# Patient Record
Sex: FEMALE | Race: WHITE | NOT HISPANIC OR LATINO | Employment: FULL TIME | URBAN - METROPOLITAN AREA
[De-identification: names, ages, dates, MRNs, and addresses within clinical notes are randomized per-mention and may not be internally consistent; named-entity substitution may affect disease eponyms.]

---

## 2017-09-27 PROCEDURE — 99284 EMERGENCY DEPT VISIT MOD MDM: CPT

## 2017-09-28 ENCOUNTER — HOSPITAL ENCOUNTER (INPATIENT)
Facility: HOSPITAL | Age: 37
LOS: 3 days | Discharge: HOME | End: 2017-10-01
Attending: EMERGENCY MEDICINE | Admitting: INTERNAL MEDICINE

## 2017-09-28 ENCOUNTER — APPOINTMENT (OUTPATIENT)
Dept: ULTRASOUND IMAGING | Facility: HOSPITAL | Age: 37
End: 2017-09-28
Attending: INTERNAL MEDICINE

## 2017-09-28 ENCOUNTER — APPOINTMENT (OUTPATIENT)
Dept: CT IMAGING | Facility: HOSPITAL | Age: 37
End: 2017-09-28

## 2017-09-28 DIAGNOSIS — K85.20 ALCOHOL-INDUCED ACUTE PANCREATITIS WITHOUT INFECTION OR NECROSIS: Primary | ICD-10-CM

## 2017-09-28 DIAGNOSIS — F10.929 ACUTE ALCOHOL INTOXICATION, WITH UNSPECIFIED COMPLICATION (HCC): ICD-10-CM

## 2017-09-28 PROBLEM — F10.10 ALCOHOL ABUSE: Status: ACTIVE | Noted: 2017-09-28

## 2017-09-28 PROBLEM — E87.6 HYPOKALEMIA: Status: ACTIVE | Noted: 2017-09-28

## 2017-09-28 PROBLEM — E86.0 DEHYDRATION: Status: ACTIVE | Noted: 2017-09-28

## 2017-09-28 PROBLEM — E87.4 MIXED ACID BASE BALANCE DISORDER: Status: ACTIVE | Noted: 2017-09-28

## 2017-09-28 PROBLEM — R79.89 ELEVATED LFTS: Status: ACTIVE | Noted: 2017-09-28

## 2017-09-28 LAB
ALBUMIN SERPL-MCNC: 4.5 G/DL (ref 3.5–5.2)
ALBUMIN/GLOB SERPL: 1.3 G/DL
ALP SERPL-CCNC: 150 U/L (ref 39–117)
ALT SERPL W P-5'-P-CCNC: 154 U/L (ref 1–33)
ANION GAP SERPL CALCULATED.3IONS-SCNC: 15.8 MMOL/L
AST SERPL-CCNC: 203 U/L (ref 1–32)
BASOPHILS # BLD AUTO: 0.04 10*3/MM3 (ref 0–0.2)
BASOPHILS NFR BLD AUTO: 0.8 % (ref 0–1.5)
BILIRUB SERPL-MCNC: 1 MG/DL (ref 0.1–1.2)
BILIRUB UR QL STRIP: NEGATIVE
BILIRUB UR QL STRIP: NEGATIVE
BUN BLD-MCNC: 3 MG/DL (ref 6–20)
BUN/CREAT SERPL: 4.9 (ref 7–25)
CALCIUM SPEC-SCNC: 9.3 MG/DL (ref 8.6–10.5)
CHLORIDE SERPL-SCNC: 99 MMOL/L (ref 98–107)
CLARITY UR: CLEAR
CLARITY UR: CLEAR
CO2 SERPL-SCNC: 30.2 MMOL/L (ref 22–29)
COLOR UR: ABNORMAL
COLOR UR: YELLOW
CREAT BLD-MCNC: 0.61 MG/DL (ref 0.57–1)
DEPRECATED RDW RBC AUTO: 64.6 FL (ref 37–54)
EOSINOPHIL # BLD AUTO: 0.04 10*3/MM3 (ref 0–0.7)
EOSINOPHIL NFR BLD AUTO: 0.8 % (ref 0.3–6.2)
ERYTHROCYTE [DISTWIDTH] IN BLOOD BY AUTOMATED COUNT: 16 % (ref 11.7–13)
ETHANOL BLD-MCNC: 381 MG/DL (ref 0–10)
ETHANOL UR QL: 0.38 %
GFR SERPL CREATININE-BSD FRML MDRD: 112 ML/MIN/1.73
GLOBULIN UR ELPH-MCNC: 3.5 GM/DL
GLUCOSE BLD-MCNC: 92 MG/DL (ref 65–99)
GLUCOSE UR STRIP-MCNC: NEGATIVE MG/DL
GLUCOSE UR STRIP-MCNC: NEGATIVE MG/DL
HCG SERPL QL: NEGATIVE
HCT VFR BLD AUTO: 44 % (ref 35.6–45.5)
HGB BLD-MCNC: 16 G/DL (ref 11.9–15.5)
HGB UR QL STRIP.AUTO: NEGATIVE
HGB UR QL STRIP.AUTO: NEGATIVE
HOLD SPECIMEN: NORMAL
HOLD SPECIMEN: NORMAL
IMM GRANULOCYTES # BLD: 0 10*3/MM3 (ref 0–0.03)
IMM GRANULOCYTES NFR BLD: 0 % (ref 0–0.5)
KETONES UR QL STRIP: NEGATIVE
KETONES UR QL STRIP: NEGATIVE
LEUKOCYTE ESTERASE UR QL STRIP.AUTO: NEGATIVE
LEUKOCYTE ESTERASE UR QL STRIP.AUTO: NEGATIVE
LIPASE SERPL-CCNC: 133 U/L (ref 13–60)
LYMPHOCYTES # BLD AUTO: 2.8 10*3/MM3 (ref 0.9–4.8)
LYMPHOCYTES NFR BLD AUTO: 57.3 % (ref 19.6–45.3)
MAGNESIUM SERPL-MCNC: 1.4 MG/DL (ref 1.6–2.6)
MCH RBC QN AUTO: 39.9 PG (ref 26.9–32)
MCHC RBC AUTO-ENTMCNC: 36.4 G/DL (ref 32.4–36.3)
MCV RBC AUTO: 109.7 FL (ref 80.5–98.2)
MONOCYTES # BLD AUTO: 0.47 10*3/MM3 (ref 0.2–1.2)
MONOCYTES NFR BLD AUTO: 9.6 % (ref 5–12)
NEUTROPHILS # BLD AUTO: 1.54 10*3/MM3 (ref 1.9–8.1)
NEUTROPHILS NFR BLD AUTO: 31.5 % (ref 42.7–76)
NITRITE UR QL STRIP: NEGATIVE
NITRITE UR QL STRIP: NEGATIVE
PH UR STRIP.AUTO: 5.5 [PH] (ref 5–8)
PH UR STRIP.AUTO: 6.5 [PH] (ref 5–8)
PLATELET # BLD AUTO: 165 10*3/MM3 (ref 140–500)
PMV BLD AUTO: 10.7 FL (ref 6–12)
POTASSIUM BLD-SCNC: 3.1 MMOL/L (ref 3.5–5.2)
PROT SERPL-MCNC: 8 G/DL (ref 6–8.5)
PROT UR QL STRIP: NEGATIVE
PROT UR QL STRIP: NEGATIVE
RBC # BLD AUTO: 4.01 10*6/MM3 (ref 3.9–5.2)
SODIUM BLD-SCNC: 145 MMOL/L (ref 136–145)
SP GR UR STRIP: 1.01 (ref 1–1.03)
SP GR UR STRIP: 1.01 (ref 1–1.03)
UROBILINOGEN UR QL STRIP: ABNORMAL
UROBILINOGEN UR QL STRIP: NORMAL
WBC NRBC COR # BLD: 4.89 10*3/MM3 (ref 4.5–10.7)
WHOLE BLOOD HOLD SPECIMEN: NORMAL
WHOLE BLOOD HOLD SPECIMEN: NORMAL

## 2017-09-28 PROCEDURE — 80307 DRUG TEST PRSMV CHEM ANLYZR: CPT | Performed by: EMERGENCY MEDICINE

## 2017-09-28 PROCEDURE — 81003 URINALYSIS AUTO W/O SCOPE: CPT | Performed by: EMERGENCY MEDICINE

## 2017-09-28 PROCEDURE — 81003 URINALYSIS AUTO W/O SCOPE: CPT | Performed by: INTERNAL MEDICINE

## 2017-09-28 PROCEDURE — 25010000002 MAGNESIUM SULFATE IN D5W 1G/100ML (PREMIX) 1-5 GM/100ML-% SOLUTION: Performed by: INTERNAL MEDICINE

## 2017-09-28 PROCEDURE — 25010000002 ONDANSETRON PER 1 MG: Performed by: INTERNAL MEDICINE

## 2017-09-28 PROCEDURE — 83735 ASSAY OF MAGNESIUM: CPT | Performed by: INTERNAL MEDICINE

## 2017-09-28 PROCEDURE — 25010000002 ONDANSETRON PER 1 MG: Performed by: EMERGENCY MEDICINE

## 2017-09-28 PROCEDURE — 25010000002 ENOXAPARIN PER 10 MG: Performed by: INTERNAL MEDICINE

## 2017-09-28 PROCEDURE — 25010000002 THIAMINE PER 100 MG: Performed by: INTERNAL MEDICINE

## 2017-09-28 PROCEDURE — 25010000002 MAGNESIUM SULFATE PER 500 MG OF MAGNESIUM: Performed by: INTERNAL MEDICINE

## 2017-09-28 PROCEDURE — 74176 CT ABD & PELVIS W/O CONTRAST: CPT

## 2017-09-28 PROCEDURE — 85025 COMPLETE CBC W/AUTO DIFF WBC: CPT | Performed by: EMERGENCY MEDICINE

## 2017-09-28 PROCEDURE — 80053 COMPREHEN METABOLIC PANEL: CPT | Performed by: EMERGENCY MEDICINE

## 2017-09-28 PROCEDURE — 25010000002 HYDROMORPHONE PER 4 MG: Performed by: EMERGENCY MEDICINE

## 2017-09-28 PROCEDURE — 25010000002 MAGNESIUM SULFATE PER 500 MG OF MAGNESIUM: Performed by: EMERGENCY MEDICINE

## 2017-09-28 PROCEDURE — 83690 ASSAY OF LIPASE: CPT | Performed by: EMERGENCY MEDICINE

## 2017-09-28 PROCEDURE — 25010000002 THIAMINE PER 100 MG: Performed by: EMERGENCY MEDICINE

## 2017-09-28 PROCEDURE — 76775 US EXAM ABDO BACK WALL LIM: CPT

## 2017-09-28 PROCEDURE — 84703 CHORIONIC GONADOTROPIN ASSAY: CPT | Performed by: EMERGENCY MEDICINE

## 2017-09-28 PROCEDURE — 80048 BASIC METABOLIC PNL TOTAL CA: CPT | Performed by: INTERNAL MEDICINE

## 2017-09-28 RX ORDER — HYDROMORPHONE HYDROCHLORIDE 1 MG/ML
0.5 INJECTION, SOLUTION INTRAMUSCULAR; INTRAVENOUS; SUBCUTANEOUS ONCE
Status: COMPLETED | OUTPATIENT
Start: 2017-09-28 | End: 2017-09-28

## 2017-09-28 RX ORDER — POTASSIUM CHLORIDE 1.5 G/1.77G
40 POWDER, FOR SOLUTION ORAL AS NEEDED
Status: DISCONTINUED | OUTPATIENT
Start: 2017-09-28 | End: 2017-10-01 | Stop reason: HOSPADM

## 2017-09-28 RX ORDER — LORAZEPAM 2 MG/ML
2 INJECTION INTRAMUSCULAR
Status: DISCONTINUED | OUTPATIENT
Start: 2017-09-28 | End: 2017-10-01 | Stop reason: HOSPADM

## 2017-09-28 RX ORDER — MAGNESIUM SULFATE 1 G/100ML
2 INJECTION INTRAVENOUS ONCE
Status: COMPLETED | OUTPATIENT
Start: 2017-09-28 | End: 2017-09-28

## 2017-09-28 RX ORDER — UBIDECARENONE 75 MG
50 CAPSULE ORAL DAILY
Status: ON HOLD | COMMUNITY
End: 2022-12-01

## 2017-09-28 RX ORDER — NITROGLYCERIN 0.4 MG/1
0.4 TABLET SUBLINGUAL
Status: DISCONTINUED | OUTPATIENT
Start: 2017-09-28 | End: 2017-10-01 | Stop reason: HOSPADM

## 2017-09-28 RX ORDER — ONDANSETRON 2 MG/ML
4 INJECTION INTRAMUSCULAR; INTRAVENOUS ONCE
Status: COMPLETED | OUTPATIENT
Start: 2017-09-28 | End: 2017-09-28

## 2017-09-28 RX ORDER — POTASSIUM CHLORIDE 7.45 MG/ML
10 INJECTION INTRAVENOUS
Status: DISCONTINUED | OUTPATIENT
Start: 2017-09-28 | End: 2017-10-01 | Stop reason: HOSPADM

## 2017-09-28 RX ORDER — OXYCODONE HYDROCHLORIDE AND ACETAMINOPHEN 5; 325 MG/1; MG/1
1 TABLET ORAL EVERY 4 HOURS PRN
Status: DISCONTINUED | OUTPATIENT
Start: 2017-09-28 | End: 2017-10-01 | Stop reason: HOSPADM

## 2017-09-28 RX ORDER — CALCIUM CARBONATE 200(500)MG
2 TABLET,CHEWABLE ORAL 2 TIMES DAILY PRN
Status: DISCONTINUED | OUTPATIENT
Start: 2017-09-28 | End: 2017-10-01 | Stop reason: HOSPADM

## 2017-09-28 RX ORDER — MAGNESIUM SULFATE 1 G/100ML
1 INJECTION INTRAVENOUS ONCE
Status: COMPLETED | OUTPATIENT
Start: 2017-09-28 | End: 2017-09-28

## 2017-09-28 RX ORDER — LORAZEPAM 2 MG/ML
1 INJECTION INTRAMUSCULAR
Status: DISCONTINUED | OUTPATIENT
Start: 2017-09-28 | End: 2017-10-01 | Stop reason: HOSPADM

## 2017-09-28 RX ORDER — SODIUM CHLORIDE 0.9 % (FLUSH) 0.9 %
1-10 SYRINGE (ML) INJECTION AS NEEDED
Status: DISCONTINUED | OUTPATIENT
Start: 2017-09-28 | End: 2017-10-01 | Stop reason: HOSPADM

## 2017-09-28 RX ORDER — SODIUM CHLORIDE 0.9 % (FLUSH) 0.9 %
10 SYRINGE (ML) INJECTION AS NEEDED
Status: DISCONTINUED | OUTPATIENT
Start: 2017-09-28 | End: 2017-10-01 | Stop reason: HOSPADM

## 2017-09-28 RX ORDER — LORAZEPAM 1 MG/1
2 TABLET ORAL
Status: DISCONTINUED | OUTPATIENT
Start: 2017-09-28 | End: 2017-10-01 | Stop reason: HOSPADM

## 2017-09-28 RX ORDER — ONDANSETRON 2 MG/ML
4 INJECTION INTRAMUSCULAR; INTRAVENOUS EVERY 6 HOURS PRN
Status: DISCONTINUED | OUTPATIENT
Start: 2017-09-28 | End: 2017-10-01 | Stop reason: HOSPADM

## 2017-09-28 RX ORDER — NALOXONE HCL 0.4 MG/ML
0.4 VIAL (ML) INJECTION
Status: DISCONTINUED | OUTPATIENT
Start: 2017-09-28 | End: 2017-10-01 | Stop reason: HOSPADM

## 2017-09-28 RX ORDER — ACETAMINOPHEN 325 MG/1
650 TABLET ORAL EVERY 4 HOURS PRN
Status: DISCONTINUED | OUTPATIENT
Start: 2017-09-28 | End: 2017-10-01 | Stop reason: HOSPADM

## 2017-09-28 RX ORDER — CALCIUM CARBONATE 200(500)MG
2 TABLET,CHEWABLE ORAL AS NEEDED
Status: ON HOLD | COMMUNITY
End: 2022-08-07

## 2017-09-28 RX ORDER — ONDANSETRON 4 MG/1
4 TABLET, FILM COATED ORAL EVERY 6 HOURS PRN
Status: DISCONTINUED | OUTPATIENT
Start: 2017-09-28 | End: 2017-10-01 | Stop reason: HOSPADM

## 2017-09-28 RX ORDER — HYDROMORPHONE HYDROCHLORIDE 1 MG/ML
0.5 INJECTION, SOLUTION INTRAMUSCULAR; INTRAVENOUS; SUBCUTANEOUS
Status: DISCONTINUED | OUTPATIENT
Start: 2017-09-28 | End: 2017-10-01 | Stop reason: HOSPADM

## 2017-09-28 RX ORDER — ONDANSETRON 4 MG/1
4 TABLET, ORALLY DISINTEGRATING ORAL EVERY 6 HOURS PRN
Status: DISCONTINUED | OUTPATIENT
Start: 2017-09-28 | End: 2017-10-01 | Stop reason: HOSPADM

## 2017-09-28 RX ORDER — LORAZEPAM 1 MG/1
1 TABLET ORAL
Status: DISCONTINUED | OUTPATIENT
Start: 2017-09-28 | End: 2017-10-01 | Stop reason: HOSPADM

## 2017-09-28 RX ORDER — POTASSIUM CHLORIDE 750 MG/1
40 CAPSULE, EXTENDED RELEASE ORAL AS NEEDED
Status: DISCONTINUED | OUTPATIENT
Start: 2017-09-28 | End: 2017-10-01 | Stop reason: HOSPADM

## 2017-09-28 RX ADMIN — ONDANSETRON 4 MG: 2 INJECTION INTRAMUSCULAR; INTRAVENOUS at 14:10

## 2017-09-28 RX ADMIN — HYDROMORPHONE HYDROCHLORIDE 0.5 MG: 1 INJECTION, SOLUTION INTRAMUSCULAR; INTRAVENOUS; SUBCUTANEOUS at 03:28

## 2017-09-28 RX ADMIN — LORAZEPAM 2 MG: 1 TABLET ORAL at 14:10

## 2017-09-28 RX ADMIN — MAGNESIUM SULFATE HEPTAHYDRATE 1 G: 1 INJECTION, SOLUTION INTRAVENOUS at 17:47

## 2017-09-28 RX ADMIN — ENOXAPARIN SODIUM 40 MG: 40 INJECTION SUBCUTANEOUS at 13:46

## 2017-09-28 RX ADMIN — LORAZEPAM 2 MG: 1 TABLET ORAL at 22:57

## 2017-09-28 RX ADMIN — ONDANSETRON 4 MG: 2 INJECTION INTRAMUSCULAR; INTRAVENOUS at 03:28

## 2017-09-28 RX ADMIN — OXYCODONE HYDROCHLORIDE AND ACETAMINOPHEN 1 TABLET: 5; 325 TABLET ORAL at 22:57

## 2017-09-28 RX ADMIN — MAGNESIUM SULFATE HEPTAHYDRATE 1 G: 1 INJECTION, SOLUTION INTRAVENOUS at 12:05

## 2017-09-28 RX ADMIN — MAGNESIUM SULFATE HEPTAHYDRATE 125 ML/HR: 500 INJECTION, SOLUTION INTRAMUSCULAR; INTRAVENOUS at 04:53

## 2017-09-28 RX ADMIN — SODIUM CHLORIDE 1000 ML: 9 INJECTION, SOLUTION INTRAVENOUS at 03:20

## 2017-09-28 RX ADMIN — FOLIC ACID 100 ML/HR: 5 INJECTION, SOLUTION INTRAMUSCULAR; INTRAVENOUS; SUBCUTANEOUS at 17:47

## 2017-09-28 RX ADMIN — OXYCODONE HYDROCHLORIDE AND ACETAMINOPHEN 1 TABLET: 5; 325 TABLET ORAL at 13:47

## 2017-09-29 LAB
ANION GAP SERPL CALCULATED.3IONS-SCNC: 22.9 MMOL/L
ANION GAP SERPL CALCULATED.3IONS-SCNC: 9.6 MMOL/L
BASOPHILS # BLD AUTO: 0.01 10*3/MM3 (ref 0–0.2)
BASOPHILS NFR BLD AUTO: 0.3 % (ref 0–1.5)
BUN BLD-MCNC: 2 MG/DL (ref 6–20)
BUN BLD-MCNC: 3 MG/DL (ref 6–20)
BUN/CREAT SERPL: 4.4 (ref 7–25)
BUN/CREAT SERPL: 4.9 (ref 7–25)
CALCIUM SPEC-SCNC: 8.6 MG/DL (ref 8.6–10.5)
CALCIUM SPEC-SCNC: 8.8 MG/DL (ref 8.6–10.5)
CHLORIDE SERPL-SCNC: 98 MMOL/L (ref 98–107)
CHLORIDE SERPL-SCNC: 98 MMOL/L (ref 98–107)
CO2 SERPL-SCNC: 24.1 MMOL/L (ref 22–29)
CO2 SERPL-SCNC: 30.4 MMOL/L (ref 22–29)
CREAT BLD-MCNC: 0.45 MG/DL (ref 0.57–1)
CREAT BLD-MCNC: 0.61 MG/DL (ref 0.57–1)
DEPRECATED RDW RBC AUTO: 62.2 FL (ref 37–54)
EOSINOPHIL # BLD AUTO: 0.05 10*3/MM3 (ref 0–0.7)
EOSINOPHIL NFR BLD AUTO: 1.6 % (ref 0.3–6.2)
ERYTHROCYTE [DISTWIDTH] IN BLOOD BY AUTOMATED COUNT: 15.3 % (ref 11.7–13)
GFR SERPL CREATININE-BSD FRML MDRD: 112 ML/MIN/1.73
GFR SERPL CREATININE-BSD FRML MDRD: >150 ML/MIN/1.73
GLUCOSE BLD-MCNC: 121 MG/DL (ref 65–99)
GLUCOSE BLD-MCNC: 96 MG/DL (ref 65–99)
HCT VFR BLD AUTO: 35.6 % (ref 35.6–45.5)
HGB BLD-MCNC: 12.3 G/DL (ref 11.9–15.5)
IMM GRANULOCYTES # BLD: 0 10*3/MM3 (ref 0–0.03)
IMM GRANULOCYTES NFR BLD: 0 % (ref 0–0.5)
LIPASE SERPL-CCNC: 86 U/L (ref 13–60)
LYMPHOCYTES # BLD AUTO: 1.34 10*3/MM3 (ref 0.9–4.8)
LYMPHOCYTES NFR BLD AUTO: 41.7 % (ref 19.6–45.3)
MAGNESIUM SERPL-MCNC: 2.2 MG/DL (ref 1.6–2.6)
MCH RBC QN AUTO: 38.3 PG (ref 26.9–32)
MCHC RBC AUTO-ENTMCNC: 34.6 G/DL (ref 32.4–36.3)
MCV RBC AUTO: 110.9 FL (ref 80.5–98.2)
MONOCYTES # BLD AUTO: 0.39 10*3/MM3 (ref 0.2–1.2)
MONOCYTES NFR BLD AUTO: 12.1 % (ref 5–12)
NEUTROPHILS # BLD AUTO: 1.42 10*3/MM3 (ref 1.9–8.1)
NEUTROPHILS NFR BLD AUTO: 44.3 % (ref 42.7–76)
PLATELET # BLD AUTO: 100 10*3/MM3 (ref 140–500)
PMV BLD AUTO: 10.3 FL (ref 6–12)
POTASSIUM BLD-SCNC: 3 MMOL/L (ref 3.5–5.2)
POTASSIUM BLD-SCNC: 3.3 MMOL/L (ref 3.5–5.2)
RBC # BLD AUTO: 3.21 10*6/MM3 (ref 3.9–5.2)
SODIUM BLD-SCNC: 138 MMOL/L (ref 136–145)
SODIUM BLD-SCNC: 145 MMOL/L (ref 136–145)
WBC NRBC COR # BLD: 3.21 10*3/MM3 (ref 4.5–10.7)

## 2017-09-29 PROCEDURE — 25010000002 THIAMINE PER 100 MG: Performed by: INTERNAL MEDICINE

## 2017-09-29 PROCEDURE — 25010000002 ENOXAPARIN PER 10 MG: Performed by: INTERNAL MEDICINE

## 2017-09-29 PROCEDURE — 25010000002 ONDANSETRON PER 1 MG: Performed by: INTERNAL MEDICINE

## 2017-09-29 PROCEDURE — 83735 ASSAY OF MAGNESIUM: CPT | Performed by: INTERNAL MEDICINE

## 2017-09-29 PROCEDURE — 25810000003 SODIUM CHLORIDE 0.9 % WITH KCL 40 MEQ/L 40-0.9 MEQ/L-% SOLUTION: Performed by: INTERNAL MEDICINE

## 2017-09-29 PROCEDURE — 25010000002 MAGNESIUM SULFATE PER 500 MG OF MAGNESIUM: Performed by: INTERNAL MEDICINE

## 2017-09-29 PROCEDURE — 90791 PSYCH DIAGNOSTIC EVALUATION: CPT

## 2017-09-29 PROCEDURE — 83690 ASSAY OF LIPASE: CPT | Performed by: INTERNAL MEDICINE

## 2017-09-29 PROCEDURE — 85025 COMPLETE CBC W/AUTO DIFF WBC: CPT | Performed by: INTERNAL MEDICINE

## 2017-09-29 PROCEDURE — 80053 COMPREHEN METABOLIC PANEL: CPT | Performed by: INTERNAL MEDICINE

## 2017-09-29 RX ORDER — SODIUM CHLORIDE AND POTASSIUM CHLORIDE 300; 900 MG/100ML; MG/100ML
100 INJECTION, SOLUTION INTRAVENOUS CONTINUOUS
Status: DISCONTINUED | OUTPATIENT
Start: 2017-09-29 | End: 2017-10-01 | Stop reason: HOSPADM

## 2017-09-29 RX ADMIN — FOLIC ACID 100 ML/HR: 5 INJECTION, SOLUTION INTRAMUSCULAR; INTRAVENOUS; SUBCUTANEOUS at 14:15

## 2017-09-29 RX ADMIN — POTASSIUM CHLORIDE AND SODIUM CHLORIDE 100 ML/HR: 900; 300 INJECTION, SOLUTION INTRAVENOUS at 20:10

## 2017-09-29 RX ADMIN — ONDANSETRON 4 MG: 2 INJECTION INTRAMUSCULAR; INTRAVENOUS at 20:10

## 2017-09-29 RX ADMIN — LORAZEPAM 1 MG: 1 TABLET ORAL at 20:13

## 2017-09-29 RX ADMIN — ENOXAPARIN SODIUM 40 MG: 40 INJECTION SUBCUTANEOUS at 09:57

## 2017-09-29 RX ADMIN — LORAZEPAM 1 MG: 1 TABLET ORAL at 09:56

## 2017-09-29 RX ADMIN — LORAZEPAM 1 MG: 1 TABLET ORAL at 15:07

## 2017-09-30 LAB
ALBUMIN SERPL-MCNC: 3.1 G/DL (ref 3.5–5.2)
ALBUMIN/GLOB SERPL: 1.2 G/DL
ALP SERPL-CCNC: 109 U/L (ref 39–117)
ALT SERPL W P-5'-P-CCNC: 81 U/L (ref 1–33)
ANION GAP SERPL CALCULATED.3IONS-SCNC: 14.1 MMOL/L
AST SERPL-CCNC: 81 U/L (ref 1–32)
BASOPHILS # BLD AUTO: 0.02 10*3/MM3 (ref 0–0.2)
BASOPHILS NFR BLD AUTO: 0.7 % (ref 0–1.5)
BILIRUB SERPL-MCNC: 1.6 MG/DL (ref 0.1–1.2)
BUN BLD-MCNC: 2 MG/DL (ref 6–20)
BUN/CREAT SERPL: 4.3 (ref 7–25)
CALCIUM SPEC-SCNC: 8.5 MG/DL (ref 8.6–10.5)
CHLORIDE SERPL-SCNC: 98 MMOL/L (ref 98–107)
CO2 SERPL-SCNC: 25.9 MMOL/L (ref 22–29)
CREAT BLD-MCNC: 0.47 MG/DL (ref 0.57–1)
DEPRECATED RDW RBC AUTO: 61.5 FL (ref 37–54)
EOSINOPHIL # BLD AUTO: 0.05 10*3/MM3 (ref 0–0.7)
EOSINOPHIL NFR BLD AUTO: 1.6 % (ref 0.3–6.2)
ERYTHROCYTE [DISTWIDTH] IN BLOOD BY AUTOMATED COUNT: 15.1 % (ref 11.7–13)
GFR SERPL CREATININE-BSD FRML MDRD: >150 ML/MIN/1.73
GLOBULIN UR ELPH-MCNC: 2.6 GM/DL
GLUCOSE BLD-MCNC: 120 MG/DL (ref 65–99)
HCT VFR BLD AUTO: 32.5 % (ref 35.6–45.5)
HGB BLD-MCNC: 11.6 G/DL (ref 11.9–15.5)
IMM GRANULOCYTES # BLD: 0 10*3/MM3 (ref 0–0.03)
IMM GRANULOCYTES NFR BLD: 0 % (ref 0–0.5)
LIPASE SERPL-CCNC: 122 U/L (ref 13–60)
LYMPHOCYTES # BLD AUTO: 1.08 10*3/MM3 (ref 0.9–4.8)
LYMPHOCYTES NFR BLD AUTO: 35.4 % (ref 19.6–45.3)
MCH RBC QN AUTO: 39.9 PG (ref 26.9–32)
MCHC RBC AUTO-ENTMCNC: 35.7 G/DL (ref 32.4–36.3)
MCV RBC AUTO: 111.7 FL (ref 80.5–98.2)
MONOCYTES # BLD AUTO: 0.51 10*3/MM3 (ref 0.2–1.2)
MONOCYTES NFR BLD AUTO: 16.7 % (ref 5–12)
NEUTROPHILS # BLD AUTO: 1.39 10*3/MM3 (ref 1.9–8.1)
NEUTROPHILS NFR BLD AUTO: 45.6 % (ref 42.7–76)
PLATELET # BLD AUTO: 83 10*3/MM3 (ref 140–500)
PMV BLD AUTO: 10.9 FL (ref 6–12)
POTASSIUM BLD-SCNC: 3.4 MMOL/L (ref 3.5–5.2)
PROT SERPL-MCNC: 5.7 G/DL (ref 6–8.5)
RBC # BLD AUTO: 2.91 10*6/MM3 (ref 3.9–5.2)
SODIUM BLD-SCNC: 138 MMOL/L (ref 136–145)
WBC NRBC COR # BLD: 3.05 10*3/MM3 (ref 4.5–10.7)

## 2017-09-30 PROCEDURE — 25810000003 SODIUM CHLORIDE 0.9 % WITH KCL 40 MEQ/L 40-0.9 MEQ/L-% SOLUTION: Performed by: INTERNAL MEDICINE

## 2017-09-30 PROCEDURE — 85025 COMPLETE CBC W/AUTO DIFF WBC: CPT | Performed by: INTERNAL MEDICINE

## 2017-09-30 PROCEDURE — 83690 ASSAY OF LIPASE: CPT | Performed by: INTERNAL MEDICINE

## 2017-09-30 RX ADMIN — POTASSIUM CHLORIDE AND SODIUM CHLORIDE 100 ML/HR: 900; 300 INJECTION, SOLUTION INTRAVENOUS at 22:00

## 2017-09-30 RX ADMIN — LORAZEPAM 1 MG: 1 TABLET ORAL at 04:43

## 2017-09-30 RX ADMIN — LORAZEPAM 1 MG: 1 TABLET ORAL at 08:16

## 2017-09-30 RX ADMIN — LORAZEPAM 1 MG: 1 TABLET ORAL at 11:52

## 2017-09-30 RX ADMIN — OXYCODONE HYDROCHLORIDE AND ACETAMINOPHEN 1 TABLET: 5; 325 TABLET ORAL at 18:43

## 2017-09-30 RX ADMIN — POTASSIUM CHLORIDE AND SODIUM CHLORIDE 100 ML/HR: 900; 300 INJECTION, SOLUTION INTRAVENOUS at 08:05

## 2017-09-30 RX ADMIN — OXYCODONE HYDROCHLORIDE AND ACETAMINOPHEN 1 TABLET: 5; 325 TABLET ORAL at 08:15

## 2017-09-30 RX ADMIN — POTASSIUM CHLORIDE AND SODIUM CHLORIDE 100 ML/HR: 900; 300 INJECTION, SOLUTION INTRAVENOUS at 22:01

## 2017-10-01 VITALS
TEMPERATURE: 98.3 F | HEART RATE: 70 BPM | OXYGEN SATURATION: 98 % | HEIGHT: 71 IN | WEIGHT: 110 LBS | BODY MASS INDEX: 15.4 KG/M2 | RESPIRATION RATE: 18 BRPM | DIASTOLIC BLOOD PRESSURE: 83 MMHG | SYSTOLIC BLOOD PRESSURE: 121 MMHG

## 2017-10-01 PROBLEM — E86.0 DEHYDRATION: Status: RESOLVED | Noted: 2017-09-28 | Resolved: 2017-10-01

## 2017-10-01 PROBLEM — E87.6 HYPOKALEMIA: Status: RESOLVED | Noted: 2017-09-28 | Resolved: 2017-10-01

## 2017-10-01 PROBLEM — E87.4 MIXED ACID BASE BALANCE DISORDER: Status: RESOLVED | Noted: 2017-09-28 | Resolved: 2017-10-01

## 2017-10-01 LAB
ALBUMIN SERPL-MCNC: 3 G/DL (ref 3.5–5.2)
ALBUMIN/GLOB SERPL: 1.2 G/DL
ALP SERPL-CCNC: 104 U/L (ref 39–117)
ALT SERPL W P-5'-P-CCNC: 61 U/L (ref 1–33)
ANION GAP SERPL CALCULATED.3IONS-SCNC: 7.5 MMOL/L
AST SERPL-CCNC: 50 U/L (ref 1–32)
BASOPHILS # BLD AUTO: 0.01 10*3/MM3 (ref 0–0.2)
BASOPHILS NFR BLD AUTO: 0.3 % (ref 0–1.5)
BILIRUB SERPL-MCNC: 0.8 MG/DL (ref 0.1–1.2)
BUN BLD-MCNC: 2 MG/DL (ref 6–20)
BUN/CREAT SERPL: 4.9 (ref 7–25)
CALCIUM SPEC-SCNC: 9 MG/DL (ref 8.6–10.5)
CHLORIDE SERPL-SCNC: 103 MMOL/L (ref 98–107)
CO2 SERPL-SCNC: 28.5 MMOL/L (ref 22–29)
CREAT BLD-MCNC: 0.41 MG/DL (ref 0.57–1)
DEPRECATED RDW RBC AUTO: 62.3 FL (ref 37–54)
EOSINOPHIL # BLD AUTO: 0.09 10*3/MM3 (ref 0–0.7)
EOSINOPHIL NFR BLD AUTO: 2.5 % (ref 0.3–6.2)
ERYTHROCYTE [DISTWIDTH] IN BLOOD BY AUTOMATED COUNT: 15.2 % (ref 11.7–13)
GFR SERPL CREATININE-BSD FRML MDRD: >150 ML/MIN/1.73
GLOBULIN UR ELPH-MCNC: 2.5 GM/DL
GLUCOSE BLD-MCNC: 91 MG/DL (ref 65–99)
HCT VFR BLD AUTO: 34.6 % (ref 35.6–45.5)
HGB BLD-MCNC: 12 G/DL (ref 11.9–15.5)
IMM GRANULOCYTES # BLD: 0 10*3/MM3 (ref 0–0.03)
IMM GRANULOCYTES NFR BLD: 0 % (ref 0–0.5)
LIPASE SERPL-CCNC: 93 U/L (ref 13–60)
LYMPHOCYTES # BLD AUTO: 1.3 10*3/MM3 (ref 0.9–4.8)
LYMPHOCYTES NFR BLD AUTO: 36 % (ref 19.6–45.3)
MCH RBC QN AUTO: 39.2 PG (ref 26.9–32)
MCHC RBC AUTO-ENTMCNC: 34.7 G/DL (ref 32.4–36.3)
MCV RBC AUTO: 113.1 FL (ref 80.5–98.2)
MONOCYTES # BLD AUTO: 0.62 10*3/MM3 (ref 0.2–1.2)
MONOCYTES NFR BLD AUTO: 17.2 % (ref 5–12)
NEUTROPHILS # BLD AUTO: 1.59 10*3/MM3 (ref 1.9–8.1)
NEUTROPHILS NFR BLD AUTO: 44 % (ref 42.7–76)
PLATELET # BLD AUTO: 87 10*3/MM3 (ref 140–500)
PMV BLD AUTO: 10.9 FL (ref 6–12)
POTASSIUM BLD-SCNC: 4.8 MMOL/L (ref 3.5–5.2)
PROT SERPL-MCNC: 5.5 G/DL (ref 6–8.5)
RBC # BLD AUTO: 3.06 10*6/MM3 (ref 3.9–5.2)
SODIUM BLD-SCNC: 139 MMOL/L (ref 136–145)
WBC NRBC COR # BLD: 3.61 10*3/MM3 (ref 4.5–10.7)

## 2017-10-01 PROCEDURE — 80053 COMPREHEN METABOLIC PANEL: CPT | Performed by: INTERNAL MEDICINE

## 2017-10-01 PROCEDURE — 83690 ASSAY OF LIPASE: CPT | Performed by: INTERNAL MEDICINE

## 2017-10-01 PROCEDURE — 85025 COMPLETE CBC W/AUTO DIFF WBC: CPT | Performed by: INTERNAL MEDICINE

## 2017-10-01 PROCEDURE — 25810000003 SODIUM CHLORIDE 0.9 % WITH KCL 40 MEQ/L 40-0.9 MEQ/L-% SOLUTION: Performed by: INTERNAL MEDICINE

## 2017-10-01 RX ORDER — TRAMADOL HYDROCHLORIDE 50 MG/1
50 TABLET ORAL EVERY 6 HOURS PRN
Qty: 10 TABLET | Refills: 0 | Status: SHIPPED | OUTPATIENT
Start: 2017-10-01 | End: 2017-10-11

## 2017-10-01 RX ORDER — TRAMADOL HYDROCHLORIDE 50 MG/1
50 TABLET ORAL EVERY 6 HOURS PRN
Status: DISCONTINUED | OUTPATIENT
Start: 2017-10-01 | End: 2017-10-01 | Stop reason: HOSPADM

## 2017-10-01 RX ADMIN — OXYCODONE HYDROCHLORIDE AND ACETAMINOPHEN 1 TABLET: 5; 325 TABLET ORAL at 01:57

## 2017-10-01 RX ADMIN — LORAZEPAM 1 MG: 1 TABLET ORAL at 01:57

## 2017-10-01 RX ADMIN — POTASSIUM CHLORIDE AND SODIUM CHLORIDE 100 ML/HR: 900; 300 INJECTION, SOLUTION INTRAVENOUS at 11:44

## 2017-10-01 RX ADMIN — TRAMADOL HYDROCHLORIDE 50 MG: 50 TABLET, FILM COATED ORAL at 19:56

## 2017-10-01 NOTE — DISCHARGE SUMMARY
NAME: Piper Cain ADMIT: 2017   : 1980  PCP: No Known Provider    MRN: [id number] LOS: 3 days   AGE/SEX: 37 y.o. female  ROOM: [facility]/1     Date of Admission:  2017  Date of Discharge:  10/2/2017    PCP: No Known Provider      CHIEF COMPLAINT  Urinary Tract Infection and Alcohol Intoxication      DISCHARGE DIAGNOSIS  Active Hospital Problems (** Indicates Principal Problem)    Diagnosis Date Noted   • Thrombocytopenia [D69.6] 10/02/2017   • Alcohol-induced acute pancreatitis without infection or necrosis [K85.20] 2017   • Alcohol abuse [F10.10] 2017   • Elevated LFTs [R79.89] 2017      Resolved Hospital Problems    Diagnosis Date Noted Date Resolved   • Hypokalemia [E87.6] 2017 10/01/2017   • Mixed acid base balance disorder [E87.4] 2017 10/01/2017   • Dehydration [E86.0] 2017 10/01/2017       SECONDARY DIAGNOSES  Past Medical History:   Diagnosis Date   • Anxiety    • E. coli bacteremia    • GERD (gastroesophageal reflux disease)    • Hiatal hernia    • Migraine    • Miscarriage 2004       CONSULTS   None  Consult Orders (all)     Start     Ordered    17 0510  Inpatient Consult to [facility]  Once     Provider:  (Not yet assigned)    17 0510    17 0354  [facility] (on-call MD unless specified)  Once     Specialty:  Internal Medicine  Provider:  (Not yet assigned)    17 0353        Treatment Team     Provider Relationship Specialty Contact    Venus Jara MD Attending --  [phone]    Rahel Angela, ZAYDA Case Manager --      Caity Aviles, RN Registered Nurse --  [phone]    yMla Segal Certified Nursing Assistant --  [phone]    Bernarda Wong, KATHRINE Respiratory Therapist Respiratory Therapy  [phone]          PROCEDURES PERFORMED  None      HOSPITAL COURSE  37-year-old female who presented complaining of epigastric pain and was found to have an elevated lipase as well as elevated AST and ALT.   She has a history of alcohol abuse and her alcohol level on admission was 381.  She was covered with a detox protocol but required very little lorazepam.  She was also followed by the access Center.  She was hydrated with IV fluids and given antiemetics and pain medication.  Her pain actually resolved very quickly although she was still taking some doses of pain medication.  Her lipase had initially gone down but went back up yesterday after her diet had been progressed.  Today it was better although it is still above the normal range.  Her abdominal exam however is completely benign so I went ahead and progressed her diet to use a low-fat diet which she tolerated fine.  She was subtotally discharged home in stable condition.  She was counseled regarding her alcohol use and the need for cessation.  Her platelet count did drop after admission but this was likely secondary to the alcohol still.  It had started to improve by today    PHYSICAL EXAM  Temp:  [97.4 °F (36.3 °C)-98.3 °F (36.8 °C)] 98.3 °F (36.8 °C)  Heart Rate:  [65-70] 70  Resp:  [16-18] 18  BP: (121)/(82-83) 121/83  Body mass index is 15.34 kg/(m^2).  Physical Exam  WDWN WF in NAD  Skin warm & dry  Lungs clear  Heart RRR  Abd soft, BS+, NT, no guarding  Ext no edema    CONDITION ON DISCHARGE  Stable.      DISCHARGE DISPOSITION   Home      DISCHARGE MEDICATIONS   Piper Cain   Home Medication Instructions DOTTIE:[id number]    Printed on:10/02/17 0037   Medication Information                      calcium carbonate (TUMS) 500 MG chewable tablet  Chew 2 tablets As Needed for Indigestion or Heartburn.             traMADol (ULTRAM) 50 MG tablet  Take 1 tablet by mouth Every 6 (Six) Hours As Needed for Moderate Pain  for up to 10 days.             vitamin B-12 (CYANOCOBALAMIN) 100 MCG tablet  Take 50 mcg by mouth Daily.                No future appointments.  Follow-up Information     Follow up with No Known Provider .    Contact information:    [facility]  [facility] [address]            TEST  RESULTS PENDING AT DISCHARGE         Venus Jara MD  [facility]  10/02/17  12:37 AM      Time: greater than 30 minutes.

## 2017-10-01 NOTE — PLAN OF CARE
Problem: Patient Care Overview (Adult)  Goal: Plan of Care Review  Outcome: Ongoing (interventions implemented as appropriate)    10/01/17 0810   Coping/Psychosocial Response Interventions   Plan Of Care Reviewed With patient   Patient Care Overview   Progress improving   Outcome Evaluation   Outcome Summary/Follow up Plan Pt with minimal complaints. CIWA improved over last night. Ativan and oxycodone given x1. will continue to monitor.         Problem: Acute Alcohol Withdrawal Syndrome, Risk For/Actual (Adult)  Goal: Signs and Symptoms of Listed Potential Problems Will be Absent or Manageable (Acute Alcohol Withdrawal Syndrome, Risk For/Actual)  Outcome: Ongoing (interventions implemented as appropriate)

## 2017-10-01 NOTE — PROGRESS NOTES
Chart was reviewed.  Met w/ patient and discussed high risk for drinking ETOH again. Also provided list of IP and outpatient [facility] providers.  Patient ambivalent about whether she wants to go to treatment. Discussed her recent medical issues that are ETOH related.  She will consider it. She states that her b/f (reason for moving from [place] to [place] in July) drinks occasionally.  She is not interested in any 12 step involvement.  CIWA scores were low and zero.  Discharge anticipated today.

## 2017-10-02 PROBLEM — D69.6 THROMBOCYTOPENIA: Status: ACTIVE | Noted: 2017-10-02

## 2018-01-24 ENCOUNTER — HOSPITAL ENCOUNTER (EMERGENCY)
Facility: HOSPITAL | Age: 38
Discharge: HOME | End: 2018-01-24
Attending: EMERGENCY MEDICINE | Admitting: EMERGENCY MEDICINE

## 2018-01-24 VITALS
DIASTOLIC BLOOD PRESSURE: 95 MMHG | TEMPERATURE: 98.1 F | SYSTOLIC BLOOD PRESSURE: 126 MMHG | BODY MASS INDEX: 18.2 KG/M2 | HEIGHT: 71 IN | OXYGEN SATURATION: 98 % | HEART RATE: 95 BPM | WEIGHT: 130 LBS | RESPIRATION RATE: 20 BRPM

## 2018-01-24 DIAGNOSIS — K85.20 ALCOHOL-INDUCED ACUTE PANCREATITIS WITHOUT INFECTION OR NECROSIS: Primary | ICD-10-CM

## 2018-01-24 DIAGNOSIS — F10.10 ALCOHOL ABUSE: ICD-10-CM

## 2018-01-24 LAB
ALBUMIN SERPL-MCNC: 4.3 G/DL (ref 3.5–5.2)
ALBUMIN/GLOB SERPL: 1.2 G/DL
ALP SERPL-CCNC: 71 U/L (ref 39–117)
ALT SERPL W P-5'-P-CCNC: 14 U/L (ref 1–33)
ANION GAP SERPL CALCULATED.3IONS-SCNC: 20.5 MMOL/L
AST SERPL-CCNC: 26 U/L (ref 1–32)
BASOPHILS # BLD AUTO: 0.02 10*3/MM3 (ref 0–0.2)
BASOPHILS NFR BLD AUTO: 0.3 % (ref 0–1.5)
BILIRUB SERPL-MCNC: 0.4 MG/DL (ref 0.1–1.2)
BUN BLD-MCNC: 7 MG/DL (ref 6–20)
BUN/CREAT SERPL: 10.9 (ref 7–25)
CALCIUM SPEC-SCNC: 8.7 MG/DL (ref 8.6–10.5)
CHLORIDE SERPL-SCNC: 97 MMOL/L (ref 98–107)
CO2 SERPL-SCNC: 28.5 MMOL/L (ref 22–29)
CREAT BLD-MCNC: 0.64 MG/DL (ref 0.57–1)
DEPRECATED RDW RBC AUTO: 56.1 FL (ref 37–54)
EOSINOPHIL # BLD AUTO: 0.01 10*3/MM3 (ref 0–0.7)
EOSINOPHIL NFR BLD AUTO: 0.2 % (ref 0.3–6.2)
ERYTHROCYTE [DISTWIDTH] IN BLOOD BY AUTOMATED COUNT: 14.8 % (ref 11.7–13)
ETHANOL BLD-MCNC: 413 MG/DL (ref 0–10)
ETHANOL UR QL: 0.41 %
GFR SERPL CREATININE-BSD FRML MDRD: 106 ML/MIN/1.73
GLOBULIN UR ELPH-MCNC: 3.5 GM/DL
GLUCOSE BLD-MCNC: 93 MG/DL (ref 65–99)
HCT VFR BLD AUTO: 48.2 % (ref 35.6–45.5)
HGB BLD-MCNC: 16.6 G/DL (ref 11.9–15.5)
IMM GRANULOCYTES # BLD: 0 10*3/MM3 (ref 0–0.03)
IMM GRANULOCYTES NFR BLD: 0 % (ref 0–0.5)
LIPASE SERPL-CCNC: 95 U/L (ref 13–60)
LYMPHOCYTES # BLD AUTO: 2.24 10*3/MM3 (ref 0.9–4.8)
LYMPHOCYTES NFR BLD AUTO: 34.9 % (ref 19.6–45.3)
MCH RBC QN AUTO: 35.9 PG (ref 26.9–32)
MCHC RBC AUTO-ENTMCNC: 34.4 G/DL (ref 32.4–36.3)
MCV RBC AUTO: 104.1 FL (ref 80.5–98.2)
MONOCYTES # BLD AUTO: 0.63 10*3/MM3 (ref 0.2–1.2)
MONOCYTES NFR BLD AUTO: 9.8 % (ref 5–12)
NEUTROPHILS # BLD AUTO: 3.52 10*3/MM3 (ref 1.9–8.1)
NEUTROPHILS NFR BLD AUTO: 54.8 % (ref 42.7–76)
PLATELET # BLD AUTO: 182 10*3/MM3 (ref 140–500)
PMV BLD AUTO: 10.4 FL (ref 6–12)
POTASSIUM BLD-SCNC: 3.5 MMOL/L (ref 3.5–5.2)
PROT SERPL-MCNC: 7.8 G/DL (ref 6–8.5)
RBC # BLD AUTO: 4.63 10*6/MM3 (ref 3.9–5.2)
SODIUM BLD-SCNC: 146 MMOL/L (ref 136–145)
WBC NRBC COR # BLD: 6.42 10*3/MM3 (ref 4.5–10.7)

## 2018-01-24 PROCEDURE — 99284 EMERGENCY DEPT VISIT MOD MDM: CPT

## 2018-01-24 PROCEDURE — 80053 COMPREHEN METABOLIC PANEL: CPT | Performed by: EMERGENCY MEDICINE

## 2018-01-24 PROCEDURE — 25010000002 ONDANSETRON PER 1 MG: Performed by: EMERGENCY MEDICINE

## 2018-01-24 PROCEDURE — 96374 THER/PROPH/DIAG INJ IV PUSH: CPT

## 2018-01-24 PROCEDURE — 80307 DRUG TEST PRSMV CHEM ANLYZR: CPT | Performed by: EMERGENCY MEDICINE

## 2018-01-24 PROCEDURE — 85025 COMPLETE CBC W/AUTO DIFF WBC: CPT | Performed by: EMERGENCY MEDICINE

## 2018-01-24 PROCEDURE — 83690 ASSAY OF LIPASE: CPT | Performed by: EMERGENCY MEDICINE

## 2018-01-24 RX ORDER — ONDANSETRON 2 MG/ML
4 INJECTION INTRAMUSCULAR; INTRAVENOUS ONCE
Status: COMPLETED | OUTPATIENT
Start: 2018-01-24 | End: 2018-01-24

## 2018-01-24 RX ORDER — SODIUM CHLORIDE 0.9 % (FLUSH) 0.9 %
10 SYRINGE (ML) INJECTION AS NEEDED
Status: DISCONTINUED | OUTPATIENT
Start: 2018-01-24 | End: 2018-01-24 | Stop reason: HOSPADM

## 2018-01-24 RX ADMIN — ONDANSETRON 4 MG: 2 INJECTION INTRAMUSCULAR; INTRAVENOUS at 05:47

## 2018-01-24 RX ADMIN — SODIUM CHLORIDE 1000 ML: 9 INJECTION, SOLUTION INTRAVENOUS at 05:47

## 2018-01-24 NOTE — ED NOTES
"Patient states she is ready to go because she is \"just laying here and no one is doing anything.\" Informed patient we were waiting for lab results and then the doctor would see her. Patient verbalizes understanding.  Patient given ice chips per her request.      Kailee Denny RN  01/24/18 0641    "

## 2018-01-24 NOTE — ED PROVIDER NOTES
" EMERGENCY DEPARTMENT ENCOUNTER    CHIEF COMPLAINT  Chief Complaint: Abdominal pain  History given by: Patient  History limited by:   Room Number: 17/17  PMD: No Known Provider      HPI:  Pt is a 37 y.o. female who presents complaining of abd pain onset 3 days ago, worse tonight. Pt reports the pain is in the epigastrium and similar to her previous episodes of abd pain. She states drinking 1x EtOH drink today at 0000. She states a fever last few weeks, CP, headache, sore throat, N/V/D, and dysuria.    Duration/Onset/Timing: 3 days  Location: epigastrium  Radiation: none  Quality: \"pain\"  Intensity/Severity: moderate  Associated Symptoms: fever, Tmax of 102, CP, headache, sore throat, N/V/D, dysuria  Aggravating or Alleviating Factors: EtOH  Previous Episodes: hx of pancreatitis. Pain is similar       PAST MEDICAL HISTORY  Active Ambulatory Problems     Diagnosis Date Noted   • Alcohol-induced acute pancreatitis without infection or necrosis 09/28/2017   • Alcohol abuse 09/28/2017   • Elevated LFTs 09/28/2017   • Thrombocytopenia 10/02/2017     Resolved Ambulatory Problems     Diagnosis Date Noted   • Hypokalemia 09/28/2017   • Mixed acid base balance disorder 09/28/2017   • Dehydration 09/28/2017     Past Medical History:   Diagnosis Date   • Anxiety    • E. coli bacteremia    • GERD (gastroesophageal reflux disease)    • Hiatal hernia    • Migraine    • Miscarriage 2004   • Pancreatitis        PAST SURGICAL HISTORY  History reviewed. No pertinent surgical history.    FAMILY HISTORY  Family History   Problem Relation Age of Onset   • Alcohol abuse Mother    • Arthritis Mother    • Asthma Mother    • Miscarriages / Stillbirths Mother    • Cancer Father    • Diabetes Father    • Drug abuse Father    • Early death Father    • Heart disease Father    • Hyperlipidemia Father    • Hypertension Father    • Alcohol abuse Paternal Aunt    • Cancer Paternal Aunt    • Diabetes Paternal Aunt    • Drug abuse Paternal Aunt    • " Early death Paternal Aunt    • Heart disease Paternal Aunt    • Hyperlipidemia Paternal Aunt    • Hypertension Paternal Aunt    • Alcohol abuse Maternal Grandmother    • Alcohol abuse Maternal Grandfather    • Alcohol abuse Paternal Grandmother    • Cancer Paternal Grandmother    • Diabetes Paternal Grandmother    • Drug abuse Paternal Grandmother    • Early death Paternal Grandmother    • Heart disease Paternal Grandmother    • Hyperlipidemia Paternal Grandmother    • Hypertension Paternal Grandmother    • Alcohol abuse Paternal Grandfather        SOCIAL HISTORY  Social History     Social History   • Marital status: Single     Spouse name: N/A   • Number of children: N/A   • Years of education: N/A     Occupational History   • Not on file.     Social History Main Topics   • Smoking status: Current Every Day Smoker     Packs/day: 0.50     Types: Cigarettes     Start date: 1/28/1996   • Smokeless tobacco: Current User   • Alcohol use Yes      Comment: 2 cocktails daily   • Drug use: No   • Sexual activity: Yes     Partners: Male     Birth control/ protection: None      Comment: IUD removed 6/29/2017     Other Topics Concern   • Not on file     Social History Narrative   • No narrative on file       ALLERGIES  Latex; Nickel; and Penicillins    REVIEW OF SYSTEMS  Review of Systems   Constitutional: Positive for fever. Negative for chills.   HENT: Negative for sore throat.    Eyes: Negative.    Respiratory: Negative.  Negative for cough.    Cardiovascular: Positive for chest pain.   Gastrointestinal: Positive for abdominal pain, diarrhea, nausea and vomiting.   Genitourinary: Positive for dysuria.   Musculoskeletal: Negative.  Negative for back pain.   Skin: Negative.  Negative for rash.   Neurological: Positive for headaches.       PHYSICAL EXAM  ED Triage Vitals   Temp Heart Rate Resp BP SpO2   01/24/18 0502 01/24/18 0456 01/24/18 0456 01/24/18 0456 --   97.3 °F (36.3 °C) 100 16 128/92       Temp src Heart Rate Source  Patient Position BP Location FiO2 (%)   01/24/18 0502 01/24/18 0456 01/24/18 0456 01/24/18 0456 --   Tympanic Monitor Lying Right arm        Physical Exam   Constitutional: No distress (occasionally laughing).   Appears intoxicated   HENT:   Head: Normocephalic and atraumatic.   Mouth/Throat: Oropharynx is clear and moist.   Eyes:   Unremarkable   Cardiovascular: Regular rhythm.  Tachycardia present.    Pulmonary/Chest: Breath sounds normal. No respiratory distress.   Abdominal: There is generalized tenderness (mild).   Musculoskeletal: She exhibits no edema or tenderness.   Neurological: She is alert.   Skin: No rash noted.   Nursing note and vitals reviewed.      LAB RESULTS  Lab Results (last 24 hours)     Procedure Component Value Units Date/Time    CBC & Differential [[id number]] Collected:  01/24/18 0545    Specimen:  Blood Updated:  01/24/18 0605    Narrative:       The following orders were created for panel order CBC & Differential.  Procedure                               Abnormality         Status                     ---------                               -----------         ------                     CBC Auto Differential[[id number]]        Abnormal            Final result                 Please view results for these tests on the individual orders.    Comprehensive Metabolic Panel [[id number]]  (Abnormal) Collected:  01/24/18 0545    Specimen:  Blood Updated:  01/24/18 0628     Glucose 93 mg/dL      BUN 7 mg/dL      Creatinine 0.64 mg/dL      Sodium 146 (H) mmol/L      Potassium 3.5 mmol/L      Chloride 97 (L) mmol/L      CO2 28.5 mmol/L      Calcium 8.7 mg/dL      Total Protein 7.8 g/dL      Albumin 4.30 g/dL      ALT (SGPT) 14 U/L      AST (SGOT) 26 U/L      Alkaline Phosphatase 71 U/L      Total Bilirubin 0.4 mg/dL      eGFR Non African Amer 106 mL/min/1.73      Globulin 3.5 gm/dL      A/G Ratio 1.2 g/dL      BUN/Creatinine Ratio 10.9     Anion Gap 20.5 mmol/L     Lipase [[id number]]  (Abnormal)  "Collected:  01/24/18 0545    Specimen:  Blood Updated:  01/24/18 0628     Lipase 95 (H) U/L     CBC Auto Differential [[id number]]  (Abnormal) Collected:  01/24/18 0545    Specimen:  Blood Updated:  01/24/18 0605     WBC 6.42 10*3/mm3      RBC 4.63 10*6/mm3      Hemoglobin 16.6 (H) g/dL      Hematocrit 48.2 (H) %      .1 (H) fL      MCH 35.9 (H) pg      MCHC 34.4 g/dL      RDW 14.8 (H) %      RDW-SD 56.1 (H) fl      MPV 10.4 fL      Platelets 182 10*3/mm3      Neutrophil % 54.8 %      Lymphocyte % 34.9 %      Monocyte % 9.8 %      Eosinophil % 0.2 (L) %      Basophil % 0.3 %      Immature Grans % 0.0 %      Neutrophils, Absolute 3.52 10*3/mm3      Lymphocytes, Absolute 2.24 10*3/mm3      Monocytes, Absolute 0.63 10*3/mm3      Eosinophils, Absolute 0.01 10*3/mm3      Basophils, Absolute 0.02 10*3/mm3      Immature Grans, Absolute 0.00 10*3/mm3     Ethanol [[id number]]  (Abnormal) Collected:  01/24/18 0545    Specimen:  Blood Updated:  01/24/18 0644     Ethanol 413 (C) mg/dL      Ethanol % 0.413 %           I ordered the above labs and reviewed the results    PROCEDURES  Procedures      PROGRESS AND CONSULTS  ED Course   5:10 AM  Blood work ordered for further evaluation.     6:50am  Pt in no distress.  No vomiting. Eating ice chips.  Discussed that the real problem here is continues alchohol use (she would only admit to \"1\" then \"2\" drinks around midnight).  I discussed need to stop drinking.  Given exam and labs, I do not feel admission or CT abdomen are indicated.  Will let pt go home if she can find a sober .  I feel her long term prognosis is poor as she seems to have no real inclination to stop drinking and is quick to blame her problems on recent \"URI\".      MEDICAL DECISION MAKING  Results were reviewed/discussed with the patient and they were also made aware of online access. Pt also made aware that some labs, such as cultures, will not be resulted during ER visit and follow up with PMD is " necessary.     MDM  Number of Diagnoses or Management Options     Amount and/or Complexity of Data Reviewed  Review and summarize past medical records: yes (Records reviewed from previous alcohol induced pancreatitis. )           DIAGNOSIS  Final diagnoses:   Alcohol-induced acute pancreatitis without infection or necrosis   Alcohol abuse       DISPOSITION  DISCHARGE    Patient discharged in stable condition.    Reviewed implications of results, diagnosis, meds, responsibility to follow up, warning signs and symptoms of possible worsening, potential complications and reasons to return to ER.    Patient/Family voiced understanding of above instructions.    Discussed plan for discharge, as there is no emergent indication for admission.  Pt/family is agreeable and understands need for follow up and repeat testing.  Pt is aware that discharge does not mean that nothing is wrong but it indicates no emergency is present that requires admission and they must continue care with follow-up as given below or physician of their choice.     FOLLOW-UP  [facility] REF[facility]  [facility] [address] [phone]    Call for Appointment         Medication List      Stop          OxyCODONE HCl 5 MG tablet                Latest Documented Vital Signs:  As of 7:07 AM  BP- 128/92 HR- 100 Temp- 97.3 °F (36.3 °C) (Tympanic) O2 sat- 98%    --  Documentation assistance provided by rowan Oleary for Dr. Navarro.  Information recorded by the scribe was done at my direction and has been verified and validated by me.     Manny Oleary  01/24/18 0640       Dany Navarro MD  01/24/18 0729

## 2018-01-24 NOTE — ED TRIAGE NOTES
"Pt called EMS with c/o heart palpitations and chest pain.  Per EMS, once patient was in ambulance, she denied having any c/o pain. Pt now describes having pain and describes pain as a sharp constant pain that is under left breast and extends down left arm.  Pt states palpitations became so strong \"that I passed out.  Pt unsure of whether she had LOC or hit head\" The patient has strong odor of alcohol but states she has only had \"one cocktail\"  "

## 2018-03-05 ENCOUNTER — HOSPITAL ENCOUNTER (EMERGENCY)
Facility: HOSPITAL | Age: 38
Discharge: HOME | End: 2018-03-05
Attending: FAMILY MEDICINE | Admitting: FAMILY MEDICINE

## 2018-03-05 VITALS
HEIGHT: 71 IN | RESPIRATION RATE: 8 BRPM | OXYGEN SATURATION: 96 % | DIASTOLIC BLOOD PRESSURE: 91 MMHG | BODY MASS INDEX: 17.92 KG/M2 | SYSTOLIC BLOOD PRESSURE: 126 MMHG | WEIGHT: 128 LBS | HEART RATE: 102 BPM | TEMPERATURE: 97.5 F

## 2018-03-05 DIAGNOSIS — N91.2 AMENORRHEA: Primary | ICD-10-CM

## 2018-03-05 LAB
ALBUMIN SERPL-MCNC: 4.3 G/DL (ref 3.5–5.2)
ALBUMIN/GLOB SERPL: 1.5 G/DL
ALP SERPL-CCNC: 67 U/L (ref 39–117)
ALT SERPL W P-5'-P-CCNC: 20 U/L (ref 1–33)
ANION GAP SERPL CALCULATED.3IONS-SCNC: 9.8 MMOL/L
AST SERPL-CCNC: 21 U/L (ref 1–32)
B-HCG UR QL: NEGATIVE
BASOPHILS # BLD AUTO: 0.02 10*3/MM3 (ref 0–0.2)
BASOPHILS NFR BLD AUTO: 0.4 % (ref 0–1.5)
BILIRUB SERPL-MCNC: 0.4 MG/DL (ref 0.1–1.2)
BUN BLD-MCNC: 5 MG/DL (ref 6–20)
BUN/CREAT SERPL: 7.8 (ref 7–25)
CALCIUM SPEC-SCNC: 8.5 MG/DL (ref 8.6–10.5)
CHLORIDE SERPL-SCNC: 101 MMOL/L (ref 98–107)
CO2 SERPL-SCNC: 32.2 MMOL/L (ref 22–29)
CREAT BLD-MCNC: 0.64 MG/DL (ref 0.57–1)
DEPRECATED RDW RBC AUTO: 48.5 FL (ref 37–54)
EOSINOPHIL # BLD AUTO: 0.08 10*3/MM3 (ref 0–0.7)
EOSINOPHIL NFR BLD AUTO: 1.7 % (ref 0.3–6.2)
ERYTHROCYTE [DISTWIDTH] IN BLOOD BY AUTOMATED COUNT: 12.9 % (ref 11.7–13)
GFR SERPL CREATININE-BSD FRML MDRD: 106 ML/MIN/1.73
GLOBULIN UR ELPH-MCNC: 2.8 GM/DL
GLUCOSE BLD-MCNC: 94 MG/DL (ref 65–99)
HCT VFR BLD AUTO: 39.3 % (ref 35.6–45.5)
HGB BLD-MCNC: 13.7 G/DL (ref 11.9–15.5)
IMM GRANULOCYTES # BLD: 0 10*3/MM3 (ref 0–0.03)
IMM GRANULOCYTES NFR BLD: 0 % (ref 0–0.5)
LYMPHOCYTES # BLD AUTO: 2.43 10*3/MM3 (ref 0.9–4.8)
LYMPHOCYTES NFR BLD AUTO: 51.1 % (ref 19.6–45.3)
MCH RBC QN AUTO: 36.1 PG (ref 26.9–32)
MCHC RBC AUTO-ENTMCNC: 34.9 G/DL (ref 32.4–36.3)
MCV RBC AUTO: 103.7 FL (ref 80.5–98.2)
MONOCYTES # BLD AUTO: 0.53 10*3/MM3 (ref 0.2–1.2)
MONOCYTES NFR BLD AUTO: 11.1 % (ref 5–12)
NEUTROPHILS # BLD AUTO: 1.7 10*3/MM3 (ref 1.9–8.1)
NEUTROPHILS NFR BLD AUTO: 35.7 % (ref 42.7–76)
NRBC BLD MANUAL-RTO: 0 /100 WBC (ref 0–0)
PLATELET # BLD AUTO: 169 10*3/MM3 (ref 140–500)
PMV BLD AUTO: 10.6 FL (ref 6–12)
POTASSIUM BLD-SCNC: 3.2 MMOL/L (ref 3.5–5.2)
PROT SERPL-MCNC: 7.1 G/DL (ref 6–8.5)
RBC # BLD AUTO: 3.79 10*6/MM3 (ref 3.9–5.2)
SODIUM BLD-SCNC: 143 MMOL/L (ref 136–145)
TSH SERPL DL<=0.05 MIU/L-ACNC: 3.23 MIU/ML (ref 0.27–4.2)
WBC NRBC COR # BLD: 4.76 10*3/MM3 (ref 4.5–10.7)

## 2018-03-05 PROCEDURE — 85025 COMPLETE CBC W/AUTO DIFF WBC: CPT | Performed by: PHYSICIAN ASSISTANT

## 2018-03-05 PROCEDURE — 80053 COMPREHEN METABOLIC PANEL: CPT | Performed by: PHYSICIAN ASSISTANT

## 2018-03-05 PROCEDURE — 84443 ASSAY THYROID STIM HORMONE: CPT | Performed by: PHYSICIAN ASSISTANT

## 2018-03-05 PROCEDURE — 99283 EMERGENCY DEPT VISIT LOW MDM: CPT

## 2018-03-05 PROCEDURE — 81025 URINE PREGNANCY TEST: CPT | Performed by: PHYSICIAN ASSISTANT

## 2018-03-05 NOTE — ED PROVIDER NOTES
HPI: Pt is a 37 y.o. female who presents to the ED c/o amenorrhea for the past two months. Pt is on B12 vitamin and potassium QD.     On exam, pt is in NAD.     I supervised care provided by the midlevel provider.    We have discussed this patient's history, physical exam, and treatment plan.   I have reviewed the note and personally saw and examined the patient and agree with the plan of care.    Documentation assistance provided by rowan Adams for Dr. Paul Anna.  Information recorded by the scribe was done at my direction and has been verified and validated by me.       Valeri Adams  03/05/18 8187       Paul Anna MD  03/05/18 8290

## 2018-03-05 NOTE — ED PROVIDER NOTES
EMERGENCY DEPARTMENT ENCOUNTER    CHIEF COMPLAINT  Chief Complaint: multiple complaints  History given by: patient  History limited by: N/A  Room Number: 42/42  PMD: No Known Provider      HPI:  Pt is a 37 y.o. female who presents with multiple complaints. She states that for the past 2 months, she has had cough, sore throat, decreased appetite, and fatigue. She reports that at baseline, she has regular menstrual cycles. However, she has missed her menstrual period for the past 2 months and her last menstrual cycle occurred on 1/4/18. She denies chest pain, trouble breathing, N/V/D, abd pain, pain and difficulty with urination, vaginal pain, fevers, and chills. Pt has concern for possible pregnancy. Pt has no other complaints at this time.     Location: N/A  Radiation: N/A  Quality: N/A  Intensity/Severity: moderate  Duration: for about 2 months  Onset quality: gradual  Timing: intermittent  Progression: unchanged  Aggravating Factors: none  Alleviating Factors: none  Previous Episodes: none mentioned  Treatment before arrival: none mentioned  Associated Symptoms: cough, sore throat, decreased appetite, fatigue, missed menstrual period for about 2 months       PAST MEDICAL HISTORY  Active Ambulatory Problems     Diagnosis Date Noted   • Alcohol-induced acute pancreatitis without infection or necrosis 09/28/2017   • Alcohol abuse 09/28/2017   • Elevated LFTs 09/28/2017   • Thrombocytopenia 10/02/2017     Resolved Ambulatory Problems     Diagnosis Date Noted   • Hypokalemia 09/28/2017   • Mixed acid base balance disorder 09/28/2017   • Dehydration 09/28/2017     Past Medical History:   Diagnosis Date   • Anxiety    • E. coli bacteremia    • ETOH abuse    • GERD (gastroesophageal reflux disease)    • Hiatal hernia    • Migraine    • Miscarriage 2004   • Pancreatitis          PAST SURGICAL HISTORY  History reviewed. No pertinent surgical history.      FAMILY HISTORY  Family History   Problem Relation Age of Onset   •  Alcohol abuse Mother    • Arthritis Mother    • Asthma Mother    • Miscarriages / Stillbirths Mother    • Cancer Father    • Diabetes Father    • Drug abuse Father    • Early death Father    • Heart disease Father    • Hyperlipidemia Father    • Hypertension Father    • Alcohol abuse Paternal Aunt    • Cancer Paternal Aunt    • Diabetes Paternal Aunt    • Drug abuse Paternal Aunt    • Early death Paternal Aunt    • Heart disease Paternal Aunt    • Hyperlipidemia Paternal Aunt    • Hypertension Paternal Aunt    • Alcohol abuse Maternal Grandmother    • Alcohol abuse Maternal Grandfather    • Alcohol abuse Paternal Grandmother    • Cancer Paternal Grandmother    • Diabetes Paternal Grandmother    • Drug abuse Paternal Grandmother    • Early death Paternal Grandmother    • Heart disease Paternal Grandmother    • Hyperlipidemia Paternal Grandmother    • Hypertension Paternal Grandmother    • Alcohol abuse Paternal Grandfather          SOCIAL HISTORY  Social History     Social History   • Marital status: Single     Spouse name: N/A   • Number of children: N/A   • Years of education: N/A     Occupational History   • Not on file.     Social History Main Topics   • Smoking status: Current Every Day Smoker     Packs/day: 0.50     Types: Cigarettes     Start date: 1/28/1996   • Smokeless tobacco: Current User   • Alcohol use Yes      Comment: 2 cocktails daily   • Drug use: No   • Sexual activity: Yes     Partners: Male     Birth control/ protection: None      Comment: IUD removed 6/29/2017     Other Topics Concern   • Not on file     Social History Narrative   • No narrative on file         ALLERGIES  Latex; Nickel; and Penicillins        REVIEW OF SYSTEMS  Review of Systems   Constitutional: Positive for appetite change (decreased appetite) and fatigue. Negative for chills and fever.   HENT: Positive for sore throat. Negative for congestion and rhinorrhea.    Eyes: Negative for pain.   Respiratory: Positive for cough.  Negative for shortness of breath.    Cardiovascular: Negative for chest pain and palpitations.   Gastrointestinal: Negative for abdominal pain, diarrhea, nausea and vomiting.   Endocrine: Negative.    Genitourinary: Positive for menstrual problem (missed menstrual period for about 2 months). Negative for difficulty urinating, dysuria and vaginal pain.   Musculoskeletal: Negative for myalgias.   Skin: Negative.    Neurological: Negative for speech difficulty, weakness, numbness and headaches.   Psychiatric/Behavioral: Negative.    All other systems reviewed and are negative.           PHYSICAL EXAM  ED Triage Vitals   Temp Heart Rate Resp BP SpO2   03/05/18 1124 03/05/18 1124 03/05/18 1135 03/05/18 1134 03/05/18 1124   97.5 °F (36.4 °C) 98 16 139/94 100 % WNL      Temp src Heart Rate Source Patient Position BP Location FiO2 (%)   -- 03/05/18 1337 03/05/18 1134 03/05/18 1134 --    Monitor Sitting Left arm        Physical Exam   Constitutional: She is oriented to person, place, and time. No distress.   HENT:   Head: Normocephalic.   Mouth/Throat: Mucous membranes are normal.   Eyes: EOM are normal. Pupils are equal, round, and reactive to light.   Neck: Normal range of motion. Neck supple.   Cardiovascular: Normal rate, regular rhythm and normal heart sounds.    Pulmonary/Chest: Effort normal and breath sounds normal. No respiratory distress. She has no decreased breath sounds. She has no wheezes. She has no rhonchi. She has no rales.   Abdominal: Soft. There is no tenderness. There is no rebound and no guarding.   Musculoskeletal: Normal range of motion.   Neurological: She is alert and oriented to person, place, and time. She has normal motor skills and normal sensation.   Skin: Skin is warm and dry.   Psychiatric: Mood and affect normal.   Nursing note and vitals reviewed.          LAB RESULTS  Recent Results (from the past 24 hour(s))   Pregnancy, Urine - Urine, Clean Catch    Collection Time: 03/05/18 12:04 PM    Result Value Ref Range    HCG, Urine QL Negative Negative   Comprehensive Metabolic Panel    Collection Time: 03/05/18  3:15 PM   Result Value Ref Range    Glucose 94 65 - 99 mg/dL    BUN 5 (L) 6 - 20 mg/dL    Creatinine 0.64 0.57 - 1.00 mg/dL    Sodium 143 136 - 145 mmol/L    Potassium 3.2 (L) 3.5 - 5.2 mmol/L    Chloride 101 98 - 107 mmol/L    CO2 32.2 (H) 22.0 - 29.0 mmol/L    Calcium 8.5 (L) 8.6 - 10.5 mg/dL    Total Protein 7.1 6.0 - 8.5 g/dL    Albumin 4.30 3.50 - 5.20 g/dL    ALT (SGPT) 20 1 - 33 U/L    AST (SGOT) 21 1 - 32 U/L    Alkaline Phosphatase 67 39 - 117 U/L    Total Bilirubin 0.4 0.1 - 1.2 mg/dL    eGFR Non African Amer 106 >60 mL/min/1.73    Globulin 2.8 gm/dL    A/G Ratio 1.5 g/dL    BUN/Creatinine Ratio 7.8 7.0 - 25.0    Anion Gap 9.8 mmol/L   TSH    Collection Time: 03/05/18  3:15 PM   Result Value Ref Range    TSH 3.230 0.270 - 4.200 mIU/mL   CBC Auto Differential    Collection Time: 03/05/18  3:15 PM   Result Value Ref Range    WBC 4.76 4.50 - 10.70 10*3/mm3    RBC 3.79 (L) 3.90 - 5.20 10*6/mm3    Hemoglobin 13.7 11.9 - 15.5 g/dL    Hematocrit 39.3 35.6 - 45.5 %    .7 (H) 80.5 - 98.2 fL    MCH 36.1 (H) 26.9 - 32.0 pg    MCHC 34.9 32.4 - 36.3 g/dL    RDW 12.9 11.7 - 13.0 %    RDW-SD 48.5 37.0 - 54.0 fl    MPV 10.6 6.0 - 12.0 fL    Platelets 169 140 - 500 10*3/mm3    Neutrophil % 35.7 (L) 42.7 - 76.0 %    Lymphocyte % 51.1 (H) 19.6 - 45.3 %    Monocyte % 11.1 5.0 - 12.0 %    Eosinophil % 1.7 0.3 - 6.2 %    Basophil % 0.4 0.0 - 1.5 %    Immature Grans % 0.0 0.0 - 0.5 %    Neutrophils, Absolute 1.70 (L) 1.90 - 8.10 10*3/mm3    Lymphocytes, Absolute 2.43 0.90 - 4.80 10*3/mm3    Monocytes, Absolute 0.53 0.20 - 1.20 10*3/mm3    Eosinophils, Absolute 0.08 0.00 - 0.70 10*3/mm3    Basophils, Absolute 0.02 0.00 - 0.20 10*3/mm3    Immature Grans, Absolute 0.00 0.00 - 0.03 10*3/mm3    nRBC 0.0 0.0 - 0.0 /100 WBC       Ordered the above labs and reviewed the  results.        PROCEDURES  Procedures        PROGRESS AND CONSULTS  ED Course     1427- Pt states that she has attempted to establish herself with a PMD without success. Consequently, CCP RN provided pt with referrals to PMDs and OBGYNs.     1434- Ordered blood work and TSH for further evaluation.    1618- Rechecked pt. She is resting comfortably and is in no acute distress. Discussed with pt about negative urine pregnancy test, normal TSH, and stable blood work. Informed pt of dx of amenorrhea and plan for discharge. Instructed to f/u with referred [facility] physician and referred OBGYN for recheck and for further management. RTER warnings given. Pt understands and agrees with plan. Addressed all questions.    1620- Discussed case with Dr Anna who agrees with the plan of care.       MEDICAL DECISION MAKING  Results were reviewed/discussed with the patient.     MDM  Number of Diagnoses or Management Options  Amenorrhea:      Amount and/or Complexity of Data Reviewed  Clinical lab tests: ordered and reviewed (Urine pregnancy test= negative, WBC= 4.76, BUN= 5, creatinine= 0.64)    Patient Progress  Patient progress: stable             DIAGNOSIS  Final diagnoses:   Amenorrhea         DISPOSITION  DISCHARGE    Patient discharged in stable condition.    Reviewed implications of results, diagnosis, responsibility to follow up, warning signs and symptoms of possible worsening, potential complications and reasons to return to ER.    Patient/Family voiced understanding of above instructions.    Discussed plan for discharge, as there is no emergent indication for admission.  Pt/family is agreeable and understands need for follow up and repeat testing.  Pt is aware that discharge does not mean that nothing is wrong but it indicates no emergency is present and they must continue care with follow-up as given below or physician of their choice.     FOLLOW-UP  [facility] PHYSICAN REFERRAL SERVICE  [facility]  [address]  [phone]  Schedule an appointment as soon as possible for a visit      Xiao Brown MD  [address] [facility] [address]  [phone]    Schedule an appointment as soon as possible for a visit        Latest Documented Vital Signs:  As of 4:19 PM  BP- 122/92 HR- 91 Temp- 97.5 °F (36.4 °C) O2 sat- 95%      --  Documentation assistance provided by rowan Hogan for CAN Huston.  Information recorded by the scribe was done at my direction and has been verified and validated by me.       Marcella Hogan  03/05/18 0642       CAN Lozano  03/05/18 6045

## 2018-03-05 NOTE — ED NOTES
"Pt reports that she has not had a period in 2 months. She has also been feeling nauseous. Pt reports a history of ETOH abuse, but states that \"we don't need to worry about that right now\". Pt also states that she does not have a PCP until August and that she just started a new joob and needs a work note.      Trixie Lerner RN  03/05/18 1140       Trixie Lerner RN  03/05/18 1142    "

## 2018-04-06 ENCOUNTER — HOSPITAL ENCOUNTER (EMERGENCY)
Facility: HOSPITAL | Age: 38
Discharge: LEFT BEFORE DISCHARGE | End: 2018-04-06

## 2018-04-06 ENCOUNTER — APPOINTMENT (OUTPATIENT)
Dept: GENERAL RADIOLOGY | Facility: HOSPITAL | Age: 38
End: 2018-04-06

## 2018-04-06 VITALS
WEIGHT: 130 LBS | RESPIRATION RATE: 22 BRPM | OXYGEN SATURATION: 99 % | TEMPERATURE: 98.7 F | BODY MASS INDEX: 18.2 KG/M2 | HEART RATE: 102 BPM | HEIGHT: 71 IN | SYSTOLIC BLOOD PRESSURE: 127 MMHG | DIASTOLIC BLOOD PRESSURE: 88 MMHG

## 2018-04-06 LAB
ALBUMIN SERPL-MCNC: 4.3 G/DL (ref 3.5–5.2)
ALBUMIN/GLOB SERPL: 1.5 G/DL
ALP SERPL-CCNC: 54 U/L (ref 39–117)
ALT SERPL W P-5'-P-CCNC: 8 U/L (ref 1–33)
ANION GAP SERPL CALCULATED.3IONS-SCNC: 14 MMOL/L
AST SERPL-CCNC: 13 U/L (ref 1–32)
BILIRUB SERPL-MCNC: 0.3 MG/DL (ref 0.1–1.2)
BUN BLD-MCNC: 8 MG/DL (ref 6–20)
BUN/CREAT SERPL: 11.6 (ref 7–25)
CALCIUM SPEC-SCNC: 8.6 MG/DL (ref 8.6–10.5)
CHLORIDE SERPL-SCNC: 99 MMOL/L (ref 98–107)
CO2 SERPL-SCNC: 31 MMOL/L (ref 22–29)
CREAT BLD-MCNC: 0.69 MG/DL (ref 0.57–1)
ETHANOL BLD-MCNC: 475 MG/DL (ref 0–10)
ETHANOL UR QL: 0.47 %
GFR SERPL CREATININE-BSD FRML MDRD: 97 ML/MIN/1.73
GLOBULIN UR ELPH-MCNC: 2.9 GM/DL
GLUCOSE BLD-MCNC: 87 MG/DL (ref 65–99)
HCG SERPL QL: NEGATIVE
POTASSIUM BLD-SCNC: 3.4 MMOL/L (ref 3.5–5.2)
PROT SERPL-MCNC: 7.2 G/DL (ref 6–8.5)
SODIUM BLD-SCNC: 144 MMOL/L (ref 136–145)
TROPONIN T SERPL-MCNC: <0.01 NG/ML (ref 0–0.03)
WHOLE BLOOD HOLD SPECIMEN: NORMAL
WHOLE BLOOD HOLD SPECIMEN: NORMAL

## 2018-04-06 PROCEDURE — 93010 ELECTROCARDIOGRAM REPORT: CPT | Performed by: INTERNAL MEDICINE

## 2018-04-06 PROCEDURE — 99211 OFF/OP EST MAY X REQ PHY/QHP: CPT

## 2018-04-06 PROCEDURE — 71046 X-RAY EXAM CHEST 2 VIEWS: CPT

## 2018-04-06 PROCEDURE — 84484 ASSAY OF TROPONIN QUANT: CPT

## 2018-04-06 PROCEDURE — 93005 ELECTROCARDIOGRAM TRACING: CPT

## 2018-04-06 PROCEDURE — 80307 DRUG TEST PRSMV CHEM ANLYZR: CPT

## 2018-04-06 PROCEDURE — 84703 CHORIONIC GONADOTROPIN ASSAY: CPT

## 2018-04-06 PROCEDURE — 80053 COMPREHEN METABOLIC PANEL: CPT

## 2018-04-06 RX ORDER — ASPIRIN 325 MG
325 TABLET ORAL ONCE
Status: DISCONTINUED | OUTPATIENT
Start: 2018-04-06 | End: 2018-04-06 | Stop reason: HOSPADM

## 2018-04-06 RX ORDER — SODIUM CHLORIDE 0.9 % (FLUSH) 0.9 %
10 SYRINGE (ML) INJECTION AS NEEDED
Status: DISCONTINUED | OUTPATIENT
Start: 2018-04-06 | End: 2018-04-06 | Stop reason: HOSPADM

## 2018-04-06 NOTE — ED NOTES
Patient can no longer be seen in the waiting room. Patient did not notify staff prior to leaving. Patient last seen alert and oriented, ambulating without difficulty. Charge nurse (Verito HORTON RN) notified.     Miley Olivares RN  04/06/18 2402

## 2018-04-06 NOTE — ED TRIAGE NOTES
"Once triage was done, patient started to cry and scream that she could not go in a wheelchair, that she was too sick and needed to go to room. Instructed patient we are busy and have no room.\" patient started crying for help.  "

## 2018-04-06 NOTE — ED TRIAGE NOTES
Patient walking around outside, patient asked to come back in but was on the telephone,-Security saw patient getting into a car with someone and leaving just now. Patient did not inform staff she was leaving.

## 2018-04-06 NOTE — ED TRIAGE NOTES
"Patient to er with ems c/o shortness of breath and chest pain.once ems got to the scene patient was in the house in no distress and smoking. Once outside ems reported patient \" faked\" passing out. Patient admitted to Ems of drinking.   "

## 2018-04-06 NOTE — ED TRIAGE NOTES
Found patient outside the er lobby, walked out with security to talk with patient, patient was smoking, instructed patient if wants to be seen she needs to come back in the er, and there is no smoking on there property.

## 2021-07-13 ENCOUNTER — INPATIENT HOSPITAL (OUTPATIENT)
Dept: URBAN - METROPOLITAN AREA HOSPITAL 76 | Facility: HOSPITAL | Age: 41
End: 2021-07-13

## 2021-07-13 DIAGNOSIS — R10.84 GENERALIZED ABDOMINAL PAIN: ICD-10-CM

## 2021-07-13 DIAGNOSIS — K85.20 ALCOHOL INDUCED ACUTE PANCREATITIS WITHOUT NECROSIS OR INFEC: ICD-10-CM

## 2021-07-13 DIAGNOSIS — D69.6 THROMBOCYTOPENIA, UNSPECIFIED: ICD-10-CM

## 2021-07-13 DIAGNOSIS — F10.239 ALCOHOL DEPENDENCE WITH WITHDRAWAL, UNSPECIFIED: ICD-10-CM

## 2021-07-13 DIAGNOSIS — E80.7 DISORDER OF BILIRUBIN METABOLISM, UNSPECIFIED: ICD-10-CM

## 2021-07-13 DIAGNOSIS — K92.0 HEMATEMESIS: ICD-10-CM

## 2021-07-13 PROCEDURE — 99253 IP/OBS CNSLTJ NEW/EST LOW 45: CPT | Performed by: NURSE PRACTITIONER

## 2021-07-14 PROCEDURE — 99232 SBSQ HOSP IP/OBS MODERATE 35: CPT | Performed by: NURSE PRACTITIONER

## 2021-07-15 ENCOUNTER — INPATIENT HOSPITAL (OUTPATIENT)
Dept: URBAN - METROPOLITAN AREA HOSPITAL 76 | Facility: HOSPITAL | Age: 41
End: 2021-07-15

## 2021-07-15 DIAGNOSIS — K92.0 HEMATEMESIS: ICD-10-CM

## 2021-07-15 DIAGNOSIS — F10.239 ALCOHOL DEPENDENCE WITH WITHDRAWAL, UNSPECIFIED: ICD-10-CM

## 2021-07-15 DIAGNOSIS — D69.6 THROMBOCYTOPENIA, UNSPECIFIED: ICD-10-CM

## 2021-07-15 DIAGNOSIS — E80.7 DISORDER OF BILIRUBIN METABOLISM, UNSPECIFIED: ICD-10-CM

## 2021-07-15 DIAGNOSIS — R10.84 GENERALIZED ABDOMINAL PAIN: ICD-10-CM

## 2021-07-15 DIAGNOSIS — K85.20 ALCOHOL INDUCED ACUTE PANCREATITIS WITHOUT NECROSIS OR INFEC: ICD-10-CM

## 2021-07-15 PROCEDURE — 99232 SBSQ HOSP IP/OBS MODERATE 35: CPT | Performed by: NURSE PRACTITIONER

## 2021-09-15 ENCOUNTER — OFFICE (OUTPATIENT)
Dept: URBAN - METROPOLITAN AREA CLINIC 64 | Facility: CLINIC | Age: 41
End: 2021-09-15
Payer: COMMERCIAL

## 2021-09-15 VITALS
HEART RATE: 77 BPM | WEIGHT: 119 LBS | DIASTOLIC BLOOD PRESSURE: 94 MMHG | HEIGHT: 71 IN | SYSTOLIC BLOOD PRESSURE: 139 MMHG

## 2021-09-15 DIAGNOSIS — K86.1 OTHER CHRONIC PANCREATITIS: ICD-10-CM

## 2021-09-15 DIAGNOSIS — Z72.0 TOBACCO USE: ICD-10-CM

## 2021-09-15 DIAGNOSIS — F10.10 ALCOHOL ABUSE, UNCOMPLICATED: ICD-10-CM

## 2021-09-15 DIAGNOSIS — K85.90 ACUTE PANCREATITIS WITHOUT NECROSIS OR INFECTION, UNSPECIFIE: ICD-10-CM

## 2021-09-15 PROCEDURE — 99214 OFFICE O/P EST MOD 30 MIN: CPT | Performed by: NURSE PRACTITIONER

## 2021-09-15 RX ORDER — PANCRELIPASE 36000; 180000; 114000 [USP'U]/1; [USP'U]/1; [USP'U]/1
216 CAPSULE, DELAYED RELEASE PELLETS ORAL
Qty: 180 | Refills: 11 | Status: COMPLETED
Start: 2021-09-15 | End: 2023-04-06

## 2021-10-08 ENCOUNTER — INPATIENT HOSPITAL (OUTPATIENT)
Dept: URBAN - METROPOLITAN AREA HOSPITAL 76 | Facility: HOSPITAL | Age: 41
End: 2021-10-08
Payer: COMMERCIAL

## 2021-10-08 DIAGNOSIS — R93.3 ABNORMAL FINDINGS ON DIAGNOSTIC IMAGING OF OTHER PARTS OF DI: ICD-10-CM

## 2021-10-08 DIAGNOSIS — K85.90 ACUTE PANCREATITIS WITHOUT NECROSIS OR INFECTION, UNSPECIFIE: ICD-10-CM

## 2021-10-08 DIAGNOSIS — K86.2 CYST OF PANCREAS: ICD-10-CM

## 2021-10-08 DIAGNOSIS — K86.1 OTHER CHRONIC PANCREATITIS: ICD-10-CM

## 2021-10-08 DIAGNOSIS — F10.11 ALCOHOL ABUSE, IN REMISSION: ICD-10-CM

## 2021-10-08 PROCEDURE — 99222 1ST HOSP IP/OBS MODERATE 55: CPT | Performed by: NURSE PRACTITIONER

## 2021-10-09 ENCOUNTER — INPATIENT HOSPITAL (OUTPATIENT)
Dept: URBAN - METROPOLITAN AREA HOSPITAL 76 | Facility: HOSPITAL | Age: 41
End: 2021-10-09
Payer: COMMERCIAL

## 2021-10-09 DIAGNOSIS — D53.9 NUTRITIONAL ANEMIA, UNSPECIFIED: ICD-10-CM

## 2021-10-09 DIAGNOSIS — K59.03 DRUG INDUCED CONSTIPATION: ICD-10-CM

## 2021-10-09 DIAGNOSIS — K86.2 CYST OF PANCREAS: ICD-10-CM

## 2021-10-09 DIAGNOSIS — M54.2 CERVICALGIA: ICD-10-CM

## 2021-10-09 DIAGNOSIS — Z72.0 TOBACCO USE: ICD-10-CM

## 2021-10-09 DIAGNOSIS — K86.1 OTHER CHRONIC PANCREATITIS: ICD-10-CM

## 2021-10-09 DIAGNOSIS — F10.11 ALCOHOL ABUSE, IN REMISSION: ICD-10-CM

## 2021-10-09 DIAGNOSIS — K85.90 ACUTE PANCREATITIS WITHOUT NECROSIS OR INFECTION, UNSPECIFIE: ICD-10-CM

## 2021-10-09 PROCEDURE — 99231 SBSQ HOSP IP/OBS SF/LOW 25: CPT | Performed by: INTERNAL MEDICINE

## 2021-10-10 PROCEDURE — 99231 SBSQ HOSP IP/OBS SF/LOW 25: CPT | Performed by: INTERNAL MEDICINE

## 2021-10-11 ENCOUNTER — INPATIENT HOSPITAL (OUTPATIENT)
Dept: URBAN - METROPOLITAN AREA HOSPITAL 76 | Facility: HOSPITAL | Age: 41
End: 2021-10-11
Payer: COMMERCIAL

## 2021-10-11 DIAGNOSIS — K86.3 PSEUDOCYST OF PANCREAS: ICD-10-CM

## 2021-10-11 DIAGNOSIS — K86.1 OTHER CHRONIC PANCREATITIS: ICD-10-CM

## 2021-10-11 DIAGNOSIS — K85.90 ACUTE PANCREATITIS WITHOUT NECROSIS OR INFECTION, UNSPECIFIE: ICD-10-CM

## 2021-10-11 DIAGNOSIS — M54.2 CERVICALGIA: ICD-10-CM

## 2021-10-11 DIAGNOSIS — F10.11 ALCOHOL ABUSE, IN REMISSION: ICD-10-CM

## 2021-10-11 DIAGNOSIS — M25.519 PAIN IN UNSPECIFIED SHOULDER: ICD-10-CM

## 2021-10-11 DIAGNOSIS — Z72.0 TOBACCO USE: ICD-10-CM

## 2021-10-11 DIAGNOSIS — D53.9 NUTRITIONAL ANEMIA, UNSPECIFIED: ICD-10-CM

## 2021-10-11 DIAGNOSIS — K59.03 DRUG INDUCED CONSTIPATION: ICD-10-CM

## 2021-10-11 PROCEDURE — 99232 SBSQ HOSP IP/OBS MODERATE 35: CPT | Performed by: INTERNAL MEDICINE

## 2021-10-12 ENCOUNTER — INPATIENT HOSPITAL (OUTPATIENT)
Dept: URBAN - METROPOLITAN AREA HOSPITAL 76 | Facility: HOSPITAL | Age: 41
End: 2021-10-12
Payer: COMMERCIAL

## 2021-10-12 DIAGNOSIS — K59.03 DRUG INDUCED CONSTIPATION: ICD-10-CM

## 2021-10-12 DIAGNOSIS — K86.3 PSEUDOCYST OF PANCREAS: ICD-10-CM

## 2021-10-12 DIAGNOSIS — K86.1 OTHER CHRONIC PANCREATITIS: ICD-10-CM

## 2021-10-12 DIAGNOSIS — Z72.0 TOBACCO USE: ICD-10-CM

## 2021-10-12 DIAGNOSIS — D53.9 NUTRITIONAL ANEMIA, UNSPECIFIED: ICD-10-CM

## 2021-10-12 DIAGNOSIS — M25.519 PAIN IN UNSPECIFIED SHOULDER: ICD-10-CM

## 2021-10-12 DIAGNOSIS — K85.90 ACUTE PANCREATITIS WITHOUT NECROSIS OR INFECTION, UNSPECIFIE: ICD-10-CM

## 2021-10-12 DIAGNOSIS — F10.11 ALCOHOL ABUSE, IN REMISSION: ICD-10-CM

## 2021-10-12 DIAGNOSIS — M54.2 CERVICALGIA: ICD-10-CM

## 2021-10-12 PROCEDURE — 99232 SBSQ HOSP IP/OBS MODERATE 35: CPT | Performed by: NURSE PRACTITIONER

## 2021-11-16 ENCOUNTER — OFFICE (OUTPATIENT)
Dept: URBAN - METROPOLITAN AREA CLINIC 64 | Facility: CLINIC | Age: 41
End: 2021-11-16
Payer: COMMERCIAL

## 2021-11-16 VITALS
DIASTOLIC BLOOD PRESSURE: 71 MMHG | HEART RATE: 104 BPM | WEIGHT: 112 LBS | HEIGHT: 71 IN | SYSTOLIC BLOOD PRESSURE: 107 MMHG

## 2021-11-16 DIAGNOSIS — K85.90 ACUTE PANCREATITIS WITHOUT NECROSIS OR INFECTION, UNSPECIFIE: ICD-10-CM

## 2021-11-16 PROCEDURE — 99214 OFFICE O/P EST MOD 30 MIN: CPT | Performed by: NURSE PRACTITIONER

## 2021-11-16 RX ORDER — ONDANSETRON HYDROCHLORIDE 4 MG/1
8 TABLET, FILM COATED ORAL
Qty: 100 | Refills: 3 | Status: COMPLETED
Start: 2021-11-16 | End: 2022-10-04

## 2022-07-24 ENCOUNTER — INPATIENT HOSPITAL (OUTPATIENT)
Dept: URBAN - METROPOLITAN AREA HOSPITAL 76 | Facility: HOSPITAL | Age: 42
End: 2022-07-24
Payer: COMMERCIAL

## 2022-07-24 DIAGNOSIS — K86.1 OTHER CHRONIC PANCREATITIS: ICD-10-CM

## 2022-07-24 DIAGNOSIS — K85.90 ACUTE PANCREATITIS WITHOUT NECROSIS OR INFECTION, UNSPECIFIE: ICD-10-CM

## 2022-07-24 PROCEDURE — 99221 1ST HOSP IP/OBS SF/LOW 40: CPT | Performed by: INTERNAL MEDICINE

## 2022-07-25 ENCOUNTER — INPATIENT HOSPITAL (OUTPATIENT)
Dept: URBAN - METROPOLITAN AREA HOSPITAL 76 | Facility: HOSPITAL | Age: 42
End: 2022-07-25
Payer: COMMERCIAL

## 2022-07-25 DIAGNOSIS — F17.200 NICOTINE DEPENDENCE, UNSPECIFIED, UNCOMPLICATED: ICD-10-CM

## 2022-07-25 DIAGNOSIS — F10.10 ALCOHOL ABUSE, UNCOMPLICATED: ICD-10-CM

## 2022-07-25 DIAGNOSIS — K85.20 ALCOHOL INDUCED ACUTE PANCREATITIS WITHOUT NECROSIS OR INFEC: ICD-10-CM

## 2022-07-25 PROCEDURE — 99232 SBSQ HOSP IP/OBS MODERATE 35: CPT

## 2022-08-06 ENCOUNTER — APPOINTMENT (OUTPATIENT)
Dept: GENERAL RADIOLOGY | Facility: HOSPITAL | Age: 42
End: 2022-08-06

## 2022-08-06 ENCOUNTER — APPOINTMENT (OUTPATIENT)
Dept: CT IMAGING | Facility: HOSPITAL | Age: 42
End: 2022-08-06

## 2022-08-06 ENCOUNTER — HOSPITAL ENCOUNTER (INPATIENT)
Facility: HOSPITAL | Age: 42
LOS: 7 days | Discharge: HOME | End: 2022-08-14
Attending: EMERGENCY MEDICINE | Admitting: INTERNAL MEDICINE

## 2022-08-06 DIAGNOSIS — N12 PYELONEPHRITIS: ICD-10-CM

## 2022-08-06 DIAGNOSIS — K85.20 ALCOHOL-INDUCED ACUTE PANCREATITIS WITHOUT INFECTION OR NECROSIS: ICD-10-CM

## 2022-08-06 DIAGNOSIS — Z09 FOLLOW-UP EXAM: ICD-10-CM

## 2022-08-06 DIAGNOSIS — D63.8 ANEMIA OF CHRONIC DISEASE: ICD-10-CM

## 2022-08-06 DIAGNOSIS — R10.13 EPIGASTRIC PAIN: Primary | ICD-10-CM

## 2022-08-06 DIAGNOSIS — Z87.19 HISTORY OF PANCREATITIS: ICD-10-CM

## 2022-08-06 DIAGNOSIS — K21.9 GERD WITHOUT ESOPHAGITIS: ICD-10-CM

## 2022-08-06 DIAGNOSIS — R19.7 DIARRHEA, UNSPECIFIED TYPE: ICD-10-CM

## 2022-08-06 DIAGNOSIS — R11.2 NAUSEA AND VOMITING, UNSPECIFIED VOMITING TYPE: ICD-10-CM

## 2022-08-06 DIAGNOSIS — I82.890 SPLENIC VEIN THROMBOSIS: ICD-10-CM

## 2022-08-06 LAB
ALBUMIN SERPL-MCNC: 4.1 G/DL (ref 3.5–5.2)
ALBUMIN/GLOB SERPL: 1.4 G/DL
ALP SERPL-CCNC: 68 U/L (ref 39–117)
ALT SERPL W P-5'-P-CCNC: 11 U/L (ref 1–33)
ANION GAP SERPL CALCULATED.3IONS-SCNC: 12.3 MMOL/L (ref 5–15)
AST SERPL-CCNC: 12 U/L (ref 1–32)
BASOPHILS # BLD AUTO: 0.05 10*3/MM3 (ref 0–0.2)
BASOPHILS NFR BLD AUTO: 0.5 % (ref 0–1.5)
BILIRUB SERPL-MCNC: 0.2 MG/DL (ref 0–1.2)
BILIRUB UR QL STRIP: NEGATIVE
BUN SERPL-MCNC: 11 MG/DL (ref 6–20)
BUN/CREAT SERPL: 15.9 (ref 7–25)
CALCIUM SPEC-SCNC: 9 MG/DL (ref 8.6–10.5)
CHLORIDE SERPL-SCNC: 104 MMOL/L (ref 98–107)
CLARITY UR: CLEAR
CO2 SERPL-SCNC: 22.7 MMOL/L (ref 22–29)
COLOR UR: YELLOW
CREAT SERPL-MCNC: 0.69 MG/DL (ref 0.57–1)
DEPRECATED RDW RBC AUTO: 55.8 FL (ref 37–54)
EGFRCR SERPLBLD CKD-EPI 2021: 113.4 ML/MIN/1.73
EOSINOPHIL # BLD AUTO: 0.24 10*3/MM3 (ref 0–0.4)
EOSINOPHIL NFR BLD AUTO: 2.3 % (ref 0.3–6.2)
ERYTHROCYTE [DISTWIDTH] IN BLOOD BY AUTOMATED COUNT: 14.9 % (ref 12.3–15.4)
GLOBULIN UR ELPH-MCNC: 3 GM/DL
GLUCOSE SERPL-MCNC: 102 MG/DL (ref 65–99)
GLUCOSE UR STRIP-MCNC: NEGATIVE MG/DL
HCG SERPL QL: NEGATIVE
HCT VFR BLD AUTO: 34.3 % (ref 34–46.6)
HGB BLD-MCNC: 11.5 G/DL (ref 12–15.9)
HGB UR QL STRIP.AUTO: NEGATIVE
IMM GRANULOCYTES # BLD AUTO: 0.03 10*3/MM3 (ref 0–0.05)
IMM GRANULOCYTES NFR BLD AUTO: 0.3 % (ref 0–0.5)
KETONES UR QL STRIP: NEGATIVE
LDH SERPL-CCNC: 238 U/L (ref 135–214)
LEUKOCYTE ESTERASE UR QL STRIP.AUTO: NEGATIVE
LIPASE SERPL-CCNC: 378 U/L (ref 13–60)
LYMPHOCYTES # BLD AUTO: 2.36 10*3/MM3 (ref 0.7–3.1)
LYMPHOCYTES NFR BLD AUTO: 22.5 % (ref 19.6–45.3)
MCH RBC QN AUTO: 35.1 PG (ref 26.6–33)
MCHC RBC AUTO-ENTMCNC: 33.5 G/DL (ref 31.5–35.7)
MCV RBC AUTO: 104.6 FL (ref 79–97)
MONOCYTES # BLD AUTO: 0.75 10*3/MM3 (ref 0.1–0.9)
MONOCYTES NFR BLD AUTO: 7.2 % (ref 5–12)
NEUTROPHILS NFR BLD AUTO: 67.2 % (ref 42.7–76)
NEUTROPHILS NFR BLD AUTO: 7.05 10*3/MM3 (ref 1.7–7)
NITRITE UR QL STRIP: NEGATIVE
NRBC BLD AUTO-RTO: 0 /100 WBC (ref 0–0.2)
PH UR STRIP.AUTO: <=5 [PH] (ref 5–8)
PLAT MORPH BLD: NORMAL
PLATELET # BLD AUTO: 323 10*3/MM3 (ref 140–450)
PMV BLD AUTO: 10.5 FL (ref 6–12)
POTASSIUM SERPL-SCNC: 4 MMOL/L (ref 3.5–5.2)
PROT SERPL-MCNC: 7.1 G/DL (ref 6–8.5)
PROT UR QL STRIP: NEGATIVE
RBC # BLD AUTO: 3.28 10*6/MM3 (ref 3.77–5.28)
RBC MORPH BLD: NORMAL
SODIUM SERPL-SCNC: 139 MMOL/L (ref 136–145)
SP GR UR STRIP: 1.01 (ref 1–1.03)
TROPONIN T SERPL-MCNC: <0.01 NG/ML (ref 0–0.03)
UROBILINOGEN UR QL STRIP: NORMAL
WBC MORPH BLD: NORMAL
WBC NRBC COR # BLD: 10.48 10*3/MM3 (ref 3.4–10.8)

## 2022-08-06 PROCEDURE — 84703 CHORIONIC GONADOTROPIN ASSAY: CPT | Performed by: EMERGENCY MEDICINE

## 2022-08-06 PROCEDURE — 99285 EMERGENCY DEPT VISIT HI MDM: CPT

## 2022-08-06 PROCEDURE — 85025 COMPLETE CBC W/AUTO DIFF WBC: CPT | Performed by: EMERGENCY MEDICINE

## 2022-08-06 PROCEDURE — 25010000002 MORPHINE PER 10 MG: Performed by: EMERGENCY MEDICINE

## 2022-08-06 PROCEDURE — 93010 ELECTROCARDIOGRAM REPORT: CPT | Performed by: INTERNAL MEDICINE

## 2022-08-06 PROCEDURE — 93005 ELECTROCARDIOGRAM TRACING: CPT | Performed by: EMERGENCY MEDICINE

## 2022-08-06 PROCEDURE — 83615 LACTATE (LD) (LDH) ENZYME: CPT | Performed by: EMERGENCY MEDICINE

## 2022-08-06 PROCEDURE — 74177 CT ABD & PELVIS W/CONTRAST: CPT

## 2022-08-06 PROCEDURE — 99284 EMERGENCY DEPT VISIT MOD MDM: CPT

## 2022-08-06 PROCEDURE — 81003 URINALYSIS AUTO W/O SCOPE: CPT | Performed by: EMERGENCY MEDICINE

## 2022-08-06 PROCEDURE — 84145 PROCALCITONIN (PCT): CPT | Performed by: PHYSICIAN ASSISTANT

## 2022-08-06 PROCEDURE — 25010000002 ONDANSETRON PER 1 MG: Performed by: EMERGENCY MEDICINE

## 2022-08-06 PROCEDURE — 25010000002 IOPAMIDOL 61 % SOLUTION: Performed by: EMERGENCY MEDICINE

## 2022-08-06 PROCEDURE — 83690 ASSAY OF LIPASE: CPT | Performed by: EMERGENCY MEDICINE

## 2022-08-06 PROCEDURE — 85007 BL SMEAR W/DIFF WBC COUNT: CPT | Performed by: EMERGENCY MEDICINE

## 2022-08-06 PROCEDURE — 80053 COMPREHEN METABOLIC PANEL: CPT | Performed by: EMERGENCY MEDICINE

## 2022-08-06 PROCEDURE — 84484 ASSAY OF TROPONIN QUANT: CPT | Performed by: EMERGENCY MEDICINE

## 2022-08-06 PROCEDURE — 25010000002 KETOROLAC TROMETHAMINE PER 15 MG: Performed by: EMERGENCY MEDICINE

## 2022-08-06 PROCEDURE — 86140 C-REACTIVE PROTEIN: CPT | Performed by: PHYSICIAN ASSISTANT

## 2022-08-06 PROCEDURE — 71045 X-RAY EXAM CHEST 1 VIEW: CPT

## 2022-08-06 RX ORDER — MORPHINE SULFATE 2 MG/ML
4 INJECTION, SOLUTION INTRAMUSCULAR; INTRAVENOUS ONCE
Status: COMPLETED | OUTPATIENT
Start: 2022-08-06 | End: 2022-08-06

## 2022-08-06 RX ORDER — KETOROLAC TROMETHAMINE 15 MG/ML
15 INJECTION, SOLUTION INTRAMUSCULAR; INTRAVENOUS ONCE
Status: COMPLETED | OUTPATIENT
Start: 2022-08-06 | End: 2022-08-06

## 2022-08-06 RX ORDER — ONDANSETRON 2 MG/ML
8 INJECTION INTRAMUSCULAR; INTRAVENOUS ONCE
Status: COMPLETED | OUTPATIENT
Start: 2022-08-06 | End: 2022-08-06

## 2022-08-06 RX ADMIN — SODIUM CHLORIDE 1000 ML: 9 INJECTION, SOLUTION INTRAVENOUS at 20:56

## 2022-08-06 RX ADMIN — MORPHINE SULFATE 4 MG: 2 INJECTION, SOLUTION INTRAMUSCULAR; INTRAVENOUS at 20:52

## 2022-08-06 RX ADMIN — IOPAMIDOL 100 ML: 612 INJECTION, SOLUTION INTRAVENOUS at 22:57

## 2022-08-06 RX ADMIN — ONDANSETRON 8 MG: 2 INJECTION INTRAMUSCULAR; INTRAVENOUS at 20:52

## 2022-08-06 RX ADMIN — KETOROLAC TROMETHAMINE 15 MG: 15 INJECTION, SOLUTION INTRAMUSCULAR; INTRAVENOUS at 22:10

## 2022-08-07 ENCOUNTER — APPOINTMENT (OUTPATIENT)
Dept: ULTRASOUND IMAGING | Facility: HOSPITAL | Age: 42
End: 2022-08-07

## 2022-08-07 ENCOUNTER — APPOINTMENT (OUTPATIENT)
Dept: CARDIOLOGY | Facility: HOSPITAL | Age: 42
End: 2022-08-07

## 2022-08-07 PROBLEM — R10.13 EPIGASTRIC PAIN: Status: ACTIVE | Noted: 2022-08-07

## 2022-08-07 PROBLEM — R93.5 ABNORMAL CT OF THE ABDOMEN: Status: ACTIVE | Noted: 2022-08-07

## 2022-08-07 LAB
ANION GAP SERPL CALCULATED.3IONS-SCNC: 11 MMOL/L (ref 5–15)
BH CV VAS HEPATIC PORTAL SPLEEN LENGTH: 12.4 CM
BH CV VAS HEPATIC PORTAL VEIN DIAMETER: 0.99 CM
BH CV VAS HEPATOPORTAL HEPATIC V LT DIRECTION: NORMAL
BH CV VAS HEPATOPORTAL HEPATIC V MID DIRECTION: NORMAL
BH CV VAS HEPATOPORTAL HEPATIC V RT DIRECTION: NORMAL
BH CV VAS HEPATOPORTAL IVC CONFLUENCE FLOW: NORMAL
BH CV VAS HEPATOPORTAL IVC CONFLUENCE SPONT: NORMAL
BH CV VAS HEPATOPORTAL IVC FLOW: NORMAL
BH CV VAS HEPATOPORTAL IVC SPONT: NORMAL
BH CV VAS HEPATOPORTAL PORTAL V EXTRAHEPATIC DIRECTION: NORMAL
BH CV VAS HEPATOPORTAL PORTAL V LT INTRA DIRECTION: NORMAL
BH CV VAS HEPATOPORTAL PORTAL V MAIN INTRA DIRECTION: NORMAL
BH CV VAS HEPATOPORTAL PORTAL V PSV: 38.5 CM/S
BH CV VAS HEPATOPORTAL PORTAL V RT INTRA DIRECTION: NORMAL
BH CV VAS HEPATOPORTAL SMV DIRECTION: NORMAL
BH CV VAS HEPATOPORTAL SPLENIC DIRECTION: NORMAL
BH CV VAS SMA HEPATIC EDV: 24.9 CM/S
BH CV VAS SMA HEPATIC PSV: 107 CM/S
BH CV VAS SMA SPLENIC EDV: 17.7 CM/S
BH CV VAS SMA SPLENIC PSV: 55.7 CM/S
BUN SERPL-MCNC: 8 MG/DL (ref 6–20)
BUN/CREAT SERPL: 12.9 (ref 7–25)
CALCIUM SPEC-SCNC: 8.3 MG/DL (ref 8.6–10.5)
CHLORIDE SERPL-SCNC: 106 MMOL/L (ref 98–107)
CO2 SERPL-SCNC: 23 MMOL/L (ref 22–29)
CREAT SERPL-MCNC: 0.62 MG/DL (ref 0.57–1)
CRP SERPL-MCNC: 4.43 MG/DL (ref 0–0.5)
CRP SERPL-MCNC: 4.74 MG/DL (ref 0–0.5)
D-LACTATE SERPL-SCNC: 0.6 MMOL/L (ref 0.5–2)
DEPRECATED RDW RBC AUTO: 55.3 FL (ref 37–54)
EGFRCR SERPLBLD CKD-EPI 2021: 116.3 ML/MIN/1.73
ERYTHROCYTE [DISTWIDTH] IN BLOOD BY AUTOMATED COUNT: 14.9 % (ref 12.3–15.4)
ERYTHROCYTE [SEDIMENTATION RATE] IN BLOOD: 67 MM/HR (ref 0–20)
FERRITIN SERPL-MCNC: 355 NG/ML (ref 13–150)
GLUCOSE SERPL-MCNC: 104 MG/DL (ref 65–99)
HCT VFR BLD AUTO: 28 % (ref 34–46.6)
HGB BLD-MCNC: 9.4 G/DL (ref 12–15.9)
HGB RETIC QN AUTO: 30.7 PG (ref 29.8–36.1)
IMM RETICS NFR: 12.2 % (ref 3–15.8)
INR PPP: 1.06 (ref 0.9–1.1)
IRON 24H UR-MRATE: 19 MCG/DL (ref 37–145)
IRON SATN MFR SERPL: 8 % (ref 20–50)
MAXIMAL PREDICTED HEART RATE: 181 BPM
MCH RBC QN AUTO: 35.1 PG (ref 26.6–33)
MCHC RBC AUTO-ENTMCNC: 33.6 G/DL (ref 31.5–35.7)
MCV RBC AUTO: 104.5 FL (ref 79–97)
PLATELET # BLD AUTO: 239 10*3/MM3 (ref 140–450)
PMV BLD AUTO: 10.2 FL (ref 6–12)
POTASSIUM SERPL-SCNC: 3.7 MMOL/L (ref 3.5–5.2)
PROCALCITONIN SERPL-MCNC: 0.06 NG/ML (ref 0–0.25)
PROTHROMBIN TIME: 13.7 SECONDS (ref 11.7–14.2)
QT INTERVAL: 360 MS
RBC # BLD AUTO: 2.68 10*6/MM3 (ref 3.77–5.28)
RETICS # AUTO: 0.03 10*6/MM3 (ref 0.02–0.13)
RETICS # AUTO: 0.03 10*6/MM3 (ref 0.02–0.13)
RETICS/RBC NFR AUTO: 0.96 % (ref 0.7–1.9)
RETICS/RBC NFR AUTO: 0.96 % (ref 0.7–1.9)
SARS-COV-2 RNA RESP QL NAA+PROBE: NOT DETECTED
SODIUM SERPL-SCNC: 140 MMOL/L (ref 136–145)
STRESS TARGET HR: 154 BPM
TIBC SERPL-MCNC: 249 MCG/DL (ref 298–536)
TRANSFERRIN SERPL-MCNC: 167 MG/DL (ref 200–360)
WBC NRBC COR # BLD: 5.75 10*3/MM3 (ref 3.4–10.8)

## 2022-08-07 PROCEDURE — 83605 ASSAY OF LACTIC ACID: CPT | Performed by: PHYSICIAN ASSISTANT

## 2022-08-07 PROCEDURE — 25010000002 HYDROMORPHONE PER 4 MG: Performed by: EMERGENCY MEDICINE

## 2022-08-07 PROCEDURE — 85610 PROTHROMBIN TIME: CPT | Performed by: NURSE PRACTITIONER

## 2022-08-07 PROCEDURE — 82728 ASSAY OF FERRITIN: CPT | Performed by: INTERNAL MEDICINE

## 2022-08-07 PROCEDURE — 84466 ASSAY OF TRANSFERRIN: CPT | Performed by: INTERNAL MEDICINE

## 2022-08-07 PROCEDURE — 99222 1ST HOSP IP/OBS MODERATE 55: CPT | Performed by: NURSE PRACTITIONER

## 2022-08-07 PROCEDURE — 25010000002 HYDROMORPHONE 1 MG/ML SOLUTION: Performed by: STUDENT IN AN ORGANIZED HEALTH CARE EDUCATION/TRAINING PROGRAM

## 2022-08-07 PROCEDURE — 85045 AUTOMATED RETICULOCYTE COUNT: CPT | Performed by: INTERNAL MEDICINE

## 2022-08-07 PROCEDURE — 36415 COLL VENOUS BLD VENIPUNCTURE: CPT

## 2022-08-07 PROCEDURE — U0003 INFECTIOUS AGENT DETECTION BY NUCLEIC ACID (DNA OR RNA); SEVERE ACUTE RESPIRATORY SYNDROME CORONAVIRUS 2 (SARS-COV-2) (CORONAVIRUS DISEASE [COVID-19]), AMPLIFIED PROBE TECHNIQUE, MAKING USE OF HIGH THROUGHPUT TECHNOLOGIES AS DESCRIBED BY CMS-2020-01-R: HCPCS | Performed by: PHYSICIAN ASSISTANT

## 2022-08-07 PROCEDURE — 25010000002 HYDROMORPHONE PER 4 MG: Performed by: NURSE PRACTITIONER

## 2022-08-07 PROCEDURE — 76705 ECHO EXAM OF ABDOMEN: CPT

## 2022-08-07 PROCEDURE — 25010000002 CEFTRIAXONE PER 250 MG: Performed by: PHYSICIAN ASSISTANT

## 2022-08-07 PROCEDURE — 85046 RETICYTE/HGB CONCENTRATE: CPT | Performed by: INTERNAL MEDICINE

## 2022-08-07 PROCEDURE — 99223 1ST HOSP IP/OBS HIGH 75: CPT | Performed by: INTERNAL MEDICINE

## 2022-08-07 PROCEDURE — 99223 1ST HOSP IP/OBS HIGH 75: CPT | Performed by: STUDENT IN AN ORGANIZED HEALTH CARE EDUCATION/TRAINING PROGRAM

## 2022-08-07 PROCEDURE — 93975 VASCULAR STUDY: CPT

## 2022-08-07 PROCEDURE — 85652 RBC SED RATE AUTOMATED: CPT | Performed by: INTERNAL MEDICINE

## 2022-08-07 PROCEDURE — 80048 BASIC METABOLIC PNL TOTAL CA: CPT | Performed by: NURSE PRACTITIONER

## 2022-08-07 PROCEDURE — 87040 BLOOD CULTURE FOR BACTERIA: CPT | Performed by: PHYSICIAN ASSISTANT

## 2022-08-07 PROCEDURE — 85027 COMPLETE CBC AUTOMATED: CPT | Performed by: NURSE PRACTITIONER

## 2022-08-07 PROCEDURE — 25010000002 ONDANSETRON PER 1 MG: Performed by: NURSE PRACTITIONER

## 2022-08-07 PROCEDURE — 86140 C-REACTIVE PROTEIN: CPT | Performed by: INTERNAL MEDICINE

## 2022-08-07 PROCEDURE — 36415 COLL VENOUS BLD VENIPUNCTURE: CPT | Performed by: NURSE PRACTITIONER

## 2022-08-07 PROCEDURE — 83540 ASSAY OF IRON: CPT | Performed by: INTERNAL MEDICINE

## 2022-08-07 RX ORDER — LIDOCAINE 50 MG/G
1 PATCH TOPICAL
Status: DISCONTINUED | OUTPATIENT
Start: 2022-08-07 | End: 2022-08-14 | Stop reason: HOSPADM

## 2022-08-07 RX ORDER — AMITRIPTYLINE HYDROCHLORIDE 10 MG/1
10 TABLET, FILM COATED ORAL NIGHTLY
Status: DISCONTINUED | OUTPATIENT
Start: 2022-08-07 | End: 2022-08-14 | Stop reason: HOSPADM

## 2022-08-07 RX ORDER — ACETAMINOPHEN 650 MG/1
650 SUPPOSITORY RECTAL EVERY 4 HOURS PRN
Status: DISCONTINUED | OUTPATIENT
Start: 2022-08-07 | End: 2022-08-14 | Stop reason: HOSPADM

## 2022-08-07 RX ORDER — FERROUS SULFATE 325(65) MG
200 TABLET ORAL DAILY
COMMUNITY

## 2022-08-07 RX ORDER — MELATONIN
5000 DAILY
Status: DISCONTINUED | OUTPATIENT
Start: 2022-08-07 | End: 2022-08-14 | Stop reason: HOSPADM

## 2022-08-07 RX ORDER — OXYCODONE AND ACETAMINOPHEN 10; 325 MG/1; MG/1
1 TABLET ORAL EVERY 6 HOURS PRN
Status: DISPENSED | OUTPATIENT
Start: 2022-08-07 | End: 2022-08-14

## 2022-08-07 RX ORDER — ACETAMINOPHEN 160 MG/5ML
650 SOLUTION ORAL EVERY 4 HOURS PRN
Status: DISCONTINUED | OUTPATIENT
Start: 2022-08-07 | End: 2022-08-14 | Stop reason: HOSPADM

## 2022-08-07 RX ORDER — FOLIC ACID 1 MG/1
1 TABLET ORAL DAILY
Status: ON HOLD | COMMUNITY
End: 2022-08-14 | Stop reason: SDUPTHER

## 2022-08-07 RX ORDER — AMOXICILLIN 250 MG
2 CAPSULE ORAL 2 TIMES DAILY
Status: DISCONTINUED | OUTPATIENT
Start: 2022-08-07 | End: 2022-08-14 | Stop reason: HOSPADM

## 2022-08-07 RX ORDER — ONDANSETRON 4 MG/1
4 TABLET, ORALLY DISINTEGRATING ORAL EVERY 6 HOURS PRN
Status: DISCONTINUED | OUTPATIENT
Start: 2022-08-07 | End: 2022-08-14 | Stop reason: HOSPADM

## 2022-08-07 RX ORDER — SODIUM CHLORIDE 0.9 % (FLUSH) 0.9 %
10 SYRINGE (ML) INJECTION EVERY 12 HOURS SCHEDULED
Status: DISCONTINUED | OUTPATIENT
Start: 2022-08-07 | End: 2022-08-14 | Stop reason: HOSPADM

## 2022-08-07 RX ORDER — SODIUM CHLORIDE 9 MG/ML
100 INJECTION, SOLUTION INTRAVENOUS CONTINUOUS
Status: DISCONTINUED | OUTPATIENT
Start: 2022-08-07 | End: 2022-08-10

## 2022-08-07 RX ORDER — FOLIC ACID 1 MG/1
1 TABLET ORAL DAILY
Status: DISCONTINUED | OUTPATIENT
Start: 2022-08-07 | End: 2022-08-14 | Stop reason: HOSPADM

## 2022-08-07 RX ORDER — HYDROMORPHONE HYDROCHLORIDE 1 MG/ML
0.5 INJECTION, SOLUTION INTRAMUSCULAR; INTRAVENOUS; SUBCUTANEOUS ONCE
Status: COMPLETED | OUTPATIENT
Start: 2022-08-07 | End: 2022-08-07

## 2022-08-07 RX ORDER — GAUZE BANDAGE 2" X 2"
100 BANDAGE TOPICAL DAILY
Status: DISCONTINUED | OUTPATIENT
Start: 2022-08-07 | End: 2022-08-14 | Stop reason: HOSPADM

## 2022-08-07 RX ORDER — ONDANSETRON 2 MG/ML
4 INJECTION INTRAMUSCULAR; INTRAVENOUS EVERY 6 HOURS PRN
Status: DISCONTINUED | OUTPATIENT
Start: 2022-08-07 | End: 2022-08-07

## 2022-08-07 RX ORDER — NALOXONE HCL 0.4 MG/ML
0.4 VIAL (ML) INJECTION
Status: DISCONTINUED | OUTPATIENT
Start: 2022-08-07 | End: 2022-08-12

## 2022-08-07 RX ORDER — NALOXONE HCL 0.4 MG/ML
0.4 VIAL (ML) INJECTION
Status: DISCONTINUED | OUTPATIENT
Start: 2022-08-07 | End: 2022-08-07

## 2022-08-07 RX ORDER — UBIDECARENONE 75 MG
50 CAPSULE ORAL DAILY
Status: DISCONTINUED | OUTPATIENT
Start: 2022-08-07 | End: 2022-08-07

## 2022-08-07 RX ORDER — ACETAMINOPHEN 325 MG/1
650 TABLET ORAL EVERY 4 HOURS PRN
Status: DISCONTINUED | OUTPATIENT
Start: 2022-08-07 | End: 2022-08-14 | Stop reason: HOSPADM

## 2022-08-07 RX ORDER — HYDROMORPHONE HYDROCHLORIDE 1 MG/ML
0.5 INJECTION, SOLUTION INTRAMUSCULAR; INTRAVENOUS; SUBCUTANEOUS
Status: DISCONTINUED | OUTPATIENT
Start: 2022-08-07 | End: 2022-08-07

## 2022-08-07 RX ORDER — ONDANSETRON 4 MG/1
4 TABLET, FILM COATED ORAL EVERY 8 HOURS PRN
COMMUNITY

## 2022-08-07 RX ORDER — PANTOPRAZOLE SODIUM 40 MG/1
40 TABLET, DELAYED RELEASE ORAL DAILY
Status: ON HOLD | COMMUNITY
End: 2022-08-14 | Stop reason: SDUPTHER

## 2022-08-07 RX ORDER — PANTOPRAZOLE SODIUM 40 MG/1
40 TABLET, DELAYED RELEASE ORAL DAILY
Status: DISCONTINUED | OUTPATIENT
Start: 2022-08-07 | End: 2022-08-14 | Stop reason: HOSPADM

## 2022-08-07 RX ORDER — AMITRIPTYLINE HYDROCHLORIDE 10 MG/1
10 TABLET, FILM COATED ORAL NIGHTLY
COMMUNITY

## 2022-08-07 RX ORDER — SODIUM CHLORIDE 0.9 % (FLUSH) 0.9 %
10 SYRINGE (ML) INJECTION AS NEEDED
Status: DISCONTINUED | OUTPATIENT
Start: 2022-08-07 | End: 2022-08-14 | Stop reason: HOSPADM

## 2022-08-07 RX ORDER — ONDANSETRON 4 MG/1
4 TABLET, ORALLY DISINTEGRATING ORAL EVERY 6 HOURS PRN
Status: CANCELLED | OUTPATIENT
Start: 2022-08-07

## 2022-08-07 RX ADMIN — OXYCODONE HYDROCHLORIDE AND ACETAMINOPHEN 1 TABLET: 10; 325 TABLET ORAL at 18:29

## 2022-08-07 RX ADMIN — PANCRELIPASE 36000 UNITS OF LIPASE: 60000; 12000; 38000 CAPSULE, DELAYED RELEASE PELLETS ORAL at 12:19

## 2022-08-07 RX ADMIN — PANCRELIPASE 72000 UNITS OF LIPASE: 60000; 12000; 38000 CAPSULE, DELAYED RELEASE PELLETS ORAL at 18:24

## 2022-08-07 RX ADMIN — HYDROMORPHONE HYDROCHLORIDE 1 MG: 1 INJECTION, SOLUTION INTRAMUSCULAR; INTRAVENOUS; SUBCUTANEOUS at 12:27

## 2022-08-07 RX ADMIN — SODIUM CHLORIDE 100 ML/HR: 9 INJECTION, SOLUTION INTRAVENOUS at 13:33

## 2022-08-07 RX ADMIN — SODIUM CHLORIDE, POTASSIUM CHLORIDE, SODIUM LACTATE AND CALCIUM CHLORIDE 1000 ML: 600; 310; 30; 20 INJECTION, SOLUTION INTRAVENOUS at 00:06

## 2022-08-07 RX ADMIN — HYDROMORPHONE HYDROCHLORIDE 0.5 MG: 1 INJECTION, SOLUTION INTRAMUSCULAR; INTRAVENOUS; SUBCUTANEOUS at 03:25

## 2022-08-07 RX ADMIN — HYDROMORPHONE HYDROCHLORIDE 0.5 MG: 1 INJECTION, SOLUTION INTRAMUSCULAR; INTRAVENOUS; SUBCUTANEOUS at 00:16

## 2022-08-07 RX ADMIN — CEFTRIAXONE SODIUM 2 G: 2 INJECTION, POWDER, FOR SOLUTION INTRAMUSCULAR; INTRAVENOUS at 00:44

## 2022-08-07 RX ADMIN — HYDROMORPHONE HYDROCHLORIDE 1 MG: 1 INJECTION, SOLUTION INTRAMUSCULAR; INTRAVENOUS; SUBCUTANEOUS at 20:25

## 2022-08-07 RX ADMIN — Medication 10 ML: at 03:22

## 2022-08-07 RX ADMIN — Medication 1 MG: at 11:49

## 2022-08-07 RX ADMIN — HYDROMORPHONE HYDROCHLORIDE 0.5 MG: 1 INJECTION, SOLUTION INTRAMUSCULAR; INTRAVENOUS; SUBCUTANEOUS at 05:56

## 2022-08-07 RX ADMIN — Medication 10 ML: at 20:25

## 2022-08-07 RX ADMIN — AMITRIPTYLINE HYDROCHLORIDE 10 MG: 10 TABLET, FILM COATED ORAL at 20:25

## 2022-08-07 RX ADMIN — LIDOCAINE 1 PATCH: 50 PATCH CUTANEOUS at 13:58

## 2022-08-07 RX ADMIN — OXYCODONE HYDROCHLORIDE AND ACETAMINOPHEN 1 TABLET: 10; 325 TABLET ORAL at 09:52

## 2022-08-07 RX ADMIN — PANTOPRAZOLE SODIUM 40 MG: 40 TABLET, DELAYED RELEASE ORAL at 09:52

## 2022-08-07 RX ADMIN — Medication 100 MG: at 11:49

## 2022-08-07 RX ADMIN — SODIUM CHLORIDE 100 ML/HR: 9 INJECTION, SOLUTION INTRAVENOUS at 02:56

## 2022-08-07 RX ADMIN — ONDANSETRON 4 MG: 2 INJECTION INTRAMUSCULAR; INTRAVENOUS at 03:23

## 2022-08-07 RX ADMIN — Medication 5000 UNITS: at 11:50

## 2022-08-07 RX ADMIN — HYDROMORPHONE HYDROCHLORIDE 1 MG: 1 INJECTION, SOLUTION INTRAMUSCULAR; INTRAVENOUS; SUBCUTANEOUS at 23:40

## 2022-08-07 RX ADMIN — ACETAMINOPHEN 650 MG: 325 TABLET, FILM COATED ORAL at 23:44

## 2022-08-07 NOTE — CONSULTS
Gastroenterology   Initial Inpatient Consult Note    Referring Provider: Evelin Weiss APRN    Reason for Consultation: Pancreatitis    Subjective     History of present illness:    41 y.o. female presented to emergency department with symptoms she thought were consistent with her history of pancreatitis.  She has chronic pancreatitis and has been having symptoms for the last 2 months.  She has had CT scan and evaluation at [facility], CT scan July 2022.    Pain described as sharp, constant, located left upper quadrant radiates to her back.  She was having nausea and vomiting that would come and go.    Patient also with diarrhea and looser stools, no melena.    Patient also reports pain that radiates up her chest and into her throat.    Patient denies history of abdominal surgery.    Patient has a history of alcoholic pancreatitis but states that she has been avoiding booze for many months.    Past Medical History:  Past Medical History:   Diagnosis Date   • Anxiety    • E. coli bacteremia    • ETOH abuse    • GERD (gastroesophageal reflux disease)    • Hiatal hernia    • Migraine    • Miscarriage 2004   • Pancreatitis      Past Surgical History:  History reviewed. No pertinent surgical history.   Social History:   Social History     Tobacco Use   • Smoking status: Current Every Day Smoker     Packs/day: 0.50     Types: Cigarettes     Start date: 1/28/1996   • Smokeless tobacco: Current User   Substance Use Topics   • Alcohol use: Yes     Comment: 2 cocktails daily      Family History:  Family History   Problem Relation Age of Onset   • Alcohol abuse Mother    • Arthritis Mother    • Asthma Mother    • Miscarriages / Stillbirths Mother    • Cancer Father    • Diabetes Father    • Drug abuse Father    • Early death Father    • Heart disease Father    • Hyperlipidemia Father    • Hypertension Father    • Alcohol abuse Paternal Aunt    • Cancer Paternal Aunt    • Diabetes Paternal Aunt    • Drug  abuse Paternal Aunt    • Early death Paternal Aunt    • Heart disease Paternal Aunt    • Hyperlipidemia Paternal Aunt    • Hypertension Paternal Aunt    • Alcohol abuse Maternal Grandmother    • Alcohol abuse Maternal Grandfather    • Alcohol abuse Paternal Grandmother    • Cancer Paternal Grandmother    • Diabetes Paternal Grandmother    • Drug abuse Paternal Grandmother    • Early death Paternal Grandmother    • Heart disease Paternal Grandmother    • Hyperlipidemia Paternal Grandmother    • Hypertension Paternal Grandmother    • Alcohol abuse Paternal Grandfather        Home Meds:  Medications Prior to Admission   Medication Sig Dispense Refill Last Dose   • amitriptyline (ELAVIL) 10 MG tablet Take 10 mg by mouth Every Night.   8/6/2022 at Unknown time   • Cholecalciferol (VITAMIN D3) 5000 units capsule capsule Take 5,000 Units by mouth Daily.   8/6/2022 at Unknown time   • Ferrous Sulfate Dried (Feosol) 200 (65 Fe) MG tablet tablet Take 200 mg by mouth Daily.   8/6/2022 at Unknown time   • folic acid (FOLVITE) 1 MG tablet Take 1 mg by mouth Daily.   8/6/2022 at Unknown time   • ondansetron (ZOFRAN) 4 MG tablet Take 4 mg by mouth Every 8 (Eight) Hours As Needed for Nausea or Vomiting.   8/6/2022 at Unknown time   • pancrelipase, Lip-Prot-Amyl, (CREON) 46660-79850 units capsule delayed-release particles capsule Take 72,000 units of lipase by mouth 3 (Three) Times a Day With Meals.   8/6/2022 at Unknown time   • pantoprazole (PROTONIX) 40 MG EC tablet Take 40 mg by mouth Daily.   Past Week at Unknown time   • thiamine (VITAMIN B-1) 100 MG tablet  tablet Take 100 mg by mouth Daily.   8/6/2022 at Unknown time   • Potassium 95 MG tablet Take  by mouth.   More than a month at Unknown time   • vitamin B-12 (CYANOCOBALAMIN) 100 MCG tablet Take 50 mcg by mouth Daily.   More than a month at Unknown time     Current Meds:   amitriptyline, 10 mg, Oral, Nightly  [START ON 8/8/2022] cefTRIAXone, 2 g, Intravenous,  Q24H  cholecalciferol, 5,000 Units, Oral, Daily  folic acid, 1 mg, Oral, Daily  lidocaine, 1 patch, Transdermal, Q24H  pancrelipase (Lip-Prot-Amyl), 72,000 units of lipase, Oral, TID With Meals  pantoprazole, 40 mg, Oral, Daily  senna-docusate sodium, 2 tablet, Oral, BID  sodium chloride, 10 mL, Intravenous, Q12H  thiamine, 100 mg, Oral, Daily      Allergies:  Allergies   Allergen Reactions   • Latex    • Nickel      Review of Systems  Pertinent items are noted in HPI, all other systems reviewed and negative    Objective     Vital Signs  Temp:  [97.2 °F (36.2 °C)-98.5 °F (36.9 °C)] 97.2 °F (36.2 °C)  Heart Rate:  [73-98] 74  Resp:  [17-20] 18  BP: (108-130)/(71-94) 120/80    Physical Exam:  CONSTITUTIONAL:  today's vital signs reviewed  EARS NOSE THROAT: trachea midline and no deformity of the nares  EYES: no scleral icterus  GASTROINTESTINAL: abdomen is soft, tender diffusely, nondistended with normal active bowel sounds  PSYCHIATRIC: appropriate mood and affect  RESPIRATORY: normal inspiratory effort with no increased work of breathing  NEUROLOGIC: patient is awake and alert  DERMATOLOGIC: skin is warm with no cyanosis  LYMPHATIC: no periumbilical lymphadenopathy     Results Review:              I reviewed the patient's new clinical results.    Results from last 7 days   Lab Units 08/07/22  0603 08/06/22  2055   WBC 10*3/mm3 5.75 10.48   HEMOGLOBIN g/dL 9.4* 11.5*   HEMATOCRIT % 28.0* 34.3   PLATELETS 10*3/mm3 239 323     Results from last 7 days   Lab Units 08/07/22  0603 08/06/22  2158   SODIUM mmol/L 140 139   POTASSIUM mmol/L 3.7 4.0   CHLORIDE mmol/L 106 104   CO2 mmol/L 23.0 22.7   BUN mg/dL 8 11   CREATININE mg/dL 0.62 0.69   CALCIUM mg/dL 8.3* 9.0   BILIRUBIN mg/dL  --  0.2   ALK PHOS U/L  --  68   ALT (SGPT) U/L  --  11   AST (SGOT) U/L  --  12   GLUCOSE mg/dL 104* 102*     Results from last 7 days   Lab Units 08/07/22  0603   INR  1.06     Lab Results   Lab Value Date/Time    LIPASE 378 (H) 08/06/2022  2158    LIPASE 95 (H) 01/24/2018 0545    LIPASE 93 (H) 10/01/2017 0356    LIPASE 122 (H) 09/30/2017 0619    LIPASE 86 (H) 09/29/2017 1731    LIPASE 133 (H) 09/28/2017 0032       Radiology:  CT Abdomen Pelvis With Contrast   Final Result       1. The patient has heterogeneous enhancement of the left kidney, as well   as extensive urothelial enhancement. The appearance is concerning for   pyelonephritis. There are associated perinephric abscesses, as described   in the body of the report. These may be in continuity with the patient's   proximal left ureter. The inflammation is also intimately associated   with the left wilmer of the diaphragm, the pancreatic tail, and the   posterior wall of the stomach. The patient does have both subcapsular   and intraparenchymal collections within the spleen. These may represent   areas of infarction or hematoma, although superimposed infection cannot   be excluded, given the degree of inflammation within the left upper   quadrant. I do think the patient has a splenic vein occlusion, as it   becomes very attenuated, and there are multiple prominent collaterals   identified within the left upper quadrant.        Radiation dose reduction techniques were utilized, including automated   exposure control and exposure modulation based on body size.       This report was finalized on 8/6/2022 11:29 PM by Dr. Camilla Whalen M.D.          XR Chest 1 View   Final Result   No acute findings.       This report was finalized on 8/6/2022 9:17 PM by Dr. Camilla Whalen M.D.          CT Guided Abscess Drain Kidney Renal    (Results Pending)   US Spleen    (Results Pending)       Assessment & Plan   Patient Active Problem List   Diagnosis   • Alcohol-induced acute pancreatitis without infection or necrosis   • Alcohol abuse   • Elevated LFTs   • Thrombocytopenia (HCC)   • Epigastric pain   • Abnormal CT of the abdomen       Assessment:  1. Chronic pancreatitis  2. Abnormal CT scan with  perinephric abscess  3. Left-sided pyelonephritis  4. Multiloculated heterogenic splenic collections    Plan:  · Continue supportive measures for chronic pancreatitis with IV fluids and pain medication  · Plans for CT guided abscess  · ID is following, managing antibiotics and recommend ultrasound for further evaluation of splenic collections and DASHA for endocarditis work-up given disseminated infection  · GI will continue to follow      I discussed the patients findings and my recommendations with patient and nursing staff.             Adrianna HORTON  [facility], [place], operated by [facility]  [address] [place] [address]

## 2022-08-07 NOTE — H&P (VIEW-ONLY)
Referring Provider: Hipolito Quintana MD  Reason for Consultation: Perinephric abscess  Chief Complaint   Patient presents with   • Pancreatitis   • Shoulder Pain         Subjective   History of present illness: Patient is a 41-year-old female with a past medical history of alcohol abuse, chronic pancreatitis and urinary tract infections who presents with worsening back, side and abdominal pain.  ID was consulted for perinephric abscess.    Patient reports she has a recent history of being admitted at [facility] in [place] for pancreatitis and has had many difficulties with admission due to this in the past.  She reports she has been told in the past she has had cysts located on her pancreas but never had any difficulties with her kidneys.  She does report she has had multiple bouts of urinary tract infections throughout the years with the last being approximately 8 months ago.  She denies any current diarrhea but does have abdominal pain.  She reports she is changed her diet very significantly to include no fats to help with her overall diarrhea.  She also reports she has had on and off fevers and chills for the last month.  Reports also significant night sweats.    On admission patient was noted to be afebrile with no leukocytosis.  CRP was elevated at 4.7 however lactate and procalcitonin were normal.  Imaging illustrated enhancement of the left kidney with extensive urothelial enhancement concerning for pyelonephritis.  There is also an associated perinephric abscess measuring 3.5 x 3.3 cm.  Also collection seen extending to the tail of the pancreas that is 1.6 cm.  Stranding around the spleen with multiloculated collections within the spleen as well.    Past Medical History:   Diagnosis Date   • Anxiety    • E. coli bacteremia    • ETOH abuse    • GERD (gastroesophageal reflux disease)    • Hiatal hernia    • Migraine    • Miscarriage 2004   • Pancreatitis        History reviewed. No pertinent surgical  history.    family history includes Alcohol abuse in her maternal grandfather, maternal grandmother, mother, paternal aunt, paternal grandfather, and paternal grandmother; Arthritis in her mother; Asthma in her mother; Cancer in her father, paternal aunt, and paternal grandmother; Diabetes in her father, paternal aunt, and paternal grandmother; Drug abuse in her father, paternal aunt, and paternal grandmother; Early death in her father, paternal aunt, and paternal grandmother; Heart disease in her father, paternal aunt, and paternal grandmother; Hyperlipidemia in her father, paternal aunt, and paternal grandmother; Hypertension in her father, paternal aunt, and paternal grandmother; Miscarriages / Stillbirths in her mother.     reports that she has been smoking cigarettes. She started smoking about 26 years ago. She has been smoking about 0.50 packs per day. She uses smokeless tobacco. She reports current alcohol use. She reports that she does not use drugs.     Allergies   Allergen Reactions   • Latex    • Nickel        Medication:  Antibiotics:  Anti-Infectives (From admission, onward)    Ordered     Dose/Rate Route Frequency Start Stop    08/07/22 1053  piperacillin-tazobactam (ZOSYN) 3.375 g in iso-osmotic dextrose 50 ml (premix)        Ordering Provider: Rafat Beltran MD    3.375 g  over 4 Hours Intravenous Every 8 Hours 08/07/22 1745 08/14/22 1744    08/07/22 1053  piperacillin-tazobactam (ZOSYN) 3.375 g in iso-osmotic dextrose 50 ml (premix)        Ordering Provider: Rafat Beltran MD    3.375 g  over 30 Minutes Intravenous Once 08/07/22 1145      08/06/22 2350  cefTRIAXone (ROCEPHIN) 2 g in sodium chloride 0.9 % 100 mL IVPB-VTB        Ordering Provider: Salvador Bach PA    2 g  200 mL/hr over 30 Minutes Intravenous Once 08/06/22 2352 08/07/22 0115          Review of Systems  Pertinent items are noted in HPI, all other systems reviewed and negative    Objective     Physical Exam:   Vital Signs   Temp:   [97.2 °F (36.2 °C)-98.5 °F (36.9 °C)] 97.2 °F (36.2 °C)  Heart Rate:  [73-98] 74  Resp:  [17-20] 18  BP: (108-130)/(71-94) 120/80    GENERAL: Awake and alert, in no acute distress.  Chronically ill-appearing.  HEENT: Oropharynx is clear. Hearing is grossly normal.   EYES: PERRL. No conjunctival injection. No lid lag.   LYMPHATICS: No lymphadenopathy of the neck or inguinal regions.   HEART: Regular rate and regular rhythm. No peripheral edema.   LUNGS: Clear to auscultation anteriorly with normal respiratory effort.   GI: Soft, tender to palpation on the left side, nondistended. No appreciable organomegaly.   SKIN: Warm and dry without cutaneous eruptions   PSYCHIATRIC: Appropriate mood, affect, insight, and judgment.     Results Review:   I reviewed the patient's new clinical results.  I reviewed the patient's new imaging results and agree with the interpretation.  I reviewed the patient's other test results and agree with the interpretation    Lab Results   Component Value Date    WBC 5.75 08/07/2022    HGB 9.4 (L) 08/07/2022    HCT 28.0 (L) 08/07/2022    .5 (H) 08/07/2022     08/07/2022       No results found for: EDUARDO LIZARRAGA VANCORANDOM    Lab Results   Component Value Date    GLUCOSE 104 (H) 08/07/2022    BUN 8 08/07/2022    CREATININE 0.62 08/07/2022    EGFRIFNONA 97 04/06/2018    BCR 12.9 08/07/2022    CO2 23.0 08/07/2022    CALCIUM 8.3 (L) 08/07/2022    ALBUMIN 4.10 08/06/2022    AST 12 08/06/2022    ALT 11 08/06/2022         Estimated Creatinine Clearance: 107.3 mL/min (by C-G formula based on SCr of 0.62 mg/dL).      Microbiology:  8/7 blood culture 1 out of 1 in process    Radiology:  8/6 CT of the abdomen and pelvis report reviewed with 3.5 x 3.3 cm perinephric abscess.  Fluid collection extending from the tail of the pancreas measuring 1.6 cm.  Stranding consistent with left-sided pyelonephritis.  Lesions within the spleen concerning for hematoma, infarction or abscess.   Largest of which is 5.1 x 4.1 cm.    Assessment   #Perinephric abscess  #Left-sided pyelonephritis  #Multiloculated heterogeneous splenic collections  #Chronic pancreatitis    Findings are concerning for disseminated infection.  Plan to obtain a set of blood cultures to correlate.  Patient is already received antibiotics and will therefore continue IV ceftriaxone 2 g daily and plan for IR drainage to further define microbiology.  Plan to use this to further guide antibiotic therapy.    Splenic collections are concerning and may need general surgery consult regarding splenectomy eval however will obtain ultrasound to further define whether these are fluid containing.  Also will get TTE for beginning endocarditis work-up given the disseminated nature.    Thank you for this consult.  We will continue to follow along and tailor antibiotics as the patient's clinical course evolves.

## 2022-08-07 NOTE — ED PROVIDER NOTES
EMERGENCY DEPARTMENT ENCOUNTER  I wore full protective equipment throughout this patient encounter including a N95 mask, eye shield, gown and gloves. Hand hygiene was performed before donning protective equipment and after removal when leaving the room.    Room Number:  P690/1  Date of encounter:  8/7/2022  PCP: System, Provider Not In    HPI:  Context: Piper Cain is a 41 y.o. female who presents to the ED c/o chief complaint of pancreatitis.  Patient reports history of chronic pancreatitis, reports that she has been having a pancreatitis attack for the last 2 months.  Patient reports pain was initially left upper quadrant, then migrated to her back, now is epigastric in nature but migrates across the upper abdomen as well as to the back.  Pain is sharp, constant, patient denies any aggravating or ameliorating factors.  Patient reports nausea vomiting but reports last emesis a week ago.  Patient has been having diarrhea, reports 3 episodes today, no blood or mucus.  Patient denies any urinary symptoms, no fever or systemic symptoms.  Patient reports pain occasionally radiates up into her chest as well as her throat, denies any shortness of breath.  Patient reports she no longer drinks alcohol.  Patient denies any prior abdominal surgeries.    MEDICAL HISTORY REVIEW  Reviewed in EPIC    PAST MEDICAL HISTORY  Active Ambulatory Problems     Diagnosis Date Noted   • Alcohol-induced acute pancreatitis without infection or necrosis 09/28/2017   • Alcohol abuse 09/28/2017   • Elevated LFTs 09/28/2017   • Thrombocytopenia (HCC) 10/02/2017     Resolved Ambulatory Problems     Diagnosis Date Noted   • Hypokalemia 09/28/2017   • Mixed acid base balance disorder 09/28/2017   • Dehydration 09/28/2017     Past Medical History:   Diagnosis Date   • Anxiety    • E. coli bacteremia    • ETOH abuse    • GERD (gastroesophageal reflux disease)    • Hiatal hernia    • Migraine    • Miscarriage 2004   • Pancreatitis        PAST  SURGICAL HISTORY  History reviewed. No pertinent surgical history.    FAMILY HISTORY  Family History   Problem Relation Age of Onset   • Alcohol abuse Mother    • Arthritis Mother    • Asthma Mother    • Miscarriages / Stillbirths Mother    • Cancer Father    • Diabetes Father    • Drug abuse Father    • Early death Father    • Heart disease Father    • Hyperlipidemia Father    • Hypertension Father    • Alcohol abuse Paternal Aunt    • Cancer Paternal Aunt    • Diabetes Paternal Aunt    • Drug abuse Paternal Aunt    • Early death Paternal Aunt    • Heart disease Paternal Aunt    • Hyperlipidemia Paternal Aunt    • Hypertension Paternal Aunt    • Alcohol abuse Maternal Grandmother    • Alcohol abuse Maternal Grandfather    • Alcohol abuse Paternal Grandmother    • Cancer Paternal Grandmother    • Diabetes Paternal Grandmother    • Drug abuse Paternal Grandmother    • Early death Paternal Grandmother    • Heart disease Paternal Grandmother    • Hyperlipidemia Paternal Grandmother    • Hypertension Paternal Grandmother    • Alcohol abuse Paternal Grandfather        SOCIAL HISTORY  Social History     Socioeconomic History   • Marital status: Single   Tobacco Use   • Smoking status: Current Every Day Smoker     Packs/day: 0.50     Types: Cigarettes     Start date: 1/28/1996   • Smokeless tobacco: Current User   Substance and Sexual Activity   • Alcohol use: Yes     Comment: 2 cocktails daily   • Drug use: No   • Sexual activity: Yes     Partners: Male     Birth control/protection: None     Comment: IUD removed 6/29/2017       ALLERGIES  Latex and Nickel    The patient's allergies have been reviewed    REVIEW OF SYSTEMS  All systems reviewed and negative except for those discussed in HPI.     PHYSICAL EXAM  I have reviewed the triage vital signs and nursing notes.  ED Triage Vitals [08/06/22 2021]   Temp Heart Rate Resp BP SpO2   98.5 °F (36.9 °C) 94 20 121/84 99 %      Temp src Heart Rate Source Patient Position BP  Location FiO2 (%)   Oral Monitor -- -- --       General: No acute distress.  HENT: NCAT, PERRL, Nares patent.  Eyes: no scleral icterus.  Neck: trachea midline, no ROM limitations.  CV: regular rhythm, regular rate.  Respiratory: normal effort, CTAB.  Abdomen: soft, nondistended, upper abdominal tenderness, greatest in the epigastrium, negative Hinton's, negative McBurney's, no rebound tenderness, no guarding or rigidity.  Musculoskeletal: no deformity.  Neuro: alert, moves all extremities, follows commands.  Skin: warm, dry.    LAB RESULTS  Recent Results (from the past 24 hour(s))   hCG, Serum, Qualitative    Collection Time: 08/06/22  8:55 PM    Specimen: Blood   Result Value Ref Range    HCG Qualitative Negative Negative   CBC Auto Differential    Collection Time: 08/06/22  8:55 PM    Specimen: Blood   Result Value Ref Range    WBC 10.48 3.40 - 10.80 10*3/mm3    RBC 3.28 (L) 3.77 - 5.28 10*6/mm3    Hemoglobin 11.5 (L) 12.0 - 15.9 g/dL    Hematocrit 34.3 34.0 - 46.6 %    .6 (H) 79.0 - 97.0 fL    MCH 35.1 (H) 26.6 - 33.0 pg    MCHC 33.5 31.5 - 35.7 g/dL    RDW 14.9 12.3 - 15.4 %    RDW-SD 55.8 (H) 37.0 - 54.0 fl    MPV 10.5 6.0 - 12.0 fL    Platelets 323 140 - 450 10*3/mm3    Neutrophil % 67.2 42.7 - 76.0 %    Lymphocyte % 22.5 19.6 - 45.3 %    Monocyte % 7.2 5.0 - 12.0 %    Eosinophil % 2.3 0.3 - 6.2 %    Basophil % 0.5 0.0 - 1.5 %    Immature Grans % 0.3 0.0 - 0.5 %    Neutrophils, Absolute 7.05 (H) 1.70 - 7.00 10*3/mm3    Lymphocytes, Absolute 2.36 0.70 - 3.10 10*3/mm3    Monocytes, Absolute 0.75 0.10 - 0.90 10*3/mm3    Eosinophils, Absolute 0.24 0.00 - 0.40 10*3/mm3    Basophils, Absolute 0.05 0.00 - 0.20 10*3/mm3    Immature Grans, Absolute 0.03 0.00 - 0.05 10*3/mm3    nRBC 0.0 0.0 - 0.2 /100 WBC   Scan Slide    Collection Time: 08/06/22  8:55 PM    Specimen: Blood   Result Value Ref Range    RBC Morphology Normal Normal    WBC Morphology Normal Normal    Platelet Morphology Normal Normal   ECG 12 Lead     Collection Time: 08/06/22  8:57 PM   Result Value Ref Range    QT Interval 360 ms   Comprehensive Metabolic Panel    Collection Time: 08/06/22  9:58 PM    Specimen: Blood   Result Value Ref Range    Glucose 102 (H) 65 - 99 mg/dL    BUN 11 6 - 20 mg/dL    Creatinine 0.69 0.57 - 1.00 mg/dL    Sodium 139 136 - 145 mmol/L    Potassium 4.0 3.5 - 5.2 mmol/L    Chloride 104 98 - 107 mmol/L    CO2 22.7 22.0 - 29.0 mmol/L    Calcium 9.0 8.6 - 10.5 mg/dL    Total Protein 7.1 6.0 - 8.5 g/dL    Albumin 4.10 3.50 - 5.20 g/dL    ALT (SGPT) 11 1 - 33 U/L    AST (SGOT) 12 1 - 32 U/L    Alkaline Phosphatase 68 39 - 117 U/L    Total Bilirubin 0.2 0.0 - 1.2 mg/dL    Globulin 3.0 gm/dL    A/G Ratio 1.4 g/dL    BUN/Creatinine Ratio 15.9 7.0 - 25.0    Anion Gap 12.3 5.0 - 15.0 mmol/L    eGFR 113.4 >60.0 mL/min/1.73   Lipase    Collection Time: 08/06/22  9:58 PM    Specimen: Blood   Result Value Ref Range    Lipase 378 (H) 13 - 60 U/L   Lactate Dehydrogenase    Collection Time: 08/06/22  9:58 PM    Specimen: Blood   Result Value Ref Range     (H) 135 - 214 U/L   Troponin    Collection Time: 08/06/22  9:58 PM    Specimen: Blood   Result Value Ref Range    Troponin T <0.010 0.000 - 0.030 ng/mL   Procalcitonin    Collection Time: 08/06/22  9:58 PM    Specimen: Blood   Result Value Ref Range    Procalcitonin 0.06 0.00 - 0.25 ng/mL   C-reactive Protein    Collection Time: 08/06/22  9:58 PM    Specimen: Blood   Result Value Ref Range    C-Reactive Protein 4.43 (H) 0.00 - 0.50 mg/dL   Urinalysis With Microscopic If Indicated (No Culture) - Urine, Clean Catch    Collection Time: 08/06/22 10:00 PM    Specimen: Urine, Clean Catch   Result Value Ref Range    Color, UA Yellow Yellow, Straw    Appearance, UA Clear Clear    pH, UA <=5.0 5.0 - 8.0    Specific Gravity, UA 1.011 1.005 - 1.030    Glucose, UA Negative Negative    Ketones, UA Negative Negative    Bilirubin, UA Negative Negative    Blood, UA Negative Negative    Protein, UA Negative  Negative    Leuk Esterase, UA Negative Negative    Nitrite, UA Negative Negative    Urobilinogen, UA 0.2 E.U./dL 0.2 - 1.0 E.U./dL   COVID-19,[facility] IN-HOUSE CEPHEID/CHARLIE NP SWAB IN TRANSPORT MEDIA 8-12 HR TAT - Swab, Nasopharynx    Collection Time: 08/07/22 12:08 AM    Specimen: Nasopharynx; Swab   Result Value Ref Range    COVID19 Not Detected Not Detected - Ref. Range   Lactic Acid, Plasma    Collection Time: 08/07/22 12:11 AM    Specimen: Blood   Result Value Ref Range    Lactate 0.6 0.5 - 2.0 mmol/L   Basic Metabolic Panel    Collection Time: 08/07/22  6:03 AM    Specimen: Blood   Result Value Ref Range    Glucose 104 (H) 65 - 99 mg/dL    BUN 8 6 - 20 mg/dL    Creatinine 0.62 0.57 - 1.00 mg/dL    Sodium 140 136 - 145 mmol/L    Potassium 3.7 3.5 - 5.2 mmol/L    Chloride 106 98 - 107 mmol/L    CO2 23.0 22.0 - 29.0 mmol/L    Calcium 8.3 (L) 8.6 - 10.5 mg/dL    BUN/Creatinine Ratio 12.9 7.0 - 25.0    Anion Gap 11.0 5.0 - 15.0 mmol/L    eGFR 116.3 >60.0 mL/min/1.73   CBC (No Diff)    Collection Time: 08/07/22  6:03 AM    Specimen: Blood   Result Value Ref Range    WBC 5.75 3.40 - 10.80 10*3/mm3    RBC 2.68 (L) 3.77 - 5.28 10*6/mm3    Hemoglobin 9.4 (L) 12.0 - 15.9 g/dL    Hematocrit 28.0 (L) 34.0 - 46.6 %    .5 (H) 79.0 - 97.0 fL    MCH 35.1 (H) 26.6 - 33.0 pg    MCHC 33.6 31.5 - 35.7 g/dL    RDW 14.9 12.3 - 15.4 %    RDW-SD 55.3 (H) 37.0 - 54.0 fl    MPV 10.2 6.0 - 12.0 fL    Platelets 239 140 - 450 10*3/mm3   Protime-INR    Collection Time: 08/07/22  6:03 AM    Specimen: Blood   Result Value Ref Range    Protime 13.7 11.7 - 14.2 Seconds    INR 1.06 0.90 - 1.10   C-reactive Protein    Collection Time: 08/07/22  6:03 AM    Specimen: Blood   Result Value Ref Range    C-Reactive Protein 4.74 (H) 0.00 - 0.50 mg/dL   Sedimentation Rate    Collection Time: 08/07/22  6:03 AM    Specimen: Blood   Result Value Ref Range    Sed Rate 67 (H) 0 - 20 mm/hr   Ferritin    Collection Time: 08/07/22  6:03 AM    Specimen:  Blood   Result Value Ref Range    Ferritin 355.00 (H) 13.00 - 150.00 ng/mL   Iron Profile    Collection Time: 08/07/22  6:03 AM    Specimen: Blood   Result Value Ref Range    Iron 19 (L) 37 - 145 mcg/dL    Iron Saturation 8 (L) 20 - 50 %    Transferrin 167 (L) 200 - 360 mg/dL    TIBC 249 (L) 298 - 536 mcg/dL   Reticulocytes    Collection Time: 08/07/22  6:03 AM    Specimen: Blood   Result Value Ref Range    Reticulocyte % 0.96 0.70 - 1.90 %    Reticulocyte Absolute 0.0259 0.0200 - 0.1300 10*6/mm3   Retic With IRF & RET-He    Collection Time: 08/07/22  6:03 AM    Specimen: Blood   Result Value Ref Range    Immature Reticulocyte Fraction 12.2 3.0 - 15.8 %    Reticulocyte % 0.96 0.70 - 1.90 %    Reticulocyte Absolute 0.0259 0.0200 - 0.1300 10*6/mm3    Reticulocyte Hgb 30.7 29.8 - 36.1 pg   Duplex Portal Hepatic Complete CAR    Collection Time: 08/07/22  4:59 PM   Result Value Ref Range    Target HR (85%) 154 bpm    Max. Pred. HR (100%) 181 bpm    Portal Vein Diameter 0.99 cm     CV VAS HEPATOPORTAL PORTAL V PSV 38.50 cm/s     CV VAS HEPATIC PORTAL SPLEEN LENGTH 12.40 cm     CV VAS HEPATOPORTAL PORTAL V MAIN INTRA DIRECTION Hepatopetal      CV VAS HEPATOPORTAL PORTAL V EXTRAHEPATIC DIRECTION Hepatopetal      CV VAS HEPATOPORTAL PORTAL V RT INTRA DIRECTION Hepatopetal      CV VAS HEPATOPORTAL PORTAL V LT INTRA DIRECTION Hepatopetal      CV VAS HEPATOPORTAL SPLENIC DIRECTION Hepatopetal      CV VAS HEPATOPORTAL SMV DIRECTION Hepatopetal      CV VAS HEPATOPORTAL HEPATIC V RT DIRECTION Hepatofugal      CV VAS HEPATOPORTAL HEPATIC V MID DIRECTION Hepatofugal      CV VAS HEPATOPORTAL HEPATIC V LT DIRECTION Hepatofugal      CV VAS HEPATOPORTAL IVC CONFLUENCE FLOW P      CV VAS HEPATOPORTAL IVC CONFLUENCE SPONT Y     BH CV VAS HEPATOPORTAL IVC FLOW P      CV VAS HEPATOPORTAL IVC SPONT Y     SMA Hepatic  cm/s    SMA Hepatic EDV 24.9 cm/s    SMA Splenic PSV 55.7 cm/s    SMA Splenic EDV 17.7 cm/s        I ordered the above labs and reviewed the results.    RADIOLOGY  CT Abdomen Pelvis With Contrast    Result Date: 8/6/2022  CT OF THE ABDOMEN AND PELVIS WITH CONTRAST  HISTORY: Upper abdominal pain  COMPARISON: 09/28/2017  TECHNIQUE: Axial CT imaging was obtained through the abdomen and pelvis. IV contrast was administered.  FINDINGS: Images through the lung bases demonstrate some minimal dependent atelectasis. No suspicious hepatic lesions are seen. The liver is enlarged, measuring up to 18.9 cm in craniocaudal dimensions. Gallbladder is normal. Right kidney enhances normally, and there is no hydronephrosis or there is heterogeneous enhancement of the left kidney, with marked urothelial enhancement. There is left periureteral soft tissue stranding. There is also left perinephric stranding. There is a multiloculated rim-enhancing collection which is intimately associated with the anterior aspect of the left kidney, concerning for perinephric abscess. It measures up to 3.5 x 3.3 cm on the axial series. It has a linear extension medially, which is intimately associated with the left wilmer of the diaphragm. This collection measures up to 8 mm in transverse dimensions, and 4.3 cm in length. Both of these collections are suspected to be continuous with the patient's left ureter. There is marked attenuation of the patient's left renal vein, although I think it appears patent. There is an additional component of the collection, which extends anteriorly, and is intimately associated with the tail of the pancreas and posterior gastric wall. This collection measures up to 1.6 cm. There is inflammatory stranding around the spleen. Multiloculated heterogeneous collections are noted within the spleen. Some of these appear to be intraparenchymal, while others are subcapsular. Etiology is uncertain. They may represent areas of splenic infarction or hematoma, although superimposed infection is not excluded. Of note, there is  marked attenuation of the patient's splenic vein, and I cannot exclude a splenic infarction. There certainly appear to be within  prominent vessels along the greater curvature of the stomach, which may represent for gastric collaterals. The single largest collection involving the spleen measures approximately 5.1 x 4.1 cm on the axial series. While the inflammation closely abuts the pancreas, it does appear to enhance normally. There is some calcification of the aorta. There is no bowel obstruction. Urinary bladder appears somewhat distended. Uterus is within normal limits. There is a small amount of free fluid within the pelvis. There is colonic diverticulosis. The appendix is normal. No acute osseous abnormalities are seen.       1. The patient has heterogeneous enhancement of the left kidney, as well as extensive urothelial enhancement. The appearance is concerning for pyelonephritis. There are associated perinephric abscesses, as described in the body of the report. These may be in continuity with the patient's proximal left ureter. The inflammation is also intimately associated with the left wilmer of the diaphragm, the pancreatic tail, and the posterior wall of the stomach. The patient does have both subcapsular and intraparenchymal collections within the spleen. These may represent areas of infarction or hematoma, although superimposed infection cannot be excluded, given the degree of inflammation within the left upper quadrant. I do think the patient has a splenic vein occlusion, as it becomes very attenuated, and there are multiple prominent collaterals identified within the left upper quadrant.  Radiation dose reduction techniques were utilized, including automated exposure control and exposure modulation based on body size.  This report was finalized on 8/6/2022 11:29 PM by Dr. Camilla Whalen M.D.       Spleen    Result Date: 8/7/2022  SONOGRAM OF THE SPLEEN  HISTORY: Splenic lesion seen on CT.   TECHNIQUE: Sonogram of the spleen was performed and is correlated with CT scan performed yesterday.  FINDINGS: Spleen measures 11.9 x 4.2 x 4.3 cm. Along the superior medial edge of the spleen there is heterogeneous area of echogenicity measuring about 3.6 cm long corresponding to the lesion seen in the same area on CT. There is also some heterogeneous echogenicity on the medial lower portion of the spleen. The findings appear similar to the CT from yesterday.      Abnormal spleen appears no different than on CT yesterday.  This report was finalized on 8/7/2022 6:48 PM by Dr. Salvador Perdomo M.D.      XR Chest 1 View    Result Date: 8/6/2022  SINGLE VIEW OF THE CHEST  HISTORY: Chronic pancreatitis  COMPARISON: 04/06/2018  FINDINGS: Heart size is within normal limits. No pneumothorax, pleural effusion, or acute infiltrate is seen.      No acute findings.  This report was finalized on 8/6/2022 9:17 PM by Dr. Camilla Whalen M.D.      Duplex Portal Hepatic Complete CAR    Result Date: 8/7/2022  · All vessels appear normal with normal flow direction and no evidence of thrombus. However, there is a nonvascular fluid collection in the and the splenic vein itself is difficult to visualize with multiple collaterals noted.        I ordered the above noted radiological studies. I reviewed the images and results. I agree with the radiologist interpretation.    PROCEDURES  Procedures    MEDICATIONS GIVEN IN ER  Medications   sodium chloride 0.9 % flush 10 mL (10 mL Intravenous Not Given 8/7/22 1410)   sodium chloride 0.9 % flush 10 mL (has no administration in time range)   sodium chloride 0.9 % infusion (100 mL/hr Intravenous New Bag 8/7/22 1333)   acetaminophen (TYLENOL) tablet 650 mg (has no administration in time range)     Or   acetaminophen (TYLENOL) 160 MG/5ML solution 650 mg (has no administration in time range)     Or   acetaminophen (TYLENOL) suppository 650 mg (has no administration in time range)   HYDROmorphone  (DILAUDID) injection 1 mg (1 mg Intravenous Given 8/7/22 1227)     And   naloxone (NARCAN) injection 0.4 mg (has no administration in time range)   oxyCODONE-acetaminophen (PERCOCET)  MG per tablet 1 tablet (1 tablet Oral Given 8/7/22 1829)   sennosides-docusate (PERICOLACE) 8.6-50 MG per tablet 2 tablet (2 tablets Oral Not Given 8/7/22 0949)   ondansetron ODT (ZOFRAN-ODT) disintegrating tablet 4 mg (has no administration in time range)   amitriptyline (ELAVIL) tablet 10 mg (has no administration in time range)   cholecalciferol (VITAMIN D3) tablet 5,000 Units (5,000 Units Oral Given 8/7/22 1150)   folic acid (FOLVITE) tablet 1 mg (1 mg Oral Given 8/7/22 1149)   pantoprazole (PROTONIX) EC tablet 40 mg (40 mg Oral Given 8/7/22 0952)   Thiamine Mononitrate tablet 100 mg (100 mg Oral Given 8/7/22 1149)   lidocaine (LIDODERM) 5 % 1 patch (1 patch Transdermal Medication Applied 8/7/22 1358)   cefTRIAXone (ROCEPHIN) 2 g in sodium chloride 0.9 % 100 mL IVPB-VTB (has no administration in time range)   pancrelipase (Lip-Prot-Amyl) (CREON) capsule 72,000 units of lipase (72,000 units of lipase Oral Given 8/7/22 1824)   morphine injection 4 mg (4 mg Intravenous Given 8/6/22 2052)   ondansetron (ZOFRAN) injection 8 mg (8 mg Intravenous Given 8/6/22 2052)   sodium chloride 0.9 % bolus 1,000 mL (0 mL Intravenous Stopped 8/6/22 2158)   ketorolac (TORADOL) injection 15 mg (15 mg Intravenous Given 8/6/22 2210)   iopamidol (ISOVUE-300) 61 % injection 100 mL (100 mL Intravenous Given 8/6/22 2257)   cefTRIAXone (ROCEPHIN) 2 g in sodium chloride 0.9 % 100 mL IVPB-VTB (0 g Intravenous Stopped 8/7/22 0115)   lactated ringers bolus 1,000 mL (1,000 mL Intravenous New Bag 8/7/22 0006)   HYDROmorphone (DILAUDID) injection 0.5 mg (0.5 mg Intravenous Given 8/7/22 0016)       PROGRESS, DATA ANALYSIS, CONSULTS, AND MEDICAL DECISION MAKING  A complete history and physical exam have been performed.  All available laboratory and imaging  results have been reviewed by myself prior to disposition.    MDM  After the initial H&P, I discussed pertinent information from history and physical exam with patient/family.  Discussed differential diagnosis.  Discussed plan for ED evaluation/workup/treatment.  All questions answered.  Patient/family is agreeable with plan.  ED Course as of 08/07/22 1915   Sat Aug 06, 2022   2101 My differential diagnosis for abdominal pain includes but is not limited to:  Gastritis, gastroenteritis, peptic ulcer disease, GERD, esophageal perforation, acute appendicitis, mesenteric adenitis, Meckel's diverticulum, epiploic appendagitis, diverticulitis, colon cancer, ulcerative colitis, Crohn's disease, intussusception, small bowel obstruction, adhesions, ischemic bowel, perforated viscus, ileus, obstipation, biliary colic, cholecystitis, cholelithiasis, Mike-Dionicio Rakan, hepatitis, pancreatitis, common bile duct obstruction, cholangitis, bile leak, splenic trauma, splenic rupture, splenic infarction, splenic abscess, abdominal abscess, ascites, spontaneous bacterial peritonitis, hernia, UTI, cystitis, prostatitis, ureterolithiasis, urinary obstruction, ovarian cyst, torsion, pregnancy, ectopic pregnancy, PID, pelvic abscess, mittelschmerz, endometriosis, AAA, myocardial infarction, pneumonia, cancer, porphyria, DKA, medications, sickle cell, viral syndrome, zoster     [JG]   2101 EKG independently viewed and contemporaneously interpreted by ED physician. Time: 2057.  Rate 92.  Interpretation: Normal sinus rhythm, normal axis, normal QRS, no acute ST changes. [JG]   2226 Patient history and ER presentation discussed with Dr. Plascencia, she is pending laboratory and imaging evaluation and final disposition. [DIMITRI]   2241 WBC: 10.48 [DIMITRI]   2242 Hemoglobin(!): 11.5 [DIMITRI]   2242 Hematocrit: 34.3 [DIMITRI]   2242 Platelets: 323 [DIMITRI]   2242 Glucose(!): 102 [DIMITRI]   2242 BUN: 11 [DIMITRI]   2242 Creatinine: 0.69 [DIMITRI]   2242 Sodium: 139 [DIMITRI]   2242  Potassium: 4.0 [DIMITRI]   2242 Chloride: 104 [DIMITRI]   2242 CO2: 22.7 [DIMITRI]   2242 Troponin T: <0.010 [DIMITRI]   2251 Lipase(!): 378 [DIMITRI]   2251 LDH(!): 238 [DIMITRI]   2251 HCG Qualitative: Negative [DIMITRI]   [place] Aug 07, 2022   0003 Patient rechecked, sitting in bed, still complaining of pain.  Discussed results including abnormal findings on her CT scan and need for admission for further evaluation and treatment.  In short patient states that she has a history of chronic pancreatitis related to alcohol abuse, however she has not had anything to drink in months.  Patient states for approximately the past 4 months she has had worsening pain with nausea.  Tonight again in the left upper quadrant became severe and radiated to her left shoulder and neck.  Patient denies any recent UTI symptoms, but has had a low-grade temperature. [DIMITRI]   0027 Patient history, ER presentation and evaluation discussed with Evelin HORTON, will admit to a [facility] bed per Dr. Quintana. [DIMITRI]      ED Course User Index  [DIMITRI] Salvador Bach PA  [JG] Clive Plascencia MD       AS OF 19:15 EDT VITALS:    BP - 113/78  HR - 79  TEMP - 97 °F (36.1 °C)  O2 SATS - 100%    DIAGNOSIS  Final diagnoses:   Epigastric pain   Nausea and vomiting, unspecified vomiting type   Diarrhea, unspecified type   History of pancreatitis   Pyelonephritis         DISPOSITION  ADMISSION    Discussed treatment plan and reason for admission with pt/family and admitting physician.  Pt/family voiced understanding of the plan for admission for further testing/treatment as needed.          Clive Plascencia MD  08/07/22 1916

## 2022-08-07 NOTE — PLAN OF CARE
Goal Outcome Evaluation:              Outcome Evaluation: Pt arrived from ED. A/O x4. up ad valeria. Pt c/o of abdominal pain, radiating to her back. dilaudid prn given with some relief. zofran given for nausea- with relief.  vss. care explained. questions answered. clear liquid diet. iv fluids infusing. significant other at bedside. will continue to monitor.

## 2022-08-07 NOTE — PROGRESS NOTES
[facility]: Piperacillin-Tazobactam Consult    Pt Name: Piper Cain   : 1980    Indication: Intra-Abdominal Infection (perinephric abscess)    Relevant clinical data and objective history reviewed:    Past Medical History:   Diagnosis Date    Anxiety     E. coli bacteremia     ETOH abuse     GERD (gastroesophageal reflux disease)     Hiatal hernia     Migraine     Miscarriage 2004    Pancreatitis      Creatinine   Date Value Ref Range Status   2022 0.62 0.57 - 1.00 mg/dL Final   2022 0.69 0.57 - 1.00 mg/dL Final   2018 0.69 0.57 - 1.00 mg/dL Final     BUN   Date Value Ref Range Status   2022 8 6 - 20 mg/dL Final     Estimated Creatinine Clearance: 107.3 mL/min (by C-G formula based on SCr of 0.62 mg/dL).    Lab Results   Component Value Date    WBC 5.75 2022     Temp Readings from Last 3 Encounters:   22 97.2 °F (36.2 °C) (Oral)   18 97.5 °F (36.4 °C)   18 98.1 °F (36.7 °C) (Oral)      Assessment/Plan  Estimated CrCl >20 mL/min at this time; BMI 17 kg/m2  Will start piperacillin-tazobactam 3.375 g IV every 8 hours     Pharmacy will continue to follow daily while on piperacillin-tazobactam and adjust as needed. Thank you for this consult.    Loni Dyer [facility]  Clinical Pharmacist

## 2022-08-07 NOTE — ED PROVIDER NOTES
EMERGENCY DEPARTMENT ENCOUNTER    Room number:  03/03  Date Seen:  8/7/2022  Time of transfer: 22:30  PCP:  System, Provider Not In    Laboratory Results:  Recent Results (from the past 24 hour(s))   hCG, Serum, Qualitative    Collection Time: 08/06/22  8:55 PM    Specimen: Blood   Result Value Ref Range    HCG Qualitative Negative Negative   CBC Auto Differential    Collection Time: 08/06/22  8:55 PM    Specimen: Blood   Result Value Ref Range    WBC 10.48 3.40 - 10.80 10*3/mm3    RBC 3.28 (L) 3.77 - 5.28 10*6/mm3    Hemoglobin 11.5 (L) 12.0 - 15.9 g/dL    Hematocrit 34.3 34.0 - 46.6 %    .6 (H) 79.0 - 97.0 fL    MCH 35.1 (H) 26.6 - 33.0 pg    MCHC 33.5 31.5 - 35.7 g/dL    RDW 14.9 12.3 - 15.4 %    RDW-SD 55.8 (H) 37.0 - 54.0 fl    MPV 10.5 6.0 - 12.0 fL    Platelets 323 140 - 450 10*3/mm3    Neutrophil % 67.2 42.7 - 76.0 %    Lymphocyte % 22.5 19.6 - 45.3 %    Monocyte % 7.2 5.0 - 12.0 %    Eosinophil % 2.3 0.3 - 6.2 %    Basophil % 0.5 0.0 - 1.5 %    Immature Grans % 0.3 0.0 - 0.5 %    Neutrophils, Absolute 7.05 (H) 1.70 - 7.00 10*3/mm3    Lymphocytes, Absolute 2.36 0.70 - 3.10 10*3/mm3    Monocytes, Absolute 0.75 0.10 - 0.90 10*3/mm3    Eosinophils, Absolute 0.24 0.00 - 0.40 10*3/mm3    Basophils, Absolute 0.05 0.00 - 0.20 10*3/mm3    Immature Grans, Absolute 0.03 0.00 - 0.05 10*3/mm3    nRBC 0.0 0.0 - 0.2 /100 WBC   Scan Slide    Collection Time: 08/06/22  8:55 PM    Specimen: Blood   Result Value Ref Range    RBC Morphology Normal Normal    WBC Morphology Normal Normal    Platelet Morphology Normal Normal   ECG 12 Lead    Collection Time: 08/06/22  8:57 PM   Result Value Ref Range    QT Interval 360 ms   Comprehensive Metabolic Panel    Collection Time: 08/06/22  9:58 PM    Specimen: Blood   Result Value Ref Range    Glucose 102 (H) 65 - 99 mg/dL    BUN 11 6 - 20 mg/dL    Creatinine 0.69 0.57 - 1.00 mg/dL    Sodium 139 136 - 145 mmol/L    Potassium 4.0 3.5 - 5.2 mmol/L    Chloride 104 98 - 107 mmol/L     CO2 22.7 22.0 - 29.0 mmol/L    Calcium 9.0 8.6 - 10.5 mg/dL    Total Protein 7.1 6.0 - 8.5 g/dL    Albumin 4.10 3.50 - 5.20 g/dL    ALT (SGPT) 11 1 - 33 U/L    AST (SGOT) 12 1 - 32 U/L    Alkaline Phosphatase 68 39 - 117 U/L    Total Bilirubin 0.2 0.0 - 1.2 mg/dL    Globulin 3.0 gm/dL    A/G Ratio 1.4 g/dL    BUN/Creatinine Ratio 15.9 7.0 - 25.0    Anion Gap 12.3 5.0 - 15.0 mmol/L    eGFR 113.4 >60.0 mL/min/1.73   Lipase    Collection Time: 08/06/22  9:58 PM    Specimen: Blood   Result Value Ref Range    Lipase 378 (H) 13 - 60 U/L   Lactate Dehydrogenase    Collection Time: 08/06/22  9:58 PM    Specimen: Blood   Result Value Ref Range     (H) 135 - 214 U/L   Troponin    Collection Time: 08/06/22  9:58 PM    Specimen: Blood   Result Value Ref Range    Troponin T <0.010 0.000 - 0.030 ng/mL   Procalcitonin    Collection Time: 08/06/22  9:58 PM    Specimen: Blood   Result Value Ref Range    Procalcitonin 0.06 0.00 - 0.25 ng/mL   C-reactive Protein    Collection Time: 08/06/22  9:58 PM    Specimen: Blood   Result Value Ref Range    C-Reactive Protein 4.43 (H) 0.00 - 0.50 mg/dL   Urinalysis With Microscopic If Indicated (No Culture) - Urine, Clean Catch    Collection Time: 08/06/22 10:00 PM    Specimen: Urine, Clean Catch   Result Value Ref Range    Color, UA Yellow Yellow, Straw    Appearance, UA Clear Clear    pH, UA <=5.0 5.0 - 8.0    Specific Gravity, UA 1.011 1.005 - 1.030    Glucose, UA Negative Negative    Ketones, UA Negative Negative    Bilirubin, UA Negative Negative    Blood, UA Negative Negative    Protein, UA Negative Negative    Leuk Esterase, UA Negative Negative    Nitrite, UA Negative Negative    Urobilinogen, UA 0.2 E.U./dL 0.2 - 1.0 E.U./dL   Lactic Acid, Plasma    Collection Time: 08/07/22 12:11 AM    Specimen: Blood   Result Value Ref Range    Lactate 0.6 0.5 - 2.0 mmol/L     I reviewed the above results.    Radiology:  CT Abdomen Pelvis With Contrast   Final Result       1. The patient has  heterogeneous enhancement of the left kidney, as well   as extensive urothelial enhancement. The appearance is concerning for   pyelonephritis. There are associated perinephric abscesses, as described   in the body of the report. These may be in continuity with the patient's   proximal left ureter. The inflammation is also intimately associated   with the left wilmer of the diaphragm, the pancreatic tail, and the   posterior wall of the stomach. The patient does have both subcapsular   and intraparenchymal collections within the spleen. These may represent   areas of infarction or hematoma, although superimposed infection cannot   be excluded, given the degree of inflammation within the left upper   quadrant. I do think the patient has a splenic vein occlusion, as it   becomes very attenuated, and there are multiple prominent collaterals   identified within the left upper quadrant.        Radiation dose reduction techniques were utilized, including automated   exposure control and exposure modulation based on body size.       This report was finalized on 8/6/2022 11:29 PM by Dr. Camilla Whalen M.D.          XR Chest 1 View   Final Result   No acute findings.       This report was finalized on 8/6/2022 9:17 PM by Dr. Camilla Whalen M.D.            I reviewed the above results    Medications ordered in ED:  Medications   cefTRIAXone (ROCEPHIN) 2 g in sodium chloride 0.9 % 100 mL IVPB-VTB (2 g Intravenous New Bag 8/7/22 0044)   morphine injection 4 mg (4 mg Intravenous Given 8/6/22 2052)   ondansetron (ZOFRAN) injection 8 mg (8 mg Intravenous Given 8/6/22 2052)   sodium chloride 0.9 % bolus 1,000 mL (0 mL Intravenous Stopped 8/6/22 2158)   ketorolac (TORADOL) injection 15 mg (15 mg Intravenous Given 8/6/22 2210)   iopamidol (ISOVUE-300) 61 % injection 100 mL (100 mL Intravenous Given 8/6/22 2257)   lactated ringers bolus 1,000 mL (1,000 mL Intravenous New Bag 8/7/22 0006)   HYDROmorphone (DILAUDID) injection  0.5 mg (0.5 mg Intravenous Given 8/7/22 0016)       ED Course as of 08/07/22 0056   Sat Aug 06, 2022   2101 My differential diagnosis for abdominal pain includes but is not limited to:  Gastritis, gastroenteritis, peptic ulcer disease, GERD, esophageal perforation, acute appendicitis, mesenteric adenitis, Meckel's diverticulum, epiploic appendagitis, diverticulitis, colon cancer, ulcerative colitis, Crohn's disease, intussusception, small bowel obstruction, adhesions, ischemic bowel, perforated viscus, ileus, obstipation, biliary colic, cholecystitis, cholelithiasis, Mike-Dionicio Rakan, hepatitis, pancreatitis, common bile duct obstruction, cholangitis, bile leak, splenic trauma, splenic rupture, splenic infarction, splenic abscess, abdominal abscess, ascites, spontaneous bacterial peritonitis, hernia, UTI, cystitis, prostatitis, ureterolithiasis, urinary obstruction, ovarian cyst, torsion, pregnancy, ectopic pregnancy, PID, pelvic abscess, mittelschmerz, endometriosis, AAA, myocardial infarction, pneumonia, cancer, porphyria, DKA, medications, sickle cell, viral syndrome, zoster     [JG]   2101 EKG independently viewed and contemporaneously interpreted by ED physician. Time: 2057.  Rate 92.  Interpretation: Normal sinus rhythm, normal axis, normal QRS, no acute ST changes. [JG]   2226 Patient history and ER presentation discussed with Dr. Plascencia, she is pending laboratory and imaging evaluation and final disposition. [DIMITRI]   2241 WBC: 10.48 [DIMITRI]   2242 Hemoglobin(!): 11.5 [DIMITRI]   2242 Hematocrit: 34.3 [DIMITRI]   2242 Platelets: 323 [DIMITRI]   2242 Glucose(!): 102 [DIMITRI]   2242 BUN: 11 [DIMITRI]   2242 Creatinine: 0.69 [DIMITRI]   2242 Sodium: 139 [DIMITRI]   2242 Potassium: 4.0 [DIMITRI]   2242 Chloride: 104 [DIMITRI]   2242 CO2: 22.7 [DIMITRI]   2242 Troponin T: <0.010 [DIMITRI]   2251 Lipase(!): 378 [DIMITRI]   2251 LDH(!): 238 [DIMITRI]   2251 HCG Qualitative: Negative [DIMITRI]   Sun Aug 07, 2022   0003 Patient rechecked, sitting in bed, still complaining of pain.   Discussed results including abnormal findings on her CT scan and need for admission for further evaluation and treatment.  In short patient states that she has a history of chronic pancreatitis related to alcohol abuse, however she has not had anything to drink in months.  Patient states for approximately the past 4 months she has had worsening pain with nausea.  Tonight again in the left upper quadrant became severe and radiated to her left shoulder and neck.  Patient denies any recent UTI symptoms, but has had a low-grade temperature. [DIMITRI]   0027 Patient history, ER presentation and evaluation discussed with Evelin HORTON, will admit to a [facility] bed per Dr. Quintana. [DIMITRI]      ED Course User Index  [DIMITRI] Salvador Bach PA  [JG] Clive Plascencia MD           Progress and Consult Notes:  22:30:  Patient care transferred from Dr. Plascecnia pending complete evaluation and disposition.    Diagnosis:  Final diagnoses:   Epigastric pain   Nausea and vomiting, unspecified vomiting type   Diarrhea, unspecified type   History of pancreatitis   Pyelonephritis       Follow Up:  No follow-up provider specified.    RX:     Medication List      No changes were made to your prescriptions during this visit.         Provider attestation:  I personally reviewed the past medical history, past surgical history, social history, family history, current medications, and allergies as they appear in the chart.    The patient was seen and examined by myself and Dr. Plascencia, who agrees with plan.          Salvador Bach PA  08/07/22 0056

## 2022-08-07 NOTE — CONSULTS
Subjective     REASON FOR CONSULTATION:  1. ANEMIA IN THE BACKGROUND OF CHRONIC PANCREATITIS .  2. MULTIPLE FLUID COLLECTIONS AROUND LEFT KIDNEY, SUGGESTING PYELONEPHRITIS AND ABCESS FORMATION.  3.QUESTION THROMBOSIS OF SPLENIC VEIN IN CT  ABDOMEN IN AN AREA OF SEVERE INFLAMMATORY RESPONSE, NO PREVIOUS HISTORY OF THROMBOPHILIA, NO FAMILY HISTORY OF THROMBOPHILIA, GRANDFATHER HAS BLOOD CLOTH AGE 82.  Provide an opinion on any further workup or treatment                             REQUESTING PHYSICIAN:   Rafat Beltran MD      Attending     Since 8/7/2022     [phone]     Chat           RECORDS OBTAINED:  Records of the patients history including those obtained from the referring provider were reviewed and summarized in detail.      History of Present Illness   On 08/07/2022 I had the opportunity to get to see this 41-year-old white female who has been admitted to the hospital with many weeks history of persistent abdominal pain in the epigastric area propagated into the left upper quadrant of the abdomen and also propagated to left shoulder. The pain intensity is 9 over 10, it comes and goes intermittently, gives her some relief, is not associated with modification in regard to bowel activity and urination and is in company of profound fatigue, anorexia, occasional nausea and no vomiting/. She also has had nocturnal sweats but no high temperature with the exception of temperature around 99 a few days ago. She carries a diagnosis of chronic pancreatitis related to alcohol abuse when she was in the 20s, she has quit drinking alcohol a long time ago. The patient also has history of anemia associated with pancreatitis and she has been taking supplementation for this including oral iron, folic acid, vitamin B12, it seems to be that this is not having an impact on her anemia.     The patient came to the emergency room after she moved from [place] to [place] and because of the illness that she has had and the  inability to take care of her pain. At the time of the arrival to the emergency room radiological assessment was done of her abdomen that documented multiple loculated areas of fluid in the perinephric area on the left, significant inflammatory response in the left kidney suggesting acute pyelonephritis and cystic collected fluid around the spleen and also engorgement of the splenic vein that could suggest any clots. The right kidney disclosed no major abnormalities. There are no tumoral lesions. The pancreas was calcified but not inflamed. Given these facts the patient was admitted especially also knowing that she has anemia.    Besides the above symptoms the patient states that actually she hs been able to gain 4 pounds of weight in the last few weeks because she was trying to eat better. Her appetite actually has been very good. Her stool is still abnormal, loose floats in the commode extremely stinky with no passage of blood.  Several stools a day. Urination is normal. She denies any cloudiness in the urine, no hematuria, frequency, urgency but she has had costovertebral angle on the left side. She denies any pelvic pain. She has not had any vaginal flow or bleeding. She has not had any swelling in her lower extremities. She has not had any cough, sputum production or shortness of breath.     She has not had any rash.     The patient states that she has had multiple urinary tract infections in the past including mostly cystitis.       Past Medical History:   Diagnosis Date   • Anxiety    • E. coli bacteremia    • ETOH abuse    • GERD (gastroesophageal reflux disease)    • Hiatal hernia    • Migraine    • Miscarriage 2004   • Pancreatitis         History reviewed. No pertinent surgical history.     No current facility-administered medications on file prior to encounter.     Current Outpatient Medications on File Prior to Encounter   Medication Sig Dispense Refill   • amitriptyline (ELAVIL) 10 MG tablet Take 10 mg  by mouth Every Night.     • Cholecalciferol (VITAMIN D3) 5000 units capsule capsule Take 5,000 Units by mouth Daily.     • Ferrous Sulfate Dried (Feosol) 200 (65 Fe) MG tablet tablet Take 200 mg by mouth Daily.     • folic acid (FOLVITE) 1 MG tablet Take 1 mg by mouth Daily.     • ondansetron (ZOFRAN) 4 MG tablet Take 4 mg by mouth Every 8 (Eight) Hours As Needed for Nausea or Vomiting.     • pancrelipase, Lip-Prot-Amyl, (CREON) 20496-56250 units capsule delayed-release particles capsule Take 36,000 units of lipase by mouth 3 (Three) Times a Day With Meals.     • pantoprazole (PROTONIX) 40 MG EC tablet Take 40 mg by mouth Daily.     • thiamine (VITAMIN B-1) 100 MG tablet  tablet Take 100 mg by mouth Daily.     • Potassium 95 MG tablet Take  by mouth.     • vitamin B-12 (CYANOCOBALAMIN) 100 MCG tablet Take 50 mcg by mouth Daily.     • [DISCONTINUED] calcium carbonate (TUMS) 500 MG chewable tablet Chew 2 tablets As Needed for Indigestion or Heartburn.     • [DISCONTINUED] OxyCODONE HCl 5 MG tablet  Take  by mouth.          ALLERGIES:    Allergies   Allergen Reactions   • Latex    • Nickel         Social History     Socioeconomic History   • Marital status: Single   Tobacco Use   • Smoking status: Current Every Day Smoker     Packs/day: 0.50     Types: Cigarettes     Start date: 1/28/1996   • Smokeless tobacco: Current User   Substance and Sexual Activity   • Alcohol use: Yes     Comment: 2 cocktails daily   • Drug use: No   • Sexual activity: Yes     Partners: Male     Birth control/protection: None     Comment: IUD removed 6/29/2017        Family History   Problem Relation Age of Onset   • Alcohol abuse Mother    • Arthritis Mother    • Asthma Mother    • Miscarriages / Stillbirths Mother    • Cancer Father    • Diabetes Father    • Drug abuse Father    • Early death Father    • Heart disease Father    • Hyperlipidemia Father    • Hypertension Father    • Alcohol abuse Paternal Aunt    • Cancer Paternal Aunt    •  Diabetes Paternal Aunt    • Drug abuse Paternal Aunt    • Early death Paternal Aunt    • Heart disease Paternal Aunt    • Hyperlipidemia Paternal Aunt    • Hypertension Paternal Aunt    • Alcohol abuse Maternal Grandmother    • Alcohol abuse Maternal Grandfather    • Alcohol abuse Paternal Grandmother    • Cancer Paternal Grandmother    • Diabetes Paternal Grandmother    • Drug abuse Paternal Grandmother    • Early death Paternal Grandmother    • Heart disease Paternal Grandmother    • Hyperlipidemia Paternal Grandmother    • Hypertension Paternal Grandmother    • Alcohol abuse Paternal Grandfather         Review of Systems   Constitutional: Positive for activity change, appetite change, chills, fatigue and fever.   HENT: Negative.    Respiratory: Negative.    Cardiovascular: Negative.    Gastrointestinal: Positive for abdominal pain, diarrhea and nausea.   Endocrine: Negative.    Genitourinary: Negative.    Musculoskeletal: Negative.    Skin: Negative.    Neurological: Negative.    Hematological: Negative.    Psychiatric/Behavioral: Negative.         Objective     Vitals:    08/07/22 0222 08/07/22 0248 08/07/22 0503 08/07/22 1000   BP:  108/73 112/71 120/80   BP Location:  Left arm Right arm Left arm   Patient Position:  Lying Lying Lying   Pulse: 76 75 73 74   Resp:  18 18 18   Temp:  97.2 °F (36.2 °C) 97.7 °F (36.5 °C) 97.2 °F (36.2 °C)   TempSrc:  Oral Oral Oral   SpO2: 100% 98% 100% 100%   Weight:       Height:         No flowsheet data found.    Physical Exam  HENT:      Head: Normocephalic.      Nose: No congestion.      Mouth/Throat:      Mouth: Mucous membranes are moist.   Eyes:      General: No scleral icterus.     Pupils: Pupils are equal, round, and reactive to light.   Cardiovascular:      Rate and Rhythm: Normal rate and regular rhythm.      Pulses: Normal pulses.      Heart sounds: Normal heart sounds. No murmur heard.    No gallop.   Pulmonary:      Effort: Pulmonary effort is normal. No respiratory  distress.      Breath sounds: Normal breath sounds. No stridor. No wheezing, rhonchi or rales.   Chest:      Chest wall: No tenderness.   Abdominal:      General: Abdomen is flat.      Palpations: There is no mass.      Tenderness: There is abdominal tenderness. There is left CVA tenderness. There is no right CVA tenderness, guarding or rebound.      Hernia: No hernia is present.   Musculoskeletal:      Right lower leg: No edema.      Left lower leg: No edema.   Lymphadenopathy:      Cervical: No cervical adenopathy.   Skin:     General: Skin is warm and dry.      Coloration: Skin is pale.   Neurological:      General: No focal deficit present.      Mental Status: She is alert.   Psychiatric:         Mood and Affect: Mood normal.         Behavior: Behavior normal.         Thought Content: Thought content normal.         Judgment: Judgment normal.           RECENT LABS:  Hematology WBC   Date Value Ref Range Status   08/07/2022 5.75 3.40 - 10.80 10*3/mm3 Final     RBC   Date Value Ref Range Status   08/07/2022 2.68 (L) 3.77 - 5.28 10*6/mm3 Final     Hemoglobin   Date Value Ref Range Status   08/07/2022 9.4 (L) 12.0 - 15.9 g/dL Final     Hematocrit   Date Value Ref Range Status   08/07/2022 28.0 (L) 34.0 - 46.6 % Final     Platelets   Date Value Ref Range Status   08/07/2022 239 140 - 450 10*3/mm3 Final        CT OF THE ABDOMEN AND PELVIS WITH CONTRAST     HISTORY: Upper abdominal pain     COMPARISON: 09/28/2017     TECHNIQUE: Axial CT imaging was obtained through the abdomen and pelvis.  IV contrast was administered.     FINDINGS:  Images through the lung bases demonstrate some minimal dependent  atelectasis. No suspicious hepatic lesions are seen. The liver is  enlarged, measuring up to 18.9 cm in craniocaudal dimensions.  Gallbladder is normal. Right kidney enhances normally, and there is no  hydronephrosis or there is heterogeneous enhancement of the left kidney,  with marked urothelial enhancement. There is left  periureteral soft  tissue stranding. There is also left perinephric stranding. There is a  multiloculated rim-enhancing collection which is intimately associated  with the anterior aspect of the left kidney, concerning for perinephric  abscess. It measures up to 3.5 x 3.3 cm on the axial series. It has a  linear extension medially, which is intimately associated with the left  wilmer of the diaphragm. This collection measures up to 8 mm in transverse  dimensions, and 4.3 cm in length. Both of these collections are  suspected to be continuous with the patient's left ureter. There is  marked attenuation of the patient's left renal vein, although I think it  appears patent. There is an additional component of the collection,  which extends anteriorly, and is intimately associated with the tail of  the pancreas and posterior gastric wall. This collection measures up to  1.6 cm. There is inflammatory stranding around the spleen.  Multiloculated heterogeneous collections are noted within the spleen.  Some of these appear to be intraparenchymal, while others are  subcapsular. Etiology is uncertain. They may represent areas of splenic  infarction or hematoma, although superimposed infection is not excluded.  Of note, there is marked attenuation of the patient's splenic vein, and  I cannot exclude a splenic infarction. There certainly appear to be  within  prominent vessels along the greater curvature of the stomach,  which may represent for gastric collaterals. The single largest  collection involving the spleen measures approximately 5.1 x 4.1 cm on  the axial series. While the inflammation closely abuts the pancreas, it  does appear to enhance normally. There is some calcification of the  aorta. There is no bowel obstruction. Urinary bladder appears somewhat  distended. Uterus is within normal limits. There is a small amount of  free fluid within the pelvis. There is colonic diverticulosis. The  appendix is normal. No acute  osseous abnormalities are seen.     IMPRESSION:     1. The patient has heterogeneous enhancement of the left kidney, as well  as extensive urothelial enhancement. The appearance is concerning for  pyelonephritis. There are associated perinephric abscesses, as described  in the body of the report. These may be in continuity with the patient's  proximal left ureter. The inflammation is also intimately associated  with the left wilmer of the diaphragm, the pancreatic tail, and the  posterior wall of the stomach. The patient does have both subcapsular  and intraparenchymal collections within the spleen. These may represent  areas of infarction or hematoma, although superimposed infection cannot  be excluded, given the degree of inflammation within the left upper  quadrant. 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      Radiation dose reduction techniques were utilized, including automated  exposure control and exposure modulation based on body size.     This report was finalized on 8/6/2022 11:29 PM by Dr. Camilla Whalen M.D.  Assessment & Plan     1. This patient has anemia. It seems to be that this issue is not new. It has been present for a long time. She has macrocytosis, she takes multivitamins, iron as well as B12, and folic acid supplementation, all of this in the background of chronic pancreatitis. She has not had the need for transfusion. Her white count, her platelet count are normal, white count differential is normal. The MCV is elevated and all of this suggests deficiencies of vitamins under the present circumstances. I would not be surprised if the patient's pancreatitis keeps her proper nutrients from absorbing including B12, folic acid, iron and so forth. Therefore I think we need to reevaluate the anemia in detail with a ferritin, iron profile, reticulated hemoglobin,  B12, folic acid level and a serum protein immunoelectrophoresis. We will obtain a C-reactive protein and also a sedimentation rate. I will quantitate immunoglobulins.     2. The patient has multiloculated lesions surrounding the left kidney. The left kidney per se is very abnormal, to my eyes it looks larger than usual with very significant soggy areas and shotty areas of inflammation at the cortex and multiple surrounding fluid collections that suggest pyelonephritis with secondary abscess formation. The spleen also has a hematoma and also probably has become infected as well.     3. The other phenomenon that this patient has is the fact that the splenic vein seems to be occluded in an area where there is so much inflammation that actually is not surprising. I do not know if the inflammation is decreasing the blood flow or if indeed there is an anatomical blockage that caused clot but I am not willing to put this patient on anticoagulant with just this piece of information. This patient will require dedicated venous Doppler of the portal circulation to see where she stands and I would not be surprised if we need to do an angiogram and venogram of this territory under the present circumstances.     Knowing that the patient likely is going to require probably a cystoscopy evaluation of why the left kidney looks like it is in absence of any symptomatology and waiting for a urinalysis to tell us what goes on there. I would not be surprised again if she needs to have a urological procedure like a cystoscopy.     The patient is going to have also a CT guided drainage of this fluid collection and microbiology will be extremely useful in regard to the characteristics of this and what needs to be done under the present circumstances about correction of the problem.     The inflammatory process that she has also will trigger anemia on the other hand but it should not effect the MCV. Typically anemia in inflammatory  conditions of acute nature are normocytic, normochromic with a normal MCV.    Therefore we will proceed with the Doppler study first and maybe we have to do a CT angiogram. I do not want to expose this patient with IV contrast again today given the fact that she already has had plenty of this with the CT scan from yesterday. We need to be proactive in regard to protection of kidney function to minimize contrast induced nephropathy.     I will discuss this patient's case with my partner from [facility] who will take over the patient's management tomorrow.    I would not be surprised if this patient needs to receive IV folic acid, intramuscular B12 and IV iron therapy.     DURING THE VISIT WITH THE PATIENT TODAY , PATIENT HAD FACE MASK, I HAD PROPER PROTECTIVE EQUIPMENT, AND I DID HAND HYGIENE WITH SOAP AND WATER BEFORE AND AFTER THE VISIT.

## 2022-08-07 NOTE — PLAN OF CARE
Goal Outcome Evaluation:              Outcome Evaluation: VSS. IV fluids infusing. Up ad valeria. Percocet and Dilaudid given for pain. Also got Lidocaine patch ordered. Home medications ordered. Plan for CT guided drain tomorrow. Seen by ID, ordered blood cultures and Rocephin.

## 2022-08-07 NOTE — ED NOTES
Pt to triage from home via [facility] [id number] with c/o left shoulder/neck pain - pt states history of pancreatitis, states pain similar to previous attacks.  Pt took hydrodocone at 1300 with little relief.  Pt wearing maskin triage.  Triage personnel wore appropriate PPE

## 2022-08-07 NOTE — H&P
"    Patient Name:  Piper Cain  YOB: 1980  MRN:  [id number]  Admit Date:  8/6/2022  Patient Care Team:  System, Provider Not In as PCP - General      Subjective   History Present Illness     Chief Complaint   Patient presents with   • Pancreatitis   • Shoulder Pain       Ms. Cain is a 41 y.o. female with a history of alcohol abuse, chronic pancreatitis, GERD that presents to [facility] complaining of epigastric pain radiating to the back and left shoulder and neck.  Patient states she is having a \"pancreatitis attack\" which has been ongoing for about 6 months, worsening over the last few days.  She states history of heavy alcohol dependence in her 20s and at that time had her first episode of acute pancreatitis.  Apparently for many years was doing fine and then began to drink heavily again in her early 30s.  She was diagnosed with chronic pancreatitis about 6 or 8 months ago and started on Creon.  Since that time she has been able to eat more and has been gaining weight.      For the past 2 months however she has been dealing with severe episodes of shooting pains, aching pains and sharp pains in the epigastric area radiating to the back, left shoulder, left neck.  The pain is moderate and constant with severe intermittent shooting episodes.  For the past 2 days she has been having nausea and vomiting.  She has had several episodes of liquid stools.  Denies any fevers as she has not checked but has been having a lot of sweating and particularly night sweats for couple months.    Timeline is a little difficult to follow.  Patient is distracted during interview, complaining of shooting pains and asking for coffee and food.  She has not eaten for a full day.    She has history of hiatal hernia, reflux, migraine headaches, anxiety and frequent UTIs.  She states she has E. coli on her skin which causes frequent urinary tract infections for her.  She has not been heavily drinking for " many years but occasionally does drink cocktails again, last drink 6 months ago.  She smokes about a pack of cigarettes once a week.  No drug use.    Review of Systems   Constitutional: Positive for diaphoresis and fever. Negative for activity change, appetite change and chills.   HENT: Negative for congestion.    Eyes: Negative for visual disturbance.   Respiratory: Negative for cough and shortness of breath.    Cardiovascular: Negative for chest pain, palpitations and leg swelling.   Gastrointestinal: Positive for abdominal pain, diarrhea and nausea. Negative for blood in stool, constipation and vomiting.   Genitourinary: Negative for difficulty urinating.   Musculoskeletal: Positive for back pain. Negative for arthralgias and myalgias.   Skin: Negative for rash and wound.   Neurological: Negative for dizziness and light-headedness.        Personal History     Past Medical History:   Diagnosis Date   • Anxiety    • E. coli bacteremia    • ETOH abuse    • GERD (gastroesophageal reflux disease)    • Hiatal hernia    • Migraine    • Miscarriage 2004   • Pancreatitis      History reviewed. No pertinent surgical history.  Family History   Problem Relation Age of Onset   • Alcohol abuse Mother    • Arthritis Mother    • Asthma Mother    • Miscarriages / Stillbirths Mother    • Cancer Father    • Diabetes Father    • Drug abuse Father    • Early death Father    • Heart disease Father    • Hyperlipidemia Father    • Hypertension Father    • Alcohol abuse Paternal Aunt    • Cancer Paternal Aunt    • Diabetes Paternal Aunt    • Drug abuse Paternal Aunt    • Early death Paternal Aunt    • Heart disease Paternal Aunt    • Hyperlipidemia Paternal Aunt    • Hypertension Paternal Aunt    • Alcohol abuse Maternal Grandmother    • Alcohol abuse Maternal Grandfather    • Alcohol abuse Paternal Grandmother    • Cancer Paternal Grandmother    • Diabetes Paternal Grandmother    • Drug abuse Paternal Grandmother    • Early death  Paternal Grandmother    • Heart disease Paternal Grandmother    • Hyperlipidemia Paternal Grandmother    • Hypertension Paternal Grandmother    • Alcohol abuse Paternal Grandfather      Social History     Tobacco Use   • Smoking status: Current Every Day Smoker     Packs/day: 0.50     Types: Cigarettes     Start date: 1/28/1996   • Smokeless tobacco: Current User   Substance Use Topics   • Alcohol use: Yes     Comment: 2 cocktails daily   • Drug use: No     No current facility-administered medications on file prior to encounter.     Current Outpatient Medications on File Prior to Encounter   Medication Sig Dispense Refill   • amitriptyline (ELAVIL) 10 MG tablet Take 10 mg by mouth Every Night.     • Cholecalciferol (VITAMIN D3) 5000 units capsule capsule Take 5,000 Units by mouth Daily.     • Ferrous Sulfate Dried (Feosol) 200 (65 Fe) MG tablet tablet Take 200 mg by mouth Daily.     • folic acid (FOLVITE) 1 MG tablet Take 1 mg by mouth Daily.     • ondansetron (ZOFRAN) 4 MG tablet Take 4 mg by mouth Every 8 (Eight) Hours As Needed for Nausea or Vomiting.     • pancrelipase, Lip-Prot-Amyl, (CREON) 32851-56867 units capsule delayed-release particles capsule Take 36,000 units of lipase by mouth 3 (Three) Times a Day With Meals.     • pantoprazole (PROTONIX) 40 MG EC tablet Take 40 mg by mouth Daily.     • thiamine (VITAMIN B-1) 100 MG tablet  tablet Take 100 mg by mouth Daily.     • Potassium 95 MG tablet Take  by mouth.     • vitamin B-12 (CYANOCOBALAMIN) 100 MCG tablet Take 50 mcg by mouth Daily.     • [DISCONTINUED] calcium carbonate (TUMS) 500 MG chewable tablet Chew 2 tablets As Needed for Indigestion or Heartburn.     • [DISCONTINUED] OxyCODONE HCl 5 MG tablet  Take  by mouth.       Allergies   Allergen Reactions   • Latex    • Nickel        Objective    Objective     Vital Signs  Temp:  [97.2 °F (36.2 °C)-98.5 °F (36.9 °C)] 97.2 °F (36.2 °C)  Heart Rate:  [73-98] 74  Resp:  [17-20] 18  BP: (108-130)/(71-94)  120/80  SpO2:  [97 %-100 %] 100 %  on   ;   Device (Oxygen Therapy): room air  Body mass index is 17.16 kg/m².    Physical Exam  Constitutional:       General: She is not in acute distress.     Appearance: She is ill-appearing. She is not toxic-appearing.   HENT:      Head: Normocephalic and atraumatic.      Mouth/Throat:      Mouth: Mucous membranes are moist.   Eyes:      Extraocular Movements: Extraocular movements intact.      Pupils: Pupils are equal, round, and reactive to light.   Cardiovascular:      Rate and Rhythm: Normal rate and regular rhythm.      Pulses: Normal pulses.      Heart sounds: Normal heart sounds.   Pulmonary:      Effort: No respiratory distress.      Breath sounds: Normal breath sounds.   Abdominal:      General: Bowel sounds are normal. There is no distension.      Tenderness: There is abdominal tenderness.   Musculoskeletal:         General: No swelling or tenderness. Normal range of motion.      Cervical back: Normal range of motion.   Neurological:      General: No focal deficit present.      Mental Status: She is alert and oriented to person, place, and time.   Psychiatric:         Mood and Affect: Mood normal.         Results Review:  I reviewed the patient's new clinical results.      Lab Results (last 24 hours)     Procedure Component Value Units Date/Time    CBC & Differential [[id number]]  (Abnormal) Collected: 08/06/22 2055    Specimen: Blood Updated: 08/06/22 2142    Narrative:      The following orders were created for panel order CBC & Differential.  Procedure                               Abnormality         Status                     ---------                               -----------         ------                     CBC Auto Differential[[id number]]        Abnormal            Final result               Scan Slide[[id number]]                   Normal              Final result                 Please view results for these tests on the individual orders.    hCG, Serum,  Qualitative [[id number]]  (Normal) Collected: 08/06/22 2055    Specimen: Blood Updated: 08/06/22 2151     HCG Qualitative Negative    CBC Auto Differential [[id number]]  (Abnormal) Collected: 08/06/22 2055    Specimen: Blood Updated: 08/06/22 2142     WBC 10.48 10*3/mm3      RBC 3.28 10*6/mm3      Hemoglobin 11.5 g/dL      Hematocrit 34.3 %      .6 fL      MCH 35.1 pg      MCHC 33.5 g/dL      RDW 14.9 %      RDW-SD 55.8 fl      MPV 10.5 fL      Platelets 323 10*3/mm3      Neutrophil % 67.2 %      Lymphocyte % 22.5 %      Monocyte % 7.2 %      Eosinophil % 2.3 %      Basophil % 0.5 %      Immature Grans % 0.3 %      Neutrophils, Absolute 7.05 10*3/mm3      Lymphocytes, Absolute 2.36 10*3/mm3      Monocytes, Absolute 0.75 10*3/mm3      Eosinophils, Absolute 0.24 10*3/mm3      Basophils, Absolute 0.05 10*3/mm3      Immature Grans, Absolute 0.03 10*3/mm3      nRBC 0.0 /100 WBC     Scan Slide [[id number]]  (Normal) Collected: 08/06/22 2055    Specimen: Blood Updated: 08/06/22 2142     RBC Morphology Normal     WBC Morphology Normal     Platelet Morphology Normal    Comprehensive Metabolic Panel [[id number]]  (Abnormal) Collected: 08/06/22 2158    Specimen: Blood Updated: 08/06/22 2239     Glucose 102 mg/dL      BUN 11 mg/dL      Creatinine 0.69 mg/dL      Sodium 139 mmol/L      Potassium 4.0 mmol/L      Comment: Slight hemolysis detected by analyzer. Results may be affected.        Chloride 104 mmol/L      CO2 22.7 mmol/L      Calcium 9.0 mg/dL      Total Protein 7.1 g/dL      Albumin 4.10 g/dL      ALT (SGPT) 11 U/L      AST (SGOT) 12 U/L      Comment: Slight hemolysis detected by analyzer. Results may be affected.        Alkaline Phosphatase 68 U/L      Total Bilirubin 0.2 mg/dL      Globulin 3.0 gm/dL      A/G Ratio 1.4 g/dL      BUN/Creatinine Ratio 15.9     Anion Gap 12.3 mmol/L      eGFR 113.4 mL/min/1.73      Comment: National Kidney Foundation and American Society of Nephrology (ASN) Task Force recommended  "calculation based on the Chronic Kidney Disease Epidemiology Collaboration (CKD-EPI) equation refit without adjustment for race.       Narrative:      GFR Normal >60  Chronic Kidney Disease <60  Kidney Failure <15      Lipase [[id number]]  (Abnormal) Collected: 08/06/22 2158    Specimen: Blood Updated: 08/06/22 2243     Lipase 378 U/L     Lactate Dehydrogenase [[id number]]  (Abnormal) Collected: 08/06/22 2158    Specimen: Blood Updated: 08/06/22 2239      U/L      Comment: Specimen hemolyzed.  Results may be affected.       Troponin [[id number]]  (Normal) Collected: 08/06/22 2158    Specimen: Blood Updated: 08/06/22 2237     Troponin T <0.010 ng/mL     Narrative:      Troponin T Reference Range:  <= 0.03 ng/mL-   Negative for AMI  >0.03 ng/mL-     Abnormal for myocardial necrosis.  Clinicians would have to utilize clinical acumen, EKG, Troponin and serial changes to determine if it is an Acute Myocardial Infarction or myocardial injury due to an underlying chronic condition.       Results may be falsely decreased if patient taking Biotin.      Procalcitonin [[id number]]  (Normal) Collected: 08/06/22 2158    Specimen: Blood Updated: 08/07/22 0017     Procalcitonin 0.06 ng/mL     Narrative:      As a Marker for Sepsis (Non-Neonates):    1. <0.5 ng/mL represents a low risk of severe sepsis and/or septic shock.  2. >2 ng/mL represents a high risk of severe sepsis and/or septic shock.    As a Marker for Lower Respiratory Tract Infections that require antibiotic therapy:    PCT on Admission    Antibiotic Therapy       6-12 Hrs later    >0.5                Strongly Recommended  >0.25 - <0.5        Recommended   0.1 - 0.25          Discouraged              Remeasure/reassess PCT  <0.1                Strongly Discouraged     Remeasure/reassess PCT    As 28 day mortality risk marker: \"Change in Procalcitonin Result\" (>80% or <=80%) if Day 0 (or Day 1) and Day 4 values are available. Refer to " http://www.[facility]-pct-calculator.com    Change in PCT <=80%  A decrease of PCT levels below or equal to 80% defines a positive change in PCT test result representing a higher risk for 28-day all-cause mortality of patients diagnosed with severe sepsis for septic shock.    Change in PCT >80%  A decrease of PCT levels of more than 80% defines a negative change in PCT result representing a lower risk for 28-day all-cause mortality of patients diagnosed with severe sepsis or septic shock.       C-reactive Protein [[id number]]  (Abnormal) Collected: 08/06/22 2158    Specimen: Blood Updated: 08/07/22 0011     C-Reactive Protein 4.43 mg/dL     Urinalysis With Microscopic If Indicated (No Culture) - Urine, Clean Catch [[id number]]  (Normal) Collected: 08/06/22 2200    Specimen: Urine, Clean Catch Updated: 08/06/22 2214     Color, UA Yellow     Appearance, UA Clear     pH, UA <=5.0     Specific Gravity, UA 1.011     Glucose, UA Negative     Ketones, UA Negative     Bilirubin, UA Negative     Blood, UA Negative     Protein, UA Negative     Leuk Esterase, UA Negative     Nitrite, UA Negative     Urobilinogen, UA 0.2 E.U./dL    Narrative:      Urine microscopic not indicated.    COVID PRE-OP / PRE-PROCEDURE SCREENING ORDER (NO ISOLATION) - Swab, Nasopharynx [[id number]]  (Normal) Collected: 08/07/22 0008    Specimen: Swab from Nasopharynx Updated: 08/07/22 0101    Narrative:      The following orders were created for panel order COVID PRE-OP / PRE-PROCEDURE SCREENING ORDER (NO ISOLATION) - Swab, Nasopharynx.  Procedure                               Abnormality         Status                     ---------                               -----------         ------                     COVID-19,[facility] IN-HOUSE...[[id number]]  Normal              Final result                 Please view results for these tests on the individual orders.    COVID-19,[facility] IN-HOUSE CEPHEID/CHARLIE NP SWAB IN TRANSPORT MEDIA 8-12 HR TAT - Swab, Nasopharynx  [[id number]]  (Normal) Collected: 08/07/22 0008    Specimen: Swab from Nasopharynx Updated: 08/07/22 0101     COVID19 Not Detected    Narrative:      Fact sheet for providers: https://www.fda.gov/media/089370/download     Fact sheet for patients: https://www.fda.gov/media/596880/download    Lactic Acid, Plasma [[id number]]  (Normal) Collected: 08/07/22 0011    Specimen: Blood Updated: 08/07/22 0036     Lactate 0.6 mmol/L     Blood Culture - Blood, Arm, Right [[id number]] Collected: 08/07/22 0043    Specimen: Blood from Arm, Right Updated: 08/07/22 0046    Basic Metabolic Panel [[id number]]  (Abnormal) Collected: 08/07/22 0603    Specimen: Blood Updated: 08/07/22 0711     Glucose 104 mg/dL      BUN 8 mg/dL      Creatinine 0.62 mg/dL      Sodium 140 mmol/L      Potassium 3.7 mmol/L      Chloride 106 mmol/L      CO2 23.0 mmol/L      Calcium 8.3 mg/dL      BUN/Creatinine Ratio 12.9     Anion Gap 11.0 mmol/L      eGFR 116.3 mL/min/1.73      Comment: National Kidney Foundation and American Society of Nephrology (ASN) Task Force recommended calculation based on the Chronic Kidney Disease Epidemiology Collaboration (CKD-EPI) equation refit without adjustment for race.       Narrative:      GFR Normal >60  Chronic Kidney Disease <60  Kidney Failure <15      CBC (No Diff) [[id number]]  (Abnormal) Collected: 08/07/22 0603    Specimen: Blood Updated: 08/07/22 0709     WBC 5.75 10*3/mm3      RBC 2.68 10*6/mm3      Hemoglobin 9.4 g/dL      Hematocrit 28.0 %      .5 fL      MCH 35.1 pg      MCHC 33.6 g/dL      RDW 14.9 %      RDW-SD 55.3 fl      MPV 10.2 fL      Platelets 239 10*3/mm3     Protime-INR [[id number]]  (Normal) Collected: 08/07/22 0603    Specimen: Blood Updated: 08/07/22 0712     Protime 13.7 Seconds      INR 1.06    C-reactive Protein [[id number]]  (Abnormal) Collected: 08/07/22 0603    Specimen: Blood Updated: 08/07/22 1159     C-Reactive Protein 4.74 mg/dL     Sedimentation Rate [[id number]]  (Abnormal) Collected:  08/07/22 0603    Specimen: Blood Updated: 08/07/22 1200     Sed Rate 67 mm/hr     Ferritin [[id number]]  (Abnormal) Collected: 08/07/22 0603    Specimen: Blood Updated: 08/07/22 1202     Ferritin 355.00 ng/mL     Narrative:      Results may be falsely decreased if patient taking Biotin.      Iron Profile [[id number]]  (Abnormal) Collected: 08/07/22 0603    Specimen: Blood Updated: 08/07/22 1159     Iron 19 mcg/dL      Iron Saturation 8 %      Transferrin 167 mg/dL      TIBC 249 mcg/dL     Reticulocytes [[id number]]  (Normal) Collected: 08/07/22 0603    Specimen: Blood Updated: 08/07/22 1150     Reticulocyte % 0.96 %      Reticulocyte Absolute 0.0259 10*6/mm3     Retic With IRF & RET-He [[id number]]  (Normal) Collected: 08/07/22 0603    Specimen: Blood Updated: 08/07/22 1150     Immature Reticulocyte Fraction 12.2 %      Reticulocyte % 0.96 %      Reticulocyte Absolute 0.0259 10*6/mm3      Reticulocyte Hgb 30.7 pg           Imaging Results (Last 24 Hours)     Procedure Component Value Units Date/Time    CT Abdomen Pelvis With Contrast [[id number]] Collected: 08/06/22 2309     Updated: 08/06/22 2332    Narrative:      CT OF THE ABDOMEN AND PELVIS WITH CONTRAST     HISTORY: Upper abdominal pain     COMPARISON: 09/28/2017     TECHNIQUE: Axial CT imaging was obtained through the abdomen and pelvis.  IV contrast was administered.     FINDINGS:  Images through the lung bases demonstrate some minimal dependent  atelectasis. No suspicious hepatic lesions are seen. The liver is  enlarged, measuring up to 18.9 cm in craniocaudal dimensions.  Gallbladder is normal. Right kidney enhances normally, and there is no  hydronephrosis or there is heterogeneous enhancement of the left kidney,  with marked urothelial enhancement. There is left periureteral soft  tissue stranding. There is also left perinephric stranding. There is a  multiloculated rim-enhancing collection which is intimately associated  with the anterior aspect of the left  kidney, concerning for perinephric  abscess. It measures up to 3.5 x 3.3 cm on the axial series. It has a  linear extension medially, which is intimately associated with the left  wilmer of the diaphragm. This collection measures up to 8 mm in transverse  dimensions, and 4.3 cm in length. Both of these collections are  suspected to be continuous with the patient's left ureter. There is  marked attenuation of the patient's left renal vein, although I think it  appears patent. There is an additional component of the collection,  which extends anteriorly, and is intimately associated with the tail of  the pancreas and posterior gastric wall. This collection measures up to  1.6 cm. There is inflammatory stranding around the spleen.  Multiloculated heterogeneous collections are noted within the spleen.  Some of these appear to be intraparenchymal, while others are  subcapsular. Etiology is uncertain. They may represent areas of splenic  infarction or hematoma, although superimposed infection is not excluded.  Of note, there is marked attenuation of the patient's splenic vein, and  I cannot exclude a splenic infarction. There certainly appear to be  within  prominent vessels along the greater curvature of the stomach,  which may represent for gastric collaterals. The single largest  collection involving the spleen measures approximately 5.1 x 4.1 cm on  the axial series. While the inflammation closely abuts the pancreas, it  does appear to enhance normally. There is some calcification of the  aorta. There is no bowel obstruction. Urinary bladder appears somewhat  distended. Uterus is within normal limits. There is a small amount of  free fluid within the pelvis. There is colonic diverticulosis. The  appendix is normal. No acute osseous abnormalities are seen.       Impression:         1. The patient has heterogeneous enhancement of the left kidney, as well  as extensive urothelial enhancement. The appearance is concerning  for  pyelonephritis. There are associated perinephric abscesses, as described  in the body of the report. These may be in continuity with the patient's  proximal left ureter. The inflammation is also intimately associated  with the left wilmer of the diaphragm, the pancreatic tail, and the  posterior wall of the stomach. The patient does have both subcapsular  and intraparenchymal collections within the spleen. These may represent  areas of infarction or hematoma, although superimposed infection cannot  be excluded, given the degree of inflammation within the left upper  quadrant. 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      Radiation dose reduction techniques were utilized, including automated  exposure control and exposure modulation based on body size.     This report was finalized on 8/6/2022 11:29 PM by Dr. Camilla Whalen M.D.       XR Chest 1 View [[id number]] Collected: 08/06/22 2116     Updated: 08/06/22 2120    Narrative:      SINGLE VIEW OF THE CHEST     HISTORY: Chronic pancreatitis     COMPARISON: 04/06/2018     FINDINGS:  Heart size is within normal limits. No pneumothorax, pleural effusion,  or acute infiltrate is seen.       Impression:      No acute findings.     This report was finalized on 8/6/2022 9:17 PM by Dr. Camilla Whalen M.D.                 ECG 12 Lead   Preliminary Result   HEART RATE= 92  bpm   RR Interval= 652  ms   KS Interval= 178  ms   P Horizontal Axis= -36  deg   P Front Axis= 72  deg   QRSD Interval= 77  ms   QT Interval= 360  ms   QRS Axis= 66  deg   T Wave Axis= 66  deg   - OTHERWISE NORMAL ECG -   Sinus rhythm   Low voltage, precordial leads   Electronically Signed By:    Date and Time of Study: 2022-08-06 20:57:03           Assessment/Plan     Active Hospital Problems    Diagnosis  POA   • **Epigastric pain [R10.13]  Yes   • Abnormal CT of  the abdomen [R93.5]  Unknown      Resolved Hospital Problems   No resolved problems to display.     BMP fairly unremarkable  Iron 19, ferritin 355, iron sat 8, trains ferritin 167, TIBC 249  CRP elevated 4.74  Lactate normal, white count normal  Hemoglobin 9.4  Lipase elevated 378  UA is negative  Blood cultures drawn and pending  Chest x-ray negative acute    CT abdomen and pelvis concerning for pyelonephritis, perinephric abscess in the left ureter, inflammation of pancreatic tail and posterior wall of stomach, possible hematoma versus infarction versus infection and spleen, possible splenic vein occlusion      Continue Rocephin.  IV fluids with bolus.  Pain and nausea meds.  Continue her home meds including stool softeners and Creon, thiamine.  Plan for CT-guided drainage of left kidney abscess  Consult CBC group to weigh in on splenic vein occlusion versus infarction versus hematoma  Consult gastroenterology, Urology, Infectious disease      · I discussed the patient's findings and my recommendations with patient.    VTE Prophylaxis - SCDs.  Code Status - Full code.       SOL Feliciano  [facility]  08/07/22  12:53 EDT

## 2022-08-07 NOTE — CONSULTS
Referring Provider: Hipolito Quintana MD  Reason for Consultation: Perinephric abscess  Chief Complaint   Patient presents with   • Pancreatitis   • Shoulder Pain         Subjective   History of present illness: Patient is a 41-year-old female with a past medical history of alcohol abuse, chronic pancreatitis and urinary tract infections who presents with worsening back, side and abdominal pain.  ID was consulted for perinephric abscess.    Patient reports she has a recent history of being admitted at [facility] in [place] for pancreatitis and has had many difficulties with admission due to this in the past.  She reports she has been told in the past she has had cysts located on her pancreas but never had any difficulties with her kidneys.  She does report she has had multiple bouts of urinary tract infections throughout the years with the last being approximately 8 months ago.  She denies any current diarrhea but does have abdominal pain.  She reports she is changed her diet very significantly to include no fats to help with her overall diarrhea.  She also reports she has had on and off fevers and chills for the last month.  Reports also significant night sweats.    On admission patient was noted to be afebrile with no leukocytosis.  CRP was elevated at 4.7 however lactate and procalcitonin were normal.  Imaging illustrated enhancement of the left kidney with extensive urothelial enhancement concerning for pyelonephritis.  There is also an associated perinephric abscess measuring 3.5 x 3.3 cm.  Also collection seen extending to the tail of the pancreas that is 1.6 cm.  Stranding around the spleen with multiloculated collections within the spleen as well.    Past Medical History:   Diagnosis Date   • Anxiety    • E. coli bacteremia    • ETOH abuse    • GERD (gastroesophageal reflux disease)    • Hiatal hernia    • Migraine    • Miscarriage 2004   • Pancreatitis        History reviewed. No pertinent surgical  history.    family history includes Alcohol abuse in her maternal grandfather, maternal grandmother, mother, paternal aunt, paternal grandfather, and paternal grandmother; Arthritis in her mother; Asthma in her mother; Cancer in her father, paternal aunt, and paternal grandmother; Diabetes in her father, paternal aunt, and paternal grandmother; Drug abuse in her father, paternal aunt, and paternal grandmother; Early death in her father, paternal aunt, and paternal grandmother; Heart disease in her father, paternal aunt, and paternal grandmother; Hyperlipidemia in her father, paternal aunt, and paternal grandmother; Hypertension in her father, paternal aunt, and paternal grandmother; Miscarriages / Stillbirths in her mother.     reports that she has been smoking cigarettes. She started smoking about 26 years ago. She has been smoking about 0.50 packs per day. She uses smokeless tobacco. She reports current alcohol use. She reports that she does not use drugs.     Allergies   Allergen Reactions   • Latex    • Nickel        Medication:  Antibiotics:  Anti-Infectives (From admission, onward)    Ordered     Dose/Rate Route Frequency Start Stop    08/07/22 1053  piperacillin-tazobactam (ZOSYN) 3.375 g in iso-osmotic dextrose 50 ml (premix)        Ordering Provider: Rafat Beltran MD    3.375 g  over 4 Hours Intravenous Every 8 Hours 08/07/22 1745 08/14/22 1744    08/07/22 1053  piperacillin-tazobactam (ZOSYN) 3.375 g in iso-osmotic dextrose 50 ml (premix)        Ordering Provider: Rafat Beltran MD    3.375 g  over 30 Minutes Intravenous Once 08/07/22 1145      08/06/22 2350  cefTRIAXone (ROCEPHIN) 2 g in sodium chloride 0.9 % 100 mL IVPB-VTB        Ordering Provider: Salvador Bach PA    2 g  200 mL/hr over 30 Minutes Intravenous Once 08/06/22 2352 08/07/22 0115          Review of Systems  Pertinent items are noted in HPI, all other systems reviewed and negative    Objective     Physical Exam:   Vital Signs   Temp:   [97.2 °F (36.2 °C)-98.5 °F (36.9 °C)] 97.2 °F (36.2 °C)  Heart Rate:  [73-98] 74  Resp:  [17-20] 18  BP: (108-130)/(71-94) 120/80    GENERAL: Awake and alert, in no acute distress.  Chronically ill-appearing.  HEENT: Oropharynx is clear. Hearing is grossly normal.   EYES: PERRL. No conjunctival injection. No lid lag.   LYMPHATICS: No lymphadenopathy of the neck or inguinal regions.   HEART: Regular rate and regular rhythm. No peripheral edema.   LUNGS: Clear to auscultation anteriorly with normal respiratory effort.   GI: Soft, tender to palpation on the left side, nondistended. No appreciable organomegaly.   SKIN: Warm and dry without cutaneous eruptions   PSYCHIATRIC: Appropriate mood, affect, insight, and judgment.     Results Review:   I reviewed the patient's new clinical results.  I reviewed the patient's new imaging results and agree with the interpretation.  I reviewed the patient's other test results and agree with the interpretation    Lab Results   Component Value Date    WBC 5.75 08/07/2022    HGB 9.4 (L) 08/07/2022    HCT 28.0 (L) 08/07/2022    .5 (H) 08/07/2022     08/07/2022       No results found for: EDUARDO LIZARRAGA VANCORANDOM    Lab Results   Component Value Date    GLUCOSE 104 (H) 08/07/2022    BUN 8 08/07/2022    CREATININE 0.62 08/07/2022    EGFRIFNONA 97 04/06/2018    BCR 12.9 08/07/2022    CO2 23.0 08/07/2022    CALCIUM 8.3 (L) 08/07/2022    ALBUMIN 4.10 08/06/2022    AST 12 08/06/2022    ALT 11 08/06/2022         Estimated Creatinine Clearance: 107.3 mL/min (by C-G formula based on SCr of 0.62 mg/dL).      Microbiology:  8/7 blood culture 1 out of 1 in process    Radiology:  8/6 CT of the abdomen and pelvis report reviewed with 3.5 x 3.3 cm perinephric abscess.  Fluid collection extending from the tail of the pancreas measuring 1.6 cm.  Stranding consistent with left-sided pyelonephritis.  Lesions within the spleen concerning for hematoma, infarction or abscess.   Largest of which is 5.1 x 4.1 cm.    Assessment   #Perinephric abscess  #Left-sided pyelonephritis  #Multiloculated heterogeneous splenic collections  #Chronic pancreatitis    Findings are concerning for disseminated infection.  Plan to obtain a set of blood cultures to correlate.  Patient is already received antibiotics and will therefore continue IV ceftriaxone 2 g daily and plan for IR drainage to further define microbiology.  Plan to use this to further guide antibiotic therapy.    Splenic collections are concerning and may need general surgery consult regarding splenectomy eval however will obtain ultrasound to further define whether these are fluid containing.  Also will get TTE for beginning endocarditis work-up given the disseminated nature.    Thank you for this consult.  We will continue to follow along and tailor antibiotics as the patient's clinical course evolves.

## 2022-08-07 NOTE — PROGRESS NOTES
Unusual presentation  It looks like a perirenal abscess    I would recommend ct guided aspiration for culture if they think it is amenable to that.    I do not see any stones.    Broad spectrum antibiotic coverage pending cultures.  Would get ID involved also

## 2022-08-07 NOTE — ED NOTES
.  Nursing report ED to floor  Piper Cain  41 y.o.  female    HPI :   Chief Complaint   Patient presents with   • Pancreatitis   • Shoulder Pain       Admitting doctor:   Hipolito Quintana MD    Admitting diagnosis:   The primary encounter diagnosis was Epigastric pain. Diagnoses of Nausea and vomiting, unspecified vomiting type, Diarrhea, unspecified type, History of pancreatitis, and Pyelonephritis were also pertinent to this visit.    Code status:   Current Code Status     Date Active Code Status Order ID Comments User Context       Prior    Advance Care Planning Activity          Allergies:   Latex, Nickel, and Penicillins    Intake and Output    Intake/Output Summary (Last 24 hours) at 8/7/2022 0049  Last data filed at 8/6/2022 2158  Gross per 24 hour   Intake 1000 ml   Output --   Net 1000 ml       Weight:       08/06/22 2021   Weight: 55.8 kg (123 lb)       Most recent vitals:   Vitals:    08/06/22 2228 08/06/22 2232 08/07/22 0000 08/07/22 0001   BP:    112/91   Pulse: 83 88 98    Resp:   17    Temp:       TempSrc:       SpO2: 99% 100% 98%    Weight:       Height:           Active LDAs/IV Access:   Lines, Drains & Airways     Active LDAs     Name Placement date Placement time Site Days    Peripheral IV 08/06/22 2210 Right Antecubital 08/06/22 2210  Antecubital  less than 1                Labs (abnormal labs have a star):   Labs Reviewed   COMPREHENSIVE METABOLIC PANEL - Abnormal; Notable for the following components:       Result Value    Glucose 102 (*)     All other components within normal limits    Narrative:     GFR Normal >60  Chronic Kidney Disease <60  Kidney Failure <15     LIPASE - Abnormal; Notable for the following components:    Lipase 378 (*)     All other components within normal limits   LACTATE DEHYDROGENASE - Abnormal; Notable for the following components:     (*)     All other components within normal limits   CBC WITH AUTO DIFFERENTIAL - Abnormal; Notable for the following  "components:    RBC 3.28 (*)     Hemoglobin 11.5 (*)     .6 (*)     MCH 35.1 (*)     RDW-SD 55.8 (*)     Neutrophils, Absolute 7.05 (*)     All other components within normal limits   C-REACTIVE PROTEIN - Abnormal; Notable for the following components:    C-Reactive Protein 4.43 (*)     All other components within normal limits   URINALYSIS W/ MICROSCOPIC IF INDICATED (NO CULTURE) - Normal    Narrative:     Urine microscopic not indicated.   HCG, SERUM, QUALITATIVE - Normal   TROPONIN (IN-HOUSE) - Normal    Narrative:     Troponin T Reference Range:  <= 0.03 ng/mL-   Negative for AMI  >0.03 ng/mL-     Abnormal for myocardial necrosis.  Clinicians would have to utilize clinical acumen, EKG, Troponin and serial changes to determine if it is an Acute Myocardial Infarction or myocardial injury due to an underlying chronic condition.       Results may be falsely decreased if patient taking Biotin.     SCAN SLIDE - Normal   LACTIC ACID, PLASMA - Normal   PROCALCITONIN - Normal    Narrative:     As a Marker for Sepsis (Non-Neonates):    1. <0.5 ng/mL represents a low risk of severe sepsis and/or septic shock.  2. >2 ng/mL represents a high risk of severe sepsis and/or septic shock.    As a Marker for Lower Respiratory Tract Infections that require antibiotic therapy:    PCT on Admission    Antibiotic Therapy       6-12 Hrs later    >0.5                Strongly Recommended  >0.25 - <0.5        Recommended   0.1 - 0.25          Discouraged              Remeasure/reassess PCT  <0.1                Strongly Discouraged     Remeasure/reassess PCT    As 28 day mortality risk marker: \"Change in Procalcitonin Result\" (>80% or <=80%) if Day 0 (or Day 1) and Day 4 values are available. Refer to http://www.[employer]-pct-calculator.com    Change in PCT <=80%  A decrease of PCT levels below or equal to 80% defines a positive change in PCT test result representing a higher risk for 28-day all-cause mortality of patients diagnosed with " severe sepsis for septic shock.    Change in PCT >80%  A decrease of PCT levels of more than 80% defines a negative change in PCT result representing a lower risk for 28-day all-cause mortality of patients diagnosed with severe sepsis or septic shock.      BLOOD CULTURE   BLOOD CULTURE   COVID PRE-OP / PRE-PROCEDURE SCREENING ORDER (NO ISOLATION)    Narrative:     The following orders were created for panel order COVID PRE-OP / PRE-PROCEDURE SCREENING ORDER (NO ISOLATION) - Swab, Nasopharynx.  Procedure                               Abnormality         Status                     ---------                               -----------         ------                     COVID-19, [facility] IN-HOUSE...[[id number]]                      In process                   Please view results for these tests on the individual orders.   COVID-19, [facility] IN-HOUSE CEPHEID/CHARLIE, NP SWAB IN TRANSPORT MEDIA 8-12 HR TAT   CBC AND DIFFERENTIAL    Narrative:     The following orders were created for panel order CBC & Differential.  Procedure                               Abnormality         Status                     ---------                               -----------         ------                     CBC Auto Differential[[id number]]        Abnormal            Final result               Scan Slide[[id number]]                   Normal              Final result                 Please view results for these tests on the individual orders.       EKG:   ECG 12 Lead   Preliminary Result   HEART RATE= 92  bpm   RR Interval= 652  ms   [place] Interval= 178  ms   P Horizontal Axis= -36  deg   P Front Axis= 72  deg   QRSD Interval= 77  ms   QT Interval= 360  ms   QRS Axis= 66  deg   T Wave Axis= 66  deg   - OTHERWISE NORMAL ECG -   Sinus rhythm   Low voltage, precordial leads   Electronically Signed By:    Date and Time of Study: 2022-08-06 20:57:03          Meds given in ED:   Medications   cefTRIAXone (ROCEPHIN) 2 g in sodium chloride 0.9 % 100 mL IVPB-VTB (2  g Intravenous New Bag 8/7/22 0044)   morphine injection 4 mg (4 mg Intravenous Given 8/6/22 2052)   ondansetron (ZOFRAN) injection 8 mg (8 mg Intravenous Given 8/6/22 2052)   sodium chloride 0.9 % bolus 1,000 mL (0 mL Intravenous Stopped 8/6/22 2158)   ketorolac (TORADOL) injection 15 mg (15 mg Intravenous Given 8/6/22 2210)   iopamidol (ISOVUE-300) 61 % injection 100 mL (100 mL Intravenous Given 8/6/22 2257)   lactated ringers bolus 1,000 mL (1,000 mL Intravenous New Bag 8/7/22 0006)   HYDROmorphone (DILAUDID) injection 0.5 mg (0.5 mg Intravenous Given 8/7/22 0016)       Imaging results:  CT Abdomen Pelvis With Contrast    Result Date: 8/6/2022   1. The patient has heterogeneous enhancement of the left kidney, as well as extensive urothelial enhancement. The appearance is concerning for pyelonephritis. There are associated perinephric abscesses, as described in the body of the report. These may be in continuity with the patient's proximal left ureter. The inflammation is also intimately associated with the left wilmer of the diaphragm, the pancreatic tail, and the posterior wall of the stomach. The patient does have both subcapsular and intraparenchymal collections within the spleen. These may represent areas of infarction or hematoma, although superimposed infection cannot be excluded, given the degree of inflammation within the left upper quadrant. I do think the patient has a splenic vein occlusion, as it becomes very attenuated, and there are multiple prominent collaterals identified within the left upper quadrant.  Radiation dose reduction techniques were utilized, including automated exposure control and exposure modulation based on body size.  This report was finalized on 8/6/2022 11:29 PM by Dr. Camilla Whalen M.D.      XR Chest 1 View    Result Date: 8/6/2022  No acute findings.  This report was finalized on 8/6/2022 9:17 PM by Dr. Camilla Whalen M.D.        Ambulatory status:   - up ad valeria    Social  issues:   Social History     Socioeconomic History   • Marital status: Single   Tobacco Use   • Smoking status: Current Every Day Smoker     Packs/day: 0.50     Types: Cigarettes     Start date: 1/28/1996   • Smokeless tobacco: Current User   Substance and Sexual Activity   • Alcohol use: Yes     Comment: 2 cocktails daily   • Drug use: No   • Sexual activity: Yes     Partners: Male     Birth control/protection: None     Comment: IUD removed 6/29/2017       NIH Stroke Scale:        Nursing report ED to floor:

## 2022-08-08 ENCOUNTER — APPOINTMENT (OUTPATIENT)
Dept: CARDIOLOGY | Facility: HOSPITAL | Age: 42
End: 2022-08-08

## 2022-08-08 ENCOUNTER — APPOINTMENT (OUTPATIENT)
Dept: CT IMAGING | Facility: HOSPITAL | Age: 42
End: 2022-08-08

## 2022-08-08 PROBLEM — D63.8 ANEMIA OF CHRONIC DISEASE: Status: ACTIVE | Noted: 2022-08-08

## 2022-08-08 PROBLEM — F10.21 ALCOHOL DEPENDENCE IN REMISSION: Status: ACTIVE | Noted: 2022-08-08

## 2022-08-08 PROBLEM — K21.9 GERD WITHOUT ESOPHAGITIS: Status: ACTIVE | Noted: 2022-08-08

## 2022-08-08 PROBLEM — N15.1 PERINEPHRIC ABSCESS: Status: ACTIVE | Noted: 2022-08-08

## 2022-08-08 PROBLEM — K86.1 CHRONIC PANCREATITIS: Status: ACTIVE | Noted: 2022-08-08

## 2022-08-08 PROBLEM — N10 ACUTE PYELONEPHRITIS: Status: ACTIVE | Noted: 2022-08-08

## 2022-08-08 PROBLEM — I82.890 SPLENIC VEIN THROMBOSIS: Status: ACTIVE | Noted: 2022-08-08

## 2022-08-08 LAB
ANION GAP SERPL CALCULATED.3IONS-SCNC: 7.8 MMOL/L (ref 5–15)
AORTIC DIMENSIONLESS INDEX: 0.7 (DI)
BH CV ECHO MEAS - AO MAX PG: 6.2 MMHG
BH CV ECHO MEAS - AO MEAN PG: 3.7 MMHG
BH CV ECHO MEAS - AO ROOT DIAM: 2.9 CM
BH CV ECHO MEAS - AO V2 MAX: 124.2 CM/SEC
BH CV ECHO MEAS - AO V2 VTI: 28.6 CM
BH CV ECHO MEAS - AVA(I,D): 2.11 CM2
BH CV ECHO MEAS - EDV(CUBED): 95.1 ML
BH CV ECHO MEAS - EDV(MOD-SP2): 84 ML
BH CV ECHO MEAS - EDV(MOD-SP4): 74 ML
BH CV ECHO MEAS - EF(MOD-BP): 65 %
BH CV ECHO MEAS - EF(MOD-SP2): 65.5 %
BH CV ECHO MEAS - EF(MOD-SP4): 66.2 %
BH CV ECHO MEAS - ESV(CUBED): 25.4 ML
BH CV ECHO MEAS - ESV(MOD-SP2): 29 ML
BH CV ECHO MEAS - ESV(MOD-SP4): 25 ML
BH CV ECHO MEAS - FS: 35.6 %
BH CV ECHO MEAS - IVS/LVPW: 0.98 CM
BH CV ECHO MEAS - IVSD: 0.82 CM
BH CV ECHO MEAS - LAT PEAK E' VEL: 14.3 CM/SEC
BH CV ECHO MEAS - LV DIASTOLIC VOL/BSA (35-75): 43.1 CM2
BH CV ECHO MEAS - LV MASS(C)D: 121 GRAMS
BH CV ECHO MEAS - LV MAX PG: 3.4 MMHG
BH CV ECHO MEAS - LV MEAN PG: 1.75 MMHG
BH CV ECHO MEAS - LV SYSTOLIC VOL/BSA (12-30): 14.6 CM2
BH CV ECHO MEAS - LV V1 MAX: 92.4 CM/SEC
BH CV ECHO MEAS - LV V1 VTI: 19.6 CM
BH CV ECHO MEAS - LVIDD: 4.6 CM
BH CV ECHO MEAS - LVIDS: 2.9 CM
BH CV ECHO MEAS - LVOT AREA: 3.1 CM2
BH CV ECHO MEAS - LVOT DIAM: 1.98 CM
BH CV ECHO MEAS - LVPWD: 0.83 CM
BH CV ECHO MEAS - MED PEAK E' VEL: 11.7 CM/SEC
BH CV ECHO MEAS - MR MAX PG: 26.9 MMHG
BH CV ECHO MEAS - MR MAX VEL: 259.1 CM/SEC
BH CV ECHO MEAS - MV A DUR: 0.09 SEC
BH CV ECHO MEAS - MV A MAX VEL: 72.2 CM/SEC
BH CV ECHO MEAS - MV DEC SLOPE: 496 CM/SEC2
BH CV ECHO MEAS - MV DEC TIME: 184 MSEC
BH CV ECHO MEAS - MV E MAX VEL: 104 CM/SEC
BH CV ECHO MEAS - MV E/A: 1.44
BH CV ECHO MEAS - MV MAX PG: 5 MMHG
BH CV ECHO MEAS - MV MEAN PG: 1.95 MMHG
BH CV ECHO MEAS - MV P1/2T: 65.4 MSEC
BH CV ECHO MEAS - MV V2 VTI: 28.4 CM
BH CV ECHO MEAS - MVA(P1/2T): 3.4 CM2
BH CV ECHO MEAS - MVA(VTI): 2.12 CM2
BH CV ECHO MEAS - PA ACC TIME: 0.16 SEC
BH CV ECHO MEAS - PA PR(ACCEL): 8.9 MMHG
BH CV ECHO MEAS - PA V2 MAX: 81.4 CM/SEC
BH CV ECHO MEAS - PULM A REVS DUR: 0.11 SEC
BH CV ECHO MEAS - PULM A REVS VEL: 44.9 CM/SEC
BH CV ECHO MEAS - PULM DIAS VEL: 72.2 CM/SEC
BH CV ECHO MEAS - PULM S/D: 0.9
BH CV ECHO MEAS - PULM SYS VEL: 65.1 CM/SEC
BH CV ECHO MEAS - QP/QS: 0.65
BH CV ECHO MEAS - RAP SYSTOLE: 8 MMHG
BH CV ECHO MEAS - RV MAX PG: 1.48 MMHG
BH CV ECHO MEAS - RV V1 MAX: 60.8 CM/SEC
BH CV ECHO MEAS - RV V1 VTI: 16.6 CM
BH CV ECHO MEAS - RVOT DIAM: 1.73 CM
BH CV ECHO MEAS - RVSP: 32 MMHG
BH CV ECHO MEAS - SI(MOD-SP2): 32.1 ML/M2
BH CV ECHO MEAS - SI(MOD-SP4): 28.6 ML/M2
BH CV ECHO MEAS - SV(LVOT): 60.2 ML
BH CV ECHO MEAS - SV(MOD-SP2): 55 ML
BH CV ECHO MEAS - SV(MOD-SP4): 49 ML
BH CV ECHO MEAS - SV(RVOT): 38.9 ML
BH CV ECHO MEAS - TAPSE (>1.6): 2.5 CM
BH CV ECHO MEAS - TR MAX PG: 23.5 MMHG
BH CV ECHO MEAS - TR MAX VEL: 242.2 CM/SEC
BH CV ECHO MEASUREMENTS AVERAGE E/E' RATIO: 8
BH CV XLRA - RV BASE: 3 CM
BH CV XLRA - RV LENGTH: 7.2 CM
BH CV XLRA - RV MID: 2.9 CM
BH CV XLRA - TDI S': 13.3 CM/SEC
BUN SERPL-MCNC: 5 MG/DL (ref 6–20)
BUN/CREAT SERPL: 8.2 (ref 7–25)
CALCIUM SPEC-SCNC: 8.4 MG/DL (ref 8.6–10.5)
CHLORIDE SERPL-SCNC: 110 MMOL/L (ref 98–107)
CO2 SERPL-SCNC: 25.2 MMOL/L (ref 22–29)
CREAT SERPL-MCNC: 0.61 MG/DL (ref 0.57–1)
DEPRECATED RDW RBC AUTO: 53.5 FL (ref 37–54)
EGFRCR SERPLBLD CKD-EPI 2021: 116.8 ML/MIN/1.73
ERYTHROCYTE [DISTWIDTH] IN BLOOD BY AUTOMATED COUNT: 14.6 % (ref 12.3–15.4)
FOLATE SERPL-MCNC: 11.4 NG/ML (ref 4.78–24.2)
GLUCOSE SERPL-MCNC: 83 MG/DL (ref 65–99)
HCT VFR BLD AUTO: 26.6 % (ref 34–46.6)
HGB BLD-MCNC: 8.9 G/DL (ref 12–15.9)
LEFT ATRIUM VOLUME INDEX: 21 ML/M2
MAXIMAL PREDICTED HEART RATE: 181 BPM
MCH RBC QN AUTO: 34.6 PG (ref 26.6–33)
MCHC RBC AUTO-ENTMCNC: 33.5 G/DL (ref 31.5–35.7)
MCV RBC AUTO: 103.5 FL (ref 79–97)
PLATELET # BLD AUTO: 229 10*3/MM3 (ref 140–450)
PMV BLD AUTO: 10.1 FL (ref 6–12)
POTASSIUM SERPL-SCNC: 4.1 MMOL/L (ref 3.5–5.2)
RBC # BLD AUTO: 2.57 10*6/MM3 (ref 3.77–5.28)
SODIUM SERPL-SCNC: 143 MMOL/L (ref 136–145)
STRESS TARGET HR: 154 BPM
VIT B12 BLD-MCNC: 415 PG/ML (ref 211–946)
WBC NRBC COR # BLD: 4.76 10*3/MM3 (ref 3.4–10.8)

## 2022-08-08 PROCEDURE — 85027 COMPLETE CBC AUTOMATED: CPT | Performed by: STUDENT IN AN ORGANIZED HEALTH CARE EDUCATION/TRAINING PROGRAM

## 2022-08-08 PROCEDURE — 99232 SBSQ HOSP IP/OBS MODERATE 35: CPT | Performed by: INTERNAL MEDICINE

## 2022-08-08 PROCEDURE — 25010000002 HYDROMORPHONE 1 MG/ML SOLUTION: Performed by: STUDENT IN AN ORGANIZED HEALTH CARE EDUCATION/TRAINING PROGRAM

## 2022-08-08 PROCEDURE — 93306 TTE W/DOPPLER COMPLETE: CPT

## 2022-08-08 PROCEDURE — 82607 VITAMIN B-12: CPT | Performed by: INTERNAL MEDICINE

## 2022-08-08 PROCEDURE — 99232 SBSQ HOSP IP/OBS MODERATE 35: CPT | Performed by: STUDENT IN AN ORGANIZED HEALTH CARE EDUCATION/TRAINING PROGRAM

## 2022-08-08 PROCEDURE — C1729 CATH, DRAINAGE: HCPCS

## 2022-08-08 PROCEDURE — 87205 SMEAR GRAM STAIN: CPT | Performed by: STUDENT IN AN ORGANIZED HEALTH CARE EDUCATION/TRAINING PROGRAM

## 2022-08-08 PROCEDURE — 25010000002 MIDAZOLAM PER 1 MG: Performed by: RADIOLOGY

## 2022-08-08 PROCEDURE — 99232 SBSQ HOSP IP/OBS MODERATE 35: CPT | Performed by: PHYSICIAN ASSISTANT

## 2022-08-08 PROCEDURE — 25010000002 FENTANYL CITRATE (PF) 50 MCG/ML SOLUTION: Performed by: RADIOLOGY

## 2022-08-08 PROCEDURE — 25010000002 CEFTRIAXONE PER 250 MG: Performed by: STUDENT IN AN ORGANIZED HEALTH CARE EDUCATION/TRAINING PROGRAM

## 2022-08-08 PROCEDURE — 87040 BLOOD CULTURE FOR BACTERIA: CPT | Performed by: STUDENT IN AN ORGANIZED HEALTH CARE EDUCATION/TRAINING PROGRAM

## 2022-08-08 PROCEDURE — 82746 ASSAY OF FOLIC ACID SERUM: CPT | Performed by: INTERNAL MEDICINE

## 2022-08-08 PROCEDURE — 63710000001 DIPHENHYDRAMINE PER 50 MG: Performed by: NURSE PRACTITIONER

## 2022-08-08 PROCEDURE — 87015 SPECIMEN INFECT AGNT CONCNTJ: CPT | Performed by: STUDENT IN AN ORGANIZED HEALTH CARE EDUCATION/TRAINING PROGRAM

## 2022-08-08 PROCEDURE — 88112 CYTOPATH CELL ENHANCE TECH: CPT | Performed by: STUDENT IN AN ORGANIZED HEALTH CARE EDUCATION/TRAINING PROGRAM

## 2022-08-08 PROCEDURE — 87070 CULTURE OTHR SPECIMN AEROBIC: CPT | Performed by: STUDENT IN AN ORGANIZED HEALTH CARE EDUCATION/TRAINING PROGRAM

## 2022-08-08 PROCEDURE — 75989 ABSCESS DRAINAGE UNDER X-RAY: CPT

## 2022-08-08 PROCEDURE — 80048 BASIC METABOLIC PNL TOTAL CA: CPT | Performed by: STUDENT IN AN ORGANIZED HEALTH CARE EDUCATION/TRAINING PROGRAM

## 2022-08-08 PROCEDURE — 87075 CULTR BACTERIA EXCEPT BLOOD: CPT | Performed by: STUDENT IN AN ORGANIZED HEALTH CARE EDUCATION/TRAINING PROGRAM

## 2022-08-08 PROCEDURE — 88305 TISSUE EXAM BY PATHOLOGIST: CPT | Performed by: STUDENT IN AN ORGANIZED HEALTH CARE EDUCATION/TRAINING PROGRAM

## 2022-08-08 PROCEDURE — 0 LIDOCAINE 1 % SOLUTION: Performed by: RADIOLOGY

## 2022-08-08 PROCEDURE — 0W9H30Z DRAINAGE OF RETROPERITONEUM WITH DRAINAGE DEVICE, PERCUTANEOUS APPROACH: ICD-10-PCS | Performed by: RADIOLOGY

## 2022-08-08 PROCEDURE — 93306 TTE W/DOPPLER COMPLETE: CPT | Performed by: INTERNAL MEDICINE

## 2022-08-08 PROCEDURE — 63710000001 ONDANSETRON ODT 4 MG TABLET DISPERSIBLE: Performed by: STUDENT IN AN ORGANIZED HEALTH CARE EDUCATION/TRAINING PROGRAM

## 2022-08-08 RX ORDER — FENTANYL CITRATE 50 UG/ML
INJECTION, SOLUTION INTRAMUSCULAR; INTRAVENOUS
Status: COMPLETED | OUTPATIENT
Start: 2022-08-08 | End: 2022-08-08

## 2022-08-08 RX ORDER — LIDOCAINE HYDROCHLORIDE 10 MG/ML
20 INJECTION, SOLUTION INFILTRATION; PERINEURAL ONCE
Status: COMPLETED | OUTPATIENT
Start: 2022-08-08 | End: 2022-08-08

## 2022-08-08 RX ORDER — MIDAZOLAM HYDROCHLORIDE 1 MG/ML
INJECTION INTRAMUSCULAR; INTRAVENOUS
Status: COMPLETED | OUTPATIENT
Start: 2022-08-08 | End: 2022-08-08

## 2022-08-08 RX ORDER — DIPHENHYDRAMINE HCL 25 MG
25 CAPSULE ORAL EVERY 6 HOURS PRN
Status: DISCONTINUED | OUTPATIENT
Start: 2022-08-08 | End: 2022-08-14 | Stop reason: HOSPADM

## 2022-08-08 RX ADMIN — CEFTRIAXONE 2 G: 2 INJECTION, POWDER, FOR SOLUTION INTRAMUSCULAR; INTRAVENOUS at 00:03

## 2022-08-08 RX ADMIN — HYDROMORPHONE HYDROCHLORIDE 1 MG: 1 INJECTION, SOLUTION INTRAMUSCULAR; INTRAVENOUS; SUBCUTANEOUS at 02:17

## 2022-08-08 RX ADMIN — FENTANYL CITRATE 25 MCG: 50 INJECTION INTRAMUSCULAR; INTRAVENOUS at 13:58

## 2022-08-08 RX ADMIN — HYDROMORPHONE HYDROCHLORIDE 1 MG: 1 INJECTION, SOLUTION INTRAMUSCULAR; INTRAVENOUS; SUBCUTANEOUS at 22:19

## 2022-08-08 RX ADMIN — LIDOCAINE HYDROCHLORIDE 20 ML: 10 INJECTION, SOLUTION INFILTRATION; PERINEURAL at 13:58

## 2022-08-08 RX ADMIN — DIPHENHYDRAMINE HYDROCHLORIDE 25 MG: 25 CAPSULE ORAL at 05:56

## 2022-08-08 RX ADMIN — MIDAZOLAM 0.5 MG: 1 INJECTION INTRAMUSCULAR; INTRAVENOUS at 13:58

## 2022-08-08 RX ADMIN — PANCRELIPASE 72000 UNITS OF LIPASE: 60000; 12000; 38000 CAPSULE, DELAYED RELEASE PELLETS ORAL at 15:27

## 2022-08-08 RX ADMIN — HYDROMORPHONE HYDROCHLORIDE 1 MG: 1 INJECTION, SOLUTION INTRAMUSCULAR; INTRAVENOUS; SUBCUTANEOUS at 07:46

## 2022-08-08 RX ADMIN — ONDANSETRON 4 MG: 4 TABLET, ORALLY DISINTEGRATING ORAL at 00:02

## 2022-08-08 RX ADMIN — OXYCODONE HYDROCHLORIDE AND ACETAMINOPHEN 1 TABLET: 10; 325 TABLET ORAL at 17:02

## 2022-08-08 RX ADMIN — HYDROMORPHONE HYDROCHLORIDE 1 MG: 1 INJECTION, SOLUTION INTRAMUSCULAR; INTRAVENOUS; SUBCUTANEOUS at 19:38

## 2022-08-08 RX ADMIN — PANCRELIPASE 72000 UNITS OF LIPASE: 60000; 12000; 38000 CAPSULE, DELAYED RELEASE PELLETS ORAL at 16:57

## 2022-08-08 RX ADMIN — HYDROMORPHONE HYDROCHLORIDE 1 MG: 1 INJECTION, SOLUTION INTRAMUSCULAR; INTRAVENOUS; SUBCUTANEOUS at 10:17

## 2022-08-08 RX ADMIN — PANTOPRAZOLE SODIUM 40 MG: 40 TABLET, DELAYED RELEASE ORAL at 10:17

## 2022-08-08 RX ADMIN — FENTANYL CITRATE 25 MCG: 50 INJECTION INTRAMUSCULAR; INTRAVENOUS at 13:51

## 2022-08-08 RX ADMIN — OXYCODONE HYDROCHLORIDE AND ACETAMINOPHEN 1 TABLET: 10; 325 TABLET ORAL at 05:33

## 2022-08-08 RX ADMIN — SODIUM CHLORIDE 100 ML/HR: 9 INJECTION, SOLUTION INTRAVENOUS at 02:34

## 2022-08-08 RX ADMIN — AMITRIPTYLINE HYDROCHLORIDE 10 MG: 10 TABLET, FILM COATED ORAL at 20:55

## 2022-08-08 RX ADMIN — FENTANYL CITRATE 25 MCG: 50 INJECTION INTRAMUSCULAR; INTRAVENOUS at 13:54

## 2022-08-08 RX ADMIN — HYDROMORPHONE HYDROCHLORIDE 1 MG: 1 INJECTION, SOLUTION INTRAMUSCULAR; INTRAVENOUS; SUBCUTANEOUS at 15:28

## 2022-08-08 RX ADMIN — CEFTRIAXONE 2 G: 2 INJECTION, POWDER, FOR SOLUTION INTRAMUSCULAR; INTRAVENOUS at 23:32

## 2022-08-08 RX ADMIN — SODIUM CHLORIDE 100 ML/HR: 9 INJECTION, SOLUTION INTRAVENOUS at 19:42

## 2022-08-08 RX ADMIN — DOCUSATE SODIUM 50MG AND SENNOSIDES 8.6MG 2 TABLET: 8.6; 5 TABLET, FILM COATED ORAL at 20:54

## 2022-08-08 RX ADMIN — MIDAZOLAM 1 MG: 1 INJECTION INTRAMUSCULAR; INTRAVENOUS at 13:51

## 2022-08-08 NOTE — PROGRESS NOTES
REASON FOR FOLLOW-UP: Anemia, possible splenic vein thrombosis    HISTORY OF PRESENT ILLNESS:   This is a 41-year-old woman with chronic pancreatitis who presented with several month history of worsening midepigastric pain radiating to the left shoulder.  On admission imaging of the abdomen showed heterogeneous enhancement of the left kidney extensive urothelial enhancement, perinephric abscesses, inflammation associated with wilmer of the diaphragm pancreatic tail and posterior wall of the stomach, fluid collections in the spleen possibly representing infarction hematoma or infection.  Probable splenic vein occlusion with multiple prominent collaterals.      Past Medical History, Past Surgical History, Social History, Family History have been reviewed and are without significant changes except as mentioned.    Review of Systems   Constitutional: Positive for activity change and fatigue.   Respiratory: Negative.    Cardiovascular: Negative.    Gastrointestinal: Positive for abdominal pain. Negative for nausea and vomiting.   Musculoskeletal: Negative.    Skin: Negative.    Neurological: Negative.    Hematological: Negative.    Psychiatric/Behavioral: Positive for dysphoric mood.          Medications:  The current medication list was reviewed in the EMR    ALLERGIES:    Allergies   Allergen Reactions   • Latex    • Nickel        Objective      Vitals:    08/08/22 1000   BP: 124/87   Pulse: 68   Resp: 18   Temp: 98 °F (36.7 °C)   SpO2: 96%          Physical Exam    CONSTITUTIONAL: Adult woman in no acute distress  HEENT: no icterus, no thrush, moist membranes  LYMPH: no cervical or supraclavicular lad  CV: RRR, S1S2, no murmur  RESP: cta bilat, no wheezing, no rales  GI: soft, mild to moderate midepigastric tenderness, no splenomegaly, +bs  MUSC: no edema, normal gait  NEURO: alert and oriented x3, normal strength  PSYCH: normal mood and affect    RECENT LABS:  Hematology Results from last 7 days   Lab Units  08/08/22  0614 08/07/22  0603 08/06/22 2055   WBC 10*3/mm3 4.76 5.75 10.48   HEMOGLOBIN g/dL 8.9* 9.4* 11.5*   HEMATOCRIT % 26.6* 28.0* 34.3   PLATELETS 10*3/mm3 229 239 323     2  Lab Results   Component Value Date    GLUCOSE 83 08/08/2022    BUN 5 (L) 08/08/2022    CREATININE 0.61 08/08/2022    EGFRIFNONA 97 04/06/2018    BCR 8.2 08/08/2022    CO2 25.2 08/08/2022    CALCIUM 8.4 (L) 08/08/2022    ALBUMIN 4.10 08/06/2022    AST 12 08/06/2022    ALT 11 08/06/2022       Lab Results   Component Value Date    IRON 19 (L) 08/07/2022    TIBC 249 (L) 08/07/2022    FERRITIN 355.00 (H) 08/07/2022       Lab Results   Component Value Date    AXEDCZUD76 415 08/08/2022       Lab Results   Component Value Date    FOLATE 11.40 08/08/2022      CT abdomen/pelvis 8/6/2022:    IMPRESSION:     1. The patient has heterogeneous enhancement of the left kidney, as well  as extensive urothelial enhancement. The appearance is concerning for  pyelonephritis. There are associated perinephric abscesses, as described  in the body of the report. These may be in continuity with the patient's  proximal left ureter. The inflammation is also intimately associated  with the left wilmer of the diaphragm, the pancreatic tail, and the  posterior wall of the stomach. The patient does have both subcapsular  and intraparenchymal collections within the spleen. These may represent  areas of infarction or hematoma, although superimposed infection cannot  be excluded, given the degree of inflammation within the left upper  quadrant. 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     Ultrasound spleen 8/7/2022    FINDINGS: Spleen measures 11.9 x 4.2 x 4.3 cm. Along the superior medial  edge of the spleen there is heterogeneous area of echogenicity measuring  about 3.6 cm long corresponding to the lesion seen in the same area on  CT. There  is also some heterogeneous echogenicity on the medial lower  portion of the spleen. The findings appear similar to the CT from  yesterday.     IMPRESSION:  Abnormal spleen appears no different than on CT yesterday.    Portal hepatic duplex 8/7/2022:  · All vessels appear normal with normal flow direction and no evidence of thrombus. However, there is a nonvascular fluid collection in the and the splenic vein itself is difficult to visualize with multiple collaterals noted.       Assessment & Plan   *Anemia:  · Hemoglobin 8.9 with .5, MCHC 33.5; white blood cell and platelet counts were normal  · B12 415, folate 11.4, ferritin 355, iron sat 8%/TIBC 249, reticulocyte 0.96%, ESR 67, CRP 4.7    *Probable splenic vein thrombosis with multiple collaterals  · The patient has history of chronic pancreatitis and given the significant collateral formation probably developed splenic vein thrombosis at some point in the past    *Probable perinephric abscesses, infection tracking along wilmer of diaphragm/pancreatic tail/posterior wall of stomach, possible spleen  · Scheduled for CT-guided drain placement with cultures etc. Today  · Currently on [facility]    Hematology plan/recommendations:  1. Anemia evaluation looks most consistent with inflammation/chronic disease probably related to infection given the CT findings  2. Iron studies difficult to interpret in the setting of such inflammation  3. Continue to monitor CBC with transfusion support if needed  4. I think the splenic vein thrombosis is likely chronic given the significant collateral formation; I doubt anticoagulation will benefit the patient unless new thrombus identified  5. Patients with chronic splenic vein thrombosis can develop varices in the esophagus or stomach- may consider scope at some point to evaluate, obviously can be done outpatient    The patient and all problems new to this examiner today.            8/8/2022      CC:

## 2022-08-08 NOTE — PLAN OF CARE
Goal Outcome Evaluation:      VSS. CT guided drainage of left kidney abcess, pigtail drain placed. Flushed with 5cc of NSS, small blood clot returned. Pain med given and ice bag placed. [place] diet without nausea.

## 2022-08-08 NOTE — PROGRESS NOTES
LOS: 1 day     Chief Complaint: Perinephric abscess    Interval History: Patient reports she is doing well this morning.  She is very hungry as she has not been able to eat.  Denies any fevers or chills.  States she continues to have significant pain in the abdomen and back.  Denies any difficulties tolerating the antibiotics.     Vital Signs  Temp:  [96.9 °F (36.1 °C)-98 °F (36.7 °C)] 98 °F (36.7 °C)  Heart Rate:  [60-79] 68  Resp:  [18] 18  BP: (102-124)/(68-87) 124/87    Physical Exam:  General: In no acute distress  HEENT: Oropharynx clear, moist mucous membranes  Cardiovascular: RRR, normal S1 and S2, no M/R/G  Respiratory: Clear to ascultation bilaterally, no wheezing  GI: Soft, nondistended, tender to palpation diffusely, + bowel sounds bilaterally, no masses  Skin: No rashes or lesions  Extremities: No edema, cyanosis  Access: Peripheral IV.    Antibiotics:  Anti-Infectives (From admission, onward)    Ordered     Dose/Rate Route Frequency Start Stop    08/07/22 1300  cefTRIAXone (ROCEPHIN) 2 g in sodium chloride 0.9 % 100 mL IVPB-VTB        Ordering Provider: Erik Hinton DO    2 g  200 mL/hr over 30 Minutes Intravenous Every 24 Hours 08/08/22 0000 08/14/22 2359    08/06/22 2350  cefTRIAXone (ROCEPHIN) 2 g in sodium chloride 0.9 % 100 mL IVPB-VTB        Ordering Provider: Salvador Bach PA    2 g  200 mL/hr over 30 Minutes Intravenous Once 08/06/22 2352 08/07/22 0115           Results Review:     I reviewed the patient's new clinical results.    Lab Results   Component Value Date    WBC 4.76 08/08/2022    HGB 8.9 (L) 08/08/2022    HCT 26.6 (L) 08/08/2022    .5 (H) 08/08/2022     08/08/2022     Lab Results   Component Value Date    GLUCOSE 83 08/08/2022    BUN 5 (L) 08/08/2022    CREATININE 0.61 08/08/2022    EGFRIFNONA 97 04/06/2018    BCR 8.2 08/08/2022    CO2 25.2 08/08/2022    CALCIUM 8.4 (L) 08/08/2022    ALBUMIN 4.10 08/06/2022    AST 12 08/06/2022    ALT 11 08/06/2022        Microbiology:  8/7 blood culture 1 out of 1 no growth to date  8/8 blood culture 1 out of 1 in process    Assessment    #Perinephric abscess  #Left-sided pyelonephritis  #Multiloculated heterogeneous splenic collections  #Chronic pancreatitis    Plan is for IR guided drainage today and discussed with Dr. Dias who will attempt to leave a drain if possible.  Plan to obtain cultures from this to further guide her therapy. Continue to cover with IV ceftriaxone 2 g daily for now.  Plan to consult general surgery for evaluation of the spleen abscesses.    ID will follow.

## 2022-08-08 NOTE — PROGRESS NOTES
Nutrition Services    Patient Name:  Piper Cain  YOB: 1980  MRN: [id number]  Admit Date:  8/6/2022      Comment: BMI-17   Pt reports good appetite and that she is hungry. She reports both times she was ready to eat, she was not able to due to being taken for tests and images. She follows a low fat, fairly healthy diet to avoid pancreatitis flares. She requested chocolate boost at every meal-- RD ordered. No recent wt loss. She is eating about 50% at meals and experiences intermittent nausea. Will continue to follow     CLINICAL NUTRITION ASSESSMENT      Reason for Assessment BMI- 17     H&P  Per infectious disease- 41-year-old female with a past medical history of alcohol abuse, chronic pancreatitis and urinary tract infections who presents with worsening back, side and abdominal pain.  ID was consulted for perinephric abscess    Past Medical History:   Diagnosis Date   • Anxiety    • E. coli bacteremia    • ETOH abuse    • GERD (gastroesophageal reflux disease)    • Hiatal hernia    • Migraine    • Miscarriage 2004   • Pancreatitis        History reviewed. No pertinent surgical history.     Current Problems   Epigastric pain, abnormal CT of the abdomen        Nutritional Risk Screening       MST SCORE 0   Risk Screening Criteria No indicators     Encounter Information        Nutrition/Diet History:    Pt reports good appetite and that she is hungry. She reports both times she was ready to eat, she was not able to due to being taken for tests and images. She follows a low fat, fairly healthy diet to avoid pancreatitis flares. She requested chocolate boost at every meal-- RD ordered. No recent wt loss. She is eating about 50% at meals and experiences intermittent nausea. Will continue to follow    Food Preferences:      Supplements:      Typical Activity Pattern: Ambulatory     Factors Affecting Intake: No factors at this time     Tests/Procedures: CT scan, Ultrasound       Anthropometrics       "  Current Height  Current Weight  BMI kg/m2 Height: 180.3 cm (71\")  Weight: 55.8 kg (123 lb) (08/08/22 0922)  Body mass index is 17.16 kg/m².       Admission Weight 123# (55.7)    Ideal Body Weight (IBW) 156# (70.8kg)    Usual Body Weight (UBW)        Trending Weight Hx     Weight Change No recent wt changes              PTA: Wt Readings from Last 30 Encounters:   08/08/22 0922 55.8 kg (123 lb)   08/06/22 2021 55.8 kg (123 lb)   03/05/18 1124 58.1 kg (128 lb)   01/24/18 0456 59 kg (130 lb)   09/27/17 2341 49.9 kg (110 lb)            Labs       Pertinent Labs reviewed   Results from last 7 days   Lab Units 08/08/22  0614 08/07/22  0603 08/06/22  2158   SODIUM mmol/L 143 140 139   POTASSIUM mmol/L 4.1 3.7 4.0   CHLORIDE mmol/L 110* 106 104   CO2 mmol/L 25.2 23.0 22.7   BUN mg/dL 5* 8 11   CREATININE mg/dL 0.61 0.62 0.69   CALCIUM mg/dL 8.4* 8.3* 9.0   BILIRUBIN mg/dL  --   --  0.2   ALK PHOS U/L  --   --  68   ALT (SGPT) U/L  --   --  11   AST (SGOT) U/L  --   --  12   GLUCOSE mg/dL 83 104* 102*     Results from last 7 days   Lab Units 08/08/22  0614   HEMOGLOBIN g/dL 8.9*   HEMATOCRIT % 26.6*   WBC 10*3/mm3 4.76     Results from last 7 days   Lab Units 08/08/22  0614 08/07/22  0603 08/06/22 2055   INR   --  1.06  --    PLATELETS 10*3/mm3 229 239 323     COVID19   Date Value Ref Range Status   08/07/2022 Not Detected Not Detected - Ref. Range Final     No results found for: HGBA1C       Medications           Scheduled Medications amitriptyline, 10 mg, Oral, Nightly  cefTRIAXone, 2 g, Intravenous, Q24H  cholecalciferol, 5,000 Units, Oral, Daily  folic acid, 1 mg, Oral, Daily  lidocaine, 1 patch, Transdermal, Q24H  pancrelipase (Lip-Prot-Amyl), 72,000 units of lipase, Oral, TID With Meals  pantoprazole, 40 mg, Oral, Daily  senna-docusate sodium, 2 tablet, Oral, BID  sodium chloride, 10 mL, Intravenous, Q12H  thiamine, 100 mg, Oral, Daily       Infusions sodium chloride, 100 mL/hr, Last Rate: 100 mL/hr (08/08/22 " 0234)       PRN Medications •  acetaminophen **OR** acetaminophen **OR** acetaminophen  •  diphenhydrAMINE  •  HYDROmorphone **AND** naloxone  •  ondansetron ODT  •  oxyCODONE-acetaminophen  •  sodium chloride     Physical Findings        Physical Appearance alert, oriented, underweight     NFPE Not applicable   --  Edema  no edema   Gastrointestinal last bowel movement:8/5   Tubes/Drains none   Oral/Mouth Cavity WNL   Skin skin intact   --  Current Nutrition Orders & Evaluation of Intake       Oral Nutrition     Food Allergies NKFA   Current PO Diet Diet Regular; GI Soft   Supplement n/a   PO Evaluation     Trending % PO Intake 50%        --  Nutrition Diagnosis        Nutrition Dx Problem 1 Problem: Underweight    Etiology: Medical Diagnosis    Signs/Symptoms: BMI       Intervention Goal        Intervention Goal(s) Nutrition support treatment, Reduce/improve symptoms, Disease management/therapy, Appropriate weight gain and PO intake goal %: 75     Nutrition Intervention        RD Action Interview for preferences, Supplement provided, Follow Tx Progress and Care plan reviewed     Nutrition Prescription        Diet Prescription GI soft    Supplement Prescription Boost Plus, TID   Prescription Ordered Yes    --  Monitor/Evaluation        Monitor Per protocol, I&O, PO intake, Supplement intake, Pertinent labs, Weight, Skin status, GI status, Symptoms, POC/GOC     RD to follow per protocol.      Electronically signed by:  TOREY CONWAY RD  08/08/22 15:21 EDT

## 2022-08-08 NOTE — PLAN OF CARE
Goal Outcome Evaluation:  Plan of Care Reviewed With: patient        Progress: no change  Outcome Evaluation: VSS, A/O, up ad valeria, c/o pain treated w/ Dilaudid and Percocet, Zofran given for nausea, IVF infusing, Rocephin given

## 2022-08-08 NOTE — POST-PROCEDURE NOTE
POST PROCEDURE NOTE    Procedure: drain placement    Pre-Procedure Diagnosis: perinephric collection    Post-procedure Diagnosis: same    Findings: successf.drain placement, collection evacuated    Complications: none    Blood loss: min    Specimen Removed: brown fluid    Disposition:   Transfer back to inpatient room

## 2022-08-08 NOTE — PROGRESS NOTES
[facility]  Inpatient Progress Note    Reason for Follow-up: Pancreatitis    Subjective     Interval History:   Patient continues to report abdominal pain which is epigastric left upper quadrant and left CVA.  She states this pain radiates to her left shoulder and neck.  States she is tolerating p.o. intake well although she has not been given food today due to procedure later.  She is receiving Creon with meals.  Some nausea but denies vomiting.  Denies melena, hematochezia.  Admits to pleuritic chest pain.  Denies SOA.    8/8 BMP shows chloride 110, calcium 8.4, normal creatinine.  Folate, B12 folate normal.  CBC with hemoglobin 8.9, mild drop from yesterday at 9.4.  .5.  Normal WBC and platelets.  Pending blood cultures-no growth at 24 hours.    Duplex portal veins: All vessels normal, no evidence of thrombus.  Presence of nonvascular fluid collection near the splenic vein which is difficult to visualize.  Significant collateral vessel formation around splenic vein.    Splenic ultrasound showed same as CT scan    DASHA showed mild to moderate size PFO, mild tricuspid valve regurgitation, EF 61% and normal diastolic function.  No evidence of pericardial effusion.    History of alcohol use but states she has been avoiding any alcohol for months now as well as maintaining low-fat diet.  Despite this she continues with a constant epigastric pain.  Has appointment with new GI with gastro so that [place] in October.      Current Facility-Administered Medications:   •  acetaminophen (TYLENOL) tablet 650 mg, 650 mg, Oral, Q4H PRN, 650 mg at 08/07/22 2344 **OR** acetaminophen (TYLENOL) 160 MG/5ML solution 650 mg, 650 mg, Oral, Q4H PRN **OR** acetaminophen (TYLENOL) suppository 650 mg, 650 mg, Rectal, Q4H PRN, Evelin Weiss, SOL  •  amitriptyline (ELAVIL) tablet 10 mg, 10 mg, Oral, Nightly, Rafat Beltran MD, 10 mg at 08/07/22 2025  •  cefTRIAXone (ROCEPHIN) 2 g in sodium chloride 0.9 %  100 mL IVPB-VTB, 2 g, Intravenous, Q24H, Erik Hinton, DO, Last Rate: 200 mL/hr at 08/08/22 0003, 2 g at 08/08/22 0003  •  cholecalciferol (VITAMIN D3) tablet 5,000 Units, 5,000 Units, Oral, Daily, Rafat Beltran MD, 5,000 Units at 08/07/22 1150  •  diphenhydrAMINE (BENADRYL) capsule 25 mg, 25 mg, Oral, Q6H PRN, Evelin Weiss, APRN, 25 mg at 08/08/22 0556  •  folic acid (FOLVITE) tablet 1 mg, 1 mg, Oral, Daily, Rafat Beltran MD, 1 mg at 08/07/22 1149  •  HYDROmorphone (DILAUDID) injection 1 mg, 1 mg, Intravenous, Q2H PRN, 1 mg at 08/08/22 1017 **AND** naloxone (NARCAN) injection 0.4 mg, 0.4 mg, Intravenous, Q5 Min PRN, Rafat Beltran MD  •  lidocaine (LIDODERM) 5 % 1 patch, 1 patch, Transdermal, Q24H, Rafat Beltran MD, 1 patch at 08/07/22 1358  •  ondansetron ODT (ZOFRAN-ODT) disintegrating tablet 4 mg, 4 mg, Oral, Q6H PRN, Rafat Beltran MD, 4 mg at 08/08/22 0002  •  oxyCODONE-acetaminophen (PERCOCET)  MG per tablet 1 tablet, 1 tablet, Oral, Q6H PRN, Rafat Beltran MD, 1 tablet at 08/08/22 0533  •  pancrelipase (Lip-Prot-Amyl) (CREON) capsule 72,000 units of lipase, 72,000 units of lipase, Oral, TID With Meals, Rafat Beltran MD, 72,000 units of lipase at 08/07/22 1824  •  pantoprazole (PROTONIX) EC tablet 40 mg, 40 mg, Oral, Daily, Rafat Beltran MD, 40 mg at 08/08/22 1017  •  sennosides-docusate (PERICOLACE) 8.6-50 MG per tablet 2 tablet, 2 tablet, Oral, BID, Rafat Beltran MD  •  sodium chloride 0.9 % flush 10 mL, 10 mL, Intravenous, Q12H, Evelin Weiss APRN, 10 mL at 08/07/22 2025  •  sodium chloride 0.9 % flush 10 mL, 10 mL, Intravenous, PRN, Evelin Weiss APRN  •  sodium chloride 0.9 % infusion, 100 mL/hr, Intravenous, Continuous, Evelin Weiss APRN, Last Rate: 100 mL/hr at 08/08/22 0234, 100 mL/hr at 08/08/22 0234  •  Thiamine Mononitrate tablet 100 mg, 100 mg, Oral, Daily, Rafat Beltran MD, 100 mg at 08/07/22 1149  Review of Systems:    The following systems were  reviewed and negative;  ENT and neurological    Objective     Vital Signs  Temp:  [96.9 °F (36.1 °C)-98 °F (36.7 °C)] 98 °F (36.7 °C)  Heart Rate:  [60-88] 71  Resp:  [9-18] 12  BP: (102-130)/(68-92) 123/84  Body mass index is 17.16 kg/m².    Intake/Output Summary (Last 24 hours) at 8/8/2022 1443  Last data filed at 8/7/2022 2018  Gross per 24 hour   Intake 240 ml   Output 650 ml   Net -410 ml     No intake/output data recorded.     Physical Exam:    General: patient awake, alert and cooperative, walking around room   Eyes: Normal lids and lashes, no scleral icterus   Skin: warm and dry, not jaundiced   Pulm: regular and unlabored   Abdomen: soft, diffusely tender but much worse in the epigastrium and left upper quadrant, nondistended; normal bowel sounds   Psychiatric: Normal mood and behavior; memory intact     Results Review:     I reviewed the patient's new clinical results.    Results from last 7 days   Lab Units 08/08/22  0614 08/07/22  0603 08/06/22  2055   WBC 10*3/mm3 4.76 5.75 10.48   HEMOGLOBIN g/dL 8.9* 9.4* 11.5*   HEMATOCRIT % 26.6* 28.0* 34.3   PLATELETS 10*3/mm3 229 239 323     Results from last 7 days   Lab Units 08/08/22 0614 08/07/22  0603 08/06/22  2158   SODIUM mmol/L 143 140 139   POTASSIUM mmol/L 4.1 3.7 4.0   CHLORIDE mmol/L 110* 106 104   CO2 mmol/L 25.2 23.0 22.7   BUN mg/dL 5* 8 11   CREATININE mg/dL 0.61 0.62 0.69   CALCIUM mg/dL 8.4* 8.3* 9.0   BILIRUBIN mg/dL  --   --  0.2   ALK PHOS U/L  --   --  68   ALT (SGPT) U/L  --   --  11   AST (SGOT) U/L  --   --  12   GLUCOSE mg/dL 83 104* 102*     Results from last 7 days   Lab Units 08/07/22  0603   INR  1.06     Lab Results   Lab Value Date/Time    LIPASE 378 (H) 08/06/2022 2158    LIPASE 95 (H) 01/24/2018 0545    LIPASE 93 (H) 10/01/2017 0356    LIPASE 122 (H) 09/30/2017 0619    LIPASE 86 (H) 09/29/2017 1731    LIPASE 133 (H) 09/28/2017 0032       Radiology:  US Spleen   Final Result   Abnormal spleen appears no different than on CT  yesterday.       This report was finalized on 8/7/2022 6:48 PM by Dr. Salvador Perdomo M.D.          CT Abdomen Pelvis With Contrast   Final Result       1. The patient has heterogeneous enhancement of the left kidney, as well   as extensive urothelial enhancement. The appearance is concerning for   pyelonephritis. There are associated perinephric abscesses, as described   in the body of the report. These may be in continuity with the patient's   proximal left ureter. The inflammation is also intimately associated   with the left wilmer of the diaphragm, the pancreatic tail, and the   posterior wall of the stomach. The patient does have both subcapsular   and intraparenchymal collections within the spleen. These may represent   areas of infarction or hematoma, although superimposed infection cannot   be excluded, given the degree of inflammation within the left upper   quadrant. I do think the patient has a splenic vein occlusion, as it   becomes very attenuated, and there are multiple prominent collaterals   identified within the left upper quadrant.        Radiation dose reduction techniques were utilized, including automated   exposure control and exposure modulation based on body size.       This report was finalized on 8/6/2022 11:29 PM by Dr. Camilla Whalen M.D.          XR Chest 1 View   Final Result   No acute findings.       This report was finalized on 8/6/2022 9:17 PM by Dr. Camilla Whalen M.D.          CT Guided Abscess Drain Kidney Renal    (Results Pending)       Assessment & Plan     Patient Active Problem List   Diagnosis   • Alcohol-induced acute pancreatitis without infection or necrosis   • Alcohol abuse   • Elevated LFTs   • Thrombocytopenia (HCC)   • Epigastric pain   • Abnormal CT of the abdomen       Assessment:  1. Chronic pancreatitis; lipase elevated to 378, CT scan with multiple findings but no evidence of acute inflammation of the pancreas.  Abscesses along the pancreatic tail as  noted in #3.  2. Probable splenic vein thrombosis with multiple collateral vessels  3. Probable perinephric abscesses, found along wilmer of diaphragm/pancreatic tail/posterior wall of stomach, and possible spleen.  4. Left-sided pyelonephritis      Plan:  · Scheduled for CT-guided drain placement with cultures today for abscesses,  · Hematology/oncology from 8/8 note reviewed- suspect chronic splenic vein thrombosis and recommended against anticoagulation unless new thrombus identified.  · They recommended considering EGD to evaluate for varices in the esophagus or stomach due to the chronic splenic vein thrombosis.  · Continue to monitor H&H and transfuse per primary hospital team  · Antibiotics per infectious disease-currently on Rocephin 2 g daily  · ID note reviewed from 8/8-recommend general surgery consult for evaluation of spleen abscesses  · Continue Creon with meals and Protonix 40 mg daily.    I discussed the patients findings and my recommendations with patient.    Dragon dictation used throughout this note.            Evelin Hinton PA-C  [facility]  [address] [place] [address]  Office: [phone]

## 2022-08-08 NOTE — PROGRESS NOTES
Name: Piper Cain ADMIT: 2022   : 1980  PCP: System, Provider Not In    MRN: [id number] LOS: 1 days   AGE/SEX: 41 y.o. female  ROOM: [facility]     Subjective   Subjective     Went for CT guided aspiration of her perinephric abscesses and a drain was left in place.        Objective   Objective   Vital Signs  Temp:  [96.9 °F (36.1 °C)-98 °F (36.7 °C)] 98 °F (36.7 °C)  Heart Rate:  [60-88] 71  Resp:  [9-18] 12  BP: (102-130)/(68-92) 123/84  SpO2:  [91 %-100 %] 96 %  on   ;   Device (Oxygen Therapy): room air  Body mass index is 17.16 kg/m².  Physical Exam  Constitutional:       General: She is not in acute distress.     Appearance: She is not toxic-appearing.   Cardiovascular:      Rate and Rhythm: Normal rate and regular rhythm.      Heart sounds: Normal heart sounds.   Pulmonary:      Effort: Pulmonary effort is normal.      Breath sounds: Normal breath sounds.   Abdominal:      General: Bowel sounds are normal. There is no distension.      Palpations: Abdomen is soft.      Tenderness: There is abdominal tenderness. There is no guarding or rebound.      Comments: Left flank drain in place   Musculoskeletal:         General: No tenderness.      Right lower leg: No edema.      Left lower leg: No edema.   Neurological:      Mental Status: She is alert.   Psychiatric:         Mood and Affect: Mood normal.         Behavior: Behavior normal.         Results Review     I reviewed the patient's new clinical results.  Results from last 7 days   Lab Units 22  0614 22  0603 22  2055   WBC 10*3/mm3 4.76 5.75 10.48   HEMOGLOBIN g/dL 8.9* 9.4* 11.5*   PLATELETS 10*3/mm3 229 239 323     Results from last 7 days   Lab Units 22  0614 22  0603 22  2158   SODIUM mmol/L 143 140 139   POTASSIUM mmol/L 4.1 3.7 4.0   CHLORIDE mmol/L 110* 106 104   CO2 mmol/L 25.2 23.0 22.7   BUN mg/dL 5* 8 11   CREATININE mg/dL 0.61 0.62 0.69   GLUCOSE mg/dL 83 104* 102*   Estimated Creatinine Clearance:  109.1 mL/min (by C-G formula based on SCr of 0.61 mg/dL).  Results from last 7 days   Lab Units 08/06/22  2158   ALBUMIN g/dL 4.10   BILIRUBIN mg/dL 0.2   ALK PHOS U/L 68   AST (SGOT) U/L 12   ALT (SGPT) U/L 11     Results from last 7 days   Lab Units 08/08/22  0614 08/07/22  0603 08/06/22  2158   CALCIUM mg/dL 8.4* 8.3* 9.0   ALBUMIN g/dL  --   --  4.10     Results from last 7 days   Lab Units 08/07/22  0011 08/06/22  2158   PROCALCITONIN ng/mL  --  0.06   LACTATE mmol/L 0.6  --      COVID19   Date Value Ref Range Status   08/07/2022 Not Detected Not Detected - Ref. Range Final     No results found for: HGBA1C, POCGLU    CT Guided Abscess Drain Kidney Renal  Narrative: CT GUIDED DRAINAGE     HISTORY:  perinephric abscesses     COMPARISON: CT 08/06/2022.     PROCEDURE DETAILS: After informed consent was obtained from the patient,  the patient was placed on the CT scanner in supine position. A nurse was  continuously present and moderate sedation was performed under physician  supervision from 1351 to 1417 hours with a total of 75 mcg fentanyl and  1.5 mg versed administered. A preliminary scan of the abdomen was  obtained and the right upper quadrant perinephric fluid collection  identified.  A route was planned for the drainage and an appropriate  skin site identified. This site was then prepped and draped in the usual  sterile manner and the skin anesthetized with lidocaine. The tract to  the collection was anesthetized with lidocaine and a needle placed  within the collection. A wire was then advanced into the collection and  the needle removed. After dilation, an 8 Vietnamese drainage catheter was  placed into the collection, coiled, locked, secured with Dermabond  reinforced suture and stay-fix dressing, and attached to a gravity  drainage bag. Approximately 15 mL brownish fluid were removed with  specimen sent to lab; post aspiration the collection appeared largely  evacuated. Initial irrigation was performed. The  patient tolerated the  procedure well and there were no immediate complications.  All elements  of maximal sterile technique were followed. Radiation dose reduction  techniques were utilized, including automated exposure control and  exposure modulation.         Impression: Successful drain placement into right upper quadrant fluid collection as  described above.     This report was finalized on 8/8/2022 3:22 PM by Dr. Joshua Dias M.D.     Adult Transthoracic Echo Complete W/ Cont if Necessary Per Protocol  · Left ventricular ejection fraction appears to be 61 - 65%. Left   ventricular systolic function is normal.  · Left ventricular diastolic function was normal.  · Normal right ventricular cavity size and systolic function noted.  · The agitated saline study is positive with Valsalva, consistent with a   small to moderate sized PFO  · Mild tricuspid valve regurgitation is present.  · Calculated right ventricular systolic pressure from tricuspid   regurgitation is 32 mmHg.  · There is no evidence of pericardial effusion       Scheduled Medications  amitriptyline, 10 mg, Oral, Nightly  cefTRIAXone, 2 g, Intravenous, Q24H  cholecalciferol, 5,000 Units, Oral, Daily  folic acid, 1 mg, Oral, Daily  lidocaine, 1 patch, Transdermal, Q24H  pancrelipase (Lip-Prot-Amyl), 72,000 units of lipase, Oral, TID With Meals  pantoprazole, 40 mg, Oral, Daily  senna-docusate sodium, 2 tablet, Oral, BID  sodium chloride, 10 mL, Intravenous, Q12H  thiamine, 100 mg, Oral, Daily    Infusions  sodium chloride, 100 mL/hr, Last Rate: 100 mL/hr (08/08/22 1541)    Diet  Diet Regular; GI Soft       Assessment/Plan     Active Hospital Problems    Diagnosis  POA   • **Perinephric abscess [N15.1]  Yes   • Chronic pancreatitis (HCC) [K86.1]  Yes   • Acute pyelonephritis [N10]  Yes   • Splenic vein thrombosis [I82.890]  Yes   • Anemia of chronic disease [D63.8]  Yes   • GERD without esophagitis [K21.9]  Yes   • Alcohol dependence in remission  (HCC) [F10.21]  Yes   • Epigastric pain [R10.13]  Yes   • Abnormal CT of the abdomen [R93.5]  Yes      Resolved Hospital Problems   No resolved problems to display.       41 y.o. female admitted with Perinephric abscess.    · Left sided pyelonephritis and perinephric abscess with multiloculated splenic collections-s/p CT guided drain placement today. Follow cultures. Continue ceftriaxone. Note that ID is requesting a surgical consult to evaluate the splenic collections  · Splenic vein occlusion-felt to most likely be chronic. No anticoagulation is recommended. EGD eventually to evaluate for gastric varices  · Chronic pancreatitis-continue creon  · Anemia of chronic disease-hgb 8.9 today  · GERD-ppi  · Alcohol dependence in remission  · SCDs for DVT prophylaxis.  · Full code.  · Discussed with patient, nursing staff and consulting provider.  · Anticipate discharge TBD timing yet to be determined.      Rafat Beltran MD  [facility]  08/08/22  15:41 EDT    I wore protective equipment throughout this patient encounter including a face mask, gloves and protective eyewear.  Hand hygiene was performed before donning protective equipment and after removal when leaving the room.

## 2022-08-09 LAB
ANION GAP SERPL CALCULATED.3IONS-SCNC: 10 MMOL/L (ref 5–15)
BASOPHILS # BLD AUTO: 0.04 10*3/MM3 (ref 0–0.2)
BASOPHILS NFR BLD AUTO: 0.8 % (ref 0–1.5)
BUN SERPL-MCNC: 7 MG/DL (ref 6–20)
BUN/CREAT SERPL: 12.3 (ref 7–25)
CALCIUM SPEC-SCNC: 9.1 MG/DL (ref 8.6–10.5)
CHLORIDE SERPL-SCNC: 104 MMOL/L (ref 98–107)
CO2 SERPL-SCNC: 27 MMOL/L (ref 22–29)
CREAT SERPL-MCNC: 0.57 MG/DL (ref 0.57–1)
DEPRECATED RDW RBC AUTO: 57.8 FL (ref 37–54)
EGFRCR SERPLBLD CKD-EPI 2021: 118.7 ML/MIN/1.73
EOSINOPHIL # BLD AUTO: 0.21 10*3/MM3 (ref 0–0.4)
EOSINOPHIL NFR BLD AUTO: 4.1 % (ref 0.3–6.2)
ERYTHROCYTE [DISTWIDTH] IN BLOOD BY AUTOMATED COUNT: 14.9 % (ref 12.3–15.4)
GLUCOSE SERPL-MCNC: 86 MG/DL (ref 65–99)
HCT VFR BLD AUTO: 27.6 % (ref 34–46.6)
HGB BLD-MCNC: 8.8 G/DL (ref 12–15.9)
LYMPHOCYTES # BLD AUTO: 1.65 10*3/MM3 (ref 0.7–3.1)
LYMPHOCYTES NFR BLD AUTO: 31.9 % (ref 19.6–45.3)
MCH RBC QN AUTO: 33.7 PG (ref 26.6–33)
MCHC RBC AUTO-ENTMCNC: 31.9 G/DL (ref 31.5–35.7)
MCV RBC AUTO: 105.7 FL (ref 79–97)
MONOCYTES # BLD AUTO: 0.54 10*3/MM3 (ref 0.1–0.9)
MONOCYTES NFR BLD AUTO: 10.4 % (ref 5–12)
NEUTROPHILS NFR BLD AUTO: 2.72 10*3/MM3 (ref 1.7–7)
NEUTROPHILS NFR BLD AUTO: 52.6 % (ref 42.7–76)
PLATELET # BLD AUTO: 222 10*3/MM3 (ref 140–450)
PMV BLD AUTO: 9.9 FL (ref 6–12)
POTASSIUM SERPL-SCNC: 4.4 MMOL/L (ref 3.5–5.2)
RBC # BLD AUTO: 2.61 10*6/MM3 (ref 3.77–5.28)
SODIUM SERPL-SCNC: 141 MMOL/L (ref 136–145)
WBC NRBC COR # BLD: 5.17 10*3/MM3 (ref 3.4–10.8)

## 2022-08-09 PROCEDURE — 80048 BASIC METABOLIC PNL TOTAL CA: CPT | Performed by: STUDENT IN AN ORGANIZED HEALTH CARE EDUCATION/TRAINING PROGRAM

## 2022-08-09 PROCEDURE — 25010000002 CEFTRIAXONE PER 250 MG: Performed by: STUDENT IN AN ORGANIZED HEALTH CARE EDUCATION/TRAINING PROGRAM

## 2022-08-09 PROCEDURE — 63710000001 DIPHENHYDRAMINE PER 50 MG: Performed by: NURSE PRACTITIONER

## 2022-08-09 PROCEDURE — 99232 SBSQ HOSP IP/OBS MODERATE 35: CPT | Performed by: PHYSICIAN ASSISTANT

## 2022-08-09 PROCEDURE — 99222 1ST HOSP IP/OBS MODERATE 55: CPT | Performed by: SURGERY

## 2022-08-09 PROCEDURE — 99232 SBSQ HOSP IP/OBS MODERATE 35: CPT | Performed by: STUDENT IN AN ORGANIZED HEALTH CARE EDUCATION/TRAINING PROGRAM

## 2022-08-09 PROCEDURE — 99232 SBSQ HOSP IP/OBS MODERATE 35: CPT | Performed by: INTERNAL MEDICINE

## 2022-08-09 PROCEDURE — 85025 COMPLETE CBC W/AUTO DIFF WBC: CPT | Performed by: INTERNAL MEDICINE

## 2022-08-09 PROCEDURE — 25010000002 HYDROMORPHONE 1 MG/ML SOLUTION: Performed by: STUDENT IN AN ORGANIZED HEALTH CARE EDUCATION/TRAINING PROGRAM

## 2022-08-09 RX ADMIN — OXYCODONE HYDROCHLORIDE AND ACETAMINOPHEN 1 TABLET: 10; 325 TABLET ORAL at 17:27

## 2022-08-09 RX ADMIN — OXYCODONE HYDROCHLORIDE AND ACETAMINOPHEN 1 TABLET: 10; 325 TABLET ORAL at 10:24

## 2022-08-09 RX ADMIN — SODIUM CHLORIDE 100 ML/HR: 9 INJECTION, SOLUTION INTRAVENOUS at 17:20

## 2022-08-09 RX ADMIN — HYDROMORPHONE HYDROCHLORIDE 1 MG: 1 INJECTION, SOLUTION INTRAMUSCULAR; INTRAVENOUS; SUBCUTANEOUS at 15:04

## 2022-08-09 RX ADMIN — Medication 1 MG: at 08:10

## 2022-08-09 RX ADMIN — PANCRELIPASE 72000 UNITS OF LIPASE: 60000; 12000; 38000 CAPSULE, DELAYED RELEASE PELLETS ORAL at 17:20

## 2022-08-09 RX ADMIN — OXYCODONE HYDROCHLORIDE AND ACETAMINOPHEN 1 TABLET: 10; 325 TABLET ORAL at 01:07

## 2022-08-09 RX ADMIN — HYDROMORPHONE HYDROCHLORIDE 1 MG: 1 INJECTION, SOLUTION INTRAMUSCULAR; INTRAVENOUS; SUBCUTANEOUS at 21:27

## 2022-08-09 RX ADMIN — DIPHENHYDRAMINE HYDROCHLORIDE 25 MG: 25 CAPSULE ORAL at 19:53

## 2022-08-09 RX ADMIN — PANTOPRAZOLE SODIUM 40 MG: 40 TABLET, DELAYED RELEASE ORAL at 08:09

## 2022-08-09 RX ADMIN — DOCUSATE SODIUM 50MG AND SENNOSIDES 8.6MG 2 TABLET: 8.6; 5 TABLET, FILM COATED ORAL at 08:09

## 2022-08-09 RX ADMIN — HYDROMORPHONE HYDROCHLORIDE 1 MG: 1 INJECTION, SOLUTION INTRAMUSCULAR; INTRAVENOUS; SUBCUTANEOUS at 12:27

## 2022-08-09 RX ADMIN — CEFTRIAXONE 2 G: 2 INJECTION, POWDER, FOR SOLUTION INTRAMUSCULAR; INTRAVENOUS at 23:33

## 2022-08-09 RX ADMIN — SODIUM CHLORIDE 100 ML/HR: 9 INJECTION, SOLUTION INTRAVENOUS at 05:56

## 2022-08-09 RX ADMIN — AMITRIPTYLINE HYDROCHLORIDE 10 MG: 10 TABLET, FILM COATED ORAL at 20:29

## 2022-08-09 RX ADMIN — HYDROMORPHONE HYDROCHLORIDE 1 MG: 1 INJECTION, SOLUTION INTRAMUSCULAR; INTRAVENOUS; SUBCUTANEOUS at 05:51

## 2022-08-09 RX ADMIN — PANCRELIPASE 72000 UNITS OF LIPASE: 60000; 12000; 38000 CAPSULE, DELAYED RELEASE PELLETS ORAL at 08:09

## 2022-08-09 RX ADMIN — HYDROMORPHONE HYDROCHLORIDE 1 MG: 1 INJECTION, SOLUTION INTRAMUSCULAR; INTRAVENOUS; SUBCUTANEOUS at 19:37

## 2022-08-09 RX ADMIN — HYDROMORPHONE HYDROCHLORIDE 1 MG: 1 INJECTION, SOLUTION INTRAMUSCULAR; INTRAVENOUS; SUBCUTANEOUS at 08:09

## 2022-08-09 RX ADMIN — HYDROMORPHONE HYDROCHLORIDE 1 MG: 1 INJECTION, SOLUTION INTRAMUSCULAR; INTRAVENOUS; SUBCUTANEOUS at 23:33

## 2022-08-09 RX ADMIN — PANCRELIPASE 72000 UNITS OF LIPASE: 60000; 12000; 38000 CAPSULE, DELAYED RELEASE PELLETS ORAL at 12:27

## 2022-08-09 RX ADMIN — Medication 100 MG: at 08:10

## 2022-08-09 RX ADMIN — Medication 5000 UNITS: at 08:09

## 2022-08-09 RX ADMIN — DOCUSATE SODIUM 50MG AND SENNOSIDES 8.6MG 2 TABLET: 8.6; 5 TABLET, FILM COATED ORAL at 20:29

## 2022-08-09 RX ADMIN — HYDROMORPHONE HYDROCHLORIDE 1 MG: 1 INJECTION, SOLUTION INTRAMUSCULAR; INTRAVENOUS; SUBCUTANEOUS at 02:45

## 2022-08-09 NOTE — H&P (VIEW-ONLY)
[facility]  Inpatient Progress Note    Reason for Follow-up: Chronic pancreatitis    Subjective     Interval History:   Still with less abdominal pain.  Drain in place and draining well.  Tolerating p.o. intake well. Denies nausea.    Underwent drainage of perinephric abscess yesterday with drain placement-abscess cultures pending.  Blood cultures x2-no growth to date.    Current Facility-Administered Medications:   •  acetaminophen (TYLENOL) tablet 650 mg, 650 mg, Oral, Q4H PRN, 650 mg at 08/07/22 2344 **OR** acetaminophen (TYLENOL) 160 MG/5ML solution 650 mg, 650 mg, Oral, Q4H PRN **OR** acetaminophen (TYLENOL) suppository 650 mg, 650 mg, Rectal, Q4H PRN, Evelin Weiss APRN  •  amitriptyline (ELAVIL) tablet 10 mg, 10 mg, Oral, Nightly, Rafat Beltran MD, 10 mg at 08/08/22 2055  •  cefTRIAXone (ROCEPHIN) 2 g in sodium chloride 0.9 % 100 mL IVPB-VTB, 2 g, Intravenous, Q24H, Erik Hinton, DO, Last Rate: 200 mL/hr at 08/08/22 2332, 2 g at 08/08/22 2332  •  cholecalciferol (VITAMIN D3) tablet 5,000 Units, 5,000 Units, Oral, Daily, Rafat Beltran MD, 5,000 Units at 08/09/22 0809  •  diphenhydrAMINE (BENADRYL) capsule 25 mg, 25 mg, Oral, Q6H PRN, Evelin Weiss APRN, 25 mg at 08/08/22 0556  •  folic acid (FOLVITE) tablet 1 mg, 1 mg, Oral, Daily, Rafat Beltran MD, 1 mg at 08/09/22 0810  •  HYDROmorphone (DILAUDID) injection 1 mg, 1 mg, Intravenous, Q2H PRN, 1 mg at 08/09/22 1227 **AND** naloxone (NARCAN) injection 0.4 mg, 0.4 mg, Intravenous, Q5 Min PRN, Rafat Beltran MD  •  lidocaine (LIDODERM) 5 % 1 patch, 1 patch, Transdermal, Q24H, Rafat Beltran MD, 1 patch at 08/07/22 1358  •  ondansetron ODT (ZOFRAN-ODT) disintegrating tablet 4 mg, 4 mg, Oral, Q6H PRN, Rafat Beltran MD, 4 mg at 08/08/22 0002  •  oxyCODONE-acetaminophen (PERCOCET)  MG per tablet 1 tablet, 1 tablet, Oral, Q6H PRN, Rafat Beltran MD, 1 tablet at 08/09/22 1024  •  pancrelipase (Lip-Prot-Amyl)  (CREON) capsule 72,000 units of lipase, 72,000 units of lipase, Oral, TID With Meals, Rafat Beltran MD, 72,000 units of lipase at 08/09/22 1227  •  pantoprazole (PROTONIX) EC tablet 40 mg, 40 mg, Oral, Daily, Rafat Beltran MD, 40 mg at 08/09/22 0809  •  sennosides-docusate (PERICOLACE) 8.6-50 MG per tablet 2 tablet, 2 tablet, Oral, BID, Rafat Beltran MD, 2 tablet at 08/09/22 0809  •  sodium chloride 0.9 % flush 10 mL, 10 mL, Intravenous, Q12H, Evelin Weiss APRN, 10 mL at 08/07/22 2025  •  sodium chloride 0.9 % flush 10 mL, 10 mL, Intravenous, PRN, Evelin Weiss APRN  •  sodium chloride 0.9 % infusion, 100 mL/hr, Intravenous, Continuous, Evelin Weiss APRN, Last Rate: 100 mL/hr at 08/09/22 0556, 100 mL/hr at 08/09/22 0556  •  Thiamine Mononitrate tablet 100 mg, 100 mg, Oral, Daily, Rafat Beltran MD, 100 mg at 08/09/22 0810  Review of Systems:    The following systems were reviewed and negative;  respiratory and cardiovascular    Objective     Vital Signs  Temp:  [97.7 °F (36.5 °C)-98.6 °F (37 °C)] 97.7 °F (36.5 °C)  Heart Rate:  [68-88] 79  Resp:  [9-17] 16  BP: (113-130)/(76-92) 116/76  Body mass index is 17.16 kg/m².    Intake/Output Summary (Last 24 hours) at 8/9/2022 1241  Last data filed at 8/9/2022 1125  Gross per 24 hour   Intake 2220 ml   Output 1125 ml   Net 1095 ml     I/O this shift:  In: -   Out: 1000 [Urine:1000]     Physical Exam:    General: patient awake, alert and cooperative   Eyes: Normal lids and lashes, no scleral icterus   Skin: warm and dry, not jaundiced   Pulm: regular and unlabored   Abdomen: soft, diffusely tender but worse in the upper abdomen and left upper quadrant, nondistended; normal bowel sounds   Psychiatric: Normal mood and behavior; memory intact     Results Review:     I reviewed the patient's new clinical results.    Results from last 7 days   Lab Units 08/09/22  0640 08/08/22  0614 08/07/22  0603   WBC 10*3/mm3 5.17 4.76 5.75   HEMOGLOBIN g/dL 8.8*  8.9* 9.4*   HEMATOCRIT % 27.6* 26.6* 28.0*   PLATELETS 10*3/mm3 222 229 239     Results from last 7 days   Lab Units 08/09/22  0640 08/08/22  0614 08/07/22  0603 08/06/22  2158   SODIUM mmol/L 141 143 140 139   POTASSIUM mmol/L 4.4 4.1 3.7 4.0   CHLORIDE mmol/L 104 110* 106 104   CO2 mmol/L 27.0 25.2 23.0 22.7   BUN mg/dL 7 5* 8 11   CREATININE mg/dL 0.57 0.61 0.62 0.69   CALCIUM mg/dL 9.1 8.4* 8.3* 9.0   BILIRUBIN mg/dL  --   --   --  0.2   ALK PHOS U/L  --   --   --  68   ALT (SGPT) U/L  --   --   --  11   AST (SGOT) U/L  --   --   --  12   GLUCOSE mg/dL 86 83 104* 102*     Results from last 7 days   Lab Units 08/07/22  0603   INR  1.06     Lab Results   Lab Value Date/Time    LIPASE 378 (H) 08/06/2022 2158    LIPASE 95 (H) 01/24/2018 0545    LIPASE 93 (H) 10/01/2017 0356    LIPASE 122 (H) 09/30/2017 0619    LIPASE 86 (H) 09/29/2017 1731    LIPASE 133 (H) 09/28/2017 0032       Radiology:  CT Guided Abscess Drain Kidney Renal   Final Result   Successful drain placement into right upper quadrant fluid collection as   described above.       This report was finalized on 8/8/2022 3:22 PM by Dr. Joshua Dias M.D.          US Spleen   Final Result   Abnormal spleen appears no different than on CT yesterday.       This report was finalized on 8/7/2022 6:48 PM by Dr. Salvador Perdomo M.D.          CT Abdomen Pelvis With Contrast   Final Result       1. The patient has heterogeneous enhancement of the left kidney, as well   as extensive urothelial enhancement. The appearance is concerning for   pyelonephritis. There are associated perinephric abscesses, as described   in the body of the report. These may be in continuity with the patient's   proximal left ureter. The inflammation is also intimately associated   with the left wilmer of the diaphragm, the pancreatic tail, and the   posterior wall of the stomach. The patient does have both subcapsular   and intraparenchymal collections within the spleen. These may represent    areas of infarction or hematoma, although superimposed infection cannot   be excluded, given the degree of inflammation within the left upper   quadrant. I do think the patient has a splenic vein occlusion, as it   becomes very attenuated, and there are multiple prominent collaterals   identified within the left upper quadrant.        Radiation dose reduction techniques were utilized, including automated   exposure control and exposure modulation based on body size.       This report was finalized on 8/6/2022 11:29 PM by Dr. Camilla Whalen M.D.          XR Chest 1 View   Final Result   No acute findings.       This report was finalized on 8/6/2022 9:17 PM by Dr. Camilla Whalen M.D.              Assessment & Plan     Patient Active Problem List   Diagnosis   • Alcohol-induced acute pancreatitis without infection or necrosis   • Alcohol abuse   • Elevated LFTs   • Thrombocytopenia (HCC)   • Epigastric pain   • Abnormal CT of the abdomen   • Chronic pancreatitis (HCC)   • Acute pyelonephritis   • Perinephric abscess   • Splenic vein thrombosis   • Anemia of chronic disease   • GERD without esophagitis   • Alcohol dependence in remission (HCC)       Assessment:  1. Chronic pancreatitis; lipase elevated to 378, CT scan with multiple findings but no evidence of acute inflammation of the pancreas.  Abscesses along the pancreatic tail as noted in #3.  2. Probable splenic vein thrombosis with multiple collateral vessels  3. Probable perinephric abscesses, found along wilmer of diaphragm/pancreatic tail/posterior wall of stomach, and possible spleen.  4. Left-sided pyelonephritis      Plan:  · Hematology/oncology from 8/8 note reviewed- suspect chronic splenic vein thrombosis and recommended against anticoagulation unless new thrombus identified.  ? They recommended considering EGD to evaluate for varices in the esophagus or stomach due to the chronic splenic vein thrombosis.  ? Discussed with patient and she agrees  to proceed.  She is not on any blood thinners. Will proceed with EGD tomorrow.   · Continue to monitor H&H and transfuse per primary hospital team  · Antibiotics per infectious disease-currently on Rocephin 2 g daily  · Continue Creon with meals and Protonix 40 mg daily.    I discussed the patients findings and my recommendations with patient.    Dragon dictation used throughout this note.            Evelin Hinton PA-C  [facility]  [address] [place] [address]  Office: [phone]

## 2022-08-09 NOTE — PROGRESS NOTES
Name: Piper Cain ADMIT: 2022   : 1980  PCP: System, Provider Not In    MRN: [id number] LOS: 2 days   AGE/SEX: 41 y.o. female  ROOM: [facility]     Subjective   Subjective     Still having some pain in her flank where her drain is, but she reports that she has been ambulatory and eating really well.       Objective   Objective   Vital Signs  Temp:  [97.7 °F (36.5 °C)-98.6 °F (37 °C)] 97.7 °F (36.5 °C)  Heart Rate:  [71-84] 79  Resp:  [16] 16  BP: (116-129)/(76-88) 116/76  SpO2:  [96 %-99 %] 96 %  on   ;   Device (Oxygen Therapy): room air  Body mass index is 17.16 kg/m².  Physical Exam  Constitutional:       General: She is not in acute distress.     Appearance: She is not toxic-appearing.   Cardiovascular:      Rate and Rhythm: Normal rate and regular rhythm.      Heart sounds: Normal heart sounds.   Pulmonary:      Effort: Pulmonary effort is normal.      Breath sounds: Normal breath sounds.   Abdominal:      General: Bowel sounds are normal. There is no distension.      Palpations: Abdomen is soft.      Tenderness: There is abdominal tenderness. There is no guarding or rebound.      Comments: Left flank drain in place   Musculoskeletal:         General: No tenderness.      Right lower leg: No edema.      Left lower leg: No edema.   Neurological:      Mental Status: She is alert.   Psychiatric:         Mood and Affect: Mood normal.         Behavior: Behavior normal.         Results Review     I reviewed the patient's new clinical results.  Results from last 7 days   Lab Units 22  0640 22  0614 22  0603 22  2055   WBC 10*3/mm3 5.17 4.76 5.75 10.48   HEMOGLOBIN g/dL 8.8* 8.9* 9.4* 11.5*   PLATELETS 10*3/mm3 222 229 239 323     Results from last 7 days   Lab Units 22  0640 22  0614 22  0603 22  2158   SODIUM mmol/L 141 143 140 139   POTASSIUM mmol/L 4.4 4.1 3.7 4.0   CHLORIDE mmol/L 104 110* 106 104   CO2 mmol/L 27.0 25.2 23.0 22.7   BUN mg/dL 7 5* 8 11    CREATININE mg/dL 0.57 0.61 0.62 0.69   GLUCOSE mg/dL 86 83 104* 102*   Estimated Creatinine Clearance: 116.7 mL/min (by C-G formula based on SCr of 0.57 mg/dL).  Results from last 7 days   Lab Units 08/06/22  2158   ALBUMIN g/dL 4.10   BILIRUBIN mg/dL 0.2   ALK PHOS U/L 68   AST (SGOT) U/L 12   ALT (SGPT) U/L 11     Results from last 7 days   Lab Units 08/09/22  0640 08/08/22  0614 08/07/22  0603 08/06/22  2158   CALCIUM mg/dL 9.1 8.4* 8.3* 9.0   ALBUMIN g/dL  --   --   --  4.10     Results from last 7 days   Lab Units 08/07/22  0011 08/06/22  2158   PROCALCITONIN ng/mL  --  0.06   LACTATE mmol/L 0.6  --      COVID19   Date Value Ref Range Status   08/07/2022 Not Detected Not Detected - Ref. Range Final     No results found for: HGBA1C, POCGLU    CT Guided Abscess Drain Kidney Renal  Narrative: CT GUIDED DRAINAGE     HISTORY:  perinephric abscesses     COMPARISON: CT 08/06/2022.     PROCEDURE DETAILS: After informed consent was obtained from the patient,  the patient was placed on the CT scanner in supine position. A nurse was  continuously present and moderate sedation was performed under physician  supervision from 1351 to 1417 hours with a total of 75 mcg fentanyl and  1.5 mg versed administered. A preliminary scan of the abdomen was  obtained and the right upper quadrant perinephric fluid collection  identified.  A route was planned for the drainage and an appropriate  skin site identified. This site was then prepped and draped in the usual  sterile manner and the skin anesthetized with lidocaine. The tract to  the collection was anesthetized with lidocaine and a needle placed  within the collection. A wire was then advanced into the collection and  the needle removed. After dilation, an 8 Welsh drainage catheter was  placed into the collection, coiled, locked, secured with Dermabond  reinforced suture and stay-fix dressing, and attached to a gravity  drainage bag. Approximately 15 mL brownish fluid were  removed with  specimen sent to lab; post aspiration the collection appeared largely  evacuated. Initial irrigation was performed. The patient tolerated the  procedure well and there were no immediate complications.  All elements  of maximal sterile technique were followed. Radiation dose reduction  techniques were utilized, including automated exposure control and  exposure modulation.         Impression: Successful drain placement into right upper quadrant fluid collection as  described above.     This report was finalized on 8/8/2022 3:22 PM by Dr. Joshua Dias M.D.     Adult Transthoracic Echo Complete W/ Cont if Necessary Per Protocol  · Left ventricular ejection fraction appears to be 61 - 65%. Left   ventricular systolic function is normal.  · Left ventricular diastolic function was normal.  · Normal right ventricular cavity size and systolic function noted.  · The agitated saline study is positive with Valsalva, consistent with a   small to moderate sized PFO  · Mild tricuspid valve regurgitation is present.  · Calculated right ventricular systolic pressure from tricuspid   regurgitation is 32 mmHg.  · There is no evidence of pericardial effusion       Scheduled Medications  amitriptyline, 10 mg, Oral, Nightly  cefTRIAXone, 2 g, Intravenous, Q24H  cholecalciferol, 5,000 Units, Oral, Daily  folic acid, 1 mg, Oral, Daily  lidocaine, 1 patch, Transdermal, Q24H  pancrelipase (Lip-Prot-Amyl), 72,000 units of lipase, Oral, TID With Meals  pantoprazole, 40 mg, Oral, Daily  senna-docusate sodium, 2 tablet, Oral, BID  sodium chloride, 10 mL, Intravenous, Q12H  thiamine, 100 mg, Oral, Daily    Infusions  sodium chloride, 100 mL/hr, Last Rate: 100 mL/hr (08/09/22 0556)    Diet  Diet Regular       Assessment/Plan     Active Hospital Problems    Diagnosis  POA   • **Perinephric abscess [N15.1]  Yes   • Chronic pancreatitis (HCC) [K86.1]  Yes   • Acute pyelonephritis [N10]  Yes   • Splenic vein thrombosis [I82.890]  Yes    • Anemia of chronic disease [D63.8]  Yes   • GERD without esophagitis [K21.9]  Yes   • Alcohol dependence in remission (HCC) [F10.21]  Yes   • Epigastric pain [R10.13]  Yes   • Abnormal CT of the abdomen [R93.5]  Yes      Resolved Hospital Problems   No resolved problems to display.       41 y.o. female admitted with Perinephric abscess.    · Left sided pyelonephritis and perinephric abscess with multiloculated splenic collections-s/p CT guided drain placement on 8/8/22. Cultures are pending, but so far no growth. Continue ceftriaxone. General surgery is consulted regarding the patients splenic abscesses.  · Splenic vein occlusion-felt to most likely be chronic. No anticoagulation is recommended. GI is planning an EGD to evaluate for gastric varices  · Chronic pancreatitis-continue creon  · Anemia of chronic disease-hgb 8.8 today  · GERD-ppi  · Alcohol dependence in remission  · SCDs for DVT prophylaxis.  · Full code.  · Discussed with patient.  · Anticipate discharge TBD timing yet to be determined.      Rafat Beltran MD  [facility]  08/09/22  14:22 EDT    I wore protective equipment throughout this patient encounter including a face mask, gloves and protective eyewear.  Hand hygiene was performed before donning protective equipment and after removal when leaving the room.

## 2022-08-09 NOTE — PLAN OF CARE
Goal Outcome Evaluation:  Plan of Care Reviewed With: patient        Progress: no change  Outcome Evaluation: Dilaudid and Percocet given for pain control, denies nausea tonight, iv abx given as ordered, ivf infusing, pigtail drain in place and flushed as ordered, ad valeria, voiding freely, flavio gi soft diet

## 2022-08-09 NOTE — CONSULTS
ASSESSMENT/PLAN:    41-year-old lady with history of pancreatitis secondary to alcohol abuse who likely has some degree of chronic pancreatitis.  Currently, however, her admission was for severe left upper quadrant abdominal pain and CT scan demonstrates what appears to be significant pyelonephritis and perinephric fluid collections that also seem to involve portions of the spleen with subcapsular and intraparenchymal collections.  At this point, I agree with treatment with intravenous antibiotics and await final culture and sensitivity from CT-guided drainage, although initial culture is negative.  Now that the most significant fluid collection has been drained with CT guidance and she is on antibiotics, probably CT scan 5 days out from the drainage would be appropriate to reassess response to antibiotics and CT drainage.  There is no indication urgently to drain any of the collections within the spleen and doing so percutaneously would be somewhat risky.    CC:     Abdominal pain and abnormal CT scan    HPI:    41-year-old lady who indicates that in her 20s she was diagnosed with acute pancreatitis secondary to alcohol abuse.  She continued drinking into her 30s and has been diagnosed at other facilities she says with chronic pancreatitis.  She has quit alcohol for about the last 6 months.  She was admitted to the hospital on 8/6/2022 secondary to severe upper abdominal pain mostly towards the left and radiating to the back and left shoulder and neck.  This is associated with nausea and vomiting.  Similar symptoms have been going on intermittently for the last few months.    RADIOLOGY:   • CT abdomen pelvis 8/6/2022: Based on the report and my review of images, there appears to be severe pyelonephritis with perinephric fluid collections, inflammation, and probable abscess.  Additionally, there is inflammation adjacent to the pancreatic tail and the spleen as well as several subcapsular and intraparenchymal  collections within the spleen.  Interpretation list possible infarction or hematoma, I suspect these are more likely fluid collections related to the severe inflammation and possibly infected.  Although not mentioned in the report, she does have some calcifications in the head of the pancreas consistent with possible chronic pancreatitis.    LABS:    • BMP today normal  • WBC 5.2  • Hemoglobin 8.8  • Platelets 222  • Culture from CT-guided drainage performed yesterday thus far no growth    SOCIAL HISTORY:   • Pack per week tobacco use  • Denies ongoing alcohol use    FAMILY HISTORY:    • Colorectal cancer: Negative  • Gallbladder disease: Negative  • Pancreatic cancer: Negative  • Pancreatitis: Negative    PREVIOUS ABDOMINAL SURGERY    • None    PAST MEDICAL HISTORY:    • Pancreatitis  • Migraines  • Gastroesophageal reflux disease  • Alcohol abuse    MEDICATIONS:   • Elavil (this was only started recently for chronic pain)  • Creon  • Protonix    ALLERGIES:   • Latex  • Neck:  • Hydrocodone: Nausea and vomiting    PHYSICAL EXAM:   • Constitutional: Well-developed well-nourished, no acute distress  • Vital signs:   o Blood pressure 140/100  o Heart rate 74  o Respiratory rate 16  o Temperature 97.9  o Weight 123 pounds  o Height 71 inches  o BMI 17.2  • Eyes: Conjunctiva normal, sclera nonicteric  • ENMT: Hearing grossly normal, oral mucosa moist  • Neck: Supple, no palpable mass, trachea midline  • Respiratory: Clear to auscultation, normal inspiratory effort  • Cardiovascular: Regular rate, no jugular venous distention  • Gastrointestinal: Soft, moderately tender left upper quadrant, minimally tender lower abdomen, mild tenderness right upper quadrant, no palpable mass, does have tenderness to CVA palpation on left  • Lymphatics (palpable nodes):  cervical-negative, inguinal-negative  • Skin:  Warm, dry, no rash on visualized skin surfaces  • Musculoskeletal: Symmetric strength, no asymmetric muscular  atrophy  • Psychiatric: Alert and oriented ×3, normal affect     ROS:    No cough, chest pain, shortness of air.  All other systems reviewed and negative other than presenting complaints.    MICHAEL DAY M.D.

## 2022-08-09 NOTE — PROGRESS NOTES
[facility]  Inpatient Progress Note    Reason for Follow-up: Chronic pancreatitis    Subjective     Interval History:   Still with less abdominal pain.  Drain in place and draining well.  Tolerating p.o. intake well. Denies nausea.    Underwent drainage of perinephric abscess yesterday with drain placement-abscess cultures pending.  Blood cultures x2-no growth to date.    Current Facility-Administered Medications:   •  acetaminophen (TYLENOL) tablet 650 mg, 650 mg, Oral, Q4H PRN, 650 mg at 08/07/22 2344 **OR** acetaminophen (TYLENOL) 160 MG/5ML solution 650 mg, 650 mg, Oral, Q4H PRN **OR** acetaminophen (TYLENOL) suppository 650 mg, 650 mg, Rectal, Q4H PRN, Evelin Weiss APRN  •  amitriptyline (ELAVIL) tablet 10 mg, 10 mg, Oral, Nightly, Rafat Beltran MD, 10 mg at 08/08/22 2055  •  cefTRIAXone (ROCEPHIN) 2 g in sodium chloride 0.9 % 100 mL IVPB-VTB, 2 g, Intravenous, Q24H, Erik Hinton, DO, Last Rate: 200 mL/hr at 08/08/22 2332, 2 g at 08/08/22 2332  •  cholecalciferol (VITAMIN D3) tablet 5,000 Units, 5,000 Units, Oral, Daily, Rafat Beltarn MD, 5,000 Units at 08/09/22 0809  •  diphenhydrAMINE (BENADRYL) capsule 25 mg, 25 mg, Oral, Q6H PRN, Evelin Weiss APRN, 25 mg at 08/08/22 0556  •  folic acid (FOLVITE) tablet 1 mg, 1 mg, Oral, Daily, Rafat Beltran MD, 1 mg at 08/09/22 0810  •  HYDROmorphone (DILAUDID) injection 1 mg, 1 mg, Intravenous, Q2H PRN, 1 mg at 08/09/22 1227 **AND** naloxone (NARCAN) injection 0.4 mg, 0.4 mg, Intravenous, Q5 Min PRN, Rafat Beltran MD  •  lidocaine (LIDODERM) 5 % 1 patch, 1 patch, Transdermal, Q24H, Rafat Beltran MD, 1 patch at 08/07/22 1358  •  ondansetron ODT (ZOFRAN-ODT) disintegrating tablet 4 mg, 4 mg, Oral, Q6H PRN, Rafat Beltran MD, 4 mg at 08/08/22 0002  •  oxyCODONE-acetaminophen (PERCOCET)  MG per tablet 1 tablet, 1 tablet, Oral, Q6H PRN, Rafat Beltran MD, 1 tablet at 08/09/22 1024  •  pancrelipase (Lip-Prot-Amyl)  (CREON) capsule 72,000 units of lipase, 72,000 units of lipase, Oral, TID With Meals, Rafat Beltran MD, 72,000 units of lipase at 08/09/22 1227  •  pantoprazole (PROTONIX) EC tablet 40 mg, 40 mg, Oral, Daily, Rafat Beltran MD, 40 mg at 08/09/22 0809  •  sennosides-docusate (PERICOLACE) 8.6-50 MG per tablet 2 tablet, 2 tablet, Oral, BID, Rafat Beltran MD, 2 tablet at 08/09/22 0809  •  sodium chloride 0.9 % flush 10 mL, 10 mL, Intravenous, Q12H, Evelin Weiss APRN, 10 mL at 08/07/22 2025  •  sodium chloride 0.9 % flush 10 mL, 10 mL, Intravenous, PRN, Evelin Weiss APRN  •  sodium chloride 0.9 % infusion, 100 mL/hr, Intravenous, Continuous, Evelin Weiss APRN, Last Rate: 100 mL/hr at 08/09/22 0556, 100 mL/hr at 08/09/22 0556  •  Thiamine Mononitrate tablet 100 mg, 100 mg, Oral, Daily, Rafat Beltran MD, 100 mg at 08/09/22 0810  Review of Systems:    The following systems were reviewed and negative;  respiratory and cardiovascular    Objective     Vital Signs  Temp:  [97.7 °F (36.5 °C)-98.6 °F (37 °C)] 97.7 °F (36.5 °C)  Heart Rate:  [68-88] 79  Resp:  [9-17] 16  BP: (113-130)/(76-92) 116/76  Body mass index is 17.16 kg/m².    Intake/Output Summary (Last 24 hours) at 8/9/2022 1241  Last data filed at 8/9/2022 1125  Gross per 24 hour   Intake 2220 ml   Output 1125 ml   Net 1095 ml     I/O this shift:  In: -   Out: 1000 [Urine:1000]     Physical Exam:    General: patient awake, alert and cooperative   Eyes: Normal lids and lashes, no scleral icterus   Skin: warm and dry, not jaundiced   Pulm: regular and unlabored   Abdomen: soft, diffusely tender but worse in the upper abdomen and left upper quadrant, nondistended; normal bowel sounds   Psychiatric: Normal mood and behavior; memory intact     Results Review:     I reviewed the patient's new clinical results.    Results from last 7 days   Lab Units 08/09/22  0640 08/08/22  0614 08/07/22  0603   WBC 10*3/mm3 5.17 4.76 5.75   HEMOGLOBIN g/dL 8.8*  8.9* 9.4*   HEMATOCRIT % 27.6* 26.6* 28.0*   PLATELETS 10*3/mm3 222 229 239     Results from last 7 days   Lab Units 08/09/22  0640 08/08/22  0614 08/07/22  0603 08/06/22  2158   SODIUM mmol/L 141 143 140 139   POTASSIUM mmol/L 4.4 4.1 3.7 4.0   CHLORIDE mmol/L 104 110* 106 104   CO2 mmol/L 27.0 25.2 23.0 22.7   BUN mg/dL 7 5* 8 11   CREATININE mg/dL 0.57 0.61 0.62 0.69   CALCIUM mg/dL 9.1 8.4* 8.3* 9.0   BILIRUBIN mg/dL  --   --   --  0.2   ALK PHOS U/L  --   --   --  68   ALT (SGPT) U/L  --   --   --  11   AST (SGOT) U/L  --   --   --  12   GLUCOSE mg/dL 86 83 104* 102*     Results from last 7 days   Lab Units 08/07/22  0603   INR  1.06     Lab Results   Lab Value Date/Time    LIPASE 378 (H) 08/06/2022 2158    LIPASE 95 (H) 01/24/2018 0545    LIPASE 93 (H) 10/01/2017 0356    LIPASE 122 (H) 09/30/2017 0619    LIPASE 86 (H) 09/29/2017 1731    LIPASE 133 (H) 09/28/2017 0032       Radiology:  CT Guided Abscess Drain Kidney Renal   Final Result   Successful drain placement into right upper quadrant fluid collection as   described above.       This report was finalized on 8/8/2022 3:22 PM by Dr. Joshua Dias M.D.          US Spleen   Final Result   Abnormal spleen appears no different than on CT yesterday.       This report was finalized on 8/7/2022 6:48 PM by Dr. Salvador Perdomo M.D.          CT Abdomen Pelvis With Contrast   Final Result       1. The patient has heterogeneous enhancement of the left kidney, as well   as extensive urothelial enhancement. The appearance is concerning for   pyelonephritis. There are associated perinephric abscesses, as described   in the body of the report. These may be in continuity with the patient's   proximal left ureter. The inflammation is also intimately associated   with the left wilmer of the diaphragm, the pancreatic tail, and the   posterior wall of the stomach. The patient does have both subcapsular   and intraparenchymal collections within the spleen. These may represent    areas of infarction or hematoma, although superimposed infection cannot   be excluded, given the degree of inflammation within the left upper   quadrant. I do think the patient has a splenic vein occlusion, as it   becomes very attenuated, and there are multiple prominent collaterals   identified within the left upper quadrant.        Radiation dose reduction techniques were utilized, including automated   exposure control and exposure modulation based on body size.       This report was finalized on 8/6/2022 11:29 PM by Dr. Camilla Whalen M.D.          XR Chest 1 View   Final Result   No acute findings.       This report was finalized on 8/6/2022 9:17 PM by Dr. Camilla Whalen M.D.              Assessment & Plan     Patient Active Problem List   Diagnosis   • Alcohol-induced acute pancreatitis without infection or necrosis   • Alcohol abuse   • Elevated LFTs   • Thrombocytopenia (HCC)   • Epigastric pain   • Abnormal CT of the abdomen   • Chronic pancreatitis (HCC)   • Acute pyelonephritis   • Perinephric abscess   • Splenic vein thrombosis   • Anemia of chronic disease   • GERD without esophagitis   • Alcohol dependence in remission (HCC)       Assessment:  1. Chronic pancreatitis; lipase elevated to 378, CT scan with multiple findings but no evidence of acute inflammation of the pancreas.  Abscesses along the pancreatic tail as noted in #3.  2. Probable splenic vein thrombosis with multiple collateral vessels  3. Probable perinephric abscesses, found along wilmer of diaphragm/pancreatic tail/posterior wall of stomach, and possible spleen.  4. Left-sided pyelonephritis      Plan:  · Hematology/oncology from 8/8 note reviewed- suspect chronic splenic vein thrombosis and recommended against anticoagulation unless new thrombus identified.  ? They recommended considering EGD to evaluate for varices in the esophagus or stomach due to the chronic splenic vein thrombosis.  ? Discussed with patient and she agrees  to proceed.  She is not on any blood thinners. Will proceed with EGD tomorrow.   · Continue to monitor H&H and transfuse per primary hospital team  · Antibiotics per infectious disease-currently on Rocephin 2 g daily  · Continue Creon with meals and Protonix 40 mg daily.    I discussed the patients findings and my recommendations with patient.    Dragon dictation used throughout this note.            Evelin Hinton PA-C  [facility]  [address] [place] [address]  Office: [phone]

## 2022-08-09 NOTE — PROGRESS NOTES
Await results of cultures  Will need reimaging in a few weeks    Not much else to offer at present  thanks

## 2022-08-09 NOTE — PROGRESS NOTES
LOS: 2 days     Chief Complaint: Perinephric abscess    Interval History: Patient up to the bathroom this morning.  Doing well after drainage.  Continues to have soreness.  No fever or leukocytosis.  Cultures pending.    Vital Signs  Temp:  [97.7 °F (36.5 °C)-98.6 °F (37 °C)] 97.7 °F (36.5 °C)  Heart Rate:  [68-88] 79  Resp:  [9-17] 16  BP: (113-130)/(76-92) 116/76    Physical Exam:  General: In no acute distress  HEENT: Oropharynx clear  Cardiovascular: RRR  Respiratory: Normal work of breathing.  No wheezing.  GI: Soft, nondistended, tender to palpation diffusely  Skin: No rashes or lesions  Extremities: No edema, cyanosis  Access: Peripheral IV.    Antibiotics:  Anti-Infectives (From admission, onward)    Ordered     Dose/Rate Route Frequency Start Stop    08/07/22 1300  cefTRIAXone (ROCEPHIN) 2 g in sodium chloride 0.9 % 100 mL IVPB-VTB        Ordering Provider: Erik Hinton DO    2 g  200 mL/hr over 30 Minutes Intravenous Every 24 Hours 08/08/22 0000 08/14/22 2359    08/06/22 2350  cefTRIAXone (ROCEPHIN) 2 g in sodium chloride 0.9 % 100 mL IVPB-VTB        Ordering Provider: Salvador Bach PA    2 g  200 mL/hr over 30 Minutes Intravenous Once 08/06/22 2352 08/07/22 0115           Results Review:     I reviewed the patient's new clinical results.    Lab Results   Component Value Date    WBC 5.17 08/09/2022    HGB 8.8 (L) 08/09/2022    HCT 27.6 (L) 08/09/2022    .7 (H) 08/09/2022     08/09/2022     Lab Results   Component Value Date    GLUCOSE 86 08/09/2022    BUN 7 08/09/2022    CREATININE 0.57 08/09/2022    EGFRIFNONA 97 04/06/2018    BCR 12.3 08/09/2022    CO2 27.0 08/09/2022    CALCIUM 9.1 08/09/2022    ALBUMIN 4.10 08/06/2022    AST 12 08/06/2022    ALT 11 08/06/2022       Microbiology:  8/7 blood culture 1 out of 1 no growth to date  8/8 blood culture 1 out of 1 no growth to date  8/8 IR perinephric abscess culture no growth    Assessment    #Perinephric abscess  #Left-sided  pyelonephritis  #Multiloculated heterogeneous splenic collections  #Chronic pancreatitis    Continue empiric coverage with IV ceftriaxone 2 g daily for now while awaiting results of cultures from IR drainage.      ID will follow.

## 2022-08-09 NOTE — PROGRESS NOTES
REASON FOR FOLLOW-UP: Anemia, possible splenic vein thrombosis    HISTORY OF PRESENT ILLNESS:   This is a 41-year-old woman with chronic pancreatitis who presented with several month history of worsening midepigastric pain radiating to the left shoulder.  On admission imaging of the abdomen showed heterogeneous enhancement of the left kidney extensive urothelial enhancement, perinephric abscesses, inflammation associated with wilmer of the diaphragm pancreatic tail and posterior wall of the stomach, fluid collections in the spleen possibly representing infarction hematoma or infection.  Probable splenic vein occlusion with multiple prominent collaterals.    The patient had a drain placed by interventional radiology 8/8/2022.  She continues to have abdominal pain.  She denies shortness of breath or lightheadedness.      Past Medical History, Past Surgical History, Social History, Family History have been reviewed and are without significant changes except as mentioned.    Review of Systems   Constitutional: Positive for fatigue. Negative for activity change.   Respiratory: Negative.    Cardiovascular: Negative.    Gastrointestinal: Positive for abdominal pain. Negative for nausea and vomiting.   Musculoskeletal: Negative.    Skin: Negative.    Neurological: Negative.    Hematological: Negative.    Psychiatric/Behavioral: Positive for dysphoric mood.          Medications:  The current medication list was reviewed in the EMR    ALLERGIES:    Allergies   Allergen Reactions   • Latex    • Nickel    • Hydrocodone-Acetaminophen Nausea And Vomiting       Objective      Vitals:    08/09/22 0903   BP: 116/76   Pulse: 79   Resp: 16   Temp: 97.7 °F (36.5 °C)   SpO2: 96%          Physical Exam    CONSTITUTIONAL: Adult woman in no acute distress  HEENT: no icterus, no thrush, moist membranes  LYMPH: no cervical or supraclavicular lad  CV: RRR, S1S2, no murmur  RESP: cta bilat, no wheezing, no rales  GI: soft, mild to moderate  midepigastric tenderness, no splenomegaly, +bs,: Drain present left abdomen/side  MUSC: no edema, normal gait  NEURO: alert and oriented x3, normal strength  PSYCH: normal mood and affect    RECENT LABS:  Hematology Results from last 7 days   Lab Units 08/09/22  0640 08/08/22  0614 08/07/22  0603   WBC 10*3/mm3 5.17 4.76 5.75   HEMOGLOBIN g/dL 8.8* 8.9* 9.4*   HEMATOCRIT % 27.6* 26.6* 28.0*   PLATELETS 10*3/mm3 222 229 239     2  Lab Results   Component Value Date    GLUCOSE 86 08/09/2022    BUN 7 08/09/2022    CREATININE 0.57 08/09/2022    EGFRIFNONA 97 04/06/2018    BCR 12.3 08/09/2022    CO2 27.0 08/09/2022    CALCIUM 9.1 08/09/2022    ALBUMIN 4.10 08/06/2022    AST 12 08/06/2022    ALT 11 08/06/2022       Lab Results   Component Value Date    IRON 19 (L) 08/07/2022    TIBC 249 (L) 08/07/2022    FERRITIN 355.00 (H) 08/07/2022       Lab Results   Component Value Date    [id number] 415 08/08/2022       Lab Results   Component Value Date    FOLATE 11.40 08/08/2022      CT abdomen/pelvis 8/6/2022:    IMPRESSION:     1. The patient has heterogeneous enhancement of the left kidney, as well  as extensive urothelial enhancement. The appearance is concerning for  pyelonephritis. There are associated perinephric abscesses, as described  in the body of the report. These may be in continuity with the patient's  proximal left ureter. The inflammation is also intimately associated  with the left wilmer of the diaphragm, the pancreatic tail, and the  posterior wall of the stomach. The patient does have both subcapsular  and intraparenchymal collections within the spleen. These may represent  areas of infarction or hematoma, although superimposed infection cannot  be excluded, given the degree of inflammation within the left upper  quadrant. 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     Ultrasound  spleen 8/7/2022    FINDINGS: Spleen measures 11.9 x 4.2 x 4.3 cm. Along the superior medial  edge of the spleen there is heterogeneous area of echogenicity measuring  about 3.6 cm long corresponding to the lesion seen in the same area on  CT. There is also some heterogeneous echogenicity on the medial lower  portion of the spleen. The findings appear similar to the CT from  yesterday.     IMPRESSION:  Abnormal spleen appears no different than on CT yesterday.    Portal hepatic duplex 8/7/2022:  · All vessels appear normal with normal flow direction and no evidence of thrombus. However, there is a nonvascular fluid collection in the and the splenic vein itself is difficult to visualize with multiple collaterals noted.       Assessment & Plan   *Anemia:  · Admission hemoglobin 8.9 with .5, MCHC 33.5; white blood cell and platelet counts were normal  · B12 415, folate 11.4, ferritin 355, iron sat 8%/TIBC 249, reticulocyte 0.96%, ESR 67, CRP 4.7  · Hemoglobin stable 8.8/.7    *Probable splenic vein thrombosis with multiple collaterals  · The patient has history of chronic pancreatitis and given the significant collateral formation probably developed splenic vein thrombosis at some point in the past    *Probable perinephric abscesses, infection tracking along wilmer of diaphragm/pancreatic tail/posterior wall of stomach, possible spleen  · Status post CT-guided drain placement 8/8/2022; cultures pending  · Currently on Rocephin    Hematology plan/recommendations:  1. Anemia evaluation looks most consistent with inflammation/chronic disease probably related to infection given the CT findings  2. Iron studies difficult to interpret in the setting of such inflammation  3. Continue to monitor CBC with transfusion support if needed  4. I think the splenic vein thrombosis is likely chronic given the significant collateral formation; I doubt anticoagulation will benefit the patient unless new thrombus  identified  5. Patients with chronic splenic vein thrombosis can develop varices in the esophagus or stomach- may consider scope at some point to evaluate, obviously can be done outpatient                8/9/2022      CC:

## 2022-08-10 ENCOUNTER — ANESTHESIA (OUTPATIENT)
Dept: GASTROENTEROLOGY | Facility: HOSPITAL | Age: 42
End: 2022-08-10

## 2022-08-10 ENCOUNTER — ANESTHESIA EVENT (OUTPATIENT)
Dept: GASTROENTEROLOGY | Facility: HOSPITAL | Age: 42
End: 2022-08-10

## 2022-08-10 LAB
ANION GAP SERPL CALCULATED.3IONS-SCNC: 7.9 MMOL/L (ref 5–15)
BUN SERPL-MCNC: 9 MG/DL (ref 6–20)
BUN/CREAT SERPL: 15.8 (ref 7–25)
CALCIUM SPEC-SCNC: 9.4 MG/DL (ref 8.6–10.5)
CHLORIDE SERPL-SCNC: 102 MMOL/L (ref 98–107)
CO2 SERPL-SCNC: 30.1 MMOL/L (ref 22–29)
CREAT SERPL-MCNC: 0.57 MG/DL (ref 0.57–1)
CYTO UR: NORMAL
DEPRECATED RDW RBC AUTO: 54.9 FL (ref 37–54)
EGFRCR SERPLBLD CKD-EPI 2021: 118.7 ML/MIN/1.73
ERYTHROCYTE [DISTWIDTH] IN BLOOD BY AUTOMATED COUNT: 14.9 % (ref 12.3–15.4)
GLUCOSE SERPL-MCNC: 89 MG/DL (ref 65–99)
HCT VFR BLD AUTO: 26.5 % (ref 34–46.6)
HGB BLD-MCNC: 8.8 G/DL (ref 12–15.9)
LAB AP CASE REPORT: NORMAL
MCH RBC QN AUTO: 33.6 PG (ref 26.6–33)
MCHC RBC AUTO-ENTMCNC: 33.2 G/DL (ref 31.5–35.7)
MCV RBC AUTO: 101.1 FL (ref 79–97)
PATH REPORT.FINAL DX SPEC: NORMAL
PATH REPORT.GROSS SPEC: NORMAL
PLATELET # BLD AUTO: 237 10*3/MM3 (ref 140–450)
PMV BLD AUTO: 9.7 FL (ref 6–12)
POTASSIUM SERPL-SCNC: 4.1 MMOL/L (ref 3.5–5.2)
RBC # BLD AUTO: 2.62 10*6/MM3 (ref 3.77–5.28)
SODIUM SERPL-SCNC: 140 MMOL/L (ref 136–145)
WBC NRBC COR # BLD: 5.9 10*3/MM3 (ref 3.4–10.8)

## 2022-08-10 PROCEDURE — 25010000002 IRON SUCROSE PER 1 MG: Performed by: INTERNAL MEDICINE

## 2022-08-10 PROCEDURE — 25010000002 HYDROMORPHONE 1 MG/ML SOLUTION: Performed by: INTERNAL MEDICINE

## 2022-08-10 PROCEDURE — 85027 COMPLETE CBC AUTOMATED: CPT | Performed by: STUDENT IN AN ORGANIZED HEALTH CARE EDUCATION/TRAINING PROGRAM

## 2022-08-10 PROCEDURE — 80048 BASIC METABOLIC PNL TOTAL CA: CPT | Performed by: STUDENT IN AN ORGANIZED HEALTH CARE EDUCATION/TRAINING PROGRAM

## 2022-08-10 PROCEDURE — 0DB38ZX EXCISION OF LOWER ESOPHAGUS, VIA NATURAL OR ARTIFICIAL OPENING ENDOSCOPIC, DIAGNOSTIC: ICD-10-PCS | Performed by: INTERNAL MEDICINE

## 2022-08-10 PROCEDURE — 99232 SBSQ HOSP IP/OBS MODERATE 35: CPT | Performed by: STUDENT IN AN ORGANIZED HEALTH CARE EDUCATION/TRAINING PROGRAM

## 2022-08-10 PROCEDURE — 88305 TISSUE EXAM BY PATHOLOGIST: CPT | Performed by: INTERNAL MEDICINE

## 2022-08-10 PROCEDURE — 0DB28ZX EXCISION OF MIDDLE ESOPHAGUS, VIA NATURAL OR ARTIFICIAL OPENING ENDOSCOPIC, DIAGNOSTIC: ICD-10-PCS | Performed by: INTERNAL MEDICINE

## 2022-08-10 PROCEDURE — 25010000002 HYDROMORPHONE 1 MG/ML SOLUTION: Performed by: STUDENT IN AN ORGANIZED HEALTH CARE EDUCATION/TRAINING PROGRAM

## 2022-08-10 PROCEDURE — 25010000002 PROPOFOL 10 MG/ML EMULSION: Performed by: NURSE ANESTHETIST, CERTIFIED REGISTERED

## 2022-08-10 PROCEDURE — 43239 EGD BIOPSY SINGLE/MULTIPLE: CPT | Performed by: INTERNAL MEDICINE

## 2022-08-10 PROCEDURE — 63710000001 DIPHENHYDRAMINE PER 50 MG: Performed by: INTERNAL MEDICINE

## 2022-08-10 RX ORDER — DIPHENHYDRAMINE HCL 50 MG
50 CAPSULE ORAL ONCE
Status: DISCONTINUED | OUTPATIENT
Start: 2022-08-10 | End: 2022-08-10

## 2022-08-10 RX ORDER — SODIUM CHLORIDE 9 MG/ML
30 INJECTION, SOLUTION INTRAVENOUS CONTINUOUS PRN
Status: DISCONTINUED | OUTPATIENT
Start: 2022-08-10 | End: 2022-08-14 | Stop reason: HOSPADM

## 2022-08-10 RX ORDER — BISACODYL 10 MG
10 SUPPOSITORY, RECTAL RECTAL DAILY
Status: DISCONTINUED | OUTPATIENT
Start: 2022-08-11 | End: 2022-08-14 | Stop reason: HOSPADM

## 2022-08-10 RX ORDER — ACETAMINOPHEN 325 MG/1
650 TABLET ORAL EVERY 24 HOURS
Status: DISCONTINUED | OUTPATIENT
Start: 2022-08-10 | End: 2022-08-12

## 2022-08-10 RX ORDER — LIDOCAINE HYDROCHLORIDE 20 MG/ML
INJECTION, SOLUTION INFILTRATION; PERINEURAL AS NEEDED
Status: DISCONTINUED | OUTPATIENT
Start: 2022-08-10 | End: 2022-08-10 | Stop reason: SURG

## 2022-08-10 RX ORDER — GLYCOPYRROLATE 0.2 MG/ML
INJECTION INTRAMUSCULAR; INTRAVENOUS AS NEEDED
Status: DISCONTINUED | OUTPATIENT
Start: 2022-08-10 | End: 2022-08-10 | Stop reason: SURG

## 2022-08-10 RX ORDER — ACETAMINOPHEN 325 MG/1
650 TABLET ORAL ONCE
Status: DISCONTINUED | OUTPATIENT
Start: 2022-08-10 | End: 2022-08-10

## 2022-08-10 RX ORDER — DIPHENHYDRAMINE HCL 50 MG
50 CAPSULE ORAL EVERY 24 HOURS
Status: DISCONTINUED | OUTPATIENT
Start: 2022-08-10 | End: 2022-08-12

## 2022-08-10 RX ORDER — SODIUM CHLORIDE 0.9 % (FLUSH) 0.9 %
10 SYRINGE (ML) INJECTION AS NEEDED
Status: DISCONTINUED | OUTPATIENT
Start: 2022-08-10 | End: 2022-08-10

## 2022-08-10 RX ORDER — PROPOFOL 10 MG/ML
VIAL (ML) INTRAVENOUS CONTINUOUS PRN
Status: DISCONTINUED | OUTPATIENT
Start: 2022-08-10 | End: 2022-08-10 | Stop reason: SURG

## 2022-08-10 RX ORDER — POLYETHYLENE GLYCOL 3350 17 G/17G
17 POWDER, FOR SOLUTION ORAL DAILY
Status: DISCONTINUED | OUTPATIENT
Start: 2022-08-11 | End: 2022-08-14 | Stop reason: HOSPADM

## 2022-08-10 RX ORDER — SODIUM CHLORIDE 0.9 % (FLUSH) 0.9 %
10 SYRINGE (ML) INJECTION EVERY 12 HOURS SCHEDULED
Status: DISCONTINUED | OUTPATIENT
Start: 2022-08-10 | End: 2022-08-10

## 2022-08-10 RX ORDER — PROPOFOL 10 MG/ML
VIAL (ML) INTRAVENOUS AS NEEDED
Status: DISCONTINUED | OUTPATIENT
Start: 2022-08-10 | End: 2022-08-10 | Stop reason: SURG

## 2022-08-10 RX ADMIN — Medication 1 MG: at 11:53

## 2022-08-10 RX ADMIN — DOCUSATE SODIUM 50MG AND SENNOSIDES 8.6MG 2 TABLET: 8.6; 5 TABLET, FILM COATED ORAL at 20:19

## 2022-08-10 RX ADMIN — HYDROMORPHONE HYDROCHLORIDE 1 MG: 1 INJECTION, SOLUTION INTRAMUSCULAR; INTRAVENOUS; SUBCUTANEOUS at 14:05

## 2022-08-10 RX ADMIN — DOCUSATE SODIUM 50MG AND SENNOSIDES 8.6MG 2 TABLET: 8.6; 5 TABLET, FILM COATED ORAL at 11:54

## 2022-08-10 RX ADMIN — HYDROMORPHONE HYDROCHLORIDE 1 MG: 1 INJECTION, SOLUTION INTRAMUSCULAR; INTRAVENOUS; SUBCUTANEOUS at 07:47

## 2022-08-10 RX ADMIN — Medication 80 MG: at 09:41

## 2022-08-10 RX ADMIN — PANCRELIPASE 72000 UNITS OF LIPASE: 60000; 12000; 38000 CAPSULE, DELAYED RELEASE PELLETS ORAL at 17:03

## 2022-08-10 RX ADMIN — IRON SUCROSE 200 MG: 20 INJECTION, SOLUTION INTRAVENOUS at 23:08

## 2022-08-10 RX ADMIN — PROPOFOL 160 MCG/KG/MIN: 10 INJECTION, EMULSION INTRAVENOUS at 09:41

## 2022-08-10 RX ADMIN — AMITRIPTYLINE HYDROCHLORIDE 10 MG: 10 TABLET, FILM COATED ORAL at 22:03

## 2022-08-10 RX ADMIN — Medication 10 ML: at 20:20

## 2022-08-10 RX ADMIN — LIDOCAINE HYDROCHLORIDE 60 MG: 20 INJECTION, SOLUTION INFILTRATION; PERINEURAL at 09:41

## 2022-08-10 RX ADMIN — Medication 10 ML: at 11:56

## 2022-08-10 RX ADMIN — HYDROMORPHONE HYDROCHLORIDE 1 MG: 1 INJECTION, SOLUTION INTRAMUSCULAR; INTRAVENOUS; SUBCUTANEOUS at 22:39

## 2022-08-10 RX ADMIN — SODIUM CHLORIDE 100 ML/HR: 9 INJECTION, SOLUTION INTRAVENOUS at 03:24

## 2022-08-10 RX ADMIN — HYDROMORPHONE HYDROCHLORIDE 1 MG: 1 INJECTION, SOLUTION INTRAMUSCULAR; INTRAVENOUS; SUBCUTANEOUS at 03:29

## 2022-08-10 RX ADMIN — OXYCODONE HYDROCHLORIDE AND ACETAMINOPHEN 1 TABLET: 10; 325 TABLET ORAL at 11:54

## 2022-08-10 RX ADMIN — PANCRELIPASE 72000 UNITS OF LIPASE: 60000; 12000; 38000 CAPSULE, DELAYED RELEASE PELLETS ORAL at 11:53

## 2022-08-10 RX ADMIN — GLYCOPYRROLATE 0.1 MG: 0.2 INJECTION INTRAMUSCULAR; INTRAVENOUS at 09:39

## 2022-08-10 RX ADMIN — DIPHENHYDRAMINE HYDROCHLORIDE 50 MG: 50 CAPSULE ORAL at 22:22

## 2022-08-10 RX ADMIN — HYDROMORPHONE HYDROCHLORIDE 1 MG: 1 INJECTION, SOLUTION INTRAMUSCULAR; INTRAVENOUS; SUBCUTANEOUS at 20:16

## 2022-08-10 RX ADMIN — PANTOPRAZOLE SODIUM 40 MG: 40 TABLET, DELAYED RELEASE ORAL at 11:54

## 2022-08-10 RX ADMIN — SODIUM CHLORIDE 30 ML/HR: 9 INJECTION, SOLUTION INTRAVENOUS at 09:15

## 2022-08-10 RX ADMIN — ACETAMINOPHEN 650 MG: 325 TABLET, FILM COATED ORAL at 22:22

## 2022-08-10 RX ADMIN — OXYCODONE HYDROCHLORIDE AND ACETAMINOPHEN 1 TABLET: 10; 325 TABLET ORAL at 18:16

## 2022-08-10 RX ADMIN — Medication 5000 UNITS: at 11:52

## 2022-08-10 NOTE — PLAN OF CARE
Goal Outcome Evaluation:  Plan of Care Reviewed With: patient        Progress: no change  Outcome Evaluation: EGD scheduled today, Dilaudid given for pain control, denies nausea, ivf infusing, pigtail drain w/ small amount of output- irrigated as ordered, iv abx given, voiding, NPO since midnight, ad valeria

## 2022-08-10 NOTE — ANESTHESIA POSTPROCEDURE EVALUATION
Patient: Piper Hodgesrp    Procedure Summary     Date: 08/10/22 Room / Location:  [facility] /  [facility]    Anesthesia Start: 0937 Anesthesia Stop: 0956    Procedure: ESOPHAGOGASTRODUODENOSCOPY with bxs (N/A Esophagus) Diagnosis:       GERD without esophagitis      Splenic vein thrombosis      Anemia of chronic disease      (GERD without esophagitis [K21.9])      (Splenic vein thrombosis [I82.890])      (Anemia of chronic disease [D63.8])    Surgeons: Salvador Price MD Provider: Khurram Doty MD    Anesthesia Type: MAC ASA Status: 3          Anesthesia Type: MAC    Vitals  Vitals Value Taken Time   /90 08/10/22 1023   Temp     Pulse 84 08/10/22 1023   Resp 16 08/10/22 1023   SpO2 97 % 08/10/22 1023           Post Anesthesia Care and Evaluation    Patient location during evaluation: bedside  Patient participation: complete - patient participated  Level of consciousness: awake  Pain management: adequate    Airway patency: patent  Anesthetic complications: No anesthetic complications  PONV Status: none  Cardiovascular status: acceptable  Respiratory status: acceptable  Hydration status: acceptable  Post Neuraxial Block status: Motor and sensory function returned to baseline

## 2022-08-10 NOTE — PLAN OF CARE
Goal Outcome Evaluation:  Plan of Care Reviewed With: patient        Progress: no change  Outcome Evaluation: VSS. Ambulating frequently in p[place]. Tolerating diet. New IV. PO and IV meds for pain.

## 2022-08-10 NOTE — ANESTHESIA PREPROCEDURE EVALUATION
Anesthesia Evaluation     Patient summary reviewed and Nursing notes reviewed   NPO Solid Status: > 8 hours  NPO Liquid Status: > 2 hours           Airway   Mallampati: I  TM distance: >3 FB  Neck ROM: full  No difficulty expected  Dental - normal exam     Pulmonary - normal exam   (+) a smoker Current Abstained day of surgery,   Cardiovascular - normal exam  Exercise tolerance: good (4-7 METS)    (+) DVT resolved,       Neuro/Psych  (+) psychiatric history,    GI/Hepatic/Renal/Endo    (+)  hiatal hernia, GERD,  renal disease,     Musculoskeletal (-) negative ROS    Abdominal  - normal exam    Bowel sounds: normal.   Substance History   (+) alcohol use,      OB/GYN negative ob/gyn ROS   (-)  Pregnant        Other        ROS/Med Hx Other: Alcohol abuse.   Admitted with pancreatitis.                Anesthesia Plan    ASA 3     MAC     intravenous induction     Anesthetic plan, risks, benefits, and alternatives have been provided, discussed and informed consent has been obtained with: patient.  Pre-procedure education provided  Plan discussed with CRNA.        CODE STATUS:    Code Status (Patient has no pulse and is not breathing): CPR (Attempt to Resuscitate)  Medical Interventions (Patient has pulse or is breathing): Full Support

## 2022-08-10 NOTE — PROGRESS NOTES
LOS: 3 days     Chief Complaint: Perinephric abscess    Interval History: Patient reports she is continuing to have some abdominal pain.  Denies any fevers or chills.  States she is tolerating antibiotics well.  Fever curve and WBC within normal limits.  Abdominal drain cultures pending.    Vital Signs  Temp:  [97.4 °F (36.3 °C)-98.1 °F (36.7 °C)] 98.1 °F (36.7 °C)  Heart Rate:  [74-76] 74  Resp:  [16] 16  BP: (131-140)/() 132/87    Physical Exam:  General: In no acute distress  HEENT: Oropharynx clear  Cardiovascular: RRR  Respiratory: Normal work of breathing.  No wheezing.  GI: Soft, nondistended, tender to palpation diffusely.  Abdominal drain site clean and intact.  Skin: No rashes or lesions  Extremities: No edema, cyanosis  Access: Peripheral IV.    Antibiotics:  Anti-Infectives (From admission, onward)    Ordered     Dose/Rate Route Frequency Start Stop    08/07/22 1300  cefTRIAXone (ROCEPHIN) 2 g in sodium chloride 0.9 % 100 mL IVPB-VTB        Ordering Provider: Erik Hinton DO    2 g  200 mL/hr over 30 Minutes Intravenous Every 24 Hours 08/08/22 0000 08/14/22 2359    08/06/22 2350  cefTRIAXone (ROCEPHIN) 2 g in sodium chloride 0.9 % 100 mL IVPB-VTB        Ordering Provider: Salvador Bach PA    2 g  200 mL/hr over 30 Minutes Intravenous Once 08/06/22 2352 08/07/22 0115           Results Review:     I reviewed the patient's new clinical results.    Lab Results   Component Value Date    WBC 5.90 08/10/2022    HGB 8.8 (L) 08/10/2022    HCT 26.5 (L) 08/10/2022    .1 (H) 08/10/2022     08/10/2022     Lab Results   Component Value Date    GLUCOSE 89 08/10/2022    BUN 9 08/10/2022    CREATININE 0.57 08/10/2022    EGFRIFNONA 97 04/06/2018    BCR 15.8 08/10/2022    CO2 30.1 (H) 08/10/2022    CALCIUM 9.4 08/10/2022    ALBUMIN 4.10 08/06/2022    AST 12 08/06/2022    ALT 11 08/06/2022       Microbiology:  8/7 blood culture 1 out of 1 no growth to date  8/8 blood culture 1 out of 1 no  growth to date  8/8 IR perinephric abscess culture no growth to date    Assessment    #Perinephric abscess  #Left-sided pyelonephritis  #Multiloculated heterogeneous splenic collections  #Chronic pancreatitis    Continue empiric IV ceftriaxone 2 g daily for now while awaiting results of cultures from IR drainage.  GPC's were seen on the stain however will await for any growth.  Blood cultures have remained negative.  Appreciate general surgery's eval and agree that repeat imaging will be very helpful in the near future.    ID will follow.

## 2022-08-11 LAB
ALBUMIN SERPL-MCNC: 3.6 G/DL (ref 3.5–5.2)
ALP SERPL-CCNC: 89 U/L (ref 39–117)
ALT SERPL W P-5'-P-CCNC: 20 U/L (ref 1–33)
ANION GAP SERPL CALCULATED.3IONS-SCNC: 12 MMOL/L (ref 5–15)
AST SERPL-CCNC: 16 U/L (ref 1–32)
BILIRUB CONJ SERPL-MCNC: <0.2 MG/DL (ref 0–0.3)
BILIRUB INDIRECT SERPL-MCNC: NORMAL MG/DL
BILIRUB SERPL-MCNC: <0.2 MG/DL (ref 0–1.2)
BUN SERPL-MCNC: 9 MG/DL (ref 6–20)
BUN/CREAT SERPL: 14.5 (ref 7–25)
CALCIUM SPEC-SCNC: 9.5 MG/DL (ref 8.6–10.5)
CHLORIDE SERPL-SCNC: 101 MMOL/L (ref 98–107)
CO2 SERPL-SCNC: 28 MMOL/L (ref 22–29)
CREAT SERPL-MCNC: 0.62 MG/DL (ref 0.57–1)
DEPRECATED RDW RBC AUTO: 56.4 FL (ref 37–54)
EGFRCR SERPLBLD CKD-EPI 2021: 116.3 ML/MIN/1.73
EOSINOPHIL # BLD MANUAL: 0.16 10*3/MM3 (ref 0–0.4)
EOSINOPHIL NFR BLD MANUAL: 3.1 % (ref 0.3–6.2)
ERYTHROCYTE [DISTWIDTH] IN BLOOD BY AUTOMATED COUNT: 15 % (ref 12.3–15.4)
GLUCOSE SERPL-MCNC: 99 MG/DL (ref 65–99)
HCT VFR BLD AUTO: 29.8 % (ref 34–46.6)
HGB BLD-MCNC: 9.8 G/DL (ref 12–15.9)
LAB AP CASE REPORT: NORMAL
LAB AP DIAGNOSIS COMMENT: NORMAL
LYMPHOCYTES # BLD MANUAL: 1.14 10*3/MM3 (ref 0.7–3.1)
LYMPHOCYTES NFR BLD MANUAL: 7.3 % (ref 5–12)
MCH RBC QN AUTO: 34 PG (ref 26.6–33)
MCHC RBC AUTO-ENTMCNC: 32.9 G/DL (ref 31.5–35.7)
MCV RBC AUTO: 103.5 FL (ref 79–97)
MONOCYTES # BLD: 0.38 10*3/MM3 (ref 0.1–0.9)
NEUTROPHILS # BLD AUTO: 3.52 10*3/MM3 (ref 1.7–7)
NEUTROPHILS NFR BLD MANUAL: 67.7 % (ref 42.7–76)
PATH REPORT.FINAL DX SPEC: NORMAL
PATH REPORT.GROSS SPEC: NORMAL
PLAT MORPH BLD: NORMAL
PLATELET # BLD AUTO: 266 10*3/MM3 (ref 140–450)
PMV BLD AUTO: 10.1 FL (ref 6–12)
POTASSIUM SERPL-SCNC: 4.5 MMOL/L (ref 3.5–5.2)
PROT SERPL-MCNC: 6.4 G/DL (ref 6–8.5)
RBC # BLD AUTO: 2.88 10*6/MM3 (ref 3.77–5.28)
RBC MORPH BLD: NORMAL
RETICS # AUTO: 0.03 10*6/MM3 (ref 0.02–0.13)
RETICS/RBC NFR AUTO: 0.92 % (ref 0.7–1.9)
SMUDGE CELLS BLD QL SMEAR: NORMAL
SODIUM SERPL-SCNC: 141 MMOL/L (ref 136–145)
VARIANT LYMPHS NFR BLD MANUAL: 21.9 % (ref 19.6–45.3)
WBC NRBC COR # BLD: 5.2 10*3/MM3 (ref 3.4–10.8)

## 2022-08-11 PROCEDURE — 85025 COMPLETE CBC W/AUTO DIFF WBC: CPT | Performed by: INTERNAL MEDICINE

## 2022-08-11 PROCEDURE — 63710000001 ONDANSETRON ODT 4 MG TABLET DISPERSIBLE: Performed by: INTERNAL MEDICINE

## 2022-08-11 PROCEDURE — 25010000002 CEFTRIAXONE PER 250 MG: Performed by: INTERNAL MEDICINE

## 2022-08-11 PROCEDURE — 25010000002 HYDROMORPHONE 1 MG/ML SOLUTION: Performed by: INTERNAL MEDICINE

## 2022-08-11 PROCEDURE — 63710000001 DIPHENHYDRAMINE PER 50 MG: Performed by: INTERNAL MEDICINE

## 2022-08-11 PROCEDURE — 85045 AUTOMATED RETICULOCYTE COUNT: CPT | Performed by: INTERNAL MEDICINE

## 2022-08-11 PROCEDURE — 80048 BASIC METABOLIC PNL TOTAL CA: CPT | Performed by: INTERNAL MEDICINE

## 2022-08-11 PROCEDURE — 80076 HEPATIC FUNCTION PANEL: CPT | Performed by: INTERNAL MEDICINE

## 2022-08-11 PROCEDURE — 25010000002 IRON SUCROSE PER 1 MG: Performed by: INTERNAL MEDICINE

## 2022-08-11 PROCEDURE — 85007 BL SMEAR W/DIFF WBC COUNT: CPT | Performed by: INTERNAL MEDICINE

## 2022-08-11 PROCEDURE — 99232 SBSQ HOSP IP/OBS MODERATE 35: CPT | Performed by: NURSE PRACTITIONER

## 2022-08-11 PROCEDURE — 99231 SBSQ HOSP IP/OBS SF/LOW 25: CPT | Performed by: INTERNAL MEDICINE

## 2022-08-11 PROCEDURE — 99232 SBSQ HOSP IP/OBS MODERATE 35: CPT | Performed by: STUDENT IN AN ORGANIZED HEALTH CARE EDUCATION/TRAINING PROGRAM

## 2022-08-11 RX ADMIN — PANCRELIPASE 72000 UNITS OF LIPASE: 60000; 12000; 38000 CAPSULE, DELAYED RELEASE PELLETS ORAL at 12:29

## 2022-08-11 RX ADMIN — DOCUSATE SODIUM 50MG AND SENNOSIDES 8.6MG 2 TABLET: 8.6; 5 TABLET, FILM COATED ORAL at 08:09

## 2022-08-11 RX ADMIN — HYDROMORPHONE HYDROCHLORIDE 1 MG: 1 INJECTION, SOLUTION INTRAMUSCULAR; INTRAVENOUS; SUBCUTANEOUS at 06:19

## 2022-08-11 RX ADMIN — POLYETHYLENE GLYCOL 3350 17 G: 17 POWDER, FOR SOLUTION ORAL at 08:09

## 2022-08-11 RX ADMIN — OXYCODONE HYDROCHLORIDE AND ACETAMINOPHEN 1 TABLET: 10; 325 TABLET ORAL at 08:09

## 2022-08-11 RX ADMIN — Medication 10 ML: at 22:06

## 2022-08-11 RX ADMIN — PANCRELIPASE 72000 UNITS OF LIPASE: 60000; 12000; 38000 CAPSULE, DELAYED RELEASE PELLETS ORAL at 08:10

## 2022-08-11 RX ADMIN — DIPHENHYDRAMINE HYDROCHLORIDE 50 MG: 50 CAPSULE ORAL at 22:05

## 2022-08-11 RX ADMIN — IRON SUCROSE 200 MG: 20 INJECTION, SOLUTION INTRAVENOUS at 22:38

## 2022-08-11 RX ADMIN — DOCUSATE SODIUM 50MG AND SENNOSIDES 8.6MG 2 TABLET: 8.6; 5 TABLET, FILM COATED ORAL at 22:05

## 2022-08-11 RX ADMIN — ACETAMINOPHEN 650 MG: 325 TABLET, FILM COATED ORAL at 22:04

## 2022-08-11 RX ADMIN — HYDROMORPHONE HYDROCHLORIDE 1 MG: 1 INJECTION, SOLUTION INTRAMUSCULAR; INTRAVENOUS; SUBCUTANEOUS at 22:05

## 2022-08-11 RX ADMIN — ONDANSETRON 4 MG: 4 TABLET, ORALLY DISINTEGRATING ORAL at 14:43

## 2022-08-11 RX ADMIN — OXYCODONE HYDROCHLORIDE AND ACETAMINOPHEN 1 TABLET: 10; 325 TABLET ORAL at 00:08

## 2022-08-11 RX ADMIN — HYDROMORPHONE HYDROCHLORIDE 1 MG: 1 INJECTION, SOLUTION INTRAMUSCULAR; INTRAVENOUS; SUBCUTANEOUS at 18:11

## 2022-08-11 RX ADMIN — BISACODYL 10 MG: 10 SUPPOSITORY RECTAL at 08:11

## 2022-08-11 RX ADMIN — HYDROMORPHONE HYDROCHLORIDE 1 MG: 1 INJECTION, SOLUTION INTRAMUSCULAR; INTRAVENOUS; SUBCUTANEOUS at 14:43

## 2022-08-11 RX ADMIN — HYDROMORPHONE HYDROCHLORIDE 1 MG: 1 INJECTION, SOLUTION INTRAMUSCULAR; INTRAVENOUS; SUBCUTANEOUS at 11:27

## 2022-08-11 RX ADMIN — Medication 1 MG: at 08:10

## 2022-08-11 RX ADMIN — Medication 5000 UNITS: at 08:09

## 2022-08-11 RX ADMIN — OXYCODONE HYDROCHLORIDE AND ACETAMINOPHEN 1 TABLET: 10; 325 TABLET ORAL at 20:22

## 2022-08-11 RX ADMIN — CEFTRIAXONE 2 G: 2 INJECTION, POWDER, FOR SOLUTION INTRAMUSCULAR; INTRAVENOUS at 00:08

## 2022-08-11 RX ADMIN — PANTOPRAZOLE SODIUM 40 MG: 40 TABLET, DELAYED RELEASE ORAL at 08:09

## 2022-08-11 RX ADMIN — PANCRELIPASE 72000 UNITS OF LIPASE: 60000; 12000; 38000 CAPSULE, DELAYED RELEASE PELLETS ORAL at 17:01

## 2022-08-11 RX ADMIN — Medication 100 MG: at 08:10

## 2022-08-11 RX ADMIN — AMITRIPTYLINE HYDROCHLORIDE 10 MG: 10 TABLET, FILM COATED ORAL at 22:08

## 2022-08-11 NOTE — PROGRESS NOTES
Name: Piper Cain ADMIT: 2022   : 1980  PCP: System, Provider Not In    MRN: [id number] LOS: 3 days   AGE/SEX: 41 y.o. female  ROOM: [facility]     Subjective   Subjective   Complaining of constipation and upper and central abdominal pain without change.  No fever or chills.  Positive for night sweats.  No nausea or vomiting.  Tolerating regular diet well.    Review of Systems  .  No dysuria or hematuria.  Cardiovascular/respiratory.  No chest pain/no shortness of breath/no cough/no palpitation.     Objective   Objective   Vital Signs  Temp:  [96.9 °F (36.1 °C)-98.1 °F (36.7 °C)] 97.4 °F (36.3 °C)  Heart Rate:  [71-84] 79  Resp:  [9-16] 16  BP: (116-148)/(75-99) 121/75  SpO2:  [95 %-100 %] 96 %  on  Flow (L/min):  [4] 4;   Device (Oxygen Therapy): room air    Intake/Output Summary (Last 24 hours) at 8/10/2022 2014  Last data filed at 8/10/2022 1726  Gross per 24 hour   Intake 1560 ml   Output 2790 ml   Net -1230 ml     Body mass index is 17.16 kg/m².      22   Weight: 55.8 kg (123 lb) 55.8 kg (123 lb)     Physical Exam  General.  Middle-aged female.  Alert and oriented x3.  In no apparent pain/distress/diaphoresis.  Normal mood and affect.  Eyes.  Pupils equal round and reactive.  Intact extraocular musculature.  No pallor or jaundice.  Normal trajectory and lids.  Oral cavity.  Moist mucous membrane.  Neck.  Supple.  No JVD.  No lymphadenopathy or thyromegaly.  Cardiovascular.  Regular rate and rhythm with no gallops or murmurs.  Chest.  Clear to auscultation bilaterally with no added sounds.  Abdomen.  Soft lax.No localized tenderness.  No guarding or rebound.  No organomegaly.  Empty drain bag from the left loin.  Extremities.  No clubbing cyanosis or edema.  CNS.  No acute focal neurological deficits.    Results Review:      Results from last 7 days   Lab Units 08/10/22  0618 22  0640 22  0614 22  0603 22  5043   SODIUM mmol/L 140 141 143 140  139   POTASSIUM mmol/L 4.1 4.4 4.1 3.7 4.0   CHLORIDE mmol/L 102 104 110* 106 104   CO2 mmol/L 30.1* 27.0 25.2 23.0 22.7   BUN mg/dL 9 7 5* 8 11   CREATININE mg/dL 0.57 0.57 0.61 0.62 0.69   GLUCOSE mg/dL 89 86 83 104* 102*   CALCIUM mg/dL 9.4 9.1 8.4* 8.3* 9.0   AST (SGOT) U/L  --   --   --   --  12   ALT (SGPT) U/L  --   --   --   --  11     Estimated Creatinine Clearance: 116.7 mL/min (by C-G formula based on SCr of 0.57 mg/dL).          Results from last 7 days   Lab Units 08/06/22  2158   TROPONIN T ng/mL <0.010                   Invalid input(s):  PHOS        Invalid input(s): LDLCALC  Results from last 7 days   Lab Units 08/10/22  0618 08/09/22  0640 08/08/22  0614 08/07/22  0603 08/06/22  2055   WBC 10*3/mm3 5.90 5.17 4.76 5.75 10.48   HEMOGLOBIN g/dL 8.8* 8.8* 8.9* 9.4* 11.5*   HEMATOCRIT % 26.5* 27.6* 26.6* 28.0* 34.3   PLATELETS 10*3/mm3 237 222 229 239 323   MCV fL 101.1* 105.7* 103.5* 104.5* 104.6*   MCH pg 33.6* 33.7* 34.6* 35.1* 35.1*   MCHC g/dL 33.2 31.9 33.5 33.6 33.5   RDW % 14.9 14.9 14.6 14.9 14.9   RDW-SD fl 54.9* 57.8* 53.5 55.3* 55.8*   MPV fL 9.7 9.9 10.1 10.2 10.5   NEUTROPHIL % %  --  52.6  --   --  67.2   LYMPHOCYTE % %  --  31.9  --   --  22.5   MONOCYTES % %  --  10.4  --   --  7.2   EOSINOPHIL % %  --  4.1  --   --  2.3   BASOPHIL % %  --  0.8  --   --  0.5   IMM GRAN % %  --   --   --   --  0.3   NEUTROS ABS 10*3/mm3  --  2.72  --   --  7.05*   LYMPHS ABS 10*3/mm3  --  1.65  --   --  2.36   MONOS ABS 10*3/mm3  --  0.54  --   --  0.75   EOS ABS 10*3/mm3  --  0.21  --   --  0.24   BASOS ABS 10*3/mm3  --  0.04  --   --  0.05   IMMATURE GRANS (ABS) 10*3/mm3  --   --   --   --  0.03   NRBC /100 WBC  --   --   --   --  0.0     Results from last 7 days   Lab Units 08/07/22  0603   INR  1.06         Results from last 7 days   Lab Units 08/07/22  0011 08/06/22 2158   PROCALCITONIN ng/mL  --  0.06   LACTATE mmol/L 0.6  --      Results from last 7 days   Lab Units 08/07/22  0603 08/06/22 2158    SED RATE mm/hr 67*  --    CRP mg/dL 4.74* 4.43*     Results from last 7 days   Lab Units 08/06/22  2158   LIPASE U/L 378*     Results from last 7 days   Lab Units 08/08/22  1410 08/08/22  0635 08/08/22  0616 08/07/22  0043   BLOODCX   --  No growth at 2 days No growth at 24 hours No growth at 3 days   BODYFLDCX  No growth at 2 days  --   --   --          Results from last 7 days   Lab Units 08/06/22  2200   NITRITE UA  Negative           Imaging:  Imaging Results (Last 24 Hours)     ** No results found for the last 24 hours. **             I reviewed the patient's new clinical results / labs / tests / procedures      Assessment/Plan     Active Hospital Problems    Diagnosis  POA   • **Perinephric abscess [N15.1]  Yes   • Chronic pancreatitis (HCC) [K86.1]  Yes   • Acute pyelonephritis [N10]  Yes   • 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           · Left pyelonephritis/perinephric abscess/splenic abscess.  S/p CT-guided drain of the left perinephric abscess on 8/8/2022.  Blood and fluid cultures are negative till now.  Currently on ceftriaxone by infectious disease.  General surgery does not recommend any splenic abscess surgical intervention and recommends repeating CAT scan in few days.  · Splenic vein occlusion (chronic)./Possible Candida esophagitis/GERD/chronic alcoholic pancreatitis..  Continue Protonix.  No anticoagulation recommended per GI.  Will await pathology results of esophageal biopsy to decide on antifungal treatment.  Continue Creon.  Benign GI examination.  Tolerating regular diet well.  We will stop IV fluid.  · Iron deficiency anemia/anemia of chronic disease.  Plan IV iron and monitor hemoglobin.  · VTE  prophylaxis.  Sequential compression devices.      Discussed my findings and plan of treatment with the patient.      Castro Hilliard MD  [facility]  08/10/22  20:14 EDT

## 2022-08-11 NOTE — NURSING NOTE
This nurse called [facility] to obtain CT abdomen results as asked by GI. Awaiting fax at this time.      Office # [phone]

## 2022-08-11 NOTE — PROGRESS NOTES
Name: Piper Cain ADMIT: 2022   : 1980  PCP: System, Provider Not In    MRN: [id number] LOS: 4 days   AGE/SEX: 41 y.o. female  ROOM: [facility]     Subjective   Subjective   Small bowel movement without fresh bright blood per rectum or melena.  Resolved central abdominal pain.  Continues with pain around the drain on the left loin.  No fever or chills.  Positive night sweats.  The drain now has a yellowish discharge.  No nausea or vomiting.  Tolerating regular diet well.    Review of Systems  .  No dysuria or hematuria.  Cardiovascular/respiratory.  No chest pain/no shortness of breath/no cough/no palpitation.     Objective   Objective   Vital Signs  Temp:  [96.7 °F (35.9 °C)-97.7 °F (36.5 °C)] 97.7 °F (36.5 °C)  Heart Rate:  [71-79] 76  Resp:  [15-16] 16  BP: (115-126)/(77-85) 120/77  SpO2:  [96 %-99 %] 96 %  on   ;   Device (Oxygen Therapy): room air    Intake/Output Summary (Last 24 hours) at 2022 1817  Last data filed at 2022 1810  Gross per 24 hour   Intake 1220 ml   Output 4900 ml   Net -3680 ml     Body mass index is 17.16 kg/m².      22  0922   Weight: 55.8 kg (123 lb) 55.8 kg (123 lb)     Physical Exam    General.  Middle-aged female.  Alert and oriented x3.  In no apparent pain/distress/diaphoresis.  Normal mood and affect.  Eyes.  Pupils equal round and reactive.  Intact extraocular musculature.  No pallor or jaundice.  Normal trajectory and lids.  Oral cavity.  Moist mucous membrane.  Neck.  Supple.  No JVD.  No lymphadenopathy or thyromegaly.  Cardiovascular.  Regular rate and rhythm with no gallops or murmurs.  Chest.  Clear to auscultation bilaterally with no added sounds.  Abdomen.  Soft lax.No localized tenderness.  No guarding or rebound.  No organomegaly.  Empty drain bag from the left loin.  Extremities.  No clubbing cyanosis or edema.  CNS.  No acute focal neurological deficits.    Results Review:      Results from last 7 days   Lab Units  08/11/22  0544 08/10/22  0618 08/09/22  0640 08/08/22  0614 08/07/22  0603 08/06/22  2158   SODIUM mmol/L 141 140 141 143 140 139   POTASSIUM mmol/L 4.5 4.1 4.4 4.1 3.7 4.0   CHLORIDE mmol/L 101 102 104 110* 106 104   CO2 mmol/L 28.0 30.1* 27.0 25.2 23.0 22.7   BUN mg/dL 9 9 7 5* 8 11   CREATININE mg/dL 0.62 0.57 0.57 0.61 0.62 0.69   GLUCOSE mg/dL 99 89 86 83 104* 102*   CALCIUM mg/dL 9.5 9.4 9.1 8.4* 8.3* 9.0   AST (SGOT) U/L 16  --   --   --   --  12   ALT (SGPT) U/L 20  --   --   --   --  11     Estimated Creatinine Clearance: 107.3 mL/min (by C-G formula based on SCr of 0.62 mg/dL).          Results from last 7 days   Lab Units 08/06/22  2158   TROPONIN T ng/mL <0.010                   Invalid input(s):  PHOS        Invalid input(s): LDLCALC  Results from last 7 days   Lab Units 08/11/22  0544 08/10/22  0618 08/09/22  0640 08/08/22  0614 08/07/22  0603 08/06/22  2055   WBC 10*3/mm3 5.20 5.90 5.17 4.76 5.75 10.48   HEMOGLOBIN g/dL 9.8* 8.8* 8.8* 8.9* 9.4* 11.5*   HEMATOCRIT % 29.8* 26.5* 27.6* 26.6* 28.0* 34.3   PLATELETS 10*3/mm3 266 237 222 229 239 323   MCV fL 103.5* 101.1* 105.7* 103.5* 104.5* 104.6*   MCH pg 34.0* 33.6* 33.7* 34.6* 35.1* 35.1*   MCHC g/dL 32.9 33.2 31.9 33.5 33.6 33.5   RDW % 15.0 14.9 14.9 14.6 14.9 14.9   RDW-SD fl 56.4* 54.9* 57.8* 53.5 55.3* 55.8*   MPV fL 10.1 9.7 9.9 10.1 10.2 10.5   NEUTROPHIL % %  --   --  52.6  --   --  67.2   LYMPHOCYTE % %  --   --  31.9  --   --  22.5   MONOCYTES % %  --   --  10.4  --   --  7.2   EOSINOPHIL % %  --   --  4.1  --   --  2.3   BASOPHIL % %  --   --  0.8  --   --  0.5   IMM GRAN % %  --   --   --   --   --  0.3   NEUTROS ABS 10*3/mm3 3.52  --  2.72  --   --  7.05*   LYMPHS ABS 10*3/mm3  --   --  1.65  --   --  2.36   MONOS ABS 10*3/mm3  --   --  0.54  --   --  0.75   EOS ABS 10*3/mm3 0.16  --  0.21  --   --  0.24   BASOS ABS 10*3/mm3  --   --  0.04  --   --  0.05   IMMATURE GRANS (ABS) 10*3/mm3  --   --   --   --   --  0.03   NRBC /100 WBC  --   --    --   --   --  0.0     Results from last 7 days   Lab Units 08/07/22  0603   INR  1.06         Results from last 7 days   Lab Units 08/07/22  0011 08/06/22  2158   PROCALCITONIN ng/mL  --  0.06   LACTATE mmol/L 0.6  --      Results from last 7 days   Lab Units 08/07/22  0603 08/06/22  2158   SED RATE mm/hr 67*  --    CRP mg/dL 4.74* 4.43*     Results from last 7 days   Lab Units 08/06/22  2158   LIPASE U/L 378*     Results from last 7 days   Lab Units 08/08/22  1410 08/08/22  0635 08/08/22  0616 08/07/22  0043   BLOODCX   --  No growth at 3 days No growth at 2 days No growth at 4 days   BODYFLDCX  No growth at 3 days  --   --   --          Results from last 7 days   Lab Units 08/06/22  2200   NITRITE UA  Negative           Imaging:  Imaging Results (Last 24 Hours)     ** No results found for the last 24 hours. **             I reviewed the patient's new clinical results / labs / tests / procedures      Assessment/Plan     Active Hospital Problems    Diagnosis  POA   • **Perinephric abscess [N15.1]  Yes   • Chronic pancreatitis (HCC) [K86.1]  Yes   • Acute pyelonephritis [N10]  Yes   • 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           · Left pyelonephritis/perinephric abscess/splenic abscess.  S/p CT-guided drain of the left perinephric abscess on 8/8/2022.  Blood and fluid cultures are negative till now.  Currently on ceftriaxone by infectious disease.  General surgery does not recommend any splenic abscess surgical intervention.  Scheduled for repeat CAT scan tomorrow.  · Splenic vein occlusion (chronic)./Possible Candida esophagitis/GERD/chronic alcoholic pancreatitis..  Continue Protonix.  No anticoagulation recommended per  GI/hematology oncology.  Awaiting pathology results of esophageal biopsy to decide on antifungal treatment.  Continue Creon.  Benign GI examination.  Tolerating regular diet well.  IV fluid DC'd.  · Iron deficiency anemia/anemia of chronic disease.  Stable hemoglobin.  On IV iron.  · VTE prophylaxis.  Sequential compression devices.      Discussed my findings and plan of treatment with the patient/nurse..      Castro Hilliard MD  [facility]  08/11/22  18:17 EDT

## 2022-08-11 NOTE — PLAN OF CARE
Goal Outcome Evaluation:  Plan of Care Reviewed With: patient        Progress: no change  Outcome Evaluation: VSS. Up ad valeria. c/o abdominal pain.  prn pain meds given multiple times.  Pigtail drain irrigated.  labs ordered this am.  voiding large amounts.

## 2022-08-11 NOTE — PROGRESS NOTES
" LOS: 4 days     Chief Complaint: Perinephric abscess    Interval History: Patient reports she is still having some pain around the drain site but overall her \"pancreatic pain\" has improved.  Denies any nausea, vomiting, diarrhea.  Denies any fevers or chills.  Fever curve within normal limits.  No leukocytosis.    Vital Signs  Temp:  [96.7 °F (35.9 °C)-97.7 °F (36.5 °C)] 97.4 °F (36.3 °C)  Heart Rate:  [71-84] 77  Resp:  [15-16] 16  BP: (115-148)/(75-99) 125/85    Physical Exam:  General: In no acute distress  HEENT: Oropharynx clear  Cardiovascular: RRR  Respiratory: Normal work of breathing.  No wheezing.  GI: Soft, nondistended, tender to palpation diffusely.  Abdominal drain site clean and intact.  Skin: No rashes or lesions  Extremities: No edema, cyanosis  Access: Peripheral IV.    Antibiotics:  Anti-Infectives (From admission, onward)    Ordered     Dose/Rate Route Frequency Start Stop    08/07/22 1300  cefTRIAXone (ROCEPHIN) 2 g in sodium chloride 0.9 % 100 mL IVPB-VTB        Ordering Provider: Salvador Price MD    2 g  200 mL/hr over 30 Minutes Intravenous Every 24 Hours 08/08/22 0000 08/14/22 2359    08/06/22 2350  cefTRIAXone (ROCEPHIN) 2 g in sodium chloride 0.9 % 100 mL IVPB-VTB        Ordering Provider: Salvador Bach PA    2 g  200 mL/hr over 30 Minutes Intravenous Once 08/06/22 2352 08/07/22 0115           Results Review:     I reviewed the patient's new clinical results.    Lab Results   Component Value Date    WBC 5.20 08/11/2022    HGB 9.8 (L) 08/11/2022    HCT 29.8 (L) 08/11/2022    .5 (H) 08/11/2022     08/11/2022     Lab Results   Component Value Date    GLUCOSE 99 08/11/2022    BUN 9 08/11/2022    CREATININE 0.62 08/11/2022    EGFRIFNONA 97 04/06/2018    BCR 14.5 08/11/2022    CO2 28.0 08/11/2022    CALCIUM 9.5 08/11/2022    ALBUMIN 3.60 08/11/2022    AST 16 08/11/2022    ALT 20 08/11/2022       Microbiology:  8/7 blood culture 1 out of 1 no growth to date  8/8 blood culture " 1 out of 1 no growth to date  8/8 IR perinephric abscess culture no growth to date    Assessment    #Perinephric abscess  #Left-sided pyelonephritis  #Multiloculated heterogeneous splenic collections  #Chronic pancreatitis    Continue empiric IV ceftriaxone 2 g daily since her cultures have remained negative.  Drain continues to have only minimal output.  Plan to order repeat CT imaging for tomorrow to further characterize any residual collections.     ID will follow.

## 2022-08-11 NOTE — PROGRESS NOTES
REASON FOR FOLLOW-UP: Anemia, possible splenic vein thrombosis    HISTORY OF PRESENT ILLNESS:   This is a 41-year-old woman with chronic pancreatitis who presented with several month history of worsening midepigastric pain radiating to the left shoulder.  On admission imaging of the abdomen showed heterogeneous enhancement of the left kidney extensive urothelial enhancement, perinephric abscesses, inflammation associated with wilmer of the diaphragm pancreatic tail and posterior wall of the stomach, fluid collections in the spleen possibly representing infarction hematoma or infection.  Probable splenic vein occlusion with multiple prominent collaterals.    The patient had a drain placed by interventional radiology 8/8/2022.      The patient reports doing about the same with some improvement in the central abdominal pain.  No fevers or chills.  Cultures from the drain remain negative.  She denies lightheadedness or dizziness.      Past Medical History, Past Surgical History, Social History, Family History have been reviewed and are without significant changes except as mentioned.    Review of Systems   Constitutional: Positive for fatigue. Negative for activity change.   Respiratory: Negative.    Cardiovascular: Negative.    Gastrointestinal: Positive for abdominal pain. Negative for nausea and vomiting.   Musculoskeletal: Negative.    Skin: Negative.    Neurological: Negative.    Hematological: Negative.    Psychiatric/Behavioral: Positive for dysphoric mood.   ROS unchanged-8/11/2022      Medications:  The current medication list was reviewed in the EMR    ALLERGIES:    Allergies   Allergen Reactions   • Latex    • Nickel    • Hydrocodone-Acetaminophen Nausea And Vomiting       Objective      Vitals:    08/11/22 0938   BP: 125/85   Pulse: 77   Resp: 16   Temp: 97.4 °F (36.3 °C)   SpO2: 97%          Physical Exam    CONSTITUTIONAL: Adult woman in no acute distress  GI: soft, mild to moderate midepigastric tenderness,  no splenomegaly, +bs,: Drain present left abdomen/side  MUSC: no edema, normal gait  NEURO: alert and oriented x3, normal strength  PSYCH: normal mood and affect    RECENT LABS:  Hematology Results from last 7 days   Lab Units 08/11/22  0544 08/10/22  0618 08/09/22  0640   WBC 10*3/mm3 5.20 5.90 5.17   HEMOGLOBIN g/dL 9.8* 8.8* 8.8*   HEMATOCRIT % 29.8* 26.5* 27.6*   PLATELETS 10*3/mm3 266 237 222     2  Lab Results   Component Value Date    GLUCOSE 99 08/11/2022    BUN 9 08/11/2022    CREATININE 0.62 08/11/2022    EGFRIFNONA 97 04/06/2018    BCR 14.5 08/11/2022    CO2 28.0 08/11/2022    CALCIUM 9.5 08/11/2022    ALBUMIN 3.60 08/11/2022    AST 16 08/11/2022    ALT 20 08/11/2022       Lab Results   Component Value Date    IRON 19 (L) 08/07/2022    TIBC 249 (L) 08/07/2022    FERRITIN 355.00 (H) 08/07/2022       Lab Results   Component Value Date    [id number] 415 08/08/2022       Lab Results   Component Value Date    FOLATE 11.40 08/08/2022      CT abdomen/pelvis 8/6/2022:    IMPRESSION:     1. The patient has heterogeneous enhancement of the left kidney, as well  as extensive urothelial enhancement. The appearance is concerning for  pyelonephritis. There are associated perinephric abscesses, as described  in the body of the report. These may be in continuity with the patient's  proximal left ureter. The inflammation is also intimately associated  with the left wilmer of the diaphragm, the pancreatic tail, and the  posterior wall of the stomach. The patient does have both subcapsular  and intraparenchymal collections within the spleen. These may represent  areas of infarction or hematoma, although superimposed infection cannot  be excluded, given the degree of inflammation within the left upper  quadrant. 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     Ultrasound spleen 8/7/2022    FINDINGS:  Spleen measures 11.9 x 4.2 x 4.3 cm. Along the superior medial  edge of the spleen there is heterogeneous area of echogenicity measuring  about 3.6 cm long corresponding to the lesion seen in the same area on  CT. There is also some heterogeneous echogenicity on the medial lower  portion of the spleen. The findings appear similar to the CT from  yesterday.     IMPRESSION:  Abnormal spleen appears no different than on CT yesterday.    Portal hepatic duplex 8/7/2022:  · All vessels appear normal with normal flow direction and no evidence of thrombus. However, there is a nonvascular fluid collection in the and the splenic vein itself is difficult to visualize with multiple collaterals noted.       Assessment & Plan   *Anemia:  · Admission hemoglobin 8.9 with .5, MCHC 33.5; white blood cell and platelet counts were normal  · B12 415, folate 11.4, ferritin 355, iron sat 8%/TIBC 249, reticulocyte 0.96%, ESR 67, CRP 4.7  · Hemoglobin improved 9.8/    *Probable splenic vein thrombosis with multiple collaterals  · The patient has history of chronic pancreatitis and given the significant collateral formation probably developed splenic vein thrombosis at some point in the past    *Probable perinephric abscesses, infection tracking along wilmer of diaphragm/pancreatic tail/posterior wall of stomach, possible spleen  · Status post CT-guided drain placement 8/8/2022; cultures no growth to date  · Currently on Rocephin    *Esophageal plaques on EGD; no varices    Hematology plan/recommendations:  1. Anemia evaluation looks most consistent with inflammation/chronic disease probably related to infection given the CT findings  2. Iron studies difficult to interpret in the setting of such inflammation  3. Continue to monitor CBC with transfusion support if needed  4. I think the splenic vein thrombosis is likely chronic given the significant collateral formation; I doubt anticoagulation will benefit the patient unless new  thrombus identified  5. Follow-up repeat abdominal imaging when available                8/11/2022      CC:

## 2022-08-11 NOTE — PROGRESS NOTES
Gastroenterology   Inpatient Progress Note    Reason for Follow Up:  Chronic pancreatitis    Subjective  Interval History:   Patient underwent EGD August 10, 2022.    She continues to have abdominal pain, it is less severe but present to left upper quadrant, right side and into her back.    She is tolerating oral intake, no nausea or vomiting.    She is currently using suppository to help with some constipation.    EGD with normal stomach, normal duodenum.  Esophageal patches present suspicious for Candida, brushings pending.    Current Facility-Administered Medications:   •  acetaminophen (TYLENOL) tablet 650 mg, 650 mg, Oral, Q4H PRN, 650 mg at 08/07/22 2344 **OR** acetaminophen (TYLENOL) 160 MG/5ML solution 650 mg, 650 mg, Oral, Q4H PRN **OR** acetaminophen (TYLENOL) suppository 650 mg, 650 mg, Rectal, Q4H PRN, Salvador Price MD  •  acetaminophen (TYLENOL) tablet 650 mg, 650 mg, Oral, Q24H, 650 mg at 08/10/22 2222 **AND** diphenhydrAMINE (BENADRYL) capsule 50 mg, 50 mg, Oral, Q24H, 50 mg at 08/10/22 2222 **AND** iron sucrose (VENOFER) 200 mg in sodium chloride 0.9 % 100 mL IVPB, 200 mg, Intravenous, Q24H, Castro Hilliard MD, 200 mg at 08/10/22 2308  •  amitriptyline (ELAVIL) tablet 10 mg, 10 mg, Oral, Nightly, Salvador Price MD, 10 mg at 08/10/22 2203  •  bisacodyl (DULCOLAX) suppository 10 mg, 10 mg, Rectal, Daily, Castro Hilliard MD, 10 mg at 08/11/22 0811  •  cefTRIAXone (ROCEPHIN) 2 g in sodium chloride 0.9 % 100 mL IVPB-VTB, 2 g, Intravenous, Q24H, Salvador Price MD, Last Rate: 200 mL/hr at 08/11/22 0008, 2 g at 08/11/22 0008  •  cholecalciferol (VITAMIN D3) tablet 5,000 Units, 5,000 Units, Oral, Daily, Salvador Price MD, 5,000 Units at 08/11/22 0809  •  diphenhydrAMINE (BENADRYL) capsule 25 mg, 25 mg, Oral, Q6H PRN, Salvador Price MD, 25 mg at 08/09/22 1953  •  folic acid (FOLVITE) tablet 1 mg, 1 mg, Oral, Daily, Salvador Price MD, 1 mg at 08/11/22 0810  •  HYDROmorphone (DILAUDID) injection 1 mg, 1 mg,  Intravenous, Q2H PRN, 1 mg at 08/11/22 1127 **AND** naloxone (NARCAN) injection 0.4 mg, 0.4 mg, Intravenous, Q5 Min PRN, Salvador Price MD  •  lidocaine (LIDODERM) 5 % 1 patch, 1 patch, Transdermal, Q24H, Salvador Price MD, 1 patch at 08/07/22 1358  •  ondansetron ODT (ZOFRAN-ODT) disintegrating tablet 4 mg, 4 mg, Oral, Q6H PRN, Salvador Price MD, 4 mg at 08/08/22 0002  •  oxyCODONE-acetaminophen (PERCOCET)  MG per tablet 1 tablet, 1 tablet, Oral, Q6H PRN, Salvador Price MD, 1 tablet at 08/11/22 0809  •  pancrelipase (Lip-Prot-Amyl) (CREON) capsule 72,000 units of lipase, 72,000 units of lipase, Oral, TID With Meals, Salvador Price MD, 72,000 units of lipase at 08/11/22 1229  •  pantoprazole (PROTONIX) EC tablet 40 mg, 40 mg, Oral, Daily, Salvador Price MD, 40 mg at 08/11/22 0809  •  polyethylene glycol (MIRALAX) packet 17 g, 17 g, Oral, Daily, Castro Hilliard MD, 17 g at 08/11/22 0809  •  sennosides-docusate (PERICOLACE) 8.6-50 MG per tablet 2 tablet, 2 tablet, Oral, BID, Salvador Price MD, 2 tablet at 08/11/22 0809  •  sodium chloride 0.9 % flush 10 mL, 10 mL, Intravenous, Q12H, Salvador Price MD, 10 mL at 08/10/22 2020  •  sodium chloride 0.9 % flush 10 mL, 10 mL, Intravenous, PRN, Salvador Price MD  •  sodium chloride 0.9 % infusion, 30 mL/hr, Intravenous, Continuous PRN, Salvador Price MD, Stopped at 08/10/22 2159  •  Thiamine Mononitrate tablet 100 mg, 100 mg, Oral, Daily, Salvador Price MD, 100 mg at 08/11/22 0810  Review of Systems:               Gastroenterology positive for constipation, abdominal pain    Objective     Vital Signs  Temp:  [96.7 °F (35.9 °C)-97.7 °F (36.5 °C)] 97.4 °F (36.3 °C)  Heart Rate:  [71-79] 77  Resp:  [15-16] 16  BP: (115-126)/(75-85) 125/85  Body mass index is 17.16 kg/m².                  Physical Exam:              General: patient awake, alert and cooperative              Eyes: no scleral icterus              Skin: warm and dry, not jaundiced              Abdomen: soft,  normal bowel sounds, diffuse abdominal tenderness with light palpation, bowel sounds present x4              Psychiatric: Appropriate affect and behavior                Results Review:                I reviewed the patient's new clinical results.    Results from last 7 days   Lab Units 08/11/22  0544 08/10/22  0618 08/09/22  0640   WBC 10*3/mm3 5.20 5.90 5.17   HEMOGLOBIN g/dL 9.8* 8.8* 8.8*   HEMATOCRIT % 29.8* 26.5* 27.6*   PLATELETS 10*3/mm3 266 237 222     Results from last 7 days   Lab Units 08/11/22  0544 08/10/22  0618 08/09/22  0640 08/07/22  0603 08/06/22  2158   SODIUM mmol/L 141 140 141   < > 139   POTASSIUM mmol/L 4.5 4.1 4.4   < > 4.0   CHLORIDE mmol/L 101 102 104   < > 104   CO2 mmol/L 28.0 30.1* 27.0   < > 22.7   BUN mg/dL 9 9 7   < > 11   CREATININE mg/dL 0.62 0.57 0.57   < > 0.69   CALCIUM mg/dL 9.5 9.4 9.1   < > 9.0   BILIRUBIN mg/dL <0.2  --   --   --  0.2   ALK PHOS U/L 89  --   --   --  68   ALT (SGPT) U/L 20  --   --   --  11   AST (SGOT) U/L 16  --   --   --  12   GLUCOSE mg/dL 99 89 86   < > 102*    < > = values in this interval not displayed.     Results from last 7 days   Lab Units 08/07/22  0603   INR  1.06     Lab Results   Lab Value Date/Time    LIPASE 378 (H) 08/06/2022 2158    LIPASE 95 (H) 01/24/2018 0545    LIPASE 93 (H) 10/01/2017 0356       Radiology:  CT Guided Abscess Drain Kidney Renal   Final Result   Successful drain placement into right upper quadrant fluid collection as   described above.       This report was finalized on 8/8/2022 3:22 PM by Dr. Joshua Dias M.D.          US Spleen   Final Result   Abnormal spleen appears no different than on CT yesterday.       This report was finalized on 8/7/2022 6:48 PM by Dr. Salvador Rothpletz,   M.D.          CT Abdomen Pelvis With Contrast   Final Result       1. The patient has heterogeneous enhancement of the left kidney, as well   as extensive urothelial enhancement. The appearance is concerning for   pyelonephritis. There are  associated perinephric abscesses, as described   in the body of the report. These may be in continuity with the patient's   proximal left ureter. The inflammation is also intimately associated   with the left wilmer of the diaphragm, the pancreatic tail, and the   posterior wall of the stomach. The patient does have both subcapsular   and intraparenchymal collections within the spleen. These may represent   areas of infarction or hematoma, although superimposed infection cannot   be excluded, given the degree of inflammation within the left upper   quadrant. I do think the patient has a splenic vein occlusion, as it   becomes very attenuated, and there are multiple prominent collaterals   identified within the left upper quadrant.        Radiation dose reduction techniques were utilized, including automated   exposure control and exposure modulation based on body size.       This report was finalized on 8/6/2022 11:29 PM by Dr. Camilla Whalen M.D.          XR Chest 1 View   Final Result   No acute findings.       This report was finalized on 8/6/2022 9:17 PM by Dr. Camilla Whalen M.D.          CT Abdomen Pelvis With Contrast    (Results Pending)       Assessment & Plan     Patient Active Problem List   Diagnosis   • Alcohol-induced acute pancreatitis without infection or necrosis   • Alcohol abuse   • Elevated LFTs   • Thrombocytopenia (HCC)   • Epigastric pain   • Abnormal CT of the abdomen   • Chronic pancreatitis (HCC)   • Acute pyelonephritis   • Perinephric abscess   • Splenic vein thrombosis   • Anemia of chronic disease   • GERD without esophagitis   • Alcohol dependence in remission (HCC)       Assessment:  1. Chronic pancreatitis, lipase elevated at 378 upon admission, CT scan with multiple findings but no evidence of acute inflammation of the pancreas.  Patient with abscesses along the pancreatic tail  2. Probable splenic vein thrombosis with multiple collateral vessels.  3. Probable perinephric  abscesses, drained in IR  4. Left-sided pyelonephritis  5. Chronic GERD        Plan:  · Status post EGD, follow-up on esophageal biopsy results, suspicion for esophageal Candida  · Continue to monitor H&H and transfuse per primary protocol  · Antibiotics per infectious disease, currently on Rocephin 2 g daily  · Continue Creon with meals  · Continue Protonix 40 mg daily  · We have also requested most recent CT scan that was performed outpatient and will update medical records accordingly    I discussed the patients findings and my recommendations with patient and nursing staff.           Adrianna HORTON  [facility]  [address] [place] [address]

## 2022-08-11 NOTE — PLAN OF CARE
Goal Outcome Evaluation:   Vitals stable. Ambulating in [place]. PRN pain medications administered. Tolerating diet. CT abdomen/pelvis tomorrow. Pigtail drain irrigated - serous fluid draining. IV KVO.

## 2022-08-12 ENCOUNTER — APPOINTMENT (OUTPATIENT)
Dept: CT IMAGING | Facility: HOSPITAL | Age: 42
End: 2022-08-12

## 2022-08-12 ENCOUNTER — APPOINTMENT (OUTPATIENT)
Dept: OTHER | Facility: HOSPITAL | Age: 42
End: 2022-08-12

## 2022-08-12 LAB
ANION GAP SERPL CALCULATED.3IONS-SCNC: 10.1 MMOL/L (ref 5–15)
BACTERIA SPEC AEROBE CULT: NORMAL
BASOPHILS # BLD AUTO: 0.05 10*3/MM3 (ref 0–0.2)
BASOPHILS NFR BLD AUTO: 0.8 % (ref 0–1.5)
BUN SERPL-MCNC: 10 MG/DL (ref 6–20)
BUN/CREAT SERPL: 14.1 (ref 7–25)
CALCIUM SPEC-SCNC: 9.7 MG/DL (ref 8.6–10.5)
CHLORIDE SERPL-SCNC: 101 MMOL/L (ref 98–107)
CO2 SERPL-SCNC: 29.9 MMOL/L (ref 22–29)
CREAT SERPL-MCNC: 0.71 MG/DL (ref 0.57–1)
DEPRECATED RDW RBC AUTO: 55.7 FL (ref 37–54)
EGFRCR SERPLBLD CKD-EPI 2021: 111.1 ML/MIN/1.73
EOSINOPHIL # BLD AUTO: 0.28 10*3/MM3 (ref 0–0.4)
EOSINOPHIL NFR BLD AUTO: 4.4 % (ref 0.3–6.2)
ERYTHROCYTE [DISTWIDTH] IN BLOOD BY AUTOMATED COUNT: 14.9 % (ref 12.3–15.4)
GLUCOSE SERPL-MCNC: 79 MG/DL (ref 65–99)
HCT VFR BLD AUTO: 30.8 % (ref 34–46.6)
HGB BLD-MCNC: 10 G/DL (ref 12–15.9)
HIV1+2 AB SER QL: NORMAL
IMM GRANULOCYTES # BLD AUTO: 0.01 10*3/MM3 (ref 0–0.05)
IMM GRANULOCYTES NFR BLD AUTO: 0.2 % (ref 0–0.5)
LYMPHOCYTES # BLD AUTO: 2.14 10*3/MM3 (ref 0.7–3.1)
LYMPHOCYTES NFR BLD AUTO: 33.4 % (ref 19.6–45.3)
MCH RBC QN AUTO: 33.7 PG (ref 26.6–33)
MCHC RBC AUTO-ENTMCNC: 32.5 G/DL (ref 31.5–35.7)
MCV RBC AUTO: 103.7 FL (ref 79–97)
MONOCYTES # BLD AUTO: 0.83 10*3/MM3 (ref 0.1–0.9)
MONOCYTES NFR BLD AUTO: 13 % (ref 5–12)
NEUTROPHILS NFR BLD AUTO: 3.09 10*3/MM3 (ref 1.7–7)
NEUTROPHILS NFR BLD AUTO: 48.2 % (ref 42.7–76)
NRBC BLD AUTO-RTO: 0 /100 WBC (ref 0–0.2)
PLATELET # BLD AUTO: 298 10*3/MM3 (ref 140–450)
PMV BLD AUTO: 10 FL (ref 6–12)
POTASSIUM SERPL-SCNC: 4.4 MMOL/L (ref 3.5–5.2)
RBC # BLD AUTO: 2.97 10*6/MM3 (ref 3.77–5.28)
SODIUM SERPL-SCNC: 141 MMOL/L (ref 136–145)
WBC NRBC COR # BLD: 6.4 10*3/MM3 (ref 3.4–10.8)

## 2022-08-12 PROCEDURE — 74177 CT ABD & PELVIS W/CONTRAST: CPT

## 2022-08-12 PROCEDURE — 99231 SBSQ HOSP IP/OBS SF/LOW 25: CPT | Performed by: INTERNAL MEDICINE

## 2022-08-12 PROCEDURE — G0432 EIA HIV-1/HIV-2 SCREEN: HCPCS | Performed by: STUDENT IN AN ORGANIZED HEALTH CARE EDUCATION/TRAINING PROGRAM

## 2022-08-12 PROCEDURE — 25010000002 CEFTRIAXONE PER 250 MG: Performed by: INTERNAL MEDICINE

## 2022-08-12 PROCEDURE — 99232 SBSQ HOSP IP/OBS MODERATE 35: CPT | Performed by: STUDENT IN AN ORGANIZED HEALTH CARE EDUCATION/TRAINING PROGRAM

## 2022-08-12 PROCEDURE — 25010000002 IOPAMIDOL 61 % SOLUTION: Performed by: INTERNAL MEDICINE

## 2022-08-12 PROCEDURE — 25010000002 HYDROMORPHONE 1 MG/ML SOLUTION: Performed by: INTERNAL MEDICINE

## 2022-08-12 PROCEDURE — 99232 SBSQ HOSP IP/OBS MODERATE 35: CPT | Performed by: NURSE PRACTITIONER

## 2022-08-12 PROCEDURE — 25010000002 HYDROMORPHONE PER 4 MG: Performed by: INTERNAL MEDICINE

## 2022-08-12 PROCEDURE — 80048 BASIC METABOLIC PNL TOTAL CA: CPT | Performed by: INTERNAL MEDICINE

## 2022-08-12 PROCEDURE — 85025 COMPLETE CBC W/AUTO DIFF WBC: CPT | Performed by: INTERNAL MEDICINE

## 2022-08-12 RX ORDER — FLUCONAZOLE 200 MG/1
200 TABLET ORAL EVERY 24 HOURS
Status: DISCONTINUED | OUTPATIENT
Start: 2022-08-13 | End: 2022-08-13 | Stop reason: SDUPTHER

## 2022-08-12 RX ORDER — HYDROMORPHONE HYDROCHLORIDE 1 MG/ML
0.5 INJECTION, SOLUTION INTRAMUSCULAR; INTRAVENOUS; SUBCUTANEOUS EVERY 4 HOURS PRN
Status: DISCONTINUED | OUTPATIENT
Start: 2022-08-12 | End: 2022-08-14 | Stop reason: HOSPADM

## 2022-08-12 RX ORDER — NALOXONE HCL 0.4 MG/ML
0.4 VIAL (ML) INJECTION
Status: DISCONTINUED | OUTPATIENT
Start: 2022-08-12 | End: 2022-08-14 | Stop reason: HOSPADM

## 2022-08-12 RX ORDER — FLUCONAZOLE 200 MG/1
200 TABLET ORAL EVERY 24 HOURS
Status: DISCONTINUED | OUTPATIENT
Start: 2022-08-13 | End: 2022-08-14 | Stop reason: HOSPADM

## 2022-08-12 RX ORDER — FLUCONAZOLE 200 MG/1
400 TABLET ORAL ONCE
Status: COMPLETED | OUTPATIENT
Start: 2022-08-12 | End: 2022-08-12

## 2022-08-12 RX ORDER — FLUCONAZOLE 200 MG/1
400 TABLET ORAL ONCE
Status: DISCONTINUED | OUTPATIENT
Start: 2022-08-12 | End: 2022-08-13 | Stop reason: SDUPTHER

## 2022-08-12 RX ADMIN — BISACODYL 10 MG: 10 SUPPOSITORY RECTAL at 11:48

## 2022-08-12 RX ADMIN — DOCUSATE SODIUM 50MG AND SENNOSIDES 8.6MG 2 TABLET: 8.6; 5 TABLET, FILM COATED ORAL at 08:10

## 2022-08-12 RX ADMIN — HYDROMORPHONE HYDROCHLORIDE 1 MG: 1 INJECTION, SOLUTION INTRAMUSCULAR; INTRAVENOUS; SUBCUTANEOUS at 08:10

## 2022-08-12 RX ADMIN — Medication 5000 UNITS: at 08:11

## 2022-08-12 RX ADMIN — DOCUSATE SODIUM 50MG AND SENNOSIDES 8.6MG 2 TABLET: 8.6; 5 TABLET, FILM COATED ORAL at 20:02

## 2022-08-12 RX ADMIN — Medication 10 ML: at 20:01

## 2022-08-12 RX ADMIN — HYDROMORPHONE HYDROCHLORIDE 1 MG: 1 INJECTION, SOLUTION INTRAMUSCULAR; INTRAVENOUS; SUBCUTANEOUS at 14:17

## 2022-08-12 RX ADMIN — HYDROMORPHONE HYDROCHLORIDE 1 MG: 1 INJECTION, SOLUTION INTRAMUSCULAR; INTRAVENOUS; SUBCUTANEOUS at 03:37

## 2022-08-12 RX ADMIN — OXYCODONE HYDROCHLORIDE AND ACETAMINOPHEN 1 TABLET: 10; 325 TABLET ORAL at 23:51

## 2022-08-12 RX ADMIN — AMITRIPTYLINE HYDROCHLORIDE 10 MG: 10 TABLET, FILM COATED ORAL at 20:02

## 2022-08-12 RX ADMIN — CEFTRIAXONE 2 G: 2 INJECTION, POWDER, FOR SOLUTION INTRAMUSCULAR; INTRAVENOUS at 00:53

## 2022-08-12 RX ADMIN — OXYCODONE HYDROCHLORIDE AND ACETAMINOPHEN 1 TABLET: 10; 325 TABLET ORAL at 11:48

## 2022-08-12 RX ADMIN — PANCRELIPASE 72000 UNITS OF LIPASE: 60000; 12000; 38000 CAPSULE, DELAYED RELEASE PELLETS ORAL at 17:45

## 2022-08-12 RX ADMIN — HYDROMORPHONE HYDROCHLORIDE 0.5 MG: 1 INJECTION, SOLUTION INTRAMUSCULAR; INTRAVENOUS; SUBCUTANEOUS at 19:57

## 2022-08-12 RX ADMIN — ACETAMINOPHEN 650 MG: 325 TABLET, FILM COATED ORAL at 22:09

## 2022-08-12 RX ADMIN — FLUCONAZOLE 400 MG: 200 TABLET ORAL at 17:46

## 2022-08-12 RX ADMIN — HYDROMORPHONE HYDROCHLORIDE 1 MG: 1 INJECTION, SOLUTION INTRAMUSCULAR; INTRAVENOUS; SUBCUTANEOUS at 01:02

## 2022-08-12 RX ADMIN — CEFTRIAXONE 2 G: 2 INJECTION, POWDER, FOR SOLUTION INTRAMUSCULAR; INTRAVENOUS at 23:51

## 2022-08-12 RX ADMIN — POLYETHYLENE GLYCOL 3350 17 G: 17 POWDER, FOR SOLUTION ORAL at 08:10

## 2022-08-12 RX ADMIN — PANTOPRAZOLE SODIUM 40 MG: 40 TABLET, DELAYED RELEASE ORAL at 08:10

## 2022-08-12 RX ADMIN — PANCRELIPASE 72000 UNITS OF LIPASE: 60000; 12000; 38000 CAPSULE, DELAYED RELEASE PELLETS ORAL at 08:11

## 2022-08-12 RX ADMIN — OXYCODONE HYDROCHLORIDE AND ACETAMINOPHEN 1 TABLET: 10; 325 TABLET ORAL at 17:45

## 2022-08-12 RX ADMIN — Medication 1 MG: at 08:12

## 2022-08-12 RX ADMIN — Medication 100 MG: at 08:12

## 2022-08-12 RX ADMIN — IOPAMIDOL 100 ML: 612 INJECTION, SOLUTION INTRAVENOUS at 08:02

## 2022-08-12 RX ADMIN — PANCRELIPASE 72000 UNITS OF LIPASE: 60000; 12000; 38000 CAPSULE, DELAYED RELEASE PELLETS ORAL at 12:45

## 2022-08-12 NOTE — PROGRESS NOTES
Gastroenterology   Inpatient Progress Note    Reason for Follow Up:  Chronic pancreatitis    Subjective  Interval History:   Biopsies from EGD revealed acid reflux and esophageal Candida.  She has used suppository, MiraLAX and stool softener with small bowel movement today.  She is tolerating oral intake, no nausea or vomiting.  She is having worsening left flank pain, imaging was obtained.      Current Facility-Administered Medications:   •  acetaminophen (TYLENOL) tablet 650 mg, 650 mg, Oral, Q4H PRN, 650 mg at 08/07/22 2344 **OR** acetaminophen (TYLENOL) 160 MG/5ML solution 650 mg, 650 mg, Oral, Q4H PRN **OR** acetaminophen (TYLENOL) suppository 650 mg, 650 mg, Rectal, Q4H PRN, Salvador Price MD  •  acetaminophen (TYLENOL) tablet 650 mg, 650 mg, Oral, Q24H, 650 mg at 08/11/22 2204 **AND** diphenhydrAMINE (BENADRYL) capsule 50 mg, 50 mg, Oral, Q24H, 50 mg at 08/11/22 2205 **AND** iron sucrose (VENOFER) 200 mg in sodium chloride 0.9 % 100 mL IVPB, 200 mg, Intravenous, Q24H, Castro Hilliard MD, Stopped at 08/11/22 2250  •  amitriptyline (ELAVIL) tablet 10 mg, 10 mg, Oral, Nightly, Salvador Price MD, 10 mg at 08/11/22 2208  •  bisacodyl (DULCOLAX) suppository 10 mg, 10 mg, Rectal, Daily, Castro Hilliard MD, 10 mg at 08/12/22 1148  •  cefTRIAXone (ROCEPHIN) 2 g in sodium chloride 0.9 % 100 mL IVPB-VTB, 2 g, Intravenous, Q24H, Salvador Price MD, Last Rate: 200 mL/hr at 08/12/22 0053, 2 g at 08/12/22 0053  •  cholecalciferol (VITAMIN D3) tablet 5,000 Units, 5,000 Units, Oral, Daily, Salvador Price MD, 5,000 Units at 08/12/22 0811  •  diphenhydrAMINE (BENADRYL) capsule 25 mg, 25 mg, Oral, Q6H PRN, Salvador Price MD, 25 mg at 08/09/22 1953  •  folic acid (FOLVITE) tablet 1 mg, 1 mg, Oral, Daily, Salvador Price MD, 1 mg at 08/12/22 0812  •  HYDROmorphone (DILAUDID) injection 1 mg, 1 mg, Intravenous, Q2H PRN, 1 mg at 08/12/22 0810 **AND** naloxone (NARCAN) injection 0.4 mg, 0.4 mg, Intravenous, Q5 Min PRN, Salvador Price  MD YOLANDE  •  lidocaine (LIDODERM) 5 % 1 patch, 1 patch, Transdermal, Q24H, Salavdor Price MD, 1 patch at 08/07/22 1358  •  ondansetron ODT (ZOFRAN-ODT) disintegrating tablet 4 mg, 4 mg, Oral, Q6H PRN, Salvador Price MD, 4 mg at 08/11/22 1443  •  oxyCODONE-acetaminophen (PERCOCET)  MG per tablet 1 tablet, 1 tablet, Oral, Q6H PRN, Salvador Price MD, 1 tablet at 08/12/22 1148  •  pancrelipase (Lip-Prot-Amyl) (CREON) capsule 72,000 units of lipase, 72,000 units of lipase, Oral, TID With Meals, Salvador Price MD, 72,000 units of lipase at 08/12/22 0811  •  pantoprazole (PROTONIX) EC tablet 40 mg, 40 mg, Oral, Daily, Salvador Price MD, 40 mg at 08/12/22 0810  •  polyethylene glycol (MIRALAX) packet 17 g, 17 g, Oral, Daily, Castro Hilliard MD, 17 g at 08/12/22 0810  •  sennosides-docusate (PERICOLACE) 8.6-50 MG per tablet 2 tablet, 2 tablet, Oral, BID, Salvador Price MD, 2 tablet at 08/12/22 0810  •  sodium chloride 0.9 % flush 10 mL, 10 mL, Intravenous, Q12H, Salvador Price MD, 10 mL at 08/11/22 2206  •  sodium chloride 0.9 % flush 10 mL, 10 mL, Intravenous, PRN, Salvador Price MD  •  sodium chloride 0.9 % infusion, 30 mL/hr, Intravenous, Continuous PRN, Salvador Price MD, Stopped at 08/10/22 2159  •  Thiamine Mononitrate tablet 100 mg, 100 mg, Oral, Daily, Salvador Price MD, 100 mg at 08/12/22 0812  Review of Systems:               Gastroenterology positive for abdominal pain and left flank pain    Objective     Vital Signs  Temp:  [97.4 °F (36.3 °C)-97.7 °F (36.5 °C)] 97.7 °F (36.5 °C)  Heart Rate:  [74-77] 74  Resp:  [15-16] 16  BP: (109-125)/(77-85) 123/81  Body mass index is 17.16 kg/m².                  Physical Exam:              General: patient awake, alert and cooperative              Eyes: no scleral icterus              Skin: warm and dry, not jaundiced              Abdomen: soft, normal bowel sounds, left upper quadrant and left flank pain as well as lower abdominal pain described as gas and pressure               Psychiatric: Appropriate affect and behavior                Results Review:                I reviewed the patient's new clinical results.    Results from last 7 days   Lab Units 08/12/22  0531 08/11/22  0544 08/10/22  0618   WBC 10*3/mm3 6.40 5.20 5.90   HEMOGLOBIN g/dL 10.0* 9.8* 8.8*   HEMATOCRIT % 30.8* 29.8* 26.5*   PLATELETS 10*3/mm3 298 266 237     Results from last 7 days   Lab Units 08/12/22  0531 08/11/22  0544 08/10/22  0618 08/07/22  0603 08/06/22  2158   SODIUM mmol/L 141 141 140   < > 139   POTASSIUM mmol/L 4.4 4.5 4.1   < > 4.0   CHLORIDE mmol/L 101 101 102   < > 104   CO2 mmol/L 29.9* 28.0 30.1*   < > 22.7   BUN mg/dL 10 9 9   < > 11   CREATININE mg/dL 0.71 0.62 0.57   < > 0.69   CALCIUM mg/dL 9.7 9.5 9.4   < > 9.0   BILIRUBIN mg/dL  --  <0.2  --   --  0.2   ALK PHOS U/L  --  89  --   --  68   ALT (SGPT) U/L  --  20  --   --  11   AST (SGOT) U/L  --  16  --   --  12   GLUCOSE mg/dL 79 99 89   < > 102*    < > = values in this interval not displayed.     Results from last 7 days   Lab Units 08/07/22  0603   INR  1.06     Lab Results   Lab Value Date/Time    LIPASE 378 (H) 08/06/2022 2158    LIPASE 95 (H) 01/24/2018 0545    LIPASE 93 (H) 10/01/2017 0356       Radiology:  CT Outside Films   Final Result      CT Abdomen Pelvis With Contrast   Preliminary Result   Interval resolution of the anterior left perinephric fluid   collection that has been appropriately drained with a percutaneous   catheter. Urothelial enhancement along the left renal pelvis and   proximal ureter remains. Diffuse phlegmon and stranding is still present   surrounding the kidney and posterior body and tail of the pancreas. A   gastrosplenic collection intimately associated with the medial aspect of   the spleen demonstrates decreased density however, mild early increase   in size. Additional perisplenic fluid collections are likely unchanged.   The splenic vein is occluded with perigastric venous collaterals.   Evidence of  chronic pancreatitis. These findings may represent sequela   from hemorrhagic and/or infected pancreatic pseudocysts.       Radiation dose reduction techniques were utilized, including automated   exposure control and exposure modulation based on body size.              CT Guided Abscess Drain Kidney Renal   Final Result   Successful drain placement into right upper quadrant fluid collection as   described above.       This report was finalized on 8/8/2022 3:22 PM by Dr. Joshua Dias M.D.           Spleen   Final Result   Abnormal spleen appears no different than on CT yesterday.       This report was finalized on 8/7/2022 6:48 PM by Dr. Salvador Perdomo M.D.          CT Abdomen Pelvis With Contrast   Final Result       1. The patient has heterogeneous enhancement of the left kidney, as well   as extensive urothelial enhancement. The appearance is concerning for   pyelonephritis. There are associated perinephric abscesses, as described   in the body of the report. These may be in continuity with the patient's   proximal left ureter. The inflammation is also intimately associated   with the left wilmer of the diaphragm, the pancreatic tail, and the   posterior wall of the stomach. The patient does have both subcapsular   and intraparenchymal collections within the spleen. These may represent   areas of infarction or hematoma, although superimposed infection cannot   be excluded, given the degree of inflammation within the left upper   quadrant. I do think the patient has a splenic vein occlusion, as it   becomes very attenuated, and there are multiple prominent collaterals   identified within the left upper quadrant.        Radiation dose reduction techniques were utilized, including automated   exposure control and exposure modulation based on body size.       This report was finalized on 8/6/2022 11:29 PM by Dr. Camilla Whalen M.D.          XR Chest 1 View   Final Result   No acute findings.       This  report was finalized on 8/6/2022 9:17 PM by Dr. Camilla Whalen M.D.              Assessment & Plan     Patient Active Problem List   Diagnosis   • Alcohol-induced acute pancreatitis without infection or necrosis   • Alcohol abuse   • Elevated LFTs   • Thrombocytopenia (HCC)   • Epigastric pain   • Abnormal CT of the abdomen   • Chronic pancreatitis (HCC)   • Acute pyelonephritis   • Perinephric abscess   • Splenic vein thrombosis   • Anemia of chronic disease   • GERD without esophagitis   • Alcohol dependence in remission (HCC)       Assessment:  1. Chronic pancreatitis, lipase elevated at 378 upon admission, CT scan with multiple findings but no evidence of acute inflammation of the pancreas.  Patient with abscesses along the pancreatic tail  2. Probable splenic vein thrombus with multiple collateral vessels thought to be chronic  3. Probable perinephric abscess, drained in IR  4. Left-sided pyelonephritis  5. Chronic GERD      Plan:  · EGD pathology reviewed, esophageal Candida and esophagitis.  We will start fluconazole 400 mg on day 1 then fluconazole 200 mg x 13 days.  · Continue Creon with meals  · Continue Protonix 40 mg daily  · Continue to monitor H&H and transfuse per primary protocol  · Patient is also being followed by infectious disease and surgery         I discussed the patients findings and my recommendations with patient and nursing staff.           Adrianna HORTON  [facility] [place]  [address] [place] [id number]

## 2022-08-12 NOTE — PROGRESS NOTES
REASON FOR FOLLOW-UP: Anemia, possible splenic vein thrombosis    HISTORY OF PRESENT ILLNESS:   This is a 41-year-old woman with chronic pancreatitis who presented with several month history of worsening midepigastric pain radiating to the left shoulder.  On admission imaging of the abdomen showed heterogeneous enhancement of the left kidney extensive urothelial enhancement, perinephric abscesses, inflammation associated with wilmer of the diaphragm pancreatic tail and posterior wall of the stomach, fluid collections in the spleen possibly representing infarction hematoma or infection.  Probable splenic vein occlusion with multiple prominent collaterals.    The patient had a drain placed by interventional radiology 8/8/2022.      The patient continues to complain of left side pain.      Past Medical History, Past Surgical History, Social History, Family History have been reviewed and are without significant changes except as mentioned.    Review of Systems   Constitutional: Positive for fatigue. Negative for activity change.   Respiratory: Negative.    Cardiovascular: Negative.    Gastrointestinal: Positive for abdominal pain. Negative for nausea and vomiting.   Musculoskeletal: Negative.    Skin: Negative.    Neurological: Negative.    Hematological: Negative.    Psychiatric/Behavioral: Positive for dysphoric mood.   ROS unchanged- 8/12/2022      Medications:  The current medication list was reviewed in the EMR    ALLERGIES:    Allergies   Allergen Reactions   • Latex    • Nickel    • Hydrocodone-Acetaminophen Nausea And Vomiting       Objective      Vitals:    08/12/22 0920   BP: 123/81   Pulse: 74   Resp: 16   Temp: 97.7 °F (36.5 °C)   SpO2: 98%          Physical Exam    CONSTITUTIONAL: Adult woman in no acute distress  GI: soft, mild to moderate midepigastric tenderness, no splenomegaly, +bs,: Drain present left abdomen/side  MUSC: no edema, normal gait  NEURO: alert and oriented x3, normal strength  PSYCH: normal  mood and affect  Exam unchanged-8/12/2022  RECENT LABS:  Hematology Results from last 7 days   Lab Units 08/12/22  0531 08/11/22  0544 08/10/22  0618   WBC 10*3/mm3 6.40 5.20 5.90   HEMOGLOBIN g/dL 10.0* 9.8* 8.8*   HEMATOCRIT % 30.8* 29.8* 26.5*   PLATELETS 10*3/mm3 298 266 237     2  Lab Results   Component Value Date    GLUCOSE 79 08/12/2022    BUN 10 08/12/2022    CREATININE 0.71 08/12/2022    EGFRIFNONA 97 04/06/2018    BCR 14.1 08/12/2022    CO2 29.9 (H) 08/12/2022    CALCIUM 9.7 08/12/2022    ALBUMIN 3.60 08/11/2022    AST 16 08/11/2022    ALT 20 08/11/2022       Lab Results   Component Value Date    IRON 19 (L) 08/07/2022    TIBC 249 (L) 08/07/2022    FERRITIN 355.00 (H) 08/07/2022       Lab Results   Component Value Date    [id number] 415 08/08/2022       Lab Results   Component Value Date    FOLATE 11.40 08/08/2022      CT abdomen/pelvis 8/12/2022:   MPRESSION:  Interval resolution of the anterior left perinephric fluid  collection that has been appropriately drained with a percutaneous  catheter. Urothelial enhancement along the left renal pelvis and  proximal ureter remains. Diffuse phlegmon and stranding is still present  surrounding the kidney and posterior body and tail of the pancreas. A  gastrosplenic collection intimately associated with the medial aspect of  the spleen demonstrates decreased density however, mild early increase  in size. Additional perisplenic fluid collections are likely unchanged.  The splenic vein is occluded with perigastric venous collaterals.  Evidence of chronic pancreatitis. These findings may represent sequela  from hemorrhagic and/or infected pancreatic pseudocysts.    Assessment & Plan   *Anemia:  · Admission hemoglobin 8.9 with .5, MCHC 33.5; white blood cell and platelet counts were normal  · B12 415, folate 11.4, ferritin 355, iron sat 8%/TIBC 249, reticulocyte 0.96%, ESR 67, CRP 4.7  · Hemoglobin improved 10.0    *Probable splenic vein thrombosis with  multiple collaterals  · The patient has history of chronic pancreatitis and given the significant collateral formation probably developed splenic vein thrombosis at some point in the past related to her chronic pancreatitis    *Probable perinephric abscesses, infection tracking along wilmer of diaphragm/pancreatic tail/posterior wall of stomach, possible spleen  · Status post CT-guided drain placement 8/8/2022       *Esophageal candida on EGD; no varices    Hematology plan/recommendations:  1. Anemia evaluation looks most consistent with inflammation/chronic disease probably related to infection given the CT findings-hemoglobin improving  2. Iron studies difficult to interpret in the setting of such inflammation  3. Continue to monitor CBC with transfusion support if needed  4. I think the splenic vein thrombosis is likely chronic given the significant collateral formation; I doubt anticoagulation will benefit the patient   5. ? Need to check HIV status    Nothing else to add at this point.  Hematology will sign off; please call if further needed during the hospital stay.                8/12/2022      CC:

## 2022-08-12 NOTE — PROGRESS NOTES
Patient is admitted and pathology to be discussed with her today by our team.  Pathology shows Candida esophagitis.

## 2022-08-12 NOTE — PLAN OF CARE
Goal Outcome Evaluation:  Plan of Care Reviewed With: patient        Progress: improving  Outcome Evaluation: VSS. Tolerating regular diet. PO and IV meds for pain. Ambulating halls continuously. Voiding freely. Large BM, but still feeling constipated.

## 2022-08-12 NOTE — PROGRESS NOTES
LOS: 5 days     Chief Complaint: Perinephric abscess    Interval History: Patient reports she is having significant pain in the left upper abdomen today.  Denies any fevers or chills.  Continues to have clear yellow output in her drain.  Denies any nausea, vomiting, diarrhea.  Does report she has somewhat constipated.  Status post repeat CT this morning.    Vital Signs  Temp:  [97.4 °F (36.3 °C)-97.7 °F (36.5 °C)] 97.7 °F (36.5 °C)  Heart Rate:  [74-77] 74  Resp:  [15-16] 16  BP: (109-125)/(77-85) 123/81    Physical Exam:  General: In no acute distress  HEENT: Oropharynx clear  Cardiovascular: RRR  Respiratory: Normal work of breathing.  No wheezing.  GI: Soft, nondistended, tender to palpation diffusely.  Abdominal drain site clean and intact.  Skin: No rashes or lesions  Extremities: No edema, cyanosis  Access: Peripheral IV.    Antibiotics:  Anti-Infectives (From admission, onward)    Ordered     Dose/Rate Route Frequency Start Stop    08/07/22 1300  cefTRIAXone (ROCEPHIN) 2 g in sodium chloride 0.9 % 100 mL IVPB-VTB        Ordering Provider: Salvador Price MD    2 g  200 mL/hr over 30 Minutes Intravenous Every 24 Hours 08/08/22 0000 08/14/22 2359    08/06/22 2350  cefTRIAXone (ROCEPHIN) 2 g in sodium chloride 0.9 % 100 mL IVPB-VTB        Ordering Provider: Salvador Bach PA    2 g  200 mL/hr over 30 Minutes Intravenous Once 08/06/22 2352 08/07/22 0115           Results Review:     I reviewed the patient's new clinical results.    Lab Results   Component Value Date    WBC 6.40 08/12/2022    HGB 10.0 (L) 08/12/2022    HCT 30.8 (L) 08/12/2022    .7 (H) 08/12/2022     08/12/2022     Lab Results   Component Value Date    GLUCOSE 79 08/12/2022    BUN 10 08/12/2022    CREATININE 0.71 08/12/2022    EGFRIFNONA 97 04/06/2018    BCR 14.1 08/12/2022    CO2 29.9 (H) 08/12/2022    CALCIUM 9.7 08/12/2022    ALBUMIN 3.60 08/11/2022    AST 16 08/11/2022    ALT 20 08/11/2022       Microbiology:  8/7 blood  culture 1 out of 1 no growth to date  8/8 blood culture 1 out of 1 no growth to date  8/8 IR perinephric abscess culture no growth to date    Assessment    #Perinephric abscess  #Left-sided pyelonephritis  #Multiloculated heterogeneous splenic collections  #Chronic pancreatitis    Continue empiric IV ceftriaxone 2 g daily in the setting of continued negative cultures from the perinephric abscess.  Repeat CT has been performed this morning and discussed taking her old imaging from outside hospital to radiology for comparison and appreciate their help.  Plan to follow-up on repeat CT to assess improvement.  Appreciate general surgery's help.    ID will follow.     Addendum:  EGD pathology with esophageal candidiasis.  Plan to start fluconazole for 14-day course with 200 mg daily.

## 2022-08-12 NOTE — NURSING NOTE
Pt c/o numbness onher LH and pain all over her LUE as soon as the  Venofer infusion was started.    Stopped infusion.  Vitals has been stable.

## 2022-08-13 LAB
ANION GAP SERPL CALCULATED.3IONS-SCNC: 12.1 MMOL/L (ref 5–15)
BACTERIA FLD CULT: NORMAL
BACTERIA SPEC AEROBE CULT: NORMAL
BACTERIA SPEC ANAEROBE CULT: NORMAL
BASOPHILS # BLD AUTO: 0.05 10*3/MM3 (ref 0–0.2)
BASOPHILS NFR BLD AUTO: 1 % (ref 0–1.5)
BUN SERPL-MCNC: 12 MG/DL (ref 6–20)
BUN/CREAT SERPL: 17.1 (ref 7–25)
CALCIUM SPEC-SCNC: 9.7 MG/DL (ref 8.6–10.5)
CHLORIDE SERPL-SCNC: 100 MMOL/L (ref 98–107)
CO2 SERPL-SCNC: 28.9 MMOL/L (ref 22–29)
CREAT SERPL-MCNC: 0.7 MG/DL (ref 0.57–1)
DEPRECATED RDW RBC AUTO: 56 FL (ref 37–54)
EGFRCR SERPLBLD CKD-EPI 2021: 113 ML/MIN/1.73
EOSINOPHIL # BLD AUTO: 0.21 10*3/MM3 (ref 0–0.4)
EOSINOPHIL NFR BLD AUTO: 4.1 % (ref 0.3–6.2)
ERYTHROCYTE [DISTWIDTH] IN BLOOD BY AUTOMATED COUNT: 15.1 % (ref 12.3–15.4)
GLUCOSE SERPL-MCNC: 103 MG/DL (ref 65–99)
GRAM STN SPEC: NORMAL
GRAM STN SPEC: NORMAL
HCT VFR BLD AUTO: 30.8 % (ref 34–46.6)
HGB BLD-MCNC: 10 G/DL (ref 12–15.9)
IMM GRANULOCYTES # BLD AUTO: 0.01 10*3/MM3 (ref 0–0.05)
IMM GRANULOCYTES NFR BLD AUTO: 0.2 % (ref 0–0.5)
LYMPHOCYTES # BLD AUTO: 1.69 10*3/MM3 (ref 0.7–3.1)
LYMPHOCYTES NFR BLD AUTO: 33.3 % (ref 19.6–45.3)
MCH RBC QN AUTO: 33.1 PG (ref 26.6–33)
MCHC RBC AUTO-ENTMCNC: 32.5 G/DL (ref 31.5–35.7)
MCV RBC AUTO: 102 FL (ref 79–97)
MONOCYTES # BLD AUTO: 0.68 10*3/MM3 (ref 0.1–0.9)
MONOCYTES NFR BLD AUTO: 13.4 % (ref 5–12)
NEUTROPHILS NFR BLD AUTO: 2.43 10*3/MM3 (ref 1.7–7)
NEUTROPHILS NFR BLD AUTO: 48 % (ref 42.7–76)
NRBC BLD AUTO-RTO: 0 /100 WBC (ref 0–0.2)
PLATELET # BLD AUTO: 267 10*3/MM3 (ref 140–450)
PMV BLD AUTO: 9.7 FL (ref 6–12)
POTASSIUM SERPL-SCNC: 4.2 MMOL/L (ref 3.5–5.2)
RBC # BLD AUTO: 3.02 10*6/MM3 (ref 3.77–5.28)
SODIUM SERPL-SCNC: 141 MMOL/L (ref 136–145)
WBC NRBC COR # BLD: 5.07 10*3/MM3 (ref 3.4–10.8)

## 2022-08-13 PROCEDURE — 80048 BASIC METABOLIC PNL TOTAL CA: CPT | Performed by: INTERNAL MEDICINE

## 2022-08-13 PROCEDURE — 99231 SBSQ HOSP IP/OBS SF/LOW 25: CPT | Performed by: SURGERY

## 2022-08-13 PROCEDURE — 99231 SBSQ HOSP IP/OBS SF/LOW 25: CPT | Performed by: INTERNAL MEDICINE

## 2022-08-13 PROCEDURE — 85025 COMPLETE CBC W/AUTO DIFF WBC: CPT | Performed by: INTERNAL MEDICINE

## 2022-08-13 PROCEDURE — 99232 SBSQ HOSP IP/OBS MODERATE 35: CPT | Performed by: PHYSICIAN ASSISTANT

## 2022-08-13 PROCEDURE — 25010000002 HYDROMORPHONE PER 4 MG: Performed by: INTERNAL MEDICINE

## 2022-08-13 RX ORDER — CEFDINIR 300 MG/1
300 CAPSULE ORAL EVERY 12 HOURS SCHEDULED
Status: DISCONTINUED | OUTPATIENT
Start: 2022-08-13 | End: 2022-08-14 | Stop reason: HOSPADM

## 2022-08-13 RX ADMIN — Medication 10 ML: at 20:05

## 2022-08-13 RX ADMIN — HYDROMORPHONE HYDROCHLORIDE 0.5 MG: 1 INJECTION, SOLUTION INTRAMUSCULAR; INTRAVENOUS; SUBCUTANEOUS at 08:19

## 2022-08-13 RX ADMIN — OXYCODONE HYDROCHLORIDE AND ACETAMINOPHEN 1 TABLET: 10; 325 TABLET ORAL at 05:54

## 2022-08-13 RX ADMIN — DOCUSATE SODIUM 50MG AND SENNOSIDES 8.6MG 2 TABLET: 8.6; 5 TABLET, FILM COATED ORAL at 20:03

## 2022-08-13 RX ADMIN — Medication 10 ML: at 08:48

## 2022-08-13 RX ADMIN — OXYCODONE HYDROCHLORIDE AND ACETAMINOPHEN 1 TABLET: 10; 325 TABLET ORAL at 20:03

## 2022-08-13 RX ADMIN — OXYCODONE HYDROCHLORIDE AND ACETAMINOPHEN 1 TABLET: 10; 325 TABLET ORAL at 12:19

## 2022-08-13 RX ADMIN — Medication 1 MG: at 08:48

## 2022-08-13 RX ADMIN — BISACODYL 10 MG: 10 SUPPOSITORY RECTAL at 20:06

## 2022-08-13 RX ADMIN — PANCRELIPASE 72000 UNITS OF LIPASE: 60000; 12000; 38000 CAPSULE, DELAYED RELEASE PELLETS ORAL at 08:16

## 2022-08-13 RX ADMIN — DOCUSATE SODIUM 50MG AND SENNOSIDES 8.6MG 2 TABLET: 8.6; 5 TABLET, FILM COATED ORAL at 08:47

## 2022-08-13 RX ADMIN — PANCRELIPASE 72000 UNITS OF LIPASE: 60000; 12000; 38000 CAPSULE, DELAYED RELEASE PELLETS ORAL at 11:35

## 2022-08-13 RX ADMIN — HYDROMORPHONE HYDROCHLORIDE 0.5 MG: 1 INJECTION, SOLUTION INTRAMUSCULAR; INTRAVENOUS; SUBCUTANEOUS at 22:13

## 2022-08-13 RX ADMIN — HYDROMORPHONE HYDROCHLORIDE 0.5 MG: 1 INJECTION, SOLUTION INTRAMUSCULAR; INTRAVENOUS; SUBCUTANEOUS at 16:02

## 2022-08-13 RX ADMIN — Medication 5000 UNITS: at 08:48

## 2022-08-13 RX ADMIN — CEFDINIR 300 MG: 300 CAPSULE ORAL at 16:00

## 2022-08-13 RX ADMIN — Medication 100 MG: at 08:48

## 2022-08-13 RX ADMIN — FLUCONAZOLE 200 MG: 200 TABLET ORAL at 08:48

## 2022-08-13 RX ADMIN — HYDROMORPHONE HYDROCHLORIDE 0.5 MG: 1 INJECTION, SOLUTION INTRAMUSCULAR; INTRAVENOUS; SUBCUTANEOUS at 01:10

## 2022-08-13 RX ADMIN — PANTOPRAZOLE SODIUM 40 MG: 40 TABLET, DELAYED RELEASE ORAL at 08:47

## 2022-08-13 RX ADMIN — PANCRELIPASE 72000 UNITS OF LIPASE: 60000; 12000; 38000 CAPSULE, DELAYED RELEASE PELLETS ORAL at 16:58

## 2022-08-13 RX ADMIN — AMITRIPTYLINE HYDROCHLORIDE 10 MG: 10 TABLET, FILM COATED ORAL at 22:13

## 2022-08-13 RX ADMIN — POLYETHYLENE GLYCOL 3350 17 G: 17 POWDER, FOR SOLUTION ORAL at 08:47

## 2022-08-13 NOTE — PROGRESS NOTES
[facility]  Inpatient Progress Note    Reason for Follow-up: Chronic pancreatitis    Subjective     Interval History:   Resting comfortably.  Complains of abdominal bloating.  No bowel movement in 2 to 3 days.  No nausea.  Complaints of chronic left upper quadrant pain.  Left lower quadrant drain removed this morning by general surgery with expected pain at the site.      Current Facility-Administered Medications:   •  acetaminophen (TYLENOL) tablet 650 mg, 650 mg, Oral, Q4H PRN, 650 mg at 08/12/22 2209 **OR** acetaminophen (TYLENOL) 160 MG/5ML solution 650 mg, 650 mg, Oral, Q4H PRN **OR** acetaminophen (TYLENOL) suppository 650 mg, 650 mg, Rectal, Q4H PRN, Salvador Price MD  •  amitriptyline (ELAVIL) tablet 10 mg, 10 mg, Oral, Nightly, Salvador Price MD, 10 mg at 08/12/22 2002  •  bisacodyl (DULCOLAX) suppository 10 mg, 10 mg, Rectal, Daily, Castro Hilliard MD, 10 mg at 08/12/22 1148  •  cefTRIAXone (ROCEPHIN) 2 g in sodium chloride 0.9 % 100 mL IVPB-VTB, 2 g, Intravenous, Q24H, Salvador Price MD, Last Rate: 200 mL/hr at 08/12/22 2351, 2 g at 08/12/22 2351  •  cholecalciferol (VITAMIN D3) tablet 5,000 Units, 5,000 Units, Oral, Daily, Salvador Price MD, 5,000 Units at 08/13/22 0848  •  diphenhydrAMINE (BENADRYL) capsule 25 mg, 25 mg, Oral, Q6H PRN, Salvador Price MD, 25 mg at 08/09/22 1953  •  fluconazole (DIFLUCAN) tablet 400 mg, 400 mg, Oral, Once **FOLLOWED BY** fluconazole (DIFLUCAN) tablet 200 mg, 200 mg, Oral, Q24H, Erik Hinton, DO  •  fluconazole (DIFLUCAN) tablet 200 mg, 200 mg, Oral, Q24H, Adrianna Walton APRN, 200 mg at 08/13/22 0848  •  folic acid (FOLVITE) tablet 1 mg, 1 mg, Oral, Daily, Salvador Price MD, 1 mg at 08/13/22 0848  •  HYDROmorphone (DILAUDID) injection 0.5 mg, 0.5 mg, Intravenous, Q4H PRN, 0.5 mg at 08/13/22 0819 **AND** naloxone (NARCAN) injection 0.4 mg, 0.4 mg, Intravenous, Q5 Min PRN, Castro Hilliard MD  •  lidocaine (LIDODERM) 5 % 1 patch, 1  patch, Transdermal, Q24H, Salvador Price MD, 1 patch at 08/07/22 1358  •  ondansetron ODT (ZOFRAN-ODT) disintegrating tablet 4 mg, 4 mg, Oral, Q6H PRN, Salvador Price MD, 4 mg at 08/11/22 1443  •  oxyCODONE-acetaminophen (PERCOCET)  MG per tablet 1 tablet, 1 tablet, Oral, Q6H PRN, Salvador Price MD, 1 tablet at 08/13/22 0554  •  pancrelipase (Lip-Prot-Amyl) (CREON) capsule 72,000 units of lipase, 72,000 units of lipase, Oral, TID With Meals, Salvador Price MD, 72,000 units of lipase at 08/13/22 0816  •  pantoprazole (PROTONIX) EC tablet 40 mg, 40 mg, Oral, Daily, Salvador Price MD, 40 mg at 08/13/22 0847  •  polyethylene glycol (MIRALAX) packet 17 g, 17 g, Oral, Daily, Castro Hilliard MD, 17 g at 08/13/22 0847  •  sennosides-docusate (PERICOLACE) 8.6-50 MG per tablet 2 tablet, 2 tablet, Oral, BID, Salvador Price MD, 2 tablet at 08/13/22 0847  •  sodium chloride 0.9 % flush 10 mL, 10 mL, Intravenous, Q12H, Salvador Price MD, 10 mL at 08/13/22 0848  •  sodium chloride 0.9 % flush 10 mL, 10 mL, Intravenous, PRN, Salvador Price MD  •  sodium chloride 0.9 % infusion, 30 mL/hr, Intravenous, Continuous PRN, Salvador Price MD, Stopped at 08/10/22 2159  •  Thiamine Mononitrate tablet 100 mg, 100 mg, Oral, Daily, Slavador Price MD, 100 mg at 08/13/22 0848  Review of Systems:    Constitutional: No fevers, chills, sweats   Respiratory: No shortness of breath, cough   Cardiovascular: No Chest pain, palpitations   Gastrointestinal: No nausea, vomiting, diarrhea   Genitourinary: No hematuria, dysuria    Objective     Vital Signs  Temp:  [97.8 °F (36.6 °C)-98 °F (36.7 °C)] 98 °F (36.7 °C)  Heart Rate:  [76-78] 76  Resp:  [16] 16  BP: (105-116)/(76-78) 105/76  Body mass index is 17.16 kg/m².    Intake/Output Summary (Last 24 hours) at 8/13/2022 0924  Last data filed at 8/13/2022 0853  Gross per 24 hour   Intake 750 ml   Output 4250 ml   Net -3500 ml     I/O this shift:  In: -   Out: 60 [Urine:60]     Physical  Exam:   General: Awake, alert and conversive. No acute distress.   Eyes: Normal lids and lashes, no scleral icterus.   Neck: Supple and symmetric. Trachea midline.    Skin: Warm and dry, not jaundiced.    Cardiovascular: Regular rate and rhythm. No murmur.   Pulm: Quiet, even, nonlabored breathing. Clear to auscultation bilaterally.    Abdomen: Soft, mildly distended, mild tenderness to palpation epigastric region. No rebound or guarding. Bowel sounds present in all 4 quadrants.   Extremities: No rashes or edema.   Psychiatric: Appropriate mood and affect. Cooperative.     Results Review:     I reviewed the patient's new clinical results.    Results from last 7 days   Lab Units 08/13/22  0500 08/12/22  0531 08/11/22  0544   WBC 10*3/mm3 5.07 6.40 5.20   HEMOGLOBIN g/dL 10.0* 10.0* 9.8*   HEMATOCRIT % 30.8* 30.8* 29.8*   PLATELETS 10*3/mm3 267 298 266     Results from last 7 days   Lab Units 08/13/22  0500 08/12/22  0531 08/11/22  0544 08/07/22  0603 08/06/22  2158   SODIUM mmol/L 141 141 141   < > 139   POTASSIUM mmol/L 4.2 4.4 4.5   < > 4.0   CHLORIDE mmol/L 100 101 101   < > 104   CO2 mmol/L 28.9 29.9* 28.0   < > 22.7   BUN mg/dL 12 10 9   < > 11   CREATININE mg/dL 0.70 0.71 0.62   < > 0.69   CALCIUM mg/dL 9.7 9.7 9.5   < > 9.0   BILIRUBIN mg/dL  --   --  <0.2  --  0.2   ALK PHOS U/L  --   --  89  --  68   ALT (SGPT) U/L  --   --  20  --  11   AST (SGOT) U/L  --   --  16  --  12   GLUCOSE mg/dL 103* 79 99   < > 102*    < > = values in this interval not displayed.     Results from last 7 days   Lab Units 08/07/22  0603   INR  1.06     Lab Results   Lab Value Date/Time    LIPASE 378 (H) 08/06/2022 2158    LIPASE 95 (H) 01/24/2018 0545    LIPASE 93 (H) 10/01/2017 0356    LIPASE 122 (H) 09/30/2017 0619    LIPASE 86 (H) 09/29/2017 1731    LIPASE 133 (H) 09/28/2017 0032       Radiology:  CT Outside Films   Final Result      CT Abdomen Pelvis With Contrast   Final Result   Interval resolution of the anterior left  perinephric fluid   collection that has been appropriately drained with a percutaneous   catheter. Urothelial enhancement along the left renal pelvis and   proximal ureter remains. Diffuse phlegmon and stranding is still present   surrounding the kidney and posterior body and tail of the pancreas. A   gastrosplenic collection intimately associated with the medial aspect of   the spleen demonstrates decreased density however, mild early increase   in size. Additional perisplenic fluid collections are likely unchanged.   The splenic vein is occluded with perigastric venous collaterals.   Evidence of chronic pancreatitis. These findings may represent sequela   from hemorrhagic and/or infected pancreatic pseudocysts.       Radiation dose reduction techniques were utilized, including automated   exposure control and exposure modulation based on body size.       This report was finalized on 8/12/2022 3:08 PM by Dr. Donna Steward M.D.          CT Guided Abscess Drain Kidney Renal   Final Result   Successful drain placement into right upper quadrant fluid collection as   described above.       This report was finalized on 8/8/2022 3:22 PM by Dr. Joshua Dias M.D.          US Spleen   Final Result   Abnormal spleen appears no different than on CT yesterday.       This report was finalized on 8/7/2022 6:48 PM by Dr. Salvador Perdomo M.D.          CT Abdomen Pelvis With Contrast   Final Result       1. The patient has heterogeneous enhancement of the left kidney, as well   as extensive urothelial enhancement. The appearance is concerning for   pyelonephritis. There are associated perinephric abscesses, as described   in the body of the report. These may be in continuity with the patient's   proximal left ureter. The inflammation is also intimately associated   with the left wilmer of the diaphragm, the pancreatic tail, and the   posterior wall of the stomach. The patient does have both subcapsular   and intraparenchymal  collections within the spleen. These may represent   areas of infarction or hematoma, although superimposed infection cannot   be excluded, given the degree of inflammation within the left upper   quadrant. I do think the patient has a splenic vein occlusion, as it   becomes very attenuated, and there are multiple prominent collaterals   identified within the left upper quadrant.        Radiation dose reduction techniques were utilized, including automated   exposure control and exposure modulation based on body size.       This report was finalized on 8/6/2022 11:29 PM by Dr. Camilla Whalen M.D.          XR Chest 1 View   Final Result   No acute findings.       This report was finalized on 8/6/2022 9:17 PM by Dr. Camilla Whalen M.D.              Assessment & Plan     Patient Active Problem List   Diagnosis   • Alcohol-induced acute pancreatitis without infection or necrosis   • Alcohol abuse   • Elevated LFTs   • Thrombocytopenia (HCC)   • Epigastric pain   • Abnormal CT of the abdomen   • Chronic pancreatitis (HCC)   • Acute pyelonephritis   • Perinephric abscess   • Splenic vein thrombosis   • Anemia of chronic disease   • GERD without esophagitis   • Alcohol dependence in remission (HCC)       Assessment:  1. Chronic pancreatitis with pseudocyst  2. 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 Probable perinephric abscess status post IR drain placement and subsequent removal by general surgery  4. Left-sided pyelonephritis  5. Chronic GERD      Plan:  · Status post EGD with Candida esophagitis, continue fluconazole 200 mg for 13 days  · Continue Creon with meals and snacks  · 40 mg pantoprazole daily.  · Antibiotic regimen per ID recommendations.  · Low-fat diet indefinitely.  · Complete abstinence from alcohol and tobacco discussed with the patient.  · Recommend follow-up with pain management.  · Also discussed the need for bowel regimen in the outpatient setting  particularly given her need for narcotics.  · Patient does not wish to return to [facility] but instead would like to establish with our practice as she recently moved to [place].  We will arrange an outpatient follow-up appointment with SOL Reyez.  · No objections to discharge from a GI perspective.  · Our service will sign off at this time.  Please contact us if we may be of further assistance.    I discussed the patient's findings and my recommendations with patient, nursing staff, consulting provider and Dr. Cameron.    Dragon dictation used throughout this note.            CAN Bateman  [facility]  [address] [place] [address]  Office: [phone]

## 2022-08-13 NOTE — PROGRESS NOTES
ID note for possible abscess   subjective: New patient to me.  She is on room air.  Not having any fevers.  Drain removed by general surgery today.  Discussed with GI  Objective: Afebrile, no acute distress, pulmonary effort normal, skin is without rash in exposed areas      8/8 IR culture negative  Assessment and plan   #Perinephric abscess  #Left-sided pyelonephritis  #Multiloculated heterogeneous splenic collections  #Chronic pancreatitis    Cultures are negative and these may be sterile fluid collections related to chronic pancreatitis as Dr. Ramos alludes to.  Discussed with the patient and she would prefer some antibiotic coverage and therefore we will place her on Omnicef 300 mg p.o. twice daily for 2 weeks in case there is secondary infection that was not captured on cultures.  Nothing more to add from our perspective and we will sign off

## 2022-08-13 NOTE — PROGRESS NOTES
Name: Piper Cain ADMIT: 2022   : 1980  PCP: System, Provider Not In    MRN: [id number] LOS: 6 days   AGE/SEX: 41 y.o. female  ROOM: [facility]     Subjective   Subjective   Continues with pain in the left upper abdomen and left lung..  Normal bowel movement today without constipation/diarrhea/bleeding per rectum/melena.  No fever or chills.  Drain has been removed by surgery.  No nausea or vomiting.  Tolerating regular diet well.    Review of Systems  .  No dysuria or hematuria.  Cardiovascular/respiratory.  No chest pain/no shortness of breath/no cough/no palpitation.     Objective   Objective   Vital Signs  Temp:  [97.9 °F (36.6 °C)-98.2 °F (36.8 °C)] 98.2 °F (36.8 °C)  Heart Rate:  [76-89] 84  Resp:  [16-18] 18  BP: (105-116)/(75-79) 112/79  SpO2:  [97 %-100 %] 97 %  on   ;   Device (Oxygen Therapy): room air    Intake/Output Summary (Last 24 hours) at 2022  Last data filed at 2022 1605  Gross per 24 hour   Intake 750 ml   Output 4050 ml   Net -3300 ml     Body mass index is 17.16 kg/m².      22   Weight: 55.8 kg (123 lb) 55.8 kg (123 lb)     Physical Exam    General.  Middle-aged female.  Alert and oriented x3.  In no apparent pain/distress/diaphoresis.  Normal mood and affect.  Eyes.  Pupils equal round and reactive.  Intact extraocular musculature.  No pallor or jaundice.  Normal trajectory and lids.  Oral cavity.  Moist mucous membrane.  Neck.  Supple.  No JVD.  No lymphadenopathy or thyromegaly.  Cardiovascular.  Regular rate and rhythm with no gallops or murmurs.  Chest.  Clear to auscultation bilaterally with no added sounds.  Abdomen.  Soft lax.No localized tenderness.  No guarding or rebound.  No organomegaly.  Drain site is dressed.  Extremities.  No clubbing cyanosis or edema.  CNS.  No acute focal neurological deficits.    Results Review:      Results from last 7 days   Lab Units 22  0500 22  0531 22  0544 08/10/22  0618  08/09/22  0640 08/08/22  0614 08/07/22  0603 08/06/22  2158   SODIUM mmol/L 141 141 141 140 141 143 140 139   POTASSIUM mmol/L 4.2 4.4 4.5 4.1 4.4 4.1 3.7 4.0   CHLORIDE mmol/L 100 101 101 102 104 110* 106 104   CO2 mmol/L 28.9 29.9* 28.0 30.1* 27.0 25.2 23.0 22.7   BUN mg/dL 12 10 9 9 7 5* 8 11   CREATININE mg/dL 0.70 0.71 0.62 0.57 0.57 0.61 0.62 0.69   GLUCOSE mg/dL 103* 79 99 89 86 83 104* 102*   CALCIUM mg/dL 9.7 9.7 9.5 9.4 9.1 8.4* 8.3* 9.0   AST (SGOT) U/L  --   --  16  --   --   --   --  12   ALT (SGPT) U/L  --   --  20  --   --   --   --  11     Estimated Creatinine Clearance: 95 mL/min (by C-G formula based on SCr of 0.7 mg/dL).          Results from last 7 days   Lab Units 08/06/22  2158   TROPONIN T ng/mL <0.010                   Invalid input(s):  PHOS        Invalid input(s): LDLCALC  Results from last 7 days   Lab Units 08/13/22  0500 08/12/22  0531 08/11/22  0544 08/10/22  0618 08/09/22  0640 08/08/22  0614 08/07/22  0603   WBC 10*3/mm3 5.07 6.40 5.20 5.90 5.17 4.76 5.75   HEMOGLOBIN g/dL 10.0* 10.0* 9.8* 8.8* 8.8* 8.9* 9.4*   HEMATOCRIT % 30.8* 30.8* 29.8* 26.5* 27.6* 26.6* 28.0*   PLATELETS 10*3/mm3 267 298 266 237 222 229 239   MCV fL 102.0* 103.7* 103.5* 101.1* 105.7* 103.5* 104.5*   MCH pg 33.1* 33.7* 34.0* 33.6* 33.7* 34.6* 35.1*   MCHC g/dL 32.5 32.5 32.9 33.2 31.9 33.5 33.6   RDW % 15.1 14.9 15.0 14.9 14.9 14.6 14.9   RDW-SD fl 56.0* 55.7* 56.4* 54.9* 57.8* 53.5 55.3*   MPV fL 9.7 10.0 10.1 9.7 9.9 10.1 10.2   NEUTROPHIL % % 48.0 48.2  --   --  52.6  --   --    LYMPHOCYTE % % 33.3 33.4  --   --  31.9  --   --    MONOCYTES % % 13.4* 13.0*  --   --  10.4  --   --    EOSINOPHIL % % 4.1 4.4  --   --  4.1  --   --    BASOPHIL % % 1.0 0.8  --   --  0.8  --   --    IMM GRAN % % 0.2 0.2  --   --   --   --   --    NEUTROS ABS 10*3/mm3 2.43 3.09 3.52  --  2.72  --   --    LYMPHS ABS 10*3/mm3 1.69 2.14  --   --  1.65  --   --    MONOS ABS 10*3/mm3 0.68 0.83  --   --  0.54  --   --    EOS ABS 10*3/mm3  0.21 0.28 0.16  --  0.21  --   --    BASOS ABS 10*3/mm3 0.05 0.05  --   --  0.04  --   --    IMMATURE GRANS (ABS) 10*3/mm3 0.01 0.01  --   --   --   --   --    NRBC /100 WBC 0.0 0.0  --   --   --   --   --      Results from last 7 days   Lab Units 08/07/22  0603   INR  1.06         Results from last 7 days   Lab Units 08/07/22  0011 08/06/22  2158   PROCALCITONIN ng/mL  --  0.06   LACTATE mmol/L 0.6  --      Results from last 7 days   Lab Units 08/07/22  0603 08/06/22  2158   SED RATE mm/hr 67*  --    CRP mg/dL 4.74* 4.43*     Results from last 7 days   Lab Units 08/06/22  2158   LIPASE U/L 378*     Results from last 7 days   Lab Units 08/08/22  1410 08/08/22  0635 08/08/22  0616 08/07/22  0043   BLOODCX   --  No growth at 5 days No growth at 4 days No growth at 5 days   BODYFLDCX  No growth at 5 days  --   --   --          Results from last 7 days   Lab Units 08/06/22  2200   NITRITE UA  Negative           Imaging:  Imaging Results (Last 24 Hours)     ** No results found for the last 24 hours. **             I reviewed the patient's new clinical results / labs / tests / procedures      Assessment/Plan     Active Hospital Problems    Diagnosis  POA   • **Perinephric abscess [N15.1]  Yes   • Chronic pancreatitis (HCC) [K86.1]  Yes   • Acute pyelonephritis [N10]  Yes   • 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           · Left pyelonephritis/perinephric abscess/splenic abscess.  S/p CT-guided drain of the left perinephric abscess on 8/8/2022.  Blood and fluid cultures are negative till now.  Switched from ceftriaxone to p.o. Omnicef.  General surgery does not believe that this is an active cyst but rather a pseudocyst of the pancreas  extending to around the kidney.  General surgery does not recommend any splenic abscess surgical intervention.  Repeat CAT scan shows resolution of the perinephric abscess but continued inflammatory changes.  DC IV Dilaudid.  Patient follows up with the pain MD for her chronic pancreatitis pain.  Discussed with the patient possible celiac block  · Splenic vein occlusion (chronic)./ Candida esophagitis/GERD/chronic alcoholic pancreatitis/ pancreatic pseudocyst...  Continue Protonix.  No anticoagulation recommended per GI/hematology oncology.  Diflucan started by infectious disease.  Continue Creon.  Benign GI examination.  Tolerating regular diet well.  IV fluid DC'd.  Patient has DC'd alcohol 4 months ago.    · Iron deficiency anemia/anemia of chronic disease.  Stable hemoglobin.  Status post IV iron.  Hematology oncology is following.  · VTE prophylaxis.  Sequential compression devices.      Discussed my findings and plan of treatment with the patient/significant other.  Disposition.  Hopefully home tomorrow if no fever and the pain is controlled on oral medications      Castro Hilliard MD  [facility]  08/13/22  19:29 EDT

## 2022-08-13 NOTE — PLAN OF CARE
Goal Outcome Evaluation:  Plan of Care Reviewed With: patient        Progress: no change  Outcome Evaluation: Patient continues to c/o severe abdominal and left shoulder pain, patient requests pain medication frequently, alternating Percocet and Dilaudid for pain relief, vss, afebrile, saline locked, patient ambulates frequently, voiding, will continue to monitor. Dressing to LUQ from old drain site is clean, dry and intact.

## 2022-08-13 NOTE — PROGRESS NOTES
ASSESSMENT/PLAN:    I have reviewed her CT scan done in July at [place] and her CT scans done here.  The fluid collection which was drained here has resolved and I remove the drain today.  She has had no fever and her white blood cell count is normal, no ongoing evidence of infection.  Cultures never grew anything.  The fluid collection adjacent to her spleen was there previously and its relatively stable.  She had a larger fluid collection adjacent to the pancreas on the previous CT in July that has resolved.  I suspect at this point that most if not all of the fluid collections represent pancreatic pseudocyst and are part of her chronic pancreatitis.  There is nothing surgical to be done here.  She is fine to go home from my standpoint.  Pain management will be the biggest long-term issue and she has pain management appointment in October.  I will be available to see again if needed.    CC:     Follow-up abdominal pain    INTERVAL HISTORY:    No real change clinically    LABS:    • WBC 5, no left shift    PHYSICAL EXAM:   • Constitutional: No acute distress  • Vital signs:   o Afebrile vital signs stable  • Abdomen: Soft, nondistended, tender in the left upper quadrant but no peritoneal signs  • Psychiatric: Alert and oriented ×3, normal affect     MICHAEL DAY M.D.

## 2022-08-13 NOTE — PLAN OF CARE
Goal Outcome Evaluation:  Plan of Care Reviewed With: patient        Progress: no change  Outcome Evaluation: VSS. Pain at abdomen, left flank and left shoulder, managed with Tylenol, Percocet  and IV Dilaudid, with minimal relief, patient stated that she has the same quality of pain when she had pancreatitis. Up ad valeria. Ambulated in the peterson. With good oral fluid intake, large urine output. Pigtail drain with light yellow output, small an amount. Slept at short intervals.

## 2022-08-14 ENCOUNTER — READMISSION MANAGEMENT (OUTPATIENT)
Dept: CALL CENTER | Facility: HOSPITAL | Age: 42
End: 2022-08-14

## 2022-08-14 VITALS
DIASTOLIC BLOOD PRESSURE: 75 MMHG | OXYGEN SATURATION: 98 % | HEIGHT: 71 IN | TEMPERATURE: 97.9 F | WEIGHT: 123 LBS | RESPIRATION RATE: 16 BRPM | BODY MASS INDEX: 17.22 KG/M2 | SYSTOLIC BLOOD PRESSURE: 100 MMHG | HEART RATE: 80 BPM

## 2022-08-14 PROBLEM — K86.3 PANCREATIC PSEUDOCYST: Status: ACTIVE | Noted: 2022-08-14

## 2022-08-14 PROBLEM — B37.81 CANDIDA ESOPHAGITIS: Status: ACTIVE | Noted: 2022-08-14

## 2022-08-14 LAB
ANION GAP SERPL CALCULATED.3IONS-SCNC: 10.2 MMOL/L (ref 5–15)
BACTERIA SPEC AEROBE CULT: NORMAL
BASOPHILS # BLD AUTO: 0.06 10*3/MM3 (ref 0–0.2)
BASOPHILS NFR BLD AUTO: 0.9 % (ref 0–1.5)
BUN SERPL-MCNC: 15 MG/DL (ref 6–20)
BUN/CREAT SERPL: 22.4 (ref 7–25)
CALCIUM SPEC-SCNC: 9.7 MG/DL (ref 8.6–10.5)
CHLORIDE SERPL-SCNC: 99 MMOL/L (ref 98–107)
CO2 SERPL-SCNC: 27.8 MMOL/L (ref 22–29)
CREAT SERPL-MCNC: 0.67 MG/DL (ref 0.57–1)
DEPRECATED RDW RBC AUTO: 56.2 FL (ref 37–54)
EGFRCR SERPLBLD CKD-EPI 2021: 114.2 ML/MIN/1.73
EOSINOPHIL # BLD AUTO: 0.23 10*3/MM3 (ref 0–0.4)
EOSINOPHIL NFR BLD AUTO: 3.3 % (ref 0.3–6.2)
ERYTHROCYTE [DISTWIDTH] IN BLOOD BY AUTOMATED COUNT: 15.3 % (ref 12.3–15.4)
GLUCOSE SERPL-MCNC: 105 MG/DL (ref 65–99)
HCT VFR BLD AUTO: 31.4 % (ref 34–46.6)
HGB BLD-MCNC: 10.1 G/DL (ref 12–15.9)
IMM GRANULOCYTES # BLD AUTO: 0.02 10*3/MM3 (ref 0–0.05)
IMM GRANULOCYTES NFR BLD AUTO: 0.3 % (ref 0–0.5)
LYMPHOCYTES # BLD AUTO: 2.18 10*3/MM3 (ref 0.7–3.1)
LYMPHOCYTES NFR BLD AUTO: 31.7 % (ref 19.6–45.3)
MCH RBC QN AUTO: 32.7 PG (ref 26.6–33)
MCHC RBC AUTO-ENTMCNC: 32.2 G/DL (ref 31.5–35.7)
MCV RBC AUTO: 101.6 FL (ref 79–97)
MONOCYTES # BLD AUTO: 0.93 10*3/MM3 (ref 0.1–0.9)
MONOCYTES NFR BLD AUTO: 13.5 % (ref 5–12)
NEUTROPHILS NFR BLD AUTO: 3.45 10*3/MM3 (ref 1.7–7)
NEUTROPHILS NFR BLD AUTO: 50.3 % (ref 42.7–76)
NRBC BLD AUTO-RTO: 0 /100 WBC (ref 0–0.2)
PLATELET # BLD AUTO: 272 10*3/MM3 (ref 140–450)
PMV BLD AUTO: 9.9 FL (ref 6–12)
POTASSIUM SERPL-SCNC: 4.4 MMOL/L (ref 3.5–5.2)
RBC # BLD AUTO: 3.09 10*6/MM3 (ref 3.77–5.28)
SODIUM SERPL-SCNC: 137 MMOL/L (ref 136–145)
WBC NRBC COR # BLD: 6.87 10*3/MM3 (ref 3.4–10.8)

## 2022-08-14 PROCEDURE — 85025 COMPLETE CBC W/AUTO DIFF WBC: CPT | Performed by: INTERNAL MEDICINE

## 2022-08-14 PROCEDURE — 25010000002 HYDROMORPHONE PER 4 MG: Performed by: INTERNAL MEDICINE

## 2022-08-14 PROCEDURE — 80048 BASIC METABOLIC PNL TOTAL CA: CPT | Performed by: INTERNAL MEDICINE

## 2022-08-14 RX ORDER — FOLIC ACID 1 MG/1
1 TABLET ORAL DAILY
Qty: 30 TABLET | Refills: 3 | Status: SHIPPED | OUTPATIENT
Start: 2022-08-14 | End: 2022-12-30 | Stop reason: SDUPTHER

## 2022-08-14 RX ORDER — OXYCODONE AND ACETAMINOPHEN 10; 325 MG/1; MG/1
1 TABLET ORAL EVERY 6 HOURS PRN
Qty: 10 TABLET | Refills: 0 | Status: ON HOLD | OUTPATIENT
Start: 2022-08-14 | End: 2023-03-25

## 2022-08-14 RX ORDER — PANTOPRAZOLE SODIUM 40 MG/1
40 TABLET, DELAYED RELEASE ORAL DAILY
Qty: 30 TABLET | Refills: 3 | Status: SHIPPED | OUTPATIENT
Start: 2022-08-14 | End: 2022-12-30 | Stop reason: SDUPTHER

## 2022-08-14 RX ORDER — CEFDINIR 300 MG/1
300 CAPSULE ORAL EVERY 12 HOURS SCHEDULED
Qty: 26 CAPSULE | Refills: 0 | Status: SHIPPED | OUTPATIENT
Start: 2022-08-14 | End: 2022-08-27

## 2022-08-14 RX ORDER — FLUCONAZOLE 200 MG/1
200 TABLET ORAL EVERY 24 HOURS
Qty: 11 TABLET | Refills: 0 | Status: SHIPPED | OUTPATIENT
Start: 2022-08-15 | End: 2022-08-26

## 2022-08-14 RX ADMIN — Medication 5000 UNITS: at 08:27

## 2022-08-14 RX ADMIN — CEFDINIR 300 MG: 300 CAPSULE ORAL at 03:13

## 2022-08-14 RX ADMIN — OXYCODONE HYDROCHLORIDE AND ACETAMINOPHEN 1 TABLET: 10; 325 TABLET ORAL at 03:13

## 2022-08-14 RX ADMIN — Medication 100 MG: at 08:27

## 2022-08-14 RX ADMIN — PANTOPRAZOLE SODIUM 40 MG: 40 TABLET, DELAYED RELEASE ORAL at 08:27

## 2022-08-14 RX ADMIN — Medication 1 MG: at 08:27

## 2022-08-14 RX ADMIN — DOCUSATE SODIUM 50MG AND SENNOSIDES 8.6MG 2 TABLET: 8.6; 5 TABLET, FILM COATED ORAL at 08:27

## 2022-08-14 RX ADMIN — HYDROMORPHONE HYDROCHLORIDE 0.5 MG: 1 INJECTION, SOLUTION INTRAMUSCULAR; INTRAVENOUS; SUBCUTANEOUS at 05:29

## 2022-08-14 RX ADMIN — FLUCONAZOLE 200 MG: 200 TABLET ORAL at 08:27

## 2022-08-14 RX ADMIN — HYDROMORPHONE HYDROCHLORIDE 0.5 MG: 1 INJECTION, SOLUTION INTRAMUSCULAR; INTRAVENOUS; SUBCUTANEOUS at 10:24

## 2022-08-14 RX ADMIN — POLYETHYLENE GLYCOL 3350 17 G: 17 POWDER, FOR SOLUTION ORAL at 08:27

## 2022-08-14 NOTE — DISCHARGE SUMMARY
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        Hospital Course     Brief admission history and physical.  Please refer to the H&P for full detail.  A pleasant 41 years old white female with a past history of alcohol abuse in remission/chronic pancreatitis/GERD/chronic abdominal pain secondary to pancreatitis who presents to the emergency with upper and left loin pain associated with fever/nausea/diarrhea/back pain..  Physical examination on admission included an afebrile patient with stable vital signs/ill-appearing female/upper abdominal and left loin tenderness.  Hospital course.  Initial ER evaluation included a CBC that was normal except a hemoglobin of 11.5 .  Serum hCG was negative.  CRP was 4.4.  CMP normal except a random blood sugar of 102.  UA was negative.  COVID was negative.  Lactic acid was normal.  Blood cultures obtained.  Reticulocyte normal.  Iron studies revealed iron deficiency.  ESR elevated at 67.  INR was normal.  Chest x-ray with no acute disease.  CT scan of the abdomen pelvis with IV contrast revealed enhancement of the left kidney concerning for pyelonephritis/perinephric abscess on the left/intraparenchymal collections of the spleen that might represent  infarction or hematoma or abscesses with evidence of splenic vein occlusion with collateral present.  Splenic ultrasound revealed echogenicities in the spleen.  Patient was admitted with left pyelonephritis associated with left perinephric abscess and splenic abscess.  GI/infectious disease/surgery were consulted.  Patient had a CT-guided drain from the left perinephric abscess on 8/8/2022.  Fluid cultures came back negative and blood cultures came back negative.  She was empirically started on Rocephin infectious disease has switched to Omnicef at discharge.  This care nature of the fluid and the fact that the patient had no fever or leukocytosis might suggest that this is not a perinephric abscess but rather pseudopancreatic cyst as the surgeon has suggested.  Surgery has not recommended any splenic abscess surgical intervention.  Repeat CAT scan showed improvement of the perinephric fluid collection.  She tolerated diet well without fever or leukocytosis during the hospital stay.  She was switched eventually to p.o. antibiotic at the time of discharge by infectious disease.  GI has performed an endoscopy and she was diagnosed with Candida esophagitis we continued her proton pump inhibitor and Diflucan was added.  As far as the splenic vein occlusion this was thought to be chronic hematology and surgery did not recommend any anticoagulation.  For her chronic alcoholic pancreatitis she has abstained from alcohol for the last 4 months and she was encouraged continue doing so.  We continued her Protonix and Creon.  Her IV fluid has been DC'd during this admission.  She was noted to be anemic anemia work-up confirmed iron deficiency and anemia of chronic disease.  She has received IV iron and her hemoglobin has stabilized.  Hematology followed up with the patient.  She will be discharged on iron.  At the time of discharge she continued with upper abdominal pain but to a lesser extent.  I believe this is secondary to  her chronic pancreatitis and a pseudocyst and she has a pain MD that she follows up with and I encouraged her to discuss with her pain MD the possibility of celiac block if the pain continues.  She was given 10 tablets of Percocet at the time of discharge.  At the time of discharge she was hemodynamically stable and tolerating p.o. and afebrile.  Surgery/hematology/GI/infectious disease okayed the discharge.    Consultants     Consult Orders (all) (From admission, onward)     Start     Ordered    08/08/22 1611  Inpatient General Surgery Consult  Once        Specialty:  General Surgery  Provider:  Katherin Jimenez MD    08/08/22 1610    08/07/22 1051  Hematology & Oncology Inpatient Consult  Once        Specialty:  Hematology and Oncology  Provider:  Marcial Olson MD    08/07/22 1051    08/07/22 0934  Inpatient Infectious Diseases Consult  Once        Specialty:  Infectious Diseases  Provider:  Bennie Mendieta MD    08/07/22 0933    08/07/22 0702  Inpatient Urology Consult  IN         Specialty:  Urology  Provider:  Clay Wilder MD    08/07/22 0145    08/07/22 0145  Inpatient Gastroenterology Consult  Once        Specialty:  Gastroenterology  Provider:  Rachell Madden MD    08/07/22 0145    08/07/22 0006  [facility] (on-call MD unless specified) Details  Once        Specialty:  Hospitalist  Provider:  (Not yet assigned)    08/07/22 0005              Procedures     Imaging Results (All)     Procedure Component Value Units Date/Time    CT Abdomen Pelvis With Contrast [[id number]] Collected: 08/12/22 0910     Updated: 08/12/22 1512    Narrative:      CT ABDOMEN AND PELVIS WITH CONTRAST     HISTORY: Follow-up drainage of perinephric abscess.     TECHNIQUE: Axial CT images of the abdomen and pelvis were obtained  following administration of intravenous contrast. The patient was not  given oral contrast Coronal and sagittal reformats were obtained.     COMPARISON: CT dated 08/06/2022.     FINDINGS:  Interim placement of a percutaneous drain within the anterior  left perinephric fluid collection that has nearly resolved in the  interim. The drain is appropriately placed. There is some diffuse  stranding and phlegmon again seen within the left perinephric space with  thickening of the Gerota's fascia and along the proximal left ureter.  Again demonstrated are heterogeneous loculated collections along the  spleen. The most superior collection within the gastrosplenic space has  slightly decreased in density from the interim however, increased in  size mildly now measuring 6.0 x 4.1 cm at a location where it previously  measured 5.1 x 4.1 cm. Along the inferior and anterior pole of the  spleen, the collection measures up to 1.9 cm in thickness and this is  largely without change. The splenic vein appears to be occluded with  numerous perisplenic venous collaterals. The renal vein and artery on  the left side are patent. There are numerous small lymph nodes that are  favored to be reactive. The urinary bladder is partially distended and  normal. The right kidney is normal. Mildly thickened right adrenal  gland. The pancreas demonstrates mild biliary prominence with numerous  calcifications in the region of the head that suggests chronic  pancreatitis.     The liver is normal. The gallbladder is decompressed and likely contains  sludge and/or stones. No evidence of bowel obstruction. Large amount of  formed stool within the colon most suggestive of constipation. Appendix  is normal.       Impression:      Interval resolution of the anterior left perinephric fluid  collection that has been appropriately drained with a percutaneous  catheter. Urothelial enhancement along the left renal pelvis and  proximal ureter remains. Diffuse phlegmon and stranding is still present  surrounding the kidney and posterior body and tail of the pancreas. A  gastrosplenic collection intimately associated with the medial aspect of  the  spleen demonstrates decreased density however, mild early increase  in size. Additional perisplenic fluid collections are likely unchanged.  The splenic vein is occluded with perigastric venous collaterals.  Evidence of chronic pancreatitis. These findings may represent sequela  from hemorrhagic and/or infected pancreatic pseudocysts.     Radiation dose reduction techniques were utilized, including automated  exposure control and exposure modulation based on body size.     This report was finalized on 8/12/2022 3:08 PM by Dr. Donna Steward M.D.       CT Outside Films [[id number]] Resulted: 08/12/22 0939     Updated: 08/12/22 0939    Narrative:      This procedure was auto-finalized with no dictation required.    CT Guided Abscess Drain Kidney Renal [[id number]] Collected: 08/08/22 1513     Updated: 08/08/22 1526    Narrative:      CT GUIDED DRAINAGE     HISTORY:  perinephric abscesses     COMPARISON: CT 08/06/2022.     PROCEDURE DETAILS: After informed consent was obtained from the patient,  the patient was placed on the CT scanner in supine position. A nurse was  continuously present and moderate sedation was performed under physician  supervision from 1351 to 1417 hours with a total of 75 mcg fentanyl and  1.5 mg versed administered. A preliminary scan of the abdomen was  obtained and the right upper quadrant perinephric fluid collection  identified.  A route was planned for the drainage and an appropriate  skin site identified. This site was then prepped and draped in the usual  sterile manner and the skin anesthetized with lidocaine. The tract to  the collection was anesthetized with lidocaine and a needle placed  within the collection. A wire was then advanced into the collection and  the needle removed. After dilation, an 8 Slovak drainage catheter was  placed into the collection, coiled, locked, secured with Dermabond  reinforced suture and stay-fix dressing, and attached to a gravity  drainage bag.  Approximately 15 mL brownish fluid were removed with  specimen sent to lab; post aspiration the collection appeared largely  evacuated. Initial irrigation was performed. The patient tolerated the  procedure well and there were no immediate complications.  All elements  of maximal sterile technique were followed. Radiation dose reduction  techniques were utilized, including automated exposure control and  exposure modulation.           Impression:      Successful drain placement into right upper quadrant fluid collection as  described above.     This report was finalized on 8/8/2022 3:22 PM by Dr. Joshua Dias M.D.        Spleen [[id number]] Collected: 08/07/22 1829     Updated: 08/07/22 1852    Narrative:      SONOGRAM OF THE SPLEEN     HISTORY: Splenic lesion seen on CT.     TECHNIQUE: Sonogram of the spleen was performed and is correlated with  CT scan performed yesterday.     FINDINGS: Spleen measures 11.9 x 4.2 x 4.3 cm. Along the superior medial  edge of the spleen there is heterogeneous area of echogenicity measuring  about 3.6 cm long corresponding to the lesion seen in the same area on  CT. There is also some heterogeneous echogenicity on the medial lower  portion of the spleen. The findings appear similar to the CT from  yesterday.       Impression:      Abnormal spleen appears no different than on CT yesterday.     This report was finalized on 8/7/2022 6:48 PM by Dr. Salvador Perdomo M.D.       CT Abdomen Pelvis With Contrast [[id number]] Collected: 08/06/22 2309     Updated: 08/06/22 2332    Narrative:      CT OF THE ABDOMEN AND PELVIS WITH CONTRAST     HISTORY: Upper abdominal pain     COMPARISON: 09/28/2017     TECHNIQUE: Axial CT imaging was obtained through the abdomen and pelvis.  IV contrast was administered.     FINDINGS:  Images through the lung bases demonstrate some minimal dependent  atelectasis. No suspicious hepatic lesions are seen. The liver is  enlarged, measuring up to 18.9 cm in  craniocaudal dimensions.  Gallbladder is normal. Right kidney enhances normally, and there is no  hydronephrosis or there is heterogeneous enhancement of the left kidney,  with marked urothelial enhancement. There is left periureteral soft  tissue stranding. There is also left perinephric stranding. There is a  multiloculated rim-enhancing collection which is intimately associated  with the anterior aspect of the left kidney, concerning for perinephric  abscess. It measures up to 3.5 x 3.3 cm on the axial series. It has a  linear extension medially, which is intimately associated with the left  wilmer of the diaphragm. This collection measures up to 8 mm in transverse  dimensions, and 4.3 cm in length. Both of these collections are  suspected to be continuous with the patient's left ureter. There is  marked attenuation of the patient's left renal vein, although I think it  appears patent. There is an additional component of the collection,  which extends anteriorly, and is intimately associated with the tail of  the pancreas and posterior gastric wall. This collection measures up to  1.6 cm. There is inflammatory stranding around the spleen.  Multiloculated heterogeneous collections are noted within the spleen.  Some of these appear to be intraparenchymal, while others are  subcapsular. Etiology is uncertain. They may represent areas of splenic  infarction or hematoma, although superimposed infection is not excluded.  Of note, there is marked attenuation of the patient's splenic vein, and  I cannot exclude a splenic infarction. There certainly appear to be  within  prominent vessels along the greater curvature of the stomach,  which may represent for gastric collaterals. The single largest  collection involving the spleen measures approximately 5.1 x 4.1 cm on  the axial series. While the inflammation closely abuts the pancreas, it  does appear to enhance normally. There is some calcification of the  aorta. There is  no bowel obstruction. Urinary bladder appears somewhat  distended. Uterus is within normal limits. There is a small amount of  free fluid within the pelvis. There is colonic diverticulosis. The  appendix is normal. No acute osseous abnormalities are seen.       Impression:         1. The patient has heterogeneous enhancement of the left kidney, as well  as extensive urothelial enhancement. The appearance is concerning for  pyelonephritis. There are associated perinephric abscesses, as described  in the body of the report. These may be in continuity with the patient's  proximal left ureter. The inflammation is also intimately associated  with the left wilmer of the diaphragm, the pancreatic tail, and the  posterior wall of the stomach. The patient does have both subcapsular  and intraparenchymal collections within the spleen. These may represent  areas of infarction or hematoma, although superimposed infection cannot  be excluded, given the degree of inflammation within the left upper  quadrant. 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      Radiation dose reduction techniques were utilized, including automated  exposure control and exposure modulation based on body size.     This report was finalized on 8/6/2022 11:29 PM by Dr. Camilla Whalen M.D.       XR Chest 1 View [[id number]] Collected: 08/06/22 2116     Updated: 08/06/22 2120    Narrative:      SINGLE VIEW OF THE CHEST     HISTORY: Chronic pancreatitis     COMPARISON: 04/06/2018     FINDINGS:  Heart size is within normal limits. No pneumothorax, pleural effusion,  or acute infiltrate is seen.       Impression:      No acute findings.     This report was finalized on 8/6/2022 9:17 PM by Dr. Camilla Whalen M.D.             Pertinent Labs     Results from last 7 days   Lab Units 08/14/22  0605 08/13/22  0500 08/12/22  0573  08/11/22  0544   WBC 10*3/mm3 6.87 5.07 6.40 5.20   HEMOGLOBIN g/dL 10.1* 10.0* 10.0* 9.8*   PLATELETS 10*3/mm3 272 267 298 266     Results from last 7 days   Lab Units 08/14/22  0605 08/13/22  0500 08/12/22  0531 08/11/22  0544   SODIUM mmol/L 137 141 141 141   POTASSIUM mmol/L 4.4 4.2 4.4 4.5   CHLORIDE mmol/L 99 100 101 101   CO2 mmol/L 27.8 28.9 29.9* 28.0   BUN mg/dL 15 12 10 9   CREATININE mg/dL 0.67 0.70 0.71 0.62   GLUCOSE mg/dL 105* 103* 79 99   Estimated Creatinine Clearance: 99.3 mL/min (by C-G formula based on SCr of 0.67 mg/dL).  Results from last 7 days   Lab Units 08/11/22  0544   ALBUMIN g/dL 3.60   BILIRUBIN mg/dL <0.2   ALK PHOS U/L 89   AST (SGOT) U/L 16   ALT (SGPT) U/L 20     Results from last 7 days   Lab Units 08/14/22  0605 08/13/22  0500 08/12/22  0531 08/11/22  0544   CALCIUM mg/dL 9.7 9.7 9.7 9.5   ALBUMIN g/dL  --   --   --  3.60               Invalid input(s): LDLCALC  Results from last 7 days   Lab Units 08/08/22  1410 08/08/22  0635 08/08/22  0616   BLOODCX   --  No growth at 5 days No growth at 4 days   BODYFLDCX  No growth at 5 days  --   --      Imaging Results (Last 24 Hours)     ** No results found for the last 24 hours. **          Test Results Pending at Discharge     Pending Labs     Order Current Status    Blood Culture - Blood, Arm, Right Preliminary result            Discharge Exam   Physical Exam  Vitals.  Temperature 97.5 a pulse of 77 respiratory rate of 18 a blood pressure of 96/69 and an O2 sats of 95% on room air.    Ggeneral.  Middle-aged female.  Alert and oriented x3.  In no apparent pain/distress/diaphoresis.  Normal mood and affect.  Eyes.  Pupils equal round and reactive.  Intact extraocular musculature.  No pallor or jaundice.  Normal trajectory and lids.  Oral cavity.  Moist mucous membrane.  Neck.  Supple.  No JVD.  No lymphadenopathy or thyromegaly.  Cardiovascular.  Regular rate and rhythm with no gallops or murmurs.  Chest.  Clear to auscultation  bilaterally with no added sounds.  Abdomen.  Soft lax.No localized tenderness.  No guarding or rebound.  No organomegaly.  Drain site is dressed.  Extremities.  No clubbing cyanosis or edema.  CNS.  No acute focal neurological deficits.      Discharge Details        Discharge Medications      New Medications      Instructions Start Date   cefdinir 300 MG capsule  Commonly known as: OMNICEF   300 mg, Oral, Every 12 Hours Scheduled      fluconazole 200 MG tablet  Commonly known as: DIFLUCAN   200 mg, Oral, Every 24 Hours   Start Date: August 15, 2022     oxyCODONE-acetaminophen  MG per tablet  Commonly known as: PERCOCET   1 tablet, Oral, Every 6 Hours PRN         Continue These Medications      Instructions Start Date   amitriptyline 10 MG tablet  Commonly known as: ELAVIL   10 mg, Oral, Nightly      Feosol 200 (65 Fe) MG tablet tablet  Generic drug: Ferrous Sulfate Dried   200 mg, Oral, Daily      folic acid 1 MG tablet  Commonly known as: FOLVITE   1 mg, Oral, Daily      ondansetron 4 MG tablet  Commonly known as: ZOFRAN   4 mg, Oral, Every 8 Hours PRN      pancrelipase (Lip-Prot-Amyl) 81691-62195 units capsule delayed-release particles capsule  Commonly known as: CREON   72,000 units of lipase, Oral, 3 Times Daily With Meals      pantoprazole 40 MG EC tablet  Commonly known as: PROTONIX   40 mg, Oral, Daily      Potassium 95 MG tablet   Oral      thiamine 100 MG tablet  tablet  Commonly known as: VITAMIN B-1   100 mg, Oral, Daily      vitamin B-12 100 MCG tablet  Commonly known as: CYANOCOBALAMIN   50 mcg, Oral, Daily      vitamin D3 125 MCG (5000 UT) capsule capsule   5,000 Units, Oral, Daily             Allergies   Allergen Reactions   • Latex    • Nickel    • Hydrocodone-Acetaminophen Nausea And Vomiting         Discharge Disposition:  Condition: Stable    Diet:   Diet Order   Procedures   • Diet Regular   Low-fat    Activity:   Activity Instructions     Activity as Tolerated            Counseling  :    CODE STATUS:    Code Status and Medical Interventions:   Ordered at: 08/07/22 0145     Code Status (Patient has no pulse and is not breathing):    CPR (Attempt to Resuscitate)     Medical Interventions (Patient has pulse or is breathing):    Full Support       Future Appointments   Date Time Provider Department Center   9/8/2022  1:30 PM Suzanne Trujillo APRN [facility]     Additional Instructions for the Follow-ups that You Need to Schedule     Call MD With Problems / Concerns   As directed      Instructions: Call MD or return to ER if increasing abdominal pain/nausea or vomiting/constipation/diarrhea/bleeding per rectum/melena/fever chills/hematuria    Order Comments: Instructions: Call MD or return to ER if increasing abdominal pain/nausea or vomiting/constipation/diarrhea/bleeding per rectum/melena/fever chills/hematuria          Discharge Follow-up with PCP   As directed       Currently Documented PCP:    System, Provider Not In    PCP Phone Number:    None     Follow Up Details: Primary MD.  1 week.  Chronic alcoholic pancreatitis/pancreatic pseudocyst/UTI/perinephric abscess/Candida esophagitis         Discharge Follow-up with Specified Provider: GI; 2 Weeks   As directed      To: GI    Follow Up: 2 Weeks    Follow Up Details: Chronic alcoholic pancreatitis/pseudocyst pancreatic cyst         Discharge Follow-up with Specified Provider: Pain clinic.  As scheduled.; 2 Weeks   As directed      To: Pain clinic.  As scheduled.    Follow Up: 2 Weeks    Follow Up Details: Chronic abdominal pain secondary to chronic pancreatitis            Follow-up Information     Suzanne Trujillo APRN Follow up on 9/8/2022.    Specialties: Gastroenterology, Nurse Practitioner  Why: at 1:30. Please arrive 15 min prior to your scheduled appointment time.  Contact information:  [address] [place] [address]  [phone]             System, Provider Not In .    Why: Primary MD.  1 week.  Chronic alcoholic  pancreatitis/pancreatic pseudocyst/UTI/perinephric abscess/Candida esophagitis  Contact information:  [facility] [address]                           Time Spent on Discharge:  Greater than 30 minutes      Castro Hilliard MD  [facility]  08/14/22  09:42 EDT

## 2022-08-14 NOTE — PAYOR COMM NOTE
"Lizzeth Zavala (41 y.o. Female)     DC SUMMARY FOR [id number]                Date of Birth   1980    Social Security Number       Address   [address]  [place] [address]    Home Phone   [phone]    MRN   [id number]       Presybeterian   Rastafarian    Marital Status   Single                            Admission Date   22    Admission Type   Emergency    Admitting Provider   Rafat Beltran MD    Attending Provider       Department, Room/Bed   [address], 90/1       Discharge Date   2022    Discharge Disposition   Home or Self Care    Discharge Destination                               Attending Provider: (none)   Allergies: Latex, Nickel, Hydrocodone-acetaminophen    Isolation: None   Infection: None   Code Status: Prior   Advance Care Planning Activity    Ht: 180.3 cm (71\")   Wt: 55.8 kg (123 lb)    Admission Cmt: None   Principal Problem: Perinephric abscess [N15.1]                 Active Insurance as of 2022     Primary Coverage     Payor Plan Insurance Group Employer/Plan Group    [id number]     Payor Plan Address Payor Plan Phone Number Payor Plan Fax Number Effective Dates    MAIL STOP:   2022 - None Entered    [address] [address]       [facility] [address]       Subscriber Name Subscriber Birth Date Member ID       LIZZETH ZAVALA 1980 [id number]                 Emergency Contacts      (Rel.) Home Phone Work Phone Mobile Phone    MALOU EVANGELISTA (Significant Other) [phone] -- --               Discharge Summary      Castro Hilliard MD at 22 0942              Patient Name: Lizzeth Zavala  : 1980  MRN: [id number]    Date of Admission: 2022  Date of Discharge:  2022  Primary Care Physician: System, Provider Not In      Discharge Diagnoses     Active Hospital Problems    Diagnosis  POA   • **Perinephric abscess [N15.1]  Yes   • Pancreatic pseudocyst " [K86.3]  Yes   • Candida esophagitis (HCC) [B37.81]  Yes   • Chronic pancreatitis (HCC) [K86.1]  Yes   • Acute pyelonephritis [N10]  Yes   • 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        Hospital Course     Brief admission history and physical.  Please refer to the H&P for full detail.  A pleasant 41 years old white female with a past history of alcohol abuse in remission/chronic pancreatitis/GERD/chronic abdominal pain secondary to pancreatitis who presents to the emergency with upper and left loin pain associated with fever/nausea/diarrhea/back pain..  Physical examination on admission included an afebrile patient with stable vital signs/ill-appearing female/upper abdominal and left loin tenderness.  Hospital course.  Initial ER evaluation included a CBC that was normal except a hemoglobin of 11.5 .  Serum hCG was negative.  CRP was 4.4.  CMP normal except a random blood sugar of 102.  UA was negative.  COVID was negative.  Lactic acid was normal.  Blood cultures obtained.  Reticulocyte normal.  Iron studies revealed iron deficiency.  ESR elevated at 67.  INR was normal.  Chest x-ray with no acute disease.  CT scan of the abdomen pelvis with IV contrast revealed enhancement of the left kidney concerning for pyelonephritis/perinephric abscess on the left/intraparenchymal collections of the spleen that might represent infarction or hematoma or abscesses with evidence of splenic vein occlusion with collateral present.  Splenic ultrasound revealed echogenicities in the spleen.  Patient was admitted with left pyelonephritis associated with left perinephric abscess and splenic abscess.  GI/infectious disease/surgery were consulted.   Patient had a CT-guided drain from the left perinephric abscess on 8/8/2022.  Fluid cultures came back negative and blood cultures came back negative.  She was empirically started on Rocephin infectious disease has switched to Omnicef at discharge.  This care nature of the fluid and the fact that the patient had no fever or leukocytosis might suggest that this is not a perinephric abscess but rather pseudopancreatic cyst as the surgeon has suggested.  Surgery has not recommended any splenic abscess surgical intervention.  Repeat CAT scan showed improvement of the perinephric fluid collection.  She tolerated diet well without fever or leukocytosis during the hospital stay.  She was switched eventually to p.o. antibiotic at the time of discharge by infectious disease.  GI has performed an endoscopy and she was diagnosed with Candida esophagitis we continued her proton pump inhibitor and Diflucan was added.  As far as the splenic vein occlusion this was thought to be chronic hematology and surgery did not recommend any anticoagulation.  For her chronic alcoholic pancreatitis she has abstained from alcohol for the last 4 months and she was encouraged continue doing so.  We continued her Protonix and Creon.  Her IV fluid has been DC'd during this admission.  She was noted to be anemic anemia work-up confirmed iron deficiency and anemia of chronic disease.  She has received IV iron and her hemoglobin has stabilized.  Hematology followed up with the patient.  She will be discharged on iron.  At the time of discharge she continued with upper abdominal pain but to a lesser extent.  I believe this is secondary to her chronic pancreatitis and a pseudocyst and she has a pain MD that she follows up with and I encouraged her to discuss with her pain MD the possibility of celiac block if the pain continues.  She was given 10 tablets of Percocet at the time of discharge.  At the time of discharge she was hemodynamically stable and  tolerating p.o. and afebrile.  Surgery/hematology/GI/infectious disease okayed the discharge.    Consultants     Consult Orders (all) (From admission, onward)     Start     Ordered    08/08/22 1611  Inpatient General Surgery Consult  Once        Specialty:  General Surgery  Provider:  Katherin Jimenez MD    08/08/22 1610    08/07/22 1051  Hematology & Oncology Inpatient Consult  Once        Specialty:  Hematology and Oncology  Provider:  Marcial Olson MD    08/07/22 1051    08/07/22 0934  Inpatient Infectious Diseases Consult  Once        Specialty:  Infectious Diseases  Provider:  Bennie Mendieta MD    08/07/22 0933    08/07/22 0702  Inpatient Urology Consult  IN AM        Specialty:  Urology  Provider:  Clay Wilder MD    08/07/22 0145    08/07/22 0145  Inpatient Gastroenterology Consult  Once        Specialty:  Gastroenterology  Provider:  Rachell Madden MD    08/07/22 0145    08/07/22 0006  [facility] (on-call MD unless specified) Details  Once        Specialty:  Hospitalist  Provider:  (Not yet assigned)    08/07/22 0005              Procedures     Imaging Results (All)     Procedure Component Value Units Date/Time    CT Abdomen Pelvis With Contrast [[id number]] Collected: 08/12/22 0910     Updated: 08/12/22 1512    Narrative:      CT ABDOMEN AND PELVIS WITH CONTRAST     HISTORY: Follow-up drainage of perinephric abscess.     TECHNIQUE: Axial CT images of the abdomen and pelvis were obtained  following administration of intravenous contrast. The patient was not  given oral contrast Coronal and sagittal reformats were obtained.     COMPARISON: CT dated 08/06/2022.     FINDINGS: Interim placement of a percutaneous drain within the anterior  left perinephric fluid collection that has nearly resolved in the  interim. The drain is appropriately placed. There is some diffuse  stranding and phlegmon again seen within the left perinephric space with  thickening of the Gerota's fascia and along the  proximal left ureter.  Again demonstrated are heterogeneous loculated collections along the  spleen. The most superior collection within the gastrosplenic space has  slightly decreased in density from the interim however, increased in  size mildly now measuring 6.0 x 4.1 cm at a location where it previously  measured 5.1 x 4.1 cm. Along the inferior and anterior pole of the  spleen, the collection measures up to 1.9 cm in thickness and this is  largely without change. The splenic vein appears to be occluded with  numerous perisplenic venous collaterals. The renal vein and artery on  the left side are patent. There are numerous small lymph nodes that are  favored to be reactive. The urinary bladder is partially distended and  normal. The right kidney is normal. Mildly thickened right adrenal  gland. The pancreas demonstrates mild biliary prominence with numerous  calcifications in the region of the head that suggests chronic  pancreatitis.     The liver is normal. The gallbladder is decompressed and likely contains  sludge and/or stones. No evidence of bowel obstruction. Large amount of  formed stool within the colon most suggestive of constipation. Appendix  is normal.       Impression:      Interval resolution of the anterior left perinephric fluid  collection that has been appropriately drained with a percutaneous  catheter. Urothelial enhancement along the left renal pelvis and  proximal ureter remains. Diffuse phlegmon and stranding is still present  surrounding the kidney and posterior body and tail of the pancreas. A  gastrosplenic collection intimately associated with the medial aspect of  the spleen demonstrates decreased density however, mild early increase  in size. Additional perisplenic fluid collections are likely unchanged.  The splenic vein is occluded with perigastric venous collaterals.  Evidence of chronic pancreatitis. These findings may represent sequela  from hemorrhagic and/or infected  pancreatic pseudocysts.     Radiation dose reduction techniques were utilized, including automated  exposure control and exposure modulation based on body size.     This report was finalized on 8/12/2022 3:08 PM by Dr. Donna Steward M.D.       CT Outside Films [[id number]] Resulted: 08/12/22 0939     Updated: 08/12/22 0939    Narrative:      This procedure was auto-finalized with no dictation required.    CT Guided Abscess Drain Kidney Renal [[id number]] Collected: 08/08/22 1513     Updated: 08/08/22 1526    Narrative:      CT GUIDED DRAINAGE     HISTORY:  perinephric abscesses     COMPARISON: CT 08/06/2022.     PROCEDURE DETAILS: After informed consent was obtained from the patient,  the patient was placed on the CT scanner in supine position. A nurse was  continuously present and moderate sedation was performed under physician  supervision from 1351 to 1417 hours with a total of 75 mcg fentanyl and  1.5 mg versed administered. A preliminary scan of the abdomen was  obtained and the right upper quadrant perinephric fluid collection  identified.  A route was planned for the drainage and an appropriate  skin site identified. This site was then prepped and draped in the usual  sterile manner and the skin anesthetized with lidocaine. The tract to  the collection was anesthetized with lidocaine and a needle placed  within the collection. A wire was then advanced into the collection and  the needle removed. After dilation, an 8 [place] drainage catheter was  placed into the collection, coiled, locked, secured with Dermabond  reinforced suture and stay-fix dressing, and attached to a gravity  drainage bag. Approximately 15 mL brownish fluid were removed with  specimen sent to lab; post aspiration the collection appeared largely  evacuated. Initial irrigation was performed. The patient tolerated the  procedure well and there were no immediate complications.  All elements  of maximal sterile technique were followed.  Radiation dose reduction  techniques were utilized, including automated exposure control and  exposure modulation.           Impression:      Successful drain placement into right upper quadrant fluid collection as  described above.     This report was finalized on 8/8/2022 3:22 PM by Dr. Joshua Dias M.D.        Spleen [[id number]] Collected: 08/07/22 1829     Updated: 08/07/22 1852    Narrative:      SONOGRAM OF THE SPLEEN     HISTORY: Splenic lesion seen on CT.     TECHNIQUE: Sonogram of the spleen was performed and is correlated with  CT scan performed yesterday.     FINDINGS: Spleen measures 11.9 x 4.2 x 4.3 cm. Along the superior medial  edge of the spleen there is heterogeneous area of echogenicity measuring  about 3.6 cm long corresponding to the lesion seen in the same area on  CT. There is also some heterogeneous echogenicity on the medial lower  portion of the spleen. The findings appear similar to the CT from  yesterday.       Impression:      Abnormal spleen appears no different than on CT yesterday.     This report was finalized on 8/7/2022 6:48 PM by Dr. Salvador Perdomo M.D.       CT Abdomen Pelvis With Contrast [[id number]] Collected: 08/06/22 2309     Updated: 08/06/22 2332    Narrative:      CT OF THE ABDOMEN AND PELVIS WITH CONTRAST     HISTORY: Upper abdominal pain     COMPARISON: 09/28/2017     TECHNIQUE: Axial CT imaging was obtained through the abdomen and pelvis.  IV contrast was administered.     FINDINGS:  Images through the lung bases demonstrate some minimal dependent  atelectasis. No suspicious hepatic lesions are seen. The liver is  enlarged, measuring up to 18.9 cm in craniocaudal dimensions.  Gallbladder is normal. Right kidney enhances normally, and there is no  hydronephrosis or there is heterogeneous enhancement of the left kidney,  with marked urothelial enhancement. There is left periureteral soft  tissue stranding. There is also left perinephric stranding. There is  a  multiloculated rim-enhancing collection which is intimately associated  with the anterior aspect of the left kidney, concerning for perinephric  abscess. It measures up to 3.5 x 3.3 cm on the axial series. It has a  linear extension medially, which is intimately associated with the left  wilmer of the diaphragm. This collection measures up to 8 mm in transverse  dimensions, and 4.3 cm in length. Both of these collections are  suspected to be continuous with the patient's left ureter. There is  marked attenuation of the patient's left renal vein, although I think it  appears patent. There is an additional component of the collection,  which extends anteriorly, and is intimately associated with the tail of  the pancreas and posterior gastric wall. This collection measures up to  1.6 cm. There is inflammatory stranding around the spleen.  Multiloculated heterogeneous collections are noted within the spleen.  Some of these appear to be intraparenchymal, while others are  subcapsular. Etiology is uncertain. They may represent areas of splenic  infarction or hematoma, although superimposed infection is not excluded.  Of note, there is marked attenuation of the patient's splenic vein, and  I cannot exclude a splenic infarction. There certainly appear to be  within  prominent vessels along the greater curvature of the stomach,  which may represent for gastric collaterals. The single largest  collection involving the spleen measures approximately 5.1 x 4.1 cm on  the axial series. While the inflammation closely abuts the pancreas, it  does appear to enhance normally. There is some calcification of the  aorta. There is no bowel obstruction. Urinary bladder appears somewhat  distended. Uterus is within normal limits. There is a small amount of  free fluid within the pelvis. There is colonic diverticulosis. The  appendix is normal. No acute osseous abnormalities are seen.       Impression:         1. The patient has  heterogeneous enhancement of the left kidney, as well  as extensive urothelial enhancement. The appearance is concerning for  pyelonephritis. There are associated perinephric abscesses, as described  in the body of the report. These may be in continuity with the patient's  proximal left ureter. The inflammation is also intimately associated  with the left wilmer of the diaphragm, the pancreatic tail, and the  posterior wall of the stomach. The patient does have both subcapsular  and intraparenchymal collections within the spleen. These may represent  areas of infarction or hematoma, although superimposed infection cannot  be excluded, given the degree of inflammation within the left upper  quadrant. 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      Radiation dose reduction techniques were utilized, including automated  exposure control and exposure modulation based on body size.     This report was finalized on 8/6/2022 11:29 PM by Dr. Camilla Whalen M.D.       XR Chest 1 View [[id number]] Collected: 08/06/22 2116     Updated: 08/06/22 2120    Narrative:      SINGLE VIEW OF THE CHEST     HISTORY: Chronic pancreatitis     COMPARISON: 04/06/2018     FINDINGS:  Heart size is within normal limits. No pneumothorax, pleural effusion,  or acute infiltrate is seen.       Impression:      No acute findings.     This report was finalized on 8/6/2022 9:17 PM by Dr. Camilla Whalen M.D.             Pertinent Labs     Results from last 7 days   Lab Units 08/14/22  0605 08/13/22  0500 08/12/22 0531 08/11/22  0544   WBC 10*3/mm3 6.87 5.07 6.40 5.20   HEMOGLOBIN g/dL 10.1* 10.0* 10.0* 9.8*   PLATELETS 10*3/mm3 272 267 298 266     Results from last 7 days   Lab Units 08/14/22  0605 08/13/22  0500 08/12/22  0531 08/11/22  0544   SODIUM mmol/L 137 141 141 141   POTASSIUM mmol/L 4.4 4.2 4.4 4.5    CHLORIDE mmol/L 99 100 101 101   CO2 mmol/L 27.8 28.9 29.9* 28.0   BUN mg/dL 15 12 10 9   CREATININE mg/dL 0.67 0.70 0.71 0.62   GLUCOSE mg/dL 105* 103* 79 99   Estimated Creatinine Clearance: 99.3 mL/min (by C-G formula based on SCr of 0.67 mg/dL).  Results from last 7 days   Lab Units 08/11/22  0544   ALBUMIN g/dL 3.60   BILIRUBIN mg/dL <0.2   ALK PHOS U/L 89   AST (SGOT) U/L 16   ALT (SGPT) U/L 20     Results from last 7 days   Lab Units 08/14/22  0605 08/13/22  0500 08/12/22  0531 08/11/22  0544   CALCIUM mg/dL 9.7 9.7 9.7 9.5   ALBUMIN g/dL  --   --   --  3.60               Invalid input(s): LDLCALC  Results from last 7 days   Lab Units 08/08/22  1410 08/08/22  0635 08/08/22  0616   BLOODCX   --  No growth at 5 days No growth at 4 days   BODYFLDCX  No growth at 5 days  --   --      Imaging Results (Last 24 Hours)     ** No results found for the last 24 hours. **          Test Results Pending at Discharge     Pending Labs     Order Current Status    Blood Culture - Blood, Arm, Right Preliminary result            Discharge Exam   Physical Exam  Vitals.  Temperature 97.5 a pulse of 77 respiratory rate of 18 a blood pressure of 96/69 and an O2 sats of 95% on room air.    Ggeneral.  Middle-aged female.  Alert and oriented x3.  In no apparent pain/distress/diaphoresis.  Normal mood and affect.  Eyes.  Pupils equal round and reactive.  Intact extraocular musculature.  No pallor or jaundice.  Normal trajectory and lids.  Oral cavity.  Moist mucous membrane.  Neck.  Supple.  No JVD.  No lymphadenopathy or thyromegaly.  Cardiovascular.  Regular rate and rhythm with no gallops or murmurs.  Chest.  Clear to auscultation bilaterally with no added sounds.  Abdomen.  Soft lax.No localized tenderness.  No guarding or rebound.  No organomegaly.  Drain site is dressed.  Extremities.  No clubbing cyanosis or edema.  CNS.  No acute focal neurological deficits.      Discharge Details        Discharge Medications      New  Medications      Instructions Start Date   cefdinir 300 MG capsule  Commonly known as: OMNICEF   300 mg, Oral, Every 12 Hours Scheduled      fluconazole 200 MG tablet  Commonly known as: DIFLUCAN   200 mg, Oral, Every 24 Hours   Start Date: August 15, 2022     oxyCODONE-acetaminophen  MG per tablet  Commonly known as: PERCOCET   1 tablet, Oral, Every 6 Hours PRN         Continue These Medications      Instructions Start Date   amitriptyline 10 MG tablet  Commonly known as: ELAVIL   10 mg, Oral, Nightly      Feosol 200 (65 Fe) MG tablet tablet  Generic drug: Ferrous Sulfate Dried   200 mg, Oral, Daily      folic acid 1 MG tablet  Commonly known as: FOLVITE   1 mg, Oral, Daily      ondansetron 4 MG tablet  Commonly known as: ZOFRAN   4 mg, Oral, Every 8 Hours PRN      pancrelipase (Lip-Prot-Amyl) 02880-65447 units capsule delayed-release particles capsule  Commonly known as: CREON   72,000 units of lipase, Oral, 3 Times Daily With Meals      pantoprazole 40 MG EC tablet  Commonly known as: PROTONIX   40 mg, Oral, Daily      Potassium 95 MG tablet   Oral      thiamine 100 MG tablet  tablet  Commonly known as: VITAMIN B-1   100 mg, Oral, Daily      vitamin B-12 100 MCG tablet  Commonly known as: CYANOCOBALAMIN   50 mcg, Oral, Daily      vitamin D3 125 MCG (5000 UT) capsule capsule   5,000 Units, Oral, Daily             Allergies   Allergen Reactions   • Latex    • Nickel    • Hydrocodone-Acetaminophen Nausea And Vomiting         Discharge Disposition:  Condition: Stable    Diet:   Diet Order   Procedures   • Diet Regular   Low-fat    Activity:   Activity Instructions     Activity as Tolerated            Counseling :    CODE STATUS:    Code Status and Medical Interventions:   Ordered at: 08/07/22 0145     Code Status (Patient has no pulse and is not breathing):    CPR (Attempt to Resuscitate)     Medical Interventions (Patient has pulse or is breathing):    Full Support       Future Appointments   Date Time  Provider Department Center   9/8/2022  1:30 PM Suzanne Trujillo, APRN [facility]     Additional Instructions for the Follow-ups that You Need to Schedule     Call MD With Problems / Concerns   As directed      Instructions: Call MD or return to ER if increasing abdominal pain/nausea or vomiting/constipation/diarrhea/bleeding per rectum/melena/fever chills/hematuria    Order Comments: Instructions: Call MD or return to ER if increasing abdominal pain/nausea or vomiting/constipation/diarrhea/bleeding per rectum/melena/fever chills/hematuria          Discharge Follow-up with PCP   As directed       Currently Documented PCP:    System, Provider Not In    PCP Phone Number:    None     Follow Up Details: Primary MD.  1 week.  Chronic alcoholic pancreatitis/pancreatic pseudocyst/UTI/perinephric abscess/Candida esophagitis         Discharge Follow-up with Specified Provider: GI; 2 Weeks   As directed      To: GI    Follow Up: 2 Weeks    Follow Up Details: Chronic alcoholic pancreatitis/pseudocyst pancreatic cyst         Discharge Follow-up with Specified Provider: Pain clinic.  As scheduled.; 2 Weeks   As directed      To: Pain clinic.  As scheduled.    Follow Up: 2 Weeks    Follow Up Details: Chronic abdominal pain secondary to chronic pancreatitis            Follow-up Information     Suzanne Trujillo, SOL Follow up on 9/8/2022.    Specialties: Gastroenterology, Nurse Practitioner  Why: at 1:30. Please arrive 15 min prior to your scheduled appointment time.  Contact information:  [address] [place] [address] [phone]             System, Provider Not In .    Why: Primary MD.  1 week.  Chronic alcoholic pancreatitis/pancreatic pseudocyst/UTI/perinephric abscess/Candida esophagitis  Contact information:  [facility] [address]                           Time Spent on Discharge:  Greater than 30 minutes      Castro Hilliard MD  [facility]  08/14/22  09:42  EDT    Electronically signed by Castro Hilliard MD at 08/14/22 0908

## 2022-08-14 NOTE — PLAN OF CARE
Goal Outcome Evaluation:  Plan of Care Reviewed With: patient        Progress: no change  Outcome Evaluation: with intermittent severe abdomnal , left flank and left shoulder pain, Percocet and IV Dilaudid given for pain. Up ad valeria. Ambulated  frequaently in the peterson. Voided freely, had large BM after Dulcolax Suppository. On Cefdinir Cap. Slept on and off.

## 2022-08-14 NOTE — DISCHARGE INSTRUCTIONS
Call MD With Problems / Concerns  As directed   Instructions: Call MD or return to ER if increasing abdominal pain/nausea or vomiting/constipation/diarrhea/bleeding per rectum/melena/fever chills/hematuria   Order Comments: Instructions: Call MD or return to ER if increasing abdominal pain/nausea or vomiting/constipation/diarrhea/bleeding per rectum/melena/fever chills/hematuria        Discharge Follow-up with PCP  As directed   Currently Documented PCP:   System, Provider Not In   PCP Phone Number:   None    Follow Up Details: Primary MD.  1 week.  Chronic alcoholic pancreatitis/pancreatic pseudocyst/UTI/perinephric abscess/Candida esophagitis        Discharge Follow-up with Specified Provider: GI; 2 Weeks  As directed   To: GI    Follow Up: 2 Weeks    Follow Up Details: Chronic alcoholic pancreatitis/pseudocyst pancreatic cyst        Discharge Follow-up with Specified Provider: Pain clinic.  As scheduled.; 2 Weeks  As directed   To: Pain clinic.  As scheduled.    Follow Up: 2 Weeks    Follow Up Details: Chronic abdominal pain secondary to chronic pancreatitis

## 2022-08-15 NOTE — PROGRESS NOTES
Case Management Discharge Note      Final Note: Discharged home. Cynthia Bonilla, ZAYDA         Transportation Services  Private: Car    Final Discharge Disposition Code: 01 - home or self-care

## 2022-08-15 NOTE — OUTREACH NOTE
Prep Survey    Flowsheet Row Responses   [facility] facility patient discharged from? [place]   Is LACE score < 7 ? No   Emergency Room discharge w/ pulse ox? No   Eligibility Readm Mgmt   Discharge diagnosis Perinephric abscess    Does the patient have one of the following disease processes/diagnoses(primary or secondary)? Other   Does the patient have Home health ordered? No   Is there a DME ordered? No   Prep survey completed? Yes          MIRIAM SAMSON - Registered Nurse

## 2022-08-16 ENCOUNTER — READMISSION MANAGEMENT (OUTPATIENT)
Dept: CALL CENTER | Facility: HOSPITAL | Age: 42
End: 2022-08-16

## 2022-08-18 ENCOUNTER — READMISSION MANAGEMENT (OUTPATIENT)
Dept: CALL CENTER | Facility: HOSPITAL | Age: 42
End: 2022-08-18

## 2022-08-18 NOTE — OUTREACH NOTE
Medical Week 1 Survey    Flowsheet Row Responses   [facility] patient discharged from? [place]   Does the patient have one of the following disease processes/diagnoses(primary or secondary)? Other   Week 1 attempt successful? No   Unsuccessful attempts Attempt 2          BOGDAN TRIMBLE - Registered Nurse

## 2022-08-22 ENCOUNTER — READMISSION MANAGEMENT (OUTPATIENT)
Dept: CALL CENTER | Facility: HOSPITAL | Age: 42
End: 2022-08-22

## 2022-08-22 NOTE — OUTREACH NOTE
Medical Week 1 Survey    Flowsheet Row Responses   [facility] patient discharged from? [place]   Does the patient have one of the following disease processes/diagnoses(primary or secondary)? Other   Week 1 attempt successful? No   Unsuccessful attempts Attempt 3          TAISHA TRIMBLE - Registered Nurse

## 2022-10-04 ENCOUNTER — OFFICE (OUTPATIENT)
Dept: URBAN - METROPOLITAN AREA CLINIC 64 | Facility: CLINIC | Age: 42
End: 2022-10-04
Payer: COMMERCIAL

## 2022-10-04 VITALS
HEIGHT: 71 IN | DIASTOLIC BLOOD PRESSURE: 71 MMHG | SYSTOLIC BLOOD PRESSURE: 107 MMHG | HEART RATE: 80 BPM | WEIGHT: 131 LBS

## 2022-10-04 DIAGNOSIS — F10.10 ALCOHOL ABUSE, UNCOMPLICATED: ICD-10-CM

## 2022-10-04 DIAGNOSIS — Z72.0 TOBACCO USE: ICD-10-CM

## 2022-10-04 DIAGNOSIS — K85.90 ACUTE PANCREATITIS WITHOUT NECROSIS OR INFECTION, UNSPECIFIE: ICD-10-CM

## 2022-10-04 DIAGNOSIS — K86.1 OTHER CHRONIC PANCREATITIS: ICD-10-CM

## 2022-10-04 PROCEDURE — 99214 OFFICE O/P EST MOD 30 MIN: CPT | Performed by: NURSE PRACTITIONER

## 2022-10-04 RX ORDER — PANCRELIPASE 36000; 180000; 114000 [USP'U]/1; [USP'U]/1; [USP'U]/1
CAPSULE, DELAYED RELEASE PELLETS ORAL
Qty: 720 | Refills: 3 | Status: COMPLETED
Start: 2022-10-04 | End: 2023-10-17

## 2022-10-20 ENCOUNTER — APPOINTMENT (OUTPATIENT)
Dept: CT IMAGING | Facility: HOSPITAL | Age: 42
End: 2022-10-20

## 2022-10-20 ENCOUNTER — HOSPITAL ENCOUNTER (INPATIENT)
Facility: HOSPITAL | Age: 42
LOS: 4 days | Discharge: HOME | End: 2022-10-24
Attending: EMERGENCY MEDICINE | Admitting: STUDENT IN AN ORGANIZED HEALTH CARE EDUCATION/TRAINING PROGRAM

## 2022-10-20 DIAGNOSIS — N23 RENAL COLIC ON LEFT SIDE: ICD-10-CM

## 2022-10-20 DIAGNOSIS — N20.1 URETEROLITHIASIS: Primary | ICD-10-CM

## 2022-10-20 DIAGNOSIS — R18.8 INTRA-ABDOMINAL FLUID COLLECTION: ICD-10-CM

## 2022-10-20 DIAGNOSIS — N13.2 HYDRONEPHROSIS WITH URINARY OBSTRUCTION DUE TO URETERAL CALCULUS: ICD-10-CM

## 2022-10-20 DIAGNOSIS — R55 VASOVAGAL SYNCOPE: ICD-10-CM

## 2022-10-20 DIAGNOSIS — F10.920 ALCOHOLIC INTOXICATION WITHOUT COMPLICATION: ICD-10-CM

## 2022-10-20 LAB
ALBUMIN SERPL-MCNC: 4.9 G/DL (ref 3.5–5.2)
ALBUMIN/GLOB SERPL: 1.7 G/DL
ALP SERPL-CCNC: 87 U/L (ref 39–117)
ALT SERPL W P-5'-P-CCNC: 23 U/L (ref 1–33)
AMPHET+METHAMPHET UR QL: NEGATIVE
ANION GAP SERPL CALCULATED.3IONS-SCNC: 13.7 MMOL/L (ref 5–15)
AST SERPL-CCNC: 21 U/L (ref 1–32)
BARBITURATES UR QL SCN: NEGATIVE
BASOPHILS # BLD AUTO: 0.05 10*3/MM3 (ref 0–0.2)
BASOPHILS NFR BLD AUTO: 0.6 % (ref 0–1.5)
BENZODIAZ UR QL SCN: NEGATIVE
BILIRUB SERPL-MCNC: 0.3 MG/DL (ref 0–1.2)
BILIRUB UR QL STRIP: NEGATIVE
BUN SERPL-MCNC: 9 MG/DL (ref 6–20)
BUN/CREAT SERPL: 10 (ref 7–25)
CALCIUM SPEC-SCNC: 9.9 MG/DL (ref 8.6–10.5)
CANNABINOIDS SERPL QL: NEGATIVE
CHLORIDE SERPL-SCNC: 100 MMOL/L (ref 98–107)
CLARITY UR: CLEAR
CO2 SERPL-SCNC: 26.3 MMOL/L (ref 22–29)
COCAINE UR QL: NEGATIVE
COLOR UR: YELLOW
CREAT SERPL-MCNC: 0.9 MG/DL (ref 0.57–1)
D-LACTATE SERPL-SCNC: 1.7 MMOL/L (ref 0.5–2)
DEPRECATED RDW RBC AUTO: 48.8 FL (ref 37–54)
EGFRCR SERPLBLD CKD-EPI 2021: 83.1 ML/MIN/1.73
EOSINOPHIL # BLD AUTO: 0.08 10*3/MM3 (ref 0–0.4)
EOSINOPHIL NFR BLD AUTO: 0.9 % (ref 0.3–6.2)
ERYTHROCYTE [DISTWIDTH] IN BLOOD BY AUTOMATED COUNT: 14.1 % (ref 12.3–15.4)
ETHANOL BLD-MCNC: 296 MG/DL (ref 0–10)
ETHANOL UR QL: 0.3 %
GLOBULIN UR ELPH-MCNC: 2.9 GM/DL
GLUCOSE SERPL-MCNC: 92 MG/DL (ref 65–99)
GLUCOSE UR STRIP-MCNC: NEGATIVE MG/DL
HCG SERPL QL: NEGATIVE
HCT VFR BLD AUTO: 47.4 % (ref 34–46.6)
HGB BLD-MCNC: 16.1 G/DL (ref 12–15.9)
HGB UR QL STRIP.AUTO: NEGATIVE
IMM GRANULOCYTES # BLD AUTO: 0.01 10*3/MM3 (ref 0–0.05)
IMM GRANULOCYTES NFR BLD AUTO: 0.1 % (ref 0–0.5)
KETONES UR QL STRIP: NEGATIVE
LEUKOCYTE ESTERASE UR QL STRIP.AUTO: NEGATIVE
LIPASE SERPL-CCNC: 335 U/L (ref 13–60)
LYMPHOCYTES # BLD AUTO: 3.34 10*3/MM3 (ref 0.7–3.1)
LYMPHOCYTES NFR BLD AUTO: 39.1 % (ref 19.6–45.3)
MCH RBC QN AUTO: 32.1 PG (ref 26.6–33)
MCHC RBC AUTO-ENTMCNC: 34 G/DL (ref 31.5–35.7)
MCV RBC AUTO: 94.6 FL (ref 79–97)
METHADONE UR QL SCN: NEGATIVE
MONOCYTES # BLD AUTO: 0.42 10*3/MM3 (ref 0.1–0.9)
MONOCYTES NFR BLD AUTO: 4.9 % (ref 5–12)
NEUTROPHILS NFR BLD AUTO: 4.64 10*3/MM3 (ref 1.7–7)
NEUTROPHILS NFR BLD AUTO: 54.4 % (ref 42.7–76)
NITRITE UR QL STRIP: NEGATIVE
NRBC BLD AUTO-RTO: 0 /100 WBC (ref 0–0.2)
OPIATES UR QL: NEGATIVE
OXYCODONE UR QL SCN: POSITIVE
PH UR STRIP.AUTO: 6 [PH] (ref 5–8)
PLATELET # BLD AUTO: 203 10*3/MM3 (ref 140–450)
PMV BLD AUTO: 9.9 FL (ref 6–12)
POTASSIUM SERPL-SCNC: 4.1 MMOL/L (ref 3.5–5.2)
PROCALCITONIN SERPL-MCNC: <0.02 NG/ML (ref 0–0.25)
PROT SERPL-MCNC: 7.8 G/DL (ref 6–8.5)
PROT UR QL STRIP: NEGATIVE
RBC # BLD AUTO: 5.01 10*6/MM3 (ref 3.77–5.28)
SODIUM SERPL-SCNC: 140 MMOL/L (ref 136–145)
SP GR UR STRIP: 1.01 (ref 1–1.03)
TROPONIN T SERPL-MCNC: <0.01 NG/ML (ref 0–0.03)
UROBILINOGEN UR QL STRIP: NORMAL
WBC NRBC COR # BLD: 8.54 10*3/MM3 (ref 3.4–10.8)

## 2022-10-20 PROCEDURE — G0378 HOSPITAL OBSERVATION PER HR: HCPCS

## 2022-10-20 PROCEDURE — 74177 CT ABD & PELVIS W/CONTRAST: CPT

## 2022-10-20 PROCEDURE — 25010000002 IOPAMIDOL 61 % SOLUTION: Performed by: EMERGENCY MEDICINE

## 2022-10-20 PROCEDURE — 80053 COMPREHEN METABOLIC PANEL: CPT | Performed by: EMERGENCY MEDICINE

## 2022-10-20 PROCEDURE — 80307 DRUG TEST PRSMV CHEM ANLYZR: CPT | Performed by: EMERGENCY MEDICINE

## 2022-10-20 PROCEDURE — 84484 ASSAY OF TROPONIN QUANT: CPT | Performed by: EMERGENCY MEDICINE

## 2022-10-20 PROCEDURE — 93010 ELECTROCARDIOGRAM REPORT: CPT | Performed by: INTERNAL MEDICINE

## 2022-10-20 PROCEDURE — 25010000002 KETOROLAC TROMETHAMINE PER 15 MG: Performed by: EMERGENCY MEDICINE

## 2022-10-20 PROCEDURE — 84145 PROCALCITONIN (PCT): CPT | Performed by: EMERGENCY MEDICINE

## 2022-10-20 PROCEDURE — 83690 ASSAY OF LIPASE: CPT | Performed by: EMERGENCY MEDICINE

## 2022-10-20 PROCEDURE — 81003 URINALYSIS AUTO W/O SCOPE: CPT | Performed by: EMERGENCY MEDICINE

## 2022-10-20 PROCEDURE — 85025 COMPLETE CBC W/AUTO DIFF WBC: CPT | Performed by: EMERGENCY MEDICINE

## 2022-10-20 PROCEDURE — 82077 ASSAY SPEC XCP UR&BREATH IA: CPT | Performed by: EMERGENCY MEDICINE

## 2022-10-20 PROCEDURE — 93005 ELECTROCARDIOGRAM TRACING: CPT | Performed by: EMERGENCY MEDICINE

## 2022-10-20 PROCEDURE — 84703 CHORIONIC GONADOTROPIN ASSAY: CPT | Performed by: EMERGENCY MEDICINE

## 2022-10-20 PROCEDURE — 99284 EMERGENCY DEPT VISIT MOD MDM: CPT

## 2022-10-20 PROCEDURE — 83605 ASSAY OF LACTIC ACID: CPT | Performed by: EMERGENCY MEDICINE

## 2022-10-20 RX ORDER — AMMONIA INHALANTS 0.04 G/.3ML
INHALANT RESPIRATORY (INHALATION)
Status: COMPLETED
Start: 2022-10-20 | End: 2022-10-20

## 2022-10-20 RX ORDER — AMMONIA INHALANTS 0.04 G/.3ML
1 INHALANT RESPIRATORY (INHALATION) ONCE
Status: COMPLETED | OUTPATIENT
Start: 2022-10-20 | End: 2022-10-20

## 2022-10-20 RX ORDER — KETOROLAC TROMETHAMINE 15 MG/ML
15 INJECTION, SOLUTION INTRAMUSCULAR; INTRAVENOUS ONCE
Status: COMPLETED | OUTPATIENT
Start: 2022-10-20 | End: 2022-10-20

## 2022-10-20 RX ORDER — SODIUM CHLORIDE 0.9 % (FLUSH) 0.9 %
10 SYRINGE (ML) INJECTION AS NEEDED
Status: DISCONTINUED | OUTPATIENT
Start: 2022-10-20 | End: 2022-10-24 | Stop reason: HOSPADM

## 2022-10-20 RX ADMIN — SODIUM CHLORIDE, POTASSIUM CHLORIDE, SODIUM LACTATE AND CALCIUM CHLORIDE 1000 ML: 600; 310; 30; 20 INJECTION, SOLUTION INTRAVENOUS at 19:41

## 2022-10-20 RX ADMIN — KETOROLAC TROMETHAMINE 15 MG: 15 INJECTION, SOLUTION INTRAMUSCULAR; INTRAVENOUS at 22:19

## 2022-10-20 RX ADMIN — AMMONIA INHALANTS 1 EACH: 0.04 INHALANT RESPIRATORY (INHALATION) at 19:33

## 2022-10-20 RX ADMIN — IOPAMIDOL 85 ML: 612 INJECTION, SOLUTION INTRAVENOUS at 20:54

## 2022-10-21 PROBLEM — R10.9 LEFT FLANK PAIN: Status: ACTIVE | Noted: 2022-10-21

## 2022-10-21 LAB
ANION GAP SERPL CALCULATED.3IONS-SCNC: 11.6 MMOL/L (ref 5–15)
BASOPHILS # BLD AUTO: 0.05 10*3/MM3 (ref 0–0.2)
BASOPHILS NFR BLD AUTO: 0.8 % (ref 0–1.5)
BUN SERPL-MCNC: 12 MG/DL (ref 6–20)
BUN/CREAT SERPL: 14.3 (ref 7–25)
CALCIUM SPEC-SCNC: 9.1 MG/DL (ref 8.6–10.5)
CHLORIDE SERPL-SCNC: 101 MMOL/L (ref 98–107)
CO2 SERPL-SCNC: 26.4 MMOL/L (ref 22–29)
CREAT SERPL-MCNC: 0.84 MG/DL (ref 0.57–1)
DEPRECATED RDW RBC AUTO: 47.8 FL (ref 37–54)
EGFRCR SERPLBLD CKD-EPI 2021: 90.2 ML/MIN/1.73
EOSINOPHIL # BLD AUTO: 0.14 10*3/MM3 (ref 0–0.4)
EOSINOPHIL NFR BLD AUTO: 2.1 % (ref 0.3–6.2)
ERYTHROCYTE [DISTWIDTH] IN BLOOD BY AUTOMATED COUNT: 14.3 % (ref 12.3–15.4)
GLUCOSE SERPL-MCNC: 73 MG/DL (ref 65–99)
HCT VFR BLD AUTO: 40.7 % (ref 34–46.6)
HGB BLD-MCNC: 13.7 G/DL (ref 12–15.9)
IMM GRANULOCYTES # BLD AUTO: 0.02 10*3/MM3 (ref 0–0.05)
IMM GRANULOCYTES NFR BLD AUTO: 0.3 % (ref 0–0.5)
LYMPHOCYTES # BLD AUTO: 2.63 10*3/MM3 (ref 0.7–3.1)
LYMPHOCYTES NFR BLD AUTO: 39.9 % (ref 19.6–45.3)
MAGNESIUM SERPL-MCNC: 1.8 MG/DL (ref 1.6–2.6)
MCH RBC QN AUTO: 31.4 PG (ref 26.6–33)
MCHC RBC AUTO-ENTMCNC: 33.7 G/DL (ref 31.5–35.7)
MCV RBC AUTO: 93.1 FL (ref 79–97)
MONOCYTES # BLD AUTO: 0.52 10*3/MM3 (ref 0.1–0.9)
MONOCYTES NFR BLD AUTO: 7.9 % (ref 5–12)
NEUTROPHILS NFR BLD AUTO: 3.23 10*3/MM3 (ref 1.7–7)
NEUTROPHILS NFR BLD AUTO: 49 % (ref 42.7–76)
NRBC BLD AUTO-RTO: 0 /100 WBC (ref 0–0.2)
PHOSPHATE SERPL-MCNC: 4.9 MG/DL (ref 2.5–4.5)
PLATELET # BLD AUTO: 167 10*3/MM3 (ref 140–450)
PMV BLD AUTO: 9.8 FL (ref 6–12)
POTASSIUM SERPL-SCNC: 3.9 MMOL/L (ref 3.5–5.2)
QT INTERVAL: 356 MS
QT INTERVAL: 420 MS
RBC # BLD AUTO: 4.37 10*6/MM3 (ref 3.77–5.28)
SODIUM SERPL-SCNC: 139 MMOL/L (ref 136–145)
WBC NRBC COR # BLD: 6.59 10*3/MM3 (ref 3.4–10.8)

## 2022-10-21 PROCEDURE — 25010000002 PIPERACILLIN SOD-TAZOBACTAM PER 1 G: Performed by: NURSE PRACTITIONER

## 2022-10-21 PROCEDURE — 99223 1ST HOSP IP/OBS HIGH 75: CPT | Performed by: INTERNAL MEDICINE

## 2022-10-21 PROCEDURE — G0378 HOSPITAL OBSERVATION PER HR: HCPCS

## 2022-10-21 PROCEDURE — 25010000002 HYDROMORPHONE PER 4 MG: Performed by: STUDENT IN AN ORGANIZED HEALTH CARE EDUCATION/TRAINING PROGRAM

## 2022-10-21 PROCEDURE — 25010000002 ONDANSETRON PER 1 MG: Performed by: NURSE PRACTITIONER

## 2022-10-21 PROCEDURE — 80048 BASIC METABOLIC PNL TOTAL CA: CPT | Performed by: NURSE PRACTITIONER

## 2022-10-21 PROCEDURE — 84100 ASSAY OF PHOSPHORUS: CPT | Performed by: STUDENT IN AN ORGANIZED HEALTH CARE EDUCATION/TRAINING PROGRAM

## 2022-10-21 PROCEDURE — 85025 COMPLETE CBC W/AUTO DIFF WBC: CPT | Performed by: NURSE PRACTITIONER

## 2022-10-21 PROCEDURE — 83735 ASSAY OF MAGNESIUM: CPT | Performed by: STUDENT IN AN ORGANIZED HEALTH CARE EDUCATION/TRAINING PROGRAM

## 2022-10-21 PROCEDURE — 90791 PSYCH DIAGNOSTIC EVALUATION: CPT

## 2022-10-21 PROCEDURE — 87040 BLOOD CULTURE FOR BACTERIA: CPT | Performed by: NURSE PRACTITIONER

## 2022-10-21 PROCEDURE — 93005 ELECTROCARDIOGRAM TRACING: CPT | Performed by: HOSPITALIST

## 2022-10-21 PROCEDURE — 25010000002 KETOROLAC TROMETHAMINE PER 15 MG: Performed by: NURSE PRACTITIONER

## 2022-10-21 PROCEDURE — 93010 ELECTROCARDIOGRAM REPORT: CPT | Performed by: INTERNAL MEDICINE

## 2022-10-21 RX ORDER — FOLIC ACID 1 MG/1
1 TABLET ORAL DAILY
Status: DISCONTINUED | OUTPATIENT
Start: 2022-10-21 | End: 2022-10-24 | Stop reason: HOSPADM

## 2022-10-21 RX ORDER — KETOROLAC TROMETHAMINE 30 MG/ML
30 INJECTION, SOLUTION INTRAMUSCULAR; INTRAVENOUS EVERY 6 HOURS PRN
Status: COMPLETED | OUTPATIENT
Start: 2022-10-21 | End: 2022-10-22

## 2022-10-21 RX ORDER — AMITRIPTYLINE HYDROCHLORIDE 10 MG/1
10 TABLET, FILM COATED ORAL NIGHTLY
Status: DISCONTINUED | OUTPATIENT
Start: 2022-10-21 | End: 2022-10-24 | Stop reason: HOSPADM

## 2022-10-21 RX ORDER — ACETAMINOPHEN 160 MG/5ML
650 SOLUTION ORAL EVERY 4 HOURS PRN
Status: DISCONTINUED | OUTPATIENT
Start: 2022-10-21 | End: 2022-10-24 | Stop reason: HOSPADM

## 2022-10-21 RX ORDER — LORAZEPAM 2 MG/ML
2 INJECTION INTRAMUSCULAR
Status: DISCONTINUED | OUTPATIENT
Start: 2022-10-21 | End: 2022-10-24 | Stop reason: HOSPADM

## 2022-10-21 RX ORDER — HYDROMORPHONE HYDROCHLORIDE 1 MG/ML
0.5 INJECTION, SOLUTION INTRAMUSCULAR; INTRAVENOUS; SUBCUTANEOUS EVERY 4 HOURS PRN
Status: DISCONTINUED | OUTPATIENT
Start: 2022-10-21 | End: 2022-10-22

## 2022-10-21 RX ORDER — BISACODYL 5 MG/1
5 TABLET, DELAYED RELEASE ORAL DAILY PRN
Status: DISCONTINUED | OUTPATIENT
Start: 2022-10-21 | End: 2022-10-24 | Stop reason: HOSPADM

## 2022-10-21 RX ORDER — BISACODYL 10 MG
10 SUPPOSITORY, RECTAL RECTAL DAILY PRN
Status: DISCONTINUED | OUTPATIENT
Start: 2022-10-21 | End: 2022-10-24 | Stop reason: HOSPADM

## 2022-10-21 RX ORDER — SODIUM CHLORIDE 0.9 % (FLUSH) 0.9 %
10 SYRINGE (ML) INJECTION AS NEEDED
Status: DISCONTINUED | OUTPATIENT
Start: 2022-10-21 | End: 2022-10-24 | Stop reason: HOSPADM

## 2022-10-21 RX ORDER — METRONIDAZOLE 500 MG/1
500 TABLET ORAL EVERY 12 HOURS SCHEDULED
Status: DISCONTINUED | OUTPATIENT
Start: 2022-10-21 | End: 2022-10-24 | Stop reason: HOSPADM

## 2022-10-21 RX ORDER — ONDANSETRON 2 MG/ML
4 INJECTION INTRAMUSCULAR; INTRAVENOUS EVERY 6 HOURS PRN
Status: DISCONTINUED | OUTPATIENT
Start: 2022-10-21 | End: 2022-10-24 | Stop reason: HOSPADM

## 2022-10-21 RX ORDER — POLYETHYLENE GLYCOL 3350 17 G/17G
17 POWDER, FOR SOLUTION ORAL DAILY PRN
Status: DISCONTINUED | OUTPATIENT
Start: 2022-10-21 | End: 2022-10-23

## 2022-10-21 RX ORDER — AMOXICILLIN 250 MG
2 CAPSULE ORAL 2 TIMES DAILY
Status: DISCONTINUED | OUTPATIENT
Start: 2022-10-21 | End: 2022-10-24 | Stop reason: HOSPADM

## 2022-10-21 RX ORDER — ALUMINA, MAGNESIA, AND SIMETHICONE 2400; 2400; 240 MG/30ML; MG/30ML; MG/30ML
15 SUSPENSION ORAL EVERY 6 HOURS PRN
Status: DISCONTINUED | OUTPATIENT
Start: 2022-10-21 | End: 2022-10-24 | Stop reason: HOSPADM

## 2022-10-21 RX ORDER — FOLIC ACID 1 MG/1
1 TABLET ORAL DAILY
Status: DISCONTINUED | OUTPATIENT
Start: 2022-10-21 | End: 2022-10-21

## 2022-10-21 RX ORDER — LORAZEPAM 1 MG/1
1 TABLET ORAL
Status: DISCONTINUED | OUTPATIENT
Start: 2022-10-21 | End: 2022-10-24 | Stop reason: HOSPADM

## 2022-10-21 RX ORDER — UBIDECARENONE 75 MG
50 CAPSULE ORAL DAILY
Status: DISCONTINUED | OUTPATIENT
Start: 2022-10-21 | End: 2022-10-24 | Stop reason: HOSPADM

## 2022-10-21 RX ORDER — HYDROCODONE BITARTRATE AND ACETAMINOPHEN 7.5; 325 MG/1; MG/1
2 TABLET ORAL EVERY 4 HOURS PRN
Status: DISCONTINUED | OUTPATIENT
Start: 2022-10-21 | End: 2022-10-23

## 2022-10-21 RX ORDER — PANTOPRAZOLE SODIUM 40 MG/1
40 TABLET, DELAYED RELEASE ORAL DAILY
Status: DISCONTINUED | OUTPATIENT
Start: 2022-10-21 | End: 2022-10-24 | Stop reason: HOSPADM

## 2022-10-21 RX ORDER — ACETAMINOPHEN 650 MG/1
650 SUPPOSITORY RECTAL EVERY 4 HOURS PRN
Status: DISCONTINUED | OUTPATIENT
Start: 2022-10-21 | End: 2022-10-24 | Stop reason: HOSPADM

## 2022-10-21 RX ORDER — DIPHENOXYLATE HYDROCHLORIDE AND ATROPINE SULFATE 2.5; .025 MG/1; MG/1
1 TABLET ORAL DAILY
Status: DISCONTINUED | OUTPATIENT
Start: 2022-10-21 | End: 2022-10-24 | Stop reason: HOSPADM

## 2022-10-21 RX ORDER — ACETAMINOPHEN 325 MG/1
650 TABLET ORAL EVERY 4 HOURS PRN
Status: DISCONTINUED | OUTPATIENT
Start: 2022-10-21 | End: 2022-10-24 | Stop reason: HOSPADM

## 2022-10-21 RX ORDER — OXYCODONE AND ACETAMINOPHEN 10; 325 MG/1; MG/1
1 TABLET ORAL EVERY 6 HOURS PRN
Status: DISCONTINUED | OUTPATIENT
Start: 2022-10-21 | End: 2022-10-23

## 2022-10-21 RX ORDER — LORAZEPAM 1 MG/1
2 TABLET ORAL
Status: DISCONTINUED | OUTPATIENT
Start: 2022-10-21 | End: 2022-10-24 | Stop reason: HOSPADM

## 2022-10-21 RX ORDER — MELATONIN
5000 DAILY
Status: DISCONTINUED | OUTPATIENT
Start: 2022-10-21 | End: 2022-10-24 | Stop reason: HOSPADM

## 2022-10-21 RX ORDER — NITROGLYCERIN 0.4 MG/1
0.4 TABLET SUBLINGUAL
Status: DISCONTINUED | OUTPATIENT
Start: 2022-10-21 | End: 2022-10-24 | Stop reason: HOSPADM

## 2022-10-21 RX ORDER — CLONIDINE HYDROCHLORIDE 0.1 MG/1
0.1 TABLET ORAL EVERY 4 HOURS PRN
Status: DISCONTINUED | OUTPATIENT
Start: 2022-10-21 | End: 2022-10-24 | Stop reason: HOSPADM

## 2022-10-21 RX ORDER — FERROUS SULFATE 325(65) MG
325 TABLET ORAL
Status: DISCONTINUED | OUTPATIENT
Start: 2022-10-21 | End: 2022-10-24 | Stop reason: HOSPADM

## 2022-10-21 RX ORDER — LORAZEPAM 2 MG/ML
1 INJECTION INTRAMUSCULAR
Status: DISCONTINUED | OUTPATIENT
Start: 2022-10-21 | End: 2022-10-24 | Stop reason: HOSPADM

## 2022-10-21 RX ORDER — SODIUM CHLORIDE 0.9 % (FLUSH) 0.9 %
10 SYRINGE (ML) INJECTION EVERY 12 HOURS SCHEDULED
Status: DISCONTINUED | OUTPATIENT
Start: 2022-10-21 | End: 2022-10-24 | Stop reason: HOSPADM

## 2022-10-21 RX ADMIN — Medication 10 ML: at 02:52

## 2022-10-21 RX ADMIN — Medication 1 TABLET: at 10:18

## 2022-10-21 RX ADMIN — HYDROMORPHONE HYDROCHLORIDE 0.5 MG: 1 INJECTION, SOLUTION INTRAMUSCULAR; INTRAVENOUS; SUBCUTANEOUS at 18:43

## 2022-10-21 RX ADMIN — PANCRELIPASE 72000 UNITS OF LIPASE: 60000; 12000; 38000 CAPSULE, DELAYED RELEASE PELLETS ORAL at 08:55

## 2022-10-21 RX ADMIN — KETOROLAC TROMETHAMINE 30 MG: 30 INJECTION, SOLUTION INTRAMUSCULAR at 11:09

## 2022-10-21 RX ADMIN — TAZOBACTAM SODIUM AND PIPERACILLIN SODIUM 3.38 G: 375; 3 INJECTION, SOLUTION INTRAVENOUS at 08:54

## 2022-10-21 RX ADMIN — Medication 1 MG: at 10:17

## 2022-10-21 RX ADMIN — FERROUS SULFATE TAB 325 MG (65 MG ELEMENTAL FE) 325 MG: 325 (65 FE) TAB at 08:56

## 2022-10-21 RX ADMIN — TAZOBACTAM SODIUM AND PIPERACILLIN SODIUM 3.38 G: 375; 3 INJECTION, SOLUTION INTRAVENOUS at 02:53

## 2022-10-21 RX ADMIN — OXYCODONE AND ACETAMINOPHEN 1 TABLET: 325; 10 TABLET ORAL at 04:40

## 2022-10-21 RX ADMIN — HYDROMORPHONE HYDROCHLORIDE 0.5 MG: 1 INJECTION, SOLUTION INTRAMUSCULAR; INTRAVENOUS; SUBCUTANEOUS at 09:02

## 2022-10-21 RX ADMIN — ONDANSETRON 4 MG: 2 INJECTION INTRAMUSCULAR; INTRAVENOUS at 14:18

## 2022-10-21 RX ADMIN — KETOROLAC TROMETHAMINE 30 MG: 30 INJECTION, SOLUTION INTRAMUSCULAR at 17:31

## 2022-10-21 RX ADMIN — PANTOPRAZOLE SODIUM 40 MG: 40 TABLET, DELAYED RELEASE ORAL at 08:56

## 2022-10-21 RX ADMIN — Medication 10 ML: at 20:24

## 2022-10-21 RX ADMIN — Medication 100 MG: at 10:17

## 2022-10-21 RX ADMIN — HYDROMORPHONE HYDROCHLORIDE 0.5 MG: 1 INJECTION, SOLUTION INTRAMUSCULAR; INTRAVENOUS; SUBCUTANEOUS at 14:18

## 2022-10-21 RX ADMIN — PANCRELIPASE 72000 UNITS OF LIPASE: 60000; 12000; 38000 CAPSULE, DELAYED RELEASE PELLETS ORAL at 13:24

## 2022-10-21 RX ADMIN — AMITRIPTYLINE HYDROCHLORIDE 10 MG: 10 TABLET, FILM COATED ORAL at 20:24

## 2022-10-21 RX ADMIN — OXYCODONE AND ACETAMINOPHEN 1 TABLET: 325; 10 TABLET ORAL at 20:27

## 2022-10-21 RX ADMIN — PANCRELIPASE 72000 UNITS OF LIPASE: 60000; 12000; 38000 CAPSULE, DELAYED RELEASE PELLETS ORAL at 17:27

## 2022-10-21 RX ADMIN — ONDANSETRON 4 MG: 2 INJECTION INTRAMUSCULAR; INTRAVENOUS at 05:20

## 2022-10-21 RX ADMIN — Medication 5000 UNITS: at 10:16

## 2022-10-22 LAB
ALBUMIN SERPL-MCNC: 3.9 G/DL (ref 3.5–5.2)
ALBUMIN/GLOB SERPL: 1.7 G/DL
ALP SERPL-CCNC: 82 U/L (ref 39–117)
ALT SERPL W P-5'-P-CCNC: 18 U/L (ref 1–33)
ANION GAP SERPL CALCULATED.3IONS-SCNC: 8.4 MMOL/L (ref 5–15)
AST SERPL-CCNC: 13 U/L (ref 1–32)
BILIRUB SERPL-MCNC: 0.4 MG/DL (ref 0–1.2)
BUN SERPL-MCNC: 17 MG/DL (ref 6–20)
BUN/CREAT SERPL: 20.5 (ref 7–25)
CALCIUM SPEC-SCNC: 9.5 MG/DL (ref 8.6–10.5)
CHLORIDE SERPL-SCNC: 99 MMOL/L (ref 98–107)
CO2 SERPL-SCNC: 29.6 MMOL/L (ref 22–29)
CREAT SERPL-MCNC: 0.83 MG/DL (ref 0.57–1)
DEPRECATED RDW RBC AUTO: 49.1 FL (ref 37–54)
EGFRCR SERPLBLD CKD-EPI 2021: 91.5 ML/MIN/1.73
ERYTHROCYTE [DISTWIDTH] IN BLOOD BY AUTOMATED COUNT: 14.2 % (ref 12.3–15.4)
GLOBULIN UR ELPH-MCNC: 2.3 GM/DL
GLUCOSE SERPL-MCNC: 88 MG/DL (ref 65–99)
HCT VFR BLD AUTO: 38.8 % (ref 34–46.6)
HGB BLD-MCNC: 12.7 G/DL (ref 12–15.9)
MCH RBC QN AUTO: 31 PG (ref 26.6–33)
MCHC RBC AUTO-ENTMCNC: 32.7 G/DL (ref 31.5–35.7)
MCV RBC AUTO: 94.6 FL (ref 79–97)
PLATELET # BLD AUTO: 143 10*3/MM3 (ref 140–450)
PMV BLD AUTO: 10 FL (ref 6–12)
POTASSIUM SERPL-SCNC: 4.4 MMOL/L (ref 3.5–5.2)
PROT SERPL-MCNC: 6.2 G/DL (ref 6–8.5)
RBC # BLD AUTO: 4.1 10*6/MM3 (ref 3.77–5.28)
SODIUM SERPL-SCNC: 137 MMOL/L (ref 136–145)
WBC NRBC COR # BLD: 6.62 10*3/MM3 (ref 3.4–10.8)

## 2022-10-22 PROCEDURE — 85027 COMPLETE CBC AUTOMATED: CPT | Performed by: INTERNAL MEDICINE

## 2022-10-22 PROCEDURE — 99232 SBSQ HOSP IP/OBS MODERATE 35: CPT | Performed by: INTERNAL MEDICINE

## 2022-10-22 PROCEDURE — G0378 HOSPITAL OBSERVATION PER HR: HCPCS

## 2022-10-22 PROCEDURE — 25010000002 HYDROMORPHONE PER 4 MG: Performed by: STUDENT IN AN ORGANIZED HEALTH CARE EDUCATION/TRAINING PROGRAM

## 2022-10-22 PROCEDURE — 25010000002 KETOROLAC TROMETHAMINE PER 15 MG: Performed by: NURSE PRACTITIONER

## 2022-10-22 PROCEDURE — 25010000002 ONDANSETRON PER 1 MG: Performed by: NURSE PRACTITIONER

## 2022-10-22 PROCEDURE — 80053 COMPREHEN METABOLIC PANEL: CPT | Performed by: INTERNAL MEDICINE

## 2022-10-22 PROCEDURE — 25010000002 HYDROMORPHONE 1 MG/ML SOLUTION: Performed by: STUDENT IN AN ORGANIZED HEALTH CARE EDUCATION/TRAINING PROGRAM

## 2022-10-22 RX ADMIN — METRONIDAZOLE 500 MG: 500 TABLET, FILM COATED ORAL at 20:55

## 2022-10-22 RX ADMIN — HYDROMORPHONE HYDROCHLORIDE 1 MG: 1 INJECTION, SOLUTION INTRAMUSCULAR; INTRAVENOUS; SUBCUTANEOUS at 14:30

## 2022-10-22 RX ADMIN — FERROUS SULFATE TAB 325 MG (65 MG ELEMENTAL FE) 325 MG: 325 (65 FE) TAB at 07:49

## 2022-10-22 RX ADMIN — KETOROLAC TROMETHAMINE 30 MG: 30 INJECTION, SOLUTION INTRAMUSCULAR at 05:32

## 2022-10-22 RX ADMIN — ONDANSETRON 4 MG: 2 INJECTION INTRAMUSCULAR; INTRAVENOUS at 07:06

## 2022-10-22 RX ADMIN — Medication 10 ML: at 20:55

## 2022-10-22 RX ADMIN — PANCRELIPASE 72000 UNITS OF LIPASE: 60000; 12000; 38000 CAPSULE, DELAYED RELEASE PELLETS ORAL at 07:49

## 2022-10-22 RX ADMIN — HYDROMORPHONE HYDROCHLORIDE 1 MG: 1 INJECTION, SOLUTION INTRAMUSCULAR; INTRAVENOUS; SUBCUTANEOUS at 22:50

## 2022-10-22 RX ADMIN — Medication 5000 UNITS: at 09:31

## 2022-10-22 RX ADMIN — ONDANSETRON 4 MG: 2 INJECTION INTRAMUSCULAR; INTRAVENOUS at 22:50

## 2022-10-22 RX ADMIN — Medication 10 ML: at 09:32

## 2022-10-22 RX ADMIN — PANCRELIPASE 72000 UNITS OF LIPASE: 60000; 12000; 38000 CAPSULE, DELAYED RELEASE PELLETS ORAL at 18:11

## 2022-10-22 RX ADMIN — Medication 100 MG: at 09:31

## 2022-10-22 RX ADMIN — HYDROMORPHONE HYDROCHLORIDE 0.5 MG: 1 INJECTION, SOLUTION INTRAMUSCULAR; INTRAVENOUS; SUBCUTANEOUS at 00:08

## 2022-10-22 RX ADMIN — Medication 1 MG: at 09:31

## 2022-10-22 RX ADMIN — PANTOPRAZOLE SODIUM 40 MG: 40 TABLET, DELAYED RELEASE ORAL at 09:31

## 2022-10-22 RX ADMIN — METRONIDAZOLE 500 MG: 500 TABLET, FILM COATED ORAL at 09:31

## 2022-10-22 RX ADMIN — HYDROMORPHONE HYDROCHLORIDE 1 MG: 1 INJECTION, SOLUTION INTRAMUSCULAR; INTRAVENOUS; SUBCUTANEOUS at 18:48

## 2022-10-22 RX ADMIN — HYDROMORPHONE HYDROCHLORIDE 1 MG: 1 INJECTION, SOLUTION INTRAMUSCULAR; INTRAVENOUS; SUBCUTANEOUS at 09:39

## 2022-10-22 RX ADMIN — PANCRELIPASE 72000 UNITS OF LIPASE: 60000; 12000; 38000 CAPSULE, DELAYED RELEASE PELLETS ORAL at 12:57

## 2022-10-22 RX ADMIN — AMITRIPTYLINE HYDROCHLORIDE 10 MG: 10 TABLET, FILM COATED ORAL at 20:55

## 2022-10-22 RX ADMIN — Medication 1 TABLET: at 09:31

## 2022-10-22 RX ADMIN — METRONIDAZOLE 500 MG: 500 TABLET, FILM COATED ORAL at 00:43

## 2022-10-22 RX ADMIN — OXYCODONE AND ACETAMINOPHEN 1 TABLET: 325; 10 TABLET ORAL at 21:01

## 2022-10-23 ENCOUNTER — APPOINTMENT (OUTPATIENT)
Dept: CT IMAGING | Facility: HOSPITAL | Age: 42
End: 2022-10-23

## 2022-10-23 PROBLEM — N76.0 BACTERIAL VAGINOSIS: Status: ACTIVE | Noted: 2022-10-23

## 2022-10-23 PROBLEM — B96.89 BACTERIAL VAGINOSIS: Status: ACTIVE | Noted: 2022-10-23

## 2022-10-23 LAB
AMYLASE FLD-CCNC: 32 U/L
ANION GAP SERPL CALCULATED.3IONS-SCNC: 10.3 MMOL/L (ref 5–15)
APPEARANCE FLD: ABNORMAL
BUN SERPL-MCNC: 18 MG/DL (ref 6–20)
BUN/CREAT SERPL: 20.5 (ref 7–25)
CALCIUM SPEC-SCNC: 9.7 MG/DL (ref 8.6–10.5)
CHLORIDE SERPL-SCNC: 103 MMOL/L (ref 98–107)
CO2 SERPL-SCNC: 27.7 MMOL/L (ref 22–29)
COLOR FLD: ABNORMAL
CREAT SERPL-MCNC: 0.88 MG/DL (ref 0.57–1)
DEPRECATED RDW RBC AUTO: 49.8 FL (ref 37–54)
EGFRCR SERPLBLD CKD-EPI 2021: 85.3 ML/MIN/1.73
ERYTHROCYTE [DISTWIDTH] IN BLOOD BY AUTOMATED COUNT: 14.3 % (ref 12.3–15.4)
GLUCOSE FLD-MCNC: 48 MG/DL
GLUCOSE SERPL-MCNC: 98 MG/DL (ref 65–99)
HCT VFR BLD AUTO: 39.9 % (ref 34–46.6)
HGB BLD-MCNC: 13.3 G/DL (ref 12–15.9)
LYMPHOCYTES NFR FLD MANUAL: 15 %
MCH RBC QN AUTO: 31.6 PG (ref 26.6–33)
MCHC RBC AUTO-ENTMCNC: 33.3 G/DL (ref 31.5–35.7)
MCV RBC AUTO: 94.8 FL (ref 79–97)
METHOD: ABNORMAL
MONOS+MACROS NFR FLD: 76 %
NEUTROPHILS NFR FLD MANUAL: 9 %
NUC CELL # FLD: 230 /MM3
PLATELET # BLD AUTO: 140 10*3/MM3 (ref 140–450)
PMV BLD AUTO: 10.4 FL (ref 6–12)
POTASSIUM SERPL-SCNC: 4.7 MMOL/L (ref 3.5–5.2)
POTASSIUM SERPL-SCNC: 5.3 MMOL/L (ref 3.5–5.2)
RBC # BLD AUTO: 4.21 10*6/MM3 (ref 3.77–5.28)
RBC # FLD AUTO: ABNORMAL /MM3
SODIUM SERPL-SCNC: 141 MMOL/L (ref 136–145)
WBC NRBC COR # BLD: 4.95 10*3/MM3 (ref 3.4–10.8)

## 2022-10-23 PROCEDURE — 82945 GLUCOSE OTHER FLUID: CPT | Performed by: STUDENT IN AN ORGANIZED HEALTH CARE EDUCATION/TRAINING PROGRAM

## 2022-10-23 PROCEDURE — 84132 ASSAY OF SERUM POTASSIUM: CPT | Performed by: STUDENT IN AN ORGANIZED HEALTH CARE EDUCATION/TRAINING PROGRAM

## 2022-10-23 PROCEDURE — 0 LIDOCAINE 1 % SOLUTION: Performed by: RADIOLOGY

## 2022-10-23 PROCEDURE — 0F9G3ZX DRAINAGE OF PANCREAS, PERCUTANEOUS APPROACH, DIAGNOSTIC: ICD-10-PCS | Performed by: RADIOLOGY

## 2022-10-23 PROCEDURE — 89051 BODY FLUID CELL COUNT: CPT | Performed by: STUDENT IN AN ORGANIZED HEALTH CARE EDUCATION/TRAINING PROGRAM

## 2022-10-23 PROCEDURE — 87070 CULTURE OTHR SPECIMN AEROBIC: CPT | Performed by: STUDENT IN AN ORGANIZED HEALTH CARE EDUCATION/TRAINING PROGRAM

## 2022-10-23 PROCEDURE — 25010000002 ONDANSETRON PER 1 MG: Performed by: NURSE PRACTITIONER

## 2022-10-23 PROCEDURE — 25010000002 HYDROMORPHONE PER 4 MG: Performed by: RADIOLOGY

## 2022-10-23 PROCEDURE — 82150 ASSAY OF AMYLASE: CPT | Performed by: SURGERY

## 2022-10-23 PROCEDURE — 87075 CULTR BACTERIA EXCEPT BLOOD: CPT | Performed by: STUDENT IN AN ORGANIZED HEALTH CARE EDUCATION/TRAINING PROGRAM

## 2022-10-23 PROCEDURE — 99222 1ST HOSP IP/OBS MODERATE 55: CPT | Performed by: SURGERY

## 2022-10-23 PROCEDURE — 80048 BASIC METABOLIC PNL TOTAL CA: CPT | Performed by: STUDENT IN AN ORGANIZED HEALTH CARE EDUCATION/TRAINING PROGRAM

## 2022-10-23 PROCEDURE — 25010000002 HYDROMORPHONE 1 MG/ML SOLUTION: Performed by: STUDENT IN AN ORGANIZED HEALTH CARE EDUCATION/TRAINING PROGRAM

## 2022-10-23 PROCEDURE — 77012 CT SCAN FOR NEEDLE BIOPSY: CPT

## 2022-10-23 PROCEDURE — 87205 SMEAR GRAM STAIN: CPT | Performed by: STUDENT IN AN ORGANIZED HEALTH CARE EDUCATION/TRAINING PROGRAM

## 2022-10-23 PROCEDURE — 85027 COMPLETE CBC AUTOMATED: CPT | Performed by: STUDENT IN AN ORGANIZED HEALTH CARE EDUCATION/TRAINING PROGRAM

## 2022-10-23 RX ORDER — HYDROMORPHONE HYDROCHLORIDE 1 MG/ML
0.5 INJECTION, SOLUTION INTRAMUSCULAR; INTRAVENOUS; SUBCUTANEOUS ONCE
Status: COMPLETED | OUTPATIENT
Start: 2022-10-23 | End: 2022-10-23

## 2022-10-23 RX ORDER — POLYETHYLENE GLYCOL 3350 17 G/17G
17 POWDER, FOR SOLUTION ORAL DAILY
Status: DISCONTINUED | OUTPATIENT
Start: 2022-10-23 | End: 2022-10-24 | Stop reason: HOSPADM

## 2022-10-23 RX ORDER — SODIUM CHLORIDE 9 MG/ML
75 INJECTION, SOLUTION INTRAVENOUS CONTINUOUS
Status: DISCONTINUED | OUTPATIENT
Start: 2022-10-23 | End: 2022-10-24 | Stop reason: HOSPADM

## 2022-10-23 RX ORDER — LIDOCAINE HYDROCHLORIDE 10 MG/ML
20 INJECTION, SOLUTION INFILTRATION; PERINEURAL ONCE
Status: COMPLETED | OUTPATIENT
Start: 2022-10-23 | End: 2022-10-23

## 2022-10-23 RX ORDER — OXYCODONE AND ACETAMINOPHEN 10; 325 MG/1; MG/1
1 TABLET ORAL EVERY 4 HOURS PRN
Status: DISCONTINUED | OUTPATIENT
Start: 2022-10-23 | End: 2022-10-24 | Stop reason: HOSPADM

## 2022-10-23 RX ADMIN — POLYETHYLENE GLYCOL 3350 17 G: 17 POWDER, FOR SOLUTION ORAL at 08:44

## 2022-10-23 RX ADMIN — Medication 10 ML: at 20:48

## 2022-10-23 RX ADMIN — METRONIDAZOLE 500 MG: 500 TABLET, FILM COATED ORAL at 08:23

## 2022-10-23 RX ADMIN — Medication 1 TABLET: at 08:22

## 2022-10-23 RX ADMIN — PANCRELIPASE 72000 UNITS OF LIPASE: 60000; 12000; 38000 CAPSULE, DELAYED RELEASE PELLETS ORAL at 08:20

## 2022-10-23 RX ADMIN — FERROUS SULFATE TAB 325 MG (65 MG ELEMENTAL FE) 325 MG: 325 (65 FE) TAB at 08:23

## 2022-10-23 RX ADMIN — PANCRELIPASE 72000 UNITS OF LIPASE: 60000; 12000; 38000 CAPSULE, DELAYED RELEASE PELLETS ORAL at 16:02

## 2022-10-23 RX ADMIN — DOCUSATE SODIUM 50MG AND SENNOSIDES 8.6MG 2 TABLET: 8.6; 5 TABLET, FILM COATED ORAL at 08:44

## 2022-10-23 RX ADMIN — HYDROMORPHONE HYDROCHLORIDE 1 MG: 1 INJECTION, SOLUTION INTRAMUSCULAR; INTRAVENOUS; SUBCUTANEOUS at 12:49

## 2022-10-23 RX ADMIN — METRONIDAZOLE 500 MG: 500 TABLET, FILM COATED ORAL at 20:48

## 2022-10-23 RX ADMIN — Medication 10 ML: at 08:24

## 2022-10-23 RX ADMIN — HYDROMORPHONE HYDROCHLORIDE 1 MG: 1 INJECTION, SOLUTION INTRAMUSCULAR; INTRAVENOUS; SUBCUTANEOUS at 19:09

## 2022-10-23 RX ADMIN — ONDANSETRON 4 MG: 2 INJECTION INTRAMUSCULAR; INTRAVENOUS at 08:19

## 2022-10-23 RX ADMIN — OXYCODONE HYDROCHLORIDE AND ACETAMINOPHEN 1 TABLET: 10; 325 TABLET ORAL at 14:30

## 2022-10-23 RX ADMIN — Medication 5000 UNITS: at 08:21

## 2022-10-23 RX ADMIN — HYDROMORPHONE HYDROCHLORIDE 1 MG: 1 INJECTION, SOLUTION INTRAMUSCULAR; INTRAVENOUS; SUBCUTANEOUS at 08:20

## 2022-10-23 RX ADMIN — ONDANSETRON 4 MG: 2 INJECTION INTRAMUSCULAR; INTRAVENOUS at 19:09

## 2022-10-23 RX ADMIN — LIDOCAINE HYDROCHLORIDE 20 ML: 10 INJECTION, SOLUTION INFILTRATION; PERINEURAL at 14:54

## 2022-10-23 RX ADMIN — SODIUM CHLORIDE 75 ML/HR: 9 INJECTION, SOLUTION INTRAVENOUS at 20:47

## 2022-10-23 RX ADMIN — Medication 1 MG: at 08:23

## 2022-10-23 RX ADMIN — SODIUM ZIRCONIUM CYCLOSILICATE 5 G: 5 POWDER, FOR SUSPENSION ORAL at 21:06

## 2022-10-23 RX ADMIN — PANTOPRAZOLE SODIUM 40 MG: 40 TABLET, DELAYED RELEASE ORAL at 08:23

## 2022-10-23 RX ADMIN — Medication 100 MG: at 08:21

## 2022-10-23 RX ADMIN — HYDROMORPHONE HYDROCHLORIDE 0.5 MG: 1 INJECTION, SOLUTION INTRAMUSCULAR; INTRAVENOUS; SUBCUTANEOUS at 14:38

## 2022-10-23 RX ADMIN — HYDROMORPHONE HYDROCHLORIDE 1 MG: 1 INJECTION, SOLUTION INTRAMUSCULAR; INTRAVENOUS; SUBCUTANEOUS at 23:14

## 2022-10-23 RX ADMIN — OXYCODONE AND ACETAMINOPHEN 1 TABLET: 325; 10 TABLET ORAL at 10:12

## 2022-10-23 RX ADMIN — DOCUSATE SODIUM 50MG AND SENNOSIDES 8.6MG 2 TABLET: 8.6; 5 TABLET, FILM COATED ORAL at 20:47

## 2022-10-23 RX ADMIN — AMITRIPTYLINE HYDROCHLORIDE 10 MG: 10 TABLET, FILM COATED ORAL at 20:48

## 2022-10-24 ENCOUNTER — READMISSION MANAGEMENT (OUTPATIENT)
Dept: CALL CENTER | Facility: HOSPITAL | Age: 42
End: 2022-10-24

## 2022-10-24 VITALS
RESPIRATION RATE: 16 BRPM | OXYGEN SATURATION: 96 % | TEMPERATURE: 97 F | HEIGHT: 70 IN | HEART RATE: 71 BPM | DIASTOLIC BLOOD PRESSURE: 76 MMHG | BODY MASS INDEX: 19.33 KG/M2 | SYSTOLIC BLOOD PRESSURE: 117 MMHG | WEIGHT: 135 LBS

## 2022-10-24 LAB
ANION GAP SERPL CALCULATED.3IONS-SCNC: 6.3 MMOL/L (ref 5–15)
BUN SERPL-MCNC: 13 MG/DL (ref 6–20)
BUN/CREAT SERPL: 17.1 (ref 7–25)
CALCIUM SPEC-SCNC: 9.2 MG/DL (ref 8.6–10.5)
CHLORIDE SERPL-SCNC: 102 MMOL/L (ref 98–107)
CO2 SERPL-SCNC: 29.7 MMOL/L (ref 22–29)
CREAT SERPL-MCNC: 0.76 MG/DL (ref 0.57–1)
DEPRECATED RDW RBC AUTO: 49.1 FL (ref 37–54)
EGFRCR SERPLBLD CKD-EPI 2021: 101.7 ML/MIN/1.73
ERYTHROCYTE [DISTWIDTH] IN BLOOD BY AUTOMATED COUNT: 13.9 % (ref 12.3–15.4)
GLUCOSE SERPL-MCNC: 88 MG/DL (ref 65–99)
HCT VFR BLD AUTO: 36.3 % (ref 34–46.6)
HGB BLD-MCNC: 11.7 G/DL (ref 12–15.9)
MCH RBC QN AUTO: 31 PG (ref 26.6–33)
MCHC RBC AUTO-ENTMCNC: 32.2 G/DL (ref 31.5–35.7)
MCV RBC AUTO: 96.3 FL (ref 79–97)
PLATELET # BLD AUTO: 144 10*3/MM3 (ref 140–450)
PMV BLD AUTO: 10.3 FL (ref 6–12)
POTASSIUM SERPL-SCNC: 4.4 MMOL/L (ref 3.5–5.2)
RBC # BLD AUTO: 3.77 10*6/MM3 (ref 3.77–5.28)
SODIUM SERPL-SCNC: 138 MMOL/L (ref 136–145)
WBC NRBC COR # BLD: 4.8 10*3/MM3 (ref 3.4–10.8)

## 2022-10-24 PROCEDURE — 25010000002 ONDANSETRON PER 1 MG: Performed by: NURSE PRACTITIONER

## 2022-10-24 PROCEDURE — 80048 BASIC METABOLIC PNL TOTAL CA: CPT | Performed by: STUDENT IN AN ORGANIZED HEALTH CARE EDUCATION/TRAINING PROGRAM

## 2022-10-24 PROCEDURE — 25010000002 HYDROMORPHONE 1 MG/ML SOLUTION: Performed by: STUDENT IN AN ORGANIZED HEALTH CARE EDUCATION/TRAINING PROGRAM

## 2022-10-24 PROCEDURE — 99222 1ST HOSP IP/OBS MODERATE 55: CPT | Performed by: PHYSICIAN ASSISTANT

## 2022-10-24 PROCEDURE — 99232 SBSQ HOSP IP/OBS MODERATE 35: CPT | Performed by: SURGERY

## 2022-10-24 PROCEDURE — 85027 COMPLETE CBC AUTOMATED: CPT | Performed by: STUDENT IN AN ORGANIZED HEALTH CARE EDUCATION/TRAINING PROGRAM

## 2022-10-24 RX ORDER — METRONIDAZOLE 500 MG/1
500 TABLET ORAL EVERY 12 HOURS SCHEDULED
Qty: 8 TABLET | Refills: 0 | Status: SHIPPED | OUTPATIENT
Start: 2022-10-24 | End: 2022-10-28

## 2022-10-24 RX ORDER — AMOXICILLIN 250 MG
2 CAPSULE ORAL DAILY
Qty: 60 TABLET | Refills: 0 | Status: SHIPPED | OUTPATIENT
Start: 2022-10-24 | End: 2022-11-23

## 2022-10-24 RX ADMIN — HYDROMORPHONE HYDROCHLORIDE 1 MG: 1 INJECTION, SOLUTION INTRAMUSCULAR; INTRAVENOUS; SUBCUTANEOUS at 13:11

## 2022-10-24 RX ADMIN — Medication 5000 UNITS: at 08:28

## 2022-10-24 RX ADMIN — OXYCODONE HYDROCHLORIDE AND ACETAMINOPHEN 1 TABLET: 10; 325 TABLET ORAL at 10:59

## 2022-10-24 RX ADMIN — Medication 1 MG: at 08:25

## 2022-10-24 RX ADMIN — PANCRELIPASE 72000 UNITS OF LIPASE: 60000; 12000; 38000 CAPSULE, DELAYED RELEASE PELLETS ORAL at 12:17

## 2022-10-24 RX ADMIN — Medication 50 MCG: at 08:26

## 2022-10-24 RX ADMIN — SODIUM CHLORIDE 75 ML/HR: 9 INJECTION, SOLUTION INTRAVENOUS at 10:36

## 2022-10-24 RX ADMIN — HYDROMORPHONE HYDROCHLORIDE 1 MG: 1 INJECTION, SOLUTION INTRAMUSCULAR; INTRAVENOUS; SUBCUTANEOUS at 03:15

## 2022-10-24 RX ADMIN — METRONIDAZOLE 500 MG: 500 TABLET, FILM COATED ORAL at 08:26

## 2022-10-24 RX ADMIN — DOCUSATE SODIUM 50MG AND SENNOSIDES 8.6MG 2 TABLET: 8.6; 5 TABLET, FILM COATED ORAL at 08:26

## 2022-10-24 RX ADMIN — FERROUS SULFATE TAB 325 MG (65 MG ELEMENTAL FE) 325 MG: 325 (65 FE) TAB at 08:25

## 2022-10-24 RX ADMIN — HYDROMORPHONE HYDROCHLORIDE 1 MG: 1 INJECTION, SOLUTION INTRAMUSCULAR; INTRAVENOUS; SUBCUTANEOUS at 08:37

## 2022-10-24 RX ADMIN — Medication 1 TABLET: at 08:26

## 2022-10-24 RX ADMIN — PANTOPRAZOLE SODIUM 40 MG: 40 TABLET, DELAYED RELEASE ORAL at 08:26

## 2022-10-24 RX ADMIN — PANCRELIPASE 72000 UNITS OF LIPASE: 60000; 12000; 38000 CAPSULE, DELAYED RELEASE PELLETS ORAL at 08:30

## 2022-10-24 RX ADMIN — Medication 100 MG: at 08:26

## 2022-10-24 RX ADMIN — POLYETHYLENE GLYCOL 3350 17 G: 17 POWDER, FOR SOLUTION ORAL at 08:26

## 2022-10-24 RX ADMIN — ONDANSETRON 4 MG: 2 INJECTION INTRAMUSCULAR; INTRAVENOUS at 13:11

## 2022-10-24 RX ADMIN — ONDANSETRON 4 MG: 2 INJECTION INTRAMUSCULAR; INTRAVENOUS at 03:15

## 2022-10-25 NOTE — OUTREACH NOTE
Prep Survey    Flowsheet Row Responses   [facility] facility patient discharged from? [place]   Is LACE score < 7 ? No   Emergency Room discharge w/ pulse ox? No   Eligibility Not Eligible   What are the reasons patient is not eligible? Other   Does the patient have one of the following disease processes/diagnoses(primary or secondary)? Other   Prep survey completed? Yes          JEFF TRIMBLE - Registered Nurse

## 2022-10-26 LAB
BACTERIA FLD CULT: NORMAL
BACTERIA SPEC AEROBE CULT: NORMAL
BACTERIA SPEC AEROBE CULT: NORMAL
GRAM STN SPEC: NORMAL

## 2022-10-28 LAB — BACTERIA SPEC ANAEROBE CULT: NORMAL

## 2022-11-28 ENCOUNTER — HOSPITAL ENCOUNTER (EMERGENCY)
Facility: HOSPITAL | Age: 42
Discharge: LEFT BEFORE DISCHARGE | End: 2022-11-29

## 2022-11-28 LAB
ALBUMIN SERPL-MCNC: 4.5 G/DL (ref 3.5–5.2)
ALBUMIN/GLOB SERPL: 1.3 G/DL
ALP SERPL-CCNC: 91 U/L (ref 39–117)
ALT SERPL W P-5'-P-CCNC: 19 U/L (ref 1–33)
ANION GAP SERPL CALCULATED.3IONS-SCNC: 12.7 MMOL/L (ref 5–15)
AST SERPL-CCNC: 18 U/L (ref 1–32)
BASOPHILS # BLD AUTO: 0.03 10*3/MM3 (ref 0–0.2)
BASOPHILS NFR BLD AUTO: 0.3 % (ref 0–1.5)
BILIRUB SERPL-MCNC: 0.3 MG/DL (ref 0–1.2)
BILIRUB UR QL STRIP: NEGATIVE
BUN SERPL-MCNC: 6 MG/DL (ref 6–20)
BUN/CREAT SERPL: 7 (ref 7–25)
CALCIUM SPEC-SCNC: 9 MG/DL (ref 8.6–10.5)
CHLORIDE SERPL-SCNC: 101 MMOL/L (ref 98–107)
CLARITY UR: CLEAR
CO2 SERPL-SCNC: 24.3 MMOL/L (ref 22–29)
COLOR UR: YELLOW
CREAT SERPL-MCNC: 0.86 MG/DL (ref 0.57–1)
DEPRECATED RDW RBC AUTO: 45.8 FL (ref 37–54)
EGFRCR SERPLBLD CKD-EPI 2021: 87.7 ML/MIN/1.73
EOSINOPHIL # BLD AUTO: 0.08 10*3/MM3 (ref 0–0.4)
EOSINOPHIL NFR BLD AUTO: 0.9 % (ref 0.3–6.2)
ERYTHROCYTE [DISTWIDTH] IN BLOOD BY AUTOMATED COUNT: 13.2 % (ref 12.3–15.4)
GLOBULIN UR ELPH-MCNC: 3.4 GM/DL
GLUCOSE SERPL-MCNC: 86 MG/DL (ref 65–99)
GLUCOSE UR STRIP-MCNC: NEGATIVE MG/DL
HCT VFR BLD AUTO: 43.7 % (ref 34–46.6)
HGB BLD-MCNC: 14.6 G/DL (ref 12–15.9)
HGB UR QL STRIP.AUTO: NEGATIVE
HOLD SPECIMEN: NORMAL
HOLD SPECIMEN: NORMAL
IMM GRANULOCYTES # BLD AUTO: 0.02 10*3/MM3 (ref 0–0.05)
IMM GRANULOCYTES NFR BLD AUTO: 0.2 % (ref 0–0.5)
KETONES UR QL STRIP: NEGATIVE
LEUKOCYTE ESTERASE UR QL STRIP.AUTO: NEGATIVE
LIPASE SERPL-CCNC: 157 U/L (ref 13–60)
LYMPHOCYTES # BLD AUTO: 1.92 10*3/MM3 (ref 0.7–3.1)
LYMPHOCYTES NFR BLD AUTO: 21.9 % (ref 19.6–45.3)
MCH RBC QN AUTO: 31.9 PG (ref 26.6–33)
MCHC RBC AUTO-ENTMCNC: 33.4 G/DL (ref 31.5–35.7)
MCV RBC AUTO: 95.4 FL (ref 79–97)
MONOCYTES # BLD AUTO: 0.76 10*3/MM3 (ref 0.1–0.9)
MONOCYTES NFR BLD AUTO: 8.7 % (ref 5–12)
NEUTROPHILS NFR BLD AUTO: 5.96 10*3/MM3 (ref 1.7–7)
NEUTROPHILS NFR BLD AUTO: 68 % (ref 42.7–76)
NITRITE UR QL STRIP: NEGATIVE
NRBC BLD AUTO-RTO: 0 /100 WBC (ref 0–0.2)
PH UR STRIP.AUTO: 6 [PH] (ref 5–8)
PLATELET # BLD AUTO: 140 10*3/MM3 (ref 140–450)
PMV BLD AUTO: 10 FL (ref 6–12)
POTASSIUM SERPL-SCNC: 4.2 MMOL/L (ref 3.5–5.2)
PROT SERPL-MCNC: 7.9 G/DL (ref 6–8.5)
PROT UR QL STRIP: NEGATIVE
RBC # BLD AUTO: 4.58 10*6/MM3 (ref 3.77–5.28)
SODIUM SERPL-SCNC: 138 MMOL/L (ref 136–145)
SP GR UR STRIP: <=1.005 (ref 1–1.03)
UROBILINOGEN UR QL STRIP: NORMAL
WBC NRBC COR # BLD: 8.77 10*3/MM3 (ref 3.4–10.8)
WHOLE BLOOD HOLD COAG: NORMAL
WHOLE BLOOD HOLD SPECIMEN: NORMAL

## 2022-11-28 PROCEDURE — 85025 COMPLETE CBC W/AUTO DIFF WBC: CPT

## 2022-11-28 PROCEDURE — 83690 ASSAY OF LIPASE: CPT

## 2022-11-28 PROCEDURE — 80053 COMPREHEN METABOLIC PANEL: CPT

## 2022-11-28 PROCEDURE — 36415 COLL VENOUS BLD VENIPUNCTURE: CPT

## 2022-11-28 PROCEDURE — 99211 OFF/OP EST MAY X REQ PHY/QHP: CPT

## 2022-11-28 PROCEDURE — 81003 URINALYSIS AUTO W/O SCOPE: CPT

## 2022-11-28 RX ORDER — SODIUM CHLORIDE 0.9 % (FLUSH) 0.9 %
10 SYRINGE (ML) INJECTION AS NEEDED
Status: DISCONTINUED | OUTPATIENT
Start: 2022-11-28 | End: 2022-11-29 | Stop reason: HOSPADM

## 2022-11-29 VITALS — RESPIRATION RATE: 16 BRPM | OXYGEN SATURATION: 97 % | HEART RATE: 128 BPM | TEMPERATURE: 98.6 F

## 2022-11-30 ENCOUNTER — HOSPITAL ENCOUNTER (INPATIENT)
Facility: HOSPITAL | Age: 42
LOS: 5 days | Discharge: HOME | End: 2022-12-05
Attending: EMERGENCY MEDICINE | Admitting: INTERNAL MEDICINE

## 2022-11-30 ENCOUNTER — APPOINTMENT (OUTPATIENT)
Dept: CT IMAGING | Facility: HOSPITAL | Age: 42
End: 2022-11-30

## 2022-11-30 DIAGNOSIS — K86.3 PANCREATIC PSEUDOCYST: ICD-10-CM

## 2022-11-30 DIAGNOSIS — R18.8 INTRAABDOMINAL FLUID COLLECTION: ICD-10-CM

## 2022-11-30 DIAGNOSIS — R10.9 LEFT SIDED ABDOMINAL PAIN: Primary | ICD-10-CM

## 2022-11-30 LAB
ALBUMIN SERPL-MCNC: 3.7 G/DL (ref 3.5–5.2)
ALBUMIN/GLOB SERPL: 1.2 G/DL
ALP SERPL-CCNC: 78 U/L (ref 39–117)
ALT SERPL W P-5'-P-CCNC: 12 U/L (ref 1–33)
ANION GAP SERPL CALCULATED.3IONS-SCNC: 11.5 MMOL/L (ref 5–15)
AST SERPL-CCNC: 12 U/L (ref 1–32)
BASOPHILS # BLD AUTO: 0.02 10*3/MM3 (ref 0–0.2)
BASOPHILS NFR BLD AUTO: 0.2 % (ref 0–1.5)
BILIRUB SERPL-MCNC: 0.4 MG/DL (ref 0–1.2)
BILIRUB UR QL STRIP: NEGATIVE
BUN SERPL-MCNC: 7 MG/DL (ref 6–20)
BUN/CREAT SERPL: 10 (ref 7–25)
CALCIUM SPEC-SCNC: 9.1 MG/DL (ref 8.6–10.5)
CHLORIDE SERPL-SCNC: 96 MMOL/L (ref 98–107)
CLARITY UR: ABNORMAL
CO2 SERPL-SCNC: 25.5 MMOL/L (ref 22–29)
COLOR UR: YELLOW
CREAT SERPL-MCNC: 0.7 MG/DL (ref 0.57–1)
D-LACTATE SERPL-SCNC: 0.6 MMOL/L (ref 0.5–2)
DEPRECATED RDW RBC AUTO: 44.8 FL (ref 37–54)
EGFRCR SERPLBLD CKD-EPI 2021: 112.3 ML/MIN/1.73
EOSINOPHIL # BLD AUTO: 0.06 10*3/MM3 (ref 0–0.4)
EOSINOPHIL NFR BLD AUTO: 0.7 % (ref 0.3–6.2)
ERYTHROCYTE [DISTWIDTH] IN BLOOD BY AUTOMATED COUNT: 12.7 % (ref 12.3–15.4)
ETHANOL BLD-MCNC: <10 MG/DL (ref 0–10)
ETHANOL UR QL: <0.01 %
GLOBULIN UR ELPH-MCNC: 3.2 GM/DL
GLUCOSE SERPL-MCNC: 131 MG/DL (ref 65–99)
GLUCOSE UR STRIP-MCNC: NEGATIVE MG/DL
HCT VFR BLD AUTO: 39.8 % (ref 34–46.6)
HGB BLD-MCNC: 13.3 G/DL (ref 12–15.9)
HGB UR QL STRIP.AUTO: NEGATIVE
HOLD SPECIMEN: NORMAL
HOLD SPECIMEN: NORMAL
KETONES UR QL STRIP: NEGATIVE
LEUKOCYTE ESTERASE UR QL STRIP.AUTO: NEGATIVE
LIPASE SERPL-CCNC: 65 U/L (ref 13–60)
LYMPHOCYTES # BLD AUTO: 1.99 10*3/MM3 (ref 0.7–3.1)
LYMPHOCYTES NFR BLD AUTO: 21.7 % (ref 19.6–45.3)
MCH RBC QN AUTO: 32.1 PG (ref 26.6–33)
MCHC RBC AUTO-ENTMCNC: 33.4 G/DL (ref 31.5–35.7)
MCV RBC AUTO: 96.1 FL (ref 79–97)
MONOCYTES # BLD AUTO: 0.92 10*3/MM3 (ref 0.1–0.9)
MONOCYTES NFR BLD AUTO: 10 % (ref 5–12)
NEUTROPHILS NFR BLD AUTO: 6.17 10*3/MM3 (ref 1.7–7)
NEUTROPHILS NFR BLD AUTO: 67.1 % (ref 42.7–76)
NITRITE UR QL STRIP: NEGATIVE
PH UR STRIP.AUTO: 6 [PH] (ref 5–8)
PLATELET # BLD AUTO: 122 10*3/MM3 (ref 140–450)
PMV BLD AUTO: 10.5 FL (ref 6–12)
POTASSIUM SERPL-SCNC: 3.5 MMOL/L (ref 3.5–5.2)
PROT SERPL-MCNC: 6.9 G/DL (ref 6–8.5)
PROT UR QL STRIP: NEGATIVE
RBC # BLD AUTO: 4.14 10*6/MM3 (ref 3.77–5.28)
SODIUM SERPL-SCNC: 133 MMOL/L (ref 136–145)
SP GR UR STRIP: <=1.005 (ref 1–1.03)
UROBILINOGEN UR QL STRIP: ABNORMAL
WBC NRBC COR # BLD: 9.19 10*3/MM3 (ref 3.4–10.8)
WHOLE BLOOD HOLD COAG: NORMAL
WHOLE BLOOD HOLD SPECIMEN: NORMAL

## 2022-11-30 PROCEDURE — 99284 EMERGENCY DEPT VISIT MOD MDM: CPT

## 2022-11-30 PROCEDURE — 25010000002 IOPAMIDOL 61 % SOLUTION: Performed by: EMERGENCY MEDICINE

## 2022-11-30 PROCEDURE — 80053 COMPREHEN METABOLIC PANEL: CPT

## 2022-11-30 PROCEDURE — G0378 HOSPITAL OBSERVATION PER HR: HCPCS

## 2022-11-30 PROCEDURE — 83690 ASSAY OF LIPASE: CPT

## 2022-11-30 PROCEDURE — 85025 COMPLETE CBC W/AUTO DIFF WBC: CPT

## 2022-11-30 PROCEDURE — 83605 ASSAY OF LACTIC ACID: CPT | Performed by: PHYSICIAN ASSISTANT

## 2022-11-30 PROCEDURE — 87040 BLOOD CULTURE FOR BACTERIA: CPT | Performed by: PHYSICIAN ASSISTANT

## 2022-11-30 PROCEDURE — 82077 ASSAY SPEC XCP UR&BREATH IA: CPT | Performed by: PHYSICIAN ASSISTANT

## 2022-11-30 PROCEDURE — 25010000002 HYDROMORPHONE PER 4 MG: Performed by: EMERGENCY MEDICINE

## 2022-11-30 PROCEDURE — 25010000002 MORPHINE PER 10 MG: Performed by: EMERGENCY MEDICINE

## 2022-11-30 PROCEDURE — 81003 URINALYSIS AUTO W/O SCOPE: CPT

## 2022-11-30 PROCEDURE — 25010000002 ONDANSETRON PER 1 MG: Performed by: PHYSICIAN ASSISTANT

## 2022-11-30 PROCEDURE — 74177 CT ABD & PELVIS W/CONTRAST: CPT

## 2022-11-30 RX ORDER — ONDANSETRON 2 MG/ML
4 INJECTION INTRAMUSCULAR; INTRAVENOUS ONCE
Status: COMPLETED | OUTPATIENT
Start: 2022-11-30 | End: 2022-11-30

## 2022-11-30 RX ORDER — SODIUM CHLORIDE 0.9 % (FLUSH) 0.9 %
10 SYRINGE (ML) INJECTION AS NEEDED
Status: DISCONTINUED | OUTPATIENT
Start: 2022-11-30 | End: 2022-12-05 | Stop reason: HOSPADM

## 2022-11-30 RX ORDER — SODIUM CHLORIDE 0.9 % (FLUSH) 0.9 %
10 SYRINGE (ML) INJECTION EVERY 12 HOURS SCHEDULED
Status: DISCONTINUED | OUTPATIENT
Start: 2022-11-30 | End: 2022-12-05 | Stop reason: HOSPADM

## 2022-11-30 RX ORDER — ACETAMINOPHEN 650 MG/1
650 SUPPOSITORY RECTAL EVERY 4 HOURS PRN
Status: DISCONTINUED | OUTPATIENT
Start: 2022-11-30 | End: 2022-12-05 | Stop reason: HOSPADM

## 2022-11-30 RX ORDER — NALOXONE HCL 0.4 MG/ML
0.4 VIAL (ML) INJECTION
Status: DISCONTINUED | OUTPATIENT
Start: 2022-11-30 | End: 2022-12-01

## 2022-11-30 RX ORDER — ACETAMINOPHEN 160 MG/5ML
650 SOLUTION ORAL EVERY 4 HOURS PRN
Status: DISCONTINUED | OUTPATIENT
Start: 2022-11-30 | End: 2022-12-05 | Stop reason: HOSPADM

## 2022-11-30 RX ORDER — SODIUM CHLORIDE 9 MG/ML
40 INJECTION, SOLUTION INTRAVENOUS AS NEEDED
Status: DISCONTINUED | OUTPATIENT
Start: 2022-11-30 | End: 2022-12-05 | Stop reason: HOSPADM

## 2022-11-30 RX ORDER — BISACODYL 10 MG
10 SUPPOSITORY, RECTAL RECTAL DAILY PRN
Status: DISCONTINUED | OUTPATIENT
Start: 2022-11-30 | End: 2022-12-05 | Stop reason: HOSPADM

## 2022-11-30 RX ORDER — AMOXICILLIN 250 MG
2 CAPSULE ORAL 2 TIMES DAILY
Status: DISCONTINUED | OUTPATIENT
Start: 2022-11-30 | End: 2022-12-05 | Stop reason: HOSPADM

## 2022-11-30 RX ORDER — ONDANSETRON 4 MG/1
4 TABLET, FILM COATED ORAL EVERY 6 HOURS PRN
Status: DISCONTINUED | OUTPATIENT
Start: 2022-11-30 | End: 2022-12-05 | Stop reason: HOSPADM

## 2022-11-30 RX ORDER — GRAPE SEED EXT/BIOFLAV,CITRUS 50MG-250MG
1 CAPSULE ORAL 2 TIMES DAILY
Status: ON HOLD | COMMUNITY
End: 2023-03-25

## 2022-11-30 RX ORDER — MORPHINE SULFATE 2 MG/ML
4 INJECTION, SOLUTION INTRAMUSCULAR; INTRAVENOUS ONCE
Status: COMPLETED | OUTPATIENT
Start: 2022-11-30 | End: 2022-11-30

## 2022-11-30 RX ORDER — POLYETHYLENE GLYCOL 3350 17 G/17G
17 POWDER, FOR SOLUTION ORAL DAILY PRN
Status: DISCONTINUED | OUTPATIENT
Start: 2022-11-30 | End: 2022-12-02

## 2022-11-30 RX ORDER — CALCIUM CARBONATE 200(500)MG
2 TABLET,CHEWABLE ORAL 3 TIMES DAILY PRN
Status: DISCONTINUED | OUTPATIENT
Start: 2022-11-30 | End: 2022-12-05 | Stop reason: HOSPADM

## 2022-11-30 RX ORDER — BISACODYL 5 MG/1
5 TABLET, DELAYED RELEASE ORAL DAILY PRN
Status: DISCONTINUED | OUTPATIENT
Start: 2022-11-30 | End: 2022-12-05 | Stop reason: HOSPADM

## 2022-11-30 RX ORDER — OXYCODONE HYDROCHLORIDE AND ACETAMINOPHEN 5; 325 MG/1; MG/1
1 TABLET ORAL EVERY 4 HOURS PRN
Status: DISCONTINUED | OUTPATIENT
Start: 2022-11-30 | End: 2022-12-04

## 2022-11-30 RX ORDER — ONDANSETRON 2 MG/ML
4 INJECTION INTRAMUSCULAR; INTRAVENOUS EVERY 6 HOURS PRN
Status: DISCONTINUED | OUTPATIENT
Start: 2022-11-30 | End: 2022-12-05 | Stop reason: HOSPADM

## 2022-11-30 RX ORDER — CHOLECALCIFEROL (VITAMIN D3) 125 MCG
5 CAPSULE ORAL NIGHTLY PRN
Status: DISCONTINUED | OUTPATIENT
Start: 2022-11-30 | End: 2022-12-05 | Stop reason: HOSPADM

## 2022-11-30 RX ORDER — SODIUM CHLORIDE 9 MG/ML
125 INJECTION, SOLUTION INTRAVENOUS CONTINUOUS
Status: DISCONTINUED | OUTPATIENT
Start: 2022-11-30 | End: 2022-12-03

## 2022-11-30 RX ORDER — ACETAMINOPHEN 325 MG/1
650 TABLET ORAL EVERY 4 HOURS PRN
Status: DISCONTINUED | OUTPATIENT
Start: 2022-11-30 | End: 2022-12-05 | Stop reason: HOSPADM

## 2022-11-30 RX ORDER — HYDROMORPHONE HYDROCHLORIDE 1 MG/ML
0.5 INJECTION, SOLUTION INTRAMUSCULAR; INTRAVENOUS; SUBCUTANEOUS ONCE
Status: COMPLETED | OUTPATIENT
Start: 2022-11-30 | End: 2022-11-30

## 2022-11-30 RX ADMIN — ONDANSETRON 4 MG: 2 INJECTION INTRAMUSCULAR; INTRAVENOUS at 19:04

## 2022-11-30 RX ADMIN — SODIUM CHLORIDE 500 ML: 9 INJECTION, SOLUTION INTRAVENOUS at 19:04

## 2022-11-30 RX ADMIN — MORPHINE SULFATE 4 MG: 2 INJECTION, SOLUTION INTRAMUSCULAR; INTRAVENOUS at 19:04

## 2022-11-30 RX ADMIN — IOPAMIDOL 100 ML: 612 INJECTION, SOLUTION INTRAVENOUS at 18:57

## 2022-11-30 RX ADMIN — HYDROMORPHONE HYDROCHLORIDE 0.5 MG: 1 INJECTION, SOLUTION INTRAMUSCULAR; INTRAVENOUS; SUBCUTANEOUS at 20:06

## 2022-12-01 LAB
ALBUMIN SERPL-MCNC: 3.4 G/DL (ref 3.5–5.2)
ALBUMIN/GLOB SERPL: 1.2 G/DL
ALP SERPL-CCNC: 63 U/L (ref 39–117)
ALT SERPL W P-5'-P-CCNC: 13 U/L (ref 1–33)
ANION GAP SERPL CALCULATED.3IONS-SCNC: 6.2 MMOL/L (ref 5–15)
AST SERPL-CCNC: 8 U/L (ref 1–32)
BASOPHILS # BLD AUTO: 0.02 10*3/MM3 (ref 0–0.2)
BASOPHILS NFR BLD AUTO: 0.3 % (ref 0–1.5)
BILIRUB SERPL-MCNC: 0.3 MG/DL (ref 0–1.2)
BUN SERPL-MCNC: 4 MG/DL (ref 6–20)
BUN/CREAT SERPL: 6.6 (ref 7–25)
CALCIUM SPEC-SCNC: 8.5 MG/DL (ref 8.6–10.5)
CHLORIDE SERPL-SCNC: 101 MMOL/L (ref 98–107)
CO2 SERPL-SCNC: 30.8 MMOL/L (ref 22–29)
CREAT SERPL-MCNC: 0.61 MG/DL (ref 0.57–1)
DEPRECATED RDW RBC AUTO: 42.6 FL (ref 37–54)
EGFRCR SERPLBLD CKD-EPI 2021: 116.1 ML/MIN/1.73
EOSINOPHIL # BLD AUTO: 0.1 10*3/MM3 (ref 0–0.4)
EOSINOPHIL NFR BLD AUTO: 1.7 % (ref 0.3–6.2)
ERYTHROCYTE [DISTWIDTH] IN BLOOD BY AUTOMATED COUNT: 12.6 % (ref 12.3–15.4)
GLOBULIN UR ELPH-MCNC: 2.8 GM/DL
GLUCOSE SERPL-MCNC: 102 MG/DL (ref 65–99)
HCT VFR BLD AUTO: 33.6 % (ref 34–46.6)
HGB BLD-MCNC: 11.3 G/DL (ref 12–15.9)
IMM GRANULOCYTES # BLD AUTO: 0.01 10*3/MM3 (ref 0–0.05)
IMM GRANULOCYTES NFR BLD AUTO: 0.2 % (ref 0–0.5)
LIPASE SERPL-CCNC: 29 U/L (ref 13–60)
LYMPHOCYTES # BLD AUTO: 1.61 10*3/MM3 (ref 0.7–3.1)
LYMPHOCYTES NFR BLD AUTO: 27.9 % (ref 19.6–45.3)
MCH RBC QN AUTO: 31.9 PG (ref 26.6–33)
MCHC RBC AUTO-ENTMCNC: 33.6 G/DL (ref 31.5–35.7)
MCV RBC AUTO: 94.9 FL (ref 79–97)
MONOCYTES # BLD AUTO: 0.68 10*3/MM3 (ref 0.1–0.9)
MONOCYTES NFR BLD AUTO: 11.8 % (ref 5–12)
NEUTROPHILS NFR BLD AUTO: 3.36 10*3/MM3 (ref 1.7–7)
NEUTROPHILS NFR BLD AUTO: 58.1 % (ref 42.7–76)
NRBC BLD AUTO-RTO: 0 /100 WBC (ref 0–0.2)
PLATELET # BLD AUTO: 124 10*3/MM3 (ref 140–450)
PMV BLD AUTO: 10.5 FL (ref 6–12)
POTASSIUM SERPL-SCNC: 3.9 MMOL/L (ref 3.5–5.2)
PROT SERPL-MCNC: 6.2 G/DL (ref 6–8.5)
RBC # BLD AUTO: 3.54 10*6/MM3 (ref 3.77–5.28)
SODIUM SERPL-SCNC: 138 MMOL/L (ref 136–145)
WBC NRBC COR # BLD: 5.78 10*3/MM3 (ref 3.4–10.8)

## 2022-12-01 PROCEDURE — 36415 COLL VENOUS BLD VENIPUNCTURE: CPT | Performed by: INTERNAL MEDICINE

## 2022-12-01 PROCEDURE — 83690 ASSAY OF LIPASE: CPT | Performed by: INTERNAL MEDICINE

## 2022-12-01 PROCEDURE — G0378 HOSPITAL OBSERVATION PER HR: HCPCS

## 2022-12-01 PROCEDURE — 80053 COMPREHEN METABOLIC PANEL: CPT | Performed by: INTERNAL MEDICINE

## 2022-12-01 PROCEDURE — 25010000002 HYDROMORPHONE PER 4 MG: Performed by: INTERNAL MEDICINE

## 2022-12-01 PROCEDURE — 99221 1ST HOSP IP/OBS SF/LOW 40: CPT | Performed by: SURGERY

## 2022-12-01 PROCEDURE — 85025 COMPLETE CBC W/AUTO DIFF WBC: CPT | Performed by: INTERNAL MEDICINE

## 2022-12-01 PROCEDURE — 25010000002 ONDANSETRON PER 1 MG: Performed by: INTERNAL MEDICINE

## 2022-12-01 RX ORDER — NICOTINE 21 MG/24HR
1 PATCH, TRANSDERMAL 24 HOURS TRANSDERMAL
Status: DISCONTINUED | OUTPATIENT
Start: 2022-12-01 | End: 2022-12-05 | Stop reason: HOSPADM

## 2022-12-01 RX ORDER — NICOTINE 21 MG/24HR
1 PATCH, TRANSDERMAL 24 HOURS TRANSDERMAL
Status: DISCONTINUED | OUTPATIENT
Start: 2022-12-02 | End: 2022-12-01

## 2022-12-01 RX ORDER — PANTOPRAZOLE SODIUM 40 MG/1
40 TABLET, DELAYED RELEASE ORAL DAILY
Status: DISCONTINUED | OUTPATIENT
Start: 2022-12-01 | End: 2022-12-05 | Stop reason: HOSPADM

## 2022-12-01 RX ORDER — HYDROMORPHONE HYDROCHLORIDE 1 MG/ML
1 INJECTION, SOLUTION INTRAMUSCULAR; INTRAVENOUS; SUBCUTANEOUS
Status: DISCONTINUED | OUTPATIENT
Start: 2022-12-01 | End: 2022-12-04

## 2022-12-01 RX ORDER — NALOXONE HCL 0.4 MG/ML
0.4 VIAL (ML) INJECTION
Status: DISCONTINUED | OUTPATIENT
Start: 2022-12-01 | End: 2022-12-04

## 2022-12-01 RX ORDER — AMITRIPTYLINE HYDROCHLORIDE 10 MG/1
10 TABLET, FILM COATED ORAL NIGHTLY
Status: DISCONTINUED | OUTPATIENT
Start: 2022-12-01 | End: 2022-12-05 | Stop reason: HOSPADM

## 2022-12-01 RX ORDER — FOLIC ACID 1 MG/1
1 TABLET ORAL DAILY
Status: DISCONTINUED | OUTPATIENT
Start: 2022-12-01 | End: 2022-12-05 | Stop reason: HOSPADM

## 2022-12-01 RX ADMIN — HYDROMORPHONE HYDROCHLORIDE 1 MG: 1 INJECTION, SOLUTION INTRAMUSCULAR; INTRAVENOUS; SUBCUTANEOUS at 12:55

## 2022-12-01 RX ADMIN — OXYCODONE AND ACETAMINOPHEN 1 TABLET: 5; 325 TABLET ORAL at 01:47

## 2022-12-01 RX ADMIN — Medication 100 MG: at 08:26

## 2022-12-01 RX ADMIN — OXYCODONE AND ACETAMINOPHEN 1 TABLET: 5; 325 TABLET ORAL at 08:26

## 2022-12-01 RX ADMIN — SODIUM CHLORIDE 125 ML/HR: 9 INJECTION, SOLUTION INTRAVENOUS at 15:23

## 2022-12-01 RX ADMIN — HYDROMORPHONE HYDROCHLORIDE 1 MG: 1 INJECTION, SOLUTION INTRAMUSCULAR; INTRAVENOUS; SUBCUTANEOUS at 22:42

## 2022-12-01 RX ADMIN — FOLIC ACID 1 MG: 1 TABLET ORAL at 08:26

## 2022-12-01 RX ADMIN — OXYCODONE AND ACETAMINOPHEN 1 TABLET: 5; 325 TABLET ORAL at 14:31

## 2022-12-01 RX ADMIN — HYDROMORPHONE HYDROCHLORIDE 1 MG: 1 INJECTION, SOLUTION INTRAMUSCULAR; INTRAVENOUS; SUBCUTANEOUS at 10:22

## 2022-12-01 RX ADMIN — Medication 1 PATCH: at 23:30

## 2022-12-01 RX ADMIN — SODIUM CHLORIDE 125 ML/HR: 9 INJECTION, SOLUTION INTRAVENOUS at 23:30

## 2022-12-01 RX ADMIN — SODIUM CHLORIDE 125 ML/HR: 9 INJECTION, SOLUTION INTRAVENOUS at 07:15

## 2022-12-01 RX ADMIN — Medication 10 ML: at 08:30

## 2022-12-01 RX ADMIN — PANTOPRAZOLE SODIUM 40 MG: 40 TABLET, DELAYED RELEASE ORAL at 08:26

## 2022-12-01 RX ADMIN — AMITRIPTYLINE HYDROCHLORIDE 10 MG: 10 TABLET, FILM COATED ORAL at 20:45

## 2022-12-01 RX ADMIN — ONDANSETRON 4 MG: 2 INJECTION INTRAMUSCULAR; INTRAVENOUS at 00:19

## 2022-12-01 RX ADMIN — HYDROMORPHONE HYDROCHLORIDE 1 MG: 1 INJECTION, SOLUTION INTRAMUSCULAR; INTRAVENOUS; SUBCUTANEOUS at 16:08

## 2022-12-01 RX ADMIN — HYDROMORPHONE HYDROCHLORIDE 1 MG: 1 INJECTION, SOLUTION INTRAMUSCULAR; INTRAVENOUS; SUBCUTANEOUS at 00:19

## 2022-12-01 RX ADMIN — SODIUM CHLORIDE 125 ML/HR: 9 INJECTION, SOLUTION INTRAVENOUS at 00:17

## 2022-12-01 RX ADMIN — DOCUSATE SODIUM 50MG AND SENNOSIDES 8.6MG 2 TABLET: 8.6; 5 TABLET, FILM COATED ORAL at 20:40

## 2022-12-01 RX ADMIN — OXYCODONE AND ACETAMINOPHEN 1 TABLET: 5; 325 TABLET ORAL at 18:33

## 2022-12-01 RX ADMIN — HYDROMORPHONE HYDROCHLORIDE 1 MG: 1 INJECTION, SOLUTION INTRAMUSCULAR; INTRAVENOUS; SUBCUTANEOUS at 07:15

## 2022-12-01 RX ADMIN — HYDROMORPHONE HYDROCHLORIDE 1 MG: 1 INJECTION, SOLUTION INTRAMUSCULAR; INTRAVENOUS; SUBCUTANEOUS at 03:37

## 2022-12-01 RX ADMIN — Medication 10 ML: at 00:19

## 2022-12-01 RX ADMIN — Medication 10 ML: at 20:40

## 2022-12-01 RX ADMIN — HYDROMORPHONE HYDROCHLORIDE 1 MG: 1 INJECTION, SOLUTION INTRAMUSCULAR; INTRAVENOUS; SUBCUTANEOUS at 20:40

## 2022-12-01 NOTE — ED PROVIDER NOTES
Pt presents to the ED c/o  abdominal pain increasing over the last week with a history of recurrent pancreatitis.  Has had multiple episodes of fluid collections requiring drainage in the past couple of months.  Reports some fevers up to 102.9 a couple of days ago.  No fevers today.  Had some nausea vomiting couple days ago but nothing since.     On exam,   General: No acute distress, nontoxic, nondiaphoretic  HEENT: Mucous membranes moist, atraumatic, EOMI  Neck: Full ROM  Pulm: Symmetric chest rise, nonlabored, lungs CTAB  Cardiovascular: Regular rate and rhythm, intact distal pulses  GI: Soft, tenderness in the mid to lateral left side of the abdomen, nondistended, no rebound, no guarding, bowel sounds present  MSK: Full ROM, no deformity  Skin: Warm, dry  Neuro: Awake, alert, oriented x 4, GCS 15, moving all extremities, no focal deficits  Psych: Calm, cooperative      N95, protective eye goggles, and gloves used during this encounter. Patient in surgical mask.      Plan:   ED Course as of 11/30/22 2359 Wed Nov 30, 2022 1828 WBC: 9.19 [KA]   1828 Hemoglobin: 13.3 [KA]   1828 Lipase(!): 65 [KA]   1828 Glucose(!): 131 [KA]   1828 Creatinine: 0.70 [KA]   1957 Medical chart reviewed.  Patient has had 2 admissions since August for abdominal pain with findings of perisplenic perinephric and peripancreatic fluid collections.  These have been aspirated by IR during both admissions.  Her second admission was in October, she was evaluated by infectious disease and it was felt that her fluid collections were sterile pseudocyst from chronic pancreatitis and her labs and condition was stable off of antibiotics with normal temperatures and white blood cell counts.  Cultures of aspirates on both occasions were negative for any growth.Patient noted to have continued alcohol abuse with 6-7 beers daily.  At her admission in October CT-guided drainage was performed secondary to ongoing severe left flank pain. [KA]   2016 No  antibiotics initiated as patient is afebrile with a normal white blood cell count and previously did not improve with antibiotics and previous cultures were negative for any bacterial growth. [KA]   2031 Discussed with CHRIS Kirk, discussed patient's clinical course and findings today, treatment modalities, and need for hospitalization. [DC]      ED Course User Index  [DC] Danish Barrera MD  [KA] Concepción Shah PA     Recurrent pancreatitis with recurrent fluid collection, at this point we will plan for rehospitalization.  All questions and concerns addressed.  Due to the sterile nature of previous aspirates, we will hold off on antibiotics at this time in light of the lack of leukocytosis or fever today.  All questions and concerns addressed with the patient.     Diagnosis Plan   1. Left sided abdominal pain        2. Pancreatic pseudocyst        3. Intraabdominal fluid collection          HOSPITALIZATION    Discussed treatment plan and reason for hospitalization with pt/family and hospitalizing physician.  Pt/family voiced understanding of the plan for hospitalization for further testing/treatment as needed.        Attestation:  The EVA and I have discussed this patient's history, physical exam, and treatment plan.  I have reviewed the documentation and personally had a face to face interaction with the patient. I affirm the documentation and agree with the treatment and plan.  The attached note describes my personal findings.          Danish Barrera MD  12/01/22 0001

## 2022-12-01 NOTE — CONSULTS
General Surgery H&P/Consultation      Impression/Plan: 42-year-old lady with acute on chronic pancreatitis with likely pseudocyst.  Agree with no current indication for antibiotics based on CT scan.  I have advised against drainage in the current setting given small size of the pseudocyst, active inflammation from recurrent pancreatitis, and risk of infection introduction with repetitive drainage.    Agree with supportive care of pancreatitis with IV fluids, clear liquid diet, pain control.  Possible diet advancement tomorrow.  Nutrition consult for counseling on low-fat diet.  I advised her that she needs to completely abstain from alcohol use.    CC: Abdominal pain    HPI:   Miss Piper Cain is a 42 y.o. female that presented to the hospital with left upper quadrant, epigastric, right upper quadrant abdominal pain.  She initially experience worsening flank pain in August and underwent drainage of a peripancreatic fluid collection.  Results of that drainage procedure showed benign fluid with no evidence of bacteria.  This was repeated on 10/23/2022 with an aspiration of the fluid collection.  No evidence of infection was noted and the fluid was benign in appearance at that time.  She has had worsening pain over the past week and also describes fevers and chills.  She presented to the emergency room.  She did not have a leukocytosis and repeat CT abdomen pelvis showed persistence of the peripancreatic fluid collection with near resolution of the perisplenic fluid collection which was previously noted.  No evidence of necrosis or infection but there was significant inflammation in the retroperitoneum likely due to recurrent acute on chronic pancreatitis.    Past Medical History:   Past Medical History:   Diagnosis Date   • Anxiety    • E. coli bacteremia    • ETOH abuse    • GERD (gastroesophageal reflux disease)    • Hiatal hernia    • Left flank pain 10/21/2022   • Migraine    • Miscarriage 2004   • Pancreatitis         Past Surgical History:   Past Surgical History:   Procedure Laterality Date   • ENDOSCOPY N/A 8/10/2022    Procedure: ESOPHAGOGASTRODUODENOSCOPY with bxs;  Surgeon: Salvador Price MD;  Location: [facility] ENDOSCOPY;  Service: Gastroenterology;  Laterality: N/A;  Pre: r/o esophageal  varacies, abd pain  Post: esophageal plaque       Medications:  Medications Prior to Admission   Medication Sig Dispense Refill Last Dose   • amitriptyline (ELAVIL) 10 MG tablet Take 10 mg by mouth Every Night.   11/29/2022   • Black Cohosh 540 MG capsule Take 1 capsule by mouth 2 (Two) Times a Day.      • Cholecalciferol (VITAMIN D3) 5000 units capsule capsule Take 5,000 Units by mouth Daily.      • Ferrous Sulfate Dried (Feosol) 200 (65 Fe) MG tablet tablet Take 200 mg by mouth Daily.      • folic acid (FOLVITE) 1 MG tablet Take 1 tablet by mouth Daily. 30 tablet 3    • ondansetron (ZOFRAN) 4 MG tablet Take 4 mg by mouth Every 8 (Eight) Hours As Needed for Nausea or Vomiting.      • oxyCODONE-acetaminophen (PERCOCET)  MG per tablet Take 1 tablet by mouth Every 6 (Six) Hours As Needed for Moderate Pain . 10 tablet 0    • pancrelipase, Lip-Prot-Amyl, (CREON) 55251-30659 units capsule delayed-release particles capsule Take 72,000 units of lipase by mouth 3 (Three) Times a Day With Meals.      • pantoprazole (PROTONIX) 40 MG EC tablet Take 1 tablet by mouth Daily. 30 tablet 3    • thiamine (VITAMIN B-1) 100 MG tablet  tablet Take 100 mg by mouth Daily.          Allergies:   Allergies   Allergen Reactions   • Nickel    • Hydrocodone-Acetaminophen Nausea And Vomiting       Social History:   Social History     Socioeconomic History   • Marital status: Single   Tobacco Use   • Smoking status: Every Day     Packs/day: 0.50     Types: Cigarettes     Start date: 1/28/1996   • Smokeless tobacco: Current   Vaping Use   • Vaping Use: Never used   Substance and Sexual Activity   • Alcohol use: Not Currently     Comment: two days ago   •  Drug use: No   • Sexual activity: Yes     Partners: Male     Birth control/protection: None     Comment: IUD removed 6/29/2017       Family History:   Family History   Problem Relation Age of Onset   • Alcohol abuse Mother    • Arthritis Mother    • Asthma Mother    • Miscarriages / Stillbirths Mother    • Cancer Father    • Diabetes Father    • Drug abuse Father    • Early death Father    • Heart disease Father    • Hyperlipidemia Father    • Hypertension Father    • Alcohol abuse Paternal Aunt    • Cancer Paternal Aunt    • Diabetes Paternal Aunt    • Drug abuse Paternal Aunt    • Early death Paternal Aunt    • Heart disease Paternal Aunt    • Hyperlipidemia Paternal Aunt    • Hypertension Paternal Aunt    • Alcohol abuse Maternal Grandmother    • Alcohol abuse Maternal Grandfather    • Alcohol abuse Paternal Grandmother    • Cancer Paternal Grandmother    • Diabetes Paternal Grandmother    • Drug abuse Paternal Grandmother    • Early death Paternal Grandmother    • Heart disease Paternal Grandmother    • Hyperlipidemia Paternal Grandmother    • Hypertension Paternal Grandmother    • Alcohol abuse Paternal Grandfather        Review of Systems:  A comprehensive review of systems was negative except for the following positives: Abdominal pain    Physical Exam:   Vitals:    12/01/22 0801   BP: 99/65   Pulse: 78   Resp: 18   Temp:    SpO2: 97%     BMI: Body mass index is 19.37 kg/m².   GENERAL: no acute distress, awake and alert  HEENT: normocephalic, atraumatic, no scleral icterus, moist mucous membranes  NECK: Supple, there is no thyromegaly or lymphadenopathy  RESPIRATORY: symmetric excursion bilaterally, normal work of breathing, no wheezes  CARDIOVASCULAR: regular rate, well perfused  GASTROINTESTINAL: Soft, tender to palpation along the left flank, no rebound or guarding of the abdomen  MUSCULOSKELETAL: no cyanosis, clubbing, or edema  NEUROLOGIC: alert and oriented, normal speech, cranial nerves 2-12 grossly  intact, no focal deficits  SKIN: Moist, warm, no rashes, no jaundice      Pertinent labs:   Results from last 7 days   Lab Units 12/01/22  0519 11/30/22  1412 11/28/22  1751   WBC 10*3/mm3 5.78 9.19 8.77   HEMOGLOBIN g/dL 11.3* 13.3 14.6   HEMATOCRIT % 33.6* 39.8 43.7   PLATELETS 10*3/mm3 124* 122* 140     Results from last 7 days   Lab Units 12/01/22  0519 11/30/22  1412 11/28/22  1751   SODIUM mmol/L 138 133* 138   POTASSIUM mmol/L 3.9 3.5 4.2   CHLORIDE mmol/L 101 96* 101   CO2 mmol/L 30.8* 25.5 24.3   BUN mg/dL 4* 7 6   CREATININE mg/dL 0.61 0.70 0.86   CALCIUM mg/dL 8.5* 9.1 9.0   BILIRUBIN mg/dL 0.3 0.4 0.3   ALK PHOS U/L 63 78 91   ALT (SGPT) U/L 13 12 19   AST (SGOT) U/L 8 12 18   GLUCOSE mg/dL 102* 131* 86       IMAGING:  CT abdomen pelvis reviewed showing persistent peripancreatic fluid collection which is most likely a pseudocyst.  There is inflammation along the tail of the pancreas into the retroperitoneum most consistent with acute pancreatitis.          Reji House MD  General and Endoscopic Surgery  [facility]    [address], [address]  [place] [address]  P: [phone]  F: [phone]

## 2022-12-01 NOTE — H&P
Patient Name:  Piper Cain  YOB: 1980  MRN:  [id number]  Admit Date:  11/30/2022  Patient Care Team:  Rachell Pozo APRN as PCP - General (Family Medicine)      Subjective   History Present Illness     Chief Complaint   Patient presents with   • Abdominal Pain       Ms. Cain is a 42 y.o. smoker with a history of GERD, anxiety, and alcohol abuse with resulting recurrent pancreatitis leading to chronic pancreatitis now complicated by pseudocysts that presents to [facility] complaining of left-sided abdominal pain for the past week or so. This is a sharp, stabbing pain which has worsened over the past 3 days. There are no particular exacerbating or alleviating factors. She is found to have another pancreatic fluid collection on CT scan. She was recently here last month for the same issue and she responded quite well to fluid drainage.    History of Present Illness  Review of Systems   Constitutional: Negative for appetite change, chills and fever.   HENT: Negative for congestion, sore throat and trouble swallowing.    Eyes: Negative for redness and visual disturbance.   Respiratory: Negative for cough and shortness of breath.    Cardiovascular: Negative for chest pain, palpitations and leg swelling.   Gastrointestinal: Positive for abdominal pain. Negative for abdominal distention, constipation, diarrhea, nausea and vomiting.   Endocrine: Negative for cold intolerance and heat intolerance.   Genitourinary: Negative for difficulty urinating, dysuria and hematuria.   Musculoskeletal: Negative for arthralgias and myalgias.   Skin: Negative for pallor and rash.   Neurological: Negative for dizziness and light-headedness.   Hematological: Negative for adenopathy. Does not bruise/bleed easily.   Psychiatric/Behavioral: Negative for confusion and decreased concentration.        Personal History     Past Medical History:   Diagnosis Date   • Anxiety    • E. coli bacteremia    •  ETOH abuse    • GERD (gastroesophageal reflux disease)    • Hiatal hernia    • Left flank pain 10/21/2022   • Migraine    • Miscarriage 2004   • Pancreatitis      Past Surgical History:   Procedure Laterality Date   • ENDOSCOPY N/A 8/10/2022    Procedure: ESOPHAGOGASTRODUODENOSCOPY with bxs;  Surgeon: Salvador Price MD;  Location: [facility] ENDOSCOPY;  Service: Gastroenterology;  Laterality: N/A;  Pre: r/o esophageal  varacies, abd pain  Post: esophageal plaque     Family History   Problem Relation Age of Onset   • Alcohol abuse Mother    • Arthritis Mother    • Asthma Mother    • Miscarriages / Stillbirths Mother    • Cancer Father    • Diabetes Father    • Drug abuse Father    • Early death Father    • Heart disease Father    • Hyperlipidemia Father    • Hypertension Father    • Alcohol abuse Paternal Aunt    • Cancer Paternal Aunt    • Diabetes Paternal Aunt    • Drug abuse Paternal Aunt    • Early death Paternal Aunt    • Heart disease Paternal Aunt    • Hyperlipidemia Paternal Aunt    • Hypertension Paternal Aunt    • Alcohol abuse Maternal Grandmother    • Alcohol abuse Maternal Grandfather    • Alcohol abuse Paternal Grandmother    • Cancer Paternal Grandmother    • Diabetes Paternal Grandmother    • Drug abuse Paternal Grandmother    • Early death Paternal Grandmother    • Heart disease Paternal Grandmother    • Hyperlipidemia Paternal Grandmother    • Hypertension Paternal Grandmother    • Alcohol abuse Paternal Grandfather      Social History     Tobacco Use   • Smoking status: Every Day     Packs/day: 0.50     Types: Cigarettes     Start date: 1/28/1996   • Smokeless tobacco: Current   Vaping Use   • Vaping Use: Never used   Substance Use Topics   • Alcohol use: Yes     Comment: 2 cocktails daily   • Drug use: No     No current facility-administered medications on file prior to encounter.     Current Outpatient Medications on File Prior to Encounter   Medication Sig Dispense Refill   • amitriptyline (ELAVIL)  10 MG tablet Take 10 mg by mouth Every Night.     • Cholecalciferol (VITAMIN D3) 5000 units capsule capsule Take 5,000 Units by mouth Daily.     • Ferrous Sulfate Dried (Feosol) 200 (65 Fe) MG tablet tablet Take 200 mg by mouth Daily.     • folic acid (FOLVITE) 1 MG tablet Take 1 tablet by mouth Daily. 30 tablet 3   • ondansetron (ZOFRAN) 4 MG tablet Take 4 mg by mouth Every 8 (Eight) Hours As Needed for Nausea or Vomiting.     • oxyCODONE-acetaminophen (PERCOCET)  MG per tablet Take 1 tablet by mouth Every 6 (Six) Hours As Needed for Moderate Pain . 10 tablet 0   • pancrelipase, Lip-Prot-Amyl, (CREON) 55949-82431 units capsule delayed-release particles capsule Take 72,000 units of lipase by mouth 3 (Three) Times a Day With Meals.     • pantoprazole (PROTONIX) 40 MG EC tablet Take 1 tablet by mouth Daily. 30 tablet 3   • thiamine (VITAMIN B-1) 100 MG tablet  tablet Take 100 mg by mouth Daily.     • vitamin B-12 (CYANOCOBALAMIN) 100 MCG tablet Take 50 mcg by mouth Daily.       Allergies   Allergen Reactions   • Nickel    • Hydrocodone-Acetaminophen Nausea And Vomiting       Objective    Objective     Vital Signs  Temp:  [98.3 °F (36.8 °C)] 98.3 °F (36.8 °C)  Heart Rate:  [] 96  Resp:  [18] 18  BP: (109-131)/(75-94) 117/76  SpO2:  [94 %-98 %] 94 %  on   ;   Device (Oxygen Therapy): room air  Body mass index is 19.37 kg/m².    Physical Exam  Vitals and nursing note reviewed.   Constitutional:       General: She is not in acute distress.     Appearance: She is not toxic-appearing or diaphoretic.   HENT:      Head: Normocephalic and atraumatic.      Nose: Nose normal.      Mouth/Throat:      Mouth: Mucous membranes are moist.      Pharynx: Oropharynx is clear.   Eyes:      Conjunctiva/sclera: Conjunctivae normal.      Pupils: Pupils are equal, round, and reactive to light.   Cardiovascular:      Rate and Rhythm: Normal rate and regular rhythm.      Pulses: Normal pulses.   Pulmonary:      Effort: Pulmonary  effort is normal.      Breath sounds: Normal breath sounds.   Abdominal:      General: Bowel sounds are normal.      Tenderness: There is abdominal tenderness. There is no guarding or rebound.   Musculoskeletal:         General: No swelling or tenderness.      Cervical back: Normal range of motion and neck supple.   Skin:     General: Skin is warm and dry.      Capillary Refill: Capillary refill takes less than 2 seconds.   Neurological:      General: No focal deficit present.      Mental Status: She is alert and oriented to person, place, and time.   Psychiatric:         Mood and Affect: Mood normal.         Behavior: Behavior normal.       Results Review:  I reviewed the patient's new clinical results.  I reviewed the patient's new imaging results and agree with the interpretation.  I reviewed the patient's other test results and agree with the interpretation  I personally viewed and interpreted the patient's EKG/Telemetry data  Discussed with ED provider.    Lab Results (last 24 hours)     Procedure Component Value Units Date/Time    Urinalysis With Microscopic If Indicated (No Culture) - Urine, Clean Catch [[id number]]  (Abnormal) Collected: 11/30/22 1410    Specimen: Urine, Clean Catch Updated: 11/30/22 1453     Color, UA Yellow     Appearance, UA Cloudy     pH, UA 6.0     Specific Gravity, UA <=1.005     Glucose, UA Negative     Ketones, UA Negative     Bilirubin, UA Negative     Blood, UA Negative     Protein, UA Negative     Leuk Esterase, UA Negative     Nitrite, UA Negative     Urobilinogen, UA 0.2 E.U./dL    Narrative:      Urine microscopic not indicated.    CBC & Differential [[id number]]  (Abnormal) Collected: 11/30/22 1412    Specimen: Blood Updated: 11/30/22 1437    Narrative:      The following orders were created for panel order CBC & Differential.  Procedure                               Abnormality         Status                     ---------                               -----------         ------                      CBC Auto Differential[[id number]]        Abnormal            Final result                 Please view results for these tests on the individual orders.    Comprehensive Metabolic Panel [[id number]]  (Abnormal) Collected: 11/30/22 1412    Specimen: Blood Updated: 11/30/22 1501     Glucose 131 mg/dL      BUN 7 mg/dL      Creatinine 0.70 mg/dL      Sodium 133 mmol/L      Potassium 3.5 mmol/L      Chloride 96 mmol/L      CO2 25.5 mmol/L      Calcium 9.1 mg/dL      Total Protein 6.9 g/dL      Albumin 3.70 g/dL      ALT (SGPT) 12 U/L      AST (SGOT) 12 U/L      Alkaline Phosphatase 78 U/L      Total Bilirubin 0.4 mg/dL      Globulin 3.2 gm/dL      A/G Ratio 1.2 g/dL      BUN/Creatinine Ratio 10.0     Anion Gap 11.5 mmol/L      eGFR 112.3 mL/min/1.73      Comment: National Kidney Foundation and American Society of Nephrology (ASN) Task Force recommended calculation based on the Chronic Kidney Disease Epidemiology Collaboration (CKD-EPI) equation refit without adjustment for race.       Narrative:      GFR Normal >60  Chronic Kidney Disease <60  Kidney Failure <15      Lipase [[id number]]  (Abnormal) Collected: 11/30/22 1412    Specimen: Blood Updated: 11/30/22 1443     Lipase 65 U/L     CBC Auto Differential [[id number]]  (Abnormal) Collected: 11/30/22 1412    Specimen: Blood Updated: 11/30/22 1437     WBC 9.19 10*3/mm3      RBC 4.14 10*6/mm3      Hemoglobin 13.3 g/dL      Hematocrit 39.8 %      MCV 96.1 fL      MCH 32.1 pg      MCHC 33.4 g/dL      RDW 12.7 %      RDW-SD 44.8 fl      MPV 10.5 fL      Platelets 122 10*3/mm3      Neutrophil % 67.1 %      Lymphocyte % 21.7 %      Monocyte % 10.0 %      Eosinophil % 0.7 %      Basophil % 0.2 %      Neutrophils, Absolute 6.17 10*3/mm3      Lymphocytes, Absolute 1.99 10*3/mm3      Monocytes, Absolute 0.92 10*3/mm3      Eosinophils, Absolute 0.06 10*3/mm3      Basophils, Absolute 0.02 10*3/mm3     Lactic Acid, Plasma [[id number]]  (Normal) Collected: 11/30/22 2014     Specimen: Blood Updated: 11/30/22 2049     Lactate 0.6 mmol/L     Blood Culture - Blood, Arm, Left [[id number]] Collected: 11/30/22 2014    Specimen: Blood from Arm, Left Updated: 11/30/22 2021    Ethanol [[id number]] Collected: 11/30/22 2014    Specimen: Blood Updated: 11/30/22 2044     Ethanol <10 mg/dL      Ethanol % <0.010 %     Blood Culture - Blood, Arm, Left [[id number]] Collected: 11/30/22 2018    Specimen: Blood from Arm, Left Updated: 11/30/22 2021          Imaging Results (Last 24 Hours)     Procedure Component Value Units Date/Time    CT Abdomen Pelvis With Contrast [[id number]] Collected: 11/30/22 1906     Updated: 11/30/22 1923    Narrative:      CT ABDOMEN AND PELVIS WITH IV CONTRAST     HISTORY: Left-sided abdominal pain, history of pancreatitis and  peripancreatic fluid collection, interval aspiration of peripancreatic  fluid collection     TECHNIQUE: Radiation dose reduction techniques were utilized, including  automated exposure control and exposure modulation based on body size.  Axial images were obtained through the abdomen and pelvis after the  administration of IV contrast. Coronal and sagittal reformatted images  obtained.     COMPARISON: 10/20/2022, 10/23/2022     FINDINGS:      ABDOMEN:  Lung bases are clear. The liver and gallbladder are unremarkable. Small  perisplenic fluid collection is slightly decreased in size measuring 2.2  x 1.1 cm, previously 2.4 x 1.1 cm. Recurrent fluid collection between  the tail of the pancreas and the left kidney now measuring 3.2 x 2.6 cm.  There is increased inflammatory stranding and ill-defined fluid seen  around the tail of the pancreas, around the left kidney and extending  inferiorly in the left retroperitoneum. The left renal parenchyma is  within normal limits. The right kidney is unremarkable. Adrenal glands  are unremarkable. Stable calcifications in the pancreatic head. Stable  mildly dilated pancreatic duct.     PELVIS:  The bladder is  unremarkable. No free fluid within the pelvis. The colon  is unremarkable. The appendix is normal. Bone windows are unremarkable.       Impression:         1. Recurrent fluid collection between the tail of the pancreas and the  left kidney now measuring about 3.2 x 2.6 cm.  2. Increased inflammatory stranding and ill-defined fluid around the  tail the pancreas, around the left kidney and inferiorly within the left  retroperitoneum. This may reflect a new episode of pancreatitis.     Radiation dose reduction techniques were utilized, including automated  exposure control and exposure modulation based on body size.     This report was finalized on 11/30/2022 7:20 PM by Dr. Manny Wilder M.D.             Results for orders placed during the hospital encounter of 08/06/22    Adult Transthoracic Echo Complete W/ Cont if Necessary Per Protocol    Interpretation Summary  · Left ventricular ejection fraction appears to be 61 - 65%. Left ventricular systolic function is normal.  · Left ventricular diastolic function was normal.  · Normal right ventricular cavity size and systolic function noted.  · The agitated saline study is positive with Valsalva, consistent with a small to moderate sized PFO  · Mild tricuspid valve regurgitation is present.  · Calculated right ventricular systolic pressure from tricuspid regurgitation is 32 mmHg.  · There is no evidence of pericardial effusion      No orders to display        Assessment/Plan     Active Hospital Problems    Diagnosis  POA   • **Left sided abdominal pain [R10.9]  Yes   • Pancreatic pseudocyst [K86.3]  Yes   • GERD without esophagitis [K21.9]  Yes   • Chronic pancreatitis (HCC) [K86.1]  Yes   • Alcohol abuse [F10.10]  Yes      Resolved Hospital Problems   No resolved problems to display.   Chronic Pancreatitis with Pancreatic Pseudocyst  - will provide pain control   - question of acute pancreatitis-will start on clear liquid diet and IVF for now  - consult general  surgery as she may require repeat drainage    EtOH Abuse  - patient last had a drink about 4 days ago  - advised complete abstinence from alcohol       I discussed the patient's findings and my recommendations with patient, spouse, nursing staff and ED provider.    VTE Prophylaxis - SCDs.  Code Status - Full code.       Jony Arora MD  [facility]  11/30/22  22:56 EST

## 2022-12-01 NOTE — PROGRESS NOTES
Name: Piper Cain ADMIT: 2022   : 1980  PCP: Rachell Pozo APRN    MRN: [id number] LOS: 0 days   AGE/SEX: 42 y.o. female  ROOM: [facility]     Subjective   Subjective   Patient seen at bedside.       Objective   Objective   Vital Signs  Temp:  [98.6 °F (37 °C)] 98.6 °F (37 °C)  Heart Rate:  [] 78  Resp:  [16-18] 18  BP: ()/(65-94) 99/65  SpO2:  [94 %-98 %] 97 %  on   ;   Device (Oxygen Therapy): room air  Body mass index is 19.37 kg/m².  Physical Exam   General Appearance:    Alert, cooperative, no distress, appears stated age.  Head:    Normocephalic, without obvious abnormality, atraumatic  Eyes:    PERRL, conjunctiva/corneas clear, EOM's intact, both eyes  Ears:    Normal external ear canals, both ears  Nose:   Nares normal, septum midline, mucosa normal, no drainage    or sinus tenderness  Throat:   Lips, tongue, gums normal; oral mucosa pink and moist  Neck:   Supple, symmetrical, trachea midline, no adenopathy;     thyroid:  no enlargement/tenderness/nodules; no carotid    bruit or JVD  Back:     Symmetric, no curvature, ROM normal, no CVA tenderness  Lungs:     Clear to auscultation bilaterally, respirations unlabored  Chest Wall:    No tenderness or deformity   Heart:    Regular rate and rhythm, S1 and S2 normal, no murmur, rub   or gallop  Abdomen:    Soft nontender no organomegaly detected  Extremities:   Extremities normal, atraumatic, no cyanosis or edema  Pulses:   Pulses palpable in all extremities; symmetric all extremities  Skin:   Skin color normal, Skin is warm and dry,  no rashes or palpable lesions  Neurologic:   CNII-XII intact, motor strength grossly intact, sensation grossly intact to light touch, no focal deficits noted         Results Review     I reviewed the patient's new clinical results.  Results from last 7 days   Lab Units 22  0519 22  1412 22  1751   WBC 10*3/mm3 5.78 9.19 8.77   HEMOGLOBIN g/dL 11.3* 13.3 14.6   PLATELETS  10*3/mm3 124* 122* 140     Results from last 7 days   Lab Units 12/01/22  0519 11/30/22  1412 11/28/22  1751   SODIUM mmol/L 138 133* 138   POTASSIUM mmol/L 3.9 3.5 4.2   CHLORIDE mmol/L 101 96* 101   CO2 mmol/L 30.8* 25.5 24.3   BUN mg/dL 4* 7 6   CREATININE mg/dL 0.61 0.70 0.86   GLUCOSE mg/dL 102* 131* 86   EGFR mL/min/1.73 116.1 112.3 87.7     Results from last 7 days   Lab Units 12/01/22  0519 11/30/22  1412 11/28/22  1751   ALBUMIN g/dL 3.40* 3.70 4.50   BILIRUBIN mg/dL 0.3 0.4 0.3   ALK PHOS U/L 63 78 91   AST (SGOT) U/L 8 12 18   ALT (SGPT) U/L 13 12 19     Results from last 7 days   Lab Units 12/01/22 0519 11/30/22  1412 11/28/22  1751   CALCIUM mg/dL 8.5* 9.1 9.0   ALBUMIN g/dL 3.40* 3.70 4.50     Results from last 7 days   Lab Units 11/30/22 2014   LACTATE mmol/L 0.6     No results found for: HGBA1C, POCGLU    CT Abdomen Pelvis With Contrast    Result Date: 11/30/2022   1. Recurrent fluid collection between the tail of the pancreas and the left kidney now measuring about 3.2 x 2.6 cm. 2. Increased inflammatory stranding and ill-defined fluid around the tail the pancreas, around the left kidney and inferiorly within the left retroperitoneum. This may reflect a new episode of pancreatitis.  Radiation dose reduction techniques were utilized, including automated exposure control and exposure modulation based on body size.  This report was finalized on 11/30/2022 7:20 PM by Dr. Manny Wilder M.D.      Scheduled Medications  amitriptyline, 10 mg, Oral, Nightly  folic acid, 1 mg, Oral, Daily  pancrelipase (Lip-Prot-Amyl), 24,000 units of lipase, Oral, TID With Meals  pantoprazole, 40 mg, Oral, Daily  senna-docusate sodium, 2 tablet, Oral, BID  sodium chloride, 10 mL, Intravenous, Q12H  thiamine, 100 mg, Oral, Daily    Infusions  sodium chloride, 125 mL/hr, Last Rate: 125 mL/hr (12/01/22 1523)    Diet  Diet: Liquid Diets; Clear Liquid; Texture: Regular Texture (IDDSI 7); Fluid Consistency: Thin (IDDSI 0)        Assessment/Plan     Active Hospital Problems    Diagnosis  POA   • **Left sided abdominal pain [R10.9]  Yes   • Pancreatic pseudocyst [K86.3]  Yes   • GERD without esophagitis [K21.9]  Yes   • Chronic pancreatitis (HCC) [K86.1]  Yes   • Alcohol abuse [F10.10]  Yes      Resolved Hospital Problems   No resolved problems to display.       42 y.o. female admitted with Left sided abdominal pain.    Assessment and plan:  1.  Chronic pancreatitis with pseudocyst, continue pain control.  Continue clear liquid diet, continue IV fluids.    2.  Alcohol abuse, complete abstinence recommended.    3.  Further plans based on hospital course.      Faisal Baird MD  [facility]  12/01/22  16:42 EST

## 2022-12-01 NOTE — PAYOR COMM NOTE
"Lizzeth Zavala (42 y.o. Female)     ATTN: INITIAL REQUEST FOR OBSERVATION AUTHORIZATION: [id number]    PLEASE REPLY TO UR DEPT: [phone]    [facility]: NPI [id number]    FAISAL BAIRD MD: NPI [id number]    DX: R10.9, K86.3, K86.1, F10.10, K21.9    [employer] WOULD NOT LET ENTER AS OBSERVATION BUT THAT IS WHAT I'M REQUESTING AT THIS TIME    BUDDY ROJAS RN/CCP- [phone]          Date of Birth   1980    Social Security Number       Address   [address] [place] [address] [address] [address]    Home Phone   [phone]    MRN   [id number]       Jewish   Orthodoxy    Marital Status   Single                            Admission Date   11/30/22  OBSERVATION      Admission Type   Emergency    Admitting Provider   Jony Arora MD    Attending Provider   Faisal Baird MD    Department, Room/Bed   [place] [address] TELE ON OSC PHASE II, 71/1       Discharge Date       Discharge Disposition       Discharge Destination                               Attending Provider: Faisal Baird MD    Allergies: Nickel, Hydrocodone-acetaminophen    Isolation: None   Infection: None   Code Status: CPR    Ht: 177.8 cm (70\")   Wt: 61.2 kg (135 lb)    Admission Cmt: None   Principal Problem: Left sided abdominal pain [R10.9]                 Active Insurance as of 11/30/2022     Primary Coverage     Payor Plan Insurance Group Employer/Plan Group    [id number]     Payor Plan Address Payor Plan Phone Number Payor Plan Fax Number Effective Dates    MAIL STOP:   7/1/2022 - None Entered    [address] [address]       [facility] [address]       Subscriber Name Subscriber Birth Date Member ID       LIZZETH ZAVALA 1980 [id number]                 Emergency Contacts      (Rel.) Home Phone Work Phone Mobile Phone    MALOU EVANGELISTA (Significant Other) [phone] -- --               History & " Physical      Jony Arora MD at 11/30/22 2256              Patient Name:  Piper Cain  YOB: 1980  MRN:  [id number]  Admit Date:  11/30/2022  Patient Care Team:  Rachell Pozo APRN as PCP - General (Family Medicine)      Subjective    History Present Illness     Chief Complaint   Patient presents with   • Abdominal Pain       Ms. Cain is a 42 y.o. smoker with a history of GERD, anxiety, and alcohol abuse with resulting recurrent pancreatitis leading to chronic pancreatitis now complicated by pseudocysts that presents to [facility] complaining of left-sided abdominal pain for the past week or so. This is a sharp, stabbing pain which has worsened over the past 3 days. There are no particular exacerbating or alleviating factors. She is found to have another pancreatic fluid collection on CT scan. She was recently here last month for the same issue and she responded quite well to fluid drainage.    History of Present Illness  Review of Systems   Constitutional: Negative for appetite change, chills and fever.   HENT: Negative for congestion, sore throat and trouble swallowing.    Eyes: Negative for redness and visual disturbance.   Respiratory: Negative for cough and shortness of breath.    Cardiovascular: Negative for chest pain, palpitations and leg swelling.   Gastrointestinal: Positive for abdominal pain. Negative for abdominal distention, constipation, diarrhea, nausea and vomiting.   Endocrine: Negative for cold intolerance and heat intolerance.   Genitourinary: Negative for difficulty urinating, dysuria and hematuria.   Musculoskeletal: Negative for arthralgias and myalgias.   Skin: Negative for pallor and rash.   Neurological: Negative for dizziness and light-headedness.   Hematological: Negative for adenopathy. Does not bruise/bleed easily.   Psychiatric/Behavioral: Negative for confusion and decreased concentration.        Personal History     Past Medical  History:   Diagnosis Date   • Anxiety    • E. coli bacteremia    • ETOH abuse    • GERD (gastroesophageal reflux disease)    • Hiatal hernia    • Left flank pain 10/21/2022   • Migraine    • Miscarriage 2004   • Pancreatitis      Past Surgical History:   Procedure Laterality Date   • ENDOSCOPY N/A 8/10/2022    Procedure: ESOPHAGOGASTRODUODENOSCOPY with bxs;  Surgeon: Salvador Price MD;  Location: [facility] ENDOSCOPY;  Service: Gastroenterology;  Laterality: N/A;  Pre: r/o esophageal  varacies, abd pain  Post: esophageal plaque     Family History   Problem Relation Age of Onset   • Alcohol abuse Mother    • Arthritis Mother    • Asthma Mother    • Miscarriages / Stillbirths Mother    • Cancer Father    • Diabetes Father    • Drug abuse Father    • Early death Father    • Heart disease Father    • Hyperlipidemia Father    • Hypertension Father    • Alcohol abuse Paternal Aunt    • Cancer Paternal Aunt    • Diabetes Paternal Aunt    • Drug abuse Paternal Aunt    • Early death Paternal Aunt    • Heart disease Paternal Aunt    • Hyperlipidemia Paternal Aunt    • Hypertension Paternal Aunt    • Alcohol abuse Maternal Grandmother    • Alcohol abuse Maternal Grandfather    • Alcohol abuse Paternal Grandmother    • Cancer Paternal Grandmother    • Diabetes Paternal Grandmother    • Drug abuse Paternal Grandmother    • Early death Paternal Grandmother    • Heart disease Paternal Grandmother    • Hyperlipidemia Paternal Grandmother    • Hypertension Paternal Grandmother    • Alcohol abuse Paternal Grandfather      Social History     Tobacco Use   • Smoking status: Every Day     Packs/day: 0.50     Types: Cigarettes     Start date: 1/28/1996   • Smokeless tobacco: Current   Vaping Use   • Vaping Use: Never used   Substance Use Topics   • Alcohol use: Yes     Comment: 2 cocktails daily   • Drug use: No     No current facility-administered medications on file prior to encounter.     Current Outpatient Medications on File Prior to  Encounter   Medication Sig Dispense Refill   • amitriptyline (ELAVIL) 10 MG tablet Take 10 mg by mouth Every Night.     • Cholecalciferol (VITAMIN D3) 5000 units capsule capsule Take 5,000 Units by mouth Daily.     • Ferrous Sulfate Dried (Feosol) 200 (65 Fe) MG tablet tablet Take 200 mg by mouth Daily.     • folic acid (FOLVITE) 1 MG tablet Take 1 tablet by mouth Daily. 30 tablet 3   • ondansetron (ZOFRAN) 4 MG tablet Take 4 mg by mouth Every 8 (Eight) Hours As Needed for Nausea or Vomiting.     • oxyCODONE-acetaminophen (PERCOCET)  MG per tablet Take 1 tablet by mouth Every 6 (Six) Hours As Needed for Moderate Pain . 10 tablet 0   • pancrelipase, Lip-Prot-Amyl, (CREON) 48960-65378 units capsule delayed-release particles capsule Take 72,000 units of lipase by mouth 3 (Three) Times a Day With Meals.     • pantoprazole (PROTONIX) 40 MG EC tablet Take 1 tablet by mouth Daily. 30 tablet 3   • thiamine (VITAMIN B-1) 100 MG tablet  tablet Take 100 mg by mouth Daily.     • vitamin B-12 (CYANOCOBALAMIN) 100 MCG tablet Take 50 mcg by mouth Daily.       Allergies   Allergen Reactions   • Nickel    • Hydrocodone-Acetaminophen Nausea And Vomiting       Objective     Objective     Vital Signs  Temp:  [98.3 °F (36.8 °C)] 98.3 °F (36.8 °C)  Heart Rate:  [] 96  Resp:  [18] 18  BP: (109-131)/(75-94) 117/76  SpO2:  [94 %-98 %] 94 %  on   ;   Device (Oxygen Therapy): room air  Body mass index is 19.37 kg/m².    Physical Exam  Vitals and nursing note reviewed.   Constitutional:       General: She is not in acute distress.     Appearance: She is not toxic-appearing or diaphoretic.   HENT:      Head: Normocephalic and atraumatic.      Nose: Nose normal.      Mouth/Throat:      Mouth: Mucous membranes are moist.      Pharynx: Oropharynx is clear.   Eyes:      Conjunctiva/sclera: Conjunctivae normal.      Pupils: Pupils are equal, round, and reactive to light.   Cardiovascular:      Rate and Rhythm: Normal rate and regular  rhythm.      Pulses: Normal pulses.   Pulmonary:      Effort: Pulmonary effort is normal.      Breath sounds: Normal breath sounds.   Abdominal:      General: Bowel sounds are normal.      Tenderness: There is abdominal tenderness. There is no guarding or rebound.   Musculoskeletal:         General: No swelling or tenderness.      Cervical back: Normal range of motion and neck supple.   Skin:     General: Skin is warm and dry.      Capillary Refill: Capillary refill takes less than 2 seconds.   Neurological:      General: No focal deficit present.      Mental Status: She is alert and oriented to person, place, and time.   Psychiatric:         Mood and Affect: Mood normal.         Behavior: Behavior normal.       Results Review:  I reviewed the patient's new clinical results.  I reviewed the patient's new imaging results and agree with the interpretation.  I reviewed the patient's other test results and agree with the interpretation  I personally viewed and interpreted the patient's EKG/Telemetry data  Discussed with ED provider.    Lab Results (last 24 hours)     Procedure Component Value Units Date/Time    Urinalysis With Microscopic If Indicated (No Culture) - Urine, Clean Catch [[id number]]  (Abnormal) Collected: 11/30/22 1410    Specimen: Urine, Clean Catch Updated: 11/30/22 1453     Color, UA Yellow     Appearance, UA Cloudy     pH, UA 6.0     Specific Gravity, UA <=1.005     Glucose, UA Negative     Ketones, UA Negative     Bilirubin, UA Negative     Blood, UA Negative     Protein, UA Negative     Leuk Esterase, UA Negative     Nitrite, UA Negative     Urobilinogen, UA 0.2 E.U./dL    Narrative:      Urine microscopic not indicated.    CBC & Differential [[id number]]  (Abnormal) Collected: 11/30/22 1412    Specimen: Blood Updated: 11/30/22 1437    Narrative:      The following orders were created for panel order CBC & Differential.  Procedure                               Abnormality         Status                      ---------                               -----------         ------                     CBC Auto Differential[[id number]]        Abnormal            Final result                 Please view results for these tests on the individual orders.    Comprehensive Metabolic Panel [[id number]]  (Abnormal) Collected: 11/30/22 1412    Specimen: Blood Updated: 11/30/22 1501     Glucose 131 mg/dL      BUN 7 mg/dL      Creatinine 0.70 mg/dL      Sodium 133 mmol/L      Potassium 3.5 mmol/L      Chloride 96 mmol/L      CO2 25.5 mmol/L      Calcium 9.1 mg/dL      Total Protein 6.9 g/dL      Albumin 3.70 g/dL      ALT (SGPT) 12 U/L      AST (SGOT) 12 U/L      Alkaline Phosphatase 78 U/L      Total Bilirubin 0.4 mg/dL      Globulin 3.2 gm/dL      A/G Ratio 1.2 g/dL      BUN/Creatinine Ratio 10.0     Anion Gap 11.5 mmol/L      eGFR 112.3 mL/min/1.73      Comment: National Kidney Foundation and American Society of Nephrology (ASN) Task Force recommended calculation based on the Chronic Kidney Disease Epidemiology Collaboration (CKD-EPI) equation refit without adjustment for race.       Narrative:      GFR Normal >60  Chronic Kidney Disease <60  Kidney Failure <15      Lipase [[id number]]  (Abnormal) Collected: 11/30/22 1412    Specimen: Blood Updated: 11/30/22 1443     Lipase 65 U/L     CBC Auto Differential [[id number]]  (Abnormal) Collected: 11/30/22 1412    Specimen: Blood Updated: 11/30/22 1437     WBC 9.19 10*3/mm3      RBC 4.14 10*6/mm3      Hemoglobin 13.3 g/dL      Hematocrit 39.8 %      MCV 96.1 fL      MCH 32.1 pg      MCHC 33.4 g/dL      RDW 12.7 %      RDW-SD 44.8 fl      MPV 10.5 fL      Platelets 122 10*3/mm3      Neutrophil % 67.1 %      Lymphocyte % 21.7 %      Monocyte % 10.0 %      Eosinophil % 0.7 %      Basophil % 0.2 %      Neutrophils, Absolute 6.17 10*3/mm3      Lymphocytes, Absolute 1.99 10*3/mm3      Monocytes, Absolute 0.92 10*3/mm3      Eosinophils, Absolute 0.06 10*3/mm3      Basophils, Absolute 0.02 10*3/mm3      Lactic Acid, Plasma [[id number]]  (Normal) Collected: 11/30/22 2014    Specimen: Blood Updated: 11/30/22 2049     Lactate 0.6 mmol/L     Blood Culture - Blood, Arm, Left [[id number]] Collected: 11/30/22 2014    Specimen: Blood from Arm, Left Updated: 11/30/22 2021    Ethanol [[id number]] Collected: 11/30/22 2014    Specimen: Blood Updated: 11/30/22 2044     Ethanol <10 mg/dL      Ethanol % <0.010 %     Blood Culture - Blood, Arm, Left [[id number]] Collected: 11/30/22 2018    Specimen: Blood from Arm, Left Updated: 11/30/22 2021          Imaging Results (Last 24 Hours)     Procedure Component Value Units Date/Time    CT Abdomen Pelvis With Contrast [[id number]] Collected: 11/30/22 1906     Updated: 11/30/22 1923    Narrative:      CT ABDOMEN AND PELVIS WITH IV CONTRAST     HISTORY: Left-sided abdominal pain, history of pancreatitis and  peripancreatic fluid collection, interval aspiration of peripancreatic  fluid collection     TECHNIQUE: Radiation dose reduction techniques were utilized, including  automated exposure control and exposure modulation based on body size.  Axial images were obtained through the abdomen and pelvis after the  administration of IV contrast. Coronal and sagittal reformatted images  obtained.     COMPARISON: 10/20/2022, 10/23/2022     FINDINGS:      ABDOMEN:  Lung bases are clear. The liver and gallbladder are unremarkable. Small  perisplenic fluid collection is slightly decreased in size measuring 2.2  x 1.1 cm, previously 2.4 x 1.1 cm. Recurrent fluid collection between  the tail of the pancreas and the left kidney now measuring 3.2 x 2.6 cm.  There is increased inflammatory stranding and ill-defined fluid seen  around the tail of the pancreas, around the left kidney and extending  inferiorly in the left retroperitoneum. The left renal parenchyma is  within normal limits. The right kidney is unremarkable. Adrenal glands  are unremarkable. Stable calcifications in the pancreatic head.  Stable  mildly dilated pancreatic duct.     PELVIS:  The bladder is unremarkable. No free fluid within the pelvis. The colon  is unremarkable. The appendix is normal. Bone windows are unremarkable.       Impression:         1. Recurrent fluid collection between the tail of the pancreas and the  left kidney now measuring about 3.2 x 2.6 cm.  2. Increased inflammatory stranding and ill-defined fluid around the  tail the pancreas, around the left kidney and inferiorly within the left  retroperitoneum. This may reflect a new episode of pancreatitis.     Radiation dose reduction techniques were utilized, including automated  exposure control and exposure modulation based on body size.     This report was finalized on 11/30/2022 7:20 PM by Dr. Manny Wilder M.D.             Results for orders placed during the hospital encounter of 08/06/22    Adult Transthoracic Echo Complete W/ Cont if Necessary Per Protocol    Interpretation Summary  · Left ventricular ejection fraction appears to be 61 - 65%. Left ventricular systolic function is normal.  · Left ventricular diastolic function was normal.  · Normal right ventricular cavity size and systolic function noted.  · The agitated saline study is positive with Valsalva, consistent with a small to moderate sized PFO  · Mild tricuspid valve regurgitation is present.  · Calculated right ventricular systolic pressure from tricuspid regurgitation is 32 mmHg.  · There is no evidence of pericardial effusion      No orders to display       Assessment/Plan     Active Hospital Problems    Diagnosis  POA   • **Left sided abdominal pain [R10.9]  Yes   • Pancreatic pseudocyst [K86.3]  Yes   • GERD without esophagitis [K21.9]  Yes   • Chronic pancreatitis (HCC) [K86.1]  Yes   • Alcohol abuse [F10.10]  Yes      Resolved Hospital Problems   No resolved problems to display.   Chronic Pancreatitis with Pancreatic Pseudocyst  - will provide pain control   - question of acute pancreatitis-will  start on clear liquid diet and IVF for now  - consult general surgery as she may require repeat drainage    EtOH Abuse  - patient last had a drink about 4 days ago  - advised complete abstinence from alcohol       I discussed the patient's findings and my recommendations with patient, spouse, nursing staff and ED provider.    VTE Prophylaxis - SCDs.  Code Status - Full code.       Jony Arora MD  [facility]  11/30/22  22:56 EST    Electronically signed by Jony Arora MD at 12/01/22 0516          Emergency Department Notes      Zulema Tran, RN at 11/30/22 1144        Patient to ER via car from home for LLQ pain x 1 week    Patient states she had her kidney drained last month    Patient wearing mask this RN in PPE    Electronically signed by Zulema Tran RN at 11/30/22 1145     Mai Jauregui, RN at 11/30/22 1739        Pt c/o luq pain that started approx 1wk ago. Pain radiates into right upper abd. Reports constipation last two days, nausea.    This RN wore mask and goggles during time of contact      Electronically signed by Mai Jauregui, RN at 11/30/22 1742     Concepción Shah PA at 11/30/22 1828     Attestation signed by Danish Barrera MD at 12/01/22 0005        SHARED APC FACE TO FACE: This visit was performed by both the physician and an APC. I personally evaluated and examined the patient. I performed the entirety of the physical exam and I documented this exam in this attestation or in accompanying documentation.    Danish Barrera MD 12/1/2022 00:05 EST                          EMERGENCY DEPARTMENT ENCOUNTER    Room Number:  A03/03  Date seen:  11/30/2022  Time seen: 18:28 EST  PCP: Rachell Pozo APRN  Historian: patient      HPI:  Chief Complaint: left abdominal pain    A complete HPI/ROS/PMH/PSH/SH/FH are unobtainable due to: none    Context: Piper Cain is a 42 y.o. female with a history of chronic pancreatitis, alcohol abuse and  pancreatic pseudocyst who presents to the ED for evaluation of severe left-sided flank pain that has been gradually increasing for the last week, significantly worse last 3 days.  It is worse positionally with palpation but also painful constantly.  She reports nausea had 1 episode of vomiting 3 days ago.  She has had intermittent fevers with a T-max of 102.9 2 days ago, afebrile today.  She denies any diarrhea hematuria dysuria or any other complaints.  She has a history of recurrent fluid collections around her pancreas and kidney that she has had to have drained twice before and states the pain is the same as that.        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   • Anemia of chronic disease 08/08/2022   • GERD without esophagitis 08/08/2022   • Alcohol dependence in remission ([facility]) 08/08/2022   • Pancreatic pseudocyst 08/14/2022   • Candida esophagitis ([facility]) 08/14/2022   • Ureterolithiasis 10/20/2022   • Left flank pain 10/21/2022   • Bacterial vaginosis 10/23/2022     Resolved Ambulatory Problems     Diagnosis Date Noted   • Hypokalemia 09/28/2017   • Mixed acid base balance disorder 09/28/2017   • Dehydration 09/28/2017     Past Medical History:   Diagnosis Date   • Anxiety    • E. coli bacteremia    • ETOH abuse    • GERD (gastroesophageal reflux disease)    • Hiatal hernia    • Migraine    • Miscarriage 2004   • Pancreatitis          PAST SURGICAL HISTORY  Past Surgical History:   Procedure Laterality Date   • ENDOSCOPY N/A 8/10/2022    Procedure: ESOPHAGOGASTRODUODENOSCOPY with  bxs;  Surgeon: Salvador Price MD;  Location: [facility] ENDOSCOPY;  Service: Gastroenterology;  Laterality: N/A;  Pre: r/o esophageal  varacies, abd pain  Post: esophageal plaque         FAMILY HISTORY  Family History   Problem Relation Age of Onset   • Alcohol abuse Mother    • Arthritis Mother    • Asthma Mother    • Miscarriages / Stillbirths Mother    • Cancer Father    • Diabetes Father    • Drug abuse Father    • Early death Father    • Heart disease Father    • Hyperlipidemia Father    • Hypertension Father    • Alcohol abuse Paternal Aunt    • Cancer Paternal Aunt    • Diabetes Paternal Aunt    • Drug abuse Paternal Aunt    • Early death Paternal Aunt    • Heart disease Paternal Aunt    • Hyperlipidemia Paternal Aunt    • Hypertension Paternal Aunt    • Alcohol abuse Maternal Grandmother    • Alcohol abuse Maternal Grandfather    • Alcohol abuse Paternal Grandmother    • Cancer Paternal Grandmother    • Diabetes Paternal Grandmother    • Drug abuse Paternal Grandmother    • Early death Paternal Grandmother    • Heart disease Paternal Grandmother    • Hyperlipidemia Paternal Grandmother    • Hypertension Paternal Grandmother    • Alcohol abuse Paternal Grandfather          SOCIAL HISTORY  Social History     Socioeconomic History   • Marital status: Single   Tobacco Use   • Smoking status: Every Day     Packs/day: 0.50     Types: Cigarettes     Start date: 1/28/1996   • Smokeless tobacco: Current   Vaping Use   • Vaping Use: Never used   Substance and Sexual Activity   • Alcohol use: Yes     Comment: 2 cocktails daily   • Drug use: No   • Sexual activity: Yes     Partners: Male     Birth control/protection: None     Comment: IUD removed 6/29/2017         ALLERGIES  Nickel and Hydrocodone-acetaminophen        REVIEW OF SYSTEMS  Review of Systems     All systems reviewed and negative except for those discussed in HPI.       PHYSICAL EXAM  ED Triage Vitals   Temp Heart Rate Resp BP SpO2   11/30/22 1145 11/30/22 1145  11/30/22 1145 11/30/22 1412 11/30/22 1145   98.3 °F (36.8 °C) 105 18 109/75 98 %      Temp src Heart Rate Source Patient Position BP Location FiO2 (%)   -- 11/30/22 1412 11/30/22 1412 11/30/22 1412 --    Monitor Sitting Left arm          GENERAL: not distressed  HENT: atraumatic  EYES: no scleral icterus  CV: regular rhythm, regular rate  RESPIRATORY: normal effort CTA B  ABDOMEN: soft, diffusely tender throughout the left abdomen, nondistended normal bowel sounds no guarding or rigidity  MUSCULOSKELETAL: no deformity  NEURO: alert, moves all extremities, follows commands  SKIN: warm, dry    Vital signs and nursing notes reviewed.          LAB RESULTS  Recent Results (from the past 24 hour(s))   Urinalysis With Microscopic If Indicated (No Culture) - Urine, Clean Catch    Collection Time: 11/30/22  2:10 PM    Specimen: Urine, Clean Catch   Result Value Ref Range    Color, UA Yellow Yellow, Straw    Appearance, UA Cloudy (A) Clear    pH, UA 6.0 5.0 - 8.0    Specific Gravity, UA <=1.005 1.005 - 1.030    Glucose, UA Negative Negative    Ketones, UA Negative Negative    Bilirubin, UA Negative Negative    Blood, UA Negative Negative    Protein, UA Negative Negative    Leuk Esterase, UA Negative Negative    Nitrite, UA Negative Negative    Urobilinogen, UA 0.2 E.U./dL 0.2 - 1.0 E.U./dL   Comprehensive Metabolic Panel    Collection Time: 11/30/22  2:12 PM    Specimen: Blood   Result Value Ref Range    Glucose 131 (H) 65 - 99 mg/dL    BUN 7 6 - 20 mg/dL    Creatinine 0.70 0.57 - 1.00 mg/dL    Sodium 133 (L) 136 - 145 mmol/L    Potassium 3.5 3.5 - 5.2 mmol/L    Chloride 96 (L) 98 - 107 mmol/L    CO2 25.5 22.0 - 29.0 mmol/L    Calcium 9.1 8.6 - 10.5 mg/dL    Total Protein 6.9 6.0 - 8.5 g/dL    Albumin 3.70 3.50 - 5.20 g/dL    ALT (SGPT) 12 1 - 33 U/L    AST (SGOT) 12 1 - 32 U/L    Alkaline Phosphatase 78 39 - 117 U/L    Total Bilirubin 0.4 0.0 - 1.2 mg/dL    Globulin 3.2 gm/dL    A/G Ratio 1.2 g/dL    BUN/Creatinine Ratio  10.0 7.0 - 25.0    Anion Gap 11.5 5.0 - 15.0 mmol/L    eGFR 112.3 >60.0 mL/min/1.73   Lipase    Collection Time: 11/30/22  2:12 PM    Specimen: Blood   Result Value Ref Range    Lipase 65 (H) 13 - 60 U/L   Green Top (Gel)    Collection Time: 11/30/22  2:12 PM   Result Value Ref Range    Extra Tube Hold for add-ons.    Lavender Top    Collection Time: 11/30/22  2:12 PM   Result Value Ref Range    Extra Tube hold for add-on    Gold Top - SST    Collection Time: 11/30/22  2:12 PM   Result Value Ref Range    Extra Tube Hold for add-ons.    Light Blue Top    Collection Time: 11/30/22  2:12 PM   Result Value Ref Range    Extra Tube Hold for add-ons.    CBC Auto Differential    Collection Time: 11/30/22  2:12 PM    Specimen: Blood   Result Value Ref Range    WBC 9.19 3.40 - 10.80 10*3/mm3    RBC 4.14 3.77 - 5.28 10*6/mm3    Hemoglobin 13.3 12.0 - 15.9 g/dL    Hematocrit 39.8 34.0 - 46.6 %    MCV 96.1 79.0 - 97.0 fL    MCH 32.1 26.6 - 33.0 pg    MCHC 33.4 31.5 - 35.7 g/dL    RDW 12.7 12.3 - 15.4 %    RDW-SD 44.8 37.0 - 54.0 fl    MPV 10.5 6.0 - 12.0 fL    Platelets 122 (L) 140 - 450 10*3/mm3    Neutrophil % 67.1 42.7 - 76.0 %    Lymphocyte % 21.7 19.6 - 45.3 %    Monocyte % 10.0 5.0 - 12.0 %    Eosinophil % 0.7 0.3 - 6.2 %    Basophil % 0.2 0.0 - 1.5 %    Neutrophils, Absolute 6.17 1.70 - 7.00 10*3/mm3    Lymphocytes, Absolute 1.99 0.70 - 3.10 10*3/mm3    Monocytes, Absolute 0.92 (H) 0.10 - 0.90 10*3/mm3    Eosinophils, Absolute 0.06 0.00 - 0.40 10*3/mm3    Basophils, Absolute 0.02 0.00 - 0.20 10*3/mm3       Ordered the above labs and independently reviewed the results.        RADIOLOGY  CT Abdomen Pelvis With Contrast   Final Result       1. Recurrent fluid collection between the tail of the pancreas and the   left kidney now measuring about 3.2 x 2.6 cm.   2. Increased inflammatory stranding and ill-defined fluid around the   tail the pancreas, around the left kidney and inferiorly within the left   retroperitoneum. This  may reflect a new episode of pancreatitis.       Radiation dose reduction techniques were utilized, including automated   exposure control and exposure modulation based on body size.       This report was finalized on 11/30/2022 7:20 PM by Dr. Manny Wilder M.D.              I ordered the above noted radiological studies. Reviewed by me and discussed with radiologist.  See dictation for official radiology interpretation.    PROCEDURES  Procedures        MEDICATIONS GIVEN IN ER  Medications   sodium chloride 0.9 % flush 10 mL (has no administration in time range)   morphine injection 4 mg (4 mg Intravenous Given 11/30/22 1904)   sodium chloride 0.9 % bolus 500 mL (0 mL Intravenous Stopped 11/30/22 1959)   ondansetron (ZOFRAN) injection 4 mg (4 mg Intravenous Given 11/30/22 1904)   iopamidol (ISOVUE-300) 61 % injection 100 mL (100 mL Intravenous Given 11/30/22 1857)   HYDROmorphone (DILAUDID) injection 0.5 mg (0.5 mg Intravenous Given 11/30/22 2006)             PROGRESS AND CONSULTS    Differential diagnosis includes but is not limited to:  - hepatobiliary pathology such as cholecystitis, cholangitis, and symptomatic cholelithiasis  - Pancreatitis  - Dyspepsia  - Small bowel obstruction  - Appendicitis  - Diverticulitis  - UTI including pyelonephritis  - Ureteral stone  - Zoster  - Colitis, including infectious and ischemic  - Atypical ACS      ED Course as of 11/30/22 2017 Wed Nov 30, 2022   1828 WBC: 9.19 [KA]   1828 Hemoglobin: 13.3 [KA]   1828 Lipase(!): 65 [KA]   1828 Glucose(!): 131 [KA]   1828 Creatinine: 0.70 [KA]   1957 Medical chart reviewed.  Patient has had 2 admissions since August for abdominal pain with findings of perisplenic perinephric and peripancreatic fluid collections.  These have been aspirated by IR during both admissions.  Her second admission was in October, she was evaluated by infectious disease and it was felt that her fluid collections were sterile pseudocyst from chronic  pancreatitis and her labs and condition was stable off of antibiotics with normal temperatures and white blood cell counts.  Cultures of aspirates on both occasions were negative for any growth.Patient noted to have continued alcohol abuse with 6-7 beers daily.  At her admission in October CT-guided drainage was performed secondary to ongoing severe left flank pain. [KA]   2016 No antibiotics initiated as patient is afebrile with a normal white blood cell count and previously did not improve with antibiotics and previous cultures were negative for any bacterial growth. [KA]      ED Course User Index  [KA] Concepción Shah PA             Patient was placed in face mask in first look. Patient was wearing facemask each time I entered the room and throughout our encounter. I wore protective equipment throughout this patient encounter including a face mask, eye shield and gloves. Hand hygiene was performed before donning protective equipment and after removal when leaving the room.        DIAGNOSIS  Final diagnoses:   Left sided abdominal pain   Pancreatic pseudocyst   Intraabdominal fluid collection           Latest Documented Vital Signs:  As of 20:17 EST  BP- 131/94 HR- 100 Temp- 98.3 °F (36.8 °C) O2 sat- 98%       Concepción Shah PA  11/30/22 2017      Electronically signed by Danish Barrera MD at 12/01/22 0005     Danish Barrera MD at 11/30/22 2000          Pt presents to the ED c/o  abdominal pain increasing over the last week with a history of recurrent pancreatitis.  Has had multiple episodes of fluid collections requiring drainage in the past couple of months.  Reports some fevers up to 102.9 a couple of days ago.  No fevers today.  Had some nausea vomiting couple days ago but nothing since.     On exam,   General: No acute distress, nontoxic, nondiaphoretic  HEENT: Mucous membranes moist, atraumatic, EOMI  Neck: Full ROM  Pulm: Symmetric chest rise, nonlabored, lungs CTAB  Cardiovascular: Regular rate and  rhythm, intact distal pulses  GI: Soft, tenderness in the mid to lateral left side of the abdomen, nondistended, no rebound, no guarding, bowel sounds present  MSK: Full ROM, no deformity  Skin: Warm, dry  Neuro: Awake, alert, oriented x 4, GCS 15, moving all extremities, no focal deficits  Psych: Calm, cooperative      N95, protective eye goggles, and gloves used during this encounter. Patient in surgical mask.      Plan:   ED Course as of 11/30/22 2359   Wed Nov 30, 2022 1828 WBC: 9.19 [KA]   1828 Hemoglobin: 13.3 [KA]   1828 Lipase(!): 65 [KA]   1828 Glucose(!): 131 [KA]   1828 Creatinine: 0.70 [KA]   1957 Medical chart reviewed.  Patient has had 2 admissions since August for abdominal pain with findings of perisplenic perinephric and peripancreatic fluid collections.  These have been aspirated by IR during both admissions.  Her second admission was in October, she was evaluated by infectious disease and it was felt that her fluid collections were sterile pseudocyst from chronic pancreatitis and her labs and condition was stable off of antibiotics with normal temperatures and white blood cell counts.  Cultures of aspirates on both occasions were negative for any growth.Patient noted to have continued alcohol abuse with 6-7 beers daily.  At her admission in October CT-guided drainage was performed secondary to ongoing severe left flank pain. [KA]   2016 No antibiotics initiated as patient is afebrile with a normal white blood cell count and previously did not improve with antibiotics and previous cultures were negative for any bacterial growth. [KA]   2031 Discussed with Dr. Arora, [facility] discussed patient's clinical course and findings today, treatment modalities, and need for hospitalization. [DC]      ED Course User Index  [DC] Danish Barrera MD  [KA] Concepción Shah PA     Recurrent pancreatitis with recurrent fluid collection, at this point we will plan for rehospitalization.  All questions and concerns  addressed.  Due to the sterile nature of previous aspirates, we will hold off on antibiotics at this time in light of the lack of leukocytosis or fever today.  All questions and concerns addressed with the patient.     Diagnosis Plan   1. Left sided abdominal pain        2. Pancreatic pseudocyst        3. Intraabdominal fluid collection          HOSPITALIZATION    Discussed treatment plan and reason for hospitalization with pt/family and hospitalizing physician.  Pt/family voiced understanding of the plan for hospitalization for further testing/treatment as needed.        Attestation:  The EVA and I have discussed this patient's history, physical exam, and treatment plan.  I have reviewed the documentation and personally had a face to face interaction with the patient. I affirm the documentation and agree with the treatment and plan.  The attached note describes my personal findings.          Danish Barrera MD  12/01/22 0001      Electronically signed by Danish Barrera MD at 12/01/22 0001       Vital Signs (last 2 days)     Date/Time Temp Temp src Pulse Resp BP Patient Position SpO2    12/01/22 0801 -- Oral 78 18 99/65 Lying 97    12/01/22 0400 -- -- 84 16 -- -- 95    11/30/22 2357 98.6 (37) Oral 93 18 114/85 Lying 97    11/30/22 21:22:46 -- -- 96 18 117/76 -- 94    11/30/22 1743 -- -- 100 18 131/94 -- 98    11/30/22 14:12:07 -- -- 89 18 109/75 Sitting 98    11/30/22 1145 98.3 (36.8) -- 105 18 -- -- 98        Oxygen Therapy (last 2 days)     Date/Time SpO2 Device (Oxygen Therapy) Flow (L/min) Oxygen Concentration (%) ETCO2 (mmHg)    12/01/22 0801 97 -- -- -- --    12/01/22 0400 95 -- -- -- --    11/30/22 2357 97 room air -- -- --    11/30/22 21:22:46 94 room air -- -- --    11/30/22 1743 98 room air -- -- --    11/30/22 14:12:07 98 room air -- -- --    11/30/22 1145 98 -- -- -- --        Intake & Output (last 2 days)       11/29 0701 11/30 0700 11/30 0701 12/01 0700 12/01 0701 12/02 0700    P.O.  240 120     I.V. (mL/kg)   820 (13.4)    IV Piggyback  500     Total Intake(mL/kg)  740 (12.1) 940 (15.4)    Net  +740 +940           Urine Unmeasured Occurrence  2 x 1 x        Lines, Drains & Airways     Active LDAs     Name Placement date Placement time Site Days    Peripheral IV 11/30/22 1758 Left;Posterior Forearm 11/30/22 1758  Forearm  less than 1                  Current Facility-Administered Medications   Medication Dose Route Frequency Provider Last Rate Last Admin   • acetaminophen (TYLENOL) tablet 650 mg  650 mg Oral Q4H PRN Jony Arora MD        Or   • acetaminophen (TYLENOL) 160 MG/5ML solution 650 mg  650 mg Oral Q4H PRN Jony Arora MD        Or   • acetaminophen (TYLENOL) suppository 650 mg  650 mg Rectal Q4H PRN Jony Arora MD       • amitriptyline (ELAVIL) tablet 10 mg  10 mg Oral Nightly Jony Arora MD       • sennosides-docusate (PERICOLACE) 8.6-50 MG per tablet 2 tablet  2 tablet Oral BID Jony Arora MD        And   • polyethylene glycol (MIRALAX) packet 17 g  17 g Oral Daily PRN Jony Arora MD        And   • bisacodyl (DULCOLAX) EC tablet 5 mg  5 mg Oral Daily PRN Jony Arora MD        And   • bisacodyl (DULCOLAX) suppository 10 mg  10 mg Rectal Daily PRN Jony Arora MD       • calcium carbonate (TUMS) chewable tablet 500 mg (200 mg elemental)  2 tablet Oral TID PRN Jony Arora MD       • folic acid (FOLVITE) tablet 1 mg  1 mg Oral Daily Jony Arora MD   1 mg at 12/01/22 0826   • HYDROmorphone (DILAUDID) injection 1 mg  1 mg Intravenous Q2H PRN Jony Arora MD   1 mg at 12/01/22 1022    And   • naloxone (NARCAN) injection 0.4 mg  0.4 mg Intravenous Q5 Min PRN Jony Arora MD       • melatonin tablet 5 mg  5 mg Oral Nightly PRN Jony Arora MD       • ondansetron (ZOFRAN) tablet 4 mg  4 mg Oral Q6H PRN Jony Arora MD        Or   • ondansetron (ZOFRAN) injection 4 mg  4 mg Intravenous Q6H PRN Jony Arora MD    4 mg at 12/01/22 0019   • oxyCODONE-acetaminophen (PERCOCET) 5-325 MG per tablet 1 tablet  1 tablet Oral Q4H PRN Jony Arora MD   1 tablet at 12/01/22 0826   • pancrelipase (Lip-Prot-Amyl) (CREON) capsule 24,000 units of lipase  24,000 units of lipase Oral TID With Meals Jony Arora MD       • pantoprazole (PROTONIX) EC tablet 40 mg  40 mg Oral Daily Jony Arora MD   40 mg at 12/01/22 0826   • sodium chloride 0.9 % flush 10 mL  10 mL Intravenous PRN Danish Barrera MD       • sodium chloride 0.9 % flush 10 mL  10 mL Intravenous Q12H Jony Arora MD   10 mL at 12/01/22 0830   • sodium chloride 0.9 % flush 10 mL  10 mL Intravenous PRN Jony Arora MD       • sodium chloride 0.9 % infusion 40 mL  40 mL Intravenous PRN Jony Arora MD       • sodium chloride 0.9 % infusion  125 mL/hr Intravenous Continuous Jony Arora  mL/hr at 12/01/22 1135 125 mL/hr at 12/01/22 1135   • thiamine (VITAMIN B-1) tablet 100 mg  100 mg Oral Daily Jony Arora MD   100 mg at 12/01/22 0826         Lab Results (last 48 hours)     Procedure Component Value Units Date/Time    Comprehensive Metabolic Panel [[id number]]  (Abnormal) Collected: 12/01/22 0519    Specimen: Blood Updated: 12/01/22 0618     Glucose 102 mg/dL      BUN 4 mg/dL      Creatinine 0.61 mg/dL      Sodium 138 mmol/L      Potassium 3.9 mmol/L      Comment: Slight hemolysis detected by analyzer. Results may be affected.        Chloride 101 mmol/L      CO2 30.8 mmol/L      Calcium 8.5 mg/dL      Total Protein 6.2 g/dL      Albumin 3.40 g/dL      ALT (SGPT) 13 U/L      AST (SGOT) 8 U/L      Alkaline Phosphatase 63 U/L      Total Bilirubin 0.3 mg/dL      Globulin 2.8 gm/dL      A/G Ratio 1.2 g/dL      BUN/Creatinine Ratio 6.6     Anion Gap 6.2 mmol/L      eGFR 116.1 mL/min/1.73      Comment: National Kidney Foundation and American Society of Nephrology (ASN) Task Force recommended calculation based on the Chronic Kidney  Disease Epidemiology Collaboration (CKD-EPI) equation refit without adjustment for race.       Narrative:      GFR Normal >60  Chronic Kidney Disease <60  Kidney Failure <15      Lipase [[id number]]  (Normal) Collected: 12/01/22 0519    Specimen: Blood Updated: 12/01/22 0613     Lipase 29 U/L     CBC & Differential [[id number]]  (Abnormal) Collected: 12/01/22 0519    Specimen: Blood Updated: 12/01/22 0558    Narrative:      The following orders were created for panel order CBC & Differential.  Procedure                               Abnormality         Status                     ---------                               -----------         ------                     CBC Auto Differential[[id number]]        Abnormal            Final result                 Please view results for these tests on the individual orders.    CBC Auto Differential [[id number]]  (Abnormal) Collected: 12/01/22 0519    Specimen: Blood Updated: 12/01/22 0558     WBC 5.78 10*3/mm3      RBC 3.54 10*6/mm3      Hemoglobin 11.3 g/dL      Hematocrit 33.6 %      MCV 94.9 fL      MCH 31.9 pg      MCHC 33.6 g/dL      RDW 12.6 %      RDW-SD 42.6 fl      MPV 10.5 fL      Platelets 124 10*3/mm3      Neutrophil % 58.1 %      Lymphocyte % 27.9 %      Monocyte % 11.8 %      Eosinophil % 1.7 %      Basophil % 0.3 %      Immature Grans % 0.2 %      Neutrophils, Absolute 3.36 10*3/mm3      Lymphocytes, Absolute 1.61 10*3/mm3      Monocytes, Absolute 0.68 10*3/mm3      Eosinophils, Absolute 0.10 10*3/mm3      Basophils, Absolute 0.02 10*3/mm3      Immature Grans, Absolute 0.01 10*3/mm3      nRBC 0.0 /100 WBC     Lactic Acid, Plasma [[id number]]  (Normal) Collected: 11/30/22 2014    Specimen: Blood Updated: 11/30/22 2049     Lactate 0.6 mmol/L     Ethanol [[id number]] Collected: 11/30/22 2014    Specimen: Blood Updated: 11/30/22 2044     Ethanol <10 mg/dL      Ethanol % <0.010 %     Blood Culture - Blood, Arm, Left [[id number]] Collected: 11/30/22 2014    Specimen: Blood  from Arm, Left Updated: 11/30/22 2021    Blood Culture - Blood, Arm, Left [[id number]] Collected: 11/30/22 2018    Specimen: Blood from Arm, Left Updated: 11/30/22 2021    [place] Draw [[id number]] Collected: 11/30/22 1412    Specimen: Blood Updated: 11/30/22 1515    Narrative:      The following orders were created for panel order [place] Draw.  Procedure                               Abnormality         Status                     ---------                               -----------         ------                     Green Top (Gel)[[id number]]                                  Final result               Lavender Top[[id number]]                                     Final result               Gold Top - SST[[id number]]                                   Final result               Light Blue Top[[id number]]                                   Final result                 Please view results for these tests on the individual orders.    Lavender Top [[id number]] Collected: 11/30/22 1412    Specimen: Blood Updated: 11/30/22 1515     Extra Tube hold for add-on     Comment: Auto resulted       Light Blue Top [[id number]] Collected: 11/30/22 1412    Specimen: Blood Updated: 11/30/22 1515     Extra Tube Hold for add-ons.     Comment: Auto resulted       Green Top (Gel) [[id number]] Collected: 11/30/22 1412    Specimen: Blood Updated: 11/30/22 1515     Extra Tube Hold for add-ons.     Comment: Auto resulted.       Gold Top - SST [[id number]] Collected: 11/30/22 1412    Specimen: Blood Updated: 11/30/22 1515     Extra Tube Hold for add-ons.     Comment: Auto resulted.       Comprehensive Metabolic Panel [[id number]]  (Abnormal) Collected: 11/30/22 1412    Specimen: Blood Updated: 11/30/22 1501     Glucose 131 mg/dL      BUN 7 mg/dL      Creatinine 0.70 mg/dL      Sodium 133 mmol/L      Potassium 3.5 mmol/L      Chloride 96 mmol/L      CO2 25.5 mmol/L      Calcium 9.1 mg/dL      Total Protein 6.9 g/dL      Albumin 3.70 g/dL      ALT (SGPT) 12  U/L      AST (SGOT) 12 U/L      Alkaline Phosphatase 78 U/L      Total Bilirubin 0.4 mg/dL      Globulin 3.2 gm/dL      A/G Ratio 1.2 g/dL      BUN/Creatinine Ratio 10.0     Anion Gap 11.5 mmol/L      eGFR 112.3 mL/min/1.73      Comment: National Kidney Foundation and American Society of Nephrology (ASN) Task Force recommended calculation based on the Chronic Kidney Disease Epidemiology Collaboration (CKD-EPI) equation refit without adjustment for race.       Narrative:      GFR Normal >60  Chronic Kidney Disease <60  Kidney Failure <15      Urinalysis With Microscopic If Indicated (No Culture) - Urine, Clean Catch [[id number]]  (Abnormal) Collected: 11/30/22 1410    Specimen: Urine, Clean Catch Updated: 11/30/22 1453     Color, UA Yellow     Appearance, UA Cloudy     pH, UA 6.0     Specific Gravity, UA <=1.005     Glucose, UA Negative     Ketones, UA Negative     Bilirubin, UA Negative     Blood, UA Negative     Protein, UA Negative     Leuk Esterase, UA Negative     Nitrite, UA Negative     Urobilinogen, UA 0.2 E.U./dL    Narrative:      Urine microscopic not indicated.    Lipase [[id number]]  (Abnormal) Collected: 11/30/22 1412    Specimen: Blood Updated: 11/30/22 1443     Lipase 65 U/L     CBC & Differential [[id number]]  (Abnormal) Collected: 11/30/22 1412    Specimen: Blood Updated: 11/30/22 1437    Narrative:      The following orders were created for panel order CBC & Differential.  Procedure                               Abnormality         Status                     ---------                               -----------         ------                     CBC Auto Differential[[id number]]        Abnormal            Final result                 Please view results for these tests on the individual orders.    CBC Auto Differential [[id number]]  (Abnormal) Collected: 11/30/22 1412    Specimen: Blood Updated: 11/30/22 1437     WBC 9.19 10*3/mm3      RBC 4.14 10*6/mm3      Hemoglobin 13.3 g/dL      Hematocrit 39.8 %       MCV 96.1 fL      MCH 32.1 pg      MCHC 33.4 g/dL      RDW 12.7 %      RDW-SD 44.8 fl      MPV 10.5 fL      Platelets 122 10*3/mm3      Neutrophil % 67.1 %      Lymphocyte % 21.7 %      Monocyte % 10.0 %      Eosinophil % 0.7 %      Basophil % 0.2 %      Neutrophils, Absolute 6.17 10*3/mm3      Lymphocytes, Absolute 1.99 10*3/mm3      Monocytes, Absolute 0.92 10*3/mm3      Eosinophils, Absolute 0.06 10*3/mm3      Basophils, Absolute 0.02 10*3/mm3           Imaging Results (Last 48 Hours)     Procedure Component Value Units Date/Time    CT Abdomen Pelvis With Contrast [[id number]] Collected: 11/30/22 1906     Updated: 11/30/22 1923    Narrative:      CT ABDOMEN AND PELVIS WITH IV CONTRAST     HISTORY: Left-sided abdominal pain, history of pancreatitis and  peripancreatic fluid collection, interval aspiration of peripancreatic  fluid collection     TECHNIQUE: Radiation dose reduction techniques were utilized, including  automated exposure control and exposure modulation based on body size.  Axial images were obtained through the abdomen and pelvis after the  administration of IV contrast. Coronal and sagittal reformatted images  obtained.     COMPARISON: 10/20/2022, 10/23/2022     FINDINGS:      ABDOMEN:  Lung bases are clear. The liver and gallbladder are unremarkable. Small  perisplenic fluid collection is slightly decreased in size measuring 2.2  x 1.1 cm, previously 2.4 x 1.1 cm. Recurrent fluid collection between  the tail of the pancreas and the left kidney now measuring 3.2 x 2.6 cm.  There is increased inflammatory stranding and ill-defined fluid seen  around the tail of the pancreas, around the left kidney and extending  inferiorly in the left retroperitoneum. The left renal parenchyma is  within normal limits. The right kidney is unremarkable. Adrenal glands  are unremarkable. Stable calcifications in the pancreatic head. Stable  mildly dilated pancreatic duct.     PELVIS:  The bladder is unremarkable. No  free fluid within the pelvis. The colon  is unremarkable. The appendix is normal. Bone windows are unremarkable.       Impression:         1. Recurrent fluid collection between the tail of the pancreas and the  left kidney now measuring about 3.2 x 2.6 cm.  2. Increased inflammatory stranding and ill-defined fluid around the  tail the pancreas, around the left kidney and inferiorly within the left  retroperitoneum. This may reflect a new episode of pancreatitis.     Radiation dose reduction techniques were utilized, including automated  exposure control and exposure modulation based on body size.     This report was finalized on 11/30/2022 7:20 PM by Dr. Manny Wilder M.D.           ECG/EMG Results (last 48 hours)     ** No results found for the last 48 hours. **          Orders (last 48 hrs)      Start     Ordered    12/01/22 2100  amitriptyline (ELAVIL) tablet 10 mg  Nightly         12/01/22 0510    12/01/22 0900  folic acid (FOLVITE) tablet 1 mg  Daily         12/01/22 0510    12/01/22 0900  pantoprazole (PROTONIX) EC tablet 40 mg  Daily         12/01/22 0510    12/01/22 0900  thiamine (VITAMIN B-1) tablet 100 mg  Daily         12/01/22 0510    12/01/22 0800  Oral Care  2 Times Daily       11/30/22 2255 12/01/22 0800  pancrelipase (Lip-Prot-Amyl) (CREON) capsule 24,000 units of lipase  3 Times Daily With Meals         12/01/22 0510    12/01/22 0702  Inpatient General Surgery Consult  IN AM        Specialty:  General Surgery  Provider:  Reji House MD    11/30/22 2255 12/01/22 0600  Comprehensive Metabolic Panel  Daily       11/30/22 2255 12/01/22 0600  Lipase  Daily       11/30/22 2255 12/01/22 0600  CBC & Differential  Daily       11/30/22 2255 12/01/22 0600  CBC Auto Differential  PROCEDURE ONCE         11/30/22 2255 12/01/22 0557  Manual Differential  Once,   Status:  Canceled         12/01/22 0556    12/01/22 0005  HYDROmorphone (DILAUDID) injection 1 mg  Every 2 Hours PRN         See Hyperspace for full Linked Orders Report.    12/01/22 0005    12/01/22 0005  naloxone (NARCAN) injection 0.4 mg  Every 5 Minutes PRN        See Hyperspace for full Linked Orders Report.    12/01/22 0005    12/01/22 0000  Vital Signs  Every 4 Hours       11/30/22 2255 11/30/22 2257  sodium chloride 0.9 % flush 10 mL  Every 12 Hours Scheduled         11/30/22 2255 11/30/22 2257  sodium chloride 0.9 % infusion  Continuous         11/30/22 2255 11/30/22 2257  sennosides-docusate (PERICOLACE) 8.6-50 MG per tablet 2 tablet  2 Times Daily        See Hyperspace for full Linked Orders Report.    11/30/22 2255 11/30/22 2256  Diet: Liquid Diets; Clear Liquid; Texture: Regular Texture (IDDSI 7); Fluid Consistency: Thin (IDDSI 0)  Diet Effective Now         11/30/22 2255 11/30/22 2254  oxyCODONE-acetaminophen (PERCOCET) 5-325 MG per tablet 1 tablet  Every 4 Hours PRN         11/30/22 2255 11/30/22 2254  calcium carbonate (TUMS) chewable tablet 500 mg (200 mg elemental)  3 Times Daily PRN         11/30/22 2255 11/30/22 2254  ondansetron (ZOFRAN) tablet 4 mg  Every 6 Hours PRN        See Hyperspace for full Linked Orders Report.    11/30/22 2255 11/30/22 2254  ondansetron (ZOFRAN) injection 4 mg  Every 6 Hours PRN        See Hyperspace for full Linked Orders Report.    11/30/22 2255 11/30/22 2254  polyethylene glycol (MIRALAX) packet 17 g  Daily PRN        See Hyperspace for full Linked Orders Report.    11/30/22 2255 11/30/22 2254  bisacodyl (DULCOLAX) EC tablet 5 mg  Daily PRN        See Hyperspace for full Linked Orders Report.    11/30/22 2255 11/30/22 2254  bisacodyl (DULCOLAX) suppository 10 mg  Daily PRN        See Hyperspace for full Linked Orders Report.    11/30/22 2255 11/30/22 2254  melatonin tablet 5 mg  Nightly PRN         11/30/22 2255 11/30/22 2254  HYDROmorphone (DILAUDID) injection 1 mg  Every 2 Hours PRN,   Status:  Discontinued        See Hyperspace for full Linked  Orders Report.    11/30/22 2255 11/30/22 2254  naloxone (NARCAN) injection 0.4 mg  Every 5 Minutes PRN,   Status:  Discontinued        See Hyperspace for full Linked Orders Report.    11/30/22 2255 11/30/22 2253  acetaminophen (TYLENOL) tablet 650 mg  Every 4 Hours PRN        See Hyperspace for full Linked Orders Report.    11/30/22 2255 11/30/22 2253  acetaminophen (TYLENOL) 160 MG/5ML solution 650 mg  Every 4 Hours PRN        See Hyperspace for full Linked Orders Report.    11/30/22 2255 11/30/22 2253  acetaminophen (TYLENOL) suppository 650 mg  Every 4 Hours PRN        See Hyperspace for full Linked Orders Report.    11/30/22 2255 11/30/22 2253  Intake & Output  Every Shift       11/30/22 2255 11/30/22 2253  Weigh Patient  Once         11/30/22 2255 11/30/22 2253  Oxygen Therapy- Nasal Cannula; Titrate for SPO2: 90% - 95%  Continuous         11/30/22 2255 11/30/22 2253  Insert Peripheral IV  Once         11/30/22 2255 11/30/22 2253  Saline Lock & Maintain IV Access  Continuous         11/30/22 2255 11/30/22 2253  Code Status and Medical Interventions:  Continuous         11/30/22 2255    11/30/22 2253  Place Sequential Compression Device  Once         11/30/22 2255 11/30/22 2253  Maintain Sequential Compression Device  Continuous         11/30/22 2255 11/30/22 2253  Activity - Ad Sylwia  Until Discontinued         11/30/22 2255 11/30/22 2253  Notify Provider (With Default Parameters)  Until Discontinued         11/30/22 2255 11/30/22 2252  sodium chloride 0.9 % flush 10 mL  As Needed         11/30/22 2255 11/30/22 2252  sodium chloride 0.9 % infusion 40 mL  As Needed         11/30/22 2255 11/30/22 2033  Initiate Observation Status  Once         11/30/22 2032 11/30/22 2001  [facility] (on-call MD unless specified) Details  Once        Specialty:  Hospitalist  Provider:  Jony Arora MD    11/30/22 2000 11/30/22 2000  Ethanol  STAT         11/30/22 1959 11/30/22  1956  HYDROmorphone (DILAUDID) injection 0.5 mg  Once         11/30/22 1954 11/30/22 1953  Lactic Acid, Plasma  Once         11/30/22 1953 11/30/22 1953  Blood Culture - Blood, Arm, Left  Once         11/30/22 1953 11/30/22 1953  Blood Culture - Blood, Arm, Left  Once         11/30/22 1953 11/30/22 1858  iopamidol (ISOVUE-300) 61 % injection 100 mL  Once in Imaging         11/30/22 1857 11/30/22 1838  sodium chloride 0.9 % bolus 500 mL  Once         11/30/22 1836    11/30/22 1838  ondansetron (ZOFRAN) injection 4 mg  Once         11/30/22 1836    11/30/22 1837  morphine injection 4 mg  Once         11/30/22 1835    11/30/22 1836  CT Abdomen Pelvis With Contrast  1 Time Imaging         11/30/22 1836    11/30/22 1427  Manual Differential  Once,   Status:  Canceled         11/30/22 1426    11/30/22 1147  NPO Diet NPO Type: Strict NPO  Diet Effective Now,   Status:  Canceled         11/30/22 1146    11/30/22 1147  Undress & Gown  Once         11/30/22 1146    11/30/22 1147  Insert Peripheral IV  Once         11/30/22 1146    11/30/22 1147  [facility] Draw  Once         11/30/22 1146    11/30/22 1147  CBC & Differential  Once         11/30/22 1146    11/30/22 1147  Comprehensive Metabolic Panel  Once         11/30/22 1146    11/30/22 1147  Lipase  Once         11/30/22 1146    11/30/22 1147  Urinalysis With Microscopic If Indicated (No Culture) - Urine, Clean Catch  Once         11/30/22 1146    11/30/22 1147  Green Top (Gel)  PROCEDURE ONCE         11/30/22 1147    11/30/22 1147  Lavender Top  PROCEDURE ONCE         11/30/22 1147    11/30/22 1147  Gold Top - SST  PROCEDURE ONCE         11/30/22 1147    11/30/22 1147  Light Blue Top  PROCEDURE ONCE         11/30/22 1147    11/30/22 1147  CBC Auto Differential  PROCEDURE ONCE         11/30/22 1147    11/30/22 1146  sodium chloride 0.9 % flush 10 mL  As Needed         11/30/22 1146    --  Black Cohosh 540 MG capsule  2 Times Daily         11/30/22 0576                      Consult Notes (last 48 hours)      Reji House MD at 12/01/22 0944      Consult Orders    1. Inpatient General Surgery Consult [[id number]] ordered by Jony Arora MD at 11/30/22 5015               General Surgery H&P/Consultation      Impression/Plan: 42-year-old lady with acute on chronic pancreatitis with likely pseudocyst.  Agree with no current indication for antibiotics based on CT scan.  I have advised against drainage in the current setting given small size of the pseudocyst, active inflammation from recurrent pancreatitis, and risk of infection introduction with repetitive drainage.    Agree with supportive care of pancreatitis with IV fluids, clear liquid diet, pain control.  Possible diet advancement tomorrow.  Nutrition consult for counseling on low-fat diet.  I advised her that she needs to completely abstain from alcohol use.    CC: Abdominal pain    HPI:   Miss Piper Cain is a 42 y.o. female that presented to the hospital with left upper quadrant, epigastric, right upper quadrant abdominal pain.  She initially experience worsening flank pain in August and underwent drainage of a peripancreatic fluid collection.  Results of that drainage procedure showed benign fluid with no evidence of bacteria.  This was repeated on 10/23/2022 with an aspiration of the fluid collection.  No evidence of infection was noted and the fluid was benign in appearance at that time.  She has had worsening pain over the past week and also describes fevers and chills.  She presented to the emergency room.  She did not have a leukocytosis and repeat CT abdomen pelvis showed persistence of the peripancreatic fluid collection with near resolution of the perisplenic fluid collection which was previously noted.  No evidence of necrosis or infection but there was significant inflammation in the retroperitoneum likely due to recurrent acute on chronic pancreatitis.    Past Medical History:   Past Medical  History:   Diagnosis Date   • Anxiety    • E. coli bacteremia    • ETOH abuse    • GERD (gastroesophageal reflux disease)    • Hiatal hernia    • Left flank pain 10/21/2022   • Migraine    • Miscarriage 2004   • Pancreatitis        Past Surgical History:   Past Surgical History:   Procedure Laterality Date   • ENDOSCOPY N/A 8/10/2022    Procedure: ESOPHAGOGASTRODUODENOSCOPY with bxs;  Surgeon: Salvador Price MD;  Location: [facility] ENDOSCOPY;  Service: Gastroenterology;  Laterality: N/A;  Pre: r/o esophageal  varacies, abd pain  Post: esophageal plaque       Medications:  Medications Prior to Admission   Medication Sig Dispense Refill Last Dose   • amitriptyline (ELAVIL) 10 MG tablet Take 10 mg by mouth Every Night.   11/29/2022   • Black Cohosh 540 MG capsule Take 1 capsule by mouth 2 (Two) Times a Day.      • Cholecalciferol (VITAMIN D3) 5000 units capsule capsule Take 5,000 Units by mouth Daily.      • Ferrous Sulfate Dried (Feosol) 200 (65 Fe) MG tablet tablet Take 200 mg by mouth Daily.      • folic acid (FOLVITE) 1 MG tablet Take 1 tablet by mouth Daily. 30 tablet 3    • ondansetron (ZOFRAN) 4 MG tablet Take 4 mg by mouth Every 8 (Eight) Hours As Needed for Nausea or Vomiting.      • oxyCODONE-acetaminophen (PERCOCET)  MG per tablet Take 1 tablet by mouth Every 6 (Six) Hours As Needed for Moderate Pain . 10 tablet 0    • pancrelipase, Lip-Prot-Amyl, (CREON) 43051-93225 units capsule delayed-release particles capsule Take 72,000 units of lipase by mouth 3 (Three) Times a Day With Meals.      • pantoprazole (PROTONIX) 40 MG EC tablet Take 1 tablet by mouth Daily. 30 tablet 3    • thiamine (VITAMIN B-1) 100 MG tablet  tablet Take 100 mg by mouth Daily.          Allergies:   Allergies   Allergen Reactions   • Nickel    • Hydrocodone-Acetaminophen Nausea And Vomiting       Social History:   Social History     Socioeconomic History   • Marital status: Single   Tobacco Use   • Smoking status: Every Day      Packs/day: 0.50     Types: Cigarettes     Start date: 1/28/1996   • Smokeless tobacco: Current   Vaping Use   • Vaping Use: Never used   Substance and Sexual Activity   • Alcohol use: Not Currently     Comment: two days ago   • Drug use: No   • Sexual activity: Yes     Partners: Male     Birth control/protection: None     Comment: IUD removed 6/29/2017       Family History:   Family History   Problem Relation Age of Onset   • Alcohol abuse Mother    • Arthritis Mother    • Asthma Mother    • Miscarriages / Stillbirths Mother    • Cancer Father    • Diabetes Father    • Drug abuse Father    • Early death Father    • Heart disease Father    • Hyperlipidemia Father    • Hypertension Father    • Alcohol abuse Paternal Aunt    • Cancer Paternal Aunt    • Diabetes Paternal Aunt    • Drug abuse Paternal Aunt    • Early death Paternal Aunt    • Heart disease Paternal Aunt    • Hyperlipidemia Paternal Aunt    • Hypertension Paternal Aunt    • Alcohol abuse Maternal Grandmother    • Alcohol abuse Maternal Grandfather    • Alcohol abuse Paternal Grandmother    • Cancer Paternal Grandmother    • Diabetes Paternal Grandmother    • Drug abuse Paternal Grandmother    • Early death Paternal Grandmother    • Heart disease Paternal Grandmother    • Hyperlipidemia Paternal Grandmother    • Hypertension Paternal Grandmother    • Alcohol abuse Paternal Grandfather        Review of Systems:  A comprehensive review of systems was negative except for the following positives: Abdominal pain    Physical Exam:   Vitals:    12/01/22 0801   BP: 99/65   Pulse: 78   Resp: 18   Temp:    SpO2: 97%     BMI: Body mass index is 19.37 kg/m².   GENERAL: no acute distress, awake and alert  HEENT: normocephalic, atraumatic, no scleral icterus, moist mucous membranes  NECK: Supple, there is no thyromegaly or lymphadenopathy  RESPIRATORY: symmetric excursion bilaterally, normal work of breathing, no wheezes  CARDIOVASCULAR: regular rate, well  perfused  GASTROINTESTINAL: Soft, tender to palpation along the left flank, no rebound or guarding of the abdomen  MUSCULOSKELETAL: no cyanosis, clubbing, or edema  NEUROLOGIC: alert and oriented, normal speech, cranial nerves 2-12 grossly intact, no focal deficits  SKIN: Moist, warm, no rashes, no jaundice      Pertinent labs:   Results from last 7 days   Lab Units 12/01/22  0519 11/30/22  1412 11/28/22  1751   WBC 10*3/mm3 5.78 9.19 8.77   HEMOGLOBIN g/dL 11.3* 13.3 14.6   HEMATOCRIT % 33.6* 39.8 43.7   PLATELETS 10*3/mm3 124* 122* 140     Results from last 7 days   Lab Units 12/01/22  0519 11/30/22  1412 11/28/22  1751   SODIUM mmol/L 138 133* 138   POTASSIUM mmol/L 3.9 3.5 4.2   CHLORIDE mmol/L 101 96* 101   CO2 mmol/L 30.8* 25.5 24.3   BUN mg/dL 4* 7 6   CREATININE mg/dL 0.61 0.70 0.86   CALCIUM mg/dL 8.5* 9.1 9.0   BILIRUBIN mg/dL 0.3 0.4 0.3   ALK PHOS U/L 63 78 91   ALT (SGPT) U/L 13 12 19   AST (SGOT) U/L 8 12 18   GLUCOSE mg/dL 102* 131* 86       IMAGING:  CT abdomen pelvis reviewed showing persistent peripancreatic fluid collection which is most likely a pseudocyst.  There is inflammation along the tail of the pancreas into the retroperitoneum most consistent with acute pancreatitis.          Reji House MD  General and Endoscopic Surgery  [facility]    [address] [place] [address], [address]  [place] [address]  P: [phone]  F: [phone]       Electronically signed by Reji House MD at 12/01/22 6835

## 2022-12-01 NOTE — CONSULTS
"Nutrition Services    Patient Name:  Piper Cain  YOB: 1980  MRN: [id number]  Admit Date:  11/30/2022      CLINICAL NUTRITION ASSESSMENT     Comments:     42yoF with chronic pancreatitis and pseudocyst presents to ED with L-sided abdominal pain. CT abdomen/pelvis (11/30) with persistent peripancreatic fluid collection, most likely pseudocyst per notes.     Received consult for nutrition education for low-fat diet and diet for pancreatitis. RD spoke with pt at bedside who reported taking 2 capsules (36,000 mL) pancrelipase in the middle of each meal at home and decreasing fat, ETOH and sugar intake PTA. RD provided pt with nutrition handout from [facility] for pancreatitis nutrition therapy and encouraged pt to reach out with any questions. RD will add Boost Breeze TID with meals for additional nutrition support. Last documented BM on 11/29 per I/Os. RD will continue to follow course.     Encounter Information         Reason for Encounter Diet education for pancreatitis    Admitting Diagnosis L-sided abdominal pain    Pertinent Medical History L flank pain (10/2022), pancreatitis, ETOH abuse    Current Issues Recurrent acute on chronic pancreatitis  Pseudocyst  L-sided abdominal pain     Current Nutrition Orders & Evaluation of Intake       Oral Nutrition     Current PO Diet Diet: Liquid Diets; Clear Liquid; Texture: Regular Texture (IDDSI 7); Fluid Consistency: Thin (IDDSI 0)   Supplement Add Boost Breeze TID with meals   PO Evaluation     Trending % PO Intake    --  Anthropometrics          Height    Weight Height: 177.8 cm (70\")  Weight: 61.2 kg (135 lb) (11/30/22 1743)    BMI kg/m2 Body mass index is 19.37 kg/m².    Weight Trend/Change Stable    Weight History  Wt Readings from Last 15 Encounters:   11/30/22 1743 61.2 kg (135 lb)   10/21/22 0009 61.2 kg (135 lb)   08/08/22 0922 55.8 kg (123 lb)   08/06/22 2021 55.8 kg (123 lb)   03/05/18 1124 58.1 kg (128 lb)   01/24/18 0456 59 kg (130 lb) "   09/27/17 2341 49.9 kg (110 lb)      --  Labs        Pertinent Labs Reviewed, listed below     Results from last 7 days   Lab Units 12/01/22  0519 11/30/22  1412 11/28/22  1751   SODIUM mmol/L 138 133* 138   POTASSIUM mmol/L 3.9 3.5 4.2   CHLORIDE mmol/L 101 96* 101   CO2 mmol/L 30.8* 25.5 24.3   BUN mg/dL 4* 7 6   CREATININE mg/dL 0.61 0.70 0.86   CALCIUM mg/dL 8.5* 9.1 9.0   BILIRUBIN mg/dL 0.3 0.4 0.3   ALK PHOS U/L 63 78 91   ALT (SGPT) U/L 13 12 19   AST (SGOT) U/L 8 12 18   GLUCOSE mg/dL 102* 131* 86     Results from last 7 days   Lab Units 12/01/22  0519   HEMOGLOBIN g/dL 11.3*   HEMATOCRIT % 33.6*   WBC 10*3/mm3 5.78   ALBUMIN g/dL 3.40*     Results from last 7 days   Lab Units 12/01/22 0519 11/30/22  1412 11/28/22  1751   PLATELETS 10*3/mm3 124* 122* 140     COVID19   Date Value Ref Range Status   08/07/2022 Not Detected Not Detected - Ref. Range Final     No results found for: HGBA1C       Medications            Scheduled Medications amitriptyline, 10 mg, Oral, Nightly  folic acid, 1 mg, Oral, Daily  pancrelipase (Lip-Prot-Amyl), 24,000 units of lipase, Oral, TID With Meals  pantoprazole, 40 mg, Oral, Daily  senna-docusate sodium, 2 tablet, Oral, BID  sodium chloride, 10 mL, Intravenous, Q12H  thiamine, 100 mg, Oral, Daily        Infusions sodium chloride, 125 mL/hr, Last Rate: 125 mL/hr (12/01/22 1523)        PRN Medications •  acetaminophen **OR** acetaminophen **OR** acetaminophen  •  senna-docusate sodium **AND** polyethylene glycol **AND** bisacodyl **AND** bisacodyl  •  calcium carbonate  •  HYDROmorphone **AND** naloxone  •  melatonin  •  ondansetron **OR** ondansetron  •  oxyCODONE-acetaminophen  •  sodium chloride  •  sodium chloride  •  sodium chloride     Physical Findings         Skin, GI, Oral, LDA Abdominal pain, on room air    NFPE Not applicable at this time   --  PES STATEMENT / NUTRITION DIAGNOSIS      Nutrition Dx Problem  Problem: Altered GI Function  Etiology: Medical Diagnosis -  acute on chronic pancreatitis  Signs/Symptoms: Clear Liquid Diet and Report/Observation    Comment:      NUTRITION INTERVENTION / PLAN OF CARE  Intervention Goal        Intervention Goal(s) Provide information, Meet estimated needs, Disease management/therapy, Establish PO intake, Tolerate PO  and Maintain weight     Nutrition Intervention        RD Action Supplement provided, Encourage intake, Follow Tx Progress and Care plan reviewed     Nutrition Prescription          Diet      Supplement/Snack Boost Breeze TID with meals   EN/PN      Prescription Ordered Yes     Monitor/Evaluation        Monitor Per protocol   Discharge Needs Pending clinical course   Education Other:Pancreatitis nutrition therapy           Education topic: Fat, GI disease, Low fat     Resources given:  Printed materials, Sample menus     Advised regarding: Beverage choices, Eating pattern, Food choices, Food preparation, Food shopping, Label reading, Snacks, Supplement use     Learner:  Patient     Readiness to learn: Accepting     RD contact info provided: Yes     Outpatient referral: None at this time.      RD to follow up per protocol.    Electronically signed by:  Zari Romano RD  12/01/22 15:29 EST

## 2022-12-01 NOTE — CASE MANAGEMENT/SOCIAL WORK
Discharge Planning Assessment  [facility]     Patient Name: Piper Cain  MRN: [id number]  Today's Date: 12/1/2022    Admit Date: 11/30/2022    Plan: Home, family to transport   Discharge Needs Assessment     Row Name 12/01/22 1340       Living Environment    People in Home significant other    Current Living Arrangements home    Primary Care Provided by self    Provides Primary Care For no one    Family Caregiver if Needed significant other    Able to Return to Prior Arrangements yes       Resource/Environmental Concerns    Resource/Environmental Concerns none       Transition Planning    Patient/Family Anticipates Transition to home with family    Patient/Family Anticipated Services at Transition none    Transportation Anticipated family or friend will provide       Discharge Needs Assessment    Equipment Currently Used at Home none    Concerns to be Addressed denies needs/concerns at this time;no discharge needs identified    Equipment Needed After Discharge none               Discharge Plan     Row Name 12/01/22 6185       Plan    Plan Home, family to transport    Patient/Family in Agreement with Plan yes    Plan Comments CCP spoke with patient at bedside; explained role, verified facesheet, and discussed dc plan. Patient uses no DME and is independent for mobility. She reports no trouble with ambulation. She denies any need for DME or HH at this time. Family can transport. At this time, plan is home. SHIREEN, ONELW              Continued Care and Services - Admitted Since 11/30/2022    Coordination has not been started for this encounter.          Demographic Summary     Row Name 12/01/22 1332       General Information    Admission Type observation    Arrived From home    Referral Source admission list    Reason for Consult discharge planning    Preferred Language English               Functional Status     Row Name 12/01/22 1340       Functional Status    Usual Activity Tolerance excellent    Current Activity  Tolerance excellent       Functional Status, IADL    Medications independent    Meal Preparation independent    Housekeeping independent    Laundry independent    Shopping independent       Mental Status    General Appearance WDL WDL               Psychosocial    No documentation.                Abuse/Neglect    No documentation.                Legal    No documentation.                Substance Abuse    No documentation.                Patient Forms    No documentation.                   ESTIVEN Fagan

## 2022-12-02 LAB
ALBUMIN SERPL-MCNC: 3 G/DL (ref 3.5–5.2)
ALBUMIN/GLOB SERPL: 1.1 G/DL
ALP SERPL-CCNC: 54 U/L (ref 39–117)
ALT SERPL W P-5'-P-CCNC: 9 U/L (ref 1–33)
ANION GAP SERPL CALCULATED.3IONS-SCNC: 8.4 MMOL/L (ref 5–15)
AST SERPL-CCNC: 7 U/L (ref 1–32)
BASOPHILS # BLD AUTO: 0.02 10*3/MM3 (ref 0–0.2)
BASOPHILS NFR BLD AUTO: 0.4 % (ref 0–1.5)
BILIRUB SERPL-MCNC: 0.3 MG/DL (ref 0–1.2)
BUN SERPL-MCNC: 2 MG/DL (ref 6–20)
BUN/CREAT SERPL: 4.8 (ref 7–25)
CALCIUM SPEC-SCNC: 8.7 MG/DL (ref 8.6–10.5)
CHLORIDE SERPL-SCNC: 106 MMOL/L (ref 98–107)
CO2 SERPL-SCNC: 26.6 MMOL/L (ref 22–29)
CREAT SERPL-MCNC: 0.42 MG/DL (ref 0.57–1)
DEPRECATED RDW RBC AUTO: 44.7 FL (ref 37–54)
EGFRCR SERPLBLD CKD-EPI 2021: 127 ML/MIN/1.73
EOSINOPHIL # BLD AUTO: 0.1 10*3/MM3 (ref 0–0.4)
EOSINOPHIL NFR BLD AUTO: 2.2 % (ref 0.3–6.2)
ERYTHROCYTE [DISTWIDTH] IN BLOOD BY AUTOMATED COUNT: 12.5 % (ref 12.3–15.4)
GLOBULIN UR ELPH-MCNC: 2.7 GM/DL
GLUCOSE SERPL-MCNC: 88 MG/DL (ref 65–99)
HCT VFR BLD AUTO: 32.1 % (ref 34–46.6)
HGB BLD-MCNC: 10.8 G/DL (ref 12–15.9)
IMM GRANULOCYTES # BLD AUTO: 0.01 10*3/MM3 (ref 0–0.05)
IMM GRANULOCYTES NFR BLD AUTO: 0.2 % (ref 0–0.5)
LIPASE SERPL-CCNC: 50 U/L (ref 13–60)
LYMPHOCYTES # BLD AUTO: 1.68 10*3/MM3 (ref 0.7–3.1)
LYMPHOCYTES NFR BLD AUTO: 36.1 % (ref 19.6–45.3)
MCH RBC QN AUTO: 32.8 PG (ref 26.6–33)
MCHC RBC AUTO-ENTMCNC: 33.6 G/DL (ref 31.5–35.7)
MCV RBC AUTO: 97.6 FL (ref 79–97)
MONOCYTES # BLD AUTO: 0.55 10*3/MM3 (ref 0.1–0.9)
MONOCYTES NFR BLD AUTO: 11.8 % (ref 5–12)
NEUTROPHILS NFR BLD AUTO: 2.29 10*3/MM3 (ref 1.7–7)
NEUTROPHILS NFR BLD AUTO: 49.3 % (ref 42.7–76)
NRBC BLD AUTO-RTO: 0 /100 WBC (ref 0–0.2)
PLATELET # BLD AUTO: 127 10*3/MM3 (ref 140–450)
PMV BLD AUTO: 9.8 FL (ref 6–12)
POTASSIUM SERPL-SCNC: 4 MMOL/L (ref 3.5–5.2)
PROT SERPL-MCNC: 5.7 G/DL (ref 6–8.5)
RBC # BLD AUTO: 3.29 10*6/MM3 (ref 3.77–5.28)
SODIUM SERPL-SCNC: 141 MMOL/L (ref 136–145)
WBC NRBC COR # BLD: 4.65 10*3/MM3 (ref 3.4–10.8)

## 2022-12-02 PROCEDURE — 85025 COMPLETE CBC W/AUTO DIFF WBC: CPT | Performed by: INTERNAL MEDICINE

## 2022-12-02 PROCEDURE — 99214 OFFICE O/P EST MOD 30 MIN: CPT | Performed by: INTERNAL MEDICINE

## 2022-12-02 PROCEDURE — 99232 SBSQ HOSP IP/OBS MODERATE 35: CPT | Performed by: SURGERY

## 2022-12-02 PROCEDURE — G0378 HOSPITAL OBSERVATION PER HR: HCPCS

## 2022-12-02 PROCEDURE — 36415 COLL VENOUS BLD VENIPUNCTURE: CPT | Performed by: INTERNAL MEDICINE

## 2022-12-02 PROCEDURE — 83690 ASSAY OF LIPASE: CPT | Performed by: INTERNAL MEDICINE

## 2022-12-02 PROCEDURE — 25010000002 HYDROMORPHONE PER 4 MG: Performed by: INTERNAL MEDICINE

## 2022-12-02 PROCEDURE — 80053 COMPREHEN METABOLIC PANEL: CPT | Performed by: INTERNAL MEDICINE

## 2022-12-02 PROCEDURE — 25010000002 ONDANSETRON PER 1 MG: Performed by: INTERNAL MEDICINE

## 2022-12-02 RX ORDER — POLYETHYLENE GLYCOL 3350 17 G/17G
17 POWDER, FOR SOLUTION ORAL DAILY
Status: DISCONTINUED | OUTPATIENT
Start: 2022-12-03 | End: 2022-12-05 | Stop reason: HOSPADM

## 2022-12-02 RX ADMIN — HYDROMORPHONE HYDROCHLORIDE 1 MG: 1 INJECTION, SOLUTION INTRAMUSCULAR; INTRAVENOUS; SUBCUTANEOUS at 19:01

## 2022-12-02 RX ADMIN — FOLIC ACID 1 MG: 1 TABLET ORAL at 09:46

## 2022-12-02 RX ADMIN — HYDROMORPHONE HYDROCHLORIDE 1 MG: 1 INJECTION, SOLUTION INTRAMUSCULAR; INTRAVENOUS; SUBCUTANEOUS at 21:47

## 2022-12-02 RX ADMIN — HYDROMORPHONE HYDROCHLORIDE 1 MG: 1 INJECTION, SOLUTION INTRAMUSCULAR; INTRAVENOUS; SUBCUTANEOUS at 04:55

## 2022-12-02 RX ADMIN — HYDROMORPHONE HYDROCHLORIDE 1 MG: 1 INJECTION, SOLUTION INTRAMUSCULAR; INTRAVENOUS; SUBCUTANEOUS at 11:37

## 2022-12-02 RX ADMIN — ONDANSETRON 4 MG: 2 INJECTION INTRAMUSCULAR; INTRAVENOUS at 11:37

## 2022-12-02 RX ADMIN — OXYCODONE AND ACETAMINOPHEN 1 TABLET: 5; 325 TABLET ORAL at 09:46

## 2022-12-02 RX ADMIN — DOCUSATE SODIUM 50MG AND SENNOSIDES 8.6MG 2 TABLET: 8.6; 5 TABLET, FILM COATED ORAL at 20:30

## 2022-12-02 RX ADMIN — HYDROMORPHONE HYDROCHLORIDE 1 MG: 1 INJECTION, SOLUTION INTRAMUSCULAR; INTRAVENOUS; SUBCUTANEOUS at 01:49

## 2022-12-02 RX ADMIN — HYDROMORPHONE HYDROCHLORIDE 1 MG: 1 INJECTION, SOLUTION INTRAMUSCULAR; INTRAVENOUS; SUBCUTANEOUS at 07:48

## 2022-12-02 RX ADMIN — PANTOPRAZOLE SODIUM 40 MG: 40 TABLET, DELAYED RELEASE ORAL at 09:46

## 2022-12-02 RX ADMIN — Medication 100 MG: at 09:46

## 2022-12-02 RX ADMIN — PANCRELIPASE 72000 UNITS OF LIPASE: 36000; 180000; 114000 CAPSULE, DELAYED RELEASE PELLETS ORAL at 11:37

## 2022-12-02 RX ADMIN — SODIUM CHLORIDE 125 ML/HR: 9 INJECTION, SOLUTION INTRAVENOUS at 07:28

## 2022-12-02 RX ADMIN — SODIUM CHLORIDE 125 ML/HR: 9 INJECTION, SOLUTION INTRAVENOUS at 15:27

## 2022-12-02 RX ADMIN — Medication 10 ML: at 09:46

## 2022-12-02 RX ADMIN — POLYETHYLENE GLYCOL 3350 17 G: 17 POWDER, FOR SOLUTION ORAL at 11:37

## 2022-12-02 RX ADMIN — OXYCODONE AND ACETAMINOPHEN 1 TABLET: 5; 325 TABLET ORAL at 20:35

## 2022-12-02 RX ADMIN — AMITRIPTYLINE HYDROCHLORIDE 10 MG: 10 TABLET, FILM COATED ORAL at 20:29

## 2022-12-02 RX ADMIN — SODIUM CHLORIDE 125 ML/HR: 9 INJECTION, SOLUTION INTRAVENOUS at 23:43

## 2022-12-02 RX ADMIN — DOCUSATE SODIUM 50MG AND SENNOSIDES 8.6MG 2 TABLET: 8.6; 5 TABLET, FILM COATED ORAL at 09:45

## 2022-12-02 RX ADMIN — PANCRELIPASE 72000 UNITS OF LIPASE: 36000; 180000; 114000 CAPSULE, DELAYED RELEASE PELLETS ORAL at 17:03

## 2022-12-02 RX ADMIN — HYDROMORPHONE HYDROCHLORIDE 1 MG: 1 INJECTION, SOLUTION INTRAMUSCULAR; INTRAVENOUS; SUBCUTANEOUS at 15:27

## 2022-12-02 RX ADMIN — ONDANSETRON 4 MG: 2 INJECTION INTRAMUSCULAR; INTRAVENOUS at 19:01

## 2022-12-02 NOTE — CONSULTS
Chief Complaint   Patient presents with   • Abdominal Pain       Piper Cain is a 42 y.o. female who presents with abdominal pain    HPI  Mrs. Cain is a 42 yoF w h/o chronic pancreatitis, EtOH abuse, smoker who presents with upper abdominal pain.  She has /o of drainage of peripancreatic fluid collection and this was repeated 10/23 with aspiration of fluid collection with no evidence of infection.  She has had worsening pain over the past week with chills.  She denies drinking EtOH before reporting she had a beer a week ago.  EtOH negative on admission.  She continues to smoke.  She reports eating some solid food that did not worsen her pain.  She reports pain in her left side of the abdomen.  Some nausea with no emesis.  She reports she has not had a BM in about 4 days.   She had CT abd/pelvis w contrast that showed unremarkable GB and liver, small perisplenic fluid measuring 2.2 x 1.1 cm and recurrent fluid collection between tail of pancreas and spleen measuring 3.2 x 2.6 cm with increased stranding around tail of pancreas extending into left retroperitoneum, stable calcifications of HOP and mildly dilated PD.   WBC normal with Hb 11 / Plt low in 120s.   LFTs and tbili normal.  Lipase also normal.   Past Medical History:   Diagnosis Date   • Anxiety    • E. coli bacteremia    • ETOH abuse    • GERD (gastroesophageal reflux disease)    • Hiatal hernia    • Left flank pain 10/21/2022   • Migraine    • Miscarriage 2004   • Pancreatitis        Past Surgical History:   Procedure Laterality Date   • ENDOSCOPY N/A 8/10/2022    Procedure: ESOPHAGOGASTRODUODENOSCOPY with bxs;  Surgeon: Salvador Price MD;  Location: [facility] ENDOSCOPY;  Service: Gastroenterology;  Laterality: N/A;  Pre: r/o esophageal  varacies, abd pain  Post: esophageal plaque         Current Facility-Administered Medications:   •  acetaminophen (TYLENOL) tablet 650 mg, 650 mg, Oral, Q4H PRN **OR** acetaminophen (TYLENOL) 160 MG/5ML solution 650 mg,  650 mg, Oral, Q4H PRN **OR** acetaminophen (TYLENOL) suppository 650 mg, 650 mg, Rectal, Q4H PRN, Jony Arora MD  •  amitriptyline (ELAVIL) tablet 10 mg, 10 mg, Oral, Nightly, Jony Arora MD, 10 mg at 12/01/22 2045  •  sennosides-docusate (PERICOLACE) 8.6-50 MG per tablet 2 tablet, 2 tablet, Oral, BID, 2 tablet at 12/01/22 2040 **AND** polyethylene glycol (MIRALAX) packet 17 g, 17 g, Oral, Daily PRN **AND** bisacodyl (DULCOLAX) EC tablet 5 mg, 5 mg, Oral, Daily PRN **AND** bisacodyl (DULCOLAX) suppository 10 mg, 10 mg, Rectal, Daily PRN, Jony Arora MD  •  calcium carbonate (TUMS) chewable tablet 500 mg (200 mg elemental), 2 tablet, Oral, TID PRN, Jony Arora MD  •  folic acid (FOLVITE) tablet 1 mg, 1 mg, Oral, Daily, Jony Arora MD, 1 mg at 12/01/22 0826  •  HYDROmorphone (DILAUDID) injection 1 mg, 1 mg, Intravenous, Q2H PRN, 1 mg at 12/02/22 0748 **AND** naloxone (NARCAN) injection 0.4 mg, 0.4 mg, Intravenous, Q5 Min PRN, Jony Arora MD  •  melatonin tablet 5 mg, 5 mg, Oral, Nightly PRN, Jony Arora MD  •  nicotine (NICODERM CQ) 14 MG/24HR patch 1 patch, 1 patch, Transdermal, Q24H, Evelin Weiss APRN, 1 patch at 12/01/22 2330  •  ondansetron (ZOFRAN) tablet 4 mg, 4 mg, Oral, Q6H PRN **OR** ondansetron (ZOFRAN) injection 4 mg, 4 mg, Intravenous, Q6H PRN, Jony Arora MD, 4 mg at 12/01/22 0019  •  oxyCODONE-acetaminophen (PERCOCET) 5-325 MG per tablet 1 tablet, 1 tablet, Oral, Q4H PRN, Jony Arora MD, 1 tablet at 12/01/22 1833  •  Pancrelipase (Lip-Prot-Amyl) (CREON) capsule 72,000 units of lipase, 72,000 units of lipase, Oral, TID With Meals, Faisal Baird MD  •  pantoprazole (PROTONIX) EC tablet 40 mg, 40 mg, Oral, Daily, Jony Arora MD, 40 mg at 12/01/22 0837  •  sodium chloride 0.9 % flush 10 mL, 10 mL, Intravenous, PRN, Danish Barrera MD  •  sodium chloride 0.9 % flush 10 mL, 10 mL, Intravenous, Q12H, Jony Arora MD, 10 mL  at 12/01/22 2040  •  sodium chloride 0.9 % flush 10 mL, 10 mL, Intravenous, PRN, Jony Arora MD  •  sodium chloride 0.9 % infusion 40 mL, 40 mL, Intravenous, PRN, Jony Arora MD  •  sodium chloride 0.9 % infusion, 125 mL/hr, Intravenous, Continuous, Jony Arora MD, Last Rate: 125 mL/hr at 12/02/22 0728, 125 mL/hr at 12/02/22 0728  •  thiamine (VITAMIN B-1) tablet 100 mg, 100 mg, Oral, Daily, Jony Arora MD, 100 mg at 12/01/22 0826    Allergies   Allergen Reactions   • Nickel    • Hydrocodone-Acetaminophen Nausea And Vomiting       Social History     Socioeconomic History   • Marital status: Single   Tobacco Use   • Smoking status: Every Day     Packs/day: 0.50     Types: Cigarettes     Start date: 1/28/1996   • Smokeless tobacco: Current   Vaping Use   • Vaping Use: Never used   Substance and Sexual Activity   • Alcohol use: Not Currently     Comment: two days ago   • Drug use: No   • Sexual activity: Yes     Partners: Male     Birth control/protection: None     Comment: IUD removed 6/29/2017       Family History   Problem Relation Age of Onset   • Alcohol abuse Mother    • Arthritis Mother    • Asthma Mother    • Miscarriages / Stillbirths Mother    • Cancer Father    • Diabetes Father    • Drug abuse Father    • Early death Father    • Heart disease Father    • Hyperlipidemia Father    • Hypertension Father    • Alcohol abuse Paternal Aunt    • Cancer Paternal Aunt    • Diabetes Paternal Aunt    • Drug abuse Paternal Aunt    • Early death Paternal Aunt    • Heart disease Paternal Aunt    • Hyperlipidemia Paternal Aunt    • Hypertension Paternal Aunt    • Alcohol abuse Maternal Grandmother    • Alcohol abuse Maternal Grandfather    • Alcohol abuse Paternal Grandmother    • Cancer Paternal Grandmother    • Diabetes Paternal Grandmother    • Drug abuse Paternal Grandmother    • Early death Paternal Grandmother    • Heart disease Paternal Grandmother    • Hyperlipidemia Paternal Grandmother     • Hypertension Paternal Grandmother    • Alcohol abuse Paternal Grandfather        Review of Systems   Constitutional: Negative for chills and fever.   Respiratory: Negative for cough and shortness of breath.    Gastrointestinal: Positive for abdominal pain and nausea. Negative for abdominal distention and vomiting.   Skin: Negative for color change and rash.   All other systems reviewed and are negative.      Vitals:    12/02/22 0554   BP: 101/67   Pulse: 86   Resp: 16   Temp: 98.2 °F (36.8 °C)   SpO2: 91%       Physical Exam   General: patient awake, alert and cooperative   Eyes: Normal lids and lashes, no scleral icterus   Neck: supple, normal ROM   Skin: warm and dry, not jaundiced   Cardiovascular: regular rate,  well perfused extremities   Pulm: Equal expansion bilaterally, no increased WOB   Abdomen: soft, ttp left quadrant, nondistended;    Extremities: no rash or edema              Neuro: A&O, no obvious sign of focal deficit   Psychiatric: Normal mood and behavior; memory intact    CT Abdomen Pelvis With Contrast    Result Date: 11/30/2022   1. Recurrent fluid collection between the tail of the pancreas and the left kidney now measuring about 3.2 x 2.6 cm. 2. Increased inflammatory stranding and ill-defined fluid around the tail the pancreas, around the left kidney and inferiorly within the left retroperitoneum. This may reflect a new episode of pancreatitis.  Radiation dose reduction techniques were utilized, including automated exposure control and exposure modulation based on body size.  This report was finalized on 11/30/2022 7:20 PM by Dr. Manny Wilder M.D.        Impression:  1. Abdominal Pain  2. Acute on Chronic Pancreatitis with Perpancreatic Fluid Collections  3. EtOH Abuse  4. Tobacco Abuse  5. Anemia of Chronic Disease  6. GERD    Plan:  - No current indication for repeat aspiration of peripancreatic fluid collection at this time  - Continue supportive care with pain control and  anti-emetics  - Continue Creon with meals  - Continue Elavil nightly, have room to titrate if needed   - Continue PPI  - Advance diet as tolerated   - Bowel regimen with daily miralax, doc-senna   - Strongly recommend EtOH and smoking cessation  - Follow up with her primary gastroenterologist after discharge ([facility])    Salvador Price MD

## 2022-12-02 NOTE — PLAN OF CARE
Goal Outcome Evaluation:  Plan of Care Reviewed With: patient        Progress: no change  Outcome Evaluation: c/o Left sided abdominal pain.  IV dilaudid given several times.  up ad valeria.  tolerated clear liquid.  Gastroenterologist to see her today.  labs ordered

## 2022-12-02 NOTE — PROGRESS NOTES
Name: Piper Cain ADMIT: 2022   : 1980  PCP: Rachell Pozo APRN    MRN: [id number] LOS: 0 days   AGE/SEX: 42 y.o. female  ROOM: [facility]     Subjective   Subjective   Patient seen at bedside.       Objective   Objective   Vital Signs  Temp:  [97.2 °F (36.2 °C)-98.7 °F (37.1 °C)] 98.7 °F (37.1 °C)  Heart Rate:  [71-86] 82  Resp:  [16] 16  BP: (101-118)/(67-79) 118/79  SpO2:  [91 %-100 %] 97 %  on   ;   Device (Oxygen Therapy): room air  Body mass index is 19.37 kg/m².  Physical Exam   Physical Exam   General Appearance:    Alert, cooperative, no distress, appears stated age.  Head:     Normocephalic, without obvious abnormality, atraumatic  Eyes:     PERRL, conjunctiva/corneas clear, EOM's intact, both eyes  Ears:      Normal external ear canals, both ears  Nose:     Nares normal, septum midline, mucosa normal, no drainage    or sinus tenderness  Throat:   Lips, tongue, gums normal; oral mucosa pink and moist  Neck:     Supple, symmetrical, trachea midline, no adenopathy;     thyroid:  no enlargement/tenderness/nodules; no carotid    bruit or JVD  Back:     Symmetric, no curvature, ROM normal, no CVA tenderness  Lungs:               Clear to auscultation bilaterally, respirations unlabored  Chest Wall:        No tenderness or deformity   Heart:               Regular rate and rhythm, S1 and S2 normal, no murmur, rub   or gallop  Abdomen:         Soft nontender no organomegaly detected  Extremities:       Extremities normal, atraumatic, no cyanosis or edema  Pulses:              Pulses palpable in all extremities; symmetric all extremities  Skin:      Skin color normal, Skin is warm and dry,  no rashes or palpable lesions  Neurologic:        CNII-XII intact, motor strength grossly intact, sensation grossly intact to light touch, no focal deficits noted    Results Review     I reviewed the patient's new clinical results.  Results from last 7 days   Lab Units 22  0719 22  0519  11/30/22  1412 11/28/22  1751   WBC 10*3/mm3 4.65 5.78 9.19 8.77   HEMOGLOBIN g/dL 10.8* 11.3* 13.3 14.6   PLATELETS 10*3/mm3 127* 124* 122* 140     Results from last 7 days   Lab Units 12/02/22  0719 12/01/22  0519 11/30/22  1412 11/28/22  1751   SODIUM mmol/L 141 138 133* 138   POTASSIUM mmol/L 4.0 3.9 3.5 4.2   CHLORIDE mmol/L 106 101 96* 101   CO2 mmol/L 26.6 30.8* 25.5 24.3   BUN mg/dL 2* 4* 7 6   CREATININE mg/dL 0.42* 0.61 0.70 0.86   GLUCOSE mg/dL 88 102* 131* 86   EGFR mL/min/1.73 127.0 116.1 112.3 87.7     Results from last 7 days   Lab Units 12/02/22  0719 12/01/22  0519 11/30/22  1412 11/28/22  1751   ALBUMIN g/dL 3.00* 3.40* 3.70 4.50   BILIRUBIN mg/dL 0.3 0.3 0.4 0.3   ALK PHOS U/L 54 63 78 91   AST (SGOT) U/L 7 8 12 18   ALT (SGPT) U/L 9 13 12 19     Results from last 7 days   Lab Units 12/02/22  0719 12/01/22  0519 11/30/22 1412 11/28/22  1751   CALCIUM mg/dL 8.7 8.5* 9.1 9.0   ALBUMIN g/dL 3.00* 3.40* 3.70 4.50     Results from last 7 days   Lab Units 11/30/22 2014   LACTATE mmol/L 0.6     No results found for: HGBA1C, POCGLU    CT Abdomen Pelvis With Contrast    Result Date: 11/30/2022   1. Recurrent fluid collection between the tail of the pancreas and the left kidney now measuring about 3.2 x 2.6 cm. 2. Increased inflammatory stranding and ill-defined fluid around the tail the pancreas, around the left kidney and inferiorly within the left retroperitoneum. This may reflect a new episode of pancreatitis.  Radiation dose reduction techniques were utilized, including automated exposure control and exposure modulation based on body size.  This report was finalized on 11/30/2022 7:20 PM by Dr. Manny Wilder M.D.      Scheduled Medications  amitriptyline, 10 mg, Oral, Nightly  folic acid, 1 mg, Oral, Daily  nicotine, 1 patch, Transdermal, Q24H  Pancrelipase (Lip-Prot-Amyl), 72,000 units of lipase, Oral, TID With Meals  pantoprazole, 40 mg, Oral, Daily  [START ON 12/3/2022] polyethylene glycol, 17  g, Oral, Daily  senna-docusate sodium, 2 tablet, Oral, BID  sodium chloride, 10 mL, Intravenous, Q12H  thiamine, 100 mg, Oral, Daily    Infusions  sodium chloride, 125 mL/hr, Last Rate: 125 mL/hr (12/02/22 1527)    Diet  Diet: Gastrointestinal Diets, Regular/House Diet; Fat-Restricted; Texture: Regular Texture (IDDSI 7); Fluid Consistency: Thin (IDDSI 0)       Assessment/Plan     Active Hospital Problems    Diagnosis  POA   • **Left sided abdominal pain [R10.9]  Yes   • Pancreatic pseudocyst [K86.3]  Yes   • GERD without esophagitis [K21.9]  Yes   • Chronic pancreatitis (HCC) [K86.1]  Yes   • Alcohol abuse [F10.10]  Yes      Resolved Hospital Problems   No resolved problems to display.       42 y.o. female admitted with Left sided abdominal pain.       Assessment and plan:  1.  Chronic pancreatitis with pseudocyst, continue pain control.  Continue clear liquid diet, continue IV fluids.     2.  Alcohol abuse, complete abstinence recommended.     3.  Further plans based on hospital course.      Faisal Baird MD  [facility]  12/02/22  17:05 EST

## 2022-12-02 NOTE — PROGRESS NOTES
IMPRESSION & PLAN:  42-year-old lady with acute on chronic pancreatitis with likely pseudocyst.  Continue supportive care.  She is tolerating a low-fat diet today.  Plan to repeat CT scan as an outpatient in 4 to 6 weeks to assess for resolution of current inflammatory changes and persistence of pseudocyst.    CC:    Chief Complaint   Patient presents with   • Abdominal Pain         HPI: Still with left upper quadrant pain.  Denies any increase in pain with the low-fat diet today.  Has tolerated without nausea or emesis.    ROS:   Constitutional: No fever, no chills  CV:  No chest pain. No palpitations.   Lungs:  No shortness of breath or cough.   GI:   No nausea, no vomiting, no diarrhea,  no anorexia.       PE:    VS:   Vitals:    12/02/22 1700   BP: 116/70   Pulse: 91   Resp: 18   Temp: 98.6 °F (37 °C)   SpO2: 99%      CONST: Awake, alert  LUNGS: symmetric excursion, normal inspiratory effort  CV: regular rate, well perfused  Abdomen: Soft, tenderness to palpation along the left flank and left upper quadrant    LABS:  Lipase normal, WBC normal, LFTs normal    RADIOLOGY:  CT abdomen pelvis reviewed showing persistent peripancreatic fluid collection which is most likely a pseudocyst.  There is inflammation along the tail of the pancreas into the retroperitoneum most consistent with acute pancreatitis.

## 2022-12-02 NOTE — PLAN OF CARE
Goal Outcome Evaluation:              Outcome Evaluation: asking for dilauded for pain. Ambulating frequently in peterson. Pt had a BM today. Voiding freely.

## 2022-12-03 LAB
ALBUMIN SERPL-MCNC: 3.2 G/DL (ref 3.5–5.2)
ALBUMIN/GLOB SERPL: 1.1 G/DL
ALP SERPL-CCNC: 66 U/L (ref 39–117)
ALT SERPL W P-5'-P-CCNC: 11 U/L (ref 1–33)
ANION GAP SERPL CALCULATED.3IONS-SCNC: 10.1 MMOL/L (ref 5–15)
AST SERPL-CCNC: 9 U/L (ref 1–32)
BASOPHILS # BLD AUTO: 0.02 10*3/MM3 (ref 0–0.2)
BASOPHILS NFR BLD AUTO: 0.4 % (ref 0–1.5)
BILIRUB SERPL-MCNC: <0.2 MG/DL (ref 0–1.2)
BUN SERPL-MCNC: 4 MG/DL (ref 6–20)
BUN/CREAT SERPL: 7.4 (ref 7–25)
CALCIUM SPEC-SCNC: 8.6 MG/DL (ref 8.6–10.5)
CHLORIDE SERPL-SCNC: 104 MMOL/L (ref 98–107)
CO2 SERPL-SCNC: 27.9 MMOL/L (ref 22–29)
CREAT SERPL-MCNC: 0.54 MG/DL (ref 0.57–1)
DEPRECATED RDW RBC AUTO: 45.7 FL (ref 37–54)
EGFRCR SERPLBLD CKD-EPI 2021: 119.5 ML/MIN/1.73
EOSINOPHIL # BLD AUTO: 0.11 10*3/MM3 (ref 0–0.4)
EOSINOPHIL NFR BLD AUTO: 2.4 % (ref 0.3–6.2)
ERYTHROCYTE [DISTWIDTH] IN BLOOD BY AUTOMATED COUNT: 12.8 % (ref 12.3–15.4)
GLOBULIN UR ELPH-MCNC: 2.8 GM/DL
GLUCOSE SERPL-MCNC: 98 MG/DL (ref 65–99)
HCT VFR BLD AUTO: 35.8 % (ref 34–46.6)
HGB BLD-MCNC: 11.8 G/DL (ref 12–15.9)
IMM GRANULOCYTES # BLD AUTO: 0.01 10*3/MM3 (ref 0–0.05)
IMM GRANULOCYTES NFR BLD AUTO: 0.2 % (ref 0–0.5)
LIPASE SERPL-CCNC: 29 U/L (ref 13–60)
LYMPHOCYTES # BLD AUTO: 1.94 10*3/MM3 (ref 0.7–3.1)
LYMPHOCYTES NFR BLD AUTO: 42.3 % (ref 19.6–45.3)
MCH RBC QN AUTO: 32.3 PG (ref 26.6–33)
MCHC RBC AUTO-ENTMCNC: 33 G/DL (ref 31.5–35.7)
MCV RBC AUTO: 98.1 FL (ref 79–97)
MONOCYTES # BLD AUTO: 0.52 10*3/MM3 (ref 0.1–0.9)
MONOCYTES NFR BLD AUTO: 11.3 % (ref 5–12)
NEUTROPHILS NFR BLD AUTO: 1.99 10*3/MM3 (ref 1.7–7)
NEUTROPHILS NFR BLD AUTO: 43.4 % (ref 42.7–76)
NRBC BLD AUTO-RTO: 0 /100 WBC (ref 0–0.2)
PLATELET # BLD AUTO: 165 10*3/MM3 (ref 140–450)
PMV BLD AUTO: 9.8 FL (ref 6–12)
POTASSIUM SERPL-SCNC: 3.8 MMOL/L (ref 3.5–5.2)
PROT SERPL-MCNC: 6 G/DL (ref 6–8.5)
RBC # BLD AUTO: 3.65 10*6/MM3 (ref 3.77–5.28)
SODIUM SERPL-SCNC: 142 MMOL/L (ref 136–145)
WBC NRBC COR # BLD: 4.59 10*3/MM3 (ref 3.4–10.8)

## 2022-12-03 PROCEDURE — 85025 COMPLETE CBC W/AUTO DIFF WBC: CPT | Performed by: INTERNAL MEDICINE

## 2022-12-03 PROCEDURE — 99231 SBSQ HOSP IP/OBS SF/LOW 25: CPT | Performed by: SURGERY

## 2022-12-03 PROCEDURE — 25010000002 HYDROMORPHONE PER 4 MG: Performed by: INTERNAL MEDICINE

## 2022-12-03 PROCEDURE — 80053 COMPREHEN METABOLIC PANEL: CPT | Performed by: INTERNAL MEDICINE

## 2022-12-03 PROCEDURE — 83690 ASSAY OF LIPASE: CPT | Performed by: INTERNAL MEDICINE

## 2022-12-03 PROCEDURE — 99232 SBSQ HOSP IP/OBS MODERATE 35: CPT | Performed by: INTERNAL MEDICINE

## 2022-12-03 PROCEDURE — 63710000001 ONDANSETRON PER 8 MG: Performed by: INTERNAL MEDICINE

## 2022-12-03 RX ADMIN — HYDROMORPHONE HYDROCHLORIDE 1 MG: 1 INJECTION, SOLUTION INTRAMUSCULAR; INTRAVENOUS; SUBCUTANEOUS at 20:16

## 2022-12-03 RX ADMIN — Medication 1 PATCH: at 21:32

## 2022-12-03 RX ADMIN — OXYCODONE AND ACETAMINOPHEN 1 TABLET: 5; 325 TABLET ORAL at 16:48

## 2022-12-03 RX ADMIN — PANCRELIPASE 72000 UNITS OF LIPASE: 36000; 180000; 114000 CAPSULE, DELAYED RELEASE PELLETS ORAL at 17:59

## 2022-12-03 RX ADMIN — PANCRELIPASE 72000 UNITS OF LIPASE: 36000; 180000; 114000 CAPSULE, DELAYED RELEASE PELLETS ORAL at 08:14

## 2022-12-03 RX ADMIN — OXYCODONE AND ACETAMINOPHEN 1 TABLET: 5; 325 TABLET ORAL at 10:27

## 2022-12-03 RX ADMIN — FOLIC ACID 1 MG: 1 TABLET ORAL at 08:13

## 2022-12-03 RX ADMIN — HYDROMORPHONE HYDROCHLORIDE 1 MG: 1 INJECTION, SOLUTION INTRAMUSCULAR; INTRAVENOUS; SUBCUTANEOUS at 23:13

## 2022-12-03 RX ADMIN — DOCUSATE SODIUM 50MG AND SENNOSIDES 8.6MG 2 TABLET: 8.6; 5 TABLET, FILM COATED ORAL at 08:13

## 2022-12-03 RX ADMIN — PANTOPRAZOLE SODIUM 40 MG: 40 TABLET, DELAYED RELEASE ORAL at 08:13

## 2022-12-03 RX ADMIN — HYDROMORPHONE HYDROCHLORIDE 1 MG: 1 INJECTION, SOLUTION INTRAMUSCULAR; INTRAVENOUS; SUBCUTANEOUS at 08:14

## 2022-12-03 RX ADMIN — HYDROMORPHONE HYDROCHLORIDE 1 MG: 1 INJECTION, SOLUTION INTRAMUSCULAR; INTRAVENOUS; SUBCUTANEOUS at 14:11

## 2022-12-03 RX ADMIN — HYDROMORPHONE HYDROCHLORIDE 1 MG: 1 INJECTION, SOLUTION INTRAMUSCULAR; INTRAVENOUS; SUBCUTANEOUS at 05:26

## 2022-12-03 RX ADMIN — Medication 10 ML: at 17:29

## 2022-12-03 RX ADMIN — HYDROMORPHONE HYDROCHLORIDE 1 MG: 1 INJECTION, SOLUTION INTRAMUSCULAR; INTRAVENOUS; SUBCUTANEOUS at 11:36

## 2022-12-03 RX ADMIN — SODIUM CHLORIDE 125 ML/HR: 9 INJECTION, SOLUTION INTRAVENOUS at 08:01

## 2022-12-03 RX ADMIN — AMITRIPTYLINE HYDROCHLORIDE 10 MG: 10 TABLET, FILM COATED ORAL at 20:17

## 2022-12-03 RX ADMIN — Medication 100 MG: at 08:13

## 2022-12-03 RX ADMIN — HYDROMORPHONE HYDROCHLORIDE 1 MG: 1 INJECTION, SOLUTION INTRAMUSCULAR; INTRAVENOUS; SUBCUTANEOUS at 00:38

## 2022-12-03 RX ADMIN — POLYETHYLENE GLYCOL 3350 17 G: 17 POWDER, FOR SOLUTION ORAL at 08:13

## 2022-12-03 RX ADMIN — PANCRELIPASE 72000 UNITS OF LIPASE: 36000; 180000; 114000 CAPSULE, DELAYED RELEASE PELLETS ORAL at 11:59

## 2022-12-03 RX ADMIN — OXYCODONE AND ACETAMINOPHEN 1 TABLET: 5; 325 TABLET ORAL at 21:37

## 2022-12-03 RX ADMIN — Medication 10 ML: at 20:18

## 2022-12-03 RX ADMIN — DOCUSATE SODIUM 50MG AND SENNOSIDES 8.6MG 2 TABLET: 8.6; 5 TABLET, FILM COATED ORAL at 20:16

## 2022-12-03 RX ADMIN — ONDANSETRON HYDROCHLORIDE 4 MG: 4 TABLET, FILM COATED ORAL at 17:29

## 2022-12-03 RX ADMIN — HYDROMORPHONE HYDROCHLORIDE 1 MG: 1 INJECTION, SOLUTION INTRAMUSCULAR; INTRAVENOUS; SUBCUTANEOUS at 17:29

## 2022-12-03 NOTE — PROGRESS NOTES
Follow-up chronic pancreatitis, fluid collection and developing pseudocyst    Subjective:  Not much change, tolerating some diet, still requiring fairly high amounts of narcotics.    Objective:  Afebrile, vital stable  General: Awake and alert without distress  Abdomen: Soft, tenderness in left upper quadrant without peritonitis    Labs reviewed, unremarkable    Assessment plan:  -Pain from chronic pancreatitis and possible developing pseudocyst  -Alcohol abstinence  -Low-fat diet as tolerated  -Will need repeat outpatient CT in the future    John Hoang MD  General and Endoscopic Surgery  [facility]    [address] [place] [address], [address]  [place], [place], [address]  P: [phone]  F: [phone]

## 2022-12-03 NOTE — PLAN OF CARE
Goal Outcome Evaluation:  Plan of Care Reviewed With: patient        Progress: improving  Outcome Evaluation: pt c/o RLQ/back pain, PO and IV pain medication given as ordered, no n/v, eating/drinking well, iv saline locked, pt voiding large amts and had a BM today, ambulated in peterson independently without difficulty

## 2022-12-03 NOTE — PLAN OF CARE
Goal Outcome Evaluation:  Plan of Care Reviewed With: patient        Progress: no change  Outcome Evaluation: pt resting on and off tonight, frequently asks for dilaudid for pain, PO pain medication given x1 with no relief per pt, ambulating frequently in the peterson, voiding without difficulty, up ad valeria, ivf infusing, VSS

## 2022-12-03 NOTE — PROGRESS NOTES
Name: Piper Cain ADMIT: 2022   : 1980  PCP: Rachell Pozo APRN    MRN: [id number] LOS: 0 days   AGE/SEX: 42 y.o. female  ROOM: [facility]     Subjective   Subjective   Patient seen at bedside.       Objective   Objective   Vital Signs  Temp:  [96.8 °F (36 °C)-97.6 °F (36.4 °C)] 96.8 °F (36 °C)  Heart Rate:  [73] 73  Resp:  [18] 18  BP: (113-125)/(71-88) 125/88  SpO2:  [98 %-100 %] 100 %  on   ;   Device (Oxygen Therapy): room air  Body mass index is 19.37 kg/m².  Physical Exam   General Appearance:    Alert, cooperative, no distress, appears stated age.  Head:     Normocephalic, without obvious abnormality, atraumatic  Eyes:     PERRL, conjunctiva/corneas clear, EOM's intact, both eyes  Ears:      Normal external ear canals, both ears  Nose:     Nares normal, septum midline, mucosa normal, no drainage    or sinus tenderness  Throat:   Lips, tongue, gums normal; oral mucosa pink and moist  Neck:     Supple, symmetrical, trachea midline, no adenopathy;     thyroid:  no enlargement/tenderness/nodules; no carotid    bruit or JVD  Back:     Symmetric, no curvature, ROM normal, no CVA tenderness  Lungs:               Clear to auscultation bilaterally, respirations unlabored  Chest Wall:        No tenderness or deformity   Heart:               Regular rate and rhythm, S1 and S2 normal, no murmur, rub   or gallop  Abdomen:      Generalized tenderness  Extremities:       Extremities normal, atraumatic, no cyanosis or edema  Pulses:              Pulses palpable in all extremities; symmetric all extremities  Skin:      Skin color normal, Skin is warm and dry,  no rashes or palpable lesions  Neurologic:        CNII-XII intact, motor strength grossly intact, sensation grossly intact to light touch, no focal deficits noted    Results Review     I reviewed the patient's new clinical results.  Results from last 7 days   Lab Units 22  0515 22  0719 22  0519 22  1412   WBC 10*3/mm3  4.59 4.65 5.78 9.19   HEMOGLOBIN g/dL 11.8* 10.8* 11.3* 13.3   PLATELETS 10*3/mm3 165 127* 124* 122*     Results from last 7 days   Lab Units 12/03/22  0515 12/02/22  0719 12/01/22 0519 11/30/22  1412   SODIUM mmol/L 142 141 138 133*   POTASSIUM mmol/L 3.8 4.0 3.9 3.5   CHLORIDE mmol/L 104 106 101 96*   CO2 mmol/L 27.9 26.6 30.8* 25.5   BUN mg/dL 4* 2* 4* 7   CREATININE mg/dL 0.54* 0.42* 0.61 0.70   GLUCOSE mg/dL 98 88 102* 131*   EGFR mL/min/1.73 119.5 127.0 116.1 112.3     Results from last 7 days   Lab Units 12/03/22 0515 12/02/22 0719 12/01/22 0519 11/30/22  1412   ALBUMIN g/dL 3.20* 3.00* 3.40* 3.70   BILIRUBIN mg/dL <0.2 0.3 0.3 0.4   ALK PHOS U/L 66 54 63 78   AST (SGOT) U/L 9 7 8 12   ALT (SGPT) U/L 11 9 13 12     Results from last 7 days   Lab Units 12/03/22 0515 12/02/22 0719 12/01/22 0519 11/30/22  1412   CALCIUM mg/dL 8.6 8.7 8.5* 9.1   ALBUMIN g/dL 3.20* 3.00* 3.40* 3.70     Results from last 7 days   Lab Units 11/30/22 2014   LACTATE mmol/L 0.6     No results found for: HGBA1C, POCGLU    No radiology results for the last day  Scheduled Medications  amitriptyline, 10 mg, Oral, Nightly  folic acid, 1 mg, Oral, Daily  nicotine, 1 patch, Transdermal, Q24H  Pancrelipase (Lip-Prot-Amyl), 72,000 units of lipase, Oral, TID With Meals  pantoprazole, 40 mg, Oral, Daily  polyethylene glycol, 17 g, Oral, Daily  senna-docusate sodium, 2 tablet, Oral, BID  sodium chloride, 10 mL, Intravenous, Q12H  thiamine, 100 mg, Oral, Daily    Infusions   Diet  Diet: Gastrointestinal Diets, Regular/House Diet; Fat-Restricted; Texture: Regular Texture (IDDSI 7); Fluid Consistency: Thin (IDDSI 0)       Assessment/Plan     Active Hospital Problems    Diagnosis  POA   • **Left sided abdominal pain [R10.9]  Yes   • Pancreatic pseudocyst [K86.3]  Yes   • GERD without esophagitis [K21.9]  Yes   • Chronic pancreatitis (HCC) [K86.1]  Yes   • Alcohol abuse [F10.10]  Yes      Resolved Hospital Problems   No resolved problems to  display.       42 y.o. female admitted with Left sided abdominal pain.    Assessment and plan:  1.  Chronic pancreatitis with pseudocyst, continue pain control.  Follow management recommendations by GI and surgery.     2.  Alcohol abuse, complete abstinence recommended.     3.  Further plans based on hospital course.      Faisal Baird MD  [facility]  12/03/22  18:07 EST

## 2022-12-03 NOTE — PROGRESS NOTES
[facility]  Inpatient Progress Note    Reason for Follow Up: Abdominal pain    Subjective     Interval History: Patient continues to complain of pain, long discussion regarding recurrent fluid collection and future options.  Advised she may wish to discuss with her gastroenterologist of record in the outpatient setting and even consider referral to a tertiary care center if needed.  Would continue to treat symptomatically.      Current Facility-Administered Medications:   •  acetaminophen (TYLENOL) tablet 650 mg, 650 mg, Oral, Q4H PRN **OR** acetaminophen (TYLENOL) 160 MG/5ML solution 650 mg, 650 mg, Oral, Q4H PRN **OR** acetaminophen (TYLENOL) suppository 650 mg, 650 mg, Rectal, Q4H PRN, Jony Arora MD  •  amitriptyline (ELAVIL) tablet 10 mg, 10 mg, Oral, Nightly, Jony Arora MD, 10 mg at 12/02/22 2029  •  sennosides-docusate (PERICOLACE) 8.6-50 MG per tablet 2 tablet, 2 tablet, Oral, BID, 2 tablet at 12/03/22 0813 **AND** [DISCONTINUED] polyethylene glycol (MIRALAX) packet 17 g, 17 g, Oral, Daily PRN, 17 g at 12/02/22 1137 **AND** bisacodyl (DULCOLAX) EC tablet 5 mg, 5 mg, Oral, Daily PRN **AND** bisacodyl (DULCOLAX) suppository 10 mg, 10 mg, Rectal, Daily PRN, Jony Arora MD  •  calcium carbonate (TUMS) chewable tablet 500 mg (200 mg elemental), 2 tablet, Oral, TID PRN, Jony Arora MD  •  folic acid (FOLVITE) tablet 1 mg, 1 mg, Oral, Daily, Jony Arora MD, 1 mg at 12/03/22 0813  •  HYDROmorphone (DILAUDID) injection 1 mg, 1 mg, Intravenous, Q2H PRN, 1 mg at 12/03/22 1136 **AND** naloxone (NARCAN) injection 0.4 mg, 0.4 mg, Intravenous, Q5 Min PRN, Jony Arora MD  •  melatonin tablet 5 mg, 5 mg, Oral, Nightly PRN, Jony Arora MD  •  nicotine (NICODERM CQ) 14 MG/24HR patch 1 patch, 1 patch, Transdermal, Q24H, Evelin Weiss APRN, 1 patch at 12/01/22 2330  •  ondansetron (ZOFRAN) tablet 4 mg, 4 mg, Oral, Q6H PRN **OR** ondansetron  (ZOFRAN) injection 4 mg, 4 mg, Intravenous, Q6H PRN, Jony Arora MD, 4 mg at 12/02/22 1901  •  oxyCODONE-acetaminophen (PERCOCET) 5-325 MG per tablet 1 tablet, 1 tablet, Oral, Q4H PRN, Jony Arora MD, 1 tablet at 12/03/22 1027  •  Pancrelipase (Lip-Prot-Amyl) (CREON) capsule 72,000 units of lipase, 72,000 units of lipase, Oral, TID With Meals, Faisal Baird MD, 72,000 units of lipase at 12/03/22 1159  •  pantoprazole (PROTONIX) EC tablet 40 mg, 40 mg, Oral, Daily, Jony Arora MD, 40 mg at 12/03/22 0813  •  polyethylene glycol (MIRALAX) packet 17 g, 17 g, Oral, Daily, Salvador Price MD, 17 g at 12/03/22 0813  •  sodium chloride 0.9 % flush 10 mL, 10 mL, Intravenous, PRN, Danish Barrera MD  •  sodium chloride 0.9 % flush 10 mL, 10 mL, Intravenous, Q12H, Jony Arora MD, 10 mL at 12/02/22 0946  •  sodium chloride 0.9 % flush 10 mL, 10 mL, Intravenous, PRN, Jony Arora MD  •  sodium chloride 0.9 % infusion 40 mL, 40 mL, Intravenous, PRN, Jony Arora MD  •  sodium chloride 0.9 % infusion, 125 mL/hr, Intravenous, Continuous, Jony Arora MD, Last Rate: 125 mL/hr at 12/03/22 1136, 125 mL/hr at 12/03/22 1136  •  thiamine (VITAMIN B-1) tablet 100 mg, 100 mg, Oral, Daily, Jony Arora MD, 100 mg at 12/03/22 0813  Review of Systems:    All systems were reviewed and negative except for:  Gastrointestinal: positive for  See HPI    Objective     Vital Signs  Temp:  [96.8 °F (36 °C)-98.6 °F (37 °C)] 96.8 °F (36 °C)  Heart Rate:  [73-91] 73  Resp:  [18] 18  BP: (113-125)/(70-88) 125/88  Body mass index is 19.37 kg/m².    Intake/Output Summary (Last 24 hours) at 12/3/2022 1319  Last data filed at 12/3/2022 1139  Gross per 24 hour   Intake 3600 ml   Output 4940 ml   Net -1340 ml     I/O this shift:  In: 1360 [P.O.:360; I.V.:1000]  Out: 1100 [Urine:1100]     Physical Exam:   General: patient awake, alert and cooperative   Eyes: Normal lids and lashes, no scleral icterus   Neck:  supple, normal ROM   Skin: warm and dry, not jaundiced   Cardiovascular: regular rhythm and rate, no murmurs auscultated   Pulm: clear to auscultation bilaterally, regular and unlabored   Abdomen: soft, nontender, nondistended; normal bowel sounds   Extremities: no rash or edema   Psychiatric: Normal mood and behavior; memory intact     Results Review:     I reviewed the patient's new clinical results.    Results from last 7 days   Lab Units 12/03/22  0515 12/02/22 0719 12/01/22 0519   WBC 10*3/mm3 4.59 4.65 5.78   HEMOGLOBIN g/dL 11.8* 10.8* 11.3*   HEMATOCRIT % 35.8 32.1* 33.6*   PLATELETS 10*3/mm3 165 127* 124*     Results from last 7 days   Lab Units 12/03/22 0515 12/02/22 0719 12/01/22  0519   SODIUM mmol/L 142 141 138   POTASSIUM mmol/L 3.8 4.0 3.9   CHLORIDE mmol/L 104 106 101   CO2 mmol/L 27.9 26.6 30.8*   BUN mg/dL 4* 2* 4*   CREATININE mg/dL 0.54* 0.42* 0.61   CALCIUM mg/dL 8.6 8.7 8.5*   BILIRUBIN mg/dL <0.2 0.3 0.3   ALK PHOS U/L 66 54 63   ALT (SGPT) U/L 11 9 13   AST (SGOT) U/L 9 7 8   GLUCOSE mg/dL 98 88 102*         Lab Results   Lab Value Date/Time    LIPASE 29 12/03/2022 0515    LIPASE 50 12/02/2022 0719    LIPASE 29 12/01/2022 0519    LIPASE 65 (H) 11/30/2022 1412    LIPASE 157 (H) 11/28/2022 1751    LIPASE 335 (H) 10/20/2022 1939    LIPASE 378 (H) 08/06/2022 2158    LIPASE 95 (H) 01/24/2018 0545    LIPASE 93 (H) 10/01/2017 0356    LIPASE 122 (H) 09/30/2017 0619    LIPASE 86 (H) 09/29/2017 1731    LIPASE 133 (H) 09/28/2017 0032       Radiology:  CT Abdomen Pelvis With Contrast   Final Result       1. Recurrent fluid collection between the tail of the pancreas and the   left kidney now measuring about 3.2 x 2.6 cm.   2. Increased inflammatory stranding and ill-defined fluid around the   tail the pancreas, around the left kidney and inferiorly within the left   retroperitoneum. This may reflect a new episode of pancreatitis.       Radiation dose reduction techniques were utilized, including  automated   exposure control and exposure modulation based on body size.       This report was finalized on 11/30/2022 7:20 PM by Dr. Manny Wilder M.D.              Assessment & Plan     Patient Active Problem List   Diagnosis   • Alcohol-induced acute pancreatitis without infection or necrosis   • Alcohol abuse   • Elevated LFTs   • Thrombocytopenia (HCC)   • Epigastric pain   • Abnormal CT of the abdomen   • Chronic pancreatitis (HCC)   • Acute pyelonephritis   • Perinephric abscess   • Splenic vein thrombosis   • Anemia of chronic disease   • GERD without esophagitis   • Alcohol dependence in remission (HCC)   • Pancreatic pseudocyst   • Candida esophagitis (HCC)   • Ureterolithiasis   • Left flank pain   • Bacterial vaginosis   • Left sided abdominal pain       Assessment:  1. Acute on chronic pancreatitis with peripancreatic fluid collections  2. Abdominal pain  3. Alcohol abuse  4. Tobacco abuse  5. GERD  6. Anemia      Plan:  · Continue symptomatic treatment  · Alcohol and tobacco cessation  · Follow-up with primary gastroenterologist and consider referral to tertiary care setting as needed.  I discussed the patients findings and my recommendations with patient.    Petar Starr MD

## 2022-12-04 LAB
ALBUMIN SERPL-MCNC: 3.5 G/DL (ref 3.5–5.2)
ALBUMIN/GLOB SERPL: 1.2 G/DL
ALP SERPL-CCNC: 71 U/L (ref 39–117)
ALT SERPL W P-5'-P-CCNC: 12 U/L (ref 1–33)
ANION GAP SERPL CALCULATED.3IONS-SCNC: 10 MMOL/L (ref 5–15)
AST SERPL-CCNC: 10 U/L (ref 1–32)
BASOPHILS # BLD AUTO: 0.02 10*3/MM3 (ref 0–0.2)
BASOPHILS NFR BLD AUTO: 0.5 % (ref 0–1.5)
BILIRUB SERPL-MCNC: <0.2 MG/DL (ref 0–1.2)
BUN SERPL-MCNC: 5 MG/DL (ref 6–20)
BUN/CREAT SERPL: 8.8 (ref 7–25)
CALCIUM SPEC-SCNC: 9.6 MG/DL (ref 8.6–10.5)
CHLORIDE SERPL-SCNC: 102 MMOL/L (ref 98–107)
CO2 SERPL-SCNC: 31 MMOL/L (ref 22–29)
CREAT SERPL-MCNC: 0.57 MG/DL (ref 0.57–1)
DEPRECATED RDW RBC AUTO: 43.4 FL (ref 37–54)
EGFRCR SERPLBLD CKD-EPI 2021: 118 ML/MIN/1.73
EOSINOPHIL # BLD AUTO: 0.11 10*3/MM3 (ref 0–0.4)
EOSINOPHIL NFR BLD AUTO: 2.8 % (ref 0.3–6.2)
ERYTHROCYTE [DISTWIDTH] IN BLOOD BY AUTOMATED COUNT: 12.5 % (ref 12.3–15.4)
GLOBULIN UR ELPH-MCNC: 2.9 GM/DL
GLUCOSE SERPL-MCNC: 85 MG/DL (ref 65–99)
HCT VFR BLD AUTO: 36.4 % (ref 34–46.6)
HGB BLD-MCNC: 12.1 G/DL (ref 12–15.9)
IMM GRANULOCYTES # BLD AUTO: 0.01 10*3/MM3 (ref 0–0.05)
IMM GRANULOCYTES NFR BLD AUTO: 0.3 % (ref 0–0.5)
LIPASE SERPL-CCNC: 34 U/L (ref 13–60)
LYMPHOCYTES # BLD AUTO: 1.73 10*3/MM3 (ref 0.7–3.1)
LYMPHOCYTES NFR BLD AUTO: 44.2 % (ref 19.6–45.3)
MCH RBC QN AUTO: 31.8 PG (ref 26.6–33)
MCHC RBC AUTO-ENTMCNC: 33.2 G/DL (ref 31.5–35.7)
MCV RBC AUTO: 95.8 FL (ref 79–97)
MONOCYTES # BLD AUTO: 0.47 10*3/MM3 (ref 0.1–0.9)
MONOCYTES NFR BLD AUTO: 12 % (ref 5–12)
NEUTROPHILS NFR BLD AUTO: 1.57 10*3/MM3 (ref 1.7–7)
NEUTROPHILS NFR BLD AUTO: 40.2 % (ref 42.7–76)
NRBC BLD AUTO-RTO: 0 /100 WBC (ref 0–0.2)
PLATELET # BLD AUTO: 193 10*3/MM3 (ref 140–450)
PMV BLD AUTO: 9.8 FL (ref 6–12)
POTASSIUM SERPL-SCNC: 3.8 MMOL/L (ref 3.5–5.2)
PROT SERPL-MCNC: 6.4 G/DL (ref 6–8.5)
RBC # BLD AUTO: 3.8 10*6/MM3 (ref 3.77–5.28)
SODIUM SERPL-SCNC: 143 MMOL/L (ref 136–145)
WBC NRBC COR # BLD: 3.91 10*3/MM3 (ref 3.4–10.8)

## 2022-12-04 PROCEDURE — 85025 COMPLETE CBC W/AUTO DIFF WBC: CPT | Performed by: INTERNAL MEDICINE

## 2022-12-04 PROCEDURE — 36415 COLL VENOUS BLD VENIPUNCTURE: CPT | Performed by: INTERNAL MEDICINE

## 2022-12-04 PROCEDURE — 25010000002 HYDROMORPHONE PER 4 MG: Performed by: INTERNAL MEDICINE

## 2022-12-04 PROCEDURE — 99232 SBSQ HOSP IP/OBS MODERATE 35: CPT | Performed by: INTERNAL MEDICINE

## 2022-12-04 PROCEDURE — 63710000001 ONDANSETRON PER 8 MG: Performed by: INTERNAL MEDICINE

## 2022-12-04 PROCEDURE — 83690 ASSAY OF LIPASE: CPT | Performed by: INTERNAL MEDICINE

## 2022-12-04 PROCEDURE — 80053 COMPREHEN METABOLIC PANEL: CPT | Performed by: INTERNAL MEDICINE

## 2022-12-04 RX ORDER — OXYCODONE AND ACETAMINOPHEN 10; 325 MG/1; MG/1
1 TABLET ORAL EVERY 4 HOURS PRN
Status: DISCONTINUED | OUTPATIENT
Start: 2022-12-04 | End: 2022-12-05 | Stop reason: HOSPADM

## 2022-12-04 RX ADMIN — Medication 10 ML: at 08:44

## 2022-12-04 RX ADMIN — HYDROMORPHONE HYDROCHLORIDE 1 MG: 1 INJECTION, SOLUTION INTRAMUSCULAR; INTRAVENOUS; SUBCUTANEOUS at 16:33

## 2022-12-04 RX ADMIN — PANCRELIPASE 72000 UNITS OF LIPASE: 36000; 180000; 114000 CAPSULE, DELAYED RELEASE PELLETS ORAL at 07:45

## 2022-12-04 RX ADMIN — AMITRIPTYLINE HYDROCHLORIDE 10 MG: 10 TABLET, FILM COATED ORAL at 21:40

## 2022-12-04 RX ADMIN — HYDROMORPHONE HYDROCHLORIDE 1 MG: 1 INJECTION, SOLUTION INTRAMUSCULAR; INTRAVENOUS; SUBCUTANEOUS at 05:13

## 2022-12-04 RX ADMIN — HYDROMORPHONE HYDROCHLORIDE 1 MG: 1 INJECTION, SOLUTION INTRAMUSCULAR; INTRAVENOUS; SUBCUTANEOUS at 11:14

## 2022-12-04 RX ADMIN — PANTOPRAZOLE SODIUM 40 MG: 40 TABLET, DELAYED RELEASE ORAL at 08:43

## 2022-12-04 RX ADMIN — OXYCODONE AND ACETAMINOPHEN 1 TABLET: 5; 325 TABLET ORAL at 05:50

## 2022-12-04 RX ADMIN — DOCUSATE SODIUM 50MG AND SENNOSIDES 8.6MG 2 TABLET: 8.6; 5 TABLET, FILM COATED ORAL at 21:42

## 2022-12-04 RX ADMIN — PANCRELIPASE 72000 UNITS OF LIPASE: 36000; 180000; 114000 CAPSULE, DELAYED RELEASE PELLETS ORAL at 16:37

## 2022-12-04 RX ADMIN — PANCRELIPASE 72000 UNITS OF LIPASE: 36000; 180000; 114000 CAPSULE, DELAYED RELEASE PELLETS ORAL at 11:14

## 2022-12-04 RX ADMIN — DOCUSATE SODIUM 50MG AND SENNOSIDES 8.6MG 2 TABLET: 8.6; 5 TABLET, FILM COATED ORAL at 08:43

## 2022-12-04 RX ADMIN — HYDROMORPHONE HYDROCHLORIDE 1 MG: 1 INJECTION, SOLUTION INTRAMUSCULAR; INTRAVENOUS; SUBCUTANEOUS at 02:29

## 2022-12-04 RX ADMIN — FOLIC ACID 1 MG: 1 TABLET ORAL at 08:43

## 2022-12-04 RX ADMIN — BISACODYL 10 MG: 10 SUPPOSITORY RECTAL at 08:59

## 2022-12-04 RX ADMIN — OXYCODONE HYDROCHLORIDE AND ACETAMINOPHEN 1 TABLET: 10; 325 TABLET ORAL at 21:41

## 2022-12-04 RX ADMIN — HYDROMORPHONE HYDROCHLORIDE 1 MG: 1 INJECTION, SOLUTION INTRAMUSCULAR; INTRAVENOUS; SUBCUTANEOUS at 08:44

## 2022-12-04 RX ADMIN — Medication 10 ML: at 21:43

## 2022-12-04 RX ADMIN — Medication 100 MG: at 08:43

## 2022-12-04 RX ADMIN — HYDROMORPHONE HYDROCHLORIDE 1 MG: 1 INJECTION, SOLUTION INTRAMUSCULAR; INTRAVENOUS; SUBCUTANEOUS at 13:53

## 2022-12-04 RX ADMIN — ONDANSETRON HYDROCHLORIDE 4 MG: 4 TABLET, FILM COATED ORAL at 13:53

## 2022-12-04 RX ADMIN — POLYETHYLENE GLYCOL 3350 17 G: 17 POWDER, FOR SOLUTION ORAL at 08:44

## 2022-12-04 RX ADMIN — OXYCODONE AND ACETAMINOPHEN 1 TABLET: 5; 325 TABLET ORAL at 17:14

## 2022-12-04 RX ADMIN — ONDANSETRON HYDROCHLORIDE 4 MG: 4 TABLET, FILM COATED ORAL at 05:14

## 2022-12-04 NOTE — PROGRESS NOTES
Name: Piper Cain ADMIT: 2022   : 1980  PCP: Rachell Pozo APRN    MRN: [id number] LOS: 1 days   AGE/SEX: 42 y.o. female  ROOM: Count includes the [facility]     Subjective   Subjective    Patient seen at bedside.       Objective   Objective   Vital Signs  Temp:  [96.8 °F (36 °C)-98.5 °F (36.9 °C)] 97.4 °F (36.3 °C)  Heart Rate:  [68-75] 73  Resp:  [16-18] 18  BP: (112-127)/(79-89) 112/81  SpO2:  [97 %-100 %] 97 %  on   ;   Device (Oxygen Therapy): room air  Body mass index is 19.37 kg/m².  Physical Exam    General Appearance:    Alert, cooperative, no distress, appears stated age.  Head:     Normocephalic, without obvious abnormality, atraumatic  Eyes:     PERRL, conjunctiva/corneas clear, EOM's intact, both eyes  Ears:      Normal external ear canals, both ears  Nose:     Nares normal, septum midline, mucosa normal, no drainage    or sinus tenderness  Throat:   Lips, tongue, gums normal; oral mucosa pink and moist  Neck:     Supple, symmetrical, trachea midline, no adenopathy;     thyroid:  no enlargement/tenderness/nodules; no carotid    bruit or JVD  Back:     Symmetric, no curvature, ROM normal, no CVA tenderness  Lungs:               Clear to auscultation bilaterally, respirations unlabored  Chest Wall:        No tenderness or deformity   Heart:               Regular rate and rhythm, S1 and S2 normal, no murmur, rub   or gallop  Abdomen:      Generalized tenderness  Extremities:       Extremities normal, atraumatic, no cyanosis or edema  Pulses:              Pulses palpable in all extremities; symmetric all extremities  Skin:      Skin color normal, Skin is warm and dry,  no rashes or palpable lesions  Neurologic:        CNII-XII intact, motor strength grossly intact, sensation grossly intact to light touch, no focal deficits noted    Results Review     I reviewed the patient's new clinical results.  Results from last 7 days   Lab Units 22  0542 22  0515 22  0719 22  0519   WBC  10*3/mm3 3.91 4.59 4.65 5.78   HEMOGLOBIN g/dL 12.1 11.8* 10.8* 11.3*   PLATELETS 10*3/mm3 193 165 127* 124*     Results from last 7 days   Lab Units 12/04/22  0542 12/03/22  0515 12/02/22  0719 12/01/22  0519   SODIUM mmol/L 143 142 141 138   POTASSIUM mmol/L 3.8 3.8 4.0 3.9   CHLORIDE mmol/L 102 104 106 101   CO2 mmol/L 31.0* 27.9 26.6 30.8*   BUN mg/dL 5* 4* 2* 4*   CREATININE mg/dL 0.57 0.54* 0.42* 0.61   GLUCOSE mg/dL 85 98 88 102*   EGFR mL/min/1.73 118.0 119.5 127.0 116.1     Results from last 7 days   Lab Units 12/04/22  0542 12/03/22  0515 12/02/22  0719 12/01/22  0519   ALBUMIN g/dL 3.50 3.20* 3.00* 3.40*   BILIRUBIN mg/dL <0.2 <0.2 0.3 0.3   ALK PHOS U/L 71 66 54 63   AST (SGOT) U/L 10 9 7 8   ALT (SGPT) U/L 12 11 9 13     Results from last 7 days   Lab Units 12/04/22  0542 12/03/22  0515 12/02/22  0719 12/01/22  0519   CALCIUM mg/dL 9.6 8.6 8.7 8.5*   ALBUMIN g/dL 3.50 3.20* 3.00* 3.40*     Results from last 7 days   Lab Units 11/30/22 2014   LACTATE mmol/L 0.6     No results found for: HGBA1C, POCGLU    No radiology results for the last day  Scheduled Medications  amitriptyline, 10 mg, Oral, Nightly  folic acid, 1 mg, Oral, Daily  nicotine, 1 patch, Transdermal, Q24H  Pancrelipase (Lip-Prot-Amyl), 72,000 units of lipase, Oral, TID With Meals  pantoprazole, 40 mg, Oral, Daily  polyethylene glycol, 17 g, Oral, Daily  senna-docusate sodium, 2 tablet, Oral, BID  sodium chloride, 10 mL, Intravenous, Q12H  thiamine, 100 mg, Oral, Daily    Infusions   Diet  Diet: Gastrointestinal Diets, Regular/House Diet; Fat-Restricted; Texture: Regular Texture (IDDSI 7); Fluid Consistency: Thin (IDDSI 0)       Assessment/Plan     Active Hospital Problems    Diagnosis  POA   • **Left sided abdominal pain [R10.9]  Yes   • Pancreatic pseudocyst [K86.3]  Yes   • GERD without esophagitis [K21.9]  Yes   • Chronic pancreatitis (HCC) [K86.1]  Yes   • Alcohol abuse [F10.10]  Yes      Resolved Hospital Problems   No resolved  problems to display.       42 y.o. female admitted with Left sided abdominal pain.    Assessment and plan:  1.  Chronic pancreatitis with pseudocyst, continue pain control.  Follow management recommendations by GI and surgery.  Plan to discontinue IV Dilaudid, use Percocet as needed, with eventual plans to discharge her home tomorrow.     2.  Alcohol abuse, complete abstinence recommended.     3.  Further plans based on hospital course.      Faisal Baird MD  [facility]  12/04/22  18:56 EST

## 2022-12-04 NOTE — PLAN OF CARE
Goal Outcome Evaluation:  Plan of Care Reviewed With: patient        Progress: no change  Outcome Evaluation: pt c/o left abdominal pain, PO and IV pain medication given per pt request often throughout shift, had a long discussion with patient regarding pain medication/diagnosis, no nausea, tolerating diet well, voiding well, c/o constipation - Miralax and Dulcolax supp given with results, up ad valeria, relaxed and calm throughout this shift

## 2022-12-04 NOTE — PROGRESS NOTES
[facility], [place], operated by [facility]  Inpatient Progress Note    Reason for Follow Up: Abdominal pain    Subjective     Interval History: Discussed with patient and family at bedside.  Left-sided abdominal pain persists.  Multiple questions asked regarding the status of the CT scan in comparison with previous studies.  Advised the fluid collection between the tail of the pancreas and the left kidney measured at 3.3 x 2.6 cm and there is increased inflammatory stranding around the tail of the pancreas that may reflect a new episode of pancreatitis.      Current Facility-Administered Medications:   •  acetaminophen (TYLENOL) tablet 650 mg, 650 mg, Oral, Q4H PRN **OR** acetaminophen (TYLENOL) 160 MG/5ML solution 650 mg, 650 mg, Oral, Q4H PRN **OR** acetaminophen (TYLENOL) suppository 650 mg, 650 mg, Rectal, Q4H PRN, Jony Arora MD  •  amitriptyline (ELAVIL) tablet 10 mg, 10 mg, Oral, Nightly, Jony Arora MD, 10 mg at 12/03/22 2017  •  sennosides-docusate (PERICOLACE) 8.6-50 MG per tablet 2 tablet, 2 tablet, Oral, BID, 2 tablet at 12/04/22 0843 **AND** [DISCONTINUED] polyethylene glycol (MIRALAX) packet 17 g, 17 g, Oral, Daily PRN, 17 g at 12/02/22 1137 **AND** bisacodyl (DULCOLAX) EC tablet 5 mg, 5 mg, Oral, Daily PRN **AND** bisacodyl (DULCOLAX) suppository 10 mg, 10 mg, Rectal, Daily PRN, Jony Arora MD, 10 mg at 12/04/22 0859  •  calcium carbonate (TUMS) chewable tablet 500 mg (200 mg elemental), 2 tablet, Oral, TID PRN, Jony Arora MD  •  folic acid (FOLVITE) tablet 1 mg, 1 mg, Oral, Daily, Jony Arora MD, 1 mg at 12/04/22 0843  •  HYDROmorphone (DILAUDID) injection 1 mg, 1 mg, Intravenous, Q2H PRN, 1 mg at 12/04/22 1353 **AND** naloxone (NARCAN) injection 0.4 mg, 0.4 mg, Intravenous, Q5 Min PRN, Jony Arora MD  •  melatonin tablet 5 mg, 5 mg, Oral, Nightly PRN, Jony Arora MD  •  nicotine (NICODERM CQ) 14 MG/24HR patch 1 patch, 1 patch, Transdermal, Q24H, Abhishek  SOL Lazo, 1 patch at 12/03/22 2132  •  ondansetron (ZOFRAN) tablet 4 mg, 4 mg, Oral, Q6H PRN, 4 mg at 12/04/22 1353 **OR** ondansetron (ZOFRAN) injection 4 mg, 4 mg, Intravenous, Q6H PRN, Jony Arora MD, 4 mg at 12/02/22 1901  •  oxyCODONE-acetaminophen (PERCOCET) 5-325 MG per tablet 1 tablet, 1 tablet, Oral, Q4H PRN, Jony Arora MD, 1 tablet at 12/04/22 0550  •  Pancrelipase (Lip-Prot-Amyl) (CREON) capsule 72,000 units of lipase, 72,000 units of lipase, Oral, TID With Meals, Faisal Baird MD, 72,000 units of lipase at 12/04/22 1114  •  pantoprazole (PROTONIX) EC tablet 40 mg, 40 mg, Oral, Daily, Jony Arora MD, 40 mg at 12/04/22 0843  •  polyethylene glycol (MIRALAX) packet 17 g, 17 g, Oral, Daily, Salvador Price MD, 17 g at 12/04/22 0844  •  sodium chloride 0.9 % flush 10 mL, 10 mL, Intravenous, PRN, Danish Barrera MD  •  sodium chloride 0.9 % flush 10 mL, 10 mL, Intravenous, Q12H, Jony Arora MD, 10 mL at 12/04/22 0844  •  sodium chloride 0.9 % flush 10 mL, 10 mL, Intravenous, PRN, Jony Arora MD, 10 mL at 12/03/22 1729  •  sodium chloride 0.9 % infusion 40 mL, 40 mL, Intravenous, PRN, Jony Arora MD  •  thiamine (VITAMIN B-1) tablet 100 mg, 100 mg, Oral, Daily, Jony Aroar MD, 100 mg at 12/04/22 0843  Review of Systems:    All systems were reviewed and negative except for:  Gastrointestinal: positive for  See HPI    Objective     Vital Signs  Temp:  [96.8 °F (36 °C)-98.5 °F (36.9 °C)] 97.5 °F (36.4 °C)  Heart Rate:  [67-75] 75  Resp:  [16-18] 18  BP: (121-127)/(79-89) 121/79  Body mass index is 19.37 kg/m².    Intake/Output Summary (Last 24 hours) at 12/4/2022 1438  Last data filed at 12/4/2022 0937  Gross per 24 hour   Intake 1506 ml   Output 725 ml   Net 781 ml     I/O this shift:  In: 240 [P.O.:240]  Out: -      Physical Exam:   General: patient awake, alert and cooperative   Eyes: Normal lids and lashes, no scleral icterus   Neck: supple, normal  ROM   Skin: warm and dry, not jaundiced   Cardiovascular: regular rhythm and rate, no murmurs auscultated   Pulm: clear to auscultation bilaterally, regular and unlabored   Abdomen: soft, nontender, nondistended; normal bowel sounds   Extremities: no rash or edema   Psychiatric: Normal mood and behavior; memory intact     Results Review:     I reviewed the patient's new clinical results.    Results from last 7 days   Lab Units 12/04/22  0542 12/03/22  0515 12/02/22  0719   WBC 10*3/mm3 3.91 4.59 4.65   HEMOGLOBIN g/dL 12.1 11.8* 10.8*   HEMATOCRIT % 36.4 35.8 32.1*   PLATELETS 10*3/mm3 193 165 127*     Results from last 7 days   Lab Units 12/04/22  0542 12/03/22  0515 12/02/22  0719   SODIUM mmol/L 143 142 141   POTASSIUM mmol/L 3.8 3.8 4.0   CHLORIDE mmol/L 102 104 106   CO2 mmol/L 31.0* 27.9 26.6   BUN mg/dL 5* 4* 2*   CREATININE mg/dL 0.57 0.54* 0.42*   CALCIUM mg/dL 9.6 8.6 8.7   BILIRUBIN mg/dL <0.2 <0.2 0.3   ALK PHOS U/L 71 66 54   ALT (SGPT) U/L 12 11 9   AST (SGOT) U/L 10 9 7   GLUCOSE mg/dL 85 98 88         Lab Results   Lab Value Date/Time    LIPASE 34 12/04/2022 0542    LIPASE 29 12/03/2022 0515    LIPASE 50 12/02/2022 0719    LIPASE 29 12/01/2022 0519    LIPASE 65 (H) 11/30/2022 1412    LIPASE 157 (H) 11/28/2022 1751    LIPASE 335 (H) 10/20/2022 1939    LIPASE 378 (H) 08/06/2022 2158    LIPASE 95 (H) 01/24/2018 0545    LIPASE 93 (H) 10/01/2017 0356    LIPASE 122 (H) 09/30/2017 0619    LIPASE 86 (H) 09/29/2017 1731    LIPASE 133 (H) 09/28/2017 0032       Radiology:  CT Abdomen Pelvis With Contrast   Final Result       1. Recurrent fluid collection between the tail of the pancreas and the   left kidney now measuring about 3.2 x 2.6 cm.   2. Increased inflammatory stranding and ill-defined fluid around the   tail the pancreas, around the left kidney and inferiorly within the left   retroperitoneum. This may reflect a new episode of pancreatitis.       Radiation dose reduction techniques were utilized,  including automated   exposure control and exposure modulation based on body size.       This report was finalized on 11/30/2022 7:20 PM by Dr. Manny Wilder M.D.              Assessment & Plan     Patient Active Problem List   Diagnosis   • Alcohol-induced acute pancreatitis without infection or necrosis   • Alcohol abuse   • Elevated LFTs   • Thrombocytopenia (HCC)   • Epigastric pain   • Abnormal CT of the abdomen   • Chronic pancreatitis (HCC)   • Acute pyelonephritis   • Perinephric abscess   • Splenic vein thrombosis   • Anemia of chronic disease   • GERD without esophagitis   • Alcohol dependence in remission (HCC)   • Pancreatic pseudocyst   • Candida esophagitis (HCC)   • Ureterolithiasis   • Left flank pain   • Bacterial vaginosis   • Left sided abdominal pain       Assessment:  1. Acute on chronic pancreatitis with peripancreatic fluid collection  2. Abdominal pain  3. Alcohol abuse  4. Tobacco abuse  5. GERD      Plan:  · Continue symptomatic treatment  · Anticipate follow-up with her primary gastroenterologist upon discharge  I discussed the patients findings and my recommendations with patient and family.    Petar Starr MD

## 2022-12-04 NOTE — PLAN OF CARE
Goal Outcome Evaluation:  Plan of Care Reviewed With: patient        Progress: no change  Outcome Evaluation: Pt resting on and off tonight, up ad valeria, PO and IV pain medication given for LLQ pain, no c/o n/v, saline locked, frequent ambulation in peterson, voiding without difficulty, VSS

## 2022-12-05 ENCOUNTER — READMISSION MANAGEMENT (OUTPATIENT)
Dept: CALL CENTER | Facility: HOSPITAL | Age: 42
End: 2022-12-05

## 2022-12-05 VITALS
SYSTOLIC BLOOD PRESSURE: 116 MMHG | BODY MASS INDEX: 19.33 KG/M2 | HEIGHT: 70 IN | HEART RATE: 70 BPM | RESPIRATION RATE: 16 BRPM | OXYGEN SATURATION: 98 % | WEIGHT: 135 LBS | TEMPERATURE: 96.7 F | DIASTOLIC BLOOD PRESSURE: 79 MMHG

## 2022-12-05 LAB
ALBUMIN SERPL-MCNC: 3.4 G/DL (ref 3.5–5.2)
ALBUMIN/GLOB SERPL: 1.2 G/DL
ALP SERPL-CCNC: 65 U/L (ref 39–117)
ALT SERPL W P-5'-P-CCNC: 10 U/L (ref 1–33)
ANION GAP SERPL CALCULATED.3IONS-SCNC: 7.4 MMOL/L (ref 5–15)
AST SERPL-CCNC: 11 U/L (ref 1–32)
BACTERIA SPEC AEROBE CULT: NORMAL
BACTERIA SPEC AEROBE CULT: NORMAL
BASOPHILS # BLD AUTO: 0.03 10*3/MM3 (ref 0–0.2)
BASOPHILS NFR BLD AUTO: 0.8 % (ref 0–1.5)
BILIRUB SERPL-MCNC: <0.2 MG/DL (ref 0–1.2)
BUN SERPL-MCNC: 7 MG/DL (ref 6–20)
BUN/CREAT SERPL: 10.3 (ref 7–25)
CALCIUM SPEC-SCNC: 8.9 MG/DL (ref 8.6–10.5)
CHLORIDE SERPL-SCNC: 102 MMOL/L (ref 98–107)
CO2 SERPL-SCNC: 30.6 MMOL/L (ref 22–29)
CREAT SERPL-MCNC: 0.68 MG/DL (ref 0.57–1)
DEPRECATED RDW RBC AUTO: 45 FL (ref 37–54)
EGFRCR SERPLBLD CKD-EPI 2021: 113.1 ML/MIN/1.73
EOSINOPHIL # BLD AUTO: 0.11 10*3/MM3 (ref 0–0.4)
EOSINOPHIL NFR BLD AUTO: 3 % (ref 0.3–6.2)
ERYTHROCYTE [DISTWIDTH] IN BLOOD BY AUTOMATED COUNT: 12.6 % (ref 12.3–15.4)
GLOBULIN UR ELPH-MCNC: 2.8 GM/DL
GLUCOSE SERPL-MCNC: 97 MG/DL (ref 65–99)
HCT VFR BLD AUTO: 36.5 % (ref 34–46.6)
HGB BLD-MCNC: 12.1 G/DL (ref 12–15.9)
IMM GRANULOCYTES # BLD AUTO: 0.01 10*3/MM3 (ref 0–0.05)
IMM GRANULOCYTES NFR BLD AUTO: 0.3 % (ref 0–0.5)
LIPASE SERPL-CCNC: 38 U/L (ref 13–60)
LYMPHOCYTES # BLD AUTO: 1.56 10*3/MM3 (ref 0.7–3.1)
LYMPHOCYTES NFR BLD AUTO: 41.9 % (ref 19.6–45.3)
MCH RBC QN AUTO: 32.4 PG (ref 26.6–33)
MCHC RBC AUTO-ENTMCNC: 33.2 G/DL (ref 31.5–35.7)
MCV RBC AUTO: 97.6 FL (ref 79–97)
MONOCYTES # BLD AUTO: 0.49 10*3/MM3 (ref 0.1–0.9)
MONOCYTES NFR BLD AUTO: 13.2 % (ref 5–12)
NEUTROPHILS NFR BLD AUTO: 1.52 10*3/MM3 (ref 1.7–7)
NEUTROPHILS NFR BLD AUTO: 40.8 % (ref 42.7–76)
NRBC BLD AUTO-RTO: 0 /100 WBC (ref 0–0.2)
PLATELET # BLD AUTO: 197 10*3/MM3 (ref 140–450)
PMV BLD AUTO: 9.5 FL (ref 6–12)
POTASSIUM SERPL-SCNC: 4.3 MMOL/L (ref 3.5–5.2)
PROT SERPL-MCNC: 6.2 G/DL (ref 6–8.5)
RBC # BLD AUTO: 3.74 10*6/MM3 (ref 3.77–5.28)
SODIUM SERPL-SCNC: 140 MMOL/L (ref 136–145)
WBC NRBC COR # BLD: 3.72 10*3/MM3 (ref 3.4–10.8)

## 2022-12-05 PROCEDURE — 99232 SBSQ HOSP IP/OBS MODERATE 35: CPT | Performed by: SURGERY

## 2022-12-05 PROCEDURE — 80053 COMPREHEN METABOLIC PANEL: CPT | Performed by: INTERNAL MEDICINE

## 2022-12-05 PROCEDURE — 99231 SBSQ HOSP IP/OBS SF/LOW 25: CPT | Performed by: PHYSICIAN ASSISTANT

## 2022-12-05 PROCEDURE — 83690 ASSAY OF LIPASE: CPT | Performed by: INTERNAL MEDICINE

## 2022-12-05 PROCEDURE — 85025 COMPLETE CBC W/AUTO DIFF WBC: CPT | Performed by: INTERNAL MEDICINE

## 2022-12-05 RX ORDER — POLYETHYLENE GLYCOL 3350 17 G/17G
17 POWDER, FOR SOLUTION ORAL DAILY
Status: ON HOLD
Start: 2022-12-06 | End: 2023-03-25

## 2022-12-05 RX ORDER — AMOXICILLIN 250 MG
2 CAPSULE ORAL 2 TIMES DAILY PRN
Start: 2022-12-05

## 2022-12-05 RX ADMIN — POLYETHYLENE GLYCOL 3350 17 G: 17 POWDER, FOR SOLUTION ORAL at 08:22

## 2022-12-05 RX ADMIN — Medication 10 ML: at 08:22

## 2022-12-05 RX ADMIN — PANCRELIPASE 72000 UNITS OF LIPASE: 36000; 180000; 114000 CAPSULE, DELAYED RELEASE PELLETS ORAL at 08:22

## 2022-12-05 RX ADMIN — PANTOPRAZOLE SODIUM 40 MG: 40 TABLET, DELAYED RELEASE ORAL at 08:22

## 2022-12-05 RX ADMIN — OXYCODONE HYDROCHLORIDE AND ACETAMINOPHEN 1 TABLET: 10; 325 TABLET ORAL at 02:35

## 2022-12-05 RX ADMIN — FOLIC ACID 1 MG: 1 TABLET ORAL at 08:22

## 2022-12-05 RX ADMIN — DOCUSATE SODIUM 50MG AND SENNOSIDES 8.6MG 2 TABLET: 8.6; 5 TABLET, FILM COATED ORAL at 08:22

## 2022-12-05 RX ADMIN — OXYCODONE HYDROCHLORIDE AND ACETAMINOPHEN 1 TABLET: 10; 325 TABLET ORAL at 08:22

## 2022-12-05 RX ADMIN — Medication 100 MG: at 08:22

## 2022-12-05 RX ADMIN — PANCRELIPASE 72000 UNITS OF LIPASE: 36000; 180000; 114000 CAPSULE, DELAYED RELEASE PELLETS ORAL at 11:41

## 2022-12-05 RX ADMIN — OXYCODONE HYDROCHLORIDE AND ACETAMINOPHEN 1 TABLET: 10; 325 TABLET ORAL at 12:17

## 2022-12-05 NOTE — PROGRESS NOTES
[facility]  Inpatient Progress Note    Reason for Follow-up: Abdominal pain    Subjective     Interval History:   Still with some left upper quadrant pain but overall improved.  Tolerating regular diet well.  Labs are stable.  Would like to go home.    12/5/2 CMP with albumin 3.4 otherwise unremarkable, lipase 38, CBC with hemoglobin 12.1    Current Facility-Administered Medications:   •  acetaminophen (TYLENOL) tablet 650 mg, 650 mg, Oral, Q4H PRN **OR** acetaminophen (TYLENOL) 160 MG/5ML solution 650 mg, 650 mg, Oral, Q4H PRN **OR** acetaminophen (TYLENOL) suppository 650 mg, 650 mg, Rectal, Q4H PRN, Jony Arora MD  •  amitriptyline (ELAVIL) tablet 10 mg, 10 mg, Oral, Nightly, Jony Arora MD, 10 mg at 12/04/22 2140  •  sennosides-docusate (PERICOLACE) 8.6-50 MG per tablet 2 tablet, 2 tablet, Oral, BID, 2 tablet at 12/05/22 0822 **AND** [DISCONTINUED] polyethylene glycol (MIRALAX) packet 17 g, 17 g, Oral, Daily PRN, 17 g at 12/02/22 1137 **AND** bisacodyl (DULCOLAX) EC tablet 5 mg, 5 mg, Oral, Daily PRN **AND** bisacodyl (DULCOLAX) suppository 10 mg, 10 mg, Rectal, Daily PRN, Jony Arora MD, 10 mg at 12/04/22 0859  •  calcium carbonate (TUMS) chewable tablet 500 mg (200 mg elemental), 2 tablet, Oral, TID PRN, Jony Arora MD  •  folic acid (FOLVITE) tablet 1 mg, 1 mg, Oral, Daily, Jony Arora MD, 1 mg at 12/05/22 0822  •  melatonin tablet 5 mg, 5 mg, Oral, Nightly PRN, Jony Arora MD  •  nicotine (NICODERM CQ) 14 MG/24HR patch 1 patch, 1 patch, Transdermal, Q24H, Evelin Weiss APRN, 1 patch at 12/03/22 2132  •  ondansetron (ZOFRAN) tablet 4 mg, 4 mg, Oral, Q6H PRN, 4 mg at 12/04/22 1353 **OR** ondansetron (ZOFRAN) injection 4 mg, 4 mg, Intravenous, Q6H PRN, Jony Arora MD, 4 mg at 12/02/22 1901  •  oxyCODONE-acetaminophen (PERCOCET)  MG per tablet 1 tablet, 1 tablet, Oral, Q4H PRN, Faisal Baird MD, 1 tablet at 12/05/22  0822  •  Pancrelipase (Lip-Prot-Amyl) (CREON) capsule 72,000 units of lipase, 72,000 units of lipase, Oral, TID With Meals, Faisal Baird MD, 72,000 units of lipase at 12/05/22 0822  •  pantoprazole (PROTONIX) EC tablet 40 mg, 40 mg, Oral, Daily, Jony Arora MD, 40 mg at 12/05/22 0822  •  polyethylene glycol (MIRALAX) packet 17 g, 17 g, Oral, Daily, Salvador Price MD, 17 g at 12/05/22 0822  •  sodium chloride 0.9 % flush 10 mL, 10 mL, Intravenous, PRN, Danish Barrera MD  •  sodium chloride 0.9 % flush 10 mL, 10 mL, Intravenous, Q12H, Jony Arora MD, 10 mL at 12/05/22 0822  •  sodium chloride 0.9 % flush 10 mL, 10 mL, Intravenous, PRN, oJny Arora MD, 10 mL at 12/03/22 1729  •  sodium chloride 0.9 % infusion 40 mL, 40 mL, Intravenous, PRN, Jony Arora MD  •  thiamine (VITAMIN B-1) tablet 100 mg, 100 mg, Oral, Daily, Jony Arora MD, 100 mg at 12/05/22 0822  Review of Systems:    The following systems were reviewed and negative;  respiratory and cardiovascular    Objective     Vital Signs  Temp:  [96.5 °F (35.8 °C)-97.4 °F (36.3 °C)] 96.7 °F (35.9 °C)  Heart Rate:  [59-73] 70  Resp:  [16-18] 16  BP: ()/(65-88) 116/79  Body mass index is 19.37 kg/m².    Intake/Output Summary (Last 24 hours) at 12/5/2022 1009  Last data filed at 12/5/2022 0900  Gross per 24 hour   Intake 1040 ml   Output --   Net 1040 ml     I/O this shift:  In: 240 [P.O.:240]  Out: -      Physical Exam:    General: patient awake, alert and cooperative   Eyes: Normal lids and lashes, no scleral icterus   Skin: warm and dry, not jaundiced   Pulm: regular and unlabored   Abdomen: soft, LUQ TTP without guarding, nondistended; normal bowel sounds   Psychiatric: Normal mood and behavior; memory intact     Results Review:     I reviewed the patient's new clinical results.    Results from last 7 days   Lab Units 12/05/22  0526 12/04/22  0542 12/03/22  0515   WBC 10*3/mm3 3.72 3.91 4.59   HEMOGLOBIN g/dL 12.1 12.1  11.8*   HEMATOCRIT % 36.5 36.4 35.8   PLATELETS 10*3/mm3 197 193 165     Results from last 7 days   Lab Units 12/05/22  0526 12/04/22  0542 12/03/22  0515   SODIUM mmol/L 140 143 142   POTASSIUM mmol/L 4.3 3.8 3.8   CHLORIDE mmol/L 102 102 104   CO2 mmol/L 30.6* 31.0* 27.9   BUN mg/dL 7 5* 4*   CREATININE mg/dL 0.68 0.57 0.54*   CALCIUM mg/dL 8.9 9.6 8.6   BILIRUBIN mg/dL <0.2 <0.2 <0.2   ALK PHOS U/L 65 71 66   ALT (SGPT) U/L 10 12 11   AST (SGOT) U/L 11 10 9   GLUCOSE mg/dL 97 85 98         Lab Results   Lab Value Date/Time    LIPASE 38 12/05/2022 0526    LIPASE 34 12/04/2022 0542    LIPASE 29 12/03/2022 0515    LIPASE 50 12/02/2022 0719    LIPASE 29 12/01/2022 0519    LIPASE 65 (H) 11/30/2022 1412    LIPASE 157 (H) 11/28/2022 1751    LIPASE 335 (H) 10/20/2022 1939    LIPASE 378 (H) 08/06/2022 2158    LIPASE 95 (H) 01/24/2018 0545    LIPASE 93 (H) 10/01/2017 0356    LIPASE 122 (H) 09/30/2017 0619    LIPASE 86 (H) 09/29/2017 1731    LIPASE 133 (H) 09/28/2017 0032       Radiology:  CT Abdomen Pelvis With Contrast   Final Result       1. Recurrent fluid collection between the tail of the pancreas and the   left kidney now measuring about 3.2 x 2.6 cm.   2. Increased inflammatory stranding and ill-defined fluid around the   tail the pancreas, around the left kidney and inferiorly within the left   retroperitoneum. This may reflect a new episode of pancreatitis.       Radiation dose reduction techniques were utilized, including automated   exposure control and exposure modulation based on body size.       This report was finalized on 11/30/2022 7:20 PM by Dr. Manny Wilder M.D.              Assessment & Plan     Patient Active Problem List   Diagnosis   • Alcohol-induced acute pancreatitis without infection or necrosis   • Alcohol abuse   • Elevated LFTs   • Thrombocytopenia (HCC)   • Epigastric pain   • Abnormal CT of the abdomen   • Chronic pancreatitis (HCC)   • Acute pyelonephritis   • Perinephric abscess   •  Splenic vein thrombosis   • Anemia of chronic disease   • GERD without esophagitis   • Alcohol dependence in remission (HCC)   • Pancreatic pseudocyst   • Candida esophagitis (HCC)   • Ureterolithiasis   • Left flank pain   • Bacterial vaginosis   • Left sided abdominal pain       Assessment:  1. Acute on chronic pancreatitis with peripancreatic fluid collection  2. Abdominal pain, LUQ, improved  3. Alcohol abuse, active prior to arrival  4. Tobacco abuse  5. GERD      Plan:  · Patient tolerating low-fat diet well and agrees to maintain this at home.  · Avoid alcohol and tobacco  · Follow-up with primary gastroenterologist, Piper HORTON with [facility], in next 2 weeks  · Okay for discharge from GI standpoint.    I discussed the patients findings and my recommendations with patient.    Dragon dictation used throughout this note.            Evelin Hinton PA-C  [facility]  [address] [place] [address]  Office: [phone]

## 2022-12-05 NOTE — PROGRESS NOTES
IMPRESSION & PLAN:  42-year-old lady with acute on chronic pancreatitis with likely pseudocyst.  Okay for discharge from my standpoint.  Recommend continued low-fat diet and alcohol abstinence.  Repeat CT abdomen pelvis in 4 to 6 weeks as outpatient to evaluate for resolution of changes of pancreatitis.  If pseudocyst evident and symptoms resolve then can likely attribute recurrent symptoms to pancreatitis as opposed to the pseudocyst.    CC:    Chief Complaint   Patient presents with   • Abdominal Pain         HPI: Her symptoms have improved.  She is tolerating a diet.  She is having bowel function and her lower abdominal pain has improved    ROS:   Constitutional: No fever, no chills  CV:  No chest pain. No palpitations.   Lungs:  No shortness of breath or cough.   GI:   No nausea, no vomiting, no diarrhea,  no anorexia.       PE:    VS:   Vitals:    12/05/22 0900   BP: 116/79   Pulse: 70   Resp: 16   Temp: 96.7 °F (35.9 °C)   SpO2: 98%      CONST: Awake, alert  LUNGS: symmetric excursion, normal inspiratory effort  CV: regular rate, well perfused  Abdomen: Soft, improved tenderness to palpation along the left flank and left upper quadrant  LABS:  Lipase normal, WBC normal, LFTs normal    RADIOLOGY:  CT abdomen pelvis reviewed showing persistent peripancreatic fluid collection which is most likely a pseudocyst.  There is inflammation along the tail of the pancreas into the retroperitoneum most consistent with acute pancreatitis.

## 2022-12-05 NOTE — PLAN OF CARE
Problem: Adult Inpatient Plan of Care  Goal: Plan of Care Review  Outcome: Ongoing, Progressing  Flowsheets (Taken 12/5/2022 0426)  Progress: improving  Plan of Care Reviewed With: patient  Outcome Evaluation: c/o constant sharp pain Lt upper quadrant, under Lt ribs, Pt looks comfortable for the most part, medicated with Percocet 10 mg with adequate relief, no nausea, up ad valeria, voiding, no BM, VSS, possible home today   Goal Outcome Evaluation:  Plan of Care Reviewed With: patient        Progress: improving  Outcome Evaluation: c/o constant sharp pain Lt upper quadrant, under Lt ribs, Pt looks comfortable for the most part, medicated with Percocet 10 mg with adequate relief, no nausea, up ad valeria, voiding, no BM, VSS, possible home today

## 2022-12-05 NOTE — DISCHARGE SUMMARY
Patient Name: Piper Cain  : 1980  MRN: [id number]    Date of Admission: 2022  Date of Discharge:  2022  Primary Care Physician: Rachell Pozo APRN      Chief Complaint:   Abdominal Pain      Discharge Diagnoses     Active Hospital Problems    Diagnosis  POA   • **Left sided abdominal pain [R10.9]  Yes   • Pancreatic pseudocyst [K86.3]  Yes   • GERD without esophagitis [K21.9]  Yes   • Chronic pancreatitis (HCC) [K86.1]  Yes   • Alcohol abuse [F10.10]  Yes      Resolved Hospital Problems   No resolved problems to display.        Hospital Course     Ms. Cain is a 42 y.o. female smoker with a history of GERD, anxiety, alcohol abuse with resulting recurrent pancreatitis leading to chronic pancreatitis now complicated by pseudocyts who presented to [facility] initially complaining of lt sided abdominal pain.  Please see the admitting history and physical for further details.  She was found to have pancreatic fluid collection on CT and was admitted to the hospital for further evaluation and treatment. GI and Surgery have followed. Drainage was not recommended due to small size of the pseudocyst, active inflammation of the pancreas. Supportive care was recommended with IVF's, clear liquids, and pain control.  Pain is currently controlled with home dose percocet. She is tolerating some po intake. Alcohol abstinence is crucial as well as low fat diet. She will need to continue bowel regimen with Miralax and Senna.  She will need repeat CT A/P in 4-6 weeks to evaluate for resolution of changes of pancreatitis per Surgery. She will also follow up with her primary GI APRN. She has been cleared for discharge by GI and Surgery.  Day of Discharge     Subjective:  Still w/lt sided abdominal pain but improved. Tolerating some po. Afebrile. +BM yesterday. Agrees with discharge.    Physical Exam:  Temp:  [96.5 °F (35.8 °C)-97.4 °F (36.3 °C)] 96.7 °F (35.9 °C)  Heart Rate:  [59-73]  70  Resp:  [16-18] 16  BP: ()/(65-88) 116/79  Body mass index is 19.37 kg/m².  Physical Exam  Vitals and nursing note reviewed.   Constitutional:       General: She is not in acute distress.  HENT:      Head: Normocephalic.      Mouth/Throat:      Mouth: Mucous membranes are moist.   Eyes:      Conjunctiva/sclera: Conjunctivae normal.   Cardiovascular:      Rate and Rhythm: Normal rate and regular rhythm.   Pulmonary:      Effort: Pulmonary effort is normal. No respiratory distress.      Breath sounds: Normal breath sounds.   Abdominal:      General: Bowel sounds are normal.      Palpations: Abdomen is soft.   Musculoskeletal:      Cervical back: Neck supple.      Right lower leg: No edema.      Left lower leg: No edema.   Skin:     General: Skin is warm and dry.   Neurological:      Mental Status: She is alert and oriented to person, place, and time.   Psychiatric:         Mood and Affect: Mood normal.         Behavior: Behavior normal.         Consultants     Consult Orders (all) (From admission, onward)     Start     Ordered    12/01/22 1805  Inpatient Gastroenterology Consult  Once        Specialty:  Gastroenterology  Provider:  Valdez Coello MD    12/01/22 1806    12/01/22 1404  Inpatient Nutrition Consult  Once        Comments: Low fat diet for pancreatitis   Provider:  (Not yet assigned)    12/01/22 1404    12/01/22 0702  Inpatient General Surgery Consult  IN AM        Specialty:  General Surgery  Provider:  Reji House MD    11/30/22 5869    11/30/22 2001  [facility] (on-call MD unless specified) Details  Once        Specialty:  Hospitalist  Provider:  Jony Arora MD    11/30/22 2000              Procedures     * Surgery not found *      Imaging Results (All)     Procedure Component Value Units Date/Time    CT Abdomen Pelvis With Contrast [[id number]] Collected: 11/30/22 1906     Updated: 11/30/22 1923    Narrative:      CT ABDOMEN AND PELVIS WITH IV CONTRAST     HISTORY: Left-sided abdominal  pain, history of pancreatitis and  peripancreatic fluid collection, interval aspiration of peripancreatic  fluid collection     TECHNIQUE: Radiation dose reduction techniques were utilized, including  automated exposure control and exposure modulation based on body size.  Axial images were obtained through the abdomen and pelvis after the  administration of IV contrast. Coronal and sagittal reformatted images  obtained.     COMPARISON: 10/20/2022, 10/23/2022     FINDINGS:      ABDOMEN:  Lung bases are clear. The liver and gallbladder are unremarkable. Small  perisplenic fluid collection is slightly decreased in size measuring 2.2  x 1.1 cm, previously 2.4 x 1.1 cm. Recurrent fluid collection between  the tail of the pancreas and the left kidney now measuring 3.2 x 2.6 cm.  There is increased inflammatory stranding and ill-defined fluid seen  around the tail of the pancreas, around the left kidney and extending  inferiorly in the left retroperitoneum. The left renal parenchyma is  within normal limits. The right kidney is unremarkable. Adrenal glands  are unremarkable. Stable calcifications in the pancreatic head. Stable  mildly dilated pancreatic duct.     PELVIS:  The bladder is unremarkable. No free fluid within the pelvis. The colon  is unremarkable. The appendix is normal. Bone windows are unremarkable.       Impression:         1. Recurrent fluid collection between the tail of the pancreas and the  left kidney now measuring about 3.2 x 2.6 cm.  2. Increased inflammatory stranding and ill-defined fluid around the  tail the pancreas, around the left kidney and inferiorly within the left  retroperitoneum. This may reflect a new episode of pancreatitis.     Radiation dose reduction techniques were utilized, including automated  exposure control and exposure modulation based on body size.     This report was finalized on 11/30/2022 7:20 PM by Dr. Manny Wilder M.D.           Results for orders placed during the  hospital encounter of 08/06/22    Duplex Portal Hepatic Complete CAR    Interpretation Summary  · All vessels appear normal with normal flow direction and no evidence of thrombus. However, there is a nonvascular fluid collection in the and the splenic vein itself is difficult to visualize with multiple collaterals noted.    Results for orders placed during the hospital encounter of 08/06/22    Adult Transthoracic Echo Complete W/ Cont if Necessary Per Protocol    Interpretation Summary  · Left ventricular ejection fraction appears to be 61 - 65%. Left ventricular systolic function is normal.  · Left ventricular diastolic function was normal.  · Normal right ventricular cavity size and systolic function noted.  · The agitated saline study is positive with Valsalva, consistent with a small to moderate sized PFO  · Mild tricuspid valve regurgitation is present.  · Calculated right ventricular systolic pressure from tricuspid regurgitation is 32 mmHg.  · There is no evidence of pericardial effusion    Pertinent Labs     Results from last 7 days   Lab Units 12/05/22 0526 12/04/22 0542 12/03/22 0515 12/02/22  0719   WBC 10*3/mm3 3.72 3.91 4.59 4.65   HEMOGLOBIN g/dL 12.1 12.1 11.8* 10.8*   PLATELETS 10*3/mm3 197 193 165 127*     Results from last 7 days   Lab Units 12/05/22 0526 12/04/22  0542 12/03/22  0515 12/02/22  0719   SODIUM mmol/L 140 143 142 141   POTASSIUM mmol/L 4.3 3.8 3.8 4.0   CHLORIDE mmol/L 102 102 104 106   CO2 mmol/L 30.6* 31.0* 27.9 26.6   BUN mg/dL 7 5* 4* 2*   CREATININE mg/dL 0.68 0.57 0.54* 0.42*   GLUCOSE mg/dL 97 85 98 88   EGFR mL/min/1.73 113.1 118.0 119.5 127.0     Results from last 7 days   Lab Units 12/05/22 0526 12/04/22 0542 12/03/22  0515 12/02/22  0719   ALBUMIN g/dL 3.40* 3.50 3.20* 3.00*   BILIRUBIN mg/dL <0.2 <0.2 <0.2 0.3   ALK PHOS U/L 65 71 66 54   AST (SGOT) U/L 11 10 9 7   ALT (SGPT) U/L 10 12 11 9     Results from last 7 days   Lab Units 12/05/22  0526 12/04/22  0542  12/03/22  0515 12/02/22  0719   CALCIUM mg/dL 8.9 9.6 8.6 8.7   ALBUMIN g/dL 3.40* 3.50 3.20* 3.00*     Results from last 7 days   Lab Units 12/05/22  0526 12/04/22  0542 12/03/22  0515 12/02/22  0719   LIPASE U/L 38 34 29 50             Invalid input(s): LDLCALC  Results from last 7 days   Lab Units 11/30/22 2018 11/30/22 2014   BLOODCX  No growth at 4 days No growth at 4 days         Test Results Pending at Discharge     Pending Labs     Order Current Status    Blood Culture - Blood, Arm, Left Preliminary result    Blood Culture - Blood, Arm, Left Preliminary result          Discharge Details        Discharge Medications      New Medications      Instructions Start Date   polyethylene glycol 17 g packet  Commonly known as: MIRALAX   17 g, Oral, Daily   Start Date: December 6, 2022     sennosides-docusate 8.6-50 MG per tablet  Commonly known as: PERICOLACE   2 tablets, Oral, 2 Times Daily PRN         Continue These Medications      Instructions Start Date   amitriptyline 10 MG tablet  Commonly known as: ELAVIL   10 mg, Oral, Nightly      Black Cohosh 540 MG capsule   1 capsule, Oral, 2 Times Daily      Feosol 200 (65 Fe) MG tablet tablet  Generic drug: Ferrous Sulfate Dried   200 mg, Oral, Daily      folic acid 1 MG tablet  Commonly known as: FOLVITE   1 mg, Oral, Daily      ondansetron 4 MG tablet  Commonly known as: ZOFRAN   4 mg, Oral, Every 8 Hours PRN      oxyCODONE-acetaminophen  MG per tablet  Commonly known as: PERCOCET   1 tablet, Oral, Every 6 Hours PRN      pancrelipase (Lip-Prot-Amyl) 71895-12036 units capsule delayed-release particles capsule  Commonly known as: CREON   72,000 units of lipase, Oral, 3 Times Daily With Meals      pantoprazole 40 MG EC tablet  Commonly known as: PROTONIX   40 mg, Oral, Daily      thiamine 100 MG tablet  tablet  Commonly known as: VITAMIN B-1   100 mg, Oral, Daily      vitamin D3 125 MCG (5000 UT) capsule capsule   5,000 Units, Oral, Daily             Allergies    Allergen Reactions   • Nickel    • Hydrocodone-Acetaminophen Nausea And Vomiting       Discharge Disposition:  Home or Self Care      Discharge Diet:  Diet Order   Procedures   • Diet: Gastrointestinal Diets, Regular/House Diet; Fat-Restricted; Texture: Regular Texture (IDDSI 7); Fluid Consistency: Thin (IDDSI 0)       Discharge Activity:   Activity Instructions     Activity as Tolerated            CODE STATUS:    Code Status and Medical Interventions:   Ordered at: 11/30/22 4170     Level Of Support Discussed With:    Patient     Code Status (Patient has no pulse and is not breathing):    CPR (Attempt to Resuscitate)     Medical Interventions (Patient has pulse or is breathing):    Full Support       No future appointments.  Additional Instructions for the Follow-ups that You Need to Schedule     CT Abdomen Pelvis With Contrast   Dec 10, 2022      Schedule in 4-6 weeks    Will Oral Contrast be needed for this procedure?: No    Pregnant?: No    Will Oral Contrast be needed for this procedure?: No            Follow-up Information     Rachell Pozo APRN. Schedule an appointment as soon as possible for a visit in 1 week(s).    Specialty: Family Medicine  Contact information:  [address] [facility] [address]  [facility] [address]  [phone]             Reji House MD Follow up.    Specialty: General Surgery  Why: As recommended  Contact information:  [address] [facility] [address]  [facility] [id number] [phone]             Piper Denny APRN Follow up in 2 week(s).    Specialty: Nurse Practitioner  Contact information:  [address] [facility] [place] [address] [phone]                         Additional Instructions for the Follow-ups that You Need to Schedule     CT Abdomen Pelvis With Contrast   Dec 10, 2022      Schedule in 4-6 weeks    Will Oral Contrast be needed for this procedure?: No    Pregnant?: No    Will Oral Contrast be needed for this procedure?: No           Time Spent on Discharge:   Greater than 30 minutes      SOL Man  [facility]  12/05/22  15:45 EST

## 2022-12-06 NOTE — OUTREACH NOTE
Prep Survey    Flowsheet Row Responses   [facility] facility patient discharged from? [facility]   Is LACE score < 7 ? No   Emergency Room discharge w/ pulse ox? No   Eligibility Readm Mgmt   Discharge diagnosis abdominal pain, pnacreatic pseudocyst, chronic pancratitis, ETOH abuse   Does the patient have one of the following disease processes/diagnoses(primary or secondary)? Other   Does the patient have Home health ordered? No   Is there a DME ordered? No   Prep survey completed? Yes          KAREN TRIMBLE - Registered Nurse

## 2022-12-06 NOTE — PAYOR COMM NOTE
"Piper Zavala (42 y.o. Female)     DC SUMMARY FOR [id number]    CONTACT NORRIS MORGAN  P# [phone]  F# [phone]      Date of Birth   1980    Social Security Number       Address   [address] [facility] [address] [address]    Home Phone   [phone]    MRN   [id number]       Voodoo   Mu-ism    Marital Status   Single                            Admission Date   22    Admission Type   Emergency    Admitting Provider   Jony Arora MD    Attending Provider       Department, Room/Bed   [address], 93/1       Discharge Date   2022    Discharge Disposition   Home or Self Care    Discharge Destination                               Attending Provider: (none)   Allergies: Nickel, Hydrocodone-acetaminophen    Isolation: None   Infection: None   Code Status: Prior    Ht: 177.8 cm (70\")   Wt: 61.2 kg (135 lb)    Admission Cmt: None   Principal Problem: Left sided abdominal pain [R10.9]                 Active Insurance as of 2022     Primary Coverage     Payor Plan Insurance Group Employer/Plan Group    [id number]     Payor Plan Address Payor Plan Phone Number Payor Plan Fax Number Effective Dates    MAIL STOP:   2022 - None Entered    [address] [address]       [facility] [address]       Subscriber Name Subscriber Birth Date Member ID       PIPER ZAVALA 1980 [id number]                 Emergency Contacts      (Rel.) Home Phone Work Phone Mobile Phone    MALOU EVANGELISTA (Significant Other) [phone] -- --            [place]: [facility] [id number]  Tax ID [id number]     Discharge Summary      Nancy Glass APRN at 22 1545              Patient Name: Piper Zavala  : 1980  MRN: [id number]    Date of Admission: 2022  Date of Discharge:  2022  Primary Care Physician: Rachell Pozo APRN      Chief Complaint:   Abdominal Pain      Discharge " Diagnoses     Active Hospital Problems    Diagnosis  POA   • **Left sided abdominal pain [R10.9]  Yes   • Pancreatic pseudocyst [K86.3]  Yes   • GERD without esophagitis [K21.9]  Yes   • Chronic pancreatitis (HCC) [K86.1]  Yes   • Alcohol abuse [F10.10]  Yes      Resolved Hospital Problems   No resolved problems to display.        Hospital Course     Ms. Cain is a 42 y.o. female smoker with a history of GERD, anxiety, alcohol abuse with resulting recurrent pancreatitis leading to chronic pancreatitis now complicated by pseudocyts who presented to [facility] initially complaining of lt sided abdominal pain.  Please see the admitting history and physical for further details.  She was found to have pancreatic fluid collection on CT and was admitted to the hospital for further evaluation and treatment. GI and Surgery have followed. Drainage was not recommended due to small size of the pseudocyst, active inflammation of the pancreas. Supportive care was recommended with IVF's, clear liquids, and pain control.  Pain is currently controlled with home dose percocet. She is tolerating some po intake. Alcohol abstinence is crucial as well as low fat diet. She will need to continue bowel regimen with Miralax and Senna.  She will need repeat CT A/P in 4-6 weeks to evaluate for resolution of changes of pancreatitis per Surgery. She will also follow up with her primary GI APRN. She has been cleared for discharge by GI and Surgery.  Day of Discharge     Subjective:  Still w/lt sided abdominal pain but improved. Tolerating some po. Afebrile. +BM yesterday. Agrees with discharge.    Physical Exam:  Temp:  [96.5 °F (35.8 °C)-97.4 °F (36.3 °C)] 96.7 °F (35.9 °C)  Heart Rate:  [59-73] 70  Resp:  [16-18] 16  BP: ()/(65-88) 116/79  Body mass index is 19.37 kg/m².  Physical Exam  Vitals and nursing note reviewed.   Constitutional:       General: She is not in acute distress.  HENT:      Head: Normocephalic.       Mouth/Throat:      Mouth: Mucous membranes are moist.   Eyes:      Conjunctiva/sclera: Conjunctivae normal.   Cardiovascular:      Rate and Rhythm: Normal rate and regular rhythm.   Pulmonary:      Effort: Pulmonary effort is normal. No respiratory distress.      Breath sounds: Normal breath sounds.   Abdominal:      General: Bowel sounds are normal.      Palpations: Abdomen is soft.   Musculoskeletal:      Cervical back: Neck supple.      Right lower leg: No edema.      Left lower leg: No edema.   Skin:     General: Skin is warm and dry.   Neurological:      Mental Status: She is alert and oriented to person, place, and time.   Psychiatric:         Mood and Affect: Mood normal.         Behavior: Behavior normal.         Consultants     Consult Orders (all) (From admission, onward)     Start     Ordered    12/01/22 1805  Inpatient Gastroenterology Consult  Once        Specialty:  Gastroenterology  Provider:  Valdez Coello MD    12/01/22 1806    12/01/22 1404  Inpatient Nutrition Consult  Once        Comments: Low fat diet for pancreatitis   Provider:  (Not yet assigned)    12/01/22 1404    12/01/22 0702  Inpatient General Surgery Consult  IN AM        Specialty:  General Surgery  Provider:  Reji House MD    11/30/22 2255    11/30/22 2001  [facility] (on-call MD unless specified) Details  Once        Specialty:  Hospitalist  Provider:  Jony Arora MD    11/30/22 2000              Procedures     * Surgery not found *      Imaging Results (All)     Procedure Component Value Units Date/Time    CT Abdomen Pelvis With Contrast [[id number]] Collected: 11/30/22 1906     Updated: 11/30/22 1923    Narrative:      CT ABDOMEN AND PELVIS WITH IV CONTRAST     HISTORY: Left-sided abdominal pain, history of pancreatitis and  peripancreatic fluid collection, interval aspiration of peripancreatic  fluid collection     TECHNIQUE: Radiation dose reduction techniques were utilized, including  automated exposure control and  exposure modulation based on body size.  Axial images were obtained through the abdomen and pelvis after the  administration of IV contrast. Coronal and sagittal reformatted images  obtained.     COMPARISON: 10/20/2022, 10/23/2022     FINDINGS:      ABDOMEN:  Lung bases are clear. The liver and gallbladder are unremarkable. Small  perisplenic fluid collection is slightly decreased in size measuring 2.2  x 1.1 cm, previously 2.4 x 1.1 cm. Recurrent fluid collection between  the tail of the pancreas and the left kidney now measuring 3.2 x 2.6 cm.  There is increased inflammatory stranding and ill-defined fluid seen  around the tail of the pancreas, around the left kidney and extending  inferiorly in the left retroperitoneum. The left renal parenchyma is  within normal limits. The right kidney is unremarkable. Adrenal glands  are unremarkable. Stable calcifications in the pancreatic head. Stable  mildly dilated pancreatic duct.     PELVIS:  The bladder is unremarkable. No free fluid within the pelvis. The colon  is unremarkable. The appendix is normal. Bone windows are unremarkable.       Impression:         1. Recurrent fluid collection between the tail of the pancreas and the  left kidney now measuring about 3.2 x 2.6 cm.  2. Increased inflammatory stranding and ill-defined fluid around the  tail the pancreas, around the left kidney and inferiorly within the left  retroperitoneum. This may reflect a new episode of pancreatitis.     Radiation dose reduction techniques were utilized, including automated  exposure control and exposure modulation based on body size.     This report was finalized on 11/30/2022 7:20 PM by Dr. Manny Wilder M.D.           Results for orders placed during the hospital encounter of 08/06/22    Duplex Portal Hepatic Complete CAR    Interpretation Summary  · All vessels appear normal with normal flow direction and no evidence of thrombus. However, there is a nonvascular fluid collection in  the and the splenic vein itself is difficult to visualize with multiple collaterals noted.    Results for orders placed during the hospital encounter of 08/06/22    Adult Transthoracic Echo Complete W/ Cont if Necessary Per Protocol    Interpretation Summary  · Left ventricular ejection fraction appears to be 61 - 65%. Left ventricular systolic function is normal.  · Left ventricular diastolic function was normal.  · Normal right ventricular cavity size and systolic function noted.  · The agitated saline study is positive with Valsalva, consistent with a small to moderate sized PFO  · Mild tricuspid valve regurgitation is present.  · Calculated right ventricular systolic pressure from tricuspid regurgitation is 32 mmHg.  · There is no evidence of pericardial effusion    Pertinent Labs     Results from last 7 days   Lab Units 12/05/22 0526 12/04/22 0542 12/03/22 0515 12/02/22  0719   WBC 10*3/mm3 3.72 3.91 4.59 4.65   HEMOGLOBIN g/dL 12.1 12.1 11.8* 10.8*   PLATELETS 10*3/mm3 197 193 165 127*     Results from last 7 days   Lab Units 12/05/22 0526 12/04/22 0542 12/03/22 0515 12/02/22  0719   SODIUM mmol/L 140 143 142 141   POTASSIUM mmol/L 4.3 3.8 3.8 4.0   CHLORIDE mmol/L 102 102 104 106   CO2 mmol/L 30.6* 31.0* 27.9 26.6   BUN mg/dL 7 5* 4* 2*   CREATININE mg/dL 0.68 0.57 0.54* 0.42*   GLUCOSE mg/dL 97 85 98 88   EGFR mL/min/1.73 113.1 118.0 119.5 127.0     Results from last 7 days   Lab Units 12/05/22 0526 12/04/22 0542 12/03/22 0515 12/02/22  0719   ALBUMIN g/dL 3.40* 3.50 3.20* 3.00*   BILIRUBIN mg/dL <0.2 <0.2 <0.2 0.3   ALK PHOS U/L 65 71 66 54   AST (SGOT) U/L 11 10 9 7   ALT (SGPT) U/L 10 12 11 9     Results from last 7 days   Lab Units 12/05/22 0526 12/04/22 0542 12/03/22 0515 12/02/22  0719   CALCIUM mg/dL 8.9 9.6 8.6 8.7   ALBUMIN g/dL 3.40* 3.50 3.20* 3.00*     Results from last 7 days   Lab Units 12/05/22  0526 12/04/22  0542 12/03/22  0515 12/02/22  0719   LIPASE U/L 38 34 29 50              Invalid input(s): LDLCALC  Results from last 7 days   Lab Units 11/30/22 2018 11/30/22 2014   BLOODCX  No growth at 4 days No growth at 4 days         Test Results Pending at Discharge     Pending Labs     Order Current Status    Blood Culture - Blood, Arm, Left Preliminary result    Blood Culture - Blood, Arm, Left Preliminary result          Discharge Details        Discharge Medications      New Medications      Instructions Start Date   polyethylene glycol 17 g packet  Commonly known as: MIRALAX   17 g, Oral, Daily   Start Date: December 6, 2022     sennosides-docusate 8.6-50 MG per tablet  Commonly known as: PERICOLACE   2 tablets, Oral, 2 Times Daily PRN         Continue These Medications      Instructions Start Date   amitriptyline 10 MG tablet  Commonly known as: ELAVIL   10 mg, Oral, Nightly      Black Cohosh 540 MG capsule   1 capsule, Oral, 2 Times Daily      Feosol 200 (65 Fe) MG tablet tablet  Generic drug: Ferrous Sulfate Dried   200 mg, Oral, Daily      folic acid 1 MG tablet  Commonly known as: FOLVITE   1 mg, Oral, Daily      ondansetron 4 MG tablet  Commonly known as: ZOFRAN   4 mg, Oral, Every 8 Hours PRN      oxyCODONE-acetaminophen  MG per tablet  Commonly known as: PERCOCET   1 tablet, Oral, Every 6 Hours PRN      pancrelipase (Lip-Prot-Amyl) 10715-48597 units capsule delayed-release particles capsule  Commonly known as: CREON   72,000 units of lipase, Oral, 3 Times Daily With Meals      pantoprazole 40 MG EC tablet  Commonly known as: PROTONIX   40 mg, Oral, Daily      thiamine 100 MG tablet  tablet  Commonly known as: VITAMIN B-1   100 mg, Oral, Daily      vitamin D3 125 MCG (5000 UT) capsule capsule   5,000 Units, Oral, Daily             Allergies   Allergen Reactions   • Nickel    • Hydrocodone-Acetaminophen Nausea And Vomiting       Discharge Disposition:  Home or Self Care      Discharge Diet:  Diet Order   Procedures   • Diet: Gastrointestinal Diets, Regular/House Diet;  Fat-Restricted; Texture: Regular Texture (IDDSI 7); Fluid Consistency: Thin (IDDSI 0)       Discharge Activity:   Activity Instructions     Activity as Tolerated            CODE STATUS:    Code Status and Medical Interventions:   Ordered at: 11/30/22 0728     Level Of Support Discussed With:    Patient     Code Status (Patient has no pulse and is not breathing):    CPR (Attempt to Resuscitate)     Medical Interventions (Patient has pulse or is breathing):    Full Support       No future appointments.  Additional Instructions for the Follow-ups that You Need to Schedule     CT Abdomen Pelvis With Contrast   Dec 10, 2022      Schedule in 4-6 weeks    Will Oral Contrast be needed for this procedure?: No    Pregnant?: No    Will Oral Contrast be needed for this procedure?: No            Follow-up Information     Rachell Pozo APRN. Schedule an appointment as soon as possible for a visit in 1 week(s).    Specialty: Family Medicine  Contact information:  [address] [facility] [address]  [facility] [address]  [phone]             Reji House MD Follow up.    Specialty: General Surgery  Why: As recommended  Contact information:  [address] [facility] [address]  [facility] [address]  [phone]             Piper Denny APRN Follow up in 2 week(s).    Specialty: Nurse Practitioner  Contact information:  [address] [facility] [place] [address] [phone]                         Additional Instructions for the Follow-ups that You Need to Schedule     CT Abdomen Pelvis With Contrast   Dec 10, 2022      Schedule in 4-6 weeks    Will Oral Contrast be needed for this procedure?: No    Pregnant?: No    Will Oral Contrast be needed for this procedure?: No           Time Spent on Discharge:  Greater than 30 minutes      SOL Man  [facility]  12/05/22  15:45 EST                Electronically signed by Nancy Glass APRN at 12/05/22 9284

## 2022-12-08 ENCOUNTER — READMISSION MANAGEMENT (OUTPATIENT)
Dept: CALL CENTER | Facility: HOSPITAL | Age: 42
End: 2022-12-08

## 2022-12-08 NOTE — OUTREACH NOTE
Medical Week 1 Survey    Flowsheet Row Responses   [facility] patient discharged from? [place]   Does the patient have one of the following disease processes/diagnoses(primary or secondary)? Other   Week 1 attempt successful? Yes   Call start time 1624   Call end time 1629   Discharge diagnosis abdominal pain, pnacreatic pseudocyst, chronic pancratitis, ETOH abuse   Meds reviewed with patient/caregiver? Yes   Is the patient having any side effects they believe may be caused by any medication additions or changes? No   Does the patient have all medications ordered at discharge? N/A   Is the patient taking all medications as directed (includes completed medication regime)? Yes   Does the patient have a primary care provider?  Yes   Does the patient have an appointment with their PCP within 7 days of discharge? Greater than 7 days   Comments regarding PCP 12/29/22   Nursing Interventions Verified appointment date/time/provider   Has the patient kept scheduled appointments due by today? N/A   Has home health visited the patient within 72 hours of discharge? N/A   Psychosocial issues? No   Did the patient receive a copy of their discharge instructions? Yes   Nursing interventions Reviewed instructions with patient   What is the patient's perception of their health status since discharge? Improving   Is the patient/caregiver able to teach back the hierarchy of who to call/visit for symptoms/problems? PCP, Specialist, Home health nurse, Urgent Care, ED, 911 Yes   Week 1 call completed? Yes          BOGDAN TRIMBLE - Registered Nurse

## 2022-12-27 RX ORDER — PANTOPRAZOLE SODIUM 40 MG/1
40 TABLET, DELAYED RELEASE ORAL DAILY
Qty: 30 TABLET | Refills: 3 | OUTPATIENT
Start: 2022-12-27

## 2022-12-27 RX ORDER — FOLIC ACID 1 MG/1
1 TABLET ORAL DAILY
Qty: 30 TABLET | Refills: 3 | OUTPATIENT
Start: 2022-12-27

## 2023-01-18 ENCOUNTER — HOSPITAL ENCOUNTER (OUTPATIENT)
Dept: CT IMAGING | Facility: HOSPITAL | Age: 43
Discharge: HOME | End: 2023-01-18
Admitting: SURGERY
Payer: MEDICAID

## 2023-01-18 DIAGNOSIS — K86.3 PANCREATIC PSEUDOCYST: ICD-10-CM

## 2023-01-18 PROCEDURE — 0 IOPAMIDOL PER 1 ML: Performed by: SURGERY

## 2023-01-18 PROCEDURE — 74177 CT ABD & PELVIS W/CONTRAST: CPT

## 2023-01-18 RX ADMIN — IOPAMIDOL 100 ML: 755 INJECTION, SOLUTION INTRAVENOUS at 12:03

## 2023-01-23 NOTE — PROGRESS NOTES
I called and reviewed the results of her CT abdomen and pelvis from 1/18/2023.  In comparison to the one completed on 11/30/2022 there is improvement in the cystic lesion located at the tail of the pancreas which is likely a pseudocyst.  Previously this measured 3.2 x 2.6 cm and on scan last week was 1.6 x 2.2 cm.  Inflammatory changes surrounding this are improved as well.  I discussed with her continued supportive care and I recommended against drainage or further intervention at this time to avoid complications such as infection, hemorrhage, damage to surrounding structures.  She is following up with her established gastroenterologist in March.  I have not recommended further imaging at this time as she has had numerous CT scans in the last year.  Given the improvement seen I think following this clinically is probably her best bet currently.  Should she develop fevers, significant worsening pain then repeat imaging could be pursued at that time.  I gave her our office number should new concerns arise.

## 2023-03-25 ENCOUNTER — HOSPITAL ENCOUNTER (INPATIENT)
Facility: HOSPITAL | Age: 43
LOS: 2 days | Discharge: HOME | End: 2023-03-27
Attending: EMERGENCY MEDICINE | Admitting: INTERNAL MEDICINE
Payer: MEDICAID

## 2023-03-25 ENCOUNTER — APPOINTMENT (OUTPATIENT)
Dept: CT IMAGING | Facility: HOSPITAL | Age: 43
End: 2023-03-25
Payer: MEDICAID

## 2023-03-25 DIAGNOSIS — K86.1 ACUTE ON CHRONIC PANCREATITIS: ICD-10-CM

## 2023-03-25 DIAGNOSIS — E27.49 ADRENAL HEMORRHAGE: Primary | ICD-10-CM

## 2023-03-25 DIAGNOSIS — R82.71 BACTERIA IN URINE: ICD-10-CM

## 2023-03-25 DIAGNOSIS — K85.90 ACUTE ON CHRONIC PANCREATITIS: ICD-10-CM

## 2023-03-25 LAB
ALBUMIN SERPL-MCNC: 4.1 G/DL (ref 3.5–5.2)
ALBUMIN/GLOB SERPL: 1.1 G/DL
ALP SERPL-CCNC: 77 U/L (ref 39–117)
ALT SERPL W P-5'-P-CCNC: 15 U/L (ref 1–33)
AMPHET+METHAMPHET UR QL: NEGATIVE
ANION GAP SERPL CALCULATED.3IONS-SCNC: 13.5 MMOL/L (ref 5–15)
AST SERPL-CCNC: 6 U/L (ref 1–32)
BACTERIA UR QL AUTO: ABNORMAL /HPF
BARBITURATES UR QL SCN: NEGATIVE
BASOPHILS # BLD AUTO: 0.02 10*3/MM3 (ref 0–0.2)
BASOPHILS NFR BLD AUTO: 0.2 % (ref 0–1.5)
BENZODIAZ UR QL SCN: NEGATIVE
BILIRUB SERPL-MCNC: 0.3 MG/DL (ref 0–1.2)
BILIRUB UR QL STRIP: NEGATIVE
BUN SERPL-MCNC: 6 MG/DL (ref 6–20)
BUN/CREAT SERPL: 9.8 (ref 7–25)
CALCIUM SPEC-SCNC: 10.4 MG/DL (ref 8.6–10.5)
CANNABINOIDS SERPL QL: NEGATIVE
CHLORIDE SERPL-SCNC: 98 MMOL/L (ref 98–107)
CLARITY UR: CLEAR
CO2 SERPL-SCNC: 25.5 MMOL/L (ref 22–29)
COCAINE UR QL: NEGATIVE
COLOR UR: YELLOW
CREAT SERPL-MCNC: 0.61 MG/DL (ref 0.57–1)
DEPRECATED RDW RBC AUTO: 51.2 FL (ref 37–54)
EGFRCR SERPLBLD CKD-EPI 2021: 116.1 ML/MIN/1.73
EOSINOPHIL # BLD AUTO: 0.13 10*3/MM3 (ref 0–0.4)
EOSINOPHIL NFR BLD AUTO: 1.2 % (ref 0.3–6.2)
ERYTHROCYTE [DISTWIDTH] IN BLOOD BY AUTOMATED COUNT: 13.3 % (ref 12.3–15.4)
ETHANOL BLD-MCNC: <10 MG/DL (ref 0–10)
ETHANOL UR QL: <0.01 %
GLOBULIN UR ELPH-MCNC: 3.8 GM/DL
GLUCOSE SERPL-MCNC: 119 MG/DL (ref 65–99)
GLUCOSE UR STRIP-MCNC: NEGATIVE MG/DL
HCT VFR BLD AUTO: 40.7 % (ref 34–46.6)
HGB BLD-MCNC: 13.9 G/DL (ref 12–15.9)
HGB UR QL STRIP.AUTO: NEGATIVE
HYALINE CASTS UR QL AUTO: ABNORMAL /LPF
IMM GRANULOCYTES # BLD AUTO: 0.03 10*3/MM3 (ref 0–0.05)
IMM GRANULOCYTES NFR BLD AUTO: 0.3 % (ref 0–0.5)
KETONES UR QL STRIP: NEGATIVE
LEUKOCYTE ESTERASE UR QL STRIP.AUTO: ABNORMAL
LIPASE SERPL-CCNC: 192 U/L (ref 13–60)
LYMPHOCYTES # BLD AUTO: 1.46 10*3/MM3 (ref 0.7–3.1)
LYMPHOCYTES NFR BLD AUTO: 13 % (ref 19.6–45.3)
MCH RBC QN AUTO: 34.8 PG (ref 26.6–33)
MCHC RBC AUTO-ENTMCNC: 34.2 G/DL (ref 31.5–35.7)
MCV RBC AUTO: 102 FL (ref 79–97)
METHADONE UR QL SCN: NEGATIVE
MONOCYTES # BLD AUTO: 1.1 10*3/MM3 (ref 0.1–0.9)
MONOCYTES NFR BLD AUTO: 9.8 % (ref 5–12)
NEUTROPHILS NFR BLD AUTO: 75.5 % (ref 42.7–76)
NEUTROPHILS NFR BLD AUTO: 8.48 10*3/MM3 (ref 1.7–7)
NITRITE UR QL STRIP: NEGATIVE
NRBC BLD AUTO-RTO: 0 /100 WBC (ref 0–0.2)
OPIATES UR QL: NEGATIVE
OXYCODONE UR QL SCN: POSITIVE
PH UR STRIP.AUTO: 7.5 [PH] (ref 5–8)
PLATELET # BLD AUTO: 249 10*3/MM3 (ref 140–450)
PMV BLD AUTO: 9.6 FL (ref 6–12)
POTASSIUM SERPL-SCNC: 3.8 MMOL/L (ref 3.5–5.2)
PROT SERPL-MCNC: 7.9 G/DL (ref 6–8.5)
PROT UR QL STRIP: NEGATIVE
RBC # BLD AUTO: 3.99 10*6/MM3 (ref 3.77–5.28)
RBC # UR STRIP: ABNORMAL /HPF
REF LAB TEST METHOD: ABNORMAL
SODIUM SERPL-SCNC: 137 MMOL/L (ref 136–145)
SP GR UR STRIP: 1.01 (ref 1–1.03)
SQUAMOUS #/AREA URNS HPF: ABNORMAL /HPF
UROBILINOGEN UR QL STRIP: ABNORMAL
WBC # UR STRIP: ABNORMAL /HPF
WBC NRBC COR # BLD: 11.22 10*3/MM3 (ref 3.4–10.8)

## 2023-03-25 PROCEDURE — 80307 DRUG TEST PRSMV CHEM ANLYZR: CPT | Performed by: EMERGENCY MEDICINE

## 2023-03-25 PROCEDURE — 99285 EMERGENCY DEPT VISIT HI MDM: CPT

## 2023-03-25 PROCEDURE — 80053 COMPREHEN METABOLIC PANEL: CPT | Performed by: EMERGENCY MEDICINE

## 2023-03-25 PROCEDURE — 25010000002 HYDROMORPHONE 1 MG/ML SOLUTION: Performed by: INTERNAL MEDICINE

## 2023-03-25 PROCEDURE — 25010000002 HYDROMORPHONE 1 MG/ML SOLUTION: Performed by: EMERGENCY MEDICINE

## 2023-03-25 PROCEDURE — 25010000002 ONDANSETRON PER 1 MG: Performed by: EMERGENCY MEDICINE

## 2023-03-25 PROCEDURE — 82077 ASSAY SPEC XCP UR&BREATH IA: CPT | Performed by: EMERGENCY MEDICINE

## 2023-03-25 PROCEDURE — 25510000001 IOPAMIDOL 61 % SOLUTION: Performed by: EMERGENCY MEDICINE

## 2023-03-25 PROCEDURE — 74177 CT ABD & PELVIS W/CONTRAST: CPT

## 2023-03-25 PROCEDURE — 25010000002 KETOROLAC TROMETHAMINE PER 15 MG: Performed by: EMERGENCY MEDICINE

## 2023-03-25 PROCEDURE — 81001 URINALYSIS AUTO W/SCOPE: CPT | Performed by: EMERGENCY MEDICINE

## 2023-03-25 PROCEDURE — 83690 ASSAY OF LIPASE: CPT | Performed by: EMERGENCY MEDICINE

## 2023-03-25 PROCEDURE — 25010000002 HYDROMORPHONE PER 4 MG: Performed by: EMERGENCY MEDICINE

## 2023-03-25 PROCEDURE — 85025 COMPLETE CBC W/AUTO DIFF WBC: CPT | Performed by: EMERGENCY MEDICINE

## 2023-03-25 RX ORDER — ACETAMINOPHEN 325 MG/1
650 TABLET ORAL EVERY 4 HOURS PRN
Status: DISCONTINUED | OUTPATIENT
Start: 2023-03-25 | End: 2023-03-27 | Stop reason: HOSPADM

## 2023-03-25 RX ORDER — NALOXONE HCL 0.4 MG/ML
0.4 VIAL (ML) INJECTION
Status: DISCONTINUED | OUTPATIENT
Start: 2023-03-25 | End: 2023-03-27 | Stop reason: HOSPADM

## 2023-03-25 RX ORDER — AMITRIPTYLINE HYDROCHLORIDE 10 MG/1
10 TABLET, FILM COATED ORAL NIGHTLY
Status: DISCONTINUED | OUTPATIENT
Start: 2023-03-25 | End: 2023-03-27 | Stop reason: HOSPADM

## 2023-03-25 RX ORDER — KETOROLAC TROMETHAMINE 15 MG/ML
15 INJECTION, SOLUTION INTRAMUSCULAR; INTRAVENOUS ONCE
Status: COMPLETED | OUTPATIENT
Start: 2023-03-25 | End: 2023-03-25

## 2023-03-25 RX ORDER — MELATONIN
5000 DAILY
Status: DISCONTINUED | OUTPATIENT
Start: 2023-03-26 | End: 2023-03-27 | Stop reason: HOSPADM

## 2023-03-25 RX ORDER — ACETAMINOPHEN 160 MG/5ML
650 SOLUTION ORAL EVERY 4 HOURS PRN
Status: DISCONTINUED | OUTPATIENT
Start: 2023-03-25 | End: 2023-03-27 | Stop reason: HOSPADM

## 2023-03-25 RX ORDER — DICYCLOMINE HYDROCHLORIDE 10 MG/1
20 CAPSULE ORAL ONCE
Status: COMPLETED | OUTPATIENT
Start: 2023-03-25 | End: 2023-03-25

## 2023-03-25 RX ORDER — COSYNTROPIN 0.25 MG/ML
0.25 INJECTION, POWDER, FOR SOLUTION INTRAMUSCULAR; INTRAVENOUS ONCE
Status: COMPLETED | OUTPATIENT
Start: 2023-03-26 | End: 2023-03-26

## 2023-03-25 RX ORDER — PROCHLORPERAZINE EDISYLATE 5 MG/ML
10 INJECTION INTRAMUSCULAR; INTRAVENOUS EVERY 6 HOURS PRN
Status: DISCONTINUED | OUTPATIENT
Start: 2023-03-25 | End: 2023-03-27 | Stop reason: HOSPADM

## 2023-03-25 RX ORDER — FOLIC ACID 1 MG/1
1000 TABLET ORAL DAILY
Status: DISCONTINUED | OUTPATIENT
Start: 2023-03-26 | End: 2023-03-27 | Stop reason: HOSPADM

## 2023-03-25 RX ORDER — ACETAMINOPHEN 650 MG/1
650 SUPPOSITORY RECTAL EVERY 4 HOURS PRN
Status: DISCONTINUED | OUTPATIENT
Start: 2023-03-25 | End: 2023-03-27 | Stop reason: HOSPADM

## 2023-03-25 RX ORDER — SODIUM CHLORIDE 0.9 % (FLUSH) 0.9 %
10 SYRINGE (ML) INJECTION AS NEEDED
Status: DISCONTINUED | OUTPATIENT
Start: 2023-03-25 | End: 2023-03-27 | Stop reason: HOSPADM

## 2023-03-25 RX ORDER — PANTOPRAZOLE SODIUM 40 MG/1
40 TABLET, DELAYED RELEASE ORAL DAILY
Status: DISCONTINUED | OUTPATIENT
Start: 2023-03-26 | End: 2023-03-27 | Stop reason: HOSPADM

## 2023-03-25 RX ORDER — SODIUM CHLORIDE 9 MG/ML
150 INJECTION, SOLUTION INTRAVENOUS CONTINUOUS
Status: DISCONTINUED | OUTPATIENT
Start: 2023-03-25 | End: 2023-03-27 | Stop reason: HOSPADM

## 2023-03-25 RX ORDER — NITROGLYCERIN 0.4 MG/1
0.4 TABLET SUBLINGUAL
Status: DISCONTINUED | OUTPATIENT
Start: 2023-03-25 | End: 2023-03-27 | Stop reason: HOSPADM

## 2023-03-25 RX ORDER — ONDANSETRON 4 MG/1
4 TABLET, FILM COATED ORAL EVERY 6 HOURS PRN
Status: DISCONTINUED | OUTPATIENT
Start: 2023-03-25 | End: 2023-03-27 | Stop reason: HOSPADM

## 2023-03-25 RX ORDER — CHOLECALCIFEROL (VITAMIN D3) 125 MCG
1000 CAPSULE ORAL DAILY
Status: DISCONTINUED | OUTPATIENT
Start: 2023-03-26 | End: 2023-03-27 | Stop reason: HOSPADM

## 2023-03-25 RX ORDER — AMOXICILLIN 250 MG
2 CAPSULE ORAL 2 TIMES DAILY
Status: DISCONTINUED | OUTPATIENT
Start: 2023-03-25 | End: 2023-03-27 | Stop reason: HOSPADM

## 2023-03-25 RX ORDER — ONDANSETRON 2 MG/ML
4 INJECTION INTRAMUSCULAR; INTRAVENOUS ONCE
Status: COMPLETED | OUTPATIENT
Start: 2023-03-25 | End: 2023-03-25

## 2023-03-25 RX ORDER — ONDANSETRON 2 MG/ML
4 INJECTION INTRAMUSCULAR; INTRAVENOUS EVERY 6 HOURS PRN
Status: DISCONTINUED | OUTPATIENT
Start: 2023-03-25 | End: 2023-03-27 | Stop reason: HOSPADM

## 2023-03-25 RX ORDER — SODIUM CHLORIDE 9 MG/ML
40 INJECTION, SOLUTION INTRAVENOUS AS NEEDED
Status: DISCONTINUED | OUTPATIENT
Start: 2023-03-25 | End: 2023-03-27 | Stop reason: HOSPADM

## 2023-03-25 RX ORDER — OXYCODONE AND ACETAMINOPHEN 10; 325 MG/1; MG/1
1 TABLET ORAL ONCE
Status: COMPLETED | OUTPATIENT
Start: 2023-03-25 | End: 2023-03-25

## 2023-03-25 RX ORDER — SODIUM CHLORIDE 0.9 % (FLUSH) 0.9 %
10 SYRINGE (ML) INJECTION EVERY 12 HOURS SCHEDULED
Status: DISCONTINUED | OUTPATIENT
Start: 2023-03-25 | End: 2023-03-27 | Stop reason: HOSPADM

## 2023-03-25 RX ORDER — HYDROMORPHONE HYDROCHLORIDE 1 MG/ML
0.5 INJECTION, SOLUTION INTRAMUSCULAR; INTRAVENOUS; SUBCUTANEOUS ONCE
Status: COMPLETED | OUTPATIENT
Start: 2023-03-25 | End: 2023-03-25

## 2023-03-25 RX ORDER — OXYCODONE AND ACETAMINOPHEN 10; 325 MG/1; MG/1
1 TABLET ORAL EVERY 4 HOURS PRN
Status: DISCONTINUED | OUTPATIENT
Start: 2023-03-25 | End: 2023-03-27 | Stop reason: HOSPADM

## 2023-03-25 RX ADMIN — ONDANSETRON 4 MG: 2 INJECTION INTRAMUSCULAR; INTRAVENOUS at 17:09

## 2023-03-25 RX ADMIN — SODIUM CHLORIDE, POTASSIUM CHLORIDE, SODIUM LACTATE AND CALCIUM CHLORIDE 1000 ML: 600; 310; 30; 20 INJECTION, SOLUTION INTRAVENOUS at 17:01

## 2023-03-25 RX ADMIN — Medication 10 ML: at 21:40

## 2023-03-25 RX ADMIN — DICYCLOMINE HYDROCHLORIDE 20 MG: 10 CAPSULE ORAL at 20:47

## 2023-03-25 RX ADMIN — HYDROMORPHONE HYDROCHLORIDE 0.5 MG: 1 INJECTION, SOLUTION INTRAMUSCULAR; INTRAVENOUS; SUBCUTANEOUS at 20:47

## 2023-03-25 RX ADMIN — HYDROMORPHONE HYDROCHLORIDE 1 MG: 1 INJECTION, SOLUTION INTRAMUSCULAR; INTRAVENOUS; SUBCUTANEOUS at 17:10

## 2023-03-25 RX ADMIN — DOCUSATE SODIUM 50MG AND SENNOSIDES 8.6MG 2 TABLET: 8.6; 5 TABLET, FILM COATED ORAL at 22:59

## 2023-03-25 RX ADMIN — IOPAMIDOL 85 ML: 612 INJECTION, SOLUTION INTRAVENOUS at 20:02

## 2023-03-25 RX ADMIN — KETOROLAC TROMETHAMINE 15 MG: 15 INJECTION, SOLUTION INTRAMUSCULAR; INTRAVENOUS at 17:54

## 2023-03-25 RX ADMIN — SODIUM CHLORIDE 150 ML/HR: 9 INJECTION, SOLUTION INTRAVENOUS at 23:01

## 2023-03-25 RX ADMIN — HYDROMORPHONE HYDROCHLORIDE 1 MG: 1 INJECTION, SOLUTION INTRAMUSCULAR; INTRAVENOUS; SUBCUTANEOUS at 22:59

## 2023-03-25 RX ADMIN — AMITRIPTYLINE HYDROCHLORIDE 10 MG: 10 TABLET, FILM COATED ORAL at 23:21

## 2023-03-25 RX ADMIN — HYDROMORPHONE HYDROCHLORIDE 1 MG: 1 INJECTION, SOLUTION INTRAMUSCULAR; INTRAVENOUS; SUBCUTANEOUS at 21:39

## 2023-03-25 RX ADMIN — OXYCODONE HYDROCHLORIDE AND ACETAMINOPHEN 1 TABLET: 10; 325 TABLET ORAL at 21:39

## 2023-03-25 NOTE — ED PROVIDER NOTES
EMERGENCY DEPARTMENT ENCOUNTER    Room Number:  N638/1  Date seen:  3/25/2023  PCP: Rachell Pozo APRN  Historian: Patient      HPI:  Chief Complaint: Abdominal pain, nausea  A complete HPI/ROS/PMH/PSH/SH/FH are unobtainable due to: N/A  Context: Piper Cain is a 42 y.o. female who presents to the ED c/o persistent abdominal pain with nausea and poor appetite for the past 2 weeks.  Patient has a history of chronic pancreatitis.  She has never had any abdominal surgeries in the past, however.  Last hospitalization was in January for similar problem.  However she says that this particular flareup of pain is different from her previous pancreatitis pain symptoms.  She says the pain seems to radiate laterally towards the back but also towards the upward left-sided chest to some degree.  She has had some nausea but no blood in the vomit.  She has not had any blood in her stools.  In fact she has been very constipated recently.  She said she ran out of her usual stool softeners but has been taking some generic softeners instead.  She denies any fevers.        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   • Anemia of chronic disease 08/08/2022   • GERD without esophagitis 08/08/2022   • Alcohol dependence in remission ([facility]) 08/08/2022   • Pancreatic pseudocyst 08/14/2022   • Candida esophagitis ([facility]) 08/14/2022   • Ureterolithiasis 10/20/2022   • Left flank pain 10/21/2022   • Bacterial vaginosis 10/23/2022   • Left sided abdominal pain 11/30/2022     Resolved Ambulatory Problems      Diagnosis Date Noted   • Hypokalemia 09/28/2017   • Mixed acid base balance disorder 09/28/2017   • Dehydration 09/28/2017     Past Medical History:   Diagnosis Date   • Anxiety    • E. coli bacteremia    • ETOH abuse    • GERD (gastroesophageal reflux disease)    • Hiatal hernia    • Migraine    • Miscarriage 2004   • Pancreatitis          PAST SURGICAL HISTORY  Past Surgical History:   Procedure Laterality Date   • ENDOSCOPY N/A 8/10/2022    Procedure: ESOPHAGOGASTRODUODENOSCOPY with bxs;  Surgeon: Salvador Price MD;  Location: [facility] ENDOSCOPY;  Service: Gastroenterology;  Laterality: N/A;  Pre: r/o esophageal  varacies, abd pain  Post: esophageal plaque         FAMILY HISTORY  Family History   Problem Relation Age of Onset   • Alcohol abuse Mother    • Arthritis Mother    • Asthma Mother    • Miscarriages / Stillbirths Mother    • Cancer Father    • Diabetes Father    • Drug abuse Father    • Early death Father    • Heart disease Father    • Hyperlipidemia Father    • Hypertension Father    • Alcohol abuse Paternal Aunt    • Cancer Paternal Aunt    • Diabetes Paternal Aunt    • Drug abuse Paternal Aunt    • Early death Paternal Aunt    • Heart disease Paternal Aunt    • Hyperlipidemia Paternal Aunt    • Hypertension Paternal Aunt    • Alcohol abuse Maternal Grandmother    • Alcohol abuse Maternal Grandfather    • Alcohol abuse Paternal Grandmother    • Cancer Paternal Grandmother    • Diabetes Paternal Grandmother    • Drug abuse Paternal Grandmother    • Early death Paternal Grandmother    • Heart disease Paternal Grandmother    • Hyperlipidemia Paternal Grandmother    • Hypertension Paternal Grandmother    • Alcohol abuse Paternal Grandfather          SOCIAL HISTORY  Social History     Socioeconomic History   • Marital status: Single   Tobacco Use   • Smoking status: Every Day     Packs/day: 0.50     Types: Cigarettes     Start date: 1/28/1996   • Smokeless tobacco: Current   Vaping Use   • Vaping Use: Never  used   Substance and Sexual Activity   • Alcohol use: Never   • Drug use: No   • Sexual activity: Yes     Partners: Male     Birth control/protection: None     Comment: IUD removed 6/29/2017         ALLERGIES  Nickel and Hydrocodone-acetaminophen        REVIEW OF SYSTEMS  Review of Systems   Constitutional: Positive for activity change and appetite change. Negative for fever.   HENT: Negative.    Eyes: Negative for pain and visual disturbance.   Respiratory: Negative for cough and shortness of breath.    Cardiovascular: Negative for chest pain.   Gastrointestinal: Positive for abdominal pain, constipation and nausea. Negative for vomiting.   Genitourinary: Positive for decreased urine volume. Negative for dysuria.   Musculoskeletal: Positive for myalgias.   Skin: Negative for color change.   Neurological: Negative for syncope and headaches.   All other systems reviewed and are negative.           PHYSICAL EXAM  ED Triage Vitals   Temp Heart Rate Resp BP SpO2   03/25/23 1629 03/25/23 1629 03/25/23 1629 03/25/23 1632 03/25/23 1629   98 °F (36.7 °C) (!) 140 18 138/92 98 %      Temp src Heart Rate Source Patient Position BP Location FiO2 (%)   -- -- -- -- --              Physical Exam      GENERAL: Pleasant lady, appears uncomfortable but no acute distress  HENT: nares patent, normocephalic and atraumatic  EYES: no scleral icterus EOMI, normal conjunctiva  CV: regular rhythm, heart rate is a little bit elevated around 120 bpm during my evaluation.  Normal pulses, no murmurs  RESPIRATORY: normal effort, lungs are clear bilaterally, no stridor  ABDOMEN: soft, mild to moderate tenderness in the entire upper half of the abdomen but no peritoneal features elicited.  Bowel sounds hypoactive throughout.  MUSCULOSKELETAL: no deformity, no edema or asymmetry   NEURO: alert, moves all extremities, follows commands  PSYCH:  calm, cooperative  SKIN: warm, dry    Vital signs and nursing notes reviewed.          LAB RESULTS  Recent  Results (from the past 24 hour(s))   Comprehensive Metabolic Panel    Collection Time: 03/25/23  5:00 PM    Specimen: Arm, Right; Blood   Result Value Ref Range    Glucose 119 (H) 65 - 99 mg/dL    BUN 6 6 - 20 mg/dL    Creatinine 0.61 0.57 - 1.00 mg/dL    Sodium 137 136 - 145 mmol/L    Potassium 3.8 3.5 - 5.2 mmol/L    Chloride 98 98 - 107 mmol/L    CO2 25.5 22.0 - 29.0 mmol/L    Calcium 10.4 8.6 - 10.5 mg/dL    Total Protein 7.9 6.0 - 8.5 g/dL    Albumin 4.1 3.5 - 5.2 g/dL    ALT (SGPT) 15 1 - 33 U/L    AST (SGOT) 6 1 - 32 U/L    Alkaline Phosphatase 77 39 - 117 U/L    Total Bilirubin 0.3 0.0 - 1.2 mg/dL    Globulin 3.8 gm/dL    A/G Ratio 1.1 g/dL    BUN/Creatinine Ratio 9.8 7.0 - 25.0    Anion Gap 13.5 5.0 - 15.0 mmol/L    eGFR 116.1 >60.0 mL/min/1.73   Lipase    Collection Time: 03/25/23  5:00 PM    Specimen: Arm, Right; Blood   Result Value Ref Range    Lipase 192 (H) 13 - 60 U/L   CBC Auto Differential    Collection Time: 03/25/23  5:00 PM    Specimen: Arm, Right; Blood   Result Value Ref Range    WBC 11.22 (H) 3.40 - 10.80 10*3/mm3    RBC 3.99 3.77 - 5.28 10*6/mm3    Hemoglobin 13.9 12.0 - 15.9 g/dL    Hematocrit 40.7 34.0 - 46.6 %    .0 (H) 79.0 - 97.0 fL    MCH 34.8 (H) 26.6 - 33.0 pg    MCHC 34.2 31.5 - 35.7 g/dL    RDW 13.3 12.3 - 15.4 %    RDW-SD 51.2 37.0 - 54.0 fl    MPV 9.6 6.0 - 12.0 fL    Platelets 249 140 - 450 10*3/mm3    Neutrophil % 75.5 42.7 - 76.0 %    Lymphocyte % 13.0 (L) 19.6 - 45.3 %    Monocyte % 9.8 5.0 - 12.0 %    Eosinophil % 1.2 0.3 - 6.2 %    Basophil % 0.2 0.0 - 1.5 %    Immature Grans % 0.3 0.0 - 0.5 %    Neutrophils, Absolute 8.48 (H) 1.70 - 7.00 10*3/mm3    Lymphocytes, Absolute 1.46 0.70 - 3.10 10*3/mm3    Monocytes, Absolute 1.10 (H) 0.10 - 0.90 10*3/mm3    Eosinophils, Absolute 0.13 0.00 - 0.40 10*3/mm3    Basophils, Absolute 0.02 0.00 - 0.20 10*3/mm3    Immature Grans, Absolute 0.03 0.00 - 0.05 10*3/mm3    nRBC 0.0 0.0 - 0.2 /100 WBC   Ethanol    Collection Time:  03/25/23  5:00 PM    Specimen: Arm, Right; Blood   Result Value Ref Range    Ethanol <10 0 - 10 mg/dL    Ethanol % <0.010 %   Urinalysis With Microscopic If Indicated (No Culture) - Urine, Clean Catch    Collection Time: 03/25/23  5:22 PM    Specimen: Urine, Clean Catch   Result Value Ref Range    Color, UA Yellow Yellow, Straw    Appearance, UA Clear Clear    pH, UA 7.5 5.0 - 8.0    Specific Gravity, UA 1.006 1.005 - 1.030    Glucose, UA Negative Negative    Ketones, UA Negative Negative    Bilirubin, UA Negative Negative    Blood, UA Negative Negative    Protein, UA Negative Negative    Leuk Esterase, UA Moderate (2+) (A) Negative    Nitrite, UA Negative Negative    Urobilinogen, UA 0.2 E.U./dL 0.2 - 1.0 E.U./dL   Urine Drug Screen - Urine, Clean Catch    Collection Time: 03/25/23  5:22 PM    Specimen: Urine, Clean Catch   Result Value Ref Range    Amphet/Methamphet, Screen Negative Negative    Barbiturates Screen, Urine Negative Negative    Benzodiazepine Screen, Urine Negative Negative    Cocaine Screen, Urine Negative Negative    Opiate Screen Negative Negative    THC, Screen, Urine Negative Negative    Methadone Screen, Urine Negative Negative    Oxycodone Screen, Urine Positive (A) Negative   Urinalysis, Microscopic Only - Urine, Clean Catch    Collection Time: 03/25/23  5:22 PM    Specimen: Urine, Clean Catch   Result Value Ref Range    RBC, UA 0-2 None Seen, 0-2 /HPF    WBC, UA 21-30 (A) None Seen, 0-2 /HPF    Bacteria, UA 4+ (A) None Seen /HPF    Squamous Epithelial Cells, UA 3-6 (A) None Seen, 0-2 /HPF    Hyaline Casts, UA None Seen None Seen /LPF    Methodology Automated Microscopy        Ordered the above labs and reviewed the results.        RADIOLOGY  CT Abdomen Pelvis With Contrast    Result Date: 3/25/2023  CT ABDOMEN AND PELVIS WITH IV CONTRAST  HISTORY: Abdominal pain with nausea and vomiting.  TECHNIQUE:  CT includes axial imaging from the lung bases to the trochanters with intravenous contrast  and with use of oral contrast. Data reconstructed in coronal and sagittal planes. Radiation dose reduction techniques were utilized, including automated exposure control and exposure modulation based on body size.  COMPARISON: CT abdomen and pelvis with IV contrast 11/30/2022.  FINDINGS: There is a peripherally enhancing fluid collection centered between the tail of the pancreatic tail and the anterior left kidney measuring 4.6 x 3.8 cm and this is increased in size from 3.5 x 2.9 cm on the prior CT 11/30/2022. Above this collection there is developed 2 new peripheral enhancing collections that are along the posterior margin of the upper gastric body and gastric fundus and these collections measure 1.7 and 2.1 cm. There is indentation along the posterior gastric wall with adjacent gastric wall thickening.  There is developed new hyperdensity involving the left adrenal gland which is abnormally enlarged and exhibits heterogenous appearance measuring 4 x 2.6 cm. Left adrenal gland was normal on the prior exam and this new hyperdensity and heterogenous appearance was large and suspected to be due to an acute left adrenal hemorrhage. There is surrounding stranding within the periadrenal fat and within the fat extending above the upper pole of the left kidney. There is also mild fluid density extending adjacent to the spleen and surrounding Gerota's fascia.  The left renal vein appears narrowed. There is cortical thinning along the anterior margin of the left kidney. Right kidney appears within normal limits. Right adrenal gland is normal. The liver, gallbladder appear within normal limits. There are multiple pancreatic calcifications associated with uncinate process of the pancreatic head and there is dilatation of the pancreatic duct that appears similar to the previous exam.  There is no bowel dilatation or evidence for bowel obstruction. Atherosclerotic calcifications are present involving the abdominal aorta and  iliac vasculature.      1. Since the prior CT approximately 4 months ago there has developed abnormal enlargement with heterogenous appearance of the left adrenal gland measuring 4 x 2.6 cm and this is suspected to represent acute left adrenal hemorrhage. There is adjacent stranding within the fat and fluid partially surrounds Gerota's fascia. Recommend follow-up CT evaluation. 2. Collection between the pancreatic tail and left kidney measures increased in size compared to the prior exam now measuring 4.6 cm. There is developed 2 new collections above this along the posterior margin of the stomach associated with gastric wall. There is associated gastric wall thickening/secondary gastritis. 3. Chronic pancreatic calcifications and pancreatic ductal enlargement without change. 4. Narrowing of the left renal vein where there is surrounding edema and stranding within the fat. Left renal vein collaterals are present similar to the previous exam.  Discussed with Dr. Edwards in the emergency department on 03/25/2023 8:30 PM.  Radiation dose reduction techniques were utilized, including automated exposure control and exposure modulation based on body size.         Ordered the above noted radiological studies. Reviewed by me in PACS.            PROCEDURES  Procedures          MEDICATIONS GIVEN IN ER  Medications   sodium chloride 0.9 % flush 10 mL (10 mL Intravenous Given 3/25/23 2140)   sodium chloride 0.9 % flush 10 mL (has no administration in time range)   sodium chloride 0.9 % flush 10 mL (has no administration in time range)   sodium chloride 0.9 % infusion 40 mL (has no administration in time range)   ondansetron (ZOFRAN) tablet 4 mg (has no administration in time range)     Or   ondansetron (ZOFRAN) injection 4 mg (has no administration in time range)   nitroglycerin (NITROSTAT) SL tablet 0.4 mg (has no administration in time range)   sodium chloride 0.9 % infusion (has no administration in time range)    acetaminophen (TYLENOL) tablet 650 mg (has no administration in time range)     Or   acetaminophen (TYLENOL) 160 MG/5ML solution 650 mg (has no administration in time range)     Or   acetaminophen (TYLENOL) suppository 650 mg (has no administration in time range)   HYDROmorphone (DILAUDID) injection 1 mg (has no administration in time range)     And   naloxone (NARCAN) injection 0.4 mg (has no administration in time range)   prochlorperazine (COMPAZINE) injection 10 mg (has no administration in time range)   sennosides-docusate (PERICOLACE) 8.6-50 MG per tablet 2 tablet (has no administration in time range)   oxyCODONE-acetaminophen (PERCOCET)  MG per tablet 1 tablet (has no administration in time range)   cosyntropin (CORTROSYN) injection 0.25 mg (has no administration in time range)   lactated ringers bolus 1,000 mL (0 mL Intravenous Stopped 3/25/23 2111)   HYDROmorphone (DILAUDID) injection 1 mg (1 mg Intravenous Given 3/25/23 1710)   ondansetron (ZOFRAN) injection 4 mg (4 mg Intravenous Given 3/25/23 1709)   ketorolac (TORADOL) injection 15 mg (15 mg Intravenous Given 3/25/23 1754)   HYDROmorphone (DILAUDID) injection 0.5 mg (0.5 mg Intravenous Given 3/25/23 2047)   dicyclomine (BENTYL) capsule 20 mg (20 mg Oral Given 3/25/23 2047)   iopamidol (ISOVUE-300) 61 % injection 100 mL (85 mL Intravenous Given 3/25/23 2002)   HYDROmorphone (DILAUDID) injection 1 mg (1 mg Intravenous Given 3/25/23 2139)   oxyCODONE-acetaminophen (PERCOCET)  MG per tablet 1 tablet (1 tablet Oral Given 3/25/23 2139)                   MEDICAL DECISION MAKING, PROGRESS, and CONSULTS    All labs have been independently reviewed by me.  All radiology studies have been reviewed by me and I have also reviewed the radiology report.   EKG's independently viewed and interpreted by me.  Discussion below represents my analysis of pertinent findings related to patient's condition, differential diagnosis, treatment plan and final  disposition.      Additional sources:  - Discussed/ obtained information from independent historians: None    - External (non-ED) record review: I reviewed the most recent hospital discharge summary which was from December 5, 2022 when patient was admitted here for similar presentation with left-sided abdominal pain, chronic pancreatitis and pancreatic pseudocyst.    - Chronic or social conditions impacting care: Chronic pancreatitis.  Has a chronic pain management provider.      Orders placed during this visit:  Orders Placed This Encounter   Procedures   • CT Abdomen Pelvis With Contrast   • Comprehensive Metabolic Panel   • Lipase   • Urinalysis With Microscopic If Indicated (No Culture) - Urine, Clean Catch   • CBC Auto Differential   • Ethanol   • Urine Drug Screen - Urine, Clean Catch   • Urinalysis, Microscopic Only - Urine, Clean Catch   • CBC Auto Differential   • Potassium   • Magnesium   • High Sensitivity Troponin T   • Blood Gas, Arterial -   • Cortisol   • ACTH   • Vitamin B12   • Comprehensive Metabolic Panel   • Diet: Liquid Diets; Clear Liquid; Texture: Regular Texture (IDDSI 7); Fluid Consistency: Thin (IDDSI 0)   • NPO Diet NPO Type: Strict NPO   • Vital Signs   • Intake & Output   • Weigh patient   • Oral Care   • Place Sequential Compression Device   • Maintain Sequential Compression Device   • Telemetry - Maintain IV Access   • May Be Off Telemetry for Tests   • Pulse Oximetry, Continuous   • Cardiac Monitoring   • Code Status and Medical Interventions:   • Surgery (on-call MD unless specified)   • [facility] (on-call MD unless specified) Details   • Urology (on-call MD unless specified)   • Oxygen Therapy- Nasal Cannula; Titrate for SPO2: 90% - 95%   • Oxygen Therapy- Nasal Cannula; Titrate for SPO2: 90% - 95%   • Incentive Spirometry   • ECG 12 Lead Chest Pain   • Insert Peripheral IV   • Insert Peripheral IV   • Inpatient Admission   • ED Bed Request   • CBC & Differential         Differential  diagnosis includes but is not limited to:    Acute on chronic pancreatitis, biliary colic, bowel obstruction, viral enteritis, peptic ulcer disease      Independent interpretation of labs, radiology studies, and discussions with consultants:  ED Course as of 03/25/23 2210   Sat Mar 25, 2023   1651 Patient has some upper abdominal tenderness and tachycardia here.  However does not appear overtly toxic.  I do suspect she is probably having a flareup of her pancreatitis symptoms.  She has had multiple images in the past.  I will start with a simple work-up including CBC, CMP and lipase values as well as urinalysis testing.  We will give her some IV fluids and pain and nausea medications.  Anticipate she may necessitate observation in the hospital for symptom management and consultation with her previously established specialists. [CHARLES]   1751 Lipase(!): 192 [CHARLES]   1751 WBC(!): 11.22 [CHARLES]   1858 Bacteria, UA(!): 4+ [CHARLES]   1858 WBC, UA(!): 21-30 [CHARLES]   2042 I discussed with the radiologist about the patient's CT scan.  He identifies a new finding of left adrenal gland hemorrhage as well as chronic inflammatory changes around the pancreas.  I am paging general surgery now to discuss these findings together [CHARLES]   2059 I just discussed with Dr. Pena from general surgery about this patient.  I shared the test results with him including CT report.  He says he will be glad to consult from a surgical standpoint.  He also request that we notify urology regarding the abnormalities with regard to the adrenal gland. [CHARLES]   2059 I just discussed with Dr. Hampton from [facility].  He agrees to accept the patient on his service for continued management today. [CHARLES]   2208 I just discussed with Dr. Martinez from [facility] urolog about this patient and he agrees to consult on her as well. [CHARLES]   2208 I do note the patient has 4+ bacteria in the urine but she does not really describe any dysuria symptoms or change with urinary habits.  Therefore I  will hold off on any antibiotics at this time and defer to the admitting team. [CHARLES]      ED Course User Index  [CHARLES] Dawood Edwards MD           Patient was placed in face mask during triage.  Patient was wearing face mask throughout encounter.  I wore personal protective equipment throughout the encounter.  Hand hygiene was performed before and after patient encounter.     DIAGNOSIS  Final diagnoses:   Adrenal hemorrhage (HCC)   Acute on chronic pancreatitis (HCC)   Bacteria in urine         DISPOSITION  Discharge home            Latest Documented Vital Signs:  As of 22:10 EDT  BP- 117/81 HR- 105 Temp- 98 °F (36.7 °C) O2 sat- 96%              --    Please note that portions of this were completed with a voice recognition program.       Note Disclaimer: At [facility], we believe that sharing information builds trust and better relationships. You are receiving this note because you are receiving care at [facility] or recently visited. It is possible you will see health information before a provider has talked with you about it. This kind of information can be easy to misunderstand. To help you fully understand what it means for your health, we urge you to discuss this note with your provider.           Dawood Edwards MD  03/25/23 [id number]

## 2023-03-25 NOTE — CASE MANAGEMENT/SOCIAL WORK
"Discharge Planning Assessment  [facility]     Patient Name: Piper Cain  MRN: [id number]  Today's Date: 3/25/2023    Admit Date: 3/25/2023        Discharge Needs Assessment    No documentation.                Discharge Plan     Row Name 03/25/23 1458       Plan    Plan Comments Call placed to ER provider to advise of non par insurance for pt- was advised she will be admitted.- Entered room, introduced self and explained role w/PPE In place on self, patient and significant other at bedside; discussed probability of admission to hospital and non par insurance and guidelines related to coverage- patient advises she has been admitted here before with same insurance- she planned on switching to \"[place] Medicaid\"- Patient also noted that her insurance \"allows her to stay here a certain \"amount of days each year\" therefore she has decided to stay at [facility]. Advised I am unaware of her individual policy- discussed notice of noncoverage- patient signed notice- provided patient with copies of form.Updated ER provider              Continued Care and Services - Admitted Since 3/25/2023    Coordination has not been started for this encounter.          Demographic Summary    No documentation.                Functional Status    No documentation.                Psychosocial    No documentation.                Abuse/Neglect    No documentation.                Legal    No documentation.                Substance Abuse    No documentation.                Patient Forms    No documentation.                   Kavitha Alegria RN    "

## 2023-03-25 NOTE — Clinical Note
Level of Care: Telemetry [5]   Diagnosis: Adrenal hemorrhage (HCC) [674504]   Admitting Physician: ALLISON CONWAY [[id number]]   Attending Physician: ALLISON CONWAY [[id number]]

## 2023-03-25 NOTE — ED NOTES
Patient to ER via car for abd pain that goes all over her body x a few weeks    Patient states she has pancreatitis    Patient wearing mask this RN in PPE

## 2023-03-26 LAB
ALBUMIN SERPL-MCNC: 3.6 G/DL (ref 3.5–5.2)
ALBUMIN/GLOB SERPL: 1.2 G/DL
ALP SERPL-CCNC: 64 U/L (ref 39–117)
ALT SERPL W P-5'-P-CCNC: <5 U/L (ref 1–33)
ANION GAP SERPL CALCULATED.3IONS-SCNC: 9 MMOL/L (ref 5–15)
AST SERPL-CCNC: 5 U/L (ref 1–32)
BILIRUB SERPL-MCNC: 0.2 MG/DL (ref 0–1.2)
BUN SERPL-MCNC: 6 MG/DL (ref 6–20)
BUN/CREAT SERPL: 10.7 (ref 7–25)
CALCIUM SPEC-SCNC: 8.8 MG/DL (ref 8.6–10.5)
CHLORIDE SERPL-SCNC: 101 MMOL/L (ref 98–107)
CO2 SERPL-SCNC: 28 MMOL/L (ref 22–29)
CORTIS SERPL-MCNC: 16.5 MCG/DL
CORTIS SERPL-MCNC: 23.7 MCG/DL
CORTIS SERPL-MCNC: 3.96 MCG/DL
CREAT SERPL-MCNC: 0.56 MG/DL (ref 0.57–1)
EGFRCR SERPLBLD CKD-EPI 2021: 118.5 ML/MIN/1.73
GLOBULIN UR ELPH-MCNC: 3.1 GM/DL
GLUCOSE SERPL-MCNC: 98 MG/DL (ref 65–99)
POTASSIUM SERPL-SCNC: 4.2 MMOL/L (ref 3.5–5.2)
PROT SERPL-MCNC: 6.7 G/DL (ref 6–8.5)
SODIUM SERPL-SCNC: 138 MMOL/L (ref 136–145)
VIT B12 BLD-MCNC: 670 PG/ML (ref 211–946)

## 2023-03-26 PROCEDURE — 25010000002 KETOROLAC TROMETHAMINE PER 15 MG: Performed by: NURSE PRACTITIONER

## 2023-03-26 PROCEDURE — 80053 COMPREHEN METABOLIC PANEL: CPT | Performed by: INTERNAL MEDICINE

## 2023-03-26 PROCEDURE — 82607 VITAMIN B-12: CPT | Performed by: INTERNAL MEDICINE

## 2023-03-26 PROCEDURE — 25010000002 HYDROMORPHONE 1 MG/ML SOLUTION: Performed by: INTERNAL MEDICINE

## 2023-03-26 PROCEDURE — 82533 TOTAL CORTISOL: CPT | Performed by: INTERNAL MEDICINE

## 2023-03-26 PROCEDURE — 63710000001 ONDANSETRON PER 8 MG: Performed by: INTERNAL MEDICINE

## 2023-03-26 PROCEDURE — 82024 ASSAY OF ACTH: CPT | Performed by: INTERNAL MEDICINE

## 2023-03-26 PROCEDURE — 25010000002 ONDANSETRON PER 1 MG: Performed by: INTERNAL MEDICINE

## 2023-03-26 PROCEDURE — 25010000002 COSYNTROPIN PER 0.25 MG: Performed by: INTERNAL MEDICINE

## 2023-03-26 RX ORDER — KETOROLAC TROMETHAMINE 15 MG/ML
15 INJECTION, SOLUTION INTRAMUSCULAR; INTRAVENOUS ONCE
Status: COMPLETED | OUTPATIENT
Start: 2023-03-26 | End: 2023-03-26

## 2023-03-26 RX ADMIN — HYDROMORPHONE HYDROCHLORIDE 1 MG: 1 INJECTION, SOLUTION INTRAMUSCULAR; INTRAVENOUS; SUBCUTANEOUS at 23:41

## 2023-03-26 RX ADMIN — HYDROMORPHONE HYDROCHLORIDE 1 MG: 1 INJECTION, SOLUTION INTRAMUSCULAR; INTRAVENOUS; SUBCUTANEOUS at 01:13

## 2023-03-26 RX ADMIN — DOCUSATE SODIUM 50MG AND SENNOSIDES 8.6MG 2 TABLET: 8.6; 5 TABLET, FILM COATED ORAL at 20:39

## 2023-03-26 RX ADMIN — OXYCODONE HYDROCHLORIDE AND ACETAMINOPHEN 1 TABLET: 10; 325 TABLET ORAL at 16:14

## 2023-03-26 RX ADMIN — HYDROMORPHONE HYDROCHLORIDE 1 MG: 1 INJECTION, SOLUTION INTRAMUSCULAR; INTRAVENOUS; SUBCUTANEOUS at 11:21

## 2023-03-26 RX ADMIN — KETOROLAC TROMETHAMINE 15 MG: 15 INJECTION, SOLUTION INTRAMUSCULAR; INTRAVENOUS at 06:21

## 2023-03-26 RX ADMIN — HYDROMORPHONE HYDROCHLORIDE 1 MG: 1 INJECTION, SOLUTION INTRAMUSCULAR; INTRAVENOUS; SUBCUTANEOUS at 20:39

## 2023-03-26 RX ADMIN — OXYCODONE HYDROCHLORIDE AND ACETAMINOPHEN 1 TABLET: 10; 325 TABLET ORAL at 19:58

## 2023-03-26 RX ADMIN — ONDANSETRON HYDROCHLORIDE 4 MG: 4 TABLET, FILM COATED ORAL at 16:14

## 2023-03-26 RX ADMIN — HYDROMORPHONE HYDROCHLORIDE 1 MG: 1 INJECTION, SOLUTION INTRAMUSCULAR; INTRAVENOUS; SUBCUTANEOUS at 14:11

## 2023-03-26 RX ADMIN — ONDANSETRON 4 MG: 2 INJECTION INTRAMUSCULAR; INTRAVENOUS at 01:13

## 2023-03-26 RX ADMIN — Medication 10 ML: at 09:00

## 2023-03-26 RX ADMIN — SODIUM CHLORIDE 150 ML/HR: 9 INJECTION, SOLUTION INTRAVENOUS at 20:39

## 2023-03-26 RX ADMIN — AMITRIPTYLINE HYDROCHLORIDE 10 MG: 10 TABLET, FILM COATED ORAL at 20:39

## 2023-03-26 RX ADMIN — Medication 100 MG: at 16:14

## 2023-03-26 RX ADMIN — SODIUM CHLORIDE 150 ML/HR: 9 INJECTION, SOLUTION INTRAVENOUS at 12:41

## 2023-03-26 RX ADMIN — HYDROMORPHONE HYDROCHLORIDE 1 MG: 1 INJECTION, SOLUTION INTRAMUSCULAR; INTRAVENOUS; SUBCUTANEOUS at 09:13

## 2023-03-26 RX ADMIN — Medication 5000 UNITS: at 16:14

## 2023-03-26 RX ADMIN — HYDROMORPHONE HYDROCHLORIDE 1 MG: 1 INJECTION, SOLUTION INTRAMUSCULAR; INTRAVENOUS; SUBCUTANEOUS at 06:27

## 2023-03-26 RX ADMIN — COSYNTROPIN 0.25 MG: 0.25 INJECTION, POWDER, LYOPHILIZED, FOR SOLUTION INTRAMUSCULAR; INTRAVENOUS at 06:00

## 2023-03-26 RX ADMIN — HYDROMORPHONE HYDROCHLORIDE 1 MG: 1 INJECTION, SOLUTION INTRAMUSCULAR; INTRAVENOUS; SUBCUTANEOUS at 03:33

## 2023-03-26 RX ADMIN — Medication 1000 MCG: at 16:14

## 2023-03-26 NOTE — PROGRESS NOTES
Name: Piper Cain ADMIT: 3/25/2023   : 1980  PCP: Rachell Pozo APRN    MRN: [id number] LOS: 1 days   AGE/SEX: 42 y.o. female  ROOM: [facility]     Subjective   Subjective   No acute events overnight. No surgical intervention required per urology,     Review of Systems   Respiratory: Negative for chest tightness and shortness of breath.    Cardiovascular: Negative for chest pain and palpitations.   Gastrointestinal: Positive for abdominal pain and constipation.        Objective   Objective   Vital Signs  Temp:  [98 °F (36.7 °C)-98.5 °F (36.9 °C)] 98.4 °F (36.9 °C)  Heart Rate:  [] 101  Resp:  [18-22] 22  BP: (103-138)/(71-92) 103/71  SpO2:  [96 %-100 %] 100 %  on   ;   Device (Oxygen Therapy): room air  Body mass index is 19.2 kg/m².  Physical Exam    General: Alert and oriented x3, no acute distress  HEENT: Normocephalic, atraumatic  CV: Regular rate and rhythm, no murmurs rubs or gallops  Lungs: Clear to auscultation bilaterally, no crackles or wheezes  Abdomen: Soft, nontender, nondistended  Neuro: CN II-XII intact, no FND appreciated     Results Review     I reviewed the patient's new clinical results.  Results from last 7 days   Lab Units 23  1700   WBC 10*3/mm3 11.22*   HEMOGLOBIN g/dL 13.9   PLATELETS 10*3/mm3 249     Results from last 7 days   Lab Units 23  0559 23  1700   SODIUM mmol/L 138 137   POTASSIUM mmol/L 4.2 3.8   CHLORIDE mmol/L 101 98   CO2 mmol/L 28.0 25.5   BUN mg/dL 6 6   CREATININE mg/dL 0.56* 0.61   GLUCOSE mg/dL 98 119*   Estimated Creatinine Clearance: 124.4 mL/min (A) (by C-G formula based on SCr of 0.56 mg/dL (L)).  Results from last 7 days   Lab Units 23  0559 23  1700   ALBUMIN g/dL 3.6 4.1   BILIRUBIN mg/dL 0.2 0.3   ALK PHOS U/L 64 77   AST (SGOT) U/L 5 6   ALT (SGPT) U/L <5 15     Results from last 7 days   Lab Units 23  0559 23  1700   CALCIUM mg/dL 8.8 10.4   ALBUMIN g/dL 3.6 4.1       COVID19   Date Value Ref  Range Status   08/07/2022 Not Detected Not Detected - Ref. Range Final     No results found for: HGBA1C, POCGLU    CT Abdomen Pelvis With Contrast  Narrative: CT ABDOMEN AND PELVIS WITH IV CONTRAST     HISTORY: Abdominal pain with nausea and vomiting.     TECHNIQUE:  CT includes axial imaging from the lung bases to the  trochanters with intravenous contrast and with use of oral contrast.  Data reconstructed in coronal and sagittal planes. Radiation dose  reduction techniques were utilized, including automated exposure control  and exposure modulation based on body size.     COMPARISON: CT abdomen and pelvis with IV contrast 11/30/2022.     FINDINGS: There is a peripherally enhancing fluid collection centered  between the tail of the pancreatic tail and the anterior left kidney  measuring 4.6 x 3.8 cm and this is increased in size from 3.5 x 2.9 cm  on the prior CT 11/30/2022. Above this collection there is developed 2  new peripheral enhancing collections that are along the posterior margin  of the upper gastric body and gastric fundus and these collections  measure 1.7 and 2.1 cm. There is indentation along the posterior gastric  wall with adjacent gastric wall thickening.     There is developed new hyperdensity involving the left adrenal gland  which is abnormally enlarged and exhibits heterogenous appearance  measuring 4 x 2.6 cm. Left adrenal gland was normal on the prior exam  and this new hyperdensity and heterogenous appearance was large and  suspected to be due to an acute left adrenal hemorrhage. There is  surrounding stranding within the periadrenal fat and within the fat  extending above the upper pole of the left kidney. There is also mild  fluid density extending adjacent to the spleen and surrounding Gerota's  fascia.     The left renal vein appears narrowed. There is cortical thinning along  the anterior margin of the left kidney. Right kidney appears within  normal limits. Right adrenal gland is  normal. The liver, gallbladder  appear within normal limits. There are multiple pancreatic  calcifications associated with uncinate process of the pancreatic head  and there is dilatation of the pancreatic duct that appears similar to  the previous exam.     There is no bowel dilatation or evidence for bowel obstruction.  Atherosclerotic calcifications are present involving the abdominal aorta  and iliac vasculature.     Impression: 1. Since the prior CT approximately 4 months ago there has developed  abnormal enlargement with heterogenous appearance of the left adrenal  gland measuring 4 x 2.6 cm and this is suspected to represent acute left  adrenal hemorrhage. There is adjacent stranding within the fat and fluid  partially surrounds Gerota's fascia. Recommend follow-up CT evaluation.  2. Collection between the pancreatic tail and left kidney measures  increased in size compared to the prior exam now measuring 4.6 cm. There  is developed 2 new collections above this along the posterior margin of  the stomach associated with gastric wall. There is associated gastric  wall thickening/secondary gastritis.  3. Chronic pancreatic calcifications and pancreatic ductal enlargement  without change.  4. Narrowing of the left renal vein where there is surrounding edema and  stranding within the fat. Left renal vein collaterals are present  similar to the previous exam.     Discussed with Dr. Edwards in the emergency department on 03/25/2023 8:30  PM.     Radiation dose reduction techniques were utilized, including automated  exposure control and exposure modulation based on body size.     This report was finalized on 3/25/2023 10:30 PM by Dr. Angel Mcleod M.D.       Scheduled Medications  amitriptyline, 10 mg, Oral, Nightly  cholecalciferol, 5,000 Units, Oral, Daily  folic acid, 1,000 mcg, Oral, Daily  pancrelipase (Lip-Prot-Amyl), 12,000 units of lipase, Oral, TID With Meals  pantoprazole, 40 mg, Oral,  Daily  senna-docusate sodium, 2 tablet, Oral, BID  sodium chloride, 10 mL, Intravenous, Q12H  thiamine, 100 mg, Oral, Daily  vitamin B-12, 1,000 mcg, Oral, Daily    Infusions  sodium chloride, 150 mL/hr, Last Rate: 150 mL/hr (03/26/23 1241)    Diet  NPO Diet NPO Type: Strict NPO       Assessment/Plan     Active Hospital Problems    Diagnosis  POA   • **Adrenal hemorrhage (HCC) [E27.49]  Yes   • Left flank pain [R10.9]  Yes   • Pancreatic pseudocyst [K86.3]  Yes   • GERD without esophagitis [K21.9]  Yes   • Chronic pancreatitis (HCC) [K86.1]  Yes   • Anemia of chronic disease [D63.8]  Yes   • Epigastric pain [R10.13]  Yes      Resolved Hospital Problems   No resolved problems to display.       42 y.o. female admitted with Adrenal hemorrhage (HCC).    Adrenal hemorrhage  Evaluated by urology.  No need for any surgical intervention.  Will need repeat CT scan in the near future.  Cosyntropin stimulation test ordered to rule out adrenal insufficiency.  Cortisol level was 24, 1 hour after administration of cosyntropin, findings revealed that she is not adrenally insufficient.  However, if she becomes hemodynamically unstable or hypotensive she will need IV Solu-Cortef.  There are no signs of adrenal insufficiency.    Chronic pancreatitis  Pancreatic pseudocyst  IV fluids and IV medications for pain control  General Surgery consulted  Continue pancrelipase    SCDs for DVT prophylaxis  Full code  Discussed with patient and nursing staff  Dispo: Home, timing to be determined        Hipolito Quintana MD  [facility]  03/26/23  12:53 EDT    I wore protective equipment throughout this patient encounter including a face mask, gloves and protective eyewear.  Hand hygiene was performed before donning protective equipment and after removal when leaving the room.

## 2023-03-26 NOTE — PLAN OF CARE
Problem: Adult Inpatient Plan of Care  Goal: Plan of Care Review  Outcome: Ongoing, Progressing  Goal: Patient-Specific Goal (Individualized)  Outcome: Ongoing, Progressing  Goal: Absence of Hospital-Acquired Illness or Injury  Outcome: Ongoing, Progressing  Goal: Optimal Comfort and Wellbeing  Outcome: Ongoing, Progressing  Goal: Readiness for Transition of Care  Outcome: Ongoing, Progressing  Intervention: Mutually Develop Transition Plan  Recent Flowsheet Documentation  Taken 3/25/2023 2313 by Miley Middleton, RN  Transportation Anticipated: family or friend will provide  Patient/Family Anticipated Services at Transition: none  Patient/Family Anticipates Transition to: home with family  Taken 3/25/2023 2311 by Miley Middleton, RN  Equipment Currently Used at Home: none     Problem: Pain Acute  Goal: Acceptable Pain Control and Functional Ability  Outcome: Ongoing, Progressing     Problem: Fluid Imbalance (Pancreatitis)  Goal: Fluid Balance  Outcome: Ongoing, Progressing     Problem: Infection (Pancreatitis)  Goal: Infection Symptom Resolution  Outcome: Ongoing, Progressing     Problem: Nutrition Impaired (Pancreatitis)  Goal: Optimal Nutrition Intake  Outcome: Ongoing, Progressing     Problem: Pain (Pancreatitis)  Goal: Acceptable Pain Control  Outcome: Ongoing, Progressing     Problem: Respiratory Compromise (Pancreatitis)  Goal: Effective Oxygenation and Ventilation  Outcome: Ongoing, Progressing   Goal Outcome Evaluation:

## 2023-03-26 NOTE — CONSULTS
Urology Consult Note    Patient:Piper Cain :1980  Room:[facility]  Admit Date3/  Age:42 y.o.     SEX:female     DOS:3/26/2023     MR:[id number]     Visit:[id number]       Attending: Hipolito Quintana MD  Referring Provider: SOL Pickard  Reason for Consultation: Adrenal Hemorrhage    Patient Care Team:  Rachell Pozo APRN as PCP - General (Family Medicine)    Chief complaint Left flank pain    Subjective .     History of present illness:    Patient reports having worsening left sided pain for the last 2 weeks. Radiates from her side to her mid abdomen and to her left flank. Has history of chronic pancreatitis. Reports alcohol cessation approximately 3 months ago.  Denies any gross hematuria or dysuria, occasional malodorous urine.     Review of Systems  Constitutional:  Flank pain  Opthalmalogic: No change in vision or blurred vision  Otolaryngeal:  No epistaxis  Cardiovascular: No recent chest pain  Pulmonary:  No cough, sputum production, hemoptysis  Gastrointestinal: No melena, hematochezia, BRBPR, hematemesis  Genitourinary:  See HPI  Hematologic:  No tendency for easy bruising  Musculoskeletal: Left sided pain  Neurologic:  No Seizures, new focal deficits      History  Past Medical History:   Diagnosis Date   • Anxiety    • E. coli bacteremia    • ETOH abuse    • GERD (gastroesophageal reflux disease)    • Hiatal hernia    • Left flank pain 10/21/2022   • Migraine    • Miscarriage    • Pancreatitis      Past Surgical History:   Procedure Laterality Date   • ENDOSCOPY N/A 8/10/2022    Procedure: ESOPHAGOGASTRODUODENOSCOPY with bxs;  Surgeon: Salvador Price MD;  Location: [facility] ENDOSCOPY;  Service: Gastroenterology;  Laterality: N/A;  Pre: r/o esophageal  varacies, abd pain  Post: esophageal plaque     Social History     Socioeconomic History   • Marital status: Single   Tobacco Use   • Smoking status: Every Day     Packs/day: 0.50     Types: Cigarettes     Start date:  1/28/1996   • Smokeless tobacco: Current   Vaping Use   • Vaping Use: Never used   Substance and Sexual Activity   • Alcohol use: Never   • Drug use: No   • Sexual activity: Yes     Partners: Male     Birth control/protection: None     Comment: IUD removed 6/29/2017     Family History   Problem Relation Age of Onset   • Alcohol abuse Mother    • Arthritis Mother    • Asthma Mother    • Miscarriages / Stillbirths Mother    • Cancer Father    • Diabetes Father    • Drug abuse Father    • Early death Father    • Heart disease Father    • Hyperlipidemia Father    • Hypertension Father    • Alcohol abuse Paternal Aunt    • Cancer Paternal Aunt    • Diabetes Paternal Aunt    • Drug abuse Paternal Aunt    • Early death Paternal Aunt    • Heart disease Paternal Aunt    • Hyperlipidemia Paternal Aunt    • Hypertension Paternal Aunt    • Alcohol abuse Maternal Grandmother    • Alcohol abuse Maternal Grandfather    • Alcohol abuse Paternal Grandmother    • Cancer Paternal Grandmother    • Diabetes Paternal Grandmother    • Drug abuse Paternal Grandmother    • Early death Paternal Grandmother    • Heart disease Paternal Grandmother    • Hyperlipidemia Paternal Grandmother    • Hypertension Paternal Grandmother    • Alcohol abuse Paternal Grandfather      Allergies   Allergen Reactions   • Nickel    • Hydrocodone-Acetaminophen Nausea And Vomiting     Prior to Admission medications    Medication Sig Start Date End Date Taking? Authorizing Provider   amitriptyline (ELAVIL) 10 MG tablet Take 1 tablet by mouth Every Night.   Yes ProviderChanelle MD   Cyanocobalamin (VITAMIN B 12 PO) Take  by mouth.   Yes ProviderChanelle MD   Ferrous Sulfate Dried (Feosol) 200 (65 Fe) MG tablet tablet Take 1 tablet by mouth Daily.   Yes ProviderChanelle MD   folic acid (FOLVITE) 1 MG tablet Take 1 tablet by mouth Daily. 12/30/22  Yes    ondansetron (ZOFRAN) 4 MG tablet Take 1 tablet by mouth Every 8 (Eight) Hours As Needed for Nausea  "or Vomiting.   Yes Chanelle Osorio MD   pancrelipase, Lip-Prot-Amyl, (CREON) 62965-96615 units capsule delayed-release particles capsule Take 3 capsules by mouth 3 (Three) Times a Day With Meals.   Yes Chanelle Osorio MD   pantoprazole (PROTONIX) 40 MG EC tablet Take 1 tablet by mouth Daily. 22  Yes    Cholecalciferol (VITAMIN D3) 5000 units capsule capsule Take 5,000 Units by mouth Daily.    Chanelle Osorio MD   sennosides-docusate (PERICOLACE) 8.6-50 MG per tablet Take 2 tablets by mouth 2 (Two) Times a Day As Needed for Constipation. 22   Nancy Glass APRN   thiamine (VITAMIN B-1) 100 MG tablet  tablet Take 100 mg by mouth Daily.    Chanelle Osorio MD         Objective     tMax 24 hours:  Temp (24hrs), Av.3 °F (36.8 °C), Min:98 °F (36.7 °C), Max:98.5 °F (36.9 °C)    Vital Sign Ranges:  Temp:  [98 °F (36.7 °C)-98.5 °F (36.9 °C)] 98.4 °F (36.9 °C)  Heart Rate:  [] 101  Resp:  [18-22] 22  BP: (103-138)/(71-92) 103/71  Intake and Output Last 3 Shifts:  I/O last 3 completed shifts:  In: 1480 [P.O.:480; IV Piggyback:1000]  Out: 625 [Urine:625]      Physical Exam:     Vital signs: /71 (BP Location: Right arm, Patient Position: Lying)   Pulse 101   Temp 98.4 °F (36.9 °C) (Oral)   Resp 22   Ht 175.3 cm (69\")   Wt 59 kg (130 lb)   LMP 2018 (Exact Date)   SpO2 100%   BMI 19.20 kg/m²   100%     General: Well developed, well nourished, alert and oriented x 3    Appears stated age. No apparent distress.    HEENT: Moist mucous membranes of the oral mucosa & nasal mucosa.    Lips are not cyanotic.    Extraocular movements intact    Neck:  Trachea position is midline.     Respiratory: Respiratory rhythm & depth: Normal.    Respiratory effort:  Normal.      GI:  Nondistended. Nontender.    Skin:  Inspection: No rash.    Palpation:  Warm, dry. No induration.     Extremities: No clubbing & no cyanosis.    No edema    :  Exam deferred           Results " Review:     Lab Results (last 24 hours)     Procedure Component Value Units Date/Time    Cortisol [[id number]] Collected: 03/26/23 0659    Specimen: Blood Updated: 03/26/23 0752     Cortisol 23.70 mcg/dL     Narrative:      Cortisol Reference Ranges:    Cortisol 6AM - 10AM Range: 6.02-18.40 mcg/dl  Cortisol 4PM - 8PM Range: 2.68-10.50 mcg/dl      Results may be falsely increased if patient taking Biotin.      Cortisol [[id number]] Collected: 03/26/23 0629    Specimen: Blood Updated: 03/26/23 0730     Cortisol 16.50 mcg/dL     Narrative:      Cortisol Reference Ranges:    Cortisol 6AM - 10AM Range: 6.02-18.40 mcg/dl  Cortisol 4PM - 8PM Range: 2.68-10.50 mcg/dl      Results may be falsely increased if patient taking Biotin.      Vitamin B12 [[id number]]  (Normal) Collected: 03/26/23 0559    Specimen: Blood Updated: 03/26/23 0655     Vitamin B-12 670 pg/mL     Narrative:      Results may be falsely increased if patient taking Biotin.      Cortisol [[id number]] Collected: 03/26/23 0559    Specimen: Blood Updated: 03/26/23 0641     Cortisol 3.96 mcg/dL     Narrative:      Cortisol Reference Ranges:    Cortisol 6AM - 10AM Range: 6.02-18.40 mcg/dl  Cortisol 4PM - 8PM Range: 2.68-10.50 mcg/dl      Results may be falsely increased if patient taking Biotin.      Comprehensive Metabolic Panel [[id number]]  (Abnormal) Collected: 03/26/23 0559    Specimen: Blood Updated: 03/26/23 0635     Glucose 98 mg/dL      BUN 6 mg/dL      Creatinine 0.56 mg/dL      Sodium 138 mmol/L      Potassium 4.2 mmol/L      Chloride 101 mmol/L      CO2 28.0 mmol/L      Calcium 8.8 mg/dL      Total Protein 6.7 g/dL      Albumin 3.6 g/dL      ALT (SGPT) <5 U/L      AST (SGOT) 5 U/L      Alkaline Phosphatase 64 U/L      Total Bilirubin 0.2 mg/dL      Globulin 3.1 gm/dL      A/G Ratio 1.2 g/dL      BUN/Creatinine Ratio 10.7     Anion Gap 9.0 mmol/L      eGFR 118.5 mL/min/1.73     Narrative:      GFR Normal >60  Chronic Kidney Disease <60  Kidney Failure <15       ACTH [[id number]] Collected: 03/26/23 0559    Specimen: Blood Updated: 03/26/23 0610    Urine Drug Screen - Urine, Clean Catch [[id number]]  (Abnormal) Collected: 03/25/23 1722    Specimen: Urine, Clean Catch Updated: 03/25/23 1813     Amphet/Methamphet, Screen Negative     Barbiturates Screen, Urine Negative     Benzodiazepine Screen, Urine Negative     Cocaine Screen, Urine Negative     Opiate Screen Negative     THC, Screen, Urine Negative     Methadone Screen, Urine Negative     Oxycodone Screen, Urine Positive    Narrative:      Negative Thresholds Per Drugs Screened:    Amphetamines                 500 ng/ml  Barbiturates                 200 ng/ml  Benzodiazepines              100 ng/ml  Cocaine                      300 ng/ml  Methadone                    300 ng/ml  Opiates                      300 ng/ml  Oxycodone                    100 ng/ml  THC                           50 ng/ml    The Normal Value for all drugs tested is negative. This report includes final unconfirmed screening results to be used for medical treatment purposes only. Unconfirmed results must not be used for non-medical purposes such as employment or legal testing. Clinical consideration should be applied to any drug of abuse test, particularly when unconfirmed results are used.            Urinalysis With Microscopic If Indicated (No Culture) - Urine, Clean Catch [[id number]]  (Abnormal) Collected: 03/25/23 1722    Specimen: Urine, Clean Catch Updated: 03/25/23 1806     Color, UA Yellow     Appearance, UA Clear     pH, UA 7.5     Specific Gravity, UA 1.006     Glucose, UA Negative     Ketones, UA Negative     Bilirubin, UA Negative     Blood, UA Negative     Protein, UA Negative     Leuk Esterase, UA Moderate (2+)     Nitrite, UA Negative     Urobilinogen, UA 0.2 E.U./dL    Urinalysis, Microscopic Only - Urine, Clean Catch [[id number]]  (Abnormal) Collected: 03/25/23 1722    Specimen: Urine, Clean Catch Updated: 03/25/23 1806     RBC, UA  0-2 /HPF      WBC, UA 21-30 /HPF      Bacteria, UA 4+ /HPF      Squamous Epithelial Cells, UA 3-6 /HPF      Hyaline Casts, UA None Seen /LPF      Methodology Automated Microscopy    Lipase [[id number]]  (Abnormal) Collected: 03/25/23 1700    Specimen: Blood from Arm, Right Updated: 03/25/23 1731     Lipase 192 U/L     Comprehensive Metabolic Panel [[id number]]  (Abnormal) Collected: 03/25/23 1700    Specimen: Blood from Arm, Right Updated: 03/25/23 1731     Glucose 119 mg/dL      BUN 6 mg/dL      Creatinine 0.61 mg/dL      Sodium 137 mmol/L      Potassium 3.8 mmol/L      Comment: Slight hemolysis detected by analyzer. Results may be affected.        Chloride 98 mmol/L      CO2 25.5 mmol/L      Calcium 10.4 mg/dL      Total Protein 7.9 g/dL      Albumin 4.1 g/dL      ALT (SGPT) 15 U/L      AST (SGOT) 6 U/L      Alkaline Phosphatase 77 U/L      Total Bilirubin 0.3 mg/dL      Globulin 3.8 gm/dL      A/G Ratio 1.1 g/dL      BUN/Creatinine Ratio 9.8     Anion Gap 13.5 mmol/L      eGFR 116.1 mL/min/1.73     Narrative:      GFR Normal >60  Chronic Kidney Disease <60  Kidney Failure <15      Ethanol [[id number]] Collected: 03/25/23 1700    Specimen: Blood from Arm, Right Updated: 03/25/23 1731     Ethanol <10 mg/dL      Ethanol % <0.010 %     CBC & Differential [[id number]]  (Abnormal) Collected: 03/25/23 1700    Specimen: Blood from Arm, Right Updated: 03/25/23 1712    Narrative:      The following orders were created for panel order CBC & Differential.  Procedure                               Abnormality         Status                     ---------                               -----------         ------                     CBC Auto Differential[[id number]]        Abnormal            Final result                 Please view results for these tests on the individual orders.    CBC Auto Differential [[id number]]  (Abnormal) Collected: 03/25/23 1700    Specimen: Blood from Arm, Right Updated: 03/25/23 1712     WBC 11.22 10*3/mm3       RBC 3.99 10*6/mm3      Hemoglobin 13.9 g/dL      Hematocrit 40.7 %      .0 fL      MCH 34.8 pg      MCHC 34.2 g/dL      RDW 13.3 %      RDW-SD 51.2 fl      MPV 9.6 fL      Platelets 249 10*3/mm3      Neutrophil % 75.5 %      Lymphocyte % 13.0 %      Monocyte % 9.8 %      Eosinophil % 1.2 %      Basophil % 0.2 %      Immature Grans % 0.3 %      Neutrophils, Absolute 8.48 10*3/mm3      Lymphocytes, Absolute 1.46 10*3/mm3      Monocytes, Absolute 1.10 10*3/mm3      Eosinophils, Absolute 0.13 10*3/mm3      Basophils, Absolute 0.02 10*3/mm3      Immature Grans, Absolute 0.03 10*3/mm3      nRBC 0.0 /100 WBC          No results found for: URINECX     Imaging Results (Last 7 Days)     Procedure Component Value Units Date/Time    CT Abdomen Pelvis With Contrast [[id number]] Collected: 03/25/23 2045     Updated: 03/25/23 2233    Narrative:      CT ABDOMEN AND PELVIS WITH IV CONTRAST     HISTORY: Abdominal pain with nausea and vomiting.     TECHNIQUE:  CT includes axial imaging from the lung bases to the  trochanters with intravenous contrast and with use of oral contrast.  Data reconstructed in coronal and sagittal planes. Radiation dose  reduction techniques were utilized, including automated exposure control  and exposure modulation based on body size.     COMPARISON: CT abdomen and pelvis with IV contrast 11/30/2022.     FINDINGS: There is a peripherally enhancing fluid collection centered  between the tail of the pancreatic tail and the anterior left kidney  measuring 4.6 x 3.8 cm and this is increased in size from 3.5 x 2.9 cm  on the prior CT 11/30/2022. Above this collection there is developed 2  new peripheral enhancing collections that are along the posterior margin  of the upper gastric body and gastric fundus and these collections  measure 1.7 and 2.1 cm. There is indentation along the posterior gastric  wall with adjacent gastric wall thickening.     There is developed new hyperdensity involving the left  adrenal gland  which is abnormally enlarged and exhibits heterogenous appearance  measuring 4 x 2.6 cm. Left adrenal gland was normal on the prior exam  and this new hyperdensity and heterogenous appearance was large and  suspected to be due to an acute left adrenal hemorrhage. There is  surrounding stranding within the periadrenal fat and within the fat  extending above the upper pole of the left kidney. There is also mild  fluid density extending adjacent to the spleen and surrounding Gerota's  fascia.     The left renal vein appears narrowed. There is cortical thinning along  the anterior margin of the left kidney. Right kidney appears within  normal limits. Right adrenal gland is normal. The liver, gallbladder  appear within normal limits. There are multiple pancreatic  calcifications associated with uncinate process of the pancreatic head  and there is dilatation of the pancreatic duct that appears similar to  the previous exam.     There is no bowel dilatation or evidence for bowel obstruction.  Atherosclerotic calcifications are present involving the abdominal aorta  and iliac vasculature.       Impression:      1. Since the prior CT approximately 4 months ago there has developed  abnormal enlargement with heterogenous appearance of the left adrenal  gland measuring 4 x 2.6 cm and this is suspected to represent acute left  adrenal hemorrhage. There is adjacent stranding within the fat and fluid  partially surrounds Gerota's fascia. Recommend follow-up CT evaluation.  2. Collection between the pancreatic tail and left kidney measures  increased in size compared to the prior exam now measuring 4.6 cm. There  is developed 2 new collections above this along the posterior margin of  the stomach associated with gastric wall. There is associated gastric  wall thickening/secondary gastritis.  3. Chronic pancreatic calcifications and pancreatic ductal enlargement  without change.  4. Narrowing of the left renal vein  where there is surrounding edema and  stranding within the fat. Left renal vein collaterals are present  similar to the previous exam.     Discussed with Dr. Edwards in the emergency department on 03/25/2023 8:30  PM.     Radiation dose reduction techniques were utilized, including automated  exposure control and exposure modulation based on body size.     This report was finalized on 3/25/2023 10:30 PM by Dr. Angel Mcleod M.D.                Inpatient Meds:   Current Meds:       Scheduled Meds:amitriptyline, 10 mg, Oral, Nightly  cholecalciferol, 5,000 Units, Oral, Daily  folic acid, 1,000 mcg, Oral, Daily  pancrelipase (Lip-Prot-Amyl), 12,000 units of lipase, Oral, TID With Meals  pantoprazole, 40 mg, Oral, Daily  senna-docusate sodium, 2 tablet, Oral, BID  sodium chloride, 10 mL, Intravenous, Q12H  thiamine, 100 mg, Oral, Daily  vitamin B-12, 1,000 mcg, Oral, Daily       Continuous Infusions:sodium chloride, 150 mL/hr, Last Rate: 150 mL/hr (03/25/23 2301)       PRN Meds:.•  acetaminophen **OR** acetaminophen **OR** acetaminophen  •  HYDROmorphone **AND** naloxone  •  nitroglycerin  •  ondansetron **OR** ondansetron  •  oxyCODONE-acetaminophen  •  prochlorperazine  •  [COMPLETED] Insert Peripheral IV **AND** sodium chloride  •  sodium chloride  •  sodium chloride    Impression      Adrenal hemorrhage (HCC)    Epigastric pain    Chronic pancreatitis (HCC)    Anemia of chronic disease    GERD without esophagitis    Pancreatic pseudocyst    Left flank pain    Plan  Discussed repeating imaging in near future  Will follow  CT reviewed, AVSS  No acute surgical intervention at this time    I discussed the patients findings and my recommendations with patient.    Evelin Martínez, APRN  03/26/23  09:25 EDT

## 2023-03-26 NOTE — H&P
Patient Name:  Piper Cain  YOB: 1980  MRN:  [id number]  Admit Date:  3/25/2023  Patient Care Team:  Rachell Pozo APRN as PCP - General (Family Medicine)      Subjective   History Present Illness     Chief Complaint   Patient presents with   • Abdominal Pain     Ms. Cain is a 42 y.o. female smoker with a history of GERD, anxiety, alcohol abuse with resulting recurrent pancreatitis leading to chronic pancreatitis now complicated by pseudocyts.     She presents with abdominal pain- left flank and epigastric for a week or two. Different than her usual pancreatitis pain. Associated with N/V. Not improved with her home pain medications. Came to [facility] ER and found to have abnormal adrenal gland hemorrhage- likely related to her recurrent pancreatitis. Given multiple agents for pain IV and po with just partial improvement.     History of Present Illness  Review of Systems   Constitutional: Positive for appetite change. Negative for fever.   HENT: Negative.    Eyes: Negative.    Respiratory: Negative.    Cardiovascular: Negative for chest pain, palpitations and leg swelling.   Gastrointestinal: Positive for abdominal pain, nausea and vomiting.   Endocrine: Negative.    Genitourinary: Positive for flank pain. Negative for dysuria and frequency.   Musculoskeletal: Positive for back pain.   Skin: Negative.    Allergic/Immunologic: Negative.    Neurological: Negative.    Hematological: Negative.    Psychiatric/Behavioral: Negative.         Personal History     Past Medical History:   Diagnosis Date   • Anxiety    • E. coli bacteremia    • ETOH abuse    • GERD (gastroesophageal reflux disease)    • Hiatal hernia    • Left flank pain 10/21/2022   • Migraine    • Miscarriage 2004   • Pancreatitis      Past Surgical History:   Procedure Laterality Date   • ENDOSCOPY N/A 8/10/2022    Procedure: ESOPHAGOGASTRODUODENOSCOPY with bxs;  Surgeon: Salvador Price MD;  Location: [facility] ENDOSCOPY;  Service:  Gastroenterology;  Laterality: N/A;  Pre: r/o esophageal  varacies, abd pain  Post: esophageal plaque     Family History   Problem Relation Age of Onset   • Alcohol abuse Mother    • Arthritis Mother    • Asthma Mother    • Miscarriages / Stillbirths Mother    • Cancer Father    • Diabetes Father    • Drug abuse Father    • Early death Father    • Heart disease Father    • Hyperlipidemia Father    • Hypertension Father    • Alcohol abuse Paternal Aunt    • Cancer Paternal Aunt    • Diabetes Paternal Aunt    • Drug abuse Paternal Aunt    • Early death Paternal Aunt    • Heart disease Paternal Aunt    • Hyperlipidemia Paternal Aunt    • Hypertension Paternal Aunt    • Alcohol abuse Maternal Grandmother    • Alcohol abuse Maternal Grandfather    • Alcohol abuse Paternal Grandmother    • Cancer Paternal Grandmother    • Diabetes Paternal Grandmother    • Drug abuse Paternal Grandmother    • Early death Paternal Grandmother    • Heart disease Paternal Grandmother    • Hyperlipidemia Paternal Grandmother    • Hypertension Paternal Grandmother    • Alcohol abuse Paternal Grandfather      Social History     Tobacco Use   • Smoking status: Every Day     Packs/day: 0.50     Types: Cigarettes     Start date: 1/28/1996   • Smokeless tobacco: Current   Vaping Use   • Vaping Use: Never used   Substance Use Topics   • Alcohol use: Never   • Drug use: No     Medications Prior to Admission   Medication Sig Dispense Refill Last Dose   • amitriptyline (ELAVIL) 10 MG tablet Take 1 tablet by mouth Every Night.      • Cyanocobalamin (VITAMIN B 12 PO) Take  by mouth.      • Ferrous Sulfate Dried (Feosol) 200 (65 Fe) MG tablet tablet Take 1 tablet by mouth Daily.      • folic acid (FOLVITE) 1 MG tablet Take 1 tablet by mouth Daily. 30 tablet 3    • ondansetron (ZOFRAN) 4 MG tablet Take 1 tablet by mouth Every 8 (Eight) Hours As Needed for Nausea or Vomiting.      • oxyCODONE-acetaminophen (PERCOCET)  MG per tablet Take 1 tablet by  mouth Every 6 (Six) Hours As Needed for Moderate Pain . 10 tablet 0    • pancrelipase, Lip-Prot-Amyl, (CREON) 38570-14001 units capsule delayed-release particles capsule Take 3 capsules by mouth 3 (Three) Times a Day With Meals.      • pantoprazole (PROTONIX) 40 MG EC tablet Take 1 tablet by mouth Daily. 30 tablet 3    • Black Cohosh 540 MG capsule Take 1 capsule by mouth 2 (Two) Times a Day.      • Cholecalciferol (VITAMIN D3) 5000 units capsule capsule Take 5,000 Units by mouth Daily.      • polyethylene glycol 17 g packet Take 17 g by mouth Daily.      • sennosides-docusate (PERICOLACE) 8.6-50 MG per tablet Take 2 tablets by mouth 2 (Two) Times a Day As Needed for Constipation.      • thiamine (VITAMIN B-1) 100 MG tablet  tablet Take 100 mg by mouth Daily.        Allergies:    Allergies   Allergen Reactions   • Nickel    • Hydrocodone-Acetaminophen Nausea And Vomiting       Objective    Objective     Vital Signs  Temp:  [98 °F (36.7 °C)] 98 °F (36.7 °C)  Heart Rate:  [] 105  Resp:  [18] 18  BP: (117-138)/(76-92) 117/81  SpO2:  [96 %-98 %] 96 %  on   ;      Body mass index is 19.2 kg/m².    Physical Exam  Vitals and nursing note reviewed.   Constitutional:       General: She is in acute distress (in pain).      Appearance: She is well-developed.   HENT:      Head: Normocephalic and atraumatic.   Eyes:      General: No scleral icterus.     Pupils: Pupils are equal, round, and reactive to light.   Cardiovascular:      Rate and Rhythm: Normal rate and regular rhythm.      Heart sounds: Normal heart sounds. No murmur heard.  Pulmonary:      Effort: Pulmonary effort is normal.      Breath sounds: Normal breath sounds.   Abdominal:      General: Bowel sounds are normal. There is no distension.      Palpations: Abdomen is soft.      Tenderness: There is abdominal tenderness (epigastric and left flank). There is no guarding.   Musculoskeletal:      Cervical back: Normal range of motion and neck supple.   Skin:      General: Skin is warm and dry.      Findings: No rash.   Neurological:      Mental Status: She is alert and oriented to person, place, and time.      Cranial Nerves: No cranial nerve deficit.   Psychiatric:         Behavior: Behavior normal.         Thought Content: Thought content normal.         Results Review:  I reviewed the patient's new clinical results.  Discussed with ED provider.    Lab Results (last 24 hours)     Procedure Component Value Units Date/Time    CBC & Differential [[id number]]  (Abnormal) Collected: 03/25/23 1700    Specimen: Blood from Arm, Right Updated: 03/25/23 1712    Narrative:      The following orders were created for panel order CBC & Differential.  Procedure                               Abnormality         Status                     ---------                               -----------         ------                     CBC Auto Differential[[id number]]        Abnormal            Final result                 Please view results for these tests on the individual orders.    Comprehensive Metabolic Panel [[id number]]  (Abnormal) Collected: 03/25/23 1700    Specimen: Blood from Arm, Right Updated: 03/25/23 1731     Glucose 119 mg/dL      BUN 6 mg/dL      Creatinine 0.61 mg/dL      Sodium 137 mmol/L      Potassium 3.8 mmol/L      Comment: Slight hemolysis detected by analyzer. Results may be affected.        Chloride 98 mmol/L      CO2 25.5 mmol/L      Calcium 10.4 mg/dL      Total Protein 7.9 g/dL      Albumin 4.1 g/dL      ALT (SGPT) 15 U/L      AST (SGOT) 6 U/L      Alkaline Phosphatase 77 U/L      Total Bilirubin 0.3 mg/dL      Globulin 3.8 gm/dL      A/G Ratio 1.1 g/dL      BUN/Creatinine Ratio 9.8     Anion Gap 13.5 mmol/L      eGFR 116.1 mL/min/1.73     Narrative:      GFR Normal >60  Chronic Kidney Disease <60  Kidney Failure <15      Lipase [[id number]]  (Abnormal) Collected: 03/25/23 1700    Specimen: Blood from Arm, Right Updated: 03/25/23 1731     Lipase 192 U/L     CBC Auto  Differential [[id number]]  (Abnormal) Collected: 03/25/23 1700    Specimen: Blood from Arm, Right Updated: 03/25/23 1712     WBC 11.22 10*3/mm3      RBC 3.99 10*6/mm3      Hemoglobin 13.9 g/dL      Hematocrit 40.7 %      .0 fL      MCH 34.8 pg      MCHC 34.2 g/dL      RDW 13.3 %      RDW-SD 51.2 fl      MPV 9.6 fL      Platelets 249 10*3/mm3      Neutrophil % 75.5 %      Lymphocyte % 13.0 %      Monocyte % 9.8 %      Eosinophil % 1.2 %      Basophil % 0.2 %      Immature Grans % 0.3 %      Neutrophils, Absolute 8.48 10*3/mm3      Lymphocytes, Absolute 1.46 10*3/mm3      Monocytes, Absolute 1.10 10*3/mm3      Eosinophils, Absolute 0.13 10*3/mm3      Basophils, Absolute 0.02 10*3/mm3      Immature Grans, Absolute 0.03 10*3/mm3      nRBC 0.0 /100 WBC     Ethanol [[id number]] Collected: 03/25/23 1700    Specimen: Blood from Arm, Right Updated: 03/25/23 1731     Ethanol <10 mg/dL      Ethanol % <0.010 %     Urinalysis With Microscopic If Indicated (No Culture) - Urine, Clean Catch [[id number]]  (Abnormal) Collected: 03/25/23 1722    Specimen: Urine, Clean Catch Updated: 03/25/23 1806     Color, UA Yellow     Appearance, UA Clear     pH, UA 7.5     Specific Gravity, UA 1.006     Glucose, UA Negative     Ketones, UA Negative     Bilirubin, UA Negative     Blood, UA Negative     Protein, UA Negative     Leuk Esterase, UA Moderate (2+)     Nitrite, UA Negative     Urobilinogen, UA 0.2 E.U./dL    Urine Drug Screen - Urine, Clean Catch [[id number]]  (Abnormal) Collected: 03/25/23 1722    Specimen: Urine, Clean Catch Updated: 03/25/23 1813     Amphet/Methamphet, Screen Negative     Barbiturates Screen, Urine Negative     Benzodiazepine Screen, Urine Negative     Cocaine Screen, Urine Negative     Opiate Screen Negative     THC, Screen, Urine Negative     Methadone Screen, Urine Negative     Oxycodone Screen, Urine Positive    Narrative:      Negative Thresholds Per Drugs Screened:    Amphetamines                 500  ng/ml  Barbiturates                 200 ng/ml  Benzodiazepines              100 ng/ml  Cocaine                      300 ng/ml  Methadone                    300 ng/ml  Opiates                      300 ng/ml  Oxycodone                    100 ng/ml  THC                           50 ng/ml    The Normal Value for all drugs tested is negative. This report includes final unconfirmed screening results to be used for medical treatment purposes only. Unconfirmed results must not be used for non-medical purposes such as employment or legal testing. Clinical consideration should be applied to any drug of abuse test, particularly when unconfirmed results are used.            Urinalysis, Microscopic Only - Urine, Clean Catch [[id number]]  (Abnormal) Collected: 03/25/23 1722    Specimen: Urine, Clean Catch Updated: 03/25/23 1806     RBC, UA 0-2 /HPF      WBC, UA 21-30 /HPF      Bacteria, UA 4+ /HPF      Squamous Epithelial Cells, UA 3-6 /HPF      Hyaline Casts, UA None Seen /LPF      Methodology Automated Microscopy          Imaging Results (Last 24 Hours)     Procedure Component Value Units Date/Time    CT Abdomen Pelvis With Contrast [[id number]] Collected: 03/25/23 2045     Updated: 03/25/23 2045    Narrative:      CT ABDOMEN AND PELVIS WITH IV CONTRAST     HISTORY: Abdominal pain with nausea and vomiting.     TECHNIQUE:  CT includes axial imaging from the lung bases to the  trochanters with intravenous contrast and with use of oral contrast.  Data reconstructed in coronal and sagittal planes. Radiation dose  reduction techniques were utilized, including automated exposure control  and exposure modulation based on body size.     COMPARISON: CT abdomen and pelvis with IV contrast 11/30/2022.     FINDINGS: There is a peripherally enhancing fluid collection centered  between the tail of the pancreatic tail and the anterior left kidney  measuring 4.6 x 3.8 cm and this is increased in size from 3.5 x 2.9 cm  on the prior CT  11/30/2022. Above this collection there is developed 2  new peripheral enhancing collections that are along the posterior margin  of the upper gastric body and gastric fundus and these collections  measure 1.7 and 2.1 cm. There is indentation along the posterior gastric  wall with adjacent gastric wall thickening.     There is developed new hyperdensity involving the left adrenal gland  which is abnormally enlarged and exhibits heterogenous appearance  measuring 4 x 2.6 cm. Left adrenal gland was normal on the prior exam  and this new hyperdensity and heterogenous appearance was large and  suspected to be due to an acute left adrenal hemorrhage. There is  surrounding stranding within the periadrenal fat and within the fat  extending above the upper pole of the left kidney. There is also mild  fluid density extending adjacent to the spleen and surrounding Gerota's  fascia.     The left renal vein appears narrowed. There is cortical thinning along  the anterior margin of the left kidney. Right kidney appears within  normal limits. Right adrenal gland is normal. The liver, gallbladder  appear within normal limits. There are multiple pancreatic  calcifications associated with uncinate process of the pancreatic head  and there is dilatation of the pancreatic duct that appears similar to  the previous exam.     There is no bowel dilatation or evidence for bowel obstruction.  Atherosclerotic calcifications are present involving the abdominal aorta  and iliac vasculature.       Impression:      1. Since the prior CT approximately 4 months ago there has developed  abnormal enlargement with heterogenous appearance of the left adrenal  gland measuring 4 x 2.6 cm and this is suspected to represent acute left  adrenal hemorrhage. There is adjacent stranding within the fat and fluid  partially surrounds Gerota's fascia. Recommend follow-up CT evaluation.  2. Collection between the pancreatic tail and left kidney  measures  increased in size compared to the prior exam now measuring 4.6 cm. There  is developed 2 new collections above this along the posterior margin of  the stomach associated with gastric wall. There is associated gastric  wall thickening/secondary gastritis.  3. Chronic pancreatic calcifications and pancreatic ductal enlargement  without change.  4. Narrowing of the left renal vein where there is surrounding edema and  stranding within the fat. Left renal vein collaterals are present  similar to the previous exam.     Discussed with Dr. Edwards in the emergency department on 03/25/2023 8:30  PM.     Radiation dose reduction techniques were utilized, including automated  exposure control and exposure modulation based on body size.                Results for orders placed during the hospital encounter of 08/06/22    Adult Transthoracic Echo Complete W/ Cont if Necessary Per Protocol    Interpretation Summary  · Left ventricular ejection fraction appears to be 61 - 65%. Left ventricular systolic function is normal.  · Left ventricular diastolic function was normal.  · Normal right ventricular cavity size and systolic function noted.  · The agitated saline study is positive with Valsalva, consistent with a small to moderate sized PFO  · Mild tricuspid valve regurgitation is present.  · Calculated right ventricular systolic pressure from tricuspid regurgitation is 32 mmHg.  · There is no evidence of pericardial effusion      No orders to display        Assessment/Plan     Active Hospital Problems    Diagnosis  POA   • **Adrenal hemorrhage (HCC) [E27.49]  Yes   • Left flank pain [R10.9]  Yes   • Pancreatic pseudocyst [K86.3]  Yes   • GERD without esophagitis [K21.9]  Yes   • Chronic pancreatitis (HCC) [K86.1]  Yes   • Anemia of chronic disease [D63.8]  Yes   • Epigastric pain [R10.13]  Yes     Ms. Cain is a 42 y.o. female smoker with a history of GERD, anxiety, alcohol abuse with resulting recurrent pancreatitis  leading to chronic pancreatitis now complicated by pseudocyts. She presents now with unilateral adrenal gland hemorrhage- likely related to her recurrent pancreatitis.     · IVFs, IV agents for pain and nausea control  · Surgery and Urology consulted by ER physician. Will see their recommendations- will have her NPO after midnight incase surgery is needed though I wonder if will be more conservative management  · I discussed with her she will likely need to follow up with Endocrinology as an outpatient. No evidence currently for adrenal insufficiency-and less likely to have it with unilateral adrenal hemorrhage. Will check AM cosyntropin stim test. If hypotension or unstable would need IV steroids but she is stable and no signs of adrenal insuffiency at this time.   · Denies any dysuria or symptoms of UTI. She states she has a history of urinary colonization. Will hold off on abx for the time being unless develops symptoms of UTI.   · Monitor labs daily  · I discussed the patients findings and my recommendations with patient, family and consulting provider.    VTE Prophylaxis - SCDs.         Marcel Hampton MD  [facility]  03/25/23  22:09 EDT

## 2023-03-26 NOTE — ED NOTES
Nursing report ED to floor  Piper Cain  42 y.o.  female    HPI :   Chief Complaint   Patient presents with    Abdominal Pain       Admitting doctor:   Marcel Hampton MD    Admitting diagnosis:   There were no encounter diagnoses.    Code status:   Current Code Status       Date Active Code Status Order ID Comments User Context       Prior            Allergies:   Nickel and Hydrocodone-acetaminophen    Isolation:   No active isolations    Intake and Output  No intake or output data in the 24 hours ending 03/25/23 2105    Weight:       03/25/23  1631   Weight: 59 kg (130 lb)       Most recent vitals:   Vitals:    03/25/23 1746 03/25/23 1901 03/25/23 1931 03/25/23 2027   BP: 119/88 117/76 117/85    Pulse:  96  93   Resp:       Temp:       SpO2:  97%  97%   Weight:       Height:           Active LDAs/IV Access:   Lines, Drains & Airways       Active LDAs       Name Placement date Placement time Site Days    Peripheral IV 03/25/23 1659 Anterior;Right Wrist 03/25/23  1659  Wrist  less than 1                    Labs (abnormal labs have a star):   Labs Reviewed   COMPREHENSIVE METABOLIC PANEL - Abnormal; Notable for the following components:       Result Value    Glucose 119 (*)     All other components within normal limits    Narrative:     GFR Normal >60  Chronic Kidney Disease <60  Kidney Failure <15     LIPASE - Abnormal; Notable for the following components:    Lipase 192 (*)     All other components within normal limits   URINALYSIS W/ MICROSCOPIC IF INDICATED (NO CULTURE) - Abnormal; Notable for the following components:    Leuk Esterase, UA Moderate (2+) (*)     All other components within normal limits   CBC WITH AUTO DIFFERENTIAL - Abnormal; Notable for the following components:    WBC 11.22 (*)     .0 (*)     MCH 34.8 (*)     Lymphocyte % 13.0 (*)     Neutrophils, Absolute 8.48 (*)     Monocytes, Absolute 1.10 (*)     All other components within normal limits   URINE DRUG SCREEN - Abnormal;  Notable for the following components:    Oxycodone Screen, Urine Positive (*)     All other components within normal limits    Narrative:     Negative Thresholds Per Drugs Screened:    Amphetamines                 500 ng/ml  Barbiturates                 200 ng/ml  Benzodiazepines              100 ng/ml  Cocaine                      300 ng/ml  Methadone                    300 ng/ml  Opiates                      300 ng/ml  Oxycodone                    100 ng/ml  THC                           50 ng/ml    The Normal Value for all drugs tested is negative. This report includes final unconfirmed screening results to be used for medical treatment purposes only. Unconfirmed results must not be used for non-medical purposes such as employment or legal testing. Clinical consideration should be applied to any drug of abuse test, particularly when unconfirmed results are used.           URINALYSIS, MICROSCOPIC ONLY - Abnormal; Notable for the following components:    WBC, UA 21-30 (*)     Bacteria, UA 4+ (*)     Squamous Epithelial Cells, UA 3-6 (*)     All other components within normal limits   ETHANOL   CBC AND DIFFERENTIAL    Narrative:     The following orders were created for panel order CBC & Differential.  Procedure                               Abnormality         Status                     ---------                               -----------         ------                     CBC Auto Differential[[id number]]        Abnormal            Final result                 Please view results for these tests on the individual orders.       EKG:   No orders to display       Meds given in ED:   Medications   sodium chloride 0.9 % flush 10 mL (has no administration in time range)   HYDROmorphone (DILAUDID) injection 1 mg (has no administration in time range)   oxyCODONE-acetaminophen (PERCOCET)  MG per tablet 1 tablet (has no administration in time range)   lactated ringers bolus 1,000 mL (1,000 mL Intravenous New Bag 3/25/23  1701)   HYDROmorphone (DILAUDID) injection 1 mg (1 mg Intravenous Given 3/25/23 1710)   ondansetron (ZOFRAN) injection 4 mg (4 mg Intravenous Given 3/25/23 1709)   ketorolac (TORADOL) injection 15 mg (15 mg Intravenous Given 3/25/23 1754)   HYDROmorphone (DILAUDID) injection 0.5 mg (0.5 mg Intravenous Given 3/25/23 2047)   dicyclomine (BENTYL) capsule 20 mg (20 mg Oral Given 3/25/23 2047)   iopamidol (ISOVUE-300) 61 % injection 100 mL (85 mL Intravenous Given 3/25/23 2002)       Imaging results:  CT Abdomen Pelvis With Contrast    Result Date: 3/25/2023  1. Since the prior CT approximately 4 months ago there has developed abnormal enlargement with heterogenous appearance of the left adrenal gland measuring 4 x 2.6 cm and this is suspected to represent acute left adrenal hemorrhage. There is adjacent stranding within the fat and fluid partially surrounds Gerota's fascia. Recommend follow-up CT evaluation. 2. Collection between the pancreatic tail and left kidney measures increased in size compared to the prior exam now measuring 4.6 cm. There is developed 2 new collections above this along the posterior margin of the stomach associated with gastric wall. There is associated gastric wall thickening/secondary gastritis. 3. Chronic pancreatic calcifications and pancreatic ductal enlargement without change. 4. Narrowing of the left renal vein where there is surrounding edema and stranding within the fat. Left renal vein collaterals are present similar to the previous exam.  Discussed with Dr. Edwards in the emergency department on 03/25/2023 8:30 PM.  Radiation dose reduction techniques were utilized, including automated exposure control and exposure modulation based on body size.        Ambulatory status:   - up ad valeria, AOx4.     Social issues:   Social History     Socioeconomic History    Marital status: Single   Tobacco Use    Smoking status: Every Day     Packs/day: 0.50     Types: Cigarettes     Start date: 1/28/1996     Smokeless tobacco: Current   Vaping Use    Vaping Use: Never used   Substance and Sexual Activity    Alcohol use: Never    Drug use: No    Sexual activity: Yes     Partners: Male     Birth control/protection: None     Comment: IUD removed 6/29/2017       NIH Stroke Scale:         Prosper Zuñiga RN  03/25/23 21:05 EDT

## 2023-03-26 NOTE — PLAN OF CARE
Goal Outcome Evaluation:  Plan of Care Reviewed With: patient        Progress: no change  Outcome Evaluation: VSS. No signs of distress. Remains RA. C/o pain (see MAR). A&Ox4. Up ad valeria, ambulated around nurses station. Continues on IVF. Advance diet as tolerated. SR on the monitor. IS initial instruction provided.

## 2023-03-27 VITALS
SYSTOLIC BLOOD PRESSURE: 108 MMHG | BODY MASS INDEX: 19.26 KG/M2 | WEIGHT: 130 LBS | HEART RATE: 92 BPM | HEIGHT: 69 IN | TEMPERATURE: 98.9 F | RESPIRATION RATE: 18 BRPM | DIASTOLIC BLOOD PRESSURE: 72 MMHG | OXYGEN SATURATION: 98 %

## 2023-03-27 LAB
ANION GAP SERPL CALCULATED.3IONS-SCNC: 9.9 MMOL/L (ref 5–15)
BUN SERPL-MCNC: 3 MG/DL (ref 6–20)
BUN/CREAT SERPL: 6.1 (ref 7–25)
CALCIUM SPEC-SCNC: 8.8 MG/DL (ref 8.6–10.5)
CHLORIDE SERPL-SCNC: 102 MMOL/L (ref 98–107)
CO2 SERPL-SCNC: 25.1 MMOL/L (ref 22–29)
CREAT SERPL-MCNC: 0.49 MG/DL (ref 0.57–1)
DEPRECATED RDW RBC AUTO: 48.5 FL (ref 37–54)
EGFRCR SERPLBLD CKD-EPI 2021: 122.4 ML/MIN/1.73
ERYTHROCYTE [DISTWIDTH] IN BLOOD BY AUTOMATED COUNT: 13.1 % (ref 12.3–15.4)
GLUCOSE SERPL-MCNC: 81 MG/DL (ref 65–99)
HCT VFR BLD AUTO: 30.9 % (ref 34–46.6)
HGB BLD-MCNC: 10.5 G/DL (ref 12–15.9)
MCH RBC QN AUTO: 34.5 PG (ref 26.6–33)
MCHC RBC AUTO-ENTMCNC: 34 G/DL (ref 31.5–35.7)
MCV RBC AUTO: 101.6 FL (ref 79–97)
PLATELET # BLD AUTO: 167 10*3/MM3 (ref 140–450)
PMV BLD AUTO: 10.2 FL (ref 6–12)
POTASSIUM SERPL-SCNC: 3.4 MMOL/L (ref 3.5–5.2)
RBC # BLD AUTO: 3.04 10*6/MM3 (ref 3.77–5.28)
SODIUM SERPL-SCNC: 137 MMOL/L (ref 136–145)
WBC NRBC COR # BLD: 6.46 10*3/MM3 (ref 3.4–10.8)

## 2023-03-27 PROCEDURE — 25010000002 ONDANSETRON PER 1 MG: Performed by: INTERNAL MEDICINE

## 2023-03-27 PROCEDURE — 99222 1ST HOSP IP/OBS MODERATE 55: CPT | Performed by: SURGERY

## 2023-03-27 PROCEDURE — 25010000002 HYDROMORPHONE 1 MG/ML SOLUTION: Performed by: INTERNAL MEDICINE

## 2023-03-27 PROCEDURE — 80048 BASIC METABOLIC PNL TOTAL CA: CPT | Performed by: STUDENT IN AN ORGANIZED HEALTH CARE EDUCATION/TRAINING PROGRAM

## 2023-03-27 PROCEDURE — 85027 COMPLETE CBC AUTOMATED: CPT | Performed by: STUDENT IN AN ORGANIZED HEALTH CARE EDUCATION/TRAINING PROGRAM

## 2023-03-27 RX ORDER — BISACODYL 10 MG
10 SUPPOSITORY, RECTAL RECTAL DAILY
Status: DISCONTINUED | OUTPATIENT
Start: 2023-03-27 | End: 2023-03-27 | Stop reason: HOSPADM

## 2023-03-27 RX ORDER — BISACODYL 10 MG
10 SUPPOSITORY, RECTAL RECTAL DAILY
Qty: 30 EACH | Refills: 0 | Status: SHIPPED | OUTPATIENT
Start: 2023-03-27

## 2023-03-27 RX ADMIN — Medication 1000 MCG: at 09:29

## 2023-03-27 RX ADMIN — DOCUSATE SODIUM 50MG AND SENNOSIDES 8.6MG 2 TABLET: 8.6; 5 TABLET, FILM COATED ORAL at 09:30

## 2023-03-27 RX ADMIN — PANTOPRAZOLE SODIUM 40 MG: 40 TABLET, DELAYED RELEASE ORAL at 05:50

## 2023-03-27 RX ADMIN — OXYCODONE HYDROCHLORIDE AND ACETAMINOPHEN 1 TABLET: 10; 325 TABLET ORAL at 11:26

## 2023-03-27 RX ADMIN — ONDANSETRON 4 MG: 2 INJECTION INTRAMUSCULAR; INTRAVENOUS at 09:28

## 2023-03-27 RX ADMIN — SODIUM CHLORIDE 150 ML/HR: 9 INJECTION, SOLUTION INTRAVENOUS at 04:31

## 2023-03-27 RX ADMIN — HYDROMORPHONE HYDROCHLORIDE 1 MG: 1 INJECTION, SOLUTION INTRAMUSCULAR; INTRAVENOUS; SUBCUTANEOUS at 14:52

## 2023-03-27 RX ADMIN — OXYCODONE HYDROCHLORIDE AND ACETAMINOPHEN 1 TABLET: 10; 325 TABLET ORAL at 01:24

## 2023-03-27 RX ADMIN — BISACODYL 10 MG: 10 SUPPOSITORY RECTAL at 11:26

## 2023-03-27 RX ADMIN — HYDROMORPHONE HYDROCHLORIDE 1 MG: 1 INJECTION, SOLUTION INTRAMUSCULAR; INTRAVENOUS; SUBCUTANEOUS at 12:36

## 2023-03-27 RX ADMIN — HYDROMORPHONE HYDROCHLORIDE 1 MG: 1 INJECTION, SOLUTION INTRAMUSCULAR; INTRAVENOUS; SUBCUTANEOUS at 02:22

## 2023-03-27 RX ADMIN — SODIUM CHLORIDE 150 ML/HR: 9 INJECTION, SOLUTION INTRAVENOUS at 11:19

## 2023-03-27 RX ADMIN — ONDANSETRON 4 MG: 2 INJECTION INTRAMUSCULAR; INTRAVENOUS at 03:38

## 2023-03-27 RX ADMIN — Medication 5000 UNITS: at 09:29

## 2023-03-27 RX ADMIN — OXYCODONE HYDROCHLORIDE AND ACETAMINOPHEN 1 TABLET: 10; 325 TABLET ORAL at 16:07

## 2023-03-27 RX ADMIN — OXYCODONE HYDROCHLORIDE AND ACETAMINOPHEN 1 TABLET: 10; 325 TABLET ORAL at 05:50

## 2023-03-27 RX ADMIN — ONDANSETRON 4 MG: 2 INJECTION INTRAMUSCULAR; INTRAVENOUS at 14:52

## 2023-03-27 RX ADMIN — HYDROMORPHONE HYDROCHLORIDE 1 MG: 1 INJECTION, SOLUTION INTRAMUSCULAR; INTRAVENOUS; SUBCUTANEOUS at 04:31

## 2023-03-27 RX ADMIN — Medication 10 ML: at 09:30

## 2023-03-27 RX ADMIN — HYDROMORPHONE HYDROCHLORIDE 1 MG: 1 INJECTION, SOLUTION INTRAMUSCULAR; INTRAVENOUS; SUBCUTANEOUS at 09:29

## 2023-03-27 RX ADMIN — Medication 100 MG: at 09:29

## 2023-03-27 NOTE — CONSULTS
"Nutrition Services    Patient Name:  Piper Cain  YOB: 1980  MRN: [id number]  Admit Date:  3/25/2023    Assessment Date:  03/27/23    Comment: Nutrition consult for chronic poor intake    43 y/o female admitted d/t adrenal hemorrhage. PMHx significant for chronic pancreatitis with pseudocysts.     Pt reports decreased PO intake d/t N/V x 5 days leading up until hospital admission. Pt reports normally she has been eating very well, no issues with appetite. Reports she follows a low fat diet at home d/t chronic pancreatitis. Takes Creon with meals. Has abstained from alcohol for 3 months. Reports supplementing her intakes with Premier protein shakes. She has been maintaining her weight around 128-132#. She agreed to addition of Boost Plus BID to promote adequate intakes. Pt had no additional questions about her diet or other nutrition related needs. RD will follow.     Recommendations  1. Low Fat diet  2. Boost Plus BID      CLINICAL NUTRITION ASSESSMENT      Reason for Assessment Chronic Poor Intake      Diagnosis/Problem   Adrenal hemorrhage   Medical/Surgical History Past Medical History:   Diagnosis Date   • Anxiety    • E. coli bacteremia    • ETOH abuse    • GERD (gastroesophageal reflux disease)    • Hiatal hernia    • Left flank pain 10/21/2022   • Migraine    • Miscarriage 2004   • Pancreatitis        Past Surgical History:   Procedure Laterality Date   • ENDOSCOPY N/A 8/10/2022    Procedure: ESOPHAGOGASTRODUODENOSCOPY with bxs;  Surgeon: Salvador Price MD;  Location: [facility] ENDOSCOPY;  Service: Gastroenterology;  Laterality: N/A;  Pre: r/o esophageal  varacies, abd pain  Post: esophageal plaque        Encounter Information        Nutrition History:  Follows low fat diet   Food Preferences:    Supplements: Premier protein shakes   Factors Affecting Intake: No factors at this time     Anthropometrics        Current Height  Current Weight  BMI kg/m2 Height: 175.3 cm (69\")  Weight: 59 kg (130 " lb) (03/25/23 2214)  Body mass index is 19.2 kg/m².   Adjusted BMI (if applicable)        Admission Weight 130#       Ideal Body Weight (IBW) 145#   Adjusted IBW (if applicable)        Usual Body Weight (UBW) 128-132#   Weight Change/Trend Stable       Weight History Wt Readings from Last 30 Encounters:   03/25/23 2214 59 kg (130 lb)   03/25/23 1631 59 kg (130 lb)   11/30/22 1743 61.2 kg (135 lb)   10/21/22 0009 61.2 kg (135 lb)   08/08/22 0922 55.8 kg (123 lb)   08/06/22 2021 55.8 kg (123 lb)   03/05/18 1124 58.1 kg (128 lb)   01/24/18 0456 59 kg (130 lb)   09/27/17 2341 49.9 kg (110 lb)           --  Tests/Procedures        Tests/Procedures No new tests/procedures     Labs       Pertinent Labs    Results from last 7 days   Lab Units 03/27/23  0705 03/26/23  0559 03/25/23  1700   SODIUM mmol/L 137 138 137   POTASSIUM mmol/L 3.4* 4.2 3.8   CHLORIDE mmol/L 102 101 98   CO2 mmol/L 25.1 28.0 25.5   BUN mg/dL 3* 6 6   CREATININE mg/dL 0.49* 0.56* 0.61   CALCIUM mg/dL 8.8 8.8 10.4   BILIRUBIN mg/dL  --  0.2 0.3   ALK PHOS U/L  --  64 77   ALT (SGPT) U/L  --  <5 15   AST (SGOT) U/L  --  5 6   GLUCOSE mg/dL 81 98 119*     Results from last 7 days   Lab Units 03/27/23  0705 03/26/23  0559   HEMOGLOBIN g/dL 10.5*  --    HEMATOCRIT % 30.9*  --    WBC 10*3/mm3 6.46  --    ALBUMIN g/dL  --  3.6     Results from last 7 days   Lab Units 03/27/23  0705 03/25/23  1700   PLATELETS 10*3/mm3 167 249     COVID19   Date Value Ref Range Status   08/07/2022 Not Detected Not Detected - Ref. Range Final     No results found for: HGBA1C       Medications           Scheduled Medications amitriptyline, 10 mg, Oral, Nightly  bisacodyl, 10 mg, Rectal, Daily  cholecalciferol, 5,000 Units, Oral, Daily  folic acid, 1,000 mcg, Oral, Daily  Pancrelipase (Lip-Prot-Amyl), 72,000 units of lipase, Oral, TID  pantoprazole, 40 mg, Oral, Daily  senna-docusate sodium, 2 tablet, Oral, BID  sodium chloride, 10 mL, Intravenous, Q12H  thiamine, 100 mg, Oral,  Daily  vitamin B-12, 1,000 mcg, Oral, Daily       Infusions sodium chloride, 150 mL/hr, Last Rate: 150 mL/hr (03/27/23 1119)       PRN Medications •  acetaminophen **OR** acetaminophen **OR** acetaminophen  •  HYDROmorphone **AND** naloxone  •  nitroglycerin  •  ondansetron **OR** ondansetron  •  oxyCODONE-acetaminophen  •  prochlorperazine  •  [COMPLETED] Insert Peripheral IV **AND** sodium chloride  •  sodium chloride  •  sodium chloride     Physical Findings          Physical Appearance alert, oriented   Oral/Mouth Cavity WNL   Edema  no edema   Gastrointestinal hyperactive bowel sounds, last bowel movement:3/20   Skin  skin intact   Tubes/Drains none   NFPE Not applicable at this time   --  Current Nutrition Orders & Evaluation of Intake       Oral Nutrition     Food Allergies NKFA   Current PO Diet Diet: Gastrointestinal Diets; Low Irritant; Texture: Regular Texture (IDDSI 7); Fluid Consistency: Thin (IDDSI 0)   Supplement n/a   PO Evaluation     % PO Intake     # of Days Evaluated    --  PES STATEMENT / NUTRITION DIAGNOSIS      Nutrition Dx Problem  Problem: Altered GI Function  Etiology: Medical Diagnosis  Signs/Symptoms: Other (comment) chronic pancreatitis    Comment:    --  NUTRITION INTERVENTION / PLAN OF CARE      Intervention Goal(s) Maintain nutrition status, Maintain intake and Maintain weight         RD Intervention/Action Supplement provided, Encourage intake and Follow Tx Progress         Prescription/Orders:       PO Diet       Supplements Boost Plus BID      Snacks       Enteral Nutrition       Parenteral Nutrition    New Prescription Ordered? Yes   --      Monitor/Evaluation Per protocol, PO intake, Supplement intake, GI status   Discharge Plan/Needs Pending clinical course   Education Will instruct as appropriate   --    RD to follow per protocol.      Electronically signed by:  Mary Núñez RD  03/27/23 15:52 EDT

## 2023-03-27 NOTE — PROGRESS NOTES
Name: Piper Cain ADMIT: 3/25/2023   : 1980  PCP: Rachell Pozo APRN    MRN: [id number] LOS: 2 days   AGE/SEX: 42 y.o. female  ROOM: [facility]     Subjective   Subjective   She is complaining of significant amount of pain.  Reports that this is more like her chronic pancreatitis type of pain.  Continues to endorse constipation.    Review of Systems   Respiratory: Negative for chest tightness and shortness of breath.    Cardiovascular: Negative for chest pain and palpitations.   Gastrointestinal: Positive for abdominal pain and constipation.        Objective   Objective   Vital Signs  Temp:  [98.3 °F (36.8 °C)-99 °F (37.2 °C)] 99 °F (37.2 °C)  Heart Rate:  [86-93] 92  Resp:  [18] 18  BP: (109-126)/(83-91) 118/84  SpO2:  [91 %-98 %] 93 %  on   ;   Device (Oxygen Therapy): room air  Body mass index is 19.2 kg/m².  Physical Exam    General: Alert and oriented x3, no acute distress  HEENT: Normocephalic, atraumatic  CV: Regular rate and rhythm, no murmurs rubs or gallops  Lungs: Clear to auscultation bilaterally, no crackles or wheezes  Abdomen: Tender to palpation at the epigastrium  Neuro: CN II-XII intact, no FND appreciated     Results Review     I reviewed the patient's new clinical results.  Results from last 7 days   Lab Units 23  0705 23  1700   WBC 10*3/mm3 6.46 11.22*   HEMOGLOBIN g/dL 10.5* 13.9   PLATELETS 10*3/mm3 167 249     Results from last 7 days   Lab Units 23  0705 23  0559 23  1700   SODIUM mmol/L 137 138 137   POTASSIUM mmol/L 3.4* 4.2 3.8   CHLORIDE mmol/L 102 101 98   CO2 mmol/L 25.1 28.0 25.5   BUN mg/dL 3* 6 6   CREATININE mg/dL 0.49* 0.56* 0.61   GLUCOSE mg/dL 81 98 119*   Estimated Creatinine Clearance: 142.1 mL/min (A) (by C-G formula based on SCr of 0.49 mg/dL (L)).  Results from last 7 days   Lab Units 23  0559 23  1700   ALBUMIN g/dL 3.6 4.1   BILIRUBIN mg/dL 0.2 0.3   ALK PHOS U/L 64 77   AST (SGOT) U/L 5 6   ALT (SGPT) U/L  <5 15     Results from last 7 days   Lab Units 03/27/23  0705 03/26/23  0559 03/25/23  1700   CALCIUM mg/dL 8.8 8.8 10.4   ALBUMIN g/dL  --  3.6 4.1       COVID19   Date Value Ref Range Status   08/07/2022 Not Detected Not Detected - Ref. Range Final     No results found for: HGBA1C, POCGLU    CT Abdomen Pelvis With Contrast  Narrative: CT ABDOMEN AND PELVIS WITH IV CONTRAST     HISTORY: Abdominal pain with nausea and vomiting.     TECHNIQUE:  CT includes axial imaging from the lung bases to the  trochanters with intravenous contrast and with use of oral contrast.  Data reconstructed in coronal and sagittal planes. Radiation dose  reduction techniques were utilized, including automated exposure control  and exposure modulation based on body size.     COMPARISON: CT abdomen and pelvis with IV contrast 11/30/2022.     FINDINGS: There is a peripherally enhancing fluid collection centered  between the tail of the pancreatic tail and the anterior left kidney  measuring 4.6 x 3.8 cm and this is increased in size from 3.5 x 2.9 cm  on the prior CT 11/30/2022. Above this collection there is developed 2  new peripheral enhancing collections that are along the posterior margin  of the upper gastric body and gastric fundus and these collections  measure 1.7 and 2.1 cm. There is indentation along the posterior gastric  wall with adjacent gastric wall thickening.     There is developed new hyperdensity involving the left adrenal gland  which is abnormally enlarged and exhibits heterogenous appearance  measuring 4 x 2.6 cm. Left adrenal gland was normal on the prior exam  and this new hyperdensity and heterogenous appearance was large and  suspected to be due to an acute left adrenal hemorrhage. There is  surrounding stranding within the periadrenal fat and within the fat  extending above the upper pole of the left kidney. There is also mild  fluid density extending adjacent to the spleen and surrounding Gerota's  fascia.     The  left renal vein appears narrowed. There is cortical thinning along  the anterior margin of the left kidney. Right kidney appears within  normal limits. Right adrenal gland is normal. The liver, gallbladder  appear within normal limits. There are multiple pancreatic  calcifications associated with uncinate process of the pancreatic head  and there is dilatation of the pancreatic duct that appears similar to  the previous exam.     There is no bowel dilatation or evidence for bowel obstruction.  Atherosclerotic calcifications are present involving the abdominal aorta  and iliac vasculature.     Impression: 1. Since the prior CT approximately 4 months ago there has developed  abnormal enlargement with heterogenous appearance of the left adrenal  gland measuring 4 x 2.6 cm and this is suspected to represent acute left  adrenal hemorrhage. There is adjacent stranding within the fat and fluid  partially surrounds Gerota's fascia. Recommend follow-up CT evaluation.  2. Collection between the pancreatic tail and left kidney measures  increased in size compared to the prior exam now measuring 4.6 cm. There  is developed 2 new collections above this along the posterior margin of  the stomach associated with gastric wall. There is associated gastric  wall thickening/secondary gastritis.  3. Chronic pancreatic calcifications and pancreatic ductal enlargement  without change.  4. Narrowing of the left renal vein where there is surrounding edema and  stranding within the fat. Left renal vein collaterals are present  similar to the previous exam.     Discussed with Dr. Edwards in the emergency department on 03/25/2023 8:30  PM.     Radiation dose reduction techniques were utilized, including automated  exposure control and exposure modulation based on body size.     This report was finalized on 3/25/2023 10:30 PM by Dr. Angel Mcleod M.D.       Scheduled Medications  amitriptyline, 10 mg, Oral, Nightly  cholecalciferol, 5,000  Units, Oral, Daily  folic acid, 1,000 mcg, Oral, Daily  Pancrelipase (Lip-Prot-Amyl), 72,000 units of lipase, Oral, TID  pantoprazole, 40 mg, Oral, Daily  senna-docusate sodium, 2 tablet, Oral, BID  sodium chloride, 10 mL, Intravenous, Q12H  thiamine, 100 mg, Oral, Daily  vitamin B-12, 1,000 mcg, Oral, Daily    Infusions  sodium chloride, 150 mL/hr, Last Rate: 150 mL/hr (03/27/23 0431)    Diet  Diet: Gastrointestinal Diets; Low Irritant; Texture: Regular Texture (IDDSI 7); Fluid Consistency: Thin (IDDSI 0)       Assessment/Plan     Active Hospital Problems    Diagnosis  POA   • **Adrenal hemorrhage (HCC) [E27.49]  Yes   • Left flank pain [R10.9]  Yes   • Pancreatic pseudocyst [K86.3]  Yes   • GERD without esophagitis [K21.9]  Yes   • Chronic pancreatitis (HCC) [K86.1]  Yes   • Anemia of chronic disease [D63.8]  Yes   • Epigastric pain [R10.13]  Yes      Resolved Hospital Problems   No resolved problems to display.       42 y.o. female admitted with Adrenal hemorrhage (HCC).    Left adrenal gland hemorrhage  Evaluated by urology.  No need for any surgical intervention.  Will need repeat CT scan in the near future.  Cosyntropin stimulation test ordered to rule out adrenal insufficiency.  Cortisol level was 24, 1 hour after administration of cosyntropin, findings revealed that she is not adrenally insufficient.  However, if she becomes hemodynamically unstable or hypotensive she will need IV Solu-Cortef.  There are no signs of adrenal insufficiency.     Chronic pancreatitis  Pancreatic pseudocyst  IV fluids and IV medications for pain control. she currently receiving Percocet 10 mg every 4 hours as needed as well as IV Dilaudid 1 mg every 2 hours as needed.  Reports the pain is not currently adequately controlled.  General Surgery consulted.  No indications for any intervention at this time  Continue Creon      SCDs for DVT prophylaxis  Full code  Discussed with patient and nursing staff  Dispo: Home, tomorrow          Hipolito Quintana MD  [facility]  03/27/23  10:25 EDT    I wore protective equipment throughout this patient encounter including a face mask, gloves and protective eyewear.  Hand hygiene was performed before donning protective equipment and after removal when leaving the room.

## 2023-03-27 NOTE — PLAN OF CARE
Problem: Adult Inpatient Plan of Care  Goal: Plan of Care Review  Outcome: Ongoing, Progressing  Goal: Patient-Specific Goal (Individualized)  Outcome: Ongoing, Progressing  Goal: Absence of Hospital-Acquired Illness or Injury  Outcome: Ongoing, Progressing  Intervention: Identify and Manage Fall Risk  Recent Flowsheet Documentation  Taken 3/27/2023 0000 by Miley Middleton RN  Safety Promotion/Fall Prevention:   activity supervised   assistive device/personal items within reach   clutter free environment maintained   fall prevention program maintained   lighting adjusted   nonskid shoes/slippers when out of bed   room organization consistent   safety round/check completed   toileting scheduled  Taken 3/26/2023 2200 by Miley Middleton RN  Safety Promotion/Fall Prevention:   activity supervised   assistive device/personal items within reach   clutter free environment maintained   fall prevention program maintained   lighting adjusted   nonskid shoes/slippers when out of bed   room organization consistent   safety round/check completed   toileting scheduled  Taken 3/26/2023 2000 by Miley Middleton RN  Safety Promotion/Fall Prevention:   activity supervised   assistive device/personal items within reach   clutter free environment maintained   fall prevention program maintained   lighting adjusted   nonskid shoes/slippers when out of bed   room organization consistent   safety round/check completed   toileting scheduled  Intervention: Prevent Skin Injury  Recent Flowsheet Documentation  Taken 3/27/2023 0000 by Miley Middleton RN  Body Position: position changed independently  Taken 3/26/2023 2200 by Miley Middleton RN  Body Position:   position changed independently   sitting up in bed  Taken 3/26/2023 2000 by Miley Middleton RN  Body Position: position changed independently  Skin Protection:   adhesive use limited   tubing/devices free from skin contact   transparent dressing maintained  Intervention:  Prevent and Manage VTE (Venous Thromboembolism) Risk  Recent Flowsheet Documentation  Taken 3/27/2023 0000 by Miley Middleton, RN  Activity Management: up ad valeria  Taken 3/26/2023 2200 by Miley Middleton RN  Activity Management: up ad valeria  Taken 3/26/2023 2000 by Miley Middleton RN  Activity Management: up ad valeria  Range of Motion: active ROM (range of motion) encouraged  Intervention: Prevent Infection  Recent Flowsheet Documentation  Taken 3/27/2023 0000 by Miley Middleton RN  Infection Prevention:   visitors restricted/screened   single patient room provided   rest/sleep promoted   personal protective equipment utilized   hand hygiene promoted  Taken 3/26/2023 2200 by Miley Middleton RN  Infection Prevention:   visitors restricted/screened   single patient room provided   rest/sleep promoted   personal protective equipment utilized   hand hygiene promoted  Taken 3/26/2023 2000 by Miley Middleton RN  Infection Prevention:   visitors restricted/screened   single patient room provided   rest/sleep promoted   personal protective equipment utilized   hand hygiene promoted  Goal: Optimal Comfort and Wellbeing  Outcome: Ongoing, Progressing  Intervention: Monitor Pain and Promote Comfort  Recent Flowsheet Documentation  Taken 3/26/2023 2000 by Miley Middleton RN  Pain Management Interventions:   see MAR   quiet environment facilitated   position adjusted   pillow support provided   diversional activity provided   care clustered  Intervention: Provide Person-Centered Care  Recent Flowsheet Documentation  Taken 3/26/2023 2000 by Miley Middleton RN  Trust Relationship/Rapport:   care explained   choices provided   emotional support provided   empathic listening provided   questions answered  Goal: Readiness for Transition of Care  Outcome: Ongoing, Progressing     Problem: Pain Acute  Goal: Acceptable Pain Control and Functional Ability  Outcome: Ongoing, Progressing  Intervention: Prevent or Manage  Pain  Recent Flowsheet Documentation  Taken 3/26/2023 2000 by Miley Middleton RN  Bowel Elimination Promotion:   ambulation promoted   adequate fluid intake promoted  Sleep/Rest Enhancement:   awakenings minimized   room darkened   regular sleep/rest pattern promoted  Medication Review/Management: medications reviewed  Intervention: Develop Pain Management Plan  Recent Flowsheet Documentation  Taken 3/26/2023 2000 by Miley Middleton RN  Pain Management Interventions:   see MAR   quiet environment facilitated   position adjusted   pillow support provided   diversional activity provided   care clustered  Intervention: Optimize Psychosocial Wellbeing  Recent Flowsheet Documentation  Taken 3/26/2023 2000 by Miley Middleton RN  Supportive Measures: active listening utilized  Diversional Activities:   television   smartphone     Problem: Fluid Imbalance (Pancreatitis)  Goal: Fluid Balance  Outcome: Ongoing, Progressing     Problem: Infection (Pancreatitis)  Goal: Infection Symptom Resolution  Outcome: Ongoing, Progressing  Intervention: Prevent or Manage Infection  Recent Flowsheet Documentation  Taken 3/26/2023 2200 by Miley Middleton RN  Isolation Precautions: precautions maintained  Taken 3/26/2023 2000 by Miley Middleton RN  Isolation Precautions: precautions maintained     Problem: Nutrition Impaired (Pancreatitis)  Goal: Optimal Nutrition Intake  Outcome: Ongoing, Progressing     Problem: Pain (Pancreatitis)  Goal: Acceptable Pain Control  Outcome: Ongoing, Progressing  Intervention: Monitor and Manage Pain  Recent Flowsheet Documentation  Taken 3/26/2023 2000 by Miley Middleton RN  Pain Management Interventions:   see MAR   quiet environment facilitated   position adjusted   pillow support provided   diversional activity provided   care clustered  Diversional Activities:   television   smartphone  Sleep/Rest Enhancement:   awakenings minimized   room darkened   regular sleep/rest pattern promoted      Problem: Respiratory Compromise (Pancreatitis)  Goal: Effective Oxygenation and Ventilation  Outcome: Ongoing, Progressing  Intervention: Optimize Oxygenation and Ventilation  Recent Flowsheet Documentation  Taken 3/27/2023 0000 by Miley Middleton, RN  Activity Management: up ad valeria  Head of Bed (HOB) Positioning: HOB elevated  Taken 3/26/2023 2200 by Miley Middleton, RN  Activity Management: up ad valeria  Head of Bed (HOB) Positioning: HOB elevated  Taken 3/26/2023 2000 by Miley Middleton, RN  Activity Management: up ad valeria  Head of Bed (HOB) Positioning: HOB elevated   Goal Outcome Evaluation:

## 2023-03-27 NOTE — PLAN OF CARE
Goal Outcome Evaluation:  Plan of Care Reviewed With: patient        Progress: no change  Outcome Evaluation: VSS. Telemetry monitor, sr/st. alert and oriented x4, room air. up ad valeria. to be discharged, awaiting transportation.

## 2023-03-27 NOTE — PROGRESS NOTES
Follow up for left adrenal hemorrhage    Patient reports ongoing pain in the abdomen, flank, and back with radiation to the left lower quadrant and right abdomen.    Afebrile  Patient appears uncomfortable during encounter.  A&Ox3    Cr 0.56  No recent Hb    CT reviewed: enlargement of the left adrenal gland or uncertain significance, possible hemorrhage. Fluid collection within the tail of the pancreas causing mass effect/inflammatory response on the gastric wall and left renal vein.    Plan:  - no urologic intervention necessary at this time  - check Hb. As long as this is stable, will sign off  - will follow remotely

## 2023-03-27 NOTE — CONSULTS
General Surgery H&P/Consultation      Impression/Plan: 42-year-old lady with history of chronic pancreatitis and peripancreatic fluid collections.  Readmitted for what appears to be left adrenal gland hemorrhage.  She also has enlarging and new peripancreatic fluid collections.  Unclear entirely if all of these are related to the adrenal hemorrhage versus 2 separate processes going on.  Her last drainage procedure had a low amylase for a pancreatic pseudocyst.  Either way, nothing that appears to require acute intervention.  I will refer her for an outpatient EUS with Dr. Denson for further characterization of the peripancreatic fluid collections.  Left adrenal gland hemorrhage management per urology.    Okay for discharge from my standpoint once pain controlled and tolerating regular diet.    CC: Abdominal pain, back pain    HPI:   Miss Piper Cain is a 42 y.o. female that presented to the hospital with worsening abdominal pain, left flank pain, back pain over the past 2 months.  She has previously been admitted and undergone drainage procedures with interventional radiology for perinephric and peripancreatic fluid collections.  Most recent CT scan showed improvement in the peripancreatic/perinephric fluid collection and I recommended observation at that time.  Previous drainage procedures did not clearly delineate a source.  In October 2022 aspiration of the fluid collection showed a large number of red blood cells and a low fluid amylase (32).  In August, drainage of the fluid showed rare gram-positive cocci on the Gram stain but no growth.  She reports that she is following a low-fat diet and has abstained from alcohol for the last 3 months.    Past Medical History:   Past Medical History:   Diagnosis Date   • Anxiety    • E. coli bacteremia    • ETOH abuse    • GERD (gastroesophageal reflux disease)    • Hiatal hernia    • Left flank pain 10/21/2022   • Migraine    • Miscarriage 2004   • Pancreatitis         Past Surgical History:   Past Surgical History:   Procedure Laterality Date   • ENDOSCOPY N/A 8/10/2022    Procedure: ESOPHAGOGASTRODUODENOSCOPY with bxs;  Surgeon: Salvador Price MD;  Location: [facility] ENDOSCOPY;  Service: Gastroenterology;  Laterality: N/A;  Pre: r/o esophageal  varacies, abd pain  Post: esophageal plaque       Medications:  Medications Prior to Admission   Medication Sig Dispense Refill Last Dose   • amitriptyline (ELAVIL) 10 MG tablet Take 1 tablet by mouth Every Night.      • Cyanocobalamin (VITAMIN B 12 PO) Take  by mouth.      • Ferrous Sulfate Dried (Feosol) 200 (65 Fe) MG tablet tablet Take 1 tablet by mouth Daily.      • folic acid (FOLVITE) 1 MG tablet Take 1 tablet by mouth Daily. 30 tablet 3    • ondansetron (ZOFRAN) 4 MG tablet Take 1 tablet by mouth Every 8 (Eight) Hours As Needed for Nausea or Vomiting.      • pancrelipase, Lip-Prot-Amyl, (CREON) 06451-24308 units capsule delayed-release particles capsule Take 3 capsules by mouth 3 (Three) Times a Day With Meals.      • pantoprazole (PROTONIX) 40 MG EC tablet Take 1 tablet by mouth Daily. 30 tablet 3    • Cholecalciferol (VITAMIN D3) 5000 units capsule capsule Take 5,000 Units by mouth Daily.      • sennosides-docusate (PERICOLACE) 8.6-50 MG per tablet Take 2 tablets by mouth 2 (Two) Times a Day As Needed for Constipation.      • thiamine (VITAMIN B-1) 100 MG tablet  tablet Take 100 mg by mouth Daily.          Allergies:   Allergies   Allergen Reactions   • Nickel    • Hydrocodone-Acetaminophen Nausea And Vomiting       Social History:   Social History     Socioeconomic History   • Marital status: Single   Tobacco Use   • Smoking status: Every Day     Packs/day: 0.50     Types: Cigarettes     Start date: 1/28/1996   • Smokeless tobacco: Current   Vaping Use   • Vaping Use: Never used   Substance and Sexual Activity   • Alcohol use: Never   • Drug use: No   • Sexual activity: Yes     Partners: Male     Birth control/protection:  None     Comment: IUD removed 6/29/2017       Family History:   Family History   Problem Relation Age of Onset   • Alcohol abuse Mother    • Arthritis Mother    • Asthma Mother    • Miscarriages / Stillbirths Mother    • Cancer Father    • Diabetes Father    • Drug abuse Father    • Early death Father    • Heart disease Father    • Hyperlipidemia Father    • Hypertension Father    • Alcohol abuse Paternal Aunt    • Cancer Paternal Aunt    • Diabetes Paternal Aunt    • Drug abuse Paternal Aunt    • Early death Paternal Aunt    • Heart disease Paternal Aunt    • Hyperlipidemia Paternal Aunt    • Hypertension Paternal Aunt    • Alcohol abuse Maternal Grandmother    • Alcohol abuse Maternal Grandfather    • Alcohol abuse Paternal Grandmother    • Cancer Paternal Grandmother    • Diabetes Paternal Grandmother    • Drug abuse Paternal Grandmother    • Early death Paternal Grandmother    • Heart disease Paternal Grandmother    • Hyperlipidemia Paternal Grandmother    • Hypertension Paternal Grandmother    • Alcohol abuse Paternal Grandfather        Review of Systems:  A comprehensive review of systems was negative except for the following positives: Abdominal pain, flank pain, back pain    Physical Exam:   Vitals:    03/27/23 1337   BP: 108/72   Pulse:    Resp: 18   Temp: 98.9 °F (37.2 °C)   SpO2: 98%     BMI: Body mass index is 19.2 kg/m².   GENERAL: no acute distress, awake and alert  HEENT: normocephalic, atraumatic, no scleral icterus, moist mucous membranes  NECK: Supple, there is no thyromegaly or lymphadenopathy  RESPIRATORY: symmetric excursion bilaterally, normal work of breathing, no wheezes  CARDIOVASCULAR: regular rate, well perfused  GASTROINTESTINAL: Soft, nondistended  MUSCULOSKELETAL: no cyanosis, clubbing, or edema  NEUROLOGIC: alert and oriented, normal speech, cranial nerves 2-12 grossly intact, no focal deficits  SKIN: Moist, warm, no rashes, no jaundice      Pertinent labs:   Results from last 7 days    Lab Units 23  0705 23  1700   WBC 10*3/mm3 6.46 11.22*   HEMOGLOBIN g/dL 10.5* 13.9   HEMATOCRIT % 30.9* 40.7   PLATELETS 10*3/mm3 167 249     Results from last 7 days   Lab Units 23  0705 23  0559 23  1700   SODIUM mmol/L 137 138 137   POTASSIUM mmol/L 3.4* 4.2 3.8   CHLORIDE mmol/L 102 101 98   CO2 mmol/L 25.1 28.0 25.5   BUN mg/dL 3* 6 6   CREATININE mg/dL 0.49* 0.56* 0.61   CALCIUM mg/dL 8.8 8.8 10.4   BILIRUBIN mg/dL  --  0.2 0.3   ALK PHOS U/L  --  64 77   ALT (SGPT) U/L  --  <5 15   AST (SGOT) U/L  --  5 6   GLUCOSE mg/dL 81 98 119*       IMAGIN. Since the prior CT approximately 4 months ago there has developed  abnormal enlargement with heterogenous appearance of the left adrenal  gland measuring 4 x 2.6 cm and this is suspected to represent acute left  adrenal hemorrhage. There is adjacent stranding within the fat and fluid  partially surrounds Gerota's fascia. Recommend follow-up CT evaluation.  2. Collection between the pancreatic tail and left kidney measures  increased in size compared to the prior exam now measuring 4.6 cm. There  is developed 2 new collections above this along the posterior margin of  the stomach associated with gastric wall. There is associated gastric  wall thickening/secondary gastritis.  3. Chronic pancreatic calcifications and pancreatic ductal enlargement  without change.  4. Narrowing of the left renal vein where there is surrounding edema and  stranding within the fat. Left renal vein collaterals are present  similar to the previous exam.          Reji House MD  General and Endoscopic Surgery  [facility]    [address], [address]  [place] [address]  P: [phone]  F: [phone]

## 2023-03-27 NOTE — PAYOR COMM NOTE
"Lizzeth Zavala (42 y.o. Female)     PLEASE CALL  OR  [phone] WITH INPT AUTH AND DAYS.     REF#[id number]    THANK YOU    THELMA BARNEY LPN CCP    Date of Birth   1980    Social Security Number       Address   [address]  [place] [address]    Home Phone   [phone]    MRN   [id number]       Religious   Anglican    Marital Status   Single                            Admission Date   3/25/23    Admission Type   Emergency    Admitting Provider   Marcel Hampton MD    Attending Provider   Hipolito Quintana MD    Department, Room/Bed   [address], N638/1       Discharge Date       Discharge Disposition       Discharge Destination                               Attending Provider: Hipolito Quintana MD    Allergies: Nickel, Hydrocodone-acetaminophen    Isolation: None   Infection: None   Code Status: CPR    Ht: 175.3 cm (69\")   Wt: 59 kg (130 lb)    Admission Cmt: None   Principal Problem: Adrenal hemorrhage (HCC) [E27.49]                 Active Insurance as of 3/25/2023     Primary Coverage     Payor Plan Insurance Group Employer/Plan Group    [id number]     Payor Plan Address Payor Plan Phone Number Payor Plan Fax Number Effective Dates    MAIL STOP:   7/1/2022 - None Entered    [address] [address]       [facility] [id number]       Subscriber Name Subscriber Birth Date Member ID       LIZZETH ZAVALA 1980 [id number]                 Emergency Contacts      (Rel.) Home Phone Work Phone Mobile Phone    JEAN CARLOSMALOU CLARKE (Significant Other) [phone] -- --            [place]: [facility] [id number]  Tax ID [id number]     History & Physical      Marcel Hampton MD at 03/25/23 2151              Patient Name:  Lizzeth Zavala  YOB: 1980  MRN:  [id number]  Admit Date:  3/25/2023  Patient Care Team:  Rachell Pozo APRN as PCP - General (Family Medicine)      Subjective "    History Present Illness     Chief Complaint   Patient presents with   • Abdominal Pain     Ms. Cain is a 42 y.o. female smoker with a history of GERD, anxiety, alcohol abuse with resulting recurrent pancreatitis leading to chronic pancreatitis now complicated by pseudocyts.     She presents with abdominal pain- left flank and epigastric for a week or two. Different than her usual pancreatitis pain. Associated with N/V. Not improved with her home pain medications. Came to [facility] ER and found to have abnormal adrenal gland hemorrhage- likely related to her recurrent pancreatitis. Given multiple agents for pain IV and po with just partial improvement.     History of Present Illness  Review of Systems   Constitutional: Positive for appetite change. Negative for fever.   HENT: Negative.    Eyes: Negative.    Respiratory: Negative.    Cardiovascular: Negative for chest pain, palpitations and leg swelling.   Gastrointestinal: Positive for abdominal pain, nausea and vomiting.   Endocrine: Negative.    Genitourinary: Positive for flank pain. Negative for dysuria and frequency.   Musculoskeletal: Positive for back pain.   Skin: Negative.    Allergic/Immunologic: Negative.    Neurological: Negative.    Hematological: Negative.    Psychiatric/Behavioral: Negative.         Personal History     Past Medical History:   Diagnosis Date   • Anxiety    • E. coli bacteremia    • ETOH abuse    • GERD (gastroesophageal reflux disease)    • Hiatal hernia    • Left flank pain 10/21/2022   • Migraine    • Miscarriage 2004   • Pancreatitis      Past Surgical History:   Procedure Laterality Date   • ENDOSCOPY N/A 8/10/2022    Procedure: ESOPHAGOGASTRODUODENOSCOPY with bxs;  Surgeon: Salvador Price MD;  Location: [facility] ENDOSCOPY;  Service: Gastroenterology;  Laterality: N/A;  Pre: r/o esophageal  varacies, abd pain  Post: esophageal plaque     Family History   Problem Relation Age of Onset   • Alcohol abuse Mother    • Arthritis Mother    •  Asthma Mother    • Miscarriages / Stillbirths Mother    • Cancer Father    • Diabetes Father    • Drug abuse Father    • Early death Father    • Heart disease Father    • Hyperlipidemia Father    • Hypertension Father    • Alcohol abuse Paternal Aunt    • Cancer Paternal Aunt    • Diabetes Paternal Aunt    • Drug abuse Paternal Aunt    • Early death Paternal Aunt    • Heart disease Paternal Aunt    • Hyperlipidemia Paternal Aunt    • Hypertension Paternal Aunt    • Alcohol abuse Maternal Grandmother    • Alcohol abuse Maternal Grandfather    • Alcohol abuse Paternal Grandmother    • Cancer Paternal Grandmother    • Diabetes Paternal Grandmother    • Drug abuse Paternal Grandmother    • Early death Paternal Grandmother    • Heart disease Paternal Grandmother    • Hyperlipidemia Paternal Grandmother    • Hypertension Paternal Grandmother    • Alcohol abuse Paternal Grandfather      Social History     Tobacco Use   • Smoking status: Every Day     Packs/day: 0.50     Types: Cigarettes     Start date: 1/28/1996   • Smokeless tobacco: Current   Vaping Use   • Vaping Use: Never used   Substance Use Topics   • Alcohol use: Never   • Drug use: No     Medications Prior to Admission   Medication Sig Dispense Refill Last Dose   • amitriptyline (ELAVIL) 10 MG tablet Take 1 tablet by mouth Every Night.      • Cyanocobalamin (VITAMIN B 12 PO) Take  by mouth.      • Ferrous Sulfate Dried (Feosol) 200 (65 Fe) MG tablet tablet Take 1 tablet by mouth Daily.      • folic acid (FOLVITE) 1 MG tablet Take 1 tablet by mouth Daily. 30 tablet 3    • ondansetron (ZOFRAN) 4 MG tablet Take 1 tablet by mouth Every 8 (Eight) Hours As Needed for Nausea or Vomiting.      • oxyCODONE-acetaminophen (PERCOCET)  MG per tablet Take 1 tablet by mouth Every 6 (Six) Hours As Needed for Moderate Pain . 10 tablet 0    • pancrelipase, Lip-Prot-Amyl, (CREON) 50183-34740 units capsule delayed-release particles capsule Take 3 capsules by mouth 3 (Three)  Times a Day With Meals.      • pantoprazole (PROTONIX) 40 MG EC tablet Take 1 tablet by mouth Daily. 30 tablet 3    • Black Cohosh 540 MG capsule Take 1 capsule by mouth 2 (Two) Times a Day.      • Cholecalciferol (VITAMIN D3) 5000 units capsule capsule Take 5,000 Units by mouth Daily.      • polyethylene glycol 17 g packet Take 17 g by mouth Daily.      • sennosides-docusate (PERICOLACE) 8.6-50 MG per tablet Take 2 tablets by mouth 2 (Two) Times a Day As Needed for Constipation.      • thiamine (VITAMIN B-1) 100 MG tablet  tablet Take 100 mg by mouth Daily.        Allergies:    Allergies   Allergen Reactions   • Nickel    • Hydrocodone-Acetaminophen Nausea And Vomiting       Objective     Objective     Vital Signs  Temp:  [98 °F (36.7 °C)] 98 °F (36.7 °C)  Heart Rate:  [] 105  Resp:  [18] 18  BP: (117-138)/(76-92) 117/81  SpO2:  [96 %-98 %] 96 %  on   ;      Body mass index is 19.2 kg/m².    Physical Exam  Vitals and nursing note reviewed.   Constitutional:       General: She is in acute distress (in pain).      Appearance: She is well-developed.   HENT:      Head: Normocephalic and atraumatic.   Eyes:      General: No scleral icterus.     Pupils: Pupils are equal, round, and reactive to light.   Cardiovascular:      Rate and Rhythm: Normal rate and regular rhythm.      Heart sounds: Normal heart sounds. No murmur heard.  Pulmonary:      Effort: Pulmonary effort is normal.      Breath sounds: Normal breath sounds.   Abdominal:      General: Bowel sounds are normal. There is no distension.      Palpations: Abdomen is soft.      Tenderness: There is abdominal tenderness (epigastric and left flank). There is no guarding.   Musculoskeletal:      Cervical back: Normal range of motion and neck supple.   Skin:     General: Skin is warm and dry.      Findings: No rash.   Neurological:      Mental Status: She is alert and oriented to person, place, and time.      Cranial Nerves: No cranial nerve deficit.    Psychiatric:         Behavior: Behavior normal.         Thought Content: Thought content normal.         Results Review:  I reviewed the patient's new clinical results.  Discussed with ED provider.    Lab Results (last 24 hours)     Procedure Component Value Units Date/Time    CBC & Differential [[id number]]  (Abnormal) Collected: 03/25/23 1700    Specimen: Blood from Arm, Right Updated: 03/25/23 1712    Narrative:      The following orders were created for panel order CBC & Differential.  Procedure                               Abnormality         Status                     ---------                               -----------         ------                     CBC Auto Differential[[id number]]        Abnormal            Final result                 Please view results for these tests on the individual orders.    Comprehensive Metabolic Panel [[id number]]  (Abnormal) Collected: 03/25/23 1700    Specimen: Blood from Arm, Right Updated: 03/25/23 1731     Glucose 119 mg/dL      BUN 6 mg/dL      Creatinine 0.61 mg/dL      Sodium 137 mmol/L      Potassium 3.8 mmol/L      Comment: Slight hemolysis detected by analyzer. Results may be affected.        Chloride 98 mmol/L      CO2 25.5 mmol/L      Calcium 10.4 mg/dL      Total Protein 7.9 g/dL      Albumin 4.1 g/dL      ALT (SGPT) 15 U/L      AST (SGOT) 6 U/L      Alkaline Phosphatase 77 U/L      Total Bilirubin 0.3 mg/dL      Globulin 3.8 gm/dL      A/G Ratio 1.1 g/dL      BUN/Creatinine Ratio 9.8     Anion Gap 13.5 mmol/L      eGFR 116.1 mL/min/1.73     Narrative:      GFR Normal >60  Chronic Kidney Disease <60  Kidney Failure <15      Lipase [[id number]]  (Abnormal) Collected: 03/25/23 1700    Specimen: Blood from Arm, Right Updated: 03/25/23 1731     Lipase 192 U/L     CBC Auto Differential [[id number]]  (Abnormal) Collected: 03/25/23 1700    Specimen: Blood from Arm, Right Updated: 03/25/23 1712     WBC 11.22 10*3/mm3      RBC 3.99 10*6/mm3      Hemoglobin 13.9 g/dL       Hematocrit 40.7 %      .0 fL      MCH 34.8 pg      MCHC 34.2 g/dL      RDW 13.3 %      RDW-SD 51.2 fl      MPV 9.6 fL      Platelets 249 10*3/mm3      Neutrophil % 75.5 %      Lymphocyte % 13.0 %      Monocyte % 9.8 %      Eosinophil % 1.2 %      Basophil % 0.2 %      Immature Grans % 0.3 %      Neutrophils, Absolute 8.48 10*3/mm3      Lymphocytes, Absolute 1.46 10*3/mm3      Monocytes, Absolute 1.10 10*3/mm3      Eosinophils, Absolute 0.13 10*3/mm3      Basophils, Absolute 0.02 10*3/mm3      Immature Grans, Absolute 0.03 10*3/mm3      nRBC 0.0 /100 WBC     Ethanol [[id number]] Collected: 03/25/23 1700    Specimen: Blood from Arm, Right Updated: 03/25/23 1731     Ethanol <10 mg/dL      Ethanol % <0.010 %     Urinalysis With Microscopic If Indicated (No Culture) - Urine, Clean Catch [[id number]]  (Abnormal) Collected: 03/25/23 1722    Specimen: Urine, Clean Catch Updated: 03/25/23 1806     Color, UA Yellow     Appearance, UA Clear     pH, UA 7.5     Specific Gravity, UA 1.006     Glucose, UA Negative     Ketones, UA Negative     Bilirubin, UA Negative     Blood, UA Negative     Protein, UA Negative     Leuk Esterase, UA Moderate (2+)     Nitrite, UA Negative     Urobilinogen, UA 0.2 E.U./dL    Urine Drug Screen - Urine, Clean Catch [[id number]]  (Abnormal) Collected: 03/25/23 1722    Specimen: Urine, Clean Catch Updated: 03/25/23 1813     Amphet/Methamphet, Screen Negative     Barbiturates Screen, Urine Negative     Benzodiazepine Screen, Urine Negative     Cocaine Screen, Urine Negative     Opiate Screen Negative     THC, Screen, Urine Negative     Methadone Screen, Urine Negative     Oxycodone Screen, Urine Positive    Narrative:      Negative Thresholds Per Drugs Screened:    Amphetamines                 500 ng/ml  Barbiturates                 200 ng/ml  Benzodiazepines              100 ng/ml  Cocaine                      300 ng/ml  Methadone                    300 ng/ml  Opiates                      300  ng/ml  Oxycodone                    100 ng/ml  THC                           50 ng/ml    The Normal Value for all drugs tested is negative. This report includes final unconfirmed screening results to be used for medical treatment purposes only. Unconfirmed results must not be used for non-medical purposes such as employment or legal testing. Clinical consideration should be applied to any drug of abuse test, particularly when unconfirmed results are used.            Urinalysis, Microscopic Only - Urine, Clean Catch [[id number]]  (Abnormal) Collected: 03/25/23 1722    Specimen: Urine, Clean Catch Updated: 03/25/23 1806     RBC, UA 0-2 /HPF      WBC, UA 21-30 /HPF      Bacteria, UA 4+ /HPF      Squamous Epithelial Cells, UA 3-6 /HPF      Hyaline Casts, UA None Seen /LPF      Methodology Automated Microscopy          Imaging Results (Last 24 Hours)     Procedure Component Value Units Date/Time    CT Abdomen Pelvis With Contrast [[id number]] Collected: 03/25/23 2045     Updated: 03/25/23 2045    Narrative:      CT ABDOMEN AND PELVIS WITH IV CONTRAST     HISTORY: Abdominal pain with nausea and vomiting.     TECHNIQUE:  CT includes axial imaging from the lung bases to the  trochanters with intravenous contrast and with use of oral contrast.  Data reconstructed in coronal and sagittal planes. Radiation dose  reduction techniques were utilized, including automated exposure control  and exposure modulation based on body size.     COMPARISON: CT abdomen and pelvis with IV contrast 11/30/2022.     FINDINGS: There is a peripherally enhancing fluid collection centered  between the tail of the pancreatic tail and the anterior left kidney  measuring 4.6 x 3.8 cm and this is increased in size from 3.5 x 2.9 cm  on the prior CT 11/30/2022. Above this collection there is developed 2  new peripheral enhancing collections that are along the posterior margin  of the upper gastric body and gastric fundus and these collections  measure 1.7  and 2.1 cm. There is indentation along the posterior gastric  wall with adjacent gastric wall thickening.     There is developed new hyperdensity involving the left adrenal gland  which is abnormally enlarged and exhibits heterogenous appearance  measuring 4 x 2.6 cm. Left adrenal gland was normal on the prior exam  and this new hyperdensity and heterogenous appearance was large and  suspected to be due to an acute left adrenal hemorrhage. There is  surrounding stranding within the periadrenal fat and within the fat  extending above the upper pole of the left kidney. There is also mild  fluid density extending adjacent to the spleen and surrounding Gerota's  fascia.     The left renal vein appears narrowed. There is cortical thinning along  the anterior margin of the left kidney. Right kidney appears within  normal limits. Right adrenal gland is normal. The liver, gallbladder  appear within normal limits. There are multiple pancreatic  calcifications associated with uncinate process of the pancreatic head  and there is dilatation of the pancreatic duct that appears similar to  the previous exam.     There is no bowel dilatation or evidence for bowel obstruction.  Atherosclerotic calcifications are present involving the abdominal aorta  and iliac vasculature.       Impression:      1. Since the prior CT approximately 4 months ago there has developed  abnormal enlargement with heterogenous appearance of the left adrenal  gland measuring 4 x 2.6 cm and this is suspected to represent acute left  adrenal hemorrhage. There is adjacent stranding within the fat and fluid  partially surrounds Gerota's fascia. Recommend follow-up CT evaluation.  2. Collection between the pancreatic tail and left kidney measures  increased in size compared to the prior exam now measuring 4.6 cm. There  is developed 2 new collections above this along the posterior margin of  the stomach associated with gastric wall. There is associated  gastric  wall thickening/secondary gastritis.  3. Chronic pancreatic calcifications and pancreatic ductal enlargement  without change.  4. Narrowing of the left renal vein where there is surrounding edema and  stranding within the fat. Left renal vein collaterals are present  similar to the previous exam.     Discussed with Dr. Edwards in the emergency department on 03/25/2023 8:30  PM.     Radiation dose reduction techniques were utilized, including automated  exposure control and exposure modulation based on body size.                Results for orders placed during the hospital encounter of 08/06/22    Adult Transthoracic Echo Complete W/ Cont if Necessary Per Protocol    Interpretation Summary  · Left ventricular ejection fraction appears to be 61 - 65%. Left ventricular systolic function is normal.  · Left ventricular diastolic function was normal.  · Normal right ventricular cavity size and systolic function noted.  · The agitated saline study is positive with Valsalva, consistent with a small to moderate sized PFO  · Mild tricuspid valve regurgitation is present.  · Calculated right ventricular systolic pressure from tricuspid regurgitation is 32 mmHg.  · There is no evidence of pericardial effusion      No orders to display       Assessment/Plan     Active Hospital Problems    Diagnosis  POA   • **Adrenal hemorrhage (HCC) [E27.49]  Yes   • Left flank pain [R10.9]  Yes   • Pancreatic pseudocyst [K86.3]  Yes   • GERD without esophagitis [K21.9]  Yes   • Chronic pancreatitis (HCC) [K86.1]  Yes   • Anemia of chronic disease [D63.8]  Yes   • Epigastric pain [R10.13]  Yes     Ms. Cain is a 42 y.o. female smoker with a history of GERD, anxiety, alcohol abuse with resulting recurrent pancreatitis leading to chronic pancreatitis now complicated by pseudocyts. She presents now with unilateral adrenal gland hemorrhage- likely related to her recurrent pancreatitis.     · IVFs, IV agents for pain and nausea  control  · Surgery and Urology consulted by ER physician. Will see their recommendations- will have her NPO after midnight incase surgery is needed though I wonder if will be more conservative management  · I discussed with her she will likely need to follow up with Endocrinology as an outpatient. No evidence currently for adrenal insufficiency-and less likely to have it with unilateral adrenal hemorrhage. Will check AM cosyntropin stim test. If hypotension or unstable would need IV steroids but she is stable and no signs of adrenal insuffiency at this time.   · Denies any dysuria or symptoms of UTI. She states she has a history of urinary colonization. Will hold off on abx for the time being unless develops symptoms of UTI.   · Monitor labs daily  · I discussed the patients findings and my recommendations with patient, family and consulting provider.    VTE Prophylaxis - SCDs.         Marcel Hampton MD  [facility]  03/25/23  22:09 EDT      Electronically signed by Marcel Hampton MD at 03/25/23 2209          Emergency Department Notes      Zulema Tran RN at 03/25/23 1627        Patient to ER via car for abd pain that goes all over her body x a few weeks    Patient states she has pancreatitis    Patient wearing mask this RN in PPE    Electronically signed by Zulema Tran RN at 03/25/23 1629     Dawood Edwards MD at 03/25/23 1648           EMERGENCY DEPARTMENT ENCOUNTER    Room Number:  N638/1  Date seen:  3/25/2023  PCP: Rachell Pozo APRN  Historian: Patient      HPI:  Chief Complaint: Abdominal pain, nausea  A complete HPI/ROS/PMH/PSH/SH/FH are unobtainable due to: N/A  Context: Piper Cain is a 42 y.o. female who presents to the ED c/o persistent abdominal pain with nausea and poor appetite for the past 2 weeks.  Patient has a history of chronic pancreatitis.  She has never had any abdominal surgeries in the past, however.  Last hospitalization was in  January for similar problem.  However she says that this particular flareup of pain is different from her previous pancreatitis pain symptoms.  She says the pain seems to radiate laterally towards the back but also towards the upward left-sided chest to some degree.  She has had some nausea but no blood in the vomit.  She has not had any blood in her stools.  In fact she has been very constipated recently.  She said she ran out of her usual stool softeners but has been taking some generic softeners instead.  She denies any fevers.        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   • Anemia of chronic disease 08/08/2022   • GERD without esophagitis 08/08/2022   • Alcohol dependence in remission ([facility]) 08/08/2022   • Pancreatic pseudocyst 08/14/2022   • Candida esophagitis ([facility]) 08/14/2022   • Ureterolithiasis 10/20/2022   • Left flank pain 10/21/2022   • Bacterial vaginosis 10/23/2022   • Left sided abdominal pain 11/30/2022     Resolved Ambulatory Problems     Diagnosis Date Noted   • Hypokalemia 09/28/2017   • Mixed acid base balance disorder 09/28/2017   • Dehydration 09/28/2017     Past Medical History:   Diagnosis Date   • Anxiety    • E. coli bacteremia    • ETOH abuse    • GERD (gastroesophageal reflux disease)    • Hiatal hernia    • Migraine    • Miscarriage 2004   • Pancreatitis          PAST SURGICAL HISTORY  Past Surgical History:   Procedure Laterality Date   • ENDOSCOPY N/A 8/10/2022    Procedure: ESOPHAGOGASTRODUODENOSCOPY with bxs;  Surgeon: Salvador Price MD;  Location: [facility]  ENDOSCOPY;  Service: Gastroenterology;  Laterality: N/A;  Pre: r/o esophageal  varacies, abd pain  Post: esophageal plaque         FAMILY HISTORY  Family History   Problem Relation Age of Onset   • Alcohol abuse Mother    • Arthritis Mother    • Asthma Mother    • Miscarriages / Stillbirths Mother    • Cancer Father    • Diabetes Father    • Drug abuse Father    • Early death Father    • Heart disease Father    • Hyperlipidemia Father    • Hypertension Father    • Alcohol abuse Paternal Aunt    • Cancer Paternal Aunt    • Diabetes Paternal Aunt    • Drug abuse Paternal Aunt    • Early death Paternal Aunt    • Heart disease Paternal Aunt    • Hyperlipidemia Paternal Aunt    • Hypertension Paternal Aunt    • Alcohol abuse Maternal Grandmother    • Alcohol abuse Maternal Grandfather    • Alcohol abuse Paternal Grandmother    • Cancer Paternal Grandmother    • Diabetes Paternal Grandmother    • Drug abuse Paternal Grandmother    • Early death Paternal Grandmother    • Heart disease Paternal Grandmother    • Hyperlipidemia Paternal Grandmother    • Hypertension Paternal Grandmother    • Alcohol abuse Paternal Grandfather          SOCIAL HISTORY  Social History     Socioeconomic History   • Marital status: Single   Tobacco Use   • Smoking status: Every Day     Packs/day: 0.50     Types: Cigarettes     Start date: 1/28/1996   • Smokeless tobacco: Current   Vaping Use   • Vaping Use: Never used   Substance and Sexual Activity   • Alcohol use: Never   • Drug use: No   • Sexual activity: Yes     Partners: Male     Birth control/protection: None     Comment: IUD removed 6/29/2017         ALLERGIES  Nickel and Hydrocodone-acetaminophen        REVIEW OF SYSTEMS  Review of Systems   Constitutional: Positive for activity change and appetite change. Negative for fever.   HENT: Negative.    Eyes: Negative for pain and visual disturbance.   Respiratory: Negative for cough and shortness of breath.    Cardiovascular: Negative for chest  pain.   Gastrointestinal: Positive for abdominal pain, constipation and nausea. Negative for vomiting.   Genitourinary: Positive for decreased urine volume. Negative for dysuria.   Musculoskeletal: Positive for myalgias.   Skin: Negative for color change.   Neurological: Negative for syncope and headaches.   All other systems reviewed and are negative.           PHYSICAL EXAM  ED Triage Vitals   Temp Heart Rate Resp BP SpO2   03/25/23 1629 03/25/23 1629 03/25/23 1629 03/25/23 1632 03/25/23 1629   98 °F (36.7 °C) (!) 140 18 138/92 98 %      Temp src Heart Rate Source Patient Position BP Location FiO2 (%)   -- -- -- -- --              Physical Exam      GENERAL: Pleasant lady, appears uncomfortable but no acute distress  HENT: nares patent, normocephalic and atraumatic  EYES: no scleral icterus EOMI, normal conjunctiva  CV: regular rhythm, heart rate is a little bit elevated around 120 bpm during my evaluation.  Normal pulses, no murmurs  RESPIRATORY: normal effort, lungs are clear bilaterally, no stridor  ABDOMEN: soft, mild to moderate tenderness in the entire upper half of the abdomen but no peritoneal features elicited.  Bowel sounds hypoactive throughout.  MUSCULOSKELETAL: no deformity, no edema or asymmetry   NEURO: alert, moves all extremities, follows commands  PSYCH:  calm, cooperative  SKIN: warm, dry    Vital signs and       RADIOLOGY  CT Abdomen Pelvis With Contrast    Result Date: 3/25/2023  CT ABDOMEN AND PELVIS WITH IV CONTRAST  HISTORY: Abdominal pain with nausea and vomiting.  TECHNIQUE:  CT includes axial imaging from the lung bases to the trochanters with intravenous contrast and with use of oral contrast. Data reconstructed in coronal and sagittal planes. Radiation dose reduction techniques were utilized, including automated exposure control and exposure modulation based on body size.  COMPARISON: CT abdomen and pelvis with IV contrast 11/30/2022.  FINDINGS: There is a peripherally enhancing fluid  collection centered between the tail of the pancreatic tail and the anterior left kidney measuring 4.6 x 3.8 cm and this is increased in size from 3.5 x 2.9 cm on the prior CT 11/30/2022. Above this collection there is developed 2 new peripheral enhancing collections that are along the posterior margin of the upper gastric body and gastric fundus and these collections measure 1.7 and 2.1 cm. There is indentation along the posterior gastric wall with adjacent gastric wall thickening.  There is developed new hyperdensity involving the left adrenal gland which is abnormally enlarged and exhibits heterogenous appearance measuring 4 x 2.6 cm. Left adrenal gland was normal on the prior exam and this new hyperdensity and heterogenous appearance was large and suspected to be due to an acute left adrenal hemorrhage. There is surrounding stranding within the periadrenal fat and within the fat extending above the upper pole of the left kidney. There is also mild fluid density extending adjacent to the spleen and surrounding Gerota's fascia.  The left renal vein appears narrowed. There is cortical thinning along the anterior margin of the left kidney. Right kidney appears within normal limits. Right adrenal gland is normal. The liver, gallbladder appear within normal limits. There are multiple pancreatic calcifications associated with uncinate process of the pancreatic head and there is dilatation of the pancreatic duct that appears similar to the previous exam.  There is no bowel dilatation or evidence for bowel obstruction. Atherosclerotic calcifications are present involving the abdominal aorta and iliac vasculature.      1. Since the prior CT approximately 4 months ago there has developed abnormal enlargement with heterogenous appearance of the left adrenal gland measuring 4 x 2.6 cm and this is suspected to represent acute left adrenal hemorrhage. There is adjacent stranding within the fat and fluid partially surrounds  Gerota's fascia. Recommend follow-up CT evaluation. 2. Collection between the pancreatic tail and left kidney measures increased in size compared to the prior exam now measuring 4.6 cm. There is developed 2 new collections above this along the posterior margin of the stomach associated with gastric wall. There is associated gastric wall thickening/secondary gastritis. 3. Chronic pancreatic calcifications and pancreatic ductal enlargement without change. 4. Narrowing of the left renal vein where there is surrounding edema and stranding within the fat. Left renal vein collaterals are present similar to the previous exam.  Discussed with Dr. Edwards in the emergency department on 03/25/2023 8:30 PM.  Radiation dose reduction techniques were utilized, including automated exposure control and exposure modulation based on body size.         Ordered the above noted radiological studies. Reviewed by me in PACS.            PROCEDURES  Procedures          MEDICATIONS GIVEN IN ER  Medications   sodium chloride 0.9 % flush 10 mL (10 mL Intravenous Given 3/25/23 2140)   sodium chloride 0.9 % flush 10 mL (has no administration in time range)   sodium chloride 0.9 % flush 10 mL (has no administration in time range)   sodium chloride 0.9 % infusion 40 mL (has no administration in time range)   ondansetron (ZOFRAN) tablet 4 mg (has no administration in time range)     Or   ondansetron (ZOFRAN) injection 4 mg (has no administration in time range)   nitroglycerin (NITROSTAT) SL tablet 0.4 mg (has no administration in time range)   sodium chloride 0.9 % infusion (has no administration in time range)   acetaminophen (TYLENOL) tablet 650 mg (has no administration in time range)     Or   acetaminophen (TYLENOL) 160 MG/5ML solution 650 mg (has no administration in time range)     Or   acetaminophen (TYLENOL) suppository 650 mg (has no administration in time range)   HYDROmorphone (DILAUDID) injection 1 mg (has no administration in time  range)     And   naloxone (NARCAN) injection 0.4 mg (has no administration in time range)   prochlorperazine (COMPAZINE) injection 10 mg (has no administration in time range)   sennosides-docusate (PERICOLACE) 8.6-50 MG per tablet 2 tablet (has no administration in time range)   oxyCODONE-acetaminophen (PERCOCET)  MG per tablet 1 tablet (has no administration in time range)   cosyntropin (CORTROSYN) injection 0.25 mg (has no administration in time range)   lactated ringers bolus 1,000 mL (0 mL Intravenous Stopped 3/25/23 2111)   HYDROmorphone (DILAUDID) injection 1 mg (1 mg Intravenous Given 3/25/23 1710)   ondansetron (ZOFRAN) injection 4 mg (4 mg Intravenous Given 3/25/23 1709)   ketorolac (TORADOL) injection 15 mg (15 mg Intravenous Given 3/25/23 1754)   HYDROmorphone (DILAUDID) injection 0.5 mg (0.5 mg Intravenous Given 3/25/23 2047)   dicyclomine (BENTYL) capsule 20 mg (20 mg Oral Given 3/25/23 2047)   iopamidol (ISOVUE-300) 61 % injection 100 mL (85 mL Intravenous Given 3/25/23 2002)   HYDROmorphone (DILAUDID) injection 1 mg (1 mg Intravenous Given 3/25/23 2139)   oxyCODONE-acetaminophen (PERCOCET)  MG per tablet 1 tablet (1 tablet Oral Given 3/25/23 2139)                   MEDICAL DECISION MAKING, PROGRESS, and CONSULTS    All labs have been independently reviewed by me.  All radiology studies have been reviewed by me and I have also reviewed the radiology report.   EKG's independently viewed and interpreted by me.  Discussion below represents my analysis of pertinent findings related to patient's condition, differential diagnosis, treatment plan and final disposition.      Additional sources:  - Discussed/ obtained information from independent historians: None    - External (non-ED) record review: I reviewed the most recent hospital discharge summary which was from December 5, 2022 when patient was admitted here for similar presentation with left-sided abdominal pain, chronic pancreatitis and  pancreatic pseudocyst.    - Chronic or social conditions impacting care: Chronic pancreatitis.  Has a chronic pain management provider.      Orders placed during this visit:  Orders Placed This Encounter   Procedures   • CT Abdomen Pelvis With Contrast   • Comprehensive Metabolic Panel   • Lipase   • Urinalysis With Microscopic If Indicated (No Culture) - Urine, Clean Catch   • CBC Auto Differential   • Ethanol   • Urine Drug Screen - Urine, Clean Catch   • Urinalysis, Microscopic Only - Urine, Clean Catch   • CBC Auto Differential   • Potassium   • Magnesium   • High Sensitivity Troponin T   • Blood Gas, Arterial -   • Cortisol   • ACTH   • Vitamin B12   • Comprehensive Metabolic Panel   • Diet: Liquid Diets; Clear Liquid; Texture: Regular Texture (IDDSI 7); Fluid Consistency: Thin (IDDSI 0)   • NPO Diet NPO Type: Strict NPO   • Vital Signs   • Intake & Output   • Weigh patient   • Oral Care   • Place Sequential Compression Device   • Maintain Sequential Compression Device   • Telemetry - Maintain IV Access   • May Be Off Telemetry for Tests   • Pulse Oximetry, Continuous   • Cardiac Monitoring   • Code Status and Medical Interventions:   • Surgery (on-call MD unless specified)   • [facility] (on-call MD unless specified) Details   • Urology (on-call MD unless specified)   • Oxygen Therapy- Nasal Cannula; Titrate for SPO2: 90% - 95%   • Oxygen Therapy- Nasal Cannula; Titrate for SPO2: 90% - 95%   • Incentive Spirometry   • ECG 12 Lead Chest Pain   • Insert Peripheral IV   • Insert Peripheral IV   • Inpatient Admission   • ED Bed Request   • CBC & Differential         Differential diagnosis includes but is not limited to:    Acute on chronic pancreatitis, biliary colic, bowel obstruction, viral enteritis, peptic ulcer disease      Independent interpretation of labs, radiology studies, and discussions with consultants:  ED Course as of 03/25/23 2210   Sat Mar 25, 2023   1651 Patient has some upper abdominal tenderness and  tachycardia here.  However does not appear overtly toxic.  I do suspect she is probably having a flareup of her pancreatitis symptoms.  She has had multiple images in the past.  I will start with a simple work-up including CBC, CMP and lipase values as well as urinalysis testing.  We will give her some IV fluids and pain and nausea medications.  Anticipate she may necessitate observation in the hospital for symptom management and consultation with her previously established specialists. [CHARLES]   1751 Lipase(!): 192 [CHARLES]   1751 WBC(!): 11.22 [CHARLES]   1858 Bacteria, UA(!): 4+ [CHARLES]   1858 WBC, UA(!): 21-30 [CHARLES]   2042 I discussed with the radiologist about the patient's CT scan.  He identifies a new finding of left adrenal gland hemorrhage as well as chronic inflammatory changes around the pancreas.  I am paging general surgery now to discuss these findings together [CHARLES]   2059 I just discussed with Dr. Pena from general surgery about this patient.  I shared the test results with him including CT report.  He says he will be glad to consult from a surgical standpoint.  He also request that we notify urology regarding the abnormalities with regard to the adrenal gland. [CHARLES]   2059 I just discussed with Dr. Hampton from [facility].  He agrees to accept the patient on his service for continued management today. [CHARLES]   2208 I just discussed with Dr. Martinez from [facility] urology about this patient and he agrees to consult on her as well. [CHARLES]   2208 I do note the patient has 4+ bacteria in the urine but she does not really describe any dysuria symptoms or change with urinary habits.  Therefore I will hold off on any antibiotics at this time and defer to the admitting team. [CHARLES]      ED Course User Index  [CHARLES] Dawood Edwards MD           Patient was placed in face mask during triage.  Patient was wearing face mask throughout encounter.  I wore personal protective equipment throughout the encounter.  Hand hygiene was performed before and  after patient encounter.     DIAGNOSIS  Final diagnoses:   Adrenal hemorrhage (HCC)   Acute on chronic pancreatitis (HCC)   Bacteria in urine         DISPOSITION  Discharge home            Latest Documented Vital Signs:  As of 22:10 EDT  BP- 117/81 HR- 105 Temp- 98 °F (36.7 °C) O2 sat- 96%              --    Please note that portions of this were completed with a voice recognition program.       Note Disclaimer: At [facility], we believe that sharing information builds trust and better relationships. You are receiving this note because you are receiving care at [facility] or recently visited. It is possible you will see health information before a provider has talked with you about it. This kind of information can be easy to misunderstand. To help you fully understand what it means for your health, we urge you to discuss this note with your provider.           Dawood Edwards MD  03/25/23 2210      Electronically signed by Dawood Edwards MD at 03/25/23 2210     Malaika Church, RN at 03/25/23 1705        IV attempt x2 unsuccessful.    Electronically signed by Malaika Church, RN at 03/25/23 1705     Prosper Moreno, RN at 03/25/23 2105               Oxygen Therapy (since admission)     Date/Time SpO2 Device (Oxygen Therapy) Flow (L/min) Oxygen Concentration (%) ETCO2 (mmHg)    03/27/23 1337 98 room air -- -- --    03/27/23 0736 -- room air -- -- --    03/26/23 2331 93 room air -- -- --    03/26/23 2000 -- room air -- -- --    03/26/23 1907 91 room air -- -- --    03/26/23 1411 -- room air -- -- --    03/26/23 1350 98 room air -- -- --    03/26/23 0913 -- room air -- -- --    03/26/23 0721 100 room air -- -- --    03/26/23 0358 97 room air -- -- --    03/25/23 2230 -- room air -- -- --    03/25/23 2214 96 room air -- -- --    03/25/23 2128 96 -- -- -- --    03/25/23 2111 97 -- -- -- --    03/25/23 2027 97 -- -- -- --    03/25/23 1901 97 -- -- -- --    03/25/23 1629 98 -- -- -- --        Intake &  Output (last 3 days)        0701   0700  0701   0700  0701   07 0701   0700    P.O.  480 1800 600    IV Piggyback  1000      Total Intake(mL/kg)  1480 (25.1) 1800 (30.5) 600 (10.2)    Urine (mL/kg/hr)  625 1750 (1.2) 600 (1.5)    Total Output  625 1750 600    Net  +855 +50 0            Urine Unmeasured Occurrence  1 x           Lines, Drains & Airways     Active LDAs     Name Placement date Placement time Site Days    Peripheral IV 23 Anterior;Left Wrist 23  Wrist  less than 1          Inactive LDAs     Name Placement date Placement time Removal date Removal time Site Days    [REMOVED] Peripheral IV 23 Anterior;Right Wrist 23  Wrist  1                       Physician Progress Notes (all)      Hipolito Quintana MD at 23 1024              Name: Piper Cain ADMIT: 3/25/2023   : 1980  PCP: Rachell Pozo APRN    MRN: [id number] LOS: 2 days   AGE/SEX: 42 y.o. female  ROOM: [facility]     Subjective   Subjective   She is complaining of significant amount of pain.  Reports that this is more like her chronic pancreatitis type of pain.  Continues to endorse constipation.    Review of Systems   Respiratory: Negative for chest tightness and shortness of breath.    Cardiovascular: Negative for chest pain and palpitations.   Gastrointestinal: Positive for abdominal pain and constipation.       Objective   Objective   Vital Signs  Temp:  [98.3 °F (36.8 °C)-99 °F (37.2 °C)] 99 °F (37.2 °C)  Heart Rate:  [86-93] 92  Resp:  [18] 18  BP: (109-126)/(83-91) 118/84  SpO2:  [91 %-98 %] 93 %  on   ;   Device (Oxygen Therapy): room air  Body mass index is 19.2 kg/m².  Physical Exam    General: Alert and oriented x3, no acute distress  HEENT: Normocephalic, atraumatic  CV: Regular rate and rhythm, no murmurs rubs or gallops  Lungs: Clear to auscultation bilaterally, no crackles or wheezes  Abdomen: Tender to palpation  at the epigastrium  Neuro: CN II-XII intact, no FND appreciated     Results Review     I reviewed the patient's new clinical results.  Results from last 7 days   Lab Units 03/27/23  0705 03/25/23  1700   WBC 10*3/mm3 6.46 11.22*   HEMOGLOBIN g/dL 10.5* 13.9   PLATELETS 10*3/mm3 167 249     Results from last 7 days   Lab Units 03/27/23  0705 03/26/23  0559 03/25/23  1700   SODIUM mmol/L 137 138 137   POTASSIUM mmol/L 3.4* 4.2 3.8   CHLORIDE mmol/L 102 101 98   CO2 mmol/L 25.1 28.0 25.5   BUN mg/dL 3* 6 6   CREATININE mg/dL 0.49* 0.56* 0.61   GLUCOSE mg/dL 81 98 119*   Estimated Creatinine Clearance: 142.1 mL/min (A) (by C-G formula based on SCr of 0.49 mg/dL (L)).  Results from last 7 days   Lab Units 03/26/23  0559 03/25/23  1700   ALBUMIN g/dL 3.6 4.1   BILIRUBIN mg/dL 0.2 0.3   ALK PHOS U/L 64 77   AST (SGOT) U/L 5 6   ALT (SGPT) U/L <5 15     Results from last 7 days   Lab Units 03/27/23  0705 03/26/23  0559 03/25/23  1700   CALCIUM mg/dL 8.8 8.8 10.4   ALBUMIN g/dL  --  3.6 4.1       COVID19   Date Value Ref Range Status   08/07/2022 Not Detected Not Detected - Ref. Range Final     No results found for: HGBA1C, POCGLU    CT Abdomen Pelvis With Contrast  Narrative: CT ABDOMEN AND PELVIS WITH IV CONTRAST     HISTORY: Abdominal pain with nausea and vomiting.     TECHNIQUE:  CT includes axial imaging from the lung bases to the  trochanters with intravenous contrast and with use of oral contrast.  Data reconstructed in coronal and sagittal planes. Radiation dose  reduction techniques were utilized, including automated exposure control  and exposure modulation based on body size.     COMPARISON: CT abdomen and pelvis with IV contrast 11/30/2022.     FINDINGS: There is a peripherally enhancing fluid collection centered  between the tail of the pancreatic tail and the anterior left kidney  measuring 4.6 x 3.8 cm and this is increased in size from 3.5 x 2.9 cm  on the prior CT 11/30/2022. Above this collection there is  developed 2  new peripheral enhancing collections that are along the posterior margin  of the upper gastric body and gastric fundus and these collections  measure 1.7 and 2.1 cm. There is indentation along the posterior gastric  wall with adjacent gastric wall thickening.     There is developed new hyperdensity involving the left adrenal gland  which is abnormally enlarged and exhibits heterogenous appearance  measuring 4 x 2.6 cm. Left adrenal gland was normal on the prior exam  and this new hyperdensity and heterogenous appearance was large and  suspected to be due to an acute left adrenal hemorrhage. There is  surrounding stranding within the periadrenal fat and within the fat  extending above the upper pole of the left kidney. There is also mild  fluid density extending adjacent to the spleen and surrounding Gerota's  fascia.     The left renal vein appears narrowed. There is cortical thinning along  the anterior margin of the left kidney. Right kidney appears within  normal limits. Right adrenal gland is normal. The liver, gallbladder  appear within normal limits. There are multiple pancreatic  calcifications associated with uncinate process of the pancreatic head  and there is dilatation of the pancreatic duct that appears similar to  the previous exam.     There is no bowel dilatation or evidence for bowel obstruction.  Atherosclerotic calcifications are present involving the abdominal aorta  and iliac vasculature.     Impression: 1. Since the prior CT approximately 4 months ago there has developed  abnormal enlargement with heterogenous appearance of the left adrenal  gland measuring 4 x 2.6 cm and this is suspected to represent acute left  adrenal hemorrhage. There is adjacent stranding within the fat and fluid  partially surrounds Gerota's fascia. Recommend follow-up CT evaluation.  2. Collection between the pancreatic tail and left kidney measures  increased in size compared to the prior exam now  measuring 4.6 cm. There  is developed 2 new collections above this along the posterior margin of  the stomach associated with gastric wall. There is associated gastric  wall thickening/secondary gastritis.  3. Chronic pancreatic calcifications and pancreatic ductal enlargement  without change.  4. Narrowing of the left renal vein where there is surrounding edema and  stranding within the fat. Left renal vein collaterals are present  similar to the previous exam.     Discussed with Dr. Edwards in the emergency department on 03/25/2023 8:30  PM.     Radiation dose reduction techniques were utilized, including automated  exposure control and exposure modulation based on body size.     This report was finalized on 3/25/2023 10:30 PM by Dr. Angel Mcleod M.D.       Scheduled Medications  amitriptyline, 10 mg, Oral, Nightly  cholecalciferol, 5,000 Units, Oral, Daily  folic acid, 1,000 mcg, Oral, Daily  Pancrelipase (Lip-Prot-Amyl), 72,000 units of lipase, Oral, TID  pantoprazole, 40 mg, Oral, Daily  senna-docusate sodium, 2 tablet, Oral, BID  sodium chloride, 10 mL, Intravenous, Q12H  thiamine, 100 mg, Oral, Daily  vitamin B-12, 1,000 mcg, Oral, Daily    Infusions  sodium chloride, 150 mL/hr, Last Rate: 150 mL/hr (03/27/23 0431)    Diet  Diet: Gastrointestinal Diets; Low Irritant; Texture: Regular Texture (IDDSI 7); Fluid Consistency: Thin (IDDSI 0)      Assessment/Plan     Active Hospital Problems    Diagnosis  POA   • **Adrenal hemorrhage (HCC) [E27.49]  Yes   • Left flank pain [R10.9]  Yes   • Pancreatic pseudocyst [K86.3]  Yes   • GERD without esophagitis [K21.9]  Yes   • Chronic pancreatitis (HCC) [K86.1]  Yes   • Anemia of chronic disease [D63.8]  Yes   • Epigastric pain [R10.13]  Yes      Resolved Hospital Problems   No resolved problems to display.       42 y.o. female admitted with Adrenal hemorrhage (HCC).    Left adrenal gland hemorrhage  Evaluated by urology.  No need for any surgical intervention.  Will  need repeat CT scan in the near future.  Cosyntropin stimulation test ordered to rule out adrenal insufficiency.  Cortisol level was 24, 1 hour after administration of cosyntropin, findings revealed that she is not adrenally insufficient.  However, if she becomes hemodynamically unstable or hypotensive she will need IV Solu-Cortef.  There are no signs of adrenal insufficiency.     Chronic pancreatitis  Pancreatic pseudocyst  IV fluids and IV medications for pain control. she currently receiving Percocet 10 mg every 4 hours as needed as well as IV Dilaudid 1 mg every 2 hours as needed.  Reports the pain is not currently adequately controlled.  General Surgery consulted.  No indications for any intervention at this time  Continue Creon      SCDs for DVT prophylaxis  Full code  Discussed with patient and nursing staff  Dispo: Home, tomorrow         Hipolito Quintana MD  [facility]  03/27/23  10:25 EDT    I wore protective equipment throughout this patient encounter including a face mask, gloves and protective eyewear.  Hand hygiene was performed before donning protective equipment and after removal when leaving the room.          Electronically signed by Hipolito Quintana MD at 03/27/23 1029     Salvador Boles Jr., MD at 03/27/23 0807        Follow up for left adrenal hemorrhage    Patient reports ongoing pain in the abdomen, flank, and back with radiation to the left lower quadrant and right abdomen.    Afebrile  Patient appears uncomfortable during encounter.  A&Ox3    Cr 0.56  No recent Hb    CT reviewed: enlargement of the left adrenal gland or uncertain significance, possible hemorrhage. Fluid collection within the tail of the pancreas causing mass effect/inflammatory response on the gastric wall and left renal vein.    Plan:  - no urologic intervention necessary at this time  - check Hb. As long as this is stable, will sign off  - will follow remotely    Electronically signed by Salvador Boles  Jose Sy MD at 23 0813     Hipolito Quintana MD at 23 1252              Name: Piper Cain ADMIT: 3/25/2023   : 1980  PCP: Rachell Pozo APRN    MRN: [id number] LOS: 1 days   AGE/SEX: 42 y.o. female  ROOM: [facility]     Subjective   Subjective   No acute events overnight. No surgical intervention required per urology,     Review of Systems   Respiratory: Negative for chest tightness and shortness of breath.    Cardiovascular: Negative for chest pain and palpitations.   Gastrointestinal: Positive for abdominal pain and constipation.       Objective   Objective   Vital Signs  Temp:  [98 °F (36.7 °C)-98.5 °F (36.9 °C)] 98.4 °F (36.9 °C)  Heart Rate:  [] 101  Resp:  [18-22] 22  BP: (103-138)/(71-92) 103/71  SpO2:  [96 %-100 %] 100 %  on   ;   Device (Oxygen Therapy): room air  Body mass index is 19.2 kg/m².  Physical Exam    General: Alert and oriented x3, no acute distress  HEENT: Normocephalic, atraumatic  CV: Regular rate and rhythm, no murmurs rubs or gallops  Lungs: Clear to auscultation bilaterally, no crackles or wheezes  Abdomen: Soft, nontender, nondistended  Neuro: CN II-XII intact, no FND appreciated     Results Review     I reviewed the patient's new clinical results.  Results from last 7 days   Lab Units 23  1700   WBC 10*3/mm3 11.22*   HEMOGLOBIN g/dL 13.9   PLATELETS 10*3/mm3 249     Results from last 7 days   Lab Units 23  0559 23  1700   SODIUM mmol/L 138 137   POTASSIUM mmol/L 4.2 3.8   CHLORIDE mmol/L 101 98   CO2 mmol/L 28.0 25.5   BUN mg/dL 6 6   CREATININE mg/dL 0.56* 0.61   GLUCOSE mg/dL 98 119*   Estimated Creatinine Clearance: 124.4 mL/min (A) (by C-G formula based on SCr of 0.56 mg/dL (L)).  Results from last 7 days   Lab Units 23  0559 23  1700   ALBUMIN g/dL 3.6 4.1   BILIRUBIN mg/dL 0.2 0.3   ALK PHOS U/L 64 77   AST (SGOT) U/L 5 6   ALT (SGPT) U/L <5 15     Results from last 7 days   Lab Units 23  0559 23  1700    CALCIUM mg/dL 8.8 10.4   ALBUMIN g/dL 3.6 4.1       COVID19   Date Value Ref Range Status   08/07/2022 Not Detected Not Detected - Ref. Range Final     No results found for: HGBA1C, POCGLU    CT Abdomen Pelvis With Contrast  Narrative: CT ABDOMEN AND PELVIS WITH IV CONTRAST     HISTORY: Abdominal pain with nausea and vomiting.     TECHNIQUE:  CT includes axial imaging from the lung bases to the  trochanters with intravenous contrast and with use of oral contrast.  Data reconstructed in coronal and sagittal planes. Radiation dose  reduction techniques were utilized, including automated exposure control  and exposure modulation based on body size.     COMPARISON: CT abdomen and pelvis with IV contrast 11/30/2022.     FINDINGS: There is a peripherally enhancing fluid collection centered  between the tail of the pancreatic tail and the anterior left kidney  measuring 4.6 x 3.8 cm and this is increased in size from 3.5 x 2.9 cm  on the prior CT 11/30/2022. Above this collection there is developed 2  new peripheral enhancing collections that are along the posterior margin  of the upper gastric body and gastric fundus and these collections  measure 1.7 and 2.1 cm. There is indentation along the posterior gastric  wall with adjacent gastric wall thickening.     There is developed new hyperdensity involving the left adrenal gland  which is abnormally enlarged and exhibits heterogenous appearance  measuring 4 x 2.6 cm. Left adrenal gland was normal on the prior exam  and this new hyperdensity and heterogenous appearance was large and  suspected to be due to an acute left adrenal hemorrhage. There is  surrounding stranding within the periadrenal fat and within the fat  extending above the upper pole of the left kidney. There is also mild  fluid density extending adjacent to the spleen and surrounding Gerota's  fascia.     The left renal vein appears narrowed. There is cortical thinning along  the anterior margin of the left  kidney. Right kidney appears within  normal limits. Right adrenal gland is normal. The liver, gallbladder  appear within normal limits. There are multiple pancreatic  calcifications associated with uncinate process of the pancreatic head  and there is dilatation of the pancreatic duct that appears similar to  the previous exam.     There is no bowel dilatation or evidence for bowel obstruction.  Atherosclerotic calcifications are present involving the abdominal aorta  and iliac vasculature.     Impression: 1. Since the prior CT approximately 4 months ago there has developed  abnormal enlargement with heterogenous appearance of the left adrenal  gland measuring 4 x 2.6 cm and this is suspected to represent acute left  adrenal hemorrhage. There is adjacent stranding within the fat and fluid  partially surrounds Gerota's fascia. Recommend follow-up CT evaluation.  2. Collection between the pancreatic tail and left kidney measures  increased in size compared to the prior exam now measuring 4.6 cm. There  is developed 2 new collections above this along the posterior margin of  the stomach associated with gastric wall. There is associated gastric  wall thickening/secondary gastritis.  3. Chronic pancreatic calcifications and pancreatic ductal enlargement  without change.  4. Narrowing of the left renal vein where there is surrounding edema and  stranding within the fat. Left renal vein collaterals are present  similar to the previous exam.     Discussed with Dr. Edwards in the emergency department on 03/25/2023 8:30  PM.     Radiation dose reduction techniques were utilized, including automated  exposure control and exposure modulation based on body size.     This report was finalized on 3/25/2023 10:30 PM by Dr. Angel Mcleod M.D.       Scheduled Medications  amitriptyline, 10 mg, Oral, Nightly  cholecalciferol, 5,000 Units, Oral, Daily  folic acid, 1,000 mcg, Oral, Daily  pancrelipase (Lip-Prot-Amyl), 12,000 units of  lipase, Oral, TID With Meals  pantoprazole, 40 mg, Oral, Daily  senna-docusate sodium, 2 tablet, Oral, BID  sodium chloride, 10 mL, Intravenous, Q12H  thiamine, 100 mg, Oral, Daily  vitamin B-12, 1,000 mcg, Oral, Daily    Infusions  sodium chloride, 150 mL/hr, Last Rate: 150 mL/hr (03/26/23 1241)    Diet  NPO Diet NPO Type: Strict NPO      Assessment/Plan     Active Hospital Problems    Diagnosis  POA   • **Adrenal hemorrhage (HCC) [E27.49]  Yes   • Left flank pain [R10.9]  Yes   • Pancreatic pseudocyst [K86.3]  Yes   • GERD without esophagitis [K21.9]  Yes   • Chronic pancreatitis (HCC) [K86.1]  Yes   • Anemia of chronic disease [D63.8]  Yes   • Epigastric pain [R10.13]  Yes      Resolved Hospital Problems   No resolved problems to display.       42 y.o. female admitted with Adrenal hemorrhage (HCC).    Adrenal hemorrhage  Evaluated by urology.  No need for any surgical intervention.  Will need repeat CT scan in the near future.  Cosyntropin stimulation test ordered to rule out adrenal insufficiency.  Cortisol level was 24, 1 hour after administration of cosyntropin, findings revealed that she is not adrenally insufficient.  However, if she becomes hemodynamically unstable or hypotensive she will need IV Solu-Cortef.  There are no signs of adrenal insufficiency.    Chronic pancreatitis  Pancreatic pseudocyst  IV fluids and IV medications for pain control  General Surgery consulted  Continue pancrelipase    SCDs for DVT prophylaxis  Full code  Discussed with patient and nursing staff  Dispo: Home, timing to be determined        Hipolito Quintana MD  [facility]  03/26/23  12:53 EDT    I wore protective equipment throughout this patient encounter including a face mask, gloves and protective eyewear.  Hand hygiene was performed before donning protective equipment and after removal when leaving the room.          Electronically signed by Hipolito Quintana MD at 03/26/23 1305          Consult Notes (all)       Evelin Martínez APRN at 23 [id number]      Consult Orders    1. Urology (on-call MD unless specified) [[id number]] ordered by Dawood Edwards MD at 23          Attestation signed by Oscar Murray MD at 23 171    I have reviewed this documentation and agree.    CT reviewed 2.5x3.8 cm left adrenal hemorrhage  Pt AF and blood pressure normal  No adrenal insufficiency noted  Does have left upper quadrant pain worse with inspiration    Plan:  No acute surgical intervention  Monitor vital signs, if any signs of decomposition repeat imaging and start corticosteroids  Urology to follow                  Urology Consult Note    Patient:Piper Cain :1980  Room:[facility]  Admit Date3/  Age:42 y.o.     SEX:female     DOS:3/26/2023     MR:[id number]     Visit:[id number]       Attending: Hipolito Quintana MD  Referring Provider: SOL Pickard  Reason for Consultation: Adrenal Hemorrhage    Patient Care Team:  Rachell Pozo APRN as PCP - General (Family Medicine)    Chief complaint Left flank pain    Subjective .     History of present illness:    Patient reports having worsening left sided pain for the last 2 weeks. Radiates from her side to her mid abdomen and to her left flank. Has history of chronic pancreatitis. Reports alcohol cessation approximately 3 months ago.  Denies any gross hematuria or dysuria, occasional malodorous urine.     Review of Systems  Constitutional:  Flank pain  Opthalmalogic: No change in vision or blurred vision  Otolaryngeal:  No epistaxis  Cardiovascular: No recent chest pain  Pulmonary:  No cough, sputum production, hemoptysis  Gastrointestinal: No melena, hematochezia, BRBPR, hematemesis  Genitourinary:  See HPI  Hematologic:  No tendency for easy bruising  Musculoskeletal: Left sided pain  Neurologic:  No Seizures, new focal deficits      History  Past Medical History:   Diagnosis Date   • Anxiety    • E. coli bacteremia    • ETOH abuse     • GERD (gastroesophageal reflux disease)    • Hiatal hernia    • Left flank pain 10/21/2022   • Migraine    • Miscarriage 2004   • Pancreatitis      Past Surgical History:   Procedure Laterality Date   • ENDOSCOPY N/A 8/10/2022    Procedure: ESOPHAGOGASTRODUODENOSCOPY with bxs;  Surgeon: Salvador Price MD;  Location: [facility] ENDOSCOPY;  Service: Gastroenterology;  Laterality: N/A;  Pre: r/o esophageal  varacies, abd pain  Post: esophageal plaque     Social History     Socioeconomic History   • Marital status: Single   Tobacco Use   • Smoking status: Every Day     Packs/day: 0.50     Types: Cigarettes     Start date: 1/28/1996   • Smokeless tobacco: Current   Vaping Use   • Vaping Use: Never used   Substance and Sexual Activity   • Alcohol use: Never   • Drug use: No   • Sexual activity: Yes     Partners: Male     Birth control/protection: None     Comment: IUD removed 6/29/2017     Family History   Problem Relation Age of Onset   • Alcohol abuse Mother    • Arthritis Mother    • Asthma Mother    • Miscarriages / Stillbirths Mother    • Cancer Father    • Diabetes Father    • Drug abuse Father    • Early death Father    • Heart disease Father    • Hyperlipidemia Father    • Hypertension Father    • Alcohol abuse Paternal Aunt    • Cancer Paternal Aunt    • Diabetes Paternal Aunt    • Drug abuse Paternal Aunt    • Early death Paternal Aunt    • Heart disease Paternal Aunt    • Hyperlipidemia Paternal Aunt    • Hypertension Paternal Aunt    • Alcohol abuse Maternal Grandmother    • Alcohol abuse Maternal Grandfather    • Alcohol abuse Paternal Grandmother    • Cancer Paternal Grandmother    • Diabetes Paternal Grandmother    • Drug abuse Paternal Grandmother    • Early death Paternal Grandmother    • Heart disease Paternal Grandmother    • Hyperlipidemia Paternal Grandmother    • Hypertension Paternal Grandmother    • Alcohol abuse Paternal Grandfather      Allergies   Allergen Reactions   • Nickel    •  "Hydrocodone-Acetaminophen Nausea And Vomiting     Prior to Admission medications    Medication Sig Start Date End Date Taking? Authorizing Provider   amitriptyline (ELAVIL) 10 MG tablet Take 1 tablet by mouth Every Night.   Yes Chanelle Osorio MD   Cyanocobalamin (VITAMIN B 12 PO) Take  by mouth.   Yes Chanelle Osorio MD   Ferrous Sulfate Dried (Feosol) 200 (65 Fe) MG tablet tablet Take 1 tablet by mouth Daily.   Yes Chanelle Osorio MD   folic acid (FOLVITE) 1 MG tablet Take 1 tablet by mouth Daily. 22  Yes    ondansetron (ZOFRAN) 4 MG tablet Take 1 tablet by mouth Every 8 (Eight) Hours As Needed for Nausea or Vomiting.   Yes Chanelle Osorio MD   pancrelipase, Lip-Prot-Amyl, (CREON) 97880-05008 units capsule delayed-release particles capsule Take 3 capsules by mouth 3 (Three) Times a Day With Meals.   Yes Chanelle Osorio MD   pantoprazole (PROTONIX) 40 MG EC tablet Take 1 tablet by mouth Daily. 22  Yes    Cholecalciferol (VITAMIN D3) 5000 units capsule capsule Take 5,000 Units by mouth Daily.    Chanelle Osorio MD   sennosides-docusate (PERICOLACE) 8.6-50 MG per tablet Take 2 tablets by mouth 2 (Two) Times a Day As Needed for Constipation. 22   Nancy Glass APRN   thiamine (VITAMIN B-1) 100 MG tablet  tablet Take 100 mg by mouth Daily.    Chanelle Osorio MD         Objective     tMax 24 hours:  Temp (24hrs), Av.3 °F (36.8 °C), Min:98 °F (36.7 °C), Max:98.5 °F (36.9 °C)    Vital Sign Ranges:  Temp:  [98 °F (36.7 °C)-98.5 °F (36.9 °C)] 98.4 °F (36.9 °C)  Heart Rate:  [] 101  Resp:  [18-22] 22  BP: (103-138)/(71-92) 103/71  Intake and Output Last 3 Shifts:  I/O last 3 completed shifts:  In: 1480 [P.O.:480; IV Piggyback:1000]  Out: 625 [Urine:625]      Physical Exam:     Vital signs: /71 (BP Location: Right arm, Patient Position: Lying)   Pulse 101   Temp 98.4 °F (36.9 °C) (Oral)   Resp 22   Ht 175.3 cm (69\")   Wt 59 kg (130 lb)   " LMP 01/17/2018 (Exact Date)   SpO2 100%   BMI 19.20 kg/m²   100%     General: Well developed, well nourished, alert and oriented x 3    Appears stated age. No apparent distress.    HEENT: Moist mucous membranes of the oral mucosa & nasal mucosa.    Lips are not cyanotic.    Extraocular movements intact    Neck:  Trachea position is midline.     Respiratory: Respiratory rhythm & depth: Normal.    Respiratory effort:  Normal.      GI:  Nondistended. Nontender.    Skin:  Inspection: No rash.    Palpation:  Warm, dry. No induration.     Extremities: No clubbing & no cyanosis.    No edema    :  Exam deferred           Results Review:     Lab Results (last 24 hours)     Procedure Component Value Units Date/Time    Cortisol [[id number]] Collected: 03/26/23 0659    Specimen: Blood Updated: 03/26/23 0752     Cortisol 23.70 mcg/dL     Narrative:      Cortisol Reference Ranges:    Cortisol 6AM - 10AM Range: 6.02-18.40 mcg/dl  Cortisol 4PM - 8PM Range: 2.68-10.50 mcg/dl      Results may be falsely increased if patient taking Biotin.      Cortisol [[id number]] Collected: 03/26/23 0629    Specimen: Blood Updated: 03/26/23 0730     Cortisol 16.50 mcg/dL     Narrative:      Cortisol Reference Ranges:    Cortisol 6AM - 10AM Range: 6.02-18.40 mcg/dl  Cortisol 4PM - 8PM Range: 2.68-10.50 mcg/dl      Results may be falsely increased if patient taking Biotin.      Vitamin B12 [[id number]]  (Normal) Collected: 03/26/23 0559    Specimen: Blood Updated: 03/26/23 0655     Vitamin B-12 670 pg/mL     Narrative:      Results may be falsely increased if patient taking Biotin.      Cortisol [[id number]] Collected: 03/26/23 0559    Specimen: Blood Updated: 03/26/23 0641     Cortisol 3.96 mcg/dL     Narrative:      Cortisol Reference Ranges:    Cortisol 6AM - 10AM Range: 6.02-18.40 mcg/dl  Cortisol 4PM - 8PM Range: 2.68-10.50 mcg/dl      Results may be falsely increased if patient taking Biotin.      Comprehensive Metabolic Panel [[id number]]   (Abnormal) Collected: 03/26/23 0559    Specimen: Blood Updated: 03/26/23 0635     Glucose 98 mg/dL      BUN 6 mg/dL      Creatinine 0.56 mg/dL      Sodium 138 mmol/L      Potassium 4.2 mmol/L      Chloride 101 mmol/L      CO2 28.0 mmol/L      Calcium 8.8 mg/dL      Total Protein 6.7 g/dL      Albumin 3.6 g/dL      ALT (SGPT) <5 U/L      AST (SGOT) 5 U/L      Alkaline Phosphatase 64 U/L      Total Bilirubin 0.2 mg/dL      Globulin 3.1 gm/dL      A/G Ratio 1.2 g/dL      BUN/Creatinine Ratio 10.7     Anion Gap 9.0 mmol/L      eGFR 118.5 mL/min/1.73     Narrative:      GFR Normal >60  Chronic Kidney Disease <60  Kidney Failure <15      ACTH [[id number]] Collected: 03/26/23 0559    Specimen: Blood Updated: 03/26/23 0610    Urine Drug Screen - Urine, Clean Catch [[id number]]  (Abnormal) Collected: 03/25/23 1722    Specimen: Urine, Clean Catch Updated: 03/25/23 1813     Amphet/Methamphet, Screen Negative     Barbiturates Screen, Urine Negative     Benzodiazepine Screen, Urine Negative     Cocaine Screen, Urine Negative     Opiate Screen Negative     THC, Screen, Urine Negative     Methadone Screen, Urine Negative     Oxycodone Screen, Urine Positive    Narrative:      Negative Thresholds Per Drugs Screened:    Amphetamines                 500 ng/ml  Barbiturates                 200 ng/ml  Benzodiazepines              100 ng/ml  Cocaine                      300 ng/ml  Methadone                    300 ng/ml  Opiates                      300 ng/ml  Oxycodone                    100 ng/ml  THC                           50 ng/ml    The Normal Value for all drugs tested is negative. This report includes final unconfirmed screening results to be used for medical treatment purposes only. Unconfirmed results must not be used for non-medical purposes such as employment or legal testing. Clinical consideration should be applied to any drug of abuse test, particularly when unconfirmed results are used.            Urinalysis With  Microscopic If Indicated (No Culture) - Urine, Clean Catch [[id number]]  (Abnormal) Collected: 03/25/23 1722    Specimen: Urine, Clean Catch Updated: 03/25/23 1806     Color, UA Yellow     Appearance, UA Clear     pH, UA 7.5     Specific Gravity, UA 1.006     Glucose, UA Negative     Ketones, UA Negative     Bilirubin, UA Negative     Blood, UA Negative     Protein, UA Negative     Leuk Esterase, UA Moderate (2+)     Nitrite, UA Negative     Urobilinogen, UA 0.2 E.U./dL    Urinalysis, Microscopic Only - Urine, Clean Catch [[id number]]  (Abnormal) Collected: 03/25/23 1722    Specimen: Urine, Clean Catch Updated: 03/25/23 1806     RBC, UA 0-2 /HPF      WBC, UA 21-30 /HPF      Bacteria, UA 4+ /HPF      Squamous Epithelial Cells, UA 3-6 /HPF      Hyaline Casts, UA None Seen /LPF      Methodology Automated Microscopy    Lipase [[id number]]  (Abnormal) Collected: 03/25/23 1700    Specimen: Blood from Arm, Right Updated: 03/25/23 1731     Lipase 192 U/L     Comprehensive Metabolic Panel [[id number]]  (Abnormal) Collected: 03/25/23 1700    Specimen: Blood from Arm, Right Updated: 03/25/23 1731     Glucose 119 mg/dL      BUN 6 mg/dL      Creatinine 0.61 mg/dL      Sodium 137 mmol/L      Potassium 3.8 mmol/L      Comment: Slight hemolysis detected by analyzer. Results may be affected.        Chloride 98 mmol/L      CO2 25.5 mmol/L      Calcium 10.4 mg/dL      Total Protein 7.9 g/dL      Albumin 4.1 g/dL      ALT (SGPT) 15 U/L      AST (SGOT) 6 U/L      Alkaline Phosphatase 77 U/L      Total Bilirubin 0.3 mg/dL      Globulin 3.8 gm/dL      A/G Ratio 1.1 g/dL      BUN/Creatinine Ratio 9.8     Anion Gap 13.5 mmol/L      eGFR 116.1 mL/min/1.73     Narrative:      GFR Normal >60  Chronic Kidney Disease <60  Kidney Failure <15      Ethanol [[id number]] Collected: 03/25/23 1700    Specimen: Blood from Arm, Right Updated: 03/25/23 1731     Ethanol <10 mg/dL      Ethanol % <0.010 %     CBC & Differential [[id number]]  (Abnormal) Collected:  03/25/23 1700    Specimen: Blood from Arm, Right Updated: 03/25/23 1712    Narrative:      The following orders were created for panel order CBC & Differential.  Procedure                               Abnormality         Status                     ---------                               -----------         ------                     CBC Auto Differential[[id number]]        Abnormal            Final result                 Please view results for these tests on the individual orders.    CBC Auto Differential [[id number]]  (Abnormal) Collected: 03/25/23 1700    Specimen: Blood from Arm, Right Updated: 03/25/23 1712     WBC 11.22 10*3/mm3      RBC 3.99 10*6/mm3      Hemoglobin 13.9 g/dL      Hematocrit 40.7 %      .0 fL      MCH 34.8 pg      MCHC 34.2 g/dL      RDW 13.3 %      RDW-SD 51.2 fl      MPV 9.6 fL      Platelets 249 10*3/mm3      Neutrophil % 75.5 %      Lymphocyte % 13.0 %      Monocyte % 9.8 %      Eosinophil % 1.2 %      Basophil % 0.2 %      Immature Grans % 0.3 %      Neutrophils, Absolute 8.48 10*3/mm3      Lymphocytes, Absolute 1.46 10*3/mm3      Monocytes, Absolute 1.10 10*3/mm3      Eosinophils, Absolute 0.13 10*3/mm3      Basophils, Absolute 0.02 10*3/mm3      Immature Grans, Absolute 0.03 10*3/mm3      nRBC 0.0 /100 WBC          No results found for: URINECX     Imaging Results (Last 7 Days)     Procedure Component Value Units Date/Time    CT Abdomen Pelvis With Contrast [[id number]] Collected: 03/25/23 2045     Updated: 03/25/23 2233    Narrative:      CT ABDOMEN AND PELVIS WITH IV CONTRAST     HISTORY: Abdominal pain with nausea and vomiting.     TECHNIQUE:  CT includes axial imaging from the lung bases to the  trochanters with intravenous contrast and with use of oral contrast.  Data reconstructed in coronal and sagittal planes. Radiation dose  reduction techniques were utilized, including automated exposure control  and exposure modulation based on body size.     COMPARISON: CT abdomen and  pelvis with IV contrast 11/30/2022.     FINDINGS: There is a peripherally enhancing fluid collection centered  between the tail of the pancreatic tail and the anterior left kidney  measuring 4.6 x 3.8 cm and this is increased in size from 3.5 x 2.9 cm  on the prior CT 11/30/2022. Above this collection there is developed 2  new peripheral enhancing collections that are along the posterior margin  of the upper gastric body and gastric fundus and these collections  measure 1.7 and 2.1 cm. There is indentation along the posterior gastric  wall with adjacent gastric wall thickening.     There is developed new hyperdensity involving the left adrenal gland  which is abnormally enlarged and exhibits heterogenous appearance  measuring 4 x 2.6 cm. Left adrenal gland was normal on the prior exam  and this new hyperdensity and heterogenous appearance was large and  suspected to be due to an acute left adrenal hemorrhage. There is  surrounding stranding within the periadrenal fat and within the fat  extending above the upper pole of the left kidney. There is also mild  fluid density extending adjacent to the spleen and surrounding Gerota's  fascia.     The left renal vein appears narrowed. There is cortical thinning along  the anterior margin of the left kidney. Right kidney appears within  normal limits. Right adrenal gland is normal. The liver, gallbladder  appear within normal limits. There are multiple pancreatic  calcifications associated with uncinate process of the pancreatic head  and there is dilatation of the pancreatic duct that appears similar to  the previous exam.     There is no bowel dilatation or evidence for bowel obstruction.  Atherosclerotic calcifications are present involving the abdominal aorta  and iliac vasculature.       Impression:      1. Since the prior CT approximately 4 months ago there has developed  abnormal enlargement with heterogenous appearance of the left adrenal  gland measuring 4 x 2.6 cm  and this is suspected to represent acute left  adrenal hemorrhage. There is adjacent stranding within the fat and fluid  partially surrounds Gerota's fascia. Recommend follow-up CT evaluation.  2. Collection between the pancreatic tail and left kidney measures  increased in size compared to the prior exam now measuring 4.6 cm. There  is developed 2 new collections above this along the posterior margin of  the stomach associated with gastric wall. There is associated gastric  wall thickening/secondary gastritis.  3. Chronic pancreatic calcifications and pancreatic ductal enlargement  without change.  4. Narrowing of the left renal vein where there is surrounding edema and  stranding within the fat. Left renal vein collaterals are present  similar to the previous exam.     Discussed with Dr. Edwards in the emergency department on 03/25/2023 8:30  PM.     Radiation dose reduction techniques were utilized, including automated  exposure control and exposure modulation based on body size.     This report was finalized on 3/25/2023 10:30 PM by Dr. Angel Mcleod M.D.                Inpatient Meds:   Current Meds:       Scheduled Meds:amitriptyline, 10 mg, Oral, Nightly  cholecalciferol, 5,000 Units, Oral, Daily  folic acid, 1,000 mcg, Oral, Daily  pancrelipase (Lip-Prot-Amyl), 12,000 units of lipase, Oral, TID With Meals  pantoprazole, 40 mg, Oral, Daily  senna-docusate sodium, 2 tablet, Oral, BID  sodium chloride, 10 mL, Intravenous, Q12H  thiamine, 100 mg, Oral, Daily  vitamin B-12, 1,000 mcg, Oral, Daily       Continuous Infusions:sodium chloride, 150 mL/hr, Last Rate: 150 mL/hr (03/25/23 6275)       PRN Meds:.•  acetaminophen **OR** acetaminophen **OR** acetaminophen  •  HYDROmorphone **AND** naloxone  •  nitroglycerin  •  ondansetron **OR** ondansetron  •  oxyCODONE-acetaminophen  •  prochlorperazine  •  [COMPLETED] Insert Peripheral IV **AND** sodium chloride  •  sodium chloride  •  sodium  chloride    Impression      Adrenal hemorrhage (HCC)    Epigastric pain    Chronic pancreatitis (HCC)    Anemia of chronic disease    GERD without esophagitis    Pancreatic pseudocyst    Left flank pain    Plan  Discussed repeating imaging in near future  Will follow  CT reviewed, AVSS  No acute surgical intervention at this time    I discussed the patients findings and my recommendations with patient.    Evelin Martínez, APRN  03/26/23  09:25 EDT      Electronically signed by Oscar Murray MD at 03/26/23 0228

## 2023-03-27 NOTE — DISCHARGE SUMMARY
Patient Name: Piper Cain  : 1980  MRN: [id number]    Date of Admission: 3/25/2023  Date of Discharge:  3/27/2023  Primary Care Physician: Rachell Pozo APRN      Chief Complaint:   Abdominal Pain      Discharge Diagnoses     Active Hospital Problems    Diagnosis  POA   • **Adrenal hemorrhage (HCC) [E27.49]  Yes   • Left flank pain [R10.9]  Yes   • Pancreatic pseudocyst [K86.3]  Yes   • GERD without esophagitis [K21.9]  Yes   • Chronic pancreatitis (HCC) [K86.1]  Yes   • Anemia of chronic disease [D63.8]  Yes   • Epigastric pain [R10.13]  Yes      Resolved Hospital Problems   No resolved problems to display.        Hospital Course       Ms. Cain is a 42 y.o. female smoker with a history of GERD, anxiety, alcohol abuse with resulting recurrent pancreatitis leading to chronic pancreatitis now complicated by pseudocyts.  She presented to the hospital with abdominal pain and left flank pain.  Reportedly this was from her usual pancreatitis pain.  She was found to have an abnormal adrenal gland under imaging, concerning for adrenal hemorrhage. Urology was consulted and no surgical intervention was indicated. General surgery also saw the patient in consultation for the pancreatic pseudocyst.  No surgical intervention was indicated from their side as well.    Cosyntropin test was administered and results showed that she was not adrenally insufficient. His blood pressure was maintained without steroids.  Patient reported that she was in pain and that she was barely able to walk but per nursing report, she was seen walking around the hospital floor without any issues.  She was also seen tolerating a regular diet.  She is stable for discharge home with outpatient follow-up.    Her hemoglobin did drop from 13.9-10.5 however she had been on IV fluids for the course of this admission.     Of note, she did have a urinalysis that was concerning for urinary tract infection.  However she was not  complaining of any symptoms and has a history of urinary colonization and so antibiotics were held.    She could follow-up with her pain management physician for further titration of her pain medication regimen.    She should also follow-up with her primary care provider for repeat hemoglobin check.    Day of Discharge       Physical Exam:  Temp:  [98.3 °F (36.8 °C)-99 °F (37.2 °C)] 99 °F (37.2 °C)  Heart Rate:  [86-93] 92  Resp:  [18] 18  BP: (109-126)/(83-91) 118/84  Body mass index is 19.2 kg/m².  Physical Exam  Constitutional:       Appearance: Normal appearance.   HENT:      Head: Normocephalic and atraumatic.   Eyes:      Extraocular Movements: Extraocular movements intact.      Pupils: Pupils are equal, round, and reactive to light.   Cardiovascular:      Rate and Rhythm: Normal rate and regular rhythm.   Pulmonary:      Breath sounds: Normal breath sounds.   Abdominal:      General: There is no distension.      Palpations: Abdomen is soft.      Tenderness: There is no abdominal tenderness.   Neurological:      General: No focal deficit present.      Mental Status: She is alert and oriented to person, place, and time.         Consultants     Consult Orders (all) (From admission, onward)     Start     Ordered    03/26/23 1014  Inpatient Nutrition Consult  Once        Provider:  (Not yet assigned)    03/26/23 1013    03/25/23 2052  [facility] (on-call MD unless specified) Details  Once        Specialty:  Hospitalist  Provider:  Marcel Hampton MD    03/25/23 2051 03/25/23 2052  Urology (on-call MD unless specified)  Once        Specialty:  Urology  Provider:  (Not yet assigned)    03/25/23 2051 03/25/23 2043  Surgery (on-call MD unless specified)  Once        Specialty:  General Surgery  Provider:  Jacob Pena Jr., MD    03/25/23 2042              Procedures     Imaging Results (All)     Procedure Component Value Units Date/Time    CT Abdomen Pelvis With Contrast [[id number]] Collected: 03/25/23 2045      Updated: 03/25/23 2233    Narrative:      CT ABDOMEN AND PELVIS WITH IV CONTRAST     HISTORY: Abdominal pain with nausea and vomiting.     TECHNIQUE:  CT includes axial imaging from the lung bases to the  trochanters with intravenous contrast and with use of oral contrast.  Data reconstructed in coronal and sagittal planes. Radiation dose  reduction techniques were utilized, including automated exposure control  and exposure modulation based on body size.     COMPARISON: CT abdomen and pelvis with IV contrast 11/30/2022.     FINDINGS: There is a peripherally enhancing fluid collection centered  between the tail of the pancreatic tail and the anterior left kidney  measuring 4.6 x 3.8 cm and this is increased in size from 3.5 x 2.9 cm  on the prior CT 11/30/2022. Above this collection there is developed 2  new peripheral enhancing collections that are along the posterior margin  of the upper gastric body and gastric fundus and these collections  measure 1.7 and 2.1 cm. There is indentation along the posterior gastric  wall with adjacent gastric wall thickening.     There is developed new hyperdensity involving the left adrenal gland  which is abnormally enlarged and exhibits heterogenous appearance  measuring 4 x 2.6 cm. Left adrenal gland was normal on the prior exam  and this new hyperdensity and heterogenous appearance was large and  suspected to be due to an acute left adrenal hemorrhage. There is  surrounding stranding within the periadrenal fat and within the fat  extending above the upper pole of the left kidney. There is also mild  fluid density extending adjacent to the spleen and surrounding Gerota's  fascia.     The left renal vein appears narrowed. There is cortical thinning along  the anterior margin of the left kidney. Right kidney appears within  normal limits. Right adrenal gland is normal. The liver, gallbladder  appear within normal limits. There are multiple pancreatic  calcifications associated  with uncinate process of the pancreatic head  and there is dilatation of the pancreatic duct that appears similar to  the previous exam.     There is no bowel dilatation or evidence for bowel obstruction.  Atherosclerotic calcifications are present involving the abdominal aorta  and iliac vasculature.       Impression:      1. Since the prior CT approximately 4 months ago there has developed  abnormal enlargement with heterogenous appearance of the left adrenal  gland measuring 4 x 2.6 cm and this is suspected to represent acute left  adrenal hemorrhage. There is adjacent stranding within the fat and fluid  partially surrounds Gerota's fascia. Recommend follow-up CT evaluation.  2. Collection between the pancreatic tail and left kidney measures  increased in size compared to the prior exam now measuring 4.6 cm. There  is developed 2 new collections above this along the posterior margin of  the stomach associated with gastric wall. There is associated gastric  wall thickening/secondary gastritis.  3. Chronic pancreatic calcifications and pancreatic ductal enlargement  without change.  4. Narrowing of the left renal vein where there is surrounding edema and  stranding within the fat. Left renal vein collaterals are present  similar to the previous exam.     Discussed with Dr. Edwards in the emergency department on 03/25/2023 8:30  PM.     Radiation dose reduction techniques were utilized, including automated  exposure control and exposure modulation based on body size.     This report was finalized on 3/25/2023 10:30 PM by Dr. Angel Mcleod M.D.             Pertinent Labs     Results from last 7 days   Lab Units 03/27/23  0705 03/25/23  1700   WBC 10*3/mm3 6.46 11.22*   HEMOGLOBIN g/dL 10.5* 13.9   PLATELETS 10*3/mm3 167 249     Results from last 7 days   Lab Units 03/27/23  0705 03/26/23  0559 03/25/23  1700   SODIUM mmol/L 137 138 137   POTASSIUM mmol/L 3.4* 4.2 3.8   CHLORIDE mmol/L 102 101 98   CO2 mmol/L 25.1  28.0 25.5   BUN mg/dL 3* 6 6   CREATININE mg/dL 0.49* 0.56* 0.61   GLUCOSE mg/dL 81 98 119*   Estimated Creatinine Clearance: 142.1 mL/min (A) (by C-G formula based on SCr of 0.49 mg/dL (L)).  Results from last 7 days   Lab Units 03/26/23  0559 03/25/23  1700   ALBUMIN g/dL 3.6 4.1   BILIRUBIN mg/dL 0.2 0.3   ALK PHOS U/L 64 77   AST (SGOT) U/L 5 6   ALT (SGPT) U/L <5 15     Results from last 7 days   Lab Units 03/27/23  0705 03/26/23  0559 03/25/23  1700   CALCIUM mg/dL 8.8 8.8 10.4   ALBUMIN g/dL  --  3.6 4.1     Results from last 7 days   Lab Units 03/25/23  1700   LIPASE U/L 192*             Invalid input(s): LDLCALC        Test Results Pending at Discharge     Pending Labs     Order Current Status    ACTH In process          Discharge Details        Discharge Medications      New Medications      Instructions Start Date   bisacodyl 10 MG suppository  Commonly known as: DULCOLAX   10 mg, Rectal, Daily         Continue These Medications      Instructions Start Date   amitriptyline 10 MG tablet  Commonly known as: ELAVIL   10 mg, Oral, Nightly      Feosol 200 (65 Fe) MG tablet tablet  Generic drug: Ferrous Sulfate Dried   200 mg, Oral, Daily      folic acid 1 MG tablet  Commonly known as: FOLVITE   1,000 mcg, Oral, Daily      ondansetron 4 MG tablet  Commonly known as: ZOFRAN   4 mg, Oral, Every 8 Hours PRN      pancrelipase (Lip-Prot-Amyl) 95013-79447 units capsule delayed-release particles capsule  Commonly known as: CREON   72,000 units of lipase, Oral, 3 Times Daily With Meals      pantoprazole 40 MG EC tablet  Commonly known as: PROTONIX   40 mg, Oral, Daily      sennosides-docusate 8.6-50 MG per tablet  Commonly known as: PERICOLACE   2 tablets, Oral, 2 Times Daily PRN      thiamine 100 MG tablet  tablet  Commonly known as: VITAMIN B-1   100 mg, Oral, Daily      VITAMIN B 12 PO   Oral      vitamin D3 125 MCG (5000 UT) capsule capsule   5,000 Units, Oral, Daily             Allergies   Allergen Reactions   •  Nickel    • Hydrocodone-Acetaminophen Nausea And Vomiting         Discharge Disposition:  Home or Self Care    Discharge Diet:  Diet Order   Procedures   • Diet: Gastrointestinal Diets; Low Irritant; Texture: Regular Texture (IDDSI 7); Fluid Consistency: Thin (IDDSI 0)       Discharge Activity:  As tolerated      CODE STATUS:    Code Status and Medical Interventions:   Ordered at: 03/25/23 2142     Code Status (Patient has no pulse and is not breathing):    CPR (Attempt to Resuscitate)     Medical Interventions (Patient has pulse or is breathing):    Full Support       No future appointments.   Follow-up Information     Rachell Pozo, SOL .    Specialty: Family Medicine  Contact information:  [id number] [facility] [id number] [phone]                         Time Spent on Discharge:  Greater than 30 minutes      Hipolito Quintana MD  [facility]  03/27/23  10:59 EDT

## 2023-03-28 ENCOUNTER — READMISSION MANAGEMENT (OUTPATIENT)
Dept: CALL CENTER | Facility: HOSPITAL | Age: 43
End: 2023-03-28
Payer: MEDICAID

## 2023-03-28 LAB — ACTH PLAS-MCNC: <1.5 PG/ML (ref 7.2–63.3)

## 2023-03-28 NOTE — OUTREACH NOTE
Prep Survey    Flowsheet Row Responses   [facility] facility patient discharged from? [facility]   Is LACE score < 7 ? No   Eligibility Readm Mgmt   Discharge diagnosis adrenal hemorrhage   Does the patient have one of the following disease processes/diagnoses(primary or secondary)? Other   Does the patient have Home health ordered? No   Is there a DME ordered? No   Prep survey completed? Yes          Selam Chacon Registered Nurse

## 2023-03-31 NOTE — PAYOR COMM NOTE
"Lizzeth Zavala (42 y.o. Female)     PLEASE SEE ATTACHED DC SUMMARY    REF#[id number]    THANK YOU    THELMA BARNEY LPN CCP    Date of Birth   1980    Social Security Number       Address   [address]   [place] [address]    Home Phone   [phone]    MRN   [id number]       Church   [employer]    Marital Status   Single                            Admission Date   3/25/23    Admission Type   Emergency    Admitting Provider   Marcel Hampton MD    Attending Provider   Rafat Beltran MD    Department, Room/Bed   [address], [address]       Discharge Date   3/27/2023    Discharge Disposition   Home or Self Care    Discharge Destination                               Attending Provider: Rafat Beltran MD    Allergies: Nickel, Hydrocodone-acetaminophen    Isolation: None   Infection: None   Code Status: Prior    Ht: 175.3 cm (69\")   Wt: 59 kg (130 lb)    Admission Cmt: None   Principal Problem: Adrenal hemorrhage (HCC) [E27.49]                 Active Insurance as of 3/25/2023     Primary Coverage     Payor Plan Insurance Group Employer/Plan Group    [id number]     Payor Plan Address Payor Plan Phone Number Payor Plan Fax Number Effective Dates    MAIL STOP:   2022 - None Entered    [address] [address]       [facility] [address]       Subscriber Name Subscriber Birth Date Member ID       LIZZETH ZAVALA 1980 [id number]                 Emergency Contacts      (Rel.) Home Phone Work Phone Mobile Phone    MALOU EVANGELISTA (Significant Other) [phone] -- --               Discharge Summary      Hipolito Quintana MD at 23 1059              Patient Name: Lizzeth Zavala  : 1980  MRN: [id number]    Date of Admission: 3/25/2023  Date of Discharge:  3/27/2023  Primary Care Physician: Rachell Pozo APRN      Chief Complaint:   Abdominal Pain      Discharge Diagnoses     Active Hospital " Problems    Diagnosis  POA   • **Adrenal hemorrhage (HCC) [E27.49]  Yes   • Left flank pain [R10.9]  Yes   • Pancreatic pseudocyst [K86.3]  Yes   • GERD without esophagitis [K21.9]  Yes   • Chronic pancreatitis (HCC) [K86.1]  Yes   • Anemia of chronic disease [D63.8]  Yes   • Epigastric pain [R10.13]  Yes      Resolved Hospital Problems   No resolved problems to display.        Hospital Course       Ms. Cain is a 42 y.o. female smoker with a history of GERD, anxiety, alcohol abuse with resulting recurrent pancreatitis leading to chronic pancreatitis now complicated by pseudocyts.  She presented to the hospital with abdominal pain and left flank pain.  Reportedly this was from her usual pancreatitis pain.  She was found to have an abnormal adrenal gland under imaging, concerning for adrenal hemorrhage. Urology was consulted and no surgical intervention was indicated. General surgery also saw the patient in consultation for the pancreatic pseudocyst.  No surgical intervention was indicated from their side as well.    Cosyntropin test was administered and results showed that she was not adrenally insufficient. His blood pressure was maintained without steroids.  Patient reported that she was in pain and that she was barely able to walk but per nursing report, she was seen walking around the hospital floor without any issues.  She was also seen tolerating a regular diet.  She is stable for discharge home with outpatient follow-up.    Her hemoglobin did drop from 13.9-10.5 however she had been on IV fluids for the course of this admission.     Of note, she did have a urinalysis that was concerning for urinary tract infection.  However she was not complaining of any symptoms and has a history of urinary colonization and so antibiotics were held.    She could follow-up with her pain management physician for further titration of her pain medication regimen.    She should also follow-up with her primary care provider for  repeat hemoglobin check.    Day of Discharge       Physical Exam:  Temp:  [98.3 °F (36.8 °C)-99 °F (37.2 °C)] 99 °F (37.2 °C)  Heart Rate:  [86-93] 92  Resp:  [18] 18  BP: (109-126)/(83-91) 118/84  Body mass index is 19.2 kg/m².  Physical Exam  Constitutional:       Appearance: Normal appearance.   HENT:      Head: Normocephalic and atraumatic.   Eyes:      Extraocular Movements: Extraocular movements intact.      Pupils: Pupils are equal, round, and reactive to light.   Cardiovascular:      Rate and Rhythm: Normal rate and regular rhythm.   Pulmonary:      Breath sounds: Normal breath sounds.   Abdominal:      General: There is no distension.      Palpations: Abdomen is soft.      Tenderness: There is no abdominal tenderness.   Neurological:      General: No focal deficit present.      Mental Status: She is alert and oriented to person, place, and time.         Consultants     Consult Orders (all) (From admission, onward)     Start     Ordered    03/26/23 1014  Inpatient Nutrition Consult  Once        Provider:  (Not yet assigned)    03/26/23 1013    03/25/23 2052  [facility] (on-call MD unless specified) Details  Once        Specialty:  Hospitalist  Provider:  Marcel Hampton MD    03/25/23 2051 03/25/23 2052  Urology (on-call MD unless specified)  Once        Specialty:  Urology  Provider:  (Not yet assigned)    03/25/23 2051 03/25/23 2043  Surgery (on-call MD unless specified)  Once        Specialty:  General Surgery  Provider:  Jacob Pena Jr., MD    03/25/23 2042              Procedures     Imaging Results (All)     Procedure Component Value Units Date/Time    CT Abdomen Pelvis With Contrast [[id number]] Collected: 03/25/23 2045     Updated: 03/25/23 2233    Narrative:      CT ABDOMEN AND PELVIS WITH IV CONTRAST     HISTORY: Abdominal pain with nausea and vomiting.     TECHNIQUE:  CT includes axial imaging from the lung bases to the  trochanters with intravenous contrast and with use of oral  contrast.  Data reconstructed in coronal and sagittal planes. Radiation dose  reduction techniques were utilized, including automated exposure control  and exposure modulation based on body size.     COMPARISON: CT abdomen and pelvis with IV contrast 11/30/2022.     FINDINGS: There is a peripherally enhancing fluid collection centered  between the tail of the pancreatic tail and the anterior left kidney  measuring 4.6 x 3.8 cm and this is increased in size from 3.5 x 2.9 cm  on the prior CT 11/30/2022. Above this collection there is developed 2  new peripheral enhancing collections that are along the posterior margin  of the upper gastric body and gastric fundus and these collections  measure 1.7 and 2.1 cm. There is indentation along the posterior gastric  wall with adjacent gastric wall thickening.     There is developed new hyperdensity involving the left adrenal gland  which is abnormally enlarged and exhibits heterogenous appearance  measuring 4 x 2.6 cm. Left adrenal gland was normal on the prior exam  and this new hyperdensity and heterogenous appearance was large and  suspected to be due to an acute left adrenal hemorrhage. There is  surrounding stranding within the periadrenal fat and within the fat  extending above the upper pole of the left kidney. There is also mild  fluid density extending adjacent to the spleen and surrounding Gerota's  fascia.     The left renal vein appears narrowed. There is cortical thinning along  the anterior margin of the left kidney. Right kidney appears within  normal limits. Right adrenal gland is normal. The liver, gallbladder  appear within normal limits. There are multiple pancreatic  calcifications associated with uncinate process of the pancreatic head  and there is dilatation of the pancreatic duct that appears similar to  the previous exam.     There is no bowel dilatation or evidence for bowel obstruction.  Atherosclerotic calcifications are present involving the  abdominal aorta  and iliac vasculature.       Impression:      1. Since the prior CT approximately 4 months ago there has developed  abnormal enlargement with heterogenous appearance of the left adrenal  gland measuring 4 x 2.6 cm and this is suspected to represent acute left  adrenal hemorrhage. There is adjacent stranding within the fat and fluid  partially surrounds Gerota's fascia. Recommend follow-up CT evaluation.  2. Collection between the pancreatic tail and left kidney measures  increased in size compared to the prior exam now measuring 4.6 cm. There  is developed 2 new collections above this along the posterior margin of  the stomach associated with gastric wall. There is associated gastric  wall thickening/secondary gastritis.  3. Chronic pancreatic calcifications and pancreatic ductal enlargement  without change.  4. Narrowing of the left renal vein where there is surrounding edema and  stranding within the fat. Left renal vein collaterals are present  similar to the previous exam.     Discussed with Dr. Edwards in the emergency department on 03/25/2023 8:30  PM.     Radiation dose reduction techniques were utilized, including automated  exposure control and exposure modulation based on body size.     This report was finalized on 3/25/2023 10:30 PM by Dr. Angel Mcleod M.D.             Pertinent Labs     Results from last 7 days   Lab Units 03/27/23  0705 03/25/23  1700   WBC 10*3/mm3 6.46 11.22*   HEMOGLOBIN g/dL 10.5* 13.9   PLATELETS 10*3/mm3 167 249     Results from last 7 days   Lab Units 03/27/23  0705 03/26/23  0559 03/25/23  1700   SODIUM mmol/L 137 138 137   POTASSIUM mmol/L 3.4* 4.2 3.8   CHLORIDE mmol/L 102 101 98   CO2 mmol/L 25.1 28.0 25.5   BUN mg/dL 3* 6 6   CREATININE mg/dL 0.49* 0.56* 0.61   GLUCOSE mg/dL 81 98 119*   Estimated Creatinine Clearance: 142.1 mL/min (A) (by C-G formula based on SCr of 0.49 mg/dL (L)).  Results from last 7 days   Lab Units 03/26/23  0559 03/25/23  1700    ALBUMIN g/dL 3.6 4.1   BILIRUBIN mg/dL 0.2 0.3   ALK PHOS U/L 64 77   AST (SGOT) U/L 5 6   ALT (SGPT) U/L <5 15     Results from last 7 days   Lab Units 03/27/23  0705 03/26/23  0559 03/25/23  1700   CALCIUM mg/dL 8.8 8.8 10.4   ALBUMIN g/dL  --  3.6 4.1     Results from last 7 days   Lab Units 03/25/23  1700   LIPASE U/L 192*             Invalid input(s): LDLCALC        Test Results Pending at Discharge     Pending Labs     Order Current Status    ACTH In process          Discharge Details        Discharge Medications      New Medications      Instructions Start Date   bisacodyl 10 MG suppository  Commonly known as: DULCOLAX   10 mg, Rectal, Daily         Continue These Medications      Instructions Start Date   amitriptyline 10 MG tablet  Commonly known as: ELAVIL   10 mg, Oral, Nightly      Feosol 200 (65 Fe) MG tablet tablet  Generic drug: Ferrous Sulfate Dried   200 mg, Oral, Daily      folic acid 1 MG tablet  Commonly known as: FOLVITE   1,000 mcg, Oral, Daily      ondansetron 4 MG tablet  Commonly known as: ZOFRAN   4 mg, Oral, Every 8 Hours PRN      pancrelipase (Lip-Prot-Amyl) 18662-92272 units capsule delayed-release particles capsule  Commonly known as: CREON   72,000 units of lipase, Oral, 3 Times Daily With Meals      pantoprazole 40 MG EC tablet  Commonly known as: PROTONIX   40 mg, Oral, Daily      sennosides-docusate 8.6-50 MG per tablet  Commonly known as: PERICOLACE   2 tablets, Oral, 2 Times Daily PRN      thiamine 100 MG tablet  tablet  Commonly known as: VITAMIN B-1   100 mg, Oral, Daily      VITAMIN B 12 PO   Oral      vitamin D3 125 MCG (5000 UT) capsule capsule   5,000 Units, Oral, Daily             Allergies   Allergen Reactions   • Nickel    • Hydrocodone-Acetaminophen Nausea And Vomiting         Discharge Disposition:  Home or Self Care    Discharge Diet:  Diet Order   Procedures   • Diet: Gastrointestinal Diets; Low Irritant; Texture: Regular Texture (IDDSI 7); Fluid Consistency: Thin  (IDDSI 0)       Discharge Activity:  As tolerated      CODE STATUS:    Code Status and Medical Interventions:   Ordered at: 03/25/23 2148     Code Status (Patient has no pulse and is not breathing):    CPR (Attempt to Resuscitate)     Medical Interventions (Patient has pulse or is breathing):    Full Support       No future appointments.   Follow-up Information     Rachell Pozo, SOL .    Specialty: Family Medicine  Contact information:  [address] [facility] [address]  [phone]                         Time Spent on Discharge:  Greater than 30 minutes      Hipolito Quintana MD  [facility]  03/27/23  10:59 EDT                Electronically signed by Hipolito Quintana MD at 03/27/23 1740       Discharge Order (From admission, onward)     Start     Ordered    03/27/23 1551  Discharge patient  Once        Expected Discharge Date: 03/27/23    Discharge Disposition: Home or Self Care    Physician of Record for Attribution - Please select from Treatment Team: HIPOLITO QUINTANA [[id number]]    Review needed by CMO to determine Physician of Record: No       Question Answer Comment   Physician of Record for Attribution - Please select from Treatment Team HIPOLITO QUINTANA    Review needed by CMO to determine Physician of Record No        03/27/23 1550    03/27/23 1059  Discharge patient  Once,   Status:  Canceled        Expected Discharge Date: 03/27/23    Discharge Disposition: Home or Self Care    Physician of Record for Attribution - Please select from Treatment Team: HIPOLITO QUINTANA [[id number]]    Review needed by CMO to determine Physician of Record: No       Question Answer Comment   Physician of Record for Attribution - Please select from Treatment Team HIPOLITO QUINTANA    Review needed by CMO to determine Physician of Record No        03/27/23 1059

## 2023-04-04 ENCOUNTER — READMISSION MANAGEMENT (OUTPATIENT)
Dept: CALL CENTER | Facility: HOSPITAL | Age: 43
End: 2023-04-04
Payer: MEDICAID

## 2023-04-04 NOTE — OUTREACH NOTE
Medical Week 1 Survey    Flowsheet Row Responses   [facility] patient discharged from? [place]   Does the patient have one of the following disease processes/diagnoses(primary or secondary)? Other   Week 1 attempt successful? No   Unsuccessful attempts Attempt 1          Lupe ARTHUR - Licensed Nurse

## 2023-04-06 ENCOUNTER — OFFICE (OUTPATIENT)
Dept: URBAN - METROPOLITAN AREA CLINIC 64 | Facility: CLINIC | Age: 43
End: 2023-04-06
Payer: COMMERCIAL

## 2023-04-06 ENCOUNTER — READMISSION MANAGEMENT (OUTPATIENT)
Dept: CALL CENTER | Facility: HOSPITAL | Age: 43
End: 2023-04-06
Payer: MEDICAID

## 2023-04-06 VITALS
DIASTOLIC BLOOD PRESSURE: 71 MMHG | WEIGHT: 125 LBS | SYSTOLIC BLOOD PRESSURE: 113 MMHG | HEART RATE: 90 BPM | HEIGHT: 71 IN

## 2023-04-06 DIAGNOSIS — R93.3 ABNORMAL FINDINGS ON DIAGNOSTIC IMAGING OF OTHER PARTS OF DI: ICD-10-CM

## 2023-04-06 DIAGNOSIS — R10.12 LEFT UPPER QUADRANT PAIN: ICD-10-CM

## 2023-04-06 DIAGNOSIS — K86.3 PSEUDOCYST OF PANCREAS: ICD-10-CM

## 2023-04-06 DIAGNOSIS — F11.90 OPIOID USE, UNSPECIFIED, UNCOMPLICATED: ICD-10-CM

## 2023-04-06 DIAGNOSIS — F17.200 NICOTINE DEPENDENCE, UNSPECIFIED, UNCOMPLICATED: ICD-10-CM

## 2023-04-06 DIAGNOSIS — F10.10 ALCOHOL ABUSE, UNCOMPLICATED: ICD-10-CM

## 2023-04-06 PROCEDURE — 99213 OFFICE O/P EST LOW 20 MIN: CPT | Performed by: NURSE PRACTITIONER

## 2023-04-06 NOTE — OUTREACH NOTE
Medical Week 1 Survey    Flowsheet Row Responses   [facility] patient discharged from? [place]   Does the patient have one of the following disease processes/diagnoses(primary or secondary)? Other   Week 1 attempt successful? No   Unsuccessful attempts Attempt 2          Dioni SAMSON - Registered Nurse

## 2023-04-11 ENCOUNTER — READMISSION MANAGEMENT (OUTPATIENT)
Dept: CALL CENTER | Facility: HOSPITAL | Age: 43
End: 2023-04-11
Payer: MEDICAID

## 2023-04-11 NOTE — OUTREACH NOTE
Medical Week 1 Survey    Flowsheet Row Responses   [facility] patient discharged from? [place]   Does the patient have one of the following disease processes/diagnoses(primary or secondary)? Other   Week 1 attempt successful? No   Unsuccessful attempts Attempt 3   Revoke Decline to participate          Lupe ARTHUR - Licensed Nurse

## 2023-06-07 LAB
ALBUMIN SERPL-MCNC: 4.1 G/DL (ref 3.5–5.2)
ALBUMIN/GLOB SERPL: 1.3 G/DL
ALP SERPL-CCNC: 113 U/L (ref 39–117)
ALT SERPL W P-5'-P-CCNC: 14 U/L (ref 1–33)
ANION GAP SERPL CALCULATED.3IONS-SCNC: 8.8 MMOL/L (ref 5–15)
AST SERPL-CCNC: 8 U/L (ref 1–32)
BASOPHILS # BLD AUTO: 0.04 10*3/MM3 (ref 0–0.2)
BASOPHILS NFR BLD AUTO: 0.9 % (ref 0–1.5)
BILIRUB SERPL-MCNC: <0.2 MG/DL (ref 0–1.2)
BILIRUB UR QL STRIP: NEGATIVE
BUN SERPL-MCNC: 6 MG/DL (ref 6–20)
BUN/CREAT SERPL: 8.1 (ref 7–25)
CALCIUM SPEC-SCNC: 9.5 MG/DL (ref 8.6–10.5)
CHLORIDE SERPL-SCNC: 103 MMOL/L (ref 98–107)
CLARITY UR: CLEAR
CO2 SERPL-SCNC: 29.2 MMOL/L (ref 22–29)
COLOR UR: YELLOW
CREAT SERPL-MCNC: 0.74 MG/DL (ref 0.57–1)
DEPRECATED RDW RBC AUTO: 48 FL (ref 37–54)
EGFRCR SERPLBLD CKD-EPI 2021: 105 ML/MIN/1.73
EOSINOPHIL # BLD AUTO: 0.08 10*3/MM3 (ref 0–0.4)
EOSINOPHIL NFR BLD AUTO: 1.7 % (ref 0.3–6.2)
ERYTHROCYTE [DISTWIDTH] IN BLOOD BY AUTOMATED COUNT: 13.4 % (ref 12.3–15.4)
GLOBULIN UR ELPH-MCNC: 3.2 GM/DL
GLUCOSE SERPL-MCNC: 104 MG/DL (ref 65–99)
GLUCOSE UR STRIP-MCNC: NEGATIVE MG/DL
HCT VFR BLD AUTO: 40.5 % (ref 34–46.6)
HGB BLD-MCNC: 13.9 G/DL (ref 12–15.9)
HGB UR QL STRIP.AUTO: NEGATIVE
IMM GRANULOCYTES # BLD AUTO: 0.01 10*3/MM3 (ref 0–0.05)
IMM GRANULOCYTES NFR BLD AUTO: 0.2 % (ref 0–0.5)
KETONES UR QL STRIP: NEGATIVE
LEUKOCYTE ESTERASE UR QL STRIP.AUTO: NEGATIVE
LIPASE SERPL-CCNC: 151 U/L (ref 13–60)
LYMPHOCYTES # BLD AUTO: 2.33 10*3/MM3 (ref 0.7–3.1)
LYMPHOCYTES NFR BLD AUTO: 49.9 % (ref 19.6–45.3)
MCH RBC QN AUTO: 33.7 PG (ref 26.6–33)
MCHC RBC AUTO-ENTMCNC: 34.3 G/DL (ref 31.5–35.7)
MCV RBC AUTO: 98.3 FL (ref 79–97)
MONOCYTES # BLD AUTO: 0.36 10*3/MM3 (ref 0.1–0.9)
MONOCYTES NFR BLD AUTO: 7.7 % (ref 5–12)
NEUTROPHILS NFR BLD AUTO: 1.85 10*3/MM3 (ref 1.7–7)
NEUTROPHILS NFR BLD AUTO: 39.6 % (ref 42.7–76)
NITRITE UR QL STRIP: NEGATIVE
NRBC BLD AUTO-RTO: 0 /100 WBC (ref 0–0.2)
PH UR STRIP.AUTO: 6.5 [PH] (ref 5–8)
PLATELET # BLD AUTO: 204 10*3/MM3 (ref 140–450)
PMV BLD AUTO: 9.6 FL (ref 6–12)
POTASSIUM SERPL-SCNC: 5 MMOL/L (ref 3.5–5.2)
PROT SERPL-MCNC: 7.3 G/DL (ref 6–8.5)
PROT UR QL STRIP: NEGATIVE
RBC # BLD AUTO: 4.12 10*6/MM3 (ref 3.77–5.28)
SODIUM SERPL-SCNC: 141 MMOL/L (ref 136–145)
SP GR UR STRIP: <1.005 (ref 1–1.03)
UROBILINOGEN UR QL STRIP: ABNORMAL
WBC NRBC COR # BLD: 4.67 10*3/MM3 (ref 3.4–10.8)

## 2023-06-07 PROCEDURE — 93010 ELECTROCARDIOGRAM REPORT: CPT | Performed by: STUDENT IN AN ORGANIZED HEALTH CARE EDUCATION/TRAINING PROGRAM

## 2023-06-07 PROCEDURE — 36415 COLL VENOUS BLD VENIPUNCTURE: CPT

## 2023-06-07 PROCEDURE — 93005 ELECTROCARDIOGRAM TRACING: CPT

## 2023-06-07 PROCEDURE — 85025 COMPLETE CBC W/AUTO DIFF WBC: CPT

## 2023-06-07 PROCEDURE — 80053 COMPREHEN METABOLIC PANEL: CPT

## 2023-06-07 PROCEDURE — 99285 EMERGENCY DEPT VISIT HI MDM: CPT

## 2023-06-07 PROCEDURE — 81003 URINALYSIS AUTO W/O SCOPE: CPT

## 2023-06-07 PROCEDURE — 83690 ASSAY OF LIPASE: CPT

## 2023-06-07 RX ORDER — SODIUM CHLORIDE 0.9 % (FLUSH) 0.9 %
10 SYRINGE (ML) INJECTION AS NEEDED
Status: DISCONTINUED | OUTPATIENT
Start: 2023-06-07 | End: 2023-06-09 | Stop reason: HOSPADM

## 2023-06-08 ENCOUNTER — HOSPITAL ENCOUNTER (OUTPATIENT)
Facility: HOSPITAL | Age: 43
Setting detail: OBSERVATION
Discharge: HOME | End: 2023-06-09
Attending: EMERGENCY MEDICINE | Admitting: INTERNAL MEDICINE
Payer: MEDICAID

## 2023-06-08 ENCOUNTER — TELEPHONE (OUTPATIENT)
Dept: GASTROENTEROLOGY | Facility: CLINIC | Age: 43
End: 2023-06-08
Payer: MEDICAID

## 2023-06-08 ENCOUNTER — APPOINTMENT (OUTPATIENT)
Dept: CT IMAGING | Facility: HOSPITAL | Age: 43
End: 2023-06-08
Payer: MEDICAID

## 2023-06-08 DIAGNOSIS — R11.2 NAUSEA AND VOMITING, UNSPECIFIED VOMITING TYPE: ICD-10-CM

## 2023-06-08 DIAGNOSIS — R10.30 INTRACTABLE LOWER ABDOMINAL PAIN: ICD-10-CM

## 2023-06-08 DIAGNOSIS — K86.1 CHRONIC PANCREATITIS, UNSPECIFIED PANCREATITIS TYPE: Primary | ICD-10-CM

## 2023-06-08 PROBLEM — D75.89 MACROCYTOSIS: Status: ACTIVE | Noted: 2023-06-08

## 2023-06-08 PROBLEM — R07.9 LEFT-SIDED CHEST PAIN: Status: ACTIVE | Noted: 2023-06-08

## 2023-06-08 PROBLEM — Z72.0 TOBACCO ABUSE: Status: ACTIVE | Noted: 2023-06-08

## 2023-06-08 LAB
ALBUMIN SERPL-MCNC: 3.7 G/DL (ref 3.5–5.2)
ALP SERPL-CCNC: 96 U/L (ref 39–117)
ALT SERPL W P-5'-P-CCNC: 11 U/L (ref 1–33)
ANION GAP SERPL CALCULATED.3IONS-SCNC: 7 MMOL/L (ref 5–15)
AST SERPL-CCNC: 10 U/L (ref 1–32)
BILIRUB CONJ SERPL-MCNC: <0.2 MG/DL (ref 0–0.3)
BILIRUB INDIRECT SERPL-MCNC: NORMAL MG/DL
BILIRUB SERPL-MCNC: <0.2 MG/DL (ref 0–1.2)
BUN SERPL-MCNC: 5 MG/DL (ref 6–20)
BUN/CREAT SERPL: 8.5 (ref 7–25)
CALCIUM SPEC-SCNC: 7.9 MG/DL (ref 8.6–10.5)
CHLORIDE SERPL-SCNC: 104 MMOL/L (ref 98–107)
CO2 SERPL-SCNC: 27 MMOL/L (ref 22–29)
CREAT SERPL-MCNC: 0.59 MG/DL (ref 0.57–1)
DEPRECATED RDW RBC AUTO: 47.6 FL (ref 37–54)
EGFRCR SERPLBLD CKD-EPI 2021: 117 ML/MIN/1.73
ERYTHROCYTE [DISTWIDTH] IN BLOOD BY AUTOMATED COUNT: 13.4 % (ref 12.3–15.4)
ETHANOL BLD-MCNC: <10 MG/DL (ref 0–10)
ETHANOL UR QL: <0.01 %
GEN 5 2HR TROPONIN T REFLEX: <6 NG/L
GLUCOSE SERPL-MCNC: 76 MG/DL (ref 65–99)
HCT VFR BLD AUTO: 36.9 % (ref 34–46.6)
HGB BLD-MCNC: 12.4 G/DL (ref 12–15.9)
HOLD SPECIMEN: NORMAL
HOLD SPECIMEN: NORMAL
LIPASE SERPL-CCNC: 73 U/L (ref 13–60)
MAGNESIUM SERPL-MCNC: 1.6 MG/DL (ref 1.6–2.6)
MCH RBC QN AUTO: 32.9 PG (ref 26.6–33)
MCHC RBC AUTO-ENTMCNC: 33.6 G/DL (ref 31.5–35.7)
MCV RBC AUTO: 97.9 FL (ref 79–97)
PLATELET # BLD AUTO: 160 10*3/MM3 (ref 140–450)
PMV BLD AUTO: 9.6 FL (ref 6–12)
POTASSIUM SERPL-SCNC: 3.7 MMOL/L (ref 3.5–5.2)
PROT SERPL-MCNC: 6.2 G/DL (ref 6–8.5)
QT INTERVAL: 376 MS
RBC # BLD AUTO: 3.77 10*6/MM3 (ref 3.77–5.28)
SODIUM SERPL-SCNC: 138 MMOL/L (ref 136–145)
TROPONIN T DELTA: NORMAL
TROPONIN T SERPL HS-MCNC: <6 NG/L
WBC NRBC COR # BLD: 4.46 10*3/MM3 (ref 3.4–10.8)
WHOLE BLOOD HOLD COAG: NORMAL
WHOLE BLOOD HOLD SPECIMEN: NORMAL

## 2023-06-08 PROCEDURE — 96375 TX/PRO/DX INJ NEW DRUG ADDON: CPT

## 2023-06-08 PROCEDURE — 25010000002 HYDROMORPHONE 1 MG/ML SOLUTION: Performed by: INTERNAL MEDICINE

## 2023-06-08 PROCEDURE — 85027 COMPLETE CBC AUTOMATED: CPT | Performed by: NURSE PRACTITIONER

## 2023-06-08 PROCEDURE — 82077 ASSAY SPEC XCP UR&BREATH IA: CPT | Performed by: NURSE PRACTITIONER

## 2023-06-08 PROCEDURE — 25010000002 HYDROMORPHONE PER 4 MG: Performed by: NURSE PRACTITIONER

## 2023-06-08 PROCEDURE — 84484 ASSAY OF TROPONIN QUANT: CPT | Performed by: NURSE PRACTITIONER

## 2023-06-08 PROCEDURE — 96361 HYDRATE IV INFUSION ADD-ON: CPT

## 2023-06-08 PROCEDURE — 83690 ASSAY OF LIPASE: CPT | Performed by: INTERNAL MEDICINE

## 2023-06-08 PROCEDURE — G0378 HOSPITAL OBSERVATION PER HR: HCPCS

## 2023-06-08 PROCEDURE — 96376 TX/PRO/DX INJ SAME DRUG ADON: CPT

## 2023-06-08 PROCEDURE — 25010000002 HYDROMORPHONE PER 4 MG: Performed by: EMERGENCY MEDICINE

## 2023-06-08 PROCEDURE — 25510000001 IOPAMIDOL 61 % SOLUTION: Performed by: EMERGENCY MEDICINE

## 2023-06-08 PROCEDURE — 80076 HEPATIC FUNCTION PANEL: CPT | Performed by: INTERNAL MEDICINE

## 2023-06-08 PROCEDURE — 83735 ASSAY OF MAGNESIUM: CPT | Performed by: INTERNAL MEDICINE

## 2023-06-08 PROCEDURE — 80048 BASIC METABOLIC PNL TOTAL CA: CPT | Performed by: NURSE PRACTITIONER

## 2023-06-08 PROCEDURE — 96374 THER/PROPH/DIAG INJ IV PUSH: CPT

## 2023-06-08 PROCEDURE — 74177 CT ABD & PELVIS W/CONTRAST: CPT

## 2023-06-08 PROCEDURE — 25010000002 ONDANSETRON PER 1 MG: Performed by: NURSE PRACTITIONER

## 2023-06-08 PROCEDURE — 25010000002 ONDANSETRON PER 1 MG: Performed by: EMERGENCY MEDICINE

## 2023-06-08 PROCEDURE — 99213 OFFICE O/P EST LOW 20 MIN: CPT | Performed by: INTERNAL MEDICINE

## 2023-06-08 RX ORDER — NICOTINE 21 MG/24HR
1 PATCH, TRANSDERMAL 24 HOURS TRANSDERMAL
Status: DISCONTINUED | OUTPATIENT
Start: 2023-06-08 | End: 2023-06-09 | Stop reason: HOSPADM

## 2023-06-08 RX ORDER — OXYCODONE AND ACETAMINOPHEN 10; 325 MG/1; MG/1
1 TABLET ORAL EVERY 4 HOURS PRN
Status: DISCONTINUED | OUTPATIENT
Start: 2023-06-08 | End: 2023-06-08

## 2023-06-08 RX ORDER — FOLIC ACID 1 MG/1
1000 TABLET ORAL DAILY
Status: DISCONTINUED | OUTPATIENT
Start: 2023-06-08 | End: 2023-06-09 | Stop reason: HOSPADM

## 2023-06-08 RX ORDER — PANTOPRAZOLE SODIUM 40 MG/1
40 TABLET, DELAYED RELEASE ORAL DAILY
Status: DISCONTINUED | OUTPATIENT
Start: 2023-06-08 | End: 2023-06-09 | Stop reason: HOSPADM

## 2023-06-08 RX ORDER — ACETAMINOPHEN 325 MG/1
650 TABLET ORAL EVERY 4 HOURS PRN
Status: DISCONTINUED | OUTPATIENT
Start: 2023-06-08 | End: 2023-06-09 | Stop reason: HOSPADM

## 2023-06-08 RX ORDER — HYDROMORPHONE HYDROCHLORIDE 1 MG/ML
0.5 INJECTION, SOLUTION INTRAMUSCULAR; INTRAVENOUS; SUBCUTANEOUS
Status: DISCONTINUED | OUTPATIENT
Start: 2023-06-08 | End: 2023-06-08

## 2023-06-08 RX ORDER — SODIUM CHLORIDE 0.9 % (FLUSH) 0.9 %
10 SYRINGE (ML) INJECTION EVERY 12 HOURS SCHEDULED
Status: DISCONTINUED | OUTPATIENT
Start: 2023-06-08 | End: 2023-06-09 | Stop reason: HOSPADM

## 2023-06-08 RX ORDER — SODIUM CHLORIDE 9 MG/ML
40 INJECTION, SOLUTION INTRAVENOUS AS NEEDED
Status: DISCONTINUED | OUTPATIENT
Start: 2023-06-08 | End: 2023-06-09 | Stop reason: HOSPADM

## 2023-06-08 RX ORDER — HYDROMORPHONE HYDROCHLORIDE 1 MG/ML
0.5 INJECTION, SOLUTION INTRAMUSCULAR; INTRAVENOUS; SUBCUTANEOUS ONCE
Status: COMPLETED | OUTPATIENT
Start: 2023-06-08 | End: 2023-06-08

## 2023-06-08 RX ORDER — ONDANSETRON 2 MG/ML
4 INJECTION INTRAMUSCULAR; INTRAVENOUS EVERY 6 HOURS PRN
Status: DISCONTINUED | OUTPATIENT
Start: 2023-06-08 | End: 2023-06-09 | Stop reason: HOSPADM

## 2023-06-08 RX ORDER — ONDANSETRON 2 MG/ML
4 INJECTION INTRAMUSCULAR; INTRAVENOUS ONCE
Status: COMPLETED | OUTPATIENT
Start: 2023-06-08 | End: 2023-06-08

## 2023-06-08 RX ORDER — HYDROMORPHONE HYDROCHLORIDE 1 MG/ML
0.5 INJECTION, SOLUTION INTRAMUSCULAR; INTRAVENOUS; SUBCUTANEOUS
Status: DISCONTINUED | OUTPATIENT
Start: 2023-06-08 | End: 2023-06-09

## 2023-06-08 RX ORDER — ONDANSETRON 4 MG/1
4 TABLET, FILM COATED ORAL EVERY 6 HOURS PRN
Status: DISCONTINUED | OUTPATIENT
Start: 2023-06-08 | End: 2023-06-09 | Stop reason: HOSPADM

## 2023-06-08 RX ORDER — CALCIUM CARBONATE 500 MG/1
2 TABLET, CHEWABLE ORAL 2 TIMES DAILY PRN
Status: DISCONTINUED | OUTPATIENT
Start: 2023-06-08 | End: 2023-06-09 | Stop reason: HOSPADM

## 2023-06-08 RX ORDER — SODIUM CHLORIDE 9 MG/ML
125 INJECTION, SOLUTION INTRAVENOUS CONTINUOUS
Status: DISCONTINUED | OUTPATIENT
Start: 2023-06-08 | End: 2023-06-09 | Stop reason: HOSPADM

## 2023-06-08 RX ORDER — SODIUM CHLORIDE 0.9 % (FLUSH) 0.9 %
10 SYRINGE (ML) INJECTION AS NEEDED
Status: DISCONTINUED | OUTPATIENT
Start: 2023-06-08 | End: 2023-06-09 | Stop reason: HOSPADM

## 2023-06-08 RX ORDER — AMITRIPTYLINE HYDROCHLORIDE 10 MG/1
10 TABLET, FILM COATED ORAL NIGHTLY
Status: DISCONTINUED | OUTPATIENT
Start: 2023-06-08 | End: 2023-06-09 | Stop reason: HOSPADM

## 2023-06-08 RX ORDER — ACETAMINOPHEN 650 MG/1
650 SUPPOSITORY RECTAL EVERY 4 HOURS PRN
Status: DISCONTINUED | OUTPATIENT
Start: 2023-06-08 | End: 2023-06-09 | Stop reason: HOSPADM

## 2023-06-08 RX ORDER — OXYCODONE HYDROCHLORIDE AND ACETAMINOPHEN 5; 325 MG/1; MG/1
1 TABLET ORAL EVERY 8 HOURS PRN
Status: DISCONTINUED | OUTPATIENT
Start: 2023-06-08 | End: 2023-06-09 | Stop reason: HOSPADM

## 2023-06-08 RX ORDER — ACETAMINOPHEN 160 MG/5ML
650 SOLUTION ORAL EVERY 4 HOURS PRN
Status: DISCONTINUED | OUTPATIENT
Start: 2023-06-08 | End: 2023-06-09 | Stop reason: HOSPADM

## 2023-06-08 RX ORDER — OXYCODONE AND ACETAMINOPHEN 10; 325 MG/1; MG/1
1 TABLET ORAL EVERY 4 HOURS PRN
COMMUNITY

## 2023-06-08 RX ADMIN — Medication 100 MG: at 09:54

## 2023-06-08 RX ADMIN — HYDROMORPHONE HYDROCHLORIDE 0.5 MG: 1 INJECTION, SOLUTION INTRAMUSCULAR; INTRAVENOUS; SUBCUTANEOUS at 08:09

## 2023-06-08 RX ADMIN — HYDROMORPHONE HYDROCHLORIDE 0.5 MG: 1 INJECTION, SOLUTION INTRAMUSCULAR; INTRAVENOUS; SUBCUTANEOUS at 04:41

## 2023-06-08 RX ADMIN — NICOTINE 1 PATCH: 21 PATCH, EXTENDED RELEASE TRANSDERMAL at 11:28

## 2023-06-08 RX ADMIN — SODIUM CHLORIDE 125 ML/HR: 9 INJECTION, SOLUTION INTRAVENOUS at 08:09

## 2023-06-08 RX ADMIN — SODIUM CHLORIDE 125 ML/HR: 9 INJECTION, SOLUTION INTRAVENOUS at 05:50

## 2023-06-08 RX ADMIN — AMITRIPTYLINE HYDROCHLORIDE 10 MG: 10 TABLET, FILM COATED ORAL at 22:27

## 2023-06-08 RX ADMIN — HYDROMORPHONE HYDROCHLORIDE 0.5 MG: 1 INJECTION, SOLUTION INTRAMUSCULAR; INTRAVENOUS; SUBCUTANEOUS at 22:02

## 2023-06-08 RX ADMIN — OXYCODONE HYDROCHLORIDE AND ACETAMINOPHEN 1 TABLET: 10; 325 TABLET ORAL at 09:55

## 2023-06-08 RX ADMIN — Medication 1000 MCG: at 09:54

## 2023-06-08 RX ADMIN — HYDROMORPHONE HYDROCHLORIDE 1 MG: 1 INJECTION, SOLUTION INTRAMUSCULAR; INTRAVENOUS; SUBCUTANEOUS at 11:28

## 2023-06-08 RX ADMIN — ONDANSETRON 4 MG: 2 INJECTION INTRAMUSCULAR; INTRAVENOUS at 09:55

## 2023-06-08 RX ADMIN — IOPAMIDOL 85 ML: 612 INJECTION, SOLUTION INTRAVENOUS at 04:07

## 2023-06-08 RX ADMIN — ONDANSETRON 4 MG: 2 INJECTION INTRAMUSCULAR; INTRAVENOUS at 03:36

## 2023-06-08 RX ADMIN — SODIUM CHLORIDE 125 ML/HR: 9 INJECTION, SOLUTION INTRAVENOUS at 15:34

## 2023-06-08 RX ADMIN — SODIUM CHLORIDE 125 ML/HR: 9 INJECTION, SOLUTION INTRAVENOUS at 23:01

## 2023-06-08 RX ADMIN — OXYCODONE HYDROCHLORIDE AND ACETAMINOPHEN 1 TABLET: 5; 325 TABLET ORAL at 17:26

## 2023-06-08 RX ADMIN — Medication 10 ML: at 09:55

## 2023-06-08 RX ADMIN — HYDROMORPHONE HYDROCHLORIDE 0.5 MG: 1 INJECTION, SOLUTION INTRAMUSCULAR; INTRAVENOUS; SUBCUTANEOUS at 15:02

## 2023-06-08 RX ADMIN — HYDROMORPHONE HYDROCHLORIDE 0.5 MG: 1 INJECTION, SOLUTION INTRAMUSCULAR; INTRAVENOUS; SUBCUTANEOUS at 18:48

## 2023-06-08 RX ADMIN — ACETAMINOPHEN 650 MG: 325 TABLET ORAL at 06:18

## 2023-06-08 RX ADMIN — SODIUM CHLORIDE 1000 ML: 9 INJECTION, SOLUTION INTRAVENOUS at 03:33

## 2023-06-08 RX ADMIN — Medication 10 ML: at 20:16

## 2023-06-08 RX ADMIN — PANTOPRAZOLE SODIUM 40 MG: 40 TABLET, DELAYED RELEASE ORAL at 09:54

## 2023-06-08 RX ADMIN — HYDROMORPHONE HYDROCHLORIDE 0.5 MG: 1 INJECTION, SOLUTION INTRAMUSCULAR; INTRAVENOUS; SUBCUTANEOUS at 03:36

## 2023-06-08 NOTE — ED NOTES
Nursing report ED to floor  Piper Cain  42 y.o.  female    HPI :   Chief Complaint   Patient presents with    Abdominal Pain    Palpitations       Admitting doctor:   Bienvenido Barney MD    Admitting diagnosis:   The primary encounter diagnosis was Chronic pancreatitis, unspecified pancreatitis type. Diagnoses of Intractable lower abdominal pain and Nausea and vomiting, unspecified vomiting type were also pertinent to this visit.    Code status:   Current Code Status       Date Active Code Status Order ID Comments User Context       6/8/2023 0514 CPR (Attempt to Resuscitate) [id number]  Xiao Conroy APRN ED        Question Answer    Code Status (Patient has no pulse and is not breathing) CPR (Attempt to Resuscitate)    Medical Interventions (Patient has pulse or is breathing) Full Support                    Allergies:   Nickel and Hydrocodone-acetaminophen    Isolation:   No active isolations    Intake and Output  No intake or output data in the 24 hours ending 06/08/23 0651    Weight:       06/07/23  2210   Weight: 54.4 kg (120 lb)       Most recent vitals:   Vitals:    06/08/23 0417 06/08/23 0436 06/08/23 0506 06/08/23 0536   BP:  116/86 133/82 133/95   Pulse: 77 74 74 71   Resp:       Temp:       TempSrc:       SpO2: 100% 99% 97% 99%   Weight:       Height:           Active LDAs/IV Access:   Lines, Drains & Airways       Active LDAs       Name Placement date Placement time Site Days    Peripheral IV 06/08/23 0324 Left;Distal Antecubital 06/08/23  0324  Antecubital  less than 1                    Labs (abnormal labs have a star):   Labs Reviewed   COMPREHENSIVE METABOLIC PANEL - Abnormal; Notable for the following components:       Result Value    Glucose 104 (*)     CO2 29.2 (*)     All other components within normal limits    Narrative:     GFR Normal >60  Chronic Kidney Disease <60  Kidney Failure <15     LIPASE - Abnormal; Notable for the following components:    Lipase 151 (*)     All other  components within normal limits   URINALYSIS W/ MICROSCOPIC IF INDICATED (NO CULTURE) - Abnormal; Notable for the following components:    Specific Gravity, UA <1.005 (*)     All other components within normal limits    Narrative:     Urine microscopic not indicated.   CBC WITH AUTO DIFFERENTIAL - Abnormal; Notable for the following components:    MCV 98.3 (*)     MCH 33.7 (*)     Neutrophil % 39.6 (*)     Lymphocyte % 49.9 (*)     All other components within normal limits   RAINBOW DRAW    Narrative:     The following orders were created for panel order [place] Draw.  Procedure                               Abnormality         Status                     ---------                               -----------         ------                     Green Top (Gel)[[id number]]                                  Final result               Lavender Top[[id number]]                                     Final result               Gold Top - SST[[id number]]                                   Final result               Light Blue Top[[id number]]                                   Final result                 Please view results for these tests on the individual orders.   BASIC METABOLIC PANEL   CBC (NO DIFF)   CBC AND DIFFERENTIAL    Narrative:     The following orders were created for panel order CBC & Differential.  Procedure                               Abnormality         Status                     ---------                               -----------         ------                     CBC Auto Differential[[id number]]        Abnormal            Final result                 Please view results for these tests on the individual orders.   GREEN TOP   LAVENDER TOP   GOLD TOP - SST   LIGHT BLUE TOP       EKG:   ECG 12 Lead Other; Palpitations   Preliminary Result   HEART RATE= 83  bpm   RR Interval= 723  ms   MS Interval= 158  ms   P Horizontal Axis= 10  deg   P Front Axis= 44  deg   QRSD Interval= 83  ms   QT Interval= 376  ms   QRS Axis= 67   deg   T Wave Axis= 61  deg   - OTHERWISE NORMAL ECG -   Sinus rhythm   Low voltage, precordial leads   Electronically Signed By:    Date and Time of Study: 2023-06-07 22:34:56          Meds given in ED:   Medications   sodium chloride 0.9 % flush 10 mL (has no administration in time range)   sodium chloride 0.9 % flush 10 mL (has no administration in time range)   sodium chloride 0.9 % flush 10 mL (has no administration in time range)   sodium chloride 0.9 % infusion 40 mL (has no administration in time range)   sodium chloride 0.9 % infusion (125 mL/hr Intravenous New Bag 6/8/23 0550)   acetaminophen (TYLENOL) tablet 650 mg (650 mg Oral Given 6/8/23 0618)     Or   acetaminophen (TYLENOL) 160 MG/5ML solution 650 mg ( Oral Not Given:  See Alt 6/8/23 0618)     Or   acetaminophen (TYLENOL) suppository 650 mg ( Rectal Not Given:  See Alt 6/8/23 0618)   ondansetron (ZOFRAN) tablet 4 mg (has no administration in time range)     Or   ondansetron (ZOFRAN) injection 4 mg (has no administration in time range)   calcium carbonate (TUMS) chewable tablet 500 mg (200 mg elemental) (has no administration in time range)   HYDROmorphone (DILAUDID) injection 0.5 mg (has no administration in time range)   nicotine (NICODERM CQ) 21 MG/24HR patch 1 patch (has no administration in time range)   sodium chloride 0.9 % bolus 1,000 mL (1,000 mL Intravenous New Bag 6/8/23 0333)   HYDROmorphone (DILAUDID) injection 0.5 mg (0.5 mg Intravenous Given 6/8/23 0336)   ondansetron (ZOFRAN) injection 4 mg (4 mg Intravenous Given 6/8/23 0336)   iopamidol (ISOVUE-300) 61 % injection 100 mL (85 mL Intravenous Given 6/8/23 0407)   HYDROmorphone (DILAUDID) injection 0.5 mg (0.5 mg Intravenous Given 6/8/23 3101)       Imaging results:  CT Abdomen Pelvis With Contrast    Result Date: 6/8/2023  Electronically signed by Jony Enamorado MD on 06-08-23 at 0505     Ambulatory status:   - ad valeria    Social issues:   Social History     Socioeconomic History     Marital status: Single   Tobacco Use    Smoking status: Every Day     Packs/day: 0.50     Types: Cigarettes     Start date: 1/28/1996    Smokeless tobacco: Current   Vaping Use    Vaping Use: Never used   Substance and Sexual Activity    Alcohol use: Never    Drug use: No    Sexual activity: Yes     Partners: Male     Birth control/protection: None     Comment: IUD removed 6/29/2017       NIH Stroke Scale:         Hayley Mata RN  06/08/23 06:51 EDT

## 2023-06-08 NOTE — CONSULTS
[facility]  Initial Inpatient Consult Note    Referring Provider: Bienvenido Kern    Reason for Consultation: Chronic pancreatitis    Subjective     History of present illness:    42 y.o. female chronic pancreatitis, alcohol abuse as recently as 5 days ago, tobacco abuse, admitted again with her upper abdominal pain.  She has a history of peripancreatic fluid collection, she is following with gastroenterology of [facility] and there is a plan to place a stent according to the patient to try to reduce volume of fluid in the peripancreatic area.  She says that she believes she is going to have a ERCP and EUS in [facility] but it has not been scheduled.  She is due to see their nurse practitioner in 3 weeks.  She is describing worsening upper abdominal pain worse with eating better while fasting.  No relation to movement.  Her gallbladder is intact.  She tells me she is never had a HIDA scan.    Past Medical History:  Past Medical History:   Diagnosis Date    Anxiety     E. coli bacteremia     ETOH abuse     GERD (gastroesophageal reflux disease)     Hiatal hernia     Left flank pain 10/21/2022    Migraine     Miscarriage 2004    Pancreatitis      Past Surgical History:  Past Surgical History:   Procedure Laterality Date    ENDOSCOPY N/A 8/10/2022    Procedure: ESOPHAGOGASTRODUODENOSCOPY with bxs;  Surgeon: Salvador Price MD;  Location: [facility] ENDOSCOPY;  Service: Gastroenterology;  Laterality: N/A;  Pre: r/o esophageal  varacies, abd pain  Post: esophageal plaque      Social History:   Social History     Tobacco Use    Smoking status: Every Day     Packs/day: 0.50     Types: Cigarettes     Start date: 1/28/1996    Smokeless tobacco: Current   Substance Use Topics    Alcohol use: Never      Family History:  Family History   Problem Relation Age of Onset    Alcohol abuse Mother     Arthritis Mother     Asthma Mother     Miscarriages / Stillbirths Mother     Cancer Father     Diabetes  Father     Drug abuse Father     Early death Father     Heart disease Father     Hyperlipidemia Father     Hypertension Father     Alcohol abuse Paternal Aunt     Cancer Paternal Aunt     Diabetes Paternal Aunt     Drug abuse Paternal Aunt     Early death Paternal Aunt     Heart disease Paternal Aunt     Hyperlipidemia Paternal Aunt     Hypertension Paternal Aunt     Alcohol abuse Maternal Grandmother     Alcohol abuse Maternal Grandfather     Alcohol abuse Paternal Grandmother     Cancer Paternal Grandmother     Diabetes Paternal Grandmother     Drug abuse Paternal Grandmother     Early death Paternal Grandmother     Heart disease Paternal Grandmother     Hyperlipidemia Paternal Grandmother     Hypertension Paternal Grandmother     Alcohol abuse Paternal Grandfather        Home Meds:  Medications Prior to Admission   Medication Sig Dispense Refill Last Dose    amitriptyline (ELAVIL) 10 MG tablet Take 1 tablet by mouth Every Night.   6/7/2023    bisacodyl (DULCOLAX) 10 MG suppository Insert 1 suppository into the rectum Daily. 30 each 0     Cyanocobalamin (VITAMIN B 12 PO) Take  by mouth.   6/7/2023    Ferrous Sulfate Dried (Feosol) 200 (65 Fe) MG tablet tablet Take 1 tablet by mouth Daily.   6/7/2023    folic acid (FOLVITE) 1 MG tablet Take 1 tablet by mouth Daily. 30 tablet 3 6/7/2023    ondansetron (ZOFRAN) 4 MG tablet Take 1 tablet by mouth Every 8 (Eight) Hours As Needed for Nausea or Vomiting.   Past Week    oxyCODONE-acetaminophen (PERCOCET)  MG per tablet Take 1 tablet by mouth Every 4 (Four) Hours As Needed for Moderate Pain.       pancrelipase, Lip-Prot-Amyl, (CREON) 10024-54745 units capsule delayed-release particles capsule Take 3 capsules by mouth 3 (Three) Times a Day With Meals.   6/7/2023    pantoprazole (PROTONIX) 40 MG EC tablet Take 1 tablet by mouth Daily. 30 tablet 3 6/7/2023    Cholecalciferol (VITAMIN D3) 5000 units capsule capsule Take 5,000 Units by mouth Daily. (Patient not taking:  Reported on 6/8/2023)   Not Taking    sennosides-docusate (PERICOLACE) 8.6-50 MG per tablet Take 2 tablets by mouth 2 (Two) Times a Day As Needed for Constipation. (Patient not taking: Reported on 6/8/2023)   Not Taking    thiamine (VITAMIN B-1) 100 MG tablet  tablet Take 100 mg by mouth Daily. (Patient not taking: Reported on 6/8/2023)   Not Taking     Current Meds:   amitriptyline, 10 mg, Oral, Nightly  folic acid, 1,000 mcg, Oral, Daily  nicotine, 1 patch, Transdermal, Q24H  pancrelipase (Lip-Prot-Amyl), 12,000 units of lipase, Oral, TID With Meals  pantoprazole, 40 mg, Oral, Daily  sodium chloride, 10 mL, Intravenous, Q12H  thiamine, 100 mg, Oral, Daily      Allergies:  Allergies   Allergen Reactions    Nickel     Hydrocodone-Acetaminophen Nausea And Vomiting     Review of systems: There is weakness fatigue all other systems reviewed and negative    Objective     Vital Signs  Temp:  [97.8 °F (36.6 °C)-97.9 °F (36.6 °C)] 97.9 °F (36.6 °C)  Heart Rate:  [] 70  Resp:  [16] 16  BP: (116-143)/(80-99) 132/87  Physical Exam:  General Appearance:    Alert, cooperative, in no acute distress   Head:    Normocephalic, without obvious abnormality, atraumatic   Eyes:          conjunctivae and sclerae normal, no   icterus   Throat:   no thrush, oral mucosa moist   Neck:   Supple, no adenopathy   Lungs:     Clear to auscultation bilaterally    Heart:    Regular rhythm and normal rate    Chest Wall:    No abnormalities observed   Abdomen:     Soft, nondistended, nontender; normal bowel sounds   Extremities:   no edema, no redness   Skin:   No bruising or rash   Psychiatric:  normal mood and insight     Results Review:   I reviewed the patient's new clinical results.    Results from last 7 days   Lab Units 06/08/23 0652 06/07/23  2246   WBC 10*3/mm3 4.46 4.67   HEMOGLOBIN g/dL 12.4 13.9   HEMATOCRIT % 36.9 40.5   PLATELETS 10*3/mm3 160 204     Results from last 7 days   Lab Units 06/08/23 0652 06/07/23  2246   SODIUM  mmol/L 138 141   POTASSIUM mmol/L 3.7 5.0   CHLORIDE mmol/L 104 103   CO2 mmol/L 27.0 29.2*   BUN mg/dL 5* 6   CREATININE mg/dL 0.59 0.74   CALCIUM mg/dL 7.9* 9.5   BILIRUBIN mg/dL <0.2 <0.2   ALK PHOS U/L 96 113   ALT (SGPT) U/L 11 14   AST (SGOT) U/L 10 8   GLUCOSE mg/dL 76 104*         Lab Results   Lab Value Date/Time    LIPASE 73 (H) 06/08/2023 0652    LIPASE 151 (H) 06/07/2023 2246    LIPASE 192 (H) 03/25/2023 1700    LIPASE 38 12/05/2022 0526    LIPASE 34 12/04/2022 0542    LIPASE 29 12/03/2022 0515    LIPASE 50 12/02/2022 0719    LIPASE 29 12/01/2022 0519    LIPASE 65 (H) 11/30/2022 1412    LIPASE 157 (H) 11/28/2022 1751    LIPASE 335 (H) 10/20/2022 1939    LIPASE 378 (H) 08/06/2022 2158    LIPASE 95 (H) 01/24/2018 0545    LIPASE 93 (H) 10/01/2017 0356    LIPASE 122 (H) 09/30/2017 0619    LIPASE 86 (H) 09/29/2017 1731    LIPASE 133 (H) 09/28/2017 0032       Radiology:  CT Abdomen Pelvis With Contrast   Final Result         Electronically signed by Jony Enamorado MD on 06-08-23 at 0505        MPRESSION:       1.  The previously noted loculated fluid collection between the anterior   aspect of the left kidney and the posterior margin of the distal tail the   pancreas now measures 3 x 5.8 cm from 3.4 x 4.7 cm previously.  There is   questionable subtle communication with the pancreatic duct at the tail   the pancreas (series 2; image 28). Subtle fat stranding noted along the   medial aspect of the collection.  2.  The previously noted smaller collections noted along the greater   curvature the proximal stomach have resolved.  3.  Similar calcifications in the neck and head of the pancreas with   pancreatic ductal ectasia.  The visualized pancreas is otherwise stable   in appearance and demonstrates normal enhancement.  Findings are   consistent with chronic pancreatitis.  4.  No bowel obstruction.  No asymmetric bowel mucosal abnormality   identified.  No free intraperitoneal fluid or pneumoperitoneum.      IMPRESSION:           Assessment & Plan   Active Hospital Problems    Diagnosis     **Nausea & vomiting     Chronic pancreatitis, unspecified pancreatitis type     Macrocytosis     Left-sided chest pain     Tobacco abuse     Abdominal pain        Assessment:  Abdominal pain  Chronic pancreatitis  Nausea and vomiting  Fluid collection on CT see above  Tobacco abuse  Alcohol abuse    Plan:  No current indication for repeat aspiration of peripancreatic fluid collection  I agree with ERCP and pancreas stent placement in [facility] with Dr. Duval  If EUS is to be performed at that time, Dr. Duval can make that decision  We will check a HIDA scan as she is never had her gallbladder evaluated with this test  Clear liquid diet  Strongly recommend alcohol and smoking cessation  Further recommendations after recheck HIDA scan      I discussed the patients findings and my recommendations with patient and nursing staff.    Jose Robertson MD

## 2023-06-08 NOTE — PAYOR COMM NOTE
"Lizzeth Zavala (42 y.o. Female)        PLEASE SEE ATTACHED FOR INPT AUTH.     REF#[id number]    PLEASE CALL  OR  [phone] WITH INPT AUTH.    THANK YOU    THELMA BARNEY LPN CCP   Date of Birth   1980    Social Security Number       Address   [address]  [place] [address]    Home Phone   [phone]    MRN   [id number]       [employer]   [employer]    Marital Status   Single                            Admission Date   6/8/23    Admission Type   Emergency    Admitting Provider   Bienvenido Barney MD    Attending Provider   Bienvenido Barney MD    Department, Room/Bed   [facility] OBSERVATION, 127/1       Discharge Date       Discharge Disposition       Discharge Destination                                 Attending Provider: Bienvenido Barney MD    Allergies: Nickel, Hydrocodone-acetaminophen    Isolation: None   Infection: None   Code Status: CPR    Ht: 175.3 cm (69\")   Wt: 54.4 kg (120 lb)    Admission Cmt: None   Principal Problem: Nausea & vomiting [R11.2]                   Active Insurance as of 6/8/2023       Primary Coverage       Payor Plan Insurance Group Employer/Plan Group    [id number]       Payor Plan Address Payor Plan Phone Number Payor Plan Fax Number Effective Dates    MAIL STOP:   7/1/2022 - None Entered    [address] [address]       [facility] [address]         Subscriber Name Subscriber Birth Date Member ID       LIZZETH ZAVALA 1980 [id number]                     Emergency Contacts        (Rel.) Home Phone Work Phone Mobile Phone    MALOU EVANGELISTA (Significant Other) [phone] -- --              [place]: [facility] [id number]  Tax ID [id number]     History & Physical        Manan Garcia APRN at 06/08/23 0840              Patient Name:  Lizzeth Zavala  YOB: 1980  MRN:  [id number]  Admit Date:  6/8/2023  Patient Care Team:  Rachell Pozo, " "APRN as PCP - General (Family Medicine)      Subjective  History Present Illness     Chief Complaint   Patient presents with    Abdominal Pain    Palpitations       Ms. Cain is a 42 y.o. smoker with a history of alcohol abuse, GERD, migraine, pancreatitis who presents to [facility] ER chief complaint of abdominal pain, palpitations admitted for chronic pancreatitis.    Reports acute on chronic LUQ pain for about one month & went to [facility] in [place], [place] ([facility]) on 5/3/2023 for \"scan\" for possible stent placement for pseudocyst; however, nothing scheduled with subsequent frequent \"attacks\" of abdominal pain, nausea, & vomiting for the past 3 weeks & worse on 5/18/2023 & heart \"palpitations\" within the last & left shoulder pain; therefore, went to [facility] ER for further evaluation.    Denies history of CAD.    Reports compliance with diet & avoiding EtOH; however, states recently drinking one alcohol beverage at a work party on 6/6/2023. Admits to smoking tobacco.    Recommendation pending hospital course.  Details below.    History of Present Illness  Review of Systems   Constitutional:  Negative for chills and fever.   HENT:  Negative for congestion and rhinorrhea.    Respiratory:  Negative for cough and shortness of breath.    Cardiovascular:  Positive for chest pain and palpitations. Negative for leg swelling.   Gastrointestinal:  Positive for abdominal pain, nausea and vomiting. Negative for constipation, diarrhea and rectal pain.   Endocrine: Negative for polydipsia, polyphagia and polyuria.   Genitourinary:  Negative for difficulty urinating and dysuria.   Musculoskeletal:  Negative for gait problem and myalgias.   Skin:  Negative for rash and wound.   Neurological:  Negative for syncope and light-headedness.   Psychiatric/Behavioral:  Negative for confusion and hallucinations.       Personal History     Past Medical History:   Diagnosis Date    " Anxiety     E. coli bacteremia     ETOH abuse     GERD (gastroesophageal reflux disease)     Hiatal hernia     Left flank pain 10/21/2022    Migraine     Miscarriage 2004    Pancreatitis      Past Surgical History:   Procedure Laterality Date    ENDOSCOPY N/A 8/10/2022    Procedure: ESOPHAGOGASTRODUODENOSCOPY with bxs;  Surgeon: Salvador Price MD;  Location: [facility] ENDOSCOPY;  Service: Gastroenterology;  Laterality: N/A;  Pre: r/o esophageal  varacies, abd pain  Post: esophageal plaque     Family History   Problem Relation Age of Onset    Alcohol abuse Mother     Arthritis Mother     Asthma Mother     Miscarriages / Stillbirths Mother     Cancer Father     Diabetes Father     Drug abuse Father     Early death Father     Heart disease Father     Hyperlipidemia Father     Hypertension Father     Alcohol abuse Paternal Aunt     Cancer Paternal Aunt     Diabetes Paternal Aunt     Drug abuse Paternal Aunt     Early death Paternal Aunt     Heart disease Paternal Aunt     Hyperlipidemia Paternal Aunt     Hypertension Paternal Aunt     Alcohol abuse Maternal Grandmother     Alcohol abuse Maternal Grandfather     Alcohol abuse Paternal Grandmother     Cancer Paternal Grandmother     Diabetes Paternal Grandmother     Drug abuse Paternal Grandmother     Early death Paternal Grandmother     Heart disease Paternal Grandmother     Hyperlipidemia Paternal Grandmother     Hypertension Paternal Grandmother     Alcohol abuse Paternal Grandfather      Social History     Tobacco Use    Smoking status: Every Day     Packs/day: 0.50     Types: Cigarettes     Start date: 1/28/1996    Smokeless tobacco: Current   Vaping Use    Vaping Use: Never used   Substance Use Topics    Alcohol use: Never    Drug use: No     No current facility-administered medications on file prior to encounter.     Current Outpatient Medications on File Prior to Encounter   Medication Sig Dispense Refill    amitriptyline (ELAVIL) 10 MG tablet Take 1 tablet by mouth  Every Night.      bisacodyl (DULCOLAX) 10 MG suppository Insert 1 suppository into the rectum Daily. 30 each 0    Cyanocobalamin (VITAMIN B 12 PO) Take  by mouth.      Ferrous Sulfate Dried (Feosol) 200 (65 Fe) MG tablet tablet Take 1 tablet by mouth Daily.      folic acid (FOLVITE) 1 MG tablet Take 1 tablet by mouth Daily. 30 tablet 3    ondansetron (ZOFRAN) 4 MG tablet Take 1 tablet by mouth Every 8 (Eight) Hours As Needed for Nausea or Vomiting.      oxyCODONE-acetaminophen (PERCOCET)  MG per tablet Take 1 tablet by mouth Every 4 (Four) Hours As Needed for Moderate Pain.      pancrelipase, Lip-Prot-Amyl, (CREON) 08759-19080 units capsule delayed-release particles capsule Take 3 capsules by mouth 3 (Three) Times a Day With Meals.      pantoprazole (PROTONIX) 40 MG EC tablet Take 1 tablet by mouth Daily. 30 tablet 3    Cholecalciferol (VITAMIN D3) 5000 units capsule capsule Take 5,000 Units by mouth Daily. (Patient not taking: Reported on 6/8/2023)      sennosides-docusate (PERICOLACE) 8.6-50 MG per tablet Take 2 tablets by mouth 2 (Two) Times a Day As Needed for Constipation. (Patient not taking: Reported on 6/8/2023)      thiamine (VITAMIN B-1) 100 MG tablet  tablet Take 100 mg by mouth Daily. (Patient not taking: Reported on 6/8/2023)       Allergies   Allergen Reactions    Nickel     Hydrocodone-Acetaminophen Nausea And Vomiting       Objective   Objective     Vital Signs  Temp:  [97.8 °F (36.6 °C)-97.9 °F (36.6 °C)] 97.9 °F (36.6 °C)  Heart Rate:  [] 74  Resp:  [16] 16  BP: (116-143)/(80-99) 120/86  SpO2:  [97 %-100 %] 100 %  on   ;   Device (Oxygen Therapy): room air  Body mass index is 17.72 kg/m².    Physical Exam  Constitutional:       General: She is not in acute distress.     Appearance: She is not toxic-appearing.   HENT:      Head: Normocephalic and atraumatic.   Eyes:      Extraocular Movements: Extraocular movements intact.      Conjunctiva/sclera: Conjunctivae normal.   Cardiovascular:       Rate and Rhythm: Normal rate.      Heart sounds: Normal heart sounds.   Pulmonary:      Effort: Pulmonary effort is normal.      Breath sounds: Normal breath sounds.   Abdominal:      General: There is no distension.      Palpations: Abdomen is soft.      Tenderness: There is abdominal tenderness (LUQ). There is no guarding.   Musculoskeletal:      Cervical back: Normal range of motion and neck supple.      Right lower leg: No edema.      Left lower leg: No edema.   Skin:     General: Skin is warm and dry.   Neurological:      Mental Status: She is alert and oriented to person, place, and time.      Cranial Nerves: No cranial nerve deficit.   Psychiatric:         Behavior: Behavior normal.         Thought Content: Thought content normal.       Results Review:  I reviewed the patient's new clinical results.  I reviewed the patient's new imaging results and agree with the interpretation.  I reviewed the patient's other test results and agree with the interpretation  I personally viewed and interpreted the patient's EKG/Telemetry data  Discussed with ED provider.    Lab Results (last 24 hours)       Procedure Component Value Units Date/Time    CBC & Differential [[id number]]  (Abnormal) Collected: 06/07/23 2246    Specimen: Blood Updated: 06/07/23 5043    Narrative:      The following orders were created for panel order CBC & Differential.  Procedure                               Abnormality         Status                     ---------                               -----------         ------                     CBC Auto Differential[[id number]]        Abnormal            Final result                 Please view results for these tests on the individual orders.    Comprehensive Metabolic Panel [[id number]]  (Abnormal) Collected: 06/07/23 2246    Specimen: Blood Updated: 06/07/23 2349     Glucose 104 mg/dL      BUN 6 mg/dL      Creatinine 0.74 mg/dL      Sodium 141 mmol/L      Potassium 5.0 mmol/L      Chloride 103  mmol/L      CO2 29.2 mmol/L      Calcium 9.5 mg/dL      Total Protein 7.3 g/dL      Albumin 4.1 g/dL      ALT (SGPT) 14 U/L      AST (SGOT) 8 U/L      Alkaline Phosphatase 113 U/L      Total Bilirubin <0.2 mg/dL      Globulin 3.2 gm/dL      A/G Ratio 1.3 g/dL      BUN/Creatinine Ratio 8.1     Anion Gap 8.8 mmol/L      eGFR 105.0 mL/min/1.73     Narrative:      GFR Normal >60  Chronic Kidney Disease <60  Kidney Failure <15      Lipase [[id number]]  (Abnormal) Collected: 06/07/23 2246    Specimen: Blood Updated: 06/07/23 2349     Lipase 151 U/L     CBC Auto Differential [[id number]]  (Abnormal) Collected: 06/07/23 2246    Specimen: Blood Updated: 06/07/23 2323     WBC 4.67 10*3/mm3      RBC 4.12 10*6/mm3      Hemoglobin 13.9 g/dL      Hematocrit 40.5 %      MCV 98.3 fL      MCH 33.7 pg      MCHC 34.3 g/dL      RDW 13.4 %      RDW-SD 48.0 fl      MPV 9.6 fL      Platelets 204 10*3/mm3      Neutrophil % 39.6 %      Lymphocyte % 49.9 %      Monocyte % 7.7 %      Eosinophil % 1.7 %      Basophil % 0.9 %      Immature Grans % 0.2 %      Neutrophils, Absolute 1.85 10*3/mm3      Lymphocytes, Absolute 2.33 10*3/mm3      Monocytes, Absolute 0.36 10*3/mm3      Eosinophils, Absolute 0.08 10*3/mm3      Basophils, Absolute 0.04 10*3/mm3      Immature Grans, Absolute 0.01 10*3/mm3      nRBC 0.0 /100 WBC     Urinalysis With Microscopic If Indicated (No Culture) - Urine, Clean Catch [[id number]]  (Abnormal) Collected: 06/07/23 2247    Specimen: Urine, Clean Catch Updated: 06/07/23 2303     Color, UA Yellow     Appearance, UA Clear     pH, UA 6.5     Specific Gravity, UA <1.005     Glucose, UA Negative     Ketones, UA Negative     Bilirubin, UA Negative     Blood, UA Negative     Protein, UA Negative     Leuk Esterase, UA Negative     Nitrite, UA Negative     Urobilinogen, UA 0.2 E.U./dL    Narrative:      Urine microscopic not indicated.    Basic Metabolic Panel [[id number]]  (Abnormal) Collected: 06/08/23 0652    Specimen: Blood from  Arm, Left Updated: 06/08/23 0739     Glucose 76 mg/dL      BUN 5 mg/dL      Creatinine 0.59 mg/dL      Sodium 138 mmol/L      Potassium 3.7 mmol/L      Comment: Slight hemolysis detected by analyzer. Results may be affected.        Chloride 104 mmol/L      CO2 27.0 mmol/L      Calcium 7.9 mg/dL      BUN/Creatinine Ratio 8.5     Anion Gap 7.0 mmol/L      eGFR 117.0 mL/min/1.73     Narrative:      GFR Normal >60  Chronic Kidney Disease <60  Kidney Failure <15      CBC (No Diff) [[id number]]  (Abnormal) Collected: 06/08/23 0652    Specimen: Blood from Arm, Left Updated: 06/08/23 0714     WBC 4.46 10*3/mm3      RBC 3.77 10*6/mm3      Hemoglobin 12.4 g/dL      Hematocrit 36.9 %      MCV 97.9 fL      MCH 32.9 pg      MCHC 33.6 g/dL      RDW 13.4 %      RDW-SD 47.6 fl      MPV 9.6 fL      Platelets 160 10*3/mm3     Hepatic Function Panel [[id number]] Collected: 06/08/23 0652    Specimen: Blood from Arm, Left Updated: 06/08/23 1125     Total Protein 6.2 g/dL      Albumin 3.7 g/dL      ALT (SGPT) 11 U/L      AST (SGOT) 10 U/L      Comment: Specimen hemolyzed.  Results may be affected.        Alkaline Phosphatase 96 U/L      Total Bilirubin <0.2 mg/dL      Bilirubin, Direct <0.2 mg/dL      Comment: Specimen hemolyzed. Results may be affected.        Bilirubin, Indirect --     Comment: Unable to calculate       Lipase [[id number]]  (Abnormal) Collected: 06/08/23 0652    Specimen: Blood from Arm, Left Updated: 06/08/23 1123     Lipase 73 U/L     Magnesium [[id number]]  (Normal) Collected: 06/08/23 0652    Specimen: Blood from Arm, Left Updated: 06/08/23 1123     Magnesium 1.6 mg/dL     Ethanol [[id number]] Collected: 06/08/23 0906    Specimen: Blood Updated: 06/08/23 0944     Ethanol <10 mg/dL      Ethanol % <0.010 %     High Sensitivity Troponin T [[id number]]  (Normal) Collected: 06/08/23 0906    Specimen: Blood Updated: 06/08/23 1036     HS Troponin T <6 ng/L     Narrative:      High Sensitive Troponin T Reference Range:  <10.0  ng/L- Negative Female for AMI  <15.0 ng/L- Negative Male for AMI  >=10 - Abnormal Female indicating possible myocardial injury.  >=15 - Abnormal Male indicating possible myocardial injury.   Clinicians would have to utilize clinical acumen, EKG, Troponin, and serial changes to determine if it is an Acute Myocardial Infarction or myocardial injury due to an underlying chronic condition.         High Sensitivity Troponin T 2Hr [[id number]] Collected: 06/08/23 1109    Specimen: Blood Updated: 06/08/23 1136     HS Troponin T <6 ng/L      Troponin T Delta --     Comment: Unable to calculate.       Narrative:                  Imaging Results (Last 24 Hours)       Procedure Component Value Units Date/Time    CT Abdomen Pelvis With Contrast [[id number]] Collected: 06/08/23 0506     Updated: 06/08/23 0506    Narrative:        Patient: LIZZETH ZAVALA  Time Out: 05:05  Exam(s): CT ABDOMEN + PELVIS With Contrast     EXAM:    CT Abdomen and Pelvis With Intravenous Contrast    CLINICAL HISTORY:     Abdominal pain, acute, nonlocalized.    TECHNIQUE:    Axial computed tomography images of the abdomen and pelvis with   intravenous contrast.  CTDI is 6.07 mGy and DLP is 279.9 mGy-cm.  This CT   exam was performed according to the principle of ALARA (As Low As   Reasonably Achievable) by using one or more of the following dose   reduction techniques: automated exposure control, adjustment of the mA   and or kV according to patient size, and or use of iterative   reconstruction technique.    COMPARISON:    CT abdomen and pelvis with contrast dated 3 25 2023    FINDINGS:    Lung bases:  Unremarkable.  No mass.  No consolidation.     ABDOMEN:    Liver:  Unremarkable.  No mass.    Gallbladder and bile ducts:  Unremarkable.  No calcified stones.  No   ductal dilation.    Pancreas:  The previously noted loculated fluid collection between the   anterior aspect of the left kidney and the posterior margin of the distal   tail the pancreas now  measures 3 x 5.8 cm from 3.4 x 4.7 cm previously.    There is questionable subtle communication with the pancreatic duct at   the tail the pancreas (series 2; image 28). Subtle fat stranding noted   along the medial aspect of the collection.  Similar calcifications in the   neck and head of the pancreas with pancreatic ductal ectasia.  The   visualized pancreas is otherwise stable in appearance and demonstrates   normal enhancement.    Spleen:  Unremarkable.  No splenomegaly.    Adrenals:  The previously noted abnormal hyperattenuation and fullness   of the left adrenal gland is improved with more normal appearance of the   left adrenal gland.  The right adrenal gland is unremarkable.    Kidneys and ureters: The kidneys are stable in appearance and   demonstrate normal enhancement.  No nephrolithiasis or hydronephrosis.    Stomach and bowel:  The previously noted smaller collections noted   along the greater curvature the proximal stomach have resolved.  No bowel   obstruction.  No asymmetric bowel mucosal abnormality identified.     PELVIS:    Appendix:  No findings to suggest acute appendicitis.    Bladder:  Unremarkable.  No mass.    Reproductive:  Unremarkable as visualized.     ABDOMEN and PELVIS:    Intraperitoneal space:  Unremarkable.  No free air.  No significant   fluid collection.    Bones joints:  No acute fracture.  No dislocation.    Soft tissues:  Unremarkable.    Vasculature:  Unremarkable.  No abdominal aortic aneurysm.    Lymph nodes:  Unremarkable.  No enlarged lymph nodes.    IMPRESSION:       1.  The previously noted loculated fluid collection between the anterior   aspect of the left kidney and the posterior margin of the distal tail the   pancreas now measures 3 x 5.8 cm from 3.4 x 4.7 cm previously.  There is   questionable subtle communication with the pancreatic duct at the tail   the pancreas (series 2; image 28). Subtle fat stranding noted along the   medial aspect of the collection.  2.   The previously noted smaller collections noted along the greater   curvature the proximal stomach have resolved.  3.  Similar calcifications in the neck and head of the pancreas with   pancreatic ductal ectasia.  The visualized pancreas is otherwise stable   in appearance and demonstrates normal enhancement.  Findings are   consistent with chronic pancreatitis.  4.  No bowel obstruction.  No asymmetric bowel mucosal abnormality   identified.  No free intraperitoneal fluid or pneumoperitoneum.      Impression:          Electronically signed by Jony Enamoardo MD on 06-08-23 at 0505            Results for orders placed during the hospital encounter of 08/06/22    Adult Transthoracic Echo Complete W/ Cont if Necessary Per Protocol    Interpretation Summary  · Left ventricular ejection fraction appears to be 61 - 65%. Left ventricular systolic function is normal.  · Left ventricular diastolic function was normal.  · Normal right ventricular cavity size and systolic function noted.  · The agitated saline study is positive with Valsalva, consistent with a small to moderate sized PFO  · Mild tricuspid valve regurgitation is present.  · Calculated right ventricular systolic pressure from tricuspid regurgitation is 32 mmHg.  · There is no evidence of pericardial effusion      ECG 12 Lead Other; Palpitations   Preliminary Result   HEART RATE= 83  bpm   RR Interval= 723  ms   AL Interval= 158  ms   P Horizontal Axis= 10  deg   P Front Axis= 44  deg   QRSD Interval= 83  ms   QT Interval= 376  ms   QRS Axis= 67  deg   T Wave Axis= 61  deg   - OTHERWISE NORMAL ECG -   Sinus rhythm   Low voltage, precordial leads   Electronically Signed By:    Date and Time of Study: 2023-06-07 22:34:56           Assessment/Plan     Active Hospital Problems    Diagnosis  POA    **Nausea & vomiting [R11.2]  Yes    Chronic pancreatitis, unspecified pancreatitis type [K86.1]  Yes    Macrocytosis [D75.89]  Yes    Left-sided chest pain [R07.9]  Yes     Tobacco abuse [Z72.0]  Yes    Abdominal pain [R10.9]  Yes      Resolved Hospital Problems   No resolved problems to display.       Ms. Cain is a 42 y.o. smoker with a history of alcohol abuse, GERD, migraine, pancreatitis who presents to [facility] ER chief complaint of abdominal pain, palpitations admitted for chronic pancreatitis.      Nausea & vomiting: Most likely due to chronic pancreatitis and abdominal pain.  See plan below.      Left-sided chest pain: Unremarkable at HS troponin <6.  No current indication for cardiology consultation.  Normal EKG.      Abdominal pain: CT findings loculated fluid collection between left kidney and posterior margin of distal pancreatic tail with subcu fat stranding.  Diet clears for now.  IV fluids.  Antiemetics as needed.  Consult gastroenterology for possible pseudocyst management.      Chronic pancreatitis, unspecified pancreatitis type: Lipase 115.  See plan above.      Macrocytosis: Most likely due to chronic tobacco use.  Ordering serum vitamin B12.      Tobacco abuse: Complicating all problems.  Smoking cessation per hospital protocol.      History of alcohol abuse: Unremarkable serum ethanol.  Reports last alcoholic beverage on 6/6/2023.  Monitor for signs of alcohol withdrawal and indication for CIWA protocol initiation.    I discussed the patient's findings and my recommendations with patient, RN, & Dr. Barney.    VTE Prophylaxis - SCDs.  Code Status - Full code.       [facility]  [facility]  06/08/23  13:05 EDT      Electronically signed by Manan Garcia APRN at 06/08/23 1307          Emergency Department Notes        Krista Rivera RN at 06/07/23 2207          Pt to triage  Pt reports she has chronic/acute pancreatitis. Pt reports increased episode this past week on her left side that has no radiated to her right side. Pt reports n/v/d. Pt reports palpitations that started last week.     Electronically signed by Krista Rivera  RN at 06/07/23 2209       Isacc Alfred MD at 06/08/23 0240           EMERGENCY DEPARTMENT ENCOUNTER    Room Number:  10/10  Date seen:  6/8/2023  PCP: Rachell Pozo APRN  Historian: Patient      HPI:  Chief Complaint: Abdominal pain  A complete HPI/ROS/PMH/PSH/SH/FH are unobtainable due to: None  Context: Piper Cain is a 42 y.o. female who presents to the ED c/o upper abdominal discomfort that is somewhat chronic in nature but has significantly worsened over the last 2 to 3 days.  The patient reports a long history of acute on chronic pancreatitis.  She reports associated nausea and vomiting but denies diarrhea/constipation, fever/chills, chest pain, or shortness of breath.  She describes the abdominal pain as a dull ache with radiation into her scapula.  She states that the pain is fairly consistent with her acute on chronic pancreatitis.            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 coli bacteremia     ETOH  abuse     GERD (gastroesophageal reflux disease)     Hiatal hernia     Migraine     Miscarriage 2004    Pancreatitis          PAST SURGICAL HISTORY  Past Surgical History:   Procedure Laterality Date    ENDOSCOPY N/A 8/10/2022    Procedure: ESOPHAGOGASTRODUODENOSCOPY with bxs;  Surgeon: Salvador Price MD;  Location: [facility] ENDOSCOPY;  Service: Gastroenterology;  Laterality: N/A;  Pre: r/o esophageal  varacies, abd pain  Post: esophageal plaque         FAMILY HISTORY  Family History   Problem Relation Age of Onset    Alcohol abuse Mother     Arthritis Mother     Asthma Mother     Miscarriages / Stillbirths Mother     Cancer Father     Diabetes Father     Drug abuse Father     Early death Father     Heart disease Father     Hyperlipidemia Father     Hypertension Father     Alcohol abuse Paternal Aunt     Cancer Paternal Aunt     Diabetes Paternal Aunt     Drug abuse Paternal Aunt     Early death Paternal Aunt     Heart disease Paternal Aunt     Hyperlipidemia Paternal Aunt     Hypertension Paternal Aunt     Alcohol abuse Maternal Grandmother     Alcohol abuse Maternal Grandfather     Alcohol abuse Paternal Grandmother     Cancer Paternal Grandmother     Diabetes Paternal Grandmother     Drug abuse Paternal Grandmother     Early death Paternal Grandmother     Heart disease Paternal Grandmother     Hyperlipidemia Paternal Grandmother     Hypertension Paternal Grandmother     Alcohol abuse Paternal Grandfather          SOCIAL HISTORY  Social History     Socioeconomic History    Marital status: Single   Tobacco Use    Smoking status: Every Day     Packs/day: 0.50     Types: Cigarettes     Start date: 1/28/1996    Smokeless tobacco: Current   Vaping Use    Vaping Use: Never used   Substance and Sexual Activity    Alcohol use: Never    Drug use: No    Sexual activity: Yes     Partners: Male     Birth control/protection: None     Comment: IUD removed 6/29/2017         ALLERGIES  Nickel and  Hydrocodone-acetaminophen        REVIEW OF SYSTEMS  Review of Systems   Constitutional:  Negative for fever.   HENT:  Negative for sore throat.    Eyes: Negative.    Respiratory:  Negative for cough and shortness of breath.    Cardiovascular:  Negative for chest pain.   Gastrointestinal:  Positive for abdominal pain, nausea and vomiting. Negative for diarrhea.   Genitourinary:  Negative for dysuria.   Musculoskeletal:  Negative for neck pain.   Skin:  Negative for rash.   Allergic/Immunologic: Negative.    Neurological:  Negative for weakness, numbness and headaches.   Hematological: Negative.    Psychiatric/Behavioral: Negative.     All other systems reviewed and are negative.         PHYSICAL EXAM  ED Triage Vitals [06/07/23 2210]   Temp Heart Rate Resp BP SpO2   97.8 °F (36.6 °C) 110 16 121/91 98 %      Temp src Heart Rate Source Patient Position BP Location FiO2 (%)   Tympanic -- -- -- --       Physical Exam  Constitutional:       General: She is not in acute distress.     Appearance: Normal appearance. She is not ill-appearing or toxic-appearing.   HENT:      Head: Normocephalic and atraumatic.   Eyes:      Extraocular Movements: Extraocular movements intact.      Pupils: Pupils are equal, round, and reactive to light.   Cardiovascular:      Rate and Rhythm: Normal rate and regular rhythm.      Heart sounds: No murmur heard.    No friction rub. No gallop.   Pulmonary:      Effort: Pulmonary effort is normal.      Breath sounds: Normal breath sounds.   Abdominal:      General: Abdomen is flat. There is no distension.      Palpations: Abdomen is soft.      Tenderness: There is generalized no abdominal tenderness.   Musculoskeletal:         General: No swelling or tenderness. Normal range of motion.      Cervical back: Normal range of motion and neck supple.   Skin:     General: Skin is warm and dry.   Neurological:      General: No focal deficit present.      Mental Status: She is alert and oriented to person,  place, and time.      Sensory: No sensory deficit.      Motor: No weakness.   Psychiatric:         Mood and Affect: Mood normal.         Behavior: Behavior normal.         Vital signs and nursing notes reviewed.          LAB RESULTS  Recent Results (from the past 24 hour(s))   ECG 12 Lead Other; Palpitations    Collection Time: 06/07/23 10:34 PM   Result Value Ref Range    QT Interval 376 ms   Comprehensive Metabolic Panel    Collection Time: 06/07/23 10:46 PM    Specimen: Blood   Result Value Ref Range    Glucose 104 (H) 65 - 99 mg/dL    BUN 6 6 - 20 mg/dL    Creatinine 0.74 0.57 - 1.00 mg/dL    Sodium 141 136 - 145 mmol/L    Potassium 5.0 3.5 - 5.2 mmol/L    Chloride 103 98 - 107 mmol/L    CO2 29.2 (H) 22.0 - 29.0 mmol/L    Calcium 9.5 8.6 - 10.5 mg/dL    Total Protein 7.3 6.0 - 8.5 g/dL    Albumin 4.1 3.5 - 5.2 g/dL    ALT (SGPT) 14 1 - 33 U/L    AST (SGOT) 8 1 - 32 U/L    Alkaline Phosphatase 113 39 - 117 U/L    Total Bilirubin <0.2 0.0 - 1.2 mg/dL    Globulin 3.2 gm/dL    A/G Ratio 1.3 g/dL    BUN/Creatinine Ratio 8.1 7.0 - 25.0    Anion Gap 8.8 5.0 - 15.0 mmol/L    eGFR 105.0 >60.0 mL/min/1.73   Lipase    Collection Time: 06/07/23 10:46 PM    Specimen: Blood   Result Value Ref Range    Lipase 151 (H) 13 - 60 U/L   Green Top (Gel)    Collection Time: 06/07/23 10:46 PM   Result Value Ref Range    Extra Tube Hold for add-ons.    Lavender Top    Collection Time: 06/07/23 10:46 PM   Result Value Ref Range    Extra Tube hold for add-on    Gold Top - SST    Collection Time: 06/07/23 10:46 PM   Result Value Ref Range    Extra Tube Hold for add-ons.    Light Blue Top    Collection Time: 06/07/23 10:46 PM   Result Value Ref Range    Extra Tube Hold for add-ons.    CBC Auto Differential    Collection Time: 06/07/23 10:46 PM    Specimen: Blood   Result Value Ref Range    WBC 4.67 3.40 - 10.80 10*3/mm3    RBC 4.12 3.77 - 5.28 10*6/mm3    Hemoglobin 13.9 12.0 - 15.9 g/dL    Hematocrit 40.5 34.0 - 46.6 %    MCV 98.3 (H) 79.0  - 97.0 fL    MCH 33.7 (H) 26.6 - 33.0 pg    MCHC 34.3 31.5 - 35.7 g/dL    RDW 13.4 12.3 - 15.4 %    RDW-SD 48.0 37.0 - 54.0 fl    MPV 9.6 6.0 - 12.0 fL    Platelets 204 140 - 450 10*3/mm3    Neutrophil % 39.6 (L) 42.7 - 76.0 %    Lymphocyte % 49.9 (H) 19.6 - 45.3 %    Monocyte % 7.7 5.0 - 12.0 %    Eosinophil % 1.7 0.3 - 6.2 %    Basophil % 0.9 0.0 - 1.5 %    Immature Grans % 0.2 0.0 - 0.5 %    Neutrophils, Absolute 1.85 1.70 - 7.00 10*3/mm3    Lymphocytes, Absolute 2.33 0.70 - 3.10 10*3/mm3    Monocytes, Absolute 0.36 0.10 - 0.90 10*3/mm3    Eosinophils, Absolute 0.08 0.00 - 0.40 10*3/mm3    Basophils, Absolute 0.04 0.00 - 0.20 10*3/mm3    Immature Grans, Absolute 0.01 0.00 - 0.05 10*3/mm3    nRBC 0.0 0.0 - 0.2 /100 WBC   Urinalysis With Microscopic If Indicated (No Culture) - Urine, Clean Catch    Collection Time: 06/07/23 10:47 PM    Specimen: Urine, Clean Catch   Result Value Ref Range    Color, UA Yellow Yellow, Straw    Appearance, UA Clear Clear    pH, UA 6.5 5.0 - 8.0    Specific Gravity, UA <1.005 (L) 1.005 - 1.030    Glucose, UA Negative Negative    Ketones, UA Negative Negative    Bilirubin, UA Negative Negative    Blood, UA Negative Negative    Protein, UA Negative Negative    Leuk Esterase, UA Negative Negative    Nitrite, UA Negative Negative    Urobilinogen, UA 0.2 E.U./dL 0.2 - 1.0 E.U./dL       Ordered the above labs and reviewed the results.        RADIOLOGY  CT Abdomen Pelvis With Contrast    Result Date: 6/8/2023  Patient: LIZZETH ZAVALA  Time Out: 05:05 Exam(s): CT ABDOMEN + PELVIS With Contrast EXAM:   CT Abdomen and Pelvis With Intravenous Contrast CLINICAL HISTORY:    Abdominal pain, acute, nonlocalized. TECHNIQUE:   Axial computed tomography images of the abdomen and pelvis with intravenous contrast.  CTDI is 6.07 mGy and DLP is 279.9 mGy-cm.  This CT exam was performed according to the principle of ALARA (As Low As Reasonably Achievable) by using one or more of the following dose reduction  techniques: automated exposure control, adjustment of the mA and or kV according to patient size, and or use of iterative reconstruction technique. COMPARISON:   CT abdomen and pelvis with contrast dated 3 25 2023 FINDINGS:   Lung bases:  Unremarkable.  No mass.  No consolidation.  ABDOMEN:   Liver:  Unremarkable.  No mass.   Gallbladder and bile ducts:  Unremarkable.  No calcified stones.  No ductal dilation.   Pancreas:  The previously noted loculated fluid collection between the anterior aspect of the left kidney and the posterior margin of the distal tail the pancreas now measures 3 x 5.8 cm from 3.4 x 4.7 cm previously.  There is questionable subtle communication with the pancreatic duct at the tail the pancreas (series 2; image 28). Subtle fat stranding noted along the medial aspect of the collection.  Similar calcifications in the neck and head of the pancreas with pancreatic ductal ectasia.  The visualized pancreas is otherwise stable in appearance and demonstrates normal enhancement.   Spleen:  Unremarkable.  No splenomegaly.   Adrenals:  The previously noted abnormal hyperattenuation and fullness of the left adrenal gland is improved with more normal appearance of the left adrenal gland.  The right adrenal gland is unremarkable.   Kidneys and ureters: The kidneys are stable in appearance and demonstrate normal enhancement.  No nephrolithiasis or hydronephrosis.   Stomach and bowel:  The previously noted smaller collections noted along the greater curvature the proximal stomach have resolved.  No bowel obstruction.  No asymmetric bowel mucosal abnormality identified.  PELVIS:   Appendix:  No findings to suggest acute appendicitis.   Bladder:  Unremarkable.  No mass.   Reproductive:  Unremarkable as visualized.  ABDOMEN and PELVIS:   Intraperitoneal space:  Unremarkable.  No free air.  No significant fluid collection.   Bones joints:  No acute fracture.  No dislocation.   Soft tissues:  Unremarkable.    Vasculature:  Unremarkable.  No abdominal aortic aneurysm.   Lymph nodes:  Unremarkable.  No enlarged lymph nodes. IMPRESSION:     1.  The previously noted loculated fluid collection between the anterior aspect of the left kidney and the posterior margin of the distal tail the pancreas now measures 3 x 5.8 cm from 3.4 x 4.7 cm previously.  There is questionable subtle communication with the pancreatic duct at the tail the pancreas (series 2; image 28). Subtle fat stranding noted along the medial aspect of the collection. 2.  The previously noted smaller collections noted along the greater curvature the proximal stomach have resolved. 3.  Similar calcifications in the neck and head of the pancreas with pancreatic ductal ectasia.  The visualized pancreas is otherwise stable in appearance and demonstrates normal enhancement.  Findings are consistent with chronic pancreatitis. 4.  No bowel obstruction.  No asymmetric bowel mucosal abnormality identified.  No free intraperitoneal fluid or pneumoperitoneum.     Electronically signed by Jony Enamorado MD on 06-08-23 at 0505     Ordered the above noted radiological studies. Reviewed by me in PACS.            PROCEDURES  Procedures            MEDICATIONS GIVEN IN ER  Medications   sodium chloride 0.9 % flush 10 mL (has no administration in time range)   sodium chloride 0.9 % flush 10 mL (has no administration in time range)   sodium chloride 0.9 % flush 10 mL (has no administration in time range)   sodium chloride 0.9 % infusion 40 mL (has no administration in time range)   sodium chloride 0.9 % infusion (125 mL/hr Intravenous New Bag 6/8/23 0550)   acetaminophen (TYLENOL) tablet 650 mg (650 mg Oral Given 6/8/23 0618)     Or   acetaminophen (TYLENOL) 160 MG/5ML solution 650 mg ( Oral Not Given:  See Alt 6/8/23 0618)     Or   acetaminophen (TYLENOL) suppository 650 mg ( Rectal Not Given:  See Alt 6/8/23 0618)   ondansetron (ZOFRAN) tablet 4 mg (has no administration in time  range)     Or   ondansetron (ZOFRAN) injection 4 mg (has no administration in time range)   calcium carbonate (TUMS) chewable tablet 500 mg (200 mg elemental) (has no administration in time range)   HYDROmorphone (DILAUDID) injection 0.5 mg (has no administration in time range)   nicotine (NICODERM CQ) 21 MG/24HR patch 1 patch (has no administration in time range)   sodium chloride 0.9 % bolus 1,000 mL (1,000 mL Intravenous New Bag 6/8/23 0333)   HYDROmorphone (DILAUDID) injection 0.5 mg (0.5 mg Intravenous Given 6/8/23 0336)   ondansetron (ZOFRAN) injection 4 mg (4 mg Intravenous Given 6/8/23 0336)   iopamidol (ISOVUE-300) 61 % injection 100 mL (85 mL Intravenous Given 6/8/23 0407)   HYDROmorphone (DILAUDID) injection 0.5 mg (0.5 mg Intravenous Given 6/8/23 0441)                   MEDICAL DECISION MAKING, PROGRESS, and CONSULTS    All labs have been independently reviewed by me.  All radiology studies have been reviewed by me and I have also reviewed the radiology report.   EKG's independently viewed and interpreted by me.  Discussion below represents my analysis of pertinent findings related to patient's condition, differential diagnosis, treatment plan and final disposition.      Additional sources:  - Discussed/ obtained information from independent historians: History obtained from the patient herself at bedside    - External (non-ED) record review: Upon medical records review, the patient was admitted to the hospital from 3/25/2023 through 3/27/2023 where she was treated for acute on chronic pancreatitis as well as an adrenal hemorrhage.    - Chronic or social conditions impacting care: History of pancreatitis    - Shared decision making: Admission decision based on shared conversations have between myself as well as the patient at bedside.    Case discussed with SOL Barreto for [facility], who will admit the patient to the hospital today for Dr. Uribe.        Orders placed during this visit:  Orders  Placed This Encounter   Procedures    CT Abdomen Pelvis With Contrast    [facility] Draw    Comprehensive Metabolic Panel    Lipase    Urinalysis With Microscopic If Indicated (No Culture) - Urine, Clean Catch    CBC Auto Differential    Basic Metabolic Panel    CBC (No Diff)    NPO Diet NPO Type: Strict NPO    Undress & Gown    Vital Signs    Intake & Output    Weigh Patient    Oral Care    Saline Lock & Maintain IV Access    Place Sequential Compression Device    Maintain Sequential Compression Device    Code Status and Medical Interventions:    [facility] (on-call MD unless specified) Details    ECG 12 Lead Other; Palpitations    Insert Peripheral IV    Insert Peripheral IV    Inpatient Admission    CBC & Differential    Green Top (Gel)    Lavender Top    Gold Top - SST    Light Blue Top             Differential diagnosis includes but is not limited to:    Differential diagnosis includes but is not limited to acute on chronic pancreatitis, GERD/gastritis, small bowel obstruction, acute colitis, or diverticulitis.      Independent interpretation of labs, radiology studies, and discussions with consultants:    CT scan of the abdomen and pelvis was independently interpreted by myself showing no obvious areas of acute pancreatitis or pseudocyst formation.  No sign of free intraperitoneal air or bowel obstruction as well.        ED Course as of 06/08/23 0627   Thu Jun 08, 2023   8450 The patient's presentation, work-up, as well as diagnosis and treatment plan were discussed at length with SOL Barreto for A.  She agrees to admit the patient to the hospital today for Dr. Uribe. [BM]   1231 On reevaluation, the patient does report some improvement in symptoms following IV pain medication administration.  I informed her that she does have areas of chronic pancreatitis as well as the loculated fluid formation that she was aware of on CT scan.  We will admit her to the hospital for treatment of her discomfort as well as  nausea and vomiting.  All questions have been answered. [BM]      ED Course User Index  [BM] Isacc Alfred MD             DIAGNOSIS  Final diagnoses:   Chronic pancreatitis, unspecified pancreatitis type   Intractable lower abdominal pain   Nausea and vomiting, unspecified vomiting type         DISPOSITION  ADMISSION    Discussed treatment plan and reason for admission with pt/family and admitting physician.  Pt/family voiced understanding of the plan for admission for further testing/treatment as needed.               Latest Documented Vital Signs:  As of 06:27 EDT  BP- 133/95 HR- 71 Temp- 97.8 °F (36.6 °C) (Tympanic) O2 sat- 99%              --    Please note that portions of this were completed with a voice recognition program.       Note Disclaimer: At [facility], we believe that sharing information builds trust and better relationships. You are receiving this note because you are receiving care at [facility] or recently visited. It is possible you will see health information before a provider has talked with you about it. This kind of information can be easy to misunderstand. To help you fully understand what it means for your health, we urge you to discuss this note with your provider.             Isacc Alfred MD  06/08/23 0628      Electronically signed by Isacc Alfred MD at 06/08/23 0628       Hayley Mata RN at 06/08/23 0651          N  Oxygen Therapy (since admission)       Date/Time SpO2 Device (Oxygen Therapy) Flow (L/min) Oxygen Concentration (%) ETCO2 (mmHg)    06/08/23 0801 100 room air -- -- --    06/08/23 0706 99 -- -- -- --    06/08/23 0536 99 -- -- -- --    06/08/23 0506 97 -- -- -- --    06/08/23 0436 99 -- -- -- --    06/08/23 0417 100 -- -- -- --    06/08/23 0336 99 -- -- -- --    06/08/23 0307 99 -- -- -- --    06/07/23 2210 98 -- -- -- --          Intake & Output (last 2 days)         06/06 0701 06/07 0700 06/07 0701 06/08 0700 06/08 0701 06/09 0700    IV  Piggyback   1000    Total Intake(mL/kg)   1000 (18.4)    Net   +1000                 Lines, Drains & Airways       Active LDAs       Name Placement date Placement time Site Days    Peripheral IV 06/08/23 0324 Left;Distal Antecubital 06/08/23  0324  Antecubital  less than 1                  Medication Administration Report for Piper Cain as of 06/08/23 1323     Legend:    Given Hold Not Given Due Canceled Entry Other Actions    Time Time (Time) Time Time-Action         Discontinued     Completed     Future     MAR Hold     Linked             Medications 06/06/23 06/07/23 06/08/23      acetaminophen (TYLENOL) tablet 650 mg  Dose: 650 mg  Freq: Every 4 Hours PRN Route: PO  PRN Reason: Mild Pain  Start: 06/08/23 0514   Admin Instructions:   Based on patient request - if ordered for moderate or severe pain, provider allows for administration of a medication prescribed for a lower pain scale.  Do not exceed 4 grams of acetaminophen in a 24 hr period. Max dose of 2gm for AST/ALT greater than 120 units/L    If given for fever, use fever parameter: fever greater than 100.4 °F.    If given for pain, use the following pain scale:   Mild Pain = Pain Score of 1-3, CPOT 1-2  Moderate Pain = Pain Score of 4-6, CPOT 3-4  Severe Pain = Pain Score of 7-10, CPOT 5-8      0618-Given            Or  acetaminophen (TYLENOL) 160 MG/5ML solution 650 mg  Dose: 650 mg  Freq: Every 4 Hours PRN Route: PO  PRN Reason: Mild Pain  Start: 06/08/23 0514   Admin Instructions:   Based on patient request - if ordered for moderate or severe pain, provider allows for administration of a medication prescribed for a lower pain scale.  Do not exceed 4 grams of acetaminophen in a 24 hr period. Max dose of 2gm for AST/ALT greater than 120 units/L    If given for fever, use fever parameter: fever greater than 100.4 °F.    If given for pain, use the following pain scale:   Mild Pain = Pain Score of 1-3, CPOT 1-2  Moderate Pain = Pain Score of 4-6, CPOT  3-4  Severe Pain = Pain Score of 7-10, CPOT 5-8      0618-Not Given:  See Alt            Or  acetaminophen (TYLENOL) suppository 650 mg  Dose: 650 mg  Freq: Every 4 Hours PRN Route: RE  PRN Reason: Mild Pain  Start: 06/08/23 0514   Admin Instructions:   Based on patient request - if ordered for moderate or severe pain, provider allows for administration of a medication prescribed for a lower pain scale.  Do not exceed 4 grams of acetaminophen in a 24 hr period. Max dose of 2gm for AST/ALT greater than 120 units/L    If given for fever, use fever parameter: fever greater than 100.4 °F.    If given for pain, use the following pain scale:   Mild Pain = Pain Score of 1-3, CPOT 1-2  Moderate Pain = Pain Score of 4-6, CPOT 3-4  Severe Pain = Pain Score of 7-10, CPOT 5-8      0618-Not Given:  See Alt             calcium carbonate (TUMS) chewable tablet 500 mg (200 mg elemental)  Dose: 2 tablet  Freq: 2 Times Daily PRN Route: PO  PRN Reason: Heartburn  Start: 06/08/23 0514   Admin Instructions:   One tablet contains 200 mg elemental calcium.  Take with food.          folic acid (FOLVITE) tablet 1,000 mcg  Dose: 1,000 mcg  Freq: Daily Route: PO  Start: 06/08/23 0945      7017-Given             HYDROmorphone (DILAUDID) injection 0.5 mg  Dose: 0.5 mg  Freq: Every 3 Hours PRN Route: IV  PRN Reason: Severe Pain  Start: 06/08/23 1245   Admin Instructions:   Based on patient request - if ordered for moderate or severe pain, provider allows for administration of a medication prescribed for a lower pain scale.  If given for pain, use the following pain scale:  Mild Pain = Pain Score of 1-3, CPOT 1-2  Moderate Pain = Pain Score of 4-6, CPOT 3-4  Severe Pain = Pain Score of 7-10, CPOT 5-8          HYDROmorphone (DILAUDID) injection 1 mg  Dose: 1 mg  Freq: Every 2 Hours PRN Route: IV  PRN Reason: Severe Pain  Start: 06/08/23 1114   End: 06/08/23 1239   Admin Instructions:   Based on patient request - if ordered for moderate or severe  pain, provider allows for administration of a medication prescribed for a lower pain scale.      Caution: Look alike/sound alike drug alert    If given for pain, use the following pain scale:  Mild Pain = Pain Score of 1-3, CPOT 1-2  Moderate Pain = Pain Score of 4-6, CPOT 3-4  Severe Pain = Pain Score of 7-10, CPOT 5-8      1128-Given             nicotine (NICODERM CQ) 21 MG/24HR patch 1 patch  Dose: 1 patch  Freq: Every 24 Hours Scheduled Route: TD  Start: 06/08/23 0900   Admin Instructions:   Apply to clean, dry, nonhairy area of skin (typically upper arm or shoulder)   Acutely Hazardous. Waste BOTH Residual Medication and/or Empty Package.      1128-Medication Applied             ondansetron (ZOFRAN) tablet 4 mg  Dose: 4 mg  Freq: Every 6 Hours PRN Route: PO  PRN Reasons: Nausea,Vomiting  Start: 06/08/23 0514   Admin Instructions:   If BOTH ondansetron (ZOFRAN) and promethazine (PHENERGAN) are ordered use ondansetron first and THEN promethazine IF ondansetron is ineffective.      0955-Not Given:  See Alt            Or  ondansetron (ZOFRAN) injection 4 mg  Dose: 4 mg  Freq: Every 6 Hours PRN Route: IV  PRN Reasons: Nausea,Vomiting  Start: 06/08/23 0514   Admin Instructions:   If BOTH ondansetron (ZOFRAN) and promethazine (PHENERGAN) are ordered use ondansetron first and THEN promethazine IF ondansetron is ineffective.      0955-Given             oxyCODONE-acetaminophen (PERCOCET)  MG per tablet 1 tablet  Dose: 1 tablet  Freq: Every 4 Hours PRN Route: PO  PRN Reason: Moderate Pain  Start: 06/08/23 0844   End: 06/08/23 1240   Admin Instructions:   Based on patient request - if ordered for moderate or severe pain, provider allows for administration of a medication prescribed for a lower pain scale.  [JULIA]    Do not exceed 4 grams of acetaminophen in a 24 hr period. Max dose of 2gm for AST/ALT greater than 120 units/L        If given for pain, use the following pain scale:   Mild Pain = Pain Score of 1-3, CPOT  1-2  Moderate Pain = Pain Score of 4-6, CPOT 3-4  Severe Pain = Pain Score of 7-10, CPOT 5-8      0955-Given             oxyCODONE-acetaminophen (PERCOCET) 5-325 MG per tablet 1 tablet  Dose: 1 tablet  Freq: Every 8 Hours PRN Route: PO  PRN Reason: Moderate Pain  Start: 06/08/23 1240   End: 06/15/23 1239   Admin Instructions:   Based on patient request - if ordered for moderate or severe pain, provider allows for administration of a medication prescribed for a lower pain scale.  [JULIA]    Do not exceed 4 grams of acetaminophen in a 24 hr period. Max dose of 2gm for AST/ALT greater than 120 units/L        If given for pain, use the following pain scale:   Mild Pain = Pain Score of 1-3, CPOT 1-2  Moderate Pain = Pain Score of 4-6, CPOT 3-4  Severe Pain = Pain Score of 7-10, CPOT 5-8          pantoprazole (PROTONIX) EC tablet 40 mg  Dose: 40 mg  Freq: Daily Route: PO  Start: 06/08/23 0945   Admin Instructions:   Swallow whole; do not crush, split, or chew.      0954-Given             sodium chloride 0.9 % flush 10 mL  Dose: 10 mL  Freq: As Needed Route: IV  PRN Reason: Line Care  Start: 06/08/23 0513          sodium chloride 0.9 % flush 10 mL  Dose: 10 mL  Freq: Every 12 Hours Scheduled Route: IV  Start: 06/08/23 0900      0955-Given     2100            sodium chloride 0.9 % flush 10 mL  Dose: 10 mL  Freq: As Needed Route: IV  PRN Reason: Line Care  Start: 06/07/23 2215          sodium chloride 0.9 % infusion  Rate: 125 mL/hr Dose: 125 mL/hr  Freq: Continuous Route: IV  Start: 06/08/23 0529      0550-New Bag     0748-Stopped     0809-New Bag           sodium chloride 0.9 % infusion 40 mL  Dose: 40 mL  Freq: As Needed Route: IV  PRN Reason: Line Care  Start: 06/08/23 0513   Admin Instructions:   Following administration of an IV intermittent medication, flush line with 40mL NS at 100mL/hr.          thiamine (VITAMIN B-1) tablet 100 mg  Dose: 100 mg  Freq: Daily Route: PO  Start: 06/08/23 0945      0954-Given           "  Future Medications  Medications 06/06/23 06/07/23 06/08/23       amitriptyline (ELAVIL) tablet 10 mg  Dose: 10 mg  Freq: Nightly Route: PO  Start: 06/08/23 2100 2100            Completed Medications  Medications 06/06/23 06/07/23 06/08/23       HYDROmorphone (DILAUDID) injection 0.5 mg  Dose: 0.5 mg  Freq: Once Route: IV  Start: 06/08/23 0441   End: 06/08/23 0441   Admin Instructions:   Based on patient request - if ordered for moderate or severe pain, provider allows for administration of a medication prescribed for a lower pain scale.  If given for pain, use the following pain scale:  Mild Pain = Pain Score of 1-3, CPOT 1-2  Moderate Pain = Pain Score of 4-6, CPOT 3-4  Severe Pain = Pain Score of 7-10, CPOT 5-8      0441-Given             HYDROmorphone (DILAUDID) injection 0.5 mg  Dose: 0.5 mg  Freq: Once Route: IV  Start: 06/08/23 0317   End: 06/08/23 0336   Admin Instructions:   Based on patient request - if ordered for moderate or severe pain, provider allows for administration of a medication prescribed for a lower pain scale.  If given for pain, use the following pain scale:  Mild Pain = Pain Score of 1-3, CPOT 1-2  Moderate Pain = Pain Score of 4-6, CPOT 3-4  Severe Pain = Pain Score of 7-10, CPOT 5-8      0336-Given             iopamidol (ISOVUE-300) 61 % injection 100 mL  Dose: 100 mL  Freq: Once in Imaging Route: IV  Start: 06/08/23 0421   End: 06/08/23 0407      0407-Given             ondansetron (ZOFRAN) injection 4 mg  Dose: 4 mg  Freq: Once Route: IV  Start: 06/08/23 0317   End: 06/08/23 0336   Admin Instructions:   \"If multiple N/V medications ordered, use in the following order: Ondansetron, Prochlorperazine, Promethazine. Use PO unless patient refuses or patient unable to swallow.\"        0336-Given             sodium chloride 0.9 % bolus 1,000 mL  Dose: 1,000 mL  Freq: Once Route: IV  Start: 06/08/23 0317   End: 06/08/23 0748      0333-New Bag     0748-Stopped           Discontinued " Medications  Medications 06/06/23 06/07/23 06/08/23       HYDROmorphone (DILAUDID) injection 0.5 mg  Dose: 0.5 mg  Freq: Every 2 Hours PRN Route: IV  PRN Reason: Severe Pain  Start: 06/08/23 0514   End: 06/08/23 1016   Admin Instructions:   Based on patient request - if ordered for moderate or severe pain, provider allows for administration of a medication prescribed for a lower pain scale.  If given for pain, use the following pain scale:  Mild Pain = Pain Score of 1-3, CPOT 1-2  Moderate Pain = Pain Score of 4-6, CPOT 3-4  Severe Pain = Pain Score of 7-10, CPOT 5-8      0809-Given             HYDROmorphone (DILAUDID) injection 1 mg  Dose: 1 mg  Freq: Every 3 Hours PRN Route: IV  PRN Reason: Severe Pain  Start: 06/08/23 1030   End: 06/08/23 1115   Admin Instructions:   Based on patient request - if ordered for moderate or severe pain, provider allows for administration of a medication prescribed for a lower pain scale.  Caution: Look alike/sound alike drug alert    If given for pain, use the following pain scale:  Mild Pain = Pain Score of 1-3, CPOT 1-2  Moderate Pain = Pain Score of 4-6, CPOT 3-4  Severe Pain = Pain Score of 7-10, CPOT 5-8

## 2023-06-08 NOTE — TELEPHONE ENCOUNTER
Caller: FRANTZ    Relationship: FROM [facility]       Best call back number: [phone]    What orders are you requesting (i.e. lab or imaging):PATIENT WAS SEEN  IN THE ER AND PER DR. ADHIKARI, HE WAS SUPPOSED TO PUT IN AN ORDER FOR THIS PATIENT AND IT'S STILL NOT THERE.     In what timeframe would the patient need to come in: ASAP

## 2023-06-08 NOTE — CASE MANAGEMENT/SOCIAL WORK
Discharge Planning Assessment  [facility]     Patient Name: Piper Cain  MRN: [id number]  Today's Date: 6/8/2023    Admit Date: 6/8/2023    Plan: home no needs   Discharge Needs Assessment       Row Name 06/08/23 1234       Living Environment    People in Home significant other    Current Living Arrangements apartment    Potentially Unsafe Housing Conditions none    Primary Care Provided by self    Provides Primary Care For no one    Family Caregiver if Needed significant other    Quality of Family Relationships helpful;involved    Able to Return to Prior Arrangements yes       Resource/Environmental Concerns    Resource/Environmental Concerns none       Transition Planning    Patient/Family Anticipates Transition to home with family    Patient/Family Anticipated Services at Transition none    Transportation Anticipated family or friend will provide       Discharge Needs Assessment    Readmission Within the Last 30 Days no previous admission in last 30 days    Equipment Currently Used at Home none    Concerns to be Addressed denies needs/concerns at this time;no discharge needs identified    Anticipated Changes Related to Illness none    Equipment Needed After Discharge none    Provided Post Acute Provider List? N/A                   Discharge Plan       Row Name 06/08/23 1237       Plan    Plan home no needs    Patient/Family in Agreement with Plan yes    Plan Comments Spoke with pt bedside. Confirmed facesheet correct. Explained role of CCP. Pt reports she lives with her significant other in a apartment. She is IADLs no DME used. Pt works full time as a  at [employer]. Pt has no HH or SNF history. Verified PCP and pharamcy. pt reports she is worried she is going to lose her job and wants information on how to apply for SSDI. Provided her with that information. She plans home at d/c. CCP to follow.                  Continued Care and Services - Admitted Since 6/8/2023    Coordination has not  been started for this encounter.          Demographic Summary    No documentation.                  Functional Status    No documentation.                  Psychosocial    No documentation.                  Abuse/Neglect    No documentation.                  Legal    No documentation.                  Substance Abuse    No documentation.                  Patient Forms    No documentation.                     TIARA Su

## 2023-06-08 NOTE — H&P
"    Patient Name:  Piper Cain  YOB: 1980  MRN:  [id number]  Admit Date:  6/8/2023  Patient Care Team:  Rachell Pozo APRN as PCP - General (Family Medicine)      Subjective   History Present Illness     Chief Complaint   Patient presents with    Abdominal Pain    Palpitations       Ms. Cain is a 42 y.o. smoker with a history of alcohol abuse, GERD, migraine, pancreatitis who presents to [facility] ER chief complaint of abdominal pain, palpitations admitted for chronic pancreatitis.    Reports acute on chronic LUQ pain for about one month & went to [facility] in [place], [place] ([facility]) on 5/3/2023 for \"scan\" for possible stent placement for pseudocyst; however, nothing scheduled with subsequent frequent \"attacks\" of abdominal pain, nausea, & vomiting for the past 3 weeks & worse on 5/18/2023 & heart \"palpitations\" within the last & left shoulder pain; therefore, went to [facility] ER for further evaluation.    Denies history of CAD.    Reports compliance with diet & avoiding EtOH; however, states recently drinking one alcohol beverage at a work party on 6/6/2023. Admits to smoking tobacco.    Recommendation pending hospital course.  Details below.    History of Present Illness  Review of Systems   Constitutional:  Negative for chills and fever.   HENT:  Negative for congestion and rhinorrhea.    Respiratory:  Negative for cough and shortness of breath.    Cardiovascular:  Positive for chest pain and palpitations. Negative for leg swelling.   Gastrointestinal:  Positive for abdominal pain, nausea and vomiting. Negative for constipation, diarrhea and rectal pain.   Endocrine: Negative for polydipsia, polyphagia and polyuria.   Genitourinary:  Negative for difficulty urinating and dysuria.   Musculoskeletal:  Negative for gait problem and myalgias.   Skin:  Negative for rash and wound.   Neurological:  Negative for syncope and " light-headedness.   Psychiatric/Behavioral:  Negative for confusion and hallucinations.       Personal History     Past Medical History:   Diagnosis Date    Anxiety     E. coli bacteremia     ETOH abuse     GERD (gastroesophageal reflux disease)     Hiatal hernia     Left flank pain 10/21/2022    Migraine     Miscarriage 2004    Pancreatitis      Past Surgical History:   Procedure Laterality Date    ENDOSCOPY N/A 8/10/2022    Procedure: ESOPHAGOGASTRODUODENOSCOPY with bxs;  Surgeon: Salvador Price MD;  Location: [facility] ENDOSCOPY;  Service: Gastroenterology;  Laterality: N/A;  Pre: r/o esophageal  varacies, abd pain  Post: esophageal plaque     Family History   Problem Relation Age of Onset    Alcohol abuse Mother     Arthritis Mother     Asthma Mother     Miscarriages / Stillbirths Mother     Cancer Father     Diabetes Father     Drug abuse Father     Early death Father     Heart disease Father     Hyperlipidemia Father     Hypertension Father     Alcohol abuse Paternal Aunt     Cancer Paternal Aunt     Diabetes Paternal Aunt     Drug abuse Paternal Aunt     Early death Paternal Aunt     Heart disease Paternal Aunt     Hyperlipidemia Paternal Aunt     Hypertension Paternal Aunt     Alcohol abuse Maternal Grandmother     Alcohol abuse Maternal Grandfather     Alcohol abuse Paternal Grandmother     Cancer Paternal Grandmother     Diabetes Paternal Grandmother     Drug abuse Paternal Grandmother     Early death Paternal Grandmother     Heart disease Paternal Grandmother     Hyperlipidemia Paternal Grandmother     Hypertension Paternal Grandmother     Alcohol abuse Paternal Grandfather      Social History     Tobacco Use    Smoking status: Every Day     Packs/day: 0.50     Types: Cigarettes     Start date: 1/28/1996    Smokeless tobacco: Current   Vaping Use    Vaping Use: Never used   Substance Use Topics    Alcohol use: Never    Drug use: No     No current facility-administered medications on file prior to encounter.      Current Outpatient Medications on File Prior to Encounter   Medication Sig Dispense Refill    amitriptyline (ELAVIL) 10 MG tablet Take 1 tablet by mouth Every Night.      bisacodyl (DULCOLAX) 10 MG suppository Insert 1 suppository into the rectum Daily. 30 each 0    Cyanocobalamin (VITAMIN B 12 PO) Take  by mouth.      Ferrous Sulfate Dried (Feosol) 200 (65 Fe) MG tablet tablet Take 1 tablet by mouth Daily.      folic acid (FOLVITE) 1 MG tablet Take 1 tablet by mouth Daily. 30 tablet 3    ondansetron (ZOFRAN) 4 MG tablet Take 1 tablet by mouth Every 8 (Eight) Hours As Needed for Nausea or Vomiting.      oxyCODONE-acetaminophen (PERCOCET)  MG per tablet Take 1 tablet by mouth Every 4 (Four) Hours As Needed for Moderate Pain.      pancrelipase, Lip-Prot-Amyl, (CREON) 63200-83792 units capsule delayed-release particles capsule Take 3 capsules by mouth 3 (Three) Times a Day With Meals.      pantoprazole (PROTONIX) 40 MG EC tablet Take 1 tablet by mouth Daily. 30 tablet 3    Cholecalciferol (VITAMIN D3) 5000 units capsule capsule Take 5,000 Units by mouth Daily. (Patient not taking: Reported on 6/8/2023)      sennosides-docusate (PERICOLACE) 8.6-50 MG per tablet Take 2 tablets by mouth 2 (Two) Times a Day As Needed for Constipation. (Patient not taking: Reported on 6/8/2023)      thiamine (VITAMIN B-1) 100 MG tablet  tablet Take 100 mg by mouth Daily. (Patient not taking: Reported on 6/8/2023)       Allergies   Allergen Reactions    Nickel     Hydrocodone-Acetaminophen Nausea And Vomiting       Objective    Objective     Vital Signs  Temp:  [97.8 °F (36.6 °C)-97.9 °F (36.6 °C)] 97.9 °F (36.6 °C)  Heart Rate:  [] 74  Resp:  [16] 16  BP: (116-143)/(80-99) 120/86  SpO2:  [97 %-100 %] 100 %  on   ;   Device (Oxygen Therapy): room air  Body mass index is 17.72 kg/m².    Physical Exam  Constitutional:       General: She is not in acute distress.     Appearance: She is not toxic-appearing.   HENT:      Head:  Normocephalic and atraumatic.   Eyes:      Extraocular Movements: Extraocular movements intact.      Conjunctiva/sclera: Conjunctivae normal.   Cardiovascular:      Rate and Rhythm: Normal rate.      Heart sounds: Normal heart sounds.   Pulmonary:      Effort: Pulmonary effort is normal.      Breath sounds: Normal breath sounds.   Abdominal:      General: There is no distension.      Palpations: Abdomen is soft.      Tenderness: There is abdominal tenderness (LUQ). There is no guarding.   Musculoskeletal:      Cervical back: Normal range of motion and neck supple.      Right lower leg: No edema.      Left lower leg: No edema.   Skin:     General: Skin is warm and dry.   Neurological:      Mental Status: She is alert and oriented to person, place, and time.      Cranial Nerves: No cranial nerve deficit.   Psychiatric:         Behavior: Behavior normal.         Thought Content: Thought content normal.       Results Review:  I reviewed the patient's new clinical results.  I reviewed the patient's new imaging results and agree with the interpretation.  I reviewed the patient's other test results and agree with the interpretation  I personally viewed and interpreted the patient's EKG/Telemetry data  Discussed with ED provider.    Lab Results (last 24 hours)       Procedure Component Value Units Date/Time    CBC & Differential [[id number]]  (Abnormal) Collected: 06/07/23 2246    Specimen: Blood Updated: 06/07/23 2323    Narrative:      The following orders were created for panel order CBC & Differential.  Procedure                               Abnormality         Status                     ---------                               -----------         ------                     CBC Auto Differential[[id number]]        Abnormal            Final result                 Please view results for these tests on the individual orders.    Comprehensive Metabolic Panel [[id number]]  (Abnormal) Collected: 06/07/23 2246    Specimen: Blood  Updated: 06/07/23 2349     Glucose 104 mg/dL      BUN 6 mg/dL      Creatinine 0.74 mg/dL      Sodium 141 mmol/L      Potassium 5.0 mmol/L      Chloride 103 mmol/L      CO2 29.2 mmol/L      Calcium 9.5 mg/dL      Total Protein 7.3 g/dL      Albumin 4.1 g/dL      ALT (SGPT) 14 U/L      AST (SGOT) 8 U/L      Alkaline Phosphatase 113 U/L      Total Bilirubin <0.2 mg/dL      Globulin 3.2 gm/dL      A/G Ratio 1.3 g/dL      BUN/Creatinine Ratio 8.1     Anion Gap 8.8 mmol/L      eGFR 105.0 mL/min/1.73     Narrative:      GFR Normal >60  Chronic Kidney Disease <60  Kidney Failure <15      Lipase [[id number]]  (Abnormal) Collected: 06/07/23 2246    Specimen: Blood Updated: 06/07/23 2349     Lipase 151 U/L     CBC Auto Differential [[id number]]  (Abnormal) Collected: 06/07/23 2246    Specimen: Blood Updated: 06/07/23 2323     WBC 4.67 10*3/mm3      RBC 4.12 10*6/mm3      Hemoglobin 13.9 g/dL      Hematocrit 40.5 %      MCV 98.3 fL      MCH 33.7 pg      MCHC 34.3 g/dL      RDW 13.4 %      RDW-SD 48.0 fl      MPV 9.6 fL      Platelets 204 10*3/mm3      Neutrophil % 39.6 %      Lymphocyte % 49.9 %      Monocyte % 7.7 %      Eosinophil % 1.7 %      Basophil % 0.9 %      Immature Grans % 0.2 %      Neutrophils, Absolute 1.85 10*3/mm3      Lymphocytes, Absolute 2.33 10*3/mm3      Monocytes, Absolute 0.36 10*3/mm3      Eosinophils, Absolute 0.08 10*3/mm3      Basophils, Absolute 0.04 10*3/mm3      Immature Grans, Absolute 0.01 10*3/mm3      nRBC 0.0 /100 WBC     Urinalysis With Microscopic If Indicated (No Culture) - Urine, Clean Catch [[id number]]  (Abnormal) Collected: 06/07/23 2247    Specimen: Urine, Clean Catch Updated: 06/07/23 2303     Color, UA Yellow     Appearance, UA Clear     pH, UA 6.5     Specific Gravity, UA <1.005     Glucose, UA Negative     Ketones, UA Negative     Bilirubin, UA Negative     Blood, UA Negative     Protein, UA Negative     Leuk Esterase, UA Negative     Nitrite, UA Negative     Urobilinogen, UA 0.2  E.U./dL    Narrative:      Urine microscopic not indicated.    Basic Metabolic Panel [[id number]]  (Abnormal) Collected: 06/08/23 0652    Specimen: Blood from Arm, Left Updated: 06/08/23 0739     Glucose 76 mg/dL      BUN 5 mg/dL      Creatinine 0.59 mg/dL      Sodium 138 mmol/L      Potassium 3.7 mmol/L      Comment: Slight hemolysis detected by analyzer. Results may be affected.        Chloride 104 mmol/L      CO2 27.0 mmol/L      Calcium 7.9 mg/dL      BUN/Creatinine Ratio 8.5     Anion Gap 7.0 mmol/L      eGFR 117.0 mL/min/1.73     Narrative:      GFR Normal >60  Chronic Kidney Disease <60  Kidney Failure <15      CBC (No Diff) [[id number]]  (Abnormal) Collected: 06/08/23 0652    Specimen: Blood from Arm, Left Updated: 06/08/23 0714     WBC 4.46 10*3/mm3      RBC 3.77 10*6/mm3      Hemoglobin 12.4 g/dL      Hematocrit 36.9 %      MCV 97.9 fL      MCH 32.9 pg      MCHC 33.6 g/dL      RDW 13.4 %      RDW-SD 47.6 fl      MPV 9.6 fL      Platelets 160 10*3/mm3     Hepatic Function Panel [[id number]] Collected: 06/08/23 0652    Specimen: Blood from Arm, Left Updated: 06/08/23 1125     Total Protein 6.2 g/dL      Albumin 3.7 g/dL      ALT (SGPT) 11 U/L      AST (SGOT) 10 U/L      Comment: Specimen hemolyzed.  Results may be affected.        Alkaline Phosphatase 96 U/L      Total Bilirubin <0.2 mg/dL      Bilirubin, Direct <0.2 mg/dL      Comment: Specimen hemolyzed. Results may be affected.        Bilirubin, Indirect --     Comment: Unable to calculate       Lipase [[id number]]  (Abnormal) Collected: 06/08/23 0652    Specimen: Blood from Arm, Left Updated: 06/08/23 1123     Lipase 73 U/L     Magnesium [[id number]]  (Normal) Collected: 06/08/23 0652    Specimen: Blood from Arm, Left Updated: 06/08/23 1123     Magnesium 1.6 mg/dL     Ethanol [[id number]] Collected: 06/08/23 0906    Specimen: Blood Updated: 06/08/23 0944     Ethanol <10 mg/dL      Ethanol % <0.010 %     High Sensitivity Troponin T [[id number]]  (Normal)  Collected: 06/08/23 0906    Specimen: Blood Updated: 06/08/23 1036     HS Troponin T <6 ng/L     Narrative:      High Sensitive Troponin T Reference Range:  <10.0 ng/L- Negative Female for AMI  <15.0 ng/L- Negative Male for AMI  >=10 - Abnormal Female indicating possible myocardial injury.  >=15 - Abnormal Male indicating possible myocardial injury.   Clinicians would have to utilize clinical acumen, EKG, Troponin, and serial changes to determine if it is an Acute Myocardial Infarction or myocardial injury due to an underlying chronic condition.         High Sensitivity Troponin T 2Hr [[id number]] Collected: 06/08/23 1109    Specimen: Blood Updated: 06/08/23 1136     HS Troponin T <6 ng/L      Troponin T Delta --     Comment: Unable to calculate.       Narrative:      High Sensitive Troponin T Reference Range:  <10.0 ng/L- Negative Female for AMI  <15.0 ng/L- Negative Male for AMI  >=10 - Abnormal Female indicating possible myocardial injury.  >=15 - Abnormal Male indicating possible myocardial injury.   Clinicians would have to utilize clinical acumen, EKG, Troponin, and serial changes to determine if it is an Acute Myocardial Infarction or myocardial injury due to an underlying chronic condition.                 Imaging Results (Last 24 Hours)       Procedure Component Value Units Date/Time    CT Abdomen Pelvis With Contrast [[id number]] Collected: 06/08/23 0506     Updated: 06/08/23 0506    Narrative:        Patient: LIZZETH ZAVAAL  Time Out: 05:05  Exam(s): CT ABDOMEN + PELVIS With Contrast     EXAM:    CT Abdomen and Pelvis With Intravenous Contrast    CLINICAL HISTORY:     Abdominal pain, acute, nonlocalized.    TECHNIQUE:    Axial computed tomography images of the abdomen and pelvis with   intravenous contrast.  CTDI is 6.07 mGy and DLP is 279.9 mGy-cm.  This CT   exam was performed according to the principle of ALARA (As Low As   Reasonably Achievable) by using one or more of the following dose   reduction  techniques: automated exposure control, adjustment of the mA   and or kV according to patient size, and or use of iterative   reconstruction technique.    COMPARISON:    CT abdomen and pelvis with contrast dated 3 25 2023    FINDINGS:    Lung bases:  Unremarkable.  No mass.  No consolidation.     ABDOMEN:    Liver:  Unremarkable.  No mass.    Gallbladder and bile ducts:  Unremarkable.  No calcified stones.  No   ductal dilation.    Pancreas:  The previously noted loculated fluid collection between the   anterior aspect of the left kidney and the posterior margin of the distal   tail the pancreas now measures 3 x 5.8 cm from 3.4 x 4.7 cm previously.    There is questionable subtle communication with the pancreatic duct at   the tail the pancreas (series 2; image 28). Subtle fat stranding noted   along the medial aspect of the collection.  Similar calcifications in the   neck and head of the pancreas with pancreatic ductal ectasia.  The   visualized pancreas is otherwise stable in appearance and demonstrates   normal enhancement.    Spleen:  Unremarkable.  No splenomegaly.    Adrenals:  The previously noted abnormal hyperattenuation and fullness   of the left adrenal gland is improved with more normal appearance of the   left adrenal gland.  The right adrenal gland is unremarkable.    Kidneys and ureters: The kidneys are stable in appearance and   demonstrate normal enhancement.  No nephrolithiasis or hydronephrosis.    Stomach and bowel:  The previously noted smaller collections noted   along the greater curvature the proximal stomach have resolved.  No bowel   obstruction.  No asymmetric bowel mucosal abnormality identified.     PELVIS:    Appendix:  No findings to suggest acute appendicitis.    Bladder:  Unremarkable.  No mass.    Reproductive:  Unremarkable as visualized.     ABDOMEN and PELVIS:    Intraperitoneal space:  Unremarkable.  No free air.  No significant   fluid collection.    Bones joints:  No acute  fracture.  No dislocation.    Soft tissues:  Unremarkable.    Vasculature:  Unremarkable.  No abdominal aortic aneurysm.    Lymph nodes:  Unremarkable.  No enlarged lymph nodes.    IMPRESSION:       1.  The previously noted loculated fluid collection between the anterior   aspect of the left kidney and the posterior margin of the distal tail the   pancreas now measures 3 x 5.8 cm from 3.4 x 4.7 cm previously.  There is   questionable subtle communication with the pancreatic duct at the tail   the pancreas (series 2; image 28). Subtle fat stranding noted along the   medial aspect of the collection.  2.  The previously noted smaller collections noted along the greater   curvature the proximal stomach have resolved.  3.  Similar calcifications in the neck and head of the pancreas with   pancreatic ductal ectasia.  The visualized pancreas is otherwise stable   in appearance and demonstrates normal enhancement.  Findings are   consistent with chronic pancreatitis.  4.  No bowel obstruction.  No asymmetric bowel mucosal abnormality   identified.  No free intraperitoneal fluid or pneumoperitoneum.      Impression:          Electronically signed by Jony Enamorado MD on 06-08-23 at 0505            Results for orders placed during the hospital encounter of 08/06/22    Adult Transthoracic Echo Complete W/ Cont if Necessary Per Protocol    Interpretation Summary  · Left ventricular ejection fraction appears to be 61 - 65%. Left ventricular systolic function is normal.  · Left ventricular diastolic function was normal.  · Normal right ventricular cavity size and systolic function noted.  · The agitated saline study is positive with Valsalva, consistent with a small to moderate sized PFO  · Mild tricuspid valve regurgitation is present.  · Calculated right ventricular systolic pressure from tricuspid regurgitation is 32 mmHg.  · There is no evidence of pericardial effusion      ECG 12 Lead Other; Palpitations   Preliminary  Result   HEART RATE= 83  bpm   RR Interval= 723  ms   SD Interval= 158  ms   P Horizontal Axis= 10  deg   P Front Axis= 44  deg   QRSD Interval= 83  ms   QT Interval= 376  ms   QRS Axis= 67  deg   T Wave Axis= 61  deg   - OTHERWISE NORMAL ECG -   Sinus rhythm   Low voltage, precordial leads   Electronically Signed By:    Date and Time of Study: 2023-06-07 22:34:56           Assessment/Plan     Active Hospital Problems    Diagnosis  POA    **Nausea & vomiting [R11.2]  Yes    Chronic pancreatitis, unspecified pancreatitis type [K86.1]  Yes    Macrocytosis [D75.89]  Yes    Left-sided chest pain [R07.9]  Yes    Tobacco abuse [Z72.0]  Yes    Abdominal pain [R10.9]  Yes      Resolved Hospital Problems   No resolved problems to display.       Ms. Cain is a 42 y.o. smoker with a history of alcohol abuse, GERD, migraine, pancreatitis who presents to [facility] ER chief complaint of abdominal pain, palpitations admitted for chronic pancreatitis.      Nausea & vomiting: Most likely due to chronic pancreatitis and abdominal pain.  See plan below.      Left-sided chest pain: Unremarkable at HS troponin <6.  No current indication for cardiology consultation.  Normal EKG.      Abdominal pain: CT findings loculated fluid collection between left kidney and posterior margin of distal pancreatic tail with subcu fat stranding.  Diet clears for now.  IV fluids.  Antiemetics as needed.  Consult gastroenterology for possible pseudocyst management.      Chronic pancreatitis, unspecified pancreatitis type: Lipase 115.  See plan above.      Macrocytosis: Most likely due to chronic tobacco use.  Ordering serum vitamin B12.      Tobacco abuse: Complicating all problems.  Smoking cessation per hospital protocol.      History of alcohol abuse: Unremarkable serum ethanol.  Reports last alcoholic beverage on 6/6/2023.  Monitor for signs of alcohol withdrawal and indication for CIWA protocol initiation.    I discussed the patient's findings and my  recommendations with patient, RN, & Dr. Barney.    VTE Prophylaxis - SCDs.  Code Status - Full code.       [facility]  [facility]  06/08/23  13:05 EDT

## 2023-06-08 NOTE — PLAN OF CARE
Problem: Fluid Imbalance (Pancreatitis)  Goal: Fluid Balance  Outcome: Ongoing, Progressing     Problem: Fluid Imbalance (Pancreatitis)  Goal: Fluid Balance  Outcome: Ongoing, Progressing     Problem: Infection (Pancreatitis)  Goal: Infection Symptom Resolution  Outcome: Ongoing, Progressing     Problem: Nutrition Impaired (Pancreatitis)  Goal: Optimal Nutrition Intake  Outcome: Ongoing, Progressing     Problem: Pain (Pancreatitis)  Goal: Acceptable Pain Control  Outcome: Ongoing, Not Progressing     Problem: Respiratory Compromise (Pancreatitis)  Goal: Effective Oxygenation and Ventilation  Outcome: Ongoing, Not Progressing  Intervention: Optimize Oxygenation and Ventilation  Recent Flowsheet Documentation  Taken 6/8/2023 1800 by Ebonie Tariq RN  Activity Management: activity encouraged  Taken 6/8/2023 1600 by Ebonie Tariq RN  Activity Management: activity encouraged  Taken 6/8/2023 1400 by Ebonie Tariq RN  Activity Management: activity encouraged  Taken 6/8/2023 1200 by Ebonie Tariq, RN  Activity Management: activity encouraged  Taken 6/8/2023 1000 by Ebonie Tariq, RN  Activity Management: activity encouraged  Taken 6/8/2023 0800 by Ebonie Tariq RN  Activity Management: activity encouraged   Goal Outcome Evaluation:

## 2023-06-08 NOTE — ED TRIAGE NOTES
Pt to triage  Pt reports she has chronic/acute pancreatitis. Pt reports increased episode this past week on her left side that has no radiated to her right side. Pt reports n/v/d. Pt reports palpitations that started last week.

## 2023-06-08 NOTE — ED PROVIDER NOTES
EMERGENCY DEPARTMENT ENCOUNTER    Room Number:  10/10  Date seen:  6/8/2023  PCP: Rachell Pozo APRN  Historian: Patient      HPI:  Chief Complaint: Abdominal pain  A complete HPI/ROS/PMH/PSH/SH/FH are unobtainable due to: None  Context: Piper Cain is a 42 y.o. female who presents to the ED c/o upper abdominal discomfort that is somewhat chronic in nature but has significantly worsened over the last 2 to 3 days.  The patient reports a long history of acute on chronic pancreatitis.  She reports associated nausea and vomiting but denies diarrhea/constipation, fever/chills, chest pain, or shortness of breath.  She describes the abdominal pain as a dull ache with radiation into her scapula.  She states that the pain is fairly consistent with her acute on chronic pancreatitis.            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 coli bacteremia    • ETOH abuse    • GERD (gastroesophageal reflux disease)    •  Hiatal hernia    • Migraine    • Miscarriage 2004   • Pancreatitis          PAST SURGICAL HISTORY  Past Surgical History:   Procedure Laterality Date   • ENDOSCOPY N/A 8/10/2022    Procedure: ESOPHAGOGASTRODUODENOSCOPY with bxs;  Surgeon: Salvador Price MD;  Location: [facility] ENDOSCOPY;  Service: Gastroenterology;  Laterality: N/A;  Pre: r/o esophageal  varacies, abd pain  Post: esophageal plaque         FAMILY HISTORY  Family History   Problem Relation Age of Onset   • Alcohol abuse Mother    • Arthritis Mother    • Asthma Mother    • Miscarriages / Stillbirths Mother    • Cancer Father    • Diabetes Father    • Drug abuse Father    • Early death Father    • Heart disease Father    • Hyperlipidemia Father    • Hypertension Father    • Alcohol abuse Paternal Aunt    • Cancer Paternal Aunt    • Diabetes Paternal Aunt    • Drug abuse Paternal Aunt    • Early death Paternal Aunt    • Heart disease Paternal Aunt    • Hyperlipidemia Paternal Aunt    • Hypertension Paternal Aunt    • Alcohol abuse Maternal Grandmother    • Alcohol abuse Maternal Grandfather    • Alcohol abuse Paternal Grandmother    • Cancer Paternal Grandmother    • Diabetes Paternal Grandmother    • Drug abuse Paternal Grandmother    • Early death Paternal Grandmother    • Heart disease Paternal Grandmother    • Hyperlipidemia Paternal Grandmother    • Hypertension Paternal Grandmother    • Alcohol abuse Paternal Grandfather          SOCIAL HISTORY  Social History     Socioeconomic History   • Marital status: Single   Tobacco Use   • Smoking status: Every Day     Packs/day: 0.50     Types: Cigarettes     Start date: 1/28/1996   • Smokeless tobacco: Current   Vaping Use   • Vaping Use: Never used   Substance and Sexual Activity   • Alcohol use: Never   • Drug use: No   • Sexual activity: Yes     Partners: Male     Birth control/protection: None     Comment: IUD removed 6/29/2017         ALLERGIES  Nickel and Hydrocodone-acetaminophen        REVIEW OF  SYSTEMS  Review of Systems   Constitutional:  Negative for fever.   HENT:  Negative for sore throat.    Eyes: Negative.    Respiratory:  Negative for cough and shortness of breath.    Cardiovascular:  Negative for chest pain.   Gastrointestinal:  Positive for abdominal pain, nausea and vomiting. Negative for diarrhea.   Genitourinary:  Negative for dysuria.   Musculoskeletal:  Negative for neck pain.   Skin:  Negative for rash.   Allergic/Immunologic: Negative.    Neurological:  Negative for weakness, numbness and headaches.   Hematological: Negative.    Psychiatric/Behavioral: Negative.     All other systems reviewed and are negative.         PHYSICAL EXAM  ED Triage Vitals [06/07/23 2210]   Temp Heart Rate Resp BP SpO2   97.8 °F (36.6 °C) 110 16 121/91 98 %      Temp src Heart Rate Source Patient Position BP Location FiO2 (%)   Tympanic -- -- -- --       Physical Exam  Constitutional:       General: She is not in acute distress.     Appearance: Normal appearance. She is not ill-appearing or toxic-appearing.   HENT:      Head: Normocephalic and atraumatic.   Eyes:      Extraocular Movements: Extraocular movements intact.      Pupils: Pupils are equal, round, and reactive to light.   Cardiovascular:      Rate and Rhythm: Normal rate and regular rhythm.      Heart sounds: No murmur heard.    No friction rub. No gallop.   Pulmonary:      Effort: Pulmonary effort is normal.      Breath sounds: Normal breath sounds.   Abdominal:      General: Abdomen is flat. There is no distension.      Palpations: Abdomen is soft.      Tenderness: There is generalized no abdominal tenderness.   Musculoskeletal:         General: No swelling or tenderness. Normal range of motion.      Cervical back: Normal range of motion and neck supple.   Skin:     General: Skin is warm and dry.   Neurological:      General: No focal deficit present.      Mental Status: She is alert and oriented to person, place, and time.      Sensory: No sensory  deficit.      Motor: No weakness.   Psychiatric:         Mood and Affect: Mood normal.         Behavior: Behavior normal.         Vital signs and nursing notes reviewed.          LAB RESULTS  Recent Results (from the past 24 hour(s))   ECG 12 Lead Other; Palpitations    Collection Time: 06/07/23 10:34 PM   Result Value Ref Range    QT Interval 376 ms   Comprehensive Metabolic Panel    Collection Time: 06/07/23 10:46 PM    Specimen: Blood   Result Value Ref Range    Glucose 104 (H) 65 - 99 mg/dL    BUN 6 6 - 20 mg/dL    Creatinine 0.74 0.57 - 1.00 mg/dL    Sodium 141 136 - 145 mmol/L    Potassium 5.0 3.5 - 5.2 mmol/L    Chloride 103 98 - 107 mmol/L    CO2 29.2 (H) 22.0 - 29.0 mmol/L    Calcium 9.5 8.6 - 10.5 mg/dL    Total Protein 7.3 6.0 - 8.5 g/dL    Albumin 4.1 3.5 - 5.2 g/dL    ALT (SGPT) 14 1 - 33 U/L    AST (SGOT) 8 1 - 32 U/L    Alkaline Phosphatase 113 39 - 117 U/L    Total Bilirubin <0.2 0.0 - 1.2 mg/dL    Globulin 3.2 gm/dL    A/G Ratio 1.3 g/dL    BUN/Creatinine Ratio 8.1 7.0 - 25.0    Anion Gap 8.8 5.0 - 15.0 mmol/L    eGFR 105.0 >60.0 mL/min/1.73   Lipase    Collection Time: 06/07/23 10:46 PM    Specimen: Blood   Result Value Ref Range    Lipase 151 (H) 13 - 60 U/L   Green Top (Gel)    Collection Time: 06/07/23 10:46 PM   Result Value Ref Range    Extra Tube Hold for add-ons.    Lavender Top    Collection Time: 06/07/23 10:46 PM   Result Value Ref Range    Extra Tube hold for add-on    Gold Top - SST    Collection Time: 06/07/23 10:46 PM   Result Value Ref Range    Extra Tube Hold for add-ons.    Light Blue Top    Collection Time: 06/07/23 10:46 PM   Result Value Ref Range    Extra Tube Hold for add-ons.    CBC Auto Differential    Collection Time: 06/07/23 10:46 PM    Specimen: Blood   Result Value Ref Range    WBC 4.67 3.40 - 10.80 10*3/mm3    RBC 4.12 3.77 - 5.28 10*6/mm3    Hemoglobin 13.9 12.0 - 15.9 g/dL    Hematocrit 40.5 34.0 - 46.6 %    MCV 98.3 (H) 79.0 - 97.0 fL    MCH 33.7 (H) 26.6 - 33.0 pg     MCHC 34.3 31.5 - 35.7 g/dL    RDW 13.4 12.3 - 15.4 %    RDW-SD 48.0 37.0 - 54.0 fl    MPV 9.6 6.0 - 12.0 fL    Platelets 204 140 - 450 10*3/mm3    Neutrophil % 39.6 (L) 42.7 - 76.0 %    Lymphocyte % 49.9 (H) 19.6 - 45.3 %    Monocyte % 7.7 5.0 - 12.0 %    Eosinophil % 1.7 0.3 - 6.2 %    Basophil % 0.9 0.0 - 1.5 %    Immature Grans % 0.2 0.0 - 0.5 %    Neutrophils, Absolute 1.85 1.70 - 7.00 10*3/mm3    Lymphocytes, Absolute 2.33 0.70 - 3.10 10*3/mm3    Monocytes, Absolute 0.36 0.10 - 0.90 10*3/mm3    Eosinophils, Absolute 0.08 0.00 - 0.40 10*3/mm3    Basophils, Absolute 0.04 0.00 - 0.20 10*3/mm3    Immature Grans, Absolute 0.01 0.00 - 0.05 10*3/mm3    nRBC 0.0 0.0 - 0.2 /100 WBC   Urinalysis With Microscopic If Indicated (No Culture) - Urine, Clean Catch    Collection Time: 06/07/23 10:47 PM    Specimen: Urine, Clean Catch   Result Value Ref Range    Color, UA Yellow Yellow, Straw    Appearance, UA Clear Clear    pH, UA 6.5 5.0 - 8.0    Specific Gravity, UA <1.005 (L) 1.005 - 1.030    Glucose, UA Negative Negative    Ketones, UA Negative Negative    Bilirubin, UA Negative Negative    Blood, UA Negative Negative    Protein, UA Negative Negative    Leuk Esterase, UA Negative Negative    Nitrite, UA Negative Negative    Urobilinogen, UA 0.2 E.U./dL 0.2 - 1.0 E.U./dL       Ordered the above labs and reviewed the results.        RADIOLOGY  CT Abdomen Pelvis With Contrast    Result Date: 6/8/2023  Patient: LIZZETH ZAVALA  Time Out: 05:05 Exam(s): CT ABDOMEN + PELVIS With Contrast EXAM:   CT Abdomen and Pelvis With Intravenous Contrast CLINICAL HISTORY:    Abdominal pain, acute, nonlocalized. TECHNIQUE:   Axial computed tomography images of the abdomen and pelvis with intravenous contrast.  CTDI is 6.07 mGy and DLP is 279.9 mGy-cm.  This CT exam was performed according to the principle of ALARA (As Low As Reasonably Achievable) by using one or more of the following dose reduction techniques: automated exposure control,  adjustment of the mA and or kV according to patient size, and or use of iterative reconstruction technique. COMPARISON:   CT abdomen and pelvis with contrast dated 3 25 2023 FINDINGS:   Lung bases:  Unremarkable.  No mass.  No consolidation.  ABDOMEN:   Liver:  Unremarkable.  No mass.   Gallbladder and bile ducts:  Unremarkable.  No calcified stones.  No ductal dilation.   Pancreas:  The previously noted loculated fluid collection between the anterior aspect of the left kidney and the posterior margin of the distal tail the pancreas now measures 3 x 5.8 cm from 3.4 x 4.7 cm previously.  There is questionable subtle communication with the pancreatic duct at the tail the pancreas (series 2; image 28). Subtle fat stranding noted along the medial aspect of the collection.  Similar calcifications in the neck and head of the pancreas with pancreatic ductal ectasia.  The visualized pancreas is otherwise stable in appearance and demonstrates normal enhancement.   Spleen:  Unremarkable.  No splenomegaly.   Adrenals:  The previously noted abnormal hyperattenuation and fullness of the left adrenal gland is improved with more normal appearance of the left adrenal gland.  The right adrenal gland is unremarkable.   Kidneys and ureters: The kidneys are stable in appearance and demonstrate normal enhancement.  No nephrolithiasis or hydronephrosis.   Stomach and bowel:  The previously noted smaller collections noted along the greater curvature the proximal stomach have resolved.  No bowel obstruction.  No asymmetric bowel mucosal abnormality identified.  PELVIS:   Appendix:  No findings to suggest acute appendicitis.   Bladder:  Unremarkable.  No mass.   Reproductive:  Unremarkable as visualized.  ABDOMEN and PELVIS:   Intraperitoneal space:  Unremarkable.  No free air.  No significant fluid collection.   Bones joints:  No acute fracture.  No dislocation.   Soft tissues:  Unremarkable.   Vasculature:  Unremarkable.  No abdominal  aortic aneurysm.   Lymph nodes:  Unremarkable.  No enlarged lymph nodes. IMPRESSION:     1.  The previously noted loculated fluid collection between the anterior aspect of the left kidney and the posterior margin of the distal tail the pancreas now measures 3 x 5.8 cm from 3.4 x 4.7 cm previously.  There is questionable subtle communication with the pancreatic duct at the tail the pancreas (series 2; image 28). Subtle fat stranding noted along the medial aspect of the collection. 2.  The previously noted smaller collections noted along the greater curvature the proximal stomach have resolved. 3.  Similar calcifications in the neck and head of the pancreas with pancreatic ductal ectasia.  The visualized pancreas is otherwise stable in appearance and demonstrates normal enhancement.  Findings are consistent with chronic pancreatitis. 4.  No bowel obstruction.  No asymmetric bowel mucosal abnormality identified.  No free intraperitoneal fluid or pneumoperitoneum.     Electronically signed by Jony Enamorado MD on 06-08-23 at 0505     Ordered the above noted radiological studies. Reviewed by me in PACS.            PROCEDURES  Procedures            MEDICATIONS GIVEN IN ER  Medications   sodium chloride 0.9 % flush 10 mL (has no administration in time range)   sodium chloride 0.9 % flush 10 mL (has no administration in time range)   sodium chloride 0.9 % flush 10 mL (has no administration in time range)   sodium chloride 0.9 % infusion 40 mL (has no administration in time range)   sodium chloride 0.9 % infusion (125 mL/hr Intravenous New Bag 6/8/23 0550)   acetaminophen (TYLENOL) tablet 650 mg (650 mg Oral Given 6/8/23 0618)     Or   acetaminophen (TYLENOL) 160 MG/5ML solution 650 mg ( Oral Not Given:  See Alt 6/8/23 0618)     Or   acetaminophen (TYLENOL) suppository 650 mg ( Rectal Not Given:  See Alt 6/8/23 0618)   ondansetron (ZOFRAN) tablet 4 mg (has no administration in time range)     Or   ondansetron (ZOFRAN)  injection 4 mg (has no administration in time range)   calcium carbonate (TUMS) chewable tablet 500 mg (200 mg elemental) (has no administration in time range)   HYDROmorphone (DILAUDID) injection 0.5 mg (has no administration in time range)   nicotine (NICODERM CQ) 21 MG/24HR patch 1 patch (has no administration in time range)   sodium chloride 0.9 % bolus 1,000 mL (1,000 mL Intravenous New Bag 6/8/23 0333)   HYDROmorphone (DILAUDID) injection 0.5 mg (0.5 mg Intravenous Given 6/8/23 0336)   ondansetron (ZOFRAN) injection 4 mg (4 mg Intravenous Given 6/8/23 0336)   iopamidol (ISOVUE-300) 61 % injection 100 mL (85 mL Intravenous Given 6/8/23 0407)   HYDROmorphone (DILAUDID) injection 0.5 mg (0.5 mg Intravenous Given 6/8/23 0441)                   MEDICAL DECISION MAKING, PROGRESS, and CONSULTS    All labs have been independently reviewed by me.  All radiology studies have been reviewed by me and I have also reviewed the radiology report.   EKG's independently viewed and interpreted by me.  Discussion below represents my analysis of pertinent findings related to patient's condition, differential diagnosis, treatment plan and final disposition.      Additional sources:  - Discussed/ obtained information from independent historians: History obtained from the patient herself at bedside    - External (non-ED) record review: Upon medical records review, the patient was admitted to the hospital from 3/25/2023 through 3/27/2023 where she was treated for acute on chronic pancreatitis as well as an adrenal hemorrhage.    - Chronic or social conditions impacting care: History of pancreatitis    - Shared decision making: Admission decision based on shared conversations have between myself as well as the patient at bedside.    Case discussed with SOL Barreto for [facility], who will admit the patient to the hospital today for Dr. Uribe.        Orders placed during this visit:  Orders Placed This Encounter   Procedures   • CT  Abdomen Pelvis With Contrast   • [place] Draw   • Comprehensive Metabolic Panel   • Lipase   • Urinalysis With Microscopic If Indicated (No Culture) - Urine, Clean Catch   • CBC Auto Differential   • Basic Metabolic Panel   • CBC (No Diff)   • NPO Diet NPO Type: Strict NPO   • Undress & Gown   • Vital Signs   • Intake & Output   • Weigh Patient   • Oral Care   • Saline Lock & Maintain IV Access   • Place Sequential Compression Device   • Maintain Sequential Compression Device   • Code Status and Medical Interventions:   • LHA (on-call MD unless specified) Details   • ECG 12 Lead Other; Palpitations   • Insert Peripheral IV   • Insert Peripheral IV   • Inpatient Admission   • CBC & Differential   • Green Top (Gel)   • Lavender Top   • Gold Top - SST   • Light Blue Top             Differential diagnosis includes but is not limited to:    Differential diagnosis includes but is not limited to acute on chronic pancreatitis, GERD/gastritis, small bowel obstruction, acute colitis, or diverticulitis.      Independent interpretation of labs, radiology studies, and discussions with consultants:    CT scan of the abdomen and pelvis was independently interpreted by myself showing no obvious areas of acute pancreatitis or pseudocyst formation.  No sign of free intraperitoneal air or bowel obstruction as well.        ED Course as of 06/08/23 0627   Thu Jun 08, 2023   0789 The patient's presentation, work-up, as well as diagnosis and treatment plan were discussed at length with SOL Barreto for A.  She agrees to admit the patient to the hospital today for Dr. Uribe. [BM]   4170 On reevaluation, the patient does report some improvement in symptoms following IV pain medication administration.  I informed her that she does have areas of chronic pancreatitis as well as the loculated fluid formation that she was aware of on CT scan.  We will admit her to the hospital for treatment of her discomfort as well as nausea and  vomiting.  All questions have been answered. [BM]      ED Course User Index  [BM] Isacc Alfred MD             DIAGNOSIS  Final diagnoses:   Chronic pancreatitis, unspecified pancreatitis type   Intractable lower abdominal pain   Nausea and vomiting, unspecified vomiting type         DISPOSITION  ADMISSION    Discussed treatment plan and reason for admission with pt/family and admitting physician.  Pt/family voiced understanding of the plan for admission for further testing/treatment as needed.               Latest Documented Vital Signs:  As of 06:27 EDT  BP- 133/95 HR- 71 Temp- 97.8 °F (36.6 °C) (Tympanic) O2 sat- 99%              --    Please note that portions of this were completed with a voice recognition program.       Note Disclaimer: At [facility], we believe that sharing information builds trust and better relationships. You are receiving this note because you are receiving care at [facility] or recently visited. It is possible you will see health information before a provider has talked with you about it. This kind of information can be easy to misunderstand. To help you fully understand what it means for your health, we urge you to discuss this note with your provider.             Isacc Alfred MD  06/08/23 0652

## 2023-06-09 ENCOUNTER — APPOINTMENT (OUTPATIENT)
Dept: NUCLEAR MEDICINE | Facility: HOSPITAL | Age: 43
End: 2023-06-09
Payer: MEDICAID

## 2023-06-09 VITALS
TEMPERATURE: 97.9 F | RESPIRATION RATE: 16 BRPM | HEART RATE: 89 BPM | DIASTOLIC BLOOD PRESSURE: 83 MMHG | HEIGHT: 69 IN | BODY MASS INDEX: 17.77 KG/M2 | WEIGHT: 120 LBS | OXYGEN SATURATION: 98 % | SYSTOLIC BLOOD PRESSURE: 117 MMHG

## 2023-06-09 LAB
ALBUMIN SERPL-MCNC: 3.2 G/DL (ref 3.5–5.2)
ALBUMIN/GLOB SERPL: 1.3 G/DL
ALP SERPL-CCNC: 93 U/L (ref 39–117)
ALT SERPL W P-5'-P-CCNC: 9 U/L (ref 1–33)
ANION GAP SERPL CALCULATED.3IONS-SCNC: 6 MMOL/L (ref 5–15)
AST SERPL-CCNC: 7 U/L (ref 1–32)
BILIRUB SERPL-MCNC: 0.3 MG/DL (ref 0–1.2)
BUN SERPL-MCNC: 6 MG/DL (ref 6–20)
BUN/CREAT SERPL: 10.2 (ref 7–25)
CALCIUM SPEC-SCNC: 9.3 MG/DL (ref 8.6–10.5)
CHLORIDE SERPL-SCNC: 105 MMOL/L (ref 98–107)
CO2 SERPL-SCNC: 28 MMOL/L (ref 22–29)
CREAT SERPL-MCNC: 0.59 MG/DL (ref 0.57–1)
DEPRECATED RDW RBC AUTO: 48 FL (ref 37–54)
EGFRCR SERPLBLD CKD-EPI 2021: 117 ML/MIN/1.73
ERYTHROCYTE [DISTWIDTH] IN BLOOD BY AUTOMATED COUNT: 13.3 % (ref 12.3–15.4)
FOLATE SERPL-MCNC: 19 NG/ML (ref 4.78–24.2)
GLOBULIN UR ELPH-MCNC: 2.4 GM/DL
GLUCOSE SERPL-MCNC: 94 MG/DL (ref 65–99)
HCT VFR BLD AUTO: 37.6 % (ref 34–46.6)
HGB BLD-MCNC: 12.6 G/DL (ref 12–15.9)
MCH RBC QN AUTO: 32.9 PG (ref 26.6–33)
MCHC RBC AUTO-ENTMCNC: 33.5 G/DL (ref 31.5–35.7)
MCV RBC AUTO: 98.2 FL (ref 79–97)
PLATELET # BLD AUTO: 175 10*3/MM3 (ref 140–450)
PMV BLD AUTO: 9.8 FL (ref 6–12)
POTASSIUM SERPL-SCNC: 5.1 MMOL/L (ref 3.5–5.2)
PROT SERPL-MCNC: 5.6 G/DL (ref 6–8.5)
RBC # BLD AUTO: 3.83 10*6/MM3 (ref 3.77–5.28)
SODIUM SERPL-SCNC: 139 MMOL/L (ref 136–145)
TSH SERPL DL<=0.05 MIU/L-ACNC: 2.58 UIU/ML (ref 0.27–4.2)
VIT B12 BLD-MCNC: 358 PG/ML (ref 211–946)
WBC NRBC COR # BLD: 4.54 10*3/MM3 (ref 3.4–10.8)

## 2023-06-09 PROCEDURE — 82746 ASSAY OF FOLIC ACID SERUM: CPT | Performed by: INTERNAL MEDICINE

## 2023-06-09 PROCEDURE — 80050 GENERAL HEALTH PANEL: CPT | Performed by: NURSE PRACTITIONER

## 2023-06-09 PROCEDURE — 25010000002 HYDROMORPHONE PER 4 MG: Performed by: NURSE PRACTITIONER

## 2023-06-09 PROCEDURE — 25010000002 SINCALIDE PER 5 MCG: Performed by: INTERNAL MEDICINE

## 2023-06-09 PROCEDURE — 63710000001 ONDANSETRON PER 8 MG: Performed by: NURSE PRACTITIONER

## 2023-06-09 PROCEDURE — G0378 HOSPITAL OBSERVATION PER HR: HCPCS

## 2023-06-09 PROCEDURE — 78227 HEPATOBIL SYST IMAGE W/DRUG: CPT

## 2023-06-09 PROCEDURE — A9537 TC99M MEBROFENIN: HCPCS | Performed by: INTERNAL MEDICINE

## 2023-06-09 PROCEDURE — 82607 VITAMIN B-12: CPT | Performed by: NURSE PRACTITIONER

## 2023-06-09 PROCEDURE — 0 TECHNETIUM TC 99M MEBROFENIN KIT: Performed by: INTERNAL MEDICINE

## 2023-06-09 RX ORDER — KIT FOR THE PREPARATION OF TECHNETIUM TC 99M MEBROFENIN 45 MG/10ML
1 INJECTION, POWDER, LYOPHILIZED, FOR SOLUTION INTRAVENOUS
Status: COMPLETED | OUTPATIENT
Start: 2023-06-09 | End: 2023-06-09

## 2023-06-09 RX ORDER — HYDROMORPHONE HYDROCHLORIDE 1 MG/ML
0.5 INJECTION, SOLUTION INTRAMUSCULAR; INTRAVENOUS; SUBCUTANEOUS
Status: DISCONTINUED | OUTPATIENT
Start: 2023-06-09 | End: 2023-06-09 | Stop reason: HOSPADM

## 2023-06-09 RX ADMIN — HYDROMORPHONE HYDROCHLORIDE 0.5 MG: 1 INJECTION, SOLUTION INTRAMUSCULAR; INTRAVENOUS; SUBCUTANEOUS at 15:06

## 2023-06-09 RX ADMIN — SODIUM CHLORIDE 1.1 MCG: 9 INJECTION, SOLUTION INTRAVENOUS at 13:14

## 2023-06-09 RX ADMIN — MEBROFENIN 1 DOSE: 45 INJECTION, POWDER, LYOPHILIZED, FOR SOLUTION INTRAVENOUS at 11:41

## 2023-06-09 RX ADMIN — ONDANSETRON HYDROCHLORIDE 4 MG: 4 TABLET, FILM COATED ORAL at 15:06

## 2023-06-09 RX ADMIN — OXYCODONE HYDROCHLORIDE AND ACETAMINOPHEN 1 TABLET: 5; 325 TABLET ORAL at 17:25

## 2023-06-09 RX ADMIN — HYDROMORPHONE HYDROCHLORIDE 0.5 MG: 1 INJECTION, SOLUTION INTRAMUSCULAR; INTRAVENOUS; SUBCUTANEOUS at 01:08

## 2023-06-09 RX ADMIN — NICOTINE 1 PATCH: 21 PATCH, EXTENDED RELEASE TRANSDERMAL at 06:57

## 2023-06-09 NOTE — NURSING NOTE
Pt given discharge instructions both verbal and written. Educated on need for follow up appt with GI in [place]. Waiting for transport.

## 2023-06-09 NOTE — PROGRESS NOTES
Gastroenterology quick note:    Patient not seen by her service today as she was off the floor all morning.  '  Reviewed HIDA scan results which were unremarkable with ejection fraction 92%.     6/9 labs showed CMP with normal LFTs kidney functions, BUN 3.2, TSH, B12, and folate all normal, CBC unremarkable with WBC 4.54    Okay to discharge from GI standpoint with follow-up with Gastroenterology at [facility].    Evelin Hinton PA-C

## 2023-06-09 NOTE — DISCHARGE SUMMARY
"    Patient Name: Piper Cain  : 1980  MRN: [id number]    Date of Admission: 2023  Date of Discharge:  2023  Primary Care Physician: Rachell Pozo APRN      Chief Complaint:   Abdominal Pain and Palpitations      Discharge Diagnoses     Active Hospital Problems    Diagnosis  POA    **Nausea & vomiting [R11.2]  Yes    Chronic pancreatitis, unspecified pancreatitis type [K86.1]  Yes    Macrocytosis [D75.89]  Yes    Left-sided chest pain [R07.9]  Yes    Tobacco abuse [Z72.0]  Yes    Abdominal pain [R10.9]  Yes      Resolved Hospital Problems   No resolved problems to display.        Hospital Course     Ms. Cain is a 42 y.o. smoker with a history of alcohol abuse, GERD, migraine, pancreatitis who presents to [facility] ER chief complaint of nausea, vomiting, abdominal pain, palpitations admitted for chronic pancreatitis.    Describes chronic \"pancreatic attacks\" since 2023 with most recent occurring this week following alcohol intake at a work party.    CT findings revealed loculated fluid collection between left kidney and posterior margin of distal pancreatic tail with subcu fat stranding.  IV fluids provided symptom management with pain medication available as needed.      Gastroenterology evaluated and states no current indication for repeat aspiration of peripancreatic fluid collection and okay to discharge following HIDA scan (normal findings) with recommendation for follow-up Dr. Duval for possible EUS versus ERCP versus both versus other.    Strongly recommend alcohol and smoking cessation moving forward.    TSH, folate, vitamin B12 unremarkable.      Patient appears medically stable for discharge, 2023 and agrees with plan for follow-up as previously discussed.    No plans to provide additional narcotics at discharge given 14-day supply of oxycodone dispensed on 2023.    Day of Discharge       Physical Exam:  Temp:  [96.6 °F (35.9 °C)-98.1 °F (36.7 °C)] 97.8 °F (36.6 " °C)  Heart Rate:  [59-76] 67  Resp:  [16] 16  BP: (122-139)/(81-89) 139/89  Body mass index is 17.72 kg/m².    Physical Exam  Constitutional:       General: She is not in acute distress.     Appearance: She is not toxic-appearing.   HENT:      Head: Normocephalic and atraumatic.   Eyes:      Extraocular Movements: Extraocular movements intact.      Conjunctiva/sclera: Conjunctivae normal.   Cardiovascular:      Rate and Rhythm: Normal rate.      Heart sounds: Normal heart sounds.   Pulmonary:      Effort: Pulmonary effort is normal.      Breath sounds: Normal breath sounds.   Abdominal:      General: Bowel sounds are normal.      Palpations: Abdomen is soft.   Musculoskeletal:      Cervical back: Normal range of motion and neck supple.      Right lower leg: No edema.      Left lower leg: No edema.   Skin:     General: Skin is warm and dry.   Neurological:      Mental Status: She is alert and oriented to person, place, and time.      Cranial Nerves: No cranial nerve deficit.   Psychiatric:         Behavior: Behavior normal.         Thought Content: Thought content normal.       Consultants     Consult Orders (all) (From admission, onward)       Start     Ordered    06/08/23 1016  Inpatient Gastroenterology Consult  Once        Specialty:  Gastroenterology  Provider:  Petar Starr MD    06/08/23 1015    06/08/23 0458  [facility] (on-call MD unless specified) Details  Once        Specialty:  Hospitalist  Provider:  (Not yet assigned)    06/08/23 0457                  Procedures     * Surgery not found *      Imaging Results (All)       Procedure Component Value Units Date/Time    NM HIDA SCAN WITH PHARMACOLOGICAL INTERVENTION [[id number]] Collected: 06/09/23 1511     Updated: 06/09/23 1511    Narrative:      NUCLEAR MEDICINE HIDA SCAN WITH PHARMACOLOGIC INTERVENTION     HISTORY: Right upper quadrant pain.     TECHNIQUE: 45 minute anterior dynamic imaging over the abdomen was  performed after injection of 4.8 mCi  Tc-choletec IV.  1.1 mcg Kinevac  was administered IV to determine gallbladder ejection fraction.     FINDINGS: There is normal hepatic uptake and excretion with appropriate  clearance of background blood pool activity. There is normal  visualization of biliary tract, gallbladder, small bowel. After CCK  infusion, gallbladder ejection fraction is calculated to be  92%   (normal range is considered 35% or greater)       Impression:      1. No common duct or cystic duct obstruction.  2. Gallbladder ejection fraction measures 92 % which is normal.       CT Abdomen Pelvis With Contrast [[id number]] Collected: 06/08/23 0506     Updated: 06/08/23 0506    Narrative:        Patient: LIZZETH ZAVALA  Time Out: 05:05  Exam(s): CT ABDOMEN + PELVIS With Contrast     EXAM:    CT Abdomen and Pelvis With Intravenous Contrast    CLINICAL HISTORY:     Abdominal pain, acute, nonlocalized.    TECHNIQUE:    Axial computed tomography images of the abdomen and pelvis with   intravenous contrast.  CTDI is 6.07 mGy and DLP is 279.9 mGy-cm.  This CT   exam was performed according to the principle of ALARA (As Low As   Reasonably Achievable) by using one or more of the following dose   reduction techniques: automated exposure control, adjustment of the mA   and or kV according to patient size, and or use of iterative   reconstruction technique.    COMPARISON:    CT abdomen and pelvis with contrast dated 3 25 2023    FINDINGS:    Lung bases:  Unremarkable.  No mass.  No consolidation.     ABDOMEN:    Liver:  Unremarkable.  No mass.    Gallbladder and bile ducts:  Unremarkable.  No calcified stones.  No   ductal dilation.    Pancreas:  The previously noted loculated fluid collection between the   anterior aspect of the left kidney and the posterior margin of the distal   tail the pancreas now measures 3 x 5.8 cm from 3.4 x 4.7 cm previously.    There is questionable subtle communication with the pancreatic duct at   the tail the pancreas (series  2; image 28). Subtle fat stranding noted   along the medial aspect of the collection.  Similar calcifications in the   neck and head of the pancreas with pancreatic ductal ectasia.  The   visualized pancreas is otherwise stable in appearance and demonstrates   normal enhancement.    Spleen:  Unremarkable.  No splenomegaly.    Adrenals:  The previously noted abnormal hyperattenuation and fullness   of the left adrenal gland is improved with more normal appearance of the   left adrenal gland.  The right adrenal gland is unremarkable.    Kidneys and ureters: The kidneys are stable in appearance and   demonstrate normal enhancement.  No nephrolithiasis or hydronephrosis.    Stomach and bowel:  The previously noted smaller collections noted   along the greater curvature the proximal stomach have resolved.  No bowel   obstruction.  No asymmetric bowel mucosal abnormality identified.     PELVIS:    Appendix:  No findings to suggest acute appendicitis.    Bladder:  Unremarkable.  No mass.    Reproductive:  Unremarkable as visualized.     ABDOMEN and PELVIS:    Intraperitoneal space:  Unremarkable.  No free air.  No significant   fluid collection.    Bones joints:  No acute fracture.  No dislocation.    Soft tissues:  Unremarkable.    Vasculature:  Unremarkable.  No abdominal aortic aneurysm.    Lymph nodes:  Unremarkable.  No enlarged lymph nodes.    IMPRESSION:       1.  The previously noted loculated fluid collection between the anterior   aspect of the left kidney and the posterior margin of the distal tail the   pancreas now measures 3 x 5.8 cm from 3.4 x 4.7 cm previously.  There is   questionable subtle communication with the pancreatic duct at the tail   the pancreas (series 2; image 28). Subtle fat stranding noted along the   medial aspect of the collection.  2.  The previously noted smaller collections noted along the greater   curvature the proximal stomach have resolved.  3.  Similar calcifications in the neck  and head of the pancreas with   pancreatic ductal ectasia.  The visualized pancreas is otherwise stable   in appearance and demonstrates normal enhancement.  Findings are   consistent with chronic pancreatitis.  4.  No bowel obstruction.  No asymmetric bowel mucosal abnormality   identified.  No free intraperitoneal fluid or pneumoperitoneum.      Impression:          Electronically signed by Jony Enamorado MD on 06-08-23 at 0505          Results for orders placed during the hospital encounter of 08/06/22    Duplex Portal Hepatic Complete CAR    Interpretation Summary  · All vessels appear normal with normal flow direction and no evidence of thrombus. However, there is a nonvascular fluid collection in the and the splenic vein itself is difficult to visualize with multiple collaterals noted.    Results for orders placed during the hospital encounter of 08/06/22    Adult Transthoracic Echo Complete W/ Cont if Necessary Per Protocol    Interpretation Summary  · Left ventricular ejection fraction appears to be 61 - 65%. Left ventricular systolic function is normal.  · Left ventricular diastolic function was normal.  · Normal right ventricular cavity size and systolic function noted.  · The agitated saline study is positive with Valsalva, consistent with a small to moderate sized PFO  · Mild tricuspid valve regurgitation is present.  · Calculated right ventricular systolic pressure from tricuspid regurgitation is 32 mmHg.  · There is no evidence of pericardial effusion    Pertinent Labs     Results from last 7 days   Lab Units 06/09/23  0700 06/08/23  0652 06/07/23  2246   WBC 10*3/mm3 4.54 4.46 4.67   HEMOGLOBIN g/dL 12.6 12.4 13.9   PLATELETS 10*3/mm3 175 160 204     Results from last 7 days   Lab Units 06/09/23  0700 06/08/23  0652 06/07/23  2246   SODIUM mmol/L 139 138 141   POTASSIUM mmol/L 5.1 3.7 5.0   CHLORIDE mmol/L 105 104 103   CO2 mmol/L 28.0 27.0 29.2*   BUN mg/dL 6 5* 6   CREATININE mg/dL 0.59 0.59 0.74    GLUCOSE mg/dL 94 76 104*   EGFR mL/min/1.73 117.0 117.0 105.0     Results from last 7 days   Lab Units 06/09/23  0700 06/08/23  0652 06/07/23  2246   ALBUMIN g/dL 3.2* 3.7 4.1   BILIRUBIN mg/dL 0.3 <0.2 <0.2   ALK PHOS U/L 93 96 113   AST (SGOT) U/L 7 10 8   ALT (SGPT) U/L 9 11 14     Results from last 7 days   Lab Units 06/09/23  0700 06/08/23  0652 06/07/23  2246   CALCIUM mg/dL 9.3 7.9* 9.5   ALBUMIN g/dL 3.2* 3.7 4.1   MAGNESIUM mg/dL  --  1.6  --      Results from last 7 days   Lab Units 06/08/23  0652 06/07/23  2246   LIPASE U/L 73* 151*     Results from last 7 days   Lab Units 06/08/23  1109 06/08/23  0906   HSTROP T ng/L <6 <6           Invalid input(s): LDLCALC          Test Results Pending at Discharge       Discharge Details        Discharge Medications        Continue These Medications        Instructions Start Date   amitriptyline 10 MG tablet  Commonly known as: ELAVIL   10 mg, Oral, Nightly      bisacodyl 10 MG suppository  Commonly known as: DULCOLAX   10 mg, Rectal, Daily      Feosol 200 (65 Fe) MG tablet tablet  Generic drug: Ferrous Sulfate Dried   200 mg, Oral, Daily      folic acid 1 MG tablet  Commonly known as: FOLVITE   1,000 mcg, Oral, Daily      ondansetron 4 MG tablet  Commonly known as: ZOFRAN   4 mg, Oral, Every 8 Hours PRN      oxyCODONE-acetaminophen  MG per tablet  Commonly known as: PERCOCET   1 tablet, Oral, Every 4 Hours PRN      pancrelipase (Lip-Prot-Amyl) 00888-67102 units capsule delayed-release particles capsule  Commonly known as: CREON   72,000 units of lipase, Oral, 3 Times Daily With Meals      pantoprazole 40 MG EC tablet  Commonly known as: PROTONIX   40 mg, Oral, Daily      VITAMIN B 12 PO   Oral             ASK your doctor about these medications        Instructions Start Date   sennosides-docusate 8.6-50 MG per tablet  Commonly known as: PERICOLACE   2 tablets, Oral, 2 Times Daily PRN      thiamine 100 MG tablet  tablet  Commonly known as: VITAMIN B-1   100  mg, Daily      vitamin D3 125 MCG (5000 UT) capsule capsule   5,000 Units, Daily               Allergies   Allergen Reactions    Nickel     Hydrocodone-Acetaminophen Nausea And Vomiting       Discharge Disposition:  Home or Self Care      Discharge Diet:  Diet Order   Procedures    Diet: Regular/House Diet; Texture: Regular Texture (IDDSI 7); Fluid Consistency: Thin (IDDSI 0)       Discharge Activity:   Activity Instructions       Activity as Tolerated              CODE STATUS:    Code Status and Medical Interventions:   Ordered at: 06/08/23 0514     Code Status (Patient has no pulse and is not breathing):    CPR (Attempt to Resuscitate)     Medical Interventions (Patient has pulse or is breathing):    Full Support       No future appointments.  Additional Instructions for the Follow-ups that You Need to Schedule       Discharge Follow-up with PCP   As directed       Currently Documented PCP:    Rachell Pozo APRN    PCP Phone Number:    [phone]     Follow Up Details: Please call to schedule a 1 week or earliest available follow-up appointment PCP                Follow-up Information       Rachell Pozo APRN .    Specialty: Family Medicine  Why: Please call to schedule a 1 week or earliest available follow-up appointment PCP  Contact information:  [id number] [facility] [address] [phone]               Jakob Duval MD. Schedule an appointment as soon as possible for a visit.    Specialty: Gastroenterology  Why: As needed  Contact information:  [address]  [facility] [address] [phone]                             Additional Instructions for the Follow-ups that You Need to Schedule       Discharge Follow-up with PCP   As directed       Currently Documented PCP:    Rachell Pozo APRN    PCP Phone Number:    [phone]     Follow Up Details: Please call to schedule a 1 week or earliest available follow-up appointment PCP             Time  Spent on Discharge:  Greater than 30 minutes      SOL Richomnd  [facility]  06/09/23  15:16 EDT

## 2023-06-09 NOTE — PLAN OF CARE
Problem: Adult Inpatient Plan of Care  Goal: Plan of Care Review  Outcome: Ongoing, Progressing  Flowsheets (Taken 6/9/2023 0343)  Plan of Care Reviewed With: patient  Outcome Evaluation: received from Observation unit alert and oriemted, up ad valeria, c/o LUQ and Lt upper back pain 7/10, medicated with IV Dilaudid once with adequate relief, was on Full liquid diet and is now NPO since MN for a HIDA scan today, voiding, VSS   Goal Outcome Evaluation:  Plan of Care Reviewed With: patient           Outcome Evaluation: received from Observation unit alert and oriemted, up ad valeria, c/o LUQ and Lt upper back pain 7/10, medicated with IV Dilaudid once with adequate relief, was on Full liquid diet and is now NPO since MN for a HIDA scan today, voiding, VSS

## 2023-06-09 NOTE — PAYOR COMM NOTE
"Piper Zavala (42 y.o. Female)          DC NOTE FOR [id number]    CONTACT NORRIS MORGAN  P# [phone]  F# [phone]       Date of Birth   1980    Social Security Number       Address   [address] [place] [address] [address] [address]    Home Phone   [phone]    MRN   [id number]       Mandaeism   Synagogue    Marital Status   Single                            Admission Date   6/8/23    Admission Type   Emergency    Admitting Provider   Bienvenido Barney MD    Attending Provider   Bienvenido Barney MD    Department, Room/Bed   [address], 91/1       Discharge Date       Discharge Disposition   Home or Self Care    Discharge Destination                                 Attending Provider: Bienvenido Barney MD    Allergies: Nickel, Hydrocodone-acetaminophen    Isolation: None   Infection: None   Code Status: CPR    Ht: 175.3 cm (69\")   Wt: 54.4 kg (120 lb)    Admission Cmt: None   Principal Problem: Nausea & vomiting [R11.2]                   Active Insurance as of 6/8/2023       Primary Coverage       Payor Plan Insurance Group Employer/Plan Group    [id number]       Payor Plan Address Payor Plan Phone Number Payor Plan Fax Number Effective Dates    MAIL STOP:   7/1/2022 - None Entered    [address] [address]       [facility] [address]         Subscriber Name Subscriber Birth Date Member ID       PIPER ZAVALA 1980 [id number]                     Emergency Contacts        (Rel.) Home Phone Work Phone Mobile Phone    MALOU EVANGELISTA (Significant Other) [phone] -- --              Physician Progress Notes (last 48 hours)  Notes from 06/07/23 1327 through 06/09/23 1327   No notes of this type exist for this encounter.          Consult Notes (last 48 hours)        Jose Robertson MD at 06/08/23 1614          [facility]  Initial Inpatient Consult Note    Referring Provider: Bienvenido" Concha    Reason for Consultation: Chronic pancreatitis    Subjective     History of present illness:    42 y.o. female chronic pancreatitis, alcohol abuse as recently as 5 days ago, tobacco abuse, admitted again with her upper abdominal pain.  She has a history of peripancreatic fluid collection, she is following with gastroenterology of [facility] and there is a plan to place a stent according to the patient to try to reduce volume of fluid in the peripancreatic area.  She says that she believes she is going to have a ERCP and EUS in [facility] but it has not been scheduled.  She is due to see their nurse practitioner in 3 weeks.  She is describing worsening upper abdominal pain worse with eating better while fasting.  No relation to movement.  Her gallbladder is intact.  She tells me she is never had a HIDA scan.    Past Medical History:  Past Medical History:   Diagnosis Date    Anxiety     E. coli bacteremia     ETOH abuse     GERD (gastroesophageal reflux disease)     Hiatal hernia     Left flank pain 10/21/2022    Migraine     Miscarriage 2004    Pancreatitis      Past Surgical History:  Past Surgical History:   Procedure Laterality Date    ENDOSCOPY N/A 8/10/2022    Procedure: ESOPHAGOGASTRODUODENOSCOPY with bxs;  Surgeon: Salvador Price MD;  Location: [facility] ENDOSCOPY;  Service: Gastroenterology;  Laterality: N/A;  Pre: r/o esophageal  varacies, abd pain  Post: esophageal plaque      Social History:   Social History     Tobacco Use    Smoking status: Every Day     Packs/day: 0.50     Types: Cigarettes     Start date: 1/28/1996    Smokeless tobacco: Current   Substance Use Topics    Alcohol use: Never      Family History:  Family History   Problem Relation Age of Onset    Alcohol abuse Mother     Arthritis Mother     Asthma Mother     Miscarriages / Stillbirths Mother     Cancer Father     Diabetes Father     Drug abuse Father     Early death Father     Heart disease Father     Hyperlipidemia  Father     Hypertension Father     Alcohol abuse Paternal Aunt     Cancer Paternal Aunt     Diabetes Paternal Aunt     Drug abuse Paternal Aunt     Early death Paternal Aunt     Heart disease Paternal Aunt     Hyperlipidemia Paternal Aunt     Hypertension Paternal Aunt     Alcohol abuse Maternal Grandmother     Alcohol abuse Maternal Grandfather     Alcohol abuse Paternal Grandmother     Cancer Paternal Grandmother     Diabetes Paternal Grandmother     Drug abuse Paternal Grandmother     Early death Paternal Grandmother     Heart disease Paternal Grandmother     Hyperlipidemia Paternal Grandmother     Hypertension Paternal Grandmother     Alcohol abuse Paternal Grandfather        Home Meds:  Medications Prior to Admission   Medication Sig Dispense Refill Last Dose    amitriptyline (ELAVIL) 10 MG tablet Take 1 tablet by mouth Every Night.   6/7/2023    bisacodyl (DULCOLAX) 10 MG suppository Insert 1 suppository into the rectum Daily. 30 each 0     Cyanocobalamin (VITAMIN B 12 PO) Take  by mouth.   6/7/2023    Ferrous Sulfate Dried (Feosol) 200 (65 Fe) MG tablet tablet Take 1 tablet by mouth Daily.   6/7/2023    folic acid (FOLVITE) 1 MG tablet Take 1 tablet by mouth Daily. 30 tablet 3 6/7/2023    ondansetron (ZOFRAN) 4 MG tablet Take 1 tablet by mouth Every 8 (Eight) Hours As Needed for Nausea or Vomiting.   Past Week    oxyCODONE-acetaminophen (PERCOCET)  MG per tablet Take 1 tablet by mouth Every 4 (Four) Hours As Needed for Moderate Pain.       pancrelipase, Lip-Prot-Amyl, (CREON) 99395-57734 units capsule delayed-release particles capsule Take 3 capsules by mouth 3 (Three) Times a Day With Meals.   6/7/2023    pantoprazole (PROTONIX) 40 MG EC tablet Take 1 tablet by mouth Daily. 30 tablet 3 6/7/2023    Cholecalciferol (VITAMIN D3) 5000 units capsule capsule Take 5,000 Units by mouth Daily. (Patient not taking: Reported on 6/8/2023)   Not Taking    sennosides-docusate (PERICOLACE) 8.6-50 MG per tablet Take 2  tablets by mouth 2 (Two) Times a Day As Needed for Constipation. (Patient not taking: Reported on 6/8/2023)   Not Taking    thiamine (VITAMIN B-1) 100 MG tablet  tablet Take 100 mg by mouth Daily. (Patient not taking: Reported on 6/8/2023)   Not Taking     Current Meds:   amitriptyline, 10 mg, Oral, Nightly  folic acid, 1,000 mcg, Oral, Daily  nicotine, 1 patch, Transdermal, Q24H  pancrelipase (Lip-Prot-Amyl), 12,000 units of lipase, Oral, TID With Meals  pantoprazole, 40 mg, Oral, Daily  sodium chloride, 10 mL, Intravenous, Q12H  thiamine, 100 mg, Oral, Daily      Allergies:  Allergies   Allergen Reactions    Nickel     Hydrocodone-Acetaminophen Nausea And Vomiting     Review of systems: There is weakness fatigue all other systems reviewed and negative    Objective     Vital Signs  Temp:  [97.8 °F (36.6 °C)-97.9 °F (36.6 °C)] 97.9 °F (36.6 °C)  Heart Rate:  [] 70  Resp:  [16] 16  BP: (116-143)/(80-99) 132/87  Physical Exam:  General Appearance:    Alert, cooperative, in no acute distress   Head:    Normocephalic, without obvious abnormality, atraumatic   Eyes:          conjunctivae and sclerae normal, no   icterus   Throat:   no thrush, oral mucosa moist   Neck:   Supple, no adenopathy   Lungs:     Clear to auscultation bilaterally    Heart:    Regular rhythm and normal rate    Chest Wall:    No abnormalities observed   Abdomen:     Soft, nondistended, nontender; normal bowel sounds   Extremities:   no edema, no redness   Skin:   No bruising or rash   Psychiatric:  normal mood and insight     Results Review:   I reviewed the patient's new clinical results.    Results from last 7 days   Lab Units 06/08/23  0652 06/07/23  2246   WBC 10*3/mm3 4.46 4.67   HEMOGLOBIN g/dL 12.4 13.9   HEMATOCRIT % 36.9 40.5   PLATELETS 10*3/mm3 160 204     Results from last 7 days   Lab Units 06/08/23  0652 06/07/23  2246   SODIUM mmol/L 138 141   POTASSIUM mmol/L 3.7 5.0   CHLORIDE mmol/L 104 103   CO2 mmol/L 27.0 29.2*   BUN  mg/dL 5* 6   CREATININE mg/dL 0.59 0.74   CALCIUM mg/dL 7.9* 9.5   BILIRUBIN mg/dL <0.2 <0.2   ALK PHOS U/L 96 113   ALT (SGPT) U/L 11 14   AST (SGOT) U/L 10 8   GLUCOSE mg/dL 76 104*         Lab Results   Lab Value Date/Time    LIPASE 73 (H) 06/08/2023 0652    LIPASE 151 (H) 06/07/2023 2246    LIPASE 192 (H) 03/25/2023 1700    LIPASE 38 12/05/2022 0526    LIPASE 34 12/04/2022 0542    LIPASE 29 12/03/2022 0515    LIPASE 50 12/02/2022 0719    LIPASE 29 12/01/2022 0519    LIPASE 65 (H) 11/30/2022 1412    LIPASE 157 (H) 11/28/2022 1751    LIPASE 335 (H) 10/20/2022 1939    LIPASE 378 (H) 08/06/2022 2158    LIPASE 95 (H) 01/24/2018 0545    LIPASE 93 (H) 10/01/2017 0356    LIPASE 122 (H) 09/30/2017 0619    LIPASE 86 (H) 09/29/2017 1731    LIPASE 133 (H) 09/28/2017 0032       Radiology:  CT Abdomen Pelvis With Contrast   Final Result         Electronically signed by Jony Enamorado MD on 06-08-23 at 0505        MPRESSION:       1.  The previously noted loculated fluid collection between the anterior   aspect of the left kidney and the posterior margin of the distal tail the   pancreas now measures 3 x 5.8 cm from 3.4 x 4.7 cm previously.  There is   questionable subtle communication with the pancreatic duct at the tail   the pancreas (series 2; image 28). Subtle fat stranding noted along the   medial aspect of the collection.  2.  The previously noted smaller collections noted along the greater   curvature the proximal stomach have resolved.  3.  Similar calcifications in the neck and head of the pancreas with   pancreatic ductal ectasia.  The visualized pancreas is otherwise stable   in appearance and demonstrates normal enhancement.  Findings are   consistent with chronic pancreatitis.  4.  No bowel obstruction.  No asymmetric bowel mucosal abnormality   identified.  No free intraperitoneal fluid or pneumoperitoneum.     IMPRESSION:           Assessment & Plan   Active Hospital Problems    Diagnosis     **Nausea &  vomiting     Chronic pancreatitis, unspecified pancreatitis type     Macrocytosis     Left-sided chest pain     Tobacco abuse     Abdominal pain        Assessment:  Abdominal pain  Chronic pancreatitis  Nausea and vomiting  Fluid collection on CT see above  Tobacco abuse  Alcohol abuse    Plan:  No current indication for repeat aspiration of peripancreatic fluid collection  I agree with ERCP and pancreas stent placement in [facility] with Dr. Duval  If EUS is to be performed at that time, Dr. Duval can make that decision  We will check a HIDA scan as she is never had her gallbladder evaluated with this test  Clear liquid diet  Strongly recommend alcohol and smoking cessation  Further recommendations after recheck HIDA scan      I discussed the patients findings and my recommendations with patient and nursing staff.    Jose Robertson MD             Electronically signed by Jose Robertson MD at 23 1619       Discharge Summary    No notes of this type exist for this encounter.     Manan Garcia APRN   Nurse Practitioner  Hospitalist     Discharge Summary      Cosign Needed     Date of Service: 23  Creation Time: 23     Cosign Needed              Patient Name: Piper Cain  : 1980  MRN: [id number]     Date of Admission: 2023  Date of Discharge:  2023  Primary Care Physician: Rachell Pozo APRN        Chief Complaint:   Abdominal Pain and Palpitations        Discharge Diagnoses            Active Hospital Problems     Diagnosis   POA    **Nausea & vomiting [R11.2]   Yes    Chronic pancreatitis, unspecified pancreatitis type [K86.1]   Yes    Macrocytosis [D75.89]   Yes    Left-sided chest pain [R07.9]   Yes    Tobacco abuse [Z72.0]   Yes    Abdominal pain [R10.9]   Yes       Resolved Hospital Problems   No resolved problems to display.         Hospital Course      Ms. Cain is a 42 y.o. smoker with a history of alcohol abuse, GERD, migraine,  "pancreatitis who presents to [facility] ER chief complaint of nausea, vomiting, abdominal pain, palpitations admitted for chronic pancreatitis.     Describes chronic \"pancreatic attacks\" since March 2023 with most recent occurring this week following alcohol intake at a work party.     CT findings revealed loculated fluid collection between left kidney and posterior margin of distal pancreatic tail with subcu fat stranding.  IV fluids provided symptom management with pain medication available as needed.       Gastroenterology evaluated and states no current indication for repeat aspiration of peripancreatic fluid collection and okay to discharge following HIDA scan (normal findings) with recommendation for follow-up Dr. Duval for possible EUS versus ERCP versus both versus other.     Strongly recommend alcohol and smoking cessation moving forward.     TSH, folate, vitamin B12 unremarkable.       Patient appears medically stable for discharge, 6/9/2023 and agrees with plan for follow-up as previously discussed.     No plans to provide additional narcotics at discharge given 14-day supply of oxycodone dispensed on 5/30/2023.     Day of Discharge         Physical Exam:  Temp:  [96.6 °F (35.9 °C)-98.1 °F (36.7 °C)] 97.8 °F (36.6 °C)  Heart Rate:  [59-76] 67  Resp:  [16] 16  BP: (122-139)/(81-89) 139/89  Body mass index is 17.72 kg/m².     Physical Exam  Constitutional:       General: She is not in acute distress.     Appearance: She is not toxic-appearing.   HENT:      Head: Normocephalic and atraumatic.   Eyes:      Extraocular Movements: Extraocular movements intact.      Conjunctiva/sclera: Conjunctivae normal.   Cardiovascular:      Rate and Rhythm: Normal rate.      Heart sounds: Normal heart sounds.   Pulmonary:      Effort: Pulmonary effort is normal.      Breath sounds: Normal breath sounds.   Abdominal:      General: Bowel sounds are normal.      Palpations: Abdomen is soft.   Musculoskeletal:      Cervical " back: Normal range of motion and neck supple.      Right lower leg: No edema.      Left lower leg: No edema.   Skin:     General: Skin is warm and dry.   Neurological:      Mental Status: She is alert and oriented to person, place, and time.      Cranial Nerves: No cranial nerve deficit.   Psychiatric:         Behavior: Behavior normal.         Thought Content: Thought content normal.         Consultants      Consult Orders (all) (From admission, onward)          Start     Ordered     06/08/23 1016   Inpatient Gastroenterology Consult  Once        Specialty:  Gastroenterology  Provider:  Petar Starr MD    06/08/23 1015     06/08/23 0458   [facility] (on-call MD unless specified) Details  Once        Specialty:  Hospitalist  Provider:  (Not yet assigned)    06/08/23 0457                       Procedures      * Surgery not found *        Imaging Results (All)         Procedure Component Value Units Date/Time     NM HIDA SCAN WITH PHARMACOLOGICAL INTERVENTION [[id number]] Collected: 06/09/23 1511       Updated: 06/09/23 1511     Narrative:       NUCLEAR MEDICINE HIDA SCAN WITH PHARMACOLOGIC INTERVENTION     HISTORY: Right upper quadrant pain.     TECHNIQUE: 45 minute anterior dynamic imaging over the abdomen was  performed after injection of 4.8 mCi Tc-choletec IV.  1.1 mcg Kinevac  was administered IV to determine gallbladder ejection fraction.     FINDINGS: There is normal hepatic uptake and excretion with appropriate  clearance of background blood pool activity. There is normal  visualization of biliary tract, gallbladder, small bowel. After CCK  infusion, gallbladder ejection fraction is calculated to be  92%   (normal range is considered 35% or greater)        Impression:       1. No common duct or cystic duct obstruction.  2. Gallbladder ejection fraction measures 92 % which is normal.        CT Abdomen Pelvis With Contrast [[id number]] Collected: 06/08/23 0506       Updated: 06/08/23 0506     Narrative:           Patient: LIZZETH ZAVALA  Time Out: 05:05  Exam(s): CT ABDOMEN + PELVIS With Contrast      EXAM:    CT Abdomen and Pelvis With Intravenous Contrast     CLINICAL HISTORY:     Abdominal pain, acute, nonlocalized.     TECHNIQUE:    Axial computed tomography images of the abdomen and pelvis with   intravenous contrast.  CTDI is 6.07 mGy and DLP is 279.9 mGy-cm.  This CT   exam was performed according to the principle of ALARA (As Low As   Reasonably Achievable) by using one or more of the following dose   reduction techniques: automated exposure control, adjustment of the mA   and or kV according to patient size, and or use of iterative   reconstruction technique.     COMPARISON:    CT abdomen and pelvis with contrast dated 3 25 2023     FINDINGS:    Lung bases:  Unremarkable.  No mass.  No consolidation.      ABDOMEN:    Liver:  Unremarkable.  No mass.    Gallbladder and bile ducts:  Unremarkable.  No calcified stones.  No   ductal dilation.    Pancreas:  The previously noted loculated fluid collection between the   anterior aspect of the left kidney and the posterior margin of the distal   tail the pancreas now measures 3 x 5.8 cm from 3.4 x 4.7 cm previously.    There is questionable subtle communication with the pancreatic duct at   the tail the pancreas (series 2; image 28). Subtle fat stranding noted   along the medial aspect of the collection.  Similar calcifications in the   neck and head of the pancreas with pancreatic ductal ectasia.  The   visualized pancreas is otherwise stable in appearance and demonstrates   normal enhancement.    Spleen:  Unremarkable.  No splenomegaly.    Adrenals:  The previously noted abnormal hyperattenuation and fullness   of the left adrenal gland is improved with more normal appearance of the   left adrenal gland.  The right adrenal gland is unremarkable.    Kidneys and ureters: The kidneys are stable in appearance and   demonstrate normal enhancement.  No nephrolithiasis or  hydronephrosis.    Stomach and bowel:  The previously noted smaller collections noted   along the greater curvature the proximal stomach have resolved.  No bowel   obstruction.  No asymmetric bowel mucosal abnormality identified.      PELVIS:    Appendix:  No findings to suggest acute appendicitis.    Bladder:  Unremarkable.  No mass.    Reproductive:  Unremarkable as visualized.      ABDOMEN and PELVIS:    Intraperitoneal space:  Unremarkable.  No free air.  No significant   fluid collection.    Bones joints:  No acute fracture.  No dislocation.    Soft tissues:  Unremarkable.    Vasculature:  Unremarkable.  No abdominal aortic aneurysm.    Lymph nodes:  Unremarkable.  No enlarged lymph nodes.     IMPRESSION:       1.  The previously noted loculated fluid collection between the anterior   aspect of the left kidney and the posterior margin of the distal tail the   pancreas now measures 3 x 5.8 cm from 3.4 x 4.7 cm previously.  There is   questionable subtle communication with the pancreatic duct at the tail   the pancreas (series 2; image 28). Subtle fat stranding noted along the   medial aspect of the collection.  2.  The previously noted smaller collections noted along the greater   curvature the proximal stomach have resolved.  3.  Similar calcifications in the neck and head of the pancreas with   pancreatic ductal ectasia.  The visualized pancreas is otherwise stable   in appearance and demonstrates normal enhancement.  Findings are   consistent with chronic pancreatitis.  4.  No bowel obstruction.  No asymmetric bowel mucosal abnormality   identified.  No free intraperitoneal fluid or pneumoperitoneum.        Impression:             Electronically signed by Jony Enamorado MD on 06-08-23 at 0505             Results for orders placed during the hospital encounter of 08/06/22     Duplex Portal Hepatic Complete CAR     Interpretation Summary  · All vessels appear normal with normal flow direction and no evidence  of thrombus. However, there is a nonvascular fluid collection in the and the splenic vein itself is difficult to visualize with multiple collaterals noted.     Results for orders placed during the hospital encounter of 08/06/22     Adult Transthoracic Echo Complete W/ Cont if Necessary Per Protocol     Interpretation Summary  · Left ventricular ejection fraction appears to be 61 - 65%. Left ventricular systolic function is normal.  · Left ventricular diastolic function was normal.  · Normal right ventricular cavity size and systolic function noted.  · The agitated saline study is positive with Valsalva, consistent with a small to moderate sized PFO  · Mild tricuspid valve regurgitation is present.  · Calculated right ventricular systolic pressure from tricuspid regurgitation is 32 mmHg.  · There is no evidence of pericardial effusion     Pertinent Labs             Results from last 7 days   Lab Units 06/09/23 0700 06/08/23 0652 06/07/23  2246   WBC 10*3/mm3 4.54 4.46 4.67   HEMOGLOBIN g/dL 12.6 12.4 13.9   PLATELETS 10*3/mm3 175 160 204             Results from last 7 days   Lab Units 06/09/23 0700 06/08/23 0652 06/07/23  2246   SODIUM mmol/L 139 138 141   POTASSIUM mmol/L 5.1 3.7 5.0   CHLORIDE mmol/L 105 104 103   CO2 mmol/L 28.0 27.0 29.2*   BUN mg/dL 6 5* 6   CREATININE mg/dL 0.59 0.59 0.74   GLUCOSE mg/dL 94 76 104*   EGFR mL/min/1.73 117.0 117.0 105.0             Results from last 7 days   Lab Units 06/09/23 0700 06/08/23 0652 06/07/23  2246   ALBUMIN g/dL 3.2* 3.7 4.1   BILIRUBIN mg/dL 0.3 <0.2 <0.2   ALK PHOS U/L 93 96 113   AST (SGOT) U/L 7 10 8   ALT (SGPT) U/L 9 11 14             Results from last 7 days   Lab Units 06/09/23 0700 06/08/23 0652 06/07/23  2246   CALCIUM mg/dL 9.3 7.9* 9.5   ALBUMIN g/dL 3.2* 3.7 4.1   MAGNESIUM mg/dL  --  1.6  --             Results from last 7 days   Lab Units 06/08/23 0652 06/07/23  2246   LIPASE U/L 73* 151*            Results from last 7 days   Lab Units  06/08/23  1109 06/08/23  0906   HSTROP T ng/L <6 <6            Invalid input(s): LDLCALC           Test Results Pending at Discharge         Discharge Details          Discharge Medications          Continue These Medications         Instructions Start Date   amitriptyline 10 MG tablet  Commonly known as: ELAVIL    10 mg, Oral, Nightly        bisacodyl 10 MG suppository  Commonly known as: DULCOLAX    10 mg, Rectal, Daily        Feosol 200 (65 Fe) MG tablet tablet  Generic drug: Ferrous Sulfate Dried    200 mg, Oral, Daily        folic acid 1 MG tablet  Commonly known as: FOLVITE    1,000 mcg, Oral, Daily        ondansetron 4 MG tablet  Commonly known as: ZOFRAN    4 mg, Oral, Every 8 Hours PRN        oxyCODONE-acetaminophen  MG per tablet  Commonly known as: PERCOCET    1 tablet, Oral, Every 4 Hours PRN        pancrelipase (Lip-Prot-Amyl) 63203-45445 units capsule delayed-release particles capsule  Commonly known as: CREON    72,000 units of lipase, Oral, 3 Times Daily With Meals        pantoprazole 40 MG EC tablet  Commonly known as: PROTONIX    40 mg, Oral, Daily        VITAMIN B 12 PO    Oral                  ASK your doctor about these medications         Instructions Start Date   sennosides-docusate 8.6-50 MG per tablet  Commonly known as: PERICOLACE    2 tablets, Oral, 2 Times Daily PRN        thiamine 100 MG tablet  tablet  Commonly known as: VITAMIN B-1    100 mg, Daily        vitamin D3 125 MCG (5000 UT) capsule capsule    5,000 Units, Daily                          Allergies   Allergen Reactions    Nickel      Hydrocodone-Acetaminophen Nausea And Vomiting         Discharge Disposition:  Home or Self Care        Discharge Diet:      Diet Order   Procedures    Diet: Regular/House Diet; Texture: Regular Texture (IDDSI 7); Fluid Consistency: Thin (IDDSI 0)         Discharge Activity:   Activity Instructions         Activity as Tolerated                  CODE STATUS:        Code Status and Medical  Interventions:   Ordered at: 06/08/23 0514     Code Status (Patient has no pulse and is not breathing):     CPR (Attempt to Resuscitate)     Medical Interventions (Patient has pulse or is breathing):     Full Support         No future appointments.  Additional Instructions for the Follow-ups that You Need to Schedule         Discharge Follow-up with PCP   As directed         Currently Documented PCP:    Rachell Pozo APRN    PCP Phone Number:    [phone]      Follow Up Details: Please call to schedule a 1 week or earliest available follow-up appointment PCP                     Follow-up Information         Rachell Pozo APRN .    Specialty: Family Medicine  Why: Please call to schedule a 1 week or earliest available follow-up appointment PCP  Contact information:  [id number]  [facility] [id number] [phone]

## 2023-06-12 NOTE — PROGRESS NOTES
Case Management Discharge Note      Final Note: Discharged home. Cynthia Bonilla RN    Provided Post Acute Provider List?: N/A            Transportation Services  Private: Car    Final Discharge Disposition Code: 01 - home or self-care

## 2023-06-22 ENCOUNTER — OFFICE (OUTPATIENT)
Dept: URBAN - METROPOLITAN AREA CLINIC 64 | Facility: CLINIC | Age: 43
End: 2023-06-22
Payer: COMMERCIAL

## 2023-06-22 VITALS
WEIGHT: 127 LBS | SYSTOLIC BLOOD PRESSURE: 114 MMHG | HEIGHT: 71 IN | HEART RATE: 104 BPM | DIASTOLIC BLOOD PRESSURE: 74 MMHG

## 2023-06-22 DIAGNOSIS — F10.10 ALCOHOL ABUSE, UNCOMPLICATED: ICD-10-CM

## 2023-06-22 DIAGNOSIS — R10.12 LEFT UPPER QUADRANT PAIN: ICD-10-CM

## 2023-06-22 DIAGNOSIS — R93.3 ABNORMAL FINDINGS ON DIAGNOSTIC IMAGING OF OTHER PARTS OF DI: ICD-10-CM

## 2023-06-22 DIAGNOSIS — K86.3 PSEUDOCYST OF PANCREAS: ICD-10-CM

## 2023-06-22 DIAGNOSIS — K86.1 OTHER CHRONIC PANCREATITIS: ICD-10-CM

## 2023-06-22 DIAGNOSIS — F11.90 OPIOID USE, UNSPECIFIED, UNCOMPLICATED: ICD-10-CM

## 2023-06-22 DIAGNOSIS — F17.200 NICOTINE DEPENDENCE, UNSPECIFIED, UNCOMPLICATED: ICD-10-CM

## 2023-06-22 PROCEDURE — 99214 OFFICE O/P EST MOD 30 MIN: CPT | Performed by: NURSE PRACTITIONER

## 2023-06-22 RX ORDER — AMITRIPTYLINE HYDROCHLORIDE 25 MG/1
TABLET, FILM COATED ORAL
Qty: 30 | Refills: 6 | Status: COMPLETED
End: 2024-01-10

## 2023-06-22 RX ORDER — FERROUS SULFATE TAB EC 325 MG (65 MG FE EQUIVALENT) 325 (65 FE) MG
650 TABLET DELAYED RESPONSE ORAL
Qty: 180 | Refills: 2 | Status: ACTIVE
Start: 2023-06-22

## 2023-08-22 ENCOUNTER — HOSPITAL ENCOUNTER (INPATIENT)
Facility: HOSPITAL | Age: 43
LOS: 3 days | Discharge: HOME | End: 2023-08-25
Attending: EMERGENCY MEDICINE | Admitting: INTERNAL MEDICINE
Payer: MEDICAID

## 2023-08-22 ENCOUNTER — APPOINTMENT (OUTPATIENT)
Dept: CT IMAGING | Facility: HOSPITAL | Age: 43
End: 2023-08-22
Payer: MEDICAID

## 2023-08-22 DIAGNOSIS — K85.90 PHLEGMON OF PANCREAS: ICD-10-CM

## 2023-08-22 DIAGNOSIS — R10.13 EPIGASTRIC PAIN: Primary | ICD-10-CM

## 2023-08-22 DIAGNOSIS — R11.2 NAUSEA AND VOMITING, UNSPECIFIED VOMITING TYPE: ICD-10-CM

## 2023-08-22 DIAGNOSIS — K86.0 ALCOHOL-INDUCED CHRONIC PANCREATITIS: ICD-10-CM

## 2023-08-22 LAB
ALBUMIN SERPL-MCNC: 4.3 G/DL (ref 3.5–5.2)
ALBUMIN/GLOB SERPL: 1.4 G/DL
ALP SERPL-CCNC: 140 U/L (ref 39–117)
ALT SERPL W P-5'-P-CCNC: 26 U/L (ref 1–33)
AMPHET+METHAMPHET UR QL: NEGATIVE
ANION GAP SERPL CALCULATED.3IONS-SCNC: 14 MMOL/L (ref 5–15)
AST SERPL-CCNC: 17 U/L (ref 1–32)
BACTERIA UR QL AUTO: ABNORMAL /HPF
BARBITURATES UR QL SCN: NEGATIVE
BASOPHILS # BLD AUTO: 0.03 10*3/MM3 (ref 0–0.2)
BASOPHILS NFR BLD AUTO: 0.3 % (ref 0–1.5)
BENZODIAZ UR QL SCN: NEGATIVE
BILIRUB SERPL-MCNC: 0.5 MG/DL (ref 0–1.2)
BILIRUB UR QL STRIP: NEGATIVE
BUN SERPL-MCNC: 6 MG/DL (ref 6–20)
BUN/CREAT SERPL: 9.4 (ref 7–25)
CALCIUM SPEC-SCNC: 9.7 MG/DL (ref 8.6–10.5)
CANNABINOIDS SERPL QL: NEGATIVE
CHLORIDE SERPL-SCNC: 99 MMOL/L (ref 98–107)
CLARITY UR: ABNORMAL
CO2 SERPL-SCNC: 25 MMOL/L (ref 22–29)
COCAINE UR QL: NEGATIVE
COLOR UR: YELLOW
CREAT SERPL-MCNC: 0.64 MG/DL (ref 0.57–1)
D-LACTATE SERPL-SCNC: 0.8 MMOL/L (ref 0.5–2)
DEPRECATED RDW RBC AUTO: 53.3 FL (ref 37–54)
EGFRCR SERPLBLD CKD-EPI 2021: 114.7 ML/MIN/1.73
EOSINOPHIL # BLD AUTO: 0.05 10*3/MM3 (ref 0–0.4)
EOSINOPHIL NFR BLD AUTO: 0.4 % (ref 0.3–6.2)
ERYTHROCYTE [DISTWIDTH] IN BLOOD BY AUTOMATED COUNT: 14.3 % (ref 12.3–15.4)
ETHANOL BLD-MCNC: <10 MG/DL (ref 0–10)
ETHANOL UR QL: <0.01 %
FENTANYL UR-MCNC: NEGATIVE NG/ML
GEN 5 2HR TROPONIN T REFLEX: <6 NG/L
GLOBULIN UR ELPH-MCNC: 3.1 GM/DL
GLUCOSE SERPL-MCNC: 122 MG/DL (ref 65–99)
GLUCOSE UR STRIP-MCNC: NEGATIVE MG/DL
HCT VFR BLD AUTO: 42.9 % (ref 34–46.6)
HGB BLD-MCNC: 14.6 G/DL (ref 12–15.9)
HGB UR QL STRIP.AUTO: NEGATIVE
HOLD SPECIMEN: NORMAL
HOLD SPECIMEN: NORMAL
HYALINE CASTS UR QL AUTO: ABNORMAL /LPF
IMM GRANULOCYTES # BLD AUTO: 0.05 10*3/MM3 (ref 0–0.05)
IMM GRANULOCYTES NFR BLD AUTO: 0.4 % (ref 0–0.5)
INR PPP: 0.94 (ref 0.9–1.1)
KETONES UR QL STRIP: ABNORMAL
LEUKOCYTE ESTERASE UR QL STRIP.AUTO: NEGATIVE
LIPASE SERPL-CCNC: 115 U/L (ref 13–60)
LYMPHOCYTES # BLD AUTO: 1.24 10*3/MM3 (ref 0.7–3.1)
LYMPHOCYTES NFR BLD AUTO: 10.7 % (ref 19.6–45.3)
MAGNESIUM SERPL-MCNC: 2.1 MG/DL (ref 1.6–2.6)
MCH RBC QN AUTO: 34.6 PG (ref 26.6–33)
MCHC RBC AUTO-ENTMCNC: 34 G/DL (ref 31.5–35.7)
MCV RBC AUTO: 101.7 FL (ref 79–97)
METHADONE UR QL SCN: NEGATIVE
MONOCYTES # BLD AUTO: 0.72 10*3/MM3 (ref 0.1–0.9)
MONOCYTES NFR BLD AUTO: 6.2 % (ref 5–12)
NEUTROPHILS NFR BLD AUTO: 82 % (ref 42.7–76)
NEUTROPHILS NFR BLD AUTO: 9.46 10*3/MM3 (ref 1.7–7)
NITRITE UR QL STRIP: POSITIVE
NRBC BLD AUTO-RTO: 0 /100 WBC (ref 0–0.2)
OPIATES UR QL: NEGATIVE
OXYCODONE UR QL SCN: POSITIVE
PH UR STRIP.AUTO: 7 [PH] (ref 5–8)
PLATELET # BLD AUTO: 194 10*3/MM3 (ref 140–450)
PMV BLD AUTO: 9.7 FL (ref 6–12)
POTASSIUM SERPL-SCNC: 3.6 MMOL/L (ref 3.5–5.2)
PROCALCITONIN SERPL-MCNC: <0.02 NG/ML (ref 0–0.25)
PROT SERPL-MCNC: 7.4 G/DL (ref 6–8.5)
PROT UR QL STRIP: ABNORMAL
PROTHROMBIN TIME: 12.6 SECONDS (ref 11.7–14.2)
QT INTERVAL: 384 MS
RBC # BLD AUTO: 4.22 10*6/MM3 (ref 3.77–5.28)
RBC # UR STRIP: ABNORMAL /HPF
REF LAB TEST METHOD: ABNORMAL
SODIUM SERPL-SCNC: 138 MMOL/L (ref 136–145)
SP GR UR STRIP: 1.02 (ref 1–1.03)
SQUAMOUS #/AREA URNS HPF: ABNORMAL /HPF
TROPONIN T DELTA: NORMAL
TROPONIN T SERPL HS-MCNC: <6 NG/L
UROBILINOGEN UR QL STRIP: ABNORMAL
WBC # UR STRIP: ABNORMAL /HPF
WBC NRBC COR # BLD: 11.55 10*3/MM3 (ref 3.4–10.8)
WHOLE BLOOD HOLD COAG: NORMAL
WHOLE BLOOD HOLD SPECIMEN: NORMAL

## 2023-08-22 PROCEDURE — 80307 DRUG TEST PRSMV CHEM ANLYZR: CPT | Performed by: EMERGENCY MEDICINE

## 2023-08-22 PROCEDURE — 74177 CT ABD & PELVIS W/CONTRAST: CPT

## 2023-08-22 PROCEDURE — 25010000002 THIAMINE HCL 200 MG/2ML SOLUTION: Performed by: INTERNAL MEDICINE

## 2023-08-22 PROCEDURE — 25010000002 SODIUM CHLORIDE 0.9 % WITH KCL 20 MEQ 20-0.9 MEQ/L-% SOLUTION: Performed by: INTERNAL MEDICINE

## 2023-08-22 PROCEDURE — 85610 PROTHROMBIN TIME: CPT | Performed by: EMERGENCY MEDICINE

## 2023-08-22 PROCEDURE — 84484 ASSAY OF TROPONIN QUANT: CPT | Performed by: EMERGENCY MEDICINE

## 2023-08-22 PROCEDURE — 87040 BLOOD CULTURE FOR BACTERIA: CPT | Performed by: EMERGENCY MEDICINE

## 2023-08-22 PROCEDURE — 83690 ASSAY OF LIPASE: CPT | Performed by: EMERGENCY MEDICINE

## 2023-08-22 PROCEDURE — 81001 URINALYSIS AUTO W/SCOPE: CPT | Performed by: EMERGENCY MEDICINE

## 2023-08-22 PROCEDURE — 25510000001 IOPAMIDOL 61 % SOLUTION: Performed by: EMERGENCY MEDICINE

## 2023-08-22 PROCEDURE — 87077 CULTURE AEROBIC IDENTIFY: CPT | Performed by: EMERGENCY MEDICINE

## 2023-08-22 PROCEDURE — 25010000002 LORAZEPAM PER 2 MG: Performed by: INTERNAL MEDICINE

## 2023-08-22 PROCEDURE — 93010 ELECTROCARDIOGRAM REPORT: CPT | Performed by: INTERNAL MEDICINE

## 2023-08-22 PROCEDURE — 87086 URINE CULTURE/COLONY COUNT: CPT | Performed by: EMERGENCY MEDICINE

## 2023-08-22 PROCEDURE — 83735 ASSAY OF MAGNESIUM: CPT | Performed by: EMERGENCY MEDICINE

## 2023-08-22 PROCEDURE — 84145 PROCALCITONIN (PCT): CPT | Performed by: EMERGENCY MEDICINE

## 2023-08-22 PROCEDURE — 36415 COLL VENOUS BLD VENIPUNCTURE: CPT

## 2023-08-22 PROCEDURE — 25010000002 MORPHINE PER 10 MG: Performed by: NURSE PRACTITIONER

## 2023-08-22 PROCEDURE — 99285 EMERGENCY DEPT VISIT HI MDM: CPT

## 2023-08-22 PROCEDURE — 25010000002 HYDROMORPHONE 1 MG/ML SOLUTION: Performed by: EMERGENCY MEDICINE

## 2023-08-22 PROCEDURE — 80053 COMPREHEN METABOLIC PANEL: CPT | Performed by: EMERGENCY MEDICINE

## 2023-08-22 PROCEDURE — 87186 SC STD MICRODIL/AGAR DIL: CPT | Performed by: EMERGENCY MEDICINE

## 2023-08-22 PROCEDURE — 25010000002 ONDANSETRON PER 1 MG: Performed by: EMERGENCY MEDICINE

## 2023-08-22 PROCEDURE — 25010000002 DROPERIDOL PER 5 MG: Performed by: EMERGENCY MEDICINE

## 2023-08-22 PROCEDURE — 83605 ASSAY OF LACTIC ACID: CPT | Performed by: EMERGENCY MEDICINE

## 2023-08-22 PROCEDURE — 85025 COMPLETE CBC W/AUTO DIFF WBC: CPT | Performed by: EMERGENCY MEDICINE

## 2023-08-22 PROCEDURE — 82077 ASSAY SPEC XCP UR&BREATH IA: CPT | Performed by: EMERGENCY MEDICINE

## 2023-08-22 PROCEDURE — 25010000002 LORAZEPAM PER 2 MG: Performed by: NURSE PRACTITIONER

## 2023-08-22 PROCEDURE — 25010000002 PIPERACILLIN SOD-TAZOBACTAM PER 1 G: Performed by: INTERNAL MEDICINE

## 2023-08-22 PROCEDURE — 93005 ELECTROCARDIOGRAM TRACING: CPT | Performed by: EMERGENCY MEDICINE

## 2023-08-22 PROCEDURE — 25010000002 ONDANSETRON PER 1 MG: Performed by: INTERNAL MEDICINE

## 2023-08-22 PROCEDURE — 25010000002 DIPHENHYDRAMINE PER 50 MG: Performed by: EMERGENCY MEDICINE

## 2023-08-22 RX ORDER — SODIUM CHLORIDE 0.9 % (FLUSH) 0.9 %
10 SYRINGE (ML) INJECTION AS NEEDED
Status: DISCONTINUED | OUTPATIENT
Start: 2023-08-22 | End: 2023-08-25 | Stop reason: HOSPADM

## 2023-08-22 RX ORDER — LORAZEPAM 2 MG/ML
2 INJECTION INTRAMUSCULAR
Status: DISCONTINUED | OUTPATIENT
Start: 2023-08-22 | End: 2023-08-25 | Stop reason: HOSPADM

## 2023-08-22 RX ORDER — DIPHENHYDRAMINE HYDROCHLORIDE 50 MG/ML
25 INJECTION INTRAMUSCULAR; INTRAVENOUS ONCE
Status: COMPLETED | OUTPATIENT
Start: 2023-08-22 | End: 2023-08-22

## 2023-08-22 RX ORDER — CLONIDINE HYDROCHLORIDE 0.1 MG/1
0.1 TABLET ORAL EVERY 4 HOURS PRN
Status: DISCONTINUED | OUTPATIENT
Start: 2023-08-22 | End: 2023-08-25 | Stop reason: HOSPADM

## 2023-08-22 RX ORDER — THIAMINE HYDROCHLORIDE 100 MG/ML
200 INJECTION, SOLUTION INTRAMUSCULAR; INTRAVENOUS EVERY 8 HOURS SCHEDULED
Status: DISCONTINUED | OUTPATIENT
Start: 2023-08-22 | End: 2023-08-25 | Stop reason: HOSPADM

## 2023-08-22 RX ORDER — ONDANSETRON 4 MG/1
4 TABLET, FILM COATED ORAL EVERY 6 HOURS PRN
Status: DISCONTINUED | OUTPATIENT
Start: 2023-08-22 | End: 2023-08-25 | Stop reason: HOSPADM

## 2023-08-22 RX ORDER — BISACODYL 5 MG/1
5 TABLET, DELAYED RELEASE ORAL DAILY PRN
Status: DISCONTINUED | OUTPATIENT
Start: 2023-08-22 | End: 2023-08-25 | Stop reason: HOSPADM

## 2023-08-22 RX ORDER — LORAZEPAM 2 MG/ML
1 INJECTION INTRAMUSCULAR
Status: DISCONTINUED | OUTPATIENT
Start: 2023-08-22 | End: 2023-08-25 | Stop reason: HOSPADM

## 2023-08-22 RX ORDER — SODIUM CHLORIDE AND POTASSIUM CHLORIDE 150; 900 MG/100ML; MG/100ML
125 INJECTION, SOLUTION INTRAVENOUS CONTINUOUS
Status: DISCONTINUED | OUTPATIENT
Start: 2023-08-22 | End: 2023-08-25 | Stop reason: HOSPADM

## 2023-08-22 RX ORDER — ONDANSETRON 2 MG/ML
4 INJECTION INTRAMUSCULAR; INTRAVENOUS EVERY 6 HOURS PRN
Status: DISCONTINUED | OUTPATIENT
Start: 2023-08-22 | End: 2023-08-25 | Stop reason: HOSPADM

## 2023-08-22 RX ORDER — LORAZEPAM 1 MG/1
2 TABLET ORAL
Status: DISCONTINUED | OUTPATIENT
Start: 2023-08-22 | End: 2023-08-25 | Stop reason: HOSPADM

## 2023-08-22 RX ORDER — UREA 10 %
3 LOTION (ML) TOPICAL NIGHTLY PRN
Status: DISCONTINUED | OUTPATIENT
Start: 2023-08-22 | End: 2023-08-25 | Stop reason: HOSPADM

## 2023-08-22 RX ORDER — POLYETHYLENE GLYCOL 3350 17 G/17G
17 POWDER, FOR SOLUTION ORAL DAILY PRN
Status: DISCONTINUED | OUTPATIENT
Start: 2023-08-22 | End: 2023-08-25 | Stop reason: HOSPADM

## 2023-08-22 RX ORDER — LORAZEPAM 2 MG/ML
1 INJECTION INTRAMUSCULAR ONCE
Status: COMPLETED | OUTPATIENT
Start: 2023-08-22 | End: 2023-08-22

## 2023-08-22 RX ORDER — AMITRIPTYLINE HYDROCHLORIDE 25 MG/1
25 TABLET, FILM COATED ORAL NIGHTLY
Status: DISCONTINUED | OUTPATIENT
Start: 2023-08-22 | End: 2023-08-25 | Stop reason: HOSPADM

## 2023-08-22 RX ORDER — DROPERIDOL 2.5 MG/ML
1.25 INJECTION, SOLUTION INTRAMUSCULAR; INTRAVENOUS ONCE
Status: COMPLETED | OUTPATIENT
Start: 2023-08-22 | End: 2023-08-22

## 2023-08-22 RX ORDER — SODIUM CHLORIDE 9 MG/ML
125 INJECTION, SOLUTION INTRAVENOUS CONTINUOUS
Status: DISCONTINUED | OUTPATIENT
Start: 2023-08-22 | End: 2023-08-22

## 2023-08-22 RX ORDER — LORAZEPAM 1 MG/1
1 TABLET ORAL EVERY 6 HOURS
Status: COMPLETED | OUTPATIENT
Start: 2023-08-23 | End: 2023-08-24

## 2023-08-22 RX ORDER — BISACODYL 10 MG
10 SUPPOSITORY, RECTAL RECTAL DAILY PRN
Status: DISCONTINUED | OUTPATIENT
Start: 2023-08-22 | End: 2023-08-25 | Stop reason: HOSPADM

## 2023-08-22 RX ORDER — FOLIC ACID 1 MG/1
1 TABLET ORAL DAILY
Status: DISCONTINUED | OUTPATIENT
Start: 2023-08-23 | End: 2023-08-25 | Stop reason: HOSPADM

## 2023-08-22 RX ORDER — ONDANSETRON 2 MG/ML
4 INJECTION INTRAMUSCULAR; INTRAVENOUS ONCE
Status: COMPLETED | OUTPATIENT
Start: 2023-08-22 | End: 2023-08-22

## 2023-08-22 RX ORDER — ACETAMINOPHEN 325 MG/1
650 TABLET ORAL EVERY 4 HOURS PRN
Status: DISCONTINUED | OUTPATIENT
Start: 2023-08-22 | End: 2023-08-25 | Stop reason: HOSPADM

## 2023-08-22 RX ORDER — MORPHINE SULFATE 2 MG/ML
2 INJECTION, SOLUTION INTRAMUSCULAR; INTRAVENOUS
Status: DISCONTINUED | OUTPATIENT
Start: 2023-08-22 | End: 2023-08-25 | Stop reason: HOSPADM

## 2023-08-22 RX ORDER — LORAZEPAM 1 MG/1
1 TABLET ORAL
Status: DISCONTINUED | OUTPATIENT
Start: 2023-08-22 | End: 2023-08-25 | Stop reason: HOSPADM

## 2023-08-22 RX ORDER — LORAZEPAM 1 MG/1
2 TABLET ORAL EVERY 6 HOURS
Status: DISPENSED | OUTPATIENT
Start: 2023-08-22 | End: 2023-08-23

## 2023-08-22 RX ORDER — AMOXICILLIN 250 MG
2 CAPSULE ORAL 2 TIMES DAILY
Status: DISCONTINUED | OUTPATIENT
Start: 2023-08-22 | End: 2023-08-25 | Stop reason: HOSPADM

## 2023-08-22 RX ADMIN — SODIUM CHLORIDE 1000 ML: 9 INJECTION, SOLUTION INTRAVENOUS at 13:37

## 2023-08-22 RX ADMIN — ONDANSETRON 4 MG: 2 INJECTION INTRAMUSCULAR; INTRAVENOUS at 13:40

## 2023-08-22 RX ADMIN — POTASSIUM CHLORIDE AND SODIUM CHLORIDE 125 ML/HR: 900; 150 INJECTION, SOLUTION INTRAVENOUS at 23:03

## 2023-08-22 RX ADMIN — MORPHINE SULFATE 2 MG: 2 INJECTION, SOLUTION INTRAMUSCULAR; INTRAVENOUS at 20:40

## 2023-08-22 RX ADMIN — MORPHINE SULFATE 2 MG: 2 INJECTION, SOLUTION INTRAMUSCULAR; INTRAVENOUS at 22:59

## 2023-08-22 RX ADMIN — HYDROMORPHONE HYDROCHLORIDE 1 MG: 1 INJECTION, SOLUTION INTRAMUSCULAR; INTRAVENOUS; SUBCUTANEOUS at 17:31

## 2023-08-22 RX ADMIN — DIPHENHYDRAMINE HYDROCHLORIDE 25 MG: 50 INJECTION, SOLUTION INTRAMUSCULAR; INTRAVENOUS at 14:32

## 2023-08-22 RX ADMIN — DROPERIDOL 1.25 MG: 2.5 INJECTION, SOLUTION INTRAMUSCULAR; INTRAVENOUS at 14:32

## 2023-08-22 RX ADMIN — LORAZEPAM 1 MG: 2 INJECTION INTRAMUSCULAR; INTRAVENOUS at 22:15

## 2023-08-22 RX ADMIN — SODIUM CHLORIDE 125 ML/HR: 9 INJECTION, SOLUTION INTRAVENOUS at 13:45

## 2023-08-22 RX ADMIN — PIPERACILLIN SODIUM AND TAZOBACTAM SODIUM 3.38 G: 3; .375 INJECTION, SOLUTION INTRAVENOUS at 22:05

## 2023-08-22 RX ADMIN — HYDROMORPHONE HYDROCHLORIDE 1 MG: 1 INJECTION, SOLUTION INTRAMUSCULAR; INTRAVENOUS; SUBCUTANEOUS at 14:50

## 2023-08-22 RX ADMIN — HYDROMORPHONE HYDROCHLORIDE 1 MG: 1 INJECTION, SOLUTION INTRAMUSCULAR; INTRAVENOUS; SUBCUTANEOUS at 13:40

## 2023-08-22 RX ADMIN — ONDANSETRON 4 MG: 2 INJECTION INTRAMUSCULAR; INTRAVENOUS at 20:40

## 2023-08-22 RX ADMIN — IOPAMIDOL 100 ML: 612 INJECTION, SOLUTION INTRAVENOUS at 15:23

## 2023-08-22 RX ADMIN — THIAMINE HYDROCHLORIDE 200 MG: 100 INJECTION, SOLUTION INTRAMUSCULAR; INTRAVENOUS at 22:58

## 2023-08-22 RX ADMIN — LORAZEPAM 1 MG: 2 INJECTION INTRAMUSCULAR; INTRAVENOUS at 22:59

## 2023-08-22 NOTE — ED PROVIDER NOTES
" EMERGENCY DEPARTMENT ENCOUNTER    Room Number:  S410/1  Date of encounter:  8/22/2023  PCP: Rachell Pozo APRN  Historian: Patient and boyfriend  Relevant information and history provided by sources other than the patient will be included below and in the ED Course.  Review of pertinent past medical records may also be included in record below and ED Course.    HPI:  Chief Complaint: \"I am having pancreatitis again\" abdominal pain with nausea and vomiting  A complete HPI/ROS/PMH/PSH/SH/FH are unobtainable due to: Not applicable  Context: Piper Cain is a 42 y.o. female who presents to the ED c/o patient started having abdominal pain again a few days ago and then really became worse yesterday evening and afternoon.  She has had some episodic vomiting over the past few days and has become persistent since last night.  Whenever she tries to drink or eat the pain is worse and she will vomit it up.  She is not able tolerate any solids.  Pain is in the mid epigastric area goes to the left upper quadrant into her midline back and a little to the left side.  Pain is worse with eating and drinking as well as with movement.  She has not vomited up any blood.  She reports no diarrhea or black stool.  She reports her last alcohol was a couple weeks ago.  She did take an oxycodone as well as a Creon today.  She has never had surgery on her abdomen and pelvis.  She has not had a menstrual period since she was 30 due to premature menopause.  She denies the possibility of pregnancy.  Denies any urinary symptoms.  Denies any illegal drug use.  Denies any chest pain although the midepigastric pain radiates into the lower aspect of her chest on the left and posteriorly.  Denies shortness of breath.      Previous Episodes: Yes multiple times in the past  Current Symptoms: See above    MEDICAL HISTORY REVIEWED  In reviewing old records I see this patient was discharged from [facility] on 6/9/2023 with a " diagnosis of nausea and vomiting and chronic pancreatitis.  She does have a history of alcohol induced pancreatitis, tobacco abuse and chronic abdominal pain.    I can see this patient was also admitted 3/27/2023 with left flank pain did have a pancreatic pseudocyst and did have adrenal hemorrhage.    I saw that the patient was again admitted in December 2022 to [facility] had left-sided abdominal pain with pancreatic pseudocyst and chronic pancreatitis.  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    Anemia of chronic disease 08/08/2022    GERD without esophagitis 08/08/2022    Alcohol dependence in remission 08/08/2022    Pancreatic pseudocyst 08/14/2022    Candida esophagitis 08/14/2022    Ureterolithiasis 10/20/2022    Left flank pain 10/21/2022    Bacterial vaginosis 10/23/2022    Abdominal pain 11/30/2022    Adrenal hemorrhage 03/25/2023    Chronic pancreatitis, unspecified pancreatitis type 06/08/2023    Macrocytosis 06/08/2023    Nausea & vomiting 06/08/2023    Left-sided chest pain 06/08/2023    Tobacco abuse 06/08/2023     Resolved Ambulatory Problems     Diagnosis Date Noted    Hypokalemia 09/28/2017    Mixed acid base balance disorder 09/28/2017    Dehydration 09/28/2017     Past Medical History:   Diagnosis Date    Anxiety     E. coli bacteremia     ETOH abuse     GERD (gastroesophageal reflux disease)     Hiatal hernia     Migraine     Miscarriage 2004    Pancreatitis          PAST SURGICAL HISTORY  Past Surgical History:   Procedure Laterality Date    ENDOSCOPY N/A 8/10/2022     Procedure: ESOPHAGOGASTRODUODENOSCOPY with bxs;  Surgeon: Salvador Price MD;  Location: [facility] ENDOSCOPY;  Service: Gastroenterology;  Laterality: N/A;  Pre: r/o esophageal  varacies, abd pain  Post: esophageal plaque         FAMILY HISTORY  Family History   Problem Relation Age of Onset    Alcohol abuse Mother     Arthritis Mother     Asthma Mother     Miscarriages / Stillbirths Mother     Cancer Father     Diabetes Father     Drug abuse Father     Early death Father     Heart disease Father     Hyperlipidemia Father     Hypertension Father     Alcohol abuse Paternal Aunt     Cancer Paternal Aunt     Diabetes Paternal Aunt     Drug abuse Paternal Aunt     Early death Paternal Aunt     Heart disease Paternal Aunt     Hyperlipidemia Paternal Aunt     Hypertension Paternal Aunt     Alcohol abuse Maternal Grandmother     Alcohol abuse Maternal Grandfather     Alcohol abuse Paternal Grandmother     Cancer Paternal Grandmother     Diabetes Paternal Grandmother     Drug abuse Paternal Grandmother     Early death Paternal Grandmother     Heart disease Paternal Grandmother     Hyperlipidemia Paternal Grandmother     Hypertension Paternal Grandmother     Alcohol abuse Paternal Grandfather          SOCIAL HISTORY  Social History     Socioeconomic History    Marital status: Single   Tobacco Use    Smoking status: Every Day     Packs/day: 0.50     Types: Cigarettes     Start date: 1/28/1996    Smokeless tobacco: Current   Vaping Use    Vaping Use: Never used   Substance and Sexual Activity    Alcohol use: Never    Drug use: No    Sexual activity: Yes     Partners: Male     Birth control/protection: None     Comment: IUD removed 6/29/2017         ALLERGIES  Nickel and Hydrocodone-acetaminophen        REVIEW OF SYSTEMS  Review of Systems     All systems reviewed and negative except for those discussed in HPI.       PHYSICAL EXAM    I have reviewed the triage vital signs and nursing notes.    ED Triage Vitals   Temp Heart Rate  Resp BP SpO2   08/22/23 1247 08/22/23 1247 08/22/23 1254 08/22/23 1254 08/22/23 1247   98.2 øF (36.8 øC) (!) 136 20 (!) 152/116 93 %      Temp src Heart Rate Source Patient Position BP Location FiO2 (%)   08/22/23 1247 08/22/23 1247 08/22/23 1254 08/22/23 1254 --   Tympanic Monitor Lying Right arm        GENERAL: Patient looks uncomfortable and looks like she is in pain.  She is tachycardic with a heart rate in the low 100s and a sinus tachycardia.  Her heart rate is about 1 10-1 20 on the monitor.  Blood pressure is normal she is afebrile oxygen saturation is normal.  .Vital signs on my initial evaluation have been reviewed  HENT: nares patent  Head/neck/ face are symmetric without gross deformity, signs of trauma, or swelling  EYES: no scleral icterus, no conjunctival pallor.  NECK: Supple, no meningismus  CV: regular rhythm, tachycardia with intact distal pulses.  No murmur or rub  RESPIRATORY: normal effort and no respiratory distress.  Clear to auscultation bilaterally  ABDOMEN: This is midepigastric region left upper quadrant with radiation to her back in the left lower aspect of her chest anterior and posteriorly.  There is some voluntary guarding.  Positive bowel sounds.  She does have some dry heaves after my abdominal exam.  MUSCULOSKELETAL: no deformity.  No edema.  Intact distal pulses that are equal strong and symmetric.  Patient appears chronically malnourished.  She has no cyanosis or pallor.  NEURO: alert and appropriate, moves all extremities, follows commands.  No focal motor or sensory changes  SKIN: warm, dry    Vital signs and nursing notes reviewed.        LAB RESULTS  Recent Results (from the past 24 hour(s))   Comprehensive Metabolic Panel    Collection Time: 08/22/23  1:01 PM    Specimen: Blood   Result Value Ref Range    Glucose 122 (H) 65 - 99 mg/dL    BUN 6 6 - 20 mg/dL    Creatinine 0.64 0.57 - 1.00 mg/dL    Sodium 138 136 - 145 mmol/L    Potassium 3.6 3.5 - 5.2 mmol/L    Chloride 99 98  - 107 mmol/L    CO2 25.0 22.0 - 29.0 mmol/L    Calcium 9.7 8.6 - 10.5 mg/dL    Total Protein 7.4 6.0 - 8.5 g/dL    Albumin 4.3 3.5 - 5.2 g/dL    ALT (SGPT) 26 1 - 33 U/L    AST (SGOT) 17 1 - 32 U/L    Alkaline Phosphatase 140 (H) 39 - 117 U/L    Total Bilirubin 0.5 0.0 - 1.2 mg/dL    Globulin 3.1 gm/dL    A/G Ratio 1.4 g/dL    BUN/Creatinine Ratio 9.4 7.0 - 25.0    Anion Gap 14.0 5.0 - 15.0 mmol/L    eGFR 114.7 >60.0 mL/min/1.73   Lipase    Collection Time: 08/22/23  1:01 PM    Specimen: Blood   Result Value Ref Range    Lipase 115 (H) 13 - 60 U/L   Green Top (Gel)    Collection Time: 08/22/23  1:01 PM   Result Value Ref Range    Extra Tube Hold for add-ons.    Lavender Top    Collection Time: 08/22/23  1:01 PM   Result Value Ref Range    Extra Tube hold for add-on    Gold Top - SST    Collection Time: 08/22/23  1:01 PM   Result Value Ref Range    Extra Tube Hold for add-ons.    Light Blue Top    Collection Time: 08/22/23  1:01 PM   Result Value Ref Range    Extra Tube Hold for add-ons.    CBC Auto Differential    Collection Time: 08/22/23  1:01 PM    Specimen: Blood   Result Value Ref Range    WBC 11.55 (H) 3.40 - 10.80 10*3/mm3    RBC 4.22 3.77 - 5.28 10*6/mm3    Hemoglobin 14.6 12.0 - 15.9 g/dL    Hematocrit 42.9 34.0 - 46.6 %    .7 (H) 79.0 - 97.0 fL    MCH 34.6 (H) 26.6 - 33.0 pg    MCHC 34.0 31.5 - 35.7 g/dL    RDW 14.3 12.3 - 15.4 %    RDW-SD 53.3 37.0 - 54.0 fl    MPV 9.7 6.0 - 12.0 fL    Platelets 194 140 - 450 10*3/mm3    Neutrophil % 82.0 (H) 42.7 - 76.0 %    Lymphocyte % 10.7 (L) 19.6 - 45.3 %    Monocyte % 6.2 5.0 - 12.0 %    Eosinophil % 0.4 0.3 - 6.2 %    Basophil % 0.3 0.0 - 1.5 %    Immature Grans % 0.4 0.0 - 0.5 %    Neutrophils, Absolute 9.46 (H) 1.70 - 7.00 10*3/mm3    Lymphocytes, Absolute 1.24 0.70 - 3.10 10*3/mm3    Monocytes, Absolute 0.72 0.10 - 0.90 10*3/mm3    Eosinophils, Absolute 0.05 0.00 - 0.40 10*3/mm3    Basophils, Absolute 0.03 0.00 - 0.20 10*3/mm3    Immature Grans, Absolute  0.05 0.00 - 0.05 10*3/mm3    nRBC 0.0 0.0 - 0.2 /100 WBC   Protime-INR    Collection Time: 08/22/23  1:01 PM    Specimen: Blood   Result Value Ref Range    Protime 12.6 11.7 - 14.2 Seconds    INR 0.94 0.90 - 1.10   Magnesium    Collection Time: 08/22/23  1:01 PM    Specimen: Blood   Result Value Ref Range    Magnesium 2.1 1.6 - 2.6 mg/dL   High Sensitivity Troponin T    Collection Time: 08/22/23  1:01 PM    Specimen: Blood   Result Value Ref Range    HS Troponin T <6 <10 ng/L   Ethanol    Collection Time: 08/22/23  1:01 PM    Specimen: Blood   Result Value Ref Range    Ethanol <10 0 - 10 mg/dL    Ethanol % <0.010 %   Procalcitonin    Collection Time: 08/22/23  1:01 PM    Specimen: Blood   Result Value Ref Range    Procalcitonin <0.02 0.00 - 0.25 ng/mL   ECG 12 Lead Other; Midepigastric abdominal pain    Collection Time: 08/22/23  1:46 PM   Result Value Ref Range    QT Interval 384 ms   Urinalysis With Microscopic If Indicated (No Culture) - Urine, Clean Catch    Collection Time: 08/22/23  3:16 PM    Specimen: Urine, Clean Catch   Result Value Ref Range    Color, UA Yellow Yellow, Straw    Appearance, UA Cloudy (A) Clear    pH, UA 7.0 5.0 - 8.0    Specific Gravity, UA 1.020 1.005 - 1.030    Glucose, UA Negative Negative    Ketones, UA Trace (A) Negative    Bilirubin, UA Negative Negative    Blood, UA Negative Negative    Protein, UA Trace (A) Negative    Leuk Esterase, UA Negative Negative    Nitrite, UA Positive (A) Negative    Urobilinogen, UA 0.2 E.U./dL 0.2 - 1.0 E.U./dL   Urine Drug Screen - Urine, Clean Catch    Collection Time: 08/22/23  3:16 PM    Specimen: Urine, Clean Catch   Result Value Ref Range    Amphet/Methamphet, Screen Negative Negative    Barbiturates Screen, Urine Negative Negative    Benzodiazepine Screen, Urine Negative Negative    Cocaine Screen, Urine Negative Negative    Opiate Screen Negative Negative    THC, Screen, Urine Negative Negative    Methadone Screen, Urine Negative Negative     Oxycodone Screen, Urine Positive (A) Negative    Fentanyl, Urine Negative Negative   Urinalysis, Microscopic Only - Urine, Clean Catch    Collection Time: 08/22/23  3:16 PM    Specimen: Urine, Clean Catch   Result Value Ref Range    RBC, UA 0-2 None Seen, 0-2 /HPF    WBC, UA 13-20 (A) None Seen, 0-2 /HPF    Bacteria, UA 4+ (A) None Seen /HPF    Squamous Epithelial Cells, UA 7-12 (A) None Seen, 0-2 /HPF    Hyaline Casts, UA 3-6 None Seen /LPF    Methodology Automated Microscopy    High Sensitivity Troponin T 2Hr    Collection Time: 08/22/23  4:19 PM    Specimen: Blood   Result Value Ref Range    HS Troponin T <6 <10 ng/L    Troponin T Delta     Lactic Acid, Plasma    Collection Time: 08/22/23  4:19 PM    Specimen: Blood   Result Value Ref Range    Lactate 0.8 0.5 - 2.0 mmol/L       Ordered the above labs and independently reviewed the results.        RADIOLOGY  CT Abdomen Pelvis With Contrast    Result Date: 8/22/2023  CT ABDOMEN AND PELVIS WITH IV CONTRAST  HISTORY: 42-year-old female with epigastric pain. Pancreatitis history.  TECHNIQUE: Radiation dose reduction techniques were utilized, including automated exposure control and exposure modulation based on body size. 3 mm images were obtained through the abdomen and pelvis after the administration of IV contrast. Compared with previous CT 06/08/2023.  FINDINGS: There has been significant interval increase in the peripancreatic fluid collection posterior to the pancreatic tail measuring approximately 8.6 x 6.2 cm, previously 4.5 x 3.3 cm. The much smaller fluid collections within the left adrenal gland are larger as well. There is also slight increase in the rind of complex fluid along the left paracolic gutter posterior to the spleen and lateral to the dominant peripancreatic collection. There is also significantly more prominent thickening of the posteromedial left diaphragmatic wilmer with the thickening extending anteriorly to the midline, and there are now tiny  fluid collections within the left diaphragmatic phlegmon measuring 1.6 cm transversely. There is no change in the appearance of the pancreas which has changes of chronic pancreatitis predominantly at the pancreatic head and neck and the degree of pancreatic ductal dilatation proximal to the stones is unchanged, 5 mm. There is no biliary dilatation and the gallbladder appears unremarkable. The splenic vein is diminutive. The portal vein and SMV are patent. The phlegmon along the left posterior medial diaphragmatic wilmer abuts and thickens the posterior wall of the GE junction and gastric fundus. There is no acute bowel abnormality. Uterus and adnexa appear unremarkable. There are no pleural effusions or pneumonia at the visualized lower lobes.      1. Marked increase in size of the 8.6 x 6.2 cm peripancreatic fluid collection, development of phlegmon along the left posterior medial diaphragmatic wilmer with phlegmon extending to the posterior wall of the GE junction and gastric fundus. Much smaller fluid collections within the left adrenal gland are larger and there is thicker phlegmon along the left paracolic gutter posterior to the spleen. 2. There is no biliary dilatation and the gallbladder appears unremarkable. There are no new vascular complications of pancreatitis.       I ordered the above noted radiological studies. Reviewed by me and discussed with radiologist.  See dictation for official radiology interpretation.      PROCEDURES    Procedures      MEDICATIONS GIVEN IN ER    Medications   sodium chloride 0.9 % flush 10 mL (has no administration in time range)   sodium chloride 0.9 % flush 10 mL (has no administration in time range)   sodium chloride 0.9 % infusion (125 mL/hr Intravenous New Bag 8/22/23 1345)   iopamidol (ISOVUE-300) 61 % injection 100 mL (has no administration in time range)   acetaminophen (TYLENOL) tablet 650 mg (has no administration in time range)   sennosides-docusate (PERICOLACE) 8.6-50  MG per tablet 2 tablet (has no administration in time range)     And   polyethylene glycol (MIRALAX) packet 17 g (has no administration in time range)     And   bisacodyl (DULCOLAX) EC tablet 5 mg (has no administration in time range)     And   bisacodyl (DULCOLAX) suppository 10 mg (has no administration in time range)   ondansetron (ZOFRAN) tablet 4 mg (has no administration in time range)     Or   ondansetron (ZOFRAN) injection 4 mg (has no administration in time range)   melatonin tablet 3 mg (has no administration in time range)   sodium chloride 0.9 % with KCl 20 mEq/L infusion (has no administration in time range)   morphine injection 2 mg (has no administration in time range)   sodium chloride 0.9 % bolus 1,000 mL (0 mL Intravenous Stopped 8/22/23 1902)   HYDROmorphone (DILAUDID) injection 1 mg (1 mg Intravenous Given 8/22/23 1340)   ondansetron (ZOFRAN) injection 4 mg (4 mg Intravenous Given 8/22/23 1340)   droperidol (INAPSINE) injection 1.25 mg (1.25 mg Intravenous Given 8/22/23 1432)   diphenhydrAMINE (BENADRYL) injection 25 mg (25 mg Intravenous Given 8/22/23 1432)   HYDROmorphone (DILAUDID) injection 1 mg (1 mg Intravenous Given 8/22/23 1450)   iopamidol (ISOVUE-300) 61 % injection 100 mL (100 mL Intravenous Given by Other 8/22/23 1523)   HYDROmorphone (DILAUDID) injection 1 mg (1 mg Intravenous Given 8/22/23 1731)         All labs have been independently reviewed by me.  All radiology studies have been reviewed by me and I discussed with radiologist dictating the report when indicated below.  All EKG's independently viewed and interpreted by me.  Discussion below represents my analysis of pertinent findings related to patient's condition, differential diagnosis, treatment plan and final disposition.        PROGRESS, DATA ANALYSIS, CONSULTS, AND MEDICAL DECISION MAKING    Differential diagnosis includes   - hepatobiliary pathology such as cholecystitis, cholangitis, and symptomatic  cholelithiasis  -PUD  -Mesenteric ischemia  - Pancreatitis  - Dyspepsia  - Small bowel or large bowel obstruction  - Appendicitis  - Diverticulitis  - UTI including pyelonephritis  - Ureteral stone  - Zoster  - Colitis, including infectious and ischemic  - Atypical ACS  This patient looks uncomfortable with a history of pancreatitis in the past.  I suspect that is the same etiology of this episode.  She is also been admitted with GERD and abdominal pain and vomiting without pancreatitis as well and looking at old records.  We will give her some pain medicine, IV fluids and check a CT scan of her abdomen pelvis.  She will very likely need to get admitted to the hospital.  Discussed the plan with the patient and significant other at bedside.  All questions answered    ED Course as of 08/22/23 2039 Tue Aug 22, 2023   1442 Lipase(!): 115 [MM]   1442 Ethanol %: <0.010 [MM]   1442 WBC(!): 11.55 [MM]   1442 HS Troponin T: <6 [MM]   1506 Lipase(!): 115  Pancreatitis.  Mildly elevated from last lipase in the 70s. [MM]   1506 Patient has had persistent dry heaves and pain.  I have given her Dilaudid 1 mg and then Inapsine 1.25 mg and then is just given her another dose of Dilaudid 1 mg.  She was unable to get a CT scan after the initial 1 mg of Dilaudid and Inapsine.  Give her another dose of Dilaudid to see if that will help her sit still so we can get a repeat CT scan. [MM]   1507 My own independent TURP rotation of the EKG that was done at 1:46 PM reveals a rate of 86 it is sinus rhythm it is narrow complex normal axis I do not appreciate any acute injury pattern there are some nonspecific T wave changes  This EKG looks pretty darn similar to an EKG that was done on 6/7/2023. [MM]   1543 Bacteria, UA(!): 4+ [MM]   1543 WBC, UA(!): 13-20 [MM]   1543 Ketones, UA(!): Trace [MM]   1543 Nitrite, UA(!): Positive [MM]   1551 Oxycodone Screen, Urine(!): Positive [MM]   1709 I reviewed the CT scan report from the radiologist.   There is marked increased size of the 8.6 x 6.2 cm peripancreatic fluid collection this development of likely a phlegmon along the posterior medial diaphragmatic wilmer with phlegmon extending to the posterior wall of the GE junction and gastric fundus.  There is much smaller fluid collection within the left adrenal gland which appears larger and thicker phlegmon along the left paracolic gutter posterior to the spleen.  No biliary dilatation in the bladder gallbladder appears unremarkable.  No new vascular abnormality appreciated. [MM]   1716 I can see the CT scan that was done here on 6/8/2023.  The CT scan done today was compared to that CT scan.  I did review the report of that CT scan as well. [MM]   1716 I have reevaluated the patient.  She is hypertensive.  Her heart rate is now 70s to 80s.  She still complains of bad pain but she looks more comfortable than my initial exam.  I informed her the results of the CT scan and lab work.  She does inform me that she sees a GI in [facility] and there was talk about placing a drain.  Supposed to be a drain from her stomach to drain a fluid collection.  I Agustina consult general surgery initially to see if this is something that they potentially take care of when she gets in or whether this will be a gastroenterology issue. [MM]   1721 I discussed the case with the general surgeon Dr. Marte.  She reviewed the CT scan report as she currently was not in front of an image computer so she can see the images.  She will do that when that becomes available to her.  I informed her of the CT scan report and the patient's clinical condition.  I do not think this patient has an infectious etiology.  Patient's procalcitonin is normal lactic acid is normal and she is afebrile here and prior to arrival here.  This is likely a fluid collection that is nonacutely infectious.  She does not recommend placing the patient on antibiotics at this time.  She will see and evaluate the  patient.  Likely these fluids will need to either be drained by IR or potential GI.  All questions answered at this time [MM]      ED Course User Index  [MM] Tomasz Viera MD       AS OF 20:39 EDT VITALS:    BP - 167/99  HR - 76  TEMP - 98.2 øF (36.8 øC) (Tympanic)  02 SATS - 97%    SOCIAL DETERMINANTS OF HEALTH THAT IMPACT OR LIMIT CARE (For example..Homelessness,safe discharge, inability to obtain care, follow up, or prescriptions):      DIAGNOSIS  Final diagnoses:   Epigastric pain   Phlegmon of pancreas   Alcohol-induced chronic pancreatitis   Nausea and vomiting, unspecified vomiting type         DISPOSITION  I have reviewed the test results with my patient and explained the current treatment plan.  I answered all of the patient's questions.  The patient will be admitted to monitor bed at this time.  The patient is not hypotensive and is tolerating their current disease condition well enough for a monitored bed at this time.  The patient's current condition does not require intensive care treatment at this time.            DICTATED UTILIZING DRAGON DICTATION    Note Disclaimer: At [facility], we believe that sharing information builds trust and better relationships. You are receiving this note because you recently visited [facility]. It is possible you will see health information before a provider has talked with you about it. This kind of information can be easy to misunderstand. To help you fully understand what it means for your health, we urge you to discuss this note with your provider.       Tomasz Viera MD  08/22/23 2039

## 2023-08-22 NOTE — ED TRIAGE NOTES
"Patient presents to ED with c/o \"pancreatitis flare up\". Patient reports L sided pain and vomiting x 4 days.   " BMP in system

## 2023-08-22 NOTE — ED NOTES
Nursing report ED to floor  Piper Hodgesrp  42 y.o.  female    HPI :   Chief Complaint   Patient presents with    Abdominal Pain       Admitting doctor:   Kalpana Beasley MD    Admitting diagnosis:   The primary encounter diagnosis was Epigastric pain. Diagnoses of Phlegmon of pancreas, Alcohol-induced chronic pancreatitis, and Nausea and vomiting, unspecified vomiting type were also pertinent to this visit.    Code status:   Current Code Status       Date Active Code Status Order ID Comments User Context       8/22/2023 1727 CPR (Attempt to Resuscitate) [id number]  Kalpana Beasley MD ED        Question Answer    Code Status (Patient has no pulse and is not breathing) CPR (Attempt to Resuscitate)    Medical Interventions (Patient has pulse or is breathing) Full                    Allergies:   Nickel and Hydrocodone-acetaminophen    Isolation:   No active isolations    Intake and Output  No intake or output data in the 24 hours ending 08/22/23 1741    Weight:       08/22/23  1254   Weight: 58.1 kg (128 lb)       Most recent vitals:   Vitals:    08/22/23 1256 08/22/23 1326 08/22/23 1456 08/22/23 1656   BP: (!) 158/120 131/100 (!) 152/104 (!) 172/105   BP Location:       Patient Position:       Pulse: 91 117 75 73   Resp:       Temp:       TempSrc:       SpO2: 100% 99% 97% 99%   Weight:       Height:           Active LDAs/IV Access:   Lines, Drains & Airways       Active LDAs       Name Placement date Placement time Site Days    Peripheral IV 08/22/23 1300 Right Antecubital 08/22/23  1300  Antecubital  less than 1                    Labs (abnormal labs have a star):   Labs Reviewed   COMPREHENSIVE METABOLIC PANEL - Abnormal; Notable for the following components:       Result Value    Glucose 122 (*)     Alkaline Phosphatase 140 (*)     All other components within normal limits    Narrative:     GFR Normal >60  Chronic Kidney Disease <60  Kidney Failure <15     LIPASE - Abnormal; Notable for the following  components:    Lipase 115 (*)     All other components within normal limits   URINALYSIS W/ MICROSCOPIC IF INDICATED (NO CULTURE) - Abnormal; Notable for the following components:    Appearance, UA Cloudy (*)     Ketones, UA Trace (*)     Protein, UA Trace (*)     Nitrite, UA Positive (*)     All other components within normal limits   CBC WITH AUTO DIFFERENTIAL - Abnormal; Notable for the following components:    WBC 11.55 (*)     .7 (*)     MCH 34.6 (*)     Neutrophil % 82.0 (*)     Lymphocyte % 10.7 (*)     Neutrophils, Absolute 9.46 (*)     All other components within normal limits   URINE DRUG SCREEN - Abnormal; Notable for the following components:    Oxycodone Screen, Urine Positive (*)     All other components within normal limits    Narrative:     Negative Thresholds Per Drugs Screened:    Amphetamines                 500 ng/ml  Barbiturates                 200 ng/ml  Benzodiazepines              100 ng/ml  Cocaine                      300 ng/ml  Methadone                    300 ng/ml  Opiates                      300 ng/ml  Oxycodone                    100 ng/ml  THC                           50 ng/ml  Fentanyl                       5 ng/ml      The Normal Value for all drugs tested is negative. This report includes final unconfirmed screening results to be used for medical treatment purposes only. Unconfirmed results must not be used for non-medical purposes such as employment or legal testing. Clinical consideration should be applied to any drug of abuse test, particularly when unconfirmed results are used.           URINALYSIS, MICROSCOPIC ONLY - Abnormal; Notable for the following components:    WBC, UA 13-20 (*)     Bacteria, UA 4+ (*)     Squamous Epithelial Cells, UA 7-12 (*)     All other components within normal limits   PROTIME-INR - Normal   MAGNESIUM - Normal   TROPONIN - Normal    Narrative:     High Sensitive Troponin T Reference Range:  <10.0 ng/L- Negative Female for AMI  <15.0 ng/L-  "Negative Male for AMI  >=10 - Abnormal Female indicating possible myocardial injury.  >=15 - Abnormal Male indicating possible myocardial injury.   Clinicians would have to utilize clinical acumen, EKG, Troponin, and serial changes to determine if it is an Acute Myocardial Infarction or myocardial injury due to an underlying chronic condition.        PROCALCITONIN - Normal    Narrative:     As a Marker for Sepsis (Non-Neonates):    1. <0.5 ng/mL represents a low risk of severe sepsis and/or septic shock.  2. >2 ng/mL represents a high risk of severe sepsis and/or septic shock.    As a Marker for Lower Respiratory Tract Infections that require antibiotic therapy:    PCT on Admission    Antibiotic Therapy       6-12 Hrs later    >0.5                Strongly Recommended  >0.25 - <0.5        Recommended   0.1 - 0.25          Discouraged              Remeasure/reassess PCT  <0.1                Strongly Discouraged     Remeasure/reassess PCT    As 28 day mortality risk marker: \"Change in Procalcitonin Result\" (>80% or <=80%) if Day 0 (or Day 1) and Day 4 values are available. Refer to [email or web address]    Change in PCT <=80%  A decrease of PCT levels below or equal to 80% defines a positive change in PCT test result representing a higher risk for 28-day all-cause mortality of patients diagnosed with severe sepsis for septic shock.    Change in PCT >80%  A decrease of PCT levels of more than 80% defines a negative change in PCT result representing a lower risk for 28-day all-cause mortality of patients diagnosed with severe sepsis or septic shock.      LACTIC ACID, PLASMA - Normal   BLOOD CULTURE   BLOOD CULTURE   RAINBOW DRAW    Narrative:     The following orders were created for panel order [place] Draw.  Procedure                               Abnormality         Status                     ---------                               -----------         ------                     Green Top " (Gel)[[id number]]                                  Final result               Lavender Top[[id number]]                                     Final result               Gold Top - SST[[id number]]                                   Final result               Light Blue Top[[id number]]                                   Final result                 Please view results for these tests on the individual orders.   ETHANOL   HIGH SENSITIVITIY TROPONIN T 2HR    Narrative:     High Sensitive Troponin T Reference Range:  <10.0 ng/L- Negative Female for AMI  <15.0 ng/L- Negative Male for AMI  >=10 - Abnormal Female indicating possible myocardial injury.  >=15 - Abnormal Male indicating possible myocardial injury.   Clinicians would have to utilize clinical acumen, EKG, Troponin, and serial changes to determine if it is an Acute Myocardial Infarction or myocardial injury due to an underlying chronic condition.        URINALYSIS W/ CULTURE IF INDICATED   CBC AND DIFFERENTIAL    Narrative:     The following orders were created for panel order CBC & Differential.  Procedure                               Abnormality         Status                     ---------                               -----------         ------                     CBC Auto Differential[[id number]]        Abnormal            Final result                 Please view results for these tests on the individual orders.   GREEN TOP   LAVENDER TOP   GOLD TOP - SST   LIGHT BLUE TOP       EKG:   ECG 12 Lead Other; Midepigastric abdominal pain   Final Result   HEART RATE= 86  bpm   RR Interval= 698  ms   IL Interval= 167  ms   P Horizontal Axis= -26  deg   P Front Axis= 56  deg   QRSD Interval= 80  ms   QT Interval= 384  ms   QRS Axis= 71  deg   T Wave Axis= 68  deg   - ABNORMAL ECG -   Sinus rhythm   Probable left atrial enlargement   Low voltage, precordial leads   Nonspecific T abnrm, anterolateral leads   c/w prior ecg, anterior t wave inversion now seen   Electronically  Signed By: Evelyn Rinaldi ([facility]) 22-Aug-2023 16:19:04   Date and Time of Study: 2023-08-22 13:46:30          Meds given in ED:   Medications   sodium chloride 0.9 % flush 10 mL (has no administration in time range)   sodium chloride 0.9 % flush 10 mL (has no administration in time range)   sodium chloride 0.9 % infusion (125 mL/hr Intravenous New Bag 8/22/23 1345)   iopamidol (ISOVUE-300) 61 % injection 100 mL (has no administration in time range)   acetaminophen (TYLENOL) tablet 650 mg (has no administration in time range)   sennosides-docusate (PERICOLACE) 8.6-50 MG per tablet 2 tablet (has no administration in time range)     And   polyethylene glycol (MIRALAX) packet 17 g (has no administration in time range)     And   bisacodyl (DULCOLAX) EC tablet 5 mg (has no administration in time range)     And   bisacodyl (DULCOLAX) suppository 10 mg (has no administration in time range)   ondansetron (ZOFRAN) tablet 4 mg (has no administration in time range)     Or   ondansetron (ZOFRAN) injection 4 mg (has no administration in time range)   melatonin tablet 3 mg (has no administration in time range)   sodium chloride 0.9 % with KCl 20 mEq/L infusion (has no administration in time range)   sodium chloride 0.9 % bolus 1,000 mL (1,000 mL Intravenous New Bag 8/22/23 1337)   HYDROmorphone (DILAUDID) injection 1 mg (1 mg Intravenous Given 8/22/23 1340)   ondansetron (ZOFRAN) injection 4 mg (4 mg Intravenous Given 8/22/23 1340)   droperidol (INAPSINE) injection 1.25 mg (1.25 mg Intravenous Given 8/22/23 1432)   diphenhydrAMINE (BENADRYL) injection 25 mg (25 mg Intravenous Given 8/22/23 1432)   HYDROmorphone (DILAUDID) injection 1 mg (1 mg Intravenous Given 8/22/23 1450)   iopamidol (ISOVUE-300) 61 % injection 100 mL (100 mL Intravenous Given by Other 8/22/23 1523)   HYDROmorphone (DILAUDID) injection 1 mg (1 mg Intravenous Given 8/22/23 1731)       Imaging results:  CT Abdomen Pelvis With Contrast    Result Date:  8/22/2023  1. Marked increase in size of the 8.6 x 6.2 cm peripancreatic fluid collection, development of phlegmon along the left posterior medial diaphragmatic wilmer with phlegmon extending to the posterior wall of the GE junction and gastric fundus. Much smaller fluid collections within the left adrenal gland are larger and there is thicker phlegmon along the left paracolic gutter posterior to the spleen. 2. There is no biliary dilatation and the gallbladder appears unremarkable. There are no new vascular complications of pancreatitis.       Ambulatory status:   - Up x's 1 assist    Social issues:   Social History     Socioeconomic History    Marital status: Single   Tobacco Use    Smoking status: Every Day     Packs/day: 0.50     Types: Cigarettes     Start date: 1/28/1996    Smokeless tobacco: Current   Vaping Use    Vaping Use: Never used   Substance and Sexual Activity    Alcohol use: Never    Drug use: No    Sexual activity: Yes     Partners: Male     Birth control/protection: None     Comment: IUD removed 6/29/2017       NIH Stroke Scale:       Nunu Estrada RN  08/22/23 17:41 EDT

## 2023-08-23 PROBLEM — K86.1 ACUTE ON CHRONIC PANCREATITIS: Status: ACTIVE | Noted: 2023-08-23

## 2023-08-23 PROBLEM — F10.10 ALCOHOL ABUSE: Status: ACTIVE | Noted: 2023-08-23

## 2023-08-23 PROBLEM — F41.1 GENERALIZED ANXIETY DISORDER: Status: ACTIVE | Noted: 2023-08-23

## 2023-08-23 PROBLEM — I10 ESSENTIAL HYPERTENSION: Status: ACTIVE | Noted: 2023-08-23

## 2023-08-23 PROBLEM — K86.3 PANCREATIC PSEUDOCYST: Status: ACTIVE | Noted: 2023-08-23

## 2023-08-23 PROBLEM — G43.909 MIGRAINE: Status: ACTIVE | Noted: 2023-08-23

## 2023-08-23 PROBLEM — N39.0 ACUTE UTI (URINARY TRACT INFECTION): Status: ACTIVE | Noted: 2023-08-23

## 2023-08-23 PROBLEM — K85.90 ACUTE ON CHRONIC PANCREATITIS: Status: ACTIVE | Noted: 2023-08-23

## 2023-08-23 PROBLEM — R73.9 HYPERGLYCEMIA: Status: ACTIVE | Noted: 2023-08-23

## 2023-08-23 LAB
ANION GAP SERPL CALCULATED.3IONS-SCNC: 11 MMOL/L (ref 5–15)
BUN SERPL-MCNC: 4 MG/DL (ref 6–20)
BUN/CREAT SERPL: 7.8 (ref 7–25)
CALCIUM SPEC-SCNC: 9 MG/DL (ref 8.6–10.5)
CHLORIDE SERPL-SCNC: 101 MMOL/L (ref 98–107)
CO2 SERPL-SCNC: 24 MMOL/L (ref 22–29)
CREAT SERPL-MCNC: 0.51 MG/DL (ref 0.57–1)
DEPRECATED RDW RBC AUTO: 51.6 FL (ref 37–54)
EGFRCR SERPLBLD CKD-EPI 2021: 121.2 ML/MIN/1.73
ERYTHROCYTE [DISTWIDTH] IN BLOOD BY AUTOMATED COUNT: 14 % (ref 12.3–15.4)
FOLATE SERPL-MCNC: >20 NG/ML (ref 4.78–24.2)
GLUCOSE SERPL-MCNC: 103 MG/DL (ref 65–99)
HBA1C MFR BLD: 5.1 % (ref 4.8–5.6)
HCT VFR BLD AUTO: 39.6 % (ref 34–46.6)
HGB BLD-MCNC: 14 G/DL (ref 12–15.9)
LIPASE SERPL-CCNC: 87 U/L (ref 13–60)
MCH RBC QN AUTO: 35.4 PG (ref 26.6–33)
MCHC RBC AUTO-ENTMCNC: 35.4 G/DL (ref 31.5–35.7)
MCV RBC AUTO: 100 FL (ref 79–97)
PLATELET # BLD AUTO: 147 10*3/MM3 (ref 140–450)
PMV BLD AUTO: 10.3 FL (ref 6–12)
POTASSIUM SERPL-SCNC: 3.5 MMOL/L (ref 3.5–5.2)
RBC # BLD AUTO: 3.96 10*6/MM3 (ref 3.77–5.28)
SODIUM SERPL-SCNC: 136 MMOL/L (ref 136–145)
TSH SERPL DL<=0.05 MIU/L-ACNC: 0.84 UIU/ML (ref 0.27–4.2)
VIT B12 BLD-MCNC: 810 PG/ML (ref 211–946)
WBC NRBC COR # BLD: 8.14 10*3/MM3 (ref 3.4–10.8)

## 2023-08-23 PROCEDURE — 83036 HEMOGLOBIN GLYCOSYLATED A1C: CPT | Performed by: INTERNAL MEDICINE

## 2023-08-23 PROCEDURE — 84443 ASSAY THYROID STIM HORMONE: CPT | Performed by: INTERNAL MEDICINE

## 2023-08-23 PROCEDURE — 25010000002 SODIUM CHLORIDE 0.9 % WITH KCL 20 MEQ 20-0.9 MEQ/L-% SOLUTION: Performed by: INTERNAL MEDICINE

## 2023-08-23 PROCEDURE — 25010000002 THIAMINE HCL 200 MG/2ML SOLUTION: Performed by: INTERNAL MEDICINE

## 2023-08-23 PROCEDURE — 82607 VITAMIN B-12: CPT | Performed by: INTERNAL MEDICINE

## 2023-08-23 PROCEDURE — 36415 COLL VENOUS BLD VENIPUNCTURE: CPT | Performed by: INTERNAL MEDICINE

## 2023-08-23 PROCEDURE — 85027 COMPLETE CBC AUTOMATED: CPT | Performed by: INTERNAL MEDICINE

## 2023-08-23 PROCEDURE — 25010000002 ONDANSETRON PER 1 MG: Performed by: INTERNAL MEDICINE

## 2023-08-23 PROCEDURE — 25010000002 THIAMINE PER 100 MG: Performed by: INTERNAL MEDICINE

## 2023-08-23 PROCEDURE — 25010000002 MORPHINE PER 10 MG: Performed by: NURSE PRACTITIONER

## 2023-08-23 PROCEDURE — 83690 ASSAY OF LIPASE: CPT | Performed by: INTERNAL MEDICINE

## 2023-08-23 PROCEDURE — 25010000002 PIPERACILLIN SOD-TAZOBACTAM PER 1 G: Performed by: INTERNAL MEDICINE

## 2023-08-23 PROCEDURE — 99222 1ST HOSP IP/OBS MODERATE 55: CPT | Performed by: INTERNAL MEDICINE

## 2023-08-23 PROCEDURE — 80048 BASIC METABOLIC PNL TOTAL CA: CPT | Performed by: INTERNAL MEDICINE

## 2023-08-23 PROCEDURE — 82746 ASSAY OF FOLIC ACID SERUM: CPT | Performed by: INTERNAL MEDICINE

## 2023-08-23 PROCEDURE — 99221 1ST HOSP IP/OBS SF/LOW 40: CPT | Performed by: SURGERY

## 2023-08-23 PROCEDURE — 25010000002 CEFTRIAXONE PER 250 MG: Performed by: INTERNAL MEDICINE

## 2023-08-23 RX ORDER — PANTOPRAZOLE SODIUM 40 MG/10ML
40 INJECTION, POWDER, LYOPHILIZED, FOR SOLUTION INTRAVENOUS
Status: DISCONTINUED | OUTPATIENT
Start: 2023-08-23 | End: 2023-08-25 | Stop reason: HOSPADM

## 2023-08-23 RX ADMIN — MORPHINE SULFATE 2 MG: 2 INJECTION, SOLUTION INTRAMUSCULAR; INTRAVENOUS at 08:56

## 2023-08-23 RX ADMIN — MORPHINE SULFATE 2 MG: 2 INJECTION, SOLUTION INTRAMUSCULAR; INTRAVENOUS at 01:32

## 2023-08-23 RX ADMIN — POTASSIUM CHLORIDE AND SODIUM CHLORIDE 125 ML/HR: 900; 150 INJECTION, SOLUTION INTRAVENOUS at 16:52

## 2023-08-23 RX ADMIN — THIAMINE HYDROCHLORIDE 200 MG: 100 INJECTION, SOLUTION INTRAMUSCULAR; INTRAVENOUS at 06:52

## 2023-08-23 RX ADMIN — FOLIC ACID 1 MG: 1 TABLET ORAL at 08:56

## 2023-08-23 RX ADMIN — MORPHINE SULFATE 2 MG: 2 INJECTION, SOLUTION INTRAMUSCULAR; INTRAVENOUS at 21:17

## 2023-08-23 RX ADMIN — MORPHINE SULFATE 2 MG: 2 INJECTION, SOLUTION INTRAMUSCULAR; INTRAVENOUS at 12:48

## 2023-08-23 RX ADMIN — ONDANSETRON 4 MG: 2 INJECTION INTRAMUSCULAR; INTRAVENOUS at 06:57

## 2023-08-23 RX ADMIN — PIPERACILLIN SODIUM AND TAZOBACTAM SODIUM 3.38 G: 3; .375 INJECTION, SOLUTION INTRAVENOUS at 03:59

## 2023-08-23 RX ADMIN — MORPHINE SULFATE 2 MG: 2 INJECTION, SOLUTION INTRAMUSCULAR; INTRAVENOUS at 15:02

## 2023-08-23 RX ADMIN — MORPHINE SULFATE 2 MG: 2 INJECTION, SOLUTION INTRAMUSCULAR; INTRAVENOUS at 06:57

## 2023-08-23 RX ADMIN — THIAMINE HYDROCHLORIDE 200 MG: 100 INJECTION, SOLUTION INTRAMUSCULAR; INTRAVENOUS at 21:17

## 2023-08-23 RX ADMIN — MORPHINE SULFATE 2 MG: 2 INJECTION, SOLUTION INTRAMUSCULAR; INTRAVENOUS at 03:59

## 2023-08-23 RX ADMIN — THIAMINE HYDROCHLORIDE 200 MG: 100 INJECTION, SOLUTION INTRAMUSCULAR; INTRAVENOUS at 15:02

## 2023-08-23 RX ADMIN — PANTOPRAZOLE SODIUM 40 MG: 40 INJECTION, POWDER, FOR SOLUTION INTRAVENOUS at 12:48

## 2023-08-23 RX ADMIN — MORPHINE SULFATE 2 MG: 2 INJECTION, SOLUTION INTRAMUSCULAR; INTRAVENOUS at 18:50

## 2023-08-23 RX ADMIN — CEFTRIAXONE SODIUM 1000 MG: 1 INJECTION, POWDER, FOR SOLUTION INTRAMUSCULAR; INTRAVENOUS at 13:04

## 2023-08-23 RX ADMIN — LORAZEPAM 2 MG: 1 TABLET ORAL at 15:02

## 2023-08-23 RX ADMIN — ONDANSETRON 4 MG: 2 INJECTION INTRAMUSCULAR; INTRAVENOUS at 12:48

## 2023-08-23 RX ADMIN — LORAZEPAM 1 MG: 1 TABLET ORAL at 21:18

## 2023-08-23 RX ADMIN — AMITRIPTYLINE HYDROCHLORIDE 25 MG: 25 TABLET, FILM COATED ORAL at 21:16

## 2023-08-23 RX ADMIN — POTASSIUM CHLORIDE AND SODIUM CHLORIDE 125 ML/HR: 900; 150 INJECTION, SOLUTION INTRAVENOUS at 08:56

## 2023-08-23 RX ADMIN — LORAZEPAM 2 MG: 1 TABLET ORAL at 08:56

## 2023-08-23 RX ADMIN — LORAZEPAM 2 MG: 1 TABLET ORAL at 02:52

## 2023-08-23 RX ADMIN — ONDANSETRON 4 MG: 2 INJECTION INTRAMUSCULAR; INTRAVENOUS at 18:50

## 2023-08-23 NOTE — CONSULTS
"[facility] attempted consult, spoke w/ RN and reviewed chart. Pt PERCY 7 @ 4411. RN reports pt is pleasant. Pt A&Ox4. Pt reports has seen [facility] multiple times in the past and has \"all the resources I could possible get at this point, it's up to me from now on\". Pt reports not wanting to complete another consult and does not want to be followed by [facility] at this time. Pt aware of how to request [facility] to return if needed. Gave report to RN.   [facility] will sign off at this time, please re-consult if needed.     "

## 2023-08-23 NOTE — H&P
HISTORY AND PHYSICAL   [facility]        Date of Admission: 2023  Patient Identification:  Name: Piper Cain  Age: 42 y.o.  Sex: female  :  1980  MRN: [id number]                     Primary Care Physician: Rachell Pozo APRN    Chief Complaint:  42 year old female who presented to the emergency room with abdominal pain which started four days ago; she has also had nausea and vomiting; she has a history of alcohol dependence and pancreatitis; she became much worse last night; she has continued to consume alcohol but is trying to wean herself off; she has a history of alcohol withdrawal;     History of Present Illness:   As above    Past Medical History:  Past Medical History:   Diagnosis Date    Anxiety     E. coli bacteremia     ETOH abuse     GERD (gastroesophageal reflux disease)     Hiatal hernia     Left flank pain 10/21/2022    Migraine     Miscarriage 2004    Pancreatitis      Past Surgical History:  Past Surgical History:   Procedure Laterality Date    ENDOSCOPY N/A 8/10/2022    Procedure: ESOPHAGOGASTRODUODENOSCOPY with bxs;  Surgeon: Salvador Price MD;  Location: [facility] ENDOSCOPY;  Service: Gastroenterology;  Laterality: N/A;  Pre: r/o esophageal  varacies, abd pain  Post: esophageal plaque      Home Meds:  Medications Prior to Admission   Medication Sig Dispense Refill Last Dose    amitriptyline (ELAVIL) 10 MG tablet Take 2.5 tablets by mouth Every Night.   2023 at 2300    bisacodyl (DULCOLAX) 10 MG suppository Insert 1 suppository into the rectum Daily. 30 each 0 Past Week    Cyanocobalamin (VITAMIN B 12 PO) Take  by mouth.   Past Week    Ferrous Sulfate Dried (Feosol) 200 (65 Fe) MG tablet tablet Take 1 tablet by mouth Daily.   2023 at 0900    folic acid (FOLVITE) 1 MG tablet Take 1 tablet by mouth Daily. 30 tablet 3 2023 at 0900    Magnesium 400 MG capsule Take 400 mg by mouth.   2023 at 0900    ondansetron (ZOFRAN) 4 MG tablet Take 1 tablet  by mouth Every 8 (Eight) Hours As Needed for Nausea or Vomiting.   8/22/2023 at 0900    oxyCODONE-acetaminophen (PERCOCET)  MG per tablet Take 1 tablet by mouth Every 4 (Four) Hours As Needed for Moderate Pain.   8/22/2023 at 0900    pancrelipase, Lip-Prot-Amyl, (CREON) 50335-06887 units capsule delayed-release particles capsule Take 3 capsules by mouth 3 (Three) Times a Day With Meals.   8/21/2023 at 2300    pantoprazole (PROTONIX) 40 MG EC tablet Take 1 tablet by mouth Daily. 30 tablet 3 8/21/2023 at 1800    Cholecalciferol (VITAMIN D3) 5000 units capsule capsule Take 1 capsule by mouth Daily.       sennosides-docusate (PERICOLACE) 8.6-50 MG per tablet Take 2 tablets by mouth 2 (Two) Times a Day As Needed for Constipation. (Patient not taking: Reported on 6/8/2023)       thiamine (VITAMIN B-1) 100 MG tablet  tablet Take 100 mg by mouth Daily. (Patient not taking: Reported on 6/8/2023)          Allergies:  Allergies   Allergen Reactions    Nickel     Hydrocodone-Acetaminophen Nausea And Vomiting     Immunizations:    There is no immunization history on file for this patient.  Social History:   Social History     Social History Narrative    Not on file     Social History     Socioeconomic History    Marital status: Single   Tobacco Use    Smoking status: Every Day     Packs/day: 0.50     Types: Cigarettes     Start date: 1/28/1996    Smokeless tobacco: Current   Vaping Use    Vaping Use: Never used   Substance and Sexual Activity    Alcohol use: Never    Drug use: No    Sexual activity: Yes     Partners: Male     Birth control/protection: None     Comment: IUD removed 6/29/2017       Family History:  Family History   Problem Relation Age of Onset    Alcohol abuse Mother     Arthritis Mother     Asthma Mother     Miscarriages / Stillbirths Mother     Cancer Father     Diabetes Father     Drug abuse Father     Early death Father     Heart disease Father     Hyperlipidemia Father     Hypertension Father      "Alcohol abuse Paternal Aunt     Cancer Paternal Aunt     Diabetes Paternal Aunt     Drug abuse Paternal Aunt     Early death Paternal Aunt     Heart disease Paternal Aunt     Hyperlipidemia Paternal Aunt     Hypertension Paternal Aunt     Alcohol abuse Maternal Grandmother     Alcohol abuse Maternal Grandfather     Alcohol abuse Paternal Grandmother     Cancer Paternal Grandmother     Diabetes Paternal Grandmother     Drug abuse Paternal Grandmother     Early death Paternal Grandmother     Heart disease Paternal Grandmother     Hyperlipidemia Paternal Grandmother     Hypertension Paternal Grandmother     Alcohol abuse Paternal Grandfather         Review of Systems  See history of present illness and past medical history.  Patient denies headache, dizziness, syncope, falls, trauma, change in vision, change in hearing, change in taste, changes in weight, changes in appetite, focal weakness, numbness, or paresthesia.  Patient denies chest pain, palpitations, dyspnea, orthopnea, PND, cough, sinus pressure, rhinorrhea, epistaxis, hemoptysis, nausea, vomiting,hematemesis, diarrhea, constipation or hematochezia.  Denies cold or heat intolerance, polydipsia, polyuria, polyphagia. Denies hematuria, pyuria, dysuria, hesitancy, frequency or urgency. Denies consumption of raw and under cooked meats foods or change in water source.  Denies fever, chills, sweats, night sweats.  Denies missing any routine medications. Remainder of ROS is negative.    Objective:  T Max 24 hrs: Temp (24hrs), Av.8 øF (37.1 øC), Min:98.2 øF (36.8 øC), Max:99.3 øF (37.4 øC)    Vitals Ranges:   Temp:  [98.2 øF (36.8 øC)-99.3 øF (37.4 øC)] 99.3 øF (37.4 øC)  Heart Rate:  [] 74  Resp:  [20] 20  BP: (131-172)/() 155/90      Exam:  /90 (BP Location: Left arm, Patient Position: Lying)   Pulse 74   Temp 99.3 øF (37.4 øC) (Oral)   Resp 20   Ht 175.3 cm (69\")   Wt 61.4 kg (135 lb 4.8 oz)   LMP 2018 (Exact Date)   SpO2 99%   " BMI 19.98 kg/mý     General Appearance:    Alert, cooperative, no distress, appears stated age   Head:    Normocephalic, without obvious abnormality, atraumatic   Eyes:    PERRL, conjunctivae/corneas clear, EOM's intact, both eyes   Ears:    Normal external ear canals, both ears   Nose:   Nares normal, septum midline, mucosa normal, no drainage    or sinus tenderness   Throat:   Lips, mucosa, and tongue normal   Neck:   Supple, symmetrical, trachea midline, no adenopathy;     thyroid:  no enlargement/tenderness/nodules; no carotid    bruit or JVD   Back:     Symmetric, no curvature, ROM normal, no CVA tenderness   Lungs:     Clear to auscultation bilaterally, respirations unlabored   Chest Wall:    No tenderness or deformity    Heart:    Regular rate and rhythm, S1 and S2 normal, no murmur, rub   or gallop   Abdomen:     Soft, nontender, bowel sounds active all four quadrants,     no masses, no hepatomegaly, no splenomegaly   Extremities:   Extremities normal, atraumatic, no cyanosis or edema   Pulses:   2+ and symmetric all extremities   Skin:   Skin color, texture, turgor normal, no rashes or lesions   Lymph nodes:   Cervical, supraclavicular, and axillary nodes normal   Neurologic:   CNII-XII intact, normal strength, sensation intact throughout      .    Data Review:  Labs in chart were reviewed.  WBC   Date Value Ref Range Status   08/22/2023 11.55 (H) 3.40 - 10.80 10*3/mm3 Final     Hemoglobin   Date Value Ref Range Status   08/22/2023 14.6 12.0 - 15.9 g/dL Final     Hematocrit   Date Value Ref Range Status   08/22/2023 42.9 34.0 - 46.6 % Final     Platelets   Date Value Ref Range Status   08/22/2023 194 140 - 450 10*3/mm3 Final     Sodium   Date Value Ref Range Status   08/22/2023 138 136 - 145 mmol/L Final     Potassium   Date Value Ref Range Status   08/22/2023 3.6 3.5 - 5.2 mmol/L Final     Chloride   Date Value Ref Range Status   08/22/2023 99 98 - 107 mmol/L Final     CO2   Date Value Ref Range Status    08/22/2023 25.0 22.0 - 29.0 mmol/L Final     BUN   Date Value Ref Range Status   08/22/2023 6 6 - 20 mg/dL Final     Creatinine   Date Value Ref Range Status   08/22/2023 0.64 0.57 - 1.00 mg/dL Final     Glucose   Date Value Ref Range Status   08/22/2023 122 (H) 65 - 99 mg/dL Final     Calcium   Date Value Ref Range Status   08/22/2023 9.7 8.6 - 10.5 mg/dL Final     Magnesium   Date Value Ref Range Status   08/22/2023 2.1 1.6 - 2.6 mg/dL Final                Imaging Results (All)       Procedure Component Value Units Date/Time    CT Abdomen Pelvis With Contrast [[id number]] Collected: 08/22/23 1629     Updated: 08/22/23 1629    Narrative:      CT ABDOMEN AND PELVIS WITH IV CONTRAST     HISTORY: 42-year-old female with epigastric pain. Pancreatitis history.     TECHNIQUE: Radiation dose reduction techniques were utilized, including  automated exposure control and exposure modulation based on body size.   3 mm images were obtained through the abdomen and pelvis after the  administration of IV contrast. Compared with previous CT 06/08/2023.     FINDINGS: There has been significant interval increase in the  peripancreatic fluid collection posterior to the pancreatic tail  measuring approximately 8.6 x 6.2 cm, previously 4.5 x 3.3 cm. The much  smaller fluid collections within the left adrenal gland are larger as  well. There is also slight increase in the rind of complex fluid along  the left paracolic gutter posterior to the spleen and lateral to the  dominant peripancreatic collection. There is also significantly more  prominent thickening of the posteromedial left diaphragmatic wilmer with  the thickening extending anteriorly to the midline, and there are now  tiny fluid collections within the left diaphragmatic phlegmon measuring  1.6 cm transversely. There is no change in the appearance of the  pancreas which has changes of chronic pancreatitis predominantly at the  pancreatic head and neck and the degree of  pancreatic ductal dilatation  proximal to the stones is unchanged, 5 mm. There is no biliary  dilatation and the gallbladder appears unremarkable. The splenic vein is  diminutive. The portal vein and SMV are patent. The phlegmon along the  left posterior medial diaphragmatic wilmer abuts and thickens the  posterior wall of the GE junction and gastric fundus. There is no acute  bowel abnormality. Uterus and adnexa appear unremarkable. There are no  pleural effusions or pneumonia at the visualized lower lobes.       Impression:      1. Marked increase in size of the 8.6 x 6.2 cm peripancreatic fluid  collection, development of phlegmon along the left posterior medial  diaphragmatic wilmer with phlegmon extending to the posterior wall of the  GE junction and gastric fundus. Much smaller fluid collections within  the left adrenal gland are larger and there is thicker phlegmon along  the left paracolic gutter posterior to the spleen.  2. There is no biliary dilatation and the gallbladder appears  unremarkable. There are no new vascular complications of pancreatitis.                 Assessment:  Active Hospital Problems    Diagnosis  POA    **Abdominal pain [R10.9]  Yes      Resolved Hospital Problems   No resolved problems to display.   Pancreatitis  Alcohol dependence  Alcohol withdrawal  Uti  Hypertension  Hyperglycemia      Plan:  Ask gi to see her  Antibiotics  [facility] protocol  [facility] consult  Monitor on telemetry  Trend labs  Dw patient and ed provider    Kalpana Beasley MD  8/22/2023  22:46 EDT

## 2023-08-23 NOTE — PAYOR COMM NOTE
"Lizzeth Zavala (42 y.o. Female)      REQUESTING INPATIENT AUTHORIZATION:  ID#  [id number]    REPLY TO UR DEPT: [phone],  [phone]    [facility]    ROBB ALFARO MD: HEIKE [id number]    DX: R10.9, K85.90, K86.1, K86.3, N39.0             Date of Birth   1980    Social Security Number         Address   [address] [place] [address]  [place] [address]    Home Phone   [phone]    MRN   [id number]       Gnosticist   Nondenominational    Marital Status   Single                            Admission Date   8/22/23    Admission Type   Emergency    Admitting Provider   Kalpana Beasley MD    Attending Provider   Robb Alfaro MD    Department, Room/Bed   [address], S410/1       Discharge Date       Discharge Disposition       Discharge Destination                                 Attending Provider: Robb Alfaro MD    Allergies: Nickel    Isolation: None   Infection: None   Code Status: CPR    Ht: 175.3 cm (69\")   Wt: 59 kg (130 lb)    Admission Cmt: None   Principal Problem: Acute on chronic pancreatitis [K85.90,K86.1]                   Active Insurance as of 8/22/2023       Primary Coverage       Payor Plan Insurance Group Employer/Plan Group    [id number]       Payor Plan Address Payor Plan Phone Number Payor Plan Fax Number Effective Dates    MAIL STOP:   7/1/2022 - None Entered    [address] [address]       [facility] [address]         Subscriber Name Subscriber Birth Date Member ID       LIZZETH ZAVALA 1980 [id number]                     Emergency Contacts        (Rel.) Home Phone Work Phone Mobile Phone    JEAN CARLOSMALOU (Significant Other) [phone] -- --                 History & Physical        Kalpana Beasley MD at 08/22/23 2246          HISTORY AND PHYSICAL   [facility]        Date of Admission: 8/22/2023  Patient Identification:  Name: Lizzeth ROLLE " Payton  Age: 42 y.o.  Sex: female  :  1980  MRN: [id number]                     Primary Care Physician: Rachell Pozo APRN    Chief Complaint:  42 year old female who presented to the emergency room with abdominal pain which started four days ago; she has also had nausea and vomiting; she has a history of alcohol dependence and pancreatitis; she became much worse last night; she has continued to consume alcohol but is trying to wean herself off; she has a history of alcohol withdrawal;     History of Present Illness:   As above    Past Medical History:  Past Medical History:   Diagnosis Date    Anxiety     E. coli bacteremia     ETOH abuse     GERD (gastroesophageal reflux disease)     Hiatal hernia     Left flank pain 10/21/2022    Migraine     Miscarriage 2004    Pancreatitis      Past Surgical History:  Past Surgical History:   Procedure Laterality Date    ENDOSCOPY N/A 8/10/2022    Procedure: ESOPHAGOGASTRODUODENOSCOPY with bxs;  Surgeon: Salvador Price MD;  Location: [facility] ENDOSCOPY;  Service: Gastroenterology;  Laterality: N/A;  Pre: r/o esophageal  varacies, abd pain  Post: esophageal plaque      Home Meds:  Medications Prior to Admission   Medication Sig Dispense Refill Last Dose    amitriptyline (ELAVIL) 10 MG tablet Take 2.5 tablets by mouth Every Night.   2023 at 2300    bisacodyl (DULCOLAX) 10 MG suppository Insert 1 suppository into the rectum Daily. 30 each 0 Past Week    Cyanocobalamin (VITAMIN B 12 PO) Take  by mouth.   Past Week    Ferrous Sulfate Dried (Feosol) 200 (65 Fe) MG tablet tablet Take 1 tablet by mouth Daily.   2023 at 0900    folic acid (FOLVITE) 1 MG tablet Take 1 tablet by mouth Daily. 30 tablet 3 2023 at 0900    Magnesium 400 MG capsule Take 400 mg by mouth.   2023 at 0900    ondansetron (ZOFRAN) 4 MG tablet Take 1 tablet by mouth Every 8 (Eight) Hours As Needed for Nausea or Vomiting.   2023 at 0900    oxyCODONE-acetaminophen (PERCOCET)   MG per tablet Take 1 tablet by mouth Every 4 (Four) Hours As Needed for Moderate Pain.   8/22/2023 at 0900    pancrelipase, Lip-Prot-Amyl, (CREON) 04014-40530 units capsule delayed-release particles capsule Take 3 capsules by mouth 3 (Three) Times a Day With Meals.   8/21/2023 at 2300    pantoprazole (PROTONIX) 40 MG EC tablet Take 1 tablet by mouth Daily. 30 tablet 3 8/21/2023 at 1800    Cholecalciferol (VITAMIN D3) 5000 units capsule capsule Take 1 capsule by mouth Daily.       sennosides-docusate (PERICOLACE) 8.6-50 MG per tablet Take 2 tablets by mouth 2 (Two) Times a Day As Needed for Constipation. (Patient not taking: Reported on 6/8/2023)       thiamine (VITAMIN B-1) 100 MG tablet  tablet Take 100 mg by mouth Daily. (Patient not taking: Reported on 6/8/2023)          Allergies:  Allergies   Allergen Reactions    Nickel     Hydrocodone-Acetaminophen Nausea And Vomiting     Immunizations:    There is no immunization history on file for this patient.  Social History:   Social History     Social History Narrative    Not on file     Social History     Socioeconomic History    Marital status: Single   Tobacco Use    Smoking status: Every Day     Packs/day: 0.50     Types: Cigarettes     Start date: 1/28/1996    Smokeless tobacco: Current   Vaping Use    Vaping Use: Never used   Substance and Sexual Activity    Alcohol use: Never    Drug use: No    Sexual activity: Yes     Partners: Male     Birth control/protection: None     Comment: IUD removed 6/29/2017       Family History:  Family History   Problem Relation Age of Onset    Alcohol abuse Mother     Arthritis Mother     Asthma Mother     Miscarriages / Stillbirths Mother     Cancer Father     Diabetes Father     Drug abuse Father     Early death Father     Heart disease Father     Hyperlipidemia Father     Hypertension Father     Alcohol abuse Paternal Aunt     Cancer Paternal Aunt     Diabetes Paternal Aunt     Drug abuse Paternal Aunt     Early death  "Paternal Aunt     Heart disease Paternal Aunt     Hyperlipidemia Paternal Aunt     Hypertension Paternal Aunt     Alcohol abuse Maternal Grandmother     Alcohol abuse Maternal Grandfather     Alcohol abuse Paternal Grandmother     Cancer Paternal Grandmother     Diabetes Paternal Grandmother     Drug abuse Paternal Grandmother     Early death Paternal Grandmother     Heart disease Paternal Grandmother     Hyperlipidemia Paternal Grandmother     Hypertension Paternal Grandmother     Alcohol abuse Paternal Grandfather         Review of Systems  See history of present illness and past medical history.  Patient denies headache, dizziness, syncope, falls, trauma, change in vision, change in hearing, change in taste, changes in weight, changes in appetite, focal weakness, numbness, or paresthesia.  Patient denies chest pain, palpitations, dyspnea, orthopnea, PND, cough, sinus pressure, rhinorrhea, epistaxis, hemoptysis, nausea, vomiting,hematemesis, diarrhea, constipation or hematochezia.  Denies cold or heat intolerance, polydipsia, polyuria, polyphagia. Denies hematuria, pyuria, dysuria, hesitancy, frequency or urgency. Denies consumption of raw and under cooked meats foods or change in water source.  Denies fever, chills, sweats, night sweats.  Denies missing any routine medications. Remainder of ROS is negative.    Objective:  T Max 24 hrs: Temp (24hrs), Av.8 øF (37.1 øC), Min:98.2 øF (36.8 øC), Max:99.3 øF (37.4 øC)    Vitals Ranges:   Temp:  [98.2 øF (36.8 øC)-99.3 øF (37.4 øC)] 99.3 øF (37.4 øC)  Heart Rate:  [] 74  Resp:  [20] 20  BP: (131-172)/() 155/90      Exam:  /90 (BP Location: Left arm, Patient Position: Lying)   Pulse 74   Temp 99.3 øF (37.4 øC) (Oral)   Resp 20   Ht 175.3 cm (69\")   Wt 61.4 kg (135 lb 4.8 oz)   LMP 2018 (Exact Date)   SpO2 99%   BMI 19.98 kg/mý     General Appearance:    Alert, cooperative, no distress, appears stated age   Head:    Normocephalic, " without obvious abnormality, atraumatic   Eyes:    PERRL, conjunctivae/corneas clear, EOM's intact, both eyes   Ears:    Normal external ear canals, both ears   Nose:   Nares normal, septum midline, mucosa normal, no drainage    or sinus tenderness   Throat:   Lips, mucosa, and tongue normal   Neck:   Supple, symmetrical, trachea midline, no adenopathy;     thyroid:  no enlargement/tenderness/nodules; no carotid    bruit or JVD   Back:     Symmetric, no curvature, ROM normal, no CVA tenderness   Lungs:     Clear to auscultation bilaterally, respirations unlabored   Chest Wall:    No tenderness or deformity    Heart:    Regular rate and rhythm, S1 and S2 normal, no murmur, rub   or gallop   Abdomen:     Soft, nontender, bowel sounds active all four quadrants,     no masses, no hepatomegaly, no splenomegaly   Extremities:   Extremities normal, atraumatic, no cyanosis or edema   Pulses:   2+ and symmetric all extremities   Skin:   Skin color, texture, turgor normal, no rashes or lesions   Lymph nodes:   Cervical, supraclavicular, and axillary nodes normal   Neurologic:   CNII-XII intact, normal strength, sensation intact throughout      .    Data Review:  Labs in chart were reviewed.  WBC   Date Value Ref Range Status   08/22/2023 11.55 (H) 3.40 - 10.80 10*3/mm3 Final     Hemoglobin   Date Value Ref Range Status   08/22/2023 14.6 12.0 - 15.9 g/dL Final     Hematocrit   Date Value Ref Range Status   08/22/2023 42.9 34.0 - 46.6 % Final     Platelets   Date Value Ref Range Status   08/22/2023 194 140 - 450 10*3/mm3 Final     Sodium   Date Value Ref Range Status   08/22/2023 138 136 - 145 mmol/L Final     Potassium   Date Value Ref Range Status   08/22/2023 3.6 3.5 - 5.2 mmol/L Final     Chloride   Date Value Ref Range Status   08/22/2023 99 98 - 107 mmol/L Final     CO2   Date Value Ref Range Status   08/22/2023 25.0 22.0 - 29.0 mmol/L Final     BUN   Date Value Ref Range Status   08/22/2023 6 6 - 20 mg/dL Final      Creatinine   Date Value Ref Range Status   08/22/2023 0.64 0.57 - 1.00 mg/dL Final     Glucose   Date Value Ref Range Status   08/22/2023 122 (H) 65 - 99 mg/dL Final     Calcium   Date Value Ref Range Status   08/22/2023 9.7 8.6 - 10.5 mg/dL Final     Magnesium   Date Value Ref Range Status   08/22/2023 2.1 1.6 - 2.6 mg/dL Final                Imaging Results (All)       Procedure Component Value Units Date/Time    CT Abdomen Pelvis With Contrast [[id number]] Collected: 08/22/23 1629     Updated: 08/22/23 1629    Narrative:      CT ABDOMEN AND PELVIS WITH IV CONTRAST     HISTORY: 42-year-old female with epigastric pain. Pancreatitis history.     TECHNIQUE: Radiation dose reduction techniques were utilized, including  automated exposure control and exposure modulation based on body size.   3 mm images were obtained through the abdomen and pelvis after the  administration of IV contrast. Compared with previous CT 06/08/2023.     FINDINGS: There has been significant interval increase in the  peripancreatic fluid collection posterior to the pancreatic tail  measuring approximately 8.6 x 6.2 cm, previously 4.5 x 3.3 cm. The much  smaller fluid collections within the left adrenal gland are larger as  well. There is also slight increase in the rind of complex fluid along  the left paracolic gutter posterior to the spleen and lateral to the  dominant peripancreatic collection. There is also significantly more  prominent thickening of the posteromedial left diaphragmatic wilmer with  the thickening extending anteriorly to the midline, and there are now  tiny fluid collections within the left diaphragmatic phlegmon measuring  1.6 cm transversely. There is no change in the appearance of the  pancreas which has changes of chronic pancreatitis predominantly at the  pancreatic head and neck and the degree of pancreatic ductal dilatation  proximal to the stones is unchanged, 5 mm. There is no biliary  dilatation and the  gallbladder appears unremarkable. The splenic vein is  diminutive. The portal vein and SMV are patent. The phlegmon along the  left posterior medial diaphragmatic wilmer abuts and thickens the  posterior wall of the GE junction and gastric fundus. There is no acute  bowel abnormality. Uterus and adnexa appear unremarkable. There are no  pleural effusions or pneumonia at the visualized lower lobes.       Impression:      1. Marked increase in size of the 8.6 x 6.2 cm peripancreatic fluid  collection, development of phlegmon along the left posterior medial  diaphragmatic wilmer with phlegmon extending to the posterior wall of the  GE junction and gastric fundus. Much smaller fluid collections within  the left adrenal gland are larger and there is thicker phlegmon along  the left paracolic gutter posterior to the spleen.  2. There is no biliary dilatation and the gallbladder appears  unremarkable. There are no new vascular complications of pancreatitis.                 Assessment:  Active Hospital Problems    Diagnosis  POA    **Abdominal pain [R10.9]  Yes      Resolved Hospital Problems   No resolved problems to display.   Pancreatitis  Alcohol dependence  Alcohol withdrawal  Uti  Hypertension  Hyperglycemia      Plan:  Ask gi to see her  Antibiotics  [facility]  Access center consult  Monitor on telemetry  Trend labs  Dw patient and ed provider    Kalpana Beasley MD  8/22/2023  22:46 EDT      Electronically signed by Kalpana Beasley MD at 08/22/23 2251          Emergency Department Notes            Nunu Estrada RN at 08/22/23 1733          This RN wearing all appropriate PPE during patient encounter. Hand hygiene performed before and during entering room.      Electronically signed by Nunu Estrada RN at 08/22/23 1733       Nunu Estrada RN at 08/22/23 1622          This RN wearing all appropriate PPE during patient encounter. Hand hygiene performed before and during  "entering room.      Electronically signed by Nunu Estrada RN at 08/22/23 1622       Nunu Estrada, RN at 08/22/23 1441          This RN wearing all appropriate PPE during patient encounter. Hand hygiene performed before and during entering room.      Electronically signed by Nunu Estrada RN at 08/22/23 1441       Nunu Estrada RN at 08/22/23 1356          This RN wearing all appropriate PPE during patient encounter. Hand hygiene performed before and during entering room.      Electronically signed by Nunu Estrada RN at 08/22/23 1356       Tomasz Virea MD at 08/22/23 1334           EMERGENCY DEPARTMENT ENCOUNTER    Room Number:  S410/1  Date of encounter:  8/22/2023  PCP: Rachell Pozo APRN  Historian: Patient and boyfriend  Relevant information and history provided by sources other than the patient will be included below and in the ED Course.  Review of pertinent past medical records may also be included in record below and ED Course.    HPI:  Chief Complaint: \"I am having pancreatitis again\" abdominal pain with nausea and vomiting  A complete HPI/ROS/PMH/PSH/SH/FH are unobtainable due to: Not applicable  Context: Piper Cain is a 42 y.o. female who presents to the ED c/o patient started having abdominal pain again a few days ago and then really became worse yesterday evening and afternoon.  She has had some episodic vomiting over the past few days and has become persistent since last night.  Whenever she tries to drink or eat the pain is worse and she will vomit it up.  She is not able tolerate any solids.  Pain is in the mid epigastric area goes to the left upper quadrant into her midline back and a little to the left side.  Pain is worse with eating and drinking as well as with movement.  She has not vomited up any blood.  She reports no diarrhea or black stool.  She reports her last alcohol was a couple weeks ago.  She did take an oxycodone as " well as a Creon today.  She has never had surgery on her abdomen and pelvis.  She has not had a menstrual period since she was 30 due to premature menopause.  She denies the possibility of pregnancy.  Denies any urinary symptoms.  Denies any illegal drug use.  Denies any chest pain although the midepigastric pain radiates into the lower aspect of her chest on the left and posteriorly.  Denies shortness of breath.      Previous Episodes: Yes multiple times in the past  Current Symptoms: See above    MEDICAL HISTORY REVIEWED  In reviewing old records I see this patient was discharged from [facility] on 6/9/2023 with a diagnosis of nausea and vomiting and chronic pancreatitis.  She does have a history of alcohol induced pancreatitis, tobacco abuse and chronic abdominal pain.    I can see this patient was also admitted 3/27/2023 with left flank pain did have a pancreatic pseudocyst and did have adrenal hemorrhage.    I saw that the patient was again admitted in December 2022 to [facility] had left-sided abdominal pain with pancreatic pseudocyst and chronic pancreatitis.  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    Anemia of chronic disease 08/08/2022    GERD without esophagitis 08/08/2022    Alcohol dependence in remission 08/08/2022    Pancreatic pseudocyst 08/14/2022    Candida esophagitis 08/14/2022    Ureterolithiasis 10/20/2022    Left flank pain 10/21/2022    Bacterial vaginosis 10/23/2022    Abdominal pain 11/30/2022    Adrenal hemorrhage 03/25/2023    Chronic pancreatitis,  unspecified pancreatitis type 06/08/2023    Macrocytosis 06/08/2023    Nausea & vomiting 06/08/2023    Left-sided chest pain 06/08/2023    Tobacco abuse 06/08/2023     Resolved Ambulatory Problems     Diagnosis Date Noted    Hypokalemia 09/28/2017    Mixed acid base balance disorder 09/28/2017    Dehydration 09/28/2017     Past Medical History:   Diagnosis Date    Anxiety     E. coli bacteremia     ETOH abuse     GERD (gastroesophageal reflux disease)     Hiatal hernia     Migraine     Miscarriage 2004    Pancreatitis          PAST SURGICAL HISTORY  Past Surgical History:   Procedure Laterality Date    ENDOSCOPY N/A 8/10/2022    Procedure: ESOPHAGOGASTRODUODENOSCOPY with bxs;  Surgeon: Salvador Price MD;  Location: [facility] ENDOSCOPY;  Service: Gastroenterology;  Laterality: N/A;  Pre: r/o esophageal  varacies, abd pain  Post: esophageal plaque         FAMILY HISTORY  Family History   Problem Relation Age of Onset    Alcohol abuse Mother     Arthritis Mother     Asthma Mother     Miscarriages / Stillbirths Mother     Cancer Father     Diabetes Father     Drug abuse Father     Early death Father     Heart disease Father     Hyperlipidemia Father     Hypertension Father     Alcohol abuse Paternal Aunt     Cancer Paternal Aunt     Diabetes Paternal Aunt     Drug abuse Paternal Aunt     Early death Paternal Aunt     Heart disease Paternal Aunt     Hyperlipidemia Paternal Aunt     Hypertension Paternal Aunt     Alcohol abuse Maternal Grandmother     Alcohol abuse Maternal Grandfather     Alcohol abuse Paternal Grandmother     Cancer Paternal Grandmother     Diabetes Paternal Grandmother     Drug abuse Paternal Grandmother     Early death Paternal Grandmother     Heart disease Paternal Grandmother     Hyperlipidemia Paternal Grandmother     Hypertension Paternal Grandmother     Alcohol abuse Paternal Grandfather          SOCIAL HISTORY  Social History     Socioeconomic History    Marital status: Single   Tobacco Use     Smoking status: Every Day     Packs/day: 0.50     Types: Cigarettes     Start date: 1/28/1996    Smokeless tobacco: Current   Vaping Use    Vaping Use: Never used   Substance and Sexual Activity    Alcohol use: Never    Drug use: No    Sexual activity: Yes     Partners: Male     Birth control/protection: None     Comment: IUD removed 6/29/2017         ALLERGIES  Nickel and Hydrocodone-acetaminophen        REVIEW OF SYSTEMS  Review of Systems     All systems reviewed and negative except for those discussed in HPI.       PHYSICAL EXAM    I have reviewed the triage vital signs and nursing notes.    ED Triage Vitals   Temp Heart Rate Resp BP SpO2   08/22/23 1247 08/22/23 1247 08/22/23 1254 08/22/23 1254 08/22/23 1247   98.2 øF (36.8 øC) (!) 136 20 (!) 152/116 93 %      Temp src Heart Rate Source Patient Position BP Location FiO2 (%)   08/22/23 1247 08/22/23 1247 08/22/23 1254 08/22/23 1254 --   Tympanic Monitor Lying Right arm        GENERAL: Patient looks uncomfortable and looks like she is in pain.  She is tachycardic with a heart rate in the low 100s and a sinus tachycardia.  Her heart rate is about 1 10-1 20 on the monitor.  Blood pressure is normal she is afebrile oxygen saturation is normal.  .Vital signs on my initial evaluation have been reviewed  HENT: nares patent  Head/neck/ face are symmetric without gross deformity, signs of trauma, or swelling  EYES: no scleral icterus, no conjunctival pallor.  NECK: Supple, no meningismus  CV: regular rhythm, tachycardia with intact distal pulses.  No murmur or rub  RESPIRATORY: normal effort and no respiratory distress.  Clear to auscultation bilaterally  ABDOMEN: This is midepigastric region left upper quadrant with radiation to her back in the left lower aspect of her chest anterior and posteriorly.  There is some voluntary guarding.  Positive bowel sounds.  She does have some dry heaves after my abdominal exam.  MUSCULOSKELETAL: no deformity.  No edema.  Intact distal  pulses that are equal strong and symmetric.  Patient appears chronically malnourished.  She has no cyanosis or pallor.  NEURO: alert and appropriate, moves all extremities, follows commands.  No focal motor or sensory changes  SKIN: warm, dry    Vital signs and nursing notes reviewed.        LAB RESULTS  Recent Results (from the past 24 hour(s))   Comprehensive Metabolic Panel    Collection Time: 08/22/23  1:01 PM    Specimen: Blood   Result Value Ref Range    Glucose 122 (H) 65 - 99 mg/dL    BUN 6 6 - 20 mg/dL    Creatinine 0.64 0.57 - 1.00 mg/dL    Sodium 138 136 - 145 mmol/L    Potassium 3.6 3.5 - 5.2 mmol/L    Chloride 99 98 - 107 mmol/L    CO2 25.0 22.0 - 29.0 mmol/L    Calcium 9.7 8.6 - 10.5 mg/dL    Total Protein 7.4 6.0 - 8.5 g/dL    Albumin 4.3 3.5 - 5.2 g/dL    ALT (SGPT) 26 1 - 33 U/L    AST (SGOT) 17 1 - 32 U/L    Alkaline Phosphatase 140 (H) 39 - 117 U/L    Total Bilirubin 0.5 0.0 - 1.2 mg/dL    Globulin 3.1 gm/dL    A/G Ratio 1.4 g/dL    BUN/Creatinine Ratio 9.4 7.0 - 25.0    Anion Gap 14.0 5.0 - 15.0 mmol/L    eGFR 114.7 >60.0 mL/min/1.73   Lipase    Collection Time: 08/22/23  1:01 PM    Specimen: Blood   Result Value Ref Range    Lipase 115 (H) 13 - 60 U/L   Green Top (Gel)    Collection Time: 08/22/23  1:01 PM   Result Value Ref Range    Extra Tube Hold for add-ons.    Lavender Top    Collection Time: 08/22/23  1:01 PM   Result Value Ref Range    Extra Tube hold for add-on    Gold Top - SST    Collection Time: 08/22/23  1:01 PM   Result Value Ref Range    Extra Tube Hold for add-ons.    Light Blue Top    Collection Time: 08/22/23  1:01 PM   Result Value Ref Range    Extra Tube Hold for add-ons.    CBC Auto Differential    Collection Time: 08/22/23  1:01 PM    Specimen: Blood   Result Value Ref Range    WBC 11.55 (H) 3.40 - 10.80 10*3/mm3    RBC 4.22 3.77 - 5.28 10*6/mm3    Hemoglobin 14.6 12.0 - 15.9 g/dL    Hematocrit 42.9 34.0 - 46.6 %    .7 (H) 79.0 - 97.0 fL    MCH 34.6 (H) 26.6 - 33.0 pg     MCHC 34.0 31.5 - 35.7 g/dL    RDW 14.3 12.3 - 15.4 %    RDW-SD 53.3 37.0 - 54.0 fl    MPV 9.7 6.0 - 12.0 fL    Platelets 194 140 - 450 10*3/mm3    Neutrophil % 82.0 (H) 42.7 - 76.0 %    Lymphocyte % 10.7 (L) 19.6 - 45.3 %    Monocyte % 6.2 5.0 - 12.0 %    Eosinophil % 0.4 0.3 - 6.2 %    Basophil % 0.3 0.0 - 1.5 %    Immature Grans % 0.4 0.0 - 0.5 %    Neutrophils, Absolute 9.46 (H) 1.70 - 7.00 10*3/mm3    Lymphocytes, Absolute 1.24 0.70 - 3.10 10*3/mm3    Monocytes, Absolute 0.72 0.10 - 0.90 10*3/mm3    Eosinophils, Absolute 0.05 0.00 - 0.40 10*3/mm3    Basophils, Absolute 0.03 0.00 - 0.20 10*3/mm3    Immature Grans, Absolute 0.05 0.00 - 0.05 10*3/mm3    nRBC 0.0 0.0 - 0.2 /100 WBC   Protime-INR    Collection Time: 08/22/23  1:01 PM    Specimen: Blood   Result Value Ref Range    Protime 12.6 11.7 - 14.2 Seconds    INR 0.94 0.90 - 1.10   Magnesium    Collection Time: 08/22/23  1:01 PM    Specimen: Blood   Result Value Ref Range    Magnesium 2.1 1.6 - 2.6 mg/dL   High Sensitivity Troponin T    Collection Time: 08/22/23  1:01 PM    Specimen: Blood   Result Value Ref Range    HS Troponin T <6 <10 ng/L   Ethanol    Collection Time: 08/22/23  1:01 PM    Specimen: Blood   Result Value Ref Range    Ethanol <10 0 - 10 mg/dL    Ethanol % <0.010 %   Procalcitonin    Collection Time: 08/22/23  1:01 PM    Specimen: Blood   Result Value Ref Range    Procalcitonin <0.02 0.00 - 0.25 ng/mL   ECG 12 Lead Other; Midepigastric abdominal pain    Collection Time: 08/22/23  1:46 PM   Result Value Ref Range    QT Interval 384 ms   Urinalysis With Microscopic If Indicated (No Culture) - Urine, Clean Catch    Collection Time: 08/22/23  3:16 PM    Specimen: Urine, Clean Catch   Result Value Ref Range    Color, UA Yellow Yellow, Straw    Appearance, UA Cloudy (A) Clear    pH, UA 7.0 5.0 - 8.0    Specific Gravity, UA 1.020 1.005 - 1.030    Glucose, UA Negative Negative    Ketones, UA Trace (A) Negative    Bilirubin, UA Negative Negative     Blood, UA Negative Negative    Protein, UA Trace (A) Negative    Leuk Esterase, UA Negative Negative    Nitrite, UA Positive (A) Negative    Urobilinogen, UA 0.2 E.U./dL 0.2 - 1.0 E.U./dL   Urine Drug Screen - Urine, Clean Catch    Collection Time: 08/22/23  3:16 PM    Specimen: Urine, Clean Catch   Result Value Ref Range    Amphet/Methamphet, Screen Negative Negative    Barbiturates Screen, Urine Negative Negative    Benzodiazepine Screen, Urine Negative Negative    Cocaine Screen, Urine Negative Negative    Opiate Screen Negative Negative    THC, Screen, Urine Negative Negative    Methadone Screen, Urine Negative Negative    Oxycodone Screen, Urine Positive (A) Negative    Fentanyl, Urine Negative Negative   Urinalysis, Microscopic Only - Urine, Clean Catch    Collection Time: 08/22/23  3:16 PM    Specimen: Urine, Clean Catch   Result Value Ref Range    RBC, UA 0-2 None Seen, 0-2 /HPF    WBC, UA 13-20 (A) None Seen, 0-2 /HPF    Bacteria, UA 4+ (A) None Seen /HPF    Squamous Epithelial Cells, UA 7-12 (A) None Seen, 0-2 /HPF    Hyaline Casts, UA 3-6 None Seen /LPF    Methodology Automated Microscopy    High Sensitivity Troponin T 2Hr    Collection Time: 08/22/23  4:19 PM    Specimen: Blood   Result Value Ref Range    HS Troponin T <6 <10 ng/L    Troponin T Delta     Lactic Acid, Plasma    Collection Time: 08/22/23  4:19 PM    Specimen: Blood   Result Value Ref Range    Lactate 0.8 0.5 - 2.0 mmol/L       Ordered the above labs and independently reviewed the results.        RADIOLOGY  CT Abdomen Pelvis With Contrast    Result Date: 8/22/2023  CT ABDOMEN AND PELVIS WITH IV CONTRAST  HISTORY: 42-year-old female with epigastric pain. Pancreatitis history.  TECHNIQUE: Radiation dose reduction techniques were utilized, including automated exposure control and exposure modulation based on body size. 3 mm images were obtained through the abdomen and pelvis after the administration of IV contrast. Compared with previous CT  06/08/2023.  FINDINGS: There has been significant interval increase in the peripancreatic fluid collection posterior to the pancreatic tail measuring approximately 8.6 x 6.2 cm, previously 4.5 x 3.3 cm. The much smaller fluid collections within the left adrenal gland are larger as well. There is also slight increase in the rind of complex fluid along the left paracolic gutter posterior to the spleen and lateral to the dominant peripancreatic collection. There is also significantly more prominent thickening of the posteromedial left diaphragmatic wilmer with the thickening extending anteriorly to the midline, and there are now tiny fluid collections within the left diaphragmatic phlegmon measuring 1.6 cm transversely. There is no change in the appearance of the pancreas which has changes of chronic pancreatitis predominantly at the pancreatic head and neck and the degree of pancreatic ductal dilatation proximal to the stones is unchanged, 5 mm. There is no biliary dilatation and the gallbladder appears unremarkable. The splenic vein is diminutive. The portal vein and SMV are patent. The phlegmon along the left posterior medial diaphragmatic wilmer abuts and thickens the posterior wall of the GE junction and gastric fundus. There is no acute bowel abnormality. Uterus and adnexa appear unremarkable. There are no pleural effusions or pneumonia at the visualized lower lobes.      1. Marked increase in size of the 8.6 x 6.2 cm peripancreatic fluid collection, development of phlegmon along the left posterior medial diaphragmatic wilmer with phlegmon extending to the posterior wall of the GE junction and gastric fundus. Much smaller fluid collections within the left adrenal gland are larger and there is thicker phlegmon along the left paracolic gutter posterior to the spleen. 2. There is no biliary dilatation and the gallbladder appears unremarkable. There are no new vascular complications of pancreatitis.       I ordered the  above noted radiological studies. Reviewed by me and discussed with radiologist.  See dictation for official radiology interpretation.      PROCEDURES    Procedures      MEDICATIONS GIVEN IN ER    Medications   sodium chloride 0.9 % flush 10 mL (has no administration in time range)   sodium chloride 0.9 % flush 10 mL (has no administration in time range)   sodium chloride 0.9 % infusion (125 mL/hr Intravenous New Bag 8/22/23 1345)   iopamidol (ISOVUE-300) 61 % injection 100 mL (has no administration in time range)   acetaminophen (TYLENOL) tablet 650 mg (has no administration in time range)   sennosides-docusate (PERICOLACE) 8.6-50 MG per tablet 2 tablet (has no administration in time range)     And   polyethylene glycol (MIRALAX) packet 17 g (has no administration in time range)     And   bisacodyl (DULCOLAX) EC tablet 5 mg (has no administration in time range)     And   bisacodyl (DULCOLAX) suppository 10 mg (has no administration in time range)   ondansetron (ZOFRAN) tablet 4 mg (has no administration in time range)     Or   ondansetron (ZOFRAN) injection 4 mg (has no administration in time range)   melatonin tablet 3 mg (has no administration in time range)   sodium chloride 0.9 % with KCl 20 mEq/L infusion (has no administration in time range)   morphine injection 2 mg (has no administration in time range)   sodium chloride 0.9 % bolus 1,000 mL (0 mL Intravenous Stopped 8/22/23 1902)   HYDROmorphone (DILAUDID) injection 1 mg (1 mg Intravenous Given 8/22/23 1340)   ondansetron (ZOFRAN) injection 4 mg (4 mg Intravenous Given 8/22/23 1340)   droperidol (INAPSINE) injection 1.25 mg (1.25 mg Intravenous Given 8/22/23 1432)   diphenhydrAMINE (BENADRYL) injection 25 mg (25 mg Intravenous Given 8/22/23 1432)   HYDROmorphone (DILAUDID) injection 1 mg (1 mg Intravenous Given 8/22/23 1450)   iopamidol (ISOVUE-300) 61 % injection 100 mL (100 mL Intravenous Given by Other 8/22/23 1523)   HYDROmorphone (DILAUDID) injection 1  mg (1 mg Intravenous Given 8/22/23 8861)         All labs have been independently reviewed by me.  All radiology studies have been reviewed by me and I discussed with radiologist dictating the report when indicated below.  All EKG's independently viewed and interpreted by me.  Discussion below represents my analysis of pertinent findings related to patient's condition, differential diagnosis, treatment plan and final disposition.        PROGRESS, DATA ANALYSIS, CONSULTS, AND MEDICAL DECISION MAKING    Differential diagnosis includes   - hepatobiliary pathology such as cholecystitis, cholangitis, and symptomatic cholelithiasis  -PUD  -Mesenteric ischemia  - Pancreatitis  - Dyspepsia  - Small bowel or large bowel obstruction  - Appendicitis  - Diverticulitis  - UTI including pyelonephritis  - Ureteral stone  - Zoster  - Colitis, including infectious and ischemic  - Atypical ACS  This patient looks uncomfortable with a history of pancreatitis in the past.  I suspect that is the same etiology of this episode.  She is also been admitted with GERD and abdominal pain and vomiting without pancreatitis as well and looking at old records.  We will give her some pain medicine, IV fluids and check a CT scan of her abdomen pelvis.  She will very likely need to get admitted to the hospital.  Discussed the plan with the patient and significant other at bedside.  All questions answered    ED Course as of 08/22/23 2039 Tue Aug 22, 2023   1442 Lipase(!): 115 [MM]   1442 Ethanol %: <0.010 [MM]   1442 WBC(!): 11.55 [MM]   1442 HS Troponin T: <6 [MM]   1506 Lipase(!): 115  Pancreatitis.  Mildly elevated from last lipase in the 70s. [MM]   1506 Patient has had persistent dry heaves and pain.  I have given her Dilaudid 1 mg and then Inapsine 1.25 mg and then is just given her another dose of Dilaudid 1 mg.  She was unable to get a CT scan after the initial 1 mg of Dilaudid and Inapsine.  Give her another dose of Dilaudid to see if that  will help her sit still so we can get a repeat CT scan. [MM]   1507 My own independent TURP rotation of the EKG that was done at 1:46 PM reveals a rate of 86 it is sinus rhythm it is narrow complex normal axis I do not appreciate any acute injury pattern there are some nonspecific T wave changes  This EKG looks pretty darn similar to an EKG that was done on 6/7/2023. [MM]   1543 Bacteria, UA(!): 4+ [MM]   1543 WBC, UA(!): 13-20 [MM]   1543 Ketones, UA(!): Trace [MM]   1543 Nitrite, UA(!): Positive [MM]   1551 Oxycodone Screen, Urine(!): Positive [MM]   1709 I reviewed the CT scan report from the radiologist.  There is marked increased size of the 8.6 x 6.2 cm peripancreatic fluid collection this development of likely a phlegmon along the posterior medial diaphragmatic wilmer with phlegmon extending to the posterior wall of the GE junction and gastric fundus.  There is much smaller fluid collection within the left adrenal gland which appears larger and thicker phlegmon along the left paracolic gutter posterior to the spleen.  No biliary dilatation in the bladder gallbladder appears unremarkable.  No new vascular abnormality appreciated. [MM]   1716 I can see the CT scan that was done here on 6/8/2023.  The CT scan done today was compared to that CT scan.  I did review the report of that CT scan as well. [MM]   1716 I have reevaluated the patient.  She is hypertensive.  Her heart rate is now 70s to 80s.  She still complains of bad pain but she looks more comfortable than my initial exam.  I informed her the results of the CT scan and lab work.  She does inform me that she sees a GI in [facility] and there was talk about placing a drain.  Supposed to be a drain from her stomach to drain a fluid collection.  I Agustina consult general surgery initially to see if this is something that they potentially take care of when she gets in or whether this will be a gastroenterology issue. [MM]   1721 I discussed the case with  the general surgeon Dr. Marte.  She reviewed the CT scan report as she currently was not in front of an image computer so she can see the images.  She will do that when that becomes available to her.  I informed her of the CT scan report and the patient's clinical condition.  I do not think this patient has an infectious etiology.  Patient's procalcitonin is normal lactic acid is normal and she is afebrile here and prior to arrival here.  This is likely a fluid collection that is nonacutely infectious.  She does not recommend placing the patient on antibiotics at this time.  She will see and evaluate the patient.  Likely these fluids will need to either be drained by IR or potential GI.  All questions answered at this time [MM]      ED Course User Index  [MM] Tomasz Viera MD       AS OF 20:39 EDT VITALS:    BP - 167/99  HR - 76  TEMP - 98.2 øF (36.8 øC) (Tympanic)  02 SATS - 97%    SOCIAL DETERMINANTS OF HEALTH THAT IMPACT OR LIMIT CARE (For example..Homelessness,safe discharge, inability to obtain care, follow up, or prescriptions):      DIAGNOSIS  Final diagnoses:   Epigastric pain   Phlegmon of pancreas   Alcohol-induced chronic pancreatitis   Nausea and vomiting, unspecified vomiting type         DISPOSITION  I have reviewed the test results with my patient and explained the current treatment plan.  I answered all of the patient's questions.  The patient will be admitted to monitor bed at this time.  The patient is not hypotensive and is tolerating their current disease condition well enough for a monitored bed at this time.  The patient's current condition does not require intensive care treatment at this time.            DICTATED UTILIZING DRAGON DICTATION    Note Disclaimer: At [facility], we believe that sharing information builds trust and better relationships. You are receiving this note because you recently visited [facility]. It is possible you will see health information before a provider  "has talked with you about it. This kind of information can be easy to misunderstand. To help you fully understand what it means for your health, we urge you to discuss this note with your provider.       Tomasz Viera MD  08/22/23 2039      Electronically signed by Tomasz Viera MD at 08/22/23 2039       Cassidy Nunez, RN at 08/22/23 1302          Patient presents to ED with c/o \"pancreatitis flare up\". Patient reports L sided pain and vomiting x 4 days.     Electronically signed by Cassidy Nunez, RN at 08/22/23 1304       Vital Signs (last 2 days)       Date/Time Temp Temp src Pulse Resp BP Patient Position SpO2    08/23/23 0815 97.7 (36.5) Oral 98 18 136/95 Lying 97    08/22/23 2355 99.7 (37.6) Oral 96 20 144/96 Lying 96    08/22/23 1954 99.3 (37.4) Oral 74 20 155/90 Lying 99    08/22/23 1856 -- -- 76 -- 167/99 -- 97    08/22/23 1756 -- -- 74 -- 154/103 -- 96    08/22/23 1656 -- -- 73 -- 172/105 -- 99    08/22/23 1456 -- -- 75 -- 152/104 -- 97    08/22/23 1326 -- -- 117 -- 131/100 -- 99    08/22/23 1256 -- -- 91 -- 158/120 -- 100    08/22/23 1254 -- -- 101 20 152/116 Lying --    08/22/23 1247 98.2 (36.8) Tympanic 136 -- -- -- 93          Oxygen Therapy (last 2 days)       Date/Time SpO2 Device (Oxygen Therapy) Flow (L/min) Oxygen Concentration (%) ETCO2 (mmHg)    08/23/23 0815 97 room air -- -- --    08/23/23 0004 -- room air -- -- --    08/22/23 2355 96 room air -- -- --    08/22/23 2247 -- room air -- -- --    08/22/23 2031 -- room air -- -- --    08/22/23 1954 99 room air -- -- --    08/22/23 1856 97 -- -- -- --    08/22/23 1756 96 -- -- -- --    08/22/23 1656 99 -- -- -- --    08/22/23 1456 97 -- -- -- --    08/22/23 1326 99 -- -- -- --    08/22/23 1256 100 -- -- -- --    08/22/23 1247 93 room air -- -- --          Intake & Output (last 2 days)         08/21 0701 08/22 0700 08/22 0701 08/23 0700 08/23 0701 08/24 0700    P.O.  240 0    IV Piggyback  1000     Total Intake(mL/kg)  1240 (21) 0 (0)    " Net  +1240 0           Urine Unmeasured Occurrence  3 x           Lines, Drains & Airways       Active LDAs       Name Placement date Placement time Site Days    Peripheral IV 08/22/23 1300 Right Antecubital 08/22/23  1300  Antecubital  1    Peripheral IV 08/22/23 2215 Posterior;Right Hand 08/22/23  2215  Hand  less than 1                  CIWA (last 2 days)       Date/Time CIWA-Ar Score    08/23/23 1248 5    08/23/23 1000 2    08/23/23 0847 7    08/23/23 0649 6    08/23/23 0253 5    08/23/23 0130 2    08/23/23 0004 3    08/22/23 2247 10    08/22/23 2136 21    08/22/23 2031 22          Medication Administration Report for Piper Cain as of 08/23/23 1305     Legend:    Given Hold Not Given Due Canceled Entry Other Actions    Time Time (Time) Time Time-Action         Discontinued     Completed     Future     MAR Hold     Linked             Medications 08/22/23 08/23/23      acetaminophen (TYLENOL) tablet 650 mg  Dose: 650 mg  Freq: Every 4 Hours PRN Route: PO  PRN Reason: Mild Pain  Start: 08/22/23 1726   Admin Instructions:   Do not exceed 4 grams of acetaminophen in a 24 hr period.    If given for pain, use the following pain scale:   Mild Pain = Pain Score of 1-3, CPOT 1-2  Moderate Pain = Pain Score of 4-6, CPOT 3-4  Severe Pain = Pain Score of 7-10, CPOT 5-8  Based on patient request - if ordered for moderate or severe pain, provider allows for administration of a medication prescribed for a lower pain scale.    Do not exceed 4 grams of acetaminophen in a 24 hr period. Max dose of 2gm for AST/ALT greater than 120 units/L.    If given for pain, use the following pain scale:   Mild Pain = Pain Score of 1-3, CPOT 1-2  Moderate Pain = Pain Score of 4-6, CPOT 3-4  Severe Pain = Pain Score of 7-10, CPOT 5-8         amitriptyline (ELAVIL) tablet 25 mg  Dose: 25 mg  Freq: Nightly Route: PO  Start: 08/22/23 2676     (0024)-Not Given     2100              sennosides-docusate (PERICOLACE) 8.6-50 MG per tablet 2  tablet  Dose: 2 tablet  Freq: 2 Times Daily Route: PO  Start: 08/22/23 2100   Admin Instructions:   HOLD MEDICATION IF PATIENT HAS HAD BOWEL MOVEMENT. Start bowel management regimen if patient has not had a bowel movement after 12 hours.    (2136)-Not Given            (0850)-Not Given     2100             And  polyethylene glycol (MIRALAX) packet 17 g  Dose: 17 g  Freq: Daily PRN Route: PO  PRN Reason: Constipation  PRN Comment: Use if senna-docusate is ineffective  Start: 08/22/23 1726   Admin Instructions:   Use if no bowel movement after 12 hours. Mix in 6-8 ounces of water.  Use 4-8 ounces of water, tea, or juice for each 17 gram dose.        And  bisacodyl (DULCOLAX) EC tablet 5 mg  Dose: 5 mg  Freq: Daily PRN Route: PO  PRN Reason: Constipation  PRN Comment: Use if polyethylene glycol is ineffective  Start: 08/22/23 1726   Admin Instructions:   Use if no bowel movement after 12 hours.  Swallow whole. Do not crush, split, or chew tablet.        And  bisacodyl (DULCOLAX) suppository 10 mg  Dose: 10 mg  Freq: Daily PRN Route: RE  PRN Reason: Constipation  PRN Comment: Use if bisacodyl oral is ineffective  Start: 08/22/23 1726   Admin Instructions:   Use if no bowel movement after 12 hours.  Hold for diarrhea         cefTRIAXone (ROCEPHIN) 1,000 mg in sodium chloride 0.9 % 100 mL IVPB-VTB  Dose: 1,000 mg  Freq: Every 24 Hours Route: IV  Indications of Use: URINARY TRACT INFECTION  Start: 08/23/23 1200   End: 08/28/23 1159   Admin Instructions:   LR should be paused and flushing of the line with NS is recommended prior to and after completion of ceftriaxone infusion due to incompatibility. Do not co-adminster with calcium-containing solutions.  Caution: Look alike/sound alike drug alert     1200               cloNIDine (CATAPRES) tablet 0.1 mg  Dose: 0.1 mg  Freq: Every 4 Hours PRN Route: PO  PRN Reason: High Blood Pressure  PRN Comment: sbp greater than 160  Start: 08/22/23 2053   Admin Instructions:   For  systolic blood pressure greater than 160 mmHg  Caution: Look alike/sound alike drug alert.         folic acid (FOLVITE) tablet 1 mg  Dose: 1 mg  Freq: Daily Route: PO  Start: 08/23/23 0900     0856-Given               iopamidol (ISOVUE-300) 61 % injection 100 mL  Dose: 100 mL  Freq: Once in Imaging Route: IV  Start: 08/22/23 1520    1520                LORazepam (ATIVAN) tablet 1 mg  Dose: 1 mg  Freq: Every 1 Hour PRN Route: PO  PRN Reason: Withdrawal  PRN Comment: For CIWA-Ar 8-10  Start: 08/22/23 2053   End: 08/29/23 2052   Admin Instructions:   Reassess 1 Hour After Administration     Caution: Look alike/sound alike drug alert    2259-Not Given:  See Alt               Or  LORazepam (ATIVAN) injection 1 mg  Dose: 1 mg  Freq: Every 1 Hour PRN Route: IV  PRN Reason: Withdrawal  PRN Comment: For CIWA-Ar 8-10  Start: 08/22/23 2053   End: 08/29/23 2052   Admin Instructions:   Reassess 1 Hour After Administration     Caution: Look alike/sound alike drug alert. Dilute 1:1 with normal saline.    2259-Given               Or  LORazepam (ATIVAN) tablet 2 mg  Dose: 2 mg  Freq: Every 1 Hour PRN Route: PO  PRN Reason: Withdrawal  PRN Comment: For CIWA-Ar 11-15 or -160, DBP , -125  Start: 08/22/23 2053   End: 08/29/23 2052   Admin Instructions:   Reassess 1 Hour After Administration.     Caution: Look alike/sound alike drug alert    2259-Not Given:  See Alt               Or  LORazepam (ATIVAN) injection 2 mg  Dose: 2 mg  Freq: Every 1 Hour PRN Route: IV  PRN Reason: Withdrawal  PRN Comment: For CIWA-Ar 11-15 or -160, DBP , -125  Start: 08/22/23 2053   End: 08/29/23 2052   Admin Instructions:   Reassess 1 Hour After Administration     Caution: Look alike/sound alike drug alert. Dilute 1:1 with normal saline.    2259-Not Given:  See Alt               Or  LORazepam (ATIVAN) injection 2 mg  Dose: 2 mg  Freq: Every 15 Minutes PRN Route: IV  PRN Reason: Withdrawal  PRN Comment: For PERCY-Ar Greater  "Than 15 or SBP greater than 160, DBP greater than 110, or HR greater than 125.  Start: 08/22/23 2053   End: 08/29/23 2052   Admin Instructions:   Reassess 15 Minutes After Each Administration.  If CIWA-Ar Does Not Decrease Contact Provider To Discuss Transfer to Higher Level of Care.     Caution: Look alike/sound alike drug alert. Dilute 1:1 with normal saline.    2259-Not Given:  See Alt               Or  LORazepam (ATIVAN) injection 2 mg  Dose: 2 mg  Freq: Every 15 Minutes PRN Route: IM  PRN Reason: Withdrawal  PRN Comment: For CIWA-Ar Greater Than 15 or SBP greater than 160, DBP greater than 110, or HR greater than 125.  Start: 08/22/23 2053   End: 08/29/23 2052   Admin Instructions:   Reassess 15 Minutes After Each Administration.  If CIWA-Ar Does Not Decrease Contact Provider To Discuss Transfer to Higher Level of Care.     Caution: Look alike/sound alike drug alert. Dilute 1:1 with normal saline.    2259-Not Given:  See Alt                LORazepam (ATIVAN) tablet 2 mg  Dose: 2 mg  Freq: Every 6 Hours Route: PO  Start: 08/22/23 2145   End: 08/23/23 2144   Admin Instructions:      Caution: Look alike/sound alike drug alert    (2142)-Not Given [C]            0252-Given     0856-Given     1545            Followed by  LORazepam (ATIVAN) tablet 1 mg  Dose: 1 mg  Freq: Every 6 Hours Route: PO  Start: 08/23/23 2145   End: 08/24/23 2144   Admin Instructions:      Caution: Look alike/sound alike drug alert     2145               Magnesium Standard Dose Replacement - Follow Nurse / BPA Driven Protocol  Freq: As Needed Route: XX  PRN Reason: Other  Start: 08/22/23 2053   Admin Instructions:   Open Order & Select \"[facility] Electrolyte Replacement Protocol Algorithm\" to View Details         melatonin tablet 3 mg  Dose: 3 mg  Freq: Nightly PRN Route: PO  PRN Reason: Sleep  Start: 08/22/23 1726         morphine injection 2 mg  Dose: 2 mg  Freq: Every 2 Hours PRN Route: IV  PRN Reason: Severe Pain  Start: 08/22/23 1945   End: " 09/01/23 1944   Admin Instructions:   If given for pain, use the following pain scale:  Mild Pain = Pain Score of 1-3, CPOT 1-2  Moderate Pain = Pain Score of 4-6, CPOT 3-4  Severe Pain = Pain Score of 7-10, CPOT 5-8    2040-Given     2259-Given           0132-Given     0359-Given     0657-Given     0856-Given     1248-Given           ondansetron (ZOFRAN) tablet 4 mg  Dose: 4 mg  Freq: Every 6 Hours PRN Route: PO  PRN Reasons: Nausea,Vomiting  Start: 08/22/23 1726 2040-Not Given:  See Alt            0657-Not Given:  See Alt     1248-Not Given:  See Alt             Or  ondansetron (ZOFRAN) injection 4 mg  Dose: 4 mg  Freq: Every 6 Hours PRN Route: IV  PRN Reasons: Nausea,Vomiting  Start: 08/22/23 1726 2040-Given            0657-Given     1248-Given              pantoprazole (PROTONIX) injection 40 mg  Dose: 40 mg  Freq: Every Early Morning Route: IV  Indications of Use: STRESS ULCER PROPHYLAXIS  Start: 08/23/23 1145   Admin Instructions:   Dilute with 10 mL of 0.9% NaCl and give IV push over 2 minutes.     1248-Given               sodium chloride 0.9 % flush 10 mL  Dose: 10 mL  Freq: As Needed Route: IV  PRN Reason: Line Care  Start: 08/22/23 1305         sodium chloride 0.9 % flush 10 mL  Dose: 10 mL  Freq: As Needed Route: IV  PRN Reason: Line Care  Start: 08/22/23 1300         sodium chloride 0.9 % with KCl 20 mEq/L infusion  Rate: 125 mL/hr Dose: 125 mL/hr  Freq: Continuous Route: IV  Start: 08/22/23 1743    2303-New Bag            0856-New Bag               thiamine (B-1) injection 200 mg  Dose: 200 mg  Freq: Every 8 Hours Scheduled Route: IV  Start: 08/22/23 2300   End: 08/27/23 2159   Admin Instructions:   Doses of up to 250mg, give over 1-2 minutes (IV push). Doses 250mg and above, give over 30 minutes.    2258-Given            0652-Given     1400     2200            Followed by  thiamine (VITAMIN B-1) tablet 100 mg  Dose: 100 mg  Freq: Daily Route: PO  Start: 08/28/23 0900        Completed  Medications  Medications 08/22/23 08/23/23       diphenhydrAMINE (BENADRYL) injection 25 mg  Dose: 25 mg  Freq: Once Route: IV  Start: 08/22/23 1439   End: 08/22/23 1432   Admin Instructions:   25 mg may be given IV push over less than 1 minute.  Caution: Look alike/sound alike drug alert. This med may be ordered in other forms and routes. Before giving verify the last time the drug was given by any route/form.      1432-Given                droperidol (INAPSINE) injection 1.25 mg  Dose: 1.25 mg  Freq: Once Route: IV  Start: 08/22/23 1439   End: 08/22/23 1432    1432-Given                HYDROmorphone (DILAUDID) injection 1 mg  Dose: 1 mg  Freq: Once Route: IV  Start: 08/22/23 1730   End: 08/22/23 1731   Admin Instructions:   Based on patient request - if ordered for moderate or severe pain, provider allows for administration of a medication prescribed for a lower pain scale.        Caution: Look alike/sound alike drug alert    If given for pain, use the following pain scale:  Mild Pain = Pain Score of 1-3, CPOT 1-2  Moderate Pain = Pain Score of 4-6, CPOT 3-4  Severe Pain = Pain Score of 7-10, CPOT 5-8    1731-Given                HYDROmorphone (DILAUDID) injection 1 mg  Dose: 1 mg  Freq: Once Route: IV  Start: 08/22/23 1458   End: 08/22/23 1450   Admin Instructions:   Based on patient request - if ordered for moderate or severe pain, provider allows for administration of a medication prescribed for a lower pain scale.        Caution: Look alike/sound alike drug alert    If given for pain, use the following pain scale:  Mild Pain = Pain Score of 1-3, CPOT 1-2  Moderate Pain = Pain Score of 4-6, CPOT 3-4  Severe Pain = Pain Score of 7-10, CPOT 5-8    1450-Given                HYDROmorphone (DILAUDID) injection 1 mg  Dose: 1 mg  Freq: Once Route: IV  Start: 08/22/23 1346   End: 08/22/23 1340   Admin Instructions:   Based on patient request - if ordered for moderate or severe pain, provider allows for administration  "of a medication prescribed for a lower pain scale.        Caution: Look alike/sound alike drug alert    If given for pain, use the following pain scale:  Mild Pain = Pain Score of 1-3, CPOT 1-2  Moderate Pain = Pain Score of 4-6, CPOT 3-4  Severe Pain = Pain Score of 7-10, CPOT 5-8    1340-Given                iopamidol (ISOVUE-300) 61 % injection 100 mL  Dose: 100 mL  Freq: Once in Imaging Route: IV  Start: 08/22/23 1535   End: 08/22/23 1523    1523-Given by Other                LORazepam (ATIVAN) injection 1 mg  Dose: 1 mg  Freq: Once Route: IV  Start: 08/22/23 2245   End: 08/22/23 2215   Admin Instructions:      Caution: Look alike/sound alike drug alert. Dilute 1:1 with normal saline.    2215-Given                ondansetron (ZOFRAN) injection 4 mg  Dose: 4 mg  Freq: Once Route: IV  Start: 08/22/23 1346   End: 08/22/23 1340   Admin Instructions:   \"If multiple N/V medications ordered, use in the following order: Ondansetron, Prochlorperazine, Promethazine. Use PO unless patient refuses or patient unable to swallow.\"      1340-Given                piperacillin-tazobactam (ZOSYN) 3.375 g in iso-osmotic dextrose 50 ml (premix)  Dose: 3.375 g  Freq: Once Route: IV  Start: 08/22/23 2145   End: 08/22/23 2235   Admin Instructions:   Refrigerate    2205-New Bag                sodium chloride 0.9 % bolus 1,000 mL  Dose: 1,000 mL  Freq: Once Route: IV  Start: 08/22/23 1346   End: 08/22/23 1902    1337-New Bag     1902-Stopped              Discontinued Medications  Medications 08/22/23 08/23/23       HYDROmorphone (DILAUDID) injection 1 mg  Dose: 1 mg  Freq: Once Route: IV  Start: 08/22/23 1743   End: 08/22/23 1740   Admin Instructions:   Based on patient request - if ordered for moderate or severe pain, provider allows for administration of a medication prescribed for a lower pain scale.        Caution: Look alike/sound alike drug alert    If given for pain, use the following pain scale:  Mild Pain = Pain Score of 1-3, " CPOT 1-2  Moderate Pain = Pain Score of 4-6, CPOT 3-4  Severe Pain = Pain Score of 7-10, CPOT 5-8    (1730)-Not Given     (1731)-Not Given               HYDROmorphone (DILAUDID) injection 1 mg  Dose: 1 mg  Freq: Once Route: IV  Start: 08/22/23 1742   End: 08/22/23 1730   Admin Instructions:   Based on patient request - if ordered for moderate or severe pain, provider allows for administration of a medication prescribed for a lower pain scale.        Caution: Look alike/sound alike drug alert    If given for pain, use the following pain scale:  Mild Pain = Pain Score of 1-3, CPOT 1-2  Moderate Pain = Pain Score of 4-6, CPOT 3-4  Severe Pain = Pain Score of 7-10, CPOT 5-8         piperacillin-tazobactam (ZOSYN) 3.375 g in iso-osmotic dextrose 50 ml (premix)  Dose: 3.375 g  Freq: Every 8 Hours Route: IV  Indications of Use: INTRA-ABDOMINAL INFECTION  Start: 08/23/23 0345   End: 08/23/23 1048   Admin Instructions:   Refrigerate     0359-New Bag               sodium chloride 0.9 % infusion  Rate: 125 mL/hr Dose: 125 mL/hr  Freq: Continuous Route: IV  Start: 08/22/23 1346   End: 08/22/23 2253    1345-New Bag                 Lab Results (all)       Procedure Component Value Units Date/Time    Urine Culture - Urine, Urine, Clean Catch [[id number]]  (Abnormal) Collected: 08/22/23 1516    Specimen: Urine, Clean Catch Updated: 08/23/23 1303     Urine Culture >100,000 CFU/mL Gram Negative Bacilli    Narrative:      Colonization of the urinary tract without infection is common. Treatment is discouraged unless the patient is symptomatic, pregnant, or undergoing an invasive urologic procedure.    Folate [[id number]]  (Normal) Collected: 08/23/23 1105    Specimen: Blood Updated: 08/23/23 1241     Folate >20.00 ng/mL     Narrative:      Results may be falsely increased if patient taking Biotin.      Vitamin B12 [[id number]]  (Normal) Collected: 08/23/23 1105    Specimen: Blood Updated: 08/23/23 1237     Vitamin B-12 810 pg/mL      Narrative:      Results may be falsely increased if patient taking Biotin.      TSH [[id number]]  (Normal) Collected: 08/23/23 1105    Specimen: Blood Updated: 08/23/23 1237     TSH 0.841 uIU/mL     Hemoglobin A1c [[id number]]  (Normal) Collected: 08/23/23 1105    Specimen: Blood Updated: 08/23/23 1215     Hemoglobin A1C 5.10 %     Narrative:      Hemoglobin A1C Ranges:    Increased Risk for Diabetes  5.7% to 6.4%  Diabetes                     >= 6.5%  Diabetic Goal                < 7.0%    Lipase [[id number]]  (Abnormal) Collected: 08/23/23 0252    Specimen: Blood Updated: 08/23/23 0411     Lipase 87 U/L     Basic Metabolic Panel [[id number]]  (Abnormal) Collected: 08/23/23 0252    Specimen: Blood Updated: 08/23/23 0411     Glucose 103 mg/dL      BUN 4 mg/dL      Creatinine 0.51 mg/dL      Sodium 136 mmol/L      Potassium 3.5 mmol/L      Chloride 101 mmol/L      CO2 24.0 mmol/L      Calcium 9.0 mg/dL      BUN/Creatinine Ratio 7.8     Anion Gap 11.0 mmol/L      eGFR 121.2 mL/min/1.73     Narrative:      GFR Normal >60  Chronic Kidney Disease <60  Kidney Failure <15      CBC (No Diff) [[id number]]  (Abnormal) Collected: 08/23/23 0252    Specimen: Blood Updated: 08/23/23 0351     WBC 8.14 10*3/mm3      RBC 3.96 10*6/mm3      Hemoglobin 14.0 g/dL      Hematocrit 39.6 %      .0 fL      MCH 35.4 pg      MCHC 35.4 g/dL      RDW 14.0 %      RDW-SD 51.6 fl      MPV 10.3 fL      Platelets 147 10*3/mm3     Blood Culture - Blood, Arm, Left [[id number]] Collected: 08/22/23 1650    Specimen: Blood from Arm, Left Updated: 08/22/23 1652    Lactic Acid, Plasma [[id number]]  (Normal) Collected: 08/22/23 1619    Specimen: Blood Updated: 08/22/23 1649     Lactate 0.8 mmol/L     High Sensitivity Troponin T 2Hr [[id number]] Collected: 08/22/23 1619    Specimen: Blood Updated: 08/22/23 1647     HS Troponin T <6 ng/L      Troponin T Delta --     Comment: Unable to calculate.       Narrative:      High Sensitive Troponin T Reference  "Range:  <10.0 ng/L- Negative Female for AMI  <15.0 ng/L- Negative Male for AMI  >=10 - Abnormal Female indicating possible myocardial injury.  >=15 - Abnormal Male indicating possible myocardial injury.   Clinicians would have to utilize clinical acumen, EKG, Troponin, and serial changes to determine if it is an Acute Myocardial Infarction or myocardial injury due to an underlying chronic condition.         Blood Culture - Blood, Arm, Right [[id number]] Collected: 08/22/23 1619    Specimen: Blood from Arm, Right Updated: 08/22/23 1625    Procalcitonin [[id number]]  (Normal) Collected: 08/22/23 1301    Specimen: Blood Updated: 08/22/23 1608     Procalcitonin <0.02 ng/mL     Narrative:      As a Marker for Sepsis (Non-Neonates):    1. <0.5 ng/mL represents a low risk of severe sepsis and/or septic shock.  2. >2 ng/mL represents a high risk of severe sepsis and/or septic shock.    As a Marker for Lower Respiratory Tract Infections that require antibiotic therapy:    PCT on Admission    Antibiotic Therapy       6-12 Hrs later    >0.5                Strongly Recommended  >0.25 - <0.5        Recommended   0.1 - 0.25          Discouraged              Remeasure/reassess PCT  <0.1                Strongly Discouraged     Remeasure/reassess PCT    As 28 day mortality risk marker: \"Change in Procalcitonin Result\" (>80% or <=80%) if Day 0 (or Day 1) and Day 4 values are available. Refer to http://www.[email or web address]-pct-calculator.com    Change in PCT <=80%  A decrease of PCT levels below or equal to 80% defines a positive change in PCT test result representing a higher risk for 28-day all-cause mortality of patients diagnosed with severe sepsis for septic shock.    Change in PCT >80%  A decrease of PCT levels of more than 80% defines a negative change in PCT result representing a lower risk for 28-day all-cause mortality of patients diagnosed with severe sepsis or septic shock.       Urine Drug Screen - Urine, Clean Catch [[id number]]  " (Abnormal) Collected: 08/22/23 1516    Specimen: Urine, Clean Catch Updated: 08/22/23 1548     Amphet/Methamphet, Screen Negative     Barbiturates Screen, Urine Negative     Benzodiazepine Screen, Urine Negative     Cocaine Screen, Urine Negative     Opiate Screen Negative     THC, Screen, Urine Negative     Methadone Screen, Urine Negative     Oxycodone Screen, Urine Positive     Fentanyl, Urine Negative    Narrative:      Negative Thresholds Per Drugs Screened:    Amphetamines                 500 ng/ml  Barbiturates                 200 ng/ml  Benzodiazepines              100 ng/ml  Cocaine                      300 ng/ml  Methadone                    300 ng/ml  Opiates                      300 ng/ml  Oxycodone                    100 ng/ml  THC                           50 ng/ml  Fentanyl                       5 ng/ml      The Normal Value for all drugs tested is negative. This report includes final unconfirmed screening results to be used for medical treatment purposes only. Unconfirmed results must not be used for non-medical purposes such as employment or legal testing. Clinical consideration should be applied to any drug of abuse test, particularly when unconfirmed results are used.            Urinalysis, Microscopic Only - Urine, Clean Catch [[id number]]  (Abnormal) Collected: 08/22/23 1516    Specimen: Urine, Clean Catch Updated: 08/22/23 1540     RBC, UA 0-2 /HPF      WBC, UA 13-20 /HPF      Bacteria, UA 4+ /HPF      Squamous Epithelial Cells, UA 7-12 /HPF      Hyaline Casts, UA 3-6 /LPF      Methodology Automated Microscopy    Urinalysis With Microscopic If Indicated (No Culture) - Urine, Clean Catch [[id number]]  (Abnormal) Collected: 08/22/23 1516    Specimen: Urine, Clean Catch Updated: 08/22/23 1540     Color, UA Yellow     Appearance, UA Cloudy     pH, UA 7.0     Specific Gravity, UA 1.020     Glucose, UA Negative     Ketones, UA Trace     Bilirubin, UA Negative     Blood, UA Negative     Protein, UA  Trace     Leuk Esterase, UA Negative     Nitrite, UA Positive     Urobilinogen, UA 0.2 E.U./dL    High Sensitivity Troponin T [[id number]]  (Normal) Collected: 08/22/23 1301    Specimen: Blood Updated: 08/22/23 1432     HS Troponin T <6 ng/L     Narrative:      High Sensitive Troponin T Reference Range:  <10.0 ng/L- Negative Female for AMI  <15.0 ng/L- Negative Male for AMI  >=10 - Abnormal Female indicating possible myocardial injury.  >=15 - Abnormal Male indicating possible myocardial injury.   Clinicians would have to utilize clinical acumen, EKG, Troponin, and serial changes to determine if it is an Acute Myocardial Infarction or myocardial injury due to an underlying chronic condition.         Magnesium [[id number]]  (Normal) Collected: 08/22/23 1301    Specimen: Blood Updated: 08/22/23 1429     Magnesium 2.1 mg/dL     Ethanol [[id number]] Collected: 08/22/23 1301    Specimen: Blood Updated: 08/22/23 1429     Ethanol <10 mg/dL      Ethanol % <0.010 %     [place] Draw [[id number]] Collected: 08/22/23 1301    Specimen: Blood Updated: 08/22/23 1415    Narrative:      The following orders were created for panel order [place] Draw.  Procedure                               Abnormality         Status                     ---------                               -----------         ------                     Green Top (Gel)[[id number]]                                  Final result               Lavender Top[[id number]]                                     Final result               Gold Top - SST[[id number]]                                   Final result               Light Blue Top[[id number]]                                   Final result                 Please view results for these tests on the individual orders.    Green Top (Gel) [[id number]] Collected: 08/22/23 1301    Specimen: Blood Updated: 08/22/23 1415     Extra Tube Hold for add-ons.     Comment: Auto resulted.       Lavender Top [[id number]] Collected: 08/22/23 1301     Specimen: Blood Updated: 08/22/23 1415     Extra Tube hold for add-on     Comment: Auto resulted       Gold Top - SST [[id number]] Collected: 08/22/23 1301    Specimen: Blood Updated: 08/22/23 1415     Extra Tube Hold for add-ons.     Comment: Auto resulted.       Light Blue Top [[id number]] Collected: 08/22/23 1301    Specimen: Blood Updated: 08/22/23 1415     Extra Tube Hold for add-ons.     Comment: Auto resulted       Comprehensive Metabolic Panel [[id number]]  (Abnormal) Collected: 08/22/23 1301    Specimen: Blood Updated: 08/22/23 1333     Glucose 122 mg/dL      BUN 6 mg/dL      Creatinine 0.64 mg/dL      Sodium 138 mmol/L      Potassium 3.6 mmol/L      Chloride 99 mmol/L      CO2 25.0 mmol/L      Calcium 9.7 mg/dL      Total Protein 7.4 g/dL      Albumin 4.3 g/dL      ALT (SGPT) 26 U/L      AST (SGOT) 17 U/L      Alkaline Phosphatase 140 U/L      Total Bilirubin 0.5 mg/dL      Globulin 3.1 gm/dL      A/G Ratio 1.4 g/dL      BUN/Creatinine Ratio 9.4     Anion Gap 14.0 mmol/L      eGFR 114.7 mL/min/1.73     Narrative:      GFR Normal >60  Chronic Kidney Disease <60  Kidney Failure <15      Lipase [[id number]]  (Abnormal) Collected: 08/22/23 1301    Specimen: Blood Updated: 08/22/23 1333     Lipase 115 U/L     Protime-INR [[id number]]  (Normal) Collected: 08/22/23 1301    Specimen: Blood Updated: 08/22/23 1333     Protime 12.6 Seconds      INR 0.94    CBC & Differential [[id number]]  (Abnormal) Collected: 08/22/23 1301    Specimen: Blood Updated: 08/22/23 1316    Narrative:      The following orders were created for panel order CBC & Differential.  Procedure                               Abnormality         Status                     ---------                               -----------         ------                     CBC Auto Differential[[id number]]        Abnormal            Final result                 Please view results for these tests on the individual orders.    CBC Auto Differential [[id number]]  (Abnormal)  Collected: 08/22/23 1301    Specimen: Blood Updated: 08/22/23 1316     WBC 11.55 10*3/mm3      RBC 4.22 10*6/mm3      Hemoglobin 14.6 g/dL      Hematocrit 42.9 %      .7 fL      MCH 34.6 pg      MCHC 34.0 g/dL      RDW 14.3 %      RDW-SD 53.3 fl      MPV 9.7 fL      Platelets 194 10*3/mm3      Neutrophil % 82.0 %      Lymphocyte % 10.7 %      Monocyte % 6.2 %      Eosinophil % 0.4 %      Basophil % 0.3 %      Immature Grans % 0.4 %      Neutrophils, Absolute 9.46 10*3/mm3      Lymphocytes, Absolute 1.24 10*3/mm3      Monocytes, Absolute 0.72 10*3/mm3      Eosinophils, Absolute 0.05 10*3/mm3      Basophils, Absolute 0.03 10*3/mm3      Immature Grans, Absolute 0.05 10*3/mm3      nRBC 0.0 /100 WBC           Imaging Results (All)       Procedure Component Value Units Date/Time    CT Abdomen Pelvis With Contrast [[id number]] Collected: 08/22/23 1629     Updated: 08/22/23 1629    Narrative:      CT ABDOMEN AND PELVIS WITH IV CONTRAST     HISTORY: 42-year-old female with epigastric pain. Pancreatitis history.     TECHNIQUE: Radiation dose reduction techniques were utilized, including  automated exposure control and exposure modulation based on body size.   3 mm images were obtained through the abdomen and pelvis after the  administration of IV contrast. Compared with previous CT 06/08/2023.     FINDINGS: There has been significant interval increase in the  peripancreatic fluid collection posterior to the pancreatic tail  measuring approximately 8.6 x 6.2 cm, previously 4.5 x 3.3 cm. The much  smaller fluid collections within the left adrenal gland are larger as  well. There is also slight increase in the rind of complex fluid along  the left paracolic gutter posterior to the spleen and lateral to the  dominant peripancreatic collection. There is also significantly more  prominent thickening of the posteromedial left diaphragmatic wilmer with  the thickening extending anteriorly to the midline, and there are now  tiny  fluid collections within the left diaphragmatic phlegmon measuring  1.6 cm transversely. There is no change in the appearance of the  pancreas which has changes of chronic pancreatitis predominantly at the  pancreatic head and neck and the degree of pancreatic ductal dilatation  proximal to the stones is unchanged, 5 mm. There is no biliary  dilatation and the gallbladder appears unremarkable. The splenic vein is  diminutive. The portal vein and SMV are patent. The phlegmon along the  left posterior medial diaphragmatic wilmer abuts and thickens the  posterior wall of the GE junction and gastric fundus. There is no acute  bowel abnormality. Uterus and adnexa appear unremarkable. There are no  pleural effusions or pneumonia at the visualized lower lobes.       Impression:      1. Marked increase in size of the 8.6 x 6.2 cm peripancreatic fluid  collection, development of phlegmon along the left posterior medial  diaphragmatic wilmer with phlegmon extending to the posterior wall of the  GE junction and gastric fundus. Much smaller fluid collections within  the left adrenal gland are larger and there is thicker phlegmon along  the left paracolic gutter posterior to the spleen.  2. There is no biliary dilatation and the gallbladder appears  unremarkable. There are no new vascular complications of pancreatitis.             ECG/EMG Results (all)       Procedure Component Value Units Date/Time    ECG 12 Lead Other; Midepigastric abdominal pain [[id number]] Collected: 08/22/23 1346     Updated: 08/22/23 1619     QT Interval 384 ms     Narrative:      HEART RATE= 86  bpm  RR Interval= 698  ms  NH Interval= 167  ms  P Horizontal Axis= -26  deg  P Front Axis= 56  deg  QRSD Interval= 80  ms  QT Interval= 384  ms  QRS Axis= 71  deg  T Wave Axis= 68  deg  - ABNORMAL ECG -  Sinus rhythm  Probable left atrial enlargement  Low voltage, precordial leads  Nonspecific T abnrm, anterolateral leads  c/w prior ecg, anterior t wave inversion  now seen  Electronically Signed By: Evelyn Rinaldi ([facility]) 22-Aug-2023 16:19:04  Date and Time of Study: 2023-08-22 13:46:30    SCANNED - TELEMETRY   [[id number]] Resulted: 08/22/23     Updated: 08/22/23 2050    SCANNED - TELEMETRY   [[id number]] Resulted: 08/22/23     Updated: 08/23/23 0435    SCANNED - TELEMETRY   [[id number]] Resulted: 08/22/23     Updated: 08/23/23 0818          Orders (last 48 hrs)        Start     Ordered    08/28/23 0900  thiamine (VITAMIN B-1) tablet 100 mg  Daily        See Hyperspace for full Linked Orders Report.    08/22/23 2053 08/24/23 0600  CBC & Differential  Daily       08/23/23 1048    08/24/23 0600  Comprehensive Metabolic Panel  Daily       08/23/23 1048    08/24/23 0600  Lipase  Daily       08/23/23 1048    08/24/23 0600  Cortisol  Morning Draw         08/23/23 1048    08/23/23 2145  LORazepam (ATIVAN) tablet 1 mg  Every 6 Hours        See Hyperspace for full Linked Orders Report.    08/22/23 2053 08/23/23 1200  cefTRIAXone (ROCEPHIN) 1,000 mg in sodium chloride 0.9 % 100 mL IVPB-VTB  Every 24 Hours         08/23/23 1048    08/23/23 1145  pantoprazole (PROTONIX) injection 40 mg  Every Early Morning         08/23/23 1048    08/23/23 1049  Hemoglobin A1c  Once         08/23/23 1048    08/23/23 1048  Inpatient [facility] Consult  Once        Provider:  (Not yet assigned)    08/23/23 1048    08/23/23 1047  TSH  Once         08/23/23 1048    08/23/23 1047  Vitamin B12  Once         08/23/23 1048    08/23/23 1047  Folate  Once         08/23/23 1048    08/23/23 0944  Can advance diet to LF diet as tolerated  Nursing Communication  Once        Comments: Can advance diet to LF diet as tolerated    08/23/23 0952    08/23/23 0943  MRI Abdomen With & Without Contrast  1 Time Imaging         08/23/23 0942    08/23/23 0943  Diet: Liquid Diets; Clear Liquid; Texture: Regular Texture (IDDSI 7); Fluid Consistency: Thin (IDDSI 0)  Diet Effective Now         08/23/23 0942    08/23/23 0900   folic acid (FOLVITE) tablet 1 mg  Daily         08/22/23 2053 08/23/23 0600  Basic Metabolic Panel  Morning Draw         08/22/23 1727 08/23/23 0600  CBC (No Diff)  Morning Draw         08/22/23 1727 08/23/23 0600  Lipase  Morning Draw         08/22/23 2251 08/23/23 0345  piperacillin-tazobactam (ZOSYN) 3.375 g in iso-osmotic dextrose 50 ml (premix)  Every 8 Hours,   Status:  Discontinued         08/22/23 2053 08/22/23 2345  amitriptyline (ELAVIL) tablet 25 mg  Nightly         08/22/23 2253 08/22/23 2300  thiamine (B-1) injection 200 mg  Every 8 Hours Scheduled        See Hyperspace for full Linked Orders Report.    08/22/23 2053 08/22/23 2253  NPO Diet NPO Type: Ice Chips  Diet Effective Now,   Status:  Canceled         08/22/23 2253 08/22/23 2245  LORazepam (ATIVAN) injection 1 mg  Once         08/22/23 2151 08/22/23 2145  piperacillin-tazobactam (ZOSYN) 3.375 g in iso-osmotic dextrose 50 ml (premix)  Once         08/22/23 2053 08/22/23 2145  LORazepam (ATIVAN) tablet 2 mg  Every 6 Hours        See Hyperspace for full Linked Orders Report.    08/22/23 2053 08/22/23 2100  sennosides-docusate (PERICOLACE) 8.6-50 MG per tablet 2 tablet  2 Times Daily        See Hyperspace for full Linked Orders Report.    08/22/23 1727 08/22/23 2054  Initiate Alcohol Withdrawal Protocol  Once         08/22/23 2053 08/22/23 2054  Vital Signs  Per Hospital Policy         08/22/23 2053 08/22/23 2054  Continuous Pulse Oximetry  Continuous         08/22/23 2053 08/22/23 2054  Obtain baseline Clinical [place] Withdrawal Assessment - Ar, sedation scale, and vital signs  Until Discontinued        Comments: See hyperlink for CIWA scoring reference guide.    08/22/23 2053 08/22/23 2054  Clinical [place] Withdrawal Assessment (CIWA-Ar)  Per Hospital Policy         08/22/23 2053 08/22/23 2054  If CIWA-Ar Score Less Than 8 For 3 Consecutive Assessments, Monitor Every 4 Hours & Discontinue  Assessment When CIWA-Ar Less Than 8 for 24 Hours  Per Hospital Policy        Comments: Contact Provider to Restart Protocol if Any Symptoms Reappear    08/22/23 2053 08/22/23 2054  Obtain pre and post sedation scores with every Lorazepam dose  Once        Comments: Hold Lorazepam for POSS greater than 2 or RASS of-3, -4, -5.     08/22/23 2053 08/22/23 2054  Seizure Precautions  Continuous         08/22/23 2053 08/22/23 2054  Notify physician for breakthrough symptoms of withdrawal and to restart protocol  Until Discontinued        Comments: Including: anxiety, agitation, severe vomiting, seizure activity.    08/22/23 2053 08/22/23 2054  Notify physician of abnormal lab results  Until Discontinued         08/22/23 2053 08/22/23 2054  Notify physician  Until Discontinued         08/22/23 2053 08/22/23 2054  Fall Precautions  Continuous         08/22/23 2053 08/22/23 2054  Safety Precautions  Continuous         08/22/23 2053 08/22/23 2054  Inpatient consult to [facility]  Once        Provider:  (Not yet assigned)    08/22/23 2053 08/22/23 2053  LORazepam (ATIVAN) tablet 1 mg  Every 1 Hour PRN        See Hyperspace for full Linked Orders Report.    08/22/23 2053 08/22/23 2053  LORazepam (ATIVAN) injection 1 mg  Every 1 Hour PRN        See Hyperspace for full Linked Orders Report.    08/22/23 2053 08/22/23 2053  LORazepam (ATIVAN) tablet 2 mg  Every 1 Hour PRN        See Hyperspace for full Linked Orders Report.    08/22/23 2053 08/22/23 2053  LORazepam (ATIVAN) injection 2 mg  Every 1 Hour PRN        See Hyperspace for full Linked Orders Report.    08/22/23 2053 08/22/23 2053  LORazepam (ATIVAN) injection 2 mg  Every 15 Minutes PRN        See Hyperspace for full Linked Orders Report.    08/22/23 2053 08/22/23 2053  LORazepam (ATIVAN) injection 2 mg  Every 15 Minutes PRN        See Hyperspace for full Linked Orders Report.    08/22/23 2053 08/22/23 2053  cloNIDine  (CATAPRES) tablet 0.1 mg  Every 4 Hours PRN         08/22/23 2053 08/22/23 2053  Magnesium Standard Dose Replacement - Follow Nurse / BPA Driven Protocol  As Needed         08/22/23 2053 08/22/23 2053  Inpatient Gastroenterology Consult  Once        Specialty:  Gastroenterology  Provider:  Rachell Madden MD    08/22/23 2053 08/22/23 2001  Urine Culture - Urine, Urine, Clean Catch  Once         08/22/23 2000 08/22/23 2000  Vital Signs  Every 4 Hours      Comments: Per per hospital policy    08/22/23 1727 08/22/23 1945  morphine injection 2 mg  Every 2 Hours PRN         08/22/23 1945 08/22/23 1800  Oral Care  2 Times Daily       08/22/23 1727 08/22/23 1743  HYDROmorphone (DILAUDID) injection 1 mg  Once,   Status:  Discontinued         08/22/23 1727    08/22/23 1743  sodium chloride 0.9 % with KCl 20 mEq/L infusion  Continuous         08/22/23 1727    08/22/23 1742  HYDROmorphone (DILAUDID) injection 1 mg  Once,   Status:  Discontinued         08/22/23 1726    08/22/23 1730  HYDROmorphone (DILAUDID) injection 1 mg  Once         08/22/23 1730    08/22/23 1728  Daily Weights  Daily       08/22/23 1727    08/22/23 1728  Place Sequential Compression Device  Once         08/22/23 1727    08/22/23 1728  Maintain Sequential Compression Device  Continuous         08/22/23 1727    08/22/23 1727  Code Status and Medical Interventions:  Continuous         08/22/23 1727    08/22/23 1727  Diet: Cardiac Diets; Healthy Heart (2-3 Na+); Texture: Regular Texture (IDDSI 7); Fluid Consistency: Thin (IDDSI 0)  Diet Effective Now,   Status:  Canceled         08/22/23 1727    08/22/23 1727  Strict Intake & Output  Every Shift       08/22/23 1727    08/22/23 1726  acetaminophen (TYLENOL) tablet 650 mg  Every 4 Hours PRN         08/22/23 1727    08/22/23 1726  polyethylene glycol (MIRALAX) packet 17 g  Daily PRN        See Hyperspace for full Linked Orders Report.    08/22/23 1727    08/22/23 1726  bisacodyl  (DULCOLAX) EC tablet 5 mg  Daily PRN        See Hyperspace for full Linked Orders Report.    08/22/23 1727    08/22/23 1726  bisacodyl (DULCOLAX) suppository 10 mg  Daily PRN        See Hyperspace for full Linked Orders Report.    08/22/23 1727    08/22/23 1726  ondansetron (ZOFRAN) tablet 4 mg  Every 6 Hours PRN        See Hyperspace for full Linked Orders Report.    08/22/23 1727    08/22/23 1726  ondansetron (ZOFRAN) injection 4 mg  Every 6 Hours PRN        See Hyperspace for full Linked Orders Report.    08/22/23 1727    08/22/23 1726  melatonin tablet 3 mg  Nightly PRN         08/22/23 1727    08/22/23 1726  Inpatient Admission  Once         08/22/23 1725    08/22/23 1711  Surgery (on-call MD unless specified)  Once        Specialty:  General Surgery  Provider:  Alessandra Marte MD    08/22/23 1710    08/22/23 1555  [facility] (on-call MD unless specified) Details  Once        Specialty:  Hospitalist  Provider:  Kalpana Beasley MD    08/22/23 1554    08/22/23 1544  Procalcitonin  Once         08/22/23 1543    08/22/23 1544  Lactic Acid, Plasma  Once         08/22/23 1543    08/22/23 1544  Blood Culture - Blood, Arm, Right  Once         08/22/23 1543    08/22/23 1544  Blood Culture - Blood, Arm, Left  Once         08/22/23 1543    08/22/23 1544  Urinalysis With Culture If Indicated - Urine, Clean Catch  Once,   Status:  Canceled         08/22/23 1543    08/22/23 1535  iopamidol (ISOVUE-300) 61 % injection 100 mL  Once in Imaging         08/22/23 1519    08/22/23 1532  Urinalysis, Microscopic Only - Urine, Clean Catch  Once         08/22/23 1531    08/22/23 1520  iopamidol (ISOVUE-300) 61 % injection 100 mL  Once in Imaging         08/22/23 1504    08/22/23 1501  High Sensitivity Troponin T 2Hr  PROCEDURE ONCE         08/22/23 1432    08/22/23 1458  HYDROmorphone (DILAUDID) injection 1 mg  Once         08/22/23 1442    08/22/23 1439  droperidol (INAPSINE) injection 1.25 mg  Once         08/22/23 1423    08/22/23  1439  diphenhydrAMINE (BENADRYL) injection 25 mg  Once         08/22/23 1423    08/22/23 1346  sodium chloride 0.9 % bolus 1,000 mL  Once         08/22/23 1330    08/22/23 1346  sodium chloride 0.9 % infusion  Continuous,   Status:  Discontinued         08/22/23 1330    08/22/23 1346  HYDROmorphone (DILAUDID) injection 1 mg  Once         08/22/23 1330    08/22/23 1346  ondansetron (ZOFRAN) injection 4 mg  Once         08/22/23 1330    08/22/23 1338  Ethanol  Once         08/22/23 1337    08/22/23 1338  Urine Drug Screen - Urine, Clean Catch  Once         08/22/23 1337    08/22/23 1331  Monitor Blood Pressure  Per Hospital Policy         08/22/23 1330    08/22/23 1331  Cardiac Monitoring  Continuous        Comments: Follow Standing Orders As Outlined in Process Instructions (Open Order Report to View Full Instructions)    08/22/23 1330    08/22/23 1331  Pulse Oximetry, Continuous  Continuous,   Status:  Canceled         08/22/23 1330    08/22/23 1330  CT Abdomen Pelvis With Contrast  1 Time Imaging        Comments: IV CONTRAST ONLY      08/22/23 1330    08/22/23 1330  High Sensitivity Troponin T  Once         08/22/23 1330    08/22/23 1330  ECG 12 Lead Other; Midepigastric abdominal pain  Once         08/22/23 1330    08/22/23 1306  Protime-INR  Once         08/22/23 1305    08/22/23 1306  Magnesium  Once         08/22/23 1305    08/22/23 1306  Insert Peripheral IV  Once        See Hyperspace for full Linked Orders Report.    08/22/23 1305    08/22/23 1306  Monitor Blood Pressure  Per Hospital Policy         08/22/23 1305    08/22/23 1306  Cardiac Monitoring  Continuous,   Status:  Canceled        Comments: Follow Standing Orders As Outlined in Process Instructions (Open Order Report to View Full Instructions)    08/22/23 1305    08/22/23 1306  Pulse Oximetry, Continuous  Continuous,   Status:  Canceled         08/22/23 1305    08/22/23 1305  sodium chloride 0.9 % flush 10 mL  As Needed        See Hyperspace for full  Linked Orders Report.    23 1305    23 1301  CBC & Differential  Once         23 1300    23 1301  Comprehensive Metabolic Panel  Once         23 1300    23 1301  Lipase  Once         23 1300    23 1301  Urinalysis With Microscopic If Indicated (No Culture) - Urine, Clean Catch  Once         23 1300    23 1301  CBC Auto Differential  PROCEDURE ONCE         23 1300    23 1300  NPO Diet NPO Type: Strict NPO  Diet Effective Now,   Status:  Canceled         23 1300    23 1300  Undress & Gown  Once         23 1300    23 1300  Insert Peripheral IV  Once         23 1300    23 1300  sodium chloride 0.9 % flush 10 mL  As Needed         23 1300    23 1300  [place] Draw  Once         23 1300    23 1300  Green Top (Gel)  PROCEDURE ONCE         23 1300    23 1300  Lavender Top  PROCEDURE ONCE         23 1300    23 1300  Gold Top - SST  PROCEDURE ONCE         23 1300    23 1300  Light Blue Top  PROCEDURE ONCE         23 1300    Unscheduled  Up with assistance  As Needed       23 1727    --  Magnesium 400 MG capsule  Status:  Discontinued         23 1355    --  SCANNED - TELEMETRY           23 0000    --  SCANNED - TELEMETRY           23 0000    --  SCANNED - TELEMETRY           23 0000                       Physician Progress Notes (all)        Castro Hilliard MD at 23 1048              Name: Piper Cain ADMIT: 2023   : 1980  PCP: Rachell Pozo APRN    MRN: [id number] LOS: 1 days   AGE/SEX: 42 y.o. female  ROOM: [facility]/     Subjective   Subjective   Patient continues with middle and upper abdominal pain.  No nausea or vomiting today.  Tolerating clear liquids well.  There are no bowel movement.  No fever or chills.    Review of Systems  .  Positive frequency and urgency but no dysuria or  hematuria.  Cardiovascular/respiratory.  No chest pain/no palpitations/no shortness of breath/no cough  CNS/psychiatry.  Minimal anxiety.  No tremor.  No headache.  No seizures.  No focal neurological symptoms.    Objective   Objective   Vital Signs  Temp:  [97.7 øF (36.5 øC)-99.7 øF (37.6 øC)] 97.7 øF (36.5 øC)  Heart Rate:  [] 98  Resp:  [18-20] 18  BP: (131-172)/() 136/95  SpO2:  [93 %-100 %] 97 %  on   ;   Device (Oxygen Therapy): room air    Intake/Output Summary (Last 24 hours) at 8/23/2023 1051  Last data filed at 8/23/2023 0815  Gross per 24 hour   Intake 1240 ml   Output --   Net 1240 ml     Body mass index is 19.2 kg/mý.      08/22/23  1254 08/22/23  1954 08/23/23  0533   Weight: 58.1 kg (128 lb) 61.4 kg (135 lb 4.8 oz) 59 kg (130 lb)     Physical Exam  General.  Alert and oriented x3.  In no apparent pain/distress/diaphoresis.  Normal mood and affect.  Eyes.  Pupils equal round and reactive.  Intact extraocular musculature.  No pallor or jaundice.  Oral cavity.  Moist mucous membrane.  Neck.  Supple. No lymphadenopathy or thyromegaly.  Cardiovascular.  Regular rate and rhythm with no gallops or murmurs.  Chest.  Care to auscultation bilaterally with no added sounds.  Abdomen.  Upper and middle abdominal mild tenderness.  No guarding.  No distention.  Normal bowel sounds.  No organomegaly.  Extremities.  No clubbing/cyanosis/edema.    Results Review:      Results from last 7 days   Lab Units 08/23/23  0252 08/22/23  1301   SODIUM mmol/L 136 138   POTASSIUM mmol/L 3.5 3.6   CHLORIDE mmol/L 101 99   CO2 mmol/L 24.0 25.0   BUN mg/dL 4* 6   CREATININE mg/dL 0.51* 0.64   GLUCOSE mg/dL 103* 122*   CALCIUM mg/dL 9.0 9.7   AST (SGOT) U/L  --  17   ALT (SGPT) U/L  --  26     Estimated Creatinine Clearance: 136.6 mL/min (A) (by C-G formula based on SCr of 0.51 mg/dL (L)).          Results from last 7 days   Lab Units 08/22/23  1619 08/22/23  1301   HSTROP T ng/L <6 <6             Results from last 7 days    Lab Units 08/22/23  1301   MAGNESIUM mg/dL 2.1           Invalid input(s): LDLCALC  Results from last 7 days   Lab Units 08/23/23  0252 08/22/23  1301   WBC 10*3/mm3 8.14 11.55*   HEMOGLOBIN g/dL 14.0 14.6   HEMATOCRIT % 39.6 42.9   PLATELETS 10*3/mm3 147 194   MCV fL 100.0* 101.7*   MCH pg 35.4* 34.6*   MCHC g/dL 35.4 34.0   RDW % 14.0 14.3   RDW-SD fl 51.6 53.3   MPV fL 10.3 9.7   NEUTROPHIL % %  --  82.0*   LYMPHOCYTE % %  --  10.7*   MONOCYTES % %  --  6.2   EOSINOPHIL % %  --  0.4   BASOPHIL % %  --  0.3   IMM GRAN % %  --  0.4   NEUTROS ABS 10*3/mm3  --  9.46*   LYMPHS ABS 10*3/mm3  --  1.24   MONOS ABS 10*3/mm3  --  0.72   EOS ABS 10*3/mm3  --  0.05   BASOS ABS 10*3/mm3  --  0.03   IMMATURE GRANS (ABS) 10*3/mm3  --  0.05   NRBC /100 WBC  --  0.0     Results from last 7 days   Lab Units 08/22/23  1301   INR  0.94         Results from last 7 days   Lab Units 08/22/23  1619 08/22/23  1301   PROCALCITONIN ng/mL  --  <0.02   LACTATE mmol/L 0.8  --          Results from last 7 days   Lab Units 08/23/23  0252 08/22/23  1301   LIPASE U/L 87* 115*             Results from last 7 days   Lab Units 08/22/23  1516   NITRITE UA  Positive*   WBC UA /HPF 13-20*   BACTERIA UA /HPF 4+*   SQUAM EPITHEL UA /HPF 7-12*           Imaging:  Imaging Results (Last 24 Hours)       Procedure Component Value Units Date/Time    CT Abdomen Pelvis With Contrast [[id number]] Collected: 08/22/23 1629     Updated: 08/22/23 1629    Narrative:      CT ABDOMEN AND PELVIS WITH IV CONTRAST     HISTORY: 42-year-old female with epigastric pain. Pancreatitis history.     TECHNIQUE: Radiation dose reduction techniques were utilized, including  automated exposure control and exposure modulation based on body size.   3 mm images were obtained through the abdomen and pelvis after the  administration of IV contrast. Compared with previous CT 06/08/2023.     FINDINGS: There has been significant interval increase in the  peripancreatic fluid collection  posterior to the pancreatic tail  measuring approximately 8.6 x 6.2 cm, previously 4.5 x 3.3 cm. The much  smaller fluid collections within the left adrenal gland are larger as  well. There is also slight increase in the rind of complex fluid along  the left paracolic gutter posterior to the spleen and lateral to the  dominant peripancreatic collection. There is also significantly more  prominent thickening of the posteromedial left diaphragmatic wilmer with  the thickening extending anteriorly to the midline, and there are now  tiny fluid collections within the left diaphragmatic phlegmon measuring  1.6 cm transversely. There is no change in the appearance of the  pancreas which has changes of chronic pancreatitis predominantly at the  pancreatic head and neck and the degree of pancreatic ductal dilatation  proximal to the stones is unchanged, 5 mm. There is no biliary  dilatation and the gallbladder appears unremarkable. The splenic vein is  diminutive. The portal vein and SMV are patent. The phlegmon along the  left posterior medial diaphragmatic wilmer abuts and thickens the  posterior wall of the GE junction and gastric fundus. There is no acute  bowel abnormality. Uterus and adnexa appear unremarkable. There are no  pleural effusions or pneumonia at the visualized lower lobes.       Impression:      1. Marked increase in size of the 8.6 x 6.2 cm peripancreatic fluid  collection, development of phlegmon along the left posterior medial  diaphragmatic wilmer with phlegmon extending to the posterior wall of the  GE junction and gastric fundus. Much smaller fluid collections within  the left adrenal gland are larger and there is thicker phlegmon along  the left paracolic gutter posterior to the spleen.  2. There is no biliary dilatation and the gallbladder appears  unremarkable. There are no new vascular complications of pancreatitis.                  I reviewed the patient's new clinical results / labs / tests /  procedures      Assessment/Plan     Active Hospital Problems    Diagnosis  POA    **Acute on chronic pancreatitis [K85.90, K86.1]  Yes    Alcohol abuse [F10.10]  Yes    Acute UTI (urinary tract infection) [N39.0]  Yes    Hyperglycemia [R73.9]  Yes    Essential hypertension [I10]  Yes    Migraine [G43.909]  Yes    Generalized anxiety disorder [F41.1]  Yes    Pancreatic pseudocyst [K86.3]  Yes    Nausea and vomiting [R11.2]  Yes    Abdominal pain [R10.9]  Yes      Resolved Hospital Problems   No resolved problems to display.           Acute on chronic alcoholic pancreatitis complicated by pancreatic pseudocyst in a patient with a history of chronic alcoholic pancreatitis/GERD.  Improving.  Nausea and vomiting.  Currently tolerating clear liquids and GI advancing the diet and ordering MRI of the abdomen to better evaluate intra-abdominal pancreatic fluid.  CT scan of the abdomen and pelvis revealed changes of chronic pancreatitis and increased peripancreatic fluid with phlegmon formation.  I doubt that there is an infection in the pancreatic cyst (no fever and normal leukocytes)..  Awaiting surgical consultation for possible drainage.  We will continue IV fluid/initiate IV Protonix.  Alcohol abuse/withdrawal.  Improved withdrawal symptoms on detoxification and CIWA protocol.  We will ask access center to evaluate.  Migraine/anxiety.  Stable.  Negative CNS examination.  Continue Elavil.  UTI in a patient with a history of nephrolithiasis.  CT scan of the abdomen pelvis reveals no obstruction.  Change from Zosyn to Rocephin.  Hypertension.  Initially elevated.  Not on any home medication.  Now blood pressure under good control.  No evidence of angina or congestive heart failure.  Will monitor.  Hyperglycemia.  Check A1c.  History of anemia of chronic disease and thrombocytopenia/macrocytosis.  Normal hemoglobin and platelets.  Will check B12/folate/TSH/cortisol.  VTE prophylaxis.  Sequential compression  "devices.    Discussed my findings and plan of treatment with the patient.  Disposition.  To be determined based on clinical course.        Castro Hilliard MD  [facility]  08/23/23  10:51 EDT           Electronically signed by Castro Hilliard MD at 08/23/23 1100          Consult Notes (all)        Evelin Ohara LPCC at 08/23/23 0931        Consult Orders    1. Inpatient consult to [facility] [[id number]] ordered by Kalpana Beasley MD at 08/22/23 2053                 [facility] attempted consult, spoke w/ RN and reviewed chart. Pt SHARMILAWA 7 @ 5100. RN reports pt is pleasant. Pt A&Ox4. Pt reports has seen [facility] multiple times in the past and has \"all the resources I could possible get at this point, it's up to me from now on\". Pt reports not wanting to complete another consult and does not want to be followed by [facility] at this time. Pt aware of how to request [facility] to return if needed. Gave report to RN.   [facility] will sign off at this time, please re-consult if needed.       Electronically signed by Evelin Ohara LPCC at 08/23/23 0933       Rachell Madden MD at 08/23/23 0908        Consult Orders    1. Inpatient Gastroenterology Consult [[id number]] ordered by Kalpana Beasley MD at 08/22/23 2053                 [facility]  Initial Inpatient Consult Note    Referring Provider: Dr. Beasley    Reason for Consultation: Pancreatitis    Subjective     History of present illness:    42 y.o. female, known to our service from previous admissions, that we are asked to see for pancreatitis.    Patient reports increasing bouts of nausea with vomiting.  This often happens at night but sometimes happens in the morning.  She reports worsening heartburn despite taking daily medication.  No weight loss.  No change in her bowel movements or diarrhea.  Abdominal pain is stable.  She did have some left shoulder pain which is often been an early sign of a " pancreatitis flare in the past but she does not feel the typical symptoms of a flare.  She is scheduled for outpatient ERCP with stent placement as well as EUS at [place] on 9/6.    Patient has a history of chronic pancreatitis, alcohol abuse, as well as tobacco abuse.  She is followed by gastroenterology of [facility].  Patient reports her last drink was 3 weeks ago when she continues to smoke although this makes her nauseated and has induced vomiting.    CT of the abdomen and pelvis with IV contrast, reviewed by me shows significant increase in the peripancreatic fluid collection posterior to the pancreatic tail.  Chronic pancreatitis noted involving the pancreatic head and neck predominantly.  Normal gallbladder.  Lipase this admission is normal.  Normal white count.  Normal BUN and creatinine.    She had a previous normal HIDA scan in June 2023.    Past Medical History:  Past Medical History:   Diagnosis Date    Anxiety     E. coli bacteremia     ETOH abuse     GERD (gastroesophageal reflux disease)     Hiatal hernia     Left flank pain 10/21/2022    Migraine     Miscarriage 2004    Pancreatitis      Past Surgical History:  Past Surgical History:   Procedure Laterality Date    ENDOSCOPY N/A 8/10/2022    Procedure: ESOPHAGOGASTRODUODENOSCOPY with bxs;  Surgeon: Salvador Price MD;  Location: [facility] ENDOSCOPY;  Service: Gastroenterology;  Laterality: N/A;  Pre: r/o esophageal  varacies, abd pain  Post: esophageal plaque      Social History:   Social History     Tobacco Use    Smoking status: Every Day     Packs/day: 0.50     Types: Cigarettes     Start date: 1/28/1996    Smokeless tobacco: Current   Substance Use Topics    Alcohol use: Never      Family History:  Family History   Problem Relation Age of Onset    Alcohol abuse Mother     Arthritis Mother     Asthma Mother     Miscarriages / Stillbirths Mother     Cancer Father     Diabetes Father     Drug abuse Father     Early death Father     Heart disease  Father     Hyperlipidemia Father     Hypertension Father     Alcohol abuse Paternal Aunt     Cancer Paternal Aunt     Diabetes Paternal Aunt     Drug abuse Paternal Aunt     Early death Paternal Aunt     Heart disease Paternal Aunt     Hyperlipidemia Paternal Aunt     Hypertension Paternal Aunt     Alcohol abuse Maternal Grandmother     Alcohol abuse Maternal Grandfather     Alcohol abuse Paternal Grandmother     Cancer Paternal Grandmother     Diabetes Paternal Grandmother     Drug abuse Paternal Grandmother     Early death Paternal Grandmother     Heart disease Paternal Grandmother     Hyperlipidemia Paternal Grandmother     Hypertension Paternal Grandmother     Alcohol abuse Paternal Grandfather        Home Meds:  Medications Prior to Admission   Medication Sig Dispense Refill Last Dose    amitriptyline (ELAVIL) 10 MG tablet Take 2.5 tablets by mouth Every Night.   8/21/2023 at 2300    Cyanocobalamin (VITAMIN B 12 PO) Take  by mouth.   Past Week    Ferrous Sulfate Dried (Feosol) 200 (65 Fe) MG tablet tablet Take 1 tablet by mouth Daily.   8/21/2023 at 0900    folic acid (FOLVITE) 1 MG tablet Take 1 tablet by mouth Daily. 30 tablet 3 8/21/2023 at 0900    ondansetron (ZOFRAN) 4 MG tablet Take 1 tablet by mouth Every 8 (Eight) Hours As Needed for Nausea or Vomiting.   8/22/2023 at 0900    oxyCODONE-acetaminophen (PERCOCET)  MG per tablet Take 1 tablet by mouth Every 4 (Four) Hours As Needed for Moderate Pain.   8/22/2023 at 0900    pancrelipase, Lip-Prot-Amyl, (CREON) 49324-25194 units capsule delayed-release particles capsule Take 3 capsules by mouth 3 (Three) Times a Day With Meals.   8/21/2023 at 2300    pantoprazole (PROTONIX) 40 MG EC tablet Take 1 tablet by mouth Daily. 30 tablet 3 8/21/2023 at 1800     Current Meds:   amitriptyline, 25 mg, Oral, Nightly  folic acid, 1 mg, Oral, Daily  iopamidol, 100 mL, Intravenous, Once in imaging  LORazepam, 2 mg, Oral, Q6H   Followed by  LORazepam, 1 mg, Oral,  Q6H  piperacillin-tazobactam, 3.375 g, Intravenous, Q8H  senna-docusate sodium, 2 tablet, Oral, BID  thiamine (B-1) IV, 200 mg, Intravenous, Q8H   Followed by  [START ON 8/28/2023] thiamine, 100 mg, Oral, Daily      Allergies:  Allergies   Allergen Reactions    Nickel      Review of Systems  Pertinent items are noted in HPI, all other systems reviewed and negative     Objective     Vital Signs  Temp:  [97.7 øF (36.5 øC)-99.7 øF (37.6 øC)] 97.7 øF (36.5 øC)  Heart Rate:  [] 98  Resp:  [18-20] 18  BP: (131-172)/() 136/95  Physical Exam:  General Appearance:    Alert, cooperative, in no acute distress   Head:    Normocephalic, without obvious abnormality, atraumatic   Eyes:          conjunctivae and sclerae normal, no   icterus   Throat:   no thrush, oral mucosa moist   Neck:   Supple, no adenopathy   Lungs:     Clear to auscultation bilaterally    Heart:    Regular rhythm and normal rate    Chest Wall:    No abnormalities observed   Abdomen:     Soft, nondistended, mildly tender in the midepigastrium without rebound or guarding   Extremities:   no edema, no redness   Skin:   No bruising or rash   Psychiatric:  normal mood and insight     Results Review:   I reviewed the patient's new clinical results.    Results from last 7 days   Lab Units 08/23/23  0252 08/22/23  1301   WBC 10*3/mm3 8.14 11.55*   HEMOGLOBIN g/dL 14.0 14.6   HEMATOCRIT % 39.6 42.9   PLATELETS 10*3/mm3 147 194     Results from last 7 days   Lab Units 08/23/23  0252 08/22/23  1301   SODIUM mmol/L 136 138   POTASSIUM mmol/L 3.5 3.6   CHLORIDE mmol/L 101 99   CO2 mmol/L 24.0 25.0   BUN mg/dL 4* 6   CREATININE mg/dL 0.51* 0.64   CALCIUM mg/dL 9.0 9.7   BILIRUBIN mg/dL  --  0.5   ALK PHOS U/L  --  140*   ALT (SGPT) U/L  --  26   AST (SGOT) U/L  --  17   GLUCOSE mg/dL 103* 122*     Results from last 7 days   Lab Units 08/22/23  1301   INR  0.94     Lab Results   Lab Value Date/Time    LIPASE 87 (H) 08/23/2023 0252    LIPASE 115 (H) 08/22/2023  1301    LIPASE 73 (H) 06/08/2023 0652    LIPASE 151 (H) 06/07/2023 2246    LIPASE 192 (H) 03/25/2023 1700    LIPASE 38 12/05/2022 0526    LIPASE 34 12/04/2022 0542    LIPASE 29 12/03/2022 0515    LIPASE 50 12/02/2022 0719    LIPASE 29 12/01/2022 0519    LIPASE 65 (H) 11/30/2022 1412    LIPASE 157 (H) 11/28/2022 1751    LIPASE 335 (H) 10/20/2022 1939    LIPASE 378 (H) 08/06/2022 2158    LIPASE 95 (H) 01/24/2018 0545    LIPASE 93 (H) 10/01/2017 0356    LIPASE 122 (H) 09/30/2017 0619    LIPASE 86 (H) 09/29/2017 1731    LIPASE 133 (H) 09/28/2017 0032       Radiology:  CT Abdomen Pelvis With Contrast   Preliminary Result   1. Marked increase in size of the 8.6 x 6.2 cm peripancreatic fluid   collection, development of phlegmon along the left posterior medial   diaphragmatic wilmer with phlegmon extending to the posterior wall of the   GE junction and gastric fundus. Much smaller fluid collections within   the left adrenal gland are larger and there is thicker phlegmon along   the left paracolic gutter posterior to the spleen.   2. There is no biliary dilatation and the gallbladder appears   unremarkable. There are no new vascular complications of pancreatitis.              Assessment & Plan   Active Hospital Problems    Diagnosis     **Abdominal pain        Assessment:  Peripancreatic fluid collection  Chronic pancreatitis  H/o alcohol abuse  History of tobacco abuse    Plan:  Recommend MRI of the pancreas for further evaluation of the pancreatic fluid collection.  Clear liquids-she is not having a lot of abdominal pain.  Can advance to low-fat diet as tolerated.  Recommend that she eat small portions given some compression of the stomach caused by the pancreatic fluid collection which is likely contributing to symptoms.  Resume pancreatic enzymes with initiation of diet  Counseled patient regarding alcohol and tobacco cessation  Ultimately needs to f/u for planned OP ERCP with [place] [facility]        I discussed the patients findings and my recommendations with patient and family.    Rachell Madden MD              Electronically signed by Rachell Madden MD at 08/23/23 0977

## 2023-08-23 NOTE — CONSULTS
[facility]  Initial Inpatient Consult Note    Referring Provider: Dr. Beasley    Reason for Consultation: Pancreatitis    Subjective     History of present illness:    42 y.o. female, known to our service from previous admissions, that we are asked to see for pancreatitis.    Patient reports increasing bouts of nausea with vomiting.  This often happens at night but sometimes happens in the morning.  She reports worsening heartburn despite taking daily medication.  No weight loss.  No change in her bowel movements or diarrhea.  Abdominal pain is stable.  She did have some left shoulder pain which is often been an early sign of a pancreatitis flare in the past but she does not feel the typical symptoms of a flare.  She is scheduled for outpatient ERCP with stent placement as well as EUS at [place] on 9/6.    Patient has a history of chronic pancreatitis, alcohol abuse, as well as tobacco abuse.  She is followed by gastroenterology of [facility].  Patient reports her last drink was 3 weeks ago when she continues to smoke although this makes her nauseated and has induced vomiting.    CT of the abdomen and pelvis with IV contrast, reviewed by me shows significant increase in the peripancreatic fluid collection posterior to the pancreatic tail.  Chronic pancreatitis noted involving the pancreatic head and neck predominantly.  Normal gallbladder.  Lipase this admission is normal.  Normal white count.  Normal BUN and creatinine.    She had a previous normal HIDA scan in June 2023.    Past Medical History:  Past Medical History:   Diagnosis Date    Anxiety     E. coli bacteremia     ETOH abuse     GERD (gastroesophageal reflux disease)     Hiatal hernia     Left flank pain 10/21/2022    Migraine     Miscarriage 2004    Pancreatitis      Past Surgical History:  Past Surgical History:   Procedure Laterality Date    ENDOSCOPY N/A 8/10/2022    Procedure: ESOPHAGOGASTRODUODENOSCOPY with bxs;  Surgeon:  Salvador Price MD;  Location: [facility] ENDOSCOPY;  Service: Gastroenterology;  Laterality: N/A;  Pre: r/o esophageal  varacies, abd pain  Post: esophageal plaque      Social History:   Social History     Tobacco Use    Smoking status: Every Day     Packs/day: 0.50     Types: Cigarettes     Start date: 1/28/1996    Smokeless tobacco: Current   Substance Use Topics    Alcohol use: Never      Family History:  Family History   Problem Relation Age of Onset    Alcohol abuse Mother     Arthritis Mother     Asthma Mother     Miscarriages / Stillbirths Mother     Cancer Father     Diabetes Father     Drug abuse Father     Early death Father     Heart disease Father     Hyperlipidemia Father     Hypertension Father     Alcohol abuse Paternal Aunt     Cancer Paternal Aunt     Diabetes Paternal Aunt     Drug abuse Paternal Aunt     Early death Paternal Aunt     Heart disease Paternal Aunt     Hyperlipidemia Paternal Aunt     Hypertension Paternal Aunt     Alcohol abuse Maternal Grandmother     Alcohol abuse Maternal Grandfather     Alcohol abuse Paternal Grandmother     Cancer Paternal Grandmother     Diabetes Paternal Grandmother     Drug abuse Paternal Grandmother     Early death Paternal Grandmother     Heart disease Paternal Grandmother     Hyperlipidemia Paternal Grandmother     Hypertension Paternal Grandmother     Alcohol abuse Paternal Grandfather        Home Meds:  Medications Prior to Admission   Medication Sig Dispense Refill Last Dose    amitriptyline (ELAVIL) 10 MG tablet Take 2.5 tablets by mouth Every Night.   8/21/2023 at 2300    Cyanocobalamin (VITAMIN B 12 PO) Take  by mouth.   Past Week    Ferrous Sulfate Dried (Feosol) 200 (65 Fe) MG tablet tablet Take 1 tablet by mouth Daily.   8/21/2023 at 0900    folic acid (FOLVITE) 1 MG tablet Take 1 tablet by mouth Daily. 30 tablet 3 8/21/2023 at 0900    ondansetron (ZOFRAN) 4 MG tablet Take 1 tablet by mouth Every 8 (Eight) Hours As Needed for Nausea or Vomiting.    8/22/2023 at 0900    oxyCODONE-acetaminophen (PERCOCET)  MG per tablet Take 1 tablet by mouth Every 4 (Four) Hours As Needed for Moderate Pain.   8/22/2023 at 0900    pancrelipase, Lip-Prot-Amyl, (CREON) 51008-54174 units capsule delayed-release particles capsule Take 3 capsules by mouth 3 (Three) Times a Day With Meals.   8/21/2023 at 2300    pantoprazole (PROTONIX) 40 MG EC tablet Take 1 tablet by mouth Daily. 30 tablet 3 8/21/2023 at 1800     Current Meds:   amitriptyline, 25 mg, Oral, Nightly  folic acid, 1 mg, Oral, Daily  iopamidol, 100 mL, Intravenous, Once in imaging  LORazepam, 2 mg, Oral, Q6H   Followed by  LORazepam, 1 mg, Oral, Q6H  piperacillin-tazobactam, 3.375 g, Intravenous, Q8H  senna-docusate sodium, 2 tablet, Oral, BID  thiamine (B-1) IV, 200 mg, Intravenous, Q8H   Followed by  [START ON 8/28/2023] thiamine, 100 mg, Oral, Daily      Allergies:  Allergies   Allergen Reactions    Nickel      Review of Systems  Pertinent items are noted in HPI, all other systems reviewed and negative     Objective     Vital Signs  Temp:  [97.7 øF (36.5 øC)-99.7 øF (37.6 øC)] 97.7 øF (36.5 øC)  Heart Rate:  [] 98  Resp:  [18-20] 18  BP: (131-172)/() 136/95  Physical Exam:  General Appearance:    Alert, cooperative, in no acute distress   Head:    Normocephalic, without obvious abnormality, atraumatic   Eyes:          conjunctivae and sclerae normal, no   icterus   Throat:   no thrush, oral mucosa moist   Neck:   Supple, no adenopathy   Lungs:     Clear to auscultation bilaterally    Heart:    Regular rhythm and normal rate    Chest Wall:    No abnormalities observed   Abdomen:     Soft, nondistended, mildly tender in the midepigastrium without rebound or guarding   Extremities:   no edema, no redness   Skin:   No bruising or rash   Psychiatric:  normal mood and insight     Results Review:   I reviewed the patient's new clinical results.    Results from last 7 days   Lab Units 08/23/23  0252  08/22/23  1301   WBC 10*3/mm3 8.14 11.55*   HEMOGLOBIN g/dL 14.0 14.6   HEMATOCRIT % 39.6 42.9   PLATELETS 10*3/mm3 147 194     Results from last 7 days   Lab Units 08/23/23  0252 08/22/23  1301   SODIUM mmol/L 136 138   POTASSIUM mmol/L 3.5 3.6   CHLORIDE mmol/L 101 99   CO2 mmol/L 24.0 25.0   BUN mg/dL 4* 6   CREATININE mg/dL 0.51* 0.64   CALCIUM mg/dL 9.0 9.7   BILIRUBIN mg/dL  --  0.5   ALK PHOS U/L  --  140*   ALT (SGPT) U/L  --  26   AST (SGOT) U/L  --  17   GLUCOSE mg/dL 103* 122*     Results from last 7 days   Lab Units 08/22/23  1301   INR  0.94     Lab Results   Lab Value Date/Time    LIPASE 87 (H) 08/23/2023 0252    LIPASE 115 (H) 08/22/2023 1301    LIPASE 73 (H) 06/08/2023 0652    LIPASE 151 (H) 06/07/2023 2246    LIPASE 192 (H) 03/25/2023 1700    LIPASE 38 12/05/2022 0526    LIPASE 34 12/04/2022 0542    LIPASE 29 12/03/2022 0515    LIPASE 50 12/02/2022 0719    LIPASE 29 12/01/2022 0519    LIPASE 65 (H) 11/30/2022 1412    LIPASE 157 (H) 11/28/2022 1751    LIPASE 335 (H) 10/20/2022 1939    LIPASE 378 (H) 08/06/2022 2158    LIPASE 95 (H) 01/24/2018 0545    LIPASE 93 (H) 10/01/2017 0356    LIPASE 122 (H) 09/30/2017 0619    LIPASE 86 (H) 09/29/2017 1731    LIPASE 133 (H) 09/28/2017 0032       Radiology:  CT Abdomen Pelvis With Contrast   Preliminary Result   1. Marked increase in size of the 8.6 x 6.2 cm peripancreatic fluid   collection, development of phlegmon along the left posterior medial   diaphragmatic wilmer with phlegmon extending to the posterior wall of the   GE junction and gastric fundus. Much smaller fluid collections within   the left adrenal gland are larger and there is thicker phlegmon along   the left paracolic gutter posterior to the spleen.   2. There is no biliary dilatation and the gallbladder appears   unremarkable. There are no new vascular complications of pancreatitis.              Assessment & Plan   Active Hospital Problems    Diagnosis     **Abdominal pain         Assessment:  Peripancreatic fluid collection  Chronic pancreatitis  H/o alcohol abuse  History of tobacco abuse    Plan:  Recommend MRI of the pancreas for further evaluation of the pancreatic fluid collection.  Clear liquids-she is not having a lot of abdominal pain.  Can advance to low-fat diet as tolerated.  Recommend that she eat small portions given some compression of the stomach caused by the pancreatic fluid collection which is likely contributing to symptoms.  Resume pancreatic enzymes with initiation of diet  Counseled patient regarding alcohol and tobacco cessation  Ultimately needs to f/u for planned OP ERCP with stent placement and EUS at [facility] on 9/6        I discussed the patients findings and my recommendations with patient and family.    Rachell Madden MD

## 2023-08-23 NOTE — PLAN OF CARE
Problem: Adult Inpatient Plan of Care  Goal: Plan of Care Review  8/23/2023 0521 by Floridalma Jacobo RN  Outcome: Ongoing, Progressing     Goal Outcome Evaluation:  Plan of Care Reviewed With: patient           Outcome Evaluation: pt is here with pancreatitis. A/OX4. VSS. NSR. on RA. on CIWA Protocol, Ativan given per CIWA protocol. last CIWA was 5. C/O of abd pain and Nausea, managing with Morphine and zofran PRN. IVF and IV ABX in place. NPO. GI consulted. Carefully updating plan of care.

## 2023-08-23 NOTE — CONSULTS
[facility] attempted to see patient this morning-she declined; see note per Evelin for further details.

## 2023-08-23 NOTE — CONSULTS
General Surgery Consultation    Consulting Physician: Alessandra Marte MD    Reason for consultation: Pancreatitis, pancreatic pseudocyst    CC: abdominal pain, nocturnal vomitin    HPI:   The patient is a very pleasant 42 y.o. female who presented to the hospital with ongoing abdominal pain and vomiting. She reports the pain is in her epigastrium and left upper abdomen. It feels sharp and comes and goes. She also reports feeling nauseated and vomiting at night. Nausea was improved in the mornings. No fevers or chills.     She reports seeing Piper Denny NP, with [facility].  She also reports having appointment for an endoscopic ultrasound and cyst gastrostomy with Dr. Denson on September 6th.     Past Medical History:  Anxiety  E. coli bacteremia  Alcohol abuse  GERD  Hiatal hernia  Left flank pain  Chronic pancreatitis with pseudocysts    Past Surgical History:  Multiple endoscopy procedures    Medications:  Medications Prior to Admission   Medication Sig Dispense Refill Last Dose    amitriptyline (ELAVIL) 10 MG tablet Take 2.5 tablets by mouth Every Night.   8/21/2023 at 2300    Cyanocobalamin (VITAMIN B 12 PO) Take  by mouth.   Past Week    Ferrous Sulfate Dried (Feosol) 200 (65 Fe) MG tablet tablet Take 1 tablet by mouth Daily.   8/21/2023 at 0900    folic acid (FOLVITE) 1 MG tablet Take 1 tablet by mouth Daily. 30 tablet 3 8/21/2023 at 0900    ondansetron (ZOFRAN) 4 MG tablet Take 1 tablet by mouth Every 8 (Eight) Hours As Needed for Nausea or Vomiting.   8/22/2023 at 0900    oxyCODONE-acetaminophen (PERCOCET)  MG per tablet Take 1 tablet by mouth Every 4 (Four) Hours As Needed for Moderate Pain.   8/22/2023 at 0900    pancrelipase, Lip-Prot-Amyl, (CREON) 54070-43976 units capsule delayed-release particles capsule Take 3 capsules by mouth 3 (Three) Times a Day With Meals.   8/21/2023 at 2300    pantoprazole (PROTONIX) 40 MG EC tablet Take 1 tablet by mouth Daily.  30 tablet 3 8/21/2023 at 1800       Allergies:   Allergies   Allergen Reactions    Nickel        Social History:   Single  Current everyday smoker  Prior alcohol abuse, denies current use      Family History:   Family History   Problem Relation Age of Onset    Alcohol abuse Mother     Arthritis Mother     Asthma Mother     Miscarriages / Stillbirths Mother     Cancer Father     Diabetes Father     Drug abuse Father     Early death Father     Heart disease Father     Hyperlipidemia Father     Hypertension Father     Alcohol abuse Paternal Aunt     Cancer Paternal Aunt     Diabetes Paternal Aunt     Drug abuse Paternal Aunt     Early death Paternal Aunt     Heart disease Paternal Aunt     Hyperlipidemia Paternal Aunt     Hypertension Paternal Aunt     Alcohol abuse Maternal Grandmother     Alcohol abuse Maternal Grandfather     Alcohol abuse Paternal Grandmother     Cancer Paternal Grandmother     Diabetes Paternal Grandmother     Drug abuse Paternal Grandmother     Early death Paternal Grandmother     Heart disease Paternal Grandmother     Hyperlipidemia Paternal Grandmother     Hypertension Paternal Grandmother     Alcohol abuse Paternal Grandfather        Review of Systems:  Constitutional: denies any weight changes, fatigue, or weakness  Eyes: denies blurred/double vision or scleral icterus  Cardiovascular: denies chest pain, palpitations, or edemas  Respiratory: denies cough, sputum, or dyspnea  Gastrointestinal: Reports abdominal pain, nausea, vomiting  Genitourinary: denies dysuria or hematuria  Endocrine: denies cold intolerance, lethargy, or flushing  Hematologic: denies excessive bruising or bleeding  Musculoskeletal: denies weakness, joint swelling, joint pain, or stiffness  Neurologic: denies seizures, CVA, paresthesia, or peripheral neuropathy  Skin: denies change in nevi, rashes, masses, or jaundice     All other systems reviewed and were negative.    Physical Exam:   Vitals:    08/22/23 2355   BP:  144/96   Pulse: 96   Resp: 20   Temp: 99.7 øF (37.6 øC)   SpO2: 96%     Height: 69 inches  Weight: 59 kg  BMI: 19  GENERAL: awake and alert, no acute distress, oriented to person, place, and time  HEENT: normocephalic, atraumatic, no scleral icterus, moist mucous membranes  NECK: Supple, there is no thyromegaly or lymphadenopathy  RESPIRATORY: clear to auscultation, no wheezes, rales or rhonchi, symmetric air entry  CARDIOVASCULAR: regular rate and rhythm    GASTROINTESTINAL: Soft, nondistended, tender to palpation in the left upper quadrant without rebound or guarding  MUSCULOSKELETAL: no cyanosis, clubbing, or edema   NEUROLOGIC: alert and oriented, normal speech, cranial nerves 2-12 grossly intact, no focal deficits   SKIN: Moist, warm, no rashes, no jaundice      Diagnostic work-up:     Pertinent labs:   Results from last 7 days   Lab Units 08/23/23  0252 08/22/23  1301   WBC 10*3/mm3 8.14 11.55*   HEMOGLOBIN g/dL 14.0 14.6   HEMATOCRIT % 39.6 42.9   PLATELETS 10*3/mm3 147 194     Results from last 7 days   Lab Units 08/23/23  0252 08/22/23  1301   SODIUM mmol/L 136 138   POTASSIUM mmol/L 3.5 3.6   CHLORIDE mmol/L 101 99   CO2 mmol/L 24.0 25.0   BUN mg/dL 4* 6   CREATININE mg/dL 0.51* 0.64   CALCIUM mg/dL 9.0 9.7   BILIRUBIN mg/dL  --  0.5   ALK PHOS U/L  --  140*   ALT (SGPT) U/L  --  26   AST (SGOT) U/L  --  17   GLUCOSE mg/dL 103* 122*       Imaging:  I personally reviewed the CT abdomen and pelvis images.  Again demonstrated is the pseudocyst extending from the pancreatic tail.  It has increased in size from the last images done in June, but it still does not have any signs of infection or necrosis.    Assessment and plan:   The patient is a 42 y.o. female with chronic pancreatitis and large pancreatic pseudocyst.  Currently, she does not have any signs of infection of the pseudocyst or pancreatic necrosis.  No antibiotics warranted.  Due to the size of the pseudocyst and her symptoms, she needs a drainage  procedure, which she already has scheduled with Dr. Denson in 2 weeks. She is okay for discharge from general surgery standpoint.        Alessandra Marte MD  General, Robotic, and Endoscopic Surgery  [facility]     [address], [address]  [place], [place], [address]  P: [phone]  F: [phone]

## 2023-08-23 NOTE — PROGRESS NOTES
Name: Piper Cain ADMIT: 2023   : 1980  PCP: Rachell Pozo APRN    MRN: [id number] LOS: 1 days   AGE/SEX: 42 y.o. female  ROOM: [facility]     Subjective   Subjective   Patient continues with middle and upper abdominal pain.  No nausea or vomiting today.  Tolerating clear liquids well.  There are no bowel movement.  No fever or chills.    Review of Systems  .  Positive frequency and urgency but no dysuria or hematuria.  Cardiovascular/respiratory.  No chest pain/no palpitations/no shortness of breath/no cough  CNS/psychiatry.  Minimal anxiety.  No tremor.  No headache.  No seizures.  No focal neurological symptoms.     Objective   Objective   Vital Signs  Temp:  [97.7 øF (36.5 øC)-99.7 øF (37.6 øC)] 97.7 øF (36.5 øC)  Heart Rate:  [] 98  Resp:  [18-20] 18  BP: (131-172)/() 136/95  SpO2:  [93 %-100 %] 97 %  on   ;   Device (Oxygen Therapy): room air    Intake/Output Summary (Last 24 hours) at 2023 1051  Last data filed at 2023 0815  Gross per 24 hour   Intake 1240 ml   Output --   Net 1240 ml     Body mass index is 19.2 kg/mý.      23  1254 23  1954 23  0533   Weight: 58.1 kg (128 lb) 61.4 kg (135 lb 4.8 oz) 59 kg (130 lb)     Physical Exam  General.  Alert and oriented x3.  In no apparent pain/distress/diaphoresis.  Normal mood and affect.  Eyes.  Pupils equal round and reactive.  Intact extraocular musculature.  No pallor or jaundice.  Oral cavity.  Moist mucous membrane.  Neck.  Supple. No lymphadenopathy or thyromegaly.  Cardiovascular.  Regular rate and rhythm with no gallops or murmurs.  Chest.  Care to auscultation bilaterally with no added sounds.  Abdomen.  Upper and middle abdominal mild tenderness.  No guarding.  No distention.  Normal bowel sounds.  No organomegaly.  Extremities.  No clubbing/cyanosis/edema.    Results Review:      Results from last 7 days   Lab Units 23  0252 23  1301   SODIUM mmol/L 136 138   POTASSIUM  Assessment & Plan     Herpes zoster without complication  Patient with history of zoster, clinical presentation today consistent with nondisseminated nonsystemic zoster.  Will treat with valacyclovir as symptom onset is within 72 hours and patient is at high risk due to age.  Given level of discomfort at night, will also provide a short course of as needed Vicodin for pain management.  Advised not to use alongside her prescription benzodiazepine.  Agreed with the disposition and plan.  - valACYclovir (VALTREX) 1000 mg tablet  Dispense: 21 tablet; Refill: 0  - HYDROcodone-acetaminophen (NORCO) 5-325 MG tablet  Dispense: 10 tablet; Refill: 0     I spent a total of 20 minutes on the day of the visit.   Time spent doing chart review, history and exam, documentation and further activities per the note    Return in about 1 week (around 1/26/2023) for reevaluation with PCP if symptoms not improving, return earlier if symptoms are worsening.    Tre Mayfield PA-C  [facility] GELY Palmer is a 59 year old female who presents to clinic today for the following health issues:  Chief Complaint   Patient presents with     Urgent Care     Derm Problem     Concerns of shingles around right breast area and upper back      HPI  Rash  Onset of rash was 1 day(s) ago.   Course of illness is worsening.  Severity moderate  Current and Associated symptoms: burning and painful   Location of the rash: Right posterior chest wall and right breast.  Previous history of a similar rash? Yes, patient had shingles in her teen years as well as in her 30s.  Did not have shingles vaccine this year.  Recent exposure history: none known  Denies exposure to: none known  Associated symptoms include: nothing.  Treatment measures tried include: Lidocaine gel  Patient with history    Review of Systems  Focused ROS obtained, pertinent positives/negatives reviewed in the HPI.       Objective    /83   Pulse 76    Temp 96.9  F (36.1  C) (Tympanic)   Resp 16   SpO2 96%   Physical Exam   GENERAL: healthy, alert and no distress  SKIN: Maculopapular erythema on right mid posterior thoracic wall, also on the lateral right breast in a dermatomal distribution.  No current open ulcerations.  Minimally tender to palpation.  No weeping.       mmol/L 3.5 3.6   CHLORIDE mmol/L 101 99   CO2 mmol/L 24.0 25.0   BUN mg/dL 4* 6   CREATININE mg/dL 0.51* 0.64   GLUCOSE mg/dL 103* 122*   CALCIUM mg/dL 9.0 9.7   AST (SGOT) U/L  --  17   ALT (SGPT) U/L  --  26     Estimated Creatinine Clearance: 136.6 mL/min (A) (by C-G formula based on SCr of 0.51 mg/dL (L)).          Results from last 7 days   Lab Units 08/22/23  1619 08/22/23  1301   HSTROP T ng/L <6 <6             Results from last 7 days   Lab Units 08/22/23  1301   MAGNESIUM mg/dL 2.1           Invalid input(s): LDLCALC  Results from last 7 days   Lab Units 08/23/23  0252 08/22/23  1301   WBC 10*3/mm3 8.14 11.55*   HEMOGLOBIN g/dL 14.0 14.6   HEMATOCRIT % 39.6 42.9   PLATELETS 10*3/mm3 147 194   MCV fL 100.0* 101.7*   MCH pg 35.4* 34.6*   MCHC g/dL 35.4 34.0   RDW % 14.0 14.3   RDW-SD fl 51.6 53.3   MPV fL 10.3 9.7   NEUTROPHIL % %  --  82.0*   LYMPHOCYTE % %  --  10.7*   MONOCYTES % %  --  6.2   EOSINOPHIL % %  --  0.4   BASOPHIL % %  --  0.3   IMM GRAN % %  --  0.4   NEUTROS ABS 10*3/mm3  --  9.46*   LYMPHS ABS 10*3/mm3  --  1.24   MONOS ABS 10*3/mm3  --  0.72   EOS ABS 10*3/mm3  --  0.05   BASOS ABS 10*3/mm3  --  0.03   IMMATURE GRANS (ABS) 10*3/mm3  --  0.05   NRBC /100 WBC  --  0.0     Results from last 7 days   Lab Units 08/22/23  1301   INR  0.94         Results from last 7 days   Lab Units 08/22/23  1619 08/22/23  1301   PROCALCITONIN ng/mL  --  <0.02   LACTATE mmol/L 0.8  --          Results from last 7 days   Lab Units 08/23/23  0252 08/22/23  1301   LIPASE U/L 87* 115*             Results from last 7 days   Lab Units 08/22/23  1516   NITRITE UA  Positive*   WBC UA /HPF 13-20*   BACTERIA UA /HPF 4+*   SQUAM EPITHEL UA /HPF 7-12*           Imaging:  Imaging Results (Last 24 Hours)       Procedure Component Value Units Date/Time    CT Abdomen Pelvis With Contrast [[id number]] Collected: 08/22/23 1629     Updated: 08/22/23 1629    Narrative:      CT ABDOMEN AND PELVIS WITH IV CONTRAST     HISTORY:  42-year-old female with epigastric pain. Pancreatitis history.     TECHNIQUE: Radiation dose reduction techniques were utilized, including  automated exposure control and exposure modulation based on body size.   3 mm images were obtained through the abdomen and pelvis after the  administration of IV contrast. Compared with previous CT 06/08/2023.     FINDINGS: There has been significant interval increase in the  peripancreatic fluid collection posterior to the pancreatic tail  measuring approximately 8.6 x 6.2 cm, previously 4.5 x 3.3 cm. The much  smaller fluid collections within the left adrenal gland are larger as  well. There is also slight increase in the rind of complex fluid along  the left paracolic gutter posterior to the spleen and lateral to the  dominant peripancreatic collection. There is also significantly more  prominent thickening of the posteromedial left diaphragmatic wilmer with  the thickening extending anteriorly to the midline, and there are now  tiny fluid collections within the left diaphragmatic phlegmon measuring  1.6 cm transversely. There is no change in the appearance of the  pancreas which has changes of chronic pancreatitis predominantly at the  pancreatic head and neck and the degree of pancreatic ductal dilatation  proximal to the stones is unchanged, 5 mm. There is no biliary  dilatation and the gallbladder appears unremarkable. The splenic vein is  diminutive. The portal vein and SMV are patent. The phlegmon along the  left posterior medial diaphragmatic wilmer abuts and thickens the  posterior wall of the GE junction and gastric fundus. There is no acute  bowel abnormality. Uterus and adnexa appear unremarkable. There are no  pleural effusions or pneumonia at the visualized lower lobes.       Impression:      1. Marked increase in size of the 8.6 x 6.2 cm peripancreatic fluid  collection, development of phlegmon along the left posterior medial  diaphragmatic wilmer with phlegmon  extending to the posterior wall of the  GE junction and gastric fundus. Much smaller fluid collections within  the left adrenal gland are larger and there is thicker phlegmon along  the left paracolic gutter posterior to the spleen.  2. There is no biliary dilatation and the gallbladder appears  unremarkable. There are no new vascular complications of pancreatitis.                  I reviewed the patient's new clinical results / labs / tests / procedures      Assessment/Plan     Active Hospital Problems    Diagnosis  POA    **Acute on chronic pancreatitis [K85.90, K86.1]  Yes    Alcohol abuse [F10.10]  Yes    Acute UTI (urinary tract infection) [N39.0]  Yes    Hyperglycemia [R73.9]  Yes    Essential hypertension [I10]  Yes    Migraine [G43.909]  Yes    Generalized anxiety disorder [F41.1]  Yes    Pancreatic pseudocyst [K86.3]  Yes    Nausea and vomiting [R11.2]  Yes    Abdominal pain [R10.9]  Yes      Resolved Hospital Problems   No resolved problems to display.           Acute on chronic alcoholic pancreatitis complicated by pancreatic pseudocyst in a patient with a history of chronic alcoholic pancreatitis/GERD.  Improving.  Nausea and vomiting.  Currently tolerating clear liquids and GI advancing the diet and ordering MRI of the abdomen to better evaluate intra-abdominal pancreatic fluid.  CT scan of the abdomen and pelvis revealed changes of chronic pancreatitis and increased peripancreatic fluid with phlegmon formation.  I doubt that there is an infection in the pancreatic cyst (no fever and normal leukocytes)..  Awaiting surgical consultation for possible drainage.  We will continue IV fluid/initiate IV Protonix.  Alcohol abuse/withdrawal.  Improved withdrawal symptoms on detoxification and [facility] protocol.  We will ask access center to evaluate.  Migraine/anxiety.  Stable.  Negative CNS examination.  Continue Elavil.  UTI in a patient with a history of nephrolithiasis.  CT scan of the abdomen pelvis reveals no  obstruction.  Change from Zosyn to Rocephin.  Hypertension.  Initially elevated.  Not on any home medication.  Now blood pressure under good control.  No evidence of angina or congestive heart failure.  Will monitor.  Hyperglycemia.  Check A1c.  History of anemia of chronic disease and thrombocytopenia/macrocytosis.  Normal hemoglobin and platelets.  Will check B12/folate/TSH/cortisol.  VTE prophylaxis.  Sequential compression devices.    Discussed my findings and plan of treatment with the patient.  Disposition.  To be determined based on clinical course.        Castro Hilliard MD  [facility]  08/23/23  10:51 EDT

## 2023-08-24 ENCOUNTER — APPOINTMENT (OUTPATIENT)
Dept: MRI IMAGING | Facility: HOSPITAL | Age: 43
End: 2023-08-24
Payer: MEDICAID

## 2023-08-24 LAB
ALBUMIN SERPL-MCNC: 3.6 G/DL (ref 3.5–5.2)
ALBUMIN/GLOB SERPL: 1.1 G/DL
ALP SERPL-CCNC: 95 U/L (ref 39–117)
ALT SERPL W P-5'-P-CCNC: 15 U/L (ref 1–33)
ANION GAP SERPL CALCULATED.3IONS-SCNC: 11.1 MMOL/L (ref 5–15)
AST SERPL-CCNC: 8 U/L (ref 1–32)
BACTERIA SPEC AEROBE CULT: ABNORMAL
BASOPHILS # BLD AUTO: 0.02 10*3/MM3 (ref 0–0.2)
BASOPHILS NFR BLD AUTO: 0.4 % (ref 0–1.5)
BILIRUB SERPL-MCNC: 0.5 MG/DL (ref 0–1.2)
BUN SERPL-MCNC: 4 MG/DL (ref 6–20)
BUN/CREAT SERPL: 7.3 (ref 7–25)
CALCIUM SPEC-SCNC: 9.2 MG/DL (ref 8.6–10.5)
CHLORIDE SERPL-SCNC: 104 MMOL/L (ref 98–107)
CO2 SERPL-SCNC: 22.9 MMOL/L (ref 22–29)
CORTIS SERPL-MCNC: 5.03 MCG/DL
CREAT SERPL-MCNC: 0.55 MG/DL (ref 0.57–1)
DEPRECATED RDW RBC AUTO: 50.5 FL (ref 37–54)
EGFRCR SERPLBLD CKD-EPI 2021: 119 ML/MIN/1.73
EOSINOPHIL # BLD AUTO: 0.18 10*3/MM3 (ref 0–0.4)
EOSINOPHIL NFR BLD AUTO: 3.4 % (ref 0.3–6.2)
ERYTHROCYTE [DISTWIDTH] IN BLOOD BY AUTOMATED COUNT: 13.8 % (ref 12.3–15.4)
GLOBULIN UR ELPH-MCNC: 3.2 GM/DL
GLUCOSE SERPL-MCNC: 96 MG/DL (ref 65–99)
HCT VFR BLD AUTO: 37.6 % (ref 34–46.6)
HGB BLD-MCNC: 12.9 G/DL (ref 12–15.9)
LIPASE SERPL-CCNC: 75 U/L (ref 13–60)
LYMPHOCYTES # BLD AUTO: 2.34 10*3/MM3 (ref 0.7–3.1)
LYMPHOCYTES NFR BLD AUTO: 43.8 % (ref 19.6–45.3)
MCH RBC QN AUTO: 34.6 PG (ref 26.6–33)
MCHC RBC AUTO-ENTMCNC: 34.3 G/DL (ref 31.5–35.7)
MCV RBC AUTO: 100.8 FL (ref 79–97)
MONOCYTES # BLD AUTO: 0.51 10*3/MM3 (ref 0.1–0.9)
MONOCYTES NFR BLD AUTO: 9.6 % (ref 5–12)
NEUTROPHILS NFR BLD AUTO: 2.28 10*3/MM3 (ref 1.7–7)
NEUTROPHILS NFR BLD AUTO: 42.6 % (ref 42.7–76)
PLATELET # BLD AUTO: 143 10*3/MM3 (ref 140–450)
PMV BLD AUTO: 10.1 FL (ref 6–12)
POTASSIUM SERPL-SCNC: 3.7 MMOL/L (ref 3.5–5.2)
PROT SERPL-MCNC: 6.8 G/DL (ref 6–8.5)
RBC # BLD AUTO: 3.73 10*6/MM3 (ref 3.77–5.28)
SODIUM SERPL-SCNC: 138 MMOL/L (ref 136–145)
WBC NRBC COR # BLD: 5.34 10*3/MM3 (ref 3.4–10.8)

## 2023-08-24 PROCEDURE — 83690 ASSAY OF LIPASE: CPT | Performed by: INTERNAL MEDICINE

## 2023-08-24 PROCEDURE — 82533 TOTAL CORTISOL: CPT | Performed by: INTERNAL MEDICINE

## 2023-08-24 PROCEDURE — 74183 MRI ABD W/O CNTR FLWD CNTR: CPT

## 2023-08-24 PROCEDURE — 25010000002 THIAMINE HCL 200 MG/2ML SOLUTION: Performed by: INTERNAL MEDICINE

## 2023-08-24 PROCEDURE — 25010000002 CEFTRIAXONE PER 250 MG: Performed by: INTERNAL MEDICINE

## 2023-08-24 PROCEDURE — 25010000002 SODIUM CHLORIDE 0.9 % WITH KCL 20 MEQ 20-0.9 MEQ/L-% SOLUTION: Performed by: INTERNAL MEDICINE

## 2023-08-24 PROCEDURE — 99232 SBSQ HOSP IP/OBS MODERATE 35: CPT | Performed by: PHYSICIAN ASSISTANT

## 2023-08-24 PROCEDURE — A9577 INJ MULTIHANCE: HCPCS | Performed by: INTERNAL MEDICINE

## 2023-08-24 PROCEDURE — 80053 COMPREHEN METABOLIC PANEL: CPT | Performed by: INTERNAL MEDICINE

## 2023-08-24 PROCEDURE — 25010000002 MORPHINE PER 10 MG: Performed by: NURSE PRACTITIONER

## 2023-08-24 PROCEDURE — 85025 COMPLETE CBC W/AUTO DIFF WBC: CPT | Performed by: INTERNAL MEDICINE

## 2023-08-24 PROCEDURE — 0 GADOBENATE DIMEGLUMINE 529 MG/ML SOLUTION: Performed by: INTERNAL MEDICINE

## 2023-08-24 RX ADMIN — POTASSIUM CHLORIDE AND SODIUM CHLORIDE 125 ML/HR: 900; 150 INJECTION, SOLUTION INTRAVENOUS at 04:08

## 2023-08-24 RX ADMIN — LORAZEPAM 1 MG: 1 TABLET ORAL at 17:23

## 2023-08-24 RX ADMIN — FOLIC ACID 1 MG: 1 TABLET ORAL at 08:07

## 2023-08-24 RX ADMIN — THIAMINE HYDROCHLORIDE 200 MG: 100 INJECTION, SOLUTION INTRAMUSCULAR; INTRAVENOUS at 07:44

## 2023-08-24 RX ADMIN — SENNOSIDES AND DOCUSATE SODIUM 2 TABLET: 50; 8.6 TABLET ORAL at 08:07

## 2023-08-24 RX ADMIN — MORPHINE SULFATE 2 MG: 2 INJECTION, SOLUTION INTRAMUSCULAR; INTRAVENOUS at 01:00

## 2023-08-24 RX ADMIN — LORAZEPAM 1 MG: 1 TABLET ORAL at 10:16

## 2023-08-24 RX ADMIN — GADOBENATE DIMEGLUMINE 12 ML: 529 INJECTION, SOLUTION INTRAVENOUS at 06:35

## 2023-08-24 RX ADMIN — LORAZEPAM 1 MG: 1 TABLET ORAL at 14:14

## 2023-08-24 RX ADMIN — PANTOPRAZOLE SODIUM 40 MG: 40 INJECTION, POWDER, FOR SOLUTION INTRAVENOUS at 07:44

## 2023-08-24 RX ADMIN — Medication 3 MG: at 20:09

## 2023-08-24 RX ADMIN — MORPHINE SULFATE 2 MG: 2 INJECTION, SOLUTION INTRAMUSCULAR; INTRAVENOUS at 20:09

## 2023-08-24 RX ADMIN — AMITRIPTYLINE HYDROCHLORIDE 25 MG: 25 TABLET, FILM COATED ORAL at 20:09

## 2023-08-24 RX ADMIN — THIAMINE HYDROCHLORIDE 200 MG: 100 INJECTION, SOLUTION INTRAMUSCULAR; INTRAVENOUS at 21:55

## 2023-08-24 RX ADMIN — SENNOSIDES AND DOCUSATE SODIUM 2 TABLET: 50; 8.6 TABLET ORAL at 20:09

## 2023-08-24 RX ADMIN — POTASSIUM CHLORIDE AND SODIUM CHLORIDE 125 ML/HR: 900; 150 INJECTION, SOLUTION INTRAVENOUS at 14:16

## 2023-08-24 RX ADMIN — MORPHINE SULFATE 2 MG: 2 INJECTION, SOLUTION INTRAMUSCULAR; INTRAVENOUS at 07:44

## 2023-08-24 RX ADMIN — MORPHINE SULFATE 2 MG: 2 INJECTION, SOLUTION INTRAMUSCULAR; INTRAVENOUS at 14:16

## 2023-08-24 RX ADMIN — POTASSIUM CHLORIDE AND SODIUM CHLORIDE 125 ML/HR: 900; 150 INJECTION, SOLUTION INTRAVENOUS at 22:59

## 2023-08-24 RX ADMIN — THIAMINE HYDROCHLORIDE 200 MG: 100 INJECTION, SOLUTION INTRAMUSCULAR; INTRAVENOUS at 16:55

## 2023-08-24 RX ADMIN — MORPHINE SULFATE 2 MG: 2 INJECTION, SOLUTION INTRAMUSCULAR; INTRAVENOUS at 23:11

## 2023-08-24 RX ADMIN — MORPHINE SULFATE 2 MG: 2 INJECTION, SOLUTION INTRAMUSCULAR; INTRAVENOUS at 04:14

## 2023-08-24 RX ADMIN — CEFTRIAXONE SODIUM 1000 MG: 1 INJECTION, POWDER, FOR SOLUTION INTRAMUSCULAR; INTRAVENOUS at 14:15

## 2023-08-24 RX ADMIN — LORAZEPAM 1 MG: 1 TABLET ORAL at 04:08

## 2023-08-24 RX ADMIN — MORPHINE SULFATE 2 MG: 2 INJECTION, SOLUTION INTRAMUSCULAR; INTRAVENOUS at 10:16
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
## 2023-08-24 NOTE — PROGRESS NOTES
[facility], [facility]  Inpatient Progress Note    Reason for Follow-up: Pancreatitis    Subjective     Interval History:   Patient reports she is doing fairly well today, tolerating p.o. intake well.  Still with left lower quadrant pain rating to the flank and left mid back.    8/24 labs show lipase improved to 75, CBC with normal hemoglobin at 12.9, .8, MCH 34.6, CMP unremarkable    8/24 MRI report shows   1. The approximately 9 x 6 cm pseudocyst posterior to the pancreatic tail and body communicates with the multiple smaller collections within the left adrenal gland. The pancreatic duct which drapes the anterior margin of the pseudocyst is irregular in contour and likely has a tiny side branch connection to the dominant pseudocyst.  2. The pancreatic duct is very irregular at the pancreatic body and measures 5 mm at the pancreatic neck and is truncated at the pancreatic head, likely secondary to the multiple stones seen on the previous CT abdomen.   3.  Unremarkable gallbladder and CBD.  Patent portal vein and SMV.    Current Facility-Administered Medications:     acetaminophen (TYLENOL) tablet 650 mg, 650 mg, Oral, Q4H PRN, Kalpana Beasley MD    amitriptyline (ELAVIL) tablet 25 mg, 25 mg, Oral, Nightly, Kalpana Beasley MD, 25 mg at 08/23/23 2116    sennosides-docusate (PERICOLACE) 8.6-50 MG per tablet 2 tablet, 2 tablet, Oral, BID, 2 tablet at 08/24/23 0807 **AND** polyethylene glycol (MIRALAX) packet 17 g, 17 g, Oral, Daily PRN **AND** bisacodyl (DULCOLAX) EC tablet 5 mg, 5 mg, Oral, Daily PRN **AND** bisacodyl (DULCOLAX) suppository 10 mg, 10 mg, Rectal, Daily PRN, Kalpana Beasley MD    cefTRIAXone (ROCEPHIN) 1,000 mg in sodium chloride 0.9 % 100 mL IVPB-VTB, 1,000 mg, Intravenous, Q24H, Castro Hilliard MD, Last Rate: 200 mL/hr at 08/24/23 1415, 1,000 mg at 08/24/23 1415    cloNIDine (CATAPRES) tablet 0.1 mg, 0.1 mg, Oral, Q4H PRN, Karyl, Kalpana Fishman MD    folic  acid (FOLVITE) tablet 1 mg, 1 mg, Oral, Daily, Kalpana Beasley MD, 1 mg at 23 0807    iopamidol (ISOVUE-300) 61 % injection 100 mL, 100 mL, Intravenous, Once in imaging, Tomasz Viera MD    LORazepam (ATIVAN) tablet 1 mg, 1 mg, Oral, Q1H PRN, 1 mg at 23 1414 **OR** LORazepam (ATIVAN) injection 1 mg, 1 mg, Intravenous, Q1H PRN, 1 mg at 23 2259 **OR** LORazepam (ATIVAN) tablet 2 mg, 2 mg, Oral, Q1H PRN **OR** LORazepam (ATIVAN) injection 2 mg, 2 mg, Intravenous, Q1H PRN **OR** LORazepam (ATIVAN) injection 2 mg, 2 mg, Intravenous, Q15 Min PRN **OR** LORazepam (ATIVAN) injection 2 mg, 2 mg, Intramuscular, Q15 Min PRN, Kalpana Beasley MD    [] LORazepam (ATIVAN) tablet 2 mg, 2 mg, Oral, Q6H, 2 mg at 23 1502 **FOLLOWED BY** LORazepam (ATIVAN) tablet 1 mg, 1 mg, Oral, Q6H, Kalpana Beasley MD, 1 mg at 23 1016    Magnesium Standard Dose Replacement - Follow Nurse / BPA Driven Protocol, , Does not apply, PRN, Kalpana Beasley MD    melatonin tablet 3 mg, 3 mg, Oral, Nightly PRN, Kalpana Beasley MD    morphine injection 2 mg, 2 mg, Intravenous, Q2H PRN, Kimberly Armstrong, APRN, 2 mg at 23 1416    ondansetron (ZOFRAN) tablet 4 mg, 4 mg, Oral, Q6H PRN **OR** ondansetron (ZOFRAN) injection 4 mg, 4 mg, Intravenous, Q6H PRN, Kalpana Beasley MD, 4 mg at 23 1850    pantoprazole (PROTONIX) injection 40 mg, 40 mg, Intravenous, Q AM, Castro Hilliard MD, 40 mg at 23 0744    sodium chloride 0.9 % flush 10 mL, 10 mL, Intravenous, PRN, Tomasz Viera MD    [COMPLETED] Insert Peripheral IV, , , Once **AND** sodium chloride 0.9 % flush 10 mL, 10 mL, Intravenous, PRN, Tomasz Viera MD    sodium chloride 0.9 % with KCl 20 mEq/L infusion, 125 mL/hr, Intravenous, Continuous, Stingl, Kalpana Fishman MD, Last Rate: 125 mL/hr at 23 1416, 125 mL/hr at 23 1416    thiamine (B-1) injection 200 mg, 200 mg, Intravenous, Q8H, 200 mg at  08/24/23 0744 **FOLLOWED BY** [START ON 8/28/2023] thiamine (VITAMIN B-1) tablet 100 mg, 100 mg, Oral, Daily, Kalpana Beasley MD  Review of Systems:    The following systems were reviewed and negative;  respiratory and cardiovascular    Objective     Vital Signs  Temp:  [98.1 øF (36.7 øC)-98.8 øF (37.1 øC)] 98.6 øF (37 øC)  Heart Rate:  [] 86  Resp:  [18] 18  BP: (114-126)/(84-98) 118/93  Body mass index is 19.47 kg/mý.    Intake/Output Summary (Last 24 hours) at 8/24/2023 1438  Last data filed at 8/24/2023 0810  Gross per 24 hour   Intake 490 ml   Output --   Net 490 ml     I/O this shift:  In: 250 [P.O.:250]  Out: -      Physical Exam:    General: patient awake, alert and cooperative   Eyes: Normal lids and lashes, no scleral icterus   Skin: warm and dry, not jaundiced   Pulm: regular and unlabored   Abdomen: soft, left flank TTP with guarding, nondistended; normal bowel sounds   Psychiatric: Normal mood and behavior; memory intact     Results Review:     I reviewed the patient's new clinical results.    Results from last 7 days   Lab Units 08/24/23  0324 08/23/23  0252 08/22/23  1301   WBC 10*3/mm3 5.34 8.14 11.55*   HEMOGLOBIN g/dL 12.9 14.0 14.6   HEMATOCRIT % 37.6 39.6 42.9   PLATELETS 10*3/mm3 143 147 194     Results from last 7 days   Lab Units 08/24/23  0324 08/23/23  0252 08/22/23  1301   SODIUM mmol/L 138 136 138   POTASSIUM mmol/L 3.7 3.5 3.6   CHLORIDE mmol/L 104 101 99   CO2 mmol/L 22.9 24.0 25.0   BUN mg/dL 4* 4* 6   CREATININE mg/dL 0.55* 0.51* 0.64   CALCIUM mg/dL 9.2 9.0 9.7   BILIRUBIN mg/dL 0.5  --  0.5   ALK PHOS U/L 95  --  140*   ALT (SGPT) U/L 15  --  26   AST (SGOT) U/L 8  --  17   GLUCOSE mg/dL 96 103* 122*     Results from last 7 days   Lab Units 08/22/23  1301   INR  0.94     Lab Results   Lab Value Date/Time    LIPASE 75 (H) 08/24/2023 0324    LIPASE 87 (H) 08/23/2023 0252    LIPASE 115 (H) 08/22/2023 1301    LIPASE 73 (H) 06/08/2023 0652    LIPASE 151 (H) 06/07/2023 6229     LIPASE 192 (H) 03/25/2023 1700    LIPASE 38 12/05/2022 0526    LIPASE 34 12/04/2022 0542    LIPASE 29 12/03/2022 0515    LIPASE 50 12/02/2022 0719    LIPASE 29 12/01/2022 0519    LIPASE 65 (H) 11/30/2022 1412    LIPASE 157 (H) 11/28/2022 1751    LIPASE 335 (H) 10/20/2022 1939    LIPASE 378 (H) 08/06/2022 2158    LIPASE 95 (H) 01/24/2018 0545    LIPASE 93 (H) 10/01/2017 0356    LIPASE 122 (H) 09/30/2017 0619    LIPASE 86 (H) 09/29/2017 1731    LIPASE 133 (H) 09/28/2017 0032       Radiology:  MRI Abdomen With & Without Contrast         CT Abdomen Pelvis With Contrast   Final Result   1. Marked increase in size of the 8.6 x 6.2 cm peripancreatic fluid   collection, development of phlegmon along the left posterior medial   diaphragmatic wilmer with phlegmon extending to the posterior wall of the   GE junction and gastric fundus. Much smaller fluid collections within   the left adrenal gland are larger and there is thicker phlegmon along   the left paracolic gutter posterior to the spleen.   2. There is no biliary dilatation and the gallbladder appears   unremarkable. There are no new vascular complications of pancreatitis.       This report was finalized on 8/23/2023 1:42 PM by Dr. Eusebia Barnes M.D.              Assessment & Plan     Active Hospital Problems    Diagnosis     **Acute on chronic pancreatitis     Alcohol abuse     Acute UTI (urinary tract infection)     Hyperglycemia     Essential hypertension     Migraine     Generalized anxiety disorder     Pancreatic pseudocyst     Nausea and vomiting     Abdominal pain        Assessment:  Extensive peripancreatic fluid collection, enlarging  Chronic pancreatitis  Left flank pain due to #1  H/o alcohol abuse  History of tobacco abuse      Plan:  Tolerating low-fat diet well  Continue to eat small portions given compression of stomach caused by the peripancreatic fluid collections  Continue pancreatic enzymes with meals  Recommend complete cessation of alcohol and  tobacco  She is tolerating p.o. intake but does states she is using more of her pain meds at home to combat this increased left flank pain likely caused by extensive and worsening peripancreatic fluid collections.  She needs to follow-up as scheduled for planned outpatient ERCP with stent placement and EUS at [facility] on 9/6 with Dr. Denson.  Reviewed with Dr. Barton for discharge from GI standpoint with follow-up care with Dr. Ellis as planned.    I discussed the patients findings and my recommendations with patient.    Dragon dictation used throughout this note.            Evelin Hinton PA-C  [facility], [facility]  [address] [place] [address]  Office: [phone]

## 2023-08-24 NOTE — PAYOR COMM NOTE
"Lizzeth Zavala (42 y.o. Female)      PER REQUEST: CONTINUED STAY CLINICALS- ID#  [id number]     REPLY TO [facility] DEPT: [phone]     [facility]- NPI [id number]  [facility]# [id number]       Date of Birth   1980    Social Security Number       Address   [address]  [place] [address]    Home Phone   [phone]    MRN   [id number]       Tenriism   Quaker    Marital Status   Single                            Admission Date   8/22/23    Admission Type   Emergency    Admitting Provider   Kalpana Beasley MD    Attending Provider   Castro Hilliard MD    Department, Room/Bed   [address], S410/1       Discharge Date       Discharge Disposition       Discharge Destination                                 Attending Provider: Castro Hilliard MD    Allergies: Nickel    Isolation: None   Infection: None   Code Status: CPR    Ht: 175.3 cm (69\")   Wt: 59.8 kg (131 lb 14.4 oz)    Admission Cmt: None   Principal Problem: Acute on chronic pancreatitis [K85.90,K86.1]                   Active Insurance as of 8/22/2023       Primary Coverage       Payor Plan Insurance Group Employer/Plan Group    [id number]       Payor Plan Address Payor Plan Phone Number Payor Plan Fax Number Effective Dates    MAIL STOP:   7/1/2022 - None Entered    [address] [address]       [facility] [id number]         Subscriber Name Subscriber Birth Date Member ID       LIZZETH ZAVALA 1980 [id number]                     Emergency Contacts        (Rel.) Home Phone Work Phone Mobile Phone    MALOU EVANGELISTA (Significant Other) [phone] -- --              Vital Signs (last 2 days)       Date/Time Temp Temp src Pulse Resp BP Patient Position SpO2    08/24/23 1310 98.6 (37) Oral 86 18 118/93 Lying 99    08/24/23 0724 98.4 (36.9) Oral 70 18 126/90 Lying 100    08/23/23 2344 98.1 (36.7) Oral 86 18 114/84 " Lying 100    08/23/23 1938 98.8 (37.1) Oral 106 18 124/98 Lying 100    08/23/23 1310 99 (37.2) Oral 101 16 132/99 Lying 98    08/23/23 0815 97.7 (36.5) Oral 98 18 136/95 Lying 97    08/22/23 2355 99.7 (37.6) Oral 96 20 144/96 Lying 96    08/22/23 1954 99.3 (37.4) Oral 74 20 155/90 Lying 99    08/22/23 1856 -- -- 76 -- 167/99 -- 97    08/22/23 1756 -- -- 74 -- 154/103 -- 96    08/22/23 1656 -- -- 73 -- 172/105 -- 99    08/22/23 1456 -- -- 75 -- 152/104 -- 97    08/22/23 1326 -- -- 117 -- 131/100 -- 99    08/22/23 1256 -- -- 91 -- 158/120 -- 100    08/22/23 1254 -- -- 101 20 152/116 Lying --    08/22/23 1247 98.2 (36.8) Tympanic 136 -- -- -- 93          Oxygen Therapy (last 2 days)       Date/Time SpO2 Device (Oxygen Therapy) Flow (L/min) Oxygen Concentration (%) ETCO2 (mmHg)    08/24/23 1310 99 room air -- -- --    08/24/23 0810 -- room air -- -- --    08/24/23 0724 100 room air -- -- --    08/24/23 0054 -- room air -- -- --    08/23/23 2344 100 room air -- -- --    08/23/23 2115 -- room air -- -- --    08/23/23 1938 100 room air -- -- --    08/23/23 1310 98 room air -- -- --    08/23/23 0847 -- room air -- -- --    08/23/23 0815 97 room air -- -- --    08/23/23 0004 -- room air -- -- --    08/22/23 2355 96 room air -- -- --    08/22/23 2247 -- room air -- -- --    08/22/23 2031 -- room air -- -- --    08/22/23 1954 99 room air -- -- --    08/22/23 1856 97 -- -- -- --    08/22/23 1756 96 -- -- -- --    08/22/23 1656 99 -- -- -- --    08/22/23 1456 97 -- -- -- --    08/22/23 1326 99 -- -- -- --    08/22/23 1256 100 -- -- -- --    08/22/23 1247 93 room air -- -- --          Intake & Output (last 2 days)         08/22 0701 08/23 0700 08/23 0701 08/24 0700 08/24 0701 08/25 0700    P.O. 240 240 250    IV Piggyback 1000      Total Intake(mL/kg) 1240 (21) 240 (4) 250 (4.2)    Net +1240 +240 +250           Urine Unmeasured Occurrence 3 x            Lines, Drains & Airways       Active LDAs       Name Placement date  Placement time Site Days    Peripheral IV 08/24/23 0934 Posterior;Right Hand 08/24/23  0934  Hand  less than 1    Peripheral IV 08/24/23 1236 Right Forearm 08/24/23  1236  Forearm  less than 1                  CIWA (last 2 days)       Date/Time CIWA-Ar Score    08/24/23 1408 9    08/24/23 0810 1    08/24/23 0409 1    08/24/23 0054 1    08/23/23 2115 2    08/23/23 1600 2    08/23/23 1445 5    08/23/23 1248 5    08/23/23 1000 2    08/23/23 0847 7    08/23/23 0649 6    08/23/23 0253 5    08/23/23 0130 2    08/23/23 0004 3    08/22/23 2247 10    08/22/23 2136 21    08/22/23 2031 22          Medication Administration Report for Piper Cain as of 08/24/23 1529     Legend:    Given Hold Not Given Due Canceled Entry Other Actions    Time Time (Time) Time Time-Action         Discontinued     Completed     Future     MAR Hold     Linked             Medications 08/23/23 08/24/23      acetaminophen (TYLENOL) tablet 650 mg  Dose: 650 mg  Freq: Every 4 Hours PRN Route: PO  PRN Reason: Mild Pain  Start: 08/22/23 1726   Admin Instructions:   Do not exceed 4 grams of acetaminophen in a 24 hr period.    If given for pain, use the following pain scale:   Mild Pain = Pain Score of 1-3, CPOT 1-2  Moderate Pain = Pain Score of 4-6, CPOT 3-4  Severe Pain = Pain Score of 7-10, CPOT 5-8  Based on patient request - if ordered for moderate or severe pain, provider allows for administration of a medication prescribed for a lower pain scale.    Do not exceed 4 grams of acetaminophen in a 24 hr period. Max dose of 2gm for AST/ALT greater than 120 units/L.    If given for pain, use the following pain scale:   Mild Pain = Pain Score of 1-3, CPOT 1-2  Moderate Pain = Pain Score of 4-6, CPOT 3-4  Severe Pain = Pain Score of 7-10, CPOT 5-8         amitriptyline (ELAVIL) tablet 25 mg  Dose: 25 mg  Freq: Nightly Route: PO  Start: 08/22/23 2345    (0024)-Not Given     2116-Given           2100               sennosides-docusate (PERICOLACE) 8.6-50 MG  per tablet 2 tablet  Dose: 2 tablet  Freq: 2 Times Daily Route: PO  Start: 08/22/23 2100   Admin Instructions:   HOLD MEDICATION IF PATIENT HAS HAD BOWEL MOVEMENT. Start bowel management regimen if patient has not had a bowel movement after 12 hours.    (0850)-Not Given     (2115)-Not Given           0807-Given     2100             And  polyethylene glycol (MIRALAX) packet 17 g  Dose: 17 g  Freq: Daily PRN Route: PO  PRN Reason: Constipation  PRN Comment: Use if senna-docusate is ineffective  Start: 08/22/23 1726   Admin Instructions:   Use if no bowel movement after 12 hours. Mix in 6-8 ounces of water.  Use 4-8 ounces of water, tea, or juice for each 17 gram dose.        And  bisacodyl (DULCOLAX) EC tablet 5 mg  Dose: 5 mg  Freq: Daily PRN Route: PO  PRN Reason: Constipation  PRN Comment: Use if polyethylene glycol is ineffective  Start: 08/22/23 1726   Admin Instructions:   Use if no bowel movement after 12 hours.  Swallow whole. Do not crush, split, or chew tablet.        And  bisacodyl (DULCOLAX) suppository 10 mg  Dose: 10 mg  Freq: Daily PRN Route: RE  PRN Reason: Constipation  PRN Comment: Use if bisacodyl oral is ineffective  Start: 08/22/23 1726   Admin Instructions:   Use if no bowel movement after 12 hours.  Hold for diarrhea         cefTRIAXone (ROCEPHIN) 1,000 mg in sodium chloride 0.9 % 100 mL IVPB-VTB  Dose: 1,000 mg  Freq: Every 24 Hours Route: IV  Indications of Use: URINARY TRACT INFECTION  Start: 08/23/23 1200   End: 08/28/23 1159   Admin Instructions:   LR should be paused and flushing of the line with NS is recommended prior to and after completion of ceftriaxone infusion due to incompatibility. Do not co-adminster with calcium-containing solutions.  Caution: Look alike/sound alike drug alert    1304-New Bag     1652-Stopped           1415-New Bag [C]               cloNIDine (CATAPRES) tablet 0.1 mg  Dose: 0.1 mg  Freq: Every 4 Hours PRN Route: PO  PRN Reason: High Blood Pressure  PRN Comment:  sbp greater than 160  Start: 08/22/23 2053   Admin Instructions:   For systolic blood pressure greater than 160 mmHg  Caution: Look alike/sound alike drug alert.         folic acid (FOLVITE) tablet 1 mg  Dose: 1 mg  Freq: Daily Route: PO  Start: 08/23/23 0900    0856-Given            0807-Given               iopamidol (ISOVUE-300) 61 % injection 100 mL  Dose: 100 mL  Freq: Once in Imaging Route: IV  Start: 08/22/23 1520         LORazepam (ATIVAN) tablet 1 mg  Dose: 1 mg  Freq: Every 1 Hour PRN Route: PO  PRN Reason: Withdrawal  PRN Comment: For CIWA-Ar 8-10  Start: 08/22/23 2053   End: 08/29/23 2052   Admin Instructions:   Reassess 1 Hour After Administration     Caution: Look alike/sound alike drug alert     1414-Given [C]              Or  LORazepam (ATIVAN) injection 1 mg  Dose: 1 mg  Freq: Every 1 Hour PRN Route: IV  PRN Reason: Withdrawal  PRN Comment: For CIWA-Ar 8-10  Start: 08/22/23 2053   End: 08/29/23 2052   Admin Instructions:   Reassess 1 Hour After Administration     Caution: Look alike/sound alike drug alert. Dilute 1:1 with normal saline.     1414-Not Given:  See Alt              Or  LORazepam (ATIVAN) tablet 2 mg  Dose: 2 mg  Freq: Every 1 Hour PRN Route: PO  PRN Reason: Withdrawal  PRN Comment: For CIWA-Ar 11-15 or -160, DBP , -125  Start: 08/22/23 2053   End: 08/29/23 2052   Admin Instructions:   Reassess 1 Hour After Administration.     Caution: Look alike/sound alike drug alert     1414-Not Given:  See Alt              Or  LORazepam (ATIVAN) injection 2 mg  Dose: 2 mg  Freq: Every 1 Hour PRN Route: IV  PRN Reason: Withdrawal  PRN Comment: For CIWA-Ar 11-15 or -160, DBP , -125  Start: 08/22/23 2053   End: 08/29/23 2052   Admin Instructions:   Reassess 1 Hour After Administration     Caution: Look alike/sound alike drug alert. Dilute 1:1 with normal saline.     1414-Not Given:  See Alt              Or  LORazepam (ATIVAN) injection 2 mg  Dose: 2 mg  Freq: Every  "15 Minutes PRN Route: IV  PRN Reason: Withdrawal  PRN Comment: For CIWA-Ar Greater Than 15 or SBP greater than 160, DBP greater than 110, or HR greater than 125.  Start: 08/22/23 2053   End: 08/29/23 2052   Admin Instructions:   Reassess 15 Minutes After Each Administration.  If CIWA-Ar Does Not Decrease Contact Provider To Discuss Transfer to Higher Level of Care.     Caution: Look alike/sound alike drug alert. Dilute 1:1 with normal saline.     1414-Not Given:  See Alt              Or  LORazepam (ATIVAN) injection 2 mg  Dose: 2 mg  Freq: Every 15 Minutes PRN Route: IM  PRN Reason: Withdrawal  PRN Comment: For CIWA-Ar Greater Than 15 or SBP greater than 160, DBP greater than 110, or HR greater than 125.  Start: 08/22/23 2053   End: 08/29/23 2052   Admin Instructions:   Reassess 15 Minutes After Each Administration.  If CIWA-Ar Does Not Decrease Contact Provider To Discuss Transfer to Higher Level of Care.     Caution: Look alike/sound alike drug alert. Dilute 1:1 with normal saline.     1414-Not Given:  See Alt               LORazepam (ATIVAN) tablet 2 mg  Dose: 2 mg  Freq: Every 6 Hours Route: PO  Start: 08/22/23 2145   End: 08/23/23 2144   Admin Instructions:      Caution: Look alike/sound alike drug alert    0252-Given     0856-Given     1502-Given             Followed by  LORazepam (ATIVAN) tablet 1 mg  Dose: 1 mg  Freq: Every 6 Hours Route: PO  Start: 08/23/23 2145   End: 08/24/23 2144   Admin Instructions:      Caution: Look alike/sound alike drug alert    2118-Given            0408-Given     1016-Given     1545             Magnesium Standard Dose Replacement - Follow Nurse / BPA Driven Protocol  Freq: As Needed Route: XX  PRN Reason: Other  Start: 08/22/23 2053   Admin Instructions:   Open Order & Select \"[facility] Electrolyte Replacement Protocol Algorithm\" to View Details         melatonin tablet 3 mg  Dose: 3 mg  Freq: Nightly PRN Route: PO  PRN Reason: Sleep  Start: 08/22/23 1726         morphine injection 2 " mg  Dose: 2 mg  Freq: Every 2 Hours PRN Route: IV  PRN Reason: Severe Pain  Start: 08/22/23 1945   End: 09/01/23 1944   Admin Instructions:   If given for pain, use the following pain scale:  Mild Pain = Pain Score of 1-3, CPOT 1-2  Moderate Pain = Pain Score of 4-6, CPOT 3-4  Severe Pain = Pain Score of 7-10, CPOT 5-8    0132-Given     0359-Given     0657-Given     0856-Given     1248-Given       1502-Given     1850-Given     2117-Given          0100-Given     0414-Given     0744-Given     1016-Given     1416-Given           ondansetron (ZOFRAN) tablet 4 mg  Dose: 4 mg  Freq: Every 6 Hours PRN Route: PO  PRN Reasons: Nausea,Vomiting  Start: 08/22/23 1726    0657-Not Given:  See Alt     1248-Not Given:  See Alt     1850-Not Given:  See Alt             Or  ondansetron (ZOFRAN) injection 4 mg  Dose: 4 mg  Freq: Every 6 Hours PRN Route: IV  PRN Reasons: Nausea,Vomiting  Start: 08/22/23 1726    0657-Given     1248-Given     1850-Given              pantoprazole (PROTONIX) injection 40 mg  Dose: 40 mg  Freq: Every Early Morning Route: IV  Indications of Use: STRESS ULCER PROPHYLAXIS  Start: 08/23/23 1145   Admin Instructions:   Dilute with 10 mL of 0.9% NaCl and give IV push over 2 minutes.    1248-Given            0744-Given               sodium chloride 0.9 % flush 10 mL  Dose: 10 mL  Freq: As Needed Route: IV  PRN Reason: Line Care  Start: 08/22/23 1305         sodium chloride 0.9 % flush 10 mL  Dose: 10 mL  Freq: As Needed Route: IV  PRN Reason: Line Care  Start: 08/22/23 1300         sodium chloride 0.9 % with KCl 20 mEq/L infusion  Rate: 125 mL/hr Dose: 125 mL/hr  Freq: Continuous Route: IV  Start: 08/22/23 1743    0856-New Bag     1652-New Bag           0408-New Bag     1416-New Bag              thiamine (B-1) injection 200 mg  Dose: 200 mg  Freq: Every 8 Hours Scheduled Route: IV  Start: 08/22/23 2300   End: 08/27/23 5388   Admin Instructions:   Doses of up to 250mg, give over 1-2 minutes (IV push). Doses 250mg  and above, give over 30 minutes.    0652-Given     1502-Given     2117-Given          0744-Given     1400     2200            Followed by  thiamine (VITAMIN B-1) tablet 100 mg  Dose: 100 mg  Freq: Daily Route: PO  Start: 08/28/23 0900        Completed Medications  Medications 08/23/23 08/24/23       diphenhydrAMINE (BENADRYL) injection 25 mg  Dose: 25 mg  Freq: Once Route: IV  Start: 08/22/23 1439   End: 08/22/23 1432   Admin Instructions:   25 mg may be given IV push over less than 1 minute.  Caution: Look alike/sound alike drug alert. This med may be ordered in other forms and routes. Before giving verify the last time the drug was given by any route/form.           droperidol (INAPSINE) injection 1.25 mg  Dose: 1.25 mg  Freq: Once Route: IV  Start: 08/22/23 1439   End: 08/22/23 1432         gadobenate dimeglumine (MULTIHANCE) injection 12 mL  Dose: 12 mL  Freq: Once in Imaging Route: IV  Start: 08/24/23 0630   End: 08/24/23 0635   Admin Instructions:   Vesicant; admin as rapid bolus; flush with 5 mL NS after admin or 20 mL for renal or aortoiliofemoral vasculature     0635-Given [C]               HYDROmorphone (DILAUDID) injection 1 mg  Dose: 1 mg  Freq: Once Route: IV  Start: 08/22/23 1730   End: 08/22/23 1731   Admin Instructions:   Based on patient request - if ordered for moderate or severe pain, provider allows for administration of a medication prescribed for a lower pain scale.        Caution: Look alike/sound alike drug alert    If given for pain, use the following pain scale:  Mild Pain = Pain Score of 1-3, CPOT 1-2  Moderate Pain = Pain Score of 4-6, CPOT 3-4  Severe Pain = Pain Score of 7-10, CPOT 5-8         HYDROmorphone (DILAUDID) injection 1 mg  Dose: 1 mg  Freq: Once Route: IV  Start: 08/22/23 1458   End: 08/22/23 1450   Admin Instructions:   Based on patient request - if ordered for moderate or severe pain, provider allows for administration of a medication prescribed for a lower pain  "scale.        Caution: Look alike/sound alike drug alert    If given for pain, use the following pain scale:  Mild Pain = Pain Score of 1-3, CPOT 1-2  Moderate Pain = Pain Score of 4-6, CPOT 3-4  Severe Pain = Pain Score of 7-10, CPOT 5-8         HYDROmorphone (DILAUDID) injection 1 mg  Dose: 1 mg  Freq: Once Route: IV  Start: 08/22/23 1346   End: 08/22/23 1340   Admin Instructions:   Based on patient request - if ordered for moderate or severe pain, provider allows for administration of a medication prescribed for a lower pain scale.        Caution: Look alike/sound alike drug alert    If given for pain, use the following pain scale:  Mild Pain = Pain Score of 1-3, CPOT 1-2  Moderate Pain = Pain Score of 4-6, CPOT 3-4  Severe Pain = Pain Score of 7-10, CPOT 5-8         iopamidol (ISOVUE-300) 61 % injection 100 mL  Dose: 100 mL  Freq: Once in Imaging Route: IV  Start: 08/22/23 1535   End: 08/22/23 1523         LORazepam (ATIVAN) injection 1 mg  Dose: 1 mg  Freq: Once Route: IV  Start: 08/22/23 2245   End: 08/22/23 2215   Admin Instructions:      Caution: Look alike/sound alike drug alert. Dilute 1:1 with normal saline.         ondansetron (ZOFRAN) injection 4 mg  Dose: 4 mg  Freq: Once Route: IV  Start: 08/22/23 1346   End: 08/22/23 1340   Admin Instructions:   \"If multiple N/V medications ordered, use in the following order: Ondansetron, Prochlorperazine, Promethazine. Use PO unless patient refuses or patient unable to swallow.\"           piperacillin-tazobactam (ZOSYN) 3.375 g in iso-osmotic dextrose 50 ml (premix)  Dose: 3.375 g  Freq: Once Route: IV  Start: 08/22/23 2145   End: 08/22/23 2235   Admin Instructions:   Refrigerate         sodium chloride 0.9 % bolus 1,000 mL  Dose: 1,000 mL  Freq: Once Route: IV  Start: 08/22/23 1346   End: 08/22/23 1902        Discontinued Medications  Medications 08/23/23 08/24/23       HYDROmorphone (DILAUDID) injection 1 mg  Dose: 1 mg  Freq: Once Route: IV  Start: 08/22/23 1743 "   End: 08/22/23 1740   Admin Instructions:   Based on patient request - if ordered for moderate or severe pain, provider allows for administration of a medication prescribed for a lower pain scale.        Caution: Look alike/sound alike drug alert    If given for pain, use the following pain scale:  Mild Pain = Pain Score of 1-3, CPOT 1-2  Moderate Pain = Pain Score of 4-6, CPOT 3-4  Severe Pain = Pain Score of 7-10, CPOT 5-8         HYDROmorphone (DILAUDID) injection 1 mg  Dose: 1 mg  Freq: Once Route: IV  Start: 08/22/23 1742   End: 08/22/23 1730   Admin Instructions:   Based on patient request - if ordered for moderate or severe pain, provider allows for administration of a medication prescribed for a lower pain scale.        Caution: Look alike/sound alike drug alert    If given for pain, use the following pain scale:  Mild Pain = Pain Score of 1-3, CPOT 1-2  Moderate Pain = Pain Score of 4-6, CPOT 3-4  Severe Pain = Pain Score of 7-10, CPOT 5-8         piperacillin-tazobactam (ZOSYN) 3.375 g in iso-osmotic dextrose 50 ml (premix)  Dose: 3.375 g  Freq: Every 8 Hours Route: IV  Indications of Use: INTRA-ABDOMINAL INFECTION  Start: 08/23/23 0345   End: 08/23/23 1048   Admin Instructions:   Refrigerate    0359-New Bag                sodium chloride 0.9 % infusion  Rate: 125 mL/hr Dose: 125 mL/hr  Freq: Continuous Route: IV  Start: 08/22/23 1346   End: 08/22/23 2253          Lab Results (last 48 hours)       Procedure Component Value Units Date/Time    Urine Culture - Urine, Urine, Clean Catch [[id number]]  (Abnormal)  (Susceptibility) Collected: 08/22/23 1516    Specimen: Urine, Clean Catch Updated: 08/24/23 0734     Urine Culture >100,000 CFU/mL Escherichia coli    Narrative:      Colonization of the urinary tract without infection is common. Treatment is discouraged unless the patient is symptomatic, pregnant, or undergoing an invasive urologic procedure.    Susceptibility        Escherichia coli      OLI       Ampicillin Susceptible      Ampicillin + Sulbactam Susceptible      Cefazolin Susceptible      Cefepime Susceptible      Ceftazidime Susceptible      Ceftriaxone Susceptible      Gentamicin Susceptible      Levofloxacin Resistant      Nitrofurantoin Susceptible      Piperacillin + Tazobactam Susceptible      Trimethoprim + Sulfamethoxazole Susceptible                           Cortisol [[id number]] Collected: 08/24/23 0324    Specimen: Blood Updated: 08/24/23 0454     Cortisol 5.03 mcg/dL     Narrative:      Cortisol Reference Ranges:    Cortisol 6AM - 10AM Range: 6.02-18.40 mcg/dl  Cortisol 4PM - 8PM Range: 2.68-10.50 mcg/dl      Results may be falsely increased if patient taking Biotin.      Lipase [[id number]]  (Abnormal) Collected: 08/24/23 0324    Specimen: Blood Updated: 08/24/23 0415     Lipase 75 U/L     Comprehensive Metabolic Panel [[id number]]  (Abnormal) Collected: 08/24/23 0324    Specimen: Blood Updated: 08/24/23 0415     Glucose 96 mg/dL      BUN 4 mg/dL      Creatinine 0.55 mg/dL      Sodium 138 mmol/L      Potassium 3.7 mmol/L      Chloride 104 mmol/L      CO2 22.9 mmol/L      Calcium 9.2 mg/dL      Total Protein 6.8 g/dL      Albumin 3.6 g/dL      ALT (SGPT) 15 U/L      AST (SGOT) 8 U/L      Alkaline Phosphatase 95 U/L      Total Bilirubin 0.5 mg/dL      Globulin 3.2 gm/dL      A/G Ratio 1.1 g/dL      BUN/Creatinine Ratio 7.3     Anion Gap 11.1 mmol/L      eGFR 119.0 mL/min/1.73     Narrative:      GFR Normal >60  Chronic Kidney Disease <60  Kidney Failure <15      CBC & Differential [[id number]]  (Abnormal) Collected: 08/24/23 0324    Specimen: Blood Updated: 08/24/23 0407    Narrative:      The following orders were created for panel order CBC & Differential.  Procedure                               Abnormality         Status                     ---------                               -----------         ------                     CBC Auto Differential[[id number]]        Abnormal            Final  result                 Please view results for these tests on the individual orders.    CBC Auto Differential [[id number]]  (Abnormal) Collected: 08/24/23 0324    Specimen: Blood Updated: 08/24/23 0407     WBC 5.34 10*3/mm3      RBC 3.73 10*6/mm3      Hemoglobin 12.9 g/dL      Hematocrit 37.6 %      .8 fL      MCH 34.6 pg      MCHC 34.3 g/dL      RDW 13.8 %      RDW-SD 50.5 fl      MPV 10.1 fL      Platelets 143 10*3/mm3      Neutrophil % 42.6 %      Lymphocyte % 43.8 %      Monocyte % 9.6 %      Eosinophil % 3.4 %      Basophil % 0.4 %      Neutrophils, Absolute 2.28 10*3/mm3      Lymphocytes, Absolute 2.34 10*3/mm3      Monocytes, Absolute 0.51 10*3/mm3      Eosinophils, Absolute 0.18 10*3/mm3      Basophils, Absolute 0.02 10*3/mm3     Blood Culture - Blood, Arm, Left [[id number]]  (Normal) Collected: 08/22/23 1650    Specimen: Blood from Arm, Left Updated: 08/23/23 1700     Blood Culture No growth at 24 hours    Blood Culture - Blood, Arm, Right [[id number]]  (Normal) Collected: 08/22/23 1619    Specimen: Blood from Arm, Right Updated: 08/23/23 1631     Blood Culture No growth at 24 hours    Narrative:      Less than seven (7) mL's of blood was collected.  Insufficient quantity may yield false negative results.    Folate [[id number]]  (Normal) Collected: 08/23/23 1105    Specimen: Blood Updated: 08/23/23 1241     Folate >20.00 ng/mL     Narrative:      Results may be falsely increased if patient taking Biotin.      Vitamin B12 [[id number]]  (Normal) Collected: 08/23/23 1105    Specimen: Blood Updated: 08/23/23 1237     Vitamin B-12 810 pg/mL     Narrative:      Results may be falsely increased if patient taking Biotin.      TSH [[id number]]  (Normal) Collected: 08/23/23 1105    Specimen: Blood Updated: 08/23/23 1237     TSH 0.841 uIU/mL     Hemoglobin A1c [[id number]]  (Normal) Collected: 08/23/23 1105    Specimen: Blood Updated: 08/23/23 1215     Hemoglobin A1C 5.10 %     Narrative:      Hemoglobin A1C  Ranges:    Increased Risk for Diabetes  5.7% to 6.4%  Diabetes                     >= 6.5%  Diabetic Goal                < 7.0%    Lipase [[id number]]  (Abnormal) Collected: 08/23/23 0252    Specimen: Blood Updated: 08/23/23 0411     Lipase 87 U/L     Basic Metabolic Panel [[id number]]  (Abnormal) Collected: 08/23/23 0252    Specimen: Blood Updated: 08/23/23 0411     Glucose 103 mg/dL      BUN 4 mg/dL      Creatinine 0.51 mg/dL      Sodium 136 mmol/L      Potassium 3.5 mmol/L      Chloride 101 mmol/L      CO2 24.0 mmol/L      Calcium 9.0 mg/dL      BUN/Creatinine Ratio 7.8     Anion Gap 11.0 mmol/L      eGFR 121.2 mL/min/1.73     Narrative:      GFR Normal >60  Chronic Kidney Disease <60  Kidney Failure <15      CBC (No Diff) [[id number]]  (Abnormal) Collected: 08/23/23 0252    Specimen: Blood Updated: 08/23/23 0351     WBC 8.14 10*3/mm3      RBC 3.96 10*6/mm3      Hemoglobin 14.0 g/dL      Hematocrit 39.6 %      .0 fL      MCH 35.4 pg      MCHC 35.4 g/dL      RDW 14.0 %      RDW-SD 51.6 fl      MPV 10.3 fL      Platelets 147 10*3/mm3     Lactic Acid, Plasma [[id number]]  (Normal) Collected: 08/22/23 1619    Specimen: Blood Updated: 08/22/23 1649     Lactate 0.8 mmol/L     High Sensitivity Troponin T 2Hr [[id number]] Collected: 08/22/23 1619    Specimen: Blood Updated: 08/22/23 1647     HS Troponin T <6 ng/L      Troponin T Delta --     Comment: Unable to calculate.       Narrative:      High Sensitive Troponin T Reference Range:  <10.0 ng/L- Negative Female for AMI  <15.0 ng/L- Negative Male for AMI  >=10 - Abnormal Female indicating possible myocardial injury.  >=15 - Abnormal Male indicating possible myocardial injury.   Clinicians would have to utilize clinical acumen, EKG, Troponin, and serial changes to determine if it is an Acute Myocardial Infarction or myocardial injury due to an underlying chronic condition.         Procalcitonin [[id number]]  (Normal) Collected: 08/22/23 1301    Specimen: Blood Updated:  "08/22/23 1608     Procalcitonin <0.02 ng/mL     Narrative:      As a Marker for Sepsis (Non-Neonates):    1. <0.5 ng/mL represents a low risk of severe sepsis and/or septic shock.  2. >2 ng/mL represents a high risk of severe sepsis and/or septic shock.    As a Marker for Lower Respiratory Tract Infections that require antibiotic therapy:    PCT on Admission    Antibiotic Therapy       6-12 Hrs later    >0.5                Strongly Recommended  >0.25 - <0.5        Recommended   0.1 - 0.25          Discouraged              Remeasure/reassess PCT  <0.1                Strongly Discouraged     Remeasure/reassess PCT    As 28 day mortality risk marker: \"Change in Procalcitonin Result\" (>80% or <=80%) if Day 0 (or Day 1) and Day 4 values are available. Refer to [email or web address]    Change in PCT <=80%  A decrease of PCT levels below or equal to 80% defines a positive change in PCT test result representing a higher risk for 28-day all-cause mortality of patients diagnosed with severe sepsis for septic shock.    Change in PCT >80%  A decrease of PCT levels of more than 80% defines a negative change in PCT result representing a lower risk for 28-day all-cause mortality of patients diagnosed with severe sepsis or septic shock.       Urine Drug Screen - Urine, Clean Catch [[id number]]  (Abnormal) Collected: 08/22/23 1516    Specimen: Urine, Clean Catch Updated: 08/22/23 1548     Amphet/Methamphet, Screen Negative     Barbiturates Screen, Urine Negative     Benzodiazepine Screen, Urine Negative     Cocaine Screen, Urine Negative     Opiate Screen Negative     THC, Screen, Urine Negative     Methadone Screen, Urine Negative     Oxycodone Screen, Urine Positive     Fentanyl, Urine Negative    Narrative:      Negative Thresholds Per Drugs Screened:    Amphetamines                 500 ng/ml  Barbiturates                 200 ng/ml  Benzodiazepines              100 ng/ml  Cocaine                      300 " ng/ml  Methadone                    300 ng/ml  Opiates                      300 ng/ml  Oxycodone                    100 ng/ml  THC                           50 ng/ml  Fentanyl                       5 ng/ml      The Normal Value for all drugs tested is negative. This report includes final unconfirmed screening results to be used for medical treatment purposes only. Unconfirmed results must not be used for non-medical purposes such as employment or legal testing. Clinical consideration should be applied to any drug of abuse test, particularly when unconfirmed results are used.            Urinalysis, Microscopic Only - Urine, Clean Catch [[id number]]  (Abnormal) Collected: 08/22/23 1516    Specimen: Urine, Clean Catch Updated: 08/22/23 1540     RBC, UA 0-2 /HPF      WBC, UA 13-20 /HPF      Bacteria, UA 4+ /HPF      Squamous Epithelial Cells, UA 7-12 /HPF      Hyaline Casts, UA 3-6 /LPF      Methodology Automated Microscopy    Urinalysis With Microscopic If Indicated (No Culture) - Urine, Clean Catch [[id number]]  (Abnormal) Collected: 08/22/23 1516    Specimen: Urine, Clean Catch Updated: 08/22/23 1540     Color, UA Yellow     Appearance, UA Cloudy     pH, UA 7.0     Specific Gravity, UA 1.020     Glucose, UA Negative     Ketones, UA Trace     Bilirubin, UA Negative     Blood, UA Negative     Protein, UA Trace     Leuk Esterase, UA Negative     Nitrite, UA Positive     Urobilinogen, UA 0.2 E.U./dL          Imaging Results (Last 48 Hours)       Procedure Component Value Units Date/Time    MRI Abdomen With & Without Contrast [[id number]] Collected: 08/24/23 1008     Updated: 08/24/23 1008    Narrative:      MRI ABDOMEN W WO CONTRAST-MRCP     HISTORY: 42-year-old female with history of chronic pancreatitis.  Enlarging pseudocysts.     TECHNIQUE: Routine MRCP was performed without and with IV contrast.  Compared with CT abdomen 08/22/2023.     FINDINGS:  1. The approximately 9 x 6 cm pseudocyst posterior to the  pancreatic  tail and body communicates with the multiple smaller collections within  the left adrenal gland. The pancreatic duct which drapes the anterior  margin of the pseudocyst is irregular in contour and likely has a tiny  side branch connection to the dominant pseudocyst.  2. The pancreatic duct is very irregular at the pancreatic body and  measures 5 mm at the pancreatic neck and is truncated at the pancreatic  head, likely secondary to the multiple stones seen on the previous CT  abdomen.  3. The gallbladder appears unremarkable and there is no biliary  dilatation. The common bile duct measures up to 5 mm and there is no  abnormal signal intensity within the biliary tree. No evidence for  choledocholithiasis.  4. The portal vein and SMV are patent. The splenic vein is diminutive  but is patent and there are also collateral veins at the splenic hilum.  5. There is no significant fatty infiltration of the liver.       CT Abdomen Pelvis With Contrast [[id number]] Collected: 08/22/23 1629     Updated: 08/23/23 1345    Narrative:      CT ABDOMEN AND PELVIS WITH IV CONTRAST     HISTORY: 42-year-old female with epigastric pain. Pancreatitis history.     TECHNIQUE: Radiation dose reduction techniques were utilized, including  automated exposure control and exposure modulation based on body size.   3 mm images were obtained through the abdomen and pelvis after the  administration of IV contrast. Compared with previous CT 06/08/2023.     FINDINGS: There has been significant interval increase in the  peripancreatic fluid collection posterior to the pancreatic tail  measuring approximately 8.6 x 6.2 cm, previously 4.5 x 3.3 cm. The much  smaller fluid collections within the left adrenal gland are larger as  well. There is also slight increase in the rind of complex fluid along  the left paracolic gutter posterior to the spleen and lateral to the  dominant peripancreatic collection. There is also significantly  more  prominent thickening of the posteromedial left diaphragmatic wilmer with  the thickening extending anteriorly to the midline, and there are now  tiny fluid collections within the left diaphragmatic phlegmon measuring  1.6 cm transversely. There is no change in the appearance of the  pancreas which has changes of chronic pancreatitis predominantly at the  pancreatic head and neck and the degree of pancreatic ductal dilatation  proximal to the stones is unchanged, 5 mm. There is no biliary  dilatation and the gallbladder appears unremarkable. The splenic vein is  diminutive. The portal vein and SMV are patent. The phlegmon along the  left posterior medial diaphragmatic wilmer abuts and thickens the  posterior wall of the GE junction and gastric fundus. There is no acute  bowel abnormality. Uterus and adnexa appear unremarkable. There are no  pleural effusions or pneumonia at the visualized lower lobes.       Impression:      1. Marked increase in size of the 8.6 x 6.2 cm peripancreatic fluid  collection, development of phlegmon along the left posterior medial  diaphragmatic wilmer with phlegmon extending to the posterior wall of the  GE junction and gastric fundus. Much smaller fluid collections within  the left adrenal gland are larger and there is thicker phlegmon along  the left paracolic gutter posterior to the spleen.  2. There is no biliary dilatation and the gallbladder appears  unremarkable. There are no new vascular complications of pancreatitis.     This report was finalized on 8/23/2023 1:42 PM by Dr. Eusebia Barnes M.D.             ECG/EMG Results (last 48 hours)       Procedure Component Value Units Date/Time    ECG 12 Lead Other; Midepigastric abdominal pain [[id number]] Collected: 08/22/23 1346     Updated: 08/22/23 1619     QT Interval 384 ms     Narrative:      HEART RATE= 86  bpm  RR Interval= 698  ms  NV Interval= 167  ms  P Horizontal Axis= -26  deg  P Front Axis= 56  deg  QRSD Interval= 80  ms  QT  Interval= 384  ms  QRS Axis= 71  deg  T Wave Axis= 68  deg  - ABNORMAL ECG -  Sinus rhythm  Probable left atrial enlargement  Low voltage, precordial leads  Nonspecific T abnrm, anterolateral leads  c/w prior ecg, anterior t wave inversion now seen  Electronically Signed By: Evelyn Rinaldi ([facility]) 22-Aug-2023 16:19:04  Date and Time of Study: 2023-08-22 13:46:30    SCANNED - TELEMETRY   [[id number]] Resulted: 08/22/23     Updated: 08/22/23 2050    SCANNED - TELEMETRY   [[id number]] Resulted: 08/22/23     Updated: 08/23/23 0435    SCANNED - TELEMETRY   [[id number]] Resulted: 08/22/23     Updated: 08/23/23 0818    SCANNED - TELEMETRY   [[id number]] Resulted: 08/22/23     Updated: 08/23/23 1348    SCANNED - TELEMETRY   [[id number]] Resulted: 08/22/23     Updated: 08/24/23 0450    SCANNED - TELEMETRY   [[id number]] Resulted: 08/22/23     Updated: 08/24/23 0558    SCANNED - TELEMETRY   [[id number]] Resulted: 08/22/23     Updated: 08/24/23 0810    SCANNED - TELEMETRY   [[id number]] Resulted: 08/22/23     Updated: 08/24/23 1442          Orders (last 24 hrs)        Start     Ordered    08/28/23 0900  thiamine (VITAMIN B-1) tablet 100 mg  Daily        See Hyperspace for full Linked Orders Report.    08/22/23 2053 08/24/23 0630  gadobenate dimeglumine (MULTIHANCE) injection 12 mL  Once in Imaging         08/24/23 0539    08/24/23 0600  CBC & Differential  Daily       08/23/23 1048    08/24/23 0600  Comprehensive Metabolic Panel  Daily       08/23/23 1048    08/24/23 0600  Lipase  Daily       08/23/23 1048    08/24/23 0600  Cortisol  Morning Draw         08/23/23 1048    08/24/23 0600  CBC Auto Differential  PROCEDURE ONCE         08/23/23 2204    08/24/23 0358  Manual Differential  Once,   Status:  Canceled         08/24/23 0357    08/23/23 2145  LORazepam (ATIVAN) tablet 1 mg  Every 6 Hours        See Hyperspace for full Linked Orders Report.    08/22/23 2053 08/23/23 2035  Diet: Gastrointestinal Diets; Fat-Restricted;  Texture: Regular Texture (IDDSI 7); Fluid Consistency: Thin (IDDSI 0)  Diet Effective Now         08/23/23 2036 08/23/23 1654  Diet: Liquid Diets; Full Liquid; Texture: Regular Texture (IDDSI 7); Fluid Consistency: Thin (IDDSI 0)  Diet Effective Now,   Status:  Canceled         08/23/23 1653 08/23/23 1200  cefTRIAXone (ROCEPHIN) 1,000 mg in sodium chloride 0.9 % 100 mL IVPB-VTB  Every 24 Hours         08/23/23 1048 08/23/23 1145  pantoprazole (PROTONIX) injection 40 mg  Every Early Morning         08/23/23 1048 08/23/23 0900  folic acid (FOLVITE) tablet 1 mg  Daily         08/22/23 2053 08/22/23 2345  amitriptyline (ELAVIL) tablet 25 mg  Nightly         08/22/23 2253 08/22/23 2300  thiamine (B-1) injection 200 mg  Every 8 Hours Scheduled        See Hyperspace for full Linked Orders Report.    08/22/23 2053 08/22/23 2145  LORazepam (ATIVAN) tablet 2 mg  Every 6 Hours        See Hyperspace for full Linked Orders Report.    08/22/23 2053 08/22/23 2100  sennosides-docusate (PERICOLACE) 8.6-50 MG per tablet 2 tablet  2 Times Daily        See Hyperspace for full Linked Orders Report.    08/22/23 1727 08/22/23 2053  LORazepam (ATIVAN) tablet 1 mg  Every 1 Hour PRN        See Hyperspace for full Linked Orders Report.    08/22/23 2053 08/22/23 2053  LORazepam (ATIVAN) injection 1 mg  Every 1 Hour PRN        See Hyperspace for full Linked Orders Report.    08/22/23 2053 08/22/23 2053  LORazepam (ATIVAN) tablet 2 mg  Every 1 Hour PRN        See Hyperspace for full Linked Orders Report.    08/22/23 2053 08/22/23 2053  LORazepam (ATIVAN) injection 2 mg  Every 1 Hour PRN        See Hyperspace for full Linked Orders Report.    08/22/23 2053 08/22/23 2053  LORazepam (ATIVAN) injection 2 mg  Every 15 Minutes PRN        See Hyperspace for full Linked Orders Report.    08/22/23 2053 08/22/23 2053  LORazepam (ATIVAN) injection 2 mg  Every 15 Minutes PRN        See Hyperspace for full Linked  Orders Report.    08/22/23 2053 08/22/23 2053  cloNIDine (CATAPRES) tablet 0.1 mg  Every 4 Hours PRN         08/22/23 2053 08/22/23 2053  Magnesium Standard Dose Replacement - Follow Nurse / BPA Driven Protocol  As Needed         08/22/23 2053 08/22/23 2000  Vital Signs  Every 4 Hours      Comments: Per per hospital policy    08/22/23 1727    08/22/23 1945  morphine injection 2 mg  Every 2 Hours PRN         08/22/23 1945 08/22/23 1800  Oral Care  2 Times Daily       08/22/23 1727 08/22/23 1743  sodium chloride 0.9 % with KCl 20 mEq/L infusion  Continuous         08/22/23 1727    08/22/23 1728  Daily Weights  Daily       08/22/23 1727    08/22/23 1727  Strict Intake & Output  Every Shift       08/22/23 1727    08/22/23 1726  acetaminophen (TYLENOL) tablet 650 mg  Every 4 Hours PRN         08/22/23 1727    08/22/23 1726  polyethylene glycol (MIRALAX) packet 17 g  Daily PRN        See Hyperspace for full Linked Orders Report.    08/22/23 1727    08/22/23 1726  bisacodyl (DULCOLAX) EC tablet 5 mg  Daily PRN        See Hyperspace for full Linked Orders Report.    08/22/23 1727    08/22/23 1726  bisacodyl (DULCOLAX) suppository 10 mg  Daily PRN        See Hyperspace for full Linked Orders Report.    08/22/23 1727    08/22/23 1726  ondansetron (ZOFRAN) tablet 4 mg  Every 6 Hours PRN        See Hyperspace for full Linked Orders Report.    08/22/23 1727    08/22/23 1726  ondansetron (ZOFRAN) injection 4 mg  Every 6 Hours PRN        See Hyperspace for full Linked Orders Report.    08/22/23 1727    08/22/23 1726  melatonin tablet 3 mg  Nightly PRN         08/22/23 1727    08/22/23 1520  iopamidol (ISOVUE-300) 61 % injection 100 mL  Once in Imaging         08/22/23 1504    08/22/23 1305  sodium chloride 0.9 % flush 10 mL  As Needed        See Hyperspace for full Linked Orders Report.    08/22/23 1305    08/22/23 1300  sodium chloride 0.9 % flush 10 mL  As Needed         08/22/23 1300    Unscheduled  Up with  assistance  As Needed       23 1727    --  SCANNED - TELEMETRY           23 0000    --  SCANNED - TELEMETRY           23 0000    --  SCANNED - TELEMETRY           23 0000    --  SCANNED - TELEMETRY           23 0000    --  SCANNED - TELEMETRY           23 0000    --  SCANNED - TELEMETRY           23 0000    --  SCANNED - TELEMETRY           23 0000    --  SCANNED - TELEMETRY           23 0000                     Physician Progress Notes (last 24 hours)        Castro Hilliard MD at 23 1031              Name: Piper Cain ADMIT: 2023   : 1980  PCP: Rachell Pozo APRN    MRN: [id number] LOS: 2 days   AGE/SEX: 42 y.o. female  ROOM: [facility]     Subjective   Subjective   Patient continues with middle and upper abdominal pain radiating to the left and back (improved since admission)..  No nausea or vomiting no bowel movement since admission..  No fever or chills.  Positive mild anxiety and tremor.    Review of Systems  .  Improved frequency and urgency but no dysuria or hematuria.  Cardiovascular/respiratory.  No chest pain/no palpitations/no shortness of breath/no cough  CNS/psychiatry.  Minimal anxiety.  No tremor.  No headache.  No seizures.  No focal neurological symptoms.    Objective   Objective   Vital Signs  Temp:  [98.1 øF (36.7 øC)-99 øF (37.2 øC)] 98.4 øF (36.9 øC)  Heart Rate:  [] 70  Resp:  [16-18] 18  BP: (114-132)/(84-99) 126/90  SpO2:  [98 %-100 %] 100 %  on   ;   Device (Oxygen Therapy): room air    Intake/Output Summary (Last 24 hours) at 2023 1032  Last data filed at 2023 0810  Gross per 24 hour   Intake 490 ml   Output --   Net 490 ml       Body mass index is 19.47 kg/mý.      23  0533 23  0655   Weight: 61.4 kg (135 lb 4.8 oz) 59 kg (130 lb) 59.8 kg (131 lb 14.4 oz)     Physical Exam  General.  Alert and oriented x3.  In no apparent pain/distress/diaphoresis.  Normal mood  and affect.  Eyes.  Pupils equal round and reactive.  Intact extraocular musculature.  No pallor or jaundice.  Oral cavity.  Moist mucous membrane.  Neck.  Supple. No lymphadenopathy or thyromegaly.  Cardiovascular.  Regular rate and rhythm with no gallops or murmurs.  Chest.  Care to auscultation bilaterally with no added sounds.  Abdomen.  No localized tenderness.  No guarding.  No distention.  Normal bowel sounds.  No organomegaly.  Extremities.  No clubbing/cyanosis/edema.    Results Review:      Results from last 7 days   Lab Units 08/24/23  0324 08/23/23  0252 08/22/23  1301   SODIUM mmol/L 138 136 138   POTASSIUM mmol/L 3.7 3.5 3.6   CHLORIDE mmol/L 104 101 99   CO2 mmol/L 22.9 24.0 25.0   BUN mg/dL 4* 4* 6   CREATININE mg/dL 0.55* 0.51* 0.64   GLUCOSE mg/dL 96 103* 122*   CALCIUM mg/dL 9.2 9.0 9.7   AST (SGOT) U/L 8  --  17   ALT (SGPT) U/L 15  --  26       Estimated Creatinine Clearance: 128.4 mL/min (A) (by C-G formula based on SCr of 0.55 mg/dL (L)).  Results from last 7 days   Lab Units 08/23/23  1105   HEMOGLOBIN A1C % 5.10         Results from last 7 days   Lab Units 08/22/23  1619 08/22/23  1301   HSTROP T ng/L <6 <6           Results from last 7 days   Lab Units 08/23/23  1105   TSH uIU/mL 0.841     Results from last 7 days   Lab Units 08/22/23  1301   MAGNESIUM mg/dL 2.1             Invalid input(s): LDLCALC  Results from last 7 days   Lab Units 08/24/23  0324 08/23/23  0252 08/22/23  1301   WBC 10*3/mm3 5.34 8.14 11.55*   HEMOGLOBIN g/dL 12.9 14.0 14.6   HEMATOCRIT % 37.6 39.6 42.9   PLATELETS 10*3/mm3 143 147 194   MCV fL 100.8* 100.0* 101.7*   MCH pg 34.6* 35.4* 34.6*   MCHC g/dL 34.3 35.4 34.0   RDW % 13.8 14.0 14.3   RDW-SD fl 50.5 51.6 53.3   MPV fL 10.1 10.3 9.7   NEUTROPHIL % % 42.6*  --  82.0*   LYMPHOCYTE % % 43.8  --  10.7*   MONOCYTES % % 9.6  --  6.2   EOSINOPHIL % % 3.4  --  0.4   BASOPHIL % % 0.4  --  0.3   IMM GRAN % %  --   --  0.4   NEUTROS ABS 10*3/mm3 2.28  --  9.46*   LYMPHS ABS  10*3/mm3 2.34  --  1.24   MONOS ABS 10*3/mm3 0.51  --  0.72   EOS ABS 10*3/mm3 0.18  --  0.05   BASOS ABS 10*3/mm3 0.02  --  0.03   IMMATURE GRANS (ABS) 10*3/mm3  --   --  0.05   NRBC /100 WBC  --   --  0.0       Results from last 7 days   Lab Units 08/22/23  1301   INR  0.94           Results from last 7 days   Lab Units 08/22/23  1619 08/22/23  1301   PROCALCITONIN ng/mL  --  <0.02   LACTATE mmol/L 0.8  --            Results from last 7 days   Lab Units 08/24/23  0324 08/23/23  0252 08/22/23  1301   LIPASE U/L 75* 87* 115*       Results from last 7 days   Lab Units 08/22/23  1650 08/22/23  1619 08/22/23  1516   BLOODCX  No growth at 24 hours No growth at 24 hours  --    URINECX   --   --  >100,000 CFU/mL Escherichia coli*         Results from last 7 days   Lab Units 08/22/23  1516   NITRITE UA  Positive*   WBC UA /HPF 13-20*   BACTERIA UA /HPF 4+*   SQUAM EPITHEL UA /HPF 7-12*   URINECX  >100,000 CFU/mL Escherichia coli*             Imaging:  Imaging Results (Last 24 Hours)       Procedure Component Value Units Date/Time    MRI Abdomen With & Without Contrast [[id number]] Collected: 08/24/23 1008     Updated: 08/24/23 1008    Narrative:      MRI ABDOMEN W WO CONTRAST-MRCP     HISTORY: 42-year-old female with history of chronic pancreatitis.  Enlarging pseudocysts.     TECHNIQUE: Routine MRCP was performed without and with IV contrast.  Compared with CT abdomen 08/22/2023.     FINDINGS:  1. The approximately 9 x 6 cm pseudocyst posterior to the pancreatic  tail and body communicates with the multiple smaller collections within  the left adrenal gland. The pancreatic duct which drapes the anterior  margin of the pseudocyst is irregular in contour and likely has a tiny  side branch connection to the dominant pseudocyst.  2. The pancreatic duct is very irregular at the pancreatic body and  measures 5 mm at the pancreatic neck and is truncated at the pancreatic  head, likely secondary to the multiple stones seen on the  previous CT  abdomen.  3. The gallbladder appears unremarkable and there is no biliary  dilatation. The common bile duct measures up to 5 mm and there is no  abnormal signal intensity within the biliary tree. No evidence for  choledocholithiasis.  4. The portal vein and SMV are patent. The splenic vein is diminutive  but is patent and there are also collateral veins at the splenic hilum.  5. There is no significant fatty infiltration of the liver.       CT Abdomen Pelvis With Contrast [[id number]] Collected: 08/22/23 1629     Updated: 08/23/23 1345    Narrative:      CT ABDOMEN AND PELVIS WITH IV CONTRAST     HISTORY: 42-year-old female with epigastric pain. Pancreatitis history.     TECHNIQUE: Radiation dose reduction techniques were utilized, including  automated exposure control and exposure modulation based on body size.   3 mm images were obtained through the abdomen and pelvis after the  administration of IV contrast. Compared with previous CT 06/08/2023.     FINDINGS: There has been significant interval increase in the  peripancreatic fluid collection posterior to the pancreatic tail  measuring approximately 8.6 x 6.2 cm, previously 4.5 x 3.3 cm. The much  smaller fluid collections within the left adrenal gland are larger as  well. There is also slight increase in the rind of complex fluid along  the left paracolic gutter posterior to the spleen and lateral to the  dominant peripancreatic collection. There is also significantly more  prominent thickening of the posteromedial left diaphragmatic wilmer with  the thickening extending anteriorly to the midline, and there are now  tiny fluid collections within the left diaphragmatic phlegmon measuring  1.6 cm transversely. There is no change in the appearance of the  pancreas which has changes of chronic pancreatitis predominantly at the  pancreatic head and neck and the degree of pancreatic ductal dilatation  proximal to the stones is unchanged, 5 mm. There is no  biliary  dilatation and the gallbladder appears unremarkable. The splenic vein is  diminutive. The portal vein and SMV are patent. The phlegmon along the  left posterior medial diaphragmatic wilmer abuts and thickens the  posterior wall of the GE junction and gastric fundus. There is no acute  bowel abnormality. Uterus and adnexa appear unremarkable. There are no  pleural effusions or pneumonia at the visualized lower lobes.       Impression:      1. Marked increase in size of the 8.6 x 6.2 cm peripancreatic fluid  collection, development of phlegmon along the left posterior medial  diaphragmatic wilmer with phlegmon extending to the posterior wall of the  GE junction and gastric fundus. Much smaller fluid collections within  the left adrenal gland are larger and there is thicker phlegmon along  the left paracolic gutter posterior to the spleen.  2. There is no biliary dilatation and the gallbladder appears  unremarkable. There are no new vascular complications of pancreatitis.     This report was finalized on 8/23/2023 1:42 PM by Dr. Eusebia Barnes M.D.                  I reviewed the patient's new clinical results / labs / tests / procedures      Assessment/Plan     Active Hospital Problems    Diagnosis  POA    **Acute on chronic pancreatitis [K85.90, K86.1]  Yes    Alcohol abuse [F10.10]  Yes    Acute UTI (urinary tract infection) [N39.0]  Yes    Hyperglycemia [R73.9]  Yes    Essential hypertension [I10]  Yes    Migraine [G43.909]  Yes    Generalized anxiety disorder [F41.1]  Yes    Pancreatic pseudocyst [K86.3]  Yes    Nausea and vomiting [R11.2]  Yes    Abdominal pain [R10.9]  Yes      Resolved Hospital Problems   No resolved problems to display.           Acute on chronic alcoholic pancreatitis complicated by pancreatic pseudocyst in a patient with a history of chronic alcoholic pancreatitis/GERD.  Improving clinically.  Resolved nausea and vomiting.  Will advance diet.  MRI of the abdomen noted with chronic  "pancreatitis and pancreatic stones and a large pseudocyst.  CT scan of the abdomen and pelvis revealed changes of chronic pancreatitis and increased peripancreatic fluid with phlegmon formation.  I doubt that there is an infection in the pancreatic cyst (no fever and normal leukocytes)..  Surgery saw the patient and recommended to proceed with outpatient drainage as scheduled.  Continue IV fluid and IV Protonix.  Lipase improved.  Alcohol abuse/withdrawal.  Improved withdrawal symptoms on detoxification and [facility] protocol.  [facility] and [facility].  Migraine/anxiety.  Stable.  Negative CNS examination.  Continue Elavil.  E. coli UTI in a patient with a history of nephrolithiasis.  CT scan of the abdomen pelvis reveals no obstruction.  Currently on Rocephin.  Improving symptomatology.    Hypertension.  Initially elevated.  Not on any home medication.  Now blood pressure under good control.  No evidence of angina or congestive heart failure.  Will monitor.  Hyperglycemia.  A1c is normal.  History of anemia of chronic disease and thrombocytopenia/macrocytosis.  Normal hemoglobin and platelets.  Normal B12/folate/TSH/cortisol.  VTE prophylaxis.  Sequential compression devices.    Discussed my findings and plan of treatment with the patient.  Disposition.  Anticipate discharge home tomorrow if okay with GI.        Castro Hilliard MD  [facility]  08/24/23  10:32 EDT           Electronically signed by Castro Hilliard MD at 08/24/23 1038          Consult Notes (last 48 hours)        [facility] at 08/23/23 0931        Consult Orders    1. Inpatient consult to [facility] [[id number]] ordered by Kalpana Beasley MD at 08/22/23 2053                 [facility] attempted consult, spoke w/ RN and reviewed chart. Pt PERCY 7 @ 6369. RN reports pt is pleasant. Pt A&Ox4. Pt reports has seen [facility]ESS multiple times in the past and has \"all the resources I could " "possible get at this point, it's up to me from now on\". Pt reports not wanting to complete another consult and does not want to be followed by [facility] at this time. Pt aware of how to request [facility] to return if needed. Gave report to RN.   [facility] will sign off at this time, please re-consult if needed.       Electronically signed by Evelin Ohara [facility] at 08/23/23 0933       Rachell Madden MD at 08/23/23 0908        Consult Orders    1. Inpatient Gastroenterology Consult [[id number]] ordered by Kalpana Beasley MD at 08/22/23 2053                 [facility]  Initial Inpatient Consult Note    Referring Provider: Dr. Beasley    Reason for Consultation: Pancreatitis    Subjective     History of present illness:    42 y.o. female, known to our service from previous admissions, that we are asked to see for pancreatitis.    Patient reports increasing bouts of nausea with vomiting.  This often happens at night but sometimes happens in the morning.  She reports worsening heartburn despite taking daily medication.  No weight loss.  No change in her bowel movements or diarrhea.  Abdominal pain is stable.  She did have some left shoulder pain which is often been an early sign of a pancreatitis flare in the past but she does not feel the typical symptoms of a flare.  She is scheduled for outpatient ERCP with stent placement as well as EUS at [place] on 9/6.    Patient has a history of chronic pancreatitis, alcohol abuse, as well as tobacco abuse.  She is followed by gastroenterology of [facility].  Patient reports her last drink was 3 weeks ago when she continues to smoke although this makes her nauseated and has induced vomiting.    CT of the abdomen and pelvis with IV contrast, reviewed by me shows significant increase in the peripancreatic fluid collection posterior to the pancreatic tail.  Chronic pancreatitis noted involving the pancreatic head and neck predominantly.  Normal " gallbladder.  Lipase this admission is normal.  Normal white count.  Normal BUN and creatinine.    She had a previous normal HIDA scan in June 2023.    Past Medical History:  Past Medical History:   Diagnosis Date    Anxiety     E. coli bacteremia     ETOH abuse     GERD (gastroesophageal reflux disease)     Hiatal hernia     Left flank pain 10/21/2022    Migraine     Miscarriage 2004    Pancreatitis      Past Surgical History:  Past Surgical History:   Procedure Laterality Date    ENDOSCOPY N/A 8/10/2022    Procedure: ESOPHAGOGASTRODUODENOSCOPY with bxs;  Surgeon: Salvador Price MD;  Location: [facility] ENDOSCOPY;  Service: Gastroenterology;  Laterality: N/A;  Pre: r/o esophageal  varacies, abd pain  Post: esophageal plaque      Social History:   Social History     Tobacco Use    Smoking status: Every Day     Packs/day: 0.50     Types: Cigarettes     Start date: 1/28/1996    Smokeless tobacco: Current   Substance Use Topics    Alcohol use: Never      Family History:  Family History   Problem Relation Age of Onset    Alcohol abuse Mother     Arthritis Mother     Asthma Mother     Miscarriages / Stillbirths Mother     Cancer Father     Diabetes Father     Drug abuse Father     Early death Father     Heart disease Father     Hyperlipidemia Father     Hypertension Father     Alcohol abuse Paternal Aunt     Cancer Paternal Aunt     Diabetes Paternal Aunt     Drug abuse Paternal Aunt     Early death Paternal Aunt     Heart disease Paternal Aunt     Hyperlipidemia Paternal Aunt     Hypertension Paternal Aunt     Alcohol abuse Maternal Grandmother     Alcohol abuse Maternal Grandfather     Alcohol abuse Paternal Grandmother     Cancer Paternal Grandmother     Diabetes Paternal Grandmother     Drug abuse Paternal Grandmother     Early death Paternal Grandmother     Heart disease Paternal Grandmother     Hyperlipidemia Paternal Grandmother     Hypertension Paternal Grandmother     Alcohol abuse Paternal Grandfather        Home  Meds:  Medications Prior to Admission   Medication Sig Dispense Refill Last Dose    amitriptyline (ELAVIL) 10 MG tablet Take 2.5 tablets by mouth Every Night.   8/21/2023 at 2300    Cyanocobalamin (VITAMIN B 12 PO) Take  by mouth.   Past Week    Ferrous Sulfate Dried (Feosol) 200 (65 Fe) MG tablet tablet Take 1 tablet by mouth Daily.   8/21/2023 at 0900    folic acid (FOLVITE) 1 MG tablet Take 1 tablet by mouth Daily. 30 tablet 3 8/21/2023 at 0900    ondansetron (ZOFRAN) 4 MG tablet Take 1 tablet by mouth Every 8 (Eight) Hours As Needed for Nausea or Vomiting.   8/22/2023 at 0900    oxyCODONE-acetaminophen (PERCOCET)  MG per tablet Take 1 tablet by mouth Every 4 (Four) Hours As Needed for Moderate Pain.   8/22/2023 at 0900    pancrelipase, Lip-Prot-Amyl, (CREON) 53628-69154 units capsule delayed-release particles capsule Take 3 capsules by mouth 3 (Three) Times a Day With Meals.   8/21/2023 at 2300    pantoprazole (PROTONIX) 40 MG EC tablet Take 1 tablet by mouth Daily. 30 tablet 3 8/21/2023 at 1800     Current Meds:   amitriptyline, 25 mg, Oral, Nightly  folic acid, 1 mg, Oral, Daily  iopamidol, 100 mL, Intravenous, Once in imaging  LORazepam, 2 mg, Oral, Q6H   Followed by  LORazepam, 1 mg, Oral, Q6H  piperacillin-tazobactam, 3.375 g, Intravenous, Q8H  senna-docusate sodium, 2 tablet, Oral, BID  thiamine (B-1) IV, 200 mg, Intravenous, Q8H   Followed by  [START ON 8/28/2023] thiamine, 100 mg, Oral, Daily      Allergies:  Allergies   Allergen Reactions    Nickel      Review of Systems  Pertinent items are noted in HPI, all other systems reviewed and negative     Objective     Vital Signs  Temp:  [97.7 øF (36.5 øC)-99.7 øF (37.6 øC)] 97.7 øF (36.5 øC)  Heart Rate:  [] 98  Resp:  [18-20] 18  BP: (131-172)/() 136/95  Physical Exam:  General Appearance:    Alert, cooperative, in no acute distress   Head:    Normocephalic, without obvious abnormality, atraumatic   Eyes:          conjunctivae and sclerae  normal, no   icterus   Throat:   no thrush, oral mucosa moist   Neck:   Supple, no adenopathy   Lungs:     Clear to auscultation bilaterally    Heart:    Regular rhythm and normal rate    Chest Wall:    No abnormalities observed   Abdomen:     Soft, nondistended, mildly tender in the midepigastrium without rebound or guarding   Extremities:   no edema, no redness   Skin:   No bruising or rash   Psychiatric:  normal mood and insight     Results Review:   I reviewed the patient's new clinical results.    Results from last 7 days   Lab Units 08/23/23  0252 08/22/23  1301   WBC 10*3/mm3 8.14 11.55*   HEMOGLOBIN g/dL 14.0 14.6   HEMATOCRIT % 39.6 42.9   PLATELETS 10*3/mm3 147 194     Results from last 7 days   Lab Units 08/23/23  0252 08/22/23  1301   SODIUM mmol/L 136 138   POTASSIUM mmol/L 3.5 3.6   CHLORIDE mmol/L 101 99   CO2 mmol/L 24.0 25.0   BUN mg/dL 4* 6   CREATININE mg/dL 0.51* 0.64   CALCIUM mg/dL 9.0 9.7   BILIRUBIN mg/dL  --  0.5   ALK PHOS U/L  --  140*   ALT (SGPT) U/L  --  26   AST (SGOT) U/L  --  17   GLUCOSE mg/dL 103* 122*     Results from last 7 days   Lab Units 08/22/23  1301   INR  0.94     Lab Results   Lab Value Date/Time    LIPASE 87 (H) 08/23/2023 0252    LIPASE 115 (H) 08/22/2023 1301    LIPASE 73 (H) 06/08/2023 0652    LIPASE 151 (H) 06/07/2023 2246    LIPASE 192 (H) 03/25/2023 1700    LIPASE 38 12/05/2022 0526    LIPASE 34 12/04/2022 0542    LIPASE 29 12/03/2022 0515    LIPASE 50 12/02/2022 0719    LIPASE 29 12/01/2022 0519    LIPASE 65 (H) 11/30/2022 1412    LIPASE 157 (H) 11/28/2022 1751    LIPASE 335 (H) 10/20/2022 1939    LIPASE 378 (H) 08/06/2022 2158    LIPASE 95 (H) 01/24/2018 0545    LIPASE 93 (H) 10/01/2017 0356    LIPASE 122 (H) 09/30/2017 0619    LIPASE 86 (H) 09/29/2017 1731    LIPASE 133 (H) 09/28/2017 0032       Radiology:  CT Abdomen Pelvis With Contrast   Preliminary Result   1. Marked increase in size of the 8.6 x 6.2 cm peripancreatic fluid   collection, development of  phlegmon along the left posterior medial   diaphragmatic wilmer with phlegmon extending to the posterior wall of the   GE junction and gastric fundus. Much smaller fluid collections within   the left adrenal gland are larger and there is thicker phlegmon along   the left paracolic gutter posterior to the spleen.   2. There is no biliary dilatation and the gallbladder appears   unremarkable. There are no new vascular complications of pancreatitis.              Assessment & Plan   Active Hospital Problems    Diagnosis     **Abdominal pain        Assessment:  Peripancreatic fluid collection  Chronic pancreatitis  H/o alcohol abuse  History of tobacco abuse    Plan:  Recommend MRI of the pancreas for further evaluation of the pancreatic fluid collection.  Clear liquids-she is not having a lot of abdominal pain.  Can advance to low-fat diet as tolerated.  Recommend that she eat small portions given some compression of the stomach caused by the pancreatic fluid collection which is likely contributing to symptoms.  Resume pancreatic enzymes with initiation of diet  Counseled patient regarding alcohol and tobacco cessation  Ultimately needs to f/u for planned OP ERCP with stent placement and EUS at [facility] on 9/6        I discussed the patients findings and my recommendations with patient and family.    Rachell Madden MD              Electronically signed by Rachell Madden MD at 08/23/23 0967       Alessandra Marte MD at 08/23/23 0811        Consult Orders    1. Surgery (on-call MD unless specified) [[id number]] ordered by Tomasz Viera MD at 08/22/23 1717                 General Surgery Consultation    Consulting Physician: Alessandra Marte MD    Reason for consultation: Pancreatitis, pancreatic pseudocyst    CC: abdominal pain, nocturnal vomitin    HPI:   The patient is a very pleasant 42 y.o. female who presented to the hospital with ongoing abdominal pain and vomiting. She reports the pain is in her  epigastrium and left upper abdomen. It feels sharp and comes and goes. She also reports feeling nauseated and vomiting at night. Nausea was improved in the mornings. No fevers or chills.     She reports seeing Piper Denny NP, with gastroenterology [facility] in [facility].  She also reports having appointment for an endoscopic ultrasound and cyst gastrostomy with Dr. Denson on September 6th.     Past Medical History:  Anxiety  E. coli bacteremia  Alcohol abuse  GERD  Hiatal hernia  Left flank pain  Chronic pancreatitis with pseudocysts    Past Surgical History:  Multiple endoscopy procedures    Medications:  Medications Prior to Admission   Medication Sig Dispense Refill Last Dose    amitriptyline (ELAVIL) 10 MG tablet Take 2.5 tablets by mouth Every Night.   8/21/2023 at 2300    Cyanocobalamin (VITAMIN B 12 PO) Take  by mouth.   Past Week    Ferrous Sulfate Dried (Feosol) 200 (65 Fe) MG tablet tablet Take 1 tablet by mouth Daily.   8/21/2023 at 0900    folic acid (FOLVITE) 1 MG tablet Take 1 tablet by mouth Daily. 30 tablet 3 8/21/2023 at 0900    ondansetron (ZOFRAN) 4 MG tablet Take 1 tablet by mouth Every 8 (Eight) Hours As Needed for Nausea or Vomiting.   8/22/2023 at 0900    oxyCODONE-acetaminophen (PERCOCET)  MG per tablet Take 1 tablet by mouth Every 4 (Four) Hours As Needed for Moderate Pain.   8/22/2023 at 0900    pancrelipase, Lip-Prot-Amyl, (CREON) 35370-28573 units capsule delayed-release particles capsule Take 3 capsules by mouth 3 (Three) Times a Day With Meals.   8/21/2023 at 2300    pantoprazole (PROTONIX) 40 MG EC tablet Take 1 tablet by mouth Daily. 30 tablet 3 8/21/2023 at 1800       Allergies:   Allergies   Allergen Reactions    Nickel        Social History:   Single  Current everyday smoker  Prior alcohol abuse, denies current use      Family History:   Family History   Problem Relation Age of Onset    Alcohol abuse Mother     Arthritis Mother     Asthma Mother      Miscarriages / Stillbirths Mother     Cancer Father     Diabetes Father     Drug abuse Father     Early death Father     Heart disease Father     Hyperlipidemia Father     Hypertension Father     Alcohol abuse Paternal Aunt     Cancer Paternal Aunt     Diabetes Paternal Aunt     Drug abuse Paternal Aunt     Early death Paternal Aunt     Heart disease Paternal Aunt     Hyperlipidemia Paternal Aunt     Hypertension Paternal Aunt     Alcohol abuse Maternal Grandmother     Alcohol abuse Maternal Grandfather     Alcohol abuse Paternal Grandmother     Cancer Paternal Grandmother     Diabetes Paternal Grandmother     Drug abuse Paternal Grandmother     Early death Paternal Grandmother     Heart disease Paternal Grandmother     Hyperlipidemia Paternal Grandmother     Hypertension Paternal Grandmother     Alcohol abuse Paternal Grandfather        Review of Systems:  Constitutional: denies any weight changes, fatigue, or weakness  Eyes: denies blurred/double vision or scleral icterus  Cardiovascular: denies chest pain, palpitations, or edemas  Respiratory: denies cough, sputum, or dyspnea  Gastrointestinal: Reports abdominal pain, nausea, vomiting  Genitourinary: denies dysuria or hematuria  Endocrine: denies cold intolerance, lethargy, or flushing  Hematologic: denies excessive bruising or bleeding  Musculoskeletal: denies weakness, joint swelling, joint pain, or stiffness  Neurologic: denies seizures, CVA, paresthesia, or peripheral neuropathy  Skin: denies change in nevi, rashes, masses, or jaundice     All other systems reviewed and were negative.    Physical Exam:   Vitals:    08/22/23 2355   BP: 144/96   Pulse: 96   Resp: 20   Temp: 99.7 øF (37.6 øC)   SpO2: 96%     Height: 69 inches  Weight: 59 kg  BMI: 19  GENERAL: awake and alert, no acute distress, oriented to person, place, and time  HEENT: normocephalic, atraumatic, no scleral icterus, moist mucous membranes  NECK: Supple, there is no thyromegaly or  lymphadenopathy  RESPIRATORY: clear to auscultation, no wheezes, rales or rhonchi, symmetric air entry  CARDIOVASCULAR: regular rate and rhythm    GASTROINTESTINAL: Soft, nondistended, tender to palpation in the left upper quadrant without rebound or guarding  MUSCULOSKELETAL: no cyanosis, clubbing, or edema   NEUROLOGIC: alert and oriented, normal speech, cranial nerves 2-12 grossly intact, no focal deficits   SKIN: Moist, warm, no rashes, no jaundice      Diagnostic work-up:     Pertinent labs:   Results from last 7 days   Lab Units 08/23/23  0252 08/22/23  1301   WBC 10*3/mm3 8.14 11.55*   HEMOGLOBIN g/dL 14.0 14.6   HEMATOCRIT % 39.6 42.9   PLATELETS 10*3/mm3 147 194     Results from last 7 days   Lab Units 08/23/23  0252 08/22/23  1301   SODIUM mmol/L 136 138   POTASSIUM mmol/L 3.5 3.6   CHLORIDE mmol/L 101 99   CO2 mmol/L 24.0 25.0   BUN mg/dL 4* 6   CREATININE mg/dL 0.51* 0.64   CALCIUM mg/dL 9.0 9.7   BILIRUBIN mg/dL  --  0.5   ALK PHOS U/L  --  140*   ALT (SGPT) U/L  --  26   AST (SGOT) U/L  --  17   GLUCOSE mg/dL 103* 122*       Imaging:  I personally reviewed the CT abdomen and pelvis images.  Again demonstrated is the pseudocyst extending from the pancreatic tail.  It has increased in size from the last images done in June, but it still does not have any signs of infection or necrosis.    Assessment and plan:   The patient is a 42 y.o. female with chronic pancreatitis and large pancreatic pseudocyst.  Currently, she does not have any signs of infection of the pseudocyst or pancreatic necrosis.  No antibiotics warranted.  Due to the size of the pseudocyst and her symptoms, she needs a drainage procedure, which she already has scheduled with Dr. Denson in 2 weeks. She is okay for discharge from general surgery standpoint.        Alessandra Marte MD  General, Robotic, and Endoscopic Surgery  [facility]     [address], [address]  [place], [place], [address]  P: [phone]  F:  [phone]      Electronically signed by Alessandra Marte MD at 08/23/23 0062

## 2023-08-25 ENCOUNTER — READMISSION MANAGEMENT (OUTPATIENT)
Dept: CALL CENTER | Facility: HOSPITAL | Age: 43
End: 2023-08-25
Payer: MEDICAID

## 2023-08-25 VITALS
TEMPERATURE: 97.7 F | SYSTOLIC BLOOD PRESSURE: 124 MMHG | OXYGEN SATURATION: 100 % | HEIGHT: 69 IN | RESPIRATION RATE: 16 BRPM | BODY MASS INDEX: 19.6 KG/M2 | DIASTOLIC BLOOD PRESSURE: 97 MMHG | HEART RATE: 81 BPM | WEIGHT: 132.3 LBS

## 2023-08-25 PROBLEM — R73.9 HYPERGLYCEMIA: Status: RESOLVED | Noted: 2023-08-23 | Resolved: 2023-08-25

## 2023-08-25 PROBLEM — R11.2 NAUSEA AND VOMITING: Status: RESOLVED | Noted: 2023-06-08 | Resolved: 2023-08-25

## 2023-08-25 LAB
ALBUMIN SERPL-MCNC: 3.1 G/DL (ref 3.5–5.2)
ALBUMIN/GLOB SERPL: 1.2 G/DL
ALP SERPL-CCNC: 71 U/L (ref 39–117)
ALT SERPL W P-5'-P-CCNC: 10 U/L (ref 1–33)
ANION GAP SERPL CALCULATED.3IONS-SCNC: 10.7 MMOL/L (ref 5–15)
AST SERPL-CCNC: 6 U/L (ref 1–32)
BASOPHILS # BLD AUTO: 0.02 10*3/MM3 (ref 0–0.2)
BASOPHILS NFR BLD AUTO: 0.5 % (ref 0–1.5)
BILIRUB SERPL-MCNC: 0.2 MG/DL (ref 0–1.2)
BUN SERPL-MCNC: 5 MG/DL (ref 6–20)
BUN/CREAT SERPL: 10.4 (ref 7–25)
CALCIUM SPEC-SCNC: 8.9 MG/DL (ref 8.6–10.5)
CHLORIDE SERPL-SCNC: 107 MMOL/L (ref 98–107)
CO2 SERPL-SCNC: 23.3 MMOL/L (ref 22–29)
CREAT SERPL-MCNC: 0.48 MG/DL (ref 0.57–1)
DEPRECATED RDW RBC AUTO: 51.9 FL (ref 37–54)
EGFRCR SERPLBLD CKD-EPI 2021: 123 ML/MIN/1.73
EOSINOPHIL # BLD AUTO: 0.23 10*3/MM3 (ref 0–0.4)
EOSINOPHIL NFR BLD AUTO: 5.2 % (ref 0.3–6.2)
ERYTHROCYTE [DISTWIDTH] IN BLOOD BY AUTOMATED COUNT: 13.7 % (ref 12.3–15.4)
GLOBULIN UR ELPH-MCNC: 2.6 GM/DL
GLUCOSE SERPL-MCNC: 97 MG/DL (ref 65–99)
HCT VFR BLD AUTO: 33.8 % (ref 34–46.6)
HGB BLD-MCNC: 11.3 G/DL (ref 12–15.9)
IMM GRANULOCYTES # BLD AUTO: 0.01 10*3/MM3 (ref 0–0.05)
IMM GRANULOCYTES NFR BLD AUTO: 0.2 % (ref 0–0.5)
LIPASE SERPL-CCNC: 64 U/L (ref 13–60)
LYMPHOCYTES # BLD AUTO: 1.83 10*3/MM3 (ref 0.7–3.1)
LYMPHOCYTES NFR BLD AUTO: 41.4 % (ref 19.6–45.3)
MCH RBC QN AUTO: 34.2 PG (ref 26.6–33)
MCHC RBC AUTO-ENTMCNC: 33.4 G/DL (ref 31.5–35.7)
MCV RBC AUTO: 102.4 FL (ref 79–97)
MONOCYTES # BLD AUTO: 0.51 10*3/MM3 (ref 0.1–0.9)
MONOCYTES NFR BLD AUTO: 11.5 % (ref 5–12)
NEUTROPHILS NFR BLD AUTO: 1.82 10*3/MM3 (ref 1.7–7)
NEUTROPHILS NFR BLD AUTO: 41.2 % (ref 42.7–76)
NRBC BLD AUTO-RTO: 0 /100 WBC (ref 0–0.2)
PLATELET # BLD AUTO: 127 10*3/MM3 (ref 140–450)
PMV BLD AUTO: 10.3 FL (ref 6–12)
POTASSIUM SERPL-SCNC: 3.9 MMOL/L (ref 3.5–5.2)
PROT SERPL-MCNC: 5.7 G/DL (ref 6–8.5)
RBC # BLD AUTO: 3.3 10*6/MM3 (ref 3.77–5.28)
SODIUM SERPL-SCNC: 141 MMOL/L (ref 136–145)
WBC NRBC COR # BLD: 4.42 10*3/MM3 (ref 3.4–10.8)

## 2023-08-25 PROCEDURE — 25010000002 THIAMINE HCL 200 MG/2ML SOLUTION: Performed by: INTERNAL MEDICINE

## 2023-08-25 PROCEDURE — 25010000002 MORPHINE PER 10 MG: Performed by: NURSE PRACTITIONER

## 2023-08-25 PROCEDURE — 85025 COMPLETE CBC W/AUTO DIFF WBC: CPT | Performed by: INTERNAL MEDICINE

## 2023-08-25 PROCEDURE — 80053 COMPREHEN METABOLIC PANEL: CPT | Performed by: INTERNAL MEDICINE

## 2023-08-25 PROCEDURE — 83690 ASSAY OF LIPASE: CPT | Performed by: INTERNAL MEDICINE

## 2023-08-25 RX ORDER — AMOXICILLIN 500 MG/1
1000 CAPSULE ORAL 2 TIMES DAILY
Qty: 8 CAPSULE | Refills: 0 | Status: SHIPPED | OUTPATIENT
Start: 2023-08-25 | End: 2023-08-27

## 2023-08-25 RX ORDER — LANOLIN ALCOHOL/MO/W.PET/CERES
100 CREAM (GRAM) TOPICAL DAILY
Qty: 30 TABLET | Refills: 3 | Status: SHIPPED | OUTPATIENT
Start: 2023-08-28

## 2023-08-25 RX ADMIN — MORPHINE SULFATE 2 MG: 2 INJECTION, SOLUTION INTRAMUSCULAR; INTRAVENOUS at 03:01

## 2023-08-25 RX ADMIN — THIAMINE HYDROCHLORIDE 200 MG: 100 INJECTION, SOLUTION INTRAMUSCULAR; INTRAVENOUS at 06:14

## 2023-08-25 RX ADMIN — PANTOPRAZOLE SODIUM 40 MG: 40 INJECTION, POWDER, FOR SOLUTION INTRAVENOUS at 06:14

## 2023-08-25 RX ADMIN — FOLIC ACID 1 MG: 1 TABLET ORAL at 08:37

## 2023-08-25 RX ADMIN — SENNOSIDES AND DOCUSATE SODIUM 2 TABLET: 50; 8.6 TABLET ORAL at 08:36

## 2023-08-25 RX ADMIN — MORPHINE SULFATE 2 MG: 2 INJECTION, SOLUTION INTRAMUSCULAR; INTRAVENOUS at 09:11

## 2023-08-25 NOTE — OUTREACH NOTE
Prep Survey      Flowsheet Row Responses   [facility] facility patient discharged from? [facility]   Is LACE score < 7 ? No   Eligibility Readm Mgmt   Discharge diagnosis Acute on chronic pancreatitis   Does the patient have one of the following disease processes/diagnoses(primary or secondary)? Other   Does the patient have Home health ordered? No   Is there a DME ordered? No   Prep survey completed? Yes            JAZMIN SAMSON - Registered Nurse

## 2023-08-25 NOTE — DISCHARGE SUMMARY
Patient Name: Piper Cain  : 1980  MRN: [id number]    Date of Admission: 2023  Date of Discharge:  2023  Primary Care Physician: Rachell Pozo APRN      Discharge Diagnoses     Active Hospital Problems    Diagnosis  POA    **Acute on chronic pancreatitis [K85.90, K86.1]  Yes    Alcohol abuse [F10.10]  Yes    Acute UTI (urinary tract infection) [N39.0]  Yes    Essential hypertension [I10]  Yes    Migraine [G43.909]  Yes    Generalized anxiety disorder [F41.1]  Yes    Pancreatic pseudocyst [K86.3]  Yes    Abdominal pain [R10.9]  Yes      Resolved Hospital Problems    Diagnosis Date Resolved POA    Hyperglycemia [R73.9] 2023 Yes    Nausea and vomiting [R11.2] 2023 Yes        Hospital Course     Brief admission history and physical.  Please refer to the H&P for full details.  A pleasant 42 years old white female with a past history of alcohol abuse/GERD/hiatal hernia/chronic pancreatitis/pancreatic pseudocyst/migraine/anxiety who presented to the emergency department with central upper abdominal pain for the last 4 days associated with nausea and vomiting.  Her physical examination remarkable for a temperature of 99.3 a pulse of 74 respirate rate of 20 and blood pressure 150 Over 90.  Rest of the examination is remarkable for upper abdominal tenderness  Hospital course.  Initial ER evaluation included ethanol level that was negative.  Procalcitonin was negative.  High-sensitivity troponin was negative.  Magnesium was normal.  INR was normal.  CBC was normal except white count of 11.5 and .7.  Lipase elevated at 115.  CMP normal except Ehlinger blood sugar of 122 and alkaline phosphatase of 140.  UA suggestive of UTI.  Urine tox screen was positive for oxycodone.  Lactate was negative.  Repeat troponin was negative.  CT abdomen and pelvis with IV contrast revealed marked increase of the peripancreatic fluid collection and development of phlegmon of the pancreatic  cyst.  Patient was admitted as an acute on chronic alcoholic pancreatitis complicated by pancreatic pseudocyst in a patient with a history of chronic alcoholic pancreatitis and GERD.  She was placed n.p.o.  Antiemetic and IV Protonix were administered.  MRI of the abdomen and pelvis was done and revealed chronic pancreatitis/pancreatic stones and a large pseudocyst.  There was no obvious complication of the pseudocyst with infection.  Surgical and GI consult was obtained.  Surgery recommended continuation of the follow-up with the patient's surgeon to drain the pancreatic cyst as scheduled.  Patient pain control was achieved with with IV narcotics.  Her diet was slowly advanced and tolerated the diet advancement well.Upper abdominal pain has improved but not completely resolved.  There was no nausea or vomiting at the time of discharge.  Her GI examination remained benign.  She did not have much of an alcohol withdrawal during this admission and she was placed on [place] protocol and detoxification procedure.  Access has seen the patient and she has already had counseling and she has resources for help as an outpatient.  For her migraine and anxiety this remained stable on [place] with negative CNS examination.  She did have a urgency of urination and UA was suggestive of UTI urine culture revealed E. coli she was initially placed on IV Rocephin and at discharge she was placed on p.o. amoxicillin to complete a total of 5 days of antibiotic.  She is to be maintained on her Creon and proton pump inhibitors.  He has a history of hypertension.  Blood pressure was initially elevated but has normalized spontaneously   There was no evidence of angina or congestive heart failure.  No medications were instituted.  She was noted to have hyperglycemia that has normalized with a normal A1c.  She has a history of anemia of chronic disease and thrombocytopenia and macrocytosis.  Hemoglobin and platelets remained stable with no  bleeding diathesis.  B12/folate/TSH/cortisol were normal.  This is mostly alcohol induced.  At the time of discharge patient was hemodynamically stable.  Both surgery and GI released the patient to discharge.    Consultants     Consult Orders (all) (From admission, onward)       Start     Ordered    08/23/23 1048  [facility]patient [facility] Consult  Once        Provider:  (Not yet assigned)    08/23/23 1048    08/22/23 2054  Inpatient consult to [facility]  Once        Provider:  (Not yet assigned)    08/22/23 2053 08/22/23 2053  Inpatient Gastroenterology Consult  Once        Specialty:  Gastroenterology  Provider:  Rachell Madden MD    08/22/23 2053 08/22/23 1711  Surgery (on-call MD unless specified)  Once        Specialty:  General Surgery  Provider:  Alessandra Marte MD    08/22/23 1710 08/22/23 1555  [facility] (on-call MD unless specified) Details  Once        Specialty:  Hospitalist  Provider:  Kalpana Beasley MD    08/22/23 1554                  Procedures     Imaging Results (All)       Procedure Component Value Units Date/Time    MRI Abdomen With & Without Contrast [[id number]] Collected: 08/24/23 1008     Updated: 08/24/23 1008    Narrative:      MRI ABDOMEN W WO CONTRAST-MRCP     HISTORY: 42-year-old female with history of chronic pancreatitis.  Enlarging pseudocysts.     TECHNIQUE: Routine MRCP was performed without and with IV contrast.  Compared with CT abdomen 08/22/2023.     FINDINGS:  1. The approximately 9 x 6 cm pseudocyst posterior to the pancreatic  tail and body communicates with the multiple smaller collections within  the left adrenal gland. The pancreatic duct which drapes the anterior  margin of the pseudocyst is irregular in contour and likely has a tiny  side branch connection to the dominant pseudocyst.  2. The pancreatic duct is very irregular at the pancreatic body and  measures 5 mm at the pancreatic neck and is truncated at the pancreatic  head, likely secondary to the  multiple stones seen on the previous CT  abdomen.  3. The gallbladder appears unremarkable and there is no biliary  dilatation. The common bile duct measures up to 5 mm and there is no  abnormal signal intensity within the biliary tree. No evidence for  choledocholithiasis.  4. The portal vein and SMV are patent. The splenic vein is diminutive  but is patent and there are also collateral veins at the splenic hilum.  5. There is no significant fatty infiltration of the liver.       CT Abdomen Pelvis With Contrast [[id number]] Collected: 08/22/23 1629     Updated: 08/23/23 1345    Narrative:      CT ABDOMEN AND PELVIS WITH IV CONTRAST     HISTORY: 42-year-old female with epigastric pain. Pancreatitis history.     TECHNIQUE: Radiation dose reduction techniques were utilized, including  automated exposure control and exposure modulation based on body size.   3 mm images were obtained through the abdomen and pelvis after the  administration of IV contrast. Compared with previous CT 06/08/2023.     FINDINGS: There has been significant interval increase in the  peripancreatic fluid collection posterior to the pancreatic tail  measuring approximately 8.6 x 6.2 cm, previously 4.5 x 3.3 cm. The much  smaller fluid collections within the left adrenal gland are larger as  well. There is also slight increase in the rind of complex fluid along  the left paracolic gutter posterior to the spleen and lateral to the  dominant peripancreatic collection. There is also significantly more  prominent thickening of the posteromedial left diaphragmatic wilmer with  the thickening extending anteriorly to the midline, and there are now  tiny fluid collections within the left diaphragmatic phlegmon measuring  1.6 cm transversely. There is no change in the appearance of the  pancreas which has changes of chronic pancreatitis predominantly at the  pancreatic head and neck and the degree of pancreatic ductal dilatation  proximal to the stones is  unchanged, 5 mm. There is no biliary  dilatation and the gallbladder appears unremarkable. The splenic vein is  diminutive. The portal vein and SMV are patent. The phlegmon along the  left posterior medial diaphragmatic wilmer abuts and thickens the  posterior wall of the GE junction and gastric fundus. There is no acute  bowel abnormality. Uterus and adnexa appear unremarkable. There are no  pleural effusions or pneumonia at the visualized lower lobes.       Impression:      1. Marked increase in size of the 8.6 x 6.2 cm peripancreatic fluid  collection, development of phlegmon along the left posterior medial  diaphragmatic wilmer with phlegmon extending to the posterior wall of the  GE junction and gastric fundus. Much smaller fluid collections within  the left adrenal gland are larger and there is thicker phlegmon along  the left paracolic gutter posterior to the spleen.  2. There is no biliary dilatation and the gallbladder appears  unremarkable. There are no new vascular complications of pancreatitis.     This report was finalized on 8/23/2023 1:42 PM by Dr. Eusebia Barnes M.D.               Pertinent Labs     Results from last 7 days   Lab Units 08/25/23 0425 08/24/23 0324 08/23/23  0252 08/22/23  1301   WBC 10*3/mm3 4.42 5.34 8.14 11.55*   HEMOGLOBIN g/dL 11.3* 12.9 14.0 14.6   PLATELETS 10*3/mm3 127* 143 147 194     Results from last 7 days   Lab Units 08/25/23 0425 08/24/23 0324 08/23/23  0252 08/22/23  1301   SODIUM mmol/L 141 138 136 138   POTASSIUM mmol/L 3.9 3.7 3.5 3.6   CHLORIDE mmol/L 107 104 101 99   CO2 mmol/L 23.3 22.9 24.0 25.0   BUN mg/dL 5* 4* 4* 6   CREATININE mg/dL 0.48* 0.55* 0.51* 0.64   GLUCOSE mg/dL 97 96 103* 122*   Estimated Creatinine Clearance: 147.6 mL/min (A) (by C-G formula based on SCr of 0.48 mg/dL (L)).  Results from last 7 days   Lab Units 08/25/23 0425 08/24/23  0324 08/22/23  1301   ALBUMIN g/dL 3.1* 3.6 4.3   BILIRUBIN mg/dL 0.2 0.5 0.5   ALK PHOS U/L 71 95 140*   AST (SGOT)  U/L 6 8 17   ALT (SGPT) U/L 10 15 26     Results from last 7 days   Lab Units 08/25/23  0425 08/24/23  0324 08/23/23  0252 08/22/23  1301   CALCIUM mg/dL 8.9 9.2 9.0 9.7   ALBUMIN g/dL 3.1* 3.6  --  4.3   MAGNESIUM mg/dL  --   --   --  2.1     Results from last 7 days   Lab Units 08/25/23  0425 08/24/23  0324 08/23/23  0252 08/22/23  1301   LIPASE U/L 64* 75* 87* 115*     Results from last 7 days   Lab Units 08/22/23  1619 08/22/23  1301   HSTROP T ng/L <6 <6           Invalid input(s): LDLCALC  Results from last 7 days   Lab Units 08/22/23  1650 08/22/23  1619 08/22/23  1516   BLOODCX  No growth at 2 days No growth at 2 days  --    URINECX   --   --  >100,000 CFU/mL Escherichia coli*     Imaging Results (Last 24 Hours)       Procedure Component Value Units Date/Time    MRI Abdomen With & Without Contrast [[id number]] Collected: 08/24/23 1008     Updated: 08/24/23 1008    Narrative:      MRI ABDOMEN W WO CONTRAST-MRCP     HISTORY: 42-year-old female with history of chronic pancreatitis.  Enlarging pseudocysts.     TECHNIQUE: Routine MRCP was performed without and with IV contrast.  Compared with CT abdomen 08/22/2023.     FINDINGS:  1. The approximately 9 x 6 cm pseudocyst posterior to the pancreatic  tail and body communicates with the multiple smaller collections within  the left adrenal gland. The pancreatic duct which drapes the anterior  margin of the pseudocyst is irregular in contour and likely has a tiny  side branch connection to the dominant pseudocyst.  2. The pancreatic duct is very irregular at the pancreatic body and  measures 5 mm at the pancreatic neck and is truncated at the pancreatic  head, likely secondary to the multiple stones seen on the previous CT  abdomen.  3. The gallbladder appears unremarkable and there is no biliary  dilatation. The common bile duct measures up to 5 mm and there is no  abnormal signal intensity within the biliary tree. No evidence for  choledocholithiasis.  4. The portal  vein and SMV are patent. The splenic vein is diminutive  but is patent and there are also collateral veins at the splenic hilum.  5. There is no significant fatty infiltration of the liver.               Test Results Pending at Discharge     Pending Labs       Order Current Status    Blood Culture - Blood, Arm, Left Preliminary result    Blood Culture - Blood, Arm, Right Preliminary result              Discharge Exam   Physical Exam  Vitals.  Temperature 97.7 a pulse of 81 respiratory rate of 16 blood pressure 124/97 and an O2 sats of 100% room air.  General.  Alert and oriented x3.  In no apparent pain/distress/diaphoresis.  Normal mood and affect.  Eyes.  Pupils equal round and reactive.  Intact extraocular musculature.  No pallor or jaundice.  Oral cavity.  Moist mucous membrane.  Neck.  Supple. No lymphadenopathy or thyromegaly.  Cardiovascular.  Regular rate and rhythm with no gallops or murmurs.  Chest.  Care to auscultation bilaterally with no added sounds.  Abdomen.  No lenderness.  No guarding.  No distention.  Normal bowel sounds.  No organomegaly.  Extremities.  No clubbing/cyanosis/edema.  Discharge Details        Discharge Medications        New Medications        Instructions Start Date   amoxicillin 500 MG capsule  Commonly known as: AMOXIL   1,000 mg, Oral, 2 Times Daily      thiamine 100 MG tablet  Commonly known as: VITAMIN B1   100 mg, Oral, Daily   Start Date: August 28, 2023            Continue These Medications        Instructions Start Date   amitriptyline 10 MG tablet  Commonly known as: ELAVIL   25 mg, Oral, Nightly      Feosol 200 (65 Fe) MG tablet tablet  Generic drug: Ferrous Sulfate Dried   200 mg, Oral, Daily      folic acid 1 MG tablet  Commonly known as: FOLVITE   1,000 mcg, Oral, Daily      ondansetron 4 MG tablet  Commonly known as: ZOFRAN   4 mg, Oral, Every 8 Hours PRN      oxyCODONE-acetaminophen  MG per tablet  Commonly known as: PERCOCET   1 tablet, Oral, Every 4 Hours  PRN      pancrelipase (Lip-Prot-Amyl) 36521-52592 units capsule delayed-release particles capsule  Commonly known as: CREON   72,000 units of lipase, Oral, 3 Times Daily With Meals      pantoprazole 40 MG EC tablet  Commonly known as: PROTONIX   40 mg, Oral, Daily      VITAMIN B 12 PO   Oral               Allergies   Allergen Reactions    Nickel          Discharge Disposition:  Condition: Stable    Diet:   Diet Order   Procedures    Diet: Gastrointestinal Diets; Fat-Restricted; Texture: Regular Texture (IDDSI 7); Fluid Consistency: Thin (IDDSI 0)       Activity:   Activity Instructions       Activity as Tolerated              Counseling : Abstain from alcohol    CODE STATUS:    Code Status and Medical Interventions:   Ordered at: 08/22/23 4843     Code Status (Patient has no pulse and is not breathing):    CPR (Attempt to Resuscitate)     Medical Interventions (Patient has pulse or is breathing):    Full       No future appointments.  Additional Instructions for the Follow-ups that You Need to Schedule       Call MD With Problems / Concerns   As directed      Instructions: Call MD or return to ER if increasing abdominal pain/nausea or vomiting/bowel habit changes/chest pain/shortness of breath    Order Comments: Instructions: Call MD or return to ER if increasing abdominal pain/nausea or vomiting/bowel habit changes/chest pain/shortness of breath         Discharge Follow-up with PCP   As directed       Currently Documented PCP:    Rachell Pozo APRN    PCP Phone Number:    [phone]     Follow Up Details: Primary MD.  1 week.  Acute on chronic pancreatitis/pancreatic Pseudocyst/alcoholism/migraine/anxiety/UTI/hypertension/chronic disease anemia/thrombocytopenia        Discharge Follow-up with Specified Provider: GI.  As scheduled.   As directed      To: GI.  As scheduled.   Follow Up Details: Acute on chronic pancreatitis/pancreatic pseudocyst        Discharge Follow-up with Specified Provider:  Surgery.  As scheduled.   As directed      To: Surgery.  As scheduled.   Follow Up Details: Pancreatic pseudocyst drainage               Follow-up Information       Rachell Pozo, APRN .    Specialty: Family Medicine  Why: Primary MD.  1 week.  Acute on chronic pancreatitis/pancreatic Pseudocyst/alcoholism/migraine/anxiety/UTI/hypertension/chronic disease anemia/thrombocytopenia  Contact information:  [address] [facility]  [address]  [facility] [address]  [phone]                               Time Spent on Discharge:  Greater than 30 minutes      Castro Hilliard MD  [facility]  08/25/23  08:09 EDT

## 2023-08-25 NOTE — PLAN OF CARE
Problem: Adult Inpatient Plan of Care  Goal: Absence of Hospital-Acquired Illness or Injury  Intervention: Identify and Manage Fall Risk  Recent Flowsheet Documentation  Taken 8/25/2023 0843 by Zuly Rosales RN  Safety Promotion/Fall Prevention:   assistive device/personal items within reach   clutter free environment maintained   room organization consistent   safety round/check completed     Problem: Adult Inpatient Plan of Care  Goal: Absence of Hospital-Acquired Illness or Injury  Intervention: Prevent Skin Injury  Recent Flowsheet Documentation  Taken 8/25/2023 0843 by Zuly Rosales RN  Body Position:   position changed independently   supine   Goal Outcome Evaluation:           Progress: no change  Outcome Evaluation: Patient AO x 4 VSS, still with abdominal pain management will have ERCP as outpatient ,    Will be discharge today waiting for partner significant other.

## 2023-08-25 NOTE — CASE MANAGEMENT/SOCIAL WORK
Case Management Discharge Note      Final Note: Home, no additional CCP needs.         Selected Continued Care - Admitted Since 8/22/2023       Destination    No services have been selected for the patient.                Durable Medical Equipment    No services have been selected for the patient.                Dialysis/Infusion    No services have been selected for the patient.                Home Medical Care    No services have been selected for the patient.                Therapy    No services have been selected for the patient.                Community Resources    No services have been selected for the patient.                Community & DME    No services have been selected for the patient.                         Final Discharge Disposition Code: 01 - home or self-care

## 2023-08-27 LAB
BACTERIA SPEC AEROBE CULT: NORMAL
BACTERIA SPEC AEROBE CULT: NORMAL

## 2023-08-28 ENCOUNTER — READMISSION MANAGEMENT (OUTPATIENT)
Dept: CALL CENTER | Facility: HOSPITAL | Age: 43
End: 2023-08-28
Payer: MEDICAID

## 2023-08-28 NOTE — OUTREACH NOTE
Medical Week 1 Survey      Flowsheet Row Responses   [facility] patient discharged from? [place]   Does the patient have one of the following disease processes/diagnoses(primary or secondary)? Other   Week 1 attempt successful? No   Unsuccessful attempts Attempt 1            Connie YBARRA - Registered Nurse

## 2023-08-28 NOTE — PAYOR COMM NOTE
"Lizzeth Zavala (42 y.o. Female)      REQUESTING RECONSIDERATION FOR ID#  [id number]    PLEASE REPLY TO UR DEPT: [phone],  [phone]    [facility]: NPI [id number]  [facility]# [id number]         Date of Birth   1980    Social Security Number       Address   [address]  [place] [address]    Home Phone   [phone]    MRN   [id number]       Oriental orthodox   Jewish    Marital Status   Single                            Admission Date   23    Admission Type   Emergency    Admitting Provider   Kalpana Beasley MD    Attending Provider       Department, Room/Bed   [address], S410/1       Discharge Date   2023    Discharge Disposition   Home or Self Care    Discharge Destination                                 Attending Provider: (none)   Allergies: Nickel    Isolation: None   Infection: None   Code Status: Prior    Ht: 175.3 cm (69\")   Wt: 60 kg (132 lb 4.8 oz)    Admission Cmt: None   Principal Problem: Acute on chronic pancreatitis [K85.90,K86.1]                   Active Insurance as of 2023       Primary Coverage       Payor Plan Insurance Group Employer/Plan Group    [id number]       Payor Plan Address Payor Plan Phone Number Payor Plan Fax Number Effective Dates    MAIL STOP:   2022 - None Entered    [address] [address]       [facility] [address]         Subscriber Name Subscriber Birth Date Member ID       LIZZETH ZAVALA 1980 [id number]                   Emergency Contacts        (Rel.) Home Phone Work Phone Mobile Phone    MALOU EVANGELISTA (Significant Other) [phone] -- --             History & Physical        Kalpana Beasley MD at 23 1607          HISTORY AND PHYSICAL   [facility]        Date of Admission: 2023  Patient Identification:  Name: Lizzeth Zavala  Age: 42 y.o.  Sex: female  :  1980  MRN: " [id number]                     Primary Care Physician: Rachell Pozo APRN    Chief Complaint:  42 year old female who presented to the emergency room with abdominal pain which started four days ago; she has also had nausea and vomiting; she has a history of alcohol dependence and pancreatitis; she became much worse last night; she has continued to consume alcohol but is trying to wean herself off; she has a history of alcohol withdrawal;     History of Present Illness:   As above    Past Medical History:  Past Medical History:   Diagnosis Date    Anxiety     E. coli bacteremia     ETOH abuse     GERD (gastroesophageal reflux disease)     Hiatal hernia     Left flank pain 10/21/2022    Migraine     Miscarriage 2004    Pancreatitis      Past Surgical History:  Past Surgical History:   Procedure Laterality Date    ENDOSCOPY N/A 8/10/2022    Procedure: ESOPHAGOGASTRODUODENOSCOPY with bxs;  Surgeon: Salvador Price MD;  [facility] ENDOSCOPY;  Service: Gastroenterology;  Laterality: N/A;  Pre: r/o esophageal  varacies, abd pain  Post: esophageal plaque      Home Meds:  Medications Prior to Admission   Medication Sig Dispense Refill Last Dose    amitriptyline (ELAVIL) 10 MG tablet Take 2.5 tablets by mouth Every Night.   8/21/2023 at 2300    bisacodyl (DULCOLAX) 10 MG suppository Insert 1 suppository into the rectum Daily. 30 each 0 Past Week    Cyanocobalamin (VITAMIN B 12 PO) Take  by mouth.   Past Week    Ferrous Sulfate Dried (Feosol) 200 (65 Fe) MG tablet tablet Take 1 tablet by mouth Daily.   8/21/2023 at 0900    folic acid (FOLVITE) 1 MG tablet Take 1 tablet by mouth Daily. 30 tablet 3 8/21/2023 at 0900    Magnesium 400 MG capsule Take 400 mg by mouth.   8/21/2023 at 0900    ondansetron (ZOFRAN) 4 MG tablet Take 1 tablet by mouth Every 8 (Eight) Hours As Needed for Nausea or Vomiting.   8/22/2023 at 0900    oxyCODONE-acetaminophen (PERCOCET)  MG per tablet Take 1 tablet by mouth Every 4 (Four)  Hours As Needed for Moderate Pain.   8/22/2023 at 0900    pancrelipase, Lip-Prot-Amyl, (CREON) 20854-27564 units capsule delayed-release particles capsule Take 3 capsules by mouth 3 (Three) Times a Day With Meals.   8/21/2023 at 2300    pantoprazole (PROTONIX) 40 MG EC tablet Take 1 tablet by mouth Daily. 30 tablet 3 8/21/2023 at 1800    Cholecalciferol (VITAMIN D3) 5000 units capsule capsule Take 1 capsule by mouth Daily.       sennosides-docusate (PERICOLACE) 8.6-50 MG per tablet Take 2 tablets by mouth 2 (Two) Times a Day As Needed for Constipation. (Patient not taking: Reported on 6/8/2023)       thiamine (VITAMIN B-1) 100 MG tablet  tablet Take 100 mg by mouth Daily. (Patient not taking: Reported on 6/8/2023)          Allergies:  Allergies   Allergen Reactions    Nickel     Hydrocodone-Acetaminophen Nausea And Vomiting     Immunizations:    There is no immunization history on file for this patient.  Social History:   Social History     Social History Narrative    Not on file     Social History     Socioeconomic History    Marital status: Single   Tobacco Use    Smoking status: Every Day     Packs/day: 0.50     Types: Cigarettes     Start date: 1/28/1996    Smokeless tobacco: Current   Vaping Use    Vaping Use: Never used   Substance and Sexual Activity    Alcohol use: Never    Drug use: No    Sexual activity: Yes     Partners: Male     Birth control/protection: None     Comment: IUD removed 6/29/2017       Family History:  Family History   Problem Relation Age of Onset    Alcohol abuse Mother     Arthritis Mother     Asthma Mother     Miscarriages / Stillbirths Mother     Cancer Father     Diabetes Father     Drug abuse Father     Early death Father     Heart disease Father     Hyperlipidemia Father     Hypertension Father     Alcohol abuse Paternal Aunt     Cancer Paternal Aunt     Diabetes Paternal Aunt     Drug abuse Paternal Aunt     Early death Paternal Aunt     Heart disease Paternal Aunt      "Hyperlipidemia Paternal Aunt     Hypertension Paternal Aunt     Alcohol abuse Maternal Grandmother     Alcohol abuse Maternal Grandfather     Alcohol abuse Paternal Grandmother     Cancer Paternal Grandmother     Diabetes Paternal Grandmother     Drug abuse Paternal Grandmother     Early death Paternal Grandmother     Heart disease Paternal Grandmother     Hyperlipidemia Paternal Grandmother     Hypertension Paternal Grandmother     Alcohol abuse Paternal Grandfather         Review of Systems  See history of present illness and past medical history.  Patient denies headache, dizziness, syncope, falls, trauma, change in vision, change in hearing, change in taste, changes in weight, changes in appetite, focal weakness, numbness, or paresthesia.  Patient denies chest pain, palpitations, dyspnea, orthopnea, PND, cough, sinus pressure, rhinorrhea, epistaxis, hemoptysis, nausea, vomiting,hematemesis, diarrhea, constipation or hematochezia.  Denies cold or heat intolerance, polydipsia, polyuria, polyphagia. Denies hematuria, pyuria, dysuria, hesitancy, frequency or urgency. Denies consumption of raw and under cooked meats foods or change in water source.  Denies fever, chills, sweats, night sweats.  Denies missing any routine medications. Remainder of ROS is negative.    Objective:  T Max 24 hrs: Temp (24hrs), Av.8 øF (37.1 øC), Min:98.2 øF (36.8 øC), Max:99.3 øF (37.4 øC)    Vitals Ranges:   Temp:  [98.2 øF (36.8 øC)-99.3 øF (37.4 øC)] 99.3 øF (37.4 øC)  Heart Rate:  [] 74  Resp:  [20] 20  BP: (131-172)/() 155/90      Exam:  /90 (BP Location: Left arm, Patient Position: Lying)   Pulse 74   Temp 99.3 øF (37.4 øC) (Oral)   Resp 20   Ht 175.3 cm (69\")   Wt 61.4 kg (135 lb 4.8 oz)   LMP 2018 (Exact Date)   SpO2 99%   BMI 19.98 kg/mý     General Appearance:    Alert, cooperative, no distress, appears stated age   Head:    Normocephalic, without obvious abnormality, atraumatic   Eyes:    " PERRL, conjunctivae/corneas clear, EOM's intact, both eyes   Ears:    Normal external ear canals, both ears   Nose:   Nares normal, septum midline, mucosa normal, no drainage    or sinus tenderness   Throat:   Lips, mucosa, and tongue normal   Neck:   Supple, symmetrical, trachea midline, no adenopathy;     thyroid:  no enlargement/tenderness/nodules; no carotid    bruit or JVD   Back:     Symmetric, no curvature, ROM normal, no CVA tenderness   Lungs:     Clear to auscultation bilaterally, respirations unlabored   Chest Wall:    No tenderness or deformity    Heart:    Regular rate and rhythm, S1 and S2 normal, no murmur, rub   or gallop   Abdomen:     Soft, nontender, bowel sounds active all four quadrants,     no masses, no hepatomegaly, no splenomegaly   Extremities:   Extremities normal, atraumatic, no cyanosis or edema   Pulses:   2+ and symmetric all extremities   Skin:   Skin color, texture, turgor normal, no rashes or lesions   Lymph nodes:   Cervical, supraclavicular, and axillary nodes normal   Neurologic:   CNII-XII intact, normal strength, sensation intact throughout      .    Data Review:  Labs in chart were reviewed.  WBC   Date Value Ref Range Status   08/22/2023 11.55 (H) 3.40 - 10.80 10*3/mm3 Final     Hemoglobin   Date Value Ref Range Status   08/22/2023 14.6 12.0 - 15.9 g/dL Final     Hematocrit   Date Value Ref Range Status   08/22/2023 42.9 34.0 - 46.6 % Final     Platelets   Date Value Ref Range Status   08/22/2023 194 140 - 450 10*3/mm3 Final     Sodium   Date Value Ref Range Status   08/22/2023 138 136 - 145 mmol/L Final     Potassium   Date Value Ref Range Status   08/22/2023 3.6 3.5 - 5.2 mmol/L Final     Chloride   Date Value Ref Range Status   08/22/2023 99 98 - 107 mmol/L Final     CO2   Date Value Ref Range Status   08/22/2023 25.0 22.0 - 29.0 mmol/L Final     BUN   Date Value Ref Range Status   08/22/2023 6 6 - 20 mg/dL Final     Creatinine   Date Value Ref Range Status   08/22/2023  0.64 0.57 - 1.00 mg/dL Final     Glucose   Date Value Ref Range Status   08/22/2023 122 (H) 65 - 99 mg/dL Final     Calcium   Date Value Ref Range Status   08/22/2023 9.7 8.6 - 10.5 mg/dL Final     Magnesium   Date Value Ref Range Status   08/22/2023 2.1 1.6 - 2.6 mg/dL Final         Imaging Results (All)       Procedure Component Value Units Date/Time    CT Abdomen Pelvis With Contrast [[id number]] Collected: 08/22/23 1629     Updated: 08/22/23 1629    Narrative:      CT ABDOMEN AND PELVIS WITH IV CONTRAST     HISTORY: 42-year-old female with epigastric pain. Pancreatitis history.     TECHNIQUE: Radiation dose reduction techniques were utilized, including  automated exposure control and exposure modulation based on body size.   3 mm images were obtained through the abdomen and pelvis after the  administration of IV contrast. Compared with previous CT 06/08/2023.     FINDINGS: There has been significant interval increase in the  peripancreatic fluid collection posterior to the pancreatic tail  measuring approximately 8.6 x 6.2 cm, previously 4.5 x 3.3 cm. The much  smaller fluid collections within the left adrenal gland are larger as  well. There is also slight increase in the rind of complex fluid along  the left paracolic gutter posterior to the spleen and lateral to the  dominant peripancreatic collection. There is also significantly more  prominent thickening of the posteromedial left diaphragmatic wilmer with  the thickening extending anteriorly to the midline, and there are now  tiny fluid collections within the left diaphragmatic phlegmon measuring  1.6 cm transversely. There is no change in the appearance of the  pancreas which has changes of chronic pancreatitis predominantly at the  pancreatic head and neck and the degree of pancreatic ductal dilatation  proximal to the stones is unchanged, 5 mm. There is no biliary  dilatation and the gallbladder appears unremarkable. The splenic vein is  diminutive. The  "portal vein and SMV are patent. The phlegmon along the  left posterior medial diaphragmatic wilmer abuts and thickens the  posterior wall of the GE junction and gastric fundus. There is no acute  bowel abnormality. Uterus and adnexa appear unremarkable. There are no  pleural effusions or pneumonia at the visualized lower lobes.       Impression:      1. Marked increase in size of the 8.6 x 6.2 cm peripancreatic fluid  collection, development of phlegmon along the left posterior medial  diaphragmatic wilmer with phlegmon extending to the posterior wall of the  GE junction and gastric fundus. Much smaller fluid collections within  the left adrenal gland are larger and there is thicker phlegmon along  the left paracolic gutter posterior to the spleen.  2. There is no biliary dilatation and the gallbladder appears  unremarkable. There are no new vascular complications of pancreatitis.                 Assessment:  Active Hospital Problems    Diagnosis  POA    **Abdominal pain [R10.9]  Yes      Resolved Hospital Problems   No resolved problems to display.   Pancreatitis  Alcohol dependence  Alcohol withdrawal  Uti  Hypertension  Hyperglycemia      Plan:  Ask gi to see her  Antibiotics  [facility]  Access center consult  Monitor on telemetry  Trend labs  Dw patient and ed provider    Kalpana Beasley MD  8/22/2023  22:46 EDT      Electronically signed by Kalpana Beasley MD at 08/22/23 2251          Emergency Department Notes          Tomasz Viera MD at 08/22/23 1334           EMERGENCY DEPARTMENT ENCOUNTER    Room Number:  S410/1  Date of encounter:  8/22/2023  PCP: Rachell Pozo APRN  Historian: Patient and boyfriend  Relevant information and history provided by sources other than the patient will be included below and in the ED Course.  Review of pertinent past medical records may also be included in record below and ED Course.    HPI:  Chief Complaint: \"I am having pancreatitis again\" abdominal " pain with nausea and vomiting  A complete HPI/ROS/PMH/PSH/SH/FH are unobtainable due to: Not applicable  Context: Piper Cain is a 42 y.o. female who presents to the ED c/o patient started having abdominal pain again a few days ago and then really became worse yesterday evening and afternoon.  She has had some episodic vomiting over the past few days and has become persistent since last night.  Whenever she tries to drink or eat the pain is worse and she will vomit it up.  She is not able tolerate any solids.  Pain is in the mid epigastric area goes to the left upper quadrant into her midline back and a little to the left side.  Pain is worse with eating and drinking as well as with movement.  She has not vomited up any blood.  She reports no diarrhea or black stool.  She reports her last alcohol was a couple weeks ago.  She did take an oxycodone as well as a Creon today.  She has never had surgery on her abdomen and pelvis.  She has not had a menstrual period since she was 30 due to premature menopause.  She denies the possibility of pregnancy.  Denies any urinary symptoms.  Denies any illegal drug use.  Denies any chest pain although the midepigastric pain radiates into the lower aspect of her chest on the left and posteriorly.  Denies shortness of breath.      Previous Episodes: Yes multiple times in the past  Current Symptoms: See above    MEDICAL HISTORY REVIEWED  In reviewing old records I see this patient was discharged from [facility] on 6/9/2023 with a diagnosis of nausea and vomiting and chronic pancreatitis.  She does have a history of alcohol induced pancreatitis, tobacco abuse and chronic abdominal pain.    I can see this patient was also admitted 3/27/2023 with left flank pain did have a pancreatic pseudocyst and did have adrenal hemorrhage.    I saw that the patient was again admitted in December 2022 to [facility] had left-sided abdominal pain with pancreatic  pseudocyst and chronic pancreatitis.  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    Anemia of chronic disease 08/08/2022    GERD without esophagitis 08/08/2022    Alcohol dependence in remission 08/08/2022    Pancreatic pseudocyst 08/14/2022    Candida esophagitis 08/14/2022    Ureterolithiasis 10/20/2022    Left flank pain 10/21/2022    Bacterial vaginosis 10/23/2022    Abdominal pain 11/30/2022    Adrenal hemorrhage 03/25/2023    Chronic pancreatitis, unspecified pancreatitis type 06/08/2023    Macrocytosis 06/08/2023    Nausea & vomiting 06/08/2023    Left-sided chest pain 06/08/2023    Tobacco abuse 06/08/2023     Resolved Ambulatory Problems     Diagnosis Date Noted    Hypokalemia 09/28/2017    Mixed acid base balance disorder 09/28/2017    Dehydration 09/28/2017       ALLERGIES  Nickel and Hydrocodone-acetaminophen      REVIEW OF SYSTEMS  Review of Systems     All systems reviewed and negative except for those discussed in HPI.     PHYSICAL EXAM    I have reviewed the triage vital signs and nursing notes.    ED Triage Vitals   Temp Heart Rate Resp BP SpO2   08/22/23 1247 08/22/23 1247 08/22/23 1254 08/22/23 1254 08/22/23 1247   98.2 øF (36.8 øC) (!) 136 20 (!) 152/116 93 %      Temp src Heart Rate Source Patient Position BP Location FiO2 (%)   08/22/23 1247 08/22/23 1247 08/22/23 1254 08/22/23 1254 --   Tympanic Monitor Lying Right arm        GENERAL: Patient looks uncomfortable and looks like she is in pain.  She is tachycardic with a heart rate in the low 100s and a sinus tachycardia.  Her heart rate is  about 1 10-1 20 on the monitor.  Blood pressure is normal she is afebrile oxygen saturation is normal.  .Vital signs on my initial evaluation have been reviewed  HENT: nares patent  Head/neck/ face are symmetric without gross deformity, signs of trauma, or swelling  EYES: no scleral icterus, no conjunctival pallor.  NECK: Supple, no meningismus  CV: regular rhythm, tachycardia with intact distal pulses.  No murmur or rub  RESPIRATORY: normal effort and no respiratory distress.  Clear to auscultation bilaterally  ABDOMEN: This is midepigastric region left upper quadrant with radiation to her back in the left lower aspect of her chest anterior and posteriorly.  There is some voluntary guarding.  Positive bowel sounds.  She does have some dry heaves after my abdominal exam.  MUSCULOSKELETAL: no deformity.  No edema.  Intact distal pulses that are equal strong and symmetric.  Patient appears chronically malnourished.  She has no cyanosis or pallor.  NEURO: alert and appropriate, moves all extremities, follows commands.  No focal motor or sensory changes  SKIN: warm, dry    Vital signs and nursing notes reviewed.    LAB RESULTS    Ordered the above labs and independently reviewed the results.    RADIOLOGY  CT Abdomen Pelvis With Contrast    Result Date: 8/22/2023  CT ABDOMEN AND PELVIS WITH IV CONTRAST  HISTORY: 42-year-old female with epigastric pain. Pancreatitis history.  TECHNIQUE: Radiation dose reduction techniques were utilized, including automated exposure control and exposure modulation based on body size. 3 mm images were obtained through the abdomen and pelvis after the administration of IV contrast. Compared with previous CT 06/08/2023.  FINDINGS: There has been significant interval increase in the peripancreatic fluid collection posterior to the pancreatic tail measuring approximately 8.6 x 6.2 cm, previously 4.5 x 3.3 cm. The much smaller fluid collections within the left adrenal gland are larger as well.  There is also slight increase in the rind of complex fluid along the left paracolic gutter posterior to the spleen and lateral to the dominant peripancreatic collection. There is also significantly more prominent thickening of the posteromedial left diaphragmatic wilmer with the thickening extending anteriorly to the midline, and there are now tiny fluid collections within the left diaphragmatic phlegmon measuring 1.6 cm transversely. There is no change in the appearance of the pancreas which has changes of chronic pancreatitis predominantly at the pancreatic head and neck and the degree of pancreatic ductal dilatation proximal to the stones is unchanged, 5 mm. There is no biliary dilatation and the gallbladder appears unremarkable. The splenic vein is diminutive. The portal vein and SMV are patent. The phlegmon along the left posterior medial diaphragmatic wilmer abuts and thickens the posterior wall of the GE junction and gastric fundus. There is no acute bowel abnormality. Uterus and adnexa appear unremarkable. There are no pleural effusions or pneumonia at the visualized lower lobes.      1. Marked increase in size of the 8.6 x 6.2 cm peripancreatic fluid collection, development of phlegmon along the left posterior medial diaphragmatic wilmer with phlegmon extending to the posterior wall of the GE junction and gastric fundus. Much smaller fluid collections within the left adrenal gland are larger and there is thicker phlegmon along the left paracolic gutter posterior to the spleen. 2. There is no biliary dilatation and the gallbladder appears unremarkable. There are no new vascular complications of pancreatitis.       I ordered the above noted radiological studies. Reviewed by me and discussed with radiologist.  See dictation for official radiology interpretation.      PROCEDURES    Procedures      MEDICATIONS GIVEN IN ER    Medications   sodium chloride 0.9 % flush 10 mL (has no administration in time range)   sodium  chloride 0.9 % flush 10 mL (has no administration in time range)   sodium chloride 0.9 % infusion (125 mL/hr Intravenous New Bag 8/22/23 1345)   iopamidol (ISOVUE-300) 61 % injection 100 mL (has no administration in time range)   acetaminophen (TYLENOL) tablet 650 mg (has no administration in time range)   sennosides-docusate (PERICOLACE) 8.6-50 MG per tablet 2 tablet (has no administration in time range)     And   polyethylene glycol (MIRALAX) packet 17 g (has no administration in time range)     And   bisacodyl (DULCOLAX) EC tablet 5 mg (has no administration in time range)     And   bisacodyl (DULCOLAX) suppository 10 mg (has no administration in time range)   ondansetron (ZOFRAN) tablet 4 mg (has no administration in time range)     Or   ondansetron (ZOFRAN) injection 4 mg (has no administration in time range)   melatonin tablet 3 mg (has no administration in time range)   sodium chloride 0.9 % with KCl 20 mEq/L infusion (has no administration in time range)   morphine injection 2 mg (has no administration in time range)   sodium chloride 0.9 % bolus 1,000 mL (0 mL Intravenous Stopped 8/22/23 1902)   HYDROmorphone (DILAUDID) injection 1 mg (1 mg Intravenous Given 8/22/23 1340)   ondansetron (ZOFRAN) injection 4 mg (4 mg Intravenous Given 8/22/23 1340)   droperidol (INAPSINE) injection 1.25 mg (1.25 mg Intravenous Given 8/22/23 1432)   diphenhydrAMINE (BENADRYL) injection 25 mg (25 mg Intravenous Given 8/22/23 1432)   HYDROmorphone (DILAUDID) injection 1 mg (1 mg Intravenous Given 8/22/23 1450)   iopamidol (ISOVUE-300) 61 % injection 100 mL (100 mL Intravenous Given by Other 8/22/23 1523)   HYDROmorphone (DILAUDID) injection 1 mg (1 mg Intravenous Given 8/22/23 1731)     All labs have been independently reviewed by me.  All radiology studies have been reviewed by me and I discussed with radiologist dictating the report when indicated below.  All EKG's independently viewed and interpreted by me.  Discussion below  represents my analysis of pertinent findings related to patient's condition, differential diagnosis, treatment plan and final disposition.        PROGRESS, DATA ANALYSIS, CONSULTS, AND MEDICAL DECISION MAKING    Differential diagnosis includes   - hepatobiliary pathology such as cholecystitis, cholangitis, and symptomatic cholelithiasis  -PUD  -Mesenteric ischemia  - Pancreatitis  - Dyspepsia  - Small bowel or large bowel obstruction  - Appendicitis  - Diverticulitis  - UTI including pyelonephritis  - Ureteral stone  - Zoster  - Colitis, including infectious and ischemic  - Atypical ACS  This patient looks uncomfortable with a history of pancreatitis in the past.  I suspect that is the same etiology of this episode.  She is also been admitted with GERD and abdominal pain and vomiting without pancreatitis as well and looking at old records.  We will give her some pain medicine, IV fluids and check a CT scan of her abdomen pelvis.  She will very likely need to get admitted to the hospital.  Discussed the plan with the patient and significant other at bedside.  All questions answered    ED Course as of 08/22/23 2039 Tue Aug 22, 2023   1442 Lipase(!): 115 [MM]   1442 Ethanol %: <0.010 [MM]   1442 WBC(!): 11.55 [MM]   1442 HS Troponin T: <6 [MM]   1506 Lipase(!): 115  Pancreatitis.  Mildly elevated from last lipase in the 70s. [MM]   1506 Patient has had persistent dry heaves and pain.  I have given her Dilaudid 1 mg and then Inapsine 1.25 mg and then is just given her another dose of Dilaudid 1 mg.  She was unable to get a CT scan after the initial 1 mg of Dilaudid and Inapsine.  Give her another dose of Dilaudid to see if that will help her sit still so we can get a repeat CT scan. [MM]   1507 My own independent TURP rotation of the EKG that was done at 1:46 PM reveals a rate of 86 it is sinus rhythm it is narrow complex normal axis I do not appreciate any acute injury pattern there are some nonspecific T wave  changes  This EKG looks pretty darn similar to an EKG that was done on 6/7/2023. [MM]   1543 Bacteria, UA(!): 4+ [MM]   1543 WBC, UA(!): 13-20 [MM]   1543 Ketones, UA(!): Trace [MM]   1543 Nitrite, UA(!): Positive [MM]   1551 Oxycodone Screen, Urine(!): Positive [MM]   1709 I reviewed the CT scan report from the radiologist.  There is marked increased size of the 8.6 x 6.2 cm peripancreatic fluid collection this development of likely a phlegmon along the posterior medial diaphragmatic wilmer with phlegmon extending to the posterior wall of the GE junction and gastric fundus.  There is much smaller fluid collection within the left adrenal gland which appears larger and thicker phlegmon along the left paracolic gutter posterior to the spleen.  No biliary dilatation in the bladder gallbladder appears unremarkable.  No new vascular abnormality appreciated. [MM]   1716 I can see the CT scan that was done here on 6/8/2023.  The CT scan done today was compared to that CT scan.  I did review the report of that CT scan as well. [MM]   1716 I have reevaluated the patient.  She is hypertensive.  Her heart rate is now 70s to 80s.  She still complains of bad pain but she looks more comfortable than my initial exam.  I informed her the results of the CT scan and lab work.  She does inform me that she sees a GI in [facility] and there was talk about placing a drain.  Supposed to be a drain from her stomach to drain a fluid collection.  I Agustina consult general surgery initially to see if this is something that they potentially take care of when she gets in or whether this will be a gastroenterology issue. [MM]   1721 I discussed the case with the general surgeon Dr. Marte.  She reviewed the CT scan report as she currently was not in front of an image computer so she can see the images.  She will do that when that becomes available to her.  I informed her of the CT scan report and the patient's clinical condition.  I do not  think this patient has an infectious etiology.  Patient's procalcitonin is normal lactic acid is normal and she is afebrile here and prior to arrival here.  This is likely a fluid collection that is nonacutely infectious.  She does not recommend placing the patient on antibiotics at this time.  She will see and evaluate the patient.  Likely these fluids will need to either be drained by IR or potential GI.  All questions answered at this time [MM]      ED Course User Index  [MM] Tomasz Viera MD       AS OF 20:39 EDT VITALS:    BP - 167/99  HR - 76  TEMP - 98.2 øF (36.8 øC) (Tympanic)  02 SATS - 97%    SOCIAL DETERMINANTS OF HEALTH THAT IMPACT OR LIMIT CARE (For example..Homelessness,safe discharge, inability to obtain care, follow up, or prescriptions):      DIAGNOSIS  Final diagnoses:   Epigastric pain   Phlegmon of pancreas   Alcohol-induced chronic pancreatitis   Nausea and vomiting, unspecified vomiting type         DISPOSITION  I have reviewed the test results with my patient and explained the current treatment plan.  I answered all of the patient's questions.  The patient will be admitted to monitor bed at this time.  The patient is not hypotensive and is tolerating their current disease condition well enough for a monitored bed at this time.  The patient's current condition does not require intensive care treatment at this time.    DICTATED UTILIZING DRAGON DICTATION    Note Disclaimer: At [facility], we believe that sharing information builds trust and better relationships. You are receiving this note because you recently visited [facility]. It is possible you will see health information before a provider has talked with you about it. This kind of information can be easy to misunderstand. To help you fully understand what it means for your health, we urge you to discuss this note with your provider.       Tomasz Viera MD  08/22/23 2039      Electronically signed by Tomasz Viera MD at  "08/22/23 2039       Cassidy Nunez, RN at 08/22/23 1302          Patient presents to ED with c/o \"pancreatitis flare up\". Patient reports L sided pain and vomiting x 4 days.     Electronically signed by Cassidy Nunez RN at 08/22/23 1304       Vital Signs (last 7 days) before discharge       Date/Time Temp Temp src Pulse Resp BP Patient Position SpO2    08/25/23 0720 97.7 (36.5) Oral 81 16 124/97 Lying 100    08/25/23 0023 98.8 (37.1) Oral -- -- -- -- --    08/24/23 2320 -- -- 68 20 131/93 Lying 100    08/24/23 1950 98.2 (36.8) Oral 79 18 122/96 Lying 100    08/24/23 1310 98.6 (37) Oral 86 18 118/93 Lying 99    08/24/23 0724 98.4 (36.9) Oral 70 18 126/90 Lying 100    08/23/23 2344 98.1 (36.7) Oral 86 18 114/84 Lying 100    08/23/23 1938 98.8 (37.1) Oral 106 18 124/98 Lying 100    08/23/23 1310 99 (37.2) Oral 101 16 132/99 Lying 98    08/23/23 0815 97.7 (36.5) Oral 98 18 136/95 Lying 97    08/22/23 2355 99.7 (37.6) Oral 96 20 144/96 Lying 96    08/22/23 1954 99.3 (37.4) Oral 74 20 155/90 Lying 99    08/22/23 1856 -- -- 76 -- 167/99 -- 97    08/22/23 1756 -- -- 74 -- 154/103 -- 96    08/22/23 1656 -- -- 73 -- 172/105 -- 99    08/22/23 1456 -- -- 75 -- 152/104 -- 97    08/22/23 1326 -- -- 117 -- 131/100 -- 99    08/22/23 1256 -- -- 91 -- 158/120 -- 100    08/22/23 1254 -- -- 101 20 152/116 Lying --    08/22/23 1247 98.2 (36.8) Tympanic 136 -- -- -- 93          Intake & Output (last 7 days)         08/21 0701 08/22 0700 08/22 0701 08/23 0700 08/23 0701 08/24 0700 08/24 0701 08/25 0700 08/25 0701 08/26 0700 08/26 0701 08/27 0700 08/27 0701 08/28 0700 08/28 0701 08/29 0700    P.O.  240 240 250 240       IV Piggyback  1000          Total Intake(mL/kg)  1240 (21) 240 (4) 250 (4.2) 240 (4)       Net  +1240 +240 +250 +240                   Urine Unmeasured Occurrence  3 x            Inactive LDAs       Name Placement date Placement time Removal date Removal time Site Days    [REMOVED] Peripheral IV 08/22/23 1300 " Right Antecubital 08/22/23  1300  08/24/23  0931  Antecubital  1    [REMOVED] Peripheral IV 08/22/23 2215 Posterior;Right Hand 08/22/23  2215  08/23/23  1537  Hand  less than 1    [REMOVED] Peripheral IV 08/24/23 0932 Posterior;Right Hand 08/24/23  0932  08/24/23  0933  Hand  less than 1    [REMOVED] Peripheral IV 08/24/23 0934 Posterior;Right Hand 08/24/23  0934  08/24/23  1932  Hand  less than 1    [REMOVED] Peripheral IV 08/24/23 1236 Right Forearm 08/24/23  1236  08/25/23  0920  Forearm  less than 1               CIWA (since admission)       Date/Time CIWA-Ar Score    08/25/23 0843 4    08/25/23 0026 4    08/24/23 2009 4    08/24/23 1408 9    08/24/23 0810 1    08/24/23 0409 1    08/24/23 0054 1    08/23/23 2115 2    08/23/23 1600 2    08/23/23 1445 5    08/23/23 1248 5    08/23/23 1000 2    08/23/23 0847 7    08/23/23 0649 6    08/23/23 0253 5    08/23/23 0130 2    08/23/23 0004 3    08/22/23 2247 10    08/22/23 2136 21    08/22/23 2031 22     Medication Administration Report for Piper Cain as of 08/28/23 1239     Legend:    Given Hold Not Given Due Canceled Entry Other Actions    Time Time (Time) Time Time-Action         Discontinued     Completed     Future     MAR Hold     Linked             Medications 08/19/23 08/20/23 08/21/23 08/22/23 08/23/23 08/24/23 08/25/23     Completed Medications    diphenhydrAMINE (BENADRYL) injection 25 mg  Dose: 25 mg  Freq: Once Route: IV  Start: 08/22/23 1439   End: 08/22/23 1432   Admin Instructions:   25 mg may be given IV push over less than 1 minute.  Caution: Look alike/sound alike drug alert. This med may be ordered in other forms and routes. Before giving verify the last time the drug was given by any route/form.         1432-Given              droperidol (INAPSINE) injection 1.25 mg  Dose: 1.25 mg  Freq: Once Route: IV  Start: 08/22/23 1439   End: 08/22/23 1432 1432-Given              gadobenate dimeglumine (MULTIHANCE) injection 12 mL  Dose: 12 mL  Freq:  Once in Imaging Route: IV  Start: 08/24/23 0630   End: 08/24/23 0635   Admin Instructions:   Vesicant; admin as rapid bolus; flush with 5 mL NS after admin or 20 mL for renal or aortoiliofemoral vasculature         0635-Given [C]            HYDROmorphone (DILAUDID) injection 1 mg  Dose: 1 mg  Freq: Once Route: IV  Start: 08/22/23 1730   End: 08/22/23 1731   Admin Instructions:   Based on patient request - if ordered for moderate or severe pain, provider allows for administration of a medication prescribed for a lower pain scale.        Caution: Look alike/sound alike drug alert    If given for pain, use the following pain scale:  Mild Pain = Pain Score of 1-3, CPOT 1-2  Moderate Pain = Pain Score of 4-6, CPOT 3-4  Severe Pain = Pain Score of 7-10, CPOT 5-8       1731-Given              HYDROmorphone (DILAUDID) injection 1 mg  Dose: 1 mg  Freq: Once Route: IV  Start: 08/22/23 1458   End: 08/22/23 1450   Admin Instructions:   Based on patient request - if ordered for moderate or severe pain, provider allows for administration of a medication prescribed for a lower pain scale.        Caution: Look alike/sound alike drug alert    If given for pain, use the following pain scale:  Mild Pain = Pain Score of 1-3, CPOT 1-2  Moderate Pain = Pain Score of 4-6, CPOT 3-4  Severe Pain = Pain Score of 7-10, CPOT 5-8       1450-Given              HYDROmorphone (DILAUDID) injection 1 mg  Dose: 1 mg  Freq: Once Route: IV  Start: 08/22/23 1346   End: 08/22/23 1340   Admin Instructions:   Based on patient request - if ordered for moderate or severe pain, provider allows for administration of a medication prescribed for a lower pain scale.        Caution: Look alike/sound alike drug alert    If given for pain, use the following pain scale:  Mild Pain = Pain Score of 1-3, CPOT 1-2  Moderate Pain = Pain Score of 4-6, CPOT 3-4  Severe Pain = Pain Score of 7-10, CPOT 5-8       1340-Given              iopamidol (ISOVUE-300) 61 % injection  "100 mL  Dose: 100 mL  Freq: Once in Imaging Route: IV  Start: 08/22/23 1535   End: 08/22/23 1523       1523-Given by Other              LORazepam (ATIVAN) injection 1 mg  Dose: 1 mg  Freq: Once Route: IV  Start: 08/22/23 2245   End: 08/22/23 2215   Admin Instructions:      Caution: Look alike/sound alike drug alert. Dilute 1:1 with normal saline.       2215-Given              LORazepam (ATIVAN) tablet 2 mg  Dose: 2 mg  Freq: Every 6 Hours Route: PO  Start: 08/22/23 2145   End: 08/23/23 2144   Admin Instructions:      Caution: Look alike/sound alike drug alert       (2142)-Not Given [C]        0252-Given       0856-Given       1502-Given            Followed by  LORazepam (ATIVAN) tablet 1 mg  Dose: 1 mg  Freq: Every 6 Hours Route: PO  Start: 08/23/23 2145   End: 08/24/23 1723   Admin Instructions:      Caution: Look alike/sound alike drug alert        2118-Given        0408-Given       1016-Given       1723-Given            ondansetron (ZOFRAN) injection 4 mg  Dose: 4 mg  Freq: Once Route: IV  Start: 08/22/23 1346   End: 08/22/23 1340   Admin Instructions:   \"If multiple N/V medications ordered, use in the following order: Ondansetron, Prochlorperazine, Promethazine. Use PO unless patient refuses or patient unable to swallow.\"         1340-Given              piperacillin-tazobactam (ZOSYN) 3.375 g in iso-osmotic dextrose 50 ml (premix)  Dose: 3.375 g  Freq: Once Route: IV  Start: 08/22/23 2145   End: 08/22/23 2235   Admin Instructions:   Refrigerate       2205-New Bag              sodium chloride 0.9 % bolus 1,000 mL  Dose: 1,000 mL  Freq: Once Route: IV  Start: 08/22/23 1346   End: 08/22/23 1902       1337-New Bag       1902-Stopped             Discontinued Medications  Medications 08/19/23 08/20/23 08/21/23 08/22/23 08/23/23 08/24/23 08/25/23       acetaminophen (TYLENOL) tablet 650 mg  Dose: 650 mg  Freq: Every 4 Hours PRN Route: PO  PRN Reason: Mild Pain  Start: 08/22/23 1726   End: 08/25/23 1221   Admin " Instructions:   Do not exceed 4 grams of acetaminophen in a 24 hr period.    If given for pain, use the following pain scale:   Mild Pain = Pain Score of 1-3, CPOT 1-2  Moderate Pain = Pain Score of 4-6, CPOT 3-4  Severe Pain = Pain Score of 7-10, CPOT 5-8  Based on patient request - if ordered for moderate or severe pain, provider allows for administration of a medication prescribed for a lower pain scale.    Do not exceed 4 grams of acetaminophen in a 24 hr period. Max dose of 2gm for AST/ALT greater than 120 units/L.    If given for pain, use the following pain scale:   Mild Pain = Pain Score of 1-3, CPOT 1-2  Moderate Pain = Pain Score of 4-6, CPOT 3-4  Severe Pain = Pain Score of 7-10, CPOT 5-8              amitriptyline (ELAVIL) tablet 25 mg  Dose: 25 mg  Freq: Nightly Route: PO  Start: 08/22/23 2345   End: 08/25/23 1221        (0024)-Not Given       2116-Given        2009-Given            sennosides-docusate (PERICOLACE) 8.6-50 MG per tablet 2 tablet  Dose: 2 tablet  Freq: 2 Times Daily Route: PO  Start: 08/22/23 2100   End: 08/25/23 1221   Admin Instructions:   HOLD MEDICATION IF PATIENT HAS HAD BOWEL MOVEMENT. Start bowel management regimen if patient has not had a bowel movement after 12 hours.       (2136)-Not Given        (0850)-Not Given       (2115)-Not Given        0807-Given       2009-Given        0836-Given          And  bisacodyl (DULCOLAX) suppository 10 mg  Dose: 10 mg  Freq: Daily PRN Route: RE  PRN Reason: Constipation  PRN Comment: Use if bisacodyl oral is ineffective  Start: 08/22/23 1726   End: 08/25/23 1221   Admin Instructions:   Use if no bowel movement after 12 hours.  Hold for diarrhea              cefTRIAXone (ROCEPHIN) 1,000 mg in sodium chloride 0.9 % 100 mL IVPB-VTB  Dose: 1,000 mg  Freq: Every 24 Hours Route: IV  Indications of Use: URINARY TRACT INFECTION  Start: 08/23/23 1200   End: 08/25/23 1221   Admin Instructions:   LR should be paused and flushing of the line with NS is  recommended prior to and after completion of ceftriaxone infusion due to incompatibility. Do not co-adminster with calcium-containing solutions.  Caution: Look alike/sound alike drug alert        1304-New Bag       1652-Stopped        1415-New Bag [C]            folic acid (FOLVITE) tablet 1 mg  Dose: 1 mg  Freq: Daily Route: PO  Start: 08/23/23 0900   End: 08/25/23 1221        0856-Given        0807-Given        0837-Given           HYDROmorphone (DILAUDID) injection 1 mg  Dose: 1 mg  Freq: Once Route: IV  Start: 08/22/23 1743   End: 08/22/23 1740   Admin Instructions:   Based on patient request - if ordered for moderate or severe pain, provider allows for administration of a medication prescribed for a lower pain scale.        Caution: Look alike/sound alike drug alert    If given for pain, use the following pain scale:  Mild Pain = Pain Score of 1-3, CPOT 1-2  Moderate Pain = Pain Score of 4-6, CPOT 3-4  Severe Pain = Pain Score of 7-10, CPOT 5-8       (1730)-Not Given       (1731)-Not Given              HYDROmorphone (DILAUDID) injection 1 mg  Dose: 1 mg  Freq: Once Route: IV  Start: 08/22/23 1742   End: 08/22/23 1730   Admin Instructions:   Based on patient request - if ordered for moderate or severe pain, provider allows for administration of a medication prescribed for a lower pain scale.        Caution: Look alike/sound alike drug alert    If given for pain, use the following pain scale:  Mild Pain = Pain Score of 1-3, CPOT 1-2  Moderate Pain = Pain Score of 4-6, CPOT 3-4  Severe Pain = Pain Score of 7-10, CPOT 5-8              iopamidol (ISOVUE-300) 61 % injection 100 mL  Dose: 100 mL  Freq: Once in Imaging Route: IV  Start: 08/22/23 1520   End: 08/25/23 1221 1520              LORazepam (ATIVAN) tablet 1 mg  Dose: 1 mg  Freq: Every 1 Hour PRN Route: PO  PRN Reason: Withdrawal  PRN Comment: For CIWA-Ar 8-10  Start: 08/22/23 2053   End: 08/25/23 1221   Admin Instructions:   Reassess 1 Hour After  Administration     Caution: Look alike/sound alike drug alert       2259-Not Given:  See Alt         1414-Given [C]       (1712)-Not Given           Or  LORazepam (ATIVAN) injection 1 mg  Dose: 1 mg  Freq: Every 1 Hour PRN Route: IV  PRN Reason: Withdrawal  PRN Comment: For CIWA-Ar 8-10  Start: 08/22/23 2053   End: 08/25/23 1221   Admin Instructions:   Reassess 1 Hour After Administration     Caution: Look alike/sound alike drug alert. Dilute 1:1 with normal saline.       2259-Given         1414-Not Given:  See Alt       1712-Not Given:  See Alt           Or  LORazepam (ATIVAN) tablet 2 mg  Dose: 2 mg  Freq: Every 1 Hour PRN Route: PO  PRN Reason: Withdrawal  PRN Comment: For CIWA-Ar 11-15 or -160, DBP , -125  Start: 08/22/23 2053   End: 08/25/23 1221   Admin Instructions:   Reassess 1 Hour After Administration.     Caution: Look alike/sound alike drug alert       2259-Not Given:  See Alt         1414-Not Given:  See Alt       1712-Not Given:  See Alt           Or  LORazepam (ATIVAN) injection 2 mg  Dose: 2 mg  Freq: Every 1 Hour PRN Route: IV  PRN Reason: Withdrawal  PRN Comment: For CIWA-Ar 11-15 or -160, DBP , -125  Start: 08/22/23 2053   End: 08/25/23 1221   Admin Instructions:   Reassess 1 Hour After Administration     Caution: Look alike/sound alike drug alert. Dilute 1:1 with normal saline.       2259-Not Given:  See Alt         1414-Not Given:  See Alt       1712-Not Given:  See Alt           Or  LORazepam (ATIVAN) injection 2 mg  Dose: 2 mg  Freq: Every 15 Minutes PRN Route: IV  PRN Reason: Withdrawal  PRN Comment: For CIWA-Ar Greater Than 15 or SBP greater than 160, DBP greater than 110, or HR greater than 125.  Start: 08/22/23 2053   End: 08/25/23 1221   Admin Instructions:   Reassess 15 Minutes After Each Administration.  If CIWA-Ar Does Not Decrease Contact Provider To Discuss Transfer to Higher Level of Care.     Caution: Look alike/sound alike drug alert. Dilute  "1:1 with normal saline.       2259-Not Given:  See Alt         1414-Not Given:  See Alt       1712-Not Given:  See Alt           Or  LORazepam (ATIVAN) injection 2 mg  Dose: 2 mg  Freq: Every 15 Minutes PRN Route: IM  PRN Reason: Withdrawal  PRN Comment: For CIWA-Ar Greater Than 15 or SBP greater than 160, DBP greater than 110, or HR greater than 125.  Start: 08/22/23 2053   End: 08/25/23 1221   Admin Instructions:   Reassess 15 Minutes After Each Administration.  If CIWA-Ar Does Not Decrease Contact Provider To Discuss Transfer to Higher Level of Care.     Caution: Look alike/sound alike drug alert. Dilute 1:1 with normal saline.       2259-Not Given:  See Alt         1414-Not Given:  See Alt       1712-Not Given:  See Alt            Magnesium Standard Dose Replacement - Follow Nurse / BPA Driven Protocol  Freq: As Needed Route: XX  PRN Reason: Other  Start: 08/22/23 2053   End: 08/25/23 1221   Admin Instructions:   Open Order & Select \"[facility] Electrolyte Replacement Protocol Algorithm\" to View Details              melatonin tablet 3 mg  Dose: 3 mg  Freq: Nightly PRN Route: PO  PRN Reason: Sleep  Start: 08/22/23 1726   End: 08/25/23 1221 2009-Given            morphine injection 2 mg  Dose: 2 mg  Freq: Every 2 Hours PRN Route: IV  PRN Reason: Severe Pain  Start: 08/22/23 1945   End: 08/25/23 1221   Admin Instructions:   If given for pain, use the following pain scale:  Mild Pain = Pain Score of 1-3, CPOT 1-2  Moderate Pain = Pain Score of 4-6, CPOT 3-4  Severe Pain = Pain Score of 7-10, CPOT 5-8       2040-Given       2259-Given        0132-Given       0359-Given       0657-Given       0856-Given       1248-Given       1502-Given       1850-Given       2117-Given        0100-Given       0414-Given       0744-Given       1016-Given       1416-Given       2009-Given       2311-Given        0301-Given       0911-Given           ondansetron (ZOFRAN) tablet 4 mg  Dose: 4 mg  Freq: Every 6 Hours PRN Route: PO  PRN " Reasons: Nausea,Vomiting  Start: 08/22/23 1726   End: 08/25/23 1221       2040-Not Given:  See Alt        0657-Not Given:  See Alt       1248-Not Given:  See Alt       1850-Not Given:  See Alt            Or  ondansetron (ZOFRAN) injection 4 mg  Dose: 4 mg  Freq: Every 6 Hours PRN Route: IV  PRN Reasons: Nausea,Vomiting  Start: 08/22/23 1726   End: 08/25/23 1221       2040-Given        0657-Given       1248-Given       1850-Given             pantoprazole (PROTONIX) injection 40 mg  Dose: 40 mg  Freq: Every Early Morning Route: IV  Indications of Use: STRESS ULCER PROPHYLAXIS  Start: 08/23/23 1145   End: 08/25/23 1221   Admin Instructions:   Dilute with 10 mL of 0.9% NaCl and give IV push over 2 minutes.        1248-Given        0744-Given        0614-Given           piperacillin-tazobactam (ZOSYN) 3.375 g in iso-osmotic dextrose 50 ml (premix)  Dose: 3.375 g  Freq: Every 8 Hours Route: IV  Indications of Use: INTRA-ABDOMINAL INFECTION  Start: 08/23/23 0345   End: 08/23/23 1048   Admin Instructions:   Refrigerate        0359-New Bag             sodium chloride 0.9 % flush 10 mL  Dose: 10 mL  Freq: As Needed Route: IV  PRN Reason: Line Care  Start: 08/22/23 1305   End: 08/25/23 1221              sodium chloride 0.9 % flush 10 mL  Dose: 10 mL  Freq: As Needed Route: IV  PRN Reason: Line Care  Start: 08/22/23 1300   End: 08/25/23 1221              sodium chloride 0.9 % infusion  Rate: 125 mL/hr Dose: 125 mL/hr  Freq: Continuous Route: IV  Start: 08/22/23 1346   End: 08/22/23 2253       1345-New Bag              sodium chloride 0.9 % with KCl 20 mEq/L infusion  Rate: 125 mL/hr Dose: 125 mL/hr  Freq: Continuous Route: IV  Start: 08/22/23 1743   End: 08/25/23 1221       2303-New Bag        0856-New Bag       1652-New Bag        0408-New Bag       1416-New Bag       2259-New Bag            thiamine (B-1) injection 200 mg  Dose: 200 mg  Freq: Every 8 Hours Scheduled Route: IV  Start: 08/22/23 2300   End: 08/25/23 1221   Admin  Instructions:   Doses of up to 250mg, give over 1-2 minutes (IV push). Doses 250mg and above, give over 30 minutes.       2258-Given        0652-Given       1502-Given       2117-Given        0744-Given       1655-Given       2155-Given        0614-Given          Followed by  thiamine (VITAMIN B-1) tablet 100 mg  Dose: 100 mg  Freq: Daily Route: PO  Start: 08/28/23 0900   End: 08/25/23 1221               Lab Results (all)       Procedure Component Value Units Date/Time    Blood Culture - Blood, Arm, Left [[id number]]  (Normal) Collected: 08/22/23 1650    Specimen: Blood from Arm, Left Updated: 08/27/23 1700     Blood Culture No growth at 5 days    Blood Culture - Blood, Arm, Right [[id number]]  (Normal) Collected: 08/22/23 1619    Specimen: Blood from Arm, Right Updated: 08/27/23 1631     Blood Culture No growth at 5 days    Narrative:      Less than seven (7) mL's of blood was collected.  Insufficient quantity may yield false negative results.    Lipase [[id number]]  (Abnormal) Collected: 08/25/23 0425    Specimen: Blood Updated: 08/25/23 0530     Lipase 64 U/L     Comprehensive Metabolic Panel [[id number]]  (Abnormal) Collected: 08/25/23 0425    Specimen: Blood Updated: 08/25/23 0530     Glucose 97 mg/dL      BUN 5 mg/dL      Creatinine 0.48 mg/dL      Sodium 141 mmol/L      Potassium 3.9 mmol/L      Chloride 107 mmol/L      CO2 23.3 mmol/L      Calcium 8.9 mg/dL      Total Protein 5.7 g/dL      Albumin 3.1 g/dL      ALT (SGPT) 10 U/L      AST (SGOT) 6 U/L      Alkaline Phosphatase 71 U/L      Total Bilirubin 0.2 mg/dL      Globulin 2.6 gm/dL      A/G Ratio 1.2 g/dL      BUN/Creatinine Ratio 10.4     Anion Gap 10.7 mmol/L      eGFR 123.0 mL/min/1.73     Narrative:      GFR Normal >60  Chronic Kidney Disease <60  Kidney Failure <15      CBC & Differential [[id number]]  (Abnormal) Collected: 08/25/23 0425    Specimen: Blood Updated: 08/25/23 0511    Narrative:      The following orders were created for panel order CBC &  Differential.  Procedure                               Abnormality         Status                     ---------                               -----------         ------                     CBC Auto Differential[[id number]]        Abnormal            Final result                 Please view results for these tests on the individual orders.    CBC Auto Differential [[id number]]  (Abnormal) Collected: 08/25/23 0425    Specimen: Blood Updated: 08/25/23 0511     WBC 4.42 10*3/mm3      RBC 3.30 10*6/mm3      Hemoglobin 11.3 g/dL      Hematocrit 33.8 %      .4 fL      MCH 34.2 pg      MCHC 33.4 g/dL      RDW 13.7 %      RDW-SD 51.9 fl      MPV 10.3 fL      Platelets 127 10*3/mm3      Neutrophil % 41.2 %      Lymphocyte % 41.4 %      Monocyte % 11.5 %      Eosinophil % 5.2 %      Basophil % 0.5 %      Immature Grans % 0.2 %      Neutrophils, Absolute 1.82 10*3/mm3      Lymphocytes, Absolute 1.83 10*3/mm3      Monocytes, Absolute 0.51 10*3/mm3      Eosinophils, Absolute 0.23 10*3/mm3      Basophils, Absolute 0.02 10*3/mm3      Immature Grans, Absolute 0.01 10*3/mm3      nRBC 0.0 /100 WBC     Urine Culture - Urine, Urine, Clean Catch [[id number]]  (Abnormal)  (Susceptibility) Collected: 08/22/23 1516    Specimen: Urine, Clean Catch Updated: 08/24/23 0734     Urine Culture >100,000 CFU/mL Escherichia coli    Narrative:      Colonization of the urinary tract without infection is common. Treatment is discouraged unless the patient is symptomatic, pregnant, or undergoing an invasive urologic procedure.    Susceptibility        Escherichia coli      OLI      Ampicillin Susceptible      Ampicillin + Sulbactam Susceptible      Cefazolin Susceptible      Cefepime Susceptible      Ceftazidime Susceptible      Ceftriaxone Susceptible      Gentamicin Susceptible      Levofloxacin Resistant      Nitrofurantoin Susceptible      Piperacillin + Tazobactam Susceptible      Trimethoprim + Sulfamethoxazole Susceptible                            Cortisol [[id number]] Collected: 08/24/23 0324    Specimen: Blood Updated: 08/24/23 0454     Cortisol 5.03 mcg/dL     Narrative:      Cortisol Reference Ranges:    Cortisol 6AM - 10AM Range: 6.02-18.40 mcg/dl  Cortisol 4PM - 8PM Range: 2.68-10.50 mcg/dl      Results may be falsely increased if patient taking Biotin.      Lipase [[id number]]  (Abnormal) Collected: 08/24/23 0324    Specimen: Blood Updated: 08/24/23 0415     Lipase 75 U/L     Comprehensive Metabolic Panel [[id number]]  (Abnormal) Collected: 08/24/23 0324    Specimen: Blood Updated: 08/24/23 0415     Glucose 96 mg/dL      BUN 4 mg/dL      Creatinine 0.55 mg/dL      Sodium 138 mmol/L      Potassium 3.7 mmol/L      Chloride 104 mmol/L      CO2 22.9 mmol/L      Calcium 9.2 mg/dL      Total Protein 6.8 g/dL      Albumin 3.6 g/dL      ALT (SGPT) 15 U/L      AST (SGOT) 8 U/L      Alkaline Phosphatase 95 U/L      Total Bilirubin 0.5 mg/dL      Globulin 3.2 gm/dL      A/G Ratio 1.1 g/dL      BUN/Creatinine Ratio 7.3     Anion Gap 11.1 mmol/L      eGFR 119.0 mL/min/1.73     Narrative:      GFR Normal >60  Chronic Kidney Disease <60  Kidney Failure <15      CBC & Differential [[id number]]  (Abnormal) Collected: 08/24/23 0324    Specimen: Blood Updated: 08/24/23 0407    Narrative:      The following orders were created for panel order CBC & Differential.  Procedure                               Abnormality         Status                     ---------                               -----------         ------                     CBC Auto Differential[[id number]]        Abnormal            Final result                 Please view results for these tests on the individual orders.    CBC Auto Differential [[id number]]  (Abnormal) Collected: 08/24/23 0324    Specimen: Blood Updated: 08/24/23 0407     WBC 5.34 10*3/mm3      RBC 3.73 10*6/mm3      Hemoglobin 12.9 g/dL      Hematocrit 37.6 %      .8 fL      MCH 34.6 pg      MCHC 34.3 g/dL      RDW 13.8 %      RDW-SD  50.5 fl      MPV 10.1 fL      Platelets 143 10*3/mm3      Neutrophil % 42.6 %      Lymphocyte % 43.8 %      Monocyte % 9.6 %      Eosinophil % 3.4 %      Basophil % 0.4 %      Neutrophils, Absolute 2.28 10*3/mm3      Lymphocytes, Absolute 2.34 10*3/mm3      Monocytes, Absolute 0.51 10*3/mm3      Eosinophils, Absolute 0.18 10*3/mm3      Basophils, Absolute 0.02 10*3/mm3     Folate [[id number]]  (Normal) Collected: 08/23/23 1105    Specimen: Blood Updated: 08/23/23 1241     Folate >20.00 ng/mL     Narrative:      Results may be falsely increased if patient taking Biotin.      Vitamin B12 [[id number]]  (Normal) Collected: 08/23/23 1105    Specimen: Blood Updated: 08/23/23 1237     Vitamin B-12 810 pg/mL     Narrative:      Results may be falsely increased if patient taking Biotin.      TSH [[id number]]  (Normal) Collected: 08/23/23 1105    Specimen: Blood Updated: 08/23/23 1237     TSH 0.841 uIU/mL     Hemoglobin A1c [[id number]]  (Normal) Collected: 08/23/23 1105    Specimen: Blood Updated: 08/23/23 1215     Hemoglobin A1C 5.10 %     Narrative:      Hemoglobin A1C Ranges:    Increased Risk for Diabetes  5.7% to 6.4%  Diabetes                     >= 6.5%  Diabetic Goal                < 7.0%    Lipase [[id number]]  (Abnormal) Collected: 08/23/23 0252    Specimen: Blood Updated: 08/23/23 0411     Lipase 87 U/L     Basic Metabolic Panel [[id number]]  (Abnormal) Collected: 08/23/23 0252    Specimen: Blood Updated: 08/23/23 0411     Glucose 103 mg/dL      BUN 4 mg/dL      Creatinine 0.51 mg/dL      Sodium 136 mmol/L      Potassium 3.5 mmol/L      Chloride 101 mmol/L      CO2 24.0 mmol/L      Calcium 9.0 mg/dL      BUN/Creatinine Ratio 7.8     Anion Gap 11.0 mmol/L      eGFR 121.2 mL/min/1.73     Narrative:      GFR Normal >60  Chronic Kidney Disease <60  Kidney Failure <15      CBC (No Diff) [[id number]]  (Abnormal) Collected: 08/23/23 0252    Specimen: Blood Updated: 08/23/23 0351     WBC 8.14 10*3/mm3      RBC 3.96 10*6/mm3       "Hemoglobin 14.0 g/dL      Hematocrit 39.6 %      .0 fL      MCH 35.4 pg      MCHC 35.4 g/dL      RDW 14.0 %      RDW-SD 51.6 fl      MPV 10.3 fL      Platelets 147 10*3/mm3     Lactic Acid, Plasma [[id number]]  (Normal) Collected: 08/22/23 1619    Specimen: Blood Updated: 08/22/23 1649     Lactate 0.8 mmol/L     High Sensitivity Troponin T 2Hr [[id number]] Collected: 08/22/23 1619    Specimen: Blood Updated: 08/22/23 1647     HS Troponin T <6 ng/L      Troponin T Delta --     Comment: Unable to calculate.       Narrative:      High Sensitive Troponin T Reference Range:  <10.0 ng/L- Negative Female for AMI  <15.0 ng/L- Negative Male for AMI  >=10 - Abnormal Female indicating possible myocardial injury.  >=15 - Abnormal Male indicating possible myocardial injury.   Clinicians would have to utilize clinical acumen, EKG, Troponin, and serial changes to determine if it is an Acute Myocardial Infarction or myocardial injury due to an underlying chronic condition.         Procalcitonin [[id number]]  (Normal) Collected: 08/22/23 1301    Specimen: Blood Updated: 08/22/23 1608     Procalcitonin <0.02 ng/mL     Narrative:      As a Marker for Sepsis (Non-Neonates):    1. <0.5 ng/mL represents a low risk of severe sepsis and/or septic shock.  2. >2 ng/mL represents a high risk of severe sepsis and/or septic shock.    As a Marker for Lower Respiratory Tract Infections that require antibiotic therapy:    PCT on Admission    Antibiotic Therapy       6-12 Hrs later    >0.5                Strongly Recommended  >0.25 - <0.5        Recommended   0.1 - 0.25          Discouraged              Remeasure/reassess PCT  <0.1                Strongly Discouraged     Remeasure/reassess PCT    As 28 day mortality risk marker: \"Change in Procalcitonin Result\" (>80% or <=80%) if Day 0 (or Day 1) and Day 4 values are available. Refer to http://www.[employer]-pct-calculator.com    Change in PCT <=80%  A decrease of PCT levels below or equal to 80% " defines a positive change in PCT test result representing a higher risk for 28-day all-cause mortality of patients diagnosed with severe sepsis for septic shock.    Change in PCT >80%  A decrease of PCT levels of more than 80% defines a negative change in PCT result representing a lower risk for 28-day all-cause mortality of patients diagnosed with severe sepsis or septic shock.       Urine Drug Screen - Urine, Clean Catch [[id number]]  (Abnormal) Collected: 08/22/23 1516    Specimen: Urine, Clean Catch Updated: 08/22/23 1548     Amphet/Methamphet, Screen Negative     Barbiturates Screen, Urine Negative     Benzodiazepine Screen, Urine Negative     Cocaine Screen, Urine Negative     Opiate Screen Negative     THC, Screen, Urine Negative     Methadone Screen, Urine Negative     Oxycodone Screen, Urine Positive     Fentanyl, Urine Negative    Narrative:      Negative Thresholds Per Drugs Screened:    Amphetamines                 500 ng/ml  Barbiturates                 200 ng/ml  Benzodiazepines              100 ng/ml  Cocaine                      300 ng/ml  Methadone                    300 ng/ml  Opiates                      300 ng/ml  Oxycodone                    100 ng/ml  THC                           50 ng/ml  Fentanyl                       5 ng/ml      The Normal Value for all drugs tested is negative. This report includes final unconfirmed screening results to be used for medical treatment purposes only. Unconfirmed results must not be used for non-medical purposes such as employment or legal testing. Clinical consideration should be applied to any drug of abuse test, particularly when unconfirmed results are used.            Urinalysis, Microscopic Only - Urine, Clean Catch [[id number]]  (Abnormal) Collected: 08/22/23 1516    Specimen: Urine, Clean Catch Updated: 08/22/23 1540     RBC, UA 0-2 /HPF      WBC, UA 13-20 /HPF      Bacteria, UA 4+ /HPF      Squamous Epithelial Cells, UA 7-12 /HPF      Hyaline Casts, UA  3-6 /LPF      Methodology Automated Microscopy    Urinalysis With Microscopic If Indicated (No Culture) - Urine, Clean Catch [[id number]]  (Abnormal) Collected: 08/22/23 1516    Specimen: Urine, Clean Catch Updated: 08/22/23 1540     Color, UA Yellow     Appearance, UA Cloudy     pH, UA 7.0     Specific Gravity, UA 1.020     Glucose, UA Negative     Ketones, UA Trace     Bilirubin, UA Negative     Blood, UA Negative     Protein, UA Trace     Leuk Esterase, UA Negative     Nitrite, UA Positive     Urobilinogen, UA 0.2 E.U./dL    High Sensitivity Troponin T [[id number]]  (Normal) Collected: 08/22/23 1301    Specimen: Blood Updated: 08/22/23 1432     HS Troponin T <6 ng/L     Narrative:      High Sensitive Troponin T Reference Range:  <10.0 ng/L- Negative Female for AMI  <15.0 ng/L- Negative Male for AMI  >=10 - Abnormal Female indicating possible myocardial injury.  >=15 - Abnormal Male indicating possible myocardial injury.   Clinicians would have to utilize clinical acumen, EKG, Troponin, and serial changes to determine if it is an Acute Myocardial Infarction or myocardial injury due to an underlying chronic condition.         Magnesium [[id number]]  (Normal) Collected: 08/22/23 1301    Specimen: Blood Updated: 08/22/23 1429     Magnesium 2.1 mg/dL     Ethanol [[id number]] Collected: 08/22/23 1301    Specimen: Blood Updated: 08/22/23 1429     Ethanol <10 mg/dL      Ethanol % <0.010 %     [place] Draw [[id number]] Collected: 08/22/23 1301    Specimen: Blood Updated: 08/22/23 1415    Narrative:      The following orders were created for panel order [place] Draw.  Procedure                               Abnormality         Status                     ---------                               -----------         ------                     Green Top (Gel)[[id number]]                                  Final result               Lavender Top[[id number]]                                     Final result               Gold Top -  SST[[id number]]                                   Final result               Light Blue Top[[id number]]                                   Final result                 Please view results for these tests on the individual orders.    Comprehensive Metabolic Panel [[id number]]  (Abnormal) Collected: 08/22/23 1301    Specimen: Blood Updated: 08/22/23 1333     Glucose 122 mg/dL      BUN 6 mg/dL      Creatinine 0.64 mg/dL      Sodium 138 mmol/L      Potassium 3.6 mmol/L      Chloride 99 mmol/L      CO2 25.0 mmol/L      Calcium 9.7 mg/dL      Total Protein 7.4 g/dL      Albumin 4.3 g/dL      ALT (SGPT) 26 U/L      AST (SGOT) 17 U/L      Alkaline Phosphatase 140 U/L      Total Bilirubin 0.5 mg/dL      Globulin 3.1 gm/dL      A/G Ratio 1.4 g/dL      BUN/Creatinine Ratio 9.4     Anion Gap 14.0 mmol/L      eGFR 114.7 mL/min/1.73     Narrative:      GFR Normal >60  Chronic Kidney Disease <60  Kidney Failure <15      Lipase [[id number]]  (Abnormal) Collected: 08/22/23 1301    Specimen: Blood Updated: 08/22/23 1333     Lipase 115 U/L     Protime-INR [[id number]]  (Normal) Collected: 08/22/23 1301    Specimen: Blood Updated: 08/22/23 1333     Protime 12.6 Seconds      INR 0.94    CBC & Differential [[id number]]  (Abnormal) Collected: 08/22/23 1301    Specimen: Blood Updated: 08/22/23 1316    Narrative:      The following orders were created for panel order CBC & Differential.  Procedure                               Abnormality         Status                     ---------                               -----------         ------                     CBC Auto Differential[[id number]]        Abnormal            Final result                 Please view results for these tests on the individual orders.    CBC Auto Differential [[id number]]  (Abnormal) Collected: 08/22/23 1301    Specimen: Blood Updated: 08/22/23 1316     WBC 11.55 10*3/mm3      RBC 4.22 10*6/mm3      Hemoglobin 14.6 g/dL      Hematocrit 42.9 %      .7 fL      MCH 34.6 pg       MCHC 34.0 g/dL      RDW 14.3 %      RDW-SD 53.3 fl      MPV 9.7 fL      Platelets 194 10*3/mm3      Neutrophil % 82.0 %      Lymphocyte % 10.7 %      Monocyte % 6.2 %      Eosinophil % 0.4 %      Basophil % 0.3 %      Immature Grans % 0.4 %      Neutrophils, Absolute 9.46 10*3/mm3      Lymphocytes, Absolute 1.24 10*3/mm3      Monocytes, Absolute 0.72 10*3/mm3      Eosinophils, Absolute 0.05 10*3/mm3      Basophils, Absolute 0.03 10*3/mm3      Immature Grans, Absolute 0.05 10*3/mm3      nRBC 0.0 /100 WBC        Imaging Results (All)       Procedure Component Value Units Date/Time    MRI Abdomen With & Without Contrast [[id number]] Collected: 08/24/23 1008     Updated: 08/25/23 0854    Narrative:      MRI ABDOMEN W WO CONTRAST-MRCP     HISTORY: 42-year-old female with history of chronic pancreatitis.  Enlarging pseudocysts.     TECHNIQUE: Routine MRCP was performed without and with IV contrast.  Compared with CT abdomen 08/22/2023.     FINDINGS:  1. The approximately 9 x 6 cm pseudocyst posterior to the pancreatic  tail and body communicates with the multiple smaller collections within  the left adrenal gland. The pancreatic duct which drapes the anterior  margin of the pseudocyst is irregular in contour and likely has a tiny  side branch connection to the dominant pseudocyst.  2. The pancreatic duct is very irregular at the pancreatic body,  measures 5 mm at the pancreatic neck and is truncated at the pancreatic  head, likely secondary to the multiple stones seen on the previous CT  abdomen.  3. The gallbladder appears unremarkable and there is no biliary  dilatation. The common bile duct measures up to 5 mm and there is no  abnormal signal intensity within the biliary tree. No evidence for  choledocholithiasis.  4. The portal vein and SMV are patent. The splenic vein is diminutive  but is patent and there are also collateral veins at the splenic hilum.  5. There is no significant fatty infiltration of the  liver.     This report was finalized on 8/25/2023 8:51 AM by Dr. Eusebia Barnes M.D.       CT Abdomen Pelvis With Contrast [[id number]] Collected: 08/22/23 1629     Updated: 08/23/23 1345    Narrative:      CT ABDOMEN AND PELVIS WITH IV CONTRAST     HISTORY: 42-year-old female with epigastric pain. Pancreatitis history.     TECHNIQUE: Radiation dose reduction techniques were utilized, including  automated exposure control and exposure modulation based on body size.   3 mm images were obtained through the abdomen and pelvis after the  administration of IV contrast. Compared with previous CT 06/08/2023.     FINDINGS: There has been significant interval increase in the  peripancreatic fluid collection posterior to the pancreatic tail  measuring approximately 8.6 x 6.2 cm, previously 4.5 x 3.3 cm. The much  smaller fluid collections within the left adrenal gland are larger as  well. There is also slight increase in the rind of complex fluid along  the left paracolic gutter posterior to the spleen and lateral to the  dominant peripancreatic collection. There is also significantly more  prominent thickening of the posteromedial left diaphragmatic wilmer with  the thickening extending anteriorly to the midline, and there are now  tiny fluid collections within the left diaphragmatic phlegmon measuring  1.6 cm transversely. There is no change in the appearance of the  pancreas which has changes of chronic pancreatitis predominantly at the  pancreatic head and neck and the degree of pancreatic ductal dilatation  proximal to the stones is unchanged, 5 mm. There is no biliary  dilatation and the gallbladder appears unremarkable. The splenic vein is  diminutive. The portal vein and SMV are patent. The phlegmon along the  left posterior medial diaphragmatic wilmer abuts and thickens the  posterior wall of the GE junction and gastric fundus. There is no acute  bowel abnormality. Uterus and adnexa appear unremarkable. There are no  pleural  effusions or pneumonia at the visualized lower lobes.       Impression:      1. Marked increase in size of the 8.6 x 6.2 cm peripancreatic fluid  collection, development of phlegmon along the left posterior medial  diaphragmatic wilmer with phlegmon extending to the posterior wall of the  GE junction and gastric fundus. Much smaller fluid collections within  the left adrenal gland are larger and there is thicker phlegmon along  the left paracolic gutter posterior to the spleen.  2. There is no biliary dilatation and the gallbladder appears  unremarkable. There are no new vascular complications of pancreatitis.     This report was finalized on 8/23/2023 1:42 PM by Dr. Eusebia Barnes M.D.          ECG/EMG Results (all)       Procedure Component Value Units Date/Time    ECG 12 Lead Other; Midepigastric abdominal pain [[id number]] Collected: 08/22/23 1346     Updated: 08/22/23 1619     QT Interval 384 ms     Narrative:      HEART RATE= 86  bpm  RR Interval= 698  ms  [place] Interval= 167  ms  P Horizontal Axis= -26  deg  P Front Axis= 56  deg  QRSD Interval= 80  ms  QT Interval= 384  ms  QRS Axis= 71  deg  T Wave Axis= 68  deg  - ABNORMAL ECG -  Sinus rhythm  Probable left atrial enlargement  Low voltage, precordial leads  Nonspecific T abnrm, anterolateral leads  c/w prior ecg, anterior t wave inversion now seen  Electronically Signed By: Evelyn Rinaldi ([facility]) 22-Aug-2023 16:19:04  Date and Time of Study: 2023-08-22 13:46:30       Orders (last 7 days)        Start     Ordered    08/28/23 0900  thiamine (VITAMIN B-1) tablet 100 mg  Daily,   Status:  Discontinued        See [facility] for full Linked Orders Report.    08/22/23 2053 08/28/23 0000  thiamine (VITAMIN B1) 100 MG tablet  Daily         08/25/23 0809    08/25/23 0808  Discontinue IV  Once,   Status:  Canceled         08/25/23 0809    08/25/23 0805  Discharge patient  Once         08/25/23 0809    08/25/23 0600  CBC Auto Differential  PROCEDURE ONCE         08/24/23  2203    08/25/23 0000  amoxicillin (AMOXIL) 500 MG capsule  2 Times Daily         08/25/23 0809    08/25/23 0000  Discharge Follow-up with PCP         08/25/23 0809    08/25/23 0000  Discharge Follow-up with Specified Provider: Surgery.  As scheduled.         08/25/23 0809    08/25/23 0000  Discharge Follow-up with Specified Provider: GI.  As scheduled.         08/25/23 0809    08/25/23 0000  Discharge Instructions        Comments: 1.  Abstain from alcohol.  Use resources given to follow-up for alcohol detoxification    08/25/23 0809    08/25/23 0000  Call MD With Problems / Concerns        Comments: Instructions: Call MD or return to ER if increasing abdominal pain/nausea or vomiting/bowel habit changes/chest pain/shortness of breath    08/25/23 0809    08/25/23 0000  Activity as Tolerated         08/25/23 0809    08/25/23 0000  Diet: Regular/House Diet; Regular Texture (IDDSI 7); Thin (IDDSI 0)         08/25/23 0809    08/24/23 0630  gadobenate dimeglumine (MULTIHANCE) injection 12 mL  Once in Imaging         08/24/23 0539    08/24/23 0600  CBC & Differential  Daily,   Status:  Canceled       08/23/23 1048    08/24/23 0600  Comprehensive Metabolic Panel  Daily,   Status:  Canceled       08/23/23 1048    08/24/23 0600  Lipase  Daily,   Status:  Canceled       08/23/23 1048    08/24/23 0600  Cortisol  Morning Draw         08/23/23 1048    08/24/23 0600  CBC Auto Differential  PROCEDURE ONCE         08/23/23 2204 08/24/23 0358  Manual Differential  Once,   Status:  Canceled         08/24/23 0357    08/23/23 2145  LORazepam (ATIVAN) tablet 1 mg  Every 6 Hours        See [facility]ers[facility] for full Linked Orders Report.    08/22/23 2053 08/23/23 2035  Diet: Gastrointestinal Diets; Fat-Restricted; Texture: Regular Texture (IDDSI 7); Fluid Consistency: Thin (IDDSI 0)  Diet Effective Now,   Status:  Canceled         08/23/23 2036 08/23/23 1654  Diet: Liquid Diets; Full Liquid; Texture: Regular Texture (IDDSI 7); Fluid  Consistency: Thin (IDDSI 0)  Diet Effective Now,   Status:  Canceled         08/23/23 1653    08/23/23 1200  cefTRIAXone (ROCEPHIN) 1,000 mg in sodium chloride 0.9 % 100 mL IVPB-VTB  Every 24 Hours,   Status:  Discontinued         08/23/23 1048    08/23/23 1145  pantoprazole (PROTONIX) injection 40 mg  Every Early Morning,   Status:  Discontinued         08/23/23 1048    08/23/23 1049  Hemoglobin A1c  Once         08/23/23 1048    08/23/23 1048  [facility]patient [facility] Consult  Once        Provider:  (Not yet assigned)    08/23/23 1048    08/23/23 1047  TSH  Once         08/23/23 1048    08/23/23 1047  Vitamin B12  Once         08/23/23 1048    08/23/23 1047  Folate  Once         08/23/23 1048    08/23/23 0944  Can advance diet to LF diet as tolerated  Nursing Communication  Once,   Status:  Canceled        Comments: Can advance diet to LF diet as tolerated    08/23/23 0952    08/23/23 0943  MRI Abdomen With & Without Contrast  1 Time Imaging         08/23/23 0942    08/23/23 0943  Diet: Liquid Diets; Clear Liquid; Texture: Regular Texture (IDDSI 7); Fluid Consistency: Thin (IDDSI 0)  Diet Effective Now,   Status:  Canceled         08/23/23 0942    08/23/23 0900  folic acid (FOLVITE) tablet 1 mg  Daily,   Status:  Discontinued         08/22/23 2053 08/23/23 0600  Basic Metabolic Panel  Morning Draw         08/22/23 1727 08/23/23 0600  CBC (No Diff)  Morning Draw         08/22/23 1727 08/23/23 0600  Lipase  Morning Draw         08/22/23 2251    08/23/23 0345  piperacillin-tazobactam (ZOSYN) 3.375 g in iso-osmotic dextrose 50 ml (premix)  Every 8 Hours,   Status:  Discontinued         08/22/23 2053 08/22/23 2345  amitriptyline (ELAVIL) tablet 25 mg  Nightly,   Status:  Discontinued         08/22/23 2253 08/22/23 2300  thiamine (B-1) injection 200 mg  Every 8 Hours Scheduled,   Status:  Discontinued        See Hyperspace for full Linked Orders Report.    08/22/23 2053 08/22/23 3  NPO Diet NPO Type:  Ice Chips  Diet Effective Now,   Status:  Canceled         08/22/23 2253 08/22/23 2245  LORazepam (ATIVAN) injection 1 mg  Once         08/22/23 2151 08/22/23 2145  piperacillin-tazobactam (ZOSYN) 3.375 g in iso-osmotic dextrose 50 ml (premix)  Once         08/22/23 2053 08/22/23 2145  LORazepam (ATIVAN) tablet 2 mg  Every 6 Hours        See Hyperspace for full Linked Orders Report.    08/22/23 2053 08/22/23 2100  sennosides-docusate (PERICOLACE) 8.6-50 MG per tablet 2 tablet  2 Times Daily,   Status:  Discontinued        See Hyperspace for full Linked Orders Report.    08/22/23 1727 08/22/23 2054  Initiate Alcohol Withdrawal Protocol  Once,   Status:  Canceled         08/22/23 2053 08/22/23 2054  Vital Signs  Per Hospital Policy,   Status:  Canceled         08/22/23 2053 08/22/23 2054  Continuous Pulse Oximetry  Continuous,   Status:  Canceled         08/22/23 2053 08/22/23 2054  Obtain baseline Clinical [place] Withdrawal Assessment - Ar, sedation scale, and vital signs  Until Discontinued,   Status:  Canceled        Comments: See hyperlink for CIWA scoring reference guide.    08/22/23 2053 08/22/23 2054  Clinical [place] Withdrawal Assessment (CIWA-Ar)  Per Hospital Policy,   Status:  Canceled         08/22/23 2053 08/22/23 2054  If CIWA-Ar Score Less Than 8 For 3 Consecutive Assessments, Monitor Every 4 Hours & Discontinue Assessment When CIWA-Ar Less Than 8 for 24 Hours  Per Hospital Policy,   Status:  Canceled        Comments: Contact Provider to Restart Protocol if Any Symptoms Reappear    08/22/23 2053 08/22/23 2054  Obtain pre and post sedation scores with every Lorazepam dose  Once,   Status:  Canceled        Comments: Hold Lorazepam for POSS greater than 2 or RASS of-3, -4, -5.     08/22/23 2053 08/22/23 2054  Seizure Precautions  Continuous,   Status:  Canceled         08/22/23 2053 08/22/23 2054  Notify physician for breakthrough symptoms of withdrawal and to  restart protocol  Until Discontinued,   Status:  Canceled        Comments: Including: anxiety, agitation, severe vomiting, seizure activity.    08/22/23 2053 08/22/23 2054  Notify physician of abnormal lab results  Until Discontinued,   Status:  Canceled         08/22/23 2053 08/22/23 2054  Notify physician  Until Discontinued,   Status:  Canceled         08/22/23 2053 08/22/23 2054  Fall Precautions  Continuous,   Status:  Canceled         08/22/23 2053 08/22/23 2054  Safety Precautions  Continuous,   Status:  Canceled         08/22/23 2053 08/22/23 2054  Inpatient consult to Access Center  Once        Provider:  (Not yet assigned)    08/22/23 2053 08/22/23 2053  LORazepam (ATIVAN) tablet 1 mg  Every 1 Hour PRN,   Status:  Discontinued        See Hyperspace for full Linked Orders Report.    08/22/23 2053 08/22/23 2053  LORazepam (ATIVAN) injection 1 mg  Every 1 Hour PRN,   Status:  Discontinued        See Hyperspace for full Linked Orders Report.    08/22/23 2053 08/22/23 2053  LORazepam (ATIVAN) tablet 2 mg  Every 1 Hour PRN,   Status:  Discontinued        See Hyperspace for full Linked Orders Report.    08/22/23 2053 08/22/23 2053  LORazepam (ATIVAN) injection 2 mg  Every 1 Hour PRN,   Status:  Discontinued        See Hyperspace for full Linked Orders Report.    08/22/23 2053 08/22/23 2053  LORazepam (ATIVAN) injection 2 mg  Every 15 Minutes PRN,   Status:  Discontinued        See Hyperspace for full Linked Orders Report.    08/22/23 2053 08/22/23 2053  LORazepam (ATIVAN) injection 2 mg  Every 15 Minutes PRN,   Status:  Discontinued        See Hyperspace for full Linked Orders Report.    08/22/23 2053 08/22/23 2053  cloNIDine (CATAPRES) tablet 0.1 mg  Every 4 Hours PRN,   Status:  Discontinued         08/22/23 2053 08/22/23 2053  Magnesium Standard Dose Replacement - Follow Nurse / BPA Driven Protocol  As Needed,   Status:  Discontinued         08/22/23 2053 08/22/23 2053   Inpatient Gastroenterology Consult  Once        Specialty:  Gastroenterology  Provider:  Rachell Madden MD    08/22/23 2053 08/22/23 2001  Urine Culture - Urine, Urine, Clean Catch  Once         08/22/23 2000 08/22/23 2000  Vital Signs  Every 4 Hours,   Status:  Canceled      Comments: Per per hospital policy    08/22/23 1727 08/22/23 1945  morphine injection 2 mg  Every 2 Hours PRN,   Status:  Discontinued         08/22/23 1945 08/22/23 1800  Oral Care  2 Times Daily,   Status:  Canceled       08/22/23 1727 08/22/23 1743  HYDROmorphone (DILAUDID) injection 1 mg  Once,   Status:  Discontinued         08/22/23 1727 08/22/23 1743  sodium chloride 0.9 % with KCl 20 mEq/L infusion  Continuous,   Status:  Discontinued         08/22/23 1727 08/22/23 1742  HYDROmorphone (DILAUDID) injection 1 mg  Once,   Status:  Discontinued         08/22/23 1726 08/22/23 1730  HYDROmorphone (DILAUDID) injection 1 mg  Once         08/22/23 1730    08/22/23 1728  Daily Weights  Daily,   Status:  Canceled       08/22/23 1727    08/22/23 1728  Place Sequential Compression Device  Once,   Status:  Canceled         08/22/23 1727    08/22/23 1728  Maintain Sequential Compression Device  Continuous,   Status:  Canceled         08/22/23 1727    08/22/23 1727  Code Status and Medical Interventions:  Continuous,   Status:  Canceled         08/22/23 1727    08/22/23 1727  Diet: Cardiac Diets; Healthy Heart (2-3 Na+); Texture: Regular Texture (IDDSI 7); Fluid Consistency: Thin (IDDSI 0)  Diet Effective Now,   Status:  Canceled         08/22/23 1727    08/22/23 1727  Strict Intake & Output  Every Shift,   Status:  Canceled       08/22/23 1727    08/22/23 1726  acetaminophen (TYLENOL) tablet 650 mg  Every 4 Hours PRN,   Status:  Discontinued         08/22/23 1727    08/22/23 1726  polyethylene glycol (MIRALAX) packet 17 g  Daily PRN,   Status:  Discontinued        See Hyperspace for full Linked Orders Report.    08/22/23  1727    08/22/23 1726  bisacodyl (DULCOLAX) EC tablet 5 mg  Daily PRN,   Status:  Discontinued        See Hyperspace for full Linked Orders Report.    08/22/23 1727    08/22/23 1726  bisacodyl (DULCOLAX) suppository 10 mg  Daily PRN,   Status:  Discontinued        See Hyperspace for full Linked Orders Report.    08/22/23 1727    08/22/23 1726  ondansetron (ZOFRAN) tablet 4 mg  Every 6 Hours PRN,   Status:  Discontinued        See Hyperspace for full Linked Orders Report.    08/22/23 1727    08/22/23 1726  ondansetron (ZOFRAN) injection 4 mg  Every 6 Hours PRN,   Status:  Discontinued        See Hyperspace for full Linked Orders Report.    08/22/23 1727    08/22/23 1726  melatonin tablet 3 mg  Nightly PRN,   Status:  Discontinued         08/22/23 1727    08/22/23 1726  Up with assistance  As Needed,   Status:  Canceled       08/22/23 1727    08/22/23 1726  Inpatient Admission  Once         08/22/23 1725    08/22/23 1711  Surgery (on-call MD unless specified)  Once        Specialty:  General Surgery  Provider:  Alessandra Marte MD    08/22/23 1710    08/22/23 1555  [facility] (on-call MD unless specified) Details  Once,   Status:  Canceled        Specialty:  Hospitalist  Provider:  Kalpana Beasley MD    08/22/23 1554    08/22/23 1544  Procalcitonin  Once         08/22/23 1543    08/22/23 1544  Lactic Acid, Plasma  Once         08/22/23 1543    08/22/23 1544  Blood Culture - Blood, Arm, Right  Once         08/22/23 1543    08/22/23 1544  Blood Culture - Blood, Arm, Left  Once         08/22/23 1543    08/22/23 1544  Urinalysis With Culture If Indicated - Urine, Clean Catch  Once,   Status:  Canceled         08/22/23 1543    08/22/23 1535  iopamidol (ISOVUE-300) 61 % injection 100 mL  Once in Imaging         08/22/23 1519    08/22/23 1532  Urinalysis, Microscopic Only - Urine, Clean Catch  Once         08/22/23 1531    08/22/23 1520  iopamidol (ISOVUE-300) 61 % injection 100 mL  Once in Imaging,   Status:  Discontinued          08/22/23 1504    08/22/23 1501  High Sensitivity Troponin T 2Hr  PROCEDURE ONCE         08/22/23 1432    08/22/23 1458  HYDROmorphone (DILAUDID) injection 1 mg  Once         08/22/23 1442    08/22/23 1439  droperidol (INAPSINE) injection 1.25 mg  Once         08/22/23 1423    08/22/23 1439  diphenhydrAMINE (BENADRYL) injection 25 mg  Once         08/22/23 1423    08/22/23 1346  sodium chloride 0.9 % bolus 1,000 mL  Once         08/22/23 1330    08/22/23 1346  sodium chloride 0.9 % infusion  Continuous,   Status:  Discontinued         08/22/23 1330    08/22/23 1346  HYDROmorphone (DILAUDID) injection 1 mg  Once         08/22/23 1330    08/22/23 1346  ondansetron (ZOFRAN) injection 4 mg  Once         08/22/23 1330    08/22/23 1338  Ethanol  Once         08/22/23 1337    08/22/23 1338  Urine Drug Screen - Urine, Clean Catch  Once         08/22/23 1337    08/22/23 1331  Monitor Blood Pressure  Per Hospital Policy         08/22/23 1330    08/22/23 1331  Cardiac Monitoring  Continuous,   Status:  Canceled        Comments: Follow Standing Orders As Outlined in Process Instructions (Open Order Report to View Full Instructions)    08/22/23 1330    08/22/23 1331  Pulse Oximetry, Continuous  Continuous,   Status:  Canceled         08/22/23 1330    08/22/23 1330  CT Abdomen Pelvis With Contrast  1 Time Imaging        Comments: IV CONTRAST ONLY      08/22/23 1330    08/22/23 1330  High Sensitivity Troponin T  Once         08/22/23 1330    08/22/23 1330  ECG 12 Lead Other; Midepigastric abdominal pain  Once         08/22/23 1330    08/22/23 1306  Protime-INR  Once         08/22/23 1305    08/22/23 1306  Magnesium  Once         08/22/23 1305    08/22/23 1306  Insert Peripheral IV  Once,   Status:  Canceled        See [place]ookie for full Linked Orders Report.    08/22/23 1305    08/22/23 1306  Monitor Blood Pressure  Per Hospital Policy         08/22/23 1305    08/22/23 1306  Cardiac Monitoring  Continuous,   Status:   Canceled        Comments: Follow Standing Orders As Outlined in Process Instructions (Open Order Report to View Full Instructions)    08/22/23 1305    08/22/23 1306  Pulse Oximetry, Continuous  Continuous,   Status:  Canceled         08/22/23 1305    08/22/23 1305  sodium chloride 0.9 % flush 10 mL  As Needed,   Status:  Discontinued        See Hyperspace for full Linked Orders Report.    08/22/23 1305    08/22/23 1301  CBC & Differential  Once         08/22/23 1300    08/22/23 1301  Comprehensive Metabolic Panel  Once         08/22/23 1300    08/22/23 1301  Lipase  Once         08/22/23 1300    08/22/23 1301  Urinalysis With Microscopic If Indicated (No Culture) - Urine, Clean Catch  Once         08/22/23 1300    08/22/23 1301  CBC Auto Differential  PROCEDURE ONCE         08/22/23 1300    08/22/23 1300  NPO Diet NPO Type: Strict NPO  Diet Effective Now,   Status:  Canceled         08/22/23 1300    08/22/23 1300  Undress & Gown  Once,   Status:  Canceled         08/22/23 1300    08/22/23 1300  Insert Peripheral IV  Once,   Status:  Canceled         08/22/23 1300    08/22/23 1300  sodium chloride 0.9 % flush 10 mL  As Needed,   Status:  Discontinued         08/22/23 1300    08/22/23 1300  [place] Draw  Once         08/22/23 1300    08/22/23 1300  Green Top (Gel)  PROCEDURE ONCE         08/22/23 1300    08/22/23 1300  Lavender Top  PROCEDURE ONCE         08/22/23 1300    08/22/23 1300  Gold Top - SST  PROCEDURE ONCE         08/22/23 1300    08/22/23 1300  Light Blue Top  PROCEDURE ONCE         08/22/23 1300    --  Magnesium 400 MG capsule  Status:  Discontinued         08/22/23 1355    --  SCANNED - TELEMETRY           08/22/23 0000    --  SCANNED - TELEMETRY           08/22/23 0000    --  SCANNED - TELEMETRY           08/22/23 0000    --  SCANNED - TELEMETRY           08/22/23 0000    --  SCANNED - TELEMETRY           08/22/23 0000    --  SCANNED - TELEMETRY           08/22/23 0000    --  SCANNED - TELEMETRY            08/22/23 0000    --  SCANNED - TELEMETRY           08/22/23 0000    --  SCANNED - TELEMETRY           08/22/23 0000    --  SCANNED - TELEMETRY           08/22/23 0000             Physician Progress Notes (all)        Evelin Hinton PA-C at 08/24/23 1438          [facility]  Inpatient Progress Note    Reason for Follow-up: Pancreatitis    Subjective     Interval History:   Patient reports she is doing fairly well today, tolerating p.o. intake well.  Still with left lower quadrant pain rating to the flank and left mid back.    8/24 labs show lipase improved to 75, CBC with normal hemoglobin at 12.9, .8, MCH 34.6, CMP unremarkable    8/24 MRI report shows   1. The approximately 9 x 6 cm pseudocyst posterior to the pancreatic tail and body communicates with the multiple smaller collections within the left adrenal gland. The pancreatic duct which drapes the anterior margin of the pseudocyst is irregular in contour and likely has a tiny side branch connection to the dominant pseudocyst.  2. The pancreatic duct is very irregular at the pancreatic body and measures 5 mm at the pancreatic neck and is truncated at the pancreatic head, likely secondary to the multiple stones seen on the previous CT abdomen.   3.  Unremarkable gallbladder and CBD.  Patent portal vein and SMV.    Current Facility-Administered Medications:     acetaminophen (TYLENOL) tablet 650 mg, 650 mg, Oral, Q4H PRN, Kalpana Beasley MD    amitriptyline (ELAVIL) tablet 25 mg, 25 mg, Oral, Nightly, Kalpana Beasley MD, 25 mg at 08/23/23 2116    sennosides-docusate (PERICOLACE) 8.6-50 MG per tablet 2 tablet, 2 tablet, Oral, BID, 2 tablet at 08/24/23 0807 **AND** polyethylene glycol (MIRALAX) packet 17 g, 17 g, Oral, Daily PRN **AND** bisacodyl (DULCOLAX) EC tablet 5 mg, 5 mg, Oral, Daily PRN **AND** bisacodyl (DULCOLAX) suppository 10 mg, 10 mg, Rectal, Daily PRN, Kalpana Beasley MD    cefTRIAXone  (ROCEPHIN) 1,000 mg in sodium chloride 0.9 % 100 mL IVPB-VTB, 1,000 mg, Intravenous, Q24H, Castro Hilliard MD, Last Rate: 200 mL/hr at 23 1415, 1,000 mg at 23 1415    cloNIDine (CATAPRES) tablet 0.1 mg, 0.1 mg, Oral, Q4H PRN, Kalpana Beasley MD    folic acid (FOLVITE) tablet 1 mg, 1 mg, Oral, Daily, StingKalpana hickman MD, 1 mg at 23 0807    iopamidol (ISOVUE-300) 61 % injection 100 mL, 100 mL, Intravenous, Once in imaging, Tomasz Viera MD    LORazepam (ATIVAN) tablet 1 mg, 1 mg, Oral, Q1H PRN, 1 mg at 23 1414 **OR** LORazepam (ATIVAN) injection 1 mg, 1 mg, Intravenous, Q1H PRN, 1 mg at 23 2259 **OR** LORazepam (ATIVAN) tablet 2 mg, 2 mg, Oral, Q1H PRN **OR** LORazepam (ATIVAN) injection 2 mg, 2 mg, Intravenous, Q1H PRN **OR** LORazepam (ATIVAN) injection 2 mg, 2 mg, Intravenous, Q15 Min PRN **OR** LORazepam (ATIVAN) injection 2 mg, 2 mg, Intramuscular, Q15 Min PRN, Kalpana Beasley MD    [] LORazepam (ATIVAN) tablet 2 mg, 2 mg, Oral, Q6H, 2 mg at 23 1502 **FOLLOWED BY** LORazepam (ATIVAN) tablet 1 mg, 1 mg, Oral, Q6H, Kalpana Beasley MD, 1 mg at 23 1016    Magnesium Standard Dose Replacement - Follow Nurse / BPA Driven Protocol, , Does not apply, PRN, Kalpana Beasley MD    melatonin tablet 3 mg, 3 mg, Oral, Nightly PRN, Kalpana Beasley MD    morphine injection 2 mg, 2 mg, Intravenous, Q2H PRN, Kimberly Armstrong, APRN, 2 mg at 23 1416    ondansetron (ZOFRAN) tablet 4 mg, 4 mg, Oral, Q6H PRN **OR** ondansetron (ZOFRAN) injection 4 mg, 4 mg, Intravenous, Q6H PRN, StinglKalpana MD, 4 mg at 23 1850    pantoprazole (PROTONIX) injection 40 mg, 40 mg, Intravenous, Q AM, Castro Hilliard MD, 40 mg at 23 0744    sodium chloride 0.9 % flush 10 mL, 10 mL, Intravenous, PRN, Tomasz Viera MD    [COMPLETED] Insert Peripheral IV, , , Once **AND** sodium chloride 0.9 % flush 10 mL, 10 mL, Intravenous, PRN,  Tomasz Viera MD    sodium chloride 0.9 % with KCl 20 mEq/L infusion, 125 mL/hr, Intravenous, Continuous, Kalpana Beasley MD, Last Rate: 125 mL/hr at 08/24/23 1416, 125 mL/hr at 08/24/23 1416    thiamine (B-1) injection 200 mg, 200 mg, Intravenous, Q8H, 200 mg at 08/24/23 0744 **FOLLOWED BY** [START ON 8/28/2023] thiamine (VITAMIN B-1) tablet 100 mg, 100 mg, Oral, Daily, Kalpana Beasley MD  Review of Systems:    The following systems were reviewed and negative;  respiratory and cardiovascular    Objective     Vital Signs  Temp:  [98.1 øF (36.7 øC)-98.8 øF (37.1 øC)] 98.6 øF (37 øC)  Heart Rate:  [] 86  Resp:  [18] 18  BP: (114-126)/(84-98) 118/93  Body mass index is 19.47 kg/mý.    Intake/Output Summary (Last 24 hours) at 8/24/2023 1438  Last data filed at 8/24/2023 0810  Gross per 24 hour   Intake 490 ml   Output --   Net 490 ml     I/O this shift:  In: 250 [P.O.:250]  Out: -      Physical Exam:    General: patient awake, alert and cooperative   Eyes: Normal lids and lashes, no scleral icterus   Skin: warm and dry, not jaundiced   Pulm: regular and unlabored   Abdomen: soft, left flank TTP with guarding, nondistended; normal bowel sounds   Psychiatric: Normal mood and behavior; memory intact     Results Review:     I reviewed the patient's new clinical results.      Lab Results   Lab Value Date/Time    LIPASE 75 (H) 08/24/2023 0324    LIPASE 87 (H) 08/23/2023 0252    LIPASE 115 (H) 08/22/2023 1301    LIPASE 73 (H) 06/08/2023 0652    LIPASE 151 (H) 06/07/2023 2246    LIPASE 192 (H) 03/25/2023 1700    LIPASE 38 12/05/2022 0526    LIPASE 34 12/04/2022 0542    LIPASE 29 12/03/2022 0515    LIPASE 50 12/02/2022 0719    LIPASE 29 12/01/2022 0519    LIPASE 65 (H) 11/30/2022 1412    LIPASE 157 (H) 11/28/2022 1751    LIPASE 335 (H) 10/20/2022 1939    LIPASE 378 (H) 08/06/2022 2158    LIPASE 95 (H) 01/24/2018 0545    LIPASE 93 (H) 10/01/2017 0356    LIPASE 122 (H) 09/30/2017 0619    LIPASE 86 (H)  2017 1731    LIPASE 133 (H) 2017 0032          Assessment & Plan     Active Hospital Problems    Diagnosis     **Acute on chronic pancreatitis     Alcohol abuse     Acute UTI (urinary tract infection)     Hyperglycemia     Essential hypertension     Migraine     Generalized anxiety disorder     Pancreatic pseudocyst     Nausea and vomiting     Abdominal pain        Assessment:  Extensive peripancreatic fluid collection, enlarging  Chronic pancreatitis  Left flank pain due to #1  H/o alcohol abuse  History of tobacco abuse      Plan:  Tolerating low-fat diet well  Continue to eat small portions given compression of stomach caused by the peripancreatic fluid collections  Continue pancreatic enzymes with meals  Recommend complete cessation of alcohol and tobacco  She is tolerating p.o. intake but does states she is using more of her pain meds at home to combat this increased left flank pain likely caused by extensive and worsening peripancreatic fluid collections.  She needs to follow-up as scheduled for planned outpatient ERCP with stent placement and EUS at [facility] on  with Dr. Denson.  Reviewed with Dr. Barton for discharge from GI standpoint with follow-up care with Dr. Ellis as planned.    I discussed the patients findings and my recommendations with patient.    Dragon dictation used throughout this note.            Evelin Hinton PA-C  [facility]  [address] [place] [address]  Office: [phone]        Electronically signed by Evelin Hinton PA-C at 23 1611       Castro Hilliard MD at 23 1031              Name: Piper ROLLE Payton ADMIT: 2023   : 1980  PCP: Rachell Pozo APRN    MRN: [id number] LOS: 2 days   AGE/SEX: 42 y.o. female  ROOM: [facility]     Subjective   Subjective   Patient continues with middle and upper abdominal pain radiating to the left and back (improved since admission)..  No  nausea or vomiting no bowel movement since admission..  No fever or chills.  Positive mild anxiety and tremor.    Review of Systems  .  Improved frequency and urgency but no dysuria or hematuria.  Cardiovascular/respiratory.  No chest pain/no palpitations/no shortness of breath/no cough  CNS/psychiatry.  Minimal anxiety.  No tremor.  No headache.  No seizures.  No focal neurological symptoms.    Objective   Objective   Vital Signs  Temp:  [98.1 øF (36.7 øC)-99 øF (37.2 øC)] 98.4 øF (36.9 øC)  Heart Rate:  [] 70  Resp:  [16-18] 18  BP: (114-132)/(84-99) 126/90  SpO2:  [98 %-100 %] 100 %  on   ;   Device (Oxygen Therapy): room air    Intake/Output Summary (Last 24 hours) at 8/24/2023 1032  Last data filed at 8/24/2023 0810  Gross per 24 hour   Intake 490 ml   Output --   Net 490 ml       Body mass index is 19.47 kg/mý.      08/22/23  1954 08/23/23  0533 08/24/23  0655   Weight: 61.4 kg (135 lb 4.8 oz) 59 kg (130 lb) 59.8 kg (131 lb 14.4 oz)     Physical Exam  General.  Alert and oriented x3.  In no apparent pain/distress/diaphoresis.  Normal mood and affect.  Eyes.  Pupils equal round and reactive.  Intact extraocular musculature.  No pallor or jaundice.  Oral cavity.  Moist mucous membrane.  Neck.  Supple. No lymphadenopathy or thyromegaly.  Cardiovascular.  Regular rate and rhythm with no gallops or murmurs.  Chest.  Care to auscultation bilaterally with no added sounds.  Abdomen.  No localized tenderness.  No guarding.  No distention.  Normal bowel sounds.  No organomegaly.  Extremities.  No clubbing/cyanosis/edema.    Results Review:                I reviewed the patient's new clinical results / labs / tests / procedures      Assessment/Plan     Active Hospital Problems    Diagnosis  POA    **Acute on chronic pancreatitis [K85.90, K86.1]  Yes    Alcohol abuse [F10.10]  Yes    Acute UTI (urinary tract infection) [N39.0]  Yes    Hyperglycemia [R73.9]  Yes    Essential hypertension [I10]  Yes    Migraine  [G43.909]  Yes    Generalized anxiety disorder [F41.1]  Yes    Pancreatic pseudocyst [K86.3]  Yes    Nausea and vomiting [R11.2]  Yes    Abdominal pain [R10.9]  Yes      Resolved Hospital Problems   No resolved problems to display.           Acute on chronic alcoholic pancreatitis complicated by pancreatic pseudocyst in a patient with a history of chronic alcoholic pancreatitis/GERD.  Improving clinically.  Resolved nausea and vomiting.  Will advance diet.  MRI of the abdomen noted with chronic pancreatitis and pancreatic stones and a large pseudocyst.  CT scan of the abdomen and pelvis revealed changes of chronic pancreatitis and increased peripancreatic fluid with phlegmon formation.  I doubt that there is an infection in the pancreatic cyst (no fever and normal leukocytes)..  Surgery saw the patient and recommended to proceed with outpatient drainage as scheduled.  Continue IV fluid and IV Protonix.  Lipase improved.  Alcohol abuse/withdrawal.  Improved withdrawal symptoms on detoxification and CIWA protocol.  Access center saw the patient and she has resources.  Migraine/anxiety.  Stable.  Negative CNS examination.  Continue Elavil.  E. coli UTI in a patient with a history of nephrolithiasis.  CT scan of the abdomen pelvis reveals no obstruction.  Currently on Rocephin.  Improving symptomatology.    Hypertension.  Initially elevated.  Not on any home medication.  Now blood pressure under good control.  No evidence of angina or congestive heart failure.  Will monitor.  Hyperglycemia.  A1c is normal.  History of anemia of chronic disease and thrombocytopenia/macrocytosis.  Normal hemoglobin and platelets.  Normal B12/folate/TSH/cortisol.  VTE prophylaxis.  Sequential compression devices.    Discussed my findings and plan of treatment with the patient.  Disposition.  Anticipate discharge home tomorrow if okay with GI.      Castro Hilliard MD  [facility]  08/24/23  10:32 EDT        Electronically signed by Castro Hilliard MD at 23 1038       Castro Hilliard MD at 23 1048              Name: Piper Cain ADMIT: 2023   : 1980  PCP: Rachell Pozo APRN    MRN: [id number] LOS: 1 days   AGE/SEX: 42 y.o. female  [facility]: [facility]     Subjective   Subjective   Patient continues with middle and upper abdominal pain.  No nausea or vomiting today.  Tolerating clear liquids well.  There are no bowel movement.  No fever or chills.    Review of Systems  .  Positive frequency and urgency but no dysuria or hematuria.  Cardiovascular/respiratory.  No chest pain/no palpitations/no shortness of breath/no cough  CNS/psychiatry.  Minimal anxiety.  No tremor.  No headache.  No seizures.  No focal neurological symptoms.    Objective   Objective   Vital Signs  Temp:  [97.7 øF (36.5 øC)-99.7 øF (37.6 øC)] 97.7 øF (36.5 øC)  Heart Rate:  [] 98  Resp:  [18-20] 18  BP: (131-172)/() 136/95  SpO2:  [93 %-100 %] 97 %  on   ;   Device (Oxygen Therapy): room air    Intake/Output Summary (Last 24 hours) at 2023 1051  Last data filed at 2023 0815  Gross per 24 hour   Intake 1240 ml   Output --   Net 1240 ml     Body mass index is 19.2 kg/mý.      23  1254 23  1954 23  0533   Weight: 58.1 kg (128 lb) 61.4 kg (135 lb 4.8 oz) 59 kg (130 lb)     Physical Exam  General.  Alert and oriented x3.  In no apparent pain/distress/diaphoresis.  Normal mood and affect.  Eyes.  Pupils equal round and reactive.  Intact extraocular musculature.  No pallor or jaundice.  Oral cavity.  Moist mucous membrane.  Neck.  Supple. No lymphadenopathy or thyromegaly.  Cardiovascular.  Regular rate and rhythm with no gallops or murmurs.  Chest.  Care to auscultation bilaterally with no added sounds.  Abdomen.  Upper and middle abdominal mild tenderness.  No guarding.  No distention.  Normal bowel sounds.  No organomegaly.  Extremities.  No clubbing/cyanosis/edema.    Results  Review:              I reviewed the patient's new clinical results / labs / tests / procedures      Assessment/Plan     Active Hospital Problems    Diagnosis  POA    **Acute on chronic pancreatitis [K85.90, K86.1]  Yes    Alcohol abuse [F10.10]  Yes    Acute UTI (urinary tract infection) [N39.0]  Yes    Hyperglycemia [R73.9]  Yes    Essential hypertension [I10]  Yes    Migraine [G43.909]  Yes    Generalized anxiety disorder [F41.1]  Yes    Pancreatic pseudocyst [K86.3]  Yes    Nausea and vomiting [R11.2]  Yes    Abdominal pain [R10.9]  Yes      Resolved Hospital Problems   No resolved problems to display.           Acute on chronic alcoholic pancreatitis complicated by pancreatic pseudocyst in a patient with a history of chronic alcoholic pancreatitis/GERD.  Improving.  Nausea and vomiting.  Currently tolerating clear liquids and GI advancing the diet and ordering MRI of the abdomen to better evaluate intra-abdominal pancreatic fluid.  CT scan of the abdomen and pelvis revealed changes of chronic pancreatitis and increased peripancreatic fluid with phlegmon formation.  I doubt that there is an infection in the pancreatic cyst (no fever and normal leukocytes)..  Awaiting surgical consultation for possible drainage.  We will continue IV fluid/initiate IV Protonix.  Alcohol abuse/withdrawal.  Improved withdrawal symptoms on detoxification and CIWA protocol.  We will ask access center to evaluate.  Migraine/anxiety.  Stable.  Negative CNS examination.  Continue Elavil.  UTI in a patient with a history of nephrolithiasis.  CT scan of the abdomen pelvis reveals no obstruction.  Change from Zosyn to Rocephin.  Hypertension.  Initially elevated.  Not on any home medication.  Now blood pressure under good control.  No evidence of angina or congestive heart failure.  Will monitor.  Hyperglycemia.  Check A1c.  History of anemia of chronic disease and thrombocytopenia/macrocytosis.  Normal hemoglobin and platelets.  Will check  "B12/folate/TSH/cortisol.  VTE prophylaxis.  Sequential compression devices.    Discussed my findings and plan of treatment with the patient.  Disposition.  To be determined based on clinical course.      Castro Hilliard MD  [facility]  08/23/23  10:51 EDT        Electronically signed by Castro Hilliard MD at 08/23/23 1100       Consult Notes (all)        Evelin Ohara LPCC at 08/23/23 0931        Consult Orders    1. Inpatient consult to [facility] [[id number]] ordered by Kalpana Beasley MD at 08/22/23 2053                 [facility] attempted consult, spoke w/ RN and reviewed chart. Pt PERCY 7 @ 46. RN reports pt is pleasant. Pt A&Ox4. Pt reports has seen [facility] multiple times in the past and has \"all the resources I could possible get at this point, it's up to me from now on\". Pt reports not wanting to complete another consult and does not want to be followed by [facility] at this time. Pt aware of how to request [facility] to return if needed. Gave report to RN.   [facility] will sign off at this time, please re-consult if needed.       Electronically signed by Evelin Ohara LPCC at 08/23/23 0933       Rachell Madden MD at 08/23/23 0908        Consult Orders    1. Inpatient Gastroenterology Consult [[id number]] ordered by Kalpana Beasley MD at 08/22/23 2053                 [facility]  Initial Inpatient Consult Note    Referring Provider: Dr. Beasley    Reason for Consultation: Pancreatitis    Subjective     History of present illness:    42 y.o. female, known to our service from previous admissions, that we are asked to see for pancreatitis.    Patient reports increasing bouts of nausea with vomiting.  This often happens at night but sometimes happens in the morning.  She reports worsening heartburn despite taking daily medication.  No weight loss.  No change in her bowel movements or diarrhea.  Abdominal pain is stable.  She did have some " left shoulder pain which is often been an early sign of a pancreatitis flare in the past but she does not feel the typical symptoms of a flare.  She is scheduled for outpatient ERCP with stent placement as well as EUS at [place] on 9/6.    Patient has a history of chronic pancreatitis, alcohol abuse, as well as tobacco abuse.  She is followed by gastroenterology of [facility].  Patient reports her last drink was 3 weeks ago when she continues to smoke although this makes her nauseated and has induced vomiting.    CT of the abdomen and pelvis with IV contrast, reviewed by me shows significant increase in the peripancreatic fluid collection posterior to the pancreatic tail.  Chronic pancreatitis noted involving the pancreatic head and neck predominantly.  Normal gallbladder.  Lipase this admission is normal.  Normal white count.  Normal BUN and creatinine.    She had a previous normal HIDA scan in June 2023.    Current Meds:   amitriptyline, 25 mg, Oral, Nightly  folic acid, 1 mg, Oral, Daily  iopamidol, 100 mL, Intravenous, Once in imaging  LORazepam, 2 mg, Oral, Q6H   Followed by  LORazepam, 1 mg, Oral, Q6H  piperacillin-tazobactam, 3.375 g, Intravenous, Q8H  senna-docusate sodium, 2 tablet, Oral, BID  thiamine (B-1) IV, 200 mg, Intravenous, Q8H   Followed by  [START ON 8/28/2023] thiamine, 100 mg, Oral, Daily      Allergies:  Allergies   Allergen Reactions    Nickel      Review of Systems  Pertinent items are noted in HPI, all other systems reviewed and negative     Objective     Vital Signs  Temp:  [97.7 øF (36.5 øC)-99.7 øF (37.6 øC)] 97.7 øF (36.5 øC)  Heart Rate:  [] 98  Resp:  [18-20] 18  BP: (131-172)/() 136/95  Physical Exam:  General Appearance:    Alert, cooperative, in no acute distress   Head:    Normocephalic, without obvious abnormality, atraumatic   Eyes:          conjunctivae and sclerae normal, no   icterus   Throat:   no thrush, oral mucosa moist   Neck:   Supple, no adenopathy    Lungs:     Clear to auscultation bilaterally    Heart:    Regular rhythm and normal rate    Chest Wall:    No abnormalities observed   Abdomen:     Soft, nondistended, mildly tender in the midepigastrium without rebound or guarding   Extremities:   no edema, no redness   Skin:   No bruising or rash   Psychiatric:  normal mood and insight     Results Review:   I reviewed the patient's new clinical results.        Lab Results   Lab Value Date/Time    LIPASE 87 (H) 08/23/2023 0252    LIPASE 115 (H) 08/22/2023 1301    LIPASE 73 (H) 06/08/2023 0652    LIPASE 151 (H) 06/07/2023 2246    LIPASE 192 (H) 03/25/2023 1700    LIPASE 38 12/05/2022 0526    LIPASE 34 12/04/2022 0542    LIPASE 29 12/03/2022 0515    LIPASE 50 12/02/2022 0719    LIPASE 29 12/01/2022 0519    LIPASE 65 (H) 11/30/2022 1412    LIPASE 157 (H) 11/28/2022 1751    LIPASE 335 (H) 10/20/2022 1939    LIPASE 378 (H) 08/06/2022 2158    LIPASE 95 (H) 01/24/2018 0545    LIPASE 93 (H) 10/01/2017 0356    LIPASE 122 (H) 09/30/2017 0619    LIPASE 86 (H) 09/29/2017 1731    LIPASE 133 (H) 09/28/2017 0032       Radiology:  CT Abdomen Pelvis With Contrast   Preliminary Result   1. Marked increase in size of the 8.6 x 6.2 cm peripancreatic fluid   collection, development of phlegmon along the left posterior medial   diaphragmatic wilmer with phlegmon extending to the posterior wall of the   GE junction and gastric fundus. Much smaller fluid collections within   the left adrenal gland are larger and there is thicker phlegmon along   the left paracolic gutter posterior to the spleen.   2. There is no biliary dilatation and the gallbladder appears   unremarkable. There are no new vascular complications of pancreatitis.            Assessment & Plan   Active Hospital Problems    Diagnosis     **Abdominal pain        Assessment:  Peripancreatic fluid collection  Chronic pancreatitis  H/o alcohol abuse  History of tobacco abuse    Plan:  Recommend MRI of the pancreas for further  evaluation of the pancreatic fluid collection.  Clear liquids-she is not having a lot of abdominal pain.  Can advance to low-fat diet as tolerated.  Recommend that she eat small portions given some compression of the stomach caused by the pancreatic fluid collection which is likely contributing to symptoms.  Resume pancreatic enzymes with initiation of diet  Counseled patient regarding alcohol and tobacco cessation  Ultimately needs to f/u for planned OP ERCP with stent placement and EUS at [facility] on 9/6      I discussed the patients findings and my recommendations with patient and family.    Rachell Madden MD    Electronically signed by Rachell Madden MD at 08/23/23 0979       Alessandra Marte MD at 08/23/23 0812        Consult Orders    1. Surgery (on-call MD unless specified) [[id number]] ordered by Tomasz Viera MD at 08/22/23 1718                 General Surgery Consultation    Consulting Physician: Alessandra Marte MD    Reason for consultation: Pancreatitis, pancreatic pseudocyst    CC: abdominal pain, nocturnal vomitin    HPI:   The patient is a very pleasant 42 y.o. female who presented to the hospital with ongoing abdominal pain and vomiting. She reports the pain is in her epigastrium and left upper abdomen. It feels sharp and comes and goes. She also reports feeling nauseated and vomiting at night. Nausea was improved in the mornings. No fevers or chills.     She reports seeing Piper Denny NP, with gastroenterology [facility] in [facility].  She also reports having appointment for an endoscopic ultrasound and cyst gastrostomy with Dr. Denson on September 6th.       Allergies:   Allergies   Allergen Reactions    Nickel        Review of Systems:  Constitutional: denies any weight changes, fatigue, or weakness  Eyes: denies blurred/double vision or scleral icterus  Cardiovascular: denies chest pain, palpitations, or edemas  Respiratory: denies cough, sputum, or  dyspnea  Gastrointestinal: Reports abdominal pain, nausea, vomiting  Genitourinary: denies dysuria or hematuria  Endocrine: denies cold intolerance, lethargy, or flushing  Hematologic: denies excessive bruising or bleeding  Musculoskeletal: denies weakness, joint swelling, joint pain, or stiffness  Neurologic: denies seizures, CVA, paresthesia, or peripheral neuropathy  Skin: denies change in nevi, rashes, masses, or jaundice     All other systems reviewed and were negative.    Physical Exam:   Vitals:    08/22/23 2355   BP: 144/96   Pulse: 96   Resp: 20   Temp: 99.7 øF (37.6 øC)   SpO2: 96%     Height: 69 inches  Weight: 59 kg  BMI: 19  GENERAL: awake and alert, no acute distress, oriented to person, place, and time  HEENT: normocephalic, atraumatic, no scleral icterus, moist mucous membranes  NECK: Supple, there is no thyromegaly or lymphadenopathy  RESPIRATORY: clear to auscultation, no wheezes, rales or rhonchi, symmetric air entry  CARDIOVASCULAR: regular rate and rhythm    GASTROINTESTINAL: Soft, nondistended, tender to palpation in the left upper quadrant without rebound or guarding  MUSCULOSKELETAL: no cyanosis, clubbing, or edema   NEUROLOGIC: alert and oriented, normal speech, cranial nerves 2-12 grossly intact, no focal deficits   SKIN: Moist, warm, no rashes, no jaundice    Diagnostic work-up:     Imaging:  I personally reviewed the CT abdomen and pelvis images.  Again demonstrated is the pseudocyst extending from the pancreatic tail.  It has increased in size from the last images done in June, but it still does not have any signs of infection or necrosis.    Assessment and plan:   The patient is a 42 y.o. female with chronic pancreatitis and large pancreatic pseudocyst.  Currently, she does not have any signs of infection of the pseudocyst or pancreatic necrosis.  No antibiotics warranted.  Due to the size of the pseudocyst and her symptoms, she needs a drainage procedure, which she already has  scheduled with Dr. Denson in 2 weeks. She is okay for discharge from general surgery standpoint.        Alessandra Marte MD  General, Robotic, and Endoscopic Surgery  [facility]     [address], [address]  [place], [place], [address]  P: [phone]  F: [phone]      Electronically signed by Alessandra Marte MD at 23 1447          Discharge Summary        Castro Hilliard MD at 23 0809              Patient Name: Piper Cain  : 1980  MRN: [id number]    Date of Admission: 2023  Date of Discharge:  2023  Primary Care Physician: Rachell Pozo APRN      Discharge Diagnoses     Active Hospital Problems    Diagnosis  POA    **Acute on chronic pancreatitis [K85.90, K86.1]  Yes    Alcohol abuse [F10.10]  Yes    Acute UTI (urinary tract infection) [N39.0]  Yes    Essential hypertension [I10]  Yes    Migraine [G43.909]  Yes    Generalized anxiety disorder [F41.1]  Yes    Pancreatic pseudocyst [K86.3]  Yes    Abdominal pain [R10.9]  Yes      Resolved Hospital Problems    Diagnosis Date Resolved POA    Hyperglycemia [R73.9] 2023 Yes    Nausea and vomiting [R11.2] 2023 Yes        Hospital Course     Brief admission history and physical.  Please refer to the H&P for full details.  A pleasant 42 years old white female with a past history of alcohol abuse/GERD/hiatal hernia/chronic pancreatitis/pancreatic pseudocyst/migraine/anxiety who presented to the emergency department with central upper abdominal pain for the last 4 days associated with nausea and vomiting.  Her physical examination remarkable for a temperature of 99.3 a pulse of 74 respirate rate of 20 and blood pressure 150 Over 90.  Rest of the examination is remarkable for upper abdominal tenderness  Hospital course.  Initial ER evaluation included ethanol level that was negative.  Procalcitonin was negative.  High-sensitivity troponin was negative.  Magnesium was normal.  INR was normal.  CBC was  normal except white count of 11.5 and .7.  Lipase elevated at 115.  CMP normal except Ehlinger blood sugar of 122 and alkaline phosphatase of 140.  UA suggestive of UTI.  Urine tox screen was positive for oxycodone.  Lactate was negative.  Repeat troponin was negative.  CT abdomen and pelvis with IV contrast revealed marked increase of the peripancreatic fluid collection and development of phlegmon of the pancreatic cyst.  Patient was admitted as an acute on chronic alcoholic pancreatitis complicated by pancreatic pseudocyst in a patient with a history of chronic alcoholic pancreatitis and GERD.  She was placed n.p.o.  Antiemetic and IV Protonix were administered.  MRI of the abdomen and pelvis was done and revealed chronic pancreatitis/pancreatic stones and a large pseudocyst.  There was no obvious complication of the pseudocyst with infection.  Surgical and GI consult was obtained.  Surgery recommended continuation of the follow-up with the patient's surgeon to drain the pancreatic cyst as scheduled.  Patient pain control was achieved with with IV narcotics.  Her diet was slowly advanced and tolerated the diet advancement well.Upper abdominal pain has improved but not completely resolved.  There was no nausea or vomiting at the time of discharge.  Her GI examination remained benign.  She did not have much of an alcohol withdrawal during this admission and she was placed on [place] protocol and detoxification procedure.  Access has seen the patient and she has already had counseling and she has resources for help as an outpatient.  For her migraine and anxiety this remained stable on [place] with negative CNS examination.  She did have a urgency of urination and UA was suggestive of UTI urine culture revealed E. coli she was initially placed on IV Rocephin and at discharge she was placed on p.o. amoxicillin to complete a total of 5 days of antibiotic.  She is to be maintained on her Creon and proton pump  inhibitors.  He has a history of hypertension.  Blood pressure was initially elevated but has normalized spontaneously   There was no evidence of angina or congestive heart failure.  No medications were instituted.  She was noted to have hyperglycemia that has normalized with a normal A1c.  She has a history of anemia of chronic disease and thrombocytopenia and macrocytosis.  Hemoglobin and platelets remained stable with no bleeding diathesis.  B12/folate/TSH/cortisol were normal.  This is mostly alcohol induced.  At the time of discharge patient was hemodynamically stable.  Both surgery and GI released the patient to discharge.    Consultants     Consult Orders (all) (From admission, onward)       Start     Ordered    08/23/23 1048  Inpatient [facility] Consult  Once        Provider:  (Not yet assigned)    08/23/23 1048 08/22/23 2054  Inpatient consult to [facility]  Once        Provider:  (Not yet assigned)    08/22/23 2053 08/22/23 2053  Inpatient Gastroenterology Consult  Once        Specialty:  Gastroenterology  Provider:  Rachell Madden MD    08/22/23 2053 08/22/23 1711  Surgery (on-call MD unless specified)  Once        Specialty:  General Surgery  Provider:  Alessandra Marte MD    08/22/23 1710 08/22/23 1555  [facility] (on-call MD unless specified) Details  Once        Specialty:  Hospitalist  Provider:  Kalpana Beasley MD    08/22/23 1554                  Procedures     Imaging Results (All)       Procedure Component Value Units Date/Time    MRI Abdomen With & Without Contrast [[id number]] Collected: 08/24/23 1008     Updated: 08/24/23 1008    Narrative:      MRI ABDOMEN W WO CONTRAST-MRCP     HISTORY: 42-year-old female with history of chronic pancreatitis.  Enlarging pseudocysts.     TECHNIQUE: Routine MRCP was performed without and with IV contrast.  Compared with CT abdomen 08/22/2023.     FINDINGS:  1. The approximately 9 x 6 cm pseudocyst posterior to the pancreatic  tail and  body communicates with the multiple smaller collections within  the left adrenal gland. The pancreatic duct which drapes the anterior  margin of the pseudocyst is irregular in contour and likely has a tiny  side branch connection to the dominant pseudocyst.  2. The pancreatic duct is very irregular at the pancreatic body and  measures 5 mm at the pancreatic neck and is truncated at the pancreatic  head, likely secondary to the multiple stones seen on the previous CT  abdomen.  3. The gallbladder appears unremarkable and there is no biliary  dilatation. The common bile duct measures up to 5 mm and there is no  abnormal signal intensity within the biliary tree. No evidence for  choledocholithiasis.  4. The portal vein and SMV are patent. The splenic vein is diminutive  but is patent and there are also collateral veins at the splenic hilum.  5. There is no significant fatty infiltration of the liver.       CT Abdomen Pelvis With Contrast [[id number]] Collected: 08/22/23 1629     Updated: 08/23/23 1345    Narrative:      CT ABDOMEN AND PELVIS WITH IV CONTRAST     HISTORY: 42-year-old female with epigastric pain. Pancreatitis history.     TECHNIQUE: Radiation dose reduction techniques were utilized, including  automated exposure control and exposure modulation based on body size.   3 mm images were obtained through the abdomen and pelvis after the  administration of IV contrast. Compared with previous CT 06/08/2023.     FINDINGS: There has been significant interval increase in the  peripancreatic fluid collection posterior to the pancreatic tail  measuring approximately 8.6 x 6.2 cm, previously 4.5 x 3.3 cm. The much  smaller fluid collections within the left adrenal gland are larger as  well. There is also slight increase in the rind of complex fluid along  the left paracolic gutter posterior to the spleen and lateral to the  dominant peripancreatic collection. There is also significantly more  prominent thickening of  the posteromedial left diaphragmatic wilmer with  the thickening extending anteriorly to the midline, and there are now  tiny fluid collections within the left diaphragmatic phlegmon measuring  1.6 cm transversely. There is no change in the appearance of the  pancreas which has changes of chronic pancreatitis predominantly at the  pancreatic head and neck and the degree of pancreatic ductal dilatation  proximal to the stones is unchanged, 5 mm. There is no biliary  dilatation and the gallbladder appears unremarkable. The splenic vein is  diminutive. The portal vein and SMV are patent. The phlegmon along the  left posterior medial diaphragmatic wilmer abuts and thickens the  posterior wall of the GE junction and gastric fundus. There is no acute  bowel abnormality. Uterus and adnexa appear unremarkable. There are no  pleural effusions or pneumonia at the visualized lower lobes.       Impression:      1. Marked increase in size of the 8.6 x 6.2 cm peripancreatic fluid  collection, development of phlegmon along the left posterior medial  diaphragmatic wilmer with phlegmon extending to the posterior wall of the  GE junction and gastric fundus. Much smaller fluid collections within  the left adrenal gland are larger and there is thicker phlegmon along  the left paracolic gutter posterior to the spleen.  2. There is no biliary dilatation and the gallbladder appears  unremarkable. There are no new vascular complications of pancreatitis.     This report was finalized on 8/23/2023 1:42 PM by Dr. Eusebia Barnes M.D.               Pertinent Labs     Results from last 7 days   Lab Units 08/25/23  0425 08/24/23  0324 08/23/23  0252 08/22/23  1301   WBC 10*3/mm3 4.42 5.34 8.14 11.55*   HEMOGLOBIN g/dL 11.3* 12.9 14.0 14.6   PLATELETS 10*3/mm3 127* 143 147 194     Results from last 7 days   Lab Units 08/25/23  0425 08/24/23  0324 08/23/23  0252 08/22/23  1301   SODIUM mmol/L 141 138 136 138   POTASSIUM mmol/L 3.9 3.7 3.5 3.6   CHLORIDE  mmol/L 107 104 101 99   CO2 mmol/L 23.3 22.9 24.0 25.0   BUN mg/dL 5* 4* 4* 6   CREATININE mg/dL 0.48* 0.55* 0.51* 0.64   GLUCOSE mg/dL 97 96 103* 122*   Estimated Creatinine Clearance: 147.6 mL/min (A) (by C-G formula based on SCr of 0.48 mg/dL (L)).  Results from last 7 days   Lab Units 08/25/23  0425 08/24/23  0324 08/22/23  1301   ALBUMIN g/dL 3.1* 3.6 4.3   BILIRUBIN mg/dL 0.2 0.5 0.5   ALK PHOS U/L 71 95 140*   AST (SGOT) U/L 6 8 17   ALT (SGPT) U/L 10 15 26     Results from last 7 days   Lab Units 08/25/23  0425 08/24/23  0324 08/23/23  0252 08/22/23  1301   CALCIUM mg/dL 8.9 9.2 9.0 9.7   ALBUMIN g/dL 3.1* 3.6  --  4.3   MAGNESIUM mg/dL  --   --   --  2.1     Results from last 7 days   Lab Units 08/25/23  0425 08/24/23  0324 08/23/23  0252 08/22/23  1301   LIPASE U/L 64* 75* 87* 115*     Results from last 7 days   Lab Units 08/22/23  1619 08/22/23  1301   HSTROP T ng/L <6 <6           Invalid input(s): LDLCALC  Results from last 7 days   Lab Units 08/22/23  1650 08/22/23  1619 08/22/23  1516   BLOODCX  No growth at 2 days No growth at 2 days  --    URINECX   --   --  >100,000 CFU/mL Escherichia coli*     Imaging Results (Last 24 Hours)       Procedure Component Value Units Date/Time    MRI Abdomen With & Without Contrast [[id number]] Collected: 08/24/23 1008     Updated: 08/24/23 1008    Narrative:      MRI ABDOMEN W WO CONTRAST-MRCP     HISTORY: 42-year-old female with history of chronic pancreatitis.  Enlarging pseudocysts.     TECHNIQUE: Routine MRCP was performed without and with IV contrast.  Compared with CT abdomen 08/22/2023.     FINDINGS:  1. The approximately 9 x 6 cm pseudocyst posterior to the pancreatic  tail and body communicates with the multiple smaller collections within  the left adrenal gland. The pancreatic duct which drapes the anterior  margin of the pseudocyst is irregular in contour and likely has a tiny  side branch connection to the dominant pseudocyst.  2. The pancreatic duct is  very irregular at the pancreatic body and  measures 5 mm at the pancreatic neck and is truncated at the pancreatic  head, likely secondary to the multiple stones seen on the previous CT  abdomen.  3. The gallbladder appears unremarkable and there is no biliary  dilatation. The common bile duct measures up to 5 mm and there is no  abnormal signal intensity within the biliary tree. No evidence for  choledocholithiasis.  4. The portal vein and SMV are patent. The splenic vein is diminutive  but is patent and there are also collateral veins at the splenic hilum.  5. There is no significant fatty infiltration of the liver.               Test Results Pending at Discharge     Pending Labs       Order Current Status    Blood Culture - Blood, Arm, Left Preliminary result    Blood Culture - Blood, Arm, Right Preliminary result              Discharge Exam   Physical Exam  Vitals.  Temperature 97.7 a pulse of 81 respiratory rate of 16 blood pressure 124/97 and an O2 sats of 100% room air.  General.  Alert and oriented x3.  In no apparent pain/distress/diaphoresis.  Normal mood and affect.  Eyes.  Pupils equal round and reactive.  Intact extraocular musculature.  No pallor or jaundice.  Oral cavity.  Moist mucous membrane.  Neck.  Supple. No lymphadenopathy or thyromegaly.  Cardiovascular.  Regular rate and rhythm with no gallops or murmurs.  Chest.  Care to auscultation bilaterally with no added sounds.  Abdomen.  No lenderness.  No guarding.  No distention.  Normal bowel sounds.  No organomegaly.  Extremities.  No clubbing/cyanosis/edema.  Discharge Details        Discharge Medications        New Medications        Instructions Start Date   amoxicillin 500 MG capsule  Commonly known as: AMOXIL   1,000 mg, Oral, 2 Times Daily      thiamine 100 MG tablet  Commonly known as: VITAMIN B1   100 mg, Oral, Daily   Start Date: August 28, 2023            Continue These Medications        Instructions Start Date   amitriptyline 10 MG  tablet  Commonly known as: ELAVIL   25 mg, Oral, Nightly      Feosol 200 (65 Fe) MG tablet tablet  Generic drug: Ferrous Sulfate Dried   200 mg, Oral, Daily      folic acid 1 MG tablet  Commonly known as: FOLVITE   1,000 mcg, Oral, Daily      ondansetron 4 MG tablet  Commonly known as: ZOFRAN   4 mg, Oral, Every 8 Hours PRN      oxyCODONE-acetaminophen  MG per tablet  Commonly known as: PERCOCET   1 tablet, Oral, Every 4 Hours PRN      pancrelipase (Lip-Prot-Amyl) 79537-84996 units capsule delayed-release particles capsule  Commonly known as: CREON   72,000 units of lipase, Oral, 3 Times Daily With Meals      pantoprazole 40 MG EC tablet  Commonly known as: PROTONIX   40 mg, Oral, Daily      VITAMIN B 12 PO   Oral               Allergies   Allergen Reactions    Nickel          Discharge Disposition:  Condition: Stable    Diet:   Diet Order   Procedures    Diet: Gastrointestinal Diets; Fat-Restricted; Texture: Regular Texture (IDDSI 7); Fluid Consistency: Thin (IDDSI 0)       Activity:   Activity Instructions       Activity as Tolerated              Counseling : Abstain from alcohol    CODE STATUS:    Code Status and Medical Interventions:   Ordered at: 08/22/23 1727     Code Status (Patient has no pulse and is not breathing):    CPR (Attempt to Resuscitate)     Medical Interventions (Patient has pulse or is breathing):    Full       No future appointments.  Additional Instructions for the Follow-ups that You Need to Schedule       Call MD With Problems / Concerns   As directed      Instructions: Call MD or return to ER if increasing abdominal pain/nausea or vomiting/bowel habit changes/chest pain/shortness of breath    Order Comments: Instructions: Call MD or return to ER if increasing abdominal pain/nausea or vomiting/bowel habit changes/chest pain/shortness of breath         Discharge Follow-up with PCP   As directed       Currently Documented PCP:    Rachell Pozo APRN    PCP Phone Number:     [phone]     Follow Up Details: Primary MD.  1 week.  Acute on chronic pancreatitis/pancreatic Pseudocyst/alcoholism/migraine/anxiety/UTI/hypertension/chronic disease anemia/thrombocytopenia        Discharge Follow-up with Specified Provider: GI.  As scheduled.   As directed      To: GI.  As scheduled.   Follow Up Details: Acute on chronic pancreatitis/pancreatic pseudocyst        Discharge Follow-up with Specified Provider: Surgery.  As scheduled.   As directed      To: Surgery.  As scheduled.   Follow Up Details: Pancreatic pseudocyst drainage               Follow-up Information       Rachell Pozo, APRN .    Specialty: Family Medicine  Why: Primary MD.  1 week.  Acute on chronic pancreatitis/pancreatic Pseudocyst/alcoholism/migraine/anxiety/UTI/hypertension/chronic disease anemia/thrombocytopenia  Contact information:  [id number] [id number]  [phone]                               Time Spent on Discharge:  Greater than 30 minutes      Robb Hilliard MD  [facility]  08/25/23  08:09 EDT     Electronically signed by Robb Hilliard MD at 08/25/23 0821       Discharge Order (From admission, onward)       Start     Ordered    08/25/23 0805  Discharge patient  Once        Expected Discharge Date: 08/25/23   Expected Discharge Time: Morning   Discharge Disposition: Home or Self Care   Physician of Record for Attribution - Please select from Treatment Team: RBOB HILLIARD [[id number]]   Review needed by CMO to determine Physician of Record: No      Question Answer Comment   Physician of Record for Attribution - Please select from Treatment Team ROBB HILLIARD    Review needed by CMO to determine Physician of Record No        08/25/23 0809

## 2023-08-31 ENCOUNTER — READMISSION MANAGEMENT (OUTPATIENT)
Dept: CALL CENTER | Facility: HOSPITAL | Age: 43
End: 2023-08-31
Payer: MEDICAID

## 2023-08-31 NOTE — OUTREACH NOTE
Medical Week 1 Survey      Flowsheet Row Responses   [facility] patient discharged from? [place]   Does the patient have one of the following disease processes/diagnoses(primary or secondary)? Other   Week 1 attempt successful? No   Unsuccessful attempts Attempt 2  [UTR both contact numbers]            Agueda TRIMBLE - Registered Nurse

## 2023-09-05 ENCOUNTER — HOSPITAL ENCOUNTER (INPATIENT)
Facility: HOSPITAL | Age: 43
LOS: 2 days | Discharge: HOME | End: 2023-09-07
Attending: EMERGENCY MEDICINE | Admitting: HOSPITALIST
Payer: MEDICAID

## 2023-09-05 ENCOUNTER — APPOINTMENT (OUTPATIENT)
Dept: CT IMAGING | Facility: HOSPITAL | Age: 43
End: 2023-09-05
Payer: MEDICAID

## 2023-09-05 ENCOUNTER — READMISSION MANAGEMENT (OUTPATIENT)
Dept: CALL CENTER | Facility: HOSPITAL | Age: 43
End: 2023-09-05
Payer: MEDICAID

## 2023-09-05 DIAGNOSIS — A41.9 SEVERE SEPSIS: ICD-10-CM

## 2023-09-05 DIAGNOSIS — R65.20 SEVERE SEPSIS: ICD-10-CM

## 2023-09-05 DIAGNOSIS — K86.1 CHRONIC PANCREATITIS, UNSPECIFIED PANCREATITIS TYPE: ICD-10-CM

## 2023-09-05 DIAGNOSIS — R10.9 ACUTE ABDOMINAL PAIN: ICD-10-CM

## 2023-09-05 DIAGNOSIS — K86.3 PANCREATIC PSEUDOCYST: ICD-10-CM

## 2023-09-05 DIAGNOSIS — K85.90 ACUTE ON CHRONIC PANCREATITIS: ICD-10-CM

## 2023-09-05 DIAGNOSIS — R10.9 ACUTE ABDOMINAL PAIN: Primary | ICD-10-CM

## 2023-09-05 DIAGNOSIS — Z78.9 ALCOHOL USE: ICD-10-CM

## 2023-09-05 DIAGNOSIS — K86.1 ACUTE ON CHRONIC PANCREATITIS: ICD-10-CM

## 2023-09-05 LAB
ALBUMIN SERPL-MCNC: 4.3 G/DL (ref 3.5–5.2)
ALBUMIN/GLOB SERPL: 1.4 G/DL
ALP SERPL-CCNC: 84 U/L (ref 39–117)
ALT SERPL W P-5'-P-CCNC: 6 U/L (ref 1–33)
ANION GAP SERPL CALCULATED.3IONS-SCNC: 17 MMOL/L (ref 5–15)
AST SERPL-CCNC: 10 U/L (ref 1–32)
BASOPHILS # BLD AUTO: 0.05 10*3/MM3 (ref 0–0.2)
BASOPHILS NFR BLD AUTO: 0.7 % (ref 0–1.5)
BILIRUB SERPL-MCNC: 0.2 MG/DL (ref 0–1.2)
BUN SERPL-MCNC: 3 MG/DL (ref 6–20)
BUN/CREAT SERPL: 5.1 (ref 7–25)
CALCIUM SPEC-SCNC: 9.4 MG/DL (ref 8.6–10.5)
CHLORIDE SERPL-SCNC: 102 MMOL/L (ref 98–107)
CO2 SERPL-SCNC: 24 MMOL/L (ref 22–29)
CREAT SERPL-MCNC: 0.59 MG/DL (ref 0.57–1)
D-LACTATE SERPL-SCNC: 2.4 MMOL/L (ref 0.5–2)
D-LACTATE SERPL-SCNC: 5.5 MMOL/L (ref 0.5–2)
DEPRECATED RDW RBC AUTO: 47.8 FL (ref 37–54)
EGFRCR SERPLBLD CKD-EPI 2021: 117 ML/MIN/1.73
EOSINOPHIL # BLD AUTO: 0.09 10*3/MM3 (ref 0–0.4)
EOSINOPHIL NFR BLD AUTO: 1.3 % (ref 0.3–6.2)
ERYTHROCYTE [DISTWIDTH] IN BLOOD BY AUTOMATED COUNT: 13.3 % (ref 12.3–15.4)
ETHANOL BLD-MCNC: 111 MG/DL (ref 0–10)
ETHANOL UR QL: 0.11 %
GLOBULIN UR ELPH-MCNC: 3 GM/DL
GLUCOSE SERPL-MCNC: 117 MG/DL (ref 65–99)
HCG SERPL QL: NEGATIVE
HCT VFR BLD AUTO: 42.1 % (ref 34–46.6)
HGB BLD-MCNC: 14.6 G/DL (ref 12–15.9)
IMM GRANULOCYTES # BLD AUTO: 0.02 10*3/MM3 (ref 0–0.05)
IMM GRANULOCYTES NFR BLD AUTO: 0.3 % (ref 0–0.5)
LIPASE SERPL-CCNC: 232 U/L (ref 13–60)
LYMPHOCYTES # BLD AUTO: 1.83 10*3/MM3 (ref 0.7–3.1)
LYMPHOCYTES NFR BLD AUTO: 25.8 % (ref 19.6–45.3)
MCH RBC QN AUTO: 34.4 PG (ref 26.6–33)
MCHC RBC AUTO-ENTMCNC: 34.7 G/DL (ref 31.5–35.7)
MCV RBC AUTO: 99.3 FL (ref 79–97)
MONOCYTES # BLD AUTO: 0.47 10*3/MM3 (ref 0.1–0.9)
MONOCYTES NFR BLD AUTO: 6.6 % (ref 5–12)
NEUTROPHILS NFR BLD AUTO: 4.62 10*3/MM3 (ref 1.7–7)
NEUTROPHILS NFR BLD AUTO: 65.3 % (ref 42.7–76)
NRBC BLD AUTO-RTO: 0 /100 WBC (ref 0–0.2)
PLATELET # BLD AUTO: 269 10*3/MM3 (ref 140–450)
PMV BLD AUTO: 9.7 FL (ref 6–12)
POTASSIUM SERPL-SCNC: 3.5 MMOL/L (ref 3.5–5.2)
PROCALCITONIN SERPL-MCNC: 0.04 NG/ML (ref 0–0.25)
PROT SERPL-MCNC: 7.3 G/DL (ref 6–8.5)
RBC # BLD AUTO: 4.24 10*6/MM3 (ref 3.77–5.28)
SODIUM SERPL-SCNC: 143 MMOL/L (ref 136–145)
WBC NRBC COR # BLD: 7.08 10*3/MM3 (ref 3.4–10.8)

## 2023-09-05 PROCEDURE — 84703 CHORIONIC GONADOTROPIN ASSAY: CPT | Performed by: EMERGENCY MEDICINE

## 2023-09-05 PROCEDURE — 80053 COMPREHEN METABOLIC PANEL: CPT | Performed by: EMERGENCY MEDICINE

## 2023-09-05 PROCEDURE — 84145 PROCALCITONIN (PCT): CPT | Performed by: EMERGENCY MEDICINE

## 2023-09-05 PROCEDURE — 87040 BLOOD CULTURE FOR BACTERIA: CPT | Performed by: EMERGENCY MEDICINE

## 2023-09-05 PROCEDURE — 25010000002 METOCLOPRAMIDE PER 10 MG: Performed by: EMERGENCY MEDICINE

## 2023-09-05 PROCEDURE — 99285 EMERGENCY DEPT VISIT HI MDM: CPT

## 2023-09-05 PROCEDURE — 36415 COLL VENOUS BLD VENIPUNCTURE: CPT

## 2023-09-05 PROCEDURE — 25510000001 IOPAMIDOL 61 % SOLUTION: Performed by: EMERGENCY MEDICINE

## 2023-09-05 PROCEDURE — 25010000002 HYDROMORPHONE PER 4 MG: Performed by: EMERGENCY MEDICINE

## 2023-09-05 PROCEDURE — 82077 ASSAY SPEC XCP UR&BREATH IA: CPT | Performed by: EMERGENCY MEDICINE

## 2023-09-05 PROCEDURE — 85025 COMPLETE CBC W/AUTO DIFF WBC: CPT | Performed by: EMERGENCY MEDICINE

## 2023-09-05 PROCEDURE — 25010000002 THIAMINE HCL 200 MG/2ML SOLUTION: Performed by: EMERGENCY MEDICINE

## 2023-09-05 PROCEDURE — 83690 ASSAY OF LIPASE: CPT | Performed by: EMERGENCY MEDICINE

## 2023-09-05 PROCEDURE — 74177 CT ABD & PELVIS W/CONTRAST: CPT

## 2023-09-05 PROCEDURE — 83605 ASSAY OF LACTIC ACID: CPT | Performed by: EMERGENCY MEDICINE

## 2023-09-05 RX ORDER — SODIUM CHLORIDE 0.9 % (FLUSH) 0.9 %
10 SYRINGE (ML) INJECTION AS NEEDED
Status: DISCONTINUED | OUTPATIENT
Start: 2023-09-05 | End: 2023-09-06 | Stop reason: SDUPTHER

## 2023-09-05 RX ORDER — SODIUM CHLORIDE 9 MG/ML
1000 INJECTION, SOLUTION INTRAVENOUS ONCE
Status: COMPLETED | OUTPATIENT
Start: 2023-09-05 | End: 2023-09-05

## 2023-09-05 RX ORDER — HYDROMORPHONE HYDROCHLORIDE 1 MG/ML
0.5 INJECTION, SOLUTION INTRAMUSCULAR; INTRAVENOUS; SUBCUTANEOUS ONCE
Status: COMPLETED | OUTPATIENT
Start: 2023-09-05 | End: 2023-09-05

## 2023-09-05 RX ORDER — THIAMINE HYDROCHLORIDE 100 MG/ML
200 INJECTION, SOLUTION INTRAMUSCULAR; INTRAVENOUS ONCE
Status: COMPLETED | OUTPATIENT
Start: 2023-09-05 | End: 2023-09-05

## 2023-09-05 RX ORDER — FAMOTIDINE 10 MG/ML
20 INJECTION, SOLUTION INTRAVENOUS ONCE
Status: COMPLETED | OUTPATIENT
Start: 2023-09-05 | End: 2023-09-05

## 2023-09-05 RX ORDER — METOCLOPRAMIDE HYDROCHLORIDE 5 MG/ML
10 INJECTION INTRAMUSCULAR; INTRAVENOUS ONCE
Status: COMPLETED | OUTPATIENT
Start: 2023-09-05 | End: 2023-09-05

## 2023-09-05 RX ADMIN — HYDROMORPHONE HYDROCHLORIDE 0.5 MG: 1 INJECTION, SOLUTION INTRAMUSCULAR; INTRAVENOUS; SUBCUTANEOUS at 20:45

## 2023-09-05 RX ADMIN — FOLIC ACID 1 MG: 5 INJECTION, SOLUTION INTRAMUSCULAR; INTRAVENOUS; SUBCUTANEOUS at 22:39

## 2023-09-05 RX ADMIN — FAMOTIDINE 20 MG: 10 INJECTION INTRAVENOUS at 20:46

## 2023-09-05 RX ADMIN — SODIUM CHLORIDE 1770 ML: 9 INJECTION, SOLUTION INTRAVENOUS at 22:35

## 2023-09-05 RX ADMIN — METOCLOPRAMIDE 10 MG: 5 INJECTION, SOLUTION INTRAMUSCULAR; INTRAVENOUS at 20:46

## 2023-09-05 RX ADMIN — HYDROMORPHONE HYDROCHLORIDE 0.5 MG: 1 INJECTION, SOLUTION INTRAMUSCULAR; INTRAVENOUS; SUBCUTANEOUS at 22:36

## 2023-09-05 RX ADMIN — SODIUM CHLORIDE 1000 ML: 9 INJECTION, SOLUTION INTRAVENOUS at 22:35

## 2023-09-05 RX ADMIN — IOPAMIDOL 85 ML: 612 INJECTION, SOLUTION INTRAVENOUS at 21:47

## 2023-09-05 RX ADMIN — THIAMINE HYDROCHLORIDE 200 MG: 100 INJECTION, SOLUTION INTRAMUSCULAR; INTRAVENOUS at 22:35

## 2023-09-05 NOTE — ED PROVIDER NOTES
EMERGENCY DEPARTMENT ENCOUNTER    Room Number:  38/38  Date of encounter:  9/5/2023  PCP: Rachell Pozo APRN  Historian: Patient, significant other, EMS triage report    Patient was placed in face mask during triage process. Patient was wearing facemask when I entered the room and throughout our encounter. I wore full protective equipment throughout this patient encounter including a face mask, eye protection, and gloves. Hand hygiene was performed before donning protective equipment and again following doffing of PPE after leaving the room.    HPI:  Chief Complaint: Abdominal pain with nausea and vomiting  A complete HPI/ROS/PMH/PSH/SH/FH are unobtainable due to: Patient is a very limited historian at this time as she mostly screamed out in pain and rolls around in the bed.  She does not answer questions verbally and only occasionally shakes her head in agreement  Context: Piper Cain is a 42 y.o. female who presents to the ED c/o abdominal pain acute on chronic.  Pain worse today but it is not clear over what period of time it has been worsening.  No clear exacerbating or relieving factors reported.  Patient repeats that she feels like she is dying.  Reported history of alcohol abuse at chronic pancreatitis.  Patient denies recent alcohol intake.  Significant other thinks is probably been about 2 weeks since she consumed alcohol.      MEDICAL HISTORY REVIEW  EMR reviewed:    Hospital discharge summary 8/25/2023 reviewed:  Patient with history of alcohol abuse was admitted for acute on chronic pancreatitis and acute UTI.  She is also followed for history of essential hypertension, general anxiety disorder, migraines, pancreatic pseudocyst and abdominal pain as well as hyperglycemia.    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    Anemia of chronic disease 08/08/2022    GERD without esophagitis 08/08/2022    Alcohol dependence in remission 08/08/2022    Pancreatic pseudocyst 08/14/2022    Candida esophagitis 08/14/2022    Ureterolithiasis 10/20/2022    Left flank pain 10/21/2022    Bacterial vaginosis 10/23/2022    Abdominal pain 11/30/2022    Adrenal hemorrhage 03/25/2023    Chronic pancreatitis, unspecified pancreatitis type 06/08/2023    Macrocytosis 06/08/2023    Left-sided chest pain 06/08/2023    Tobacco abuse 06/08/2023    Acute on chronic pancreatitis 08/23/2023    Alcohol abuse 08/23/2023    Acute UTI (urinary tract infection) 08/23/2023    Essential hypertension 08/23/2023    Migraine 08/23/2023    Generalized anxiety disorder 08/23/2023    Pancreatic pseudocyst 08/23/2023     Resolved Ambulatory Problems     Diagnosis Date Noted    Hypokalemia 09/28/2017    Mixed acid base balance disorder 09/28/2017    Dehydration 09/28/2017    Nausea and vomiting 06/08/2023    Hyperglycemia 08/23/2023     Past Medical History:   Diagnosis Date    Anxiety     E. coli bacteremia     ETOH abuse     GERD (gastroesophageal reflux disease)     Hiatal hernia     Miscarriage 2004    Pancreatitis          PAST SURGICAL HISTORY  Past Surgical History:   Procedure Laterality Date    ENDOSCOPY N/A 8/10/2022    Procedure: ESOPHAGOGASTRODUODENOSCOPY with bxs;  Surgeon: Salvador Price MD;  Location: [facility] ENDOSCOPY;  Service: Gastroenterology;  Laterality: N/A;  Pre: r/o esophageal  varacies, abd pain  Post: esophageal plaque         FAMILY HISTORY  Family History   Problem Relation Age of Onset    Alcohol abuse Mother     Arthritis Mother     Asthma Mother     Miscarriages / Stillbirths Mother     Cancer Father      Diabetes Father     Drug abuse Father     Early death Father     Heart disease Father     Hyperlipidemia Father     Hypertension Father     Alcohol abuse Paternal Aunt     Cancer Paternal Aunt     Diabetes Paternal Aunt     Drug abuse Paternal Aunt     Early death Paternal Aunt     Heart disease Paternal Aunt     Hyperlipidemia Paternal Aunt     Hypertension Paternal Aunt     Alcohol abuse Maternal Grandmother     Alcohol abuse Maternal Grandfather     Alcohol abuse Paternal Grandmother     Cancer Paternal Grandmother     Diabetes Paternal Grandmother     Drug abuse Paternal Grandmother     Early death Paternal Grandmother     Heart disease Paternal Grandmother     Hyperlipidemia Paternal Grandmother     Hypertension Paternal Grandmother     Alcohol abuse Paternal Grandfather          SOCIAL HISTORY  Social History     Socioeconomic History    Marital status: Single   Tobacco Use    Smoking status: Every Day     Packs/day: 0.50     Types: Cigarettes     Start date: 1/28/1996    Smokeless tobacco: Current   Vaping Use    Vaping Use: Never used   Substance and Sexual Activity    Alcohol use: Never    Drug use: No    Sexual activity: Yes     Partners: Male     Birth control/protection: None     Comment: IUD removed 6/29/2017         ALLERGIES  Nickel        REVIEW OF SYSTEMS  Review of Systems     All systems reviewed and negative except for those discussed in HPI.       PHYSICAL EXAM    I have reviewed the triage vital signs and nursing notes.    ED Triage Vitals [09/05/23 1953]   Temp Heart Rate Resp BP SpO2   98 °F (36.7 °C) 108 24 158/82 99 %      Temp src Heart Rate Source Patient Position BP Location FiO2 (%)   Oral Monitor Lying Right arm --       Physical Exam    Physical Exam   Constitutional: Uncomfortable appearing.  Rolling around the bed and moaning.  HENT:  Head: Normocephalic and atraumatic.   Oropharynx: Mucous membranes are moist.   Eyes: No scleral icterus. No conjunctival pallor.  Neck: Painless  range of motion noted. Neck supple.   Cardiovascular: Tachycardic.  Pink warm and well-perfused  Pulmonary/Chest: No respiratory distress.  No kidney or increased work of breathing noted.    Abdominal: Diffuse voluntary guarding.  Diffuse discomfort reported with exam.  Musculoskeletal: Moves all extremities equally.   Neurological: Alert.  Moving all extremities  Skin: Skin is pink, warm, and dry. No pallor.   Psychiatric: Unable to evaluate  Nursing note and vitals reviewed.    LAB RESULTS  Recent Results (from the past 24 hour(s))   hCG, Serum, Qualitative    Collection Time: 09/05/23  8:44 PM    Specimen: Blood   Result Value Ref Range    HCG Qualitative Negative Negative   Lactic Acid, Plasma    Collection Time: 09/05/23  8:44 PM    Specimen: Blood   Result Value Ref Range    Lactate 2.4 (C) 0.5 - 2.0 mmol/L   CBC Auto Differential    Collection Time: 09/05/23  8:44 PM    Specimen: Blood   Result Value Ref Range    WBC 7.08 3.40 - 10.80 10*3/mm3    RBC 4.24 3.77 - 5.28 10*6/mm3    Hemoglobin 14.6 12.0 - 15.9 g/dL    Hematocrit 42.1 34.0 - 46.6 %    MCV 99.3 (H) 79.0 - 97.0 fL    MCH 34.4 (H) 26.6 - 33.0 pg    MCHC 34.7 31.5 - 35.7 g/dL    RDW 13.3 12.3 - 15.4 %    RDW-SD 47.8 37.0 - 54.0 fl    MPV 9.7 6.0 - 12.0 fL    Platelets 269 140 - 450 10*3/mm3    Neutrophil % 65.3 42.7 - 76.0 %    Lymphocyte % 25.8 19.6 - 45.3 %    Monocyte % 6.6 5.0 - 12.0 %    Eosinophil % 1.3 0.3 - 6.2 %    Basophil % 0.7 0.0 - 1.5 %    Immature Grans % 0.3 0.0 - 0.5 %    Neutrophils, Absolute 4.62 1.70 - 7.00 10*3/mm3    Lymphocytes, Absolute 1.83 0.70 - 3.10 10*3/mm3    Monocytes, Absolute 0.47 0.10 - 0.90 10*3/mm3    Eosinophils, Absolute 0.09 0.00 - 0.40 10*3/mm3    Basophils, Absolute 0.05 0.00 - 0.20 10*3/mm3    Immature Grans, Absolute 0.02 0.00 - 0.05 10*3/mm3    nRBC 0.0 0.0 - 0.2 /100 WBC   Comprehensive Metabolic Panel    Collection Time: 09/05/23 10:05 PM    Specimen: Hand, Left; Blood   Result Value Ref Range    Glucose  117 (H) 65 - 99 mg/dL    BUN 3 (L) 6 - 20 mg/dL    Creatinine 0.59 0.57 - 1.00 mg/dL    Sodium 143 136 - 145 mmol/L    Potassium 3.5 3.5 - 5.2 mmol/L    Chloride 102 98 - 107 mmol/L    CO2 24.0 22.0 - 29.0 mmol/L    Calcium 9.4 8.6 - 10.5 mg/dL    Total Protein 7.3 6.0 - 8.5 g/dL    Albumin 4.3 3.5 - 5.2 g/dL    ALT (SGPT) 6 1 - 33 U/L    AST (SGOT) 10 1 - 32 U/L    Alkaline Phosphatase 84 39 - 117 U/L    Total Bilirubin 0.2 0.0 - 1.2 mg/dL    Globulin 3.0 gm/dL    A/G Ratio 1.4 g/dL    BUN/Creatinine Ratio 5.1 (L) 7.0 - 25.0    Anion Gap 17.0 (H) 5.0 - 15.0 mmol/L    eGFR 117.0 >60.0 mL/min/1.73   Lipase    Collection Time: 09/05/23 10:05 PM    Specimen: Hand, Left; Blood   Result Value Ref Range    Lipase 232 (H) 13 - 60 U/L   Ethanol    Collection Time: 09/05/23 10:05 PM    Specimen: Hand, Left; Blood   Result Value Ref Range    Ethanol 111 (H) 0 - 10 mg/dL    Ethanol % 0.111 %   Procalcitonin    Collection Time: 09/05/23 10:05 PM    Specimen: Hand, Left; Blood   Result Value Ref Range    Procalcitonin 0.04 0.00 - 0.25 ng/mL   STAT Lactic Acid, Reflex    Collection Time: 09/05/23 10:05 PM    Specimen: Hand, Left; Blood   Result Value Ref Range    Lactate 5.5 (C) 0.5 - 2.0 mmol/L       Ordered the above labs and independently reviewed the results.        RADIOLOGY  CT Abdomen Pelvis With Contrast    Result Date: 9/5/2023  CT OF THE ABDOMEN AND PELVIS WITH CONTRAST  HISTORY: Acute on chronic abdominal pain  COMPARISON: August 22, 2023  TECHNIQUE: Axial CT imaging was obtained through the abdomen and pelvis. IV contrast was administered.  FINDINGS: Images through the lung bases are clear. No suspicious hepatic lesions are seen. Gallbladder appears normal. The pancreas is atrophic, with pancreatic calcifications, in keeping with chronic pancreatitis. Some of these appear to be intraductal stones. Pancreatic ductal size is stable, measuring up to 5 mm. The patient has a pancreatic pseudocyst, involving the tail of the  pancreas. This measures up to 9.9 x 7.0 cm, which is larger than on prior study, when it measured 8.6 x 6.2 cm. As was previously discussed, the pseudocyst communicates with collections within the the left adrenal gland. The largest collection within the left adrenal gland measures up to 2.6 cm. This is also larger than on prior study, when it measured 2.2 cm. MRCP showed communication of the pseudocyst with the pancreatic duct. There is a rind of complex inferior perisplenic fluid. This is again seen on the current study. It has increased in size when compared to prior study, now measuring up to 3.5 cm in length, previously measuring 1.8 cm. The collections are deforming the contour of the left kidney. There is also some displacement of the stomach anteriorly. The splenic vein remains patent at the portal splenic confluence, although the splenic vein is attenuated near the hilum, with multiple collaterals noted. There is no evidence of gastric outlet obstruction. Kidneys appear to enhance symmetrically. No hydronephrosis is seen. There our some aortic calcifications. No distal ureteral or bladder stones are seen. Uterus is absent. There is no bowel obstruction. The appendix is normal. There is a retroaortic left renal vein. No acute osseous abnormalities are seen.       1. The patient's previously described pancreatic pseudocyst involving the tail of the pancreas continues to increase in size when compared to prior imaging. This collection communicates with smaller collections within the left adrenal gland. These have also worsened. Furthermore, there is increasing complex material which is noted abutting the inferior margin of the spleen.  Radiation dose reduction techniques were utilized, including automated exposure control and exposure modulation based on body size.   This report was finalized on 9/5/2023 10:23 PM by Dr. Camilla Whalen M.D.       I ordered the above noted radiological studies. Reviewed by me  and discussed with radiologist.  See dictation for official radiology interpretation.      PROCEDURES    Procedures        MEDICATIONS GIVEN IN ER    Medications   sodium chloride 0.9 % flush 10 mL (has no administration in time range)   sodium chloride 0.9 % bolus 1,770 mL (has no administration in time range)   piperacillin-tazobactam (ZOSYN) 4.5 g in iso-osmotic dextrose 100 mL IVPB (premix) (has no administration in time range)   sodium chloride 0.9 % infusion 1,000 mL (1,000 mL Intravenous New Bag 9/5/23 2235)   folic acid 1 mg in sodium chloride 0.9 % 50 mL IVPB (1 mg Intravenous New Bag 9/5/23 2239)   thiamine (B-1) injection 200 mg (200 mg Intravenous Given 9/5/23 2235)   HYDROmorphone (DILAUDID) injection 0.5 mg (0.5 mg Intravenous Given 9/5/23 2045)   metoclopramide (REGLAN) injection 10 mg (10 mg Intravenous Given 9/5/23 2046)   famotidine (PEPCID) injection 20 mg (20 mg Intravenous Given 9/5/23 2046)   iopamidol (ISOVUE-300) 61 % injection 85 mL (85 mL Intravenous Given by Other 9/5/23 2147)   HYDROmorphone (DILAUDID) injection 0.5 mg (0.5 mg Intravenous Given 9/5/23 2236)         PROGRESS, DATA ANALYSIS, CONSULTS, AND MEDICAL DECISION MAKING    My differential diagnosis for abdominal pain includes but is not limited to:  Gastritis, gastroenteritis, peptic ulcer disease, GERD, esophageal perforation, acute appendicitis, mesenteric adenitis, Meckel’s diverticulum, epiploic appendagitis, diverticulitis, colon cancer, ulcerative colitis, Crohn’s disease, intussusception, small bowel obstruction, adhesions, ischemic bowel, perforated viscus, ileus, obstipation, biliary colic, cholecystitis, cholelithiasis, Mike-Dionicio Rakan, hepatitis, pancreatitis, common bile duct obstruction, cholangitis, bile leak, splenic trauma, splenic rupture, splenic infarction, splenic abscess, abdominal abscess, ascites, spontaneous bacterial peritonitis, hernia, UTI, cystitis,ureterolithiasis, urinary obstruction, ovarian cyst,  torsion, pregnancy, ectopic pregnancy, PID, pelvic abscess, mittelschmerz, endometriosis, AAA, myocardial infarction, pneumonia, cancer, porphyria, DKA, medications, sickle cell, viral syndrome, zoster    Total critical care time: Approximately 35 minutes    Due to a high probability of clinically significant, life threatening deterioration, the patient required my highest level of preparedness to intervene emergently and I personally spent this critical care time directly and personally managing the patient. This critical care time included obtaining a history; examining the patient; vital sign monitoring; ordering and review of studies; arranging urgent treatment with development of a management plan; evaluation of patient's response to treatment; frequent reassessment; and, discussions with other providers.    This critical care time was performed to assess and manage the high probability of imminent, life-threatening deterioration that could result in multi-organ failure. It was exclusive of separately billable procedures and treating other patients and teaching time.    Please see MDM section and the rest of the note for further information on patient assessment and treatment.      All labs have been independently reviewed by me.  All radiology studies have been reviewed by me and discussed with radiologist dictating the report.   EKG's independently viewed and interpreted by me.  Discussion below represents my analysis of pertinent findings related to patient's condition, differential diagnosis, treatment plan and final disposition.      ED Course as of 09/05/23 2321   Tue Sep 05, 2023   2155 HCG Qualitative: Negative [RS]   2155 WBC: 7.08 [RS]   2155 Hemoglobin: 14.6 [RS]   2155 Immature Grans, Absolute: 0.02 [RS]   2155 Lactate(!!): 2.4  IV fluids infusing [RS]   2155 RADIOLOGY      Study: IV contrasted CT abdomen pelvis  Findings: No free air in the abdomen.  Large fluid-filled lesion left upper  quadrant.  I independently viewed and interpreted these images contemporaneously with treatment.    [RS]   2246 Lactate(!!): 5.5 [RS]   2246 Ethanol(!): 111 [RS]   2246 Lipase(!): 232 [RS]   2248 Laboratory radiologic findings are concerning and thus admission will be recommended. [RS]   2301 Patient's pain at a much more tolerable level at this time.  Reviewed findings with the patient and significant other.  Recommend admission.  Patient agreeable. [RS]   2304 Procalcitonin: 0.04 [RS]   2314 CONSULT        Provider: MARI PEREZ    Discussion: Reviewed patient history, ED presentation and evaluation.  Agreeable to accept patient for admission on behalf of Dr. Oli hurley treatment and planned disposition.         [RS]      ED Course User Index  [RS] Donnell Wilder MD       AS OF 23:21 EDT VITALS:    BP - 158/82  HR - 108  TEMP - 98 °F (36.7 °C) (Oral)  O2 SATS - 99%        DIAGNOSIS  Final diagnoses:   Acute on chronic abdominal pain   Acute on chronic pancreatitis   Alcohol use   Severe sepsis         DISPOSITION  ADMISSION    Discussed treatment plan and reason for admission with pt/family and admitting physician.  Pt/family voiced understanding of the plan for admission for further testing/treatment as needed.          Donnell Wilder MD  09/05/23 6840

## 2023-09-05 NOTE — OUTREACH NOTE
Medical Week 1 Survey      Flowsheet Row Responses   [facility] patient discharged from? [place]   Does the patient have one of the following disease processes/diagnoses(primary or secondary)? Other   Week 1 attempt successful? No   Unsuccessful attempts Attempt 3            Lupe ARTHUR - Licensed Nurse

## 2023-09-06 LAB
AMPHET+METHAMPHET UR QL: NEGATIVE
ANION GAP SERPL CALCULATED.3IONS-SCNC: 14 MMOL/L (ref 5–15)
BARBITURATES UR QL SCN: NEGATIVE
BASOPHILS # BLD AUTO: 0.04 10*3/MM3 (ref 0–0.2)
BASOPHILS NFR BLD AUTO: 0.4 % (ref 0–1.5)
BENZODIAZ UR QL SCN: NEGATIVE
BILIRUB UR QL STRIP: NEGATIVE
BUN SERPL-MCNC: 3 MG/DL (ref 6–20)
BUN/CREAT SERPL: 5 (ref 7–25)
CALCIUM SPEC-SCNC: 8.8 MG/DL (ref 8.6–10.5)
CANNABINOIDS SERPL QL: NEGATIVE
CHLORIDE SERPL-SCNC: 103 MMOL/L (ref 98–107)
CLARITY UR: CLEAR
CO2 SERPL-SCNC: 24 MMOL/L (ref 22–29)
COCAINE UR QL: NEGATIVE
COLOR UR: YELLOW
CREAT SERPL-MCNC: 0.6 MG/DL (ref 0.57–1)
D-LACTATE SERPL-SCNC: 0.7 MMOL/L (ref 0.5–2)
D-LACTATE SERPL-SCNC: 3 MMOL/L (ref 0.5–2)
DEPRECATED RDW RBC AUTO: 50 FL (ref 37–54)
EGFRCR SERPLBLD CKD-EPI 2021: 116.5 ML/MIN/1.73
EOSINOPHIL # BLD AUTO: 0.04 10*3/MM3 (ref 0–0.4)
EOSINOPHIL NFR BLD AUTO: 0.4 % (ref 0.3–6.2)
ERYTHROCYTE [DISTWIDTH] IN BLOOD BY AUTOMATED COUNT: 13.2 % (ref 12.3–15.4)
FENTANYL UR-MCNC: NEGATIVE NG/ML
GLUCOSE SERPL-MCNC: 109 MG/DL (ref 65–99)
GLUCOSE UR STRIP-MCNC: NEGATIVE MG/DL
HCT VFR BLD AUTO: 37 % (ref 34–46.6)
HGB BLD-MCNC: 12.5 G/DL (ref 12–15.9)
HGB UR QL STRIP.AUTO: NEGATIVE
IMM GRANULOCYTES # BLD AUTO: 0.02 10*3/MM3 (ref 0–0.05)
IMM GRANULOCYTES NFR BLD AUTO: 0.2 % (ref 0–0.5)
INR PPP: 0.95 (ref 0.9–1.1)
KETONES UR QL STRIP: NEGATIVE
LEUKOCYTE ESTERASE UR QL STRIP.AUTO: NEGATIVE
LYMPHOCYTES # BLD AUTO: 1.64 10*3/MM3 (ref 0.7–3.1)
LYMPHOCYTES NFR BLD AUTO: 17.3 % (ref 19.6–45.3)
MCH RBC QN AUTO: 34.4 PG (ref 26.6–33)
MCHC RBC AUTO-ENTMCNC: 33.8 G/DL (ref 31.5–35.7)
MCV RBC AUTO: 101.9 FL (ref 79–97)
METHADONE UR QL SCN: NEGATIVE
MONOCYTES # BLD AUTO: 0.87 10*3/MM3 (ref 0.1–0.9)
MONOCYTES NFR BLD AUTO: 9.2 % (ref 5–12)
NEUTROPHILS NFR BLD AUTO: 6.88 10*3/MM3 (ref 1.7–7)
NEUTROPHILS NFR BLD AUTO: 72.5 % (ref 42.7–76)
NITRITE UR QL STRIP: NEGATIVE
NRBC BLD AUTO-RTO: 0 /100 WBC (ref 0–0.2)
OPIATES UR QL: NEGATIVE
OXYCODONE UR QL SCN: POSITIVE
PH UR STRIP.AUTO: 7.5 [PH] (ref 5–8)
PLATELET # BLD AUTO: 209 10*3/MM3 (ref 140–450)
PMV BLD AUTO: 9.7 FL (ref 6–12)
POTASSIUM SERPL-SCNC: 3.7 MMOL/L (ref 3.5–5.2)
PROT UR QL STRIP: NEGATIVE
PROTHROMBIN TIME: 12.8 SECONDS (ref 11.7–14.2)
RBC # BLD AUTO: 3.63 10*6/MM3 (ref 3.77–5.28)
SODIUM SERPL-SCNC: 141 MMOL/L (ref 136–145)
SP GR UR STRIP: >=1.03 (ref 1–1.03)
UROBILINOGEN UR QL STRIP: NORMAL
WBC NRBC COR # BLD: 9.49 10*3/MM3 (ref 3.4–10.8)

## 2023-09-06 PROCEDURE — 36415 COLL VENOUS BLD VENIPUNCTURE: CPT | Performed by: EMERGENCY MEDICINE

## 2023-09-06 PROCEDURE — 25010000002 HYDROMORPHONE PER 4 MG: Performed by: NURSE PRACTITIONER

## 2023-09-06 PROCEDURE — 25010000002 PIPERACILLIN SOD-TAZOBACTAM PER 1 G: Performed by: EMERGENCY MEDICINE

## 2023-09-06 PROCEDURE — 87040 BLOOD CULTURE FOR BACTERIA: CPT | Performed by: EMERGENCY MEDICINE

## 2023-09-06 PROCEDURE — 99222 1ST HOSP IP/OBS MODERATE 55: CPT | Performed by: INTERNAL MEDICINE

## 2023-09-06 PROCEDURE — 85610 PROTHROMBIN TIME: CPT | Performed by: NURSE PRACTITIONER

## 2023-09-06 PROCEDURE — 80307 DRUG TEST PRSMV CHEM ANLYZR: CPT | Performed by: EMERGENCY MEDICINE

## 2023-09-06 PROCEDURE — 25010000002 THIAMINE HCL 200 MG/2ML SOLUTION: Performed by: NURSE PRACTITIONER

## 2023-09-06 PROCEDURE — 83605 ASSAY OF LACTIC ACID: CPT | Performed by: EMERGENCY MEDICINE

## 2023-09-06 PROCEDURE — 81003 URINALYSIS AUTO W/O SCOPE: CPT | Performed by: EMERGENCY MEDICINE

## 2023-09-06 PROCEDURE — 25010000002 LORAZEPAM PER 2 MG: Performed by: NURSE PRACTITIONER

## 2023-09-06 PROCEDURE — 25010000002 ONDANSETRON PER 1 MG: Performed by: NURSE PRACTITIONER

## 2023-09-06 PROCEDURE — 85025 COMPLETE CBC W/AUTO DIFF WBC: CPT | Performed by: NURSE PRACTITIONER

## 2023-09-06 PROCEDURE — 80048 BASIC METABOLIC PNL TOTAL CA: CPT | Performed by: NURSE PRACTITIONER

## 2023-09-06 RX ORDER — LORAZEPAM 2 MG/ML
2 INJECTION INTRAMUSCULAR
Status: DISCONTINUED | OUTPATIENT
Start: 2023-09-06 | End: 2023-09-07 | Stop reason: HOSPADM

## 2023-09-06 RX ORDER — ACETAMINOPHEN 325 MG/1
650 TABLET ORAL EVERY 4 HOURS PRN
Status: DISCONTINUED | OUTPATIENT
Start: 2023-09-06 | End: 2023-09-07 | Stop reason: HOSPADM

## 2023-09-06 RX ORDER — BISACODYL 10 MG
10 SUPPOSITORY, RECTAL RECTAL DAILY PRN
Status: DISCONTINUED | OUTPATIENT
Start: 2023-09-06 | End: 2023-09-07 | Stop reason: HOSPADM

## 2023-09-06 RX ORDER — BISACODYL 5 MG/1
5 TABLET, DELAYED RELEASE ORAL DAILY PRN
Status: DISCONTINUED | OUTPATIENT
Start: 2023-09-06 | End: 2023-09-07 | Stop reason: HOSPADM

## 2023-09-06 RX ORDER — AMOXICILLIN 250 MG
2 CAPSULE ORAL 2 TIMES DAILY
Status: DISCONTINUED | OUTPATIENT
Start: 2023-09-06 | End: 2023-09-07 | Stop reason: HOSPADM

## 2023-09-06 RX ORDER — SODIUM CHLORIDE 0.9 % (FLUSH) 0.9 %
10 SYRINGE (ML) INJECTION AS NEEDED
Status: DISCONTINUED | OUTPATIENT
Start: 2023-09-06 | End: 2023-09-06 | Stop reason: SDUPTHER

## 2023-09-06 RX ORDER — POLYETHYLENE GLYCOL 3350 17 G/17G
17 POWDER, FOR SOLUTION ORAL DAILY PRN
Status: DISCONTINUED | OUTPATIENT
Start: 2023-09-06 | End: 2023-09-07 | Stop reason: HOSPADM

## 2023-09-06 RX ORDER — AMITRIPTYLINE HYDROCHLORIDE 25 MG/1
25 TABLET, FILM COATED ORAL NIGHTLY
Status: DISCONTINUED | OUTPATIENT
Start: 2023-09-06 | End: 2023-09-07 | Stop reason: HOSPADM

## 2023-09-06 RX ORDER — ACETAMINOPHEN 650 MG/1
650 SUPPOSITORY RECTAL EVERY 4 HOURS PRN
Status: DISCONTINUED | OUTPATIENT
Start: 2023-09-06 | End: 2023-09-07 | Stop reason: HOSPADM

## 2023-09-06 RX ORDER — NITROGLYCERIN 0.4 MG/1
0.4 TABLET SUBLINGUAL
Status: DISCONTINUED | OUTPATIENT
Start: 2023-09-06 | End: 2023-09-07 | Stop reason: HOSPADM

## 2023-09-06 RX ORDER — FOLIC ACID 1 MG/1
1 TABLET ORAL DAILY
Status: DISCONTINUED | OUTPATIENT
Start: 2023-09-06 | End: 2023-09-07 | Stop reason: HOSPADM

## 2023-09-06 RX ORDER — HYDROMORPHONE HYDROCHLORIDE 1 MG/ML
0.5 INJECTION, SOLUTION INTRAMUSCULAR; INTRAVENOUS; SUBCUTANEOUS
Status: DISCONTINUED | OUTPATIENT
Start: 2023-09-06 | End: 2023-09-07 | Stop reason: HOSPADM

## 2023-09-06 RX ORDER — PANTOPRAZOLE SODIUM 40 MG/1
40 TABLET, DELAYED RELEASE ORAL DAILY
Status: DISCONTINUED | OUTPATIENT
Start: 2023-09-06 | End: 2023-09-07 | Stop reason: HOSPADM

## 2023-09-06 RX ORDER — LORAZEPAM 2 MG/ML
1 INJECTION INTRAMUSCULAR
Status: DISCONTINUED | OUTPATIENT
Start: 2023-09-06 | End: 2023-09-07 | Stop reason: HOSPADM

## 2023-09-06 RX ORDER — OXYCODONE AND ACETAMINOPHEN 10; 325 MG/1; MG/1
1 TABLET ORAL EVERY 4 HOURS PRN
Status: DISCONTINUED | OUTPATIENT
Start: 2023-09-06 | End: 2023-09-07 | Stop reason: HOSPADM

## 2023-09-06 RX ORDER — FOLIC ACID 1 MG/1
1000 TABLET ORAL DAILY
Status: DISCONTINUED | OUTPATIENT
Start: 2023-09-06 | End: 2023-09-06 | Stop reason: SDUPTHER

## 2023-09-06 RX ORDER — SODIUM CHLORIDE 0.9 % (FLUSH) 0.9 %
10 SYRINGE (ML) INJECTION EVERY 12 HOURS SCHEDULED
Status: DISCONTINUED | OUTPATIENT
Start: 2023-09-06 | End: 2023-09-07 | Stop reason: HOSPADM

## 2023-09-06 RX ORDER — SODIUM CHLORIDE 9 MG/ML
125 INJECTION, SOLUTION INTRAVENOUS CONTINUOUS
Status: DISCONTINUED | OUTPATIENT
Start: 2023-09-06 | End: 2023-09-07 | Stop reason: HOSPADM

## 2023-09-06 RX ORDER — ONDANSETRON 2 MG/ML
4 INJECTION INTRAMUSCULAR; INTRAVENOUS EVERY 6 HOURS PRN
Status: DISCONTINUED | OUTPATIENT
Start: 2023-09-06 | End: 2023-09-07 | Stop reason: HOSPADM

## 2023-09-06 RX ORDER — NALOXONE HCL 0.4 MG/ML
0.4 VIAL (ML) INJECTION
Status: DISCONTINUED | OUTPATIENT
Start: 2023-09-06 | End: 2023-09-07 | Stop reason: HOSPADM

## 2023-09-06 RX ORDER — LORAZEPAM 1 MG/1
2 TABLET ORAL
Status: DISCONTINUED | OUTPATIENT
Start: 2023-09-06 | End: 2023-09-07 | Stop reason: HOSPADM

## 2023-09-06 RX ORDER — SODIUM CHLORIDE 9 MG/ML
40 INJECTION, SOLUTION INTRAVENOUS AS NEEDED
Status: DISCONTINUED | OUTPATIENT
Start: 2023-09-06 | End: 2023-09-07 | Stop reason: HOSPADM

## 2023-09-06 RX ORDER — LORAZEPAM 1 MG/1
1 TABLET ORAL
Status: DISCONTINUED | OUTPATIENT
Start: 2023-09-06 | End: 2023-09-07 | Stop reason: HOSPADM

## 2023-09-06 RX ORDER — ACETAMINOPHEN 160 MG/5ML
650 SOLUTION ORAL EVERY 4 HOURS PRN
Status: DISCONTINUED | OUTPATIENT
Start: 2023-09-06 | End: 2023-09-07 | Stop reason: HOSPADM

## 2023-09-06 RX ORDER — THIAMINE HYDROCHLORIDE 100 MG/ML
200 INJECTION, SOLUTION INTRAMUSCULAR; INTRAVENOUS EVERY 8 HOURS SCHEDULED
Status: DISCONTINUED | OUTPATIENT
Start: 2023-09-06 | End: 2023-09-07 | Stop reason: HOSPADM

## 2023-09-06 RX ADMIN — PIPERACILLIN SODIUM AND TAZOBACTAM SODIUM 4.5 G: 4; .5 INJECTION, SOLUTION INTRAVENOUS at 00:14

## 2023-09-06 RX ADMIN — LORAZEPAM 1 MG: 1 TABLET ORAL at 22:14

## 2023-09-06 RX ADMIN — HYDROMORPHONE HYDROCHLORIDE 0.5 MG: 1 INJECTION, SOLUTION INTRAMUSCULAR; INTRAVENOUS; SUBCUTANEOUS at 14:24

## 2023-09-06 RX ADMIN — THIAMINE HYDROCHLORIDE 200 MG: 100 INJECTION, SOLUTION INTRAMUSCULAR; INTRAVENOUS at 08:09

## 2023-09-06 RX ADMIN — Medication 10 ML: at 08:10

## 2023-09-06 RX ADMIN — OXYCODONE AND ACETAMINOPHEN 1 TABLET: 10; 325 TABLET ORAL at 16:37

## 2023-09-06 RX ADMIN — FOLIC ACID 1 MG: 1 TABLET ORAL at 09:58

## 2023-09-06 RX ADMIN — SODIUM CHLORIDE 125 ML/HR: 9 INJECTION, SOLUTION INTRAVENOUS at 14:24

## 2023-09-06 RX ADMIN — PANCRELIPASE 12000 UNITS OF LIPASE: 60000; 12000; 38000 CAPSULE, DELAYED RELEASE PELLETS ORAL at 18:21

## 2023-09-06 RX ADMIN — HYDROMORPHONE HYDROCHLORIDE 0.5 MG: 1 INJECTION, SOLUTION INTRAMUSCULAR; INTRAVENOUS; SUBCUTANEOUS at 06:55

## 2023-09-06 RX ADMIN — SENNOSIDES AND DOCUSATE SODIUM 2 TABLET: 50; 8.6 TABLET ORAL at 08:09

## 2023-09-06 RX ADMIN — THIAMINE HYDROCHLORIDE 200 MG: 100 INJECTION, SOLUTION INTRAMUSCULAR; INTRAVENOUS at 20:22

## 2023-09-06 RX ADMIN — ONDANSETRON 4 MG: 2 INJECTION INTRAMUSCULAR; INTRAVENOUS at 03:07

## 2023-09-06 RX ADMIN — THIAMINE HYDROCHLORIDE 200 MG: 100 INJECTION, SOLUTION INTRAMUSCULAR; INTRAVENOUS at 14:24

## 2023-09-06 RX ADMIN — HYDROMORPHONE HYDROCHLORIDE 0.5 MG: 1 INJECTION, SOLUTION INTRAMUSCULAR; INTRAVENOUS; SUBCUTANEOUS at 20:21

## 2023-09-06 RX ADMIN — AMITRIPTYLINE HYDROCHLORIDE 25 MG: 25 TABLET, FILM COATED ORAL at 20:21

## 2023-09-06 RX ADMIN — HYDROMORPHONE HYDROCHLORIDE 0.5 MG: 1 INJECTION, SOLUTION INTRAMUSCULAR; INTRAVENOUS; SUBCUTANEOUS at 23:12

## 2023-09-06 RX ADMIN — LORAZEPAM 1 MG: 1 TABLET ORAL at 18:21

## 2023-09-06 RX ADMIN — Medication 10 ML: at 20:29

## 2023-09-06 RX ADMIN — LORAZEPAM 2 MG: 2 INJECTION INTRAMUSCULAR; INTRAVENOUS at 06:55

## 2023-09-06 RX ADMIN — LORAZEPAM 1 MG: 1 TABLET ORAL at 10:12

## 2023-09-06 RX ADMIN — HYDROMORPHONE HYDROCHLORIDE 0.5 MG: 1 INJECTION, SOLUTION INTRAMUSCULAR; INTRAVENOUS; SUBCUTANEOUS at 02:24

## 2023-09-06 RX ADMIN — SODIUM CHLORIDE 125 ML/HR: 9 INJECTION, SOLUTION INTRAVENOUS at 20:37

## 2023-09-06 RX ADMIN — SODIUM CHLORIDE 125 ML/HR: 9 INJECTION, SOLUTION INTRAVENOUS at 02:24

## 2023-09-06 RX ADMIN — LORAZEPAM 2 MG: 2 INJECTION INTRAMUSCULAR; INTRAVENOUS at 02:33

## 2023-09-06 RX ADMIN — PANTOPRAZOLE SODIUM 40 MG: 40 TABLET, DELAYED RELEASE ORAL at 16:37

## 2023-09-06 RX ADMIN — ONDANSETRON 4 MG: 2 INJECTION INTRAMUSCULAR; INTRAVENOUS at 20:28

## 2023-09-06 NOTE — PROGRESS NOTES
"Nutrition Services    Patient Name:  Piper Cain  YOB: 1980  MRN: [id number]  Admit Date:  9/5/2023    Assessment Date:  09/06/23    Comment:  Nutrition assessment initiated due to MST score and Dx. Pt admitted with acute abd pain - chronic pancreatitis, pseudocyst.  On clears, in take not well documented. Pt sound asleep - did not disturb her. Labs, meds, skin reviewed. GI following. Will add some boost breeze with meals and see if she likes it. RD to follow along      CLINICAL NUTRITION ASSESSMENT      Reason for Assessment MST score 2+     Diagnosis/Problem   Acute on chronic pancreatitis, Pancreatic pseudocyst    Medical/Surgical History Past Medical History:   Diagnosis Date    Anxiety     E. coli bacteremia     ETOH abuse     GERD (gastroesophageal reflux disease)     Hiatal hernia     Left flank pain 10/21/2022    Migraine     Miscarriage 2004    Pancreatitis        Past Surgical History:   Procedure Laterality Date    ENDOSCOPY N/A 8/10/2022    Procedure: ESOPHAGOGASTRODUODENOSCOPY with bxs;  Surgeon: Salvador Price MD;  Location: [facility] ENDOSCOPY;  Service: Gastroenterology;  Laterality: N/A;  Pre: r/o esophageal  varacies, abd pain  Post: esophageal plaque        Encounter Information        Nutrition History:     Food Preferences:    Supplements:    Factors Affecting Intake: abdominal pain, altered GI function, nausea     Anthropometrics        Current Height  Current Weight  BMI kg/m2 Height: 175.3 cm (69.02\")  Weight: 59 kg (130 lb) (09/05/23 1953)  Body mass index is 19.19 kg/m².   Adjusted BMI (if applicable)    BMI Category Normal/Healthy (18.4 - 24.9)       Admission Weight 59kg       Ideal Body Weight (IBW) 66.2kg   Adjusted IBW (if applicable)        Usual Body Weight (UBW) 130lb   Weight Trend Stable       Weight History Wt Readings from Last 30 Encounters:   09/05/23 1953 59 kg (130 lb)   08/25/23 0643 60 kg (132 lb 4.8 oz)   08/24/23 0655 59.8 kg (131 lb 14.4 oz)   08/23/23 " 0533 59 kg (130 lb)   08/22/23 1954 61.4 kg (135 lb 4.8 oz)   08/22/23 1254 58.1 kg (128 lb)   08/23/23 1928 59 kg (130 lb)   06/07/23 2210 54.4 kg (120 lb)   03/25/23 2214 59 kg (130 lb)   03/25/23 1631 59 kg (130 lb)   11/30/22 1743 61.2 kg (135 lb)   10/21/22 0009 61.2 kg (135 lb)   08/08/22 0922 55.8 kg (123 lb)   08/06/22 2021 55.8 kg (123 lb)   03/05/18 1124 58.1 kg (128 lb)   01/24/18 0456 59 kg (130 lb)   09/27/17 2341 49.9 kg (110 lb)      --  Tests/Procedures        Tests/Procedures CT scan     Labs       Pertinent Labs    Results from last 7 days   Lab Units 09/06/23  0404 09/05/23 2205   SODIUM mmol/L 141 143   POTASSIUM mmol/L 3.7 3.5   CHLORIDE mmol/L 103 102   CO2 mmol/L 24.0 24.0   BUN mg/dL 3* 3*   CREATININE mg/dL 0.60 0.59   CALCIUM mg/dL 8.8 9.4   BILIRUBIN mg/dL  --  0.2   ALK PHOS U/L  --  84   ALT (SGPT) U/L  --  6   AST (SGOT) U/L  --  10   GLUCOSE mg/dL 109* 117*     Results from last 7 days   Lab Units 09/06/23  0404 09/05/23 2205   HEMOGLOBIN g/dL 12.5  --    HEMATOCRIT % 37.0  --    WBC 10*3/mm3 9.49  --    ALBUMIN g/dL  --  4.3     Results from last 7 days   Lab Units 09/06/23  0404 09/06/23  0403 09/05/23  2044   INR   --  0.95  --    PLATELETS 10*3/mm3 209  --  269     COVID19   Date Value Ref Range Status   08/07/2022 Not Detected Not Detected - Ref. Range Final     Lab Results   Component Value Date    HGBA1C 5.10 08/23/2023          Medications           Scheduled Medications folic acid, 1 mg, Oral, Daily  senna-docusate sodium, 2 tablet, Oral, BID  sodium chloride, 10 mL, Intravenous, Q12H  thiamine (B-1) IV, 200 mg, Intravenous, Q8H   Followed by  [START ON 9/11/2023] thiamine, 100 mg, Oral, Daily       Infusions sodium chloride, 125 mL/hr, Last Rate: 125 mL/hr (09/06/23 0224)       PRN Medications   acetaminophen **OR** acetaminophen **OR** acetaminophen    senna-docusate sodium **AND** polyethylene glycol **AND** bisacodyl **AND** bisacodyl    HYDROmorphone **AND** naloxone     LORazepam **OR** LORazepam **OR** LORazepam **OR** LORazepam **OR** LORazepam **OR** LORazepam    Magnesium Standard Dose Replacement - Follow Nurse / BPA Driven Protocol    nitroglycerin    ondansetron    sodium chloride     Physical Findings          Physical Appearance oriented alseep   Oral/Mouth Cavity WNL   Edema  not assessed   Gastrointestinal nausea, last bowel movement: pending   Skin  skin intact   Tubes/Drains/Lines none   NFPE No clinical signs of muscle wasting or fat loss   --  Current Nutrition Orders & Evaluation of Intake       Oral Nutrition     Food Allergies Other: nickel   Current PO Diet Diet: Liquid Diets; Clear Liquid; Texture: Regular Texture (IDDSI 7); Fluid Consistency: Thin (IDDSI 0)   Supplement n/a   PO Evaluation     % PO Intake/# of Days Not well documented   --  PES STATEMENT / NUTRITION DIAGNOSIS      Nutrition Dx Problem  Problem: Altered GI Function  Etiology: Medical Diagnosis - pancreatitis    Signs/Symptoms: Clear Liquid Diet    Comment:    --  NUTRITION INTERVENTION / PLAN OF CARE      Intervention Goal(s) Maintain nutrition status, Reduce/improve symptoms, Meet estimated needs, Tolerate PO , Increase intake, Advance diet, and No significant weight loss         RD Intervention/Action Follow Tx Progress, Care plan reviewed, and Recommend/ordered:          Prescription/Orders:       PO Diet       Supplements Boost breeze with meals      Snacks       Enteral Nutrition       Parenteral Nutrition    New Prescription Ordered? Yes   --      Monitor/Evaluation Per protocol, PO intake, Supplement intake, Pertinent labs, GI status, Symptoms   Discharge Plan/Needs Pending clinical course   Education Will instruct as appropriate   --    RD to follow per protocol.    Electronically signed by:  Daysi Juarez RD  09/06/23 14:02 EDT

## 2023-09-06 NOTE — PLAN OF CARE
Goal Outcome Evaluation:  Plan of Care Reviewed With: patient        Progress: no change  Outcome Evaluation: VSS. Alert and oriented x4 but lethargic and resting with eyes closed most of shift. Up to toilet with stand-by assist. Pt c/o generalized abdominal pain and headache. PRN PO and IV pain meds. CIWA as needed. PRN ativan x1 this shift. Will continue to provide supportive care.

## 2023-09-06 NOTE — CONSULTS
[facility], [facility]  Initial Inpatient Consult Note    Referring Provider: [facility]    Reason for Consultation: acute abdominal pain    Subjective     History of present illness:    42 y.o. female known to our service from previous admissions, that we are asked to see for abdominal pain.    She was admitted with intractable pain.  She was contacted by her gastroenterologist group yesterday and told that her procedure was canceled for today due to not having the necessary equipment.  They plan to reschedule this.  She reports that she did drink alcohol yesterday due to the pain.  She has chronic nausea which is at baseline.  She is tolerating liquids.  She has pain which is in the mid abdomen especially on the left.    Patient was admitted with pancreatitis 2 weeks ago.  Lipase was 232.  Alcohol level was elevated on admission at 111.  Lactate was 5.5.  Creatinine, hemoglobin, white count and liver function test were normal.  CT of the abdomen and pelvis with contrast yesterday shows pancreatic pseudocyst involving the tail of the pancreas.  This is increased in size to 9.9 x 7 cm which is approximately a centimeter larger than it was on last imaging 2 weeks ago.  Pseudocyst communicates with collections within the left adrenal gland.  MRCP 2 weeks ago shows that this pseudocyst communicates with the pancreatic duct.  Fluid collection displaces the stomach anteriorly as well as compresses the left kidney.    Patient has a history of chronic pancreatitis, alcohol abuse, as well as tobacco abuse.  She is followed by gastroenterology of [facility].  She was scheduled for outpatient ERCP with stent placement and EUS at [facility].        Past Medical History:  Past Medical History:   Diagnosis Date    Anxiety     E. coli bacteremia     ETOH abuse     GERD (gastroesophageal reflux disease)     Hiatal hernia     Left flank pain 10/21/2022    Migraine     Miscarriage 2004    Pancreatitis      Past  Surgical History:  Past Surgical History:   Procedure Laterality Date    ENDOSCOPY N/A 8/10/2022    Procedure: ESOPHAGOGASTRODUODENOSCOPY with bxs;  Surgeon: Salvador Price MD;  Location: [facility] ENDOSCOPY;  Service: Gastroenterology;  Laterality: N/A;  Pre: r/o esophageal  varacies, abd pain  Post: esophageal plaque      Social History:   Social History     Tobacco Use    Smoking status: Every Day     Packs/day: 0.50     Types: Cigarettes     Start date: 1/28/1996    Smokeless tobacco: Current   Substance Use Topics    Alcohol use: Not Currently      Family History:  Family History   Problem Relation Age of Onset    Alcohol abuse Mother     Arthritis Mother     Asthma Mother     Miscarriages / Stillbirths Mother     Cancer Father     Diabetes Father     Drug abuse Father     Early death Father     Heart disease Father     Hyperlipidemia Father     Hypertension Father     Alcohol abuse Paternal Aunt     Cancer Paternal Aunt     Diabetes Paternal Aunt     Drug abuse Paternal Aunt     Early death Paternal Aunt     Heart disease Paternal Aunt     Hyperlipidemia Paternal Aunt     Hypertension Paternal Aunt     Alcohol abuse Maternal Grandmother     Alcohol abuse Maternal Grandfather     Alcohol abuse Paternal Grandmother     Cancer Paternal Grandmother     Diabetes Paternal Grandmother     Drug abuse Paternal Grandmother     Early death Paternal Grandmother     Heart disease Paternal Grandmother     Hyperlipidemia Paternal Grandmother     Hypertension Paternal Grandmother     Alcohol abuse Paternal Grandfather        Home Meds:  Medications Prior to Admission   Medication Sig Dispense Refill Last Dose    amitriptyline (ELAVIL) 10 MG tablet Take 2.5 tablets by mouth Every Night.   9/5/2023 at 2200    Cyanocobalamin (VITAMIN B 12 PO) Take  by mouth.   Past Week    Ferrous Sulfate Dried (Feosol) 200 (65 Fe) MG tablet tablet Take 1 tablet by mouth Daily.   9/5/2023 at 0800    folic acid (FOLVITE) 1 MG tablet Take 1 tablet by  mouth Daily. 30 tablet 3 9/5/2023 at 0800    ondansetron (ZOFRAN) 4 MG tablet Take 1 tablet by mouth Every 8 (Eight) Hours As Needed for Nausea or Vomiting.   9/5/2023    oxyCODONE-acetaminophen (PERCOCET)  MG per tablet Take 1 tablet by mouth Every 4 (Four) Hours As Needed for Moderate Pain.   Past Week    pancrelipase, Lip-Prot-Amyl, (CREON) 68619-09700 units capsule delayed-release particles capsule Take 3 capsules by mouth 3 (Three) Times a Day With Meals.   9/5/2023 at 0800    pantoprazole (PROTONIX) 40 MG EC tablet Take 1 tablet by mouth Daily. 30 tablet 3 9/5/2023    thiamine (VITAMIN B1) 100 MG tablet Take 1 tablet by mouth Daily. 30 tablet 3 9/5/2023 at 0800     Current Meds:   folic acid, 1 mg, Oral, Daily  senna-docusate sodium, 2 tablet, Oral, BID  sodium chloride, 10 mL, Intravenous, Q12H  thiamine (B-1) IV, 200 mg, Intravenous, Q8H   Followed by  [START ON 9/11/2023] thiamine, 100 mg, Oral, Daily      Allergies:  Allergies   Allergen Reactions    Nickel      Review of Systems  Pertinent items are noted in HPI, all other systems reviewed and negative     Objective     Vital Signs  Temp:  [98 °F (36.7 °C)-99.1 °F (37.3 °C)] 99.1 °F (37.3 °C)  Heart Rate:  [] 103  Resp:  [20-24] 20  BP: (136-158)/() 136/98  Physical Exam:  General Appearance:    Alert, cooperative, in no acute distress   Head:    Normocephalic, without obvious abnormality, atraumatic   Eyes:          conjunctivae and sclerae normal, no   icterus   Throat:   no thrush, oral mucosa moist   Neck:   Supple, no adenopathy   Lungs:     Clear to auscultation bilaterally    Heart:    Regular rhythm and normal rate    Chest Wall:    No abnormalities observed   Abdomen:     Soft, nondistended, tender in the mid epigastrium extending to the left mid abdomen; normal bowel sounds   Extremities:   no edema, no redness   Skin:   No bruising or rash   Psychiatric:  normal mood and insight     Results Review:   I reviewed the patient's  new clinical results.    Results from last 7 days   Lab Units 09/06/23  0404 09/05/23  2044   WBC 10*3/mm3 9.49 7.08   HEMOGLOBIN g/dL 12.5 14.6   HEMATOCRIT % 37.0 42.1   PLATELETS 10*3/mm3 209 269     Results from last 7 days   Lab Units 09/06/23  0404 09/05/23  2205   SODIUM mmol/L 141 143   POTASSIUM mmol/L 3.7 3.5   CHLORIDE mmol/L 103 102   CO2 mmol/L 24.0 24.0   BUN mg/dL 3* 3*   CREATININE mg/dL 0.60 0.59   CALCIUM mg/dL 8.8 9.4   BILIRUBIN mg/dL  --  0.2   ALK PHOS U/L  --  84   ALT (SGPT) U/L  --  6   AST (SGOT) U/L  --  10   GLUCOSE mg/dL 109* 117*     Results from last 7 days   Lab Units 09/06/23  0403   INR  0.95     Lab Results   Lab Value Date/Time    LIPASE 232 (H) 09/05/2023 2205    LIPASE 64 (H) 08/25/2023 0425    LIPASE 75 (H) 08/24/2023 0324    LIPASE 87 (H) 08/23/2023 0252    LIPASE 115 (H) 08/22/2023 1301    LIPASE 73 (H) 06/08/2023 0652    LIPASE 151 (H) 06/07/2023 2246    LIPASE 192 (H) 03/25/2023 1700    LIPASE 38 12/05/2022 0526    LIPASE 34 12/04/2022 0542    LIPASE 29 12/03/2022 0515    LIPASE 50 12/02/2022 0719    LIPASE 29 12/01/2022 0519    LIPASE 65 (H) 11/30/2022 1412    LIPASE 157 (H) 11/28/2022 1751    LIPASE 335 (H) 10/20/2022 1939    LIPASE 378 (H) 08/06/2022 2158    LIPASE 95 (H) 01/24/2018 0545    LIPASE 93 (H) 10/01/2017 0356    LIPASE 122 (H) 09/30/2017 0619    LIPASE 86 (H) 09/29/2017 1731    LIPASE 133 (H) 09/28/2017 0032       Radiology:  CT Abdomen Pelvis With Contrast   Final Result       1. The patient's previously described pancreatic pseudocyst involving   the tail of the pancreas continues to increase in size when compared to   prior imaging. This collection communicates with smaller collections   within the left adrenal gland. These have also worsened. Furthermore,   there is increasing complex material which is noted abutting the   inferior margin of the spleen.       Radiation dose reduction techniques were utilized, including automated   exposure control and  exposure modulation based on body size.           This report was finalized on 9/5/2023 10:23 PM by Dr. Camilla Whalen M.D.              Assessment & Plan   Active Hospital Problems    Diagnosis     **Acute abdominal pain     Tobacco abuse     Pancreatic pseudocyst     Chronic pancreatitis     Anemia of chronic disease     Thrombocytopenia     Alcohol abuse        Assessment:  acute on chronic abdominal pain secondary to chronic pancreatitis and enlarging pancreatic pseudocyst  History of alcohol abuse    Plan:  Discussed with Dr. Denson her gastroenterologist in [facility]-they are working to reschedule her procedure for early next week.  Recommend pain control per primary team  Advance to low-fat diet as tolerated  Ultimately needs endoscopic drainage of her cyst through [facility] gastroenterology      I discussed the patients findings and my recommendations with patient.    Rachell Madden MD

## 2023-09-06 NOTE — ED NOTES
".Nursing report ED to floor  Piper Cain  42 y.o.  female    HPI :   Chief Complaint   Patient presents with    Abdominal Pain       Admitting doctor:   Faisal Baird MD    Admitting diagnosis:   The primary encounter diagnosis was Acute on chronic abdominal pain. Diagnoses of Acute on chronic pancreatitis, Alcohol use, and Severe sepsis were also pertinent to this visit.    Code status:   Current Code Status       Date Active Code Status Order ID Comments User Context       Prior            Allergies:   Nickel    Isolation:   No active isolations    Intake and Output    Intake/Output Summary (Last 24 hours) at 9/5/2023 2334  Last data filed at 9/5/2023 2328  Gross per 24 hour   Intake 50 ml   Output --   Net 50 ml       Weight:       09/05/23 1953   Weight: 59 kg (130 lb)       Most recent vitals:   Vitals:    09/05/23 1953   BP: 158/82   BP Location: Right arm   Patient Position: Lying   Pulse: 108   Resp: 24   Temp: 98 °F (36.7 °C)   TempSrc: Oral   SpO2: 99%   Weight: 59 kg (130 lb)   Height: 175.3 cm (69.02\")       Active LDAs/IV Access:   Lines, Drains & Airways       Active LDAs       Name Placement date Placement time Site Days    Peripheral IV 09/05/23 2045 Left Antecubital 09/05/23 2045  Antecubital  less than 1                    Labs (abnormal labs have a star):   Labs Reviewed   COMPREHENSIVE METABOLIC PANEL - Abnormal; Notable for the following components:       Result Value    Glucose 117 (*)     BUN 3 (*)     BUN/Creatinine Ratio 5.1 (*)     Anion Gap 17.0 (*)     All other components within normal limits    Narrative:     GFR Normal >60  Chronic Kidney Disease <60  Kidney Failure <15     LIPASE - Abnormal; Notable for the following components:    Lipase 232 (*)     All other components within normal limits   ETHANOL - Abnormal; Notable for the following components:    Ethanol 111 (*)     All other components within normal limits   LACTIC ACID, PLASMA - Abnormal; Notable for the following " "components:    Lactate 2.4 (*)     All other components within normal limits   CBC WITH AUTO DIFFERENTIAL - Abnormal; Notable for the following components:    MCV 99.3 (*)     MCH 34.4 (*)     All other components within normal limits   LACTIC ACID, REFLEX - Abnormal; Notable for the following components:    Lactate 5.5 (*)     All other components within normal limits   HCG, SERUM, QUALITATIVE - Normal   PROCALCITONIN - Normal    Narrative:     As a Marker for Sepsis (Non-Neonates):    1. <0.5 ng/mL represents a low risk of severe sepsis and/or septic shock.  2. >2 ng/mL represents a high risk of severe sepsis and/or septic shock.    As a Marker for Lower Respiratory Tract Infections that require antibiotic therapy:    PCT on Admission    Antibiotic Therapy       6-12 Hrs later    >0.5                Strongly Recommended  >0.25 - <0.5        Recommended   0.1 - 0.25          Discouraged              Remeasure/reassess PCT  <0.1                Strongly Discouraged     Remeasure/reassess PCT    As 28 day mortality risk marker: \"Change in Procalcitonin Result\" (>80% or <=80%) if Day 0 (or Day 1) and Day 4 values are available. Refer to http://www.[email or web address]p[place]    Change in PCT <=80%  A decrease of PCT levels below or equal to 80% defines a positive change in PCT test result representing a higher risk for 28-day all-cause mortality of patients diagnosed with severe sepsis for septic shock.    Change in PCT >80%  A decrease of PCT levels of more than 80% defines a negative change in PCT result representing a lower risk for 28-day all-cause mortality of patients diagnosed with severe sepsis or septic shock.      BLOOD CULTURE   BLOOD CULTURE   URINALYSIS W/ MICROSCOPIC IF INDICATED (NO CULTURE)   URINE DRUG SCREEN   BLOOD GAS, VENOUS   LACTIC ACID, REFLEX   CBC AND DIFFERENTIAL    Narrative:     The following orders were created for panel order CBC & Differential.  Procedure                               " Abnormality         Status                     ---------                               -----------         ------                     CBC Auto Differential[[id number]]        Abnormal            Final result                 Please view results for these tests on the individual orders.       EKG:   No orders to display       Meds given in ED:   Medications   sodium chloride 0.9 % flush 10 mL (has no administration in time range)   sodium chloride 0.9 % bolus 1,770 mL (has no administration in time range)   piperacillin-tazobactam (ZOSYN) 4.5 g in iso-osmotic dextrose 100 mL IVPB (premix) (has no administration in time range)   sodium chloride 0.9 % infusion 1,000 mL (1,000 mL Intravenous New Bag 9/5/23 2235)   folic acid 1 mg in sodium chloride 0.9 % 50 mL IVPB (0 mg Intravenous Stopped 9/5/23 2328)   thiamine (B-1) injection 200 mg (200 mg Intravenous Given 9/5/23 2235)   HYDROmorphone (DILAUDID) injection 0.5 mg (0.5 mg Intravenous Given 9/5/23 2045)   metoclopramide (REGLAN) injection 10 mg (10 mg Intravenous Given 9/5/23 2046)   famotidine (PEPCID) injection 20 mg (20 mg Intravenous Given 9/5/23 2046)   iopamidol (ISOVUE-300) 61 % injection 85 mL (85 mL Intravenous Given by Other 9/5/23 2147)   HYDROmorphone (DILAUDID) injection 0.5 mg (0.5 mg Intravenous Given 9/5/23 2236)       Imaging results:  CT Abdomen Pelvis With Contrast    Result Date: 9/5/2023   1. The patient's previously described pancreatic pseudocyst involving the tail of the pancreas continues to increase in size when compared to prior imaging. This collection communicates with smaller collections within the left adrenal gland. These have also worsened. Furthermore, there is increasing complex material which is noted abutting the inferior margin of the spleen.  Radiation dose reduction techniques were utilized, including automated exposure control and exposure modulation based on body size.   This report was finalized on 9/5/2023 10:23 PM by   Camilla Whalen M.D.       Ambulatory status:   - up with assistance    Social issues:   Social History     Socioeconomic History    Marital status: Single   Tobacco Use    Smoking status: Every Day     Packs/day: 0.50     Types: Cigarettes     Start date: 1/28/1996    Smokeless tobacco: Current   Vaping Use    Vaping Use: Never used   Substance and Sexual Activity    Alcohol use: Never    Drug use: No    Sexual activity: Yes     Partners: Male     Birth control/protection: None     Comment: IUD removed 6/29/2017       NIH Stroke Scale:       Marianna Hollins RN  09/05/23 23:34 EDT    \

## 2023-09-06 NOTE — H&P
HISTORY AND PHYSICAL   [facility]        Patient Identification:  Name: Piper Cain  Age: 42 y.o.  Sex: female  :  1980  MRN: [id number]                     Primary Care Physician: Rachell Pozo APRN    Chief Complaint: Abdominal pain with nausea and vomiting    History of Present Illness:         The patient is a 42-year-old female with history of alcohol abuse and chronic pancreatitis admitted with history of worsening abdominal pain with nausea and vomiting.  She has had acute on chronic pancreatitis and has pancreatic pseudocyst which has been getting bigger.  She follows with [facility] and was supposed to get a stent placed but they were not able to obtain the stent.  The patient was evaluated in the ER and admitted for further evaluation treatment with flareup of acute on chronic pancreatitis.    Past Medical History:  Past Medical History:   Diagnosis Date    Anxiety     E. coli bacteremia     ETOH abuse     GERD (gastroesophageal reflux disease)     Hiatal hernia     Left flank pain 10/21/2022    Migraine     Miscarriage 2004    Pancreatitis      Past Surgical History:  Past Surgical History:   Procedure Laterality Date    ENDOSCOPY N/A 8/10/2022    Procedure: ESOPHAGOGASTRODUODENOSCOPY with bxs;  Surgeon: Salvador Price MD;  Location: [facility] ENDOSCOPY;  Service: Gastroenterology;  Laterality: N/A;  Pre: r/o esophageal  varacies, abd pain  Post: esophageal plaque      Home Meds:  Medications Prior to Admission   Medication Sig Dispense Refill Last Dose    amitriptyline (ELAVIL) 10 MG tablet Take 2.5 tablets by mouth Every Night.   2023 at 2200    Cyanocobalamin (VITAMIN B 12 PO) Take  by mouth.   Past Week    Ferrous Sulfate Dried (Feosol) 200 (65 Fe) MG tablet tablet Take 1 tablet by mouth Daily.   2023 at 0800    folic acid (FOLVITE) 1 MG tablet Take 1 tablet by mouth Daily. 30 tablet 3 2023 at 0800    ondansetron (ZOFRAN) 4 MG  tablet Take 1 tablet by mouth Every 8 (Eight) Hours As Needed for Nausea or Vomiting.   9/5/2023    oxyCODONE-acetaminophen (PERCOCET)  MG per tablet Take 1 tablet by mouth Every 4 (Four) Hours As Needed for Moderate Pain.   Past Week    pancrelipase, Lip-Prot-Amyl, (CREON) 10076-24295 units capsule delayed-release particles capsule Take 3 capsules by mouth 3 (Three) Times a Day With Meals.   9/5/2023 at 0800    pantoprazole (PROTONIX) 40 MG EC tablet Take 1 tablet by mouth Daily. 30 tablet 3 9/5/2023    thiamine (VITAMIN B1) 100 MG tablet Take 1 tablet by mouth Daily. 30 tablet 3 9/5/2023 at 0800     Current meds    Current Facility-Administered Medications:     acetaminophen (TYLENOL) tablet 650 mg, 650 mg, Oral, Q4H PRN **OR** acetaminophen (TYLENOL) 160 MG/5ML solution 650 mg, 650 mg, Oral, Q4H PRN **OR** acetaminophen (TYLENOL) suppository 650 mg, 650 mg, Rectal, Q4H PRN, Evelin Weiss APRN    sennosides-docusate (PERICOLACE) 8.6-50 MG per tablet 2 tablet, 2 tablet, Oral, BID, 2 tablet at 09/06/23 0809 **AND** polyethylene glycol (MIRALAX) packet 17 g, 17 g, Oral, Daily PRN **AND** bisacodyl (DULCOLAX) EC tablet 5 mg, 5 mg, Oral, Daily PRN **AND** bisacodyl (DULCOLAX) suppository 10 mg, 10 mg, Rectal, Daily PRN, Evelin Weiss APRN    folic acid (FOLVITE) tablet 1 mg, 1 mg, Oral, Daily, Evelin Weiss APRN, 1 mg at 09/06/23 0958    HYDROmorphone (DILAUDID) injection 0.5 mg, 0.5 mg, Intravenous, Q2H PRN, 0.5 mg at 09/06/23 1424 **AND** naloxone (NARCAN) injection 0.4 mg, 0.4 mg, Intravenous, Q5 Min PRN, Abhishek, Evelin M, APRN    LORazepam (ATIVAN) tablet 1 mg, 1 mg, Oral, Q1H PRN, 1 mg at 09/06/23 1012 **OR** LORazepam (ATIVAN) injection 1 mg, 1 mg, Intravenous, Q1H PRN **OR** LORazepam (ATIVAN) tablet 2 mg, 2 mg, Oral, Q1H PRN **OR** LORazepam (ATIVAN) injection 2 mg, 2 mg, Intravenous, Q1H PRN, 2 mg at 09/06/23 0655 **OR** LORazepam (ATIVAN) injection 2 mg, 2 mg, Intravenous, Q15  Min PRN **OR** LORazepam (ATIVAN) injection 2 mg, 2 mg, Intramuscular, Q15 Min PRN, Eevlin Weiss, APRN    Magnesium Standard Dose Replacement - Follow Nurse / BPA Driven Protocol, , Does not apply, PRN, Evelin Weiss, APRN    nitroglycerin (NITROSTAT) SL tablet 0.4 mg, 0.4 mg, Sublingual, Q5 Min PRN, Evelin Weiss, APRN    ondansetron (ZOFRAN) injection 4 mg, 4 mg, Intravenous, Q6H PRN, Evelin Weiss, APRN, 4 mg at 09/06/23 0307    sodium chloride 0.9 % flush 10 mL, 10 mL, Intravenous, Q12H, Evelin Weiss, APRN, 10 mL at 09/06/23 0810    sodium chloride 0.9 % infusion 40 mL, 40 mL, Intravenous, PRN, Evelin Weiss, APRN    sodium chloride 0.9 % infusion, 125 mL/hr, Intravenous, Continuous, Evelin Weiss, APRN, Last Rate: 125 mL/hr at 09/06/23 1424, 125 mL/hr at 09/06/23 1424    thiamine (B-1) injection 200 mg, 200 mg, Intravenous, Q8H, 200 mg at 09/06/23 1424 **FOLLOWED BY** [START ON 9/11/2023] thiamine (VITAMIN B-1) tablet 100 mg, 100 mg, Oral, Daily, Evelin Weiss, APRN  Allergies:  Allergies   Allergen Reactions    Nickel      Immunizations:    There is no immunization history on file for this patient.  Social History:   Social History     Social History Narrative    Not on file     Social History     Socioeconomic History    Marital status: Single   Tobacco Use    Smoking status: Every Day     Packs/day: 0.50     Types: Cigarettes     Start date: 1/28/1996    Smokeless tobacco: Current   Vaping Use    Vaping Use: Never used   Substance and Sexual Activity    Alcohol use: Not Currently    Drug use: No    Sexual activity: Yes     Partners: Male     Birth control/protection: None     Comment: IUD removed 6/29/2017       Family History:  Family History   Problem Relation Age of Onset    Alcohol abuse Mother     Arthritis Mother     Asthma Mother     Miscarriages / Stillbirths Mother     Cancer Father     Diabetes Father     Drug abuse Father     Early death Father      "Heart disease Father     Hyperlipidemia Father     Hypertension Father     Alcohol abuse Paternal Aunt     Cancer Paternal Aunt     Diabetes Paternal Aunt     Drug abuse Paternal Aunt     Early death Paternal Aunt     Heart disease Paternal Aunt     Hyperlipidemia Paternal Aunt     Hypertension Paternal Aunt     Alcohol abuse Maternal Grandmother     Alcohol abuse Maternal Grandfather     Alcohol abuse Paternal Grandmother     Cancer Paternal Grandmother     Diabetes Paternal Grandmother     Drug abuse Paternal Grandmother     Early death Paternal Grandmother     Heart disease Paternal Grandmother     Hyperlipidemia Paternal Grandmother     Hypertension Paternal Grandmother     Alcohol abuse Paternal Grandfather         Review of Systems  See history of present illness and past medical history.  Patient denies headache, dizziness, syncope, falls, trauma, change in vision, change in hearing, change in taste, changes in weight, changes in appetite, focal weakness, numbness, or paresthesia.  Patient denies chest pain, palpitations, dyspnea, orthopnea, PND, cough, sinus pressure, rhinorrhea, epistaxis, hemoptysis, nausea, vomiting, hematemesis, diarrhea, constipation or hematochezia. Denies cold or heat intolerance, polydipsia, polyuria, polyphagia. Denies hematuria, pyuria, dysuria, hesitancy, frequency or urgency.  Denies fever, chills, sweats, night sweats.  Denies missing any routine medications. Remainder of ROS is negative.    Objective:  tMax 24 hrs: Temp (24hrs), Av.6 °F (37 °C), Min:98 °F (36.7 °C), Max:99.1 °F (37.3 °C)    Vitals Ranges:   Temp:  [98 °F (36.7 °C)-99.1 °F (37.3 °C)] 98.7 °F (37.1 °C)  Heart Rate:  [] 94  Resp:  [20-24] 20  BP: (136-158)/() 139/96      Exam:  /96 (BP Location: Left arm, Patient Position: Lying)   Pulse 94   Temp 98.7 °F (37.1 °C) (Oral)   Resp 20   Ht 175.3 cm (69.02\")   Wt 59 kg (130 lb)   LMP 2018 (Exact Date)   SpO2 98%   BMI 19.19 kg/m² "     General Appearance:    Alert, cooperative, no distress, appears stated age   Head:    Normocephalic, without obvious abnormality, atraumatic   Eyes:    PERRL, conjunctivae/corneas clear, EOM's intact, both eyes   Ears:    Normal external ear canals, both ears   Nose:   Nares normal, septum midline, mucosa normal, no drainage    or sinus tenderness   Throat:   Lips, mucosa, and tongue normal   Neck:   Supple, symmetrical, trachea midline, no adenopathy;     thyroid:  no enlargement/tenderness/nodules; no carotid    bruit or JVD   Back:     Symmetric, no curvature, ROM normal, no CVA tenderness   Lungs:     Clear to auscultation bilaterally, respirations unlabored   Chest Wall:    No tenderness or deformity    Heart:    Regular rate and rhythm, S1 and S2 normal, no murmur, rub   or gallop   Abdomen:     Soft, upper abdominal tenderness, bowel sounds active all four quadrants,     no masses, no hepatomegaly, no splenomegaly   Extremities:   Extremities normal, atraumatic, no cyanosis or edema   Pulses:   2+ and symmetric all extremities   Skin:   Skin color, texture, turgor normal, no rashes or lesions   Lymph nodes:   Cervical, supraclavicular, and axillary nodes normal   Neurologic:   CNII-XII intact, normal strength, sensation intact throughout      .    Data Review:  Lab Results (last 72 hours)       Procedure Component Value Units Date/Time    CBC Auto Differential [[id number]]  (Abnormal) Collected: 09/06/23 0404    Specimen: Blood Updated: 09/06/23 0502     WBC 9.49 10*3/mm3      RBC 3.63 10*6/mm3      Hemoglobin 12.5 g/dL      Hematocrit 37.0 %      .9 fL      MCH 34.4 pg      MCHC 33.8 g/dL      RDW 13.2 %      RDW-SD 50.0 fl      MPV 9.7 fL      Platelets 209 10*3/mm3      Neutrophil % 72.5 %      Lymphocyte % 17.3 %      Monocyte % 9.2 %      Eosinophil % 0.4 %      Basophil % 0.4 %      Immature Grans % 0.2 %      Neutrophils, Absolute 6.88 10*3/mm3      Lymphocytes, Absolute 1.64 10*3/mm3       Monocytes, Absolute 0.87 10*3/mm3      Eosinophils, Absolute 0.04 10*3/mm3      Basophils, Absolute 0.04 10*3/mm3      Immature Grans, Absolute 0.02 10*3/mm3      nRBC 0.0 /100 WBC     Basic Metabolic Panel [[id number]]  (Abnormal) Collected: 09/06/23 0404    Specimen: Blood Updated: 09/06/23 0436     Glucose 109 mg/dL      BUN 3 mg/dL      Creatinine 0.60 mg/dL      Sodium 141 mmol/L      Potassium 3.7 mmol/L      Chloride 103 mmol/L      CO2 24.0 mmol/L      Calcium 8.8 mg/dL      BUN/Creatinine Ratio 5.0     Anion Gap 14.0 mmol/L      eGFR 116.5 mL/min/1.73     Narrative:      GFR Normal >60  Chronic Kidney Disease <60  Kidney Failure <15      STAT Lactic Acid, Reflex [[id number]]  (Normal) Collected: 09/06/23 0404    Specimen: Blood Updated: 09/06/23 0434     Lactate 0.7 mmol/L     Protime-INR [[id number]]  (Normal) Collected: 09/06/23 0403    Specimen: Blood Updated: 09/06/23 0427     Protime 12.8 Seconds      INR 0.95    Urine Drug Screen - Urine, Clean Catch [[id number]]  (Abnormal) Collected: 09/06/23 0005    Specimen: Urine, Clean Catch Updated: 09/06/23 0257     Amphet/Methamphet, Screen Negative     Barbiturates Screen, Urine Negative     Benzodiazepine Screen, Urine Negative     Cocaine Screen, Urine Negative     Opiate Screen Negative     THC, Screen, Urine Negative     Methadone Screen, Urine Negative     Oxycodone Screen, Urine Positive     Fentanyl, Urine Negative    Narrative:      Negative Thresholds Per Drugs Screened:    Amphetamines                 500 ng/ml  Barbiturates                 200 ng/ml  Benzodiazepines              100 ng/ml  Cocaine                      300 ng/ml  Methadone                    300 ng/ml  Opiates                      300 ng/ml  Oxycodone                    100 ng/ml  THC                           50 ng/ml  Fentanyl                       5 ng/ml      The Normal Value for all drugs tested is negative. This report includes final unconfirmed screening results to be used for  medical treatment purposes only. Unconfirmed results must not be used for non-medical purposes such as employment or legal testing. Clinical consideration should be applied to any drug of abuse test, particularly when unconfirmed results are used.            STAT Lactic Acid, Reflex [[id number]]  (Abnormal) Collected: 09/06/23 0117    Specimen: Blood Updated: 09/06/23 0222     Lactate 3.0 mmol/L     Urinalysis With Microscopic If Indicated (No Culture) - Urine, Clean Catch [[id number]]  (Normal) Collected: 09/06/23 0005    Specimen: Urine, Clean Catch Updated: 09/06/23 0058     Color, UA Yellow     Appearance, UA Clear     pH, UA 7.5     Specific Gravity, UA >=1.030     Glucose, UA Negative     Ketones, UA Negative     Bilirubin, UA Negative     Blood, UA Negative     Protein, UA Negative     Leuk Esterase, UA Negative     Nitrite, UA Negative     Urobilinogen, UA 0.2 E.U./dL    Narrative:      Urine microscopic not indicated.    Blood Culture - Blood, Arm, Left [[id number]] Collected: 09/05/23 2355    Specimen: Blood from Arm, Left Updated: 09/06/23 0045    Blood Culture - Blood, Arm, Left [[id number]] Collected: 09/06/23 0012    Specimen: Blood from Arm, Left Updated: 09/06/23 0045    Procalcitonin [[id number]]  (Normal) Collected: 09/05/23 2205    Specimen: Blood from Hand, Left Updated: 09/05/23 2251     Procalcitonin 0.04 ng/mL     Narrative:      As a Marker for Sepsis (Non-Neonates):    1. <0.5 ng/mL represents a low risk of severe sepsis and/or septic shock.  2. >2 ng/mL represents a high risk of severe sepsis and/or septic shock.    As a Marker for Lower Respiratory Tract Infections that require antibiotic therapy:    PCT on Admission    Antibiotic Therapy       6-12 Hrs later    >0.5                Strongly Recommended  >0.25 - <0.5        Recommended   0.1 - 0.25          Discouraged              Remeasure/reassess PCT  <0.1                Strongly Discouraged     Remeasure/reassess PCT    As 28 day mortality  "risk marker: \"Change in Procalcitonin Result\" (>80% or <=80%) if Day 0 (or Day 1) and Day 4 values are available. Refer to [email or web address] [email or web address]    Change in PCT <=80%  A decrease of PCT levels below or equal to 80% defines a positive change in PCT test result representing a higher risk for 28-day all-cause mortality of patients diagnosed with severe sepsis for septic shock.    Change in PCT >80%  A decrease of PCT levels of more than 80% defines a negative change in PCT result representing a lower risk for 28-day all-cause mortality of patients diagnosed with severe sepsis or septic shock.       STAT Lactic Acid, Reflex [[id number]]  (Abnormal) Collected: 09/05/23 2205    Specimen: Blood from Hand, Left Updated: 09/05/23 2246     Lactate 5.5 mmol/L     Comprehensive Metabolic Panel [[id number]]  (Abnormal) Collected: 09/05/23 2205    Specimen: Blood from Hand, Left Updated: 09/05/23 2244     Glucose 117 mg/dL      BUN 3 mg/dL      Creatinine 0.59 mg/dL      Sodium 143 mmol/L      Potassium 3.5 mmol/L      Chloride 102 mmol/L      CO2 24.0 mmol/L      Calcium 9.4 mg/dL      Total Protein 7.3 g/dL      Albumin 4.3 g/dL      ALT (SGPT) 6 U/L      AST (SGOT) 10 U/L      Alkaline Phosphatase 84 U/L      Total Bilirubin 0.2 mg/dL      Globulin 3.0 gm/dL      A/G Ratio 1.4 g/dL      BUN/Creatinine Ratio 5.1     Anion Gap 17.0 mmol/L      eGFR 117.0 mL/min/1.73     Narrative:      GFR Normal >60  Chronic Kidney Disease <60  Kidney Failure <15      Lipase [[id number]]  (Abnormal) Collected: 09/05/23 2205    Specimen: Blood from Hand, Left Updated: 09/05/23 2244     Lipase 232 U/L     Ethanol [[id number]]  (Abnormal) Collected: 09/05/23 2205    Specimen: Blood from Hand, Left Updated: 09/05/23 2244     Ethanol 111 mg/dL      Ethanol % 0.111 %     Lactic Acid, Plasma [[id number]]  (Abnormal) Collected: 09/05/23 2044    Specimen: Blood Updated: 09/05/23 2138     Lactate 2.4 mmol/L     hCG, Serum, Qualitative " [[id number]]  (Normal) Collected: 09/05/23 2044    Specimen: Blood Updated: 09/05/23 2112     HCG Qualitative Negative    CBC & Differential [[id number]]  (Abnormal) Collected: 09/05/23 2044    Specimen: Blood Updated: 09/05/23 2100    Narrative:      The following orders were created for panel order CBC & Differential.  Procedure                               Abnormality         Status                     ---------                               -----------         ------                     CBC Auto Differential[[id number]]        Abnormal            Final result                 Please view results for these tests on the individual orders.    CBC Auto Differential [[id number]]  (Abnormal) Collected: 09/05/23 2044    Specimen: Blood Updated: 09/05/23 2100     WBC 7.08 10*3/mm3      RBC 4.24 10*6/mm3      Hemoglobin 14.6 g/dL      Hematocrit 42.1 %      MCV 99.3 fL      MCH 34.4 pg      MCHC 34.7 g/dL      RDW 13.3 %      RDW-SD 47.8 fl      MPV 9.7 fL      Platelets 269 10*3/mm3      Neutrophil % 65.3 %      Lymphocyte % 25.8 %      Monocyte % 6.6 %      Eosinophil % 1.3 %      Basophil % 0.7 %      Immature Grans % 0.3 %      Neutrophils, Absolute 4.62 10*3/mm3      Lymphocytes, Absolute 1.83 10*3/mm3      Monocytes, Absolute 0.47 10*3/mm3      Eosinophils, Absolute 0.09 10*3/mm3      Basophils, Absolute 0.05 10*3/mm3      Immature Grans, Absolute 0.02 10*3/mm3      nRBC 0.0 /100 WBC                      Imaging Results (All)       Procedure Component Value Units Date/Time    CT Abdomen Pelvis With Contrast [[id number]] Collected: 09/05/23 2212     Updated: 09/05/23 2226    Narrative:      CT OF THE ABDOMEN AND PELVIS WITH CONTRAST     HISTORY: Acute on chronic abdominal pain     COMPARISON: August 22, 2023     TECHNIQUE: Axial CT imaging was obtained through the abdomen and pelvis.  IV contrast was administered.     FINDINGS:  Images through the lung bases are clear. No suspicious hepatic lesions  are seen. Gallbladder  appears normal. The pancreas is atrophic, with  pancreatic calcifications, in keeping with chronic pancreatitis. Some of  these appear to be intraductal stones. Pancreatic ductal size is stable,  measuring up to 5 mm. The patient has a pancreatic pseudocyst, involving  the tail of the pancreas. This measures up to 9.9 x 7.0 cm, which is  larger than on prior study, when it measured 8.6 x 6.2 cm. As was  previously discussed, the pseudocyst communicates with collections  within the the left adrenal gland. The largest collection within the  left adrenal gland measures up to 2.6 cm. This is also larger than on  prior study, when it measured 2.2 cm. MRCP showed communication of the  pseudocyst with the pancreatic duct. There is a rind of complex inferior  perisplenic fluid. This is again seen on the current study. It has  increased in size when compared to prior study, now measuring up to 3.5  cm in length, previously measuring 1.8 cm. The collections are deforming  the contour of the left kidney. There is also some displacement of the  stomach anteriorly. The splenic vein remains patent at the portal  splenic confluence, although the splenic vein is attenuated near the  hilum, with multiple collaterals noted. There is no evidence of gastric  outlet obstruction. Kidneys appear to enhance symmetrically. No  hydronephrosis is seen. There our some aortic calcifications. No distal  ureteral or bladder stones are seen. Uterus is absent. There is no bowel  obstruction. The appendix is normal. There is a retroaortic left renal  vein. No acute osseous abnormalities are seen.       Impression:         1. The patient's previously described pancreatic pseudocyst involving  the tail of the pancreas continues to increase in size when compared to  prior imaging. This collection communicates with smaller collections  within the left adrenal gland. These have also worsened. Furthermore,  there is increasing complex material which is  noted abutting the  inferior margin of the spleen.     Radiation dose reduction techniques were utilized, including automated  exposure control and exposure modulation based on body size.        This report was finalized on 9/5/2023 10:23 PM by Dr. Camilla Whalen M.D.             Past Medical History:   Diagnosis Date    Anxiety     E. coli bacteremia     ETOH abuse     GERD (gastroesophageal reflux disease)     Hiatal hernia     Left flank pain 10/21/2022    Migraine     Miscarriage 2004    Pancreatitis        Assessment:  Active Hospital Problems    Diagnosis  POA    **Acute abdominal pain [R10.9]  Yes    Tobacco abuse [Z72.0]  Yes    Pancreatic pseudocyst [K86.3]  Yes    Chronic pancreatitis [K86.1]  Yes    Anemia of chronic disease [D63.8]  Yes    Thrombocytopenia [D69.6]  Yes    Alcohol abuse [F10.10]  Yes      Resolved Hospital Problems   No resolved problems to display.       Plan:  The patient is admitted to the hospital and will continue with IV fluid hydration and pain and nausea medicine.  GI medicine has been consulted for further evaluation treatment.  We will get follow-up lab studies.  She was encouraged to stop drinking alcoholic beverages.    Fletcher Gallegos MD  9/6/2023  15:38 EDT

## 2023-09-07 ENCOUNTER — READMISSION MANAGEMENT (OUTPATIENT)
Dept: CALL CENTER | Facility: HOSPITAL | Age: 43
End: 2023-09-07
Payer: MEDICAID

## 2023-09-07 VITALS
BODY MASS INDEX: 19.26 KG/M2 | WEIGHT: 130 LBS | RESPIRATION RATE: 16 BRPM | TEMPERATURE: 97.3 F | HEIGHT: 69 IN | OXYGEN SATURATION: 97 % | HEART RATE: 93 BPM | SYSTOLIC BLOOD PRESSURE: 133 MMHG | DIASTOLIC BLOOD PRESSURE: 91 MMHG

## 2023-09-07 LAB
ALBUMIN SERPL-MCNC: 3.4 G/DL (ref 3.5–5.2)
ALBUMIN/GLOB SERPL: 1.2 G/DL
ALP SERPL-CCNC: 66 U/L (ref 39–117)
ALT SERPL W P-5'-P-CCNC: 5 U/L (ref 1–33)
ANION GAP SERPL CALCULATED.3IONS-SCNC: 8.7 MMOL/L (ref 5–15)
AST SERPL-CCNC: 6 U/L (ref 1–32)
BASOPHILS # BLD AUTO: 0.02 10*3/MM3 (ref 0–0.2)
BASOPHILS NFR BLD AUTO: 0.4 % (ref 0–1.5)
BILIRUB SERPL-MCNC: 0.5 MG/DL (ref 0–1.2)
BUN SERPL-MCNC: 3 MG/DL (ref 6–20)
BUN/CREAT SERPL: 5.2 (ref 7–25)
CALCIUM SPEC-SCNC: 8.8 MG/DL (ref 8.6–10.5)
CHLORIDE SERPL-SCNC: 103 MMOL/L (ref 98–107)
CO2 SERPL-SCNC: 27.3 MMOL/L (ref 22–29)
CREAT SERPL-MCNC: 0.58 MG/DL (ref 0.57–1)
DEPRECATED RDW RBC AUTO: 49.2 FL (ref 37–54)
EGFRCR SERPLBLD CKD-EPI 2021: 117.5 ML/MIN/1.73
EOSINOPHIL # BLD AUTO: 0.2 10*3/MM3 (ref 0–0.4)
EOSINOPHIL NFR BLD AUTO: 3.7 % (ref 0.3–6.2)
ERYTHROCYTE [DISTWIDTH] IN BLOOD BY AUTOMATED COUNT: 13.1 % (ref 12.3–15.4)
GLOBULIN UR ELPH-MCNC: 2.8 GM/DL
GLUCOSE SERPL-MCNC: 102 MG/DL (ref 65–99)
HCT VFR BLD AUTO: 33.2 % (ref 34–46.6)
HGB BLD-MCNC: 11.4 G/DL (ref 12–15.9)
IMM GRANULOCYTES # BLD AUTO: 0.02 10*3/MM3 (ref 0–0.05)
IMM GRANULOCYTES NFR BLD AUTO: 0.4 % (ref 0–0.5)
LIPASE SERPL-CCNC: 99 U/L (ref 13–60)
LYMPHOCYTES # BLD AUTO: 1.44 10*3/MM3 (ref 0.7–3.1)
LYMPHOCYTES NFR BLD AUTO: 26.3 % (ref 19.6–45.3)
MCH RBC QN AUTO: 34.8 PG (ref 26.6–33)
MCHC RBC AUTO-ENTMCNC: 34.3 G/DL (ref 31.5–35.7)
MCV RBC AUTO: 101.2 FL (ref 79–97)
MONOCYTES # BLD AUTO: 0.62 10*3/MM3 (ref 0.1–0.9)
MONOCYTES NFR BLD AUTO: 11.3 % (ref 5–12)
NEUTROPHILS NFR BLD AUTO: 3.17 10*3/MM3 (ref 1.7–7)
NEUTROPHILS NFR BLD AUTO: 57.9 % (ref 42.7–76)
NRBC BLD AUTO-RTO: 0 /100 WBC (ref 0–0.2)
PLATELET # BLD AUTO: 151 10*3/MM3 (ref 140–450)
PMV BLD AUTO: 9.6 FL (ref 6–12)
POTASSIUM SERPL-SCNC: 3.8 MMOL/L (ref 3.5–5.2)
PROT SERPL-MCNC: 6.2 G/DL (ref 6–8.5)
RBC # BLD AUTO: 3.28 10*6/MM3 (ref 3.77–5.28)
SODIUM SERPL-SCNC: 139 MMOL/L (ref 136–145)
WBC NRBC COR # BLD: 5.47 10*3/MM3 (ref 3.4–10.8)

## 2023-09-07 PROCEDURE — 85025 COMPLETE CBC W/AUTO DIFF WBC: CPT | Performed by: HOSPITALIST

## 2023-09-07 PROCEDURE — 99232 SBSQ HOSP IP/OBS MODERATE 35: CPT | Performed by: INTERNAL MEDICINE

## 2023-09-07 PROCEDURE — 25010000002 THIAMINE HCL 200 MG/2ML SOLUTION: Performed by: NURSE PRACTITIONER

## 2023-09-07 PROCEDURE — 80053 COMPREHEN METABOLIC PANEL: CPT | Performed by: HOSPITALIST

## 2023-09-07 PROCEDURE — 25010000002 HYDROMORPHONE PER 4 MG: Performed by: NURSE PRACTITIONER

## 2023-09-07 PROCEDURE — 83690 ASSAY OF LIPASE: CPT | Performed by: HOSPITALIST

## 2023-09-07 RX ORDER — OXYCODONE AND ACETAMINOPHEN 10; 325 MG/1; MG/1
1 TABLET ORAL EVERY 4 HOURS PRN
Qty: 30 TABLET | Refills: 0 | Status: SHIPPED | OUTPATIENT
Start: 2023-09-07

## 2023-09-07 RX ADMIN — FOLIC ACID 1 MG: 1 TABLET ORAL at 08:59

## 2023-09-07 RX ADMIN — PANTOPRAZOLE SODIUM 40 MG: 40 TABLET, DELAYED RELEASE ORAL at 08:59

## 2023-09-07 RX ADMIN — LORAZEPAM 1 MG: 1 TABLET ORAL at 10:59

## 2023-09-07 RX ADMIN — THIAMINE HYDROCHLORIDE 200 MG: 100 INJECTION, SOLUTION INTRAMUSCULAR; INTRAVENOUS at 08:59

## 2023-09-07 RX ADMIN — OXYCODONE AND ACETAMINOPHEN 1 TABLET: 10; 325 TABLET ORAL at 08:59

## 2023-09-07 RX ADMIN — HYDROMORPHONE HYDROCHLORIDE 0.5 MG: 1 INJECTION, SOLUTION INTRAMUSCULAR; INTRAVENOUS; SUBCUTANEOUS at 04:30

## 2023-09-07 NOTE — PAYOR COMM NOTE
"Lizzeth Zavala (42 y.o. Female)     PLEASE SEE ATTACHED FOR INPT AUTH     REF #   [id number]    PLEASE CALL LING DAVIS RN/ DEPT @ [phone]  OR FAX  DEPARTMENT @  [phone]    THANK YOU   LING DAVIS RN  [facility]          Date of Birth   1980    Social Security Number       Address   [address]  [place] [address]    Home Phone   [phone]    MRN   [id number]       Mandaeism   Spiritism    Marital Status   Single                            Admission Date   23    Admission Type   Emergency    Admitting Provider   Fletcher Gallegos MD    Attending Provider   Fletcher Gallegos MD    Department, Room/Bed   [facility], E548/1       Discharge Date       Discharge Disposition       Discharge Destination                                 Attending Provider: Fletcher Gallegos MD    Allergies: Nickel    Isolation: None   Infection: None   Code Status: CPR    Ht: 175.3 cm (69.02\")   Wt: 59 kg (130 lb)    Admission Cmt: None   Principal Problem: Acute abdominal pain [R10.9]                   Active Insurance as of 2023       Primary Coverage       Payor Plan Insurance Group Employer/Plan Group    [facility] MEDICAID [facility] -[facility] [id number]       Payor Plan Address Payor Plan Phone Number Payor Plan Fax Number Effective Dates    MAIL STOP:   2022 - None Entered    [address] [address]       [facility] [address]         Subscriber Name Subscriber Birth Date Member ID       LIZZETH ZAVALA 1980 [id number]                     Emergency Contacts        (Rel.) Home Phone Work Phone Mobile Phone    MALOU EVANGELISTA (Significant Other) [phone] -- --              [place]: [facility] [id number]  Tax ID [id number]     History & Physical        Fletcher Gallegos MD at 23 1538          HISTORY AND PHYSICAL   [facility]        Patient Identification:  Name: Lizzeth Zavala  Age: 42 y.o.  Sex: female  :  " 1980  MRN: [id number]                     Primary Care Physician: Rachell Pozo APRN    Chief Complaint: Abdominal pain with nausea and vomiting    History of Present Illness:         The patient is a 42-year-old female with history of alcohol abuse and chronic pancreatitis admitted with history of worsening abdominal pain with nausea and vomiting.  She has had acute on chronic pancreatitis and has pancreatic pseudocyst which has been getting bigger.  She follows with [facility] and was supposed to get a stent placed but they were not able to obtain the stent.  The patient was evaluated in the ER and admitted for further evaluation treatment with flareup of acute on chronic pancreatitis.    Past Medical History:  Past Medical History:   Diagnosis Date    Anxiety     E. coli bacteremia     ETOH abuse     GERD (gastroesophageal reflux disease)     Hiatal hernia     Left flank pain 10/21/2022    Migraine     Miscarriage 2004    Pancreatitis      Past Surgical History:  Past Surgical History:   Procedure Laterality Date    ENDOSCOPY N/A 8/10/2022    Procedure: ESOPHAGOGASTRODUODENOSCOPY with bxs;  Surgeon: Salvador Price MD;  Location: [facility] ENDOSCOPY;  Service: Gastroenterology;  Laterality: N/A;  Pre: r/o esophageal  varacies, abd pain  Post: esophageal plaque      Home Meds:  Medications Prior to Admission   Medication Sig Dispense Refill Last Dose    amitriptyline (ELAVIL) 10 MG tablet Take 2.5 tablets by mouth Every Night.   9/5/2023 at 2200    Cyanocobalamin (VITAMIN B 12 PO) Take  by mouth.   Past Week    Ferrous Sulfate Dried (Feosol) 200 (65 Fe) MG tablet tablet Take 1 tablet by mouth Daily.   9/5/2023 at 0800    folic acid (FOLVITE) 1 MG tablet Take 1 tablet by mouth Daily. 30 tablet 3 9/5/2023 at 0800    ondansetron (ZOFRAN) 4 MG tablet Take 1 tablet by mouth Every 8 (Eight) Hours As Needed for Nausea or Vomiting.   9/5/2023    oxyCODONE-acetaminophen (PERCOCET)   MG per tablet Take 1 tablet by mouth Every 4 (Four) Hours As Needed for Moderate Pain.   Past Week    pancrelipase, Lip-Prot-Amyl, (CREON) 99369-78032 units capsule delayed-release particles capsule Take 3 capsules by mouth 3 (Three) Times a Day With Meals.   9/5/2023 at 0800    pantoprazole (PROTONIX) 40 MG EC tablet Take 1 tablet by mouth Daily. 30 tablet 3 9/5/2023    thiamine (VITAMIN B1) 100 MG tablet Take 1 tablet by mouth Daily. 30 tablet 3 9/5/2023 at 0800     Current meds    Current Facility-Administered Medications:     acetaminophen (TYLENOL) tablet 650 mg, 650 mg, Oral, Q4H PRN **OR** acetaminophen (TYLENOL) 160 MG/5ML solution 650 mg, 650 mg, Oral, Q4H PRN **OR** acetaminophen (TYLENOL) suppository 650 mg, 650 mg, Rectal, Q4H PRN, Evelin Weiss APRN    sennosides-docusate (PERICOLACE) 8.6-50 MG per tablet 2 tablet, 2 tablet, Oral, BID, 2 tablet at 09/06/23 0809 **AND** polyethylene glycol (MIRALAX) packet 17 g, 17 g, Oral, Daily PRN **AND** bisacodyl (DULCOLAX) EC tablet 5 mg, 5 mg, Oral, Daily PRN **AND** bisacodyl (DULCOLAX) suppository 10 mg, 10 mg, Rectal, Daily PRN, Evelin Weiss, APRN    folic acid (FOLVITE) tablet 1 mg, 1 mg, Oral, Daily, Evelin Weiss APRN, 1 mg at 09/06/23 0958    HYDROmorphone (DILAUDID) injection 0.5 mg, 0.5 mg, Intravenous, Q2H PRN, 0.5 mg at 09/06/23 1424 **AND** naloxone (NARCAN) injection 0.4 mg, 0.4 mg, Intravenous, Q5 Min PRN, Evelin Weiss APRN    LORazepam (ATIVAN) tablet 1 mg, 1 mg, Oral, Q1H PRN, 1 mg at 09/06/23 1012 **OR** LORazepam (ATIVAN) injection 1 mg, 1 mg, Intravenous, Q1H PRN **OR** LORazepam (ATIVAN) tablet 2 mg, 2 mg, Oral, Q1H PRN **OR** LORazepam (ATIVAN) injection 2 mg, 2 mg, Intravenous, Q1H PRN, 2 mg at 09/06/23 0655 **OR** LORazepam (ATIVAN) injection 2 mg, 2 mg, Intravenous, Q15 Min PRN **OR** LORazepam (ATIVAN) injection 2 mg, 2 mg, Intramuscular, Q15 Min PRN, Evelin Weiss, APRN    Magnesium Standard Dose  Replacement - Follow Nurse / BPA Driven Protocol, , Does not apply, PRN, Evelin Weiss, APRN    nitroglycerin (NITROSTAT) SL tablet 0.4 mg, 0.4 mg, Sublingual, Q5 Min PRN, Evelin Weiss, APRN    ondansetron (ZOFRAN) injection 4 mg, 4 mg, Intravenous, Q6H PRN, Evelin Weiss APRN, 4 mg at 09/06/23 0307    sodium chloride 0.9 % flush 10 mL, 10 mL, Intravenous, Q12H, Evelin Weiss, APRN, 10 mL at 09/06/23 0810    sodium chloride 0.9 % infusion 40 mL, 40 mL, Intravenous, PRN, Evelin Weiss, APRN    sodium chloride 0.9 % infusion, 125 mL/hr, Intravenous, Continuous, Evelin Weiss, APRN, Last Rate: 125 mL/hr at 09/06/23 1424, 125 mL/hr at 09/06/23 1424    thiamine (B-1) injection 200 mg, 200 mg, Intravenous, Q8H, 200 mg at 09/06/23 1424 **FOLLOWED BY** [START ON 9/11/2023] thiamine (VITAMIN B-1) tablet 100 mg, 100 mg, Oral, Daily, Evelin Weiss, APRN  Allergies:  Allergies   Allergen Reactions    Nickel      Immunizations:    There is no immunization history on file for this patient.  Social History:   Social History     Social History Narrative    Not on file     Social History     Socioeconomic History    Marital status: Single   Tobacco Use    Smoking status: Every Day     Packs/day: 0.50     Types: Cigarettes     Start date: 1/28/1996    Smokeless tobacco: Current   Vaping Use    Vaping Use: Never used   Substance and Sexual Activity    Alcohol use: Not Currently    Drug use: No    Sexual activity: Yes     Partners: Male     Birth control/protection: None     Comment: IUD removed 6/29/2017       Family History:  Family History   Problem Relation Age of Onset    Alcohol abuse Mother     Arthritis Mother     Asthma Mother     Miscarriages / Stillbirths Mother     Cancer Father     Diabetes Father     Drug abuse Father     Early death Father     Heart disease Father     Hyperlipidemia Father     Hypertension Father     Alcohol abuse Paternal Aunt     Cancer Paternal Aunt      "Diabetes Paternal Aunt     Drug abuse Paternal Aunt     Early death Paternal Aunt     Heart disease Paternal Aunt     Hyperlipidemia Paternal Aunt     Hypertension Paternal Aunt     Alcohol abuse Maternal Grandmother     Alcohol abuse Maternal Grandfather     Alcohol abuse Paternal Grandmother     Cancer Paternal Grandmother     Diabetes Paternal Grandmother     Drug abuse Paternal Grandmother     Early death Paternal Grandmother     Heart disease Paternal Grandmother     Hyperlipidemia Paternal Grandmother     Hypertension Paternal Grandmother     Alcohol abuse Paternal Grandfather         Review of Systems  See history of present illness and past medical history.  Patient denies headache, dizziness, syncope, falls, trauma, change in vision, change in hearing, change in taste, changes in weight, changes in appetite, focal weakness, numbness, or paresthesia.  Patient denies chest pain, palpitations, dyspnea, orthopnea, PND, cough, sinus pressure, rhinorrhea, epistaxis, hemoptysis, nausea, vomiting, hematemesis, diarrhea, constipation or hematochezia. Denies cold or heat intolerance, polydipsia, polyuria, polyphagia. Denies hematuria, pyuria, dysuria, hesitancy, frequency or urgency.  Denies fever, chills, sweats, night sweats.  Denies missing any routine medications. Remainder of ROS is negative.    Objective:  tMax 24 hrs: Temp (24hrs), Av.6 °F (37 °C), Min:98 °F (36.7 °C), Max:99.1 °F (37.3 °C)    Vitals Ranges:   Temp:  [98 °F (36.7 °C)-99.1 °F (37.3 °C)] 98.7 °F (37.1 °C)  Heart Rate:  [] 94  Resp:  [20-24] 20  BP: (136-158)/() 139/96      Exam:  /96 (BP Location: Left arm, Patient Position: Lying)   Pulse 94   Temp 98.7 °F (37.1 °C) (Oral)   Resp 20   Ht 175.3 cm (69.02\")   Wt 59 kg (130 lb)   LMP 2018 (Exact Date)   SpO2 98%   BMI 19.19 kg/m²     General Appearance:    Alert, cooperative, no distress, appears stated age   Head:    Normocephalic, without obvious abnormality, " atraumatic   Eyes:    PERRL, conjunctivae/corneas clear, EOM's intact, both eyes   Ears:    Normal external ear canals, both ears   Nose:   Nares normal, septum midline, mucosa normal, no drainage    or sinus tenderness   Throat:   Lips, mucosa, and tongue normal   Neck:   Supple, symmetrical, trachea midline, no adenopathy;     thyroid:  no enlargement/tenderness/nodules; no carotid    bruit or JVD   Back:     Symmetric, no curvature, ROM normal, no CVA tenderness   Lungs:     Clear to auscultation bilaterally, respirations unlabored   Chest Wall:    No tenderness or deformity    Heart:    Regular rate and rhythm, S1 and S2 normal, no murmur, rub   or gallop   Abdomen:     Soft, upper abdominal tenderness, bowel sounds active all four quadrants,     no masses, no hepatomegaly, no splenomegaly   Extremities:   Extremities normal, atraumatic, no cyanosis or edema   Pulses:   2+ and symmetric all extremities   Skin:   Skin color, texture, turgor normal, no rashes or lesions   Lymph nodes:   Cervical, supraclavicular, and axillary nodes normal   Neurologic:   CNII-XII intact, normal strength, sensation intact throughout      .    Data Review:  Lab Results (last 72 hours)       Procedure Component Value Units Date/Time    CBC Auto Differential [[id number]]  (Abnormal) Collected: 09/06/23 0404    Specimen: Blood Updated: 09/06/23 0502     WBC 9.49 10*3/mm3      RBC 3.63 10*6/mm3      Hemoglobin 12.5 g/dL      Hematocrit 37.0 %      .9 fL      MCH 34.4 pg      MCHC 33.8 g/dL      RDW 13.2 %      RDW-SD 50.0 fl      MPV 9.7 fL      Platelets 209 10*3/mm3      Neutrophil % 72.5 %      Lymphocyte % 17.3 %      Monocyte % 9.2 %      Eosinophil % 0.4 %      Basophil % 0.4 %      Immature Grans % 0.2 %      Neutrophils, Absolute 6.88 10*3/mm3      Lymphocytes, Absolute 1.64 10*3/mm3      Monocytes, Absolute 0.87 10*3/mm3      Eosinophils, Absolute 0.04 10*3/mm3      Basophils, Absolute 0.04 10*3/mm3      Immature Grans,  Absolute 0.02 10*3/mm3      nRBC 0.0 /100 WBC     Basic Metabolic Panel [[id number]]  (Abnormal) Collected: 09/06/23 0404    Specimen: Blood Updated: 09/06/23 0436     Glucose 109 mg/dL      BUN 3 mg/dL      Creatinine 0.60 mg/dL      Sodium 141 mmol/L      Potassium 3.7 mmol/L      Chloride 103 mmol/L      CO2 24.0 mmol/L      Calcium 8.8 mg/dL      BUN/Creatinine Ratio 5.0     Anion Gap 14.0 mmol/L      eGFR 116.5 mL/min/1.73     Narrative:      GFR Normal >60  Chronic Kidney Disease <60  Kidney Failure <15      STAT Lactic Acid, Reflex [[id number]]  (Normal) Collected: 09/06/23 0404    Specimen: Blood Updated: 09/06/23 0434     Lactate 0.7 mmol/L     Protime-INR [[id number]]  (Normal) Collected: 09/06/23 0403    Specimen: Blood Updated: 09/06/23 0427     Protime 12.8 Seconds      INR 0.95    Urine Drug Screen - Urine, Clean Catch [[id number]]  (Abnormal) Collected: 09/06/23 0005    Specimen: Urine, Clean Catch Updated: 09/06/23 0257     Amphet/Methamphet, Screen Negative     Barbiturates Screen, Urine Negative     Benzodiazepine Screen, Urine Negative     Cocaine Screen, Urine Negative     Opiate Screen Negative     THC, Screen, Urine Negative     Methadone Screen, Urine Negative     Oxycodone Screen, Urine Positive     Fentanyl, Urine Negative    Narrative:      Negative Thresholds Per Drugs Screened:    Amphetamines                 500 ng/ml  Barbiturates                 200 ng/ml  Benzodiazepines              100 ng/ml  Cocaine                      300 ng/ml  Methadone                    300 ng/ml  Opiates                      300 ng/ml  Oxycodone                    100 ng/ml  THC                           50 ng/ml  Fentanyl                       5 ng/ml      The Normal Value for all drugs tested is negative. This report includes final unconfirmed screening results to be used for medical treatment purposes only. Unconfirmed results must not be used for non-medical purposes such as employment or legal testing.  "Clinical consideration should be applied to any drug of abuse test, particularly when unconfirmed results are used.            STAT Lactic Acid, Reflex [[id number]]  (Abnormal) Collected: 09/06/23 0117    Specimen: Blood Updated: 09/06/23 0222     Lactate 3.0 mmol/L     Urinalysis With Microscopic If Indicated (No Culture) - Urine, Clean Catch [[id number]]  (Normal) Collected: 09/06/23 0005    Specimen: Urine, Clean Catch Updated: 09/06/23 0058     Color, UA Yellow     Appearance, UA Clear     pH, UA 7.5     Specific Gravity, UA >=1.030     Glucose, UA Negative     Ketones, UA Negative     Bilirubin, UA Negative     Blood, UA Negative     Protein, UA Negative     Leuk Esterase, UA Negative     Nitrite, UA Negative     Urobilinogen, UA 0.2 E.U./dL    Narrative:      Urine microscopic not indicated.    Blood Culture - Blood, Arm, Left [[id number]] Collected: 09/05/23 2355    Specimen: Blood from Arm, Left Updated: 09/06/23 0045    Blood Culture - Blood, Arm, Left [[id number]] Collected: 09/06/23 0012    Specimen: Blood from Arm, Left Updated: 09/06/23 0045    Procalcitonin [[id number]]  (Normal) Collected: 09/05/23 2205    Specimen: Blood from Hand, Left Updated: 09/05/23 2251     Procalcitonin 0.04 ng/mL     Narrative:      As a Marker for Sepsis (Non-Neonates):    1. <0.5 ng/mL represents a low risk of severe sepsis and/or septic shock.  2. >2 ng/mL represents a high risk of severe sepsis and/or septic shock.    As a Marker for Lower Respiratory Tract Infections that require antibiotic therapy:    PCT on Admission    Antibiotic Therapy       6-12 Hrs later    >0.5                Strongly Recommended  >0.25 - <0.5        Recommended   0.1 - 0.25          Discouraged              Remeasure/reassess PCT  <0.1                Strongly Discouraged     Remeasure/reassess PCT    As 28 day mortality risk marker: \"Change in Procalcitonin Result\" (>80% or <=80%) if Day 0 (or Day 1) and Day 4 values are available. Refer to " http://www.[facility]-pct-calculator.com    Change in PCT <=80%  A decrease of PCT levels below or equal to 80% defines a positive change in PCT test result representing a higher risk for 28-day all-cause mortality of patients diagnosed with severe sepsis for septic shock.    Change in PCT >80%  A decrease of PCT levels of more than 80% defines a negative change in PCT result representing a lower risk for 28-day all-cause mortality of patients diagnosed with severe sepsis or septic shock.       STAT Lactic Acid, Reflex [[id number]]  (Abnormal) Collected: 09/05/23 2205    Specimen: Blood from Hand, Left Updated: 09/05/23 2246     Lactate 5.5 mmol/L     Comprehensive Metabolic Panel [[id number]]  (Abnormal) Collected: 09/05/23 2205    Specimen: Blood from Hand, Left Updated: 09/05/23 2244     Glucose 117 mg/dL      BUN 3 mg/dL      Creatinine 0.59 mg/dL      Sodium 143 mmol/L      Potassium 3.5 mmol/L      Chloride 102 mmol/L      CO2 24.0 mmol/L      Calcium 9.4 mg/dL      Total Protein 7.3 g/dL      Albumin 4.3 g/dL      ALT (SGPT) 6 U/L      AST (SGOT) 10 U/L      Alkaline Phosphatase 84 U/L      Total Bilirubin 0.2 mg/dL      Globulin 3.0 gm/dL      A/G Ratio 1.4 g/dL      BUN/Creatinine Ratio 5.1     Anion Gap 17.0 mmol/L      eGFR 117.0 mL/min/1.73     Narrative:      GFR Normal >60  Chronic Kidney Disease <60  Kidney Failure <15      Lipase [[id number]]  (Abnormal) Collected: 09/05/23 2205    Specimen: Blood from Hand, Left Updated: 09/05/23 2244     Lipase 232 U/L     Ethanol [[id number]]  (Abnormal) Collected: 09/05/23 2205    Specimen: Blood from Hand, Left Updated: 09/05/23 2244     Ethanol 111 mg/dL      Ethanol % 0.111 %     Lactic Acid, Plasma [[id number]]  (Abnormal) Collected: 09/05/23 2044    Specimen: Blood Updated: 09/05/23 2138     Lactate 2.4 mmol/L     hCG, Serum, Qualitative [[id number]]  (Normal) Collected: 09/05/23 2044    Specimen: Blood Updated: 09/05/23 2112     HCG Qualitative Negative    CBC &  Differential [[id number]]  (Abnormal) Collected: 09/05/23 2044    Specimen: Blood Updated: 09/05/23 2100    Narrative:      The following orders were created for panel order CBC & Differential.  Procedure                               Abnormality         Status                     ---------                               -----------         ------                     CBC Auto Differential[[id number]]        Abnormal            Final result                 Please view results for these tests on the individual orders.    CBC Auto Differential [[id number]]  (Abnormal) Collected: 09/05/23 2044    Specimen: Blood Updated: 09/05/23 2100     WBC 7.08 10*3/mm3      RBC 4.24 10*6/mm3      Hemoglobin 14.6 g/dL      Hematocrit 42.1 %      MCV 99.3 fL      MCH 34.4 pg      MCHC 34.7 g/dL      RDW 13.3 %      RDW-SD 47.8 fl      MPV 9.7 fL      Platelets 269 10*3/mm3      Neutrophil % 65.3 %      Lymphocyte % 25.8 %      Monocyte % 6.6 %      Eosinophil % 1.3 %      Basophil % 0.7 %      Immature Grans % 0.3 %      Neutrophils, Absolute 4.62 10*3/mm3      Lymphocytes, Absolute 1.83 10*3/mm3      Monocytes, Absolute 0.47 10*3/mm3      Eosinophils, Absolute 0.09 10*3/mm3      Basophils, Absolute 0.05 10*3/mm3      Immature Grans, Absolute 0.02 10*3/mm3      nRBC 0.0 /100 WBC                      Imaging Results (All)       Procedure Component Value Units Date/Time    CT Abdomen Pelvis With Contrast [[id number]] Collected: 09/05/23 2212     Updated: 09/05/23 2226    Narrative:      CT OF THE ABDOMEN AND PELVIS WITH CONTRAST     HISTORY: Acute on chronic abdominal pain     COMPARISON: August 22, 2023     TECHNIQUE: Axial CT imaging was obtained through the abdomen and pelvis.  IV contrast was administered.     FINDINGS:  Images through the lung bases are clear. No suspicious hepatic lesions  are seen. Gallbladder appears normal. The pancreas is atrophic, with  pancreatic calcifications, in keeping with chronic pancreatitis. Some  of  these appear to be intraductal stones. Pancreatic ductal size is stable,  measuring up to 5 mm. The patient has a pancreatic pseudocyst, involving  the tail of the pancreas. This measures up to 9.9 x 7.0 cm, which is  larger than on prior study, when it measured 8.6 x 6.2 cm. As was  previously discussed, the pseudocyst communicates with collections  within the the left adrenal gland. The largest collection within the  left adrenal gland measures up to 2.6 cm. This is also larger than on  prior study, when it measured 2.2 cm. MRCP showed communication of the  pseudocyst with the pancreatic duct. There is a rind of complex inferior  perisplenic fluid. This is again seen on the current study. It has  increased in size when compared to prior study, now measuring up to 3.5  cm in length, previously measuring 1.8 cm. The collections are deforming  the contour of the left kidney. There is also some displacement of the  stomach anteriorly. The splenic vein remains patent at the portal  splenic confluence, although the splenic vein is attenuated near the  hilum, with multiple collaterals noted. There is no evidence of gastric  outlet obstruction. Kidneys appear to enhance symmetrically. No  hydronephrosis is seen. There our some aortic calcifications. No distal  ureteral or bladder stones are seen. Uterus is absent. There is no bowel  obstruction. The appendix is normal. There is a retroaortic left renal  vein. No acute osseous abnormalities are seen.       Impression:         1. The patient's previously described pancreatic pseudocyst involving  the tail of the pancreas continues to increase in size when compared to  prior imaging. This collection communicates with smaller collections  within the left adrenal gland. These have also worsened. Furthermore,  there is increasing complex material which is noted abutting the  inferior margin of the spleen.     Radiation dose reduction techniques were utilized, including  automated  exposure control and exposure modulation based on body size.        This report was finalized on 9/5/2023 10:23 PM by Dr. Camilla Whalen M.D.             Past Medical History:   Diagnosis Date    Anxiety     E. coli bacteremia     ETOH abuse     GERD (gastroesophageal reflux disease)     Hiatal hernia     Left flank pain 10/21/2022    Migraine     Miscarriage 2004    Pancreatitis        Assessment:  Active Hospital Problems    Diagnosis  POA    **Acute abdominal pain [R10.9]  Yes    Tobacco abuse [Z72.0]  Yes    Pancreatic pseudocyst [K86.3]  Yes    Chronic pancreatitis [K86.1]  Yes    Anemia of chronic disease [D63.8]  Yes    Thrombocytopenia [D69.6]  Yes    Alcohol abuse [F10.10]  Yes      Resolved Hospital Problems   No resolved problems to display.       Plan:  The patient is admitted to the hospital and will continue with IV fluid hydration and pain and nausea medicine.  GI medicine has been consulted for further evaluation treatment.  We will get follow-up lab studies.  She was encouraged to stop drinking alcoholic beverages.    Fletcher Gallegos MD  9/6/2023  15:38 EDT     Electronically signed by Fletcher Gallegos MD at 09/06/23 1840          Emergency Department Notes        Marianna Hollins, RN at 09/05/23 2334          .Nursing report ED to floor  Piper ROLLE Payton  42 y.o.  female    HPI :   Chief Complaint   Patient presents with    Abdominal Pain       Admitting doctor:   Faisal Baird MD    Admitting diagnosis:   The primary encounter diagnosis was Acute on chronic abdominal pain. Diagnoses of Acute on chronic pancreatitis, Alcohol use, and Severe sepsis were also pertinent to this visit.    Code status:   Current Code Status       Date Active Code Status Order ID Comments User Context       Prior            Allergies:   Nickel    Isolation:   No active isolations    Intake and Output    Intake/Output Summary (Last 24 hours) at 9/5/2023 2334  Last data filed at 9/5/2023 2328  Gross per 24 hour  "  Intake 50 ml   Output --   Net 50 ml       Weight:       09/05/23 1953   Weight: 59 kg (130 lb)       Most recent vitals:   Vitals:    09/05/23 1953   BP: 158/82   BP Location: Right arm   Patient Position: Lying   Pulse: 108   Resp: 24   Temp: 98 °F (36.7 °C)   TempSrc: Oral   SpO2: 99%   Weight: 59 kg (130 lb)   Height: 175.3 cm (69.02\")       Active LDAs/IV Access:   Lines, Drains & Airways       Active LDAs       Name Placement date Placement time Site Days    Peripheral IV 09/05/23 2045 Left Antecubital 09/05/23 2045  Antecubital  less than 1                    Labs (abnormal labs have a star):   Labs Reviewed   COMPREHENSIVE METABOLIC PANEL - Abnormal; Notable for the following components:       Result Value    Glucose 117 (*)     BUN 3 (*)     BUN/Creatinine Ratio 5.1 (*)     Anion Gap 17.0 (*)     All other components within normal limits    Narrative:     GFR Normal >60  Chronic Kidney Disease <60  Kidney Failure <15     LIPASE - Abnormal; Notable for the following components:    Lipase 232 (*)     All other components within normal limits   ETHANOL - Abnormal; Notable for the following components:    Ethanol 111 (*)     All other components within normal limits   LACTIC ACID, PLASMA - Abnormal; Notable for the following components:    Lactate 2.4 (*)     All other components within normal limits   CBC WITH AUTO DIFFERENTIAL - Abnormal; Notable for the following components:    MCV 99.3 (*)     MCH 34.4 (*)     All other components within normal limits   LACTIC ACID, REFLEX - Abnormal; Notable for the following components:    Lactate 5.5 (*)     All other components within normal limits   HCG, SERUM, QUALITATIVE - Normal   PROCALCITONIN - Normal    Narrative:     As a Marker for Sepsis (Non-Neonates):    1. <0.5 ng/mL represents a low risk of severe sepsis and/or septic shock.  2. >2 ng/mL represents a high risk of severe sepsis and/or septic shock.    As a Marker for Lower Respiratory Tract Infections " "that require antibiotic therapy:    PCT on Admission    Antibiotic Therapy       6-12 Hrs later    >0.5                Strongly Recommended  >0.25 - <0.5        Recommended   0.1 - 0.25          Discouraged              Remeasure/reassess PCT  <0.1                Strongly Discouraged     Remeasure/reassess PCT    As 28 day mortality risk marker: \"Change in Procalcitonin Result\" (>80% or <=80%) if Day 0 (or Day 1) and Day 4 values are available. Refer to [email or web address]    Change in PCT <=80%  A decrease of PCT levels below or equal to 80% defines a positive change in PCT test result representing a higher risk for 28-day all-cause mortality of patients diagnosed with severe sepsis for septic shock.    Change in PCT >80%  A decrease of PCT levels of more than 80% defines a negative change in PCT result representing a lower risk for 28-day all-cause mortality of patients diagnosed with severe sepsis or septic shock.      BLOOD CULTURE   BLOOD CULTURE   URINALYSIS W/ MICROSCOPIC IF INDICATED (NO CULTURE)   URINE DRUG SCREEN   BLOOD GAS, VENOUS   LACTIC ACID, REFLEX   CBC AND DIFFERENTIAL    Narrative:     The following orders were created for panel order CBC & Differential.  Procedure                               Abnormality         Status                     ---------                               -----------         ------                     CBC Auto Differential[[id number]]        Abnormal            Final result                 Please view results for these tests on the individual orders.       EKG:   No orders to display       Meds given in ED:   Medications   sodium chloride 0.9 % flush 10 mL (has no administration in time range)   sodium chloride 0.9 % bolus 1,770 mL (has no administration in time range)   piperacillin-tazobactam (ZOSYN) 4.5 g in iso-osmotic dextrose 100 mL IVPB (premix) (has no administration in time range)   sodium chloride 0.9 % infusion 1,000 mL (1,000 mL Intravenous New " Bag 9/5/23 2235)   folic acid 1 mg in sodium chloride 0.9 % 50 mL IVPB (0 mg Intravenous Stopped 9/5/23 2328)   thiamine (B-1) injection 200 mg (200 mg Intravenous Given 9/5/23 2235)   HYDROmorphone (DILAUDID) injection 0.5 mg (0.5 mg Intravenous Given 9/5/23 2045)   metoclopramide (REGLAN) injection 10 mg (10 mg Intravenous Given 9/5/23 2046)   famotidine (PEPCID) injection 20 mg (20 mg Intravenous Given 9/5/23 2046)   iopamidol (ISOVUE-300) 61 % injection 85 mL (85 mL Intravenous Given by Other 9/5/23 2147)   HYDROmorphone (DILAUDID) injection 0.5 mg (0.5 mg Intravenous Given 9/5/23 2236)       Imaging results:  CT Abdomen Pelvis With Contrast    Result Date: 9/5/2023   1. The patient's previously described pancreatic pseudocyst involving the tail of the pancreas continues to increase in size when compared to prior imaging. This collection communicates with smaller collections within the left adrenal gland. These have also worsened. Furthermore, there is increasing complex material which is noted abutting the inferior margin of the spleen.  Radiation dose reduction techniques were utilized, including automated exposure control and exposure modulation based on body size.   This report was finalized on 9/5/2023 10:23 PM by Dr. Camilla Whalen M.D.       Ambulatory status:   - up with assistance    Social issues:   Social History     Socioeconomic History    Marital status: Single   Tobacco Use    Smoking status: Every Day     Packs/day: 0.50     Types: Cigarettes     Start date: 1/28/1996    Smokeless tobacco: Current   Vaping Use    Vaping Use: Never used   Substance and Sexual Activity    Alcohol use: Never    Drug use: No    Sexual activity: Yes     Partners: Male     Birth control/protection: None     Comment: IUD removed 6/29/2017       NIH Stroke Scale:       Marianna Hollins RN  09/05/23 23:34 EDT    \    Electronically signed by Marianna Hollins RN at 09/05/23 7385       Donnell Wilder MD at 09/05/23 5463            EMERGENCY DEPARTMENT ENCOUNTER    Room Number:  38/38  Date of encounter:  9/5/2023  PCP: Rachell Pozo APRN  Historian: Patient, significant other, EMS triage report    Patient was placed in face mask during triage process. Patient was wearing facemask when I entered the room and throughout our encounter. I wore full protective equipment throughout this patient encounter including a face mask, eye protection, and gloves. Hand hygiene was performed before donning protective equipment and again following doffing of PPE after leaving the room.    HPI:  Chief Complaint: Abdominal pain with nausea and vomiting  A complete HPI/ROS/PMH/PSH/SH/FH are unobtainable due to: Patient is a very limited historian at this time as she mostly screamed out in pain and rolls around in the bed.  She does not answer questions verbally and only occasionally shakes her head in agreement  Context: Piper Cain is a 42 y.o. female who presents to the ED c/o abdominal pain acute on chronic.  Pain worse today but it is not clear over what period of time it has been worsening.  No clear exacerbating or relieving factors reported.  Patient repeats that she feels like she is dying.  Reported history of alcohol abuse at chronic pancreatitis.  Patient denies recent alcohol intake.  Significant other thinks is probably been about 2 weeks since she consumed alcohol.      MEDICAL HISTORY REVIEW  EMR reviewed:    Hospital discharge summary 8/25/2023 reviewed:  Patient with history of alcohol abuse was admitted for acute on chronic pancreatitis and acute UTI.  She is also followed for history of essential hypertension, general anxiety disorder, migraines, pancreatic pseudocyst and abdominal pain as well as hyperglycemia.    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    Anemia of chronic disease 08/08/2022    GERD without esophagitis 08/08/2022    Alcohol dependence in remission 08/08/2022    Pancreatic pseudocyst 08/14/2022    Candida esophagitis 08/14/2022    Ureterolithiasis 10/20/2022    Left flank pain 10/21/2022    Bacterial vaginosis 10/23/2022    Abdominal pain 11/30/2022    Adrenal hemorrhage 03/25/2023    Chronic pancreatitis, unspecified pancreatitis type 06/08/2023    Macrocytosis 06/08/2023    Left-sided chest pain 06/08/2023    Tobacco abuse 06/08/2023    Acute on chronic pancreatitis 08/23/2023    Alcohol abuse 08/23/2023    Acute UTI (urinary tract infection) 08/23/2023    Essential hypertension 08/23/2023    Migraine 08/23/2023    Generalized anxiety disorder 08/23/2023    Pancreatic pseudocyst 08/23/2023     Resolved Ambulatory Problems     Diagnosis Date Noted    Hypokalemia 09/28/2017    Mixed acid base balance disorder 09/28/2017    Dehydration 09/28/2017    Nausea and vomiting 06/08/2023    Hyperglycemia 08/23/2023     Past Medical History:   Diagnosis Date    Anxiety     E. coli bacteremia     ETOH abuse     GERD (gastroesophageal reflux disease)     Hiatal hernia     Miscarriage 2004    Pancreatitis          PAST SURGICAL HISTORY  Past Surgical History:   Procedure Laterality Date    ENDOSCOPY N/A 8/10/2022    Procedure: ESOPHAGOGASTRODUODENOSCOPY with bxs;  Surgeon: Salvador Price MD;  Location: [facility] ENDOSCOPY;  Service: Gastroenterology;  Laterality: N/A;  Pre: r/o esophageal  varacies, abd pain  Post: esophageal plaque         FAMILY HISTORY  Family History   Problem Relation Age of Onset    Alcohol abuse Mother     Arthritis Mother     Asthma Mother     Miscarriages / Stillbirths Mother     Cancer Father      Diabetes Father     Drug abuse Father     Early death Father     Heart disease Father     Hyperlipidemia Father     Hypertension Father     Alcohol abuse Paternal Aunt     Cancer Paternal Aunt     Diabetes Paternal Aunt     Drug abuse Paternal Aunt     Early death Paternal Aunt     Heart disease Paternal Aunt     Hyperlipidemia Paternal Aunt     Hypertension Paternal Aunt     Alcohol abuse Maternal Grandmother     Alcohol abuse Maternal Grandfather     Alcohol abuse Paternal Grandmother     Cancer Paternal Grandmother     Diabetes Paternal Grandmother     Drug abuse Paternal Grandmother     Early death Paternal Grandmother     Heart disease Paternal Grandmother     Hyperlipidemia Paternal Grandmother     Hypertension Paternal Grandmother     Alcohol abuse Paternal Grandfather          SOCIAL HISTORY  Social History     Socioeconomic History    Marital status: Single   Tobacco Use    Smoking status: Every Day     Packs/day: 0.50     Types: Cigarettes     Start date: 1/28/1996    Smokeless tobacco: Current   Vaping Use    Vaping Use: Never used   Substance and Sexual Activity    Alcohol use: Never    Drug use: No    Sexual activity: Yes     Partners: Male     Birth control/protection: None     Comment: IUD removed 6/29/2017         ALLERGIES  Nickel        REVIEW OF SYSTEMS  Review of Systems     All systems reviewed and negative except for those discussed in HPI.       PHYSICAL EXAM    I have reviewed the triage vital signs and nursing notes.    ED Triage Vitals [09/05/23 1953]   Temp Heart Rate Resp BP SpO2   98 °F (36.7 °C) 108 24 158/82 99 %      Temp src Heart Rate Source Patient Position BP Location FiO2 (%)   Oral Monitor Lying Right arm --       Physical Exam    Physical Exam   Constitutional: Uncomfortable appearing.  Rolling around the bed and moaning.  HENT:  Head: Normocephalic and atraumatic.   Oropharynx: Mucous membranes are moist.   Eyes: No scleral icterus. No conjunctival pallor.  Neck: Painless  range of motion noted. Neck supple.   Cardiovascular: Tachycardic.  Pink warm and well-perfused  Pulmonary/Chest: No respiratory distress.  No kidney or increased work of breathing noted.    Abdominal: Diffuse voluntary guarding.  Diffuse discomfort reported with exam.  Musculoskeletal: Moves all extremities equally.   Neurological: Alert.  Moving all extremities  Skin: Skin is pink, warm, and dry. No pallor.   Psychiatric: Unable to evaluate  Nursing note and vitals reviewed.    LAB RESULTS  Recent Results (from the past 24 hour(s))   hCG, Serum, Qualitative    Collection Time: 09/05/23  8:44 PM    Specimen: Blood   Result Value Ref Range    HCG Qualitative Negative Negative   Lactic Acid, Plasma    Collection Time: 09/05/23  8:44 PM    Specimen: Blood   Result Value Ref Range    Lactate 2.4 (C) 0.5 - 2.0 mmol/L   CBC Auto Differential    Collection Time: 09/05/23  8:44 PM    Specimen: Blood   Result Value Ref Range    WBC 7.08 3.40 - 10.80 10*3/mm3    RBC 4.24 3.77 - 5.28 10*6/mm3    Hemoglobin 14.6 12.0 - 15.9 g/dL    Hematocrit 42.1 34.0 - 46.6 %    MCV 99.3 (H) 79.0 - 97.0 fL    MCH 34.4 (H) 26.6 - 33.0 pg    MCHC 34.7 31.5 - 35.7 g/dL    RDW 13.3 12.3 - 15.4 %    RDW-SD 47.8 37.0 - 54.0 fl    MPV 9.7 6.0 - 12.0 fL    Platelets 269 140 - 450 10*3/mm3    Neutrophil % 65.3 42.7 - 76.0 %    Lymphocyte % 25.8 19.6 - 45.3 %    Monocyte % 6.6 5.0 - 12.0 %    Eosinophil % 1.3 0.3 - 6.2 %    Basophil % 0.7 0.0 - 1.5 %    Immature Grans % 0.3 0.0 - 0.5 %    Neutrophils, Absolute 4.62 1.70 - 7.00 10*3/mm3    Lymphocytes, Absolute 1.83 0.70 - 3.10 10*3/mm3    Monocytes, Absolute 0.47 0.10 - 0.90 10*3/mm3    Eosinophils, Absolute 0.09 0.00 - 0.40 10*3/mm3    Basophils, Absolute 0.05 0.00 - 0.20 10*3/mm3    Immature Grans, Absolute 0.02 0.00 - 0.05 10*3/mm3    nRBC 0.0 0.0 - 0.2 /100 WBC   Comprehensive Metabolic Panel    Collection Time: 09/05/23 10:05 PM    Specimen: Hand, Left; Blood   Result Value Ref Range    Glucose  117 (H) 65 - 99 mg/dL    BUN 3 (L) 6 - 20 mg/dL    Creatinine 0.59 0.57 - 1.00 mg/dL    Sodium 143 136 - 145 mmol/L    Potassium 3.5 3.5 - 5.2 mmol/L    Chloride 102 98 - 107 mmol/L    CO2 24.0 22.0 - 29.0 mmol/L    Calcium 9.4 8.6 - 10.5 mg/dL    Total Protein 7.3 6.0 - 8.5 g/dL    Albumin 4.3 3.5 - 5.2 g/dL    ALT (SGPT) 6 1 - 33 U/L    AST (SGOT) 10 1 - 32 U/L    Alkaline Phosphatase 84 39 - 117 U/L    Total Bilirubin 0.2 0.0 - 1.2 mg/dL    Globulin 3.0 gm/dL    A/G Ratio 1.4 g/dL    BUN/Creatinine Ratio 5.1 (L) 7.0 - 25.0    Anion Gap 17.0 (H) 5.0 - 15.0 mmol/L    eGFR 117.0 >60.0 mL/min/1.73   Lipase    Collection Time: 09/05/23 10:05 PM    Specimen: Hand, Left; Blood   Result Value Ref Range    Lipase 232 (H) 13 - 60 U/L   Ethanol    Collection Time: 09/05/23 10:05 PM    Specimen: Hand, Left; Blood   Result Value Ref Range    Ethanol 111 (H) 0 - 10 mg/dL    Ethanol % 0.111 %   Procalcitonin    Collection Time: 09/05/23 10:05 PM    Specimen: Hand, Left; Blood   Result Value Ref Range    Procalcitonin 0.04 0.00 - 0.25 ng/mL   STAT Lactic Acid, Reflex    Collection Time: 09/05/23 10:05 PM    Specimen: Hand, Left; Blood   Result Value Ref Range    Lactate 5.5 (C) 0.5 - 2.0 mmol/L       Ordered the above labs and independently reviewed the results.        RADIOLOGY  CT Abdomen Pelvis With Contrast    Result Date: 9/5/2023  CT OF THE ABDOMEN AND PELVIS WITH CONTRAST  HISTORY: Acute on chronic abdominal pain  COMPARISON: August 22, 2023  TECHNIQUE: Axial CT imaging was obtained through the abdomen and pelvis. IV contrast was administered.  FINDINGS: Images through the lung bases are clear. No suspicious hepatic lesions are seen. Gallbladder appears normal. The pancreas is atrophic, with pancreatic calcifications, in keeping with chronic pancreatitis. Some of these appear to be intraductal stones. Pancreatic ductal size is stable, measuring up to 5 mm. The patient has a pancreatic pseudocyst, involving the tail of the  pancreas. This measures up to 9.9 x 7.0 cm, which is larger than on prior study, when it measured 8.6 x 6.2 cm. As was previously discussed, the pseudocyst communicates with collections within the the left adrenal gland. The largest collection within the left adrenal gland measures up to 2.6 cm. This is also larger than on prior study, when it measured 2.2 cm. MRCP showed communication of the pseudocyst with the pancreatic duct. There is a rind of complex inferior perisplenic fluid. This is again seen on the current study. It has increased in size when compared to prior study, now measuring up to 3.5 cm in length, previously measuring 1.8 cm. The collections are deforming the contour of the left kidney. There is also some displacement of the stomach anteriorly. The splenic vein remains patent at the portal splenic confluence, although the splenic vein is attenuated near the hilum, with multiple collaterals noted. There is no evidence of gastric outlet obstruction. Kidneys appear to enhance symmetrically. No hydronephrosis is seen. There our some aortic calcifications. No distal ureteral or bladder stones are seen. Uterus is absent. There is no bowel obstruction. The appendix is normal. There is a retroaortic left renal vein. No acute osseous abnormalities are seen.       1. The patient's previously described pancreatic pseudocyst involving the tail of the pancreas continues to increase in size when compared to prior imaging. This collection communicates with smaller collections within the left adrenal gland. These have also worsened. Furthermore, there is increasing complex material which is noted abutting the inferior margin of the spleen.  Radiation dose reduction techniques were utilized, including automated exposure control and exposure modulation based on body size.   This report was finalized on 9/5/2023 10:23 PM by Dr. Camilla Whalen M.D.       I ordered the above noted radiological studies. Reviewed by me  and discussed with radiologist.  See dictation for official radiology interpretation.      PROCEDURES    Procedures        MEDICATIONS GIVEN IN ER    Medications   sodium chloride 0.9 % flush 10 mL (has no administration in time range)   sodium chloride 0.9 % bolus 1,770 mL (has no administration in time range)   piperacillin-tazobactam (ZOSYN) 4.5 g in iso-osmotic dextrose 100 mL IVPB (premix) (has no administration in time range)   sodium chloride 0.9 % infusion 1,000 mL (1,000 mL Intravenous New Bag 9/5/23 2235)   folic acid 1 mg in sodium chloride 0.9 % 50 mL IVPB (1 mg Intravenous New Bag 9/5/23 2239)   thiamine (B-1) injection 200 mg (200 mg Intravenous Given 9/5/23 2235)   HYDROmorphone (DILAUDID) injection 0.5 mg (0.5 mg Intravenous Given 9/5/23 2045)   metoclopramide (REGLAN) injection 10 mg (10 mg Intravenous Given 9/5/23 2046)   famotidine (PEPCID) injection 20 mg (20 mg Intravenous Given 9/5/23 2046)   iopamidol (ISOVUE-300) 61 % injection 85 mL (85 mL Intravenous Given by Other 9/5/23 2147)   HYDROmorphone (DILAUDID) injection 0.5 mg (0.5 mg Intravenous Given 9/5/23 2236)         PROGRESS, DATA ANALYSIS, CONSULTS, AND MEDICAL DECISION MAKING    My differential diagnosis for abdominal pain includes but is not limited to:  Gastritis, gastroenteritis, peptic ulcer disease, GERD, esophageal perforation, acute appendicitis, mesenteric adenitis, Meckel’s diverticulum, epiploic appendagitis, diverticulitis, colon cancer, ulcerative colitis, Crohn’s disease, intussusception, small bowel obstruction, adhesions, ischemic bowel, perforated viscus, ileus, obstipation, biliary colic, cholecystitis, cholelithiasis, Mike-Dionicio Rakan, hepatitis, pancreatitis, common bile duct obstruction, cholangitis, bile leak, splenic trauma, splenic rupture, splenic infarction, splenic abscess, abdominal abscess, ascites, spontaneous bacterial peritonitis, hernia, UTI, cystitis,ureterolithiasis, urinary obstruction, ovarian cyst,  torsion, pregnancy, ectopic pregnancy, PID, pelvic abscess, mittelschmerz, endometriosis, AAA, myocardial infarction, pneumonia, cancer, porphyria, DKA, medications, sickle cell, viral syndrome, zoster    Total critical care time: Approximately 35 minutes    Due to a high probability of clinically significant, life threatening deterioration, the patient required my highest level of preparedness to intervene emergently and I personally spent this critical care time directly and personally managing the patient. This critical care time included obtaining a history; examining the patient; vital sign monitoring; ordering and review of studies; arranging urgent treatment with development of a management plan; evaluation of patient's response to treatment; frequent reassessment; and, discussions with other providers.    This critical care time was performed to assess and manage the high probability of imminent, life-threatening deterioration that could result in multi-organ failure. It was exclusive of separately billable procedures and treating other patients and teaching time.    Please see MDM section and the rest of the note for further information on patient assessment and treatment.      All labs have been independently reviewed by me.  All radiology studies have been reviewed by me and discussed with radiologist dictating the report.   EKG's independently viewed and interpreted by me.  Discussion below represents my analysis of pertinent findings related to patient's condition, differential diagnosis, treatment plan and final disposition.      ED Course as of 09/05/23 2321   Tue Sep 05, 2023   2155 HCG Qualitative: Negative [RS]   2155 WBC: 7.08 [RS]   2155 Hemoglobin: 14.6 [RS]   2155 Immature Grans, Absolute: 0.02 [RS]   2155 Lactate(!!): 2.4  IV fluids infusing [RS]   2155 RADIOLOGY      Study: IV contrasted CT abdomen pelvis  Findings: No free air in the abdomen.  Large fluid-filled lesion left upper  quadrant.  I independently viewed and interpreted these images contemporaneously with treatment.    [RS]   2246 Lactate(!!): 5.5 [RS]   2246 Ethanol(!): 111 [RS]   2246 Lipase(!): 232 [RS]   2248 Laboratory radiologic findings are concerning and thus admission will be recommended. [RS]   2301 Patient's pain at a much more tolerable level at this time.  Reviewed findings with the patient and significant other.  Recommend admission.  Patient agreeable. [RS]   2304 Procalcitonin: 0.04 [RS]   2314 CONSULT        Provider: MARI PEREZ    Discussion: Reviewed patient history, ED presentation and evaluation.  Agreeable to accept patient for admission on behalf of Dr. Oli hurley treatment and planned disposition.         [RS]      ED Course User Index  [RS] Donnell Wilder MD       AS OF 23:21 EDT VITALS:    BP - 158/82  HR - 108  TEMP - 98 °F (36.7 °C) (Oral)  O2 SATS - 99%        DIAGNOSIS  Final diagnoses:   Acute on chronic abdominal pain   Acute on chronic pancreatitis   Alcohol use   Severe sepsis         DISPOSITION  ADMISSION    Discussed treatment plan and reason for admission with pt/family and admitting physician.  Pt/family voiced understanding of the plan for admission for further testing/treatment as needed.          Donnell Wilder MD  09/05/23 2321      Electronically signed by Donnell Wilder MD at 09/05/23 2321       Medication Administration Report for Piper Cain as of 09/07/23 1116     Legend:    Given Hold Not Given Due Canceled Entry Other Actions    Time Time (Time) Time Time-Action         Discontinued     Completed     Future     MAR Hold     Linked             Medications 09/06/23 09/07/23      acetaminophen (TYLENOL) tablet 650 mg  Dose: 650 mg  Freq: Every 4 Hours PRN Route: PO  PRN Reason: Mild Pain  Start: 09/06/23 0208   Admin Instructions:   If given for fever, use fever parameter: fever greater than 100.4 °F  Based on patient request - if ordered for moderate or severe pain,  provider allows for administration of a medication prescribed for a lower pain scale.    Do not exceed 4 grams of acetaminophen in a 24 hr period. Max dose of 2gm for AST/ALT greater than 120 units/L.    If given for pain, use the following pain scale:   Mild Pain = Pain Score of 1-3, CPOT 1-2  Moderate Pain = Pain Score of 4-6, CPOT 3-4  Severe Pain = Pain Score of 7-10, CPOT 5-8        Or  acetaminophen (TYLENOL) 160 MG/5ML solution 650 mg  Dose: 650 mg  Freq: Every 4 Hours PRN Route: PO  PRN Reason: Mild Pain  Start: 09/06/23 0208   Admin Instructions:   If given for fever, use fever parameter: fever greater than 100.4 °F  Based on patient request - if ordered for moderate or severe pain, provider allows for administration of a medication prescribed for a lower pain scale.    Do not exceed 4 grams of acetaminophen in a 24 hr period. Max dose of 2gm for AST/ALT greater than 120 units/L.    If given for pain, use the following pain scale:   Mild Pain = Pain Score of 1-3, CPOT 1-2  Moderate Pain = Pain Score of 4-6, CPOT 3-4  Severe Pain = Pain Score of 7-10, CPOT 5-8        Or  acetaminophen (TYLENOL) suppository 650 mg  Dose: 650 mg  Freq: Every 4 Hours PRN Route: RE  PRN Reason: Mild Pain  Start: 09/06/23 0208   Admin Instructions:   If given for fever, use fever parameter: fever greater than 100.4 °F  Based on patient request - if ordered for moderate or severe pain, provider allows for administration of a medication prescribed for a lower pain scale.    Do not exceed 4 grams of acetaminophen in a 24 hr period. Max dose of 2gm for AST/ALT greater than 120 units/L.    If given for pain, use the following pain scale:   Mild Pain = Pain Score of 1-3, CPOT 1-2  Moderate Pain = Pain Score of 4-6, CPOT 3-4  Severe Pain = Pain Score of 7-10, CPOT 5-8         amitriptyline (ELAVIL) tablet 25 mg  Dose: 25 mg  Freq: Nightly Route: PO  Start: 09/06/23 2100 2021-Given            2100               sennosides-docusate  (PERICOLACE) 8.6-50 MG per tablet 2 tablet  Dose: 2 tablet  Freq: 2 Times Daily Route: PO  Start: 09/06/23 0900   Admin Instructions:   HOLD MEDICATION IF PATIENT HAS HAD BOWEL MOVEMENT. Start bowel management regimen if patient has not had a bowel movement after 12 hours.    0809-Given     (2022)-Not Given           (0859)-Not Given     2100             And  polyethylene glycol (MIRALAX) packet 17 g  Dose: 17 g  Freq: Daily PRN Route: PO  PRN Reason: Constipation  PRN Comment: Use if senna-docusate is ineffective  Start: 09/06/23 0206   Admin Instructions:   Use if no bowel movement after 12 hours. Mix in 6-8 ounces of water.  Use 4-8 ounces of water, tea, or juice for each 17 gram dose.        And  bisacodyl (DULCOLAX) EC tablet 5 mg  Dose: 5 mg  Freq: Daily PRN Route: PO  PRN Reason: Constipation  PRN Comment: Use if polyethylene glycol is ineffective  Start: 09/06/23 0206   Admin Instructions:   Use if no bowel movement after 12 hours.  Swallow whole. Do not crush, split, or chew tablet.        And  bisacodyl (DULCOLAX) suppository 10 mg  Dose: 10 mg  Freq: Daily PRN Route: RE  PRN Reason: Constipation  PRN Comment: Use if bisacodyl oral is ineffective  Start: 09/06/23 0206   Admin Instructions:   Use if no bowel movement after 12 hours.  Hold for diarrhea         folic acid (FOLVITE) tablet 1 mg  Dose: 1 mg  Freq: Daily Route: PO  Start: 09/06/23 0900 0958-Given            0859-Given               HYDROmorphone (DILAUDID) injection 0.5 mg  Dose: 0.5 mg  Freq: Every 2 Hours PRN Route: IV  PRN Reason: Severe Pain  Start: 09/06/23 0208   End: 09/13/23 0207   Admin Instructions:   Based on patient request - if ordered for moderate or severe pain, provider allows for administration of a medication prescribed for a lower pain scale.  If given for pain, use the following pain scale:  Mild Pain = Pain Score of 1-3, CPOT 1-2  Moderate Pain = Pain Score of 4-6, CPOT 3-4  Severe Pain = Pain Score of 7-10, CPOT 5-8     0224-Given     0655-Given     1310-Return to Cabinet [C]     1424-Given     2021-Given       2312-Given            0430-Given              And  naloxone (NARCAN) injection 0.4 mg  Dose: 0.4 mg  Freq: Every 5 Minutes PRN Route: IV  PRN Reason: Respiratory Depression  Start: 09/06/23 0208   Admin Instructions:   If Respiratory Rate Less Than 8 or Patient is Difficult to Arouse, Stop ALL Narcotics & Contact Provider.  Administer Slow IV Push.  Repeat As Ordered Until Respiratory Rate is Greater Than 12.         LORazepam (ATIVAN) tablet 1 mg  Dose: 1 mg  Freq: Every 1 Hour PRN Route: PO  PRN Reason: Withdrawal  PRN Comment: For CIWA-Ar 8-10  Start: 09/06/23 0208   End: 09/13/23 0207   Admin Instructions:   Reassess 1 Hour After Administration   Caution: Look alike/sound alike drug alert    0233-Not Given:  See Alt     0234-Not Given:  See Alt     0655-Not Given:  See Alt     1012-Given     1821-Given       2214-Given            1059-Given              Or  LORazepam (ATIVAN) injection 1 mg  Dose: 1 mg  Freq: Every 1 Hour PRN Route: IV  PRN Reason: Withdrawal  PRN Comment: For CIWA-Ar 8-10  Start: 09/06/23 0208   End: 09/13/23 0207   Admin Instructions:   Reassess 1 Hour After Administration   Caution: Look alike/sound alike drug alert. Dilute 1:1 with normal saline.    0233-Not Given:  See Alt     0234-Not Given:  See Alt     0655-Not Given:  See Alt     1012-Not Given:  See Alt     1821-Not Given:  See Alt       2214-Not Given:  See Alt            1059-Not Given:  See Alt              Or  LORazepam (ATIVAN) tablet 2 mg  Dose: 2 mg  Freq: Every 1 Hour PRN Route: PO  PRN Reason: Withdrawal  PRN Comment: For CIWA-Ar 11-15 or -160, DBP , -125  Start: 09/06/23 0208   End: 09/13/23 0207   Admin Instructions:   Reassess 1 Hour After Administration.   Caution: Look alike/sound alike drug alert    0233-Not Given:  See Alt     0234-Not Given:  See Alt     0655-Not Given:  See Alt     1012-Not Given:  See  Alt     1821-Not Given:  See Alt       2214-Not Given:  See Alt            1059-Not Given:  See Alt              Or  LORazepam (ATIVAN) injection 2 mg  Dose: 2 mg  Freq: Every 1 Hour PRN Route: IV  PRN Reason: Withdrawal  PRN Comment: For CIWA-Ar 11-15 or -160, DBP , -125  Start: 09/06/23 0208   End: 09/13/23 0207   Admin Instructions:   Reassess 1 Hour After Administration   Caution: Look alike/sound alike drug alert. Dilute 1:1 with normal saline.    0233-Given     0234-Not Given:  See Alt     0655-Given     1012-Not Given:  See Alt     1821-Not Given:  See Alt       2214-Not Given:  See Alt            1059-Not Given:  See Alt              Or  LORazepam (ATIVAN) injection 2 mg  Dose: 2 mg  Freq: Every 15 Minutes PRN Route: IV  PRN Reason: Withdrawal  PRN Comment: For CIWA-Ar Greater Than 15 or SBP greater than 160, DBP greater than 110, or HR greater than 125.  Start: 09/06/23 0208   End: 09/13/23 0207   Admin Instructions:   Reassess 15 Minutes After Each Administration.  If CIWA-Ar Does Not Decrease Contact Provider To Discuss Transfer to Higher Level of Care.   Caution: Look alike/sound alike drug alert. Dilute 1:1 with normal saline.    0233-Not Given:  See Alt     0234-Not Given:  See Alt     0655-Not Given:  See Alt     1012-Not Given:  See Alt     1821-Not Given:  See Alt       2214-Not Given:  See Alt            1059-Not Given:  See Alt              Or  LORazepam (ATIVAN) injection 2 mg  Dose: 2 mg  Freq: Every 15 Minutes PRN Route: IM  PRN Reason: Withdrawal  PRN Comment: For CIWA-Ar Greater Than 15 or SBP greater than 160, DBP greater than 110, or HR greater than 125.  Start: 09/06/23 0208   End: 09/13/23 0207   Admin Instructions:   Reassess 15 Minutes After Each Administration.  If CIWA-Ar Does Not Decrease Contact Provider To Discuss Transfer to Higher Level of Care.   Caution: Look alike/sound alike drug alert. Dilute 1:1 with normal saline.    0233-Not Given:  See Alt      "(0234)-Not Given [C]     0655-Not Given:  See Alt     1012-Not Given:  See Alt     1821-Not Given:  See Alt       2214-Not Given:  See Alt            1059-Not Given:  See Alt               Magnesium Standard Dose Replacement - Follow Nurse / BPA Driven Protocol  Freq: As Needed Route: XX  PRN Reason: Other  Start: 09/06/23 0206   Admin Instructions:   Open Order & Select \"[facility] Electrolyte Replacement Protocol Algorithm\" to View Details         nitroglycerin (NITROSTAT) SL tablet 0.4 mg  Dose: 0.4 mg  Freq: Every 5 Minutes PRN Route: SL  PRN Reason: Chest Pain  PRN Comment: Only if SBP Greater Than 100  Start: 09/06/23 0207   Admin Instructions:   If Pain Unrelieved After 3 Doses Notify MD         ondansetron (ZOFRAN) injection 4 mg  Dose: 4 mg  Freq: Every 6 Hours PRN Route: IV  PRN Reasons: Nausea,Vomiting  Start: 09/06/23 0305   Admin Instructions:   \"If multiple N/V medications ordered, use in the following order: Ondansetron, Prochlorperazine, Promethazine. Use PO unless patient refuses or patient unable to swallow.\"      0307-Given     2028-Given               oxyCODONE-acetaminophen (PERCOCET)  MG per tablet 1 tablet  Dose: 1 tablet  Freq: Every 4 Hours PRN Route: PO  PRN Reason: Moderate Pain  Start: 09/06/23 1618   Admin Instructions:   Based on patient request - if ordered for moderate or severe pain, provider allows for administration of a medication prescribed for a lower pain scale.  [JULIA]    Do not exceed 4 grams of acetaminophen in a 24 hr period. Max dose of 2gm for AST/ALT greater than 120 units/L        If given for pain, use the following pain scale:   Mild Pain = Pain Score of 1-3, CPOT 1-2  Moderate Pain = Pain Score of 4-6, CPOT 3-4  Severe Pain = Pain Score of 7-10, CPOT 5-8    1637-Given            0859-Given               pancrelipase (Lip-Prot-Amyl) (CREON) capsule 12,000 units of lipase  Dose: 12,000 units of lipase  Freq: 3 Times Daily With Meals Route: PO  Start: 09/06/23 1800 "   Admin Instructions:   Give with meals.  Swallow whole.  Do not crush or chew capsule. For tube administration: may open capsules and add to juice.    1821-Given            (0859)-Not Given     1200     1800             pantoprazole (PROTONIX) EC tablet 40 mg  Dose: 40 mg  Freq: Daily Route: PO  Start: 09/06/23 1715   Admin Instructions:   Swallow whole; do not crush, split, or chew.    1637-Given            0859-Given               sodium chloride 0.9 % flush 10 mL  Dose: 10 mL  Freq: Every 12 Hours Scheduled Route: IV  Start: 09/06/23 0900    0810-Given     2029-Given           1008-Canceled Entry     2100              sodium chloride 0.9 % infusion  Rate: 125 mL/hr Dose: 125 mL/hr  Freq: Continuous Route: IV  Start: 09/06/23 0300    0224-New Bag     1424-New Bag     2037-New Bag              sodium chloride 0.9 % infusion 40 mL  Dose: 40 mL  Freq: As Needed Route: IV  PRN Reason: Line Care  Start: 09/06/23 0206   Admin Instructions:   Following administration of an IV intermittent medication, flush line with 40mL NS at 100mL/hr.         thiamine (B-1) injection 200 mg  Dose: 200 mg  Freq: Every 8 Hours Scheduled Route: IV  Start: 09/06/23 0600   End: 09/11/23 0559   Admin Instructions:   Doses of up to 250mg, give over 1-2 minutes (IV push). Doses 250mg and above, give over 30 minutes.    0809-Given     1424-Given     2022-Given     2200-Canceled Entry         0859-Given [C]     1400     2200            Followed by  thiamine (VITAMIN B-1) tablet 100 mg  Dose: 100 mg  Freq: Daily Route: PO  Start: 09/11/23 0900        Completed Medications  Medications 09/06/23 09/07/23       famotidine (PEPCID) injection 20 mg  Dose: 20 mg  Freq: Once Route: IV  Start: 09/05/23 2028   End: 09/05/23 2046   Admin Instructions:   Give IV push over 2 minutes.         folic acid 1 mg in sodium chloride 0.9 % 50 mL IVPB  Dose: 1 mg  Freq: Once Route: IV  Start: 09/05/23 2016   End: 09/05/23 2328   Admin Instructions:   Protect from  light.         HYDROmorphone (DILAUDID) injection 0.5 mg  Dose: 0.5 mg  Freq: Once Route: IV  Start: 09/05/23 2224   End: 09/05/23 2236   Admin Instructions:   If given for pain, use the following pain scale:  Mild Pain = Pain Score of 1-3, CPOT 1-2  Moderate Pain = Pain Score of 4-6, CPOT 3-4  Severe Pain = Pain Score of 7-10, CPOT 5-8         HYDROmorphone (DILAUDID) injection 0.5 mg  Dose: 0.5 mg  Freq: Once Route: IV  Start: 09/05/23 2028   End: 09/05/23 2045   Admin Instructions:   If given for pain, use the following pain scale:  Mild Pain = Pain Score of 1-3, CPOT 1-2  Moderate Pain = Pain Score of 4-6, CPOT 3-4  Severe Pain = Pain Score of 7-10, CPOT 5-8         iopamidol (ISOVUE-300) 61 % injection 85 mL  Dose: 85 mL  Freq: Once in Imaging Route: IV  Start: 09/05/23 2203   End: 09/05/23 2147         metoclopramide (REGLAN) injection 10 mg  Dose: 10 mg  Freq: Once Route: IV  Start: 09/05/23 2028   End: 09/05/23 2046   Admin Instructions:   Doses of 10 mg or less can be given IV push undiluted over 1 to 2 minutes         piperacillin-tazobactam (ZOSYN) 4.5 g in iso-osmotic dextrose 100 mL IVPB (premix)  Dose: 4.5 g  Freq: Once Route: IV  Indications of Use: SEPSIS  Start: 09/05/23 2303   End: 09/06/23 0047   Admin Instructions:   Refrigerate    0014-New Bag [C]     0047-Stopped               sodium chloride 0.9 % bolus 1,770 mL  Dose: 30 mL/kg  Weight Dosing Info: 59 kg  Freq: Once Route: IV  Start: 09/05/23 2303   End: 09/06/23 0058   Admin Instructions:   You may need to adjust the volume of this order to account for fluids already given.  The total of the bolus(es) given should be as close to 30 mL/kg without going under.    0058-Stopped                sodium chloride 0.9 % infusion 1,000 mL  Dose: 1,000 mL  Freq: Once Route: IV  Start: 09/05/23 2016   End: 09/05/23 2238    2202-Currently Infusing [C]     2227-Canceled Entry [C]               thiamine (B-1) injection 200 mg  Dose: 200 mg  Freq: Once Route:  IV  Start: 09/05/23 2016   End: 09/05/23 2235   Admin Instructions:   Doses of up to 250mg, give over 1-2 minutes (IV push). Doses 250mg and above, give over 30 minutes.        Discontinued Medications  Medications 09/06/23 09/07/23       folic acid (FOLVITE) tablet 1,000 mcg  Dose: 1,000 mcg  Freq: Daily Route: PO  Start: 09/06/23 1715   End: 09/06/23 1625         sodium chloride 0.9 % flush 10 mL  Dose: 10 mL  Freq: As Needed Route: IV  PRN Reason: Line Care  Start: 09/06/23 0206   End: 09/06/23 1138         sodium chloride 0.9 % flush 10 mL  Dose: 10 mL  Freq: As Needed Route: IV  PRN Reason: Line Care  Start: 09/05/23 2000   End: 09/06/23 1138         thiamine (VITAMIN B-1) tablet 100 mg  Dose: 100 mg  Freq: Daily Route: PO  Start: 09/06/23 1715   End: 09/06/23 1625                       Physician Progress Notes (last 48 hours)        Jose Robertson MD at 09/07/23 0913          [facility]  Inpatient Progress Note    Reason for Follow Up: Acute abdominal pain    Subjective     Interval History:   Abdominal pain continues, she expresses frustration of not having her procedure done recently in [facility] evidently the stent had not arrived    Current Facility-Administered Medications:     acetaminophen (TYLENOL) tablet 650 mg, 650 mg, Oral, Q4H PRN **OR** acetaminophen (TYLENOL) 160 MG/5ML solution 650 mg, 650 mg, Oral, Q4H PRN **OR** acetaminophen (TYLENOL) suppository 650 mg, 650 mg, Rectal, Q4H PRN, Evelin Weiss, APRN    amitriptyline (ELAVIL) tablet 25 mg, 25 mg, Oral, Nightly, Fletcher Gallegos MD, 25 mg at 09/06/23 2021    sennosides-docusate (PERICOLACE) 8.6-50 MG per tablet 2 tablet, 2 tablet, Oral, BID, 2 tablet at 09/06/23 0809 **AND** polyethylene glycol (MIRALAX) packet 17 g, 17 g, Oral, Daily PRN **AND** bisacodyl (DULCOLAX) EC tablet 5 mg, 5 mg, Oral, Daily PRN **AND** bisacodyl (DULCOLAX) suppository 10 mg, 10 mg, Rectal, Daily PRN, Evelin Weiss, APRN     folic acid (FOLVITE) tablet 1 mg, 1 mg, Oral, Daily, Evelin Weiss APRN, 1 mg at 09/07/23 0859    HYDROmorphone (DILAUDID) injection 0.5 mg, 0.5 mg, Intravenous, Q2H PRN, 0.5 mg at 09/07/23 0430 **AND** naloxone (NARCAN) injection 0.4 mg, 0.4 mg, Intravenous, Q5 Min PRN, Evelin Weiss APRN    LORazepam (ATIVAN) tablet 1 mg, 1 mg, Oral, Q1H PRN, 1 mg at 09/06/23 2214 **OR** LORazepam (ATIVAN) injection 1 mg, 1 mg, Intravenous, Q1H PRN **OR** LORazepam (ATIVAN) tablet 2 mg, 2 mg, Oral, Q1H PRN **OR** LORazepam (ATIVAN) injection 2 mg, 2 mg, Intravenous, Q1H PRN, 2 mg at 09/06/23 0655 **OR** LORazepam (ATIVAN) injection 2 mg, 2 mg, Intravenous, Q15 Min PRN **OR** LORazepam (ATIVAN) injection 2 mg, 2 mg, Intramuscular, Q15 Min PRN, Evelin Weiss APRN    Magnesium Standard Dose Replacement - Follow Nurse / BPA Driven Protocol, , Does not apply, PRN, Evelin Weiss APRN    nitroglycerin (NITROSTAT) SL tablet 0.4 mg, 0.4 mg, Sublingual, Q5 Min PRN, Evelin Weiss APRN    ondansetron (ZOFRAN) injection 4 mg, 4 mg, Intravenous, Q6H PRN, Evelin Weiss APRN, 4 mg at 09/06/23 2028    oxyCODONE-acetaminophen (PERCOCET)  MG per tablet 1 tablet, 1 tablet, Oral, Q4H PRN, Fletcher Gallegos MD, 1 tablet at 09/07/23 0859    pancrelipase (Lip-Prot-Amyl) (CREON) capsule 12,000 units of lipase, 12,000 units of lipase, Oral, TID With Meals, Fletcher Gallegos MD, 12,000 units of lipase at 09/06/23 1821    pantoprazole (PROTONIX) EC tablet 40 mg, 40 mg, Oral, Daily, Fletcher Gallegos MD, 40 mg at 09/07/23 0859    sodium chloride 0.9 % flush 10 mL, 10 mL, Intravenous, Q12H, Evelin Weiss APRN, 10 mL at 09/06/23 2029    sodium chloride 0.9 % infusion 40 mL, 40 mL, Intravenous, PRN, Evelin Weiss APRN    sodium chloride 0.9 % infusion, 125 mL/hr, Intravenous, Continuous, Evelin Weiss APRN, Last Rate: 125 mL/hr at 09/06/23 2037, 125 mL/hr at 09/06/23 2037    thiamine (B-1) injection 200  mg, 200 mg, Intravenous, Q8H, 200 mg at 09/07/23 0859 **FOLLOWED BY** [START ON 9/11/2023] thiamine (VITAMIN B-1) tablet 100 mg, 100 mg, Oral, Daily, Evelin Weiss, SOL  Review of Systems:    There is weakness and fatigue all other systems reviewed and negative    Objective     Vital Signs  Temp:  [98.4 °F (36.9 °C)-98.7 °F (37.1 °C)] 98.5 °F (36.9 °C)  Heart Rate:  [91-98] 98  Resp:  [16-20] 16  BP: (121-139)/(87-96) 121/87  Body mass index is 19.19 kg/m².  No intake or output data in the 24 hours ending 09/07/23 0913  No intake/output data recorded.     Physical Exam:   General: patient awake, alert and cooperative   Eyes: Normal lids and lashes, no scleral icterus   Neck: supple, normal ROM   Skin: warm and dry, not jaundiced   Cardiovascular: regular rhythm and rate, no murmurs auscultated   Pulm: clear to auscultation bilaterally, regular and unlabored   Abdomen: soft, nontender, nondistended; normal bowel sounds   Extremities: no rash or edema   Psychiatric: Normal mood and behavior; memory intact     Results Review:     I reviewed the patient's new clinical results.    Results from last 7 days   Lab Units 09/07/23  0615 09/06/23  0404 09/05/23  2044   WBC 10*3/mm3 5.47 9.49 7.08   HEMOGLOBIN g/dL 11.4* 12.5 14.6   HEMATOCRIT % 33.2* 37.0 42.1   PLATELETS 10*3/mm3 151 209 269     Results from last 7 days   Lab Units 09/07/23  0615 09/06/23  0404 09/05/23  2205   SODIUM mmol/L 139 141 143   POTASSIUM mmol/L 3.8 3.7 3.5   CHLORIDE mmol/L 103 103 102   CO2 mmol/L 27.3 24.0 24.0   BUN mg/dL 3* 3* 3*   CREATININE mg/dL 0.58 0.60 0.59   CALCIUM mg/dL 8.8 8.8 9.4   BILIRUBIN mg/dL 0.5  --  0.2   ALK PHOS U/L 66  --  84   ALT (SGPT) U/L 5  --  6   AST (SGOT) U/L 6  --  10   GLUCOSE mg/dL 102* 109* 117*     Results from last 7 days   Lab Units 09/06/23  0403   INR  0.95     Lab Results   Lab Value Date/Time    LIPASE 99 (H) 09/07/2023 0615    LIPASE 232 (H) 09/05/2023 2205    LIPASE 64 (H) 08/25/2023 9687     LIPASE 75 (H) 08/24/2023 0324    LIPASE 87 (H) 08/23/2023 0252    LIPASE 115 (H) 08/22/2023 1301    LIPASE 73 (H) 06/08/2023 0652    LIPASE 151 (H) 06/07/2023 2246    LIPASE 192 (H) 03/25/2023 1700    LIPASE 38 12/05/2022 0526    LIPASE 34 12/04/2022 0542    LIPASE 29 12/03/2022 0515    LIPASE 50 12/02/2022 0719    LIPASE 29 12/01/2022 0519    LIPASE 65 (H) 11/30/2022 1412    LIPASE 157 (H) 11/28/2022 1751    LIPASE 335 (H) 10/20/2022 1939    LIPASE 378 (H) 08/06/2022 2158    LIPASE 95 (H) 01/24/2018 0545    LIPASE 93 (H) 10/01/2017 0356    LIPASE 122 (H) 09/30/2017 0619    LIPASE 86 (H) 09/29/2017 1731    LIPASE 133 (H) 09/28/2017 0032       Radiology:  CT Abdomen Pelvis With Contrast   Final Result       1. The patient's previously described pancreatic pseudocyst involving   the tail of the pancreas continues to increase in size when compared to   prior imaging. This collection communicates with smaller collections   within the left adrenal gland. These have also worsened. Furthermore,   there is increasing complex material which is noted abutting the   inferior margin of the spleen.       Radiation dose reduction techniques were utilized, including automated   exposure control and exposure modulation based on body size.           This report was finalized on 9/5/2023 10:23 PM by Dr. Camilla Whalen M.D.              Assessment & Plan     Active Hospital Problems    Diagnosis     **Acute abdominal pain     Tobacco abuse     Pancreatic pseudocyst     Chronic pancreatitis     Anemia of chronic disease     Thrombocytopenia     Alcohol abuse        Assessment:  acute on chronic abdominal pain secondary to chronic pancreatitis and enlarging pancreatic pseudocyst  History of alcohol abuse     Plan:  Dr. Madden yesterday discussed with Dr. Denson her gastroenterologist in [facility]-they are working to reschedule her procedure for early next week.  Recommend pain control per primary team  Advance to low-fat diet  as tolerated  Ultimately needs endoscopic drainage of her cyst through [facility] gastroenterology  Explained to this patient it cannot be done here, I believe she understands this now, she should be in contact with  gastroenterology of [facility]'s office today to find out when it is going to be done  GI okay with discharge home     I discussed the patients findings and my recommendations with patient and nursing staff.    Jose Robertson MD                 Electronically signed by Jose Robertson MD at 09/07/23 0914          Consult Notes (last 48 hours)        Rachell Madden MD at 09/06/23 1049        Consult Orders    1. Inpatient Gastroenterology Consult [[id number]] ordered by Evelin Weiss APRN at 09/06/23 0209                 [facility]  Initial Inpatient Consult Note    Referring Provider: YOLANDE    Reason for Consultation: acute abdominal pain    Subjective     History of present illness:    42 y.o. female known to our service from previous admissions, that we are asked to see for abdominal pain.    She was admitted with intractable pain.  She was contacted by her gastroenterologist group yesterday and told that her procedure was canceled for today due to not having the necessary equipment.  They plan to reschedule this.  She reports that she did drink alcohol yesterday due to the pain.  She has chronic nausea which is at baseline.  She is tolerating liquids.  She has pain which is in the mid abdomen especially on the left.    Patient was admitted with pancreatitis 2 weeks ago.  Lipase was 232.  Alcohol level was elevated on admission at 111.  Lactate was 5.5.  Creatinine, hemoglobin, white count and liver function test were normal.  CT of the abdomen and pelvis with contrast yesterday shows pancreatic pseudocyst involving the tail of the pancreas.  This is increased in size to 9.9 x 7 cm which is approximately a centimeter larger than it was on last imaging 2  weeks ago.  Pseudocyst communicates with collections within the left adrenal gland.  MRCP 2 weeks ago shows that this pseudocyst communicates with the pancreatic duct.  Fluid collection displaces the stomach anteriorly as well as compresses the left kidney.    Patient has a history of chronic pancreatitis, alcohol abuse, as well as tobacco abuse.  She is followed by gastroenterology of [facility].  She was scheduled for outpatient ERCP with stent placement and EUS at [facility][1].        Past Medical History:  Past Medical History:   Diagnosis Date    Anxiety     E. coli bacteremia     ETOH abuse     GERD (gastroesophageal reflux disease)     Hiatal hernia     Left flank pain 10/21/2022    Migraine     Miscarriage 2004    Pancreatitis      Past Surgical History:  Past Surgical History:   Procedure Laterality Date    ENDOSCOPY N/A 8/10/2022    Procedure: ESOPHAGOGASTRODUODENOSCOPY with bxs;  Surgeon: Salvador Price MD;  Location: [facility] ENDOSCOPY;  Service: Gastroenterology;  Laterality: N/A;  Pre: r/o esophageal  varacies, abd pain  Post: esophageal plaque      Social History:   Social History     Tobacco Use    Smoking status: Every Day     Packs/day: 0.50     Types: Cigarettes     Start date: 1/28/1996    Smokeless tobacco: Current   Substance Use Topics    Alcohol use: Not Currently      Family History:  Family History   Problem Relation Age of Onset    Alcohol abuse Mother     Arthritis Mother     Asthma Mother     Miscarriages / Stillbirths Mother     Cancer Father     Diabetes Father     Drug abuse Father     Early death Father     Heart disease Father     Hyperlipidemia Father     Hypertension Father     Alcohol abuse Paternal Aunt     Cancer Paternal Aunt     Diabetes Paternal Aunt     Drug abuse Paternal Aunt     Early death Paternal Aunt     Heart disease Paternal Aunt     Hyperlipidemia Paternal Aunt     Hypertension Paternal Aunt     Alcohol abuse Maternal Grandmother     Alcohol abuse  Maternal Grandfather     Alcohol abuse Paternal Grandmother     Cancer Paternal Grandmother     Diabetes Paternal Grandmother     Drug abuse Paternal Grandmother     Early death Paternal Grandmother     Heart disease Paternal Grandmother     Hyperlipidemia Paternal Grandmother     Hypertension Paternal Grandmother     Alcohol abuse Paternal Grandfather        Home Meds:  Medications Prior to Admission   Medication Sig Dispense Refill Last Dose    amitriptyline (ELAVIL) 10 MG tablet Take 2.5 tablets by mouth Every Night.   9/5/2023 at 2200    Cyanocobalamin (VITAMIN B 12 PO) Take  by mouth.   Past Week    Ferrous Sulfate Dried (Feosol) 200 (65 Fe) MG tablet tablet Take 1 tablet by mouth Daily.   9/5/2023 at 0800    folic acid (FOLVITE) 1 MG tablet Take 1 tablet by mouth Daily. 30 tablet 3 9/5/2023 at 0800    ondansetron (ZOFRAN) 4 MG tablet Take 1 tablet by mouth Every 8 (Eight) Hours As Needed for Nausea or Vomiting.   9/5/2023    oxyCODONE-acetaminophen (PERCOCET)  MG per tablet Take 1 tablet by mouth Every 4 (Four) Hours As Needed for Moderate Pain.   Past Week    pancrelipase, Lip-Prot-Amyl, (CREON) 16137-73229 units capsule delayed-release particles capsule Take 3 capsules by mouth 3 (Three) Times a Day With Meals.   9/5/2023 at 0800    pantoprazole (PROTONIX) 40 MG EC tablet Take 1 tablet by mouth Daily. 30 tablet 3 9/5/2023    thiamine (VITAMIN B1) 100 MG tablet Take 1 tablet by mouth Daily. 30 tablet 3 9/5/2023 at 0800     Current Meds:   folic acid, 1 mg, Oral, Daily  senna-docusate sodium, 2 tablet, Oral, BID  sodium chloride, 10 mL, Intravenous, Q12H  thiamine (B-1) IV, 200 mg, Intravenous, Q8H   Followed by  [START ON 9/11/2023] thiamine, 100 mg, Oral, Daily      Allergies:  Allergies   Allergen Reactions    Nickel      Review of Systems  Pertinent items are noted in HPI, all other systems reviewed and negative     Objective     Vital Signs  Temp:  [98 °F (36.7 °C)-99.1 °F (37.3 °C)] 99.1 °F (37.3  °C)  Heart Rate:  [] 103  Resp:  [20-24] 20  BP: (136-158)/() 136/98  Physical Exam:  General Appearance:    Alert, cooperative, in no acute distress   Head:    Normocephalic, without obvious abnormality, atraumatic   Eyes:          conjunctivae and sclerae normal, no   icterus   Throat:   no thrush, oral mucosa moist   Neck:   Supple, no adenopathy   Lungs:     Clear to auscultation bilaterally    Heart:    Regular rhythm and normal rate    Chest Wall:    No abnormalities observed   Abdomen:     Soft, nondistended, tender in the mid epigastrium extending to the left mid abdomen; normal bowel sounds   Extremities:   no edema, no redness   Skin:   No bruising or rash   Psychiatric:  normal mood and insight     Results Review:   I reviewed the patient's new clinical results.    Results from last 7 days   Lab Units 09/06/23  0404 09/05/23  2044   WBC 10*3/mm3 9.49 7.08   HEMOGLOBIN g/dL 12.5 14.6   HEMATOCRIT % 37.0 42.1   PLATELETS 10*3/mm3 209 269     Results from last 7 days   Lab Units 09/06/23  0404 09/05/23  2205   SODIUM mmol/L 141 143   POTASSIUM mmol/L 3.7 3.5   CHLORIDE mmol/L 103 102   CO2 mmol/L 24.0 24.0   BUN mg/dL 3* 3*   CREATININE mg/dL 0.60 0.59   CALCIUM mg/dL 8.8 9.4   BILIRUBIN mg/dL  --  0.2   ALK PHOS U/L  --  84   ALT (SGPT) U/L  --  6   AST (SGOT) U/L  --  10   GLUCOSE mg/dL 109* 117*     Results from last 7 days   Lab Units 09/06/23  0403   INR  0.95     Lab Results   Lab Value Date/Time    LIPASE 232 (H) 09/05/2023 2205    LIPASE 64 (H) 08/25/2023 0425    LIPASE 75 (H) 08/24/2023 0324    LIPASE 87 (H) 08/23/2023 0252    LIPASE 115 (H) 08/22/2023 1301    LIPASE 73 (H) 06/08/2023 0652    LIPASE 151 (H) 06/07/2023 2246    LIPASE 192 (H) 03/25/2023 1700    LIPASE 38 12/05/2022 0526    LIPASE 34 12/04/2022 0542    LIPASE 29 12/03/2022 0515    LIPASE 50 12/02/2022 0719    LIPASE 29 12/01/2022 0519    LIPASE 65 (H) 11/30/2022 1412    LIPASE 157 (H) 11/28/2022 1751    LIPASE 335 (H)  10/20/2022 1939    LIPASE 378 (H) 08/06/2022 2158    LIPASE 95 (H) 01/24/2018 0545    LIPASE 93 (H) 10/01/2017 0356    LIPASE 122 (H) 09/30/2017 0619    LIPASE 86 (H) 09/29/2017 1731    LIPASE 133 (H) 09/28/2017 0032       Radiology:  CT Abdomen Pelvis With Contrast   Final Result       1. The patient's previously described pancreatic pseudocyst involving   the tail of the pancreas continues to increase in size when compared to   prior imaging. This collection communicates with smaller collections   within the left adrenal gland. These have also worsened. Furthermore,   there is increasing complex material which is noted abutting the   inferior margin of the spleen.       Radiation dose reduction techniques were utilized, including automated   exposure control and exposure modulation based on body size.           This report was finalized on 9/5/2023 10:23 PM by Dr. Camilla Whalen M.D.              Assessment & Plan   Active Hospital Problems    Diagnosis     **Acute abdominal pain     Tobacco abuse     Pancreatic pseudocyst     Chronic pancreatitis     Anemia of chronic disease     Thrombocytopenia     Alcohol abuse        Assessment:  acute on chronic abdominal pain secondary to chronic pancreatitis and enlarging pancreatic pseudocyst  History of alcohol abuse    Plan:  Discussed with Dr. Denson her gastroenterologist in [facility]-they are working to reschedule her procedure for early next week.  Recommend pain control per primary team  Advance to low-fat diet as tolerated  Ultimately needs endoscopic drainage of her cyst through [facility] gastroenterology      I discussed the patients findings and my recommendations with patient.    Rachell Madden MD              Electronically signed by Rachell Madden MD at 09/06/23 1126

## 2023-09-07 NOTE — DISCHARGE SUMMARY
PHYSICIAN DISCHARGE SUMMARY                                                                        [facility]    Patient Identification:  Name: Piper Cain  Age: 42 y.o.  Sex: female  :  1980  MRN: [id number]  Primary Care Physician: Rachell Pozo APRN    Admit date: 2023  Discharge date and time:2023  Discharged Condition: good    Discharge Diagnoses:  Active Hospital Problems    Diagnosis  POA    **Acute abdominal pain [R10.9]  Yes    Tobacco abuse [Z72.0]  Yes    Pancreatic pseudocyst [K86.3]  Yes    Chronic pancreatitis [K86.1]  Yes    Anemia of chronic disease [D63.8]  Yes    Thrombocytopenia [D69.6]  Yes    Alcohol abuse [F10.10]  Yes      Resolved Hospital Problems   No resolved problems to display.          PMHX:   Past Medical History:   Diagnosis Date    Anxiety     E. coli bacteremia     ETOH abuse     GERD (gastroesophageal reflux disease)     Hiatal hernia     Left flank pain 10/21/2022    Migraine     Miscarriage 2004    Pancreatitis      PSHX:   Past Surgical History:   Procedure Laterality Date    ENDOSCOPY N/A 8/10/2022    Procedure: ESOPHAGOGASTRODUODENOSCOPY with bxs;  Surgeon: Salvador Price MD;  Location: [facility] ENDOSCOPY;  Service: Gastroenterology;  Laterality: N/A;  Pre: r/o esophageal  varacies, abd pain  Post: esophageal plaque       Hospital Course: Piper Cain  is a 42-year-old female with history of alcohol abuse and chronic pancreatitis admitted with history of worsening abdominal pain with nausea and vomiting. She has had acute on chronic pancreatitis and has pancreatic pseudocyst which has been getting bigger. She follows with [facility] and was supposed to get a stent placed but they were not able to obtain the stent. The patient was evaluated in the ER and admitted for further evaluation treatment with flareup of acute on chronic pancreatitis.   The patient was seen by GI medicine and treated with bowel rest pain and nausea medicine for acute on chronic pancreatitis.  She was feeling better after couple days was able to tolerate low-fat diet.  She was encouraged to stop drinking alcoholic beverages and follow-up with her GI doctor with [facility] for stent which was rescheduled for next week.  She will also follow-up with her primary care.    Consults:     Consults       Date and Time Order Name Status Description    9/6/2023  2:09 AM Inpatient Gastroenterology Consult Completed     9/5/2023 10:48 PM [facility] (on-call MD unless specified) Details      8/22/2023  8:53 PM Inpatient Gastroenterology Consult Completed     8/22/2023  5:10 PM Surgery (on-call MD unless specified) Completed           Results from last 7 days   Lab Units 09/07/23  0615   WBC 10*3/mm3 5.47   HEMOGLOBIN g/dL 11.4*   HEMATOCRIT % 33.2*   PLATELETS 10*3/mm3 151     Results from last 7 days   Lab Units 09/07/23  0615   SODIUM mmol/L 139   POTASSIUM mmol/L 3.8   CHLORIDE mmol/L 103   CO2 mmol/L 27.3   BUN mg/dL 3*   CREATININE mg/dL 0.58   GLUCOSE mg/dL 102*   CALCIUM mg/dL 8.8     Significant Diagnostic Studies:   WBC   Date Value Ref Range Status   09/07/2023 5.47 3.40 - 10.80 10*3/mm3 Final     Hemoglobin   Date Value Ref Range Status   09/07/2023 11.4 (L) 12.0 - 15.9 g/dL Final     Hematocrit   Date Value Ref Range Status   09/07/2023 33.2 (L) 34.0 - 46.6 % Final     Platelets   Date Value Ref Range Status   09/07/2023 151 140 - 450 10*3/mm3 Final     Sodium   Date Value Ref Range Status   09/07/2023 139 136 - 145 mmol/L Final     Potassium   Date Value Ref Range Status   09/07/2023 3.8 3.5 - 5.2 mmol/L Final     Chloride   Date Value Ref Range Status   09/07/2023 103 98 - 107 mmol/L Final     CO2   Date Value Ref Range Status   09/07/2023 27.3 22.0 - 29.0 mmol/L Final     BUN   Date Value Ref Range Status   09/07/2023 3 (L) 6 - 20 mg/dL Final     Creatinine   Date  Value Ref Range Status   09/07/2023 0.58 0.57 - 1.00 mg/dL Final     Glucose   Date Value Ref Range Status   09/07/2023 102 (H) 65 - 99 mg/dL Final     Calcium   Date Value Ref Range Status   09/07/2023 8.8 8.6 - 10.5 mg/dL Final     AST (SGOT)   Date Value Ref Range Status   09/07/2023 6 1 - 32 U/L Final     ALT (SGPT)   Date Value Ref Range Status   09/07/2023 5 1 - 33 U/L Final     Alkaline Phosphatase   Date Value Ref Range Status   09/07/2023 66 39 - 117 U/L Final     INR   Date Value Ref Range Status   09/06/2023 0.95 0.90 - 1.10 Final     Color, UA   Date Value Ref Range Status   09/06/2023 Yellow Yellow, Straw Final     Appearance, UA   Date Value Ref Range Status   09/06/2023 Clear Clear Final     pH, UA   Date Value Ref Range Status   09/06/2023 7.5 5.0 - 8.0 Final     Glucose, UA   Date Value Ref Range Status   09/06/2023 Negative Negative Final     Ketones, UA   Date Value Ref Range Status   09/06/2023 Negative Negative Final     Blood, UA   Date Value Ref Range Status   09/06/2023 Negative Negative Final     Leuk Esterase, UA   Date Value Ref Range Status   09/06/2023 Negative Negative Final     Bilirubin, UA   Date Value Ref Range Status   09/06/2023 Negative Negative Final     Urobilinogen, UA   Date Value Ref Range Status   09/06/2023 0.2 E.U./dL 0.2 - 1.0 E.U./dL Final     No results found for: TROPONINT, TROPONINI, BNP  No components found for: HGBA1C;2  No components found for: TSH;2  Imaging Results (All)       Procedure Component Value Units Date/Time    CT Abdomen Pelvis With Contrast [[id number]] Collected: 09/05/23 2212     Updated: 09/05/23 2226    Narrative:      CT OF THE ABDOMEN AND PELVIS WITH CONTRAST     HISTORY: Acute on chronic abdominal pain     COMPARISON: August 22, 2023     TECHNIQUE: Axial CT imaging was obtained through the abdomen and pelvis.  IV contrast was administered.     FINDINGS:  Images through the lung bases are clear. No suspicious hepatic lesions  are seen.  Gallbladder appears normal. The pancreas is atrophic, with  pancreatic calcifications, in keeping with chronic pancreatitis. Some of  these appear to be intraductal stones. Pancreatic ductal size is stable,  measuring up to 5 mm. The patient has a pancreatic pseudocyst, involving  the tail of the pancreas. This measures up to 9.9 x 7.0 cm, which is  larger than on prior study, when it measured 8.6 x 6.2 cm. As was  previously discussed, the pseudocyst communicates with collections  within the the left adrenal gland. The largest collection within the  left adrenal gland measures up to 2.6 cm. This is also larger than on  prior study, when it measured 2.2 cm. MRCP showed communication of the  pseudocyst with the pancreatic duct. There is a rind of complex inferior  perisplenic fluid. This is again seen on the current study. It has  increased in size when compared to prior study, now measuring up to 3.5  cm in length, previously measuring 1.8 cm. The collections are deforming  the contour of the left kidney. There is also some displacement of the  stomach anteriorly. The splenic vein remains patent at the portal  splenic confluence, although the splenic vein is attenuated near the  hilum, with multiple collaterals noted. There is no evidence of gastric  outlet obstruction. Kidneys appear to enhance symmetrically. No  hydronephrosis is seen. There our some aortic calcifications. No distal  ureteral or bladder stones are seen. Uterus is absent. There is no bowel  obstruction. The appendix is normal. There is a retroaortic left renal  vein. No acute osseous abnormalities are seen.       Impression:         1. The patient's previously described pancreatic pseudocyst involving  the tail of the pancreas continues to increase in size when compared to  prior imaging. This collection communicates with smaller collections  within the left adrenal gland. These have also worsened. Furthermore,  there is increasing complex material  which is noted abutting the  inferior margin of the spleen.     Radiation dose reduction techniques were utilized, including automated  exposure control and exposure modulation based on body size.        This report was finalized on 9/5/2023 10:23 PM by Dr. Camilla Whalen M.D.             Lab Results (last 7 days)       Procedure Component Value Units Date/Time    Comprehensive Metabolic Panel [[id number]]  (Abnormal) Collected: 09/07/23 0615    Specimen: Blood Updated: 09/07/23 0653     Glucose 102 mg/dL      BUN 3 mg/dL      Creatinine 0.58 mg/dL      Sodium 139 mmol/L      Potassium 3.8 mmol/L      Chloride 103 mmol/L      CO2 27.3 mmol/L      Calcium 8.8 mg/dL      Total Protein 6.2 g/dL      Albumin 3.4 g/dL      ALT (SGPT) 5 U/L      AST (SGOT) 6 U/L      Alkaline Phosphatase 66 U/L      Total Bilirubin 0.5 mg/dL      Globulin 2.8 gm/dL      A/G Ratio 1.2 g/dL      BUN/Creatinine Ratio 5.2     Anion Gap 8.7 mmol/L      eGFR 117.5 mL/min/1.73     Narrative:      GFR Normal >60  Chronic Kidney Disease <60  Kidney Failure <15      Lipase [[id number]]  (Abnormal) Collected: 09/07/23 0615    Specimen: Blood Updated: 09/07/23 0653     Lipase 99 U/L     CBC & Differential [[id number]]  (Abnormal) Collected: 09/07/23 0615    Specimen: Blood Updated: 09/07/23 0635    Narrative:      The following orders were created for panel order CBC & Differential.  Procedure                               Abnormality         Status                     ---------                               -----------         ------                     CBC Auto Differential[[id number]]        Abnormal            Final result                 Please view results for these tests on the individual orders.    CBC Auto Differential [[id number]]  (Abnormal) Collected: 09/07/23 0615    Specimen: Blood Updated: 09/07/23 0635     WBC 5.47 10*3/mm3      RBC 3.28 10*6/mm3      Hemoglobin 11.4 g/dL      Hematocrit 33.2 %      .2 fL      MCH 34.8 pg       MCHC 34.3 g/dL      RDW 13.1 %      RDW-SD 49.2 fl      MPV 9.6 fL      Platelets 151 10*3/mm3      Neutrophil % 57.9 %      Lymphocyte % 26.3 %      Monocyte % 11.3 %      Eosinophil % 3.7 %      Basophil % 0.4 %      Immature Grans % 0.4 %      Neutrophils, Absolute 3.17 10*3/mm3      Lymphocytes, Absolute 1.44 10*3/mm3      Monocytes, Absolute 0.62 10*3/mm3      Eosinophils, Absolute 0.20 10*3/mm3      Basophils, Absolute 0.02 10*3/mm3      Immature Grans, Absolute 0.02 10*3/mm3      nRBC 0.0 /100 WBC     Blood Culture - Blood, Arm, Left [[id number]]  (Normal) Collected: 09/05/23 2355    Specimen: Blood from Arm, Left Updated: 09/07/23 0101     Blood Culture No growth at 24 hours    Blood Culture - Blood, Arm, Left [[id number]]  (Normal) Collected: 09/06/23 0012    Specimen: Blood from Arm, Left Updated: 09/07/23 0101     Blood Culture No growth at 24 hours    Narrative:      Less than seven (7) mL's of blood was collected.  Insufficient quantity may yield false negative results.    CBC Auto Differential [[id number]]  (Abnormal) Collected: 09/06/23 0404    Specimen: Blood Updated: 09/06/23 0502     WBC 9.49 10*3/mm3      RBC 3.63 10*6/mm3      Hemoglobin 12.5 g/dL      Hematocrit 37.0 %      .9 fL      MCH 34.4 pg      MCHC 33.8 g/dL      RDW 13.2 %      RDW-SD 50.0 fl      MPV 9.7 fL      Platelets 209 10*3/mm3      Neutrophil % 72.5 %      Lymphocyte % 17.3 %      Monocyte % 9.2 %      Eosinophil % 0.4 %      Basophil % 0.4 %      Immature Grans % 0.2 %      Neutrophils, Absolute 6.88 10*3/mm3      Lymphocytes, Absolute 1.64 10*3/mm3      Monocytes, Absolute 0.87 10*3/mm3      Eosinophils, Absolute 0.04 10*3/mm3      Basophils, Absolute 0.04 10*3/mm3      Immature Grans, Absolute 0.02 10*3/mm3      nRBC 0.0 /100 WBC     Basic Metabolic Panel [[id number]]  (Abnormal) Collected: 09/06/23 0404    Specimen: Blood Updated: 09/06/23 0436     Glucose 109 mg/dL      BUN 3 mg/dL      Creatinine 0.60 mg/dL      Sodium  Possible Home 141 mmol/L      Potassium 3.7 mmol/L      Chloride 103 mmol/L      CO2 24.0 mmol/L      Calcium 8.8 mg/dL      BUN/Creatinine Ratio 5.0     Anion Gap 14.0 mmol/L      eGFR 116.5 mL/min/1.73     Narrative:      GFR Normal >60  Chronic Kidney Disease <60  Kidney Failure <15      STAT Lactic Acid, Reflex [[id number]]  (Normal) Collected: 09/06/23 0404    Specimen: Blood Updated: 09/06/23 0434     Lactate 0.7 mmol/L     Protime-INR [[id number]]  (Normal) Collected: 09/06/23 0403    Specimen: Blood Updated: 09/06/23 0427     Protime 12.8 Seconds      INR 0.95    Urine Drug Screen - Urine, Clean Catch [[id number]]  (Abnormal) Collected: 09/06/23 0005    Specimen: Urine, Clean Catch Updated: 09/06/23 0257     Amphet/Methamphet, Screen Negative     Barbiturates Screen, Urine Negative     Benzodiazepine Screen, Urine Negative     Cocaine Screen, Urine Negative     Opiate Screen Negative     THC, Screen, Urine Negative     Methadone Screen, Urine Negative     Oxycodone Screen, Urine Positive     Fentanyl, Urine Negative    Narrative:      Negative Thresholds Per Drugs Screened:    Amphetamines                 500 ng/ml  Barbiturates                 200 ng/ml  Benzodiazepines              100 ng/ml  Cocaine                      300 ng/ml  Methadone                    300 ng/ml  Opiates                      300 ng/ml  Oxycodone                    100 ng/ml  THC                           50 ng/ml  Fentanyl                       5 ng/ml      The Normal Value for all drugs tested is negative. This report includes final unconfirmed screening results to be used for medical treatment purposes only. Unconfirmed results must not be used for non-medical purposes such as employment or legal testing. Clinical consideration should be applied to any drug of abuse test, particularly when unconfirmed results are used.            STAT Lactic Acid, Reflex [[id number]]  (Abnormal) Collected: 09/06/23 0117    Specimen: Blood Updated: 09/06/23 0222     " Lactate 3.0 mmol/L     Urinalysis With Microscopic If Indicated (No Culture) - Urine, Clean Catch [[id number]]  (Normal) Collected: 09/06/23 0005    Specimen: Urine, Clean Catch Updated: 09/06/23 0058     Color, UA Yellow     Appearance, UA Clear     pH, UA 7.5     Specific Gravity, UA >=1.030     Glucose, UA Negative     Ketones, UA Negative     Bilirubin, UA Negative     Blood, UA Negative     Protein, UA Negative     Leuk Esterase, UA Negative     Nitrite, UA Negative     Urobilinogen, UA 0.2 E.U./dL    Narrative:      Urine microscopic not indicated.    Procalcitonin [[id number]]  (Normal) Collected: 09/05/23 2205    Specimen: Blood from Hand, Left Updated: 09/05/23 2251     Procalcitonin 0.04 ng/mL     Narrative:      As a Marker for Sepsis (Non-Neonates):    1. <0.5 ng/mL represents a low risk of severe sepsis and/or septic shock.  2. >2 ng/mL represents a high risk of severe sepsis and/or septic shock.    As a Marker for Lower Respiratory Tract Infections that require antibiotic therapy:    PCT on Admission    Antibiotic Therapy       6-12 Hrs later    >0.5                Strongly Recommended  >0.25 - <0.5        Recommended   0.1 - 0.25          Discouraged              Remeasure/reassess PCT  <0.1                Strongly Discouraged     Remeasure/reassess PCT    As 28 day mortality risk marker: \"Change in Procalcitonin Result\" (>80% or <=80%) if Day 0 (or Day 1) and Day 4 values are available. Refer to http://www.[facility]-pct-calculator.com    Change in PCT <=80%  A decrease of PCT levels below or equal to 80% defines a positive change in PCT test result representing a higher risk for 28-day all-cause mortality of patients diagnosed with severe sepsis for septic shock.    Change in PCT >80%  A decrease of PCT levels of more than 80% defines a negative change in PCT result representing a lower risk for 28-day all-cause mortality of patients diagnosed with severe sepsis or septic shock.       STAT Lactic Acid, " Reflex [[id number]]  (Abnormal) Collected: 09/05/23 2205    Specimen: Blood from Hand, Left Updated: 09/05/23 2246     Lactate 5.5 mmol/L     Comprehensive Metabolic Panel [[id number]]  (Abnormal) Collected: 09/05/23 2205    Specimen: Blood from Hand, Left Updated: 09/05/23 2244     Glucose 117 mg/dL      BUN 3 mg/dL      Creatinine 0.59 mg/dL      Sodium 143 mmol/L      Potassium 3.5 mmol/L      Chloride 102 mmol/L      CO2 24.0 mmol/L      Calcium 9.4 mg/dL      Total Protein 7.3 g/dL      Albumin 4.3 g/dL      ALT (SGPT) 6 U/L      AST (SGOT) 10 U/L      Alkaline Phosphatase 84 U/L      Total Bilirubin 0.2 mg/dL      Globulin 3.0 gm/dL      A/G Ratio 1.4 g/dL      BUN/Creatinine Ratio 5.1     Anion Gap 17.0 mmol/L      eGFR 117.0 mL/min/1.73     Narrative:      GFR Normal >60  Chronic Kidney Disease <60  Kidney Failure <15      Lipase [[id number]]  (Abnormal) Collected: 09/05/23 2205    Specimen: Blood from Hand, Left Updated: 09/05/23 2244     Lipase 232 U/L     Ethanol [[id number]]  (Abnormal) Collected: 09/05/23 2205    Specimen: Blood from Hand, Left Updated: 09/05/23 2244     Ethanol 111 mg/dL      Ethanol % 0.111 %     Lactic Acid, Plasma [[id number]]  (Abnormal) Collected: 09/05/23 2044    Specimen: Blood Updated: 09/05/23 2138     Lactate 2.4 mmol/L     hCG, Serum, Qualitative [[id number]]  (Normal) Collected: 09/05/23 2044    Specimen: Blood Updated: 09/05/23 2112     HCG Qualitative Negative    CBC & Differential [[id number]]  (Abnormal) Collected: 09/05/23 2044    Specimen: Blood Updated: 09/05/23 2100    Narrative:      The following orders were created for panel order CBC & Differential.  Procedure                               Abnormality         Status                     ---------                               -----------         ------                     CBC Auto Differential[[id number]]        Abnormal            Final result                 Please view results for these tests on the individual orders.  "   CBC Auto Differential [[id number]]  (Abnormal) Collected: 09/05/23 2044    Specimen: Blood Updated: 09/05/23 2100     WBC 7.08 10*3/mm3      RBC 4.24 10*6/mm3      Hemoglobin 14.6 g/dL      Hematocrit 42.1 %      MCV 99.3 fL      MCH 34.4 pg      MCHC 34.7 g/dL      RDW 13.3 %      RDW-SD 47.8 fl      MPV 9.7 fL      Platelets 269 10*3/mm3      Neutrophil % 65.3 %      Lymphocyte % 25.8 %      Monocyte % 6.6 %      Eosinophil % 1.3 %      Basophil % 0.7 %      Immature Grans % 0.3 %      Neutrophils, Absolute 4.62 10*3/mm3      Lymphocytes, Absolute 1.83 10*3/mm3      Monocytes, Absolute 0.47 10*3/mm3      Eosinophils, Absolute 0.09 10*3/mm3      Basophils, Absolute 0.05 10*3/mm3      Immature Grans, Absolute 0.02 10*3/mm3      nRBC 0.0 /100 WBC           /91 (BP Location: Left arm, Patient Position: Lying)   Pulse 93   Temp 97.3 °F (36.3 °C) (Oral)   Resp 16   Ht 175.3 cm (69.02\")   Wt 59 kg (130 lb)   LMP 01/17/2018 (Exact Date)   SpO2 97%   BMI 19.19 kg/m²     Discharge Exam:  General Appearance:    Alert, cooperative, no distress                          Head:    Normocephalic, without obvious abnormality, atraumatic                          Eyes:                            Throat:   Lips, tongue, gums normal                          Neck:   Supple, symmetrical, trachea midline, no JVD                        Lungs:     Clear to auscultation bilaterally, respirations unlabored                Chest Wall:    No tenderness or deformity                        Heart:    Regular rate and rhythm, S1 and S2 normal, no murmur,no  Rub  or gallop                  Abdomen:     Soft, non-tender, bowel sounds active, no masses, no organomegaly                  Extremities:   Extremities normal, atraumatic, no cyanosis or edema                             Skin:   Skin is warm and dry,  no rashes or palpable lesions                  Neurologic:   no focal deficits noted     Disposition:  Home    Activity as " tolerated    Diet as tolerated  Diet Order   Procedures    Diet: Gastrointestinal Diets; Fat-Restricted; Texture: Regular Texture (IDDSI 7); Fluid Consistency: Thin (IDDSI 0)       Patient Instructions:      Discharge Medications        Continue These Medications        Instructions Start Date   amitriptyline 10 MG tablet  Commonly known as: ELAVIL   25 mg, Oral, Nightly      Feosol 200 (65 Fe) MG tablet tablet  Generic drug: Ferrous Sulfate Dried   200 mg, Oral, Daily      folic acid 1 MG tablet  Commonly known as: FOLVITE   1,000 mcg, Oral, Daily      ondansetron 4 MG tablet  Commonly known as: ZOFRAN   4 mg, Oral, Every 8 Hours PRN      oxyCODONE-acetaminophen  MG per tablet  Commonly known as: PERCOCET   1 tablet, Oral, Every 4 Hours PRN      pancrelipase (Lip-Prot-Amyl) 85553-09075 units capsule delayed-release particles capsule  Commonly known as: CREON   72,000 units of lipase, Oral, 3 Times Daily With Meals      pantoprazole 40 MG EC tablet  Commonly known as: PROTONIX   40 mg, Oral, Daily      thiamine 100 MG tablet  Commonly known as: VITAMIN B1   100 mg, Oral, Daily      VITAMIN B 12 PO   Oral             No future appointments.   Follow-up Information       Red Denson MD Follow up.    Specialties: Gastroenterology, Internal Medicine  Contact information:  [address] [facility] [place] [address] [phone]               Rachell Pozo, SOL .    Specialty: Family Medicine  Contact information:  [address] [facility] [address] [phone]                           Discharge Order (From admission, onward)       Start     Ordered    09/07/23 1128  Discharge patient  Once        Expected Discharge Date: 09/07/23   Discharge Disposition: Home or Self Care   Physician of Record for Attribution - Please select from Treatment Team: RICARDO DIEGO [[id number]]   Review needed by CMO to determine Physician of Record: No      Question Answer Comment   Physician of Record for  Attribution - Please select from Treatment Team FLETCHER GALLEGOS    Review needed by CMO to determine Physician of Record No        09/07/23 1127                    Total time spent discharging patient including evaluation,post hospitalization follow up,  medication and post hospitalization instructions and education total time exceeds 30 minutes.    Signed:  Fletcher Gallegos MD  9/7/2023  12:12 EDT    .

## 2023-09-07 NOTE — PLAN OF CARE
Problem: Adult Inpatient Plan of Care  Goal: Plan of Care Review  Outcome: Adequate for Care Transition  Goal: Patient-Specific Goal (Individualized)  Outcome: Adequate for Care Transition  Goal: Absence of Hospital-Acquired Illness or Injury  Outcome: Adequate for Care Transition  Intervention: Identify and Manage Fall Risk  Recent Flowsheet Documentation  Taken 9/7/2023 1200 by Wili Alfred RN  Safety Promotion/Fall Prevention:   safety round/check completed   room organization consistent  Intervention: Prevent Skin Injury  Recent Flowsheet Documentation  Taken 9/7/2023 1200 by Wili Alfred RN  Body Position: supine, legs elevated  Intervention: Prevent and Manage VTE (Venous Thromboembolism) Risk  Recent Flowsheet Documentation  Taken 9/7/2023 1200 by Wili Alfred RN  Activity Management: activity minimized  Intervention: Prevent Infection  Recent Flowsheet Documentation  Taken 9/7/2023 1200 by Wili Alfred RN  Infection Prevention:   single patient room provided   rest/sleep promoted  Goal: Optimal Comfort and Wellbeing  Outcome: Adequate for Care Transition  Goal: Readiness for Transition of Care  Outcome: Adequate for Care Transition   Goal Outcome Evaluation:  Plan of Care Reviewed With: patient        Progress: no change  Outcome Evaluation: VSS. Alert and oriented x4 but lethargic and resting with eyes closed most of shift. Up to toilet with stand-by assist. Pt c/o generalized abdominal pain and headache. PRN PO and IV pain meds. CIWA as needed. PRN ativan x1 this shift. Will continue to provide supportive care.

## 2023-09-07 NOTE — PROGRESS NOTES
[facility]  Inpatient Progress Note    Reason for Follow Up: Acute abdominal pain    Subjective     Interval History:   Abdominal pain continues, she expresses frustration of not having her procedure done recently in [facility] evidently the stent had not arrived    Current Facility-Administered Medications:     acetaminophen (TYLENOL) tablet 650 mg, 650 mg, Oral, Q4H PRN **OR** acetaminophen (TYLENOL) 160 MG/5ML solution 650 mg, 650 mg, Oral, Q4H PRN **OR** acetaminophen (TYLENOL) suppository 650 mg, 650 mg, Rectal, Q4H PRN, Evelin Weiss APRN    amitriptyline (ELAVIL) tablet 25 mg, 25 mg, Oral, Nightly, Fletcher Gallegos MD, 25 mg at 09/06/23 2021    sennosides-docusate (PERICOLACE) 8.6-50 MG per tablet 2 tablet, 2 tablet, Oral, BID, 2 tablet at 09/06/23 0809 **AND** polyethylene glycol (MIRALAX) packet 17 g, 17 g, Oral, Daily PRN **AND** bisacodyl (DULCOLAX) EC tablet 5 mg, 5 mg, Oral, Daily PRN **AND** bisacodyl (DULCOLAX) suppository 10 mg, 10 mg, Rectal, Daily PRN, Evelin Weiss APRN    folic acid (FOLVITE) tablet 1 mg, 1 mg, Oral, Daily, Evelin Weiss APRN, 1 mg at 09/07/23 0859    HYDROmorphone (DILAUDID) injection 0.5 mg, 0.5 mg, Intravenous, Q2H PRN, 0.5 mg at 09/07/23 0430 **AND** naloxone (NARCAN) injection 0.4 mg, 0.4 mg, Intravenous, Q5 Min PRN, Evelin Weiss APRN    LORazepam (ATIVAN) tablet 1 mg, 1 mg, Oral, Q1H PRN, 1 mg at 09/06/23 2214 **OR** LORazepam (ATIVAN) injection 1 mg, 1 mg, Intravenous, Q1H PRN **OR** LORazepam (ATIVAN) tablet 2 mg, 2 mg, Oral, Q1H PRN **OR** LORazepam (ATIVAN) injection 2 mg, 2 mg, Intravenous, Q1H PRN, 2 mg at 09/06/23 0655 **OR** LORazepam (ATIVAN) injection 2 mg, 2 mg, Intravenous, Q15 Min PRN **OR** LORazepam (ATIVAN) injection 2 mg, 2 mg, Intramuscular, Q15 Min PRN, Evelin Weiss, APRN    Magnesium Standard Dose Replacement - Follow Nurse / BPA Driven Protocol, , Does not apply, PRN, Evelin Weiss,  SOL    nitroglycerin (NITROSTAT) SL tablet 0.4 mg, 0.4 mg, Sublingual, Q5 Min PRN, Evelin Weiss APRN    ondansetron (ZOFRAN) injection 4 mg, 4 mg, Intravenous, Q6H PRN, Evelin Weiss APRN, 4 mg at 09/06/23 2028    oxyCODONE-acetaminophen (PERCOCET)  MG per tablet 1 tablet, 1 tablet, Oral, Q4H PRN, Fletcher Gallegos MD, 1 tablet at 09/07/23 0859    pancrelipase (Lip-Prot-Amyl) (CREON) capsule 12,000 units of lipase, 12,000 units of lipase, Oral, TID With Meals, Fletcher Gallegos MD, 12,000 units of lipase at 09/06/23 1821    pantoprazole (PROTONIX) EC tablet 40 mg, 40 mg, Oral, Daily, Fletcher Gallegos MD, 40 mg at 09/07/23 0859    sodium chloride 0.9 % flush 10 mL, 10 mL, Intravenous, Q12H, Evelin Weiss APRN, 10 mL at 09/06/23 2029    sodium chloride 0.9 % infusion 40 mL, 40 mL, Intravenous, PRN, Evelin Weiss APRN    sodium chloride 0.9 % infusion, 125 mL/hr, Intravenous, Continuous, Evelin Weiss APRN, Last Rate: 125 mL/hr at 09/06/23 2037, 125 mL/hr at 09/06/23 2037    thiamine (B-1) injection 200 mg, 200 mg, Intravenous, Q8H, 200 mg at 09/07/23 0859 **FOLLOWED BY** [START ON 9/11/2023] thiamine (VITAMIN B-1) tablet 100 mg, 100 mg, Oral, Daily, Evelin Weiss APRN  Review of Systems:    There is weakness and fatigue all other systems reviewed and negative    Objective     Vital Signs  Temp:  [98.4 °F (36.9 °C)-98.7 °F (37.1 °C)] 98.5 °F (36.9 °C)  Heart Rate:  [91-98] 98  Resp:  [16-20] 16  BP: (121-139)/(87-96) 121/87  Body mass index is 19.19 kg/m².  No intake or output data in the 24 hours ending 09/07/23 0913  No intake/output data recorded.     Physical Exam:   General: patient awake, alert and cooperative   Eyes: Normal lids and lashes, no scleral icterus   Neck: supple, normal ROM   Skin: warm and dry, not jaundiced   Cardiovascular: regular rhythm and rate, no murmurs auscultated   Pulm: clear to auscultation bilaterally, regular and unlabored   Abdomen: soft,  nontender, nondistended; normal bowel sounds   Extremities: no rash or edema   Psychiatric: Normal mood and behavior; memory intact     Results Review:     I reviewed the patient's new clinical results.    Results from last 7 days   Lab Units 09/07/23  0615 09/06/23  0404 09/05/23 2044   WBC 10*3/mm3 5.47 9.49 7.08   HEMOGLOBIN g/dL 11.4* 12.5 14.6   HEMATOCRIT % 33.2* 37.0 42.1   PLATELETS 10*3/mm3 151 209 269     Results from last 7 days   Lab Units 09/07/23  0615 09/06/23  0404 09/05/23  2205   SODIUM mmol/L 139 141 143   POTASSIUM mmol/L 3.8 3.7 3.5   CHLORIDE mmol/L 103 103 102   CO2 mmol/L 27.3 24.0 24.0   BUN mg/dL 3* 3* 3*   CREATININE mg/dL 0.58 0.60 0.59   CALCIUM mg/dL 8.8 8.8 9.4   BILIRUBIN mg/dL 0.5  --  0.2   ALK PHOS U/L 66  --  84   ALT (SGPT) U/L 5  --  6   AST (SGOT) U/L 6  --  10   GLUCOSE mg/dL 102* 109* 117*     Results from last 7 days   Lab Units 09/06/23  0403   INR  0.95     Lab Results   Lab Value Date/Time    LIPASE 99 (H) 09/07/2023 0615    LIPASE 232 (H) 09/05/2023 2205    LIPASE 64 (H) 08/25/2023 0425    LIPASE 75 (H) 08/24/2023 0324    LIPASE 87 (H) 08/23/2023 0252    LIPASE 115 (H) 08/22/2023 1301    LIPASE 73 (H) 06/08/2023 0652    LIPASE 151 (H) 06/07/2023 2246    LIPASE 192 (H) 03/25/2023 1700    LIPASE 38 12/05/2022 0526    LIPASE 34 12/04/2022 0542    LIPASE 29 12/03/2022 0515    LIPASE 50 12/02/2022 0719    LIPASE 29 12/01/2022 0519    LIPASE 65 (H) 11/30/2022 1412    LIPASE 157 (H) 11/28/2022 1751    LIPASE 335 (H) 10/20/2022 1939    LIPASE 378 (H) 08/06/2022 2158    LIPASE 95 (H) 01/24/2018 0545    LIPASE 93 (H) 10/01/2017 0356    LIPASE 122 (H) 09/30/2017 0619    LIPASE 86 (H) 09/29/2017 1731    LIPASE 133 (H) 09/28/2017 0032       Radiology:  CT Abdomen Pelvis With Contrast   Final Result       1. The patient's previously described pancreatic pseudocyst involving   the tail of the pancreas continues to increase in size when compared to   prior imaging. This collection  communicates with smaller collections   within the left adrenal gland. These have also worsened. Furthermore,   there is increasing complex material which is noted abutting the   inferior margin of the spleen.       Radiation dose reduction techniques were utilized, including automated   exposure control and exposure modulation based on body size.           This report was finalized on 9/5/2023 10:23 PM by Dr. Camilla Whalen M.D.              Assessment & Plan     Active Hospital Problems    Diagnosis     **Acute abdominal pain     Tobacco abuse     Pancreatic pseudocyst     Chronic pancreatitis     Anemia of chronic disease     Thrombocytopenia     Alcohol abuse        Assessment:  acute on chronic abdominal pain secondary to chronic pancreatitis and enlarging pancreatic pseudocyst  History of alcohol abuse     Plan:  Dr. Madden yesterday discussed with Dr. Denson her gastroenterologist in [facility]-they are working to reschedule her procedure for early next week.  Recommend pain control per primary team  Advance to low-fat diet as tolerated  Ultimately needs endoscopic drainage of her cyst through [facility] gastroenterology  Explained to this patient it cannot be done here, I believe she understands this now, she should be in contact with  gastroenterology of [facility]'s office today to find out when it is going to be done  GI okay with discharge home     I discussed the patients findings and my recommendations with patient and nursing staff.    Jose Robertson MD

## 2023-09-08 NOTE — PRE-PROCEDURE INSTRUCTIONS
Pre-procedure, and NPO instructions given.   Billing Type: Third-Party Bill Bill For Surgical Tray: no Expected Date Of Service: 01/12/2023 Performing Laboratory: 0

## 2023-09-08 NOTE — CASE MANAGEMENT/SOCIAL WORK
Case Management Discharge Note      Final Note: Discharged home         Selected Continued Care - Discharged on 9/7/2023 Admission date: 9/5/2023 - Discharge disposition: Home or Self Care      Destination    No services have been selected for the patient.                Durable Medical Equipment    No services have been selected for the patient.                Dialysis/Infusion    No services have been selected for the patient.                Home Medical Care    No services have been selected for the patient.                Therapy    No services have been selected for the patient.                Community Resources    No services have been selected for the patient.                Community & DME    No services have been selected for the patient.                    Transportation Services  Private: Car    Final Discharge Disposition Code: 01 - home or self-care   Principal Discharge DX:	Lumbar radiculopathy, acute

## 2023-09-08 NOTE — OUTREACH NOTE
Prep Survey      Flowsheet Row Responses   [facility] facility patient discharged from? [facility]   Is LACE score < 7 ? No   Eligibility Readm Mgmt   Discharge diagnosis Acute abdominal pain-Chronic pancreatitis   Does the patient have one of the following disease processes/diagnoses(primary or secondary)? Other   Does the patient have Home health ordered? No   Is there a DME ordered? No   Prep survey completed? Yes            JAZMIN SAMSON - Registered Nurse

## 2023-09-11 LAB
BACTERIA SPEC AEROBE CULT: NORMAL
BACTERIA SPEC AEROBE CULT: NORMAL

## 2023-09-12 ENCOUNTER — READMISSION MANAGEMENT (OUTPATIENT)
Dept: CALL CENTER | Facility: HOSPITAL | Age: 43
End: 2023-09-12
Payer: MEDICAID

## 2023-09-12 NOTE — OUTREACH NOTE
Medical Week 1 Survey      Flowsheet Row Responses   [facility] patient discharged from? [place]   Does the patient have one of the following disease processes/diagnoses(primary or secondary)? Other   Week 1 attempt successful? No   Unsuccessful attempts Attempt 1            Paulette Chacon Registered Nurse

## 2023-09-13 ENCOUNTER — READMISSION MANAGEMENT (OUTPATIENT)
Dept: CALL CENTER | Facility: HOSPITAL | Age: 43
End: 2023-09-13
Payer: MEDICAID

## 2023-09-13 ENCOUNTER — HOSPITAL ENCOUNTER (OUTPATIENT)
Facility: HOSPITAL | Age: 43
Setting detail: HOSPITAL OUTPATIENT SURGERY
Discharge: HOME | End: 2023-09-13
Attending: INTERNAL MEDICINE | Admitting: INTERNAL MEDICINE
Payer: MEDICAID

## 2023-09-13 ENCOUNTER — ANESTHESIA (OUTPATIENT)
Dept: GASTROENTEROLOGY | Facility: HOSPITAL | Age: 43
End: 2023-09-13
Payer: MEDICAID

## 2023-09-13 ENCOUNTER — ANESTHESIA EVENT (OUTPATIENT)
Dept: GASTROENTEROLOGY | Facility: HOSPITAL | Age: 43
End: 2023-09-13
Payer: MEDICAID

## 2023-09-13 VITALS
DIASTOLIC BLOOD PRESSURE: 94 MMHG | SYSTOLIC BLOOD PRESSURE: 146 MMHG | OXYGEN SATURATION: 98 % | BODY MASS INDEX: 18.24 KG/M2 | TEMPERATURE: 98.1 F | RESPIRATION RATE: 14 BRPM | HEART RATE: 72 BPM | HEIGHT: 70 IN | WEIGHT: 127.4 LBS

## 2023-09-13 DIAGNOSIS — K85.90 ACUTE PANCREATITIS: ICD-10-CM

## 2023-09-13 DIAGNOSIS — K86.2 CYST OF PANCREAS: ICD-10-CM

## 2023-09-13 PROCEDURE — 25010000002 ESMOLOL 100 MG/10ML SOLUTION: Performed by: NURSE ANESTHETIST, CERTIFIED REGISTERED

## 2023-09-13 PROCEDURE — 25010000002 DEXAMETHASONE SODIUM PHOSPHATE 20 MG/5ML SOLUTION: Performed by: NURSE ANESTHETIST, CERTIFIED REGISTERED

## 2023-09-13 PROCEDURE — 88305 TISSUE EXAM BY PATHOLOGIST: CPT | Performed by: INTERNAL MEDICINE

## 2023-09-13 PROCEDURE — 88342 IMHCHEM/IMCYTCHM 1ST ANTB: CPT | Performed by: INTERNAL MEDICINE

## 2023-09-13 PROCEDURE — 25010000002 PROPOFOL 1000 MG/100ML EMULSION: Performed by: NURSE ANESTHETIST, CERTIFIED REGISTERED

## 2023-09-13 PROCEDURE — 25010000002 FENTANYL CITRATE (PF) 100 MCG/2ML SOLUTION: Performed by: ANESTHESIOLOGY

## 2023-09-13 PROCEDURE — C1874 STENT, COATED/COV W/DEL SYS: HCPCS | Performed by: INTERNAL MEDICINE

## 2023-09-13 PROCEDURE — 25010000002 METRONIDAZOLE 500 MG/100ML SOLUTION: Performed by: NURSE ANESTHETIST, CERTIFIED REGISTERED

## 2023-09-13 PROCEDURE — 25010000002 LEVOFLOXACIN PER 250 MG: Performed by: NURSE ANESTHETIST, CERTIFIED REGISTERED

## 2023-09-13 PROCEDURE — 25010000002 FENTANYL CITRATE (PF) 100 MCG/2ML SOLUTION: Performed by: NURSE ANESTHETIST, CERTIFIED REGISTERED

## 2023-09-13 PROCEDURE — 25010000002 ONDANSETRON PER 1 MG: Performed by: NURSE ANESTHETIST, CERTIFIED REGISTERED

## 2023-09-13 DEVICE — STENT AND ELECTROCAUTERY - ENHANCED DELIVERY SYSTEM
Type: IMPLANTABLE DEVICE | Site: STOMACH | Status: FUNCTIONAL
Brand: AXIOS™

## 2023-09-13 RX ORDER — PROPOFOL 10 MG/ML
INJECTION, EMULSION INTRAVENOUS AS NEEDED
Status: DISCONTINUED | OUTPATIENT
Start: 2023-09-13 | End: 2023-09-13 | Stop reason: SURG

## 2023-09-13 RX ORDER — ONDANSETRON 2 MG/ML
INJECTION INTRAMUSCULAR; INTRAVENOUS AS NEEDED
Status: DISCONTINUED | OUTPATIENT
Start: 2023-09-13 | End: 2023-09-13 | Stop reason: SURG

## 2023-09-13 RX ORDER — SCOLOPAMINE TRANSDERMAL SYSTEM 1 MG/1
1 PATCH, EXTENDED RELEASE TRANSDERMAL
Status: DISCONTINUED | OUTPATIENT
Start: 2023-09-13 | End: 2023-09-13 | Stop reason: HOSPADM

## 2023-09-13 RX ORDER — ONDANSETRON 2 MG/ML
4 INJECTION INTRAMUSCULAR; INTRAVENOUS ONCE AS NEEDED
Status: DISCONTINUED | OUTPATIENT
Start: 2023-09-13 | End: 2023-09-13 | Stop reason: HOSPADM

## 2023-09-13 RX ORDER — DIPHENHYDRAMINE HYDROCHLORIDE 50 MG/ML
12.5 INJECTION INTRAMUSCULAR; INTRAVENOUS
Status: DISCONTINUED | OUTPATIENT
Start: 2023-09-13 | End: 2023-09-13 | Stop reason: HOSPADM

## 2023-09-13 RX ORDER — DROPERIDOL 2.5 MG/ML
0.62 INJECTION, SOLUTION INTRAMUSCULAR; INTRAVENOUS ONCE AS NEEDED
Status: DISCONTINUED | OUTPATIENT
Start: 2023-09-13 | End: 2023-09-13 | Stop reason: HOSPADM

## 2023-09-13 RX ORDER — FENTANYL CITRATE 50 UG/ML
INJECTION, SOLUTION INTRAMUSCULAR; INTRAVENOUS AS NEEDED
Status: DISCONTINUED | OUTPATIENT
Start: 2023-09-13 | End: 2023-09-13 | Stop reason: SURG

## 2023-09-13 RX ORDER — HYDROCODONE BITARTRATE AND ACETAMINOPHEN 5; 325 MG/1; MG/1
1 TABLET ORAL ONCE AS NEEDED
Status: DISCONTINUED | OUTPATIENT
Start: 2023-09-13 | End: 2023-09-13 | Stop reason: HOSPADM

## 2023-09-13 RX ORDER — FENTANYL CITRATE 50 UG/ML
50 INJECTION, SOLUTION INTRAMUSCULAR; INTRAVENOUS ONCE
Status: COMPLETED | OUTPATIENT
Start: 2023-09-13 | End: 2023-09-13

## 2023-09-13 RX ORDER — SCOLOPAMINE TRANSDERMAL SYSTEM 1 MG/1
PATCH, EXTENDED RELEASE TRANSDERMAL
Status: DISCONTINUED
Start: 2023-09-13 | End: 2023-09-13 | Stop reason: HOSPADM

## 2023-09-13 RX ORDER — ACETAMINOPHEN 325 MG/1
650 TABLET ORAL ONCE AS NEEDED
Status: DISCONTINUED | OUTPATIENT
Start: 2023-09-13 | End: 2023-09-13 | Stop reason: HOSPADM

## 2023-09-13 RX ORDER — LIDOCAINE HYDROCHLORIDE 20 MG/ML
INJECTION, SOLUTION INFILTRATION; PERINEURAL AS NEEDED
Status: DISCONTINUED | OUTPATIENT
Start: 2023-09-13 | End: 2023-09-13 | Stop reason: SURG

## 2023-09-13 RX ORDER — SODIUM CHLORIDE 9 MG/ML
9 INJECTION, SOLUTION INTRAVENOUS CONTINUOUS
Status: DISCONTINUED | OUTPATIENT
Start: 2023-09-13 | End: 2023-09-13 | Stop reason: HOSPADM

## 2023-09-13 RX ORDER — ESMOLOL HYDROCHLORIDE 10 MG/ML
INJECTION INTRAVENOUS AS NEEDED
Status: DISCONTINUED | OUTPATIENT
Start: 2023-09-13 | End: 2023-09-13 | Stop reason: SURG

## 2023-09-13 RX ORDER — ROCURONIUM BROMIDE 10 MG/ML
INJECTION, SOLUTION INTRAVENOUS AS NEEDED
Status: DISCONTINUED | OUTPATIENT
Start: 2023-09-13 | End: 2023-09-13 | Stop reason: SURG

## 2023-09-13 RX ORDER — DEXAMETHASONE SODIUM PHOSPHATE 4 MG/ML
INJECTION, SOLUTION INTRA-ARTICULAR; INTRALESIONAL; INTRAMUSCULAR; INTRAVENOUS; SOFT TISSUE AS NEEDED
Status: DISCONTINUED | OUTPATIENT
Start: 2023-09-13 | End: 2023-09-13 | Stop reason: SURG

## 2023-09-13 RX ORDER — METRONIDAZOLE 500 MG/100ML
INJECTION, SOLUTION INTRAVENOUS AS NEEDED
Status: DISCONTINUED | OUTPATIENT
Start: 2023-09-13 | End: 2023-09-13 | Stop reason: SURG

## 2023-09-13 RX ORDER — IPRATROPIUM BROMIDE AND ALBUTEROL SULFATE 2.5; .5 MG/3ML; MG/3ML
3 SOLUTION RESPIRATORY (INHALATION) ONCE AS NEEDED
Status: DISCONTINUED | OUTPATIENT
Start: 2023-09-13 | End: 2023-09-13 | Stop reason: HOSPADM

## 2023-09-13 RX ORDER — MEPERIDINE HYDROCHLORIDE 25 MG/ML
12.5 INJECTION INTRAMUSCULAR; INTRAVENOUS; SUBCUTANEOUS
Status: DISCONTINUED | OUTPATIENT
Start: 2023-09-13 | End: 2023-09-13 | Stop reason: HOSPADM

## 2023-09-13 RX ORDER — LABETALOL HYDROCHLORIDE 5 MG/ML
5 INJECTION, SOLUTION INTRAVENOUS
Status: DISCONTINUED | OUTPATIENT
Start: 2023-09-13 | End: 2023-09-13 | Stop reason: HOSPADM

## 2023-09-13 RX ORDER — LEVOFLOXACIN 5 MG/ML
INJECTION, SOLUTION INTRAVENOUS AS NEEDED
Status: DISCONTINUED | OUTPATIENT
Start: 2023-09-13 | End: 2023-09-13 | Stop reason: SURG

## 2023-09-13 RX ORDER — HYDRALAZINE HYDROCHLORIDE 20 MG/ML
5 INJECTION INTRAMUSCULAR; INTRAVENOUS
Status: DISCONTINUED | OUTPATIENT
Start: 2023-09-13 | End: 2023-09-13 | Stop reason: HOSPADM

## 2023-09-13 RX ORDER — FENTANYL CITRATE 50 UG/ML
INJECTION, SOLUTION INTRAMUSCULAR; INTRAVENOUS
Status: DISCONTINUED
Start: 2023-09-13 | End: 2023-09-13 | Stop reason: HOSPADM

## 2023-09-13 RX ORDER — EPHEDRINE SULFATE 5 MG/ML
5 INJECTION INTRAVENOUS ONCE AS NEEDED
Status: DISCONTINUED | OUTPATIENT
Start: 2023-09-13 | End: 2023-09-13 | Stop reason: HOSPADM

## 2023-09-13 RX ADMIN — FENTANYL CITRATE 50 MCG: 50 INJECTION, SOLUTION INTRAMUSCULAR; INTRAVENOUS at 09:03

## 2023-09-13 RX ADMIN — FENTANYL CITRATE 50 MCG: 50 INJECTION, SOLUTION INTRAMUSCULAR; INTRAVENOUS at 07:22

## 2023-09-13 RX ADMIN — FENTANYL CITRATE 50 MCG: 50 INJECTION, SOLUTION INTRAMUSCULAR; INTRAVENOUS at 07:49

## 2023-09-13 RX ADMIN — METRONIDAZOLE 500 MG: 500 INJECTION, SOLUTION INTRAVENOUS at 07:35

## 2023-09-13 RX ADMIN — DEXAMETHASONE SODIUM PHOSPHATE 6 MG: 4 INJECTION, SOLUTION INTRAMUSCULAR; INTRAVENOUS at 07:16

## 2023-09-13 RX ADMIN — FENTANYL CITRATE 50 MCG: 50 INJECTION, SOLUTION INTRAMUSCULAR; INTRAVENOUS at 08:34

## 2023-09-13 RX ADMIN — LEVOFLOXACIN 500 MG: 500 INJECTION, SOLUTION INTRAVENOUS at 07:46

## 2023-09-13 RX ADMIN — LIDOCAINE HYDROCHLORIDE 100 MG: 20 INJECTION, SOLUTION INFILTRATION; PERINEURAL at 07:09

## 2023-09-13 RX ADMIN — PROPOFOL INJECTABLE EMULSION 200 MG: 10 INJECTION, EMULSION INTRAVENOUS at 07:09

## 2023-09-13 RX ADMIN — ONDANSETRON 4 MG: 2 INJECTION INTRAMUSCULAR; INTRAVENOUS at 07:22

## 2023-09-13 RX ADMIN — ESMOLOL HYDROCHLORIDE 5 MG: 100 INJECTION, SOLUTION INTRAVENOUS at 07:49

## 2023-09-13 RX ADMIN — SCOPALAMINE 1 PATCH: 1 PATCH, EXTENDED RELEASE TRANSDERMAL at 08:35

## 2023-09-13 RX ADMIN — ROCURONIUM BROMIDE 50 MG: 10 INJECTION, SOLUTION INTRAVENOUS at 07:09

## 2023-09-13 RX ADMIN — SODIUM CHLORIDE 9 ML/HR: 9 INJECTION, SOLUTION INTRAVENOUS at 06:41

## 2023-09-13 RX ADMIN — ROCURONIUM BROMIDE 10 MG: 10 INJECTION, SOLUTION INTRAVENOUS at 07:44

## 2023-09-13 NOTE — OUTREACH NOTE
Medical Week 1 Survey      Flowsheet Row Responses   [facility] [place] by [facility] patient discharged from? [place]   Does the patient have one of the following disease processes/diagnoses(primary or secondary)? Other   Week 1 attempt successful? No   Unsuccessful attempts Attempt 2   Revoke Readmitted            Rosita VITAL - Registered Nurse

## 2023-09-13 NOTE — ANESTHESIA PROCEDURE NOTES
Airway  Urgency: elective    Date/Time: 9/13/2023 7:12 AM  Airway not difficult    General Information and Staff    Patient location during procedure: OR  Anesthesiologist: Tj Ott MD  CRNA/CAA: Xiao Anthony CRNA    Indications and Patient Condition  Indications for airway management: airway protection    Preoxygenated: yes (pt pre-O2 with 100% O2)  Mask difficulty assessment: 2 - vent by mask + OA or adjuvant +/- NMBA (easy BMV )    Final Airway Details  Final airway type: endotracheal airway      Successful airway: ETT  Cuffed: yes   Successful intubation technique: direct laryngoscopy  Endotracheal tube insertion site: oral  Blade: Elvin  Blade size: 3  ETT size (mm): 7.0  Cormack-Lehane Classification: grade I - full view of glottis  Placement verified by: chest auscultation and capnometry   Cuff volume (mL): 7  Measured from: lips  ETT/EBT  to lips (cm): 22  Number of attempts at approach: 1  Assessment: lips, teeth, and gum same as pre-op and atraumatic intubation    Additional Comments  ATOETx1. No change in dentition.

## 2023-09-13 NOTE — ANESTHESIA POSTPROCEDURE EVALUATION
Patient: Piper Cain    Procedure Summary       Date: 09/13/23 Room / Location: [facility] ENDOSCOPY 2 / [facility] ENDOSCOPY    Anesthesia Start: 0701 Anesthesia Stop: 0804    Procedure: Esophagogastroduodenoscopy with biopsy x 1 area and endoscopic ultrasound with placement of cyst gastrostomy Diagnosis:       Acute pancreatitis      Cyst of pancreas      (Acute pancreatitis [K85.90])      (Cyst of pancreas [K86.2])    Surgeons: Red Denson MD Provider: Tj Ott MD    Anesthesia Type: general ASA Status: 3            Anesthesia Type: general    Vitals  Vitals Value Taken Time   /94 09/13/23 0908   Temp     Pulse 75 09/13/23 0912   Resp 14 09/13/23 0838   SpO2 96 % 09/13/23 0912   Vitals shown include unvalidated device data.        Post Anesthesia Care and Evaluation    Patient location during evaluation: PACU  Patient participation: complete - patient participated  Level of consciousness: awake  Pain scale: See nurse's notes for pain score.  Pain management: adequate    Airway patency: patent  Anesthetic complications: No anesthetic complications  PONV Status: none  Cardiovascular status: acceptable  Respiratory status: acceptable and spontaneous ventilation  Hydration status: acceptable    Comments: Patient seen and examined postoperatively; vital signs stable; SpO2 greater than or equal to 90%; cardiopulmonary status stable; nausea/vomiting adequately controlled; pain adequately controlled; no apparent anesthesia complications; patient discharged from anesthesia care when discharge criteria were met

## 2023-09-13 NOTE — DISCHARGE INSTRUCTIONS
A responsible adult should stay with you and you should rest quietly for the rest of the day.    Do not drink alcohol, drive, operate any heavy machinery or power tools or make any legal/important decisions for the next 24 hours.     Progress your diet as tolerated.  If you begin to experience severe pain, increased shortness of breath, racing heartbeat or a fever above 101 F, seek immediate medical attention.     Follow up with MD as instructed. Call office for results in 5 business days if needed. [phone]    Impression:     EGD Findings:   1.  Normal esophageal mucosa entire esophagus  2.  Erosions and erythema in the fundus and antrum consistent with erosive gastritis, cold forcep biopsies of the antrum and body were taken for H. pylori  3.  Normal duodenal mucosa visualized to D2     EUS Findings:   1.  8 cm x 8 cm anechoic lesion in the pancreatic tail consistent with pseudocyst.  Cyst gastrostomy was created using a 10 mm x 10 mm fully covered wall opposing stent as described above.  There was no dopplerable vessel on the far side of the cyst indicating no likely pseudoaneurysm.   2.  Stranding surrounding the pseudocyst distally in this pancreas indicating continued pancreatitis/inflammation.  3.  Normal left and right lobe of the liver  4.  Normal celiac axis        Recommendations:  1. No NSAIDs or blood thinners until stent is removed  2. Follow up bx results   3.  CT scan of the abdomen and pelvis in 2 weeks  4.  Low residue diet  5.  Avoid PPI  6.  EGD with stent removal in 3 to 4 weeks pending the results of CT scan in 2 weeks

## 2023-09-13 NOTE — ANESTHESIA PREPROCEDURE EVALUATION
Anesthesia Evaluation     Patient summary reviewed and Nursing notes reviewed   NPO Solid Status: > 8 hours  NPO Liquid Status: > 8 hours           Airway   Mallampati: II  TM distance: >3 FB  Neck ROM: full  No difficulty expected  Dental - normal exam     Pulmonary    Cardiovascular     (+) hypertension, valvular problems/murmurs, DVT      Neuro/Psych  (+) headaches, psychiatric history Anxiety  GI/Hepatic/Renal/Endo    (+) hiatal hernia, GERD, renal disease    Musculoskeletal     Abdominal    Substance History   (+) alcohol use     OB/GYN          Other        ROS/Med Hx Other: · Left ventricular ejection fraction appears to be 61 - 65%. Left ventricular systolic function is normal.  · Left ventricular diastolic function was normal.  · Normal right ventricular cavity size and systolic function noted.  · The agitated saline study is positive with Valsalva, consistent with a small to moderate sized PFO  · Mild tricuspid valve regurgitation is present.  · Calculated right ventricular systolic pressure from tricuspid regurgitation is 32 mmHg.  · There is no evidence of pericardial effusion                     Anesthesia Plan    ASA 3     general     intravenous induction     Anesthetic plan, risks, benefits, and alternatives have been provided, discussed and informed consent has been obtained with: patient.    Plan discussed with CRNA.    CODE STATUS:

## 2023-09-13 NOTE — OP NOTE
"ESOPHAGOGASTRODUODENOSCOPY WITH ULTRASOUND AND FINE NEEDLE ASPIRATION Procedure Report    Patient Name:  Piper Cain  YOB: 1980    Date of Surgery:  9/13/2023     Pre-Op Diagnosis:  Acute pancreatitis [K85.90]  Cyst of pancreas [K86.2]  Pancreatic pseudocyst  Epigastric abdominal pain        Postop diagnosis:  Pancreatic pseudocyst  2.  Pancreatitis  3.  Erosive gastritis      Procedure/CPT® Codes:      Procedure(s):  Esophagogastroduodenoscopy and endoscopic ultrasound with placement of cyst gastrostomy stent    Staff:  Surgeon(s):  Red Denson MD      Anesthesia: General    Description of Procedure:  A description of the procedure as well as risks, benefits and alternative methods were explained to the patient who voiced understanding and signed the corresponding consent form. A physical exam was performed and vital signs were monitored throughout the procedure.    An upper GI endoscope was placed into the mouth and proceeded through the esophagus, stomach and second portion of the duodenum without difficulty. The scope was then retroflexed and the fundus was visualized. The procedure was not difficult and there were no immediate complications.    Next, a Linear echoendoscope was advanced into the mouth, esophagus, and into the stomach. The celiac axis was visualized, next the scope was used to visualize the pancreatic neck, body, and tail as well as the L lobe of the liver. The scope was advanced into the duodenal bulb where the pancreatic head and ampulla were visualized as well as the biliary tree and R lobe of the liver. The scope was then advanced to the second portion of the duodenum where the uncinate was brought into view and the ampulla in the \" kissing the papilla\" view.  Scope was withdrawn back to the stomach.  Pancreatic pseudocyst was brought into view.  Doppler was used to closely monitor for any dopplerable vessels in the wall.  None were visualized.  A 10 mm x 10 mm fully " covered wall opposing hot axios stent was placed into the pseudocyst without difficulty.  There was some minimal oozing of blood around the stent placement but a large flow of dark yellow colored liquid filled the stomach indicating we are in the pseudocyst.  The scope was then withdrawn and an EGD scope was readvanced into the stomach to suction out all the liquid and cold forcep biopsies antrum body were taken.  The scope was then withdrawn and procedure was completed without difficulty no any complications findings are listed below.  There was minimal blood loss      Impression:    EGD Findings:   1.  Normal esophageal mucosa entire esophagus  2.  Erosions and erythema in the fundus and antrum consistent with erosive gastritis, cold forcep biopsies of the antrum and body were taken for H. pylori  3.  Normal duodenal mucosa visualized to D2    EUS Findings:   1.  8 cm x 8 cm anechoic lesion in the pancreatic tail consistent with pseudocyst.  Cyst gastrostomy was created using a 10 mm x 10 mm fully covered wall opposing stent as described above.  There was no dopplerable vessel on the far side of the cyst indicating no likely pseudoaneurysm.   2.  Stranding surrounding the pseudocyst distally in this pancreas indicating continued pancreatitis/inflammation.  3.  Normal left and right lobe of the liver  4.  Normal celiac axis      Recommendations:  1. No NSAIDs or blood thinners until stent is removed  2. Follow up bx results   3.  CT scan of the abdomen and pelvis in 2 weeks  4.  Low residue diet  5.  Avoid PPI  6.  EGD with stent removal in 3 to 4 weeks pending the results of CT scan in 2 weeks        Red Denson MD     Date: 9/13/2023    Time: 08:28 EDT

## 2023-09-13 NOTE — H&P
GI CONSULT  NOTE:    Referring Provider:    Rachell Pozo, SOL  [unfilled]    Chief complaint: <principal problem not specified>    Subjective .       Pre op diagnosis  Acute pancreatitis [K85.90]  Cyst of pancreas [K86.2]  Epigastric pain  Nausea vomiting  Pseudocyst      History of present illness:      Piper Cain is a 42 y.o. female who presents today for Procedure(s):  ENDOSCOPIC ULTRASOUND WITH AXIOS STENT for the indications listed below.     The updated Patient Profile was reviewed prior to the procedure, in conjunction with the Physical Exam, including medical conditions, surgical procedures, medications, allergies, family history and social history.     Pre-operatively, I reviewed the indication(s) for the procedure, the risks of the procedure [including but not limited to: unexpected bleeding possibly requiring hospitalization and/or unplanned repeat procedures, perforation possibly requiring surgical treatment, missed lesions and complications of sedation/MAC (also explained by anesthesia staff)].     I have evaluated the patient for risks associated with the planned anesthesia and the procedure to be performed and find the patient an acceptable candidate for IV sedation.    Multiple opportunities were provided for any questions or concerns, and all questions were answered satisfactorily before any anesthesia was administered. We will proceed with the planned procedure.    History of chronic pancreatitis with numerous episodes of acute on chronic pancreatitis for requiring hospitalization with pseudocyst formation.  Pseudocyst is causing pain on a daily basis as well as nausea.  It is hindering her life.    Past Medical History:  Past Medical History:   Diagnosis Date    Anemia     Anxiety     Constipation     Cyst of spleen     Diarrhea     E. coli bacteremia     ETOH abuse     Frequent UTI     GERD (gastroesophageal reflux disease)     Hiatal hernia     Left flank pain 10/21/2022     Migraine     Miscarriage 2004    Pancreatitis     Pseudocyst of pancreas        Past Surgical History:  Past Surgical History:   Procedure Laterality Date    ENDOSCOPY N/A 08/10/2022    Procedure: ESOPHAGOGASTRODUODENOSCOPY with bxs;  Surgeon: Salvador Price MD;  Location: [facility] ENDOSCOPY;  Service: Gastroenterology;  Laterality: N/A;  Pre: r/o esophageal  varacies, abd pain  Post: esophageal plaque    PANCREATIC CYST DRAINAGE      x 2    WISDOM TOOTH EXTRACTION         Social History:  Social History     Tobacco Use    Smoking status: Every Day     Packs/day: 0.50     Years: 26.00     Pack years: 13.00     Types: Cigarettes     Start date: 1/28/1996    Smokeless tobacco: Never   Vaping Use    Vaping Use: Never used   Substance Use Topics    Alcohol use: Not Currently    Drug use: Never       Family History:  Family History   Problem Relation Age of Onset    Alcohol abuse Mother     Arthritis Mother     Asthma Mother     Miscarriages / Stillbirths Mother     Cancer Father     Diabetes Father     Drug abuse Father     Early death Father     Heart disease Father     Hyperlipidemia Father     Hypertension Father     Alcohol abuse Paternal Aunt     Cancer Paternal Aunt     Diabetes Paternal Aunt     Drug abuse Paternal Aunt     Early death Paternal Aunt     Heart disease Paternal Aunt     Hyperlipidemia Paternal Aunt     Hypertension Paternal Aunt     Alcohol abuse Maternal Grandmother     Alcohol abuse Maternal Grandfather     Alcohol abuse Paternal Grandmother     Cancer Paternal Grandmother     Diabetes Paternal Grandmother     Drug abuse Paternal Grandmother     Early death Paternal Grandmother     Heart disease Paternal Grandmother     Hyperlipidemia Paternal Grandmother     Hypertension Paternal Grandmother     Alcohol abuse Paternal Grandfather        Medications:  Medications Prior to Admission   Medication Sig Dispense Refill Last Dose    amitriptyline (ELAVIL) 10 MG tablet Take 2.5 tablets by mouth Every  "Night.   9/12/2023    Ferrous Sulfate Dried (Feosol) 200 (65 Fe) MG tablet tablet Take 1 tablet by mouth 2 (Two) Times a Day.   9/12/2023    folic acid (FOLVITE) 1 MG tablet Take 1 tablet by mouth Daily. 30 tablet 3 9/12/2023    ondansetron (ZOFRAN) 4 MG tablet Take 1 tablet by mouth Every 8 (Eight) Hours As Needed for Nausea or Vomiting.   9/13/2023    oxyCODONE-acetaminophen (PERCOCET)  MG per tablet Take 1 tablet by mouth Every 4 (Four) Hours As Needed for Moderate Pain. 30 tablet 0 9/12/2023    pancrelipase, Lip-Prot-Amyl, (CREON) 59425-82643 units capsule delayed-release particles capsule Take 3 capsules by mouth 3 (Three) Times a Day With Meals.   9/12/2023    pantoprazole (PROTONIX) 40 MG EC tablet Take 1 tablet by mouth Daily. (Patient taking differently: Take 1 tablet by mouth 2 (Two) Times a Day.) 30 tablet 3 9/12/2023    thiamine (VITAMIN B1) 100 MG tablet Take 1 tablet by mouth Daily. 30 tablet 3 9/12/2023    Cyanocobalamin (VITAMIN B 12 PO) Take  by mouth.          Scheduled Meds:   Continuous Infusions:sodium chloride, 9 mL/hr, Last Rate: 9 mL/hr (09/13/23 0654)      PRN Meds:.    ALLERGIES:  Nickel    ROS:  The following systems were reviewed and negative;  Constitution:  No fevers, chills, no unintentional weight loss  Skin: no rash, no jaundice  Eyes:  No blurry vision, no eye pain  HENT:  No change in hearing or smell  Resp:  No dyspnea or cough  CV:  No chest pain or palpitations  :  No dysuria, hematuria  Musculoskeletal:  No leg cramps or arthralgias  Neuro:  No tremor, no numbness  Psych:  No depression or confsusion    Objective     Vital Signs:   Vitals:    09/08/23 1151 09/13/23 0629   BP:  143/89   BP Location:  Left arm   Patient Position:  Lying   Pulse:  87   Resp:  17   Temp:  98.1 °F (36.7 °C)   TempSrc:  Oral   SpO2:  99%   Weight: 58.1 kg (128 lb) 57.8 kg (127 lb 6.4 oz)   Height: 177.8 cm (70\") 177.8 cm (70\")       Physical Exam:       General Appearance:    Awake and alert, " in no acute distress   Head:    Normocephalic, without obvious abnormality, atraumatic   Throat:   No oral lesions, no thrush, oral mucosa moist   Lungs:     respirations regular, even and unlabored   Skin:   No rash, no jaundice       Results Review:  Lab Results (last 24 hours)       ** No results found for the last 24 hours. **            Imaging Results (Last 24 Hours)       ** No results found for the last 24 hours. **             I reviewed the patient's labs and imaging.    ASSESSMENT AND PLAN:      Active Problems:    * No active hospital problems. *       Procedure(s):  ENDOSCOPIC ULTRASOUND WITH AXIOS STENT    The risks, including bleeding, infection, stent dislodgment requiring surgery, pain, anesthesia risks were all discussed with the patient who agreed to proceed.    I discussed the patient's findings and my recommendations with the patient.    Red Denson MD  09/13/23  06:58 EDT

## 2023-09-14 LAB
LAB AP CASE REPORT: NORMAL
PATH REPORT.FINAL DX SPEC: NORMAL
PATH REPORT.GROSS SPEC: NORMAL

## 2023-09-26 RX ORDER — MAGNESIUM OXIDE 400 MG/1
400 TABLET ORAL DAILY
COMMUNITY

## 2023-10-09 ENCOUNTER — ANESTHESIA EVENT (OUTPATIENT)
Dept: GASTROENTEROLOGY | Facility: HOSPITAL | Age: 43
End: 2023-10-09
Payer: MEDICAID

## 2023-10-09 ENCOUNTER — ON CAMPUS - OUTPATIENT (OUTPATIENT)
Dept: URBAN - METROPOLITAN AREA HOSPITAL 85 | Facility: HOSPITAL | Age: 43
End: 2023-10-09
Payer: COMMERCIAL

## 2023-10-09 ENCOUNTER — ANESTHESIA (OUTPATIENT)
Dept: GASTROENTEROLOGY | Facility: HOSPITAL | Age: 43
End: 2023-10-09
Payer: MEDICAID

## 2023-10-09 ENCOUNTER — HOSPITAL ENCOUNTER (OUTPATIENT)
Facility: HOSPITAL | Age: 43
Setting detail: HOSPITAL OUTPATIENT SURGERY
Discharge: HOME | End: 2023-10-09
Attending: INTERNAL MEDICINE | Admitting: INTERNAL MEDICINE
Payer: MEDICAID

## 2023-10-09 VITALS
OXYGEN SATURATION: 98 % | HEART RATE: 94 BPM | HEIGHT: 70 IN | DIASTOLIC BLOOD PRESSURE: 77 MMHG | WEIGHT: 127 LBS | SYSTOLIC BLOOD PRESSURE: 109 MMHG | RESPIRATION RATE: 18 BRPM | BODY MASS INDEX: 18.18 KG/M2

## 2023-10-09 DIAGNOSIS — Z96.89 PRESENCE OF OTHER SPECIFIED FUNCTIONAL IMPLANTS: ICD-10-CM

## 2023-10-09 DIAGNOSIS — K86.2 CYST OF PANCREAS: ICD-10-CM

## 2023-10-09 DIAGNOSIS — R10.13 EPIGASTRIC PAIN: ICD-10-CM

## 2023-10-09 DIAGNOSIS — K86.89 OTHER SPECIFIED DISEASES OF PANCREAS: ICD-10-CM

## 2023-10-09 DIAGNOSIS — K86.3 PSEUDOCYST OF PANCREAS: ICD-10-CM

## 2023-10-09 PROCEDURE — 25010000002 PROPOFOL 1000 MG/100ML EMULSION: Performed by: NURSE ANESTHETIST, CERTIFIED REGISTERED

## 2023-10-09 PROCEDURE — 43247 EGD REMOVE FOREIGN BODY: CPT | Performed by: INTERNAL MEDICINE

## 2023-10-09 PROCEDURE — 25810000003 SODIUM CHLORIDE 0.9 % SOLUTION: Performed by: NURSE ANESTHETIST, CERTIFIED REGISTERED

## 2023-10-09 RX ORDER — EPHEDRINE SULFATE 5 MG/ML
5 INJECTION INTRAVENOUS ONCE AS NEEDED
Status: DISCONTINUED | OUTPATIENT
Start: 2023-10-09 | End: 2023-10-09 | Stop reason: HOSPADM

## 2023-10-09 RX ORDER — HYDRALAZINE HYDROCHLORIDE 20 MG/ML
5 INJECTION INTRAMUSCULAR; INTRAVENOUS
Status: DISCONTINUED | OUTPATIENT
Start: 2023-10-09 | End: 2023-10-09 | Stop reason: HOSPADM

## 2023-10-09 RX ORDER — LIDOCAINE HYDROCHLORIDE 20 MG/ML
INJECTION, SOLUTION EPIDURAL; INFILTRATION; INTRACAUDAL; PERINEURAL AS NEEDED
Status: DISCONTINUED | OUTPATIENT
Start: 2023-10-09 | End: 2023-10-09 | Stop reason: SURG

## 2023-10-09 RX ORDER — IPRATROPIUM BROMIDE AND ALBUTEROL SULFATE 2.5; .5 MG/3ML; MG/3ML
3 SOLUTION RESPIRATORY (INHALATION) ONCE AS NEEDED
Status: DISCONTINUED | OUTPATIENT
Start: 2023-10-09 | End: 2023-10-09 | Stop reason: HOSPADM

## 2023-10-09 RX ORDER — LABETALOL HYDROCHLORIDE 5 MG/ML
5 INJECTION, SOLUTION INTRAVENOUS
Status: DISCONTINUED | OUTPATIENT
Start: 2023-10-09 | End: 2023-10-09 | Stop reason: HOSPADM

## 2023-10-09 RX ORDER — ONDANSETRON 2 MG/ML
4 INJECTION INTRAMUSCULAR; INTRAVENOUS ONCE AS NEEDED
Status: DISCONTINUED | OUTPATIENT
Start: 2023-10-09 | End: 2023-10-09 | Stop reason: HOSPADM

## 2023-10-09 RX ORDER — PROPOFOL 10 MG/ML
INJECTION, EMULSION INTRAVENOUS AS NEEDED
Status: DISCONTINUED | OUTPATIENT
Start: 2023-10-09 | End: 2023-10-09 | Stop reason: SURG

## 2023-10-09 RX ORDER — DIPHENHYDRAMINE HYDROCHLORIDE 50 MG/ML
12.5 INJECTION INTRAMUSCULAR; INTRAVENOUS
Status: DISCONTINUED | OUTPATIENT
Start: 2023-10-09 | End: 2023-10-09 | Stop reason: HOSPADM

## 2023-10-09 RX ORDER — SODIUM CHLORIDE 9 MG/ML
INJECTION, SOLUTION INTRAVENOUS CONTINUOUS PRN
Status: DISCONTINUED | OUTPATIENT
Start: 2023-10-09 | End: 2023-10-09 | Stop reason: SURG

## 2023-10-09 RX ADMIN — SODIUM CHLORIDE: 9 INJECTION, SOLUTION INTRAVENOUS at 10:03

## 2023-10-09 RX ADMIN — PROPOFOL INJECTABLE EMULSION 100 MG: 10 INJECTION, EMULSION INTRAVENOUS at 10:20

## 2023-10-09 RX ADMIN — DEXMEDETOMIDINE HYDROCHLORIDE 6 MCG: 100 INJECTION, SOLUTION INTRAVENOUS at 10:15

## 2023-10-09 RX ADMIN — PROPOFOL INJECTABLE EMULSION 200 MG: 10 INJECTION, EMULSION INTRAVENOUS at 10:15

## 2023-10-09 RX ADMIN — PROPOFOL INJECTABLE EMULSION 50 MG: 10 INJECTION, EMULSION INTRAVENOUS at 10:22

## 2023-10-09 RX ADMIN — LIDOCAINE HYDROCHLORIDE 60 MG: 20 INJECTION, SOLUTION EPIDURAL; INFILTRATION; INTRACAUDAL; PERINEURAL at 10:15

## 2023-10-09 RX ADMIN — PROPOFOL INJECTABLE EMULSION 150 MG: 10 INJECTION, EMULSION INTRAVENOUS at 10:17

## 2023-10-09 NOTE — ANESTHESIA POSTPROCEDURE EVALUATION
Patient: Piper Hodgesrp    Procedure Summary       Date: 10/09/23 Room / Location: [facility] ENDOSCOPY 1 / [facility] ENDOSCOPY    Anesthesia Start: 1003 Anesthesia Stop: 1026    Procedure: ESOPHAGOGASTRODUODENOSCOPY WITH STENT REMOVAL Diagnosis:       Pancreatic duct stricture      (Pancreatic duct stricture [K86.89])    Surgeons: Red Denson MD Provider: Tj Ott MD    Anesthesia Type: general ASA Status: 3            Anesthesia Type: general    Vitals  Vitals Value Taken Time   /77 10/09/23 1046   Temp     Pulse 88 10/09/23 1049   Resp 18 10/09/23 1025   SpO2 97 % 10/09/23 1049   Vitals shown include unfiled device data.        Post Anesthesia Care and Evaluation    Patient location during evaluation: PACU  Patient participation: complete - patient participated  Level of consciousness: awake  Pain scale: See nurse's notes for pain score.  Pain management: adequate    Airway patency: patent  Anesthetic complications: No anesthetic complications  PONV Status: none  Cardiovascular status: acceptable  Respiratory status: acceptable and spontaneous ventilation  Hydration status: acceptable    Comments: Patient seen and examined postoperatively; vital signs stable; SpO2 greater than or equal to 90%; cardiopulmonary status stable; nausea/vomiting adequately controlled; pain adequately controlled; no apparent anesthesia complications; patient discharged from anesthesia care when discharge criteria were met

## 2023-10-09 NOTE — OP NOTE
ESOPHAGOGASTRODUODENOSCOPY Procedure Report    Patient Name:  Piper Cain  YOB: 1980    Date of Surgery:  10/9/2023     Pre-Op Diagnosis:  Pancreatic duct stricture [K86.89]  Pancreatic pseudocyst  Epigastric pain      Postop diagnosis:  1.  Cystogastrostomy stent removal  2.  Pancreatic cyst      Procedure/CPT® Codes:      Procedure(s):  ESOPHAGOGASTRODUODENOSCOPY WITH STENT REMOVAL    Staff:  Surgeon(s):  Red Denson MD      Anesthesia: Monitored Anesthesia Care    Description of Procedure:  A description of the procedure as well as risks, benefits and alternative methods were explained to the patient who voiced understanding and signed the corresponding consent form. A physical exam was performed and vital signs were monitored throughout the procedure.    An upper GI endoscope was placed into the mouth and proceeded through the esophagus, stomach and second portion of the duodenum without difficulty. The scope was then retroflexed and the fundus was visualized. The procedure was not difficult and there were no immediate complications.  There was no blood loss.    Impression:  1.  Cystogastrostomy stent in good position.  The scope was used to enter the stent opening and the pancreatic cyst was fully decompressed.  Rat-tooth forceps was used to grasp the stent and it was pulled into the stomach and then out of the mouth in 1 piece without difficulty  2.  Normal gastric mucosa entire stomach  3.  Normal duodenal mucosa visualized to D2  4.  Normal esophageal mucosa entire esophagus    Recommendations:  Follow-up in clinic with Piper to assess epigastric pain  Okay to restart PPI if necessary      Red Denson MD     Date: 10/9/2023    Time: 11:02 EDT

## 2023-10-09 NOTE — DISCHARGE INSTRUCTIONS
A responsible adult should stay with you and you should rest quietly for the rest of the day.    Do not drink alcohol, drive, operate any heavy machinery or power tools or make any legal/important decisions for the next 24 hours.     Progress your diet as tolerated.  If you begin to experience severe pain, increased shortness of breath, racing heartbeat or a fever above 101 F, seek immediate medical attention.     Follow up with MD as instructed. Call office for results in 3 to 5 days if needed.     Dr Denson [phone]

## 2023-10-09 NOTE — H&P
GI CONSULT  NOTE:    Referring Provider:    Rachell Pozo, SOL  [unfilled]    Chief complaint: <principal problem not specified>    Subjective .       Pre op diagnosis  Pancreatic duct stricture [K86.89]  Pancreatic pseudocyst    History of present illness:      Piper Cain is a 43 y.o. female who presents today for Procedure(s):  ESOPHAGOGASTRODUODENOSCOPY WITH STENT REMOVAL for the indications listed below.     The updated Patient Profile was reviewed prior to the procedure, in conjunction with the Physical Exam, including medical conditions, surgical procedures, medications, allergies, family history and social history.     Pre-operatively, I reviewed the indication(s) for the procedure, the risks of the procedure [including but not limited to: unexpected bleeding possibly requiring hospitalization and/or unplanned repeat procedures, perforation possibly requiring surgical treatment, missed lesions and complications of sedation/MAC (also explained by anesthesia staff)].     I have evaluated the patient for risks associated with the planned anesthesia and the procedure to be performed and find the patient an acceptable candidate for IV sedation.    Multiple opportunities were provided for any questions or concerns, and all questions were answered satisfactorily before any anesthesia was administered. We will proceed with the planned procedure.    Past Medical History:  Past Medical History:   Diagnosis Date    Anemia     Anxiety     Constipation     Cyst of spleen     Diarrhea     E. coli bacteremia     ETOH abuse     Frequent UTI     GERD (gastroesophageal reflux disease)     Hiatal hernia     Left flank pain 10/21/2022    Migraine     Miscarriage 2004    Pancreatitis     Pseudocyst of pancreas        Past Surgical History:  Past Surgical History:   Procedure Laterality Date    ENDOSCOPY N/A 08/10/2022    Procedure: ESOPHAGOGASTRODUODENOSCOPY with bxs;  Surgeon: Salvador Price MD;  Location:   [facility] ENDOSCOPY;  Service: Gastroenterology;  Laterality: N/A;  Pre: r/o esophageal  varacies, abd pain  Post: esophageal plaque    PANCREATIC CYST DRAINAGE      x 2    UPPER ENDOSCOPIC ULTRASOUND W/ FNA N/A 9/13/2023    Procedure: Esophagogastroduodenoscopy with biopsy x 1 area and endoscopic ultrasound with placement of cyst gastrostomy;  Surgeon: Red Denson MD;  Location: [facility] ENDOSCOPY;  Service: Gastroenterology;  Laterality: N/A;  post: pancreatic psuedocyst    WISDOM TOOTH EXTRACTION         Social History:  Social History     Tobacco Use    Smoking status: Every Day     Packs/day: 0.50     Years: 26.00     Additional pack years: 0.00     Total pack years: 13.00     Types: Cigarettes     Start date: 1/28/1996    Smokeless tobacco: Never   Vaping Use    Vaping Use: Never used   Substance Use Topics    Alcohol use: Not Currently    Drug use: Never       Family History:  Family History   Problem Relation Age of Onset    Alcohol abuse Mother     Arthritis Mother     Asthma Mother     Miscarriages / Stillbirths Mother     Cancer Father     Diabetes Father     Drug abuse Father     Early death Father     Heart disease Father     Hyperlipidemia Father     Hypertension Father     Alcohol abuse Paternal Aunt     Cancer Paternal Aunt     Diabetes Paternal Aunt     Drug abuse Paternal Aunt     Early death Paternal Aunt     Heart disease Paternal Aunt     Hyperlipidemia Paternal Aunt     Hypertension Paternal Aunt     Alcohol abuse Maternal Grandmother     Alcohol abuse Maternal Grandfather     Alcohol abuse Paternal Grandmother     Cancer Paternal Grandmother     Diabetes Paternal Grandmother     Drug abuse Paternal Grandmother     Early death Paternal Grandmother     Heart disease Paternal Grandmother     Hyperlipidemia Paternal Grandmother     Hypertension Paternal Grandmother     Alcohol abuse Paternal Grandfather        Medications:  Medications Prior to Admission   Medication Sig Dispense Refill Last Dose     amitriptyline (ELAVIL) 10 MG tablet Take 2.5 tablets by mouth Every Night.   Past Week    Ferrous Sulfate Dried (Feosol) 200 (65 Fe) MG tablet tablet Take 1 tablet by mouth 2 (Two) Times a Day.   Past Week    folic acid (FOLVITE) 1 MG tablet Take 1 tablet by mouth Daily. 30 tablet 3 Past Week    magnesium oxide (MAG-OX) 400 MG tablet Take 1 tablet by mouth Daily.   Past Week    ondansetron (ZOFRAN) 4 MG tablet Take 1 tablet by mouth Every 8 (Eight) Hours As Needed for Nausea or Vomiting.   10/9/2023    oxyCODONE-acetaminophen (PERCOCET)  MG per tablet Take 1 tablet by mouth Every 4 (Four) Hours As Needed for Moderate Pain. 30 tablet 0     pantoprazole (PROTONIX) 40 MG EC tablet Take 1 tablet by mouth Daily. (Patient taking differently: Take 1 tablet by mouth 2 (Two) Times a Day.) 30 tablet 3 Past Week    thiamine (VITAMIN B1) 100 MG tablet Take 1 tablet by mouth Daily. 30 tablet 3 Past Week    Cyanocobalamin (VITAMIN B 12 PO) Take  by mouth. (Patient not taking: Reported on 9/26/2023)   Not Taking    pancrelipase, Lip-Prot-Amyl, (CREON) 03990-03229 units capsule delayed-release particles capsule Take 3 capsules by mouth 3 (Three) Times a Day With Meals. (Patient not taking: Reported on 9/26/2023)   Not Taking       Scheduled Meds:   Continuous Infusions:   PRN Meds:.  atropine    diphenhydrAMINE    ePHEDrine Sulfate (Pressors)    hydrALAZINE    ipratropium-albuterol    labetalol    lidocaine (cardiac)    ondansetron    ALLERGIES:  Nickel    ROS:  The following systems were reviewed and negative;  Constitution:  No fevers, chills, no unintentional weight loss  Skin: no rash, no jaundice  Eyes:  No blurry vision, no eye pain  HENT:  No change in hearing or smell  Resp:  No dyspnea or cough  CV:  No chest pain or palpitations  :  No dysuria, hematuria  Musculoskeletal:  No leg cramps or arthralgias  Neuro:  No tremor, no numbness  Psych:  No depression or confsusion    Objective     Vital Signs:   Vitals:     "09/26/23 1022 10/09/23 0900   BP:  124/83   BP Location:  Left arm   Patient Position:  Lying   Pulse:  92   Resp:  23   SpO2:  98%   Weight: 57.6 kg (127 lb)    Height: 177.8 cm (70\")        Physical Exam:       General Appearance:    Awake and alert, in no acute distress   Head:    Normocephalic, without obvious abnormality, atraumatic   Throat:   No oral lesions, no thrush, oral mucosa moist   Lungs:     respirations regular, even and unlabored   Skin:   No rash, no jaundice       Results Review:  Lab Results (last 24 hours)       ** No results found for the last 24 hours. **            Imaging Results (Last 24 Hours)       ** No results found for the last 24 hours. **             I reviewed the patient's labs and imaging.    ASSESSMENT AND PLAN:      Active Problems:    * No active hospital problems. *       Procedure(s):  ESOPHAGOGASTRODUODENOSCOPY WITH STENT REMOVAL      I discussed the patient's findings and my recommendations with the patient.    Red Denson MD  10/09/23  10:11 EDT                "

## 2023-10-09 NOTE — ANESTHESIA PREPROCEDURE EVALUATION
Anesthesia Evaluation     Patient summary reviewed and Nursing notes reviewed   NPO Solid Status: > 8 hours  NPO Liquid Status: > 8 hours           Airway   Mallampati: II  TM distance: >3 FB  Neck ROM: full  No difficulty expected  Dental - normal exam     Pulmonary    Cardiovascular     (+) hypertension, DVT      Neuro/Psych  (+) headaches, psychiatric history Anxiety  GI/Hepatic/Renal/Endo    (+) hiatal hernia, GERD, renal disease    Musculoskeletal     Abdominal    Substance History   (+) alcohol use     OB/GYN          Other                    Anesthesia Plan    ASA 3     general     intravenous induction     Anesthetic plan, risks, benefits, and alternatives have been provided, discussed and informed consent has been obtained with: patient.    Plan discussed with CRNA.    CODE STATUS:

## 2023-10-17 ENCOUNTER — TELEHEALTH PROVIDED IN PATIENT'S HOME (OUTPATIENT)
Dept: URBAN - METROPOLITAN AREA TELEHEALTH 4 | Facility: TELEHEALTH | Age: 43
End: 2023-10-17

## 2023-10-17 VITALS — HEIGHT: 71 IN

## 2023-10-17 DIAGNOSIS — R11.2 NAUSEA WITH VOMITING, UNSPECIFIED: ICD-10-CM

## 2023-10-17 DIAGNOSIS — K86.3 PSEUDOCYST OF PANCREAS: ICD-10-CM

## 2023-10-17 DIAGNOSIS — K86.1 OTHER CHRONIC PANCREATITIS: ICD-10-CM

## 2023-10-17 PROCEDURE — 99442: CPT | Performed by: NURSE PRACTITIONER

## 2023-10-17 RX ORDER — PROMETHAZINE HYDROCHLORIDE 25 MG/1
100 SUPPOSITORY RECTAL
Qty: 45 | Refills: 3 | Status: COMPLETED
Start: 2023-10-17 | End: 2024-01-10

## 2023-10-17 RX ORDER — AMITRIPTYLINE HYDROCHLORIDE 50 MG/1
50 TABLET, FILM COATED ORAL
Qty: 30 | Refills: 6 | Status: COMPLETED
Start: 2023-10-17 | End: 2024-01-10

## 2024-01-04 ENCOUNTER — APPOINTMENT (OUTPATIENT)
Dept: CT IMAGING | Facility: HOSPITAL | Age: 44
End: 2024-01-04
Payer: MEDICAID

## 2024-01-04 ENCOUNTER — HOSPITAL ENCOUNTER (INPATIENT)
Facility: HOSPITAL | Age: 44
LOS: 3 days | Discharge: HOME | End: 2024-01-08
Attending: EMERGENCY MEDICINE | Admitting: INTERNAL MEDICINE
Payer: MEDICAID

## 2024-01-04 DIAGNOSIS — K85.90 ACUTE ON CHRONIC PANCREATITIS: Primary | ICD-10-CM

## 2024-01-04 DIAGNOSIS — K86.1 CHRONIC PANCREATITIS, UNSPECIFIED PANCREATITIS TYPE: ICD-10-CM

## 2024-01-04 DIAGNOSIS — K86.1 ACUTE ON CHRONIC PANCREATITIS: Primary | ICD-10-CM

## 2024-01-04 LAB
ALBUMIN SERPL-MCNC: 4.4 G/DL (ref 3.5–5.2)
ALBUMIN/GLOB SERPL: 1.4 G/DL
ALP SERPL-CCNC: 98 U/L (ref 39–117)
ALT SERPL W P-5'-P-CCNC: 9 U/L (ref 1–33)
AMPHET+METHAMPHET UR QL: NEGATIVE
ANION GAP SERPL CALCULATED.3IONS-SCNC: 13 MMOL/L (ref 5–15)
AST SERPL-CCNC: 9 U/L (ref 1–32)
BARBITURATES UR QL SCN: NEGATIVE
BASOPHILS # BLD AUTO: 0.03 10*3/MM3 (ref 0–0.2)
BASOPHILS NFR BLD AUTO: 0.3 % (ref 0–1.5)
BENZODIAZ UR QL SCN: NEGATIVE
BILIRUB SERPL-MCNC: 0.4 MG/DL (ref 0–1.2)
BILIRUB UR QL STRIP: NEGATIVE
BUN SERPL-MCNC: 6 MG/DL (ref 6–20)
BUN/CREAT SERPL: 8.7 (ref 7–25)
CALCIUM SPEC-SCNC: 10.1 MG/DL (ref 8.6–10.5)
CANNABINOIDS SERPL QL: NEGATIVE
CHLORIDE SERPL-SCNC: 94 MMOL/L (ref 98–107)
CLARITY UR: CLEAR
CO2 SERPL-SCNC: 26 MMOL/L (ref 22–29)
COCAINE UR QL: NEGATIVE
COLOR UR: YELLOW
CREAT SERPL-MCNC: 0.69 MG/DL (ref 0.57–1)
DEPRECATED RDW RBC AUTO: 49.6 FL (ref 37–54)
EGFRCR SERPLBLD CKD-EPI 2021: 112 ML/MIN/1.73
EOSINOPHIL # BLD AUTO: 0.17 10*3/MM3 (ref 0–0.4)
EOSINOPHIL NFR BLD AUTO: 1.9 % (ref 0.3–6.2)
ERYTHROCYTE [DISTWIDTH] IN BLOOD BY AUTOMATED COUNT: 13.2 % (ref 12.3–15.4)
FENTANYL UR-MCNC: NEGATIVE NG/ML
GLOBULIN UR ELPH-MCNC: 3.2 GM/DL
GLUCOSE SERPL-MCNC: 115 MG/DL (ref 65–99)
GLUCOSE UR STRIP-MCNC: NEGATIVE MG/DL
HCT VFR BLD AUTO: 39.7 % (ref 34–46.6)
HGB BLD-MCNC: 13.6 G/DL (ref 12–15.9)
HGB UR QL STRIP.AUTO: NEGATIVE
HOLD SPECIMEN: NORMAL
HOLD SPECIMEN: NORMAL
IMM GRANULOCYTES # BLD AUTO: 0.02 10*3/MM3 (ref 0–0.05)
IMM GRANULOCYTES NFR BLD AUTO: 0.2 % (ref 0–0.5)
KETONES UR QL STRIP: NEGATIVE
LEUKOCYTE ESTERASE UR QL STRIP.AUTO: NEGATIVE
LIPASE SERPL-CCNC: 187 U/L (ref 13–60)
LYMPHOCYTES # BLD AUTO: 1.47 10*3/MM3 (ref 0.7–3.1)
LYMPHOCYTES NFR BLD AUTO: 16.1 % (ref 19.6–45.3)
MCH RBC QN AUTO: 35.4 PG (ref 26.6–33)
MCHC RBC AUTO-ENTMCNC: 34.3 G/DL (ref 31.5–35.7)
MCV RBC AUTO: 103.4 FL (ref 79–97)
METHADONE UR QL SCN: NEGATIVE
MONOCYTES # BLD AUTO: 0.65 10*3/MM3 (ref 0.1–0.9)
MONOCYTES NFR BLD AUTO: 7.1 % (ref 5–12)
NEUTROPHILS NFR BLD AUTO: 6.77 10*3/MM3 (ref 1.7–7)
NEUTROPHILS NFR BLD AUTO: 74.4 % (ref 42.7–76)
NITRITE UR QL STRIP: NEGATIVE
NRBC BLD AUTO-RTO: 0 /100 WBC (ref 0–0.2)
OPIATES UR QL: NEGATIVE
OXYCODONE UR QL SCN: POSITIVE
PH UR STRIP.AUTO: 7 [PH] (ref 5–8)
PLATELET # BLD AUTO: 214 10*3/MM3 (ref 140–450)
PMV BLD AUTO: 9.9 FL (ref 6–12)
POTASSIUM SERPL-SCNC: 3.4 MMOL/L (ref 3.5–5.2)
PROT SERPL-MCNC: 7.6 G/DL (ref 6–8.5)
PROT UR QL STRIP: NEGATIVE
RBC # BLD AUTO: 3.84 10*6/MM3 (ref 3.77–5.28)
SODIUM SERPL-SCNC: 133 MMOL/L (ref 136–145)
SP GR UR STRIP: 1.01 (ref 1–1.03)
UROBILINOGEN UR QL STRIP: NORMAL
WBC NRBC COR # BLD AUTO: 9.11 10*3/MM3 (ref 3.4–10.8)
WHOLE BLOOD HOLD COAG: NORMAL
WHOLE BLOOD HOLD SPECIMEN: NORMAL

## 2024-01-04 PROCEDURE — 80307 DRUG TEST PRSMV CHEM ANLYZR: CPT | Performed by: NURSE PRACTITIONER

## 2024-01-04 PROCEDURE — 25510000001 IOPAMIDOL 61 % SOLUTION: Performed by: EMERGENCY MEDICINE

## 2024-01-04 PROCEDURE — 99285 EMERGENCY DEPT VISIT HI MDM: CPT

## 2024-01-04 PROCEDURE — 83690 ASSAY OF LIPASE: CPT | Performed by: PHYSICIAN ASSISTANT

## 2024-01-04 PROCEDURE — 25010000002 ONDANSETRON PER 1 MG: Performed by: PHYSICIAN ASSISTANT

## 2024-01-04 PROCEDURE — 80053 COMPREHEN METABOLIC PANEL: CPT | Performed by: PHYSICIAN ASSISTANT

## 2024-01-04 PROCEDURE — G0378 HOSPITAL OBSERVATION PER HR: HCPCS

## 2024-01-04 PROCEDURE — 74177 CT ABD & PELVIS W/CONTRAST: CPT

## 2024-01-04 PROCEDURE — 85025 COMPLETE CBC W/AUTO DIFF WBC: CPT | Performed by: PHYSICIAN ASSISTANT

## 2024-01-04 PROCEDURE — 25010000002 MORPHINE PER 10 MG: Performed by: EMERGENCY MEDICINE

## 2024-01-04 PROCEDURE — 36415 COLL VENOUS BLD VENIPUNCTURE: CPT | Performed by: PHYSICIAN ASSISTANT

## 2024-01-04 PROCEDURE — 81003 URINALYSIS AUTO W/O SCOPE: CPT | Performed by: PHYSICIAN ASSISTANT

## 2024-01-04 PROCEDURE — 36415 COLL VENOUS BLD VENIPUNCTURE: CPT

## 2024-01-04 PROCEDURE — 25010000002 HYDROMORPHONE 1 MG/ML SOLUTION: Performed by: EMERGENCY MEDICINE

## 2024-01-04 RX ORDER — MIDAZOLAM HYDROCHLORIDE 5 MG/ML
4 INJECTION INTRAMUSCULAR; INTRAVENOUS
Status: DISCONTINUED | OUTPATIENT
Start: 2024-01-04 | End: 2024-01-08 | Stop reason: HOSPADM

## 2024-01-04 RX ORDER — LORAZEPAM 1 MG/1
1 TABLET ORAL
Status: DISCONTINUED | OUTPATIENT
Start: 2024-01-04 | End: 2024-01-08 | Stop reason: HOSPADM

## 2024-01-04 RX ORDER — SODIUM CHLORIDE 0.9 % (FLUSH) 0.9 %
10 SYRINGE (ML) INJECTION AS NEEDED
Status: DISCONTINUED | OUTPATIENT
Start: 2024-01-04 | End: 2024-01-08 | Stop reason: HOSPADM

## 2024-01-04 RX ORDER — NITROGLYCERIN 0.4 MG/1
0.4 TABLET SUBLINGUAL
Status: DISCONTINUED | OUTPATIENT
Start: 2024-01-04 | End: 2024-01-05

## 2024-01-04 RX ORDER — MORPHINE SULFATE 2 MG/ML
4 INJECTION, SOLUTION INTRAMUSCULAR; INTRAVENOUS ONCE
Status: COMPLETED | OUTPATIENT
Start: 2024-01-04 | End: 2024-01-04

## 2024-01-04 RX ORDER — THIAMINE HYDROCHLORIDE 100 MG/ML
200 INJECTION, SOLUTION INTRAMUSCULAR; INTRAVENOUS EVERY 8 HOURS SCHEDULED
Status: DISCONTINUED | OUTPATIENT
Start: 2024-01-04 | End: 2024-01-08 | Stop reason: HOSPADM

## 2024-01-04 RX ORDER — MIDAZOLAM HYDROCHLORIDE 1 MG/ML
4 INJECTION INTRAMUSCULAR; INTRAVENOUS
Status: DISCONTINUED | OUTPATIENT
Start: 2024-01-04 | End: 2024-01-08 | Stop reason: HOSPADM

## 2024-01-04 RX ORDER — FOLIC ACID 1 MG/1
1 TABLET ORAL DAILY
Status: DISCONTINUED | OUTPATIENT
Start: 2024-01-05 | End: 2024-01-08 | Stop reason: HOSPADM

## 2024-01-04 RX ORDER — SODIUM CHLORIDE, SODIUM LACTATE, POTASSIUM CHLORIDE, CALCIUM CHLORIDE 600; 310; 30; 20 MG/100ML; MG/100ML; MG/100ML; MG/100ML
1.5 INJECTION, SOLUTION INTRAVENOUS CONTINUOUS
Status: DISCONTINUED | OUTPATIENT
Start: 2024-01-04 | End: 2024-01-07

## 2024-01-04 RX ORDER — MIDAZOLAM HYDROCHLORIDE 1 MG/ML
2 INJECTION INTRAMUSCULAR; INTRAVENOUS
Status: DISCONTINUED | OUTPATIENT
Start: 2024-01-04 | End: 2024-01-08 | Stop reason: HOSPADM

## 2024-01-04 RX ORDER — LORAZEPAM 1 MG/1
2 TABLET ORAL
Status: DISCONTINUED | OUTPATIENT
Start: 2024-01-04 | End: 2024-01-08 | Stop reason: HOSPADM

## 2024-01-04 RX ORDER — ONDANSETRON 2 MG/ML
4 INJECTION INTRAMUSCULAR; INTRAVENOUS ONCE
Status: COMPLETED | OUTPATIENT
Start: 2024-01-04 | End: 2024-01-04

## 2024-01-04 RX ADMIN — HYDROMORPHONE HYDROCHLORIDE 1 MG: 1 INJECTION, SOLUTION INTRAMUSCULAR; INTRAVENOUS; SUBCUTANEOUS at 21:37

## 2024-01-04 RX ADMIN — MORPHINE SULFATE 4 MG: 2 INJECTION, SOLUTION INTRAMUSCULAR; INTRAVENOUS at 20:15

## 2024-01-04 RX ADMIN — ONDANSETRON 4 MG: 2 INJECTION INTRAMUSCULAR; INTRAVENOUS at 20:14

## 2024-01-04 RX ADMIN — IOPAMIDOL 85 ML: 612 INJECTION, SOLUTION INTRAVENOUS at 20:20

## 2024-01-04 NOTE — ED PROVIDER NOTES
EMERGENCY DEPARTMENT ENCOUNTER    Room Number:  3109/1  Date of encounter:  1/6/2024  PCP: Rachell Pozo APRN  Historian: Patient  Full history not obtainable due to: None    HPI:  Chief Complaint: Abdominal pain    Context: Piper Cain is a 43 y.o. female with a PMH significant for alcohol induced pancreatitis, acute pyelonephritis, perinephric abscess, splenic vein thrombosis who presents to the ED c/o intense aching abdominal pain to the left-sided abdomen that has been progressing over the past 3 days.  She has a strong history of chronic pancreatitis but states that the symptoms feel somewhat different.  She has not attempted to alleviate symptoms at home prior to arrival with medications.  No obvious provocative activity.  She has nausea with no vomiting.  Denies changes to bowel habits.  She has recently been treated with Bactrim for urinary tract infection but believes her symptoms of UTI are coming back.  Admits to some associated dysuria.      MEDICAL RECORD REVIEW:    Upon review of the medical record it appears the patient was evaluated in the office at an urgent care center for acute pancreatitis on 5/20/2023.  The patient had a normal lactic acid on 9/6/2023 and a normal INR on 9/6/2023.    PAST MEDICAL HISTORY    Active Ambulatory Problems     Diagnosis Date Noted    Alcohol-induced acute pancreatitis without infection or necrosis 09/28/2017    Alcohol abuse 09/28/2017    Elevated LFTs 09/28/2017    Thrombocytopenia 10/02/2017    Epigastric pain 08/07/2022    Abnormal CT of the abdomen 08/07/2022    Chronic pancreatitis 08/08/2022    Acute pyelonephritis 08/08/2022    Perinephric abscess 08/08/2022    Splenic vein thrombosis 08/08/2022    Anemia of chronic disease 08/08/2022    GERD without esophagitis 08/08/2022    Alcohol dependence in remission 08/08/2022    Pancreatic pseudocyst 08/14/2022    Candida esophagitis 08/14/2022    Ureterolithiasis 10/20/2022    Left flank pain 10/21/2022     Bacterial vaginosis 10/23/2022    Abdominal pain 11/30/2022    Adrenal hemorrhage 03/25/2023    Chronic pancreatitis, unspecified pancreatitis type 06/08/2023    Macrocytosis 06/08/2023    Left-sided chest pain 06/08/2023    Tobacco abuse 06/08/2023    Acute on chronic pancreatitis 08/23/2023    Alcohol abuse 08/23/2023    Acute UTI (urinary tract infection) 08/23/2023    Essential hypertension 08/23/2023    Migraine 08/23/2023    Generalized anxiety disorder 08/23/2023    Pancreatic pseudocyst 08/23/2023    Acute abdominal pain 09/05/2023     Resolved Ambulatory Problems     Diagnosis Date Noted    Hypokalemia 09/28/2017    Mixed acid base balance disorder 09/28/2017    Dehydration 09/28/2017    Nausea and vomiting 06/08/2023    Hyperglycemia 08/23/2023     Past Medical History:   Diagnosis Date    Anemia     Anxiety     Constipation     Cyst of spleen     Diarrhea     E. coli bacteremia     ETOH abuse     Frequent UTI     GERD (gastroesophageal reflux disease)     Hiatal hernia     Miscarriage 2004    Pancreatitis     Pseudocyst of pancreas          PAST SURGICAL HISTORY  Past Surgical History:   Procedure Laterality Date    ENDOSCOPY N/A 08/10/2022    Procedure: ESOPHAGOGASTRODUODENOSCOPY with bxs;  Surgeon: Salvador Price MD;  Location: [facility] ENDOSCOPY;  Service: Gastroenterology;  Laterality: N/A;  Pre: r/o esophageal  varacies, abd pain  Post: esophageal plaque    ENDOSCOPY N/A 10/9/2023    Procedure: ESOPHAGOGASTRODUODENOSCOPY WITH STENT REMOVAL;  Surgeon: Red Denson MD;  Location: [facility] ENDOSCOPY;  Service: Gastroenterology;  Laterality: N/A;    PANCREATIC CYST DRAINAGE      x 2    UPPER ENDOSCOPIC ULTRASOUND W/ FNA N/A 9/13/2023    Procedure: Esophagogastroduodenoscopy with biopsy x 1 area and endoscopic ultrasound with placement of cyst gastrostomy;  Surgeon: Red Denson MD;  Location: [facility] ENDOSCOPY;  Service: Gastroenterology;  Laterality: N/A;  post: pancreatic psuedocyst    WISDOM  TOOTH EXTRACTION           FAMILY HISTORY  Family History   Problem Relation Age of Onset    Alcohol abuse Mother     Arthritis Mother     Asthma Mother     Miscarriages / Stillbirths Mother     Cancer Father     Diabetes Father     Drug abuse Father     Early death Father     Heart disease Father     Hyperlipidemia Father     Hypertension Father     Alcohol abuse Paternal Aunt     Cancer Paternal Aunt     Diabetes Paternal Aunt     Drug abuse Paternal Aunt     Early death Paternal Aunt     Heart disease Paternal Aunt     Hyperlipidemia Paternal Aunt     Hypertension Paternal Aunt     Alcohol abuse Maternal Grandmother     Alcohol abuse Maternal Grandfather     Alcohol abuse Paternal Grandmother     Cancer Paternal Grandmother     Diabetes Paternal Grandmother     Drug abuse Paternal Grandmother     Early death Paternal Grandmother     Heart disease Paternal Grandmother     Hyperlipidemia Paternal Grandmother     Hypertension Paternal Grandmother     Alcohol abuse Paternal Grandfather          SOCIAL HISTORY  Social History     Socioeconomic History    Marital status: Single   Tobacco Use    Smoking status: Every Day     Packs/day: 0.50     Years: 26.00     Additional pack years: 0.00     Total pack years: 13.00     Types: Cigarettes     Start date: 1/28/1996     Passive exposure: Current    Smokeless tobacco: Never   Vaping Use    Vaping Use: Never used   Substance and Sexual Activity    Alcohol use: Not Currently    Drug use: Never    Sexual activity: Defer     Partners: Male         ALLERGIES  Nickel        REVIEW OF SYSTEMS    All systems reviewed and marked as negative except as listed in HPI     PHYSICAL EXAM    I have reviewed the triage vital signs and nursing notes.    ED Triage Vitals [01/04/24 1738]   Temp Heart Rate Resp BP SpO2   99.2 °F (37.3 °C) (!) 135 16 -- 100 %      Temp src Heart Rate Source Patient Position BP Location FiO2 (%)   Tympanic Monitor -- -- --       Physical Exam  Constitutional:        General: She is not in acute distress.     Appearance: She is well-developed.   HENT:      Head: Normocephalic and atraumatic.   Eyes:      General: No scleral icterus.     Conjunctiva/sclera: Conjunctivae normal.   Neck:      Trachea: No tracheal deviation.   Cardiovascular:      Rate and Rhythm: Regular rhythm. Tachycardia present.   Pulmonary:      Effort: Pulmonary effort is normal.      Breath sounds: Normal breath sounds.   Abdominal:      Palpations: Abdomen is soft.      Tenderness: There is abdominal tenderness in the left upper quadrant and left lower quadrant.   Musculoskeletal:         General: No deformity.      Cervical back: Normal range of motion.   Lymphadenopathy:      Cervical: No cervical adenopathy.   Skin:     General: Skin is warm and dry.   Neurological:      Mental Status: She is alert and oriented to person, place, and time.   Psychiatric:         Behavior: Behavior normal.         Vital signs and nursing notes reviewed.            LAB RESULTS  Recent Results (from the past 24 hour(s))   CBC Auto Differential    Collection Time: 01/06/24  3:58 AM    Specimen: Blood   Result Value Ref Range    WBC 5.70 3.40 - 10.80 10*3/mm3    RBC 2.95 (L) 3.77 - 5.28 10*6/mm3    Hemoglobin 9.7 (L) 12.0 - 15.9 g/dL    Hematocrit 29.8 (L) 34.0 - 46.6 %    .0 (H) 79.0 - 97.0 fL    MCH 32.9 26.6 - 33.0 pg    MCHC 32.6 31.5 - 35.7 g/dL    RDW 12.9 12.3 - 15.4 %    RDW-SD 47.7 37.0 - 54.0 fl    MPV 10.1 6.0 - 12.0 fL    Platelets 188 140 - 450 10*3/mm3    Neutrophil % 46.8 42.7 - 76.0 %    Lymphocyte % 34.7 19.6 - 45.3 %    Monocyte % 12.8 (H) 5.0 - 12.0 %    Eosinophil % 4.6 0.3 - 6.2 %    Basophil % 0.7 0.0 - 1.5 %    Immature Grans % 0.4 0.0 - 0.5 %    Neutrophils, Absolute 2.67 1.70 - 7.00 10*3/mm3    Lymphocytes, Absolute 1.98 0.70 - 3.10 10*3/mm3    Monocytes, Absolute 0.73 0.10 - 0.90 10*3/mm3    Eosinophils, Absolute 0.26 0.00 - 0.40 10*3/mm3    Basophils, Absolute 0.04 0.00 - 0.20 10*3/mm3     Immature Grans, Absolute 0.02 0.00 - 0.05 10*3/mm3    nRBC 0.0 0.0 - 0.2 /100 WBC   Comprehensive Metabolic Panel    Collection Time: 01/06/24  3:58 AM    Specimen: Blood   Result Value Ref Range    Glucose 85 65 - 99 mg/dL    BUN 2 (L) 6 - 20 mg/dL    Creatinine 0.42 (L) 0.57 - 1.00 mg/dL    Sodium 141 136 - 145 mmol/L    Potassium 3.8 3.5 - 5.2 mmol/L    Chloride 105 98 - 107 mmol/L    CO2 27.2 22.0 - 29.0 mmol/L    Calcium 8.4 (L) 8.6 - 10.5 mg/dL    Total Protein 5.6 (L) 6.0 - 8.5 g/dL    Albumin 3.2 (L) 3.5 - 5.2 g/dL    ALT (SGPT) 8 1 - 33 U/L    AST (SGOT) 7 1 - 32 U/L    Alkaline Phosphatase 70 39 - 117 U/L    Total Bilirubin <0.2 0.0 - 1.2 mg/dL    Globulin 2.4 gm/dL    A/G Ratio 1.3 g/dL    BUN/Creatinine Ratio 4.8 (L) 7.0 - 25.0    Anion Gap 8.8 5.0 - 15.0 mmol/L    eGFR 126.2 >60.0 mL/min/1.73   CBC Auto Differential    Collection Time: 01/06/24  5:57 PM    Specimen: Blood   Result Value Ref Range    WBC 5.74 3.40 - 10.80 10*3/mm3    RBC 3.09 (L) 3.77 - 5.28 10*6/mm3    Hemoglobin 10.2 (L) 12.0 - 15.9 g/dL    Hematocrit 31.4 (L) 34.0 - 46.6 %    .6 (H) 79.0 - 97.0 fL    MCH 33.0 26.6 - 33.0 pg    MCHC 32.5 31.5 - 35.7 g/dL    RDW 12.8 12.3 - 15.4 %    RDW-SD 46.6 37.0 - 54.0 fl    MPV 9.6 6.0 - 12.0 fL    Platelets 206 140 - 450 10*3/mm3    Neutrophil % 48.8 42.7 - 76.0 %    Lymphocyte % 34.5 19.6 - 45.3 %    Monocyte % 11.5 5.0 - 12.0 %    Eosinophil % 4.4 0.3 - 6.2 %    Basophil % 0.5 0.0 - 1.5 %    Immature Grans % 0.3 0.0 - 0.5 %    Neutrophils, Absolute 2.80 1.70 - 7.00 10*3/mm3    Lymphocytes, Absolute 1.98 0.70 - 3.10 10*3/mm3    Monocytes, Absolute 0.66 0.10 - 0.90 10*3/mm3    Eosinophils, Absolute 0.25 0.00 - 0.40 10*3/mm3    Basophils, Absolute 0.03 0.00 - 0.20 10*3/mm3    Immature Grans, Absolute 0.02 0.00 - 0.05 10*3/mm3    nRBC 0.0 0.0 - 0.2 /100 WBC   hCG, Serum, Qualitative    Collection Time: 01/06/24  5:57 PM    Specimen: Blood   Result Value Ref Range    HCG Qualitative Negative  Negative       Ordered the above labs and independently reviewed the results.        RADIOLOGY  No Radiology Exams Resulted Within Past 24 Hours    I ordered the above noted radiological studies. Independently reviewed by me and discussed with radiologist.  See dictation above for official radiology interpretation.      PROCEDURES    Procedures        MEDICATIONS GIVEN IN ER    Medications   sodium chloride 0.9 % flush 10 mL (has no administration in time range)   Magnesium Standard Dose Replacement - Follow Nurse / BPA Driven Protocol (has no administration in time range)   thiamine (B-1) injection 200 mg (200 mg Intravenous Given 1/6/24 1445)     Followed by   thiamine (VITAMIN B-1) tablet 100 mg (has no administration in time range)   folic acid (FOLVITE) tablet 1 mg (1 mg Oral Given 1/6/24 0827)   LORazepam (ATIVAN) tablet 1 mg (has no administration in time range)     Or   midazolam (VERSED) injection 2 mg (has no administration in time range)     Or   LORazepam (ATIVAN) tablet 2 mg (has no administration in time range)     Or   midazolam (VERSED) injection 4 mg (has no administration in time range)     Or   midazolam (VERSED) injection 4 mg (has no administration in time range)     Or   midazolam (VERSED) injection 4 mg (has no administration in time range)   lactated ringers infusion (1.5 mL/kg/hr × 54.4 kg Intravenous New Bag 1/6/24 1452)   HYDROmorphone (DILAUDID) injection 1 mg (1 mg Intravenous Given 1/6/24 1848)   sodium chloride 0.9 % flush 10 mL (10 mL Intravenous Given 1/6/24 0827)   sodium chloride 0.9 % flush 10 mL (has no administration in time range)   sodium chloride 0.9 % infusion 40 mL (has no administration in time range)   sennosides-docusate (PERICOLACE) 8.6-50 MG per tablet 2 tablet (2 tablets Oral Given 1/6/24 0828)     And   polyethylene glycol (MIRALAX) packet 17 g (has no administration in time range)     And   bisacodyl (DULCOLAX) EC tablet 5 mg (has no administration in time range)      And   bisacodyl (DULCOLAX) suppository 10 mg (has no administration in time range)   ondansetron (ZOFRAN) injection 4 mg (4 mg Intravenous Given 1/6/24 1445)   amitriptyline (ELAVIL) tablet 50 mg (50 mg Oral Given 1/5/24 2209)   oxyCODONE-acetaminophen (PERCOCET)  MG per tablet 1 tablet (1 tablet Oral Given 1/6/24 1735)   morphine injection 4 mg (4 mg Intravenous Given 1/4/24 2015)   ondansetron (ZOFRAN) injection 4 mg (4 mg Intravenous Given 1/4/24 2014)   iopamidol (ISOVUE-300) 61 % injection 100 mL (85 mL Intravenous Given 1/4/24 2020)   HYDROmorphone (DILAUDID) injection 1 mg (1 mg Intravenous Given 1/4/24 2137)   lactated ringers bolus 1,088 mL (0 mL Intravenous Stopped 1/5/24 0440)         PROGRESS, DATA ANALYSIS, CONSULTS, AND MEDICAL DECISION MAKING    All labs have been independently interpreted by me.  All radiology studies have been interpreted by me.  Discussion below represents my analysis of pertinent findings related to patient's condition, differential diagnosis, treatment plan and final disposition.      - Chronic or social conditions impacting care: None      DIFFERENTIAL DIAGNOSIS INCLUDE BUT NOT LIMITED TO:     Differential diagnosis includes but is not limited to:  - Hepatobiliary pathology such as cholecystitis, cholangitis, and symptomatic cholelithiasis  - Pancreatitis  - Dyspepsia  - Small bowel obstruction  - Appendicitis  - Diverticulitis  - UTI including pyelonephritis  - Ureteral stone  - Zoster  - Colitis, including infectious and ischemic  - Atypical ACS      Orders placed during this visit:  Orders Placed This Encounter   Procedures    CT Abdomen Pelvis With Contrast    Bronson Draw    Comprehensive Metabolic Panel    Lipase    Urinalysis With Microscopic If Indicated (No Culture) - Urine, Clean Catch    CBC Auto Differential    Toxicology Screen, Serum    Urine Drug Screen - Urine, Clean Catch    CBC Auto Differential    Comprehensive Metabolic Panel    CBC Auto Differential     hCG, Serum, Qualitative    Lipase    Diet: Liquid Diets; Full Liquid; Fluid Consistency: Thin (IDDSI 0)    Vital Signs    Obtain Baseline Clinical [place] Withdrawal Assessment - Ar (CIWA-Ar), Sedation Scale & Vital Signs    Clinical [place] Withdrawal Assessment (CIWA-Ar)    If CIWA-Ar Score Less Than 8 For 3 Consecutive Assessments, Monitor Every 4 Hours & Discontinue Assessment When CIWA-Ar Less Than 8 for 24 Hours    Obtain Pre & Post Sedation Scores With Every Lorazepam Dose - Hold For POSS Greater Than 2 or RASS of -3 or Less    Notify Provider - Withdrawal    Notify Provider of Abnormal Lab Results    Notify Provider - Vitals    NG Tube to Low Wall Suction    Telemetry - Place Orders & Notify Provider of Results When Patient Experiences Acute Chest Pain, Dysrhythmia or Respiratory Distress    May Be Off Telemetry for Tests    Opioid Administration - Document EtCO2 and / or SpO2 With Each Set of Vitals & Any Change in Patient Status    Opioid Administration - Notify Provider Hypercapnic Monitoring    Opioid Administration - Continuous Pulse Oximetry (SpO2)    Vital Signs    Intake & Output    Weigh Patient    Oral Care    Saline Lock & Maintain IV Access    Place Sequential Compression Device    Maintain Sequential Compression Device    Code Status and Medical Interventions:    [facility] (on-call MD unless specified) Details    Inpatient Gastroenterology Consult    Inpatient General Surgery Consult    SCANNED - TELEMETRY      SCANNED - TELEMETRY      SCANNED - TELEMETRY      SCANNED - TELEMETRY      SCANNED - TELEMETRY      SCANNED - TELEMETRY      SCANNED - TELEMETRY      Insert Peripheral IV    Insert Peripheral IV    Initiate Observation Status    Initiate Observation Status    Transfer Patient    Seizure Precautions    Safety Precautions    Fall Precautions    CBC & Differential    Green Top (Gel)    Lavender Top    Gold Top - SST    Light Blue Top    CBC & Differential         ED Course as of 01/06/24  1948   Thu Jan 04, 2024   1851 WBC: 9.11 [DC]   1851 Heart Rate(!): 135 [DC]   1904 Lipase(!): 187 [DC]   2100 Per the radiologist, CT scan shows acute on chronic pancreatitis with new peripancreatic inflammation and stranding.  Pancreatic ductal dilatation is mildly increased.  There is a new linear opacity within the spleen suspicious for small splenic laceration.  Volume of fluid adjacent to the spleen is similar to prior exam.  See dictated report for details. [WH]   2150 Discussed with Viviana Mo, [facility], who accepts patient for admission under Dr. Baird. [DC-2]      ED Course User Index  [DC] Dany Camacho PA  [DC-2] Domitila Hernandez PA  [WH] Juan Fallon MD       AS OF 19:48 EST VITALS:    BP - 113/83  HR - 71  TEMP - 98.5 °F (36.9 °C) (Oral)  02 SATS - 100%      DIAGNOSIS  Final diagnoses:   Acute on chronic pancreatitis         DISPOSITION  Admit    Pt masked in first look. I wore a surgical mask throughout my encounters with the pt. I performed hand hygiene on entry into the pt room and upon exit.     Dictated utilizing Dragon dictation     Note Disclaimer: At [facility], we believe that sharing information builds trust and better relationships. You are receiving this note because you recently visited [facility]. It is possible you will see health information before a provider has talked with you about it. This kind of information can be easy to misunderstand. To help you fully understand what it means for your health, we urge you to discuss this note with your provider.      Dany Camacho PA  01/06/24 1949

## 2024-01-04 NOTE — LETTER
[facility]  [address] [facility] [address]  [phone]        January 8, 2024      Patient: Piper Cain  YOB: 1980  Date of Visit: 1/7/24      THE PATIENT WAS APPROVED AS OBSERVATION.    SHE WAS CHANGED TO INPATIENT 1/7/24        Sulaiman Cardona LPN  P# [phone]  F# [phone]

## 2024-01-04 NOTE — Clinical Note
Level of Care: Telemetry [5]   Diagnosis: Acute on chronic pancreatitis [[id number]]   Admitting Physician: PENELOPE PEREZ [[id number]]   Attending Physician: PENELOPE PEREZ [[id number]]

## 2024-01-05 ENCOUNTER — INPATIENT HOSPITAL (OUTPATIENT)
Dept: URBAN - METROPOLITAN AREA HOSPITAL 113 | Facility: HOSPITAL | Age: 44
End: 2024-01-05
Payer: COMMERCIAL

## 2024-01-05 DIAGNOSIS — K85.90 ACUTE PANCREATITIS WITHOUT NECROSIS OR INFECTION, UNSPECIFIE: ICD-10-CM

## 2024-01-05 PROCEDURE — 80179 DRUG ASSAY SALICYLATE: CPT | Performed by: NURSE PRACTITIONER

## 2024-01-05 PROCEDURE — 25810000003 LACTATED RINGERS PER 1000 ML: Performed by: NURSE PRACTITIONER

## 2024-01-05 PROCEDURE — 25010000002 HYDROMORPHONE 1 MG/ML SOLUTION: Performed by: NURSE PRACTITIONER

## 2024-01-05 PROCEDURE — 25010000002 ONDANSETRON PER 1 MG: Performed by: INTERNAL MEDICINE

## 2024-01-05 PROCEDURE — 25010000002 HYDROMORPHONE 1 MG/ML SOLUTION: Performed by: INTERNAL MEDICINE

## 2024-01-05 PROCEDURE — 25010000002 HYDROMORPHONE 1 MG/ML SOLUTION: Performed by: HOSPITALIST

## 2024-01-05 PROCEDURE — 25810000003 LACTATED RINGERS SOLUTION: Performed by: NURSE PRACTITIONER

## 2024-01-05 PROCEDURE — 80184 ASSAY OF PHENOBARBITAL: CPT | Performed by: NURSE PRACTITIONER

## 2024-01-05 PROCEDURE — 99222 1ST HOSP IP/OBS MODERATE 55: CPT | Performed by: INTERNAL MEDICINE

## 2024-01-05 PROCEDURE — G0480 DRUG TEST DEF 1-7 CLASSES: HCPCS | Performed by: NURSE PRACTITIONER

## 2024-01-05 PROCEDURE — 25010000002 THIAMINE PER 100 MG: Performed by: NURSE PRACTITIONER

## 2024-01-05 PROCEDURE — G0378 HOSPITAL OBSERVATION PER HR: HCPCS

## 2024-01-05 RX ORDER — SODIUM CHLORIDE 0.9 % (FLUSH) 0.9 %
10 SYRINGE (ML) INJECTION EVERY 12 HOURS SCHEDULED
Status: DISCONTINUED | OUTPATIENT
Start: 2024-01-05 | End: 2024-01-08 | Stop reason: HOSPADM

## 2024-01-05 RX ORDER — OXYCODONE AND ACETAMINOPHEN 10; 325 MG/1; MG/1
1 TABLET ORAL EVERY 4 HOURS PRN
Status: DISCONTINUED | OUTPATIENT
Start: 2024-01-05 | End: 2024-01-08 | Stop reason: HOSPADM

## 2024-01-05 RX ORDER — BISACODYL 5 MG/1
5 TABLET, DELAYED RELEASE ORAL DAILY PRN
Status: DISCONTINUED | OUTPATIENT
Start: 2024-01-05 | End: 2024-01-08 | Stop reason: HOSPADM

## 2024-01-05 RX ORDER — ONDANSETRON 2 MG/ML
4 INJECTION INTRAMUSCULAR; INTRAVENOUS EVERY 6 HOURS PRN
Status: DISCONTINUED | OUTPATIENT
Start: 2024-01-05 | End: 2024-01-05

## 2024-01-05 RX ORDER — AMOXICILLIN 250 MG
2 CAPSULE ORAL 2 TIMES DAILY
Status: DISCONTINUED | OUTPATIENT
Start: 2024-01-05 | End: 2024-01-08 | Stop reason: HOSPADM

## 2024-01-05 RX ORDER — BISACODYL 10 MG
10 SUPPOSITORY, RECTAL RECTAL DAILY PRN
Status: DISCONTINUED | OUTPATIENT
Start: 2024-01-05 | End: 2024-01-08 | Stop reason: HOSPADM

## 2024-01-05 RX ORDER — SODIUM CHLORIDE 9 MG/ML
40 INJECTION, SOLUTION INTRAVENOUS AS NEEDED
Status: DISCONTINUED | OUTPATIENT
Start: 2024-01-05 | End: 2024-01-08 | Stop reason: HOSPADM

## 2024-01-05 RX ORDER — SODIUM CHLORIDE 0.9 % (FLUSH) 0.9 %
10 SYRINGE (ML) INJECTION AS NEEDED
Status: DISCONTINUED | OUTPATIENT
Start: 2024-01-05 | End: 2024-01-08 | Stop reason: HOSPADM

## 2024-01-05 RX ORDER — ONDANSETRON 2 MG/ML
4 INJECTION INTRAMUSCULAR; INTRAVENOUS EVERY 6 HOURS PRN
Status: DISCONTINUED | OUTPATIENT
Start: 2024-01-05 | End: 2024-01-08 | Stop reason: HOSPADM

## 2024-01-05 RX ORDER — AMITRIPTYLINE HYDROCHLORIDE 50 MG/1
50 TABLET, FILM COATED ORAL NIGHTLY
Status: DISCONTINUED | OUTPATIENT
Start: 2024-01-05 | End: 2024-01-08 | Stop reason: HOSPADM

## 2024-01-05 RX ORDER — POLYETHYLENE GLYCOL 3350 17 G/17G
17 POWDER, FOR SOLUTION ORAL DAILY PRN
Status: DISCONTINUED | OUTPATIENT
Start: 2024-01-05 | End: 2024-01-08 | Stop reason: HOSPADM

## 2024-01-05 RX ADMIN — HYDROMORPHONE HYDROCHLORIDE 1 MG: 1 INJECTION, SOLUTION INTRAMUSCULAR; INTRAVENOUS; SUBCUTANEOUS at 18:05

## 2024-01-05 RX ADMIN — SODIUM CHLORIDE, POTASSIUM CHLORIDE, SODIUM LACTATE AND CALCIUM CHLORIDE 1088 ML: 600; 310; 30; 20 INJECTION, SOLUTION INTRAVENOUS at 01:17

## 2024-01-05 RX ADMIN — THIAMINE HYDROCHLORIDE 200 MG: 100 INJECTION, SOLUTION INTRAMUSCULAR; INTRAVENOUS at 14:12

## 2024-01-05 RX ADMIN — HYDROMORPHONE HYDROCHLORIDE 1 MG: 1 INJECTION, SOLUTION INTRAMUSCULAR; INTRAVENOUS; SUBCUTANEOUS at 14:13

## 2024-01-05 RX ADMIN — HYDROMORPHONE HYDROCHLORIDE 1 MG: 1 INJECTION, SOLUTION INTRAMUSCULAR; INTRAVENOUS; SUBCUTANEOUS at 01:52

## 2024-01-05 RX ADMIN — FOLIC ACID 1 MG: 1 TABLET ORAL at 09:59

## 2024-01-05 RX ADMIN — ONDANSETRON HYDROCHLORIDE 4 MG: 2 INJECTION, SOLUTION INTRAMUSCULAR; INTRAVENOUS at 21:04

## 2024-01-05 RX ADMIN — THIAMINE HYDROCHLORIDE 200 MG: 100 INJECTION, SOLUTION INTRAMUSCULAR; INTRAVENOUS at 06:25

## 2024-01-05 RX ADMIN — SODIUM CHLORIDE, POTASSIUM CHLORIDE, SODIUM LACTATE AND CALCIUM CHLORIDE 1.5 ML/KG/HR: 600; 310; 30; 20 INJECTION, SOLUTION INTRAVENOUS at 17:58

## 2024-01-05 RX ADMIN — AMITRIPTYLINE HYDROCHLORIDE 50 MG: 50 TABLET, FILM COATED ORAL at 22:09

## 2024-01-05 RX ADMIN — SODIUM CHLORIDE, POTASSIUM CHLORIDE, SODIUM LACTATE AND CALCIUM CHLORIDE 1.5 ML/KG/HR: 600; 310; 30; 20 INJECTION, SOLUTION INTRAVENOUS at 04:05

## 2024-01-05 RX ADMIN — HYDROMORPHONE HYDROCHLORIDE 1 MG: 1 INJECTION, SOLUTION INTRAMUSCULAR; INTRAVENOUS; SUBCUTANEOUS at 09:58

## 2024-01-05 RX ADMIN — Medication 10 ML: at 22:01

## 2024-01-05 RX ADMIN — HYDROMORPHONE HYDROCHLORIDE 1 MG: 1 INJECTION, SOLUTION INTRAMUSCULAR; INTRAVENOUS; SUBCUTANEOUS at 06:25

## 2024-01-05 RX ADMIN — THIAMINE HYDROCHLORIDE 200 MG: 100 INJECTION, SOLUTION INTRAMUSCULAR; INTRAVENOUS at 22:09

## 2024-01-05 RX ADMIN — HYDROMORPHONE HYDROCHLORIDE 1 MG: 1 INJECTION, SOLUTION INTRAMUSCULAR; INTRAVENOUS; SUBCUTANEOUS at 22:09

## 2024-01-05 NOTE — PROGRESS NOTES
Nutrition Services    Patient Name:  Piper Cain  YOB: 1980  MRN: [id number]  Admit Date:  1/4/2024    Assessment Date:  01/05/24    Summary:     Pt is a 43 y.o. female adm for acute on chronic pancreatitis. H/o alcohol/tobacco abuse. Nutrition assessment completed. Visited pt at bedside. She has been having left-sided abdominal pain and nausea. She endorses she had vomiting all last week. Was drinking 2 shots up until 4 days ago per RN note. Currently NPO. Discussed option of enteral nutrition with the pt to promote pancreatic rest.   Labs: no new labs available yet   Meds: IVFs, thiamine, folic acid   Skin: intact     REC:  Advance diet as tolerated and once medically appropriate per MD  May need EN feeding past the Ligament of Treitz to support pancreatic rest if unable to tolerate PO intake     RD to follow per protocol.    CLINICAL NUTRITION ASSESSMENT      Reason for Assessment BMI, MST 1     Diagnosis/Problem   Acute on chronic pancreatitis    Medical/Surgical History Past Medical History:   Diagnosis Date    Anemia     Anxiety     Constipation     Cyst of spleen     Diarrhea     E. coli bacteremia     ETOH abuse     Frequent UTI     GERD (gastroesophageal reflux disease)     Hiatal hernia     Left flank pain 10/21/2022    Migraine     Miscarriage 2004    Pancreatitis     Pseudocyst of pancreas        Past Surgical History:   Procedure Laterality Date    ENDOSCOPY N/A 08/10/2022    Procedure: ESOPHAGOGASTRODUODENOSCOPY with bxs;  Surgeon: Salvador Price MD;  Location: [facility] ENDOSCOPY;  Service: Gastroenterology;  Laterality: N/A;  Pre: r/o esophageal  varacies, abd pain  Post: esophageal plaque    ENDOSCOPY N/A 10/9/2023    Procedure: ESOPHAGOGASTRODUODENOSCOPY WITH STENT REMOVAL;  Surgeon: Red Denson MD;  Location: [facility] ENDOSCOPY;  Service: Gastroenterology;  Laterality: N/A;    PANCREATIC CYST DRAINAGE      x 2    UPPER ENDOSCOPIC ULTRASOUND W/ FNA N/A 9/13/2023    Procedure:  "Esophagogastroduodenoscopy with biopsy x 1 area and endoscopic ultrasound with placement of cyst gastrostomy;  Surgeon: Red Denson MD;  Location: [facility] ENDOSCOPY;  Service: Gastroenterology;  Laterality: N/A;  post: pancreatic psuedocyst    WISDOM TOOTH EXTRACTION          Anthropometrics        Current Height  Current Weight  BMI kg/m2 Height: 177.8 cm (70\")  Weight: 57.6 kg (126 lb 14.4 oz) (01/05/24 0021)  Body mass index is 18.21 kg/m².   Adjusted BMI (if applicable)    BMI Category Underweight (18.4 or below)   Ideal Body Weight (IBW) 68.5 kg    Usual Body Weight (UBW) 130 lbs    Weight Trend Stable   Weight History Wt Readings from Last 30 Encounters:   01/05/24 0021 57.6 kg (126 lb 14.4 oz)   01/05/24 0010 57.6 kg (126 lb 14.4 oz)   01/04/24 1826 54.4 kg (120 lb)   09/26/23 1022 57.6 kg (127 lb)   09/13/23 0629 57.8 kg (127 lb 6.4 oz)   09/08/23 1151 58.1 kg (128 lb)   09/05/23 1953 59 kg (130 lb)   08/25/23 0643 60 kg (132 lb 4.8 oz)   08/24/23 0655 59.8 kg (131 lb 14.4 oz)   08/23/23 0533 59 kg (130 lb)   08/22/23 1954 61.4 kg (135 lb 4.8 oz)   08/22/23 1254 58.1 kg (128 lb)   08/23/23 1928 59 kg (130 lb)   06/07/23 2210 54.4 kg (120 lb)   03/25/23 2214 59 kg (130 lb)   03/25/23 1631 59 kg (130 lb)   11/30/22 1743 61.2 kg (135 lb)   10/21/22 0009 61.2 kg (135 lb)   08/08/22 0922 55.8 kg (123 lb)   08/06/22 2021 55.8 kg (123 lb)   03/05/18 1124 58.1 kg (128 lb)   01/24/18 0456 59 kg (130 lb)   09/27/17 2341 49.9 kg (110 lb)      --  Labs       Pertinent Labs    Results from last 7 days   Lab Units 01/04/24  1836   SODIUM mmol/L 133*   POTASSIUM mmol/L 3.4*   CHLORIDE mmol/L 94*   CO2 mmol/L 26.0   BUN mg/dL 6   CREATININE mg/dL 0.69   CALCIUM mg/dL 10.1   BILIRUBIN mg/dL 0.4   ALK PHOS U/L 98   ALT (SGPT) U/L 9   AST (SGOT) U/L 9   GLUCOSE mg/dL 115*     Results from last 7 days   Lab Units 01/04/24  1836   HEMOGLOBIN g/dL 13.6   HEMATOCRIT % 39.7   WBC 10*3/mm3 9.11   ALBUMIN g/dL 4.4     Results " from last 7 days   Lab Units 01/04/24  1836   PLATELETS 10*3/mm3 214     COVID19   Date Value Ref Range Status   08/07/2022 Not Detected Not Detected - Ref. Range Final     Lab Results   Component Value Date    HGBA1C 5.10 08/23/2023          Medications           Scheduled Medications folic acid, 1 mg, Oral, Daily  thiamine (B-1) IV, 200 mg, Intravenous, Q8H   Followed by  [START ON 1/10/2024] thiamine, 100 mg, Oral, Daily       Infusions lactated ringers, 1.5 mL/kg/hr, Last Rate: 1.5 mL/kg/hr (01/05/24 0626)       PRN Medications   HYDROmorphone    LORazepam **OR** midazolam **OR** LORazepam **OR** midazolam **OR** midazolam **OR** midazolam    Magnesium Standard Dose Replacement - Follow Nurse / BPA Driven Protocol    ondansetron    sodium chloride     Physical Findings          General Findings alert, room air, underweight   Oral/Mouth Cavity WDL   Edema  no edema   Gastrointestinal abdominal pain, non-distended , last bowel movement: pending    Skin  skin intact   Tubes/Drains/Lines none   NFPE No clinical signs of muscle wasting or fat loss    --  Current Nutrition Orders & Evaluation of Intake       Oral Nutrition     Food Allergies NKFA   Current PO Diet NPO Diet NPO Type: Sips with Meds   Supplement n/a   PO Evaluation     % PO Intake NPO    Factors Affecting Intake: abdominal pain, altered GI function, decreased appetite, nausea   --  PES STATEMENT / NUTRITION DIAGNOSIS      Nutrition Dx Problem  Problem: Altered GI Function  Etiology: Medical Diagnosis - acute on chronic pancreatitis and Factors Affecting Nutrition - decreased appetite, abdominal pain, nausea     Signs/Symptoms: NPO and Report/Observation     NUTRITION INTERVENTION / PLAN OF CARE      Intervention Goal(s) Maintain nutrition status, Improved nutrition related labs, Establish goals of care, Reduce/improve symptoms, Disease management/therapy, Initiate feeding/diet, Establish PO intake, Tolerate PO , Advance diet, Appropriate weight gain,  and PO intake goal %: 75%         RD Intervention/Action Await initiation/advancement of PO diet, Continue to monitor, and Care plan reviewed   --      Prescription/Orders:       PO Diet ADAT       Supplements       Enteral Nutrition May need EN feeding past the Ligament of Treitz for bowel rest       Parenteral Nutrition    New Prescription Ordered? Continue same per protocol, No changes at this time   --      Monitor/Evaluation Per protocol, PO intake, Pertinent labs, Weight, GI status, Symptoms   Discharge Plan/Needs Pending clinical course   --    RD to follow per protocol.      Electronically signed by:  Jessica Costa RDN, [facility]  01/05/24 11:22 EST

## 2024-01-05 NOTE — H&P
Patient Name:  Piper Cain  YOB: 1980  MRN:  [id number]  Admit Date:  1/4/2024  Patient Care Team:  Rachell Pozo APRN as PCP - General (Family Medicine)      Subjective   History Present Illness     Chief Complaint   Patient presents with    Abdominal Pain    Nausea    Vomiting       History of Present Illness  This is a 43-year-old female, with history of anxiety, anemia, alcohol abuse, chronic hepatitis, presents to the hospital, with abdominal pain, nausea and vomiting, she has been diagnosed to have acute pancreatitis without infection.  Patient was admitted to hospitalist service for further workup of symptoms.  Patient does mention relief in her symptoms after pain medication administration.  Patient will be evaluated by GI service.        Review of Systems   Review of Systems   Constitutional: Negative for activity change and appetite change.   HENT: Negative for congestion and dental problem.    Eyes: Negative for discharge and itching.   Respiratory: Negative for apnea and chest tightness.    Cardiovascular: Negative for chest pain and palpitations.   Gastrointestinal: Negative for abdominal distention.  Endocrine: Negative for cold intolerance and heat intolerance.   Genitourinary: Negative for difficulty urinating and frequency.   Musculoskeletal: Negative for arthralgias and back pain.   Skin: Negative for color change and pallor.   Allergic/Immunologic: Negative for environmental allergies and food allergies.   Neurological: Negative for dizziness and facial asymmetry.   Hematological: Negative for adenopathy. Does not bruise/bleed easily.   Psychiatric/Behavioral: Negative for agitation and suicidal ideas.       Personal History     Past Medical History:   Diagnosis Date    Anemia     Anxiety     Constipation     Cyst of spleen     Diarrhea     E. coli bacteremia     ETOH abuse     Frequent UTI     GERD (gastroesophageal reflux disease)     Hiatal hernia     Left  flank pain 10/21/2022    Migraine     Miscarriage 2004    Pancreatitis     Pseudocyst of pancreas      Past Surgical History:   Procedure Laterality Date    ENDOSCOPY N/A 08/10/2022    Procedure: ESOPHAGOGASTRODUODENOSCOPY with bxs;  Surgeon: Salvador Price MD;  Location: [facility] ENDOSCOPY;  Service: Gastroenterology;  Laterality: N/A;  Pre: r/o esophageal  varacies, abd pain  Post: esophageal plaque    ENDOSCOPY N/A 10/9/2023    Procedure: ESOPHAGOGASTRODUODENOSCOPY WITH STENT REMOVAL;  Surgeon: Red Denson MD;  Location: [facility] ENDOSCOPY;  Service: Gastroenterology;  Laterality: N/A;    PANCREATIC CYST DRAINAGE      x 2    UPPER ENDOSCOPIC ULTRASOUND W/ FNA N/A 9/13/2023    Procedure: Esophagogastroduodenoscopy with biopsy x 1 area and endoscopic ultrasound with placement of cyst gastrostomy;  Surgeon: Red Denson MD;  Location: [facility] ENDOSCOPY;  Service: Gastroenterology;  Laterality: N/A;  post: pancreatic psuedocyst    WISDOM TOOTH EXTRACTION       Family History   Problem Relation Age of Onset    Alcohol abuse Mother     Arthritis Mother     Asthma Mother     Miscarriages / Stillbirths Mother     Cancer Father     Diabetes Father     Drug abuse Father     Early death Father     Heart disease Father     Hyperlipidemia Father     Hypertension Father     Alcohol abuse Paternal Aunt     Cancer Paternal Aunt     Diabetes Paternal Aunt     Drug abuse Paternal Aunt     Early death Paternal Aunt     Heart disease Paternal Aunt     Hyperlipidemia Paternal Aunt     Hypertension Paternal Aunt     Alcohol abuse Maternal Grandmother     Alcohol abuse Maternal Grandfather     Alcohol abuse Paternal Grandmother     Cancer Paternal Grandmother     Diabetes Paternal Grandmother     Drug abuse Paternal Grandmother     Early death Paternal Grandmother     Heart disease Paternal Grandmother     Hyperlipidemia Paternal Grandmother     Hypertension Paternal Grandmother     Alcohol abuse Paternal Grandfather      Social  History     Tobacco Use    Smoking status: Every Day     Packs/day: 0.50     Years: 26.00     Additional pack years: 0.00     Total pack years: 13.00     Types: Cigarettes     Start date: 1/28/1996    Smokeless tobacco: Never   Vaping Use    Vaping Use: Never used   Substance Use Topics    Alcohol use: Not Currently    Drug use: Never     No current facility-administered medications on file prior to encounter.     Current Outpatient Medications on File Prior to Encounter   Medication Sig Dispense Refill    folic acid (FOLVITE) 1 MG tablet Take 1 tablet by mouth Daily. 30 tablet 3    magnesium oxide (MAG-OX) 400 MG tablet Take 1 tablet by mouth Daily.      ondansetron (ZOFRAN) 4 MG tablet Take 1 tablet by mouth Every 8 (Eight) Hours As Needed for Nausea or Vomiting.      oxyCODONE-acetaminophen (PERCOCET)  MG per tablet Take 1 tablet by mouth Every 4 (Four) Hours As Needed for Moderate Pain. 30 tablet 0    pantoprazole (PROTONIX) 40 MG EC tablet Take 1 tablet by mouth Daily. (Patient taking differently: Take 1 tablet by mouth 2 (Two) Times a Day.) 30 tablet 3    thiamine (VITAMIN B1) 100 MG tablet Take 1 tablet by mouth Daily. 30 tablet 3    amitriptyline (ELAVIL) 50 MG tablet Take 1 tablet by mouth Every Night.      Cyanocobalamin (VITAMIN B 12 PO) Take  by mouth. (Patient not taking: Reported on 1/5/2024)      Ferrous Sulfate Dried (Feosol) 200 (65 Fe) MG tablet tablet Take 1 tablet by mouth 2 (Two) Times a Day. (Patient not taking: Reported on 1/5/2024)      pancrelipase, Lip-Prot-Amyl, (CREON) 08030-68193 units capsule delayed-release particles capsule Take 3 capsules by mouth 3 (Three) Times a Day With Meals. (Patient not taking: Reported on 1/5/2024)       Allergies   Allergen Reactions    Nickel Rash       Objective    Objective     Vital Signs  Temp:  [97.8 °F (36.6 °C)-99.2 °F (37.3 °C)] 98.5 °F (36.9 °C)  Heart Rate:  [] 86  Resp:  [16-18] 16  BP: (103-126)/(76-93) 115/93  SpO2:  [94 %-100 %]  100 %  on   ;   Device (Oxygen Therapy): room air  Body mass index is 18.21 kg/m².    Physical Exam  General, awake and alert.  Head and ENT, normocephalic and atraumatic.  Lungs, symmetric expansion, equal air entry bilaterally.  Heart, regular rate and rhythm.  Abdomen, abdominal tenderness, generalized  Extremities, no clubbing or cyanosis.  Neuro, no focal deficits.  Skin: Warm and no rash.  Psych, normal mood and affect.  Musculoskeletal, joint examination is grossly normal.        Results Review:  I reviewed the patient's new clinical results.  I reviewed the patient's new imaging results and agree with the interpretation.  I reviewed the patient's other test results and agree with the interpretation  I personally viewed and interpreted the patient's EKG/Telemetry data  Discussed with ED provider.    Lab Results (last 24 hours)       Procedure Component Value Units Date/Time    CBC & Differential [[id number]]  (Abnormal) Collected: 01/04/24 1836    Specimen: Blood from Arm, Left Updated: 01/04/24 1844    Narrative:      The following orders were created for panel order CBC & Differential.  Procedure                               Abnormality         Status                     ---------                               -----------         ------                     CBC Auto Differential[[id number]]        Abnormal            Final result                 Please view results for these tests on the individual orders.    Comprehensive Metabolic Panel [[id number]]  (Abnormal) Collected: 01/04/24 1836    Specimen: Blood from Arm, Left Updated: 01/04/24 1902     Glucose 115 mg/dL      BUN 6 mg/dL      Creatinine 0.69 mg/dL      Sodium 133 mmol/L      Potassium 3.4 mmol/L      Chloride 94 mmol/L      CO2 26.0 mmol/L      Calcium 10.1 mg/dL      Total Protein 7.6 g/dL      Albumin 4.4 g/dL      ALT (SGPT) 9 U/L      AST (SGOT) 9 U/L      Alkaline Phosphatase 98 U/L      Total Bilirubin 0.4 mg/dL      Globulin 3.2 gm/dL       A/G Ratio 1.4 g/dL      BUN/Creatinine Ratio 8.7     Anion Gap 13.0 mmol/L      eGFR 112.0 mL/min/1.73     Narrative:      GFR Normal >60  Chronic Kidney Disease <60  Kidney Failure <15      Lipase [[id number]]  (Abnormal) Collected: 01/04/24 1836    Specimen: Blood from Arm, Left Updated: 01/04/24 1902     Lipase 187 U/L     CBC Auto Differential [[id number]]  (Abnormal) Collected: 01/04/24 1836    Specimen: Blood from Arm, Left Updated: 01/04/24 1844     WBC 9.11 10*3/mm3      RBC 3.84 10*6/mm3      Hemoglobin 13.6 g/dL      Hematocrit 39.7 %      .4 fL      MCH 35.4 pg      MCHC 34.3 g/dL      RDW 13.2 %      RDW-SD 49.6 fl      MPV 9.9 fL      Platelets 214 10*3/mm3      Neutrophil % 74.4 %      Lymphocyte % 16.1 %      Monocyte % 7.1 %      Eosinophil % 1.9 %      Basophil % 0.3 %      Immature Grans % 0.2 %      Neutrophils, Absolute 6.77 10*3/mm3      Lymphocytes, Absolute 1.47 10*3/mm3      Monocytes, Absolute 0.65 10*3/mm3      Eosinophils, Absolute 0.17 10*3/mm3      Basophils, Absolute 0.03 10*3/mm3      Immature Grans, Absolute 0.02 10*3/mm3      nRBC 0.0 /100 WBC     Urinalysis With Microscopic If Indicated (No Culture) - Urine, Clean Catch [[id number]]  (Normal) Collected: 01/04/24 2004    Specimen: Urine, Clean Catch Updated: 01/04/24 2031     Color, UA Yellow     Appearance, UA Clear     pH, UA 7.0     Specific Gravity, UA 1.006     Glucose, UA Negative     Ketones, UA Negative     Bilirubin, UA Negative     Blood, UA Negative     Protein, UA Negative     Leuk Esterase, UA Negative     Nitrite, UA Negative     Urobilinogen, UA 0.2 E.U./dL    Narrative:      Urine microscopic not indicated.    Urine Drug Screen - Urine, Clean Catch [[id number]]  (Abnormal) Collected: 01/04/24 2004    Specimen: Urine, Clean Catch Updated: 01/04/24 2227     Amphet/Methamphet, Screen Negative     Barbiturates Screen, Urine Negative     Benzodiazepine Screen, Urine Negative     Cocaine Screen, Urine Negative     Opiate  Screen Negative     THC, Screen, Urine Negative     Methadone Screen, Urine Negative     Oxycodone Screen, Urine Positive     Fentanyl, Urine Negative    Narrative:      Negative Thresholds Per Drugs Screened:    Amphetamines                 500 ng/ml  Barbiturates                 200 ng/ml  Benzodiazepines              100 ng/ml  Cocaine                      300 ng/ml  Methadone                    300 ng/ml  Opiates                      300 ng/ml  Oxycodone                    100 ng/ml  THC                           50 ng/ml  Fentanyl                       5 ng/ml      The Normal Value for all drugs tested is negative. This report includes final unconfirmed screening results to be used for medical treatment purposes only. Unconfirmed results must not be used for non-medical purposes such as employment or legal testing. Clinical consideration should be applied to any drug of abuse test, particularly when unconfirmed results are used.            Toxicology Screen, Serum [[id number]] Collected: 01/05/24 0941    Specimen: Blood Updated: 01/05/24 0944            Imaging Results (Last 24 Hours)       Procedure Component Value Units Date/Time    CT Abdomen Pelvis With Contrast [[id number]] Collected: 01/04/24 2045     Updated: 01/04/24 2103    Narrative:      CT ABDOMEN AND PELVIS WITH IV CONTRAST     HISTORY: Left side abdominal pain, tenderness to palpation. History of  pancreatitis.     TECHNIQUE:  CT includes axial imaging from the lung bases to the  trochanters with intravenous contrast and without use of oral contrast.  Data reconstructed in coronal and sagittal planes. Radiation dose  reduction techniques were utilized, including automated exposure control  and exposure modulation based on body size.     COMPARISON: CT abdomen and pelvis 09/05/2023, 08/22/2023.     FINDINGS: There is abnormal edema and stranding surrounding the  pancreatic body and tail consistent with acute pancreatitis. A  previously described  pancreatic tail pseudocyst just medial to the  spleen and anterior to the left kidney measures 3.5 x 4.3 cm which has  decreased in size from 9.9 x 7 cm on the previous exam. There is  progressive dilatation of the pancreatic duct within the pancreatic body  and proximal tail and the pancreatic duct measures 6 mm in diameter  versus 4.5 mm on the previous exam. There are pancreatic calcifications  involving the pancreatic head and uncinate process and proximal body  consistent with acute on chronic pancreatitis.     There is mild perisplenic fluid. Within the central to inferior spleen  there is a linear, coronally oriented hypodensity that measures 3.5 x  0.8 cm. This could represent a small splenic laceration or area of  infarction or interstitial extension of fluid from the pancreatic  pseudocyst. There is mild perisplenic fluid though the volume is not  significantly change compared to the previous exam.     Left kidney is partially compressed posteriorly from the cyst in the  region of the pancreatic tail. Right kidney appears normal. Gallbladder  is mostly decompressed. There is mild free fluid within the pelvis which  is increased in volume. There is new stranding and edema within the left  mid abdomen, particularly extending adjacent to the gastric wall and  along the greater curvature, as well as adjacent to the distal  transverse colon.       Impression:      1. Acute on chronic pancreatitis with new peripancreatic inflammation  and stranding particularly along the pancreatic body and tail and also  extending to the left abdomen. Pancreatic ductal dilatation is mildly  increased.  2. New linear low density within the spleen suspicious for a small  splenic laceration. The volume of fluid adjacent to the spleen is  similar to the prior exam, though if there are clinical signs of  hemorrhage, recommend short interval follow-up CT to assure stability.  There is increased ascites with free fluid extending into  the pelvis  when compared to the previous study.  3. The previously demonstrated pancreatic pseudocyst in the region of  the pancreatic tail has decreased in size compared to the previous exam.     Radiation dose reduction techniques were utilized, including automated  exposure control and exposure modulation based on body size.        This report was finalized on 1/4/2024 9:00 PM by Dr. Angel Mcleod M.D on Workstation: [id number]               Results for orders placed during the hospital encounter of 08/06/22    Adult Transthoracic Echo Complete W/ Cont if Necessary Per Protocol    Interpretation Summary  · Left ventricular ejection fraction appears to be 61 - 65%. Left ventricular systolic function is normal.  · Left ventricular diastolic function was normal.  · Normal right ventricular cavity size and systolic function noted.  · The agitated saline study is positive with Valsalva, consistent with a small to moderate sized PFO  · Mild tricuspid valve regurgitation is present.  · Calculated right ventricular systolic pressure from tricuspid regurgitation is 32 mmHg.  · There is no evidence of pericardial effusion      SCANNED - TELEMETRY     Final Result      SCANNED - TELEMETRY     Final Result      SCANNED - TELEMETRY     Final Result           Assessment/Plan     Active Hospital Problems    Diagnosis  POA    **Acute on chronic pancreatitis [K85.90, K86.1]  Yes    Acute abdominal pain [R10.9]  Yes    Pancreatic pseudocyst [K86.3]  Yes    Adrenal hemorrhage [E27.49]  Yes    Alcohol dependence in remission [F10.21]  Yes      Resolved Hospital Problems   No resolved problems to display.     Assessment and plan  1. Acute on chronic pancreatitis, keep patient n.p.o., IV fluids.  Continue pain control.  Gastroenterology on board, follow management recommendations.    2.  History of adrenal hemorrhage, suspected splenic hemorrhage, history of pancreatic pseudocyst, no acute interventions planned at this point of  time.  Gastroenterology on board.    3.  Hypokalemia, replacement of electrolytes per protocol.    4.  CODE STATUS is full code.  Further plans based on hospital course.      Faisal Baird MD  [facility]  01/05/24  15:32 EST

## 2024-01-05 NOTE — CONSULTS
[facility]   Consult Note    Patient Name: Piper Cain  : 1980  MRN: [id number]  Primary Care Physician:  Rachell Pozo APRN  Referring Physician: Faisal Baird MD  Date of admission: 2024    Inpatient Gastroenterology Consult  Consult performed by: Gilberto Soria MD  Consult ordered by: Evelin Weiss APRN        Subjective   Subjective     Reason for Consult/ Chief Complaint: abdominal pain     History of Present Illness  Piper Cain is a 43 y.o. female with history of acute on chronic pancreatitis.  She had worsening pain.  She denies any falls or abdominal trauma  She did have a pseudocyst drained endoscopically with a stent this fall.   She uses pain meds chronically and  followed by a pain clinic in [place] where she lives.She is followed by [facility]. She has reports some etoh use over the holidays. She reports that she generally eats healthy.  Nobody in her family has had pancreatitis     Review of Systems   Gastrointestinal:  Positive for abdominal pain and nausea.   Neurological:  Positive for weakness.        Personal History     Past Medical History:   Diagnosis Date    Anemia     Anxiety     Constipation     Cyst of spleen     Diarrhea     E. coli bacteremia     ETOH abuse     Frequent UTI     GERD (gastroesophageal reflux disease)     Hiatal hernia     Left flank pain 10/21/2022    Migraine     Miscarriage     Pancreatitis     Pseudocyst of pancreas        Past Surgical History:   Procedure Laterality Date    ENDOSCOPY N/A 08/10/2022    Procedure: ESOPHAGOGASTRODUODENOSCOPY with bxs;  Surgeon: Salvador Price MD;  Location: [facility] ENDOSCOPY;  Service: Gastroenterology;  Laterality: N/A;  Pre: r/o esophageal  varacies, abd pain  Post: esophageal plaque    ENDOSCOPY N/A 10/9/2023    Procedure: ESOPHAGOGASTRODUODENOSCOPY WITH STENT REMOVAL;  Surgeon: Red Denson MD;  Location: [facility] ENDOSCOPY;  Service:  Gastroenterology;  Laterality: N/A;    PANCREATIC CYST DRAINAGE      x 2    UPPER ENDOSCOPIC ULTRASOUND W/ FNA N/A 9/13/2023    Procedure: Esophagogastroduodenoscopy with biopsy x 1 area and endoscopic ultrasound with placement of cyst gastrostomy;  Surgeon: Red Denson MD;  Location: [facility] ENDOSCOPY;  Service: Gastroenterology;  Laterality: N/A;  post: pancreatic psuedocyst    WISDOM TOOTH EXTRACTION         Family History: family history includes Alcohol abuse in her maternal grandfather, maternal grandmother, mother, paternal aunt, paternal grandfather, and paternal grandmother; Arthritis in her mother; Asthma in her mother; Cancer in her father, paternal aunt, and paternal grandmother; Diabetes in her father, paternal aunt, and paternal grandmother; Drug abuse in her father, paternal aunt, and paternal grandmother; Early death in her father, paternal aunt, and paternal grandmother; Heart disease in her father, paternal aunt, and paternal grandmother; Hyperlipidemia in her father, paternal aunt, and paternal grandmother; Hypertension in her father, paternal aunt, and paternal grandmother; Miscarriages / Stillbirths in her mother. Otherwise pertinent FHx was reviewed and not pertinent to current issue.    Social History:  reports that she has been smoking cigarettes. She started smoking about 27 years ago. She has a 13.00 pack-year smoking history. She has never used smokeless tobacco. She reports that she does not currently use alcohol. She reports that she does not use drugs.    Home Medications:   Cyanocobalamin, Ferrous Sulfate Dried, Vitamin B1, amitriptyline, folic acid, magnesium oxide, ondansetron, oxyCODONE-acetaminophen, pancrelipase (Lip-Prot-Amyl), and pantoprazole    Allergies:  Allergies   Allergen Reactions    Nickel Rash       Objective    Objective     Vitals:  Temp:  [97.8 °F (36.6 °C)-99.2 °F (37.3 °C)] 98.8 °F (37.1 °C)  Heart Rate:  [] 90  Resp:  [16-18] 16  BP: (103-126)/(76-90)  105/80    Physical Exam  HENT:      Head: Atraumatic.      Right Ear: External ear normal.      Left Ear: External ear normal.      Mouth/Throat:      Pharynx: Oropharynx is clear.   Eyes:      Conjunctiva/sclera: Conjunctivae normal.   Cardiovascular:      Rate and Rhythm: Normal rate.      Pulses: Normal pulses.   Pulmonary:      Effort: Pulmonary effort is normal.   Abdominal:      General: There is no distension.      Tenderness: There is abdominal tenderness. There is guarding. There is no rebound.   Skin:     General: Skin is warm and dry.   Neurological:      General: No focal deficit present.      Mental Status: She is alert.   Psychiatric:         Mood and Affect: Mood normal.         Result Review    Result Review:  I have personally reviewed the results from the time of this admission to 1/5/2024 12:09 EST and agree with these findings:  []  Laboratory list / accordion  []  Microbiology  []  Radiology  []  EKG/Telemetry   []  Cardiology/Vascular   []  Pathology  []  Old records  []  Other:  Most notable findings include:       Assessment & Plan   Assessment / Plan     Brief Patient Summary:  Piper Cain is a 43 y.o. female who   Acute on chronic pancreatitis  History of pancreatic pseudocyst- smaller than previously  Splenic lesion possble laceration    Active Hospital Problems:  Active Hospital Problems    Diagnosis     **Acute on chronic pancreatitis      Plan: agree with GUT rest, possible try feeding tomorrow if improved.   Patient has appointment with her pain management on 1/8/24 hopefully she can keep that appointment.  If things do not improve, may consider nasoenteritic bypass feedings in the future.       Gilberto Soria MD

## 2024-01-05 NOTE — ED NOTES
Nursing report ED to floor  Piper Cain  43 y.o.  female    HPI :   Chief Complaint   Patient presents with    Abdominal Pain    Nausea    Vomiting   Piper Cain is a 43 y.o. female with a PMH significant for alcohol induced pancreatitis, acute pyelonephritis, perinephric abscess, splenic vein thrombosis who presents to the ED c/o intense aching abdominal pain to the left-sided abdomen that has been progressing over the past 3 days.  She has a strong history of chronic pancreatitis but states that the symptoms feel somewhat different.  She has not attempted to alleviate symptoms at home prior to arrival with medications.  No obvious provocative activity.  She has nausea with no vomiting.  Denies changes to bowel habits.  She has recently been treated with Bactrim for urinary tract infection but believes her symptoms of UTI are coming back.  Admits to some associated dysuria.     MEDICAL RECORD REVIEW:   Upon review of the medical record it appears the patient was evaluated in the office at an urgent care center for acute pancreatitis on 5/20/2023.  The patient had a normal lactic acid on 9/6/2023 and a normal INR on 9/6/2023.     PAST MEDICAL HISTORY       Active Ambulatory Problems     Diagnosis Date Noted    Alcohol-induced acute pancreatitis without infection or necrosis 09/28/2017    Alcohol abuse 09/28/2017    Elevated LFTs 09/28/2017    Thrombocytopenia 10/02/2017    Epigastric pain 08/07/2022    Abnormal CT of the abdomen 08/07/2022    Chronic pancreatitis 08/08/2022    Acute pyelonephritis 08/08/2022    Perinephric abscess 08/08/2022    Splenic vein thrombosis 08/08/2022    Anemia of chronic disease 08/08/2022    GERD without esophagitis 08/08/2022    Alcohol dependence in remission 08/08/2022    Pancreatic pseudocyst 08/14/2022    Candida esophagitis 08/14/2022    Ureterolithiasis 10/20/2022    Left flank pain 10/21/2022    Bacterial vaginosis 10/23/2022    Abdominal pain 11/30/2022    Adrenal  "hemorrhage 03/25/2023    Chronic pancreatitis, unspecified pancreatitis type 06/08/2023    Macrocytosis 06/08/2023    Left-sided chest pain 06/08/2023    Tobacco abuse 06/08/2023    Acute on chronic pancreatitis 08/23/2023    Alcohol abuse 08/23/2023    Acute UTI (urinary tract infection) 08/23/2023    Essential hypertension 08/23/2023    Migraine 08/23/2023    Generalized anxiety disorder 08/23/2023    Pancreatic pseudocyst 08/23/2023    Acute abdominal pain 09/05/2023     Admitting doctor:   Faisal Baird MD    Admitting diagnosis:   The encounter diagnosis was Acute on chronic pancreatitis.    Code status:   Current Code Status       Date Active Code Status Order ID Comments User Context       Prior        Allergies:   Nickel    Isolation:   No active isolations    Intake and Output  No intake or output data in the 24 hours ending 01/04/24 2153    Weight:       01/04/24  1826   Weight: 54.4 kg (120 lb)     Most recent vitals:   Vitals:    01/04/24 1826 01/04/24 1851 01/04/24 2013 01/04/24 2120   BP:  126/88 117/90 121/87   BP Location:   Left arm Left arm   Patient Position:   Lying Lying   Pulse:  108 111 103   Resp:   18 16   Temp:       TempSrc:       SpO2:   99% 94%   Weight: 54.4 kg (120 lb)      Height: 177.8 cm (70\")        Active LDAs/IV Access:   Lines, Drains & Airways       Active LDAs       Name Placement date Placement time Site Days    Peripheral IV 01/04/24 2004 Anterior;Proximal;Right Forearm 01/04/24 2004  Forearm  less than 1                Labs (abnormal labs have a star):   Labs Reviewed   COMPREHENSIVE METABOLIC PANEL - Abnormal; Notable for the following components:       Result Value    Glucose 115 (*)     Sodium 133 (*)     Potassium 3.4 (*)     Chloride 94 (*)     All other components within normal limits    Narrative:     GFR Normal >60  Chronic Kidney Disease <60  Kidney Failure <15     LIPASE - Abnormal; Notable for the following components:    Lipase 187 (*)     All other " components within normal limits   CBC WITH AUTO DIFFERENTIAL - Abnormal; Notable for the following components:    .4 (*)     MCH 35.4 (*)     Lymphocyte % 16.1 (*)     All other components within normal limits   URINALYSIS W/ MICROSCOPIC IF INDICATED (NO CULTURE) - Normal    Narrative:     Urine microscopic not indicated.   RAINBOW DRAW    Narrative:     The following orders were created for panel order [place] Draw.  Procedure                               Abnormality         Status                     ---------                               -----------         ------                     Green Top (Gel)[[id number]]                                  Final result               Lavender Top[[id number]]                                     Final result               Gold Top - SST[[id number]]                                   Final result               Light Blue Top[[id number]]                                   Final result                 Please view results for these tests on the individual orders.   TOXICOLOGY SCREEN, SERUM   URINE DRUG SCREEN   CBC AND DIFFERENTIAL    Narrative:     The following orders were created for panel order CBC & Differential.  Procedure                               Abnormality         Status                     ---------                               -----------         ------                     CBC Auto Differential[[id number]]        Abnormal            Final result                 Please view results for these tests on the individual orders.   GREEN TOP   LAVENDER TOP   GOLD TOP - SST   LIGHT BLUE TOP     EKG:   No orders to display       Meds given in ED:   Medications   sodium chloride 0.9 % flush 10 mL (has no administration in time range)   Magnesium Standard Dose Replacement - Follow Nurse / BPA Driven Protocol (has no administration in time range)   thiamine (B-1) injection 200 mg (has no administration in time range)     Followed by   thiamine (VITAMIN B-1) tablet 100 mg (has  no administration in time range)   folic acid (FOLVITE) tablet 1 mg (has no administration in time range)   LORazepam (ATIVAN) tablet 1 mg (has no administration in time range)     Or   midazolam (VERSED) injection 2 mg (has no administration in time range)     Or   LORazepam (ATIVAN) tablet 2 mg (has no administration in time range)     Or   midazolam (VERSED) injection 4 mg (has no administration in time range)     Or   midazolam (VERSED) injection 4 mg (has no administration in time range)     Or   midazolam (VERSED) injection 4 mg (has no administration in time range)   lactated ringers bolus 1,088 mL (has no administration in time range)   lactated ringers infusion (has no administration in time range)   nitroglycerin (NITROSTAT) SL tablet 0.4 mg (has no administration in time range)   morphine injection 4 mg (4 mg Intravenous Given 1/4/24 2015)   ondansetron (ZOFRAN) injection 4 mg (4 mg Intravenous Given 1/4/24 2014)   iopamidol (ISOVUE-300) 61 % injection 100 mL (85 mL Intravenous Given 1/4/24 2020)   HYDROmorphone (DILAUDID) injection 1 mg (1 mg Intravenous Given 1/4/24 2137)     Imaging results:  CT Abdomen Pelvis With Contrast    Result Date: 1/4/2024  1. Acute on chronic pancreatitis with new peripancreatic inflammation and stranding particularly along the pancreatic body and tail and also extending to the left abdomen. Pancreatic ductal dilatation is mildly increased. 2. New linear low density within the spleen suspicious for a small splenic laceration. The volume of fluid adjacent to the spleen is similar to the prior exam, though if there are clinical signs of hemorrhage, recommend short interval follow-up CT to assure stability. There is increased ascites with free fluid extending into the pelvis when compared to the previous study. 3. The previously demonstrated pancreatic pseudocyst in the region of the pancreatic tail has decreased in size compared to the previous exam.  Radiation dose reduction  techniques were utilized, including automated exposure control and exposure modulation based on body size.   This report was finalized on 1/4/2024 9:00 PM by Dr. Angel Mcleod M.D on Workstation: [employer]       Ambulatory status:   - ad valeria    Social issues:   Social History     Socioeconomic History    Marital status: Single   Tobacco Use    Smoking status: Every Day     Packs/day: 0.50     Years: 26.00     Additional pack years: 0.00     Total pack years: 13.00     Types: Cigarettes     Start date: 1/28/1996    Smokeless tobacco: Never   Vaping Use    Vaping Use: Never used   Substance and Sexual Activity    Alcohol use: Not Currently    Drug use: Never    Sexual activity: Defer     NIH Stroke Scale:     Cassidy Sierra RN  01/04/24 21:53 EST     [id number] with questions

## 2024-01-05 NOTE — ED PROVIDER NOTES
MD ATTESTATION NOTE    The EVA and I have discussed this patient's history, physical exam, and treatment plan.  I have reviewed the documentation and personally had a face to face interaction with the patient. I affirm the documentation and agree with the treatment and plan.  The attached note describes my personal findings.      I provided a substantive portion of the care of the patient.  I personally performed the physical exam in its entirety, and below are my findings.      Brief HPI: Patient complains of upper and left-sided abdominal pain for the past 3 days.  She reports associated nausea.  Denies fever, vomiting, or flank pain.  She also complains of dysuria.  She has history of chronic pancreatitis.    PHYSICAL EXAM  ED Triage Vitals   Temp Heart Rate Resp BP SpO2   01/04/24 1738 01/04/24 1738 01/04/24 1738 01/04/24 1851 01/04/24 1738   99.2 °F (37.3 °C) (!) 135 16 126/88 100 %      Temp src Heart Rate Source Patient Position BP Location FiO2 (%)   01/04/24 1738 01/04/24 1738 -- -- --   Tympanic Monitor            GENERAL: Awake, alert, oriented x 3.  Well-developed, well-nourished and nontoxic-appearing female.  Resting comfortably in no acute distress  HENT: nares patent  EYES: no scleral icterus  CV: regular rhythm, tachycardic  RESPIRATORY: normal effort, clear to auscultation bilaterally  ABDOMEN: soft, there is epigastric and left-sided abdominal tenderness without rebound or guarding, no CVA tenderness  MUSCULOSKELETAL: no deformity  NEURO: alert, moves all extremities, follows commands  PSYCH:  calm, cooperative  SKIN: warm, dry    Vital signs and nursing notes reviewed.        Plan: Obtain labs and CT abdomen/pelvis.  Patient will be given IV morphine.    CT scan showed acute on chronic pancreatitis.  Lipase is elevated.  LFTs and white blood cell count are normal.  Patient will be admitted.    ED Course as of 01/04/24 2240   Thu Jan 04, 2024 1851 WBC: 9.11 [DC]   1851 Heart Rate(!): 135 [DC]    1904 Lipase(!): 187 [DC]   2100 Per the radiologist, CT scan shows acute on chronic pancreatitis with new peripancreatic inflammation and stranding.  Pancreatic ductal dilatation is mildly increased.  There is a new linear opacity within the spleen suspicious for small splenic laceration.  Volume of fluid adjacent to the spleen is similar to prior exam.  See dictated report for details. [WH]   2150 Discussed with Viviana Mo, [facility] EVA, who accepts patient for admission under Dr. Baird. [DC-2]      ED Course User Index  [DC] Dany Camacho PA  [DC-2] Domitila Hernandez PA  [WH] Juan Fallon MD Holland, William D, MD  01/04/24 4809

## 2024-01-05 NOTE — PLAN OF CARE
Goal Outcome Evaluation:  Plan of Care Reviewed With: patient        Progress: improving  Outcome Evaluation: Patient with c/o pain in her left abdomen due to chronic pancreatitis. Patient states that she has a long history of GI issues. Patient states that she was drinking 2 shots up until 4 days ago. Patient denies any previous withdrawal or rehab. Patient b/p 100-110's/70-80's HR 's. Patient NPO with only sips. Patient LR infusing at 81.6ml/hr. Will continue to monitor and await discharge plans.

## 2024-01-06 LAB
ALBUMIN SERPL-MCNC: 3.2 G/DL (ref 3.5–5.2)
ALBUMIN/GLOB SERPL: 1.3 G/DL
ALP SERPL-CCNC: 70 U/L (ref 39–117)
ALT SERPL W P-5'-P-CCNC: 8 U/L (ref 1–33)
ANION GAP SERPL CALCULATED.3IONS-SCNC: 8.8 MMOL/L (ref 5–15)
AST SERPL-CCNC: 7 U/L (ref 1–32)
BASOPHILS # BLD AUTO: 0.03 10*3/MM3 (ref 0–0.2)
BASOPHILS # BLD AUTO: 0.04 10*3/MM3 (ref 0–0.2)
BASOPHILS NFR BLD AUTO: 0.5 % (ref 0–1.5)
BASOPHILS NFR BLD AUTO: 0.7 % (ref 0–1.5)
BILIRUB SERPL-MCNC: <0.2 MG/DL (ref 0–1.2)
BUN SERPL-MCNC: 2 MG/DL (ref 6–20)
BUN/CREAT SERPL: 4.8 (ref 7–25)
CALCIUM SPEC-SCNC: 8.4 MG/DL (ref 8.6–10.5)
CHLORIDE SERPL-SCNC: 105 MMOL/L (ref 98–107)
CO2 SERPL-SCNC: 27.2 MMOL/L (ref 22–29)
CREAT SERPL-MCNC: 0.42 MG/DL (ref 0.57–1)
DEPRECATED RDW RBC AUTO: 46.6 FL (ref 37–54)
DEPRECATED RDW RBC AUTO: 47.7 FL (ref 37–54)
EGFRCR SERPLBLD CKD-EPI 2021: 126.2 ML/MIN/1.73
EOSINOPHIL # BLD AUTO: 0.25 10*3/MM3 (ref 0–0.4)
EOSINOPHIL # BLD AUTO: 0.26 10*3/MM3 (ref 0–0.4)
EOSINOPHIL NFR BLD AUTO: 4.4 % (ref 0.3–6.2)
EOSINOPHIL NFR BLD AUTO: 4.6 % (ref 0.3–6.2)
ERYTHROCYTE [DISTWIDTH] IN BLOOD BY AUTOMATED COUNT: 12.8 % (ref 12.3–15.4)
ERYTHROCYTE [DISTWIDTH] IN BLOOD BY AUTOMATED COUNT: 12.9 % (ref 12.3–15.4)
GLOBULIN UR ELPH-MCNC: 2.4 GM/DL
GLUCOSE SERPL-MCNC: 85 MG/DL (ref 65–99)
HCG SERPL QL: NEGATIVE
HCT VFR BLD AUTO: 29.8 % (ref 34–46.6)
HCT VFR BLD AUTO: 31.4 % (ref 34–46.6)
HGB BLD-MCNC: 10.2 G/DL (ref 12–15.9)
HGB BLD-MCNC: 9.7 G/DL (ref 12–15.9)
IMM GRANULOCYTES # BLD AUTO: 0.02 10*3/MM3 (ref 0–0.05)
IMM GRANULOCYTES # BLD AUTO: 0.02 10*3/MM3 (ref 0–0.05)
IMM GRANULOCYTES NFR BLD AUTO: 0.3 % (ref 0–0.5)
IMM GRANULOCYTES NFR BLD AUTO: 0.4 % (ref 0–0.5)
LYMPHOCYTES # BLD AUTO: 1.98 10*3/MM3 (ref 0.7–3.1)
LYMPHOCYTES # BLD AUTO: 1.98 10*3/MM3 (ref 0.7–3.1)
LYMPHOCYTES NFR BLD AUTO: 34.5 % (ref 19.6–45.3)
LYMPHOCYTES NFR BLD AUTO: 34.7 % (ref 19.6–45.3)
MCH RBC QN AUTO: 32.9 PG (ref 26.6–33)
MCH RBC QN AUTO: 33 PG (ref 26.6–33)
MCHC RBC AUTO-ENTMCNC: 32.5 G/DL (ref 31.5–35.7)
MCHC RBC AUTO-ENTMCNC: 32.6 G/DL (ref 31.5–35.7)
MCV RBC AUTO: 101 FL (ref 79–97)
MCV RBC AUTO: 101.6 FL (ref 79–97)
MONOCYTES # BLD AUTO: 0.66 10*3/MM3 (ref 0.1–0.9)
MONOCYTES # BLD AUTO: 0.73 10*3/MM3 (ref 0.1–0.9)
MONOCYTES NFR BLD AUTO: 11.5 % (ref 5–12)
MONOCYTES NFR BLD AUTO: 12.8 % (ref 5–12)
NEUTROPHILS NFR BLD AUTO: 2.67 10*3/MM3 (ref 1.7–7)
NEUTROPHILS NFR BLD AUTO: 2.8 10*3/MM3 (ref 1.7–7)
NEUTROPHILS NFR BLD AUTO: 46.8 % (ref 42.7–76)
NEUTROPHILS NFR BLD AUTO: 48.8 % (ref 42.7–76)
NRBC BLD AUTO-RTO: 0 /100 WBC (ref 0–0.2)
NRBC BLD AUTO-RTO: 0 /100 WBC (ref 0–0.2)
PLATELET # BLD AUTO: 188 10*3/MM3 (ref 140–450)
PLATELET # BLD AUTO: 206 10*3/MM3 (ref 140–450)
PMV BLD AUTO: 10.1 FL (ref 6–12)
PMV BLD AUTO: 9.6 FL (ref 6–12)
POTASSIUM SERPL-SCNC: 3.8 MMOL/L (ref 3.5–5.2)
PROT SERPL-MCNC: 5.6 G/DL (ref 6–8.5)
RBC # BLD AUTO: 2.95 10*6/MM3 (ref 3.77–5.28)
RBC # BLD AUTO: 3.09 10*6/MM3 (ref 3.77–5.28)
SODIUM SERPL-SCNC: 141 MMOL/L (ref 136–145)
WBC NRBC COR # BLD AUTO: 5.7 10*3/MM3 (ref 3.4–10.8)
WBC NRBC COR # BLD AUTO: 5.74 10*3/MM3 (ref 3.4–10.8)

## 2024-01-06 PROCEDURE — 84703 CHORIONIC GONADOTROPIN ASSAY: CPT | Performed by: HOSPITALIST

## 2024-01-06 PROCEDURE — 25010000002 HYDROMORPHONE 1 MG/ML SOLUTION: Performed by: HOSPITALIST

## 2024-01-06 PROCEDURE — 25010000002 THIAMINE PER 100 MG: Performed by: NURSE PRACTITIONER

## 2024-01-06 PROCEDURE — 85025 COMPLETE CBC W/AUTO DIFF WBC: CPT | Performed by: HOSPITALIST

## 2024-01-06 PROCEDURE — 85025 COMPLETE CBC W/AUTO DIFF WBC: CPT | Performed by: INTERNAL MEDICINE

## 2024-01-06 PROCEDURE — G0378 HOSPITAL OBSERVATION PER HR: HCPCS

## 2024-01-06 PROCEDURE — 80053 COMPREHEN METABOLIC PANEL: CPT | Performed by: INTERNAL MEDICINE

## 2024-01-06 PROCEDURE — 25010000002 ONDANSETRON PER 1 MG: Performed by: INTERNAL MEDICINE

## 2024-01-06 PROCEDURE — 99232 SBSQ HOSP IP/OBS MODERATE 35: CPT | Performed by: INTERNAL MEDICINE

## 2024-01-06 PROCEDURE — 25810000003 LACTATED RINGERS PER 1000 ML: Performed by: NURSE PRACTITIONER

## 2024-01-06 PROCEDURE — 25010000002 HYDROMORPHONE 1 MG/ML SOLUTION: Performed by: INTERNAL MEDICINE

## 2024-01-06 RX ADMIN — ONDANSETRON HYDROCHLORIDE 4 MG: 2 INJECTION, SOLUTION INTRAMUSCULAR; INTRAVENOUS at 05:56

## 2024-01-06 RX ADMIN — AMITRIPTYLINE HYDROCHLORIDE 50 MG: 50 TABLET, FILM COATED ORAL at 21:38

## 2024-01-06 RX ADMIN — HYDROMORPHONE HYDROCHLORIDE 1 MG: 1 INJECTION, SOLUTION INTRAMUSCULAR; INTRAVENOUS; SUBCUTANEOUS at 10:23

## 2024-01-06 RX ADMIN — Medication 10 ML: at 08:27

## 2024-01-06 RX ADMIN — Medication 10 ML: at 21:34

## 2024-01-06 RX ADMIN — HYDROMORPHONE HYDROCHLORIDE 1 MG: 1 INJECTION, SOLUTION INTRAMUSCULAR; INTRAVENOUS; SUBCUTANEOUS at 01:57

## 2024-01-06 RX ADMIN — THIAMINE HYDROCHLORIDE 200 MG: 100 INJECTION, SOLUTION INTRAMUSCULAR; INTRAVENOUS at 21:36

## 2024-01-06 RX ADMIN — OXYCODONE AND ACETAMINOPHEN 1 TABLET: 10; 325 TABLET ORAL at 03:56

## 2024-01-06 RX ADMIN — FOLIC ACID 1 MG: 1 TABLET ORAL at 08:27

## 2024-01-06 RX ADMIN — OXYCODONE AND ACETAMINOPHEN 1 TABLET: 10; 325 TABLET ORAL at 23:06

## 2024-01-06 RX ADMIN — OXYCODONE AND ACETAMINOPHEN 1 TABLET: 10; 325 TABLET ORAL at 17:35

## 2024-01-06 RX ADMIN — HYDROMORPHONE HYDROCHLORIDE 1 MG: 1 INJECTION, SOLUTION INTRAMUSCULAR; INTRAVENOUS; SUBCUTANEOUS at 05:56

## 2024-01-06 RX ADMIN — SODIUM CHLORIDE, POTASSIUM CHLORIDE, SODIUM LACTATE AND CALCIUM CHLORIDE 1.5 ML/KG/HR: 600; 310; 30; 20 INJECTION, SOLUTION INTRAVENOUS at 14:52

## 2024-01-06 RX ADMIN — HYDROMORPHONE HYDROCHLORIDE 1 MG: 1 INJECTION, SOLUTION INTRAMUSCULAR; INTRAVENOUS; SUBCUTANEOUS at 14:45

## 2024-01-06 RX ADMIN — OXYCODONE AND ACETAMINOPHEN 1 TABLET: 10; 325 TABLET ORAL at 08:26

## 2024-01-06 RX ADMIN — SODIUM CHLORIDE, POTASSIUM CHLORIDE, SODIUM LACTATE AND CALCIUM CHLORIDE 1.5 ML/KG/HR: 600; 310; 30; 20 INJECTION, SOLUTION INTRAVENOUS at 01:57

## 2024-01-06 RX ADMIN — OXYCODONE AND ACETAMINOPHEN 1 TABLET: 10; 325 TABLET ORAL at 12:33

## 2024-01-06 RX ADMIN — ONDANSETRON HYDROCHLORIDE 4 MG: 2 INJECTION, SOLUTION INTRAMUSCULAR; INTRAVENOUS at 14:45

## 2024-01-06 RX ADMIN — OXYCODONE AND ACETAMINOPHEN 1 TABLET: 10; 325 TABLET ORAL at 00:03

## 2024-01-06 RX ADMIN — HYDROMORPHONE HYDROCHLORIDE 1 MG: 1 INJECTION, SOLUTION INTRAMUSCULAR; INTRAVENOUS; SUBCUTANEOUS at 18:48

## 2024-01-06 RX ADMIN — THIAMINE HYDROCHLORIDE 200 MG: 100 INJECTION, SOLUTION INTRAMUSCULAR; INTRAVENOUS at 14:45

## 2024-01-06 RX ADMIN — THIAMINE HYDROCHLORIDE 200 MG: 100 INJECTION, SOLUTION INTRAMUSCULAR; INTRAVENOUS at 05:56

## 2024-01-06 RX ADMIN — SENNOSIDES AND DOCUSATE SODIUM 2 TABLET: 50; 8.6 TABLET ORAL at 08:28

## 2024-01-06 RX ADMIN — ONDANSETRON HYDROCHLORIDE 4 MG: 2 INJECTION, SOLUTION INTRAMUSCULAR; INTRAVENOUS at 23:06

## 2024-01-06 NOTE — PLAN OF CARE
Goal Outcome Evaluation:   Pt alert and oriented and follows commands, pain meds given every 2 hrs. Tolerating full liquid diet vitals stable will cont to monitor

## 2024-01-06 NOTE — PROGRESS NOTES
Gastroenterology   Inpatient Progress Note    Reason for Follow Up: Pancreatitis    Subjective     Interval History:   Patient feels as though her abdominal pain is somewhat improved today.  She is wanting to advance her diet.    Current Facility-Administered Medications:     amitriptyline (ELAVIL) tablet 50 mg, 50 mg, Oral, Nightly, Faisal Baird MD, 50 mg at 01/05/24 2209    sennosides-docusate (PERICOLACE) 8.6-50 MG per tablet 2 tablet, 2 tablet, Oral, BID, 2 tablet at 01/06/24 0828 **AND** polyethylene glycol (MIRALAX) packet 17 g, 17 g, Oral, Daily PRN **AND** bisacodyl (DULCOLAX) EC tablet 5 mg, 5 mg, Oral, Daily PRN **AND** bisacodyl (DULCOLAX) suppository 10 mg, 10 mg, Rectal, Daily PRN, Faisal Baird MD    folic acid (FOLVITE) tablet 1 mg, 1 mg, Oral, Daily, Viviana Mo APRN, 1 mg at 01/06/24 0827    HYDROmorphone (DILAUDID) injection 1 mg, 1 mg, Intravenous, Q4H PRN, Faisal Baird MD, 1 mg at 01/06/24 1023    lactated ringers infusion, 1.5 mL/kg/hr, Intravenous, Continuous, Viviana Mo, APRN, Last Rate: 81.6 mL/hr at 01/06/24 0157, 1.5 mL/kg/hr at 01/06/24 0157    LORazepam (ATIVAN) tablet 1 mg, 1 mg, Oral, Q1H PRN **OR** midazolam (VERSED) injection 2 mg, 2 mg, Intravenous, Q1H PRN **OR** LORazepam (ATIVAN) tablet 2 mg, 2 mg, Oral, Q1H PRN **OR** midazolam (VERSED) injection 4 mg, 4 mg, Intravenous, Q1H PRN **OR** midazolam (VERSED) injection 4 mg, 4 mg, Intravenous, Q15 Min PRN **OR** midazolam (VERSED) injection 4 mg, 4 mg, Intramuscular, Q15 Min PRN, Viviana Mo, APRN    Magnesium Standard Dose Replacement - Follow Nurse / BPA Driven Protocol, , Does not apply, Chepe DE LOS SANTOS Karyn Michele, APRN    ondansetron (ZOFRAN) injection 4 mg, 4 mg, Intravenous, Q6H PRN, Faisal Baird MD, 4 mg at 01/06/24 0532    oxyCODONE-acetaminophen (PERCOCET)  MG per tablet 1 tablet, 1 tablet, Oral, Q4H PRN, Faisal Baird MD, 1 tablet at 01/06/24 0821     pancrelipase (Lip-Prot-Amyl) (CREON) capsule 12,000 units of lipase, 12,000 units of lipase, Oral, TID With Meals, Faisal Baird MD    sodium chloride 0.9 % flush 10 mL, 10 mL, Intravenous, PRN, Dany Camacho PA    sodium chloride 0.9 % flush 10 mL, 10 mL, Intravenous, Q12H, Faisal Baird MD, 10 mL at 01/06/24 0827    sodium chloride 0.9 % flush 10 mL, 10 mL, Intravenous, PRN, Faisal Baird MD    sodium chloride 0.9 % infusion 40 mL, 40 mL, Intravenous, PRN, Faisal Baird MD    thiamine (B-1) injection 200 mg, 200 mg, Intravenous, Q8H, 200 mg at 01/06/24 0556 **FOLLOWED BY** [START ON 1/10/2024] thiamine (VITAMIN B-1) tablet 100 mg, 100 mg, Oral, Daily, Viviana Mo, SOL  Review of Systems:    All systems were reviewed and negative except for:  Gastrointestinal: positive for  pain    Objective     Vital Signs  Temp:  [98.5 °F (36.9 °C)-98.7 °F (37.1 °C)] 98.6 °F (37 °C)  Heart Rate:  [81-94] 85  Resp:  [16-19] 19  BP: (107-120)/(76-96) 110/89  Body mass index is 18.21 kg/m².    Intake/Output Summary (Last 24 hours) at 1/6/2024 1151  Last data filed at 1/6/2024 0730  Gross per 24 hour   Intake 290 ml   Output 2780 ml   Net -2490 ml     I/O this shift:  In: 120 [P.O.:120]  Out: 200 [Urine:200]     Physical Exam:   General: patient awake, alert and cooperative   Eyes: no scleral icterus   Skin: warm and dry, not jaundiced   Abdomen: soft, nontender, nondistended; normal bowel sounds, no masses palpated, no periumbical lymphadenopathy   Psychiatric: Appropriate affect and behavior     Results Review:     I reviewed the patient's new clinical results.    Results from last 7 days   Lab Units 01/06/24  0358 01/04/24  1836   WBC 10*3/mm3 5.70 9.11   HEMOGLOBIN g/dL 9.7* 13.6   HEMATOCRIT % 29.8* 39.7   PLATELETS 10*3/mm3 188 214     Results from last 7 days   Lab Units 01/06/24  0358 01/04/24  1836   SODIUM mmol/L 141 133*   POTASSIUM mmol/L 3.8 3.4*   CHLORIDE mmol/L 105 94*   CO2 mmol/L 27.2  26.0   BUN mg/dL 2* 6   CREATININE mg/dL 0.42* 0.69   CALCIUM mg/dL 8.4* 10.1   BILIRUBIN mg/dL <0.2 0.4   ALK PHOS U/L 70 98   ALT (SGPT) U/L 8 9   AST (SGOT) U/L 7 9   GLUCOSE mg/dL 85 115*         Lab Results   Lab Value Date/Time    LIPASE 187 (H) 01/04/2024 1836    LIPASE 99 (H) 09/07/2023 0615    LIPASE 232 (H) 09/05/2023 2205    LIPASE 64 (H) 08/25/2023 0425    LIPASE 75 (H) 08/24/2023 0324    LIPASE 87 (H) 08/23/2023 0252    LIPASE 115 (H) 08/22/2023 1301    LIPASE 73 (H) 06/08/2023 0652    LIPASE 151 (H) 06/07/2023 2246    LIPASE 192 (H) 03/25/2023 1700    LIPASE 38 12/05/2022 0526    LIPASE 34 12/04/2022 0542    LIPASE 29 12/03/2022 0515    LIPASE 50 12/02/2022 0719    LIPASE 29 12/01/2022 0519    LIPASE 65 (H) 11/30/2022 1412    LIPASE 157 (H) 11/28/2022 1751    LIPASE 335 (H) 10/20/2022 1939    LIPASE 378 (H) 08/06/2022 2158    LIPASE 95 (H) 01/24/2018 0545    LIPASE 93 (H) 10/01/2017 0356    LIPASE 122 (H) 09/30/2017 0619    LIPASE 86 (H) 09/29/2017 1731    LIPASE 133 (H) 09/28/2017 0032       Radiology:  CT Abdomen Pelvis With Contrast   Final Result   1. Acute on chronic pancreatitis with new peripancreatic inflammation   and stranding particularly along the pancreatic body and tail and also   extending to the left abdomen. Pancreatic ductal dilatation is mildly   increased.   2. New linear low density within the spleen suspicious for a small   splenic laceration. The volume of fluid adjacent to the spleen is   similar to the prior exam, though if there are clinical signs of   hemorrhage, recommend short interval follow-up CT to assure stability.   There is increased ascites with free fluid extending into the pelvis   when compared to the previous study.   3. The previously demonstrated pancreatic pseudocyst in the region of   the pancreatic tail has decreased in size compared to the previous exam.       Radiation dose reduction techniques were utilized, including automated   exposure control and  exposure modulation based on body size.           This report was finalized on 1/4/2024 9:00 PM by Dr. Angel Mcleod M.D on Workstation: [id number]              Assessment & Plan     Active Hospital Problems    Diagnosis     **Acute on chronic pancreatitis     Acute abdominal pain     Pancreatic pseudocyst     Adrenal hemorrhage     Alcohol dependence in remission      These problems are new to me  Assessment:  Acute on chronic pancreatitis with history of pancreatic pseudocyst status post cystogastrostomy  Questionable pseudocyst fluid around the spleen.  CT suggest possible splenic laceration but no history of trauma or other risk factors for this.  No evidence of hemoperitoneum.  Chronic pain.  Patient has an appointment with pain management on Monday      Plan:  Agree with bowel rest and supportive care  Will try to advance diet slowly.  Will hopefully be able to keep her appointment with pain management.  Eventually, patient will need outpatient follow-up with Dr. Denson for continued management of her pancreatic pseudocyst though it appears smaller on CT imaging.  I discussed the patients findings and my recommendations with patient, nursing staff, and primary care team.    MD Terrell López M.D.  [facility] [address] [address]  Office: [phone]

## 2024-01-06 NOTE — PROGRESS NOTES
[facility]     LOS: 0 days     Subjective:    CC:Abdominal Pain, Nausea, and Vomiting    DIET:  Diet Order   Procedures    Diet: Liquid Diets; Full Liquid; Fluid Consistency: Thin (IDDSI 0)   Still with left sided abd pain, mild to moderate    Objective:    Vital Signs:  Temp:  [97.8 °F (36.6 °C)-98.7 °F (37.1 °C)] 98.5 °F (36.9 °C)  Heart Rate:  [71-93] 71  Resp:  [16-19] 19  BP: (109-120)/(77-96) 113/83    SpO2:  [98 %-100 %] 100 %  on   ;   Device (Oxygen Therapy): room air  Body mass index is 18.21 kg/m².    Physical Exam  Constitutional:       Appearance: Normal appearance.   HENT:      Head: Normocephalic and atraumatic.   Cardiovascular:      Heart sounds: No murmur heard.     No friction rub.   Pulmonary:      Effort: Pulmonary effort is normal.      Breath sounds: Normal breath sounds.   Abdominal:      General: Bowel sounds are normal. There is no distension.      Palpations: Abdomen is soft.      Tenderness: There is abdominal tenderness.   Skin:     General: Skin is warm and dry.   Neurological:      Mental Status: She is alert.   Psychiatric:         Mood and Affect: Mood normal.         Results Review:    Glucose   Date Value Ref Range Status   01/06/2024 85 65 - 99 mg/dL Final   01/04/2024 115 (H) 65 - 99 mg/dL Final     Results from last 7 days   Lab Units 01/06/24  0358   WBC 10*3/mm3 5.70   HEMOGLOBIN g/dL 9.7*   HEMATOCRIT % 29.8*   PLATELETS 10*3/mm3 188     Results from last 7 days   Lab Units 01/06/24  0358   SODIUM mmol/L 141   POTASSIUM mmol/L 3.8   CHLORIDE mmol/L 105   CO2 mmol/L 27.2   BUN mg/dL 2*   CREATININE mg/dL 0.42*   CALCIUM mg/dL 8.4*   BILIRUBIN mg/dL <0.2   ALK PHOS U/L 70   ALT (SGPT) U/L 8   AST (SGOT) U/L 7   GLUCOSE mg/dL 85                 Cultures:  No results found for: \"BLOODCX\", \"URINECX\", \"WOUNDCX\", \"MRSACX\", \"RESPCX\", \"STOOLCX\"    I have reviewed daily medications and changes in CPOE    Scheduled meds  amitriptyline, 50 mg, Oral, " Nightly  folic acid, 1 mg, Oral, Daily  pancrelipase (Lip-Prot-Amyl), 12,000 units of lipase, Oral, TID With Meals  senna-docusate sodium, 2 tablet, Oral, BID  sodium chloride, 10 mL, Intravenous, Q12H  thiamine (B-1) IV, 200 mg, Intravenous, Q8H   Followed by  [START ON 1/10/2024] thiamine, 100 mg, Oral, Daily        lactated ringers, 1.5 mL/kg/hr, Last Rate: 1.5 mL/kg/hr (01/06/24 1452)      PRN meds    senna-docusate sodium **AND** polyethylene glycol **AND** bisacodyl **AND** bisacodyl    HYDROmorphone    LORazepam **OR** midazolam **OR** LORazepam **OR** midazolam **OR** midazolam **OR** midazolam    Magnesium Standard Dose Replacement - Follow Nurse / BPA Driven Protocol    ondansetron    oxyCODONE-acetaminophen    sodium chloride    sodium chloride    sodium chloride        Acute on chronic pancreatitis    Alcohol dependence in remission    Adrenal hemorrhage    Pancreatic pseudocyst    Acute abdominal pain        Assessment/Plan:    Acute on chronic pancreatitis  -advance diet  -IV fluids  -Continue pain control.   - Gastroenterology following     History of adrenal hemorrhage, suspected splenic hemorrhage, history of pancreatic pseudocyst, no acute interventions planned at this point of time.  Gastroenterology on board.  -surgery consult, repeat hb, repeat bhcg     Hypokalemia, replacement of electrolytes per protocol.     CODE STATUS is full code.  Further plans based on hospital course.         Blake Everett MD  01/06/24  17:51 EST

## 2024-01-07 LAB
ALBUMIN SERPL-MCNC: 3.2 G/DL (ref 3.5–5.2)
ALBUMIN/GLOB SERPL: 1.1 G/DL
ALP SERPL-CCNC: 66 U/L (ref 39–117)
ALT SERPL W P-5'-P-CCNC: 10 U/L (ref 1–33)
ANION GAP SERPL CALCULATED.3IONS-SCNC: 12.7 MMOL/L (ref 5–15)
AST SERPL-CCNC: 8 U/L (ref 1–32)
BASOPHILS # BLD AUTO: 0.02 10*3/MM3 (ref 0–0.2)
BASOPHILS NFR BLD AUTO: 0.4 % (ref 0–1.5)
BILIRUB SERPL-MCNC: <0.2 MG/DL (ref 0–1.2)
BUN SERPL-MCNC: 5 MG/DL (ref 6–20)
BUN/CREAT SERPL: 11.6 (ref 7–25)
CALCIUM SPEC-SCNC: 8.8 MG/DL (ref 8.6–10.5)
CHLORIDE SERPL-SCNC: 103 MMOL/L (ref 98–107)
CO2 SERPL-SCNC: 24.3 MMOL/L (ref 22–29)
CREAT SERPL-MCNC: 0.43 MG/DL (ref 0.57–1)
DEPRECATED RDW RBC AUTO: 48.4 FL (ref 37–54)
EGFRCR SERPLBLD CKD-EPI 2021: 125.5 ML/MIN/1.73
EOSINOPHIL # BLD AUTO: 0.22 10*3/MM3 (ref 0–0.4)
EOSINOPHIL NFR BLD AUTO: 4.8 % (ref 0.3–6.2)
ERYTHROCYTE [DISTWIDTH] IN BLOOD BY AUTOMATED COUNT: 12.9 % (ref 12.3–15.4)
GLOBULIN UR ELPH-MCNC: 2.9 GM/DL
GLUCOSE SERPL-MCNC: 97 MG/DL (ref 65–99)
HCT VFR BLD AUTO: 30.5 % (ref 34–46.6)
HGB BLD-MCNC: 9.8 G/DL (ref 12–15.9)
IMM GRANULOCYTES # BLD AUTO: 0.01 10*3/MM3 (ref 0–0.05)
IMM GRANULOCYTES NFR BLD AUTO: 0.2 % (ref 0–0.5)
LIPASE SERPL-CCNC: 89 U/L (ref 13–60)
LYMPHOCYTES # BLD AUTO: 1.64 10*3/MM3 (ref 0.7–3.1)
LYMPHOCYTES NFR BLD AUTO: 35.8 % (ref 19.6–45.3)
MCH RBC QN AUTO: 33.1 PG (ref 26.6–33)
MCHC RBC AUTO-ENTMCNC: 32.1 G/DL (ref 31.5–35.7)
MCV RBC AUTO: 103 FL (ref 79–97)
MONOCYTES # BLD AUTO: 0.58 10*3/MM3 (ref 0.1–0.9)
MONOCYTES NFR BLD AUTO: 12.7 % (ref 5–12)
NEUTROPHILS NFR BLD AUTO: 2.11 10*3/MM3 (ref 1.7–7)
NEUTROPHILS NFR BLD AUTO: 46.1 % (ref 42.7–76)
NRBC BLD AUTO-RTO: 0 /100 WBC (ref 0–0.2)
PLATELET # BLD AUTO: 206 10*3/MM3 (ref 140–450)
PMV BLD AUTO: 9.7 FL (ref 6–12)
POTASSIUM SERPL-SCNC: 4.1 MMOL/L (ref 3.5–5.2)
PROT SERPL-MCNC: 6.1 G/DL (ref 6–8.5)
RBC # BLD AUTO: 2.96 10*6/MM3 (ref 3.77–5.28)
SODIUM SERPL-SCNC: 140 MMOL/L (ref 136–145)
WBC NRBC COR # BLD AUTO: 4.58 10*3/MM3 (ref 3.4–10.8)

## 2024-01-07 PROCEDURE — 25810000003 LACTATED RINGERS PER 1000 ML: Performed by: NURSE PRACTITIONER

## 2024-01-07 PROCEDURE — 25010000002 HYDROMORPHONE 1 MG/ML SOLUTION: Performed by: HOSPITALIST

## 2024-01-07 PROCEDURE — 99232 SBSQ HOSP IP/OBS MODERATE 35: CPT | Performed by: INTERNAL MEDICINE

## 2024-01-07 PROCEDURE — 25010000002 THIAMINE HCL 200 MG/2ML SOLUTION: Performed by: NURSE PRACTITIONER

## 2024-01-07 PROCEDURE — 83690 ASSAY OF LIPASE: CPT | Performed by: HOSPITALIST

## 2024-01-07 PROCEDURE — 99222 1ST HOSP IP/OBS MODERATE 55: CPT | Performed by: SURGERY

## 2024-01-07 PROCEDURE — 85025 COMPLETE CBC W/AUTO DIFF WBC: CPT | Performed by: HOSPITALIST

## 2024-01-07 PROCEDURE — 25010000002 HYDROMORPHONE PER 4 MG: Performed by: HOSPITALIST

## 2024-01-07 PROCEDURE — 80053 COMPREHEN METABOLIC PANEL: CPT | Performed by: HOSPITALIST

## 2024-01-07 PROCEDURE — 25010000002 THIAMINE PER 100 MG: Performed by: NURSE PRACTITIONER

## 2024-01-07 RX ORDER — SODIUM CHLORIDE, SODIUM LACTATE, POTASSIUM CHLORIDE, CALCIUM CHLORIDE 600; 310; 30; 20 MG/100ML; MG/100ML; MG/100ML; MG/100ML
75 INJECTION, SOLUTION INTRAVENOUS CONTINUOUS
Status: DISCONTINUED | OUTPATIENT
Start: 2024-01-07 | End: 2024-01-08

## 2024-01-07 RX ORDER — HYDROMORPHONE HYDROCHLORIDE 1 MG/ML
0.5 INJECTION, SOLUTION INTRAMUSCULAR; INTRAVENOUS; SUBCUTANEOUS
Status: DISCONTINUED | OUTPATIENT
Start: 2024-01-07 | End: 2024-01-08

## 2024-01-07 RX ADMIN — SENNOSIDES AND DOCUSATE SODIUM 2 TABLET: 50; 8.6 TABLET ORAL at 21:15

## 2024-01-07 RX ADMIN — FOLIC ACID 1 MG: 1 TABLET ORAL at 08:42

## 2024-01-07 RX ADMIN — SENNOSIDES AND DOCUSATE SODIUM 2 TABLET: 50; 8.6 TABLET ORAL at 08:42

## 2024-01-07 RX ADMIN — SODIUM CHLORIDE, POTASSIUM CHLORIDE, SODIUM LACTATE AND CALCIUM CHLORIDE 1.5 ML/KG/HR: 600; 310; 30; 20 INJECTION, SOLUTION INTRAVENOUS at 03:20

## 2024-01-07 RX ADMIN — THIAMINE HYDROCHLORIDE 200 MG: 100 INJECTION, SOLUTION INTRAMUSCULAR; INTRAVENOUS at 05:25

## 2024-01-07 RX ADMIN — THIAMINE HYDROCHLORIDE 200 MG: 100 INJECTION, SOLUTION INTRAMUSCULAR; INTRAVENOUS at 21:00

## 2024-01-07 RX ADMIN — THIAMINE HYDROCHLORIDE 200 MG: 100 INJECTION, SOLUTION INTRAMUSCULAR; INTRAVENOUS at 17:05

## 2024-01-07 RX ADMIN — HYDROMORPHONE HYDROCHLORIDE 0.5 MG: 1 INJECTION, SOLUTION INTRAMUSCULAR; INTRAVENOUS; SUBCUTANEOUS at 17:05

## 2024-01-07 RX ADMIN — AMITRIPTYLINE HYDROCHLORIDE 50 MG: 50 TABLET, FILM COATED ORAL at 20:59

## 2024-01-07 RX ADMIN — HYDROMORPHONE HYDROCHLORIDE 1 MG: 1 INJECTION, SOLUTION INTRAMUSCULAR; INTRAVENOUS; SUBCUTANEOUS at 05:33

## 2024-01-07 RX ADMIN — HYDROMORPHONE HYDROCHLORIDE 0.5 MG: 1 INJECTION, SOLUTION INTRAMUSCULAR; INTRAVENOUS; SUBCUTANEOUS at 11:08

## 2024-01-07 RX ADMIN — OXYCODONE AND ACETAMINOPHEN 1 TABLET: 10; 325 TABLET ORAL at 13:26

## 2024-01-07 RX ADMIN — OXYCODONE AND ACETAMINOPHEN 1 TABLET: 10; 325 TABLET ORAL at 03:34

## 2024-01-07 RX ADMIN — Medication 10 ML: at 21:16

## 2024-01-07 RX ADMIN — OXYCODONE AND ACETAMINOPHEN 1 TABLET: 10; 325 TABLET ORAL at 08:42

## 2024-01-07 RX ADMIN — Medication 10 ML: at 08:43

## 2024-01-07 RX ADMIN — HYDROMORPHONE HYDROCHLORIDE 1 MG: 1 INJECTION, SOLUTION INTRAMUSCULAR; INTRAVENOUS; SUBCUTANEOUS at 00:59

## 2024-01-07 RX ADMIN — OXYCODONE AND ACETAMINOPHEN 1 TABLET: 10; 325 TABLET ORAL at 21:15

## 2024-01-07 NOTE — PROGRESS NOTES
[facility]     LOS: 0 days     Subjective:    CC:Abdominal Pain, Nausea, and Vomiting    DIET:  Diet Order   Procedures    Diet: Liquid Diets; Full Liquid; Fluid Consistency: Thin (IDDSI 0)   Still with left sided abd pain, mild to moderate    Objective:    Vital Signs:  Temp:  [97.7 °F (36.5 °C)-98.5 °F (36.9 °C)] 97.9 °F (36.6 °C)  Heart Rate:  [71-97] 81  Resp:  [18-19] 18  BP: (107-119)/(77-95) 109/85    SpO2:  [95 %-100 %] 98 %  on   ;   Device (Oxygen Therapy): room air  Body mass index is 18.21 kg/m².    Physical Exam  Constitutional:       Appearance: Normal appearance.   HENT:      Head: Normocephalic and atraumatic.   Cardiovascular:      Heart sounds: No murmur heard.     No friction rub.   Pulmonary:      Effort: Pulmonary effort is normal.      Breath sounds: Normal breath sounds.   Abdominal:      General: Bowel sounds are normal. There is no distension.      Palpations: Abdomen is soft.      Tenderness: There is abdominal tenderness.   Skin:     General: Skin is warm and dry.   Neurological:      Mental Status: She is alert.   Psychiatric:         Mood and Affect: Mood normal.         Results Review:    Glucose   Date Value Ref Range Status   01/06/2024 85 65 - 99 mg/dL Final   01/04/2024 115 (H) 65 - 99 mg/dL Final     Results from last 7 days   Lab Units 01/06/24  1757   WBC 10*3/mm3 5.74   HEMOGLOBIN g/dL 10.2*   HEMATOCRIT % 31.4*   PLATELETS 10*3/mm3 206     Results from last 7 days   Lab Units 01/06/24  0358   SODIUM mmol/L 141   POTASSIUM mmol/L 3.8   CHLORIDE mmol/L 105   CO2 mmol/L 27.2   BUN mg/dL 2*   CREATININE mg/dL 0.42*   CALCIUM mg/dL 8.4*   BILIRUBIN mg/dL <0.2   ALK PHOS U/L 70   ALT (SGPT) U/L 8   AST (SGOT) U/L 7   GLUCOSE mg/dL 85                 Cultures:  No results found for: \"BLOODCX\", \"URINECX\", \"WOUNDCX\", \"MRSACX\", \"RESPCX\", \"STOOLCX\"    I have reviewed daily medications and changes in CPOE    Scheduled meds  amitriptyline, 50 mg, Oral, " Nightly  folic acid, 1 mg, Oral, Daily  senna-docusate sodium, 2 tablet, Oral, BID  sodium chloride, 10 mL, Intravenous, Q12H  thiamine (B-1) IV, 200 mg, Intravenous, Q8H   Followed by  [START ON 1/10/2024] thiamine, 100 mg, Oral, Daily        lactated ringers, 75 mL/hr      PRN meds    senna-docusate sodium **AND** polyethylene glycol **AND** bisacodyl **AND** bisacodyl    HYDROmorphone    LORazepam **OR** midazolam **OR** LORazepam **OR** midazolam **OR** midazolam **OR** midazolam    Magnesium Standard Dose Replacement - Follow Nurse / BPA Driven Protocol    ondansetron    oxyCODONE-acetaminophen    sodium chloride    sodium chloride    sodium chloride        Acute on chronic pancreatitis    Alcohol dependence in remission    Adrenal hemorrhage    Pancreatic pseudocyst    Acute abdominal pain        Assessment/Plan:    Acute on chronic pancreatitis  -advance diet  -IV fluids  -Continue pain control.   -Gastroenterology following     History of adrenal hemorrhage, suspected splenic hemorrhage, history of pancreatic pseudocyst, no acute interventions planned at this point of time.  Gastroenterology on board.  -surgery consult, defer to them about imaging  -hb is stable, pain is better     Hypokalemia, replacement of electrolytes per protocol.     CODE STATUS is full code.  Further plans based on hospital course.         Blake Everett MD  01/07/24  09:35 EST

## 2024-01-07 NOTE — PROGRESS NOTES
Gastroenterology   Inpatient Progress Note    Reason for Follow Up: Pancreatitis    Subjective     Interval History:   Patient states her pain is maybe a little bit better today.  No vomiting.  She has tolerated full liquids.    Current Facility-Administered Medications:     amitriptyline (ELAVIL) tablet 50 mg, 50 mg, Oral, Nightly, Faisal Baird MD, 50 mg at 01/06/24 2138    sennosides-docusate (PERICOLACE) 8.6-50 MG per tablet 2 tablet, 2 tablet, Oral, BID, 2 tablet at 01/07/24 0842 **AND** polyethylene glycol (MIRALAX) packet 17 g, 17 g, Oral, Daily PRN **AND** bisacodyl (DULCOLAX) EC tablet 5 mg, 5 mg, Oral, Daily PRN **AND** bisacodyl (DULCOLAX) suppository 10 mg, 10 mg, Rectal, Daily PRN, Faisal Baird MD    folic acid (FOLVITE) tablet 1 mg, 1 mg, Oral, Daily, Viviana Mo APRN, 1 mg at 01/07/24 0842    HYDROmorphone (DILAUDID) injection 0.5 mg, 0.5 mg, Intravenous, Q3H PRN, Blake Everett MD    lactated ringers infusion, 75 mL/hr, Intravenous, Continuous, Blake Everett MD    LORazepam (ATIVAN) tablet 1 mg, 1 mg, Oral, Q1H PRN **OR** midazolam (VERSED) injection 2 mg, 2 mg, Intravenous, Q1H PRN **OR** LORazepam (ATIVAN) tablet 2 mg, 2 mg, Oral, Q1H PRN **OR** midazolam (VERSED) injection 4 mg, 4 mg, Intravenous, Q1H PRN **OR** midazolam (VERSED) injection 4 mg, 4 mg, Intravenous, Q15 Min PRN **OR** midazolam (VERSED) injection 4 mg, 4 mg, Intramuscular, Q15 Min PRN, Viviana Mo APRN    Magnesium Standard Dose Replacement - Follow Nurse / BPA Driven Protocol, , Does not apply, PRN, Viviana Mo APRN    ondansetron (ZOFRAN) injection 4 mg, 4 mg, Intravenous, Q6H PRN, Faisal Baird MD, 4 mg at 01/06/24 9906    oxyCODONE-acetaminophen (PERCOCET)  MG per tablet 1 tablet, 1 tablet, Oral, Q4H PRN, Faisal Baird MD, 1 tablet at 01/07/24 0842    sodium chloride 0.9 % flush 10 mL, 10 mL, Intravenous, PRN, Dany Camacho, PA    sodium chloride 0.9 % flush  10 mL, 10 mL, Intravenous, Q12H, Faisal Baird MD, 10 mL at 01/07/24 0843    sodium chloride 0.9 % flush 10 mL, 10 mL, Intravenous, PRN, Faisal Baird MD    sodium chloride 0.9 % infusion 40 mL, 40 mL, Intravenous, PRN, Faisal Baird MD    thiamine (B-1) injection 200 mg, 200 mg, Intravenous, Q8H, 200 mg at 01/07/24 0525 **FOLLOWED BY** [START ON 1/10/2024] thiamine (VITAMIN B-1) tablet 100 mg, 100 mg, Oral, Daily, Viviana Mo APRN  Review of Systems:    All systems were reviewed and negative except for:  Gastrointestinal: positive for  pain    Objective     Vital Signs  Temp:  [97.7 °F (36.5 °C)-98.5 °F (36.9 °C)] 97.9 °F (36.6 °C)  Heart Rate:  [71-97] 81  Resp:  [18-19] 18  BP: (107-119)/(77-95) 109/85  Body mass index is 18.21 kg/m².    Intake/Output Summary (Last 24 hours) at 1/7/2024 1108  Last data filed at 1/7/2024 0800  Gross per 24 hour   Intake 900 ml   Output 2450 ml   Net -1550 ml     I/O this shift:  In: -   Out: 525 [Urine:525]     Physical Exam:   General: patient awake, alert and cooperative   Eyes: no scleral icterus   Skin: warm and dry, not jaundiced   Abdomen: soft, nontender, nondistended; normal bowel sounds, no masses palpated, no periumbical lymphadenopathy   Psychiatric: Appropriate affect and behavior     Results Review:     I reviewed the patient's new clinical results.    Results from last 7 days   Lab Units 01/07/24  0926 01/06/24  1757 01/06/24  0358   WBC 10*3/mm3 4.58 5.74 5.70   HEMOGLOBIN g/dL 9.8* 10.2* 9.7*   HEMATOCRIT % 30.5* 31.4* 29.8*   PLATELETS 10*3/mm3 206 206 188     Results from last 7 days   Lab Units 01/07/24  0449 01/06/24  0358 01/04/24  1836   SODIUM mmol/L 140 141 133*   POTASSIUM mmol/L 4.1 3.8 3.4*   CHLORIDE mmol/L 103 105 94*   CO2 mmol/L 24.3 27.2 26.0   BUN mg/dL 5* 2* 6   CREATININE mg/dL 0.43* 0.42* 0.69   CALCIUM mg/dL 8.8 8.4* 10.1   BILIRUBIN mg/dL <0.2 <0.2 0.4   ALK PHOS U/L 66 70 98   ALT (SGPT) U/L 10 8 9   AST (SGOT) U/L 8 7  9   GLUCOSE mg/dL 97 85 115*         Lab Results   Lab Value Date/Time    LIPASE 89 (H) 01/07/2024 0449    LIPASE 187 (H) 01/04/2024 1836    LIPASE 99 (H) 09/07/2023 0615    LIPASE 232 (H) 09/05/2023 2205    LIPASE 64 (H) 08/25/2023 0425    LIPASE 75 (H) 08/24/2023 0324    LIPASE 87 (H) 08/23/2023 0252    LIPASE 115 (H) 08/22/2023 1301    LIPASE 73 (H) 06/08/2023 0652    LIPASE 151 (H) 06/07/2023 2246    LIPASE 192 (H) 03/25/2023 1700    LIPASE 38 12/05/2022 0526    LIPASE 34 12/04/2022 0542    LIPASE 29 12/03/2022 0515    LIPASE 50 12/02/2022 0719    LIPASE 29 12/01/2022 0519    LIPASE 65 (H) 11/30/2022 1412    LIPASE 157 (H) 11/28/2022 1751    LIPASE 335 (H) 10/20/2022 1939    LIPASE 378 (H) 08/06/2022 2158    LIPASE 95 (H) 01/24/2018 0545    LIPASE 93 (H) 10/01/2017 0356    LIPASE 122 (H) 09/30/2017 0619    LIPASE 86 (H) 09/29/2017 1731    LIPASE 133 (H) 09/28/2017 0032       Radiology:  CT Abdomen Pelvis With Contrast   Final Result   1. Acute on chronic pancreatitis with new peripancreatic inflammation   and stranding particularly along the pancreatic body and tail and also   extending to the left abdomen. Pancreatic ductal dilatation is mildly   increased.   2. New linear low density within the spleen suspicious for a small   splenic laceration. The volume of fluid adjacent to the spleen is   similar to the prior exam, though if there are clinical signs of   hemorrhage, recommend short interval follow-up CT to assure stability.   There is increased ascites with free fluid extending into the pelvis   when compared to the previous study.   3. The previously demonstrated pancreatic pseudocyst in the region of   the pancreatic tail has decreased in size compared to the previous exam.       Radiation dose reduction techniques were utilized, including automated   exposure control and exposure modulation based on body size.           This report was finalized on 1/4/2024 9:00 PM by Dr. Angel Mcleod M.D on  Workstation: [id number]              Assessment & Plan     Active Hospital Problems    Diagnosis     **Acute on chronic pancreatitis     Acute abdominal pain     Pancreatic pseudocyst     Adrenal hemorrhage     Alcohol dependence in remission        Assessment:  Acute on chronic pancreatitis.  Symptomatically improving.  Lipase improved.  Pancreatic pseudocyst status post cystogastrostomy with improved appearance of the cyst on CT  Fluid around the spleen with CT suggesting possible splenic laceration versus tracking of fluid from the prior cyst.  No evidence of hemoperitoneum.  Surgery consulted.    Chronic pain secondary to chronic pancreatitis  Hypokalemia.  Resolved.      Plan:  Continue supportive care and advance diet as tolerated  Await surgical consultation regarding changes seen on the spleen on CT but would favor this represents tracking of fluid from her pancreatic pseudocyst versus a splenic laceration or infarction.  Patient will follow-up as an outpatient with Dr. Denson who has done her pancreatic pseudocyst drainage procedures.  Hopefully home in time for her appointment tomorrow with the pain clinic.  I discussed the patients findings and my recommendations with patient and nursing staff.    MD Terrell López M.D.  [facility] [address] [address]  Office: [phone]

## 2024-01-07 NOTE — CONSULTS
General Surgery Consultation    Consulting Physician: Alessandra Marte MD      Reason for consultation: Possible splenic laceration on imaging    CC: LUQ abdominal pain    HPI:   The patient is a very pleasant 43 y.o. female who presented to the hospital with left upper quadrant abdominal pain that she reports is different from her typical pancreatitis pain.  She reports pain is worse with moving, bending and taking deep breaths.  She denies nausea or vomiting.  She denies lightheadedness.  No fevers or chills.  No recent trauma to her abdomen or left side that would explain a splenic laceration.    Past Medical History:    Past Medical History:   Diagnosis Date    Anemia     Anxiety     Constipation     Cyst of spleen     Diarrhea     E. coli bacteremia     ETOH abuse     Frequent UTI     GERD (gastroesophageal reflux disease)     Hiatal hernia     Left flank pain 10/21/2022    Migraine     Miscarriage 2004    Pancreatitis     Pseudocyst of pancreas        Past Surgical History:    Past Surgical History:   Procedure Laterality Date    ENDOSCOPY N/A 08/10/2022    Procedure: ESOPHAGOGASTRODUODENOSCOPY with bxs;  Surgeon: Salvador Price MD;  Location: [facility] ENDOSCOPY;  Service: Gastroenterology;  Laterality: N/A;  Pre: r/o esophageal  varacies, abd pain  Post: esophageal plaque    ENDOSCOPY N/A 10/9/2023    Procedure: ESOPHAGOGASTRODUODENOSCOPY WITH STENT REMOVAL;  Surgeon: Red Denson MD;  Location: [facility] ENDOSCOPY;  Service: Gastroenterology;  Laterality: N/A;    PANCREATIC CYST DRAINAGE      x 2    UPPER ENDOSCOPIC ULTRASOUND W/ FNA N/A 9/13/2023    Procedure: Esophagogastroduodenoscopy with biopsy x 1 area and endoscopic ultrasound with placement of cyst gastrostomy;  Surgeon: Red Denson MD;  Location: [facility] ENDOSCOPY;  Service: Gastroenterology;  Laterality: N/A;  post: pancreatic psuedocyst    WISDOM TOOTH EXTRACTION         Medications:  Medications Prior to Admission   Medication Sig Dispense  Refill Last Dose    folic acid (FOLVITE) 1 MG tablet Take 1 tablet by mouth Daily. 30 tablet 3 Past Week    magnesium oxide (MAG-OX) 400 MG tablet Take 1 tablet by mouth Daily.   1/4/2024    ondansetron (ZOFRAN) 4 MG tablet Take 1 tablet by mouth Every 8 (Eight) Hours As Needed for Nausea or Vomiting.   1/4/2024    oxyCODONE-acetaminophen (PERCOCET)  MG per tablet Take 1 tablet by mouth Every 4 (Four) Hours As Needed for Moderate Pain. 30 tablet 0 1/4/2024    pantoprazole (PROTONIX) 40 MG EC tablet Take 1 tablet by mouth Daily. (Patient taking differently: Take 1 tablet by mouth 2 (Two) Times a Day.) 30 tablet 3 1/4/2024    thiamine (VITAMIN B1) 100 MG tablet Take 1 tablet by mouth Daily. 30 tablet 3 1/4/2024    amitriptyline (ELAVIL) 50 MG tablet Take 1 tablet by mouth Every Night.       Cyanocobalamin (VITAMIN B 12 PO) Take  by mouth. (Patient not taking: Reported on 1/5/2024)   Not Taking    Ferrous Sulfate Dried (Feosol) 200 (65 Fe) MG tablet tablet Take 1 tablet by mouth 2 (Two) Times a Day. (Patient not taking: Reported on 1/5/2024)   Not Taking    pancrelipase, Lip-Prot-Amyl, (CREON) 20922-68065 units capsule delayed-release particles capsule Take 3 capsules by mouth 3 (Three) Times a Day With Meals. (Patient not taking: Reported on 1/5/2024)   Not Taking       Allergies:   Allergies   Allergen Reactions    Nickel Rash       Social History:     Social History     Socioeconomic History    Marital status: Single   Tobacco Use    Smoking status: Every Day     Packs/day: 0.50     Years: 26.00     Additional pack years: 0.00     Total pack years: 13.00     Types: Cigarettes     Start date: 1/28/1996     Passive exposure: Current    Smokeless tobacco: Never   Vaping Use    Vaping Use: Never used   Substance and Sexual Activity    Alcohol use: Not Currently    Drug use: Never    Sexual activity: Defer     Partners: Male       Family History:     Family History   Problem Relation Age of Onset    Alcohol abuse  Mother     Arthritis Mother     Asthma Mother     Miscarriages / Stillbirths Mother     Cancer Father     Diabetes Father     Drug abuse Father     Early death Father     Heart disease Father     Hyperlipidemia Father     Hypertension Father     Alcohol abuse Paternal Aunt     Cancer Paternal Aunt     Diabetes Paternal Aunt     Drug abuse Paternal Aunt     Early death Paternal Aunt     Heart disease Paternal Aunt     Hyperlipidemia Paternal Aunt     Hypertension Paternal Aunt     Alcohol abuse Maternal Grandmother     Alcohol abuse Maternal Grandfather     Alcohol abuse Paternal Grandmother     Cancer Paternal Grandmother     Diabetes Paternal Grandmother     Drug abuse Paternal Grandmother     Early death Paternal Grandmother     Heart disease Paternal Grandmother     Hyperlipidemia Paternal Grandmother     Hypertension Paternal Grandmother     Alcohol abuse Paternal Grandfather        Review of Systems:  Constitutional: denies any weight changes, fatigue, or weakness  Eyes: denies blurred/double vision or scleral icterus  Cardiovascular: denies chest pain, palpitations, or edemas  Respiratory: denies cough, sputum, or dyspnea  Gastrointestinal: Reports abdominal pain  Genitourinary: denies dysuria or hematuria  Endocrine: denies cold intolerance, lethargy, or flushing  Hematologic: denies excessive bruising or bleeding  Musculoskeletal: denies weakness, joint swelling, joint pain, or stiffness  Neurologic: denies seizures, CVA, paresthesia, or peripheral neuropathy  Skin: denies change in nevi, rashes, masses, or jaundice     All other systems reviewed and were negative.    Physical Exam:   Vitals:    01/07/24 1207   BP: 117/93   Pulse: 83   Resp: 16   Temp: 97.7 °F (36.5 °C)   SpO2:      Height: 70 inches  Weight: 57.6 kg  BMI: 18  GENERAL: awake and alert, no acute distress, oriented to person, place, and time  HEENT: normocephalic, atraumatic, no scleral icterus, moist mucous membranes  NECK: Supple, there is  no thyromegaly or lymphadenopathy  RESPIRATORY: clear to auscultation, no wheezes, rales or rhonchi, symmetric air entry  CARDIOVASCULAR: regular rate and rhythm    GASTROINTESTINAL: Soft, nondistended, tender to palpation in the left upper quadrant without rebound or guarding  MUSCULOSKELETAL: no cyanosis, clubbing, or edema   NEUROLOGIC: alert and oriented, normal speech, cranial nerves 2-12 grossly intact, no focal deficits   SKIN: Moist, warm, no rashes, no jaundice      Diagnostic work-up:     Pertinent labs I personally reviewed:   Results from last 7 days   Lab Units 01/07/24  0926 01/06/24  1757 01/06/24  0358 01/04/24  1836   WBC 10*3/mm3 4.58 5.74 5.70 9.11   HEMOGLOBIN g/dL 9.8* 10.2* 9.7* 13.6   HEMATOCRIT % 30.5* 31.4* 29.8* 39.7   PLATELETS 10*3/mm3 206 206 188 214     Results from last 7 days   Lab Units 01/07/24  0449 01/06/24  0358 01/04/24  1836   SODIUM mmol/L 140 141 133*   POTASSIUM mmol/L 4.1 3.8 3.4*   CHLORIDE mmol/L 103 105 94*   CO2 mmol/L 24.3 27.2 26.0   BUN mg/dL 5* 2* 6   CREATININE mg/dL 0.43* 0.42* 0.69   CALCIUM mg/dL 8.8 8.4* 10.1   BILIRUBIN mg/dL <0.2 <0.2 0.4   ALK PHOS U/L 66 70 98   ALT (SGPT) U/L 10 8 9   AST (SGOT) U/L 8 7 9   GLUCOSE mg/dL 97 85 115*       Imaging:  I personally reviewed the CT abdomen pelvis from 1/4/2024, which shows inflammation surrounding the pancreas with a small remaining pseudocyst and small volume free fluid in the left upper quadrant and around the pancreas.  There is a small sliver of the spleen that is hypoenhancing, and concerning for a splenic laceration.  There is no sign of active extravasation.    Assessment and plan:   The patient is a 43 y.o. female with history of pancreatitis and pancreatic pseudocyst who presents with recurrent abdominal pain, evidence of pancreatitis on CT imaging, and concern for a splenic laceration.    Since the time of the CT scan her vitals have remained stable.  Her hemoglobin did decrease from admission, however  I suspect this is more from dehydration on presentation.  It has remained stable since the sixth.    No intervention for the possible splenic laceration.  Continue supportive care for pancreatitis.  Okay to resume diet as tolerated.  Patient is well versed in what foods to eat and what to avoid in order to avoid pancreatitis attack.  No surgical follow-up necessary.  She was counseled on the signs and symptoms of active bleeding from the spleen, including quickly increasing abdominal distention and pain, lightheadedness, fainting, and she was instructed to return to the emergency room immediately if any of these occur.      Alessandra Marte MD  General, Robotic, and Endoscopic Surgery  [facility]     [address], [address]  [place], [place], [address]  P: [phone]  F: [phone]

## 2024-01-08 ENCOUNTER — READMISSION MANAGEMENT (OUTPATIENT)
Dept: CALL CENTER | Facility: HOSPITAL | Age: 44
End: 2024-01-08
Payer: MEDICAID

## 2024-01-08 ENCOUNTER — INPATIENT HOSPITAL (OUTPATIENT)
Dept: URBAN - METROPOLITAN AREA HOSPITAL 113 | Facility: HOSPITAL | Age: 44
End: 2024-01-08
Payer: COMMERCIAL

## 2024-01-08 VITALS
RESPIRATION RATE: 16 BRPM | HEART RATE: 78 BPM | SYSTOLIC BLOOD PRESSURE: 113 MMHG | HEIGHT: 70 IN | DIASTOLIC BLOOD PRESSURE: 75 MMHG | BODY MASS INDEX: 18.17 KG/M2 | WEIGHT: 126.9 LBS | TEMPERATURE: 97.2 F | OXYGEN SATURATION: 99 %

## 2024-01-08 DIAGNOSIS — K85.90 ACUTE PANCREATITIS WITHOUT NECROSIS OR INFECTION, UNSPECIFIE: ICD-10-CM

## 2024-01-08 DIAGNOSIS — K86.3 PSEUDOCYST OF PANCREAS: ICD-10-CM

## 2024-01-08 DIAGNOSIS — K86.1 OTHER CHRONIC PANCREATITIS: ICD-10-CM

## 2024-01-08 PROCEDURE — 25010000002 THIAMINE PER 100 MG: Performed by: NURSE PRACTITIONER

## 2024-01-08 PROCEDURE — 25010000002 ONDANSETRON PER 1 MG: Performed by: INTERNAL MEDICINE

## 2024-01-08 PROCEDURE — 99233 SBSQ HOSP IP/OBS HIGH 50: CPT | Performed by: INTERNAL MEDICINE

## 2024-01-08 RX ORDER — NALOXONE HYDROCHLORIDE 4 MG/.1ML
SPRAY NASAL
Qty: 2 EACH | Refills: 0 | Status: ON HOLD | OUTPATIENT
Start: 2024-01-08

## 2024-01-08 RX ORDER — OXYCODONE AND ACETAMINOPHEN 10; 325 MG/1; MG/1
1 TABLET ORAL EVERY 4 HOURS PRN
Qty: 8 TABLET | Refills: 0 | Status: SHIPPED | OUTPATIENT
Start: 2024-01-08 | End: 2024-01-08 | Stop reason: HOSPADM

## 2024-01-08 RX ORDER — OXYCODONE HYDROCHLORIDE AND ACETAMINOPHEN 5; 325 MG/1; MG/1
1 TABLET ORAL EVERY 6 HOURS PRN
Qty: 8 TABLET | Refills: 0 | Status: ON HOLD | OUTPATIENT
Start: 2024-01-08

## 2024-01-08 RX ADMIN — Medication 10 ML: at 08:25

## 2024-01-08 RX ADMIN — ONDANSETRON HYDROCHLORIDE 4 MG: 2 INJECTION, SOLUTION INTRAMUSCULAR; INTRAVENOUS at 08:24

## 2024-01-08 RX ADMIN — OXYCODONE AND ACETAMINOPHEN 1 TABLET: 10; 325 TABLET ORAL at 08:25

## 2024-01-08 RX ADMIN — OXYCODONE AND ACETAMINOPHEN 1 TABLET: 10; 325 TABLET ORAL at 04:20

## 2024-01-08 RX ADMIN — THIAMINE HYDROCHLORIDE 200 MG: 100 INJECTION, SOLUTION INTRAMUSCULAR; INTRAVENOUS at 06:31

## 2024-01-08 NOTE — NURSING NOTE
Patient transferred from [facility] to [facility]. She is alert and oriented times four. Up as tolerated in her room. Complained of pain to her abdomen as a 7/10. Pain medication given as ordered.

## 2024-01-08 NOTE — PAYOR COMM NOTE
"Piper Zavala (43 y.o. Female)          INPATIENT REQUEST STARTING 24    THE PATIENT WAS OBS  WITH AUTH # [id number] AND CONVERTED TO INPATIENT     CONTACT NORRIS MORGAN  P# [phone]  F# [phone]         Date of Birth   1980    Social Security Number       Address   [address]  [place] [address]    Home Phone   [phone]    MRN   [id number]       [place]   Sikhism    Marital Status   Single                            Admission Date   24    Admission Type   Emergency    Admitting Provider   Faisal Baird MD    Attending Provider   Blake Everett MD    Department, Room/Bed   [address], 84/1       Discharge Date       Discharge Disposition   Home or Self Care    Discharge Destination                                 Attending Provider: Blake Everett MD    Allergies: Nickel    Isolation: None   Infection: None   Code Status: CPR    Ht: 177.8 cm (70\")   Wt: 57.6 kg (126 lb 14.4 oz)    Admission Cmt: None   Principal Problem: Acute on chronic pancreatitis [K85.90,K86.1]                   Active Insurance as of 2024       Primary Coverage       Payor Plan Insurance Group Employer/Plan Group    ANTH MEDICAID [facility] [id number]       Payor Plan Address Payor Plan Phone Number Payor Plan Fax Number Effective Dates    MAIL STOP:   2022 - None Entered    [address] [address]       [facility] [address]         Subscriber Name Subscriber Birth Date Member ID       PIPER ZAVALA 1980 [id number]                     Emergency Contacts        (Rel.) Home Phone Work Phone Mobile Phone    JEAN CARLOSMALOU (Significant Other) [phone] -- --                 Physician Progress Notes (last 48 hours)        Gilberto Soria MD at 24 0801           [facility]     Progress Note    Patient Name: Piper Zavala  : 1980  MRN: [id number]  Primary Care Physician:  Rachell Pozo " "SOL Liu  Date of admission: 1/4/2024    Subjective   Subjective     Chief Complaint: pain in abdomen, pancreatitis    History of Present Illness  Patient Reports feeling better.  Wants \"bridge \" rx of pain meds to last until 1/15.  Seeing pain doctor this afternoon.  No nausea or vomiting     Review of Systems   Gastrointestinal:  Positive for abdominal pain.       Objective   Objective     Vitals:   Temp:  [97.2 °F (36.2 °C)-98.6 °F (37 °C)] 97.2 °F (36.2 °C)  Heart Rate:  [78-88] 78  Resp:  [16-18] 16  BP: (109-130)/(75-97) 113/75    Physical Exam  HENT:      Right Ear: External ear normal.      Left Ear: External ear normal.      Mouth/Throat:      Pharynx: Oropharynx is clear.   Abdominal:      Tenderness: There is abdominal tenderness. There is no guarding or rebound.   Skin:     General: Skin is warm and dry.   Neurological:      Mental Status: She is alert.   Psychiatric:         Mood and Affect: Mood normal.          Result Review    Result Review:  I have personally reviewed the results from the time of this admission to 1/8/2024 08:01 EST and agree with these findings:  []  Laboratory list / accordion  []  Microbiology  []  Radiology  []  EKG/Telemetry   []  Cardiology/Vascular   []  Pathology  []  Old records  []  Other:  Most notable findings include:       Assessment & Plan   Assessment / Plan     Brief Patient Summary:  Piper Cain is a 43 y.o. female who   Acute on chronic pancreatitis  Pancreatic pseudocyst      Active Hospital Problems:  Active Hospital Problems    Diagnosis     **Acute on chronic pancreatitis     Acute abdominal pain     Pancreatic pseudocyst     Adrenal hemorrhage     Alcohol dependence in remission      Plan:ok to discharge  Advised patient admitting MD can manage any narcotic bridge per his discretion  To see her pain management md this afternoon  Followup with Janiya or his NP in a few weeks.       DVT prophylaxis:  Mechanical DVT prophylaxis orders are present.    CODE " STATUS:    Code Status (Patient has no pulse and is not breathing): CPR (Attempt to Resuscitate)  Medical Interventions (Patient has pulse or is breathing): Full Support    Disposition:  I expect patient to be discharged today.    Gilberto Soria MD               Electronically signed by Gilberto Soria MD at 01/08/24 0916       Terrell Ovalles MD at 01/07/24 1108          Gastroenterology   Inpatient Progress Note    Reason for Follow Up: Pancreatitis    Subjective     Interval History:   Patient states her pain is maybe a little bit better today.  No vomiting.  She has tolerated full liquids.    Current Facility-Administered Medications:     amitriptyline (ELAVIL) tablet 50 mg, 50 mg, Oral, Nightly, Faisal Baird MD, 50 mg at 01/06/24 2138    sennosides-docusate (PERICOLACE) 8.6-50 MG per tablet 2 tablet, 2 tablet, Oral, BID, 2 tablet at 01/07/24 0842 **AND** polyethylene glycol (MIRALAX) packet 17 g, 17 g, Oral, Daily PRN **AND** bisacodyl (DULCOLAX) EC tablet 5 mg, 5 mg, Oral, Daily PRN **AND** bisacodyl (DULCOLAX) suppository 10 mg, 10 mg, Rectal, Daily PRN, Faisal Baird MD    folic acid (FOLVITE) tablet 1 mg, 1 mg, Oral, Daily, Viviana Mo, APRN, 1 mg at 01/07/24 0842    HYDROmorphone (DILAUDID) injection 0.5 mg, 0.5 mg, Intravenous, Q3H PRN, Blake Everett MD    lactated ringers infusion, 75 mL/hr, Intravenous, Continuous, Blake Everett MD    LORazepam (ATIVAN) tablet 1 mg, 1 mg, Oral, Q1H PRN **OR** midazolam (VERSED) injection 2 mg, 2 mg, Intravenous, Q1H PRN **OR** LORazepam (ATIVAN) tablet 2 mg, 2 mg, Oral, Q1H PRN **OR** midazolam (VERSED) injection 4 mg, 4 mg, Intravenous, Q1H PRN **OR** midazolam (VERSED) injection 4 mg, 4 mg, Intravenous, Q15 Min PRN **OR** midazolam (VERSED) injection 4 mg, 4 mg, Intramuscular, Q15 Min PRN, Viviana Mo APRN    Magnesium Standard Dose Replacement - Follow Nurse / BPA Driven Protocol, , Does not apply, PRN, Viviana Mo  SOL Flowers    ondansetron (ZOFRAN) injection 4 mg, 4 mg, Intravenous, Q6H PRN, Faisal Baird MD, 4 mg at 01/06/24 2306    oxyCODONE-acetaminophen (PERCOCET)  MG per tablet 1 tablet, 1 tablet, Oral, Q4H PRN, Faisal Baird MD, 1 tablet at 01/07/24 0842    sodium chloride 0.9 % flush 10 mL, 10 mL, Intravenous, PRN, Dany Camacho PA    sodium chloride 0.9 % flush 10 mL, 10 mL, Intravenous, Q12H, Faisal Baird MD, 10 mL at 01/07/24 0843    sodium chloride 0.9 % flush 10 mL, 10 mL, Intravenous, PRN, Faisal Baird MD    sodium chloride 0.9 % infusion 40 mL, 40 mL, Intravenous, PRN, Faisal Baird MD    thiamine (B-1) injection 200 mg, 200 mg, Intravenous, Q8H, 200 mg at 01/07/24 0525 **FOLLOWED BY** [START ON 1/10/2024] thiamine (VITAMIN B-1) tablet 100 mg, 100 mg, Oral, Daily, Viviana Mo APRN  Review of Systems:    All systems were reviewed and negative except for:  Gastrointestinal: positive for  pain    Objective     Vital Signs  Temp:  [97.7 °F (36.5 °C)-98.5 °F (36.9 °C)] 97.9 °F (36.6 °C)  Heart Rate:  [71-97] 81  Resp:  [18-19] 18  BP: (107-119)/(77-95) 109/85  Body mass index is 18.21 kg/m².    Intake/Output Summary (Last 24 hours) at 1/7/2024 1108  Last data filed at 1/7/2024 0800  Gross per 24 hour   Intake 900 ml   Output 2450 ml   Net -1550 ml     I/O this shift:  In: -   Out: 525 [Urine:525]     Physical Exam:   General: patient awake, alert and cooperative   Eyes: no scleral icterus   Skin: warm and dry, not jaundiced   Abdomen: soft, nontender, nondistended; normal bowel sounds, no masses palpated, no periumbical lymphadenopathy   Psychiatric: Appropriate affect and behavior     Results Review:     I reviewed the patient's new clinical results.    Results from last 7 days   Lab Units 01/07/24  0926 01/06/24  1757 01/06/24  0358   WBC 10*3/mm3 4.58 5.74 5.70   HEMOGLOBIN g/dL 9.8* 10.2* 9.7*   HEMATOCRIT % 30.5* 31.4* 29.8*   PLATELETS 10*3/mm3 206 206 188      Results from last 7 days   Lab Units 01/07/24  0449 01/06/24  0358 01/04/24  1836   SODIUM mmol/L 140 141 133*   POTASSIUM mmol/L 4.1 3.8 3.4*   CHLORIDE mmol/L 103 105 94*   CO2 mmol/L 24.3 27.2 26.0   BUN mg/dL 5* 2* 6   CREATININE mg/dL 0.43* 0.42* 0.69   CALCIUM mg/dL 8.8 8.4* 10.1   BILIRUBIN mg/dL <0.2 <0.2 0.4   ALK PHOS U/L 66 70 98   ALT (SGPT) U/L 10 8 9   AST (SGOT) U/L 8 7 9   GLUCOSE mg/dL 97 85 115*         Lab Results   Lab Value Date/Time    LIPASE 89 (H) 01/07/2024 0449    LIPASE 187 (H) 01/04/2024 1836    LIPASE 99 (H) 09/07/2023 0615    LIPASE 232 (H) 09/05/2023 2205    LIPASE 64 (H) 08/25/2023 0425    LIPASE 75 (H) 08/24/2023 0324    LIPASE 87 (H) 08/23/2023 0252    LIPASE 115 (H) 08/22/2023 1301    LIPASE 73 (H) 06/08/2023 0652    LIPASE 151 (H) 06/07/2023 2246    LIPASE 192 (H) 03/25/2023 1700    LIPASE 38 12/05/2022 0526    LIPASE 34 12/04/2022 0542    LIPASE 29 12/03/2022 0515    LIPASE 50 12/02/2022 0719    LIPASE 29 12/01/2022 0519    LIPASE 65 (H) 11/30/2022 1412    LIPASE 157 (H) 11/28/2022 1751    LIPASE 335 (H) 10/20/2022 1939    LIPASE 378 (H) 08/06/2022 2158    LIPASE 95 (H) 01/24/2018 0545    LIPASE 93 (H) 10/01/2017 0356    LIPASE 122 (H) 09/30/2017 0619    LIPASE 86 (H) 09/29/2017 1731    LIPASE 133 (H) 09/28/2017 0032       Radiology:  CT Abdomen Pelvis With Contrast   Final Result   1. Acute on chronic pancreatitis with new peripancreatic inflammation   and stranding particularly along the pancreatic body and tail and also   extending to the left abdomen. Pancreatic ductal dilatation is mildly   increased.   2. New linear low density within the spleen suspicious for a small   splenic laceration. The volume of fluid adjacent to the spleen is   similar to the prior exam, though if there are clinical signs of   hemorrhage, recommend short interval follow-up CT to assure stability.   There is increased ascites with free fluid extending into the pelvis   when compared to the  previous study.   3. The previously demonstrated pancreatic pseudocyst in the region of   the pancreatic tail has decreased in size compared to the previous exam.       Radiation dose reduction techniques were utilized, including automated   exposure control and exposure modulation based on body size.           This report was finalized on 1/4/2024 9:00 PM by Dr. Angel Mcleod M.D on Workstation: [id number]              Assessment & Plan     Active Hospital Problems    Diagnosis     **Acute on chronic pancreatitis     Acute abdominal pain     Pancreatic pseudocyst     Adrenal hemorrhage     Alcohol dependence in remission        Assessment:  Acute on chronic pancreatitis.  Symptomatically improving.  Lipase improved.  Pancreatic pseudocyst status post cystogastrostomy with improved appearance of the cyst on CT  Fluid around the spleen with CT suggesting possible splenic laceration versus tracking of fluid from the prior cyst.  No evidence of hemoperitoneum.  Surgery consulted.    Chronic pain secondary to chronic pancreatitis  Hypokalemia.  Resolved.      Plan:  Continue supportive care and advance diet as tolerated  Await surgical consultation regarding changes seen on the spleen on CT but would favor this represents tracking of fluid from her pancreatic pseudocyst versus a splenic laceration or infarction.  Patient will follow-up as an outpatient with Dr. Denson who has done her pancreatic pseudocyst drainage procedures.  Hopefully home in time for her appointment tomorrow with the pain clinic.  I discussed the patients findings and my recommendations with patient and nursing staff.    MD Terrell López M.D.  [facility] [address] [address]  Office: [phone]     Electronically signed by Terrell Ovalles MD at 01/07/24 9092       Blake Everett MD at 01/07/24 9277              [facility]   "  [facility]     LOS: 0 days     Subjective:    CC:Abdominal Pain, Nausea, and Vomiting    DIET:  Diet Order   Procedures    Diet: Liquid Diets; Full Liquid; Fluid Consistency: Thin (IDDSI 0)   Still with left sided abd pain, mild to moderate    Objective:    Vital Signs:  Temp:  [97.7 °F (36.5 °C)-98.5 °F (36.9 °C)] 97.9 °F (36.6 °C)  Heart Rate:  [71-97] 81  Resp:  [18-19] 18  BP: (107-119)/(77-95) 109/85    SpO2:  [95 %-100 %] 98 %  on   ;   Device (Oxygen Therapy): room air  Body mass index is 18.21 kg/m².    Physical Exam  Constitutional:       Appearance: Normal appearance.   HENT:      Head: Normocephalic and atraumatic.   Cardiovascular:      Heart sounds: No murmur heard.     No friction rub.   Pulmonary:      Effort: Pulmonary effort is normal.      Breath sounds: Normal breath sounds.   Abdominal:      General: Bowel sounds are normal. There is no distension.      Palpations: Abdomen is soft.      Tenderness: There is abdominal tenderness.   Skin:     General: Skin is warm and dry.   Neurological:      Mental Status: She is alert.   Psychiatric:         Mood and Affect: Mood normal.         Results Review:    Glucose   Date Value Ref Range Status   01/06/2024 85 65 - 99 mg/dL Final   01/04/2024 115 (H) 65 - 99 mg/dL Final     Results from last 7 days   Lab Units 01/06/24  1757   WBC 10*3/mm3 5.74   HEMOGLOBIN g/dL 10.2*   HEMATOCRIT % 31.4*   PLATELETS 10*3/mm3 206     Results from last 7 days   Lab Units 01/06/24  0358   SODIUM mmol/L 141   POTASSIUM mmol/L 3.8   CHLORIDE mmol/L 105   CO2 mmol/L 27.2   BUN mg/dL 2*   CREATININE mg/dL 0.42*   CALCIUM mg/dL 8.4*   BILIRUBIN mg/dL <0.2   ALK PHOS U/L 70   ALT (SGPT) U/L 8   AST (SGOT) U/L 7   GLUCOSE mg/dL 85                 Cultures:  No results found for: \"BLOODCX\", \"URINECX\", \"WOUNDCX\", \"MRSACX\", \"RESPCX\", \"STOOLCX\"    I have reviewed daily medications and changes in CPOE    Scheduled meds  amitriptyline, 50 mg, Oral, Nightly  folic acid, 1 mg, Oral, " Daily  senna-docusate sodium, 2 tablet, Oral, BID  sodium chloride, 10 mL, Intravenous, Q12H  thiamine (B-1) IV, 200 mg, Intravenous, Q8H   Followed by  [START ON 1/10/2024] thiamine, 100 mg, Oral, Daily        lactated ringers, 75 mL/hr      PRN meds    senna-docusate sodium **AND** polyethylene glycol **AND** bisacodyl **AND** bisacodyl    HYDROmorphone    LORazepam **OR** midazolam **OR** LORazepam **OR** midazolam **OR** midazolam **OR** midazolam    Magnesium Standard Dose Replacement - Follow Nurse / BPA Driven Protocol    ondansetron    oxyCODONE-acetaminophen    sodium chloride    sodium chloride    sodium chloride        Acute on chronic pancreatitis    Alcohol dependence in remission    Adrenal hemorrhage    Pancreatic pseudocyst    Acute abdominal pain        Assessment/Plan:    Acute on chronic pancreatitis  -advance diet  -IV fluids  -Continue pain control.   -Gastroenterology following     History of adrenal hemorrhage, suspected splenic hemorrhage, history of pancreatic pseudocyst, no acute interventions planned at this point of time.  Gastroenterology on board.  -surgery consult, defer to them about imaging  -hb is stable, pain is better     Hypokalemia, replacement of electrolytes per protocol.     CODE STATUS is full code.  Further plans based on hospital course.         Blake Everett MD  01/07/24  09:35 EST       Electronically signed by Blake Everett MD at 01/07/24 0937       Blake Everett MD at 01/06/24 3657              [facility]     LOS: 0 days     Subjective:    CC:Abdominal Pain, Nausea, and Vomiting    DIET:  Diet Order   Procedures    Diet: Liquid Diets; Full Liquid; Fluid Consistency: Thin (IDDSI 0)   Still with left sided abd pain, mild to moderate    Objective:    Vital Signs:  Temp:  [97.8 °F (36.6 °C)-98.7 °F (37.1 °C)] 98.5 °F (36.9 °C)  Heart Rate:  [71-93] 71  Resp:  [16-19] 19  BP: (109-120)/(77-96) 113/83    SpO2:  [98 %-100 %] 100 %  on   " ;   Device (Oxygen Therapy): room air  Body mass index is 18.21 kg/m².    Physical Exam  Constitutional:       Appearance: Normal appearance.   HENT:      Head: Normocephalic and atraumatic.   Cardiovascular:      Heart sounds: No murmur heard.     No friction rub.   Pulmonary:      Effort: Pulmonary effort is normal.      Breath sounds: Normal breath sounds.   Abdominal:      General: Bowel sounds are normal. There is no distension.      Palpations: Abdomen is soft.      Tenderness: There is abdominal tenderness.   Skin:     General: Skin is warm and dry.   Neurological:      Mental Status: She is alert.   Psychiatric:         Mood and Affect: Mood normal.         Results Review:    Glucose   Date Value Ref Range Status   01/06/2024 85 65 - 99 mg/dL Final   01/04/2024 115 (H) 65 - 99 mg/dL Final     Results from last 7 days   Lab Units 01/06/24  0358   WBC 10*3/mm3 5.70   HEMOGLOBIN g/dL 9.7*   HEMATOCRIT % 29.8*   PLATELETS 10*3/mm3 188     Results from last 7 days   Lab Units 01/06/24  0358   SODIUM mmol/L 141   POTASSIUM mmol/L 3.8   CHLORIDE mmol/L 105   CO2 mmol/L 27.2   BUN mg/dL 2*   CREATININE mg/dL 0.42*   CALCIUM mg/dL 8.4*   BILIRUBIN mg/dL <0.2   ALK PHOS U/L 70   ALT (SGPT) U/L 8   AST (SGOT) U/L 7   GLUCOSE mg/dL 85                 Cultures:  No results found for: \"BLOODCX\", \"URINECX\", \"WOUNDCX\", \"MRSACX\", \"RESPCX\", \"STOOLCX\"    I have reviewed daily medications and changes in CPOE    Scheduled meds  amitriptyline, 50 mg, Oral, Nightly  folic acid, 1 mg, Oral, Daily  pancrelipase (Lip-Prot-Amyl), 12,000 units of lipase, Oral, TID With Meals  senna-docusate sodium, 2 tablet, Oral, BID  sodium chloride, 10 mL, Intravenous, Q12H  thiamine (B-1) IV, 200 mg, Intravenous, Q8H   Followed by  [START ON 1/10/2024] thiamine, 100 mg, Oral, Daily        lactated ringers, 1.5 mL/kg/hr, Last Rate: 1.5 mL/kg/hr (01/06/24 8480)      PRN meds    senna-docusate sodium **AND** polyethylene glycol **AND** bisacodyl " **AND** bisacodyl    HYDROmorphone    LORazepam **OR** midazolam **OR** LORazepam **OR** midazolam **OR** midazolam **OR** midazolam    Magnesium Standard Dose Replacement - Follow Nurse / BPA Driven Protocol    ondansetron    oxyCODONE-acetaminophen    sodium chloride    sodium chloride    sodium chloride        Acute on chronic pancreatitis    Alcohol dependence in remission    Adrenal hemorrhage    Pancreatic pseudocyst    Acute abdominal pain        Assessment/Plan:    Acute on chronic pancreatitis  -advance diet  -IV fluids  -Continue pain control.   - Gastroenterology following     History of adrenal hemorrhage, suspected splenic hemorrhage, history of pancreatic pseudocyst, no acute interventions planned at this point of time.  Gastroenterology on board.  -surgery consult, repeat hb, repeat bhcg     Hypokalemia, replacement of electrolytes per protocol.     CODE STATUS is full code.  Further plans based on hospital course.         Blake Everett MD  01/06/24  17:51 EST       Electronically signed by Blake Everett MD at 01/06/24 1848       Terrell Ovalles MD at 01/06/24 1150          Gastroenterology   Inpatient Progress Note    Reason for Follow Up: Pancreatitis    Subjective     Interval History:   Patient feels as though her abdominal pain is somewhat improved today.  She is wanting to advance her diet.    Current Facility-Administered Medications:     amitriptyline (ELAVIL) tablet 50 mg, 50 mg, Oral, Nightly, Faisal Baird MD, 50 mg at 01/05/24 2209    sennosides-docusate (PERICOLACE) 8.6-50 MG per tablet 2 tablet, 2 tablet, Oral, BID, 2 tablet at 01/06/24 0828 **AND** polyethylene glycol (MIRALAX) packet 17 g, 17 g, Oral, Daily PRN **AND** bisacodyl (DULCOLAX) EC tablet 5 mg, 5 mg, Oral, Daily PRN **AND** bisacodyl (DULCOLAX) suppository 10 mg, 10 mg, Rectal, Daily PRN, Faisal Baird MD    folic acid (FOLVITE) tablet 1 mg, 1 mg, Oral, Daily, Viviana Mo APRN, 1 mg at  01/06/24 0827    HYDROmorphone (DILAUDID) injection 1 mg, 1 mg, Intravenous, Q4H PRN, Faisal Baird MD, 1 mg at 01/06/24 1023    lactated ringers infusion, 1.5 mL/kg/hr, Intravenous, Continuous, Viviana Mo APRN, Last Rate: 81.6 mL/hr at 01/06/24 0157, 1.5 mL/kg/hr at 01/06/24 0157    LORazepam (ATIVAN) tablet 1 mg, 1 mg, Oral, Q1H PRN **OR** midazolam (VERSED) injection 2 mg, 2 mg, Intravenous, Q1H PRN **OR** LORazepam (ATIVAN) tablet 2 mg, 2 mg, Oral, Q1H PRN **OR** midazolam (VERSED) injection 4 mg, 4 mg, Intravenous, Q1H PRN **OR** midazolam (VERSED) injection 4 mg, 4 mg, Intravenous, Q15 Min PRN **OR** midazolam (VERSED) injection 4 mg, 4 mg, Intramuscular, Q15 Min PRN, Viviana Mo APRN    Magnesium Standard Dose Replacement - Follow Nurse / BPA Driven Protocol, , Does not apply, Chepe DE LOS SANTOS Karyn Michele, APRN    ondansetron (ZOFRAN) injection 4 mg, 4 mg, Intravenous, Q6H PRN, Faisal Baird MD, 4 mg at 01/06/24 0556    oxyCODONE-acetaminophen (PERCOCET)  MG per tablet 1 tablet, 1 tablet, Oral, Q4H PRN, Faisal Baird MD, 1 tablet at 01/06/24 0826    pancrelipase (Lip-Prot-Amyl) (CREON) capsule 12,000 units of lipase, 12,000 units of lipase, Oral, TID With Meals, Faisal Baird MD    sodium chloride 0.9 % flush 10 mL, 10 mL, Intravenous, PRN, Dany Camacho PA    sodium chloride 0.9 % flush 10 mL, 10 mL, Intravenous, Q12H, Faisal Baird MD, 10 mL at 01/06/24 0827    sodium chloride 0.9 % flush 10 mL, 10 mL, Intravenous, PRN, Faisal Baird MD    sodium chloride 0.9 % infusion 40 mL, 40 mL, Intravenous, PRN, Faisal Baird MD    thiamine (B-1) injection 200 mg, 200 mg, Intravenous, Q8H, 200 mg at 01/06/24 0556 **FOLLOWED BY** [START ON 1/10/2024] thiamine (VITAMIN B-1) tablet 100 mg, 100 mg, Oral, Daily, Viviana Mo, SOL  Review of Systems:    All systems were reviewed and negative except for:  Gastrointestinal: positive for   pain    Objective     Vital Signs  Temp:  [98.5 °F (36.9 °C)-98.7 °F (37.1 °C)] 98.6 °F (37 °C)  Heart Rate:  [81-94] 85  Resp:  [16-19] 19  BP: (107-120)/(76-96) 110/89  Body mass index is 18.21 kg/m².    Intake/Output Summary (Last 24 hours) at 1/6/2024 1151  Last data filed at 1/6/2024 0730  Gross per 24 hour   Intake 290 ml   Output 2780 ml   Net -2490 ml     I/O this shift:  In: 120 [P.O.:120]  Out: 200 [Urine:200]     Physical Exam:   General: patient awake, alert and cooperative   Eyes: no scleral icterus   Skin: warm and dry, not jaundiced   Abdomen: soft, nontender, nondistended; normal bowel sounds, no masses palpated, no periumbical lymphadenopathy   Psychiatric: Appropriate affect and behavior     Results Review:     I reviewed the patient's new clinical results.    Results from last 7 days   Lab Units 01/06/24  0358 01/04/24  1836   WBC 10*3/mm3 5.70 9.11   HEMOGLOBIN g/dL 9.7* 13.6   HEMATOCRIT % 29.8* 39.7   PLATELETS 10*3/mm3 188 214     Results from last 7 days   Lab Units 01/06/24  0358 01/04/24  1836   SODIUM mmol/L 141 133*   POTASSIUM mmol/L 3.8 3.4*   CHLORIDE mmol/L 105 94*   CO2 mmol/L 27.2 26.0   BUN mg/dL 2* 6   CREATININE mg/dL 0.42* 0.69   CALCIUM mg/dL 8.4* 10.1   BILIRUBIN mg/dL <0.2 0.4   ALK PHOS U/L 70 98   ALT (SGPT) U/L 8 9   AST (SGOT) U/L 7 9   GLUCOSE mg/dL 85 115*         Lab Results   Lab Value Date/Time    LIPASE 187 (H) 01/04/2024 1836    LIPASE 99 (H) 09/07/2023 0615    LIPASE 232 (H) 09/05/2023 2205    LIPASE 64 (H) 08/25/2023 0425    LIPASE 75 (H) 08/24/2023 0324    LIPASE 87 (H) 08/23/2023 0252    LIPASE 115 (H) 08/22/2023 1301    LIPASE 73 (H) 06/08/2023 0652    LIPASE 151 (H) 06/07/2023 2246    LIPASE 192 (H) 03/25/2023 1700    LIPASE 38 12/05/2022 0526    LIPASE 34 12/04/2022 0542    LIPASE 29 12/03/2022 0515    LIPASE 50 12/02/2022 0719    LIPASE 29 12/01/2022 0519    LIPASE 65 (H) 11/30/2022 1412    LIPASE 157 (H) 11/28/2022 1751    LIPASE 335 (H) 10/20/2022 1939     LIPASE 378 (H) 08/06/2022 2158    LIPASE 95 (H) 01/24/2018 0545    LIPASE 93 (H) 10/01/2017 0356    LIPASE 122 (H) 09/30/2017 0619    LIPASE 86 (H) 09/29/2017 1731    LIPASE 133 (H) 09/28/2017 0032       Radiology:  CT Abdomen Pelvis With Contrast   Final Result   1. Acute on chronic pancreatitis with new peripancreatic inflammation   and stranding particularly along the pancreatic body and tail and also   extending to the left abdomen. Pancreatic ductal dilatation is mildly   increased.   2. New linear low density within the spleen suspicious for a small   splenic laceration. The volume of fluid adjacent to the spleen is   similar to the prior exam, though if there are clinical signs of   hemorrhage, recommend short interval follow-up CT to assure stability.   There is increased ascites with free fluid extending into the pelvis   when compared to the previous study.   3. The previously demonstrated pancreatic pseudocyst in the region of   the pancreatic tail has decreased in size compared to the previous exam.       Radiation dose reduction techniques were utilized, including automated   exposure control and exposure modulation based on body size.           This report was finalized on 1/4/2024 9:00 PM by Dr. Angel Mcleod M.D on Workstation: [id number]              Assessment & Plan     Active Hospital Problems    Diagnosis     **Acute on chronic pancreatitis     Acute abdominal pain     Pancreatic pseudocyst     Adrenal hemorrhage     Alcohol dependence in remission      These problems are new to me  Assessment:  Acute on chronic pancreatitis with history of pancreatic pseudocyst status post cystogastrostomy  Questionable pseudocyst fluid around the spleen.  CT suggest possible splenic laceration but no history of trauma or other risk factors for this.  No evidence of hemoperitoneum.  Chronic pain.  Patient has an appointment with pain management on Monday      Plan:  Agree with bowel rest and supportive  care  Will try to advance diet slowly.  Will hopefully be able to keep her appointment with pain management.  Eventually, patient will need outpatient follow-up with Dr. Denson for continued management of her pancreatic pseudocyst though it appears smaller on CT imaging.  I discussed the patients findings and my recommendations with patient, nursing staff, and primary care team.    MD Terrell López M.D.  [facility] [place] [address]  Office: [phone]     Electronically signed by Terrell Ovalles MD at 01/06/24 115          Consult Notes (last 48 hours)        Alessandra Marte MD at 01/07/24 1349        Consult Orders    1. Inpatient General Surgery Consult [[id number]] ordered by Blake Everett MD at 01/06/24 9875                 General Surgery Consultation    Consulting Physician: Alessandra Marte MD      Reason for consultation: Possible splenic laceration on imaging    CC: LUQ abdominal pain    HPI:   The patient is a very pleasant 43 y.o. female who presented to the hospital with left upper quadrant abdominal pain that she reports is different from her typical pancreatitis pain.  She reports pain is worse with moving, bending and taking deep breaths.  She denies nausea or vomiting.  She denies lightheadedness.  No fevers or chills.  No recent trauma to her abdomen or left side that would explain a splenic laceration.    Past Medical History:    Past Medical History:   Diagnosis Date    Anemia     Anxiety     Constipation     Cyst of spleen     Diarrhea     E. coli bacteremia     ETOH abuse     Frequent UTI     GERD (gastroesophageal reflux disease)     Hiatal hernia     Left flank pain 10/21/2022    Migraine     Miscarriage 2004    Pancreatitis     Pseudocyst of pancreas        Past Surgical History:    Past Surgical History:   Procedure Laterality Date    ENDOSCOPY N/A 08/10/2022    Procedure:  ESOPHAGOGASTRODUODENOSCOPY with bxs;  Surgeon: Salvador Price MD;  Location:  [facility];  Service: Gastroenterology;  Laterality: N/A;  Pre: r/o esophageal  varacies, abd pain  Post: esophageal plaque    ENDOSCOPY N/A 10/9/2023    Procedure: ESOPHAGOGASTRODUODENOSCOPY WITH STENT REMOVAL;  Surgeon: Red Denson MD;  Location:  [facility];  Service: Gastroenterology;  Laterality: N/A;    PANCREATIC CYST DRAINAGE      x 2    UPPER ENDOSCOPIC ULTRASOUND W/ FNA N/A 9/13/2023    Procedure: Esophagogastroduodenoscopy with biopsy x 1 area and endoscopic ultrasound with placement of cyst gastrostomy;  Surgeon: Red Denson MD;  Location:  [facility];  Service: Gastroenterology;  Laterality: N/A;  post: pancreatic psuedocyst    WISDOM TOOTH EXTRACTION         Medications:  Medications Prior to Admission   Medication Sig Dispense Refill Last Dose    folic acid (FOLVITE) 1 MG tablet Take 1 tablet by mouth Daily. 30 tablet 3 Past Week    magnesium oxide (MAG-OX) 400 MG tablet Take 1 tablet by mouth Daily.   1/4/2024    ondansetron (ZOFRAN) 4 MG tablet Take 1 tablet by mouth Every 8 (Eight) Hours As Needed for Nausea or Vomiting.   1/4/2024    oxyCODONE-acetaminophen (PERCOCET)  MG per tablet Take 1 tablet by mouth Every 4 (Four) Hours As Needed for Moderate Pain. 30 tablet 0 1/4/2024    pantoprazole (PROTONIX) 40 MG EC tablet Take 1 tablet by mouth Daily. (Patient taking differently: Take 1 tablet by mouth 2 (Two) Times a Day.) 30 tablet 3 1/4/2024    thiamine (VITAMIN B1) 100 MG tablet Take 1 tablet by mouth Daily. 30 tablet 3 1/4/2024    amitriptyline (ELAVIL) 50 MG tablet Take 1 tablet by mouth Every Night.       Cyanocobalamin (VITAMIN B 12 PO) Take  by mouth. (Patient not taking: Reported on 1/5/2024)   Not Taking    Ferrous Sulfate Dried (Feosol) 200 (65 Fe) MG tablet tablet Take 1 tablet by mouth 2 (Two) Times a Day. (Patient not taking: Reported on 1/5/2024)   Not Taking    pancrelipase,  Lip-Prot-Amyl, (CREON) 12588-15300 units capsule delayed-release particles capsule Take 3 capsules by mouth 3 (Three) Times a Day With Meals. (Patient not taking: Reported on 1/5/2024)   Not Taking       Allergies:   Allergies   Allergen Reactions    Nickel Rash       Social History:     Social History     Socioeconomic History    Marital status: Single   Tobacco Use    Smoking status: Every Day     Packs/day: 0.50     Years: 26.00     Additional pack years: 0.00     Total pack years: 13.00     Types: Cigarettes     Start date: 1/28/1996     Passive exposure: Current    Smokeless tobacco: Never   Vaping Use    Vaping Use: Never used   Substance and Sexual Activity    Alcohol use: Not Currently    Drug use: Never    Sexual activity: Defer     Partners: Male       Family History:     Family History   Problem Relation Age of Onset    Alcohol abuse Mother     Arthritis Mother     Asthma Mother     Miscarriages / Stillbirths Mother     Cancer Father     Diabetes Father     Drug abuse Father     Early death Father     Heart disease Father     Hyperlipidemia Father     Hypertension Father     Alcohol abuse Paternal Aunt     Cancer Paternal Aunt     Diabetes Paternal Aunt     Drug abuse Paternal Aunt     Early death Paternal Aunt     Heart disease Paternal Aunt     Hyperlipidemia Paternal Aunt     Hypertension Paternal Aunt     Alcohol abuse Maternal Grandmother     Alcohol abuse Maternal Grandfather     Alcohol abuse Paternal Grandmother     Cancer Paternal Grandmother     Diabetes Paternal Grandmother     Drug abuse Paternal Grandmother     Early death Paternal Grandmother     Heart disease Paternal Grandmother     Hyperlipidemia Paternal Grandmother     Hypertension Paternal Grandmother     Alcohol abuse Paternal Grandfather        Review of Systems:  Constitutional: denies any weight changes, fatigue, or weakness  Eyes: denies blurred/double vision or scleral icterus  Cardiovascular: denies chest pain, palpitations, or  edemas  Respiratory: denies cough, sputum, or dyspnea  Gastrointestinal: Reports abdominal pain  Genitourinary: denies dysuria or hematuria  Endocrine: denies cold intolerance, lethargy, or flushing  Hematologic: denies excessive bruising or bleeding  Musculoskeletal: denies weakness, joint swelling, joint pain, or stiffness  Neurologic: denies seizures, CVA, paresthesia, or peripheral neuropathy  Skin: denies change in nevi, rashes, masses, or jaundice     All other systems reviewed and were negative.    Physical Exam:   Vitals:    01/07/24 1207   BP: 117/93   Pulse: 83   Resp: 16   Temp: 97.7 °F (36.5 °C)   SpO2:      Height: 70 inches  Weight: 57.6 kg  BMI: 18  GENERAL: awake and alert, no acute distress, oriented to person, place, and time  HEENT: normocephalic, atraumatic, no scleral icterus, moist mucous membranes  NECK: Supple, there is no thyromegaly or lymphadenopathy  RESPIRATORY: clear to auscultation, no wheezes, rales or rhonchi, symmetric air entry  CARDIOVASCULAR: regular rate and rhythm    GASTROINTESTINAL: Soft, nondistended, tender to palpation in the left upper quadrant without rebound or guarding  MUSCULOSKELETAL: no cyanosis, clubbing, or edema   NEUROLOGIC: alert and oriented, normal speech, cranial nerves 2-12 grossly intact, no focal deficits   SKIN: Moist, warm, no rashes, no jaundice      Diagnostic work-up:     Pertinent labs I personally reviewed:   Results from last 7 days   Lab Units 01/07/24  0926 01/06/24  1757 01/06/24  0358 01/04/24  1836   WBC 10*3/mm3 4.58 5.74 5.70 9.11   HEMOGLOBIN g/dL 9.8* 10.2* 9.7* 13.6   HEMATOCRIT % 30.5* 31.4* 29.8* 39.7   PLATELETS 10*3/mm3 206 206 188 214     Results from last 7 days   Lab Units 01/07/24  0449 01/06/24  0358 01/04/24  1836   SODIUM mmol/L 140 141 133*   POTASSIUM mmol/L 4.1 3.8 3.4*   CHLORIDE mmol/L 103 105 94*   CO2 mmol/L 24.3 27.2 26.0   BUN mg/dL 5* 2* 6   CREATININE mg/dL 0.43* 0.42* 0.69   CALCIUM mg/dL 8.8 8.4* 10.1   BILIRUBIN  mg/dL <0.2 <0.2 0.4   ALK PHOS U/L 66 70 98   ALT (SGPT) U/L 10 8 9   AST (SGOT) U/L 8 7 9   GLUCOSE mg/dL 97 85 115*       Imaging:  I personally reviewed the CT abdomen pelvis from 1/4/2024, which shows inflammation surrounding the pancreas with a small remaining pseudocyst and small volume free fluid in the left upper quadrant and around the pancreas.  There is a small sliver of the spleen that is hypoenhancing, and concerning for a splenic laceration.  There is no sign of active extravasation.    Assessment and plan:   The patient is a 43 y.o. female with history of pancreatitis and pancreatic pseudocyst who presents with recurrent abdominal pain, evidence of pancreatitis on CT imaging, and concern for a splenic laceration.    Since the time of the CT scan her vitals have remained stable.  Her hemoglobin did decrease from admission, however I suspect this is more from dehydration on presentation.  It has remained stable since the sixth.    No intervention for the possible splenic laceration.  Continue supportive care for pancreatitis.  Okay to resume diet as tolerated.  Patient is well versed in what foods to eat and what to avoid in order to avoid pancreatitis attack.  No surgical follow-up necessary.  She was counseled on the signs and symptoms of active bleeding from the spleen, including quickly increasing abdominal distention and pain, lightheadedness, fainting, and she was instructed to return to the emergency room immediately if any of these occur.      Alessandra Marte MD  General, Robotic, and Endoscopic Surgery  [facility]     [address], [address]  [place], [place], [address]  P: [phone]  F: [phone]      Electronically signed by Alessandra Marte MD at 01/07/24 9592            All medication doses during the admission are shown, including meds that are no longer on order.  Scheduled Meds Sorted by Name  for Piper Cain as of 1/6/24 through 1/8/24    1 Day 3 Days 7 Days 10  Days < Today >   Legend:       Medications 01/06/24 01/07/24 01/08/24   amitriptyline (ELAVIL) tablet 50 mg  Dose: 50 mg  Freq: Nightly Route: PO  Start: 01/05/24 2100 2138 2059 2100          folic acid (FOLVITE) tablet 1 mg  Dose: 1 mg  Freq: Daily Route: PO  Start: 01/05/24 0900    0827        0842        (0825)          HYDROmorphone (DILAUDID) injection 1 mg  Dose: 1 mg  Freq: Once Route: IV  Start: 01/04/24 2145 End: 01/04/24 2137   Admin Instructions:            iopamidol (ISOVUE-300) 61 % injection 100 mL  Dose: 100 mL  Freq: Once in Imaging Route: IV  Start: 01/04/24 2036 End: 01/04/24 2020         lactated ringers bolus 1,088 mL  Dose: 20 mL/kg  Weight Dosing Info: 54.4 kg  Freq: Once Route: IV  Last Dose: Stopped (01/05/24 0440)  Start: 01/04/24 2204 End: 01/05/24 0440         morphine injection 4 mg  Dose: 4 mg  Freq: Once Route: IV  Start: 01/04/24 1831 End: 01/04/24 2015   Admin Instructions:            ondansetron (ZOFRAN) injection 4 mg  Dose: 4 mg  Freq: Once Route: IV  Start: 01/04/24 2020 End: 01/04/24 2014   Admin Instructions:            pancrelipase (Lip-Prot-Amyl) (CREON) capsule 12,000 units of lipase  Dose: 12,000 units of lipase  Freq: 3 Times Daily With Meals Route: PO  Start: 01/05/24 1800 End: 01/06/24 1755   Admin Instructions:       (5715) (4649) (8524) 6888-D/C'd           sennosides-docusate (PERICOLACE) 8.6-50 MG per tablet 2 tablet  Dose: 2 tablet  Freq: 2 Times Daily Route: PO  Start: 01/05/24 2100   Admin Instructions:       0854 (3123) 5162   2113 (5098)   2100         And  polyethylene glycol (MIRALAX) packet 17 g  Dose: 17 g  Freq: Daily PRN Route: PO  PRN Reason: Constipation  PRN Comment: Use if senna-docusate is ineffective  Start: 01/05/24 1530   Admin Instructions:            And  bisacodyl (DULCOLAX) EC tablet 5 mg  Dose: 5 mg  Freq: Daily PRN Route: PO  PRN Reason: Constipation  PRN Comment: Use if polyethylene glycol is  ineffective  Start: 01/05/24 1530   Admin Instructions:            And  bisacodyl (DULCOLAX) suppository 10 mg  Dose: 10 mg  Freq: Daily PRN Route: RE  PRN Reason: Constipation  PRN Comment: Use if bisacodyl oral is ineffective  Start: 01/05/24 1530   Admin Instructions:            sodium chloride 0.9 % flush 10 mL  Dose: 10 mL  Freq: Every 12 Hours Scheduled Route: IV  Start: 01/05/24 2100    0827   2134       0843   2116       0825   2100          thiamine (B-1) injection 200 mg  Dose: 200 mg  Freq: Every 8 Hours Scheduled Route: IV  Start: 01/04/24 2204 End: 01/09/24 2159   Admin Instructions:       0556   1445   2136      0525   1705   2100      0631   1400   2200        Followed by  thiamine (VITAMIN B-1) tablet 100 mg  Dose: 100 mg  Freq: Daily Route: PO  Start: 01/10/24 0900                     Continuous Meds Sorted by Name  for Payton, Piper C as of 1/6/24 through 1/8/24  Legend:       Medications 01/06/24 01/07/24 01/08/24   lactated ringers infusion  Rate: 75 mL/hr Dose: 75 mL/hr  Freq: Continuous Route: IV  Last Dose: 75 mL/hr (01/07/24 1110)  Start: 01/07/24 1015 End: 01/08/24 0856     1110        0856-D/C'd        lactated ringers infusion  Rate: 81.6 mL/hr Dose: 1.5 mL/kg/hr  Weight Dosing Info: 54.4 kg  Freq: Continuous Route: IV  Last Dose: Stopped (01/07/24 1111)  Start: 01/04/24 2204 End: 01/07/24 0928    0157   1452   2137      0146   0320        0524   0845   0928-D/C'd  1111                     PRN Meds Sorted by Name  for Payton, Piper C as of 1/6/24 through 1/8/24  Legend:       Medications 01/06/24 01/07/24 01/08/24    sennosides-docusate (PERICOLACE) 8.6-50 MG per tablet 2 tablet  Dose: 2 tablet  Freq: 2 Times Daily Route: PO  Start: 01/05/24 2100   Admin Instructions:       4665 (1637) 5300 9790 (2621) 7287         And  polyethylene glycol (MIRALAX) packet 17 g  Dose: 17 g  Freq: Daily PRN Route: PO  PRN Reason: Constipation  PRN Comment: Use if senna-docusate is  ineffective  Start: 01/05/24 1530   Admin Instructions:            And  bisacodyl (DULCOLAX) EC tablet 5 mg  Dose: 5 mg  Freq: Daily PRN Route: PO  PRN Reason: Constipation  PRN Comment: Use if polyethylene glycol is ineffective  Start: 01/05/24 1530   Admin Instructions:            And  bisacodyl (DULCOLAX) suppository 10 mg  Dose: 10 mg  Freq: Daily PRN Route: RE  PRN Reason: Constipation  PRN Comment: Use if bisacodyl oral is ineffective  Start: 01/05/24 1530   Admin Instructions:            HYDROmorphone (DILAUDID) injection 0.5 mg  Dose: 0.5 mg  Freq: Every 3 Hours PRN Route: IV  PRN Reason: Severe Pain  Start: 01/07/24 0927 End: 01/08/24 0018   Admin Instructions:        1108   1705       0018-D/C'd        HYDROmorphone (DILAUDID) injection 1 mg  Dose: 1 mg  Freq: Every 4 Hours PRN Route: IV  PRN Reason: Moderate Pain  Start: 01/05/24 0112 End: 01/07/24 0928   Admin Instructions:       0157   0556   1023     1445   1848       0059   0533   0928-D/C'd       HYDROmorphone (DILAUDID) injection 1 mg  Dose: 1 mg  Freq: Every 4 Hours PRN Route: IV  PRN Reason: Moderate Pain  Start: 01/05/24 0109 End: 01/05/24 0112   Admin Instructions:             LORazepam (ATIVAN) tablet 1 mg  Dose: 1 mg  Freq: Every 1 Hour PRN Route: PO  PRN Reason: Withdrawal  PRN Comment: For CIWA-Ar 8-10  Start: 01/04/24 2146 End: 01/11/24 2145   Admin Instructions:            Or  midazolam (VERSED) injection 2 mg  Dose: 2 mg  Freq: Every 1 Hour PRN Route: IV  PRN Comment: For CIWA-Ar 8-10  Start: 01/04/24 2146 End: 01/11/24 2145   Admin Instructions:            Or  LORazepam (ATIVAN) tablet 2 mg  Dose: 2 mg  Freq: Every 1 Hour PRN Route: PO  PRN Reason: Withdrawal  PRN Comment: For CIWA-Ar 11-15 or -160, DBP , -125  Start: 01/04/24 2146 End: 01/11/24 2145   Admin Instructions:            Or  midazolam (VERSED) injection 4 mg  Dose: 4 mg  Freq: Every 1 Hour PRN Route: IV  PRN Comment: For CIWA-Ar 11-15 or -160, DBP  , -125  Start: 01/04/24 2146 End: 01/11/24 2145   Admin Instructions:            Or  midazolam (VERSED) injection 4 mg  Dose: 4 mg  Freq: Every 15 Minutes PRN Route: IV  PRN Comment: For CIWA-Ar Greater Than 15 or SBP greater than 160, DBP greater than 110, or HR greater than 125  Start: 01/04/24 2146 End: 01/11/24 2145   Admin Instructions:            Or  midazolam (VERSED) injection 4 mg  Dose: 4 mg  Freq: Every 15 Minutes PRN Route: IM  PRN Comment: If Unable to Administer IV - For CIWA-Ar Greater Than 15 or SBP greater than 160, DBP greater than 110, or HR greater than 125  Start: 01/04/24 2146 End: 01/11/24 2145   Admin Instructions:            Magnesium Standard Dose Replacement - Follow Nurse / BPA Driven Protocol  Freq: As Needed Route: XX  PRN Reason: Other  Start: 01/04/24 2146   Admin Instructions:            nitroglycerin (NITROSTAT) SL tablet 0.4 mg  Dose: 0.4 mg  Freq: Every 5 Minutes PRN Route: SL  PRN Reason: Chest Pain  PRN Comment: Only if SBP Greater Than 100  Start: 01/04/24 2149 End: 01/05/24 1031   Admin Instructions:            ondansetron (ZOFRAN) injection 4 mg  Dose: 4 mg  Freq: Every 6 Hours PRN Route: IV  PRN Reasons: Nausea,Vomiting  Start: 01/05/24 1531   Admin Instructions:       0556   1445   2306       0824          ondansetron (ZOFRAN) injection 4 mg  Dose: 4 mg  Freq: Every 6 Hours PRN Route: IV  PRN Reasons: Nausea,Vomiting  Start: 01/05/24 0110 End: 01/05/24 1533   Admin Instructions:            oxyCODONE-acetaminophen (PERCOCET)  MG per tablet 1 tablet  Dose: 1 tablet  Freq: Every 4 Hours PRN Route: PO  PRN Reason: Moderate Pain  Start: 01/05/24 1531   Admin Instructions:       0003   0356   0826     1233   1735   2306      0334   0842   1326     2115        0420   0825         sodium chloride 0.9 % flush 10 mL  Dose: 10 mL  Freq: As Needed Route: IV  PRN Reason: Line Care  Start: 01/05/24 1530         sodium chloride 0.9 % flush 10 mL  Dose: 10 mL  Freq: As  Needed Route: IV  PRN Reason: Line Care  Start: 01/04/24 1813         sodium chloride 0.9 % infusion 40 mL  Dose: 40 mL  Freq: As Needed Route: IV  PRN Reason: Line Care  Start: 01/05/24 1530   Admin Instructions:

## 2024-01-08 NOTE — DISCHARGE SUMMARY
[facility]  [phone]    DISCHARGE SUMMARY  [facility]    Patient Identification:  Name: Piper Cain  Age: 43 y.o.  Sex: female  :  1980  MRN: [id number]  Primary Care Physician: Rachell Pozo APRN    Admit date: 2024  Discharge date and time:      Discharge Diagnoses:  Acute on chronic pancreatitis    Alcohol dependence in remission    Adrenal hemorrhage    Pancreatic pseudocyst    Acute abdominal pain       History of present illness from H&P:    This is a 43-year-old female, with history of anxiety, anemia, alcohol abuse, chronic hepatitis, presents to the hospital, with abdominal pain, nausea and vomiting, she has been diagnosed to have acute pancreatitis without infection.  Patient was admitted to hospitalist service for further workup of symptoms.  Patient does mention relief in her symptoms after pain medication administration.  Patient will be evaluated by GI service.     Hospital Course:     Acute on chronic pancreatitis  -The patient has been admitted to the hospital multiple times for pancreatitis.  The last time she was admitted to our service was in September.  And she has been admitted total of 5 times this year.  Patient's abdominal pain was on the left side of her abdomen.  She states it was slightly different than her prior episodes.  Patient's diet was slowly advanced and she is tolerating oral intake.  Pain is controlled with oral pain medication.  She is on pain medication chronically at home.  She receives 60 Percocet a month from Dr. Sapp.  I have called Dr. Sapp's office and have not received a call back yet as the patient will need slightly more Percocets for pain control in the next several days.  A new prescription has been written for 8.  She has follow-up with Dr. Sapp's office today.  Marquis report is reviewed.  Risks and benefits of pain medication have been discussed with the patient.  Narcan  prescription has been offered to the patient.  -Patient was seen by gastroenterology and they have agreed with discharge.     History of adrenal hemorrhage, and on CT scan there was concern for possible splenic hemorrhage.  However the patient's hemoglobin has remained stable during hospitalization blood pressure and heart rate have all remained stable.  Patient was seen in consultation by general surgery and she has been cleared for discharge.  She understands that she has worsening abdominal pain or is feeling lightheaded or tachycardic she needs to immediately come back to the emergency room.       The patient was seen and examined on the day of discharge.    Consults:   Consults       Date and Time Order Name Status Description    1/6/2024  5:38 PM Inpatient General Surgery Consult Completed     1/5/2024 12:16 AM Inpatient Gastroenterology Consult Completed     1/4/2024  9:14 PM [facility] (on-call MD unless specified) Details              Results from last 7 days   Lab Units 01/07/24  0926   WBC 10*3/mm3 4.58   HEMOGLOBIN g/dL 9.8*   HEMATOCRIT % 30.5*   PLATELETS 10*3/mm3 206       Results from last 7 days   Lab Units 01/07/24  0449   SODIUM mmol/L 140   POTASSIUM mmol/L 4.1   CHLORIDE mmol/L 103   CO2 mmol/L 24.3   BUN mg/dL 5*   CREATININE mg/dL 0.43*   GLUCOSE mg/dL 97   CALCIUM mg/dL 8.8       Significant Diagnostic Studies:   WBC   Date Value Ref Range Status   01/07/2024 4.58 3.40 - 10.80 10*3/mm3 Final     Hemoglobin   Date Value Ref Range Status   01/07/2024 9.8 (L) 12.0 - 15.9 g/dL Final     Hematocrit   Date Value Ref Range Status   01/07/2024 30.5 (L) 34.0 - 46.6 % Final     Platelets   Date Value Ref Range Status   01/07/2024 206 140 - 450 10*3/mm3 Final     Sodium   Date Value Ref Range Status   01/07/2024 140 136 - 145 mmol/L Final     Potassium   Date Value Ref Range Status   01/07/2024 4.1 3.5 - 5.2 mmol/L Final     Chloride   Date Value Ref Range Status   01/07/2024 103 98 - 107 mmol/L Final     CO2  "  Date Value Ref Range Status   01/07/2024 24.3 22.0 - 29.0 mmol/L Final     BUN   Date Value Ref Range Status   01/07/2024 5 (L) 6 - 20 mg/dL Final     Creatinine   Date Value Ref Range Status   01/07/2024 0.43 (L) 0.57 - 1.00 mg/dL Final     Glucose   Date Value Ref Range Status   01/07/2024 97 65 - 99 mg/dL Final     Calcium   Date Value Ref Range Status   01/07/2024 8.8 8.6 - 10.5 mg/dL Final     AST (SGOT)   Date Value Ref Range Status   01/07/2024 8 1 - 32 U/L Final     ALT (SGPT)   Date Value Ref Range Status   01/07/2024 10 1 - 33 U/L Final     Alkaline Phosphatase   Date Value Ref Range Status   01/07/2024 66 39 - 117 U/L Final     No results found for: \"APTT\", \"INR\"  No results found for: \"COLORU\", \"CLARITYU\", \"SPECGRAV\", \"PHUR\", \"PROTEINUR\", \"GLUCOSEU\", \"KETONESU\", \"BLOODU\", \"NITRITE\", \"LEUKOCYTESUR\", \"BILIRUBINUR\", \"UROBILINOGEN\", \"RBCUA\", \"WBCUA\", \"BACTERIA\", \"UACOMMENT\"  No results found for: \"TROPONINT\", \"TROPONINI\", \"BNP\"  No components found for: \"HGBA1C;2\"  No components found for: \"TSH;2\"    Imaging Results (All)       Procedure Component Value Units Date/Time    CT Abdomen Pelvis With Contrast [[id number]] Collected: 01/04/24 2045     Updated: 01/04/24 2103    Narrative:      CT ABDOMEN AND PELVIS WITH IV CONTRAST     HISTORY: Left side abdominal pain, tenderness to palpation. History of  pancreatitis.     TECHNIQUE:  CT includes axial imaging from the lung bases to the  trochanters with intravenous contrast and without use of oral contrast.  Data reconstructed in coronal and sagittal planes. Radiation dose  reduction techniques were utilized, including automated exposure control  and exposure modulation based on body size.     COMPARISON: CT abdomen and pelvis 09/05/2023, 08/22/2023.     FINDINGS: There is abnormal edema and stranding surrounding the  pancreatic body and tail consistent with acute pancreatitis. A  previously described pancreatic tail pseudocyst just medial to the  spleen and " anterior to the left kidney measures 3.5 x 4.3 cm which has  decreased in size from 9.9 x 7 cm on the previous exam. There is  progressive dilatation of the pancreatic duct within the pancreatic body  and proximal tail and the pancreatic duct measures 6 mm in diameter  versus 4.5 mm on the previous exam. There are pancreatic calcifications  involving the pancreatic head and uncinate process and proximal body  consistent with acute on chronic pancreatitis.     There is mild perisplenic fluid. Within the central to inferior spleen  there is a linear, coronally oriented hypodensity that measures 3.5 x  0.8 cm. This could represent a small splenic laceration or area of  infarction or interstitial extension of fluid from the pancreatic  pseudocyst. There is mild perisplenic fluid though the volume is not  significantly change compared to the previous exam.     Left kidney is partially compressed posteriorly from the cyst in the  region of the pancreatic tail. Right kidney appears normal. Gallbladder  is mostly decompressed. There is mild free fluid within the pelvis which  is increased in volume. There is new stranding and edema within the left  mid abdomen, particularly extending adjacent to the gastric wall and  along the greater curvature, as well as adjacent to the distal  transverse colon.       Impression:      1. Acute on chronic pancreatitis with new peripancreatic inflammation  and stranding particularly along the pancreatic body and tail and also  extending to the left abdomen. Pancreatic ductal dilatation is mildly  increased.  2. New linear low density within the spleen suspicious for a small  splenic laceration. The volume of fluid adjacent to the spleen is  similar to the prior exam, though if there are clinical signs of  hemorrhage, recommend short interval follow-up CT to assure stability.  There is increased ascites with free fluid extending into the pelvis  when compared to the previous study.  3. The  "previously demonstrated pancreatic pseudocyst in the region of  the pancreatic tail has decreased in size compared to the previous exam.     Radiation dose reduction techniques were utilized, including automated  exposure control and exposure modulation based on body size.        This report was finalized on 1/4/2024 9:00 PM by Dr. Angel Mcleod M.D on Workstation: [id number]           No results found for: \"SITE\", \"ALLENTEST\", \"PHART\", \"KON6LBW\", \"PO2ART\", \"JVA4CJL\", \"BASEEXCESS\", \"G0UYJINE\", \"HGBBG\", \"HCTABG\", \"OXYHEMOGLOBI\", \"METHHGBN\", \"CARBOXYHGB\", \"CO2CT\", \"BAROMETRIC\", \"MODALITY\", \"FIO2\"       Discharge Medications        New Medications        Instructions Start Date   Feosol 200 (65 Fe) MG tablet tablet  Generic drug: Ferrous Sulfate Dried   200 mg, Oral, 2 Times Daily      naloxone 4 MG/0.1ML nasal spray  Commonly known as: NARCAN   Call 911. Don't prime. [place] in 1 nostril for overdose. Repeat in 2-3 minutes in other nostril if no or minimal breathing/responsiveness.             Changes to Medications        Instructions Start Date   pantoprazole 40 MG EC tablet  Commonly known as: PROTONIX  What changed: when to take this   40 mg, Oral, Daily             Continue These Medications        Instructions Start Date   amitriptyline 50 MG tablet  Commonly known as: ELAVIL   50 mg, Oral, Nightly      folic acid 1 MG tablet  Commonly known as: FOLVITE   1,000 mcg, Oral, Daily      magnesium oxide 400 MG tablet  Commonly known as: MAG-OX   400 mg, Oral, Daily      ondansetron 4 MG tablet  Commonly known as: ZOFRAN   4 mg, Oral, Every 8 Hours PRN      oxyCODONE-acetaminophen  MG per tablet  Commonly known as: PERCOCET   1 tablet, Oral, Every 4 Hours PRN      Vitamin B1 100 MG tablet tablet   100 mg, Oral, Daily             Stop These Medications      pancrelipase (Lip-Prot-Amyl) 18755-61460 units capsule delayed-release particles capsule  Commonly known as: CREON     VITAMIN B 12 PO                Patient " Instructions:       No future appointments.      Follow-up Information       Rachell Pozo APRN .    Specialty: Family Medicine  Contact information:  [address] [facility] [address]  [facility] [address] [phone]                             Discharge Order (From admission, onward)       Start     Ordered    01/08/24 0840  Discharge patient  Once        Expected Discharge Date: 01/08/24   Discharge Disposition: Home or Self Care   Physician of Record for Attribution - Please select from Treatment Team: BLAKE EVERETT [[id number]]   Review needed by CMO to determine Physician of Record: No      Question Answer Comment   Physician of Record for Attribution - Please select from Treatment Team BLAKE EVERETT    Review needed by CMO to determine Physician of Record No        01/08/24 0857                    Diet Order   Procedures    Diet: Gastrointestinal Diets; Fiber-Restricted; Texture: Regular Texture (IDDSI 7); Fluid Consistency: Thin (IDDSI 0)       TEST  RESULTS PENDING AT DISCHARGE  Pending Labs       Order Current Status    Toxicology Screen, Serum In process                Total time spent discharging patient including evaluation, post hospitalization follow up,  medication and post hospitalization instructions and education, total time exceeds 30 minutes.    Signed:  Blake Everett MD  1/8/2024  08:57 EST

## 2024-01-08 NOTE — PLAN OF CARE
Goal Outcome Evaluation:  Plan of Care Reviewed With: patient   Patient is alert and oriented times four. Up as tolerated in her room Complains of abdominal pain as a 7/10. She is tolerating her gi diet

## 2024-01-08 NOTE — PROGRESS NOTES
" [facility]     Progress Note    Patient Name: Piper Cain  : 1980  MRN: [id number]  Primary Care Physician:  Rachell Pozo APRN  Date of admission: 2024    Subjective   Subjective     Chief Complaint: pain in abdomen, pancreatitis    History of Present Illness  Patient Reports feeling better.  Wants \"bridge \" rx of pain meds to last until 1/15.  Seeing pain doctor this afternoon.  No nausea or vomiting     Review of Systems   Gastrointestinal:  Positive for abdominal pain.       Objective   Objective     Vitals:   Temp:  [97.2 °F (36.2 °C)-98.6 °F (37 °C)] 97.2 °F (36.2 °C)  Heart Rate:  [78-88] 78  Resp:  [16-18] 16  BP: (109-130)/(75-97) 113/75    Physical Exam  HENT:      Right Ear: External ear normal.      Left Ear: External ear normal.      Mouth/Throat:      Pharynx: Oropharynx is clear.   Abdominal:      Tenderness: There is abdominal tenderness. There is no guarding or rebound.   Skin:     General: Skin is warm and dry.   Neurological:      Mental Status: She is alert.   Psychiatric:         Mood and Affect: Mood normal.          Result Review    Result Review:  I have personally reviewed the results from the time of this admission to 2024 08:01 EST and agree with these findings:  []  Laboratory list / accordion  []  Microbiology  []  Radiology  []  EKG/Telemetry   []  Cardiology/Vascular   []  Pathology  []  Old records  []  Other:  Most notable findings include:       Assessment & Plan   Assessment / Plan     Brief Patient Summary:  Piper Cain is a 43 y.o. female who   Acute on chronic pancreatitis  Pancreatic pseudocyst      Active Hospital Problems:  Active Hospital Problems    Diagnosis     **Acute on chronic pancreatitis     Acute abdominal pain     Pancreatic pseudocyst     Adrenal hemorrhage     Alcohol dependence in remission      Plan:ok to discharge  Advised patient admitting MD can manage any narcotic bridge per his discretion  To see her pain management md " this afternoon  Followup with Janiya or his NP in a few weeks.       DVT prophylaxis:  Mechanical DVT prophylaxis orders are present.    CODE STATUS:    Code Status (Patient has no pulse and is not breathing): CPR (Attempt to Resuscitate)  Medical Interventions (Patient has pulse or is breathing): Full Support    Disposition:  I expect patient to be discharged today.    Gilberto Soria MD

## 2024-01-08 NOTE — OUTREACH NOTE
Prep Survey      Flowsheet Row Responses   [facility] patient discharged from? [place]   Is LACE score < 7 ? No   Eligibility Readm Mgmt   Discharge diagnosis Acute on chronic pancreatitis   Does the patient have one of the following disease processes/diagnoses(primary or secondary)? Other   Does the patient have Home health ordered? No   Is there a DME ordered? No   Prep survey completed? Yes            Elyse YBARRA - Registered Nurse

## 2024-01-09 LAB
ACETONE SERPL-MCNC: <0.01 G/DL (ref 0–0.01)
BUTALBITAL SERPL-MCNC: <1 UG/ML (ref 1–10)
CHLORDIAZEP SERPL-MCNC: <0.1 UG/ML (ref 0.1–0.9)
DIAZEPAM SERPL-MCNC: <0.1 UG/ML (ref 0.1–0.9)
ETHANOL BLD GC-MCNC: <0.01 G/DL (ref 0–0.01)
ISOPROPANOL SERPL-MCNC: <0.01 G/DL (ref 0–0.01)
Lab: ABNORMAL
METHANOL SERPL-MCNC: <0.01 G/DL (ref 0–0.01)
NORCHLORDIAZEP SERPL-MCNC: <0.1 UG/ML (ref 0.1–0.6)
NORDIAZEPAM SERPL-MCNC: <0.1 UG/ML (ref 0.1–1.4)
PENTOBARB SERPL-MCNC: <1 UG/ML (ref 1–5)
PHENOBARB SERPL-MCNC: <1 UG/ML (ref 15–40)
SALICYLATES SERPL-MCNC: ABNORMAL UG/ML (ref 30–250)

## 2024-01-09 NOTE — PROGRESS NOTES
Case Management Discharge Note      Final Note: Discharged to home on 1/8/24. CHRISTOPH Angela RN, CCP         Selected Continued Care - Discharged on 1/8/2024 Admission date: 1/4/2024 - Discharge disposition: Home or Self Care      Destination    No services have been selected for the patient.                Durable Medical Equipment    No services have been selected for the patient.                Dialysis/Infusion    No services have been selected for the patient.                Home Medical Care    No services have been selected for the patient.                Therapy    No services have been selected for the patient.                Community Resources    No services have been selected for the patient.                Community & DME    No services have been selected for the patient.                    Transportation Services  Private: Car    Final Discharge Disposition Code: 01 - home or self-care

## 2024-01-10 ENCOUNTER — OFFICE (OUTPATIENT)
Dept: URBAN - METROPOLITAN AREA CLINIC 64 | Facility: CLINIC | Age: 44
End: 2024-01-10
Payer: COMMERCIAL

## 2024-01-10 VITALS
WEIGHT: 135 LBS | SYSTOLIC BLOOD PRESSURE: 107 MMHG | HEIGHT: 71 IN | DIASTOLIC BLOOD PRESSURE: 77 MMHG | HEART RATE: 101 BPM

## 2024-01-10 DIAGNOSIS — R12 HEARTBURN: ICD-10-CM

## 2024-01-10 DIAGNOSIS — R93.3 ABNORMAL FINDINGS ON DIAGNOSTIC IMAGING OF OTHER PARTS OF DI: ICD-10-CM

## 2024-01-10 DIAGNOSIS — K85.90 ACUTE PANCREATITIS WITHOUT NECROSIS OR INFECTION, UNSPECIFIE: ICD-10-CM

## 2024-01-10 DIAGNOSIS — D64.9 ANEMIA, UNSPECIFIED: ICD-10-CM

## 2024-01-10 DIAGNOSIS — K86.3 PSEUDOCYST OF PANCREAS: ICD-10-CM

## 2024-01-10 DIAGNOSIS — R11.0 NAUSEA: ICD-10-CM

## 2024-01-10 PROCEDURE — 99214 OFFICE O/P EST MOD 30 MIN: CPT | Performed by: NURSE PRACTITIONER

## 2024-01-10 RX ORDER — ONDANSETRON 8 MG/1
24 TABLET, ORALLY DISINTEGRATING ORAL
Qty: 60 | Refills: 6 | Status: ACTIVE
Start: 2024-01-10

## 2024-01-10 RX ORDER — ESOMEPRAZOLE MAGNESIUM 20 MG/1
CAPSULE, DELAYED RELEASE ORAL
Qty: 180 | Refills: 3 | Status: COMPLETED
Start: 2024-01-10 | End: 2024-03-11

## 2024-01-10 RX ORDER — FAMOTIDINE 40 MG/1
40 TABLET, FILM COATED ORAL
Qty: 90 | Refills: 3 | Status: ACTIVE
Start: 2024-01-10

## 2024-01-12 ENCOUNTER — READMISSION MANAGEMENT (OUTPATIENT)
Dept: CALL CENTER | Facility: HOSPITAL | Age: 44
End: 2024-01-12
Payer: MEDICAID

## 2024-01-12 NOTE — OUTREACH NOTE
Medical Week 1 Survey      Flowsheet Row Responses   [facility] patient discharged from? [place]   Does the patient have one of the following disease processes/diagnoses(primary or secondary)? Other   Week 1 attempt successful? No   Unsuccessful attempts Attempt 1            Shila Chacon Licensed Nurse

## 2024-01-17 ENCOUNTER — READMISSION MANAGEMENT (OUTPATIENT)
Dept: CALL CENTER | Facility: HOSPITAL | Age: 44
End: 2024-01-17
Payer: MEDICAID

## 2024-01-17 NOTE — OUTREACH NOTE
Medical Week 1 Survey      Flowsheet Row Responses   [facility] patient discharged from? [place]   Does the patient have one of the following disease processes/diagnoses(primary or secondary)? Other   Week 1 attempt successful? No   Unsuccessful attempts Attempt 2            Elyse YBARRA - Registered Nurse

## 2024-01-19 ENCOUNTER — READMISSION MANAGEMENT (OUTPATIENT)
Dept: CALL CENTER | Facility: HOSPITAL | Age: 44
End: 2024-01-19
Payer: MEDICAID

## 2024-01-19 NOTE — OUTREACH NOTE
Medical Week 1 Survey      Flowsheet Row Responses   [facility] [place] patient discharged from? [place]   Does the patient have one of the following disease processes/diagnoses(primary or secondary)? Other   Week 1 attempt successful? No   Unsuccessful attempts Attempt 3            Dioni SAMSON - Registered Nurse

## 2024-01-21 ENCOUNTER — APPOINTMENT (OUTPATIENT)
Dept: CT IMAGING | Facility: HOSPITAL | Age: 44
End: 2024-01-21
Payer: MEDICAID

## 2024-01-21 ENCOUNTER — APPOINTMENT (OUTPATIENT)
Dept: GENERAL RADIOLOGY | Facility: HOSPITAL | Age: 44
End: 2024-01-21
Payer: MEDICAID

## 2024-01-21 ENCOUNTER — HOSPITAL ENCOUNTER (INPATIENT)
Facility: HOSPITAL | Age: 44
LOS: 4 days | Discharge: HOME | End: 2024-01-25
Attending: EMERGENCY MEDICINE | Admitting: INTERNAL MEDICINE
Payer: MEDICAID

## 2024-01-21 DIAGNOSIS — A49.9 UTI (URINARY TRACT INFECTION), BACTERIAL: ICD-10-CM

## 2024-01-21 DIAGNOSIS — K85.90 ACUTE ON CHRONIC PANCREATITIS: ICD-10-CM

## 2024-01-21 DIAGNOSIS — R10.12 LEFT UPPER QUADRANT ABDOMINAL PAIN: ICD-10-CM

## 2024-01-21 DIAGNOSIS — S36.039D LACERATION OF SPLEEN, SUBSEQUENT ENCOUNTER: ICD-10-CM

## 2024-01-21 DIAGNOSIS — S36.039A LACERATION OF SPLEEN, INITIAL ENCOUNTER: Primary | ICD-10-CM

## 2024-01-21 DIAGNOSIS — N39.0 UTI (URINARY TRACT INFECTION), BACTERIAL: ICD-10-CM

## 2024-01-21 DIAGNOSIS — K86.1 ACUTE ON CHRONIC PANCREATITIS: ICD-10-CM

## 2024-01-21 DIAGNOSIS — Q89.01 FUNCTIONAL ASPLENIA: ICD-10-CM

## 2024-01-21 DIAGNOSIS — K86.0 ALCOHOL-INDUCED CHRONIC PANCREATITIS: ICD-10-CM

## 2024-01-21 DIAGNOSIS — J90 PLEURAL EFFUSION ON LEFT: ICD-10-CM

## 2024-01-21 DIAGNOSIS — I82.890 SPLENIC VEIN THROMBOSIS: ICD-10-CM

## 2024-01-21 LAB
ABO GROUP BLD: NORMAL
ALBUMIN SERPL-MCNC: 3.6 G/DL (ref 3.5–5.2)
ALBUMIN/GLOB SERPL: 1.4 G/DL
ALP SERPL-CCNC: 91 U/L (ref 39–117)
ALT SERPL W P-5'-P-CCNC: <5 U/L (ref 1–33)
AMPHET+METHAMPHET UR QL: NEGATIVE
AMPHETAMINES UR QL: NEGATIVE
ANION GAP SERPL CALCULATED.3IONS-SCNC: 12.8 MMOL/L (ref 5–15)
AST SERPL-CCNC: 5 U/L (ref 1–32)
BACTERIA UR QL AUTO: ABNORMAL /HPF
BARBITURATES UR QL SCN: NEGATIVE
BASOPHILS # BLD AUTO: 0.02 10*3/MM3 (ref 0–0.2)
BASOPHILS NFR BLD AUTO: 0.2 % (ref 0–1.5)
BENZODIAZ UR QL SCN: NEGATIVE
BILIRUB SERPL-MCNC: 0.3 MG/DL (ref 0–1.2)
BILIRUB UR QL STRIP: NEGATIVE
BLD GP AB SCN SERPL QL: NEGATIVE
BUN SERPL-MCNC: 5 MG/DL (ref 6–20)
BUN/CREAT SERPL: 8.3 (ref 7–25)
BUPRENORPHINE SERPL-MCNC: NEGATIVE NG/ML
CALCIUM SPEC-SCNC: 8.8 MG/DL (ref 8.6–10.5)
CANNABINOIDS SERPL QL: NEGATIVE
CHLORIDE SERPL-SCNC: 96 MMOL/L (ref 98–107)
CLARITY UR: ABNORMAL
CO2 SERPL-SCNC: 28.2 MMOL/L (ref 22–29)
COCAINE UR QL: NEGATIVE
COLOR UR: YELLOW
CREAT SERPL-MCNC: 0.6 MG/DL (ref 0.57–1)
D-LACTATE SERPL-SCNC: 0.8 MMOL/L (ref 0.5–2)
DEPRECATED RDW RBC AUTO: 48.3 FL (ref 37–54)
DEPRECATED RDW RBC AUTO: 52.1 FL (ref 37–54)
EGFRCR SERPLBLD CKD-EPI 2021: 115.8 ML/MIN/1.73
EOSINOPHIL # BLD AUTO: 0.42 10*3/MM3 (ref 0–0.4)
EOSINOPHIL NFR BLD AUTO: 3.7 % (ref 0.3–6.2)
ERYTHROCYTE [DISTWIDTH] IN BLOOD BY AUTOMATED COUNT: 13.2 % (ref 12.3–15.4)
ERYTHROCYTE [DISTWIDTH] IN BLOOD BY AUTOMATED COUNT: 13.8 % (ref 12.3–15.4)
ETHANOL BLD-MCNC: <10 MG/DL (ref 0–10)
ETHANOL UR QL: <0.01 %
GLOBULIN UR ELPH-MCNC: 2.6 GM/DL
GLUCOSE BLDC GLUCOMTR-MCNC: 129 MG/DL (ref 70–130)
GLUCOSE BLDC GLUCOMTR-MCNC: 134 MG/DL (ref 70–130)
GLUCOSE SERPL-MCNC: 104 MG/DL (ref 65–99)
GLUCOSE UR STRIP-MCNC: NEGATIVE MG/DL
HCT VFR BLD AUTO: 21.9 % (ref 34–46.6)
HCT VFR BLD AUTO: 24.9 % (ref 34–46.6)
HCT VFR BLD AUTO: 30.7 % (ref 34–46.6)
HGB BLD-MCNC: 10 G/DL (ref 12–15.9)
HGB BLD-MCNC: 7.2 G/DL (ref 12–15.9)
HGB BLD-MCNC: 8.2 G/DL (ref 12–15.9)
HGB UR QL STRIP.AUTO: ABNORMAL
HOLD SPECIMEN: NORMAL
HYALINE CASTS UR QL AUTO: ABNORMAL /LPF
IMM GRANULOCYTES # BLD AUTO: 0.02 10*3/MM3 (ref 0–0.05)
IMM GRANULOCYTES NFR BLD AUTO: 0.2 % (ref 0–0.5)
INR PPP: 1.2
KETONES UR QL STRIP: NEGATIVE
LEUKOCYTE ESTERASE UR QL STRIP.AUTO: ABNORMAL
LIPASE SERPL-CCNC: 123 U/L (ref 13–60)
LYMPHOCYTES # BLD AUTO: 2.46 10*3/MM3 (ref 0.7–3.1)
LYMPHOCYTES NFR BLD AUTO: 21.7 % (ref 19.6–45.3)
MAGNESIUM SERPL-MCNC: 1.5 MG/DL (ref 1.6–2.6)
MCH RBC QN AUTO: 33.1 PG (ref 26.6–33)
MCH RBC QN AUTO: 33.6 PG (ref 26.6–33)
MCHC RBC AUTO-ENTMCNC: 32.6 G/DL (ref 31.5–35.7)
MCHC RBC AUTO-ENTMCNC: 32.9 G/DL (ref 31.5–35.7)
MCV RBC AUTO: 100.4 FL (ref 79–97)
MCV RBC AUTO: 103 FL (ref 79–97)
METHADONE UR QL SCN: NEGATIVE
MONOCYTES # BLD AUTO: 1.19 10*3/MM3 (ref 0.1–0.9)
MONOCYTES NFR BLD AUTO: 10.5 % (ref 5–12)
NEUTROPHILS NFR BLD AUTO: 63.7 % (ref 42.7–76)
NEUTROPHILS NFR BLD AUTO: 7.24 10*3/MM3 (ref 1.7–7)
NITRITE UR QL STRIP: POSITIVE
OPIATES UR QL: NEGATIVE
OXYCODONE UR QL SCN: POSITIVE
PCP UR QL SCN: NEGATIVE
PH UR STRIP.AUTO: 7 [PH] (ref 5–8)
PLATELET # BLD AUTO: 415 10*3/MM3 (ref 140–450)
PLATELET # BLD AUTO: 442 10*3/MM3 (ref 140–450)
PMV BLD AUTO: 8.9 FL (ref 6–12)
PMV BLD AUTO: 9.3 FL (ref 6–12)
POTASSIUM SERPL-SCNC: 3.8 MMOL/L (ref 3.5–5.2)
PROT SERPL-MCNC: 6.2 G/DL (ref 6–8.5)
PROT UR QL STRIP: NEGATIVE
PROTHROMBIN TIME: 13.2 SECONDS (ref 11–15)
RBC # BLD AUTO: 2.48 10*6/MM3 (ref 3.77–5.28)
RBC # BLD AUTO: 2.98 10*6/MM3 (ref 3.77–5.28)
RBC # UR STRIP: ABNORMAL /HPF
REF LAB TEST METHOD: ABNORMAL
RH BLD: POSITIVE
SODIUM SERPL-SCNC: 137 MMOL/L (ref 136–145)
SP GR UR STRIP: 1.01 (ref 1–1.03)
SQUAMOUS #/AREA URNS HPF: ABNORMAL /HPF
T&S EXPIRATION DATE: NORMAL
TRICYCLICS UR QL SCN: POSITIVE
TROPONIN T SERPL HS-MCNC: <6 NG/L
UROBILINOGEN UR QL STRIP: ABNORMAL
WBC # UR STRIP: ABNORMAL /HPF
WBC NRBC COR # BLD AUTO: 11.35 10*3/MM3 (ref 3.4–10.8)
WBC NRBC COR # BLD AUTO: 9.71 10*3/MM3 (ref 3.4–10.8)

## 2024-01-21 PROCEDURE — 85018 HEMOGLOBIN: CPT | Performed by: INTERNAL MEDICINE

## 2024-01-21 PROCEDURE — 83605 ASSAY OF LACTIC ACID: CPT | Performed by: EMERGENCY MEDICINE

## 2024-01-21 PROCEDURE — 99285 EMERGENCY DEPT VISIT HI MDM: CPT

## 2024-01-21 PROCEDURE — 25010000002 CEFTRIAXONE PER 250 MG: Performed by: EMERGENCY MEDICINE

## 2024-01-21 PROCEDURE — 25010000002 FENTANYL CITRATE (PF) 50 MCG/ML SOLUTION: Performed by: EMERGENCY MEDICINE

## 2024-01-21 PROCEDURE — 36430 TRANSFUSION BLD/BLD COMPNT: CPT

## 2024-01-21 PROCEDURE — 93005 ELECTROCARDIOGRAM TRACING: CPT | Performed by: EMERGENCY MEDICINE

## 2024-01-21 PROCEDURE — 25010000002 MAGNESIUM SULFATE 2 GM/50ML SOLUTION: Performed by: EMERGENCY MEDICINE

## 2024-01-21 PROCEDURE — 86923 COMPATIBILITY TEST ELECTRIC: CPT

## 2024-01-21 PROCEDURE — 84484 ASSAY OF TROPONIN QUANT: CPT | Performed by: EMERGENCY MEDICINE

## 2024-01-21 PROCEDURE — 25010000002 HYDROMORPHONE PER 4 MG: Performed by: EMERGENCY MEDICINE

## 2024-01-21 PROCEDURE — 87040 BLOOD CULTURE FOR BACTERIA: CPT | Performed by: EMERGENCY MEDICINE

## 2024-01-21 PROCEDURE — 82948 REAGENT STRIP/BLOOD GLUCOSE: CPT

## 2024-01-21 PROCEDURE — 85027 COMPLETE CBC AUTOMATED: CPT | Performed by: INTERNAL MEDICINE

## 2024-01-21 PROCEDURE — 25010000002 HYDROMORPHONE 1 MG/ML SOLUTION: Performed by: INTERNAL MEDICINE

## 2024-01-21 PROCEDURE — 86900 BLOOD TYPING SEROLOGIC ABO: CPT

## 2024-01-21 PROCEDURE — 81001 URINALYSIS AUTO W/SCOPE: CPT | Performed by: EMERGENCY MEDICINE

## 2024-01-21 PROCEDURE — 86900 BLOOD TYPING SEROLOGIC ABO: CPT | Performed by: STUDENT IN AN ORGANIZED HEALTH CARE EDUCATION/TRAINING PROGRAM

## 2024-01-21 PROCEDURE — 80053 COMPREHEN METABOLIC PANEL: CPT | Performed by: EMERGENCY MEDICINE

## 2024-01-21 PROCEDURE — 85610 PROTHROMBIN TIME: CPT

## 2024-01-21 PROCEDURE — 85014 HEMATOCRIT: CPT | Performed by: INTERNAL MEDICINE

## 2024-01-21 PROCEDURE — 25810000003 SODIUM CHLORIDE 0.9 % SOLUTION: Performed by: EMERGENCY MEDICINE

## 2024-01-21 PROCEDURE — 86850 RBC ANTIBODY SCREEN: CPT | Performed by: STUDENT IN AN ORGANIZED HEALTH CARE EDUCATION/TRAINING PROGRAM

## 2024-01-21 PROCEDURE — 99223 1ST HOSP IP/OBS HIGH 75: CPT | Performed by: STUDENT IN AN ORGANIZED HEALTH CARE EDUCATION/TRAINING PROGRAM

## 2024-01-21 PROCEDURE — 36415 COLL VENOUS BLD VENIPUNCTURE: CPT

## 2024-01-21 PROCEDURE — 71045 X-RAY EXAM CHEST 1 VIEW: CPT

## 2024-01-21 PROCEDURE — 85025 COMPLETE CBC W/AUTO DIFF WBC: CPT | Performed by: EMERGENCY MEDICINE

## 2024-01-21 PROCEDURE — 86901 BLOOD TYPING SEROLOGIC RH(D): CPT

## 2024-01-21 PROCEDURE — 25010000002 HYDROMORPHONE 1 MG/ML SOLUTION: Performed by: EMERGENCY MEDICINE

## 2024-01-21 PROCEDURE — 83735 ASSAY OF MAGNESIUM: CPT | Performed by: EMERGENCY MEDICINE

## 2024-01-21 PROCEDURE — 74177 CT ABD & PELVIS W/CONTRAST: CPT

## 2024-01-21 PROCEDURE — 80306 DRUG TEST PRSMV INSTRMNT: CPT | Performed by: EMERGENCY MEDICINE

## 2024-01-21 PROCEDURE — 82077 ASSAY SPEC XCP UR&BREATH IA: CPT | Performed by: EMERGENCY MEDICINE

## 2024-01-21 PROCEDURE — 99285 EMERGENCY DEPT VISIT HI MDM: CPT | Performed by: EMERGENCY MEDICINE

## 2024-01-21 PROCEDURE — 86901 BLOOD TYPING SEROLOGIC RH(D): CPT | Performed by: STUDENT IN AN ORGANIZED HEALTH CARE EDUCATION/TRAINING PROGRAM

## 2024-01-21 PROCEDURE — 93010 ELECTROCARDIOGRAM REPORT: CPT | Performed by: INTERNAL MEDICINE

## 2024-01-21 PROCEDURE — 25510000001 IOPAMIDOL PER 1 ML: Performed by: EMERGENCY MEDICINE

## 2024-01-21 PROCEDURE — 83690 ASSAY OF LIPASE: CPT | Performed by: EMERGENCY MEDICINE

## 2024-01-21 PROCEDURE — 25010000002 ONDANSETRON PER 1 MG: Performed by: EMERGENCY MEDICINE

## 2024-01-21 PROCEDURE — P9016 RBC LEUKOCYTES REDUCED: HCPCS

## 2024-01-21 RX ORDER — NITROGLYCERIN 0.4 MG/1
0.4 TABLET SUBLINGUAL
Status: DISCONTINUED | OUTPATIENT
Start: 2024-01-21 | End: 2024-01-25 | Stop reason: HOSPADM

## 2024-01-21 RX ORDER — SODIUM CHLORIDE 9 MG/ML
40 INJECTION, SOLUTION INTRAVENOUS AS NEEDED
Status: DISCONTINUED | OUTPATIENT
Start: 2024-01-21 | End: 2024-01-25 | Stop reason: HOSPADM

## 2024-01-21 RX ORDER — SODIUM CHLORIDE 0.9 % (FLUSH) 0.9 %
10 SYRINGE (ML) INJECTION EVERY 12 HOURS SCHEDULED
Status: DISCONTINUED | OUTPATIENT
Start: 2024-01-21 | End: 2024-01-25 | Stop reason: HOSPADM

## 2024-01-21 RX ORDER — ACETAMINOPHEN 650 MG/1
650 SUPPOSITORY RECTAL EVERY 4 HOURS PRN
Status: DISCONTINUED | OUTPATIENT
Start: 2024-01-21 | End: 2024-01-25 | Stop reason: HOSPADM

## 2024-01-21 RX ORDER — POLYETHYLENE GLYCOL 3350 17 G/17G
17 POWDER, FOR SOLUTION ORAL DAILY PRN
Status: DISCONTINUED | OUTPATIENT
Start: 2024-01-21 | End: 2024-01-25 | Stop reason: HOSPADM

## 2024-01-21 RX ORDER — PANTOPRAZOLE SODIUM 40 MG/10ML
40 INJECTION, POWDER, LYOPHILIZED, FOR SOLUTION INTRAVENOUS ONCE
Status: COMPLETED | OUTPATIENT
Start: 2024-01-21 | End: 2024-01-21

## 2024-01-21 RX ORDER — MAGNESIUM SULFATE HEPTAHYDRATE 40 MG/ML
2 INJECTION, SOLUTION INTRAVENOUS ONCE
Status: COMPLETED | OUTPATIENT
Start: 2024-01-21 | End: 2024-01-21

## 2024-01-21 RX ORDER — SODIUM CHLORIDE 0.9 % (FLUSH) 0.9 %
10 SYRINGE (ML) INJECTION AS NEEDED
Status: DISCONTINUED | OUTPATIENT
Start: 2024-01-21 | End: 2024-01-25 | Stop reason: HOSPADM

## 2024-01-21 RX ORDER — FENTANYL CITRATE 50 UG/ML
50 INJECTION, SOLUTION INTRAMUSCULAR; INTRAVENOUS ONCE
Status: COMPLETED | OUTPATIENT
Start: 2024-01-21 | End: 2024-01-21

## 2024-01-21 RX ORDER — ONDANSETRON 2 MG/ML
4 INJECTION INTRAMUSCULAR; INTRAVENOUS ONCE
Status: COMPLETED | OUTPATIENT
Start: 2024-01-21 | End: 2024-01-21

## 2024-01-21 RX ORDER — OXYCODONE HYDROCHLORIDE AND ACETAMINOPHEN 5; 325 MG/1; MG/1
1 TABLET ORAL EVERY 4 HOURS PRN
Status: DISCONTINUED | OUTPATIENT
Start: 2024-01-21 | End: 2024-01-24

## 2024-01-21 RX ORDER — HYDROMORPHONE HYDROCHLORIDE 1 MG/ML
0.5 INJECTION, SOLUTION INTRAMUSCULAR; INTRAVENOUS; SUBCUTANEOUS ONCE
Status: COMPLETED | OUTPATIENT
Start: 2024-01-21 | End: 2024-01-21

## 2024-01-21 RX ORDER — FENTANYL CITRATE 50 UG/ML
100 INJECTION, SOLUTION INTRAMUSCULAR; INTRAVENOUS ONCE
Status: COMPLETED | OUTPATIENT
Start: 2024-01-21 | End: 2024-01-21

## 2024-01-21 RX ORDER — ONDANSETRON 2 MG/ML
4 INJECTION INTRAMUSCULAR; INTRAVENOUS EVERY 6 HOURS PRN
Status: DISCONTINUED | OUTPATIENT
Start: 2024-01-21 | End: 2024-01-25 | Stop reason: HOSPADM

## 2024-01-21 RX ORDER — BISACODYL 5 MG/1
5 TABLET, DELAYED RELEASE ORAL DAILY PRN
Status: DISCONTINUED | OUTPATIENT
Start: 2024-01-21 | End: 2024-01-25 | Stop reason: HOSPADM

## 2024-01-21 RX ORDER — AMOXICILLIN 250 MG
2 CAPSULE ORAL 2 TIMES DAILY
Status: DISCONTINUED | OUTPATIENT
Start: 2024-01-21 | End: 2024-01-25 | Stop reason: HOSPADM

## 2024-01-21 RX ORDER — DEXTROSE AND SODIUM CHLORIDE 5; .45 G/100ML; G/100ML
125 INJECTION, SOLUTION INTRAVENOUS CONTINUOUS
Status: DISCONTINUED | OUTPATIENT
Start: 2024-01-21 | End: 2024-01-23

## 2024-01-21 RX ORDER — ACETAMINOPHEN 325 MG/1
650 TABLET ORAL EVERY 4 HOURS PRN
Status: DISCONTINUED | OUTPATIENT
Start: 2024-01-21 | End: 2024-01-25 | Stop reason: HOSPADM

## 2024-01-21 RX ORDER — ALUMINA, MAGNESIA, AND SIMETHICONE 2400; 2400; 240 MG/30ML; MG/30ML; MG/30ML
15 SUSPENSION ORAL EVERY 6 HOURS PRN
Status: DISCONTINUED | OUTPATIENT
Start: 2024-01-21 | End: 2024-01-25 | Stop reason: HOSPADM

## 2024-01-21 RX ORDER — BISACODYL 10 MG
10 SUPPOSITORY, RECTAL RECTAL DAILY PRN
Status: DISCONTINUED | OUTPATIENT
Start: 2024-01-21 | End: 2024-01-25 | Stop reason: HOSPADM

## 2024-01-21 RX ORDER — ONDANSETRON 4 MG/1
4 TABLET, ORALLY DISINTEGRATING ORAL EVERY 6 HOURS PRN
Status: DISCONTINUED | OUTPATIENT
Start: 2024-01-21 | End: 2024-01-25 | Stop reason: HOSPADM

## 2024-01-21 RX ADMIN — Medication 10 ML: at 21:06

## 2024-01-21 RX ADMIN — ONDANSETRON HYDROCHLORIDE 4 MG: 2 INJECTION, SOLUTION INTRAMUSCULAR; INTRAVENOUS at 06:30

## 2024-01-21 RX ADMIN — HYDROMORPHONE HYDROCHLORIDE 1 MG: 1 INJECTION, SOLUTION INTRAMUSCULAR; INTRAVENOUS; SUBCUTANEOUS at 16:37

## 2024-01-21 RX ADMIN — HYDROMORPHONE HYDROCHLORIDE 0.5 MG: 1 INJECTION, SOLUTION INTRAMUSCULAR; INTRAVENOUS; SUBCUTANEOUS at 06:29

## 2024-01-21 RX ADMIN — DEXTROSE AND SODIUM CHLORIDE 125 ML/HR: 5; 450 INJECTION, SOLUTION INTRAVENOUS at 13:25

## 2024-01-21 RX ADMIN — FENTANYL CITRATE 50 MCG: 0.05 INJECTION, SOLUTION INTRAMUSCULAR; INTRAVENOUS at 10:57

## 2024-01-21 RX ADMIN — OXYCODONE AND ACETAMINOPHEN 1 TABLET: 5; 325 TABLET ORAL at 23:49

## 2024-01-21 RX ADMIN — HYDROMORPHONE HYDROCHLORIDE 1 MG: 1 INJECTION, SOLUTION INTRAMUSCULAR; INTRAVENOUS; SUBCUTANEOUS at 19:50

## 2024-01-21 RX ADMIN — HYDROMORPHONE HYDROCHLORIDE 1 MG: 1 INJECTION, SOLUTION INTRAMUSCULAR; INTRAVENOUS; SUBCUTANEOUS at 14:22

## 2024-01-21 RX ADMIN — MAGNESIUM SULFATE HEPTAHYDRATE 2 G: 40 INJECTION, SOLUTION INTRAVENOUS at 08:48

## 2024-01-21 RX ADMIN — HYDROMORPHONE HYDROCHLORIDE 1 MG: 1 INJECTION, SOLUTION INTRAMUSCULAR; INTRAVENOUS; SUBCUTANEOUS at 23:49

## 2024-01-21 RX ADMIN — PANTOPRAZOLE SODIUM 40 MG: 40 INJECTION, POWDER, FOR SOLUTION INTRAVENOUS at 06:36

## 2024-01-21 RX ADMIN — SENNOSIDES AND DOCUSATE SODIUM 2 TABLET: 50; 8.6 TABLET ORAL at 14:16

## 2024-01-21 RX ADMIN — HYDROMORPHONE HYDROCHLORIDE 1 MG: 1 INJECTION, SOLUTION INTRAMUSCULAR; INTRAVENOUS; SUBCUTANEOUS at 21:47

## 2024-01-21 RX ADMIN — OXYCODONE AND ACETAMINOPHEN 1 TABLET: 5; 325 TABLET ORAL at 19:50

## 2024-01-21 RX ADMIN — Medication 10 ML: at 14:16

## 2024-01-21 RX ADMIN — HYDROMORPHONE HYDROCHLORIDE 1 MG: 1 INJECTION, SOLUTION INTRAMUSCULAR; INTRAVENOUS; SUBCUTANEOUS at 07:13

## 2024-01-21 RX ADMIN — IOPAMIDOL 85 ML: 755 INJECTION, SOLUTION INTRAVENOUS at 08:27

## 2024-01-21 RX ADMIN — FENTANYL CITRATE 100 MCG: 0.05 INJECTION, SOLUTION INTRAMUSCULAR; INTRAVENOUS at 08:49

## 2024-01-21 RX ADMIN — SODIUM CHLORIDE 1000 ML: 9 INJECTION, SOLUTION INTRAVENOUS at 06:31

## 2024-01-21 RX ADMIN — OXYCODONE AND ACETAMINOPHEN 1 TABLET: 5; 325 TABLET ORAL at 15:12

## 2024-01-21 RX ADMIN — CEFTRIAXONE SODIUM 2000 MG: 2 INJECTION, POWDER, FOR SOLUTION INTRAMUSCULAR; INTRAVENOUS at 08:28

## 2024-01-21 NOTE — H&P
.     Admission Note    Patient Identification:  Piper Cain  43 y.o.  female  1980  [id number]            CC: pain  History of Present Illness:  Patient is a 43-year-old who presented to the outside hospital ER with left-sided shoulder and flank left upper quadrant pain.  She felt as though symptoms were fairly severe they are progressive.  Associated difficulty taking deep breaths secondary to pain.  No emesis no nausea no fever no chills.  No lethargy.  She is awake and alert.  Biggest complaint is pain.  [facility] the abdomen pelvis revealed a 10 cm splenic hematoma.  Also showed a very small left-sided pleural effusion.  It was requested to be admitted to the ICU after discussion with general surgery who plans to manage the splenic hematoma with potential plan for resection tomorrow morning.        Past Medical History:   Diagnosis Date    Anemia     Anxiety     Constipation     Cyst of spleen     Diarrhea     E. coli bacteremia     ETOH abuse     Frequent UTI     GERD (gastroesophageal reflux disease)     Hiatal hernia     Left flank pain 10/21/2022    Migraine     Miscarriage 2004    Pancreatitis     Pseudocyst of pancreas        Past Surgical History:   Procedure Laterality Date    ENDOSCOPY N/A 08/10/2022    Procedure: ESOPHAGOGASTRODUODENOSCOPY with bxs;  Surgeon: Salvador Price MD;  Location: [facility] ENDOSCOPY;  Service: Gastroenterology;  Laterality: N/A;  Pre: r/o esophageal  varacies, abd pain  Post: esophageal plaque    ENDOSCOPY N/A 10/9/2023    Procedure: ESOPHAGOGASTRODUODENOSCOPY WITH STENT REMOVAL;  Surgeon: Red Denson MD;  Location: [facility] ENDOSCOPY;  Service: Gastroenterology;  Laterality: N/A;    PANCREATIC CYST DRAINAGE      x 2    UPPER ENDOSCOPIC ULTRASOUND W/ FNA N/A 9/13/2023    Procedure: Esophagogastroduodenoscopy with biopsy x 1 area and endoscopic ultrasound with placement of cyst gastrostomy;  Surgeon: Red Denson  MD;  Location: [facility] ENDOSCOPY;  Service: Gastroenterology;  Laterality: N/A;  post: pancreatic psuedocyst    WISDOM TOOTH EXTRACTION          Social History     Socioeconomic History    Marital status: Single   Tobacco Use    Smoking status: Every Day     Packs/day: 0.50     Years: 26.00     Additional pack years: 0.00     Total pack years: 13.00     Types: Cigarettes     Start date: 1/28/1996     Passive exposure: Current    Smokeless tobacco: Never   Vaping Use    Vaping Use: Never used   Substance and Sexual Activity    Alcohol use: Not Currently    Drug use: Never    Sexual activity: Defer     Partners: Male       Family History   Problem Relation Age of Onset    Alcohol abuse Mother     Arthritis Mother     Asthma Mother     Miscarriages / Stillbirths Mother     Cancer Father     Diabetes Father     Drug abuse Father     Early death Father     Heart disease Father     Hyperlipidemia Father     Hypertension Father     Alcohol abuse Paternal Aunt     Cancer Paternal Aunt     Diabetes Paternal Aunt     Drug abuse Paternal Aunt     Early death Paternal Aunt     Heart disease Paternal Aunt     Hyperlipidemia Paternal Aunt     Hypertension Paternal Aunt     Alcohol abuse Maternal Grandmother     Alcohol abuse Maternal Grandfather     Alcohol abuse Paternal Grandmother     Cancer Paternal Grandmother     Diabetes Paternal Grandmother     Drug abuse Paternal Grandmother     Early death Paternal Grandmother     Heart disease Paternal Grandmother     Hyperlipidemia Paternal Grandmother     Hypertension Paternal Grandmother     Alcohol abuse Paternal Grandfather        Medications Prior to Admission   Medication Sig Dispense Refill Last Dose    amitriptyline (ELAVIL) 50 MG tablet Take 1 tablet by mouth Every Night.       Ferrous Sulfate Dried (Feosol) 200 (65 Fe) MG tablet tablet Take 1 tablet by mouth 2 (Two) Times a Day.       folic acid (FOLVITE) 1 MG tablet Take 1 tablet by mouth Daily. 30 tablet 3     magnesium  "oxide (MAG-OX) 400 MG tablet Take 1 tablet by mouth Daily.       naloxone (NARCAN) 4 MG/0.1ML nasal spray Call 911. Don't prime. [place] in 1 nostril for overdose. Repeat in 2-3 minutes in other nostril if no or minimal breathing/responsiveness. 2 each 0     ondansetron (ZOFRAN) 4 MG tablet Take 1 tablet by mouth Every 8 (Eight) Hours As Needed for Nausea or Vomiting.       oxyCODONE-acetaminophen (Percocet) 5-325 MG per tablet Take 1 tablet by mouth Every 6 (Six) Hours As Needed for Moderate Pain. 8 tablet 0     pantoprazole (PROTONIX) 40 MG EC tablet Take 1 tablet by mouth Daily. 30 tablet 3     thiamine (VITAMIN B1) 100 MG tablet Take 1 tablet by mouth Daily. 30 tablet 3        Allergies   Allergen Reactions    Nickel Rash       Review of Systems:  As per HPI otherwise a  12 system review of systems performed and all else negative    Physical Exam:  Vitals:  Vitals:    01/21/24 0604 01/21/24 0830 01/21/24 0900 01/21/24 1145   BP: 105/85 107/78 115/82 105/75   BP Location: Right arm      Patient Position: Sitting      Pulse: 98 120 117 106   Resp: 16  18    Temp: 98.3 °F (36.8 °C)      TempSrc: Oral      SpO2: 96% 97% 93% 92%   Weight: 57.6 kg (126 lb 15.8 oz)      Height: 177.8 cm (70\")              Body mass index is 18.22 kg/m².    Intake/Output Summary (Last 24 hours) at 1/21/2024 1239  Last data filed at 1/21/2024 0822  Gross per 24 hour   Intake 1000 ml   Output --   Net 1000 ml       Exam:  General appearance: NAD, conversant   Eyes: Anicteric sclerae, moist conjunctivae; no lid lag;   HENT: Atraumatic; oropharynx clear with moist mucous membranes and no mucosal ulcerations; normal hard and soft palate  Neck: Trachea midline; supple y  Lungs: CTA, with limited respiratory effort and no intercostal retractions  CV: RRR, no MRGs   Abdomen: Soft nonrigid no tenderness right side positive tender left upper quadrant.  Extremities: No peripheral edema or extremity lymphadenopathy  Skin: wwp   Psych: Appropriate " affect, alert   Neuro: cns 2-12 speech intact        Scheduled meds:  senna-docusate sodium, 2 tablet, Oral, BID  sodium chloride, 10 mL, Intravenous, Q12H        Data Review:   I reviewed the patient's medications and new clinical results.  Lab Results   Component Value Date    CALCIUM 8.8 01/21/2024    PHOS 4.9 (H) 10/21/2022     Results from last 7 days   Lab Units 01/21/24  0756 01/21/24  0603   AST (SGOT) U/L 5  --    ALT (SGPT) U/L <5  --    MAGNESIUM mg/dL 1.5*  --    SODIUM mmol/L 137  --    POTASSIUM mmol/L 3.8  --    CHLORIDE mmol/L 96*  --    CO2 mmol/L 28.2  --    BUN mg/dL 5*  --    CREATININE mg/dL 0.60  --    GLUCOSE mg/dL 104*  --    CALCIUM mg/dL 8.8  --    WBC 10*3/mm3  --  11.35*   HEMOGLOBIN g/dL  --  10.0*   PLATELETS 10*3/mm3  --  442     Results from last 7 days   Lab Units 01/21/24  0756   HSTROP T ng/L <6         Estimated Creatinine Clearance: 112.2 mL/min (by C-G formula based on SCr of 0.6 mg/dL).    IMAGING:  I have reviewed all imaging studies since my last documentation.  Imaging Results (Most Recent)       Procedure Component Value Units Date/Time    CT Abdomen Pelvis With Contrast [[id number]] Collected: 01/21/24 0843     Updated: 01/21/24 1008    Narrative:      CT ABDOMEN AND PELVIS WITH IV CONTRAST     HISTORY: Left upper quadrant and left flank pain. Sudden onset last  night.     TECHNIQUE:  CT includes axial imaging from the lung bases to the  trochanters with intravenous contrast and without use of oral contrast.  Data reconstructed in coronal and sagittal planes. Radiation dose  reduction techniques were utilized, including automated exposure control  and exposure modulation based on body size.     COMPARISON: CT abdomen and pelvis 01/04/2024, 09/05/2023, 08/24/2023.     FINDINGS: Since the previous CT 17 days ago there has developed a left  splenic subcapsular hematoma. Hematoma extends along the superior and  lateral aspect of the spleen and measures above the spleen 10 x 10  cm x  9 cm in height. This is associated with a splenic laceration within the  anterior and inferior aspect of the spleen. There is mild surrounding  edema without clear intraperitoneal extension of hemorrhage.     There is resolving acute-on-chronic pancreatitis with improved  peripancreatic inflammation particularly along the anterior pancreatic  tail. Pancreatic ductal dilatation is without change. Pseudocyst in the  region of the pancreatic tail is without change and extends along the  anterior margin of the left kidney. There has developed a new small left  pleural effusion and there is left lower lobe atelectasis. There is no  other evidence for change when compared to the examination 17 days ago.       Impression:      1. New left subcapsular splenic hematoma extending above and lateral to  the spleen measuring approximately 10 x 10 x 9 cm. Hematoma appears  associated with splenic lacerations. Acute-on-chronic pancreatitis with  improvement in degree of acute pancreatitis when compared to the  examination 17 days ago. Persistent pancreatic ductal dilatation.  Pancreatic pseudocyst in the region of the pancreatic tail without  change.  2. New small left pleural effusion and adjacent left basilar  atelectasis.     Discussed with Dr. Almazan on 01/21/2024 at 8:35 a.m.      Radiation dose reduction techniques were utilized, including automated  exposure control and exposure modulation based on body size.        This report was finalized on 1/21/2024 10:05 AM by Dr. Angel Mcleod M.D on Workstation: [id number]       XR Chest 1 View [[id number]] Collected: 01/21/24 0844     Updated: 01/21/24 1005    Narrative:      CHEST SINGLE VIEW     HISTORY: Left anterior and posterior chest pain.     COMPARISON: AP chest 08/06/2022.     FINDINGS: There has developed a small-to-moderate left pleural effusion  with left basilar atelectasis and this is new when compared with exam  08/06/2022 and a chest CT 01/04/2024. There is  mild right basilar  atelectasis. There is no perihilar edema or evidence for pneumothorax.       Impression:      New small-to-moderate left pleural effusion with basilar  atelectasis.     This report was finalized on 1/21/2024 10:02 AM by Dr. Angel Mcleod M.D on Workstation: [id number]               ASSESSMENT /   PLAN:  Splenic laceration with hematoma  Chronic Pancreatitis  Anemia  Left pleural effusion    Consult to gen surg  Npo after midnight  Repeat cbc this afternoon  Monitor for any fever  Pain control  Pleural effusion likely reactive, too small to safely sample.  Appreciate gen surg assistance.       Prosper Reyes MD  [facility]  01/21/24  12:39 EST

## 2024-01-21 NOTE — [facility] PROVIDER NOTE
Subjective   History of Present Illness  Assumed care from Dr. Almazan 0700hrs. See initial ED note/H&P/ROS.        Review of Systems    Past Medical History:   Diagnosis Date    Anemia     Anxiety     Constipation     Cyst of spleen     Diarrhea     E. coli bacteremia     ETOH abuse     Frequent UTI     GERD (gastroesophageal reflux disease)     Hiatal hernia     Left flank pain 10/21/2022    Migraine     Miscarriage 2004    Pancreatitis     Pseudocyst of pancreas        Allergies   Allergen Reactions    Nickel Rash       Past Surgical History:   Procedure Laterality Date    ENDOSCOPY N/A 08/10/2022    Procedure: ESOPHAGOGASTRODUODENOSCOPY with bxs;  Surgeon: Salvador Price MD;  Location:  [facility];  Service: Gastroenterology;  Laterality: N/A;  Pre: r/o esophageal  varacies, abd pain  Post: esophageal plaque    ENDOSCOPY N/A 10/9/2023    Procedure: ESOPHAGOGASTRODUODENOSCOPY WITH STENT REMOVAL;  Surgeon: Red Denson MD;  Location: [facility] ENDOSCOPY;  Service: Gastroenterology;  Laterality: N/A;    PANCREATIC CYST DRAINAGE      x 2    UPPER ENDOSCOPIC ULTRASOUND W/ FNA N/A 9/13/2023    Procedure: Esophagogastroduodenoscopy with biopsy x 1 area and endoscopic ultrasound with placement of cyst gastrostomy;  Surgeon: Red Denson MD;  Location: [facility] ENDOSCOPY;  Service: Gastroenterology;  Laterality: N/A;  post: pancreatic psuedocyst    WISDOM TOOTH EXTRACTION         Family History   Problem Relation Age of Onset    Alcohol abuse Mother     Arthritis Mother     Asthma Mother     Miscarriages / Stillbirths Mother     Cancer Father     Diabetes Father     Drug abuse Father     Early death Father     Heart disease Father     Hyperlipidemia Father     Hypertension Father     Alcohol abuse Paternal Aunt     Cancer Paternal Aunt     Diabetes Paternal Aunt     Drug abuse Paternal Aunt     Early death Paternal Aunt     Heart disease Paternal Aunt     Hyperlipidemia Paternal Aunt     Hypertension Paternal  Aunt     Alcohol abuse Maternal Grandmother     Alcohol abuse Maternal Grandfather     Alcohol abuse Paternal Grandmother     Cancer Paternal Grandmother     Diabetes Paternal Grandmother     Drug abuse Paternal Grandmother     Early death Paternal Grandmother     Heart disease Paternal Grandmother     Hyperlipidemia Paternal Grandmother     Hypertension Paternal Grandmother     Alcohol abuse Paternal Grandfather        Social History     Socioeconomic History    Marital status: Single   Tobacco Use    Smoking status: Every Day     Packs/day: 0.50     Years: 26.00     Additional pack years: 0.00     Total pack years: 13.00     Types: Cigarettes     Start date: 1/28/1996     Passive exposure: Current    Smokeless tobacco: Never   Vaping Use    Vaping Use: Never used   Substance and Sexual Activity    Alcohol use: Not Currently    Drug use: Never    Sexual activity: Defer     Partners: Male           Objective   Physical Exam  Vitals and nursing note reviewed.   Constitutional:       Appearance: Normal appearance.   HENT:      Head: Normocephalic and atraumatic.      Right Ear: External ear normal.      Left Ear: External ear normal.      Nose: Nose normal.      Mouth/Throat:      Mouth: Mucous membranes are moist.      Pharynx: Oropharynx is clear.   Eyes:      Pupils: Pupils are equal, round, and reactive to light.   Cardiovascular:      Rate and Rhythm: Regular rhythm. Tachycardia present.      Pulses: Normal pulses.      Heart sounds: Normal heart sounds. No murmur heard.     No friction rub. No gallop.   Pulmonary:      Effort: Pulmonary effort is normal. No respiratory distress.      Breath sounds: Normal breath sounds. No wheezing, rhonchi or rales.   Abdominal:      General: Abdomen is flat. Bowel sounds are normal. There is no distension.      Palpations: Abdomen is soft.      Tenderness: There is no abdominal tenderness. There is no guarding or rebound.   Musculoskeletal:      Cervical back: Normal range of  motion and neck supple.      Right lower leg: No edema.      Left lower leg: No edema.   Skin:     General: Skin is warm and dry.   Neurological:      General: No focal deficit present.      Mental Status: She is alert and oriented to person, place, and time.      GCS: GCS eye subscore is 4. GCS verbal subscore is 5. GCS motor subscore is 6.      Sensory: Sensation is intact.      Motor: Motor function is intact.         Procedures           ED Course  ED Course as of 01/21/24 0932   Sun Jan 21, 2024   0640 I did review a urine culture collected on 8/22/2023.  It was positive for E. coli and was sensitive to antibiotics except for levofloxacin. [TC]   0842 General surgery paged [SD]   0845 Intensivist paged [SD]   3657 D/w Dr. Pappas,  Intensivist, who will accept patient for admit conditional upon surgery agreeing to patient transfer to . [SD]   0919 D/w Dr. Almeida, general surgery. Pending callback after CT imaging review. [SD]   0928 D/w Dr. Almeida, general surgery, accepting patient transfer. [SD]   0929 D/w Dr. Pappas, intensivist accepting. Pt stable transport. [SD]      ED Course User Index  [SD] Dalton Devries MD  [TC] Chidi Almazan MD      Labs Reviewed   COMPREHENSIVE METABOLIC PANEL - Abnormal; Notable for the following components:       Result Value    Glucose 104 (*)     BUN 5 (*)     Chloride 96 (*)     All other components within normal limits    Narrative:     GFR Normal >60  Chronic Kidney Disease <60  Kidney Failure <15     LIPASE - Abnormal; Notable for the following components:    Lipase 123 (*)     All other components within normal limits   URINALYSIS W/ MICROSCOPIC IF INDICATED (NO CULTURE) - Abnormal; Notable for the following components:    Appearance, UA Cloudy (*)     Blood, UA Trace (*)     Leuk Esterase, UA Moderate (2+) (*)     Nitrite, UA Positive (*)     All other components within normal limits   CBC WITH AUTO DIFFERENTIAL - Abnormal; Notable for the following components:     WBC 11.35 (*)     RBC 2.98 (*)     Hemoglobin 10.0 (*)     Hematocrit 30.7 (*)     .0 (*)     MCH 33.6 (*)     Neutrophils, Absolute 7.24 (*)     Monocytes, Absolute 1.19 (*)     Eosinophils, Absolute 0.42 (*)     All other components within normal limits   MAGNESIUM - Abnormal; Notable for the following components:    Magnesium 1.5 (*)     All other components within normal limits   URINE DRUG SCREEN - Abnormal; Notable for the following components:    Tricyclic Antidepressants Screen Positive (*)     Oxycodone Screen, Urine Positive (*)     All other components within normal limits    Narrative:     Cutoff For Drugs Screened:    Amphetamines               500 ng/ml  Barbiturates               200 ng/ml  Benzodiazepines            150 ng/ml  Cocaine                    150 ng/ml  Methadone                  200 ng/ml  Opiates                    100 ng/ml  Phencyclidine               25 ng/ml  THC                         50 ng/ml  Methamphetamine            500 ng/ml  Tricyclic Antidepressants  300 ng/ml  Oxycodone                  100 ng/ml  Buprenorphine               10 ng/ml    The normal value for all drugs tested is negative. This report includes unconfirmed screening results, with the cutoff values listed, to be used for medical treatment purposes only.  Unconfirmed results must not be used for non-medical purposes such as employment or legal testing.  Clinical consideration should be applied to any drug of abuse test, particularly when unconfirmed results are used.     SINGLE HSTROPONIN T - Normal    Narrative:     High Sensitive Troponin T Reference Range:  <14.0 ng/L- Negative Female for AMI  <22.0 ng/L- Negative Male for AMI  >=14 - Abnormal Female indicating possible myocardial injury.  >=22 - Abnormal Male indicating possible myocardial injury.   Clinicians would have to utilize clinical acumen, EKG, Troponin, and serial changes to determine if it is an Acute Myocardial Infarction or myocardial  injury due to an underlying chronic condition.        LACTIC ACID, PLASMA - Normal   BLOOD CULTURE   BLOOD CULTURE   ETHANOL   URINE DRUG SCREEN PLUS BUPRENORPHINE    Narrative:     The following orders were created for panel order URINE DRUG SCREEN PLUS BUPRENORPHINE -.  Procedure                               Abnormality         Status                     ---------                               -----------         ------                     Urine Drug Screen - Urin...[[id number]]  Abnormal            Final result               Buprenorphine Screen Uri...[[id number]]                                                   Please view results for these tests on the individual orders.   LAB PROTIME-INR, FINGERSTICK   URINALYSIS, MICROSCOPIC ONLY   RAINBOW URINE CULTURE TUBE   POCT PROTIME - INR   CBC AND DIFFERENTIAL    Narrative:     The following orders were created for panel order CBC & Differential.  Procedure                               Abnormality         Status                     ---------                               -----------         ------                     CBC Auto Differential[[id number]]        Abnormal            Final result                 Please view results for these tests on the individual orders.     XR Chest 1 View    Result Date: 1/21/2024  Narrative: CHEST SINGLE VIEW  HISTORY: Left anterior and posterior chest pain.  COMPARISON: AP chest 08/06/2022.  FINDINGS: There has developed a small-to-moderate left pleural effusion with left basilar atelectasis and this is new when compared with exam 08/06/2022 and a chest CT 01/04/2024. There is mild right basilar atelectasis. There is no perihilar edema or evidence for pneumothorax.      Impression: New small-to-moderate left pleural effusion with basilar atelectasis.      CT Abdomen Pelvis With Contrast    Result Date: 1/21/2024  Narrative: CT ABDOMEN AND PELVIS WITH IV CONTRAST  HISTORY: Left upper quadrant and left flank pain. Sudden onset last  night.  TECHNIQUE:  CT includes axial imaging from the lung bases to the trochanters with intravenous contrast and without use of oral contrast. Data reconstructed in coronal and sagittal planes. Radiation dose reduction techniques were utilized, including automated exposure control and exposure modulation based on body size.  COMPARISON: CT abdomen and pelvis 01/04/2024, 09/05/2023, 08/24/2023.  FINDINGS: Since the previous CT 17 days ago there has developed a left splenic subcapsular hematoma. Hematoma extends along the superior and lateral aspect of the spleen and measures above the spleen 10 x 10 cm x 9 cm in height. This is associated with a splenic laceration within the anterior and inferior aspect of the spleen. There is mild surrounding edema without clear intraperitoneal extension of hemorrhage.  There is resolving acute-on-chronic pancreatitis with improved peripancreatic inflammation particularly along the anterior pancreatic tail. Pancreatic ductal dilatation is without change. Pseudocyst in the region of the pancreatic tail is without change and extends along the anterior margin of the left kidney. There has developed a new small left pleural effusion and there is left lower lobe atelectasis. There is no other evidence for change when compared to the examination 17 days ago.      Impression: 1. New left subcapsular splenic hematoma extending above and lateral to the spleen measuring approximately 10 x 10 x 9 cm. Splenic lacerations. 2. Acute-on-chronic pancreatitis with improvement in degree of acute pancreatitis when compared to the examination 17 days ago. Persistent pancreatic ductal dilatation. Pancreatic pseudocyst in the region of the pancreatic tail without change. 3. New small left pleural effusion and adjacent left basilar atelectasis.  Discussed with Dr. Almazan on 01/21/2024 at 8:35 a.m.  Radiation dose reduction techniques were utilized, including automated exposure control and exposure  modulation based on body size.       CT Abdomen Pelvis With Contrast    Result Date: 1/4/2024  Narrative: CT ABDOMEN AND PELVIS WITH IV CONTRAST  HISTORY: Left side abdominal pain, tenderness to palpation. History of pancreatitis.  TECHNIQUE:  CT includes axial imaging from the lung bases to the trochanters with intravenous contrast and without use of oral contrast. Data reconstructed in coronal and sagittal planes. Radiation dose reduction techniques were utilized, including automated exposure control and exposure modulation based on body size.  COMPARISON: CT abdomen and pelvis 09/05/2023, 08/22/2023.  FINDINGS: There is abnormal edema and stranding surrounding the pancreatic body and tail consistent with acute pancreatitis. A previously described pancreatic tail pseudocyst just medial to the spleen and anterior to the left kidney measures 3.5 x 4.3 cm which has decreased in size from 9.9 x 7 cm on the previous exam. There is progressive dilatation of the pancreatic duct within the pancreatic body and proximal tail and the pancreatic duct measures 6 mm in diameter versus 4.5 mm on the previous exam. There are pancreatic calcifications involving the pancreatic head and uncinate process and proximal body consistent with acute on chronic pancreatitis.  There is mild perisplenic fluid. Within the central to inferior spleen there is a linear, coronally oriented hypodensity that measures 3.5 x 0.8 cm. This could represent a small splenic laceration or area of infarction or interstitial extension of fluid from the pancreatic pseudocyst. There is mild perisplenic fluid though the volume is not significantly change compared to the previous exam.  Left kidney is partially compressed posteriorly from the cyst in the region of the pancreatic tail. Right kidney appears normal. Gallbladder is mostly decompressed. There is mild free fluid within the pelvis which is increased in volume. There is new stranding and edema within the  left mid abdomen, particularly extending adjacent to the gastric wall and along the greater curvature, as well as adjacent to the distal transverse colon.      Impression: 1. Acute on chronic pancreatitis with new peripancreatic inflammation and stranding particularly along the pancreatic body and tail and also extending to the left abdomen. Pancreatic ductal dilatation is mildly increased. 2. New linear low density within the spleen suspicious for a small splenic laceration. The volume of fluid adjacent to the spleen is similar to the prior exam, though if there are clinical signs of hemorrhage, recommend short interval follow-up CT to assure stability. There is increased ascites with free fluid extending into the pelvis when compared to the previous study. 3. The previously demonstrated pancreatic pseudocyst in the region of the pancreatic tail has decreased in size compared to the previous exam.  Radiation dose reduction techniques were utilized, including automated exposure control and exposure modulation based on body size.   This report was finalized on 1/4/2024 9:00 PM by Dr. Angel Mcleod M.D on Workstation: [id number]                                          Medical Decision Making  Problems Addressed:  Laceration of spleen, initial encounter: complicated acute illness or injury  Left upper quadrant abdominal pain: complicated acute illness or injury  Pleural effusion on left: complicated acute illness or injury  UTI (urinary tract infection), bacterial: complicated acute illness or injury    Amount and/or Complexity of Data Reviewed  Labs: ordered.  Radiology: ordered.  ECG/medicine tests: ordered and independent interpretation performed.    Risk  Prescription drug management.  Decision regarding hospitalization.        Final diagnoses:   Left upper quadrant abdominal pain   UTI (urinary tract infection), bacterial   Laceration of spleen, initial encounter   Pleural effusion on left       ED  Disposition  ED Disposition       ED Disposition   Decision to Admit    Condition   --    Comment   Level of Care: Critical Care [6]   Diagnosis: Splenic laceration [[id number]]   Admitting Physician: ESTEBAN JOYA [[id number]]   Attending Physician: ESTEBAN JOYA [[id number]]   Certification: I Certify That Inpatient Hospital Services Are Medically Necessary For Greater Than 2 Midnights                 No follow-up provider specified.       Medication List      No changes were made to your prescriptions during this visit.

## 2024-01-21 NOTE — CONSULTS
"General Surgery Consultation    Consulting Physician: Mai Almeida MD  Referring: Prosper Reyes MD    Reason for consultation:   Splenic rupture    CC:   LUQ abdominal and L shoulder pain    HPI:   The patient is a 43 y.o. female who presented to the hospital with left upper quadrant abdominal pain and left shoulder pain for the past 4 days.  She states the pain has been intermittent but that this morning it woke her out of bed at 4 AM and has been more severe.  She reports spasms in her left side and \" solid\" unremitting pain in her left shoulder worse with deep breaths.  She reports shortness of breath secondary to pain.  Denies any recent trauma.  She denies any recent nausea, vomiting, fever, chills, or changes in her bowel function.  She denies any lightheadedness or dizziness.    She has a history of chronic pancreatitis secondary to alcohol use.  Was seen 1/7/2024 with CT abdomen pelvis performed at that time which showed inflammation surrounding the pancreas with a small pseudocyst and small volume free fluid in the left upper quadrant and around the pancreas, with a small area of the spleen that was hypoenhancing concerning for splenic laceration, with no active extravasation.    She went to the emergency department today and CT abdomen pelvis demonstrated development of a left splenic subcapsular hematoma measuring approximately 10 x 10 x 9 cm.  This was associated with a splenic laceration in the anterior and inferior aspect of the spleen.  There was mild surrounding edema without clear intraperitoneal extension of the hemorrhage.  There was resolving acute on chronic pancreatitis with improved peripancreatic inflammation particularly along the anterior pancreatic tail, with pseudocyst in the region of the pancreatic tail without change.  There was a new small left pleural effusion and left lower lobe atelectasis.    Labs were performed and demonstrated hemoglobin of 10 stable from recent " hemoglobin a few weeks ago.  The patient was noted to be tachycardic but with no blood pressure instability.  She received 2L IV crystalloid and was transferred to [facility] ICU.  Patient is accompanied by her boyfriend during exam.    Past Medical History:  Chronic pancreatitis  Pancreatic pseudocyst  Splenic laceration    Past Medical History:   Diagnosis Date    Anemia     Anxiety     Constipation     Cyst of spleen     Diarrhea     E. coli bacteremia     ETOH abuse     Frequent UTI     GERD (gastroesophageal reflux disease)     Hiatal hernia     Left flank pain 10/21/2022    Migraine     Miscarriage 2004    Pancreatitis     Pseudocyst of pancreas      Past Surgical History:  Cyst gastrostomy  Denies other abdominal procedures  Past Surgical History:   Procedure Laterality Date    ENDOSCOPY N/A 08/10/2022    Procedure: ESOPHAGOGASTRODUODENOSCOPY with bxs;  Surgeon: Salvador Price MD;  Location: [facility] ENDOSCOPY;  Service: Gastroenterology;  Laterality: N/A;  Pre: r/o esophageal  varacies, abd pain  Post: esophageal plaque    ENDOSCOPY N/A 10/9/2023    Procedure: ESOPHAGOGASTRODUODENOSCOPY WITH STENT REMOVAL;  Surgeon: Red Denson MD;  Location: [facility] ENDOSCOPY;  Service: Gastroenterology;  Laterality: N/A;    PANCREATIC CYST DRAINAGE      x 2    UPPER ENDOSCOPIC ULTRASOUND W/ FNA N/A 9/13/2023    Procedure: Esophagogastroduodenoscopy with biopsy x 1 area and endoscopic ultrasound with placement of cyst gastrostomy;  Surgeon: Red Denson MD;  Location: [facility] ENDOSCOPY;  Service: Gastroenterology;  Laterality: N/A;  post: pancreatic psuedocyst    WISDOM TOOTH EXTRACTION         Medications:  Medications Prior to Admission   Medication Sig Dispense Refill Last Dose    amitriptyline (ELAVIL) 50 MG tablet Take 1 tablet by mouth Every Night.       Ferrous Sulfate Dried (Feosol) 200 (65 Fe) MG tablet tablet Take 1 tablet by mouth 2 (Two) Times a Day.       folic acid (FOLVITE) 1 MG tablet Take 1 tablet by  mouth Daily. 30 tablet 3     magnesium oxide (MAG-OX) 400 MG tablet Take 1 tablet by mouth Daily.       naloxone (NARCAN) 4 MG/0.1ML nasal spray Call 911. Don't prime. [place] in 1 nostril for overdose. Repeat in 2-3 minutes in other nostril if no or minimal breathing/responsiveness. 2 each 0     ondansetron (ZOFRAN) 4 MG tablet Take 1 tablet by mouth Every 8 (Eight) Hours As Needed for Nausea or Vomiting.       oxyCODONE-acetaminophen (Percocet) 5-325 MG per tablet Take 1 tablet by mouth Every 6 (Six) Hours As Needed for Moderate Pain. 8 tablet 0     pantoprazole (PROTONIX) 40 MG EC tablet Take 1 tablet by mouth Daily. 30 tablet 3     thiamine (VITAMIN B1) 100 MG tablet Take 1 tablet by mouth Daily. 30 tablet 3        Current Facility-Administered Medications:     acetaminophen (TYLENOL) tablet 650 mg, 650 mg, Oral, Q4H PRN **OR** acetaminophen (TYLENOL) suppository 650 mg, 650 mg, Rectal, Q4H PRN, Prosper Reyes MD    aluminum-magnesium hydroxide-simethicone (MAALOX MAX) 400-400-40 MG/5ML suspension 15 mL, 15 mL, Oral, Q6H PRN, Prosper Reyes MD    sennosides-docusate (PERICOLACE) 8.6-50 MG per tablet 2 tablet, 2 tablet, Oral, BID **AND** polyethylene glycol (MIRALAX) packet 17 g, 17 g, Oral, Daily PRN **AND** bisacodyl (DULCOLAX) EC tablet 5 mg, 5 mg, Oral, Daily PRN **AND** bisacodyl (DULCOLAX) suppository 10 mg, 10 mg, Rectal, Daily PRN, Prosper Reyes MD    Calcium Replacement - Follow Nurse / BPA Driven Protocol, , Does not apply, PREDITH, Prosper Reyes MD    dextrose 5 % and sodium chloride 0.45 % infusion, 125 mL/hr, Intravenous, Continuous, Prosper Reyes MD    Magnesium Standard Dose Replacement - Follow Nurse / BPA Driven Protocol, , Does not apply, PRN, Prosper Reyes MD    nitroglycerin (NITROSTAT) SL tablet 0.4 mg, 0.4 mg, Sublingual, Q5 Min PRN, Prosper Reyes MD    ondansetron ODT (ZOFRAN-ODT) disintegrating tablet 4 mg, 4 mg, Oral, Q6H PRN **OR**  ondansetron (ZOFRAN) injection 4 mg, 4 mg, Intravenous, Q6H Amy DE LOS SANTOS Brandon John, MD    Phosphorus Replacement - Follow Nurse / BPA Driven Protocol, , Does not apply, Amy DE LOS SANTOS Brandon John, MD    Potassium Replacement - Follow Nurse / BPA Driven Protocol, , Does not apply, Amy DE LOS SANTOS Brandon John, MD    sodium chloride 0.9 % flush 10 mL, 10 mL, Intravenous, PRN, Chidi Almazan MD    sodium chloride 0.9 % flush 10 mL, 10 mL, Intravenous, Q12H, Prosper Reyes MD    sodium chloride 0.9 % flush 10 mL, 10 mL, Intravenous, PRAmy SHARMA Brandon John, MD    sodium chloride 0.9 % infusion 40 mL, 40 mL, Intravenous, Amy DE LOS SANTOS Brandon John, MD    Allergies:   Allergies   Allergen Reactions    Nickel Rash       Social History:   Smokes daily  States she has not used alcohol in 26 days  Denies recreational drug use   Social History     Socioeconomic History    Marital status: Single   Tobacco Use    Smoking status: Every Day     Packs/day: 0.50     Years: 26.00     Additional pack years: 0.00     Total pack years: 13.00     Types: Cigarettes     Start date: 1/28/1996     Passive exposure: Current    Smokeless tobacco: Never   Vaping Use    Vaping Use: Never used   Substance and Sexual Activity    Alcohol use: Not Currently    Drug use: Never    Sexual activity: Defer     Partners: Male       Family History:   Family History   Problem Relation Age of Onset    Alcohol abuse Mother     Arthritis Mother     Asthma Mother     Miscarriages / Stillbirths Mother     Cancer Father     Diabetes Father     Drug abuse Father     Early death Father     Heart disease Father     Hyperlipidemia Father     Hypertension Father     Alcohol abuse Paternal Aunt     Cancer Paternal Aunt     Diabetes Paternal Aunt     Drug abuse Paternal Aunt     Early death Paternal Aunt     Heart disease Paternal Aunt     Hyperlipidemia Paternal Aunt     Hypertension Paternal Aunt     Alcohol abuse Maternal Grandmother     Alcohol abuse  "Maternal Grandfather     Alcohol abuse Paternal Grandmother     Cancer Paternal Grandmother     Diabetes Paternal Grandmother     Drug abuse Paternal Grandmother     Early death Paternal Grandmother     Heart disease Paternal Grandmother     Hyperlipidemia Paternal Grandmother     Hypertension Paternal Grandmother     Alcohol abuse Paternal Grandfather        Review of Systems:  Constitutional: denies fever or chills  Cardiovascular: denies chest pain or palpitations   Respiratory: denies cough, +shortness of breath associated with pain  Gastrointestinal: + abdominal pain, denies nausea, vomiting, diarrhea, constipation, melena, hematochezia  Genitourinary: denies dysuria or hematuria  Musculoskeletal: +L shoulder pain with deep breaths  Skin: denies rash or jaundice     Physical Exam:   Vitals:    01/21/24 1145   BP: 105/75   Pulse: 106   Resp:    Temp:    SpO2: 92%     GENERAL: alert, interactive, cooperative, tearful prior to exam \"due to pain\"  HEENT: no scleral icterus; moist mucous membranes  NECK: Supple  RESPIRATORY: normal work of breathing on room air  CARDIOVASCULAR: HR 110s, normotensive on monitor, no pressors, no pedal edema  GASTROINTESTINAL: abdomen soft, tender in the left upper quadrant and epigastrium with focal guarding, no rebound, no bruising  MUSCULOSKELETAL: no cyanosis or edema   NEUROLOGIC: alert and oriented, normal speech, no gross focal deficits   SKIN: warm, no rash, no jaundice    Diagnostic workup:     Pertinent labs:   Results from last 7 days   Lab Units 01/21/24  0603   WBC 10*3/mm3 11.35*   HEMOGLOBIN g/dL 10.0*   HEMATOCRIT % 30.7*   PLATELETS 10*3/mm3 442     Results from last 7 days   Lab Units 01/21/24  0756   SODIUM mmol/L 137   POTASSIUM mmol/L 3.8   CHLORIDE mmol/L 96*   CO2 mmol/L 28.2   BUN mg/dL 5*   CREATININE mg/dL 0.60   CALCIUM mg/dL 8.8   BILIRUBIN mg/dL 0.3   ALK PHOS U/L 91   ALT (SGPT) U/L <5   AST (SGOT) U/L 5   GLUCOSE mg/dL 104*       Imaging:  CT abdomen " pelvis 1/21/24 images and report reviewed, demonstrates development of a left splenic subcapsular hematoma measuring approximately 10 x 10 x 9 cm.  This was associated with a splenic laceration in the anterior and inferior aspect of the spleen.  There was mild surrounding edema without clear intraperitoneal extension of the hemorrhage.  There was resolving acute on chronic pancreatitis with improved peripancreatic inflammation particularly along the anterior pancreatic tail, with pseudocyst in the region of the pancreatic tail without change.  There was a new small left pleural effusion and left lower lobe atelectasis.    CT abdomen pelvis 1/4/24 images and report reviewed, there is pancreatitis particular along the pancreatic body and tail extending to left abdomen with pancreatic ductal dilation and a new linear low-density within the spleen suspicious for a small splenic laceration; there is a pancreatic pseudocyst in the region of the pancreatic tail    Assessment and plan:   Subcapsular splenic hematoma  Splenic laceration  Acute on chronic pancreatitis secondary to alcohol use  Pancreatic pseudocyst  Left pleural effusion  Left atelectasis  Tobacco use  Anxiety  Chronic pain  Anemia    Discussed at length with patient and her boyfriend management options of her splenic laceration with subcapsular hematoma in the setting of chronic pancreatitis and pancreatic pseudocyst. Fortunately right now she demonstrates no blood pressure instability and her hemoglobin is 10, stable from last admission. CT does not appear to demonstrate active extravasation.    Recommend IR evaluation for possible embolization, as surgical intervention with splenectomy is high risk for complications including bleeding and possible need for distal pancreatectomy with worsening pancreatitis/pancreatic fistula. Other risks discussed include infection (including OPSI), injury to surrounding structures, MI, stroke, DVT/PE, risks with  anesthesia. The patient may eventually require splenectomy, but preoperative embolization can decrease splenic bleeding during surgery.  I discussed with the patient that I think it is highly likely that she has ongoing complications from her disease process, whether she undergoes procedural intervention or nonoperative management.  The patient voiced understanding and is agreeable to proceed with both embolization and surgery if needed to prevent or treat worsening splenic hemorrhage.  She also understands that pain control will likely be difficult, given the chronic nature of her pancreatitis pain. I discussed her CODE STATUS with her and she elects to be full code.  Remain in ICU. Continue NPO except sips with meds, routine pain control. Monitor for hemodynamic instability. Would recommend Q6h CBCs.  Will discuss with IR.    Addendum: discussed with IR who are available today for proximal splenic artery embolization if needed for emergent embolization should the patient become hemodynamically unstable, and again Tuesday when IR coverage resumes.  I also discussed with Vascular who will review the case and try to schedule the patient for tomorrow.    Mai Almeida M.D.  General, Robotic, and Endoscopic Surgery  [facility]    [address] [place] [address], [address]  [place], [place], [address]  P: [phone]  F: [phone]

## 2024-01-21 NOTE — ED NOTES
Second call placed to on call General Surgery at this time. Message left with answering service for Stat call back.

## 2024-01-21 NOTE — SIGNIFICANT NOTE
Pt using BR prn for UOP. UA from previous ED before transfer to [facility].

## 2024-01-21 NOTE — FSED PROVIDER NOTE
Subjective   History of Present Illness  Patient is a 43-year-old female.  She has a history of anxiety, anemia, alcohol abuse, chronic pancreatitis.  She has a history of frequent admissions and has been admitted to [facility] 5-6 times over the last year.  She was last admitted from 1/4/2024 until 1/8/2024.    She states that she was awoken at approximately 4 AM this morning with severe left upper quadrant abdominal pain.  The pain radiates into her left anterior chest, left flank and left shoulder.  She reports some nausea and dry heaves.  She does admit to associated SOA.  No documented fevers that she is aware of.  She states that she did feel hot at home.      During her last admission there was a concern for possible splenic hemorrhage.  The patient did however have a stable hemoglobin during hospital stay and it was felt like she was stable for discharge.      Review of Systems  Constitutional: No fevers, chills, sweats unless otherwise documented in HPI  Eyes: No recent visual problems, eye discharge, eye pain, redness unless otherwise documented in HPI  HEENT: No ear pain, nasal congestion, sore throat, voice changes unless otherwise documented in HPI  Respiratory: No shortness of breath, cough, pain on breathing, sputum production unless otherwise documented in HPI  Cardiovascular: No chest pain, palpitations, syncope, orthopnea unless otherwise documented in HPI  Gastrointestinal: No nausea, vomiting, diarrhea, constipation unless otherwise documented in HPI  Genitourinary: No hematuria, dysuria, incontinence unless otherwise documented in HPI  Endocrine: Negative for excessive thirst, excessive hunger, excessive urination, heat or cold intolerance unless otherwise documented in HPI  Musculoskeletal: No back pain, neck pain, joint pain, muscle pain, decreased range of motion unless otherwise documented in HPI  Integumentary: No rash, pruritus, abrasion, lesions unless otherwise documented in  HPI  Neurologic: No weakness, numbness, frequent headaches, tremors unless otherwise documented in HPI  Psychiatric: No anxiety, depression, mood changes, hallucinations unless otherwise documented in HPI        Past Medical History:   Diagnosis Date    Anemia     Anxiety     Constipation     Cyst of spleen     Diarrhea     E. coli bacteremia     ETOH abuse     Frequent UTI     GERD (gastroesophageal reflux disease)     Hiatal hernia     Left flank pain 10/21/2022    Migraine     Miscarriage 2004    Pancreatitis     Pseudocyst of pancreas        Allergies   Allergen Reactions    Nickel Rash       Past Surgical History:   Procedure Laterality Date    ENDOSCOPY N/A 08/10/2022    Procedure: ESOPHAGOGASTRODUODENOSCOPY with bxs;  Surgeon: Salvador Price MD;  Location:  [facility] ENDOSCOPY;  Service: Gastroenterology;  Laterality: N/A;  Pre: r/o esophageal  varacies, abd pain  Post: esophageal plaque    ENDOSCOPY N/A 10/9/2023    Procedure: ESOPHAGOGASTRODUODENOSCOPY WITH STENT REMOVAL;  Surgeon: Red Denson MD;  Location: [facility] ENDOSCOPY;  Service: Gastroenterology;  Laterality: N/A;    PANCREATIC CYST DRAINAGE      x 2    UPPER ENDOSCOPIC ULTRASOUND W/ FNA N/A 9/13/2023    Procedure: Esophagogastroduodenoscopy with biopsy x 1 area and endoscopic ultrasound with placement of cyst gastrostomy;  Surgeon: Red Denson MD;  Location: [facility] ENDOSCOPY;  Service: Gastroenterology;  Laterality: N/A;  post: pancreatic psuedocyst    WISDOM TOOTH EXTRACTION         Family History   Problem Relation Age of Onset    Alcohol abuse Mother     Arthritis Mother     Asthma Mother     Miscarriages / Stillbirths Mother     Cancer Father     Diabetes Father     Drug abuse Father     Early death Father     Heart disease Father     Hyperlipidemia Father     Hypertension Father     Alcohol abuse Paternal Aunt     Cancer Paternal Aunt     Diabetes Paternal Aunt     Drug abuse Paternal Aunt     Early death Paternal Aunt     Heart disease  Paternal Aunt     Hyperlipidemia Paternal Aunt     Hypertension Paternal Aunt     Alcohol abuse Maternal Grandmother     Alcohol abuse Maternal Grandfather     Alcohol abuse Paternal Grandmother     Cancer Paternal Grandmother     Diabetes Paternal Grandmother     Drug abuse Paternal Grandmother     Early death Paternal Grandmother     Heart disease Paternal Grandmother     Hyperlipidemia Paternal Grandmother     Hypertension Paternal Grandmother     Alcohol abuse Paternal Grandfather        Social History     Socioeconomic History    Marital status: Single   Tobacco Use    Smoking status: Every Day     Packs/day: 0.50     Years: 26.00     Additional pack years: 0.00     Total pack years: 13.00     Types: Cigarettes     Start date: 1/28/1996     Passive exposure: Current    Smokeless tobacco: Never   Vaping Use    Vaping Use: Never used   Substance and Sexual Activity    Alcohol use: Not Currently    Drug use: Never    Sexual activity: Defer     Partners: Male           Objective   Physical Exam  Vital signs: Reviewed in nurses notes    General: Patient is anxious awake alert.  She does appear to be uncomfortable    HEENT: Normocephalic atraumatic nasopharynx clear oropharynx is clear    Neck:   Supple without lymphadenopathy    Respiratory:   Nonlabored respirations.  Clear to auscultation bilaterally.  Equal breath sounds bilaterally.  No wheezes or stridor noted.    Cardiovascular: Rate is tachycardic rhythm is regular.  No pretibial or pedal edema    Abdomen: Nondistended bowel sounds present.  There is moderate to severe tenderness left upper quadrant and into the epigastrium with some voluntary guarding only    Skin:   Warm and dry.  No rashes noted    Neurological examination: Patient is awake alert oriented x4.  Speech is normal.  No facial palsy.  No focal motor or sensory deficits.    ECG 12 Lead Chest Pain      Date/Time: 1/21/2024 6:12 AM    Performed by: Chidi Almazan MD  Authorized by: Tha  Chidi SAMSON MD  Interpreted by physician  Rhythm: sinus tachycardia  Comments: Sinus tachycardia rate of 119.  No acute ST elevation or depression noted.  No STEMI               ED Course  ED Course as of 01/22/24 1851   Sun Jan 21, 2024   0640 I did review a urine culture collected on 8/22/2023.  It was positive for E. coli and was sensitive to antibiotics except for levofloxacin. [TC]   0842 General surgery paged [SD]   0845 Intensivist paged [SD]   0857 D/w Dr. Pappas,  Intensivist, who will accept patient for admit conditional upon surgery agreeing to patient transfer to . [SD]   0919 D/w Dr. Almeida, general surgery. Pending callback after CT imaging review. [SD]   0928 D/w Dr. Almeida, general surgery, accepting patient transfer. [SD]   0929 D/w Dr. Pappas, intensivist accepting. Pt stable transport. [SD]      ED Course User Index  [SD] Dalton Devries MD  [TC] Chidi Almazan MD      0700: Patient's care will be turned over to Dr. Devries at 07 100.  Currently pending all laboratory evaluation as well as CT scan.  Disposition will pend treatment course in the emergency department and results.                           Medications   sodium chloride 0.9 % flush 10 mL ( Intravenous MAR Unhold 1/22/24 1556)   nitroglycerin (NITROSTAT) SL tablet 0.4 mg ( Sublingual MAR Unhold 1/22/24 1556)   sodium chloride 0.9 % flush 10 mL ( Intravenous MAR Unhold 1/22/24 1556)   sodium chloride 0.9 % flush 10 mL ( Intravenous MAR Unhold 1/22/24 1556)   sodium chloride 0.9 % infusion 40 mL ( Intravenous MAR Unhold 1/22/24 1556)   aluminum-magnesium hydroxide-simethicone (MAALOX MAX) 400-400-40 MG/5ML suspension 15 mL ( Oral MAR Unhold 1/22/24 1556)   sennosides-docusate (PERICOLACE) 8.6-50 MG per tablet 2 tablet ( Oral MAR Unhold 1/22/24 1556)     And   polyethylene glycol (MIRALAX) packet 17 g ( Oral MAR Unhold 1/22/24 1556)     And   bisacodyl (DULCOLAX) EC tablet 5 mg ( Oral MAR Unhold 1/22/24 4441)     And   bisacodyl  (DULCOLAX) suppository 10 mg ( Rectal MAR Unhold 1/22/24 1556)   acetaminophen (TYLENOL) tablet 650 mg ( Oral MAR Unhold 1/22/24 1556)     Or   acetaminophen (TYLENOL) suppository 650 mg ( Rectal MAR Unhold 1/22/24 1556)   ondansetron ODT (ZOFRAN-ODT) disintegrating tablet 4 mg ( Oral MAR Unhold 1/22/24 1556)     Or   ondansetron (ZOFRAN) injection 4 mg ( Intravenous MAR Unhold 1/22/24 1556)   Potassium Replacement - Follow Nurse / BPA Driven Protocol (has no administration in time range)   Magnesium Standard Dose Replacement - Follow Nurse / BPA Driven Protocol ( Does not apply MAR Unhold 1/22/24 1556)   Phosphorus Replacement - Follow Nurse / BPA Driven Protocol ( Does not apply MAR Unhold 1/22/24 1556)   Calcium Replacement - Follow Nurse / BPA Driven Protocol ( Does not apply MAR Unhold 1/22/24 1556)   dextrose 5 % and sodium chloride 0.45 % infusion (0 mL/hr Intravenous Stopped 1/22/24 0742)   HYDROmorphone (DILAUDID) injection 1 mg (1 mg Intravenous Given 1/22/24 1621)   oxyCODONE-acetaminophen (PERCOCET) 5-325 MG per tablet 1 tablet ( Oral MAR Unhold 1/22/24 1556)   lactated ringers infusion ( Intravenous New Bag 1/22/24 1322)   lactated ringers infusion (has no administration in time range)   sodium chloride 0.9 % bolus 1,000 mL (0 mL Intravenous Stopped 1/21/24 0822)   HYDROmorphone (DILAUDID) injection 0.5 mg (0.5 mg Intravenous Given 1/21/24 0629)   pantoprazole (PROTONIX) injection 40 mg (40 mg Intravenous Given 1/21/24 0636)   ondansetron (ZOFRAN) injection 4 mg (4 mg Intravenous Given 1/21/24 0630)   cefTRIAXone (ROCEPHIN) 2,000 mg in sodium chloride 0.9 % 100 mL IVPB-VTB (0 mg Intravenous Stopped 1/21/24 0943)   HYDROmorphone (DILAUDID) injection 1 mg (1 mg Intravenous Given 1/21/24 0713)   iopamidol (ISOVUE-370) 76 % injection 100 mL (85 mL Intravenous Given 1/21/24 0827)   fentaNYL citrate (PF) (SUBLIMAZE) injection 100 mcg (100 mcg Intravenous Given 1/21/24 0849)   magnesium sulfate 2g/50 mL  (PREMIX) infusion (2 g Intravenous New Bag 1/21/24 0848)   fentaNYL citrate (PF) (SUBLIMAZE) injection 50 mcg (50 mcg Intravenous Given 1/21/24 1057)   ceFAZolin in dextrose (ANCEF) IVPB solution 2 g (2 g Intravenous New Bag 1/22/24 1055)   fentaNYL citrate (PF) (SUBLIMAZE) injection 50 mcg (50 mcg Intravenous Given 1/22/24 0845)   famotidine (PEPCID) injection 20 mg (20 mg Intravenous Given 1/22/24 0856)   fentaNYL citrate (PF) (SUBLIMAZE) injection 50 mcg (50 mcg Intravenous Given 1/22/24 0852)   ondansetron (ZOFRAN) injection 4 mg (4 mg Intravenous Given 1/22/24 1539)   iopamidol (ISOVUE-250) 51 % injection 200 mL (90 mL Intra-arterial Given 1/22/24 1431)              Medical Decision Making  Problems Addressed:  Laceration of spleen, initial encounter: complicated acute illness or injury  Left upper quadrant abdominal pain: complicated acute illness or injury  Pleural effusion on left: complicated acute illness or injury  UTI (urinary tract infection), bacterial: complicated acute illness or injury    Amount and/or Complexity of Data Reviewed  Labs: ordered.  Radiology: ordered.  ECG/medicine tests: ordered and independent interpretation performed.    Risk  Prescription drug management.  Decision regarding hospitalization.        Final diagnoses:   Left upper quadrant abdominal pain   UTI (urinary tract infection), bacterial   Laceration of spleen, initial encounter   Pleural effusion on left       ED Disposition  ED Disposition       ED Disposition   Decision to Admit    Condition   --    Comment   Level of Care: Critical Care [6]   Diagnosis: Splenic laceration [[id number]]   Admitting Physician: ESTEBAN JOYA [[id number]]   Attending Physician: ESTEBAN JOYA [[id number]]   Certification: I Certify That Inpatient Hospital Services Are Medically Necessary For Greater Than 2 Midnights                 No follow-up provider specified.       Medication List      No changes were made to your prescriptions during this visit.

## 2024-01-22 ENCOUNTER — APPOINTMENT (OUTPATIENT)
Dept: GENERAL RADIOLOGY | Facility: HOSPITAL | Age: 44
End: 2024-01-22
Payer: MEDICAID

## 2024-01-22 ENCOUNTER — ANESTHESIA EVENT (OUTPATIENT)
Dept: PERIOP | Facility: HOSPITAL | Age: 44
End: 2024-01-22
Payer: MEDICAID

## 2024-01-22 ENCOUNTER — ANESTHESIA (OUTPATIENT)
Dept: PERIOP | Facility: HOSPITAL | Age: 44
End: 2024-01-22
Payer: MEDICAID

## 2024-01-22 LAB
ALBUMIN SERPL-MCNC: 3 G/DL (ref 3.5–5.2)
ALBUMIN/GLOB SERPL: 1 G/DL
ALP SERPL-CCNC: 70 U/L (ref 39–117)
ALT SERPL W P-5'-P-CCNC: 9 U/L (ref 1–33)
ANION GAP SERPL CALCULATED.3IONS-SCNC: 11 MMOL/L (ref 5–15)
AST SERPL-CCNC: 7 U/L (ref 1–32)
BASOPHILS # BLD AUTO: 0.02 10*3/MM3 (ref 0–0.2)
BASOPHILS NFR BLD AUTO: 0.2 % (ref 0–1.5)
BH BB BLOOD EXPIRATION DATE: NORMAL
BH BB BLOOD EXPIRATION DATE: NORMAL
BH BB BLOOD TYPE BARCODE: 5100
BH BB BLOOD TYPE BARCODE: 5100
BH BB DISPENSE STATUS: NORMAL
BH BB DISPENSE STATUS: NORMAL
BH BB PRODUCT CODE: NORMAL
BH BB PRODUCT CODE: NORMAL
BH BB UNIT NUMBER: NORMAL
BH BB UNIT NUMBER: NORMAL
BILIRUB SERPL-MCNC: 0.3 MG/DL (ref 0–1.2)
BUN SERPL-MCNC: 3 MG/DL (ref 6–20)
BUN/CREAT SERPL: 6.4 (ref 7–25)
CALCIUM SPEC-SCNC: 8.5 MG/DL (ref 8.6–10.5)
CHLORIDE SERPL-SCNC: 101 MMOL/L (ref 98–107)
CO2 SERPL-SCNC: 23 MMOL/L (ref 22–29)
CREAT SERPL-MCNC: 0.47 MG/DL (ref 0.57–1)
CROSSMATCH INTERPRETATION: NORMAL
CROSSMATCH INTERPRETATION: NORMAL
DEPRECATED RDW RBC AUTO: 49.3 FL (ref 37–54)
EGFRCR SERPLBLD CKD-EPI 2021: 122.8 ML/MIN/1.73
EOSINOPHIL # BLD AUTO: 0.26 10*3/MM3 (ref 0–0.4)
EOSINOPHIL NFR BLD AUTO: 2.8 % (ref 0.3–6.2)
ERYTHROCYTE [DISTWIDTH] IN BLOOD BY AUTOMATED COUNT: 13.9 % (ref 12.3–15.4)
GLOBULIN UR ELPH-MCNC: 2.9 GM/DL
GLUCOSE BLDC GLUCOMTR-MCNC: 118 MG/DL (ref 70–130)
GLUCOSE BLDC GLUCOMTR-MCNC: 125 MG/DL (ref 70–130)
GLUCOSE BLDC GLUCOMTR-MCNC: 171 MG/DL (ref 70–130)
GLUCOSE SERPL-MCNC: 109 MG/DL (ref 65–99)
HCT VFR BLD AUTO: 28.6 % (ref 34–46.6)
HCT VFR BLD AUTO: 29.6 % (ref 34–46.6)
HGB BLD-MCNC: 10.1 G/DL (ref 12–15.9)
HGB BLD-MCNC: 9.9 G/DL (ref 12–15.9)
IMM GRANULOCYTES # BLD AUTO: 0.03 10*3/MM3 (ref 0–0.05)
IMM GRANULOCYTES NFR BLD AUTO: 0.3 % (ref 0–0.5)
LYMPHOCYTES # BLD AUTO: 1.68 10*3/MM3 (ref 0.7–3.1)
LYMPHOCYTES NFR BLD AUTO: 17.8 % (ref 19.6–45.3)
MCH RBC QN AUTO: 32.8 PG (ref 26.6–33)
MCHC RBC AUTO-ENTMCNC: 34.1 G/DL (ref 31.5–35.7)
MCV RBC AUTO: 96.1 FL (ref 79–97)
MONOCYTES # BLD AUTO: 1.3 10*3/MM3 (ref 0.1–0.9)
MONOCYTES NFR BLD AUTO: 13.8 % (ref 5–12)
NEUTROPHILS NFR BLD AUTO: 6.16 10*3/MM3 (ref 1.7–7)
NEUTROPHILS NFR BLD AUTO: 65.1 % (ref 42.7–76)
NRBC BLD AUTO-RTO: 0 /100 WBC (ref 0–0.2)
PLATELET # BLD AUTO: 331 10*3/MM3 (ref 140–450)
PMV BLD AUTO: 9.1 FL (ref 6–12)
POTASSIUM SERPL-SCNC: 3.5 MMOL/L (ref 3.5–5.2)
PROT SERPL-MCNC: 5.9 G/DL (ref 6–8.5)
QT INTERVAL: 308 MS
QTC INTERVAL: 434 MS
RBC # BLD AUTO: 3.08 10*6/MM3 (ref 3.77–5.28)
SODIUM SERPL-SCNC: 135 MMOL/L (ref 136–145)
UNIT  ABO: NORMAL
UNIT  ABO: NORMAL
UNIT  RH: NORMAL
UNIT  RH: NORMAL
WBC NRBC COR # BLD AUTO: 9.45 10*3/MM3 (ref 3.4–10.8)

## 2024-01-22 PROCEDURE — B41F1ZZ FLUOROSCOPY OF RIGHT LOWER EXTREMITY ARTERIES USING LOW OSMOLAR CONTRAST: ICD-10-PCS | Performed by: SURGERY

## 2024-01-22 PROCEDURE — 25010000002 BUPIVACAINE (PF) 0.25 % SOLUTION 30 ML VIAL: Performed by: SURGERY

## 2024-01-22 PROCEDURE — 25010000002 SUGAMMADEX 200 MG/2ML SOLUTION: Performed by: NURSE ANESTHETIST, CERTIFIED REGISTERED

## 2024-01-22 PROCEDURE — C1769 GUIDE WIRE: HCPCS | Performed by: SURGERY

## 2024-01-22 PROCEDURE — 25010000002 FENTANYL CITRATE (PF) 50 MCG/ML SOLUTION: Performed by: ANESTHESIOLOGY

## 2024-01-22 PROCEDURE — C1887 CATHETER, GUIDING: HCPCS | Performed by: SURGERY

## 2024-01-22 PROCEDURE — 25010000002 HYDROMORPHONE 1 MG/ML SOLUTION: Performed by: SURGERY

## 2024-01-22 PROCEDURE — C1757 CATH, THROMBECTOMY/EMBOLECT: HCPCS | Performed by: SURGERY

## 2024-01-22 PROCEDURE — C1894 INTRO/SHEATH, NON-LASER: HCPCS | Performed by: SURGERY

## 2024-01-22 PROCEDURE — C1889 IMPLANT/INSERT DEVICE, NOC: HCPCS | Performed by: SURGERY

## 2024-01-22 PROCEDURE — 25010000002 ONDANSETRON PER 1 MG: Performed by: INTERNAL MEDICINE

## 2024-01-22 PROCEDURE — 25010000002 DEXAMETHASONE SODIUM PHOSPHATE 20 MG/5ML SOLUTION: Performed by: NURSE ANESTHETIST, CERTIFIED REGISTERED

## 2024-01-22 PROCEDURE — 25010000002 PROTAMINE SULFATE PER 10 MG: Performed by: NURSE ANESTHETIST, CERTIFIED REGISTERED

## 2024-01-22 PROCEDURE — B4101ZZ FLUOROSCOPY OF ABDOMINAL AORTA USING LOW OSMOLAR CONTRAST: ICD-10-PCS | Performed by: SURGERY

## 2024-01-22 PROCEDURE — 99232 SBSQ HOSP IP/OBS MODERATE 35: CPT | Performed by: STUDENT IN AN ORGANIZED HEALTH CARE EDUCATION/TRAINING PROGRAM

## 2024-01-22 PROCEDURE — 82948 REAGENT STRIP/BLOOD GLUCOSE: CPT

## 2024-01-22 PROCEDURE — 25010000002 HYDROMORPHONE PER 4 MG: Performed by: NURSE ANESTHETIST, CERTIFIED REGISTERED

## 2024-01-22 PROCEDURE — 25510000001 IOPAMIDOL PER 1 ML: Performed by: EMERGENCY MEDICINE

## 2024-01-22 PROCEDURE — C1773 RET DEV, INSERTABLE: HCPCS | Performed by: SURGERY

## 2024-01-22 PROCEDURE — 25010000002 HEPARIN (PORCINE) PER 1000 UNITS: Performed by: NURSE ANESTHETIST, CERTIFIED REGISTERED

## 2024-01-22 PROCEDURE — 25010000002 MIDAZOLAM PER 1 MG: Performed by: ANESTHESIOLOGY

## 2024-01-22 PROCEDURE — 36430 TRANSFUSION BLD/BLD COMPNT: CPT

## 2024-01-22 PROCEDURE — 25010000002 DROPERIDOL PER 5 MG: Performed by: NURSE ANESTHETIST, CERTIFIED REGISTERED

## 2024-01-22 PROCEDURE — 25010000002 ONDANSETRON PER 1 MG: Performed by: NURSE ANESTHETIST, CERTIFIED REGISTERED

## 2024-01-22 PROCEDURE — 04PY3DZ REMOVAL OF INTRALUMINAL DEVICE FROM LOWER ARTERY, PERCUTANEOUS APPROACH: ICD-10-PCS | Performed by: SURGERY

## 2024-01-22 PROCEDURE — 85014 HEMATOCRIT: CPT | Performed by: SURGERY

## 2024-01-22 PROCEDURE — 80053 COMPREHEN METABOLIC PANEL: CPT | Performed by: INTERNAL MEDICINE

## 2024-01-22 PROCEDURE — 25810000003 LACTATED RINGERS PER 1000 ML: Performed by: ANESTHESIOLOGY

## 2024-01-22 PROCEDURE — P9016 RBC LEUKOCYTES REDUCED: HCPCS

## 2024-01-22 PROCEDURE — 85025 COMPLETE CBC W/AUTO DIFF WBC: CPT | Performed by: INTERNAL MEDICINE

## 2024-01-22 PROCEDURE — 04JY0ZZ INSPECTION OF LOWER ARTERY, OPEN APPROACH: ICD-10-PCS | Performed by: SURGERY

## 2024-01-22 PROCEDURE — 25010000002 HEPARIN (PORCINE) PER 1000 UNITS: Performed by: SURGERY

## 2024-01-22 PROCEDURE — 25510000001 IOPAMIDOL PER 1 ML: Performed by: SURGERY

## 2024-01-22 PROCEDURE — 25010000002 PROPOFOL 200 MG/20ML EMULSION: Performed by: NURSE ANESTHETIST, CERTIFIED REGISTERED

## 2024-01-22 PROCEDURE — C1760 CLOSURE DEV, VASC: HCPCS | Performed by: SURGERY

## 2024-01-22 PROCEDURE — 25010000002 HYDROMORPHONE 1 MG/ML SOLUTION: Performed by: INTERNAL MEDICINE

## 2024-01-22 PROCEDURE — 25010000002 CEFAZOLIN IN DEXTROSE 2000 MG/ 100 ML SOLUTION: Performed by: SURGERY

## 2024-01-22 PROCEDURE — 04L43DZ OCCLUSION OF SPLENIC ARTERY WITH INTRALUMINAL DEVICE, PERCUTANEOUS APPROACH: ICD-10-PCS | Performed by: SURGERY

## 2024-01-22 PROCEDURE — 25010000002 KETOROLAC TROMETHAMINE PER 15 MG: Performed by: NURSE ANESTHETIST, CERTIFIED REGISTERED

## 2024-01-22 PROCEDURE — 25010000002 LIDOCAINE 1 % SOLUTION 20 ML VIAL: Performed by: SURGERY

## 2024-01-22 PROCEDURE — 85018 HEMOGLOBIN: CPT | Performed by: SURGERY

## 2024-01-22 PROCEDURE — B4131ZZ FLUOROSCOPY OF SPLENIC ARTERIES USING LOW OSMOLAR CONTRAST: ICD-10-PCS | Performed by: SURGERY

## 2024-01-22 PROCEDURE — 86900 BLOOD TYPING SEROLOGIC ABO: CPT

## 2024-01-22 DEVICE — CLIPAPPLR M/ ENDO LIGACLIP 9 3/8IN SM: Type: IMPLANTABLE DEVICE | Site: ARTERIAL | Status: FUNCTIONAL

## 2024-01-22 DEVICE — KT SEAL HEMOS ABS FLOSEAL MATRX 1.5/FAST/PREP 5000/IU 10ML: Type: IMPLANTABLE DEVICE | Site: GROIN | Status: FUNCTIONAL

## 2024-01-22 DEVICE — IMPLANTABLE DEVICE: Type: IMPLANTABLE DEVICE | Site: ARTERIAL | Status: FUNCTIONAL

## 2024-01-22 RX ORDER — SODIUM CHLORIDE 0.9 % (FLUSH) 0.9 %
3-10 SYRINGE (ML) INJECTION AS NEEDED
Status: DISCONTINUED | OUTPATIENT
Start: 2024-01-22 | End: 2024-01-22 | Stop reason: HOSPADM

## 2024-01-22 RX ORDER — FLUMAZENIL 0.1 MG/ML
0.2 INJECTION INTRAVENOUS AS NEEDED
Status: DISCONTINUED | OUTPATIENT
Start: 2024-01-22 | End: 2024-01-22

## 2024-01-22 RX ORDER — DIPHENHYDRAMINE HYDROCHLORIDE 50 MG/ML
12.5 INJECTION INTRAMUSCULAR; INTRAVENOUS
Status: DISCONTINUED | OUTPATIENT
Start: 2024-01-22 | End: 2024-01-22

## 2024-01-22 RX ORDER — DROPERIDOL 2.5 MG/ML
0.62 INJECTION, SOLUTION INTRAMUSCULAR; INTRAVENOUS
Status: DISCONTINUED | OUTPATIENT
Start: 2024-01-22 | End: 2024-01-22

## 2024-01-22 RX ORDER — SODIUM CHLORIDE 0.9 % (FLUSH) 0.9 %
3 SYRINGE (ML) INJECTION EVERY 12 HOURS SCHEDULED
Status: DISCONTINUED | OUTPATIENT
Start: 2024-01-22 | End: 2024-01-22 | Stop reason: HOSPADM

## 2024-01-22 RX ORDER — FENTANYL CITRATE 50 UG/ML
50 INJECTION, SOLUTION INTRAMUSCULAR; INTRAVENOUS
Status: DISCONTINUED | OUTPATIENT
Start: 2024-01-22 | End: 2024-01-22

## 2024-01-22 RX ORDER — FENTANYL CITRATE 50 UG/ML
50 INJECTION, SOLUTION INTRAMUSCULAR; INTRAVENOUS
Status: DISCONTINUED | OUTPATIENT
Start: 2024-01-22 | End: 2024-01-22 | Stop reason: HOSPADM

## 2024-01-22 RX ORDER — FAMOTIDINE 10 MG/ML
20 INJECTION, SOLUTION INTRAVENOUS ONCE
Status: DISCONTINUED | OUTPATIENT
Start: 2024-01-22 | End: 2024-01-22 | Stop reason: HOSPADM

## 2024-01-22 RX ORDER — EPHEDRINE SULFATE 50 MG/ML
5 INJECTION, SOLUTION INTRAVENOUS ONCE AS NEEDED
Status: DISCONTINUED | OUTPATIENT
Start: 2024-01-22 | End: 2024-01-22

## 2024-01-22 RX ORDER — FAMOTIDINE 10 MG/ML
20 INJECTION, SOLUTION INTRAVENOUS ONCE
Status: COMPLETED | OUTPATIENT
Start: 2024-01-22 | End: 2024-01-22

## 2024-01-22 RX ORDER — NALOXONE HCL 0.4 MG/ML
0.2 VIAL (ML) INJECTION AS NEEDED
Status: DISCONTINUED | OUTPATIENT
Start: 2024-01-22 | End: 2024-01-22

## 2024-01-22 RX ORDER — PROMETHAZINE HYDROCHLORIDE 25 MG/1
25 TABLET ORAL ONCE AS NEEDED
Status: DISCONTINUED | OUTPATIENT
Start: 2024-01-22 | End: 2024-01-22

## 2024-01-22 RX ORDER — SODIUM CHLORIDE, SODIUM LACTATE, POTASSIUM CHLORIDE, CALCIUM CHLORIDE 600; 310; 30; 20 MG/100ML; MG/100ML; MG/100ML; MG/100ML
9 INJECTION, SOLUTION INTRAVENOUS CONTINUOUS
Status: DISCONTINUED | OUTPATIENT
Start: 2024-01-22 | End: 2024-01-25 | Stop reason: HOSPADM

## 2024-01-22 RX ORDER — FENTANYL CITRATE 50 UG/ML
50 INJECTION, SOLUTION INTRAMUSCULAR; INTRAVENOUS ONCE AS NEEDED
Status: COMPLETED | OUTPATIENT
Start: 2024-01-22 | End: 2024-01-22

## 2024-01-22 RX ORDER — LIDOCAINE HYDROCHLORIDE 10 MG/ML
0.5 INJECTION, SOLUTION INFILTRATION; PERINEURAL ONCE AS NEEDED
Status: DISCONTINUED | OUTPATIENT
Start: 2024-01-22 | End: 2024-01-22 | Stop reason: HOSPADM

## 2024-01-22 RX ORDER — HYDROMORPHONE HYDROCHLORIDE 1 MG/ML
0.5 INJECTION, SOLUTION INTRAMUSCULAR; INTRAVENOUS; SUBCUTANEOUS
Status: DISCONTINUED | OUTPATIENT
Start: 2024-01-22 | End: 2024-01-22

## 2024-01-22 RX ORDER — HYDROCODONE BITARTRATE AND ACETAMINOPHEN 5; 325 MG/1; MG/1
1 TABLET ORAL ONCE AS NEEDED
Status: DISCONTINUED | OUTPATIENT
Start: 2024-01-22 | End: 2024-01-22

## 2024-01-22 RX ORDER — DEXAMETHASONE SODIUM PHOSPHATE 4 MG/ML
INJECTION, SOLUTION INTRA-ARTICULAR; INTRALESIONAL; INTRAMUSCULAR; INTRAVENOUS; SOFT TISSUE AS NEEDED
Status: DISCONTINUED | OUTPATIENT
Start: 2024-01-22 | End: 2024-01-22 | Stop reason: SURG

## 2024-01-22 RX ORDER — ROCURONIUM BROMIDE 10 MG/ML
INJECTION, SOLUTION INTRAVENOUS AS NEEDED
Status: DISCONTINUED | OUTPATIENT
Start: 2024-01-22 | End: 2024-01-22 | Stop reason: SURG

## 2024-01-22 RX ORDER — IPRATROPIUM BROMIDE AND ALBUTEROL SULFATE 2.5; .5 MG/3ML; MG/3ML
3 SOLUTION RESPIRATORY (INHALATION) ONCE AS NEEDED
Status: DISCONTINUED | OUTPATIENT
Start: 2024-01-22 | End: 2024-01-22

## 2024-01-22 RX ORDER — KETOROLAC TROMETHAMINE 30 MG/ML
INJECTION, SOLUTION INTRAMUSCULAR; INTRAVENOUS AS NEEDED
Status: DISCONTINUED | OUTPATIENT
Start: 2024-01-22 | End: 2024-01-22 | Stop reason: SURG

## 2024-01-22 RX ORDER — ONDANSETRON 2 MG/ML
INJECTION INTRAMUSCULAR; INTRAVENOUS AS NEEDED
Status: DISCONTINUED | OUTPATIENT
Start: 2024-01-22 | End: 2024-01-22 | Stop reason: SURG

## 2024-01-22 RX ORDER — HYDRALAZINE HYDROCHLORIDE 20 MG/ML
5 INJECTION INTRAMUSCULAR; INTRAVENOUS
Status: DISCONTINUED | OUTPATIENT
Start: 2024-01-22 | End: 2024-01-22

## 2024-01-22 RX ORDER — LABETALOL HYDROCHLORIDE 5 MG/ML
5 INJECTION, SOLUTION INTRAVENOUS
Status: DISCONTINUED | OUTPATIENT
Start: 2024-01-22 | End: 2024-01-22

## 2024-01-22 RX ORDER — MIDAZOLAM HYDROCHLORIDE 1 MG/ML
1 INJECTION INTRAMUSCULAR; INTRAVENOUS
Status: DISCONTINUED | OUTPATIENT
Start: 2024-01-22 | End: 2024-01-22 | Stop reason: HOSPADM

## 2024-01-22 RX ORDER — OXYCODONE AND ACETAMINOPHEN 7.5; 325 MG/1; MG/1
1 TABLET ORAL EVERY 4 HOURS PRN
Status: DISCONTINUED | OUTPATIENT
Start: 2024-01-22 | End: 2024-01-22

## 2024-01-22 RX ORDER — LIDOCAINE HYDROCHLORIDE 20 MG/ML
INJECTION, SOLUTION INFILTRATION; PERINEURAL AS NEEDED
Status: DISCONTINUED | OUTPATIENT
Start: 2024-01-22 | End: 2024-01-22 | Stop reason: SURG

## 2024-01-22 RX ORDER — PROTAMINE SULFATE 10 MG/ML
INJECTION, SOLUTION INTRAVENOUS AS NEEDED
Status: DISCONTINUED | OUTPATIENT
Start: 2024-01-22 | End: 2024-01-22 | Stop reason: SURG

## 2024-01-22 RX ORDER — HEPARIN SODIUM 1000 [USP'U]/ML
INJECTION, SOLUTION INTRAVENOUS; SUBCUTANEOUS AS NEEDED
Status: DISCONTINUED | OUTPATIENT
Start: 2024-01-22 | End: 2024-01-22 | Stop reason: SURG

## 2024-01-22 RX ORDER — ONDANSETRON 2 MG/ML
4 INJECTION INTRAMUSCULAR; INTRAVENOUS ONCE AS NEEDED
Status: COMPLETED | OUTPATIENT
Start: 2024-01-22 | End: 2024-01-22

## 2024-01-22 RX ORDER — CEFAZOLIN SODIUM 2 G/100ML
2 INJECTION, SOLUTION INTRAVENOUS ONCE
Status: COMPLETED | OUTPATIENT
Start: 2024-01-22 | End: 2024-01-22

## 2024-01-22 RX ORDER — PROMETHAZINE HYDROCHLORIDE 25 MG/1
25 SUPPOSITORY RECTAL ONCE AS NEEDED
Status: DISCONTINUED | OUTPATIENT
Start: 2024-01-22 | End: 2024-01-22

## 2024-01-22 RX ORDER — PROPOFOL 10 MG/ML
INJECTION, EMULSION INTRAVENOUS AS NEEDED
Status: DISCONTINUED | OUTPATIENT
Start: 2024-01-22 | End: 2024-01-22 | Stop reason: SURG

## 2024-01-22 RX ADMIN — HYDROMORPHONE HYDROCHLORIDE 1 MG: 1 INJECTION, SOLUTION INTRAMUSCULAR; INTRAVENOUS; SUBCUTANEOUS at 16:21

## 2024-01-22 RX ADMIN — FENTANYL CITRATE 50 MCG: 50 INJECTION, SOLUTION INTRAMUSCULAR; INTRAVENOUS at 08:52

## 2024-01-22 RX ADMIN — HYDROMORPHONE HYDROCHLORIDE 1 MG: 1 INJECTION, SOLUTION INTRAMUSCULAR; INTRAVENOUS; SUBCUTANEOUS at 21:20

## 2024-01-22 RX ADMIN — ROCURONIUM BROMIDE 50 MG: 10 INJECTION, SOLUTION INTRAVENOUS at 11:06

## 2024-01-22 RX ADMIN — HEPARIN SODIUM 7500 UNITS: 1000 INJECTION, SOLUTION INTRAVENOUS; SUBCUTANEOUS at 13:38

## 2024-01-22 RX ADMIN — ONDANSETRON 4 MG: 2 INJECTION INTRAMUSCULAR; INTRAVENOUS at 11:36

## 2024-01-22 RX ADMIN — PROTAMINE SULFATE 20 MG: 10 INJECTION, SOLUTION INTRAVENOUS at 14:13

## 2024-01-22 RX ADMIN — FENTANYL CITRATE 50 MCG: 50 INJECTION, SOLUTION INTRAMUSCULAR; INTRAVENOUS at 08:45

## 2024-01-22 RX ADMIN — DROPERIDOL 0.62 MG: 2.5 INJECTION, SOLUTION INTRAMUSCULAR; INTRAVENOUS at 15:39

## 2024-01-22 RX ADMIN — HYDROMORPHONE HYDROCHLORIDE 1 MG: 1 INJECTION, SOLUTION INTRAMUSCULAR; INTRAVENOUS; SUBCUTANEOUS at 02:10

## 2024-01-22 RX ADMIN — SUGAMMADEX 200 MG: 100 INJECTION, SOLUTION INTRAVENOUS at 14:15

## 2024-01-22 RX ADMIN — DEXAMETHASONE SODIUM PHOSPHATE 4 MG: 4 INJECTION, SOLUTION INTRAMUSCULAR; INTRAVENOUS at 11:35

## 2024-01-22 RX ADMIN — HYDROMORPHONE HYDROCHLORIDE 1 MG: 1 INJECTION, SOLUTION INTRAMUSCULAR; INTRAVENOUS; SUBCUTANEOUS at 09:31

## 2024-01-22 RX ADMIN — OXYCODONE AND ACETAMINOPHEN 1 TABLET: 5; 325 TABLET ORAL at 04:27

## 2024-01-22 RX ADMIN — MIDAZOLAM 1 MG: 1 INJECTION INTRAMUSCULAR; INTRAVENOUS at 08:45

## 2024-01-22 RX ADMIN — HYDROMORPHONE HYDROCHLORIDE 1 MG: 1 INJECTION, SOLUTION INTRAMUSCULAR; INTRAVENOUS; SUBCUTANEOUS at 07:13

## 2024-01-22 RX ADMIN — FENTANYL CITRATE 50 MCG: 50 INJECTION, SOLUTION INTRAMUSCULAR; INTRAVENOUS at 11:06

## 2024-01-22 RX ADMIN — CEFAZOLIN SODIUM 2 G: 2 INJECTION, SOLUTION INTRAVENOUS at 10:55

## 2024-01-22 RX ADMIN — ROCURONIUM BROMIDE 10 MG: 10 INJECTION, SOLUTION INTRAVENOUS at 12:10

## 2024-01-22 RX ADMIN — SENNOSIDES AND DOCUSATE SODIUM 2 TABLET: 50; 8.6 TABLET ORAL at 20:38

## 2024-01-22 RX ADMIN — MIDAZOLAM 1 MG: 1 INJECTION INTRAMUSCULAR; INTRAVENOUS at 09:04

## 2024-01-22 RX ADMIN — ONDANSETRON HYDROCHLORIDE 4 MG: 2 INJECTION, SOLUTION INTRAMUSCULAR; INTRAVENOUS at 15:39

## 2024-01-22 RX ADMIN — IOPAMIDOL 90 ML: 510 INJECTION, SOLUTION INTRAVASCULAR at 14:31

## 2024-01-22 RX ADMIN — ROCURONIUM BROMIDE 20 MG: 10 INJECTION, SOLUTION INTRAVENOUS at 13:28

## 2024-01-22 RX ADMIN — FAMOTIDINE 20 MG: 10 INJECTION INTRAVENOUS at 08:56

## 2024-01-22 RX ADMIN — LIDOCAINE HYDROCHLORIDE 100 MG: 20 INJECTION, SOLUTION INFILTRATION; PERINEURAL at 11:06

## 2024-01-22 RX ADMIN — KETOROLAC TROMETHAMINE 30 MG: 30 INJECTION, SOLUTION INTRAMUSCULAR at 11:36

## 2024-01-22 RX ADMIN — SODIUM CHLORIDE, POTASSIUM CHLORIDE, SODIUM LACTATE AND CALCIUM CHLORIDE 9 ML/HR: 600; 310; 30; 20 INJECTION, SOLUTION INTRAVENOUS at 08:45

## 2024-01-22 RX ADMIN — HYDROMORPHONE HYDROCHLORIDE 0.5 MG: 1 INJECTION, SOLUTION INTRAMUSCULAR; INTRAVENOUS; SUBCUTANEOUS at 15:08

## 2024-01-22 RX ADMIN — FENTANYL CITRATE 50 MCG: 50 INJECTION, SOLUTION INTRAMUSCULAR; INTRAVENOUS at 12:13

## 2024-01-22 RX ADMIN — ONDANSETRON HYDROCHLORIDE 4 MG: 2 INJECTION, SOLUTION INTRAMUSCULAR; INTRAVENOUS at 01:02

## 2024-01-22 RX ADMIN — SODIUM CHLORIDE, POTASSIUM CHLORIDE, SODIUM LACTATE AND CALCIUM CHLORIDE: 600; 310; 30; 20 INJECTION, SOLUTION INTRAVENOUS at 13:22

## 2024-01-22 RX ADMIN — HYDROMORPHONE HYDROCHLORIDE 0.5 MG: 1 INJECTION, SOLUTION INTRAMUSCULAR; INTRAVENOUS; SUBCUTANEOUS at 14:39

## 2024-01-22 RX ADMIN — HYDROMORPHONE HYDROCHLORIDE 1 MG: 1 INJECTION, SOLUTION INTRAMUSCULAR; INTRAVENOUS; SUBCUTANEOUS at 04:27

## 2024-01-22 RX ADMIN — PROPOFOL 150 MG: 10 INJECTION, EMULSION INTRAVENOUS at 11:06

## 2024-01-22 RX ADMIN — ROCURONIUM BROMIDE 10 MG: 10 INJECTION, SOLUTION INTRAVENOUS at 12:46

## 2024-01-22 RX ADMIN — Medication 10 ML: at 20:38

## 2024-01-22 RX ADMIN — OXYCODONE AND ACETAMINOPHEN 1 TABLET: 5; 325 TABLET ORAL at 20:51

## 2024-01-22 NOTE — ANESTHESIA PROCEDURE NOTES
Arterial Line      Patient reassessed immediately prior to procedure    Patient location during procedure: pre-op  Start time: 1/22/2024 8:43 AM  Stop Time:1/22/2024 8:48 AM       Line placed for hemodynamic monitoring, MD/Surgeon request and ABGs/Labs/ISTAT.  Performed By   Anesthesiologist: Elyse Ku MD   Preanesthetic Checklist  Completed: patient identified, IV checked, site marked, risks and benefits discussed, surgical consent, monitors and equipment checked, pre-op evaluation and timeout performed  Arterial Line Prep    Sterile Tech: cap, gloves, sterile barriers and mask  Prep: ChloraPrep  Patient monitoring: EKG, continuous pulse oximetry and blood pressure monitoring  Arterial Line Procedure   Laterality:right  Location:  radial artery  Catheter size: 20 G   Guidance: ultrasound guided  PROCEDURE NOTE/ULTRASOUND INTERPRETATION.  Using ultrasound guidance the potential vascular sites for insertion of the catheter were visualized to determine the patency of the vessel to be used for vascular access.  After selecting the appropriate site for insertion, the needle was visualized under ultrasound being inserted into the radial artery, followed by ultrasound confirmation of wire and catheter placement. There were no abnormalities seen on ultrasound; an image was taken; and the patient tolerated the procedure with no complications.   Number of attempts: 1  Successful placement: yes Images: still images obtained, printed/placed on the chart  Post Assessment   Dressing Type: wrist guard applied, secured with tape and occlusive dressing applied.   Complications no  Circ/Move/Sens Assessment: normal and unchanged.   Patient Tolerance: patient tolerated the procedure well with no apparent complications  Additional Notes  Using ultrasound guidance the potential vascular sites for insertion of the catheter were visualized to determine the patency of the vessel to be used for vascular access.  After selecting  the appropriate site for insertion, the needle was visualized under ultrasound being inserted into the artery, followed by ultrasound confirmation of wire and catheter placement.  There were no abnormalities seen on ultrasound; an image was taken/ and the patient tolerated the procedure with no complications.

## 2024-01-22 NOTE — PROGRESS NOTES
"General Surgery Progress Note    Summary: Mrs. Piper Cain is a 43 y.o. year old lady with chronic pancreatitis and a large subcapsular splenic hematoma, now status post embolization of the splenic artery.  Doing well after the procedure with her pain control.  Continue to monitor hemoglobin.  No plans for surgical intervention at this time but will continue to follow closely pending her course.  Okay for ice chips and sips tonight.    Chief Complaint: Abdominal pain    Interval Events: No acute events overnight.  She went to the OR with vascular surgery today and her splenic artery was embolized.  She states she is feeling well now but is asking for liquids to drink.    Vitals: /71   Pulse 88   Temp 97.5 °F (36.4 °C) (Oral)   Resp 16   Ht 177.8 cm (70\")   Wt 61.7 kg (136 lb)   LMP 01/17/2018 (Exact Date)   SpO2 93%   BMI 19.51 kg/m²     GENERAL: alert, well appearing, and in no distress  HEENT: normocephalic, atraumatic, moist mucous membranes, clear sclerae   CHEST: no increased work of breathing, symmetric air entry  CARDIAC: regular rate and rhythm    ABDOMEN: Soft, nondistended, nontender  EXTREMITIES: no cyanosis, clubbing, or edema   SKIN: Warm and moist, no rashes    Labs:  Results from last 7 days   Lab Units 01/22/24  0345 01/21/24 2007 01/21/24  1357 01/21/24  0603   WBC 10*3/mm3 9.45  --  9.71 11.35*   HEMOGLOBIN g/dL 10.1* 7.2* 8.2* 10.0*   HEMATOCRIT % 29.6* 21.9* 24.9* 30.7*   PLATELETS 10*3/mm3 331  --  415 442     Results from last 7 days   Lab Units 01/22/24  0345 01/21/24  0756   SODIUM mmol/L 135* 137   POTASSIUM mmol/L 3.5 3.8   CHLORIDE mmol/L 101 96*   CO2 mmol/L 23.0 28.2   BUN mg/dL 3* 5*   CREATININE mg/dL 0.47* 0.60   CALCIUM mg/dL 8.5* 8.8   BILIRUBIN mg/dL 0.3 0.3   ALK PHOS U/L 70 91   ALT (SGPT) U/L 9 <5   AST (SGOT) U/L 7 5   GLUCOSE mg/dL 109* 104*     Results from last 7 days   Lab Units 01/21/24  0837   INR  1.20       CRISTIAN IJMENEZ MD  General and Endoscopic " Surgery  [facility], [facility]    [address], [address]  [place], [place], [address]  P: [phone]  F: [phone]

## 2024-01-22 NOTE — CONSULTS
Patient Name: Piper Cain Account #: [id number]    MRN: [id number] Admission Date: 2024      Consulting Service: Vascular Surgery Date of Evaluation: 2024    Requesting Provider: Dr. Mai Almeida MD          [place]       CHIEF COMPLAINT: Splenic laceration with impending splenic rupture  HPI: Piper Cain is a 43 y.o. female is being seen for a consultation and evaluation/management of splenic laceration with expanding hematoma and possible impending splenic rupture.  Patient is undergoing evaluation by general surgery who is assess to try to coil embolize her splenic artery to prevent rupture and aid resection if needed.  Patient is aware of these issues and is agreeable.  I answered the patient's questions regarding these issues as directly as I could referring the management of the spleen to general surgery.    PAST MEDICAL HISTORY:   Past Medical History:   Diagnosis Date    Anemia     Anxiety     Constipation     Cyst of spleen     Diarrhea     E. coli bacteremia     ETOH abuse     Frequent UTI     GERD (gastroesophageal reflux disease)     Hiatal hernia     Left flank pain 10/21/2022    Migraine     Miscarriage     Pancreatitis     Pseudocyst of pancreas       PAST SURGICAL HISTORY:   Past Surgical History:   Procedure Laterality Date    ENDOSCOPY N/A 08/10/2022    Procedure: ESOPHAGOGASTRODUODENOSCOPY with bxs;  Surgeon: Salvador Price MD;  Location: [facility] ENDOSCOPY;  Service: Gastroenterology;  Laterality: N/A;  Pre: r/o esophageal  varacies, abd pain  Post: esophageal plaque    ENDOSCOPY N/A 10/9/2023    Procedure: ESOPHAGOGASTRODUODENOSCOPY WITH STENT REMOVAL;  Surgeon: Red Denson MD;  Location: [facility] ENDOSCOPY;  Service: Gastroenterology;  Laterality: N/A;    PANCREATIC CYST DRAINAGE      x 2    UPPER ENDOSCOPIC ULTRASOUND W/ FNA N/A 2023    Procedure: Esophagogastroduodenoscopy with biopsy x 1 area and endoscopic ultrasound with placement of cyst  gastrostomy;  Surgeon: Red Denson MD;  Location: [facility] ENDOSCOPY;  Service: Gastroenterology;  Laterality: N/A;  post: pancreatic psuedocyst    WISDOM TOOTH EXTRACTION        FAMILY HISTORY:   Family History   Problem Relation Age of Onset    Alcohol abuse Mother     Arthritis Mother     Asthma Mother     Miscarriages / Stillbirths Mother     Cancer Father     Diabetes Father     Drug abuse Father     Early death Father     Heart disease Father     Hyperlipidemia Father     Hypertension Father     Alcohol abuse Paternal Aunt     Cancer Paternal Aunt     Diabetes Paternal Aunt     Drug abuse Paternal Aunt     Early death Paternal Aunt     Heart disease Paternal Aunt     Hyperlipidemia Paternal Aunt     Hypertension Paternal Aunt     Alcohol abuse Maternal Grandmother     Alcohol abuse Maternal Grandfather     Alcohol abuse Paternal Grandmother     Cancer Paternal Grandmother     Diabetes Paternal Grandmother     Drug abuse Paternal Grandmother     Early death Paternal Grandmother     Heart disease Paternal Grandmother     Hyperlipidemia Paternal Grandmother     Hypertension Paternal Grandmother     Alcohol abuse Paternal Grandfather       SOCIAL HISTORY:   Social History     Tobacco Use    Smoking status: Every Day     Packs/day: 0.50     Years: 26.00     Additional pack years: 0.00     Total pack years: 13.00     Types: Cigarettes     Start date: 1/28/1996     Passive exposure: Current    Smokeless tobacco: Never   Vaping Use    Vaping Use: Never used   Substance Use Topics    Alcohol use: Not Currently    Drug use: Never      MEDICATIONS:   No current facility-administered medications on file prior to encounter.     Current Outpatient Medications on File Prior to Encounter   Medication Sig Dispense Refill    amitriptyline (ELAVIL) 50 MG tablet Take 1 tablet by mouth Every Night.      folic acid (FOLVITE) 1 MG tablet Take 1 tablet by mouth Daily. 30 tablet 3    magnesium oxide (MAG-OX) 400 MG tablet Take 1  tablet by mouth Daily.      pantoprazole (PROTONIX) 40 MG EC tablet Take 1 tablet by mouth Daily. 30 tablet 3    thiamine (VITAMIN B1) 100 MG tablet Take 1 tablet by mouth Daily. 30 tablet 3    Ferrous Sulfate Dried (Feosol) 200 (65 Fe) MG tablet tablet Take 1 tablet by mouth 2 (Two) Times a Day. (Patient not taking: Reported on 1/21/2024)      naloxone (NARCAN) 4 MG/0.1ML nasal spray Call 911. Don't prime. [place] in 1 nostril for overdose. Repeat in 2-3 minutes in other nostril if no or minimal breathing/responsiveness. 2 each 0    ondansetron (ZOFRAN) 4 MG tablet Take 1 tablet by mouth Every 8 (Eight) Hours As Needed for Nausea or Vomiting.      oxyCODONE-acetaminophen (Percocet) 5-325 MG per tablet Take 1 tablet by mouth Every 6 (Six) Hours As Needed for Moderate Pain. 8 tablet 0             ALLERGIES: [place]   COMPLETE REVIEW OF SYSTEMS:     ENT ROS: negative  Cardiovascular ROS: no chest pain or dyspnea on exertion  Respiratory ROS: no cough, shortness of breath, or wheezing  Gastrointestinal ROS: positive for -spasmodic abdominal pain  Neurological ROS: no TIA or stroke symptoms  Genito-Urinary ROS: no dysuria, trouble voiding, or hematuria  Musculoskeletal ROS: negative  Dermatological ROS: negative  Psychological ROS: negative      PHYSICAL EXAM:   Patient Vitals for the past 24 hrs:   BP Temp Temp src Pulse Resp SpO2 Weight   01/22/24 0600 119/83 -- -- 98 -- 95 % --   01/22/24 0500 118/84 -- -- 97 -- 95 % --   01/22/24 0439 -- 97.6 °F (36.4 °C) Oral -- -- -- --   01/22/24 0400 114/80 -- -- 98 -- 99 % --   01/22/24 0345 -- -- -- 93 -- 95 % --   01/22/24 0300 113/83 -- -- 94 -- 95 % --   01/22/24 0200 118/82 -- -- 97 -- 95 % --   01/22/24 0115 119/95 -- -- 97 -- 96 % --   01/22/24 0100 114/77 -- -- 96 -- 98 % --   01/22/24 0054 -- -- -- 98 -- 96 % --   01/22/24 0045 116/85 98.4 °F (36.9 °C) -- 96 -- 97 % --   01/22/24 0030 117/89 -- -- 93 -- 96 % --   01/22/24 0015 119/76 -- -- 92 -- 100 % --   01/22/24 0000  112/84 -- -- 94 -- 96 % --   01/21/24 2345 113/79 -- -- 93 -- 93 % --   01/21/24 2330 -- 98.3 °F (36.8 °C) Oral -- -- -- --   01/21/24 2300 109/79 98.4 °F (36.9 °C) Oral 89 -- 98 % --   01/21/24 2245 106/78 -- -- 86 -- 98 % --   01/21/24 2230 105/75 -- -- 111 -- 97 % --   01/21/24 2222 109/73 98.3 °F (36.8 °C) Oral 99 -- 95 % --   01/21/24 2200 109/73 -- -- 84 -- 96 % --   01/21/24 2100 110/76 -- -- 90 -- 96 % --   01/21/24 2004 123/45 98.3 °F (36.8 °C) Oral 94 -- 97 % --   01/21/24 2000 -- -- -- 93 -- 97 % --   01/21/24 1700 107/70 -- -- 101 -- 94 % --   01/21/24 1622 -- 98.8 °F (37.1 °C) Oral -- -- -- --   01/21/24 1600 -- -- -- 98 -- 95 % --   01/21/24 1400 -- -- -- 113 -- 100 % --   01/21/24 1343 -- -- -- -- -- -- 61.7 kg (136 lb)   01/21/24 1200 114/76 -- -- 101 -- 99 % --   01/21/24 1145 105/75 -- -- 106 -- 92 % --   01/21/24 0900 115/82 -- -- 117 18 93 % --   01/21/24 0830 107/78 -- -- 120 -- 97 % --        General appearance: oriented to person, place, and time, anxious, in mild to moderate distress, ill-appearing, and chronically ill appearing.  Neurological exam reveals alert, oriented, normal speech, no focal findings or movement disorder noted.  ENT exam reveals - ENT exam normal, no neck nodes or sinus tenderness.  CVS exam: normal rate, regular rhythm, normal S1, S2, no murmurs, rubs, clicks or gallops.  Chest: clear to auscultation, no wheezes, rales or rhonchi, symmetric air entry.  Abdominal exam: Mildly distended tender nonrigid no rebound   examination of the feet reveals warm, good capillary refill.        LABS:      Results Review:       I reviewed the patient's new clinical results.  Results from last 7 days   Lab Units 01/22/24  0345 01/21/24 2007 01/21/24  1357 01/21/24  0603   WBC 10*3/mm3 9.45  --  9.71 11.35*   HEMOGLOBIN g/dL 10.1* 7.2* 8.2* 10.0*   PLATELETS 10*3/mm3 331  --  415 442     Results from last 7 days   Lab Units 01/22/24  0345 01/21/24  0756   SODIUM mmol/L 135* 137    POTASSIUM mmol/L 3.5 3.8   CHLORIDE mmol/L 101 96*   CO2 mmol/L 23.0 28.2   BUN mg/dL 3* 5*   CREATININE mg/dL 0.47* 0.60   GLUCOSE mg/dL 109* 104*   Estimated Creatinine Clearance: 153.4 mL/min (A) (by C-G formula based on SCr of 0.47 mg/dL (L)).  Results from last 7 days   Lab Units 01/22/24  0345 01/21/24  0756   CALCIUM mg/dL 8.5* 8.8   ALBUMIN g/dL 3.0* 3.6   MAGNESIUM mg/dL  --  1.5*     Results from last 7 days   Lab Units 01/21/24  0837   PROTIME Seconds 13.2   INR  1.20       The following radiologic or non-invasive studies have been reviewed by me: CT scan reviewed with images viewed    Active Hospital Problems    Diagnosis  POA    **Splenic laceration [S36.039A]  Yes      Resolved Hospital Problems   No resolved problems to display.         ASSESSMENT/PLAN: 43 y.o. female with an enlarging subcapsular hematoma of the spleen that occurred after splenic laceration associated with pancreatitis.  Patient is having this worsening pain over the last 3 days and was readmitted after attempts at conservative care were given.  Patient has concerns for possible rupture of the spleen based on size of the hematoma.  At this juncture splenectomy is likely to be performed and free splenectomy coil embolization has been requested.  I discussed with the patient the risk benefits complications of the procedure including that of increased pain rather than decrease pain.  He is aware and is agreeable with the plan.  Will attempt to pursue this today.  Will keep her strict NPO.      I discussed the plan with the patient and she is agreeable to the plan of care at this point. Thank you for this consult.     Jony Fowler MD   01/22/24

## 2024-01-22 NOTE — PAYOR COMM NOTE
"Lizzeth Zavala (43 y.o. Female)                    ATTENTION; INITIAL CLINICALS AUTH PENDING [id number]                    REPLY TO UR DEPT  [phone] OR CALL   TESSY STEWART LPN             Date of Birth   1980    Social Security Number       Address   [address]  [place] [address]    Home Phone   [phone]    MRN   [id number]       Faith   Christianity    Marital Status   Single                            Admission Date   1/21/24    Admission Type   Emergency    Admitting Provider   Yury Reyes MD    Attending Provider   Dalton Devreis MD    Department, Room/Bed   [facility] MAIN OR, [facility] Main OR/MAIN OR       Discharge Date       Discharge Disposition       Discharge Destination                                 Attending Provider: Dalton Devries MD    Allergies: Nickel    Isolation: None   Infection: None   Code Status: CPR    Ht: 177.8 cm (70\")   Wt: 61.7 kg (136 lb)    Admission Cmt: None   Principal Problem: Splenic laceration [S36.039A]                   Active Insurance as of 1/21/2024       Primary Coverage       Payor Plan Insurance Group Employer/Plan Group    [id number]       Payor Plan Address Payor Plan Phone Number Payor Plan Fax Number Effective Dates    MAIL STOP: 7/1/2022 - None Entered    [address] [address]       [facility] [address]         Subscriber Name Subscriber Birth Date Member ID       LIZZETH ZAVALA 1980 [id number]                     Emergency Contacts        (Rel.) Home Phone Work Phone Mobile Phone    MALOU EVANGELISTA (Significant Other) [phone] -- --                 History & Physical        Prosper Reyes MD at 01/21/24 1239            .     Admission Note    Patient Identification:  Lizzeth Zavala  43 y.o.  female  1980  [id number]            CC: pain  History of Present Illness:  Patient is a 43-year-old who presented to the " outside hospital ER with left-sided shoulder and flank left upper quadrant pain.  She felt as though symptoms were fairly severe they are progressive.  Associated difficulty taking deep breaths secondary to pain.  No emesis no nausea no fever no chills.  No lethargy.  She is awake and alert.  Biggest complaint is pain.  She underwent evaluation at freestanding ER where CT scan of the abdomen pelvis revealed a 10 cm splenic hematoma.  Also showed a very small left-sided pleural effusion.  It was requested to be admitted to the ICU after discussion with general surgery who plans to manage the splenic hematoma with potential plan for resection tomorrow morning.        Past Medical History:   Diagnosis Date    Anemia     Anxiety     Constipation     Cyst of spleen     Diarrhea     E. coli bacteremia     ETOH abuse     Frequent UTI     GERD (gastroesophageal reflux disease)     Hiatal hernia     Left flank pain 10/21/2022    Migraine     Miscarriage 2004    Pancreatitis     Pseudocyst of pancreas        Past Surgical History:   Procedure Laterality Date    ENDOSCOPY N/A 08/10/2022    Procedure: ESOPHAGOGASTRODUODENOSCOPY with bxs;  Surgeon: Salvador Price MD;  Location: [facility] ENDOSCOPY;  Service: Gastroenterology;  Laterality: N/A;  Pre: r/o esophageal  varacies, abd pain  Post: esophageal plaque    ENDOSCOPY N/A 10/9/2023    Procedure: ESOPHAGOGASTRODUODENOSCOPY WITH STENT REMOVAL;  Surgeon: Red Denson MD;  Location: [facility] ENDOSCOPY;  Service: Gastroenterology;  Laterality: N/A;    PANCREATIC CYST DRAINAGE      x 2    UPPER ENDOSCOPIC ULTRASOUND W/ FNA N/A 9/13/2023    Procedure: Esophagogastroduodenoscopy with biopsy x 1 area and endoscopic ultrasound with placement of cyst gastrostomy;  Surgeon: Red Denson MD;  Location: [facility] ENDOSCOPY;  Service: Gastroenterology;  Laterality: N/A;  post: pancreatic psuedocyst    WISDOM TOOTH EXTRACTION          Social History     Socioeconomic History    Marital  status: Single   Tobacco Use    Smoking status: Every Day     Packs/day: 0.50     Years: 26.00     Additional pack years: 0.00     Total pack years: 13.00     Types: Cigarettes     Start date: 1/28/1996     Passive exposure: Current    Smokeless tobacco: Never   Vaping Use    Vaping Use: Never used   Substance and Sexual Activity    Alcohol use: Not Currently    Drug use: Never    Sexual activity: Defer     Partners: Male       Family History   Problem Relation Age of Onset    Alcohol abuse Mother     Arthritis Mother     Asthma Mother     Miscarriages / Stillbirths Mother     Cancer Father     Diabetes Father     Drug abuse Father     Early death Father     Heart disease Father     Hyperlipidemia Father     Hypertension Father     Alcohol abuse Paternal Aunt     Cancer Paternal Aunt     Diabetes Paternal Aunt     Drug abuse Paternal Aunt     Early death Paternal Aunt     Heart disease Paternal Aunt     Hyperlipidemia Paternal Aunt     Hypertension Paternal Aunt     Alcohol abuse Maternal Grandmother     Alcohol abuse Maternal Grandfather     Alcohol abuse Paternal Grandmother     Cancer Paternal Grandmother     Diabetes Paternal Grandmother     Drug abuse Paternal Grandmother     Early death Paternal Grandmother     Heart disease Paternal Grandmother     Hyperlipidemia Paternal Grandmother     Hypertension Paternal Grandmother     Alcohol abuse Paternal Grandfather        Medications Prior to Admission   Medication Sig Dispense Refill Last Dose    amitriptyline (ELAVIL) 50 MG tablet Take 1 tablet by mouth Every Night.       Ferrous Sulfate Dried (Feosol) 200 (65 Fe) MG tablet tablet Take 1 tablet by mouth 2 (Two) Times a Day.       folic acid (FOLVITE) 1 MG tablet Take 1 tablet by mouth Daily. 30 tablet 3     magnesium oxide (MAG-OX) 400 MG tablet Take 1 tablet by mouth Daily.       naloxone (NARCAN) 4 MG/0.1ML nasal spray Call 911. Don't prime. [place] in 1 nostril for overdose. Repeat in 2-3 minutes in other  "nostril if no or minimal breathing/responsiveness. 2 each 0     ondansetron (ZOFRAN) 4 MG tablet Take 1 tablet by mouth Every 8 (Eight) Hours As Needed for Nausea or Vomiting.       oxyCODONE-acetaminophen (Percocet) 5-325 MG per tablet Take 1 tablet by mouth Every 6 (Six) Hours As Needed for Moderate Pain. 8 tablet 0     pantoprazole (PROTONIX) 40 MG EC tablet Take 1 tablet by mouth Daily. 30 tablet 3     thiamine (VITAMIN B1) 100 MG tablet Take 1 tablet by mouth Daily. 30 tablet 3        Allergies   Allergen Reactions    Nickel Rash       Review of Systems:  As per HPI otherwise a  12 system review of systems performed and all else negative    Physical Exam:  Vitals:  Vitals:    01/21/24 0604 01/21/24 0830 01/21/24 0900 01/21/24 1145   BP: 105/85 107/78 115/82 105/75   BP Location: Right arm      Patient Position: Sitting      Pulse: 98 120 117 106   Resp: 16  18    Temp: 98.3 °F (36.8 °C)      TempSrc: Oral      SpO2: 96% 97% 93% 92%   Weight: 57.6 kg (126 lb 15.8 oz)      Height: 177.8 cm (70\")              Body mass index is 18.22 kg/m².    Intake/Output Summary (Last 24 hours) at 1/21/2024 1239  Last data filed at 1/21/2024 0822  Gross per 24 hour   Intake 1000 ml   Output --   Net 1000 ml       Exam:  General appearance: NAD, conversant   Eyes: Anicteric sclerae, moist conjunctivae; no lid lag;   HENT: Atraumatic; oropharynx clear with moist mucous membranes and no mucosal ulcerations; normal hard and soft palate  Neck: Trachea midline; supple y  Lungs: CTA, with limited respiratory effort and no intercostal retractions  CV: RRR, no MRGs   Abdomen: Soft nonrigid no tenderness right side positive tender left upper quadrant.  Extremities: No peripheral edema or extremity lymphadenopathy  Skin: wwp   Psych: Appropriate affect, alert   Neuro: cns 2-12 speech intact        Scheduled meds:  senna-docusate sodium, 2 tablet, Oral, BID  sodium chloride, 10 mL, Intravenous, Q12H        Data Review:   I reviewed the " patient's medications and new clinical results.  Lab Results   Component Value Date    CALCIUM 8.8 01/21/2024    PHOS 4.9 (H) 10/21/2022     Results from last 7 days   Lab Units 01/21/24  0756 01/21/24  0603   AST (SGOT) U/L 5  --    ALT (SGPT) U/L <5  --    MAGNESIUM mg/dL 1.5*  --    SODIUM mmol/L 137  --    POTASSIUM mmol/L 3.8  --    CHLORIDE mmol/L 96*  --    CO2 mmol/L 28.2  --    BUN mg/dL 5*  --    CREATININE mg/dL 0.60  --    GLUCOSE mg/dL 104*  --    CALCIUM mg/dL 8.8  --    WBC 10*3/mm3  --  11.35*   HEMOGLOBIN g/dL  --  10.0*   PLATELETS 10*3/mm3  --  442     Results from last 7 days   Lab Units 01/21/24  0756   HSTROP T ng/L <6         Estimated Creatinine Clearance: 112.2 mL/min (by C-G formula based on SCr of 0.6 mg/dL).    IMAGING:  I have reviewed all imaging studies since my last documentation.  Imaging Results (Most Recent)       Procedure Component Value Units Date/Time    CT Abdomen Pelvis With Contrast [[id number]] Collected: 01/21/24 0843     Updated: 01/21/24 1008    Narrative:      CT ABDOMEN AND PELVIS WITH IV CONTRAST     HISTORY: Left upper quadrant and left flank pain. Sudden onset last  night.     TECHNIQUE:  CT includes axial imaging from the lung bases to the  trochanters with intravenous contrast and without use of oral contrast.  Data reconstructed in coronal and sagittal planes. Radiation dose  reduction techniques were utilized, including automated exposure control  and exposure modulation based on body size.     COMPARISON: CT abdomen and pelvis 01/04/2024, 09/05/2023, 08/24/2023.     FINDINGS: Since the previous CT 17 days ago there has developed a left  splenic subcapsular hematoma. Hematoma extends along the superior and  lateral aspect of the spleen and measures above the spleen 10 x 10 cm x  9 cm in height. This is associated with a splenic laceration within the  anterior and inferior aspect of the spleen. There is mild surrounding  edema without clear intraperitoneal  extension of hemorrhage.     There is resolving acute-on-chronic pancreatitis with improved  peripancreatic inflammation particularly along the anterior pancreatic  tail. Pancreatic ductal dilatation is without change. Pseudocyst in the  region of the pancreatic tail is without change and extends along the  anterior margin of the left kidney. There has developed a new small left  pleural effusion and there is left lower lobe atelectasis. There is no  other evidence for change when compared to the examination 17 days ago.       Impression:      1. New left subcapsular splenic hematoma extending above and lateral to  the spleen measuring approximately 10 x 10 x 9 cm. Hematoma appears  associated with splenic lacerations. Acute-on-chronic pancreatitis with  improvement in degree of acute pancreatitis when compared to the  examination 17 days ago. Persistent pancreatic ductal dilatation.  Pancreatic pseudocyst in the region of the pancreatic tail without  change.  2. New small left pleural effusion and adjacent left basilar  atelectasis.     Discussed with Dr. Almazan on 01/21/2024 at 8:35 a.m.      Radiation dose reduction techniques were utilized, including automated  exposure control and exposure modulation based on body size.        This report was finalized on 1/21/2024 10:05 AM by Dr. Angel Mcleod M.D on Workstation: [id number]       XR Chest 1 View [[id number]] Collected: 01/21/24 0844     Updated: 01/21/24 1005    Narrative:      CHEST SINGLE VIEW     HISTORY: Left anterior and posterior chest pain.     COMPARISON: AP chest 08/06/2022.     FINDINGS: There has developed a small-to-moderate left pleural effusion  with left basilar atelectasis and this is new when compared with exam  08/06/2022 and a chest CT 01/04/2024. There is mild right basilar  atelectasis. There is no perihilar edema or evidence for pneumothorax.       Impression:      New small-to-moderate left pleural effusion with  basilar  atelectasis.     This report was finalized on 1/21/2024 10:02 AM by Dr. Angel Mcleod M.D on Workstation: [id number]               ASSESSMENT /   PLAN:  Splenic laceration with hematoma  Chronic Pancreatitis  Anemia  Left pleural effusion    Consult to gen surg  Npo after midnight  Repeat cbc this afternoon  Monitor for any fever  Pain control  Pleural effusion likely reactive, too small to safely sample.  Appreciate gen surg assistance.       Prosper Reyes MD  [facility]  01/21/24  12:39 EST      Electronically signed by Prosper Reyes MD at 01/21/24 1351          Emergency Department Notes        Concepción Mullins RN at 01/21/24 0912          Second call placed to on call General Surgery at this time. Message left with answering service for Stat call back.     Electronically signed by Concepción Mullins RN at 01/21/24 0915       Concepción Mullins RN at 01/21/24 0844          Call placed to Intensivist at this time per Dr. Devries's request.    Electronically signed by Concepción Mullins RN at 01/21/24 0846       Dalton Devries MD at 01/21/24 0834          Subjective  History of Present Illness  Assumed care from Dr. Almazan 0700hrs. See initial ED note/H&P/ROS.        Review of Systems    Past Medical History:   Diagnosis Date    Anemia     Anxiety     Constipation     Cyst of spleen     Diarrhea     E. coli bacteremia     ETOH abuse     Frequent UTI     GERD (gastroesophageal reflux disease)     Hiatal hernia     Left flank pain 10/21/2022    Migraine     Miscarriage 2004    Pancreatitis     Pseudocyst of pancreas        Allergies   Allergen Reactions    Nickel Rash       Past Surgical History:   Procedure Laterality Date    ENDOSCOPY N/A 08/10/2022    Procedure: ESOPHAGOGASTRODUODENOSCOPY with bxs;  Surgeon: Salvador Price MD;  Location: [facility] ENDOSCOPY;  Service: Gastroenterology;  Laterality: N/A;  Pre: r/o esophageal  varacies, abd pain  Post: esophageal plaque     ENDOSCOPY N/A 10/9/2023    Procedure: ESOPHAGOGASTRODUODENOSCOPY WITH STENT REMOVAL;  Surgeon: Red Denson MD;  Location: [facility] ENDOSCOPY;  Service: Gastroenterology;  Laterality: N/A;    PANCREATIC CYST DRAINAGE      x 2    UPPER ENDOSCOPIC ULTRASOUND W/ FNA N/A 9/13/2023    Procedure: Esophagogastroduodenoscopy with biopsy x 1 area and endoscopic ultrasound with placement of cyst gastrostomy;  Surgeon: Red Denson MD;  Location: [facility] ENDOSCOPY;  Service: Gastroenterology;  Laterality: N/A;  post: pancreatic psuedocyst    WISDOM TOOTH EXTRACTION         Family History   Problem Relation Age of Onset    Alcohol abuse Mother     Arthritis Mother     Asthma Mother     Miscarriages / Stillbirths Mother     Cancer Father     Diabetes Father     Drug abuse Father     Early death Father     Heart disease Father     Hyperlipidemia Father     Hypertension Father     Alcohol abuse Paternal Aunt     Cancer Paternal Aunt     Diabetes Paternal Aunt     Drug abuse Paternal Aunt     Early death Paternal Aunt     Heart disease Paternal Aunt     Hyperlipidemia Paternal Aunt     Hypertension Paternal Aunt     Alcohol abuse Maternal Grandmother     Alcohol abuse Maternal Grandfather     Alcohol abuse Paternal Grandmother     Cancer Paternal Grandmother     Diabetes Paternal Grandmother     Drug abuse Paternal Grandmother     Early death Paternal Grandmother     Heart disease Paternal Grandmother     Hyperlipidemia Paternal Grandmother     Hypertension Paternal Grandmother     Alcohol abuse Paternal Grandfather        Social History     Socioeconomic History    Marital status: Single   Tobacco Use    Smoking status: Every Day     Packs/day: 0.50     Years: 26.00     Additional pack years: 0.00     Total pack years: 13.00     Types: Cigarettes     Start date: 1/28/1996     Passive exposure: Current    Smokeless tobacco: Never   Vaping Use    Vaping Use: Never used   Substance and Sexual Activity    Alcohol use: Not  Currently    Drug use: Never    Sexual activity: Defer     Partners: Male           Objective  Physical Exam  Vitals and nursing note reviewed.   Constitutional:       Appearance: Normal appearance.   HENT:      Head: Normocephalic and atraumatic.      Right Ear: External ear normal.      Left Ear: External ear normal.      Nose: Nose normal.      Mouth/Throat:      Mouth: Mucous membranes are moist.      Pharynx: Oropharynx is clear.   Eyes:      Pupils: Pupils are equal, round, and reactive to light.   Cardiovascular:      Rate and Rhythm: Regular rhythm. Tachycardia present.      Pulses: Normal pulses.      Heart sounds: Normal heart sounds. No murmur heard.     No friction rub. No gallop.   Pulmonary:      Effort: Pulmonary effort is normal. No respiratory distress.      Breath sounds: Normal breath sounds. No wheezing, rhonchi or rales.   Abdominal:      General: Abdomen is flat. Bowel sounds are normal. There is no distension.      Palpations: Abdomen is soft.      Tenderness: There is no abdominal tenderness. There is no guarding or rebound.   Musculoskeletal:      Cervical back: Normal range of motion and neck supple.      Right lower leg: No edema.      Left lower leg: No edema.   Skin:     General: Skin is warm and dry.   Neurological:      General: No focal deficit present.      Mental Status: She is alert and oriented to person, place, and time.      GCS: GCS eye subscore is 4. GCS verbal subscore is 5. GCS motor subscore is 6.      Sensory: Sensation is intact.      Motor: Motor function is intact.         Procedures           ED Course  ED Course as of 01/21/24 0932   Sun Jan 21, 2024   0640 I did review a urine culture collected on 8/22/2023.  It was positive for E. coli and was sensitive to antibiotics except for levofloxacin. [TC]   0842 General surgery paged [SD]   1233 Intensivist paged [SD]   3419 D/w Dr. Pappas,  Intensivist, who will accept patient for admit conditional upon surgery agreeing  to patient transfer to . [SD]   0919 D/w Dr. Almeida, general surgery. Pending callback after CT imaging review. [SD]   0928 D/w Dr. Almeida, general surgery, accepting patient transfer. [SD]   0929 D/w Dr. Pappas, intensivist accepting. Pt stable transport. [SD]      ED Course User Index  [SD] Dalton Devries MD  [TC] Chidi Almazan MD      Labs Reviewed   COMPREHENSIVE METABOLIC PANEL - Abnormal; Notable for the following components:       Result Value    Glucose 104 (*)     BUN 5 (*)     Chloride 96 (*)     All other components within normal limits    Narrative:     GFR Normal >60  Chronic Kidney Disease <60  Kidney Failure <15     LIPASE - Abnormal; Notable for the following components:    Lipase 123 (*)     All other components within normal limits   URINALYSIS W/ MICROSCOPIC IF INDICATED (NO CULTURE) - Abnormal; Notable for the following components:    Appearance, UA Cloudy (*)     Blood, UA Trace (*)     Leuk Esterase, UA Moderate (2+) (*)     Nitrite, UA Positive (*)     All other components within normal limits   CBC WITH AUTO DIFFERENTIAL - Abnormal; Notable for the following components:    WBC 11.35 (*)     RBC 2.98 (*)     Hemoglobin 10.0 (*)     Hematocrit 30.7 (*)     .0 (*)     MCH 33.6 (*)     Neutrophils, Absolute 7.24 (*)     Monocytes, Absolute 1.19 (*)     Eosinophils, Absolute 0.42 (*)     All other components within normal limits   MAGNESIUM - Abnormal; Notable for the following components:    Magnesium 1.5 (*)     All other components within normal limits   URINE DRUG SCREEN - Abnormal; Notable for the following components:    Tricyclic Antidepressants Screen Positive (*)     Oxycodone Screen, Urine Positive (*)     All other components within normal limits    Narrative:     Cutoff For Drugs Screened:    Amphetamines               500 ng/ml  Barbiturates               200 ng/ml  Benzodiazepines            150 ng/ml  Cocaine                    150 ng/ml  Methadone                  200  ng/ml  Opiates                    100 ng/ml  Phencyclidine               25 ng/ml  THC                         50 ng/ml  Methamphetamine            500 ng/ml  Tricyclic Antidepressants  300 ng/ml  Oxycodone                  100 ng/ml  Buprenorphine               10 ng/ml    The normal value for all drugs tested is negative. This report includes unconfirmed screening results, with the cutoff values listed, to be used for medical treatment purposes only.  Unconfirmed results must not be used for non-medical purposes such as employment or legal testing.  Clinical consideration should be applied to any drug of abuse test, particularly when unconfirmed results are used.     SINGLE HSTROPONIN T - Normal    Narrative:     High Sensitive Troponin T Reference Range:  <14.0 ng/L- Negative Female for AMI  <22.0 ng/L- Negative Male for AMI  >=14 - Abnormal Female indicating possible myocardial injury.  >=22 - Abnormal Male indicating possible myocardial injury.   Clinicians would have to utilize clinical acumen, EKG, Troponin, and serial changes to determine if it is an Acute Myocardial Infarction or myocardial injury due to an underlying chronic condition.        LACTIC ACID, PLASMA - Normal   BLOOD CULTURE   BLOOD CULTURE   ETHANOL   URINE DRUG SCREEN PLUS BUPRENORPHINE    Narrative:     The following orders were created for panel order URINE DRUG SCREEN PLUS BUPRENORPHINE -.  Procedure                               Abnormality         Status                     ---------                               -----------         ------                     Urine Drug Screen - Urin...[[id number]]  Abnormal            Final result               Buprenorphine Screen Uri...[[id number]]                                                   Please view results for these tests on the individual orders.   LAB PROTIME-INR, FINGERSTICK   URINALYSIS, MICROSCOPIC ONLY   RAINBOW URINE CULTURE TUBE   POCT PROTIME - INR   CBC AND DIFFERENTIAL     Narrative:     The following orders were created for panel order CBC & Differential.  Procedure                               Abnormality         Status                     ---------                               -----------         ------                     CBC Auto Differential[[id number]]        Abnormal            Final result                 Please view results for these tests on the individual orders.     XR Chest 1 View    Result Date: 1/21/2024  Narrative: CHEST SINGLE VIEW  HISTORY: Left anterior and posterior chest pain.  COMPARISON: AP chest 08/06/2022.  FINDINGS: There has developed a small-to-moderate left pleural effusion with left basilar atelectasis and this is new when compared with exam 08/06/2022 and a chest CT 01/04/2024. There is mild right basilar atelectasis. There is no perihilar edema or evidence for pneumothorax.      Impression: New small-to-moderate left pleural effusion with basilar atelectasis.      CT Abdomen Pelvis With Contrast    Result Date: 1/21/2024  Narrative: CT ABDOMEN AND PELVIS WITH IV CONTRAST  HISTORY: Left upper quadrant and left flank pain. Sudden onset last night.  TECHNIQUE:  CT includes axial imaging from the lung bases to the trochanters with intravenous contrast and without use of oral contrast. Data reconstructed in coronal and sagittal planes. Radiation dose reduction techniques were utilized, including automated exposure control and exposure modulation based on body size.  COMPARISON: CT abdomen and pelvis 01/04/2024, 09/05/2023, 08/24/2023.  FINDINGS: Since the previous CT 17 days ago there has developed a left splenic subcapsular hematoma. Hematoma extends along the superior and lateral aspect of the spleen and measures above the spleen 10 x 10 cm x 9 cm in height. This is associated with a splenic laceration within the anterior and inferior aspect of the spleen. There is mild surrounding edema without clear intraperitoneal extension of hemorrhage.  There is  resolving acute-on-chronic pancreatitis with improved peripancreatic inflammation particularly along the anterior pancreatic tail. Pancreatic ductal dilatation is without change. Pseudocyst in the region of the pancreatic tail is without change and extends along the anterior margin of the left kidney. There has developed a new small left pleural effusion and there is left lower lobe atelectasis. There is no other evidence for change when compared to the examination 17 days ago.      Impression: 1. New left subcapsular splenic hematoma extending above and lateral to the spleen measuring approximately 10 x 10 x 9 cm. Splenic lacerations. 2. Acute-on-chronic pancreatitis with improvement in degree of acute pancreatitis when compared to the examination 17 days ago. Persistent pancreatic ductal dilatation. Pancreatic pseudocyst in the region of the pancreatic tail without change. 3. New small left pleural effusion and adjacent left basilar atelectasis.  Discussed with Dr. Almazan on 01/21/2024 at 8:35 a.m.  Radiation dose reduction techniques were utilized, including automated exposure control and exposure modulation based on body size.       CT Abdomen Pelvis With Contrast    Result Date: 1/4/2024  Narrative: CT ABDOMEN AND PELVIS WITH IV CONTRAST  HISTORY: Left side abdominal pain, tenderness to palpation. History of pancreatitis.  TECHNIQUE:  CT includes axial imaging from the lung bases to the trochanters with intravenous contrast and without use of oral contrast. Data reconstructed in coronal and sagittal planes. Radiation dose reduction techniques were utilized, including automated exposure control and exposure modulation based on body size.  COMPARISON: CT abdomen and pelvis 09/05/2023, 08/22/2023.  FINDINGS: There is abnormal edema and stranding surrounding the pancreatic body and tail consistent with acute pancreatitis. A previously described pancreatic tail pseudocyst just medial to the spleen and anterior to  the left kidney measures 3.5 x 4.3 cm which has decreased in size from 9.9 x 7 cm on the previous exam. There is progressive dilatation of the pancreatic duct within the pancreatic body and proximal tail and the pancreatic duct measures 6 mm in diameter versus 4.5 mm on the previous exam. There are pancreatic calcifications involving the pancreatic head and uncinate process and proximal body consistent with acute on chronic pancreatitis.  There is mild perisplenic fluid. Within the central to inferior spleen there is a linear, coronally oriented hypodensity that measures 3.5 x 0.8 cm. This could represent a small splenic laceration or area of infarction or interstitial extension of fluid from the pancreatic pseudocyst. There is mild perisplenic fluid though the volume is not significantly change compared to the previous exam.  Left kidney is partially compressed posteriorly from the cyst in the region of the pancreatic tail. Right kidney appears normal. Gallbladder is mostly decompressed. There is mild free fluid within the pelvis which is increased in volume. There is new stranding and edema within the left mid abdomen, particularly extending adjacent to the gastric wall and along the greater curvature, as well as adjacent to the distal transverse colon.      Impression: 1. Acute on chronic pancreatitis with new peripancreatic inflammation and stranding particularly along the pancreatic body and tail and also extending to the left abdomen. Pancreatic ductal dilatation is mildly increased. 2. New linear low density within the spleen suspicious for a small splenic laceration. The volume of fluid adjacent to the spleen is similar to the prior exam, though if there are clinical signs of hemorrhage, recommend short interval follow-up CT to assure stability. There is increased ascites with free fluid extending into the pelvis when compared to the previous study. 3. The previously demonstrated pancreatic pseudocyst in the  region of the pancreatic tail has decreased in size compared to the previous exam.  Radiation dose reduction techniques were utilized, including automated exposure control and exposure modulation based on body size.   This report was finalized on 1/4/2024 9:00 PM by Dr. Angel Mcleod M.D on Workstation: [id number]                                          Medical Decision Making  Problems Addressed:  Laceration of spleen, initial encounter: complicated acute illness or injury  Left upper quadrant abdominal pain: complicated acute illness or injury  Pleural effusion on left: complicated acute illness or injury  UTI (urinary tract infection), bacterial: complicated acute illness or injury    Amount and/or Complexity of Data Reviewed  Labs: ordered.  Radiology: ordered.  ECG/medicine tests: ordered and independent interpretation performed.    Risk  Prescription drug management.  Decision regarding hospitalization.        Final diagnoses:   Left upper quadrant abdominal pain   UTI (urinary tract infection), bacterial   Laceration of spleen, initial encounter   Pleural effusion on left       ED Disposition  ED Disposition       ED Disposition   Decision to Admit    Condition   --    Comment   Level of Care: Critical Care [6]   Diagnosis: Splenic laceration [331583]   Admitting Physician: ESTEBAN JOYA [[id number]]   Attending Physician: ESTEBAN JOYA [[id number]]   Certification: I Certify That Inpatient Hospital Services Are Medically Necessary For Greater Than 2 Midnights                 No follow-up provider specified.       Medication List      No changes were made to your prescriptions during this visit.           Electronically signed by Dalton Devries MD at 01/21/24 0932       Chidi Almazan MD at 01/21/24 0624        Procedure Orders    1. ECG 12 Lead Chest Pain [[id number]] ordered by Chidi Almazan MD                 Subjective  History of Present Illness  Patient is a 43-year-old female.  She has a history  of anxiety, anemia, alcohol abuse, chronic pancreatitis.  She has a history of frequent admissions and has been admitted to [facility] 5-6 times over the last year.  She was last admitted from 1/4/2024 until 1/8/2024.    She states that she was awoken at approximately 4 AM this morning with severe left upper quadrant abdominal pain.  The pain radiates into her left anterior chest, left flank and left shoulder.  She reports some nausea and dry heaves.  She does admit to associated SOA.  No documented fevers that she is aware of.  She states that she did feel hot at home.      During her last admission there was a concern for possible splenic hemorrhage.  The patient did however have a stable hemoglobin during hospital stay and it was felt like she was stable for discharge.      Review of Systems  Constitutional: No fevers, chills, sweats unless otherwise documented in HPI  Eyes: No recent visual problems, eye discharge, eye pain, redness unless otherwise documented in HPI  HEENT: No ear pain, nasal congestion, sore throat, voice changes unless otherwise documented in HPI  Respiratory: No shortness of breath, cough, pain on breathing, sputum production unless otherwise documented in HPI  Cardiovascular: No chest pain, palpitations, syncope, orthopnea unless otherwise documented in HPI  Gastrointestinal: No nausea, vomiting, diarrhea, constipation unless otherwise documented in HPI  Genitourinary: No hematuria, dysuria, incontinence unless otherwise documented in HPI  Endocrine: Negative for excessive thirst, excessive hunger, excessive urination, heat or cold intolerance unless otherwise documented in HPI  Musculoskeletal: No back pain, neck pain, joint pain, muscle pain, decreased range of motion unless otherwise documented in HPI  Integumentary: No rash, pruritus, abrasion, lesions unless otherwise documented in HPI  Neurologic: No weakness, numbness, frequent headaches, tremors unless otherwise documented in  HPI  Psychiatric: No anxiety, depression, mood changes, hallucinations unless otherwise documented in HPI        Past Medical History:   Diagnosis Date    Anemia     Anxiety     Constipation     Cyst of spleen     Diarrhea     E. coli bacteremia     ETOH abuse     Frequent UTI     GERD (gastroesophageal reflux disease)     Hiatal hernia     Left flank pain 10/21/2022    Migraine     Miscarriage 2004    Pancreatitis     Pseudocyst of pancreas        Allergies   Allergen Reactions    Nickel Rash       Past Surgical History:   Procedure Laterality Date    ENDOSCOPY N/A 08/10/2022    Procedure: ESOPHAGOGASTRODUODENOSCOPY with bxs;  Surgeon: Salvador Price MD;  Location:  [facility] ENDOSCOPY;  Service: Gastroenterology;  Laterality: N/A;  Pre: r/o esophageal  varacies, abd pain  Post: esophageal plaque    ENDOSCOPY N/A 10/9/2023    Procedure: ESOPHAGOGASTRODUODENOSCOPY WITH STENT REMOVAL;  Surgeon: Red Denson MD;  Location: [facility] ENDOSCOPY;  Service: Gastroenterology;  Laterality: N/A;    PANCREATIC CYST DRAINAGE      x 2    UPPER ENDOSCOPIC ULTRASOUND W/ FNA N/A 9/13/2023    Procedure: Esophagogastroduodenoscopy with biopsy x 1 area and endoscopic ultrasound with placement of cyst gastrostomy;  Surgeon: Red Denson MD;  Location: [facility] ENDOSCOPY;  Service: Gastroenterology;  Laterality: N/A;  post: pancreatic psuedocyst    WISDOM TOOTH EXTRACTION         Family History   Problem Relation Age of Onset    Alcohol abuse Mother     Arthritis Mother     Asthma Mother     Miscarriages / Stillbirths Mother     Cancer Father     Diabetes Father     Drug abuse Father     Early death Father     Heart disease Father     Hyperlipidemia Father     Hypertension Father     Alcohol abuse Paternal Aunt     Cancer Paternal Aunt     Diabetes Paternal Aunt     Drug abuse Paternal Aunt     Early death Paternal Aunt     Heart disease Paternal Aunt     Hyperlipidemia Paternal Aunt     Hypertension Paternal Aunt     Alcohol abuse  Maternal Grandmother     Alcohol abuse Maternal Grandfather     Alcohol abuse Paternal Grandmother     Cancer Paternal Grandmother     Diabetes Paternal Grandmother     Drug abuse Paternal Grandmother     Early death Paternal Grandmother     Heart disease Paternal Grandmother     Hyperlipidemia Paternal Grandmother     Hypertension Paternal Grandmother     Alcohol abuse Paternal Grandfather        Social History     Socioeconomic History    Marital status: Single   Tobacco Use    Smoking status: Every Day     Packs/day: 0.50     Years: 26.00     Additional pack years: 0.00     Total pack years: 13.00     Types: Cigarettes     Start date: 1/28/1996     Passive exposure: Current    Smokeless tobacco: Never   Vaping Use    Vaping Use: Never used   Substance and Sexual Activity    Alcohol use: Not Currently    Drug use: Never    Sexual activity: Defer     Partners: Male           Objective  Physical Exam  Vital signs: Reviewed in nurses notes    General: Patient is anxious awake alert.  She does appear to be uncomfortable    HEENT: Normocephalic atraumatic nasopharynx clear oropharynx is clear    Neck:   Supple without lymphadenopathy    Respiratory:   Nonlabored respirations.  Clear to auscultation bilaterally.  Equal breath sounds bilaterally.  No wheezes or stridor noted.    Cardiovascular: Rate is tachycardic rhythm is regular.  No pretibial or pedal edema    Abdomen: Nondistended bowel sounds present.  There is moderate to severe tenderness left upper quadrant and into the epigastrium with some voluntary guarding only    Skin:   Warm and dry.  No rashes noted    Neurological examination: Patient is awake alert oriented x4.  Speech is normal.  No facial palsy.  No focal motor or sensory deficits.    ECG 12 Lead Chest Pain      Date/Time: 1/21/2024 6:12 AM    Performed by: Chidi Almazan MD  Authorized by: Chidi Almazan MD  Interpreted by physician  Rhythm: sinus tachycardia  Comments: Sinus tachycardia  rate of 119.  No acute ST elevation or depression noted.  No STEMI               ED Course  ED Course as of 01/21/24 0641   Sun Jan 21, 2024   0640 I did review a urine culture collected on 8/22/2023.  It was positive for E. coli and was sensitive to antibiotics except for levofloxacin. [TC]      ED Course User Index  [TC] Chidi Almazan MD      0700: Patient's care will be turned over to Dr. Devries at 07 100.  Currently pending all laboratory evaluation as well as CT scan.  Disposition will pend treatment course in the emergency department and results.                           Medications   sodium chloride 0.9 % flush 10 mL (has no administration in time range)   sodium chloride 0.9 % bolus 1,000 mL (1,000 mL Intravenous New Bag 1/21/24 0631)   cefTRIAXone (ROCEPHIN) 2,000 mg in sodium chloride 0.9 % 100 mL IVPB-VTB (has no administration in time range)   HYDROmorphone (DILAUDID) injection 0.5 mg (0.5 mg Intravenous Given 1/21/24 0629)   pantoprazole (PROTONIX) injection 40 mg (40 mg Intravenous Given 1/21/24 0636)   ondansetron (ZOFRAN) injection 4 mg (4 mg Intravenous Given 1/21/24 0630)              Medical Decision Making  Problems Addressed:  Left upper quadrant abdominal pain: complicated acute illness or injury    Amount and/or Complexity of Data Reviewed  Labs: ordered.  Radiology: ordered.  ECG/medicine tests: ordered and independent interpretation performed.    Risk  Prescription drug management.        Final diagnoses:   Left upper quadrant abdominal pain   UTI (urinary tract infection), bacterial       ED Disposition  ED Disposition       None            No follow-up provider specified.       Medication List      No changes were made to your prescriptions during this visit.           Electronically signed by Chidi Almazan MD at 01/21/24 0641       Vital Signs (last 2 days)       Date/Time Temp Temp src Pulse Resp BP Patient Position SpO2    01/22/24 0851 -- -- 95 -- -- -- 97    01/22/24  08:50:04 -- -- 96 16 110/82 Sitting 97    01/22/24 0850 -- -- -- -- 110/82 -- --    01/22/24 0848 -- -- 97 -- -- -- 97    01/22/24 0845 -- -- 97 -- -- -- 100    01/22/24 0842 -- -- 95 -- -- -- 100    01/22/24 0808 -- -- 99 18 115/87 Sitting 99    01/22/24 0740 98 (36.7) Oral -- -- -- -- --    01/22/24 0700 -- -- 91 -- 113/81 -- 95    01/22/24 0600 -- -- 98 -- 119/83 -- 95    01/22/24 0500 -- -- 97 -- 118/84 -- 95    01/22/24 0439 97.6 (36.4) Oral -- -- -- -- --    01/22/24 0400 -- -- 98 -- 114/80 -- 99    01/22/24 0345 -- -- 93 -- -- -- 95    01/22/24 0300 -- -- 94 -- 113/83 -- 95    01/22/24 0200 -- -- 97 -- 118/82 -- 95    01/22/24 0115 -- -- 97 -- 119/95 -- 96    01/22/24 0100 -- -- 96 -- 114/77 -- 98    01/22/24 0054 -- -- 98 -- -- -- 96    01/22/24 0045 98.4 (36.9) -- 96 -- 116/85 -- 97    01/22/24 0030 -- -- 93 -- 117/89 -- 96    01/22/24 0015 -- -- 92 -- 119/76 -- 100    01/22/24 0000 -- -- 94 -- 112/84 -- 96    01/21/24 2345 -- -- 93 -- 113/79 -- 93    01/21/24 2330 98.3 (36.8) Oral -- -- -- -- --    01/21/24 2300 98.4 (36.9) Oral 89 -- 109/79 -- 98    01/21/24 2245 -- -- 86 -- 106/78 -- 98    01/21/24 2230 -- -- 111 -- 105/75 -- 97    01/21/24 2222 98.3 (36.8) Oral 99 -- 109/73 -- 95    01/21/24 2200 -- -- 84 -- 109/73 -- 96    01/21/24 2100 -- -- 90 -- 110/76 -- 96    01/21/24 2004 98.3 (36.8) Oral 94 -- 123/45 -- 97    01/21/24 2000 -- -- 93 -- -- -- 97    01/21/24 1700 -- -- 101 -- 107/70 -- 94    01/21/24 1622 98.8 (37.1) Oral -- -- -- -- --    01/21/24 1600 -- -- 98 -- -- -- 95    01/21/24 1400 -- -- 113 -- -- -- 100    01/21/24 1200 -- -- 101 -- 114/76 -- 99    01/21/24 1145 -- -- 106 -- 105/75 -- 92    01/21/24 0900 -- -- 117 18 115/82 -- 93    01/21/24 0830 -- -- 120 -- 107/78 -- 97    01/21/24 0604 98.3 (36.8) Oral 98 16 105/85 Sitting 96          Oxygen Therapy (last 2 days)       Date/Time SpO2 Device (Oxygen Therapy) Flow (L/min) Oxygen Concentration (%) ETCO2 (mmHg)    01/22/24 0851 97 -- -- --  --    01/22/24 08:50:04 97 room air -- -- --    01/22/24 0848 97 -- -- -- --    01/22/24 0845 100 -- -- -- --    01/22/24 0842 100 -- -- -- --    01/22/24 0808 99 room air -- -- --    01/22/24 0700 95 -- -- -- --    01/22/24 0600 95 -- -- -- --    01/22/24 0500 95 -- -- -- --    01/22/24 0400 99 -- -- -- --    01/22/24 0345 95 -- -- -- --    01/22/24 0300 95 -- -- -- --    01/22/24 0200 95 -- -- -- --    01/22/24 0115 96 -- -- -- --    01/22/24 0100 98 -- -- -- --    01/22/24 0054 96 -- -- -- --    01/22/24 0045 97 -- -- -- --    01/22/24 0030 96 -- -- -- --    01/22/24 0015 100 -- -- -- --    01/22/24 0000 96 -- -- -- --    01/21/24 2345 93 -- -- -- --    01/21/24 2300 98 -- -- -- --    01/21/24 2245 98 -- -- -- --    01/21/24 2230 97 -- -- -- --    01/21/24 2222 95 -- -- -- --    01/21/24 2200 96 -- -- -- --    01/21/24 2100 96 -- -- -- --    01/21/24 2004 97 -- -- -- --    01/21/24 2000 97 room air -- -- --    01/21/24 1700 94 -- -- -- --    01/21/24 1600 95 room air -- -- --    01/21/24 1400 100 -- -- -- --    01/21/24 1200 99 -- -- -- --    01/21/24 1145 92 -- -- -- --    01/21/24 1100 -- room air -- -- --    01/21/24 0900 93 room air -- -- --    01/21/24 0830 97 -- -- -- --    01/21/24 0604 96 -- -- -- --          Lines, Drains & Airways       Active LDAs       Name Placement date Placement time Site Days    Peripheral IV 01/22/24 0833 Anterior;Right Forearm 01/22/24  0833  Forearm  less than 1    Urethral Catheter Non-latex 16 Fr. 01/22/24  1255  -- less than 1    ETT  01/22/24  1126  created via procedure documentation  -- less than 1    Arterial Line 01/22/24 Right Radial 01/22/24  0843  created via procedure documentation  Radial  less than 1              Inactive LDAs       Name Placement date Placement time Removal date Removal time Site Days    [REMOVED] Peripheral IV Posterior;Right Forearm --  --  01/22/24  0715  Forearm  --    [REMOVED] Peripheral IV 01/21/24 0603 Posterior;Right Hand 01/21/24  0603   01/22/24  0857  Hand  1    [REMOVED] Peripheral IV 01/21/24 0819 Anterior;Left;Proximal Forearm 01/21/24  0819  01/22/24  0832  Forearm  1                  Facility-Administered Medications as of 1/22/2024   Medication Dose Route Frequency Provider Last Rate Last Admin    [MAR Hold] acetaminophen (TYLENOL) tablet 650 mg  650 mg Oral Q4H PRN Prosper Reyes MD        Or    [MAR Hold] acetaminophen (TYLENOL) suppository 650 mg  650 mg Rectal Q4H PRN Prosper Reyes MD        [MAR Hold] aluminum-magnesium hydroxide-simethicone (MAALOX MAX) 400-400-40 MG/5ML suspension 15 mL  15 mL Oral Q6H PRN Prosper Reyes MD        [MAR Hold] sennosides-docusate (PERICOLACE) 8.6-50 MG per tablet 2 tablet  2 tablet Oral BID Prosper Reyes MD   2 tablet at 01/21/24 1416    And    [MAR Hold] polyethylene glycol (MIRALAX) packet 17 g  17 g Oral Daily PRN Prosper Reyes MD        And    [MAR Hold] bisacodyl (DULCOLAX) EC tablet 5 mg  5 mg Oral Daily PRN Prosper Reyes MD        And    [MAR Hold] bisacodyl (DULCOLAX) suppository 10 mg  10 mg Rectal Daily PRN Prosper Reyes MD        bupivacaine (PF) 0.25 % 30 mL, lidocaine 1 % 30 mL mixture    PRN Carine Pena Jr., MD   Given at 01/22/24 1135    [MAR Hold] Calcium Replacement - Follow Nurse / BPA Driven Protocol   Does not apply PRN Prosper Reyes MD        [COMPLETED] ceFAZolin in dextrose (ANCEF) IVPB solution 2 g  2 g Intravenous Once Jony Fowler MD   2 g at 01/22/24 1055    [COMPLETED] cefTRIAXone (ROCEPHIN) 2,000 mg in sodium chloride 0.9 % 100 mL IVPB-VTB  2,000 mg Intravenous Once Chidi Almazan MD   Stopped at 01/21/24 0943    dextrose 5 % and sodium chloride 0.45 % infusion  125 mL/hr Intravenous Continuous Prosper Reyes MD   Stopped at 01/22/24 0742    diphenhydrAMINE (BENADRYL) injection 12.5 mg  12.5 mg Intravenous Q15 Min NANDININ Krunal Khanna CRNA        droperidol (INAPSINE) injection 0.625  mg  0.625 mg Intravenous Q20 Min PRN Krunal Khanna CRNA        Or    droperidol (INAPSINE) injection 0.625 mg  0.625 mg Intramuscular Q20 Min PRN Krunal Khanna CRNA        ePHEDrine injection 5 mg  5 mg Intravenous Once PRN Krunal Khanna CRNA        [COMPLETED] famotidine (PEPCID) injection 20 mg  20 mg Intravenous Once Elyse Ku MD   20 mg at 01/22/24 0856    famotidine (PEPCID) injection 20 mg  20 mg Intravenous Once Elyse Ku MD        [COMPLETED] fentaNYL citrate (PF) (SUBLIMAZE) injection 100 mcg  100 mcg Intravenous Once Dalton Devries MD   100 mcg at 01/21/24 0849    [COMPLETED] fentaNYL citrate (PF) (SUBLIMAZE) injection 50 mcg  50 mcg Intravenous Once Dalton Devries MD   50 mcg at 01/21/24 1057    [COMPLETED] fentaNYL citrate (PF) (SUBLIMAZE) injection 50 mcg  50 mcg Intravenous Once PRN Elyse Ku MD   50 mcg at 01/22/24 0845    [COMPLETED] fentaNYL citrate (PF) (SUBLIMAZE) injection 50 mcg  50 mcg Intravenous Once PRN Elyse Ku MD   50 mcg at 01/22/24 0852    fentaNYL citrate (PF) (SUBLIMAZE) injection 50 mcg  50 mcg Intravenous Q10 Min PRN Elyse Ku MD   50 mcg at 01/22/24 1213    fentaNYL citrate (PF) (SUBLIMAZE) injection 50 mcg  50 mcg Intravenous Q5 Min PRN Krunal Khanna CRNA        flumazenil (ROMAZICON) injection 0.2 mg  0.2 mg Intravenous PRN Krunal Khanna CRNA        hydrALAZINE (APRESOLINE) injection 5 mg  5 mg Intravenous Q10 Min PRN Krunal Khanna CRNA        HYDROcodone-acetaminophen (NORCO) 5-325 MG per tablet 1 tablet  1 tablet Oral Once PRN Krunal Khanna CRNA        [COMPLETED] HYDROmorphone (DILAUDID) injection 0.5 mg  0.5 mg Intravenous Once Chidi Almazan MD   0.5 mg at 01/21/24 0629    HYDROmorphone (DILAUDID) injection 0.5 mg  0.5 mg Intravenous Q5 Min PRN Krunal Khanna CRNA        [COMPLETED] HYDROmorphone (DILAUDID) injection 1 mg  1 mg Intravenous Once Dalton Devries MD    1 mg at 01/21/24 0713    [MAR Hold] HYDROmorphone (DILAUDID) injection 1 mg  1 mg Intravenous Q2H PRN Prosper Reyes MD   1 mg at 01/22/24 0931    [COMPLETED] iopamidol (ISOVUE-250) 51 % injection 200 mL  200 mL Intra-arterial Once in imaging Dalton Devries MD   90 mL at 01/22/24 1431    iopamidol (ISOVUE-250) 51 % injection    PRN Carine Pena Jr., MD   100 mL at 01/22/24 1136    [COMPLETED] iopamidol (ISOVUE-370) 76 % injection 100 mL  100 mL Intravenous Once in imaging Dalton Devries MD   85 mL at 01/21/24 0827    ipratropium-albuterol (DUO-NEB) nebulizer solution 3 mL  3 mL Nebulization Once PRN Krunal Khanna CRNA        labetalol (NORMODYNE,TRANDATE) injection 5 mg  5 mg Intravenous Q5 Min PRN Krunal Khanna CRNA        lactated ringers infusion  9 mL/hr Intravenous Continuous Elyse Ku MD 9 mL/hr at 01/22/24 1126 New Bag at 01/22/24 1322    lactated ringers infusion  9 mL/hr Intravenous Continuous Elyse Ku MD        lidocaine (XYLOCAINE) 1 % injection 0.5 mL  0.5 mL Intradermal Once PRN Elyse Ku MD        lidocaine (XYLOCAINE) 1 % injection 0.5 mL  0.5 mL Intradermal Once PRN Elyse Ku MD        [MAR Hold] Magnesium Standard Dose Replacement - Follow Nurse / BPA Driven Protocol   Does not apply PRN Prosper Reyes MD        [COMPLETED] magnesium sulfate 2g/50 mL (PREMIX) infusion  2 g Intravenous Once Dalton Devries MD   2 g at 01/21/24 0848    midazolam (VERSED) injection 1 mg  1 mg Intravenous Q5 Min PRN Elyse Ku MD   1 mg at 01/22/24 0904    midazolam (VERSED) injection 1 mg  1 mg Intravenous Q5 Min PRN Elyse Ku MD   1 mg at 01/22/24 0845    naloxone (NARCAN) injection 0.2 mg  0.2 mg Intravenous PRN Krunal Khanna CRNA        [MAR Hold] nitroglycerin (NITROSTAT) SL tablet 0.4 mg  0.4 mg Sublingual Q5 Min PRN Prosper Reyes MD        [COMPLETED] ondansetron (ZOFRAN) injection 4 mg  4  mg Intravenous Once Chidi Almazan MD   4 mg at 01/21/24 0630    [MAR Hold] ondansetron ODT (ZOFRAN-ODT) disintegrating tablet 4 mg  4 mg Oral Q6H PRN Prosper Reyes MD        Or    [MAR Hold] ondansetron (ZOFRAN) injection 4 mg  4 mg Intravenous Q6H PRN Prosper Reyes MD   4 mg at 01/22/24 0102    ondansetron (ZOFRAN) injection 4 mg  4 mg Intravenous Once PRN Krunal Khanna CRNA        [MAR Hold] oxyCODONE-acetaminophen (PERCOCET) 5-325 MG per tablet 1 tablet  1 tablet Oral Q4H PRN Prosper Reyes MD   1 tablet at 01/22/24 0427    oxyCODONE-acetaminophen (PERCOCET) 7.5-325 MG per tablet 1 tablet  1 tablet Oral Q4H PRN Krunal Khanna CRNA        [COMPLETED] pantoprazole (PROTONIX) injection 40 mg  40 mg Intravenous Once Chidi Almazan MD   40 mg at 01/21/24 0636    [MAR Hold] Phosphorus Replacement - Follow Nurse / BPA Driven Protocol   Does not apply Prosper Matson MD        Potassium Replacement - Follow Nurse / BPA Driven Protocol   Does not apply Prosper Matson MD        promethazine (PHENERGAN) suppository 25 mg  25 mg Rectal Once PRN Krunal Khanna CRNA        Or    promethazine (PHENERGAN) tablet 25 mg  25 mg Oral Once PRN Krunal Khanna CRNA        [COMPLETED] sodium chloride 0.9 % bolus 1,000 mL  1,000 mL Intravenous Once Chidi Almazan MD   Stopped at 01/21/24 0822    [MAR Hold] sodium chloride 0.9 % flush 10 mL  10 mL Intravenous PRN Chidi Almazan MD        [MAR Hold] sodium chloride 0.9 % flush 10 mL  10 mL Intravenous Q12H Prosper Reyes MD   10 mL at 01/21/24 2106    [MAR Hold] sodium chloride 0.9 % flush 10 mL  10 mL Intravenous PRN Prosper Reyes MD        sodium chloride 0.9 % flush 3 mL  3 mL Intravenous Q12H Elyse Ku MD        sodium chloride 0.9 % flush 3 mL  3 mL Intravenous Q12H Elyse Ku MD        sodium chloride 0.9 % flush 3-10 mL  3-10 mL Intravenous PRN Elyse Ku  MD Petar        sodium chloride 0.9 % flush 3-10 mL  3-10 mL Intravenous PRN Elyse Ku MD        [MAR Hold] sodium chloride 0.9 % infusion 40 mL  40 mL Intravenous PRN Prosper Reyes MD        sodium chloride 1,000 mL with heparin (porcine) 10,000 Units mixture    PRN Carine Pena Jr., MD   1,000 mL at 01/22/24 1138     Orders (all)        Start     Ordered    01/22/24 1600  fentaNYL citrate (PF) (SUBLIMAZE) injection 50 mcg  Every 10 Minutes PRN         01/22/24 0855    01/22/24 1433  iopamidol (ISOVUE-250) 51 % injection 200 mL  Once in Imaging         01/22/24 1431    01/22/24 1430  Pulse Oximetry, Continuous  Continuous         01/22/24 1429    01/22/24 1430  POC Glucose Once  Once        Comments: Post op glucose check on all diabetic patients...Notify Anesthesia if blood sugar is less than 80 mg/dL or greater than 220 mg/dL.      01/22/24 1429    01/22/24 1430  Vital signs every 5 minutes for 15 minutes, every 15 minutes thereafter.  Once         01/22/24 1429    01/22/24 1430  Call Anesthesiologist for additional IV Fluid bolus for Hypotension/Tachycardia  Until Discontinued         01/22/24 1429    01/22/24 1430  Notify Anesthesia of Any Acute Changes in Patient Condition  Until Discontinued         01/22/24 1429    01/22/24 1430  Notify Anesthesia for Unrelieved Pain  Until Discontinued         01/22/24 1429    01/22/24 1430  Once DC criteria to floor met, follow surgeon's orders.  Until Discontinued         01/22/24 1429    01/22/24 1430  Discharge patient from PACU when discharge criteria is met.  Until Discontinued         01/22/24 1429    01/22/24 1429  droperidol (INAPSINE) injection 0.625 mg  Every 20 Minutes PRN        See Hyperspace for full Linked Orders Report.    01/22/24 1429    01/22/24 1429  droperidol (INAPSINE) injection 0.625 mg  Every 20 Minutes PRN        See Hyperspace for full Linked Orders Report.    01/22/24 1429    01/22/24 1429  promethazine  (PHENERGAN) suppository 25 mg  Once As Needed        See Hyperspace for full Linked Orders Report.    01/22/24 1429    01/22/24 1429  promethazine (PHENERGAN) tablet 25 mg  Once As Needed        See Hyperspace for full Linked Orders Report.    01/22/24 1429    01/22/24 1429  labetalol (NORMODYNE,TRANDATE) injection 5 mg  Every 5 Minutes PRN         01/22/24 1429    01/22/24 1429  hydrALAZINE (APRESOLINE) injection 5 mg  Every 10 Minutes PRN         01/22/24 1429    01/22/24 1429  ePHEDrine injection 5 mg  Once As Needed         01/22/24 1429    01/22/24 1429  diphenhydrAMINE (BENADRYL) injection 12.5 mg  Every 15 Minutes PRN         01/22/24 1429    01/22/24 1429  ipratropium-albuterol (DUO-NEB) nebulizer solution 3 mL  Once As Needed         01/22/24 1429    01/22/24 1429  HYDROcodone-acetaminophen (NORCO) 5-325 MG per tablet 1 tablet  Once As Needed         01/22/24 1429    01/22/24 1429  HYDROmorphone (DILAUDID) injection 0.5 mg  Every 5 Minutes PRN         01/22/24 1429    01/22/24 1429  oxyCODONE-acetaminophen (PERCOCET) 7.5-325 MG per tablet 1 tablet  Every 4 Hours PRN         01/22/24 1429    01/22/24 1429  fentaNYL citrate (PF) (SUBLIMAZE) injection 50 mcg  Every 5 Minutes PRN         01/22/24 1429    01/22/24 1429  naloxone (NARCAN) injection 0.2 mg  As Needed         01/22/24 1429    01/22/24 1429  flumazenil (ROMAZICON) injection 0.2 mg  As Needed         01/22/24 1429    01/22/24 1429  ondansetron (ZOFRAN) injection 4 mg  Once As Needed         01/22/24 1429    01/22/24 1138  sodium chloride 1,000 mL with heparin (porcine) 10,000 Units mixture  As Needed         01/22/24 1139    01/22/24 1136  iopamidol (ISOVUE-250) 51 % injection  As Needed         01/22/24 1136    01/22/24 1135  bupivacaine (PF) 0.25 % 30 mL, lidocaine 1 % 30 mL mixture  As Needed         01/22/24 1136    01/22/24 1020  Arteriogram (Autofinalize)  1 Time Imaging         01/22/24 1020    01/22/24 0900  sodium chloride 0.9 % flush 3 mL   Every 12 Hours Scheduled         01/22/24 0821    01/22/24 0900  sodium chloride 0.9 % flush 3 mL  Every 12 Hours Scheduled         01/22/24 0835    01/22/24 0850  ceFAZolin in dextrose (ANCEF) IVPB solution 2 g  Once         01/22/24 0848    01/22/24 0849  Follow Anesthesia Guidelines / Protocol  Once         01/22/24 0848    01/22/24 0837  lactated ringers infusion  Continuous         01/22/24 0835    01/22/24 0837  famotidine (PEPCID) injection 20 mg  Once         01/22/24 0835    01/22/24 0836  Insert Peripheral IV  Once         01/22/24 0835    01/22/24 0836  Saline Lock & Maintain IV Access  Continuous         01/22/24 0835    01/22/24 0836  Pulse Oximetry, Continuous  Continuous,   Status:  Canceled         01/22/24 0835    01/22/24 0836  May take Beta Blocker from home with sip of water.  Once        Comments: Only applicable to patients on home Beta Blocker    01/22/24 0835    01/22/24 0835  Pulse Oximetry, Continuous  Continuous PRN,   Status:  Canceled       01/22/24 0835    01/22/24 0835  sodium chloride 0.9 % flush 3-10 mL  As Needed         01/22/24 0835    01/22/24 0835  lidocaine (XYLOCAINE) 1 % injection 0.5 mL  Once As Needed         01/22/24 0835    01/22/24 0835  fentaNYL citrate (PF) (SUBLIMAZE) injection 50 mcg  Once As Needed         01/22/24 0835    01/22/24 0835  midazolam (VERSED) injection 1 mg  Every 5 Minutes PRN         01/22/24 0835    01/22/24 0823  lactated ringers infusion  Continuous         01/22/24 0821    01/22/24 0823  famotidine (PEPCID) injection 20 mg  Once         01/22/24 0821    01/22/24 0822  Insert Peripheral IV  Once         01/22/24 0821    01/22/24 0822  Saline Lock & Maintain IV Access  Continuous,   Status:  Canceled         01/22/24 0821    01/22/24 0822  Pulse Oximetry, Continuous  Continuous,   Status:  Canceled         01/22/24 0821    01/22/24 0822  May take Beta Blocker from home with sip of water.  Once        Comments: Only applicable to patients on home  Beta Blocker    01/22/24 0821    01/22/24 0821  Pulse Oximetry, Continuous  Continuous PRN,   Status:  Canceled       01/22/24 0821    01/22/24 0821  sodium chloride 0.9 % flush 3-10 mL  As Needed         01/22/24 0821    01/22/24 0821  lidocaine (XYLOCAINE) 1 % injection 0.5 mL  Once As Needed         01/22/24 0821    01/22/24 0821  fentaNYL citrate (PF) (SUBLIMAZE) injection 50 mcg  Once As Needed         01/22/24 0821    01/22/24 0821  midazolam (VERSED) injection 1 mg  Every 5 Minutes PRN         01/22/24 0821    01/22/24 0717  NPO Diet NPO Type: Sips with Meds  Diet Effective Now         01/22/24 0716    01/22/24 0712  Follow Anesthesia Guidelines / Protocol  Continuous         01/22/24 0712    01/22/24 0712  Obtain Informed Consent  Once        Comments: Dr. Carine Pena Surgeon    01/22/24 0712    01/22/24 0603  POC Glucose Once  PROCEDURE ONCE        Comments: Complete no more than 45 minutes prior to patient eating      01/22/24 0600    01/22/24 0600  CBC & Differential  Daily       01/21/24 1238    01/22/24 0600  Comprehensive Metabolic Panel  Daily       01/21/24 1238    01/22/24 0600  CBC Auto Differential  PROCEDURE ONCE         01/21/24 2202 01/22/24 0022  POC Glucose Once  PROCEDURE ONCE        Comments: Complete no more than 45 minutes prior to patient eating      01/22/24 0020    01/22/24 0001  NPO Diet NPO Type: Strict NPO  Diet Effective Midnight,   Status:  Canceled         01/21/24 1238    01/22/24 0001  NPO Diet NPO Type: Strict NPO  Diet Effective Midnight,   Status:  Canceled         01/21/24 1643    01/22/24 0000  Hemoglobin & Hematocrit, Blood  Every 4 Hours       01/21/24 2151 01/21/24 2152  Please call general surgery to inform them of hemoglobin dropping  Nursing Communication  Once        Comments: Please call general surgery to inform them of hemoglobin dropping    01/21/24 2151 01/21/24 2150  Verify Informed Consent for Blood Product Administration  Once         01/21/24 2151     01/21/24 2150  Prepare RBC, 2 Units  Blood - Once         01/21/24 2151 01/21/24 2149  Transfuse RBC, 2 Units Infuse Each Unit Over: STAT  Transfusion       01/21/24 2151 01/21/24 2009  POC Glucose Once  PROCEDURE ONCE        Comments: Complete no more than 45 minutes prior to patient eating      01/21/24 2007 01/21/24 2000  Hemoglobin & Hematocrit, Blood  Timed         01/21/24 1635    01/21/24 1700  POC Glucose 4x Daily Before Meals & at Bedtime  4 Times Daily Before Meals & at Bedtime       01/21/24 1238    01/21/24 1634  POC Glucose Once  PROCEDURE ONCE        Comments: Complete no more than 45 minutes prior to patient eating      01/21/24 1623    01/21/24 1610  Inpatient Vascular Surgery Consult  Once        Specialty:  Vascular Surgery  Provider:  Jony Fowler MD    01/21/24 1609    01/21/24 1400  CBC (No Diff)  Once         01/21/24 1240    01/21/24 1346  [MAR Hold]  oxyCODONE-acetaminophen (PERCOCET) 5-325 MG per tablet 1 tablet  Every 4 Hours PRN        (MAR Hold since Mon 1/22/2024 at 0806.Hold Reason: Unreviewed Transfer Orders)    01/21/24 1347    01/21/24 1346  [MAR Hold]  HYDROmorphone (DILAUDID) injection 1 mg  Every 2 Hours PRN        (MAR Hold since Mon 1/22/2024 at 0806.Hold Reason: Unreviewed Transfer Orders)    01/21/24 1347    01/21/24 1330  [MAR Hold]  sodium chloride 0.9 % flush 10 mL  Every 12 Hours Scheduled        (MAR Hold since Mon 1/22/2024 at 0806.Hold Reason: Unreviewed Transfer Orders)    01/21/24 1238    01/21/24 1330  [MAR Hold]  sennosides-docusate (PERICOLACE) 8.6-50 MG per tablet 2 tablet  2 Times Daily        (MAR Hold since Mon 1/22/2024 at 0806.Hold Reason: Unreviewed Transfer Orders)   See Hyperspace for full Linked Orders Report.    01/21/24 1238    01/21/24 1330  dextrose 5 % and sodium chloride 0.45 % infusion  Continuous         01/21/24 1239    01/21/24 1300  Vital Signs Every Hour and Per Hospital Policy Based on Patient Condition  Every Hour        01/21/24 1238    01/21/24 1300  Intake and Output  Every Hour       01/21/24 1238    01/21/24 1243  Type & Screen  STAT         01/21/24 1242    01/21/24 1242  NPO Diet NPO Type: Sips with Meds  Diet Effective Now,   Status:  Canceled         01/21/24 1241    01/21/24 1239  Continuous Cardiac Monitoring  Continuous        Comments: Follow Standing Orders As Outlined in Process Instructions (Open Order Report to View Full Instructions)    01/21/24 1238    01/21/24 1239  Telemetry - Maintain IV Access  Continuous,   Status:  Canceled         01/21/24 1238    01/21/24 1239  Telemetry - Place Orders & Notify Provider of Results When Patient Experiences Acute Chest Pain, Dysrhythmia or Respiratory Distress  Until Discontinued         01/21/24 1238    01/21/24 1239  Continuous Pulse Oximetry  Continuous,   Status:  Canceled         01/21/24 1238    01/21/24 1239  Height & Weight  Once         01/21/24 1238    01/21/24 1239  Daily Weights  Daily       01/21/24 1238    01/21/24 1239  Oral Care - Patient Not on NPPV & Not Intubated  Every Shift       01/21/24 1238    01/21/24 1239  Target Arousal Level RASS 0 to -1  Continuous,   Status:  Canceled         01/21/24 1238    01/21/24 1239  ICU / CCU - Maintain IV Access  Continuous,   Status:  Canceled         01/21/24 1238    01/21/24 1239  ICU / CCU - Place Order Consult Intensivist For Critical Care Management (If Patient Not Admitted to Cardiology for Primary Cardiology Condition)  Continuous         01/21/24 1238    01/21/24 1239  ICU / CCU - Notify All Physicians When Patient is Transferred  Once         01/21/24 1238    01/21/24 1239  Use Mobility Guidelines for Advancement of Activity  Continuous         01/21/24 1238    01/21/24 1239  Oxygen Therapy- Nasal Cannula; Titrate 1-6 LPM Per SpO2; 90 - 95%  Continuous         01/21/24 1238    01/21/24 1239  Place Sequential Compression Device  Once         01/21/24 1238    01/21/24 1239  Maintain Sequential Compression Device   Continuous         01/21/24 1238    01/21/24 1239  Insert Peripheral IV  Once         01/21/24 1238    01/21/24 1239  Saline Lock & Maintain IV Access  Continuous,   Status:  Canceled         01/21/24 1238    01/21/24 1239  Code Status and Medical Interventions:  Continuous         01/21/24 1238    01/21/24 1239  If Patient has BG Less Than 80 & is Symptomatic But Not on IV Insulin Protocol - Use Adult Hypoglycemia Treatment Orders  Continuous         01/21/24 1238    01/21/24 1238  Potassium Replacement - Follow Nurse / BPA Driven Protocol  As Needed         01/21/24 1238    01/21/24 1238  [MAR Hold]  Magnesium Standard Dose Replacement - Follow Nurse / BPA Driven Protocol  As Needed        (MAR Hold since Mon 1/22/2024 at 0806.Hold Reason: Unreviewed Transfer Orders)    01/21/24 1238    01/21/24 1238  [MAR Hold]  Phosphorus Replacement - Follow Nurse / BPA Driven Protocol  As Needed        (MAR Hold since Mon 1/22/2024 at 0806.Hold Reason: Unreviewed Transfer Orders)    01/21/24 1238    01/21/24 1238  [MAR Hold]  Calcium Replacement - Follow Nurse / BPA Driven Protocol  As Needed        (MAR Hold since Mon 1/22/2024 at 0806.Hold Reason: Unreviewed Transfer Orders)    01/21/24 1238    01/21/24 1238  [MAR Hold]  nitroglycerin (NITROSTAT) SL tablet 0.4 mg  Every 5 Minutes PRN        (MAR Hold since Mon 1/22/2024 at 0806.Hold Reason: Unreviewed Transfer Orders)    01/21/24 1238    01/21/24 1238  [MAR Hold]  sodium chloride 0.9 % flush 10 mL  As Needed        (MAR Hold since Mon 1/22/2024 at 0806.Hold Reason: Unreviewed Transfer Orders)    01/21/24 1238    01/21/24 1238  [MAR Hold]  sodium chloride 0.9 % infusion 40 mL  As Needed        (MAR Hold since Mon 1/22/2024 at 0806.Hold Reason: Unreviewed Transfer Orders)    01/21/24 1238    01/21/24 1238  [MAR Hold]  aluminum-magnesium hydroxide-simethicone (MAALOX MAX) 400-400-40 MG/5ML suspension 15 mL  Every 6 Hours PRN        (MAR Hold since Mon 1/22/2024 at 0806.Hold  Reason: Unreviewed Transfer Orders)    01/21/24 1238    01/21/24 1238  [MAR Hold]  polyethylene glycol (MIRALAX) packet 17 g  Daily PRN        (MAR Hold since Mon 1/22/2024 at 0806.Hold Reason: Unreviewed Transfer Orders)   See Hyperspace for full Linked Orders Report.    01/21/24 1238    01/21/24 1238  [MAR Hold]  bisacodyl (DULCOLAX) EC tablet 5 mg  Daily PRN        (MAR Hold since Mon 1/22/2024 at 0806.Hold Reason: Unreviewed Transfer Orders)   See Hyperspace for full Linked Orders Report.    01/21/24 1238    01/21/24 1238  [MAR Hold]  bisacodyl (DULCOLAX) suppository 10 mg  Daily PRN        (MAR Hold since Mon 1/22/2024 at 0806.Hold Reason: Unreviewed Transfer Orders)   See Hyperspace for full Linked Orders Report.    01/21/24 1238    01/21/24 1238  [MAR Hold]  acetaminophen (TYLENOL) tablet 650 mg  Every 4 Hours PRN        (MAR Hold since Mon 1/22/2024 at 0806.Hold Reason: Unreviewed Transfer Orders)   See Hyperspace for full Linked Orders Report.    01/21/24 1238    01/21/24 1238  [MAR Hold]  acetaminophen (TYLENOL) suppository 650 mg  Every 4 Hours PRN        (MAR Hold since Mon 1/22/2024 at 0806.Hold Reason: Unreviewed Transfer Orders)   See Hyperspace for full Linked Orders Report.    01/21/24 1238    01/21/24 1238  [MAR Hold]  ondansetron ODT (ZOFRAN-ODT) disintegrating tablet 4 mg  Every 6 Hours PRN        (MAR Hold since Mon 1/22/2024 at 0806.Hold Reason: Unreviewed Transfer Orders)   See Hyperspace for full Linked Orders Report.    01/21/24 1238    01/21/24 1238  [MAR Hold]  ondansetron (ZOFRAN) injection 4 mg  Every 6 Hours PRN        (MAR Hold since Mon 1/22/2024 at 0806.Hold Reason: Unreviewed Transfer Orders)   See Hyperspace for full Linked Orders Report.    01/21/24 1238    01/21/24 1238  Inpatient General Surgery Consult  Once        Specialty:  General Surgery  Provider:  Reji House MD    01/21/24 1238    01/21/24 1230  Inpatient Pulmonology Consult  Once        Specialty:  Pulmonary  Disease  Provider:  Prosper Reyes MD    01/21/24 1230    01/21/24 1000  fentaNYL citrate (PF) (SUBLIMAZE) injection 50 mcg  Once         01/21/24 0934    01/21/24 0932  Cardiac Monitoring  Continuous,   Status:  Canceled        Comments: Follow Standing Orders As Outlined in Process Instructions (Open Order Report to View Full Instructions)    01/21/24 0931    01/21/24 0931  Inpatient Admission  Once         01/21/24 0931    01/21/24 0900  fentaNYL citrate (PF) (SUBLIMAZE) injection 100 mcg  Once         01/21/24 0833    01/21/24 0900  magnesium sulfate 2g/50 mL (PREMIX) infusion  Once         01/21/24 0843    01/21/24 0845  iopamidol (ISOVUE-370) 76 % injection 100 mL  Once in Imaging         01/21/24 0824    01/21/24 0845  Protime-INR, Fingerstick  PROCEDURE ONCE         01/21/24 0837    01/21/24 0825  POC Protime / INR  Once         01/21/24 0824    01/21/24 0730  HYDROmorphone (DILAUDID) injection 1 mg  Once         01/21/24 0705    01/21/24 0700  cefTRIAXone (ROCEPHIN) 2,000 mg in sodium chloride 0.9 % 100 mL IVPB-VTB  Once         01/21/24 0641    01/21/24 0653  URINE DRUG SCREEN PLUS BUPRENORPHINE -  Once         01/21/24 0652    01/21/24 0653  Urine Drug Screen - Urine, Clean Catch  PROCEDURE ONCE         01/21/24 0652    01/21/24 0653  Buprenorphine Screen Urine - Urine, Clean Catch  PROCEDURE ONCE,   Status:  Canceled         01/21/24 0652    01/21/24 0645  sodium chloride 0.9 % bolus 1,000 mL  Once         01/21/24 0623    01/21/24 0645  HYDROmorphone (DILAUDID) injection 0.5 mg  Once         01/21/24 0624    01/21/24 0645  pantoprazole (PROTONIX) injection 40 mg  Once         01/21/24 0624    01/21/24 0645  ondansetron (ZOFRAN) injection 4 mg  Once         01/21/24 0624    01/21/24 0643  Urine Drug Screen - Urine, Clean Catch  Once,   Status:  Canceled         01/21/24 0642    01/21/24 0642  Blood Culture - Blood, Arm, Left  Once         01/21/24 0641    01/21/24 0642  Blood Culture - Blood, Arm,  Left  Once        Comments: 30 minutes after first collection, or from a different site      01/21/24 0641    01/21/24 0624  Magnesium  STAT         01/21/24 0623    01/21/24 0624  CT Abdomen Pelvis With Contrast  1 Time Imaging         01/21/24 0624    01/21/24 0623  Single High Sensitivity Troponin T  Once         01/21/24 0622    01/21/24 0623  Lactic Acid, Plasma  STAT         01/21/24 0622    01/21/24 0623  XR Chest 1 View  1 Time Imaging         01/21/24 0622    01/21/24 0618  Ethanol  STAT         01/21/24 0617    01/21/24 0618  Urine Drug Screen - Urine, Clean Catch  STAT,   Status:  Canceled         01/21/24 0617    01/21/24 0612  Comprehensive Metabolic Panel  Once         01/21/24 0550    01/21/24 0612  Lipase  Once         01/21/24 0550    01/21/24 0609  ECG 12 Lead Chest Pain  Once         01/21/24 0608    01/21/24 0554  Urinalysis, Microscopic Only - Urine, Clean Catch  Once         01/21/24 0553    01/21/24 0554  Holliday Urine Culture Tube - Urine, Clean Catch  Once         01/21/24 0553    01/21/24 0551  Lipase  STAT,   Status:  Canceled         01/21/24 0550    01/21/24 0550  NPO Diet NPO Type: Strict NPO  Diet Effective Now,   Status:  Canceled         01/21/24 0550    01/21/24 0550  Undress & Gown  Once         01/21/24 0550    01/21/24 0550  Insert Peripheral IV  Once         01/21/24 0550    01/21/24 0550  CBC & Differential  Once         01/21/24 0550    01/21/24 0550  Urinalysis With Microscopic If Indicated (No Culture) - Urine, Clean Catch  Once         01/21/24 0550    01/21/24 0550  CBC Auto Differential  PROCEDURE ONCE         01/21/24 0550    01/21/24 0549  [MAR Hold]  sodium chloride 0.9 % flush 10 mL  As Needed        (MAR Hold since Mon 1/22/2024 at 0806.Hold Reason: Unreviewed Transfer Orders)    01/21/24 0550    01/21/24 0000  CBC (No Diff)  Every 6 Hours       01/21/24 1527    Unscheduled  Vital Signs - Per Anesthesia Protocol  As Needed       01/22/24 0821    Unscheduled  Oxygen  Therapy- Nasal Cannula; Titrate 1-6 LPM Per SpO2; 90 - 95%  Continuous PRN       01/22/24 0821    Unscheduled  POC Glucose PRN  As Needed      Comments: For all diabetic patients. Notify Anesthesiologist for blood sugar > 180.      01/22/24 0821    Unscheduled  Vital Signs - Per Anesthesia Protocol  As Needed       01/22/24 0835    Unscheduled  Oxygen Therapy- Nasal Cannula; Titrate 1-6 LPM Per SpO2; 90 - 95%  Continuous PRN       01/22/24 0835    Unscheduled  POC Glucose PRN  As Needed      Comments: For all diabetic patients. Notify Anesthesiologist for blood sugar > 180.      01/22/24 0835    Unscheduled  Oxygen Therapy- Nasal Cannula; Titrate 1-6 LPM Per SpO2; 90 - 95%  Continuous PRN       01/22/24 1429    Signed and Held  1.  Does not need to be flat in bed for any length of time; head of bed may be up 15 to 20 degrees. 2.  May sit up in chair later this evening. 3.  DC Negron catheter in a.m. 4.  DC arterial line in AM. 5.  CBC with differential in AM.  Nursing Com...  Continuous        Comments: 1.  Does not need to be flat in bed for any length of time; head of bed may be up 15 to 20 degrees.  2.  May sit up in chair later this evening.  3.  DC Negron catheter in a.m.  4.  DC arterial line in AM.  5.  CBC with differential in AM.    Signed and Held                     Operative/Procedure Notes (all)        Carine Pena Jr., MD at 01/22/24 1128  Version 1 of 1              Operative Note  Date of Admission:  1/21/2024  OR Date: 1/22/2024    Pre-op Diagnosis:   Laceration of the spleen with expanding subcapsular hematoma    Post-op Diagnosis:     Post-Op Diagnosis Codes:  Same    Procedure:   1) duplex ultrasound-guided percutaneous access of the right common femoral artery with abdominal aortogram selective angiogram of the splenic artery; Azur coil embolization of the splenic artery using 2 coils measuring 7 mm x 36 mm and 2 coils measuring 8 mm x 24 mm;  Open exploration of the right common femoral  artery with right lower extremity angiogram and attempted snare of embolize coil with extraction using over-the-wire #3 Juanjose catheter    Surgeon: Carine Pena Jr, MD    Assistant:  Lorna Diaz MD and Katharina MINOR CSA and they provided critical assistance during the case including suctioning, exposure, retraction, and reduction of blood loss.    Anesthesia: General    Staff:   Circulator: Gage Hassan RN; Bushra Prado RN  Scrub Person: La Garcia; Elizabeth Wilder  Assistant: Katharina Davis CST  Vascular Radiology Technician: Tomasz Llanes    Estimated Blood Loss:  cc    Specimens:   * No orders in the log *    Complications: Embolization of a coil down into the distal popliteal artery/tibioperoneal trunk    Findings: Successful embolization of the splenic artery.  Successful extraction of the embolized coil    Indications:  As in preop diagnosis           Procedure: Patient was placed on the operative table supine position.  After satisfactory general endotracheal anesthesia was achieved the patient was prepped from the umbilicus to the knees using ChloraPrep and draped in usual sterile fashion.  Using duplex ultrasound the right common femoral artery was identified and the femoral artery accessed percutaneously under direct vision.  Guidewire was advanced followed by a 6 Citizen of [place] sheath.  Pigtail catheter was placed over the guidewire placed in the upper abdominal aorta.  An aortogram was performed using 20 cc of contrast at 10 cc/s.  This demonstrated normal-appearing celiac trunk with common hepatic and splenic arteries.  The splenic artery was very tortuous.  The superior mesenteric artery was normal as were both renal arteries.  The image intensifier was then placed into the 76 degree AJAY position and repeat aortogram was performed reconfirming the same.  First using a metarjame catheter followed by a SOS omni catheter  I was able to access at the splenic artery but due to tortuosity the  guidewire kept kicking out.  Therefore a 7 German  sheath was placed over the guidewire and placed into the celiac trunk.  Using a guidewire and a Rebolledo cross catheter I was able to advance a 0.014 guidewire well out into the splenic artery.  A ProGlide catheter was placed over the guidewire and first attempts were made to place a penumbra coil through the Pro-glide catheter but was unsuccessful.  Therefore the above-mentioned Joann coils were placed into the splenic artery, in its mid distal portion.  Hand-held injection demonstrated very minimal contrast getting beyond this point in this was felt to be a successful embolization.  The  sheath was pulled back into the abdominal aorta after it was straightened and a 0.035 guidewire was placed through this.  Plans were to use a closure device but it was noted that the initial penumbra coil was within the sheath and with placement of the guidewire this advanced out of the sheath into the external iliac artery.  The tumor graft was removed and a 7 German sheath placed over the guidewire.  Plans were to use a snare to remove this but while getting the snare devices the arterial flow pushed the coil beyond the sheath and this went distally first into the common femoral artery superficial femoral artery then down into the distal popliteal artery.  This was confirmed by fluoroscopy.  Therefore, open exploration was performed of the femoral artery without the tangential incision in the right groin.  The electrocautery was used to dissect through the subcutaneous tissue and femoral sheath.  The common femoral, superficial femoral, and profundofemoral arteries were all isolated and Vesseloops passed around the structures.  7500 units of heparin was then given intravenously.  An arteriotomy was made transversely and a 7 German sheath placed into the superficial femoral artery.  First a loop snare was placed down and multiple attempts were made to grasp the  coil, unsuccessfully.  Therefore, an over-the-wire 3 Juanjose catheter was then passed beyond the coil and inflated and extracted under direct vision.  The coil was then passed off.  A right lower extremity angiogram was then performed using 20 cc of contrast.  This demonstrated the superficial femoral and popliteal arteries to be normal.  There was some mild spasm noted in the tibial vessels but patency into the lower leg and foot.  The sheath was removed and the femoral arteriotomy was closed with a running 5-0 Prolene suture.  Hemostasis was checked and noted to be satisfactory.  Strong pulses were noted in the common femoral, superficial femoral, and profundofemoral arteries.  Using the hand-held Doppler strong Doppler signals were noted in the posterior tibial and dorsalis pedis arteries.  30 mg protamine sulfate was then given intravenously.  Local anesthetic was infiltrated into the skin and subcutaneous tissue.  The femoral sheath and deep subcutaneous tissue were closed with running 2-0 Vicryl suture followed by a running 3-0 Vicryl suture in 2 layers and the superficial subcutaneous tissue and the skin closed with running 4-0 Vicryl in septically fashion.  Dermabond was placed over the incision.  Sponge, needle, and sponge counts were all reported as correct.  Patient was extubated transported to recovery room in satisfactory condition.        Radiographic Findings:  1) as described above      Active Hospital Problems    Diagnosis  POA    **Splenic laceration [S36.039A]  Yes      Resolved Hospital Problems   No resolved problems to display.      Carine Pena Jr., MD     Date: 1/22/2024  Time: 14:16 EST          Electronically signed by Carine Pena Jr., MD at 01/22/24 1431          Physician Progress Notes (all)        Tomasz Correa MD at 01/22/24 0808              [facility] INPATIENT PROGRESS NOTE         [facility] OR    1/22/2024      PATIENT IDENTIFICATION:  Name:  Piper ROLLE Payton ADMIT: 2024   : 1980  PCP: Provider, No Known    MRN: [id number] LOS: 1 days   AGE/SEX: 43 y.o. female  ROOM: [facility] OR/MAIN OR                     LOS 1    Reason for visit: ICU medical management, splenic laceration with flank pain      SUBJECTIVE:      No new issues overnight.  Plan for IR intervention for splenic laceration and hematoma.    Objective   OBJECTIVE:    Vital Sign Min/Max for last 24 hours  Temp  Min: 97.6 °F (36.4 °C)  Max: 98.8 °F (37.1 °C)   BP  Min: 105/75  Max: 123/45   Pulse  Min: 84  Max: 113   Resp  Min: 16  Max: 18   SpO2  Min: 92 %  Max: 100 %   No data recorded   Weight  Min: 61.7 kg (136 lb)  Max: 61.7 kg (136 lb)    Vitals:    24 0848 24 0850 24 0850 24 0851   BP:  110/82 110/82    BP Location:   Left arm    Patient Position:   Sitting    Pulse: 97  96 95   Resp:   16    Temp:       TempSrc:       SpO2: 97%  97% 97%   Weight:       Height:                24  0604 24  1343   Weight: 57.6 kg (126 lb 15.8 oz) 61.7 kg (136 lb)       Body mass index is 19.51 kg/m².                          Body mass index is 19.51 kg/m².    Intake/Output Summary (Last 24 hours) at 2024 1051  Last data filed at 2024 0345  Gross per 24 hour   Intake 600 ml   Output --   Net 600 ml         Exam:  GEN:  No distress, appears stated age  EYES:   PERRL, anicteric sclerae  ENT:    External ears/nose normal, OP clear  NECK:  No adenopathy, midline trachea  LUNGS: Normal chest on inspection, palpation and auscultation  CV:  Normal S1S2, without murmur  ABD:  Nontender, nondistended, no hepatosplenomegaly, +BS  EXT:  No edema.  No cyanosis or clubbing.  No mottling and normal cap refill.    Assessment     Scheduled meds:  ceFAZolin, 2 g, Intravenous, Once  famotidine, 20 mg, Intravenous, Once  [MAR Hold] senna-docusate sodium, 2 tablet, Oral, BID  [MAR Hold] sodium chloride, 10 mL, Intravenous, Q12H  sodium chloride, 3 mL, Intravenous,  Q12H  sodium chloride, 3 mL, Intravenous, Q12H      IV meds:                      dextrose 5 % and sodium chloride 0.45 %, 125 mL/hr, Last Rate: Stopped (01/22/24 0742)  lactated ringers, 9 mL/hr, Last Rate: 9 mL/hr (01/22/24 0845)  lactated ringers, 9 mL/hr      Data Review:  Results from last 7 days   Lab Units 01/22/24 0345 01/21/24  0756   SODIUM mmol/L 135* 137   POTASSIUM mmol/L 3.5 3.8   CHLORIDE mmol/L 101 96*   CO2 mmol/L 23.0 28.2   BUN mg/dL 3* 5*   CREATININE mg/dL 0.47* 0.60   GLUCOSE mg/dL 109* 104*   CALCIUM mg/dL 8.5* 8.8         Estimated Creatinine Clearance: 153.4 mL/min (A) (by C-G formula based on SCr of 0.47 mg/dL (L)).  Results from last 7 days   Lab Units 01/22/24  0345 01/21/24 2007 01/21/24  1357 01/21/24  0603   WBC 10*3/mm3 9.45  --  9.71 11.35*   HEMOGLOBIN g/dL 10.1* 7.2* 8.2* 10.0*   PLATELETS 10*3/mm3 331  --  415 442     Results from last 7 days   Lab Units 01/21/24  0837   INR  1.20     Results from last 7 days   Lab Units 01/22/24  0345 01/21/24  0756   ALT (SGPT) U/L 9 <5   AST (SGOT) U/L 7 5         Results from last 7 days   Lab Units 01/21/24  0756   LACTATE mmol/L 0.8         Glucose   Date/Time Value Ref Range Status   01/22/2024 0600 118 70 - 130 mg/dL Final   01/22/2024 0020 125 70 - 130 mg/dL Final   01/21/2024 2007 129 70 - 130 mg/dL Final   01/21/2024 1623 134 (H) 70 - 130 mg/dL Final         Imaging reviewed  Chest x-ray 1/21 reviewed shows small to moderate left effusion with atelectasis.          CT abdomen pelvis 1/21 reviewed          Microbiology reviewed  No growth to date         Active Hospital Problems    Diagnosis  POA    **Splenic laceration [S36.039A]  Yes      Resolved Hospital Problems   No resolved problems to display.         ASSESSMENT:  Splenic laceration with hematoma  Chronic Pancreatitis  Anemia  Left pleural effusion      PLAN:  To IR for splenic laceration and hematoma treatment.  Monitor closely in intensive care.  Control glucose.  Pain  control.  Repeat chest x-ray in a.m. for follow-up of effusion.  Mechanical DVT prophylaxis.  Discussed with family at bedside.    Discussed with multidisciplinary ICU team on rounds this morning.       CCT: 32 min      Tomasz Correa MD  Pulmonary and Critical Care Medicine  [facility]  2024    10:51 EST       Electronically signed by Tomasz Correa MD at 24 1055          Consult Notes (all)        Jony Fowler MD at 24 0712        Consult Orders    1. Inpatient Vascular Surgery Consult [[id number]] ordered by Mai Almeida MD at 24 1609                   Patient Name: Piper Cain Account #: [id number]    MRN: [id number] Admission Date: 2024      Consulting Service: Vascular Surgery Date of Evaluation: 2024    Requesting Provider: Dr. Mai Almeida MD          [place]       CHIEF COMPLAINT: Splenic laceration with impending splenic rupture  HPI: Piper Cain is a 43 y.o. female is being seen for a consultation and evaluation/management of splenic laceration with expanding hematoma and possible impending splenic rupture.  Patient is undergoing evaluation by general surgery who is assess to try to coil embolize her splenic artery to prevent rupture and aid resection if needed.  Patient is aware of these issues and is agreeable.  I answered the patient's questions regarding these issues as directly as I could referring the management of the spleen to general surgery.    PAST MEDICAL HISTORY:   Past Medical History:   Diagnosis Date    Anemia     Anxiety     Constipation     Cyst of spleen     Diarrhea     E. coli bacteremia     ETOH abuse     Frequent UTI     GERD (gastroesophageal reflux disease)     Hiatal hernia     Left flank pain 10/21/2022    Migraine     Miscarriage 2004    Pancreatitis     Pseudocyst of pancreas       PAST SURGICAL HISTORY:   Past Surgical History:   Procedure Laterality Date    ENDOSCOPY N/A 08/10/2022     Procedure: ESOPHAGOGASTRODUODENOSCOPY with bxs;  Surgeon: Salvador Price MD;  Location:  [facility] ENDOSCOPY;  Service: Gastroenterology;  Laterality: N/A;  Pre: r/o esophageal  varacies, abd pain  Post: esophageal plaque    ENDOSCOPY N/A 10/9/2023    Procedure: ESOPHAGOGASTRODUODENOSCOPY WITH STENT REMOVAL;  Surgeon: Red Denson MD;  Location: [facility] ENDOSCOPY;  Service: Gastroenterology;  Laterality: N/A;    PANCREATIC CYST DRAINAGE      x 2    UPPER ENDOSCOPIC ULTRASOUND W/ FNA N/A 9/13/2023    Procedure: Esophagogastroduodenoscopy with biopsy x 1 area and endoscopic ultrasound with placement of cyst gastrostomy;  Surgeon: Red Denson MD;  Location: [facility] ENDOSCOPY;  Service: Gastroenterology;  Laterality: N/A;  post: pancreatic psuedocyst    WISDOM TOOTH EXTRACTION        FAMILY HISTORY:   Family History   Problem Relation Age of Onset    Alcohol abuse Mother     Arthritis Mother     Asthma Mother     Miscarriages / Stillbirths Mother     Cancer Father     Diabetes Father     Drug abuse Father     Early death Father     Heart disease Father     Hyperlipidemia Father     Hypertension Father     Alcohol abuse Paternal Aunt     Cancer Paternal Aunt     Diabetes Paternal Aunt     Drug abuse Paternal Aunt     Early death Paternal Aunt     Heart disease Paternal Aunt     Hyperlipidemia Paternal Aunt     Hypertension Paternal Aunt     Alcohol abuse Maternal Grandmother     Alcohol abuse Maternal Grandfather     Alcohol abuse Paternal Grandmother     Cancer Paternal Grandmother     Diabetes Paternal Grandmother     Drug abuse Paternal Grandmother     Early death Paternal Grandmother     Heart disease Paternal Grandmother     Hyperlipidemia Paternal Grandmother     Hypertension Paternal Grandmother     Alcohol abuse Paternal Grandfather       SOCIAL HISTORY:   Social History     Tobacco Use    Smoking status: Every Day     Packs/day: 0.50     Years: 26.00     Additional pack years: 0.00     Total pack years: 13.00      Types: Cigarettes     Start date: 1/28/1996     Passive exposure: Current    Smokeless tobacco: Never   Vaping Use    Vaping Use: Never used   Substance Use Topics    Alcohol use: Not Currently    Drug use: Never      MEDICATIONS:   No current facility-administered medications on file prior to encounter.     Current Outpatient Medications on File Prior to Encounter   Medication Sig Dispense Refill    amitriptyline (ELAVIL) 50 MG tablet Take 1 tablet by mouth Every Night.      folic acid (FOLVITE) 1 MG tablet Take 1 tablet by mouth Daily. 30 tablet 3    magnesium oxide (MAG-OX) 400 MG tablet Take 1 tablet by mouth Daily.      pantoprazole (PROTONIX) 40 MG EC tablet Take 1 tablet by mouth Daily. 30 tablet 3    thiamine (VITAMIN B1) 100 MG tablet Take 1 tablet by mouth Daily. 30 tablet 3    Ferrous Sulfate Dried (Feosol) 200 (65 Fe) MG tablet tablet Take 1 tablet by mouth 2 (Two) Times a Day. (Patient not taking: Reported on 1/21/2024)      naloxone (NARCAN) 4 MG/0.1ML nasal spray Call 911. Don't prime. [place] in 1 nostril for overdose. Repeat in 2-3 minutes in other nostril if no or minimal breathing/responsiveness. 2 each 0    ondansetron (ZOFRAN) 4 MG tablet Take 1 tablet by mouth Every 8 (Eight) Hours As Needed for Nausea or Vomiting.      oxyCODONE-acetaminophen (Percocet) 5-325 MG per tablet Take 1 tablet by mouth Every 6 (Six) Hours As Needed for Moderate Pain. 8 tablet 0             ALLERGIES: [place]   COMPLETE REVIEW OF SYSTEMS:     ENT ROS: negative  Cardiovascular ROS: no chest pain or dyspnea on exertion  Respiratory ROS: no cough, shortness of breath, or wheezing  Gastrointestinal ROS: positive for -spasmodic abdominal pain  Neurological ROS: no TIA or stroke symptoms  Genito-Urinary ROS: no dysuria, trouble voiding, or hematuria  Musculoskeletal ROS: negative  Dermatological ROS: negative  Psychological ROS: negative      PHYSICAL EXAM:   Patient Vitals for the past 24 hrs:   BP Temp Temp src Pulse  Resp SpO2 Weight   01/22/24 0600 119/83 -- -- 98 -- 95 % --   01/22/24 0500 118/84 -- -- 97 -- 95 % --   01/22/24 0439 -- 97.6 °F (36.4 °C) Oral -- -- -- --   01/22/24 0400 114/80 -- -- 98 -- 99 % --   01/22/24 0345 -- -- -- 93 -- 95 % --   01/22/24 0300 113/83 -- -- 94 -- 95 % --   01/22/24 0200 118/82 -- -- 97 -- 95 % --   01/22/24 0115 119/95 -- -- 97 -- 96 % --   01/22/24 0100 114/77 -- -- 96 -- 98 % --   01/22/24 0054 -- -- -- 98 -- 96 % --   01/22/24 0045 116/85 98.4 °F (36.9 °C) -- 96 -- 97 % --   01/22/24 0030 117/89 -- -- 93 -- 96 % --   01/22/24 0015 119/76 -- -- 92 -- 100 % --   01/22/24 0000 112/84 -- -- 94 -- 96 % --   01/21/24 2345 113/79 -- -- 93 -- 93 % --   01/21/24 2330 -- 98.3 °F (36.8 °C) Oral -- -- -- --   01/21/24 2300 109/79 98.4 °F (36.9 °C) Oral 89 -- 98 % --   01/21/24 2245 106/78 -- -- 86 -- 98 % --   01/21/24 2230 105/75 -- -- 111 -- 97 % --   01/21/24 2222 109/73 98.3 °F (36.8 °C) Oral 99 -- 95 % --   01/21/24 2200 109/73 -- -- 84 -- 96 % --   01/21/24 2100 110/76 -- -- 90 -- 96 % --   01/21/24 2004 123/45 98.3 °F (36.8 °C) Oral 94 -- 97 % --   01/21/24 2000 -- -- -- 93 -- 97 % --   01/21/24 1700 107/70 -- -- 101 -- 94 % --   01/21/24 1622 -- 98.8 °F (37.1 °C) Oral -- -- -- --   01/21/24 1600 -- -- -- 98 -- 95 % --   01/21/24 1400 -- -- -- 113 -- 100 % --   01/21/24 1343 -- -- -- -- -- -- 61.7 kg (136 lb)   01/21/24 1200 114/76 -- -- 101 -- 99 % --   01/21/24 1145 105/75 -- -- 106 -- 92 % --   01/21/24 0900 115/82 -- -- 117 18 93 % --   01/21/24 0830 107/78 -- -- 120 -- 97 % --        General appearance: oriented to person, place, and time, anxious, in mild to moderate distress, ill-appearing, and chronically ill appearing.  Neurological exam reveals alert, oriented, normal speech, no focal findings or movement disorder noted.  ENT exam reveals - ENT exam normal, no neck nodes or sinus tenderness.  CVS exam: normal rate, regular rhythm, normal S1, S2, no murmurs, rubs, clicks or  gallops.  Chest: clear to auscultation, no wheezes, rales or rhonchi, symmetric air entry.  Abdominal exam: Mildly distended tender nonrigid no rebound   examination of the feet reveals warm, good capillary refill.        LABS:      Results Review:       I reviewed the patient's new clinical results.  Results from last 7 days   Lab Units 01/22/24  0345 01/21/24 2007 01/21/24  1357 01/21/24  0603   WBC 10*3/mm3 9.45  --  9.71 11.35*   HEMOGLOBIN g/dL 10.1* 7.2* 8.2* 10.0*   PLATELETS 10*3/mm3 331  --  415 442     Results from last 7 days   Lab Units 01/22/24  0345 01/21/24  0756   SODIUM mmol/L 135* 137   POTASSIUM mmol/L 3.5 3.8   CHLORIDE mmol/L 101 96*   CO2 mmol/L 23.0 28.2   BUN mg/dL 3* 5*   CREATININE mg/dL 0.47* 0.60   GLUCOSE mg/dL 109* 104*   Estimated Creatinine Clearance: 153.4 mL/min (A) (by C-G formula based on SCr of 0.47 mg/dL (L)).  Results from last 7 days   Lab Units 01/22/24  0345 01/21/24  0756   CALCIUM mg/dL 8.5* 8.8   ALBUMIN g/dL 3.0* 3.6   MAGNESIUM mg/dL  --  1.5*     Results from last 7 days   Lab Units 01/21/24  0837   PROTIME Seconds 13.2   INR  1.20       The following radiologic or non-invasive studies have been reviewed by me: CT scan reviewed with images viewed    Active Hospital Problems    Diagnosis  POA    **Splenic laceration [S36.039A]  Yes      Resolved Hospital Problems   No resolved problems to display.         ASSESSMENT/PLAN: 43 y.o. female with an enlarging subcapsular hematoma of the spleen that occurred after splenic laceration associated with pancreatitis.  Patient is having this worsening pain over the last 3 days and was readmitted after attempts at conservative care were given.  Patient has concerns for possible rupture of the spleen based on size of the hematoma.  At this juncture splenectomy is likely to be performed and free splenectomy coil embolization has been requested.  I discussed with the patient the risk benefits complications of the procedure including  "that of increased pain rather than decrease pain.  He is aware and is agreeable with the plan.  Will attempt to pursue this today.  Will keep her strict NPO.      I discussed the plan with the patient and she is agreeable to the plan of care at this point. Thank you for this consult.     Jony Fowler MD   01/22/24       Electronically signed by Jony Fowler MD at 01/22/24 0716       Mai Almeida MD at 01/21/24 1250        Consult Orders    1. Inpatient General Surgery Consult [[id number]] ordered by Prosper Reyes MD at 01/21/24 1238                 General Surgery Consultation    Consulting Physician: Mai Almeida MD  Referring: Prosper Reyes MD    Reason for consultation:   Splenic rupture    CC:   LUQ abdominal and L shoulder pain    HPI:   The patient is a 43 y.o. female who presented to the hospital with left upper quadrant abdominal pain and left shoulder pain for the past 4 days.  She states the pain has been intermittent but that this morning it woke her out of bed at 4 AM and has been more severe.  She reports spasms in her left side and \" solid\" unremitting pain in her left shoulder worse with deep breaths.  She reports shortness of breath secondary to pain.  Denies any recent trauma.  She denies any recent nausea, vomiting, fever, chills, or changes in her bowel function.  She denies any lightheadedness or dizziness.    She has a history of chronic pancreatitis secondary to alcohol use.  Was seen 1/7/2024 with CT abdomen pelvis performed at that time which showed inflammation surrounding the pancreas with a small pseudocyst and small volume free fluid in the left upper quadrant and around the pancreas, with a small area of the spleen that was hypoenhancing concerning for splenic laceration, with no active extravasation.    She went to the emergency department today and CT abdomen pelvis demonstrated development of a left splenic subcapsular hematoma measuring approximately 10 x " 10 x 9 cm.  This was associated with a splenic laceration in the anterior and inferior aspect of the spleen.  There was mild surrounding edema without clear intraperitoneal extension of the hemorrhage.  There was resolving acute on chronic pancreatitis with improved peripancreatic inflammation particularly along the anterior pancreatic tail, with pseudocyst in the region of the pancreatic tail without change.  There was a new small left pleural effusion and left lower lobe atelectasis.    Labs were performed and demonstrated hemoglobin of 10 stable from recent hemoglobin a few weeks ago.  The patient was noted to be tachycardic but with no blood pressure instability.  She received 2L IV crystalloid and was transferred to [facility] ICU.  Patient is accompanied by her boyfriend during exam.    Past Medical History:  Chronic pancreatitis  Pancreatic pseudocyst  Splenic laceration    Past Medical History:   Diagnosis Date    Anemia     Anxiety     Constipation     Cyst of spleen     Diarrhea     E. coli bacteremia     ETOH abuse     Frequent UTI     GERD (gastroesophageal reflux disease)     Hiatal hernia     Left flank pain 10/21/2022    Migraine     Miscarriage 2004    Pancreatitis     Pseudocyst of pancreas      Past Surgical History:  Cyst gastrostomy  Denies other abdominal procedures  Past Surgical History:   Procedure Laterality Date    ENDOSCOPY N/A 08/10/2022    Procedure: ESOPHAGOGASTRODUODENOSCOPY with bxs;  Surgeon: Salvador Price MD;  Location: [facility] ENDOSCOPY;  Service: Gastroenterology;  Laterality: N/A;  Pre: r/o esophageal  varacies, abd pain  Post: esophageal plaque    ENDOSCOPY N/A 10/9/2023    Procedure: ESOPHAGOGASTRODUODENOSCOPY WITH STENT REMOVAL;  Surgeon: Red Denson MD;  Location: [facility] ENDOSCOPY;  Service: Gastroenterology;  Laterality: N/A;    PANCREATIC CYST DRAINAGE      x 2    UPPER ENDOSCOPIC ULTRASOUND W/ FNA N/A 9/13/2023    Procedure: Esophagogastroduodenoscopy with biopsy x 1  area and endoscopic ultrasound with placement of cyst gastrostomy;  Surgeon: Red Denson MD;  Location: [facility] ENDOSCOPY;  Service: Gastroenterology;  Laterality: N/A;  post: pancreatic psuedocyst    WISDOM TOOTH EXTRACTION         Medications:  Medications Prior to Admission   Medication Sig Dispense Refill Last Dose    amitriptyline (ELAVIL) 50 MG tablet Take 1 tablet by mouth Every Night.       Ferrous Sulfate Dried (Feosol) 200 (65 Fe) MG tablet tablet Take 1 tablet by mouth 2 (Two) Times a Day.       folic acid (FOLVITE) 1 MG tablet Take 1 tablet by mouth Daily. 30 tablet 3     magnesium oxide (MAG-OX) 400 MG tablet Take 1 tablet by mouth Daily.       naloxone (NARCAN) 4 MG/0.1ML nasal spray Call 911. Don't prime. [place] in 1 nostril for overdose. Repeat in 2-3 minutes in other nostril if no or minimal breathing/responsiveness. 2 each 0     ondansetron (ZOFRAN) 4 MG tablet Take 1 tablet by mouth Every 8 (Eight) Hours As Needed for Nausea or Vomiting.       oxyCODONE-acetaminophen (Percocet) 5-325 MG per tablet Take 1 tablet by mouth Every 6 (Six) Hours As Needed for Moderate Pain. 8 tablet 0     pantoprazole (PROTONIX) 40 MG EC tablet Take 1 tablet by mouth Daily. 30 tablet 3     thiamine (VITAMIN B1) 100 MG tablet Take 1 tablet by mouth Daily. 30 tablet 3        Current Facility-Administered Medications:     acetaminophen (TYLENOL) tablet 650 mg, 650 mg, Oral, Q4H PRN **OR** acetaminophen (TYLENOL) suppository 650 mg, 650 mg, Rectal, Q4H PRN, Prosper Reyes MD    aluminum-magnesium hydroxide-simethicone (MAALOX MAX) 400-400-40 MG/5ML suspension 15 mL, 15 mL, Oral, Q6H PRN, Prosper Reyes MD    sennosides-docusate (PERICOLACE) 8.6-50 MG per tablet 2 tablet, 2 tablet, Oral, BID **AND** polyethylene glycol (MIRALAX) packet 17 g, 17 g, Oral, Daily PRN **AND** bisacodyl (DULCOLAX) EC tablet 5 mg, 5 mg, Oral, Daily PRN **AND** bisacodyl (DULCOLAX) suppository 10 mg, 10 mg, Rectal, Daily PRN,  Prosper Reyes MD    Calcium Replacement - Follow Nurse / BPA Driven Protocol, , Does not apply, PRN, Prosper Reyes MD    dextrose 5 % and sodium chloride 0.45 % infusion, 125 mL/hr, Intravenous, Continuous, Prosper Reyes MD    Magnesium Standard Dose Replacement - Follow Nurse / BPA Driven Protocol, , Does not apply, PRNAmy Brandon John, MD    nitroglycerin (NITROSTAT) SL tablet 0.4 mg, 0.4 mg, Sublingual, Q5 Min PRN, Prosper Reyes MD    ondansetron ODT (ZOFRAN-ODT) disintegrating tablet 4 mg, 4 mg, Oral, Q6H PRN **OR** ondansetron (ZOFRAN) injection 4 mg, 4 mg, Intravenous, Q6H PRN, Prosper Reyes MD    Phosphorus Replacement - Follow Nurse / BPA Driven Protocol, , Does not apply, PRN, Prosper Reyes MD    Potassium Replacement - Follow Nurse / BPA Driven Protocol, , Does not apply, Amy DE [place] Brandon John, MD    sodium chloride 0.9 % flush 10 mL, 10 mL, Intravenous, PRN, Chidi Almazan MD    sodium chloride 0.9 % flush 10 mL, 10 mL, Intravenous, Q12H, Prosper Reyes MD    sodium chloride 0.9 % flush 10 mL, 10 mL, Intravenous, PRN, Prosper Reyes MD    sodium chloride 0.9 % infusion 40 mL, 40 mL, Intravenous, PRN, Prosper Reyes MD    Allergies:   Allergies   Allergen Reactions    Nickel Rash       Social History:   Smokes daily  States she has not used alcohol in 26 days  Denies recreational drug use   Social History     Socioeconomic History    Marital status: Single   Tobacco Use    Smoking status: Every Day     Packs/day: 0.50     Years: 26.00     Additional pack years: 0.00     Total pack years: 13.00     Types: Cigarettes     Start date: 1/28/1996     Passive exposure: Current    Smokeless tobacco: Never   Vaping Use    Vaping Use: Never used   Substance and Sexual Activity    Alcohol use: Not Currently    Drug use: Never    Sexual activity: Defer     Partners: Male       Family History:   Family History   Problem Relation Age of  "Onset    Alcohol abuse Mother     Arthritis Mother     Asthma Mother     Miscarriages / Stillbirths Mother     Cancer Father     Diabetes Father     Drug abuse Father     Early death Father     Heart disease Father     Hyperlipidemia Father     Hypertension Father     Alcohol abuse Paternal Aunt     Cancer Paternal Aunt     Diabetes Paternal Aunt     Drug abuse Paternal Aunt     Early death Paternal Aunt     Heart disease Paternal Aunt     Hyperlipidemia Paternal Aunt     Hypertension Paternal Aunt     Alcohol abuse Maternal Grandmother     Alcohol abuse Maternal Grandfather     Alcohol abuse Paternal Grandmother     Cancer Paternal Grandmother     Diabetes Paternal Grandmother     Drug abuse Paternal Grandmother     Early death Paternal Grandmother     Heart disease Paternal Grandmother     Hyperlipidemia Paternal Grandmother     Hypertension Paternal Grandmother     Alcohol abuse Paternal Grandfather        Review of Systems:  Constitutional: denies fever or chills  Cardiovascular: denies chest pain or palpitations   Respiratory: denies cough, +shortness of breath associated with pain  Gastrointestinal: + abdominal pain, denies nausea, vomiting, diarrhea, constipation, melena, hematochezia  Genitourinary: denies dysuria or hematuria  Musculoskeletal: +L shoulder pain with deep breaths  Skin: denies rash or jaundice     Physical Exam:   Vitals:    01/21/24 1145   BP: 105/75   Pulse: 106   Resp:    Temp:    SpO2: 92%     GENERAL: alert, interactive, cooperative, tearful prior to exam \"due to pain\"  HEENT: no scleral icterus; moist mucous membranes  NECK: Supple  RESPIRATORY: normal work of breathing on room air  CARDIOVASCULAR: HR 110s, normotensive on monitor, no pressors, no pedal edema  GASTROINTESTINAL: abdomen soft, tender in the left upper quadrant and epigastrium with focal guarding, no rebound, no bruising  MUSCULOSKELETAL: no cyanosis or edema   NEUROLOGIC: alert and oriented, normal speech, no gross focal " deficits   SKIN: warm, no rash, no jaundice    Diagnostic workup:     Pertinent labs:   Results from last 7 days   Lab Units 01/21/24  0603   WBC 10*3/mm3 11.35*   HEMOGLOBIN g/dL 10.0*   HEMATOCRIT % 30.7*   PLATELETS 10*3/mm3 442     Results from last 7 days   Lab Units 01/21/24  0756   SODIUM mmol/L 137   POTASSIUM mmol/L 3.8   CHLORIDE mmol/L 96*   CO2 mmol/L 28.2   BUN mg/dL 5*   CREATININE mg/dL 0.60   CALCIUM mg/dL 8.8   BILIRUBIN mg/dL 0.3   ALK PHOS U/L 91   ALT (SGPT) U/L <5   AST (SGOT) U/L 5   GLUCOSE mg/dL 104*       Imaging:  CT abdomen pelvis 1/21/24 images and report reviewed, demonstrates development of a left splenic subcapsular hematoma measuring approximately 10 x 10 x 9 cm.  This was associated with a splenic laceration in the anterior and inferior aspect of the spleen.  There was mild surrounding edema without clear intraperitoneal extension of the hemorrhage.  There was resolving acute on chronic pancreatitis with improved peripancreatic inflammation particularly along the anterior pancreatic tail, with pseudocyst in the region of the pancreatic tail without change.  There was a new small left pleural effusion and left lower lobe atelectasis.    CT abdomen pelvis 1/4/24 images and report reviewed, there is pancreatitis particular along the pancreatic body and tail extending to left abdomen with pancreatic ductal dilation and a new linear low-density within the spleen suspicious for a small splenic laceration; there is a pancreatic pseudocyst in the region of the pancreatic tail    Assessment and plan:   Subcapsular splenic hematoma  Splenic laceration  Acute on chronic pancreatitis secondary to alcohol use  Pancreatic pseudocyst  Left pleural effusion  Left atelectasis  Tobacco use  Anxiety  Chronic pain  Anemia    Discussed at length with patient and her boyfriend management options of her splenic laceration with subcapsular hematoma in the setting of chronic pancreatitis and pancreatic  pseudocyst. Fortunately right now she demonstrates no blood pressure instability and her hemoglobin is 10, stable from last admission. CT does not appear to demonstrate active extravasation.    Recommend IR evaluation for possible embolization, as surgical intervention with splenectomy is high risk for complications including bleeding and possible need for distal pancreatectomy with worsening pancreatitis/pancreatic fistula. Other risks discussed include infection (including OPSI), injury to surrounding structures, MI, stroke, DVT/PE, risks with anesthesia. The patient may eventually require splenectomy, but preoperative embolization can decrease splenic bleeding during surgery.  I discussed with the patient that I think it is highly likely that she has ongoing complications from her disease process, whether she undergoes procedural intervention or nonoperative management.  The patient voiced understanding and is agreeable to proceed with both embolization and surgery if needed to prevent or treat worsening splenic hemorrhage.  She also understands that pain control will likely be difficult, given the chronic nature of her pancreatitis pain. I discussed her CODE STATUS with her and she elects to be full code.  Remain in ICU. Continue NPO except sips with meds, routine pain control. Monitor for hemodynamic instability. Would recommend Q6h CBCs.  Will discuss with IR.    Addendum: discussed with IR who are available today for proximal splenic artery embolization if needed for emergent embolization should the patient become hemodynamically unstable, and again Tuesday when IR coverage resumes.  I also discussed with Vascular who will review the case and try to schedule the patient for tomorrow.    Mai Almeida M.D.  General, Robotic, and Endoscopic Surgery  [facility]    [address], [address]  [place], [place], [address]  P: [phone]  F: [phone]       Electronically signed by Juan Pablo  MD Mai at 01/21/24 3709

## 2024-01-22 NOTE — PROGRESS NOTES
[facility] INPATIENT PROGRESS NOTE         [facility] MAIN OR    2024      PATIENT IDENTIFICATION:  Name: Piper Cain ADMIT: 2024   : 1980  PCP: Provider, No Known    MRN: [id number] LOS: 1 days   AGE/SEX: 43 y.o. female  ROOM: [facility] Main OR/MAIN OR                     LOS 1    Reason for visit: ICU medical management, splenic laceration with flank pain      SUBJECTIVE:      No new issues overnight.  Plan for IR intervention for splenic laceration and hematoma.    Objective   OBJECTIVE:    Vital Sign Min/Max for last 24 hours  Temp  Min: 97.6 °F (36.4 °C)  Max: 98.8 °F (37.1 °C)   BP  Min: 105/75  Max: 123/45   Pulse  Min: 84  Max: 113   Resp  Min: 16  Max: 18   SpO2  Min: 92 %  Max: 100 %   No data recorded   Weight  Min: 61.7 kg (136 lb)  Max: 61.7 kg (136 lb)    Vitals:    24 0848 24 0850 24 0850 24 0851   BP:  110/82 110/82    BP Location:   Left arm    Patient Position:   Sitting    Pulse: 97  96 95   Resp:   16    Temp:       TempSrc:       SpO2: 97%  97% 97%   Weight:       Height:                24  0604 24  1343   Weight: 57.6 kg (126 lb 15.8 oz) 61.7 kg (136 lb)       Body mass index is 19.51 kg/m².                          Body mass index is 19.51 kg/m².    Intake/Output Summary (Last 24 hours) at 2024 1051  Last data filed at 2024 0345  Gross per 24 hour   Intake 600 ml   Output --   Net 600 ml         Exam:  GEN:  No distress, appears stated age  EYES:   PERRL, anicteric sclerae  ENT:    External ears/nose normal, OP clear  NECK:  No adenopathy, midline trachea  LUNGS: Normal chest on inspection, palpation and auscultation  CV:  Normal S1S2, without murmur  ABD:  Nontender, nondistended, no hepatosplenomegaly, +BS  EXT:  No edema.  No cyanosis or clubbing.  No mottling and normal cap refill.    Assessment     Scheduled meds:  ceFAZolin, 2 g, Intravenous, Once  famotidine, 20 mg, Intravenous, Once  [MAR Hold] senna-docusate  sodium, 2 tablet, Oral, BID  [MAR Hold] sodium chloride, 10 mL, Intravenous, Q12H  sodium chloride, 3 mL, Intravenous, Q12H  sodium chloride, 3 mL, Intravenous, Q12H      IV meds:                      dextrose 5 % and sodium chloride 0.45 %, 125 mL/hr, Last Rate: Stopped (01/22/24 0742)  lactated ringers, 9 mL/hr, Last Rate: 9 mL/hr (01/22/24 0845)  lactated ringers, 9 mL/hr      Data Review:  Results from last 7 days   Lab Units 01/22/24 0345 01/21/24  0756   SODIUM mmol/L 135* 137   POTASSIUM mmol/L 3.5 3.8   CHLORIDE mmol/L 101 96*   CO2 mmol/L 23.0 28.2   BUN mg/dL 3* 5*   CREATININE mg/dL 0.47* 0.60   GLUCOSE mg/dL 109* 104*   CALCIUM mg/dL 8.5* 8.8         Estimated Creatinine Clearance: 153.4 mL/min (A) (by C-G formula based on SCr of 0.47 mg/dL (L)).  Results from last 7 days   Lab Units 01/22/24  0345 01/21/24 2007 01/21/24  1357 01/21/24  0603   WBC 10*3/mm3 9.45  --  9.71 11.35*   HEMOGLOBIN g/dL 10.1* 7.2* 8.2* 10.0*   PLATELETS 10*3/mm3 331  --  415 442     Results from last 7 days   Lab Units 01/21/24  0837   INR  1.20     Results from last 7 days   Lab Units 01/22/24  0345 01/21/24  0756   ALT (SGPT) U/L 9 <5   AST (SGOT) U/L 7 5         Results from last 7 days   Lab Units 01/21/24  0756   LACTATE mmol/L 0.8         Glucose   Date/Time Value Ref Range Status   01/22/2024 0600 118 70 - 130 mg/dL Final   01/22/2024 0020 125 70 - 130 mg/dL Final   01/21/2024 2007 129 70 - 130 mg/dL Final   01/21/2024 1623 134 (H) 70 - 130 mg/dL Final         Imaging reviewed  Chest x-ray 1/21 reviewed shows small to moderate left effusion with atelectasis.          CT abdomen pelvis 1/21 reviewed          Microbiology reviewed  No growth to date          Active Hospital Problems    Diagnosis  POA    **Splenic laceration [S36.039A]  Yes      Resolved Hospital Problems   No resolved problems to display.         ASSESSMENT:  Splenic laceration with hematoma  Chronic Pancreatitis  Anemia  Left pleural  effusion      PLAN:  To IR for splenic laceration and hematoma treatment.  Monitor closely in intensive care.  Control glucose.  Pain control.  Repeat chest x-ray in a.m. for follow-up of effusion.  Mechanical DVT prophylaxis.  Discussed with family at bedside.    Discussed with multidisciplinary ICU team on rounds this morning.       CCT: 32 min      Tomasz Correa MD  Pulmonary and Critical Care Medicine  [facility], [facility]  1/22/2024    10:51 EST

## 2024-01-22 NOTE — ANESTHESIA PROCEDURE NOTES
Peripheral IV    Line placed for Fluids/Medication Admin and Difficult Access.  Performed By   Anesthesiologist: Elyse Ku MD  Preanesthetic Checklist  Completed: patient identified, IV checked, site marked, risks and benefits discussed, surgical consent, monitors and equipment checked, pre-op evaluation and timeout performed  Peripheral IV Prep   Patient position: supine   Prep: ChloraPrep  Patient monitoring: cardiac monitor, continuous pulse ox and heart rate  Peripheral IV Procedure   Laterality:right  Location:  Forearm  Catheter size: 18 G  Guidance: ultrasound guided          Post Assessment   Dressing Type: tape and transparent.    IV Dressing/Site: clean, dry and intact  Additional Notes  Ultrasound Interpretation:  Using ultrasound guidance the potential vascular sites for insertion of the catheter were visualized to determine the patency of the vessel to be used for vascular access.  After selecting the appropriate site for insertion, the needle was visualized under ultrasound being inserted into the vessel, followed by ultrasound confirmation of catheter placement.  There were no abnormalities seen on ultrasound; an image was taken and the patient tolerated the procedure with no complications.

## 2024-01-22 NOTE — ANESTHESIA PROCEDURE NOTES
Airway  Urgency: elective    Date/Time: 1/22/2024 11:15 AM  Airway not difficult    General Information and Staff    Patient location during procedure: OR  Anesthesiologist: Jony Garcia MD  CRNA/CAA: Krunal Khanna CRNA    Indications and Patient Condition  Indications for airway management: airway protection    Preoxygenated: yes  MILS maintained throughout  Mask difficulty assessment: 1 - vent by mask    Final Airway Details  Final airway type: endotracheal airway      Successful airway: ETT  Cuffed: yes   Successful intubation technique: lighted stylet  Endotracheal tube insertion site: oral  ETT size (mm): 7.0  Placement verified by: capnometry   Cuff volume (mL): 5  Measured from: lips  ETT/EBT  to lips (cm): 22  Number of attempts at approach: 1  Assessment: lips, teeth, and gum same as pre-op and atraumatic intubation

## 2024-01-22 NOTE — ANESTHESIA PREPROCEDURE EVALUATION
" Anesthesia Evaluation     Patient summary reviewed and Nursing notes reviewed   no history of anesthetic complications:   NPO Solid Status: > 8 hours  NPO Liquid Status: > 2 hours           Airway   Mallampati: I  TM distance: >3 FB  Neck ROM: full  No difficulty expected  Dental    (+) implants        Pulmonary - normal exam   Cardiovascular   Exercise tolerance: good (4-7 METS)    ECG reviewed  Rhythm: regular  Rate: abnormal    (+) hypertension, dysrhythmias Tachycardia, DVT (splenic)      Neuro/Psych  (+) headaches, psychiatric history Anxiety  GI/Hepatic/Renal/Endo    (+) hiatal hernia, GERD well controlled, renal disease-    Musculoskeletal     Abdominal    Substance History   (+) alcohol use     OB/GYN          Other   blood dyscrasia anemia,     ROS/Med Hx Other: EtOH abuse history, last drink 33 days ago  Acute on chronic pancreatitis    Ill appearing, spasms in flank causing extreme episodic pain    2units pRBC  yesterday    Splenic hematoma  From ICU    To preop with 2 infiltrated IVs                Anesthesia Plan    ASA 3     general     (A line for Hgb checks and HD monitoring, possible ACTs if needed    I have reviewed the patient's history and chart with the patient, including all pertinent laboratory results and imaging. I have explained the risks of anesthesia including but not limited to dental damage, corneal abrasion, nerve injury, MI, stroke, aspiration, and death. Patient has agreed to proceed.      /87 (BP Location: Left arm, Patient Position: Sitting)   Pulse 114   Temp 36.7 °C (98 °F) (Oral)   Resp 18   Ht 177.8 cm (70\")   Wt 61.7 kg (136 lb)   LMP 01/17/2018 (Exact Date)   SpO2 99%   BMI 19.51 kg/m²   )  intravenous induction     Anesthetic plan, risks, benefits, and alternatives have been provided, discussed and informed consent has been obtained with: patient.    CODE STATUS:    Code Status (Patient has no pulse and is not breathing): CPR (Attempt to Resuscitate)  Medical " Interventions (Patient has pulse or is breathing): Full Support

## 2024-01-22 NOTE — OP NOTE
Operative Note  Date of Admission:  1/21/2024  OR Date: 1/22/2024    Pre-op Diagnosis:   Laceration of the spleen with expanding subcapsular hematoma    Post-op Diagnosis:     Post-Op Diagnosis Codes:  Same    Procedure:   1) duplex ultrasound-guided percutaneous access of the right common femoral artery with abdominal aortogram selective angiogram of the splenic artery; Azur coil embolization of the splenic artery using 2 coils measuring 7 mm x 36 mm and 2 coils measuring 8 mm x 24 mm;  Open exploration of the right common femoral artery with right lower extremity angiogram and attempted snare of embolize coil with extraction using over-the-wire #3 Juanjose catheter    Surgeon: Carine Pena Jr, MD    Assistant:  Lorna Daiz MD and Katharina MINOR CSA and they provided critical assistance during the case including suctioning, exposure, retraction, and reduction of blood loss.    Anesthesia: General    Staff:   Circulator: Gage Hassan RN; Bushra Prado RN  Scrub Person: La Garcia; Elizabeth Wilder  Assistant: Katharina Davis CST  Vascular Radiology Technician: Tomasz Llanes    Estimated Blood Loss:  cc    Specimens:   * No orders in the log *    Complications: Embolization of a coil down into the distal popliteal artery/tibioperoneal trunk    Findings: Successful embolization of the splenic artery.  Successful extraction of the embolized coil    Indications:  As in preop diagnosis           Procedure: Patient was placed on the operative table supine position.  After satisfactory general endotracheal anesthesia was achieved the patient was prepped from the umbilicus to the knees using ChloraPrep and draped in usual sterile fashion.  Using duplex ultrasound the right common femoral artery was identified and the femoral artery accessed percutaneously under direct vision.  Guidewire was advanced followed by a 6 Syriac sheath.  Pigtail catheter was placed over the guidewire placed in the upper  abdominal aorta.  An aortogram was performed using 20 cc of contrast at 10 cc/s.  This demonstrated normal-appearing celiac trunk with common hepatic and splenic arteries.  The splenic artery was very tortuous.  The superior mesenteric artery was normal as were both renal arteries.  The image intensifier was then placed into the 76 degree Greek position and repeat aortogram was performed reconfirming the same.  First using a metarjame catheter followed by a SOS omni catheter  I was able to access at the splenic artery but due to tortuosity the guidewire kept kicking out.  Therefore a 7 Israeli  sheath was placed over the guidewire and placed into the celiac trunk.  Using a guidewire and a Rebolledo cross catheter I was able to advance a 0.014 guidewire well out into the splenic artery.  A ProGlide catheter was placed over the guidewire and first attempts were made to place a penumbra coil through the Pro-glide catheter but was unsuccessful.  Therefore the above-mentioned Joann coils were placed into the splenic artery, in its mid distal portion.  Hand-held injection demonstrated very minimal contrast getting beyond this point in this was felt to be a successful embolization.  The  sheath was pulled back into the abdominal aorta after it was straightened and a 0.035 guidewire was placed through this.  Plans were to use a closure device but it was noted that the initial penumbra coil was within the sheath and with placement of the guidewire this advanced out of the sheath into the external iliac artery.  The tumor graft was removed and a 7 Israeli sheath placed over the guidewire.  Plans were to use a snare to remove this but while getting the snare devices the arterial flow pushed the coil beyond the sheath and this went distally first into the common femoral artery superficial femoral artery then down into the distal popliteal artery.  This was confirmed by fluoroscopy.  Therefore, open exploration was  performed of the femoral artery without the tangential incision in the right groin.  The electrocautery was used to dissect through the subcutaneous tissue and femoral sheath.  The common femoral, superficial femoral, and profundofemoral arteries were all isolated and Vesseloops passed around the structures.  7500 units of heparin was then given intravenously.  An arteriotomy was made transversely and a 7 Grenadian sheath placed into the superficial femoral artery.  First a loop snare was placed down and multiple attempts were made to grasp the coil, unsuccessfully.  Therefore, an over-the-wire 3 Juanjose catheter was then passed beyond the coil and inflated and extracted under direct vision.  The coil was then passed off.  A right lower extremity angiogram was then performed using 20 cc of contrast.  This demonstrated the superficial femoral and popliteal arteries to be normal.  There was some mild spasm noted in the tibial vessels but patency into the lower leg and foot.  The sheath was removed and the femoral arteriotomy was closed with a running 5-0 Prolene suture.  Hemostasis was checked and noted to be satisfactory.  Strong pulses were noted in the common femoral, superficial femoral, and profundofemoral arteries.  Using the hand-held Doppler strong Doppler signals were noted in the posterior tibial and dorsalis pedis arteries.  30 mg protamine sulfate was then given intravenously.  Local anesthetic was infiltrated into the skin and subcutaneous tissue.  The femoral sheath and deep subcutaneous tissue were closed with running 2-0 Vicryl suture followed by a running 3-0 Vicryl suture in 2 layers and the superficial subcutaneous tissue and the skin closed with running 4-0 Vicryl in septically fashion.  Dermabond was placed over the incision.  Sponge, needle, and sponge counts were all reported as correct.  Patient was extubated transported to recovery room in satisfactory condition.        Radiographic Findings:  1)  as described above      Active Hospital Problems    Diagnosis  POA    **Splenic laceration [S36.039A]  Yes      Resolved Hospital Problems   No resolved problems to display.      Carine Pena Jr., MD     Date: 1/22/2024  Time: 14:16 EST

## 2024-01-23 LAB
ALBUMIN SERPL-MCNC: 2.4 G/DL (ref 3.5–5.2)
ALBUMIN/GLOB SERPL: 0.8 G/DL
ALP SERPL-CCNC: 73 U/L (ref 39–117)
ALT SERPL W P-5'-P-CCNC: 6 U/L (ref 1–33)
ANION GAP SERPL CALCULATED.3IONS-SCNC: 9.7 MMOL/L (ref 5–15)
AST SERPL-CCNC: 6 U/L (ref 1–32)
BASOPHILS # BLD AUTO: 0.02 10*3/MM3 (ref 0–0.2)
BASOPHILS NFR BLD AUTO: 0.2 % (ref 0–1.5)
BILIRUB SERPL-MCNC: 0.2 MG/DL (ref 0–1.2)
BUN SERPL-MCNC: 6 MG/DL (ref 6–20)
BUN/CREAT SERPL: 13.3 (ref 7–25)
CALCIUM SPEC-SCNC: 8.4 MG/DL (ref 8.6–10.5)
CHLORIDE SERPL-SCNC: 105 MMOL/L (ref 98–107)
CO2 SERPL-SCNC: 23.3 MMOL/L (ref 22–29)
CREAT SERPL-MCNC: 0.45 MG/DL (ref 0.57–1)
DEPRECATED RDW RBC AUTO: 47.7 FL (ref 37–54)
EGFRCR SERPLBLD CKD-EPI 2021: 124.1 ML/MIN/1.73
EOSINOPHIL # BLD AUTO: 0 10*3/MM3 (ref 0–0.4)
EOSINOPHIL NFR BLD AUTO: 0 % (ref 0.3–6.2)
ERYTHROCYTE [DISTWIDTH] IN BLOOD BY AUTOMATED COUNT: 13.7 % (ref 12.3–15.4)
GLOBULIN UR ELPH-MCNC: 3.2 GM/DL
GLUCOSE BLDC GLUCOMTR-MCNC: 111 MG/DL (ref 70–130)
GLUCOSE BLDC GLUCOMTR-MCNC: 113 MG/DL (ref 70–130)
GLUCOSE BLDC GLUCOMTR-MCNC: 135 MG/DL (ref 70–130)
GLUCOSE BLDC GLUCOMTR-MCNC: 141 MG/DL (ref 70–130)
GLUCOSE BLDC GLUCOMTR-MCNC: 191 MG/DL (ref 70–130)
GLUCOSE SERPL-MCNC: 179 MG/DL (ref 65–99)
HCT VFR BLD AUTO: 28.9 % (ref 34–46.6)
HCT VFR BLD AUTO: 29.5 % (ref 34–46.6)
HCT VFR BLD AUTO: 29.9 % (ref 34–46.6)
HGB BLD-MCNC: 10 G/DL (ref 12–15.9)
HGB BLD-MCNC: 10.1 G/DL (ref 12–15.9)
HGB BLD-MCNC: 10.1 G/DL (ref 12–15.9)
IMM GRANULOCYTES # BLD AUTO: 0.06 10*3/MM3 (ref 0–0.05)
IMM GRANULOCYTES NFR BLD AUTO: 0.5 % (ref 0–0.5)
LYMPHOCYTES # BLD AUTO: 1.08 10*3/MM3 (ref 0.7–3.1)
LYMPHOCYTES NFR BLD AUTO: 8.5 % (ref 19.6–45.3)
MCH RBC QN AUTO: 32.8 PG (ref 26.6–33)
MCHC RBC AUTO-ENTMCNC: 34.2 G/DL (ref 31.5–35.7)
MCV RBC AUTO: 95.8 FL (ref 79–97)
MONOCYTES # BLD AUTO: 0.95 10*3/MM3 (ref 0.1–0.9)
MONOCYTES NFR BLD AUTO: 7.5 % (ref 5–12)
NEUTROPHILS NFR BLD AUTO: 10.6 10*3/MM3 (ref 1.7–7)
NEUTROPHILS NFR BLD AUTO: 83.3 % (ref 42.7–76)
NRBC BLD AUTO-RTO: 0 /100 WBC (ref 0–0.2)
PLATELET # BLD AUTO: 379 10*3/MM3 (ref 140–450)
PMV BLD AUTO: 9 FL (ref 6–12)
POTASSIUM SERPL-SCNC: 3.8 MMOL/L (ref 3.5–5.2)
PROT SERPL-MCNC: 5.6 G/DL (ref 6–8.5)
RBC # BLD AUTO: 3.08 10*6/MM3 (ref 3.77–5.28)
SODIUM SERPL-SCNC: 138 MMOL/L (ref 136–145)
WBC NRBC COR # BLD AUTO: 12.71 10*3/MM3 (ref 3.4–10.8)

## 2024-01-23 PROCEDURE — 80053 COMPREHEN METABOLIC PANEL: CPT | Performed by: SURGERY

## 2024-01-23 PROCEDURE — 25010000002 HYDROMORPHONE 1 MG/ML SOLUTION: Performed by: SURGERY

## 2024-01-23 PROCEDURE — 85018 HEMOGLOBIN: CPT | Performed by: INTERNAL MEDICINE

## 2024-01-23 PROCEDURE — 85025 COMPLETE CBC W/AUTO DIFF WBC: CPT | Performed by: SURGERY

## 2024-01-23 PROCEDURE — 99232 SBSQ HOSP IP/OBS MODERATE 35: CPT

## 2024-01-23 PROCEDURE — 82948 REAGENT STRIP/BLOOD GLUCOSE: CPT

## 2024-01-23 PROCEDURE — 85014 HEMATOCRIT: CPT | Performed by: INTERNAL MEDICINE

## 2024-01-23 PROCEDURE — 25010000002 ONDANSETRON PER 1 MG: Performed by: SURGERY

## 2024-01-23 RX ORDER — INSULIN LISPRO 100 [IU]/ML
2-9 INJECTION, SOLUTION INTRAVENOUS; SUBCUTANEOUS
Status: DISCONTINUED | OUTPATIENT
Start: 2024-01-23 | End: 2024-01-25 | Stop reason: HOSPADM

## 2024-01-23 RX ORDER — NICOTINE POLACRILEX 4 MG
15 LOZENGE BUCCAL
Status: DISCONTINUED | OUTPATIENT
Start: 2024-01-23 | End: 2024-01-25 | Stop reason: HOSPADM

## 2024-01-23 RX ORDER — IBUPROFEN 600 MG/1
1 TABLET ORAL
Status: DISCONTINUED | OUTPATIENT
Start: 2024-01-23 | End: 2024-01-25 | Stop reason: HOSPADM

## 2024-01-23 RX ORDER — DEXTROSE MONOHYDRATE 25 G/50ML
25 INJECTION, SOLUTION INTRAVENOUS
Status: DISCONTINUED | OUTPATIENT
Start: 2024-01-23 | End: 2024-01-25 | Stop reason: HOSPADM

## 2024-01-23 RX ADMIN — HYDROMORPHONE HYDROCHLORIDE 1 MG: 1 INJECTION, SOLUTION INTRAMUSCULAR; INTRAVENOUS; SUBCUTANEOUS at 06:37

## 2024-01-23 RX ADMIN — HYDROMORPHONE HYDROCHLORIDE 1 MG: 1 INJECTION, SOLUTION INTRAMUSCULAR; INTRAVENOUS; SUBCUTANEOUS at 04:26

## 2024-01-23 RX ADMIN — OXYCODONE AND ACETAMINOPHEN 1 TABLET: 5; 325 TABLET ORAL at 11:21

## 2024-01-23 RX ADMIN — HYDROMORPHONE HYDROCHLORIDE 1 MG: 1 INJECTION, SOLUTION INTRAMUSCULAR; INTRAVENOUS; SUBCUTANEOUS at 09:14

## 2024-01-23 RX ADMIN — DEXTROSE AND SODIUM CHLORIDE 125 ML/HR: 5; 450 INJECTION, SOLUTION INTRAVENOUS at 01:55

## 2024-01-23 RX ADMIN — HYDROMORPHONE HYDROCHLORIDE 1 MG: 1 INJECTION, SOLUTION INTRAMUSCULAR; INTRAVENOUS; SUBCUTANEOUS at 21:04

## 2024-01-23 RX ADMIN — HYDROMORPHONE HYDROCHLORIDE 1 MG: 1 INJECTION, SOLUTION INTRAMUSCULAR; INTRAVENOUS; SUBCUTANEOUS at 12:29

## 2024-01-23 RX ADMIN — SENNOSIDES AND DOCUSATE SODIUM 2 TABLET: 50; 8.6 TABLET ORAL at 20:48

## 2024-01-23 RX ADMIN — HYDROMORPHONE HYDROCHLORIDE 1 MG: 1 INJECTION, SOLUTION INTRAMUSCULAR; INTRAVENOUS; SUBCUTANEOUS at 02:02

## 2024-01-23 RX ADMIN — OXYCODONE AND ACETAMINOPHEN 1 TABLET: 5; 325 TABLET ORAL at 07:20

## 2024-01-23 RX ADMIN — HYDROMORPHONE HYDROCHLORIDE 1 MG: 1 INJECTION, SOLUTION INTRAMUSCULAR; INTRAVENOUS; SUBCUTANEOUS at 14:52

## 2024-01-23 RX ADMIN — HYDROMORPHONE HYDROCHLORIDE 1 MG: 1 INJECTION, SOLUTION INTRAMUSCULAR; INTRAVENOUS; SUBCUTANEOUS at 23:07

## 2024-01-23 RX ADMIN — ONDANSETRON HYDROCHLORIDE 4 MG: 2 INJECTION, SOLUTION INTRAMUSCULAR; INTRAVENOUS at 20:48

## 2024-01-23 RX ADMIN — OXYCODONE AND ACETAMINOPHEN 1 TABLET: 5; 325 TABLET ORAL at 15:48

## 2024-01-23 RX ADMIN — Medication 10 ML: at 09:16

## 2024-01-23 RX ADMIN — HYDROMORPHONE HYDROCHLORIDE 1 MG: 1 INJECTION, SOLUTION INTRAMUSCULAR; INTRAVENOUS; SUBCUTANEOUS at 19:02

## 2024-01-23 RX ADMIN — OXYCODONE AND ACETAMINOPHEN 1 TABLET: 5; 325 TABLET ORAL at 22:44

## 2024-01-23 RX ADMIN — Medication 10 ML: at 20:48

## 2024-01-23 RX ADMIN — HYDROMORPHONE HYDROCHLORIDE 1 MG: 1 INJECTION, SOLUTION INTRAMUSCULAR; INTRAVENOUS; SUBCUTANEOUS at 16:59

## 2024-01-23 RX ADMIN — SENNOSIDES AND DOCUSATE SODIUM 2 TABLET: 50; 8.6 TABLET ORAL at 08:03

## 2024-01-23 NOTE — PROGRESS NOTES
[facility] INPATIENT PROGRESS NOTE         [facility] INTENSIVE CARE    2024      PATIENT IDENTIFICATION:  Name: Piper Cain ADMIT: 2024   : 1980  PCP: Provider, No Known    MRN: [id number] LOS: 2 days   AGE/SEX: 43 y.o. female  ROOM: [facility]                     LOS 2    Reason for visit: ICU medical management, splenic laceration with flank pain      SUBJECTIVE:      Doing well.  Pain fairly well-controlled.  No new issues overnight.  Out of ICU if okay with surgery.    Objective   OBJECTIVE:    Vital Sign Min/Max for last 24 hours  Temp  Min: 97.4 °F (36.3 °C)  Max: 98.8 °F (37.1 °C)   BP  Min: 97/72  Max: 128/88   Pulse  Min: 68  Max: 95   Resp  Min: 16  Max: 16   SpO2  Min: 82 %  Max: 100 %   No data recorded   No data recorded    Vitals:    24 0800 24 0900 24 1000 24 1120   BP: 107/76 106/78 111/81    Pulse: 85 81 80    Resp:       Temp:    97.8 °F (36.6 °C)   TempSrc:    Oral   SpO2: 96% (!) 89% 100%    Weight:       Height:                24  0604 24  1343   Weight: 57.6 kg (126 lb 15.8 oz) 61.7 kg (136 lb)       Body mass index is 19.51 kg/m².                          Body mass index is 19.51 kg/m².    Intake/Output Summary (Last 24 hours) at 2024 1127  Last data filed at 2024 0541  Gross per 24 hour   Intake 1608 ml   Output 3100 ml   Net -1492 ml         Exam:  GEN:  No distress, appears stated age  EYES:   PERRL, anicteric sclerae  ENT:    External ears/nose normal, OP clear  NECK:  No adenopathy, midline trachea  LUNGS: Normal chest on inspection, palpation and auscultation  CV:  Normal S1S2, without murmur  ABD:  Nontender, nondistended, no hepatosplenomegaly, +BS  EXT:  No edema.  No cyanosis or clubbing.  No mottling and normal cap refill.    Assessment     Scheduled meds:  senna-docusate sodium, 2 tablet, Oral, BID  sodium chloride, 10 mL, Intravenous, Q12H      IV meds:                      lactated ringers, 9 mL/hr, Last  Rate: 9 mL/hr (01/22/24 1126)  lactated ringers, 9 mL/hr      Data Review:  Results from last 7 days   Lab Units 01/23/24  0403 01/22/24  0345 01/21/24  0756   SODIUM mmol/L 138 135* 137   POTASSIUM mmol/L 3.8 3.5 3.8   CHLORIDE mmol/L 105 101 96*   CO2 mmol/L 23.3 23.0 28.2   BUN mg/dL 6 3* 5*   CREATININE mg/dL 0.45* 0.47* 0.60   GLUCOSE mg/dL 179* 109* 104*   CALCIUM mg/dL 8.4* 8.5* 8.8         Estimated Creatinine Clearance: 160.2 mL/min (A) (by C-G formula based on SCr of 0.45 mg/dL (L)).  Results from last 7 days   Lab Units 01/23/24  0403 01/22/24  2358 01/22/24  1948 01/22/24 0345 01/21/24 2007 01/21/24  1357 01/21/24  0603   WBC 10*3/mm3 12.71*  --   --  9.45  --  9.71 11.35*   HEMOGLOBIN g/dL 10.1* 10.0* 9.9* 10.1* 7.2* 8.2* 10.0*   PLATELETS 10*3/mm3 379  --   --  331  --  415 442     Results from last 7 days   Lab Units 01/21/24  0837   INR  1.20     Results from last 7 days   Lab Units 01/23/24  0403 01/22/24  0345 01/21/24  0756   ALT (SGPT) U/L 6 9 <5   AST (SGOT) U/L 6 7 5         Results from last 7 days   Lab Units 01/21/24  0756   LACTATE mmol/L 0.8         Glucose   Date/Time Value Ref Range Status   01/23/2024 1055 135 (H) 70 - 130 mg/dL Final   01/23/2024 0607 191 (H) 70 - 130 mg/dL Final   01/22/2024 2336 171 (H) 70 - 130 mg/dL Final   01/22/2024 0600 118 70 - 130 mg/dL Final   01/22/2024 0020 125 70 - 130 mg/dL Final   01/21/2024 2007 129 70 - 130 mg/dL Final   01/21/2024 1623 134 (H) 70 - 130 mg/dL Final         Imaging reviewed  Chest x-ray 1/21 reviewed shows small to moderate left effusion with atelectasis.          CT abdomen pelvis 1/21 reviewed          Microbiology reviewed  No growth to date          Active Hospital Problems    Diagnosis  POA    **Splenic laceration [S36.039A]  Yes      Resolved Hospital Problems   No resolved problems to display.         ASSESSMENT:  Splenic laceration with hematoma: Status post successful IR intervention  Chronic Pancreatitis  Anemia  Elevated  left hemidiaphragm secondary to above      PLAN:  No plan for further surgical intervention.  Control glucose.  Pain control.  Out of ICU when okay with surgery.  Mechanical DVT prophylaxis.      Discussed with multidisciplinary ICU team on rounds this morning.             Tomasz Correa MD  Pulmonary and Critical Care Medicine  [facility], [facility]  1/23/2024    11:27 EST

## 2024-01-23 NOTE — ANESTHESIA POSTPROCEDURE EVALUATION
"Patient: Piper ROLLE Payton    Procedure Summary       Date: 01/22/24 Room / Location:  [facility]    Anesthesia Start: 1105 Anesthesia Stop: 1451    Procedure: OR EMBOLIZATION MESENTERIC ARTERY RIGHT FEMORAL POPLITEAL THROMBECTOMY Diagnosis:     Surgeons: Carine Pena Jr., MD Provider: Jony Garcia MD    Anesthesia Type: general ASA Status: 3            Anesthesia Type: general    Vitals  Vitals Value Taken Time   /88 01/22/24 1530   Temp 36.4 °C (97.5 °F) 01/22/24 1530   Pulse 83 01/22/24 1536   Resp 16 01/22/24 1530   SpO2 96 % 01/22/24 1536   Vitals shown include unfiled device data.        Post Anesthesia Care and Evaluation    Level of consciousness: awake and alert  Pain management: adequate    Airway patency: patent  Anesthetic complications: No anesthetic complications  PONV Status: none  Cardiovascular status: acceptable  Respiratory status: acceptable  Hydration status: acceptable    Comments: /69   Pulse 83   Temp 37.1 °C (98.8 °F) (Oral)   Resp 16   Ht 177.8 cm (70\")   Wt 61.7 kg (136 lb)   LMP 01/17/2018 (Exact Date)   SpO2 94%   BMI 19.51 kg/m²           "

## 2024-01-23 NOTE — PLAN OF CARE
Goal Outcome Evaluation:            A/Ox4, VSS, Hgb trending up, minimal complaints of pain (see MAR/pain scores), adequate UOP, see AM labs

## 2024-01-23 NOTE — PROGRESS NOTES
Acute Care General Surgery Progress Note    Patient: Piper Cain  YOB: 1980  MRN: [id number]      Assessment/Plan:   Piper Cain is a 43 y.o. female with chronic pancreatitis and a large subcapsular splenic hematoma, s/p embolization of the splenic artery. Patient doing well this morning, pain is controlled. Tolerating sips, advancing diet to clears. Continue to monitor Hgb. No plans for surgical intervention at this time, will continue to follow closely.       Subjective  Patient states she is feeling much better today, pain is minimal. Denies N/V/D. Minimal pain from R. Groin incision site.     Objective    Vitals:    01/23/24 1000   BP: 111/81   Pulse: 80   Resp:    Temp:    SpO2: 100%       Intake/Output Summary (Last 24 hours) at 1/23/2024 1119  Last data filed at 1/23/2024 0541  Gross per 24 hour   Intake 2208 ml   Output 3100 ml   Net -892 ml         Physical Exam  Constitutional: alert, oriented, no acute distress  Respiratory: Normal work of breathing, Symmetric excursion  Cardiovascular: Well pefursed, RRR, no jugular venous distention evident   Abdominal: soft, non-distended, non-tender  Skin: surgical site R. Groin - no erythema or swelling noted, C/D/I      Laboratory Results  Results from last 7 days   Lab Units 01/23/24  0403 01/22/24  1948 01/22/24  0345 01/21/24 2007 01/21/24  1357 01/21/24  0603   WBC 10*3/mm3 12.71*  --  9.45  --  9.71 11.35*   HEMOGLOBIN g/dL 10.1*   < > 10.1*   < > 8.2* 10.0*   HEMATOCRIT % 29.5*   < > 29.6*   < > 24.9* 30.7*   PLATELETS 10*3/mm3 379  --  331  --  415 442    < > = values in this interval not displayed.     Results from last 7 days   Lab Units 01/23/24  0403 01/22/24  0345 01/21/24  0756   SODIUM mmol/L 138 135* 137   POTASSIUM mmol/L 3.8 3.5 3.8   CHLORIDE mmol/L 105 101 96*   CO2 mmol/L 23.3 23.0 28.2   BUN mg/dL 6 3* 5*   CREATININE mg/dL 0.45* 0.47* 0.60   CALCIUM mg/dL 8.4* 8.5* 8.8   BILIRUBIN mg/dL 0.2 0.3 0.3   ALK PHOS U/L 73 70 91    ALT (SGPT) U/L 6 9 <5   AST (SGOT) U/L 6 7 5   GLUCOSE mg/dL 179* 109* 104*         SOL Murdock  Acute Care General Surgery  [facility]    [address], [address]  [place], [place], [address]  P: [phone]  F: [phone]

## 2024-01-23 NOTE — PROGRESS NOTES
The patient is a 43-year-old lady with chronic pancreatitis who presented with splenic laceration and subcapsular hematoma, which was likely expanding.  Pain associated with this.  She underwent coil embolization of the splenic artery, and right femoral artery exploration with retrieval of a dislodged coil, one day ago.  She states that her discomfort has improved, only a minimal amount of discomfort in the midepigastric/left upper quadrant.  She is tolerating liquids.  She states that she is overall improved.    Electrolytes look good, glucose mildly elevated.  Hemoglobin is stable at 10.1.    Abdomen is soft, minimal tenderness, with mild discomfort to percussion in the left flank, upper quadrant.  Nondistended. Bowel sounds are present.  Right groin incision healing very nicely with no hematoma.  Palpable dorsalis pedis pulses noted bilaterally.    Patient doing well following her procedure.  Nothing further needed from a vascular standpoint.  It looks like that she probably will not need splenectomy at this point.  Will defer that to general surgery.  Could likely advance diet but will defer to general surgery as well.  We will see her again upon request.  Will see in the office for follow-up in 3 weeks.

## 2024-01-23 NOTE — CASE MANAGEMENT/SOCIAL WORK
Discharge Planning Assessment  [facility]     Patient Name: Piper Cain  MRN: [id number]  Today's Date: 1/23/2024    Admit Date: 1/21/2024    Plan: Home  Pt denies needs   Discharge Needs Assessment       Row Name 01/23/24 1438       Living Environment    People in Home significant other    Current Living Arrangements apartment    Potentially Unsafe Housing Conditions none    In the past 12 months has the electric, gas, oil, or water company threatened to shut off services in your home? No    Primary Care Provided by self    Provides Primary Care For no one    Family Caregiver if Needed significant other    Quality of Family Relationships supportive    Able to Return to Prior Arrangements yes       Resource/Environmental Concerns    Resource/Environmental Concerns none    Transportation Concerns none       Transportation Needs    In the past 12 months, has lack of transportation kept you from medical appointments or from getting medications? no    In the past 12 months, has lack of transportation kept you from meetings, work, or from getting things needed for daily living? No       Food Insecurity    Within the past 12 months, you worried that your food would run out before you got the money to buy more. Never true    Within the past 12 months, the food you bought just didn't last and you didn't have money to get more. Never true       Transition Planning    Patient/Family Anticipates Transition to home    Patient/Family Anticipated Services at Transition none    Transportation Anticipated family or friend will provide       Discharge Needs Assessment    Equipment Currently Used at Home none    Concerns to be Addressed discharge planning    Anticipated Changes Related to Illness none    Equipment Needed After Discharge none                   Discharge Plan       Row Name 01/23/24 7973       Plan    Plan Home  Pt denies needs    Patient/Family in Agreement with Plan yes    Plan Comments CCP spoke to patient at  bedside.  CCP role explained.  Face sheet verified  Discharge plan discussed.  Pt lives in an apartment with her LALA Franco.  Pt uses no DME to ambulate.  SHe is independent with ADL's.  She has no history of HH or rehab  Plan is home with no needs.  [employer] resourse booklet given to patient.  CCP following                  Continued Care and Services - Admitted Since 1/21/2024    Coordination has not been started for this encounter.          Demographic Summary    No documentation.                  Functional Status    No documentation.                  Psychosocial    No documentation.                  Abuse/Neglect    No documentation.                  Legal    No documentation.                  Substance Abuse    No documentation.                  Patient Forms    No documentation.                     Rena Campuzano RN

## 2024-01-24 PROBLEM — F11.90 CHRONIC, CONTINUOUS USE OF OPIOIDS: Status: ACTIVE | Noted: 2024-01-24

## 2024-01-24 LAB
ALBUMIN SERPL-MCNC: 2.2 G/DL (ref 3.5–5.2)
ALBUMIN/GLOB SERPL: 0.7 G/DL
ALP SERPL-CCNC: 65 U/L (ref 39–117)
ALT SERPL W P-5'-P-CCNC: 6 U/L (ref 1–33)
ANION GAP SERPL CALCULATED.3IONS-SCNC: 10.4 MMOL/L (ref 5–15)
AST SERPL-CCNC: 7 U/L (ref 1–32)
BASOPHILS # BLD AUTO: 0.03 10*3/MM3 (ref 0–0.2)
BASOPHILS NFR BLD AUTO: 0.3 % (ref 0–1.5)
BILIRUB SERPL-MCNC: 0.2 MG/DL (ref 0–1.2)
BUN SERPL-MCNC: 3 MG/DL (ref 6–20)
BUN/CREAT SERPL: 7.5 (ref 7–25)
CALCIUM SPEC-SCNC: 8.2 MG/DL (ref 8.6–10.5)
CHLORIDE SERPL-SCNC: 108 MMOL/L (ref 98–107)
CO2 SERPL-SCNC: 21.6 MMOL/L (ref 22–29)
CREAT SERPL-MCNC: 0.4 MG/DL (ref 0.57–1)
DEPRECATED RDW RBC AUTO: 46.9 FL (ref 37–54)
EGFRCR SERPLBLD CKD-EPI 2021: 127.7 ML/MIN/1.73
EOSINOPHIL # BLD AUTO: 0.1 10*3/MM3 (ref 0–0.4)
EOSINOPHIL NFR BLD AUTO: 0.9 % (ref 0.3–6.2)
ERYTHROCYTE [DISTWIDTH] IN BLOOD BY AUTOMATED COUNT: 13.7 % (ref 12.3–15.4)
GLOBULIN UR ELPH-MCNC: 3.3 GM/DL
GLUCOSE BLDC GLUCOMTR-MCNC: 110 MG/DL (ref 70–130)
GLUCOSE BLDC GLUCOMTR-MCNC: 90 MG/DL (ref 70–130)
GLUCOSE BLDC GLUCOMTR-MCNC: 98 MG/DL (ref 70–130)
GLUCOSE SERPL-MCNC: 84 MG/DL (ref 65–99)
HCT VFR BLD AUTO: 26.2 % (ref 34–46.6)
HCT VFR BLD AUTO: 29.5 % (ref 34–46.6)
HGB BLD-MCNC: 9 G/DL (ref 12–15.9)
HGB BLD-MCNC: 9.7 G/DL (ref 12–15.9)
IMM GRANULOCYTES # BLD AUTO: 0.05 10*3/MM3 (ref 0–0.05)
IMM GRANULOCYTES NFR BLD AUTO: 0.4 % (ref 0–0.5)
LYMPHOCYTES # BLD AUTO: 2.84 10*3/MM3 (ref 0.7–3.1)
LYMPHOCYTES NFR BLD AUTO: 24.3 % (ref 19.6–45.3)
MCH RBC QN AUTO: 32.8 PG (ref 26.6–33)
MCHC RBC AUTO-ENTMCNC: 34.4 G/DL (ref 31.5–35.7)
MCV RBC AUTO: 95.6 FL (ref 79–97)
MONOCYTES # BLD AUTO: 0.88 10*3/MM3 (ref 0.1–0.9)
MONOCYTES NFR BLD AUTO: 7.5 % (ref 5–12)
NEUTROPHILS NFR BLD AUTO: 66.6 % (ref 42.7–76)
NEUTROPHILS NFR BLD AUTO: 7.81 10*3/MM3 (ref 1.7–7)
NRBC BLD AUTO-RTO: 0 /100 WBC (ref 0–0.2)
PLATELET # BLD AUTO: 333 10*3/MM3 (ref 140–450)
PMV BLD AUTO: 8.9 FL (ref 6–12)
POTASSIUM SERPL-SCNC: 3.7 MMOL/L (ref 3.5–5.2)
PROT SERPL-MCNC: 5.5 G/DL (ref 6–8.5)
RBC # BLD AUTO: 2.74 10*6/MM3 (ref 3.77–5.28)
SODIUM SERPL-SCNC: 140 MMOL/L (ref 136–145)
WBC NRBC COR # BLD AUTO: 11.71 10*3/MM3 (ref 3.4–10.8)

## 2024-01-24 PROCEDURE — 99231 SBSQ HOSP IP/OBS SF/LOW 25: CPT

## 2024-01-24 PROCEDURE — 25010000002 HYDROMORPHONE 1 MG/ML SOLUTION: Performed by: SURGERY

## 2024-01-24 PROCEDURE — 25010000002 HYDROMORPHONE 1 MG/ML SOLUTION

## 2024-01-24 PROCEDURE — 25010000002 ONDANSETRON PER 1 MG: Performed by: SURGERY

## 2024-01-24 PROCEDURE — 85018 HEMOGLOBIN: CPT | Performed by: STUDENT IN AN ORGANIZED HEALTH CARE EDUCATION/TRAINING PROGRAM

## 2024-01-24 PROCEDURE — 85025 COMPLETE CBC W/AUTO DIFF WBC: CPT | Performed by: SURGERY

## 2024-01-24 PROCEDURE — 80053 COMPREHEN METABOLIC PANEL: CPT | Performed by: SURGERY

## 2024-01-24 PROCEDURE — 82948 REAGENT STRIP/BLOOD GLUCOSE: CPT

## 2024-01-24 PROCEDURE — 85014 HEMATOCRIT: CPT | Performed by: STUDENT IN AN ORGANIZED HEALTH CARE EDUCATION/TRAINING PROGRAM

## 2024-01-24 RX ORDER — MAGNESIUM OXIDE 400 MG/1
400 TABLET ORAL DAILY
Status: DISCONTINUED | OUTPATIENT
Start: 2024-01-24 | End: 2024-01-25 | Stop reason: HOSPADM

## 2024-01-24 RX ORDER — OXYCODONE AND ACETAMINOPHEN 10; 325 MG/1; MG/1
1 TABLET ORAL EVERY 6 HOURS PRN
Status: DISCONTINUED | OUTPATIENT
Start: 2024-01-24 | End: 2024-01-25 | Stop reason: HOSPADM

## 2024-01-24 RX ORDER — AMITRIPTYLINE HYDROCHLORIDE 50 MG/1
50 TABLET, FILM COATED ORAL NIGHTLY
Status: DISCONTINUED | OUTPATIENT
Start: 2024-01-24 | End: 2024-01-25 | Stop reason: HOSPADM

## 2024-01-24 RX ORDER — FOLIC ACID 1 MG/1
1000 TABLET ORAL DAILY
Status: DISCONTINUED | OUTPATIENT
Start: 2024-01-24 | End: 2024-01-25 | Stop reason: HOSPADM

## 2024-01-24 RX ORDER — PANTOPRAZOLE SODIUM 40 MG/1
40 TABLET, DELAYED RELEASE ORAL DAILY
Status: DISCONTINUED | OUTPATIENT
Start: 2024-01-24 | End: 2024-01-25 | Stop reason: HOSPADM

## 2024-01-24 RX ADMIN — FOLIC ACID 1000 MCG: 1 TABLET ORAL at 16:48

## 2024-01-24 RX ADMIN — HYDROMORPHONE HYDROCHLORIDE 1 MG: 1 INJECTION, SOLUTION INTRAMUSCULAR; INTRAVENOUS; SUBCUTANEOUS at 13:06

## 2024-01-24 RX ADMIN — HYDROMORPHONE HYDROCHLORIDE 1 MG: 1 INJECTION, SOLUTION INTRAMUSCULAR; INTRAVENOUS; SUBCUTANEOUS at 16:14

## 2024-01-24 RX ADMIN — HYDROMORPHONE HYDROCHLORIDE 1 MG: 1 INJECTION, SOLUTION INTRAMUSCULAR; INTRAVENOUS; SUBCUTANEOUS at 10:08

## 2024-01-24 RX ADMIN — HYDROMORPHONE HYDROCHLORIDE 1 MG: 1 INJECTION, SOLUTION INTRAMUSCULAR; INTRAVENOUS; SUBCUTANEOUS at 07:45

## 2024-01-24 RX ADMIN — HYDROMORPHONE HYDROCHLORIDE 1 MG: 1 INJECTION, SOLUTION INTRAMUSCULAR; INTRAVENOUS; SUBCUTANEOUS at 04:57

## 2024-01-24 RX ADMIN — Medication 100 MG: at 16:48

## 2024-01-24 RX ADMIN — PANTOPRAZOLE SODIUM 40 MG: 40 TABLET, DELAYED RELEASE ORAL at 16:48

## 2024-01-24 RX ADMIN — HYDROMORPHONE HYDROCHLORIDE 1 MG: 1 INJECTION, SOLUTION INTRAMUSCULAR; INTRAVENOUS; SUBCUTANEOUS at 21:05

## 2024-01-24 RX ADMIN — Medication 10 ML: at 10:08

## 2024-01-24 RX ADMIN — MAGNESIUM OXIDE 400 MG (241.3 MG MAGNESIUM) TABLET 400 MG: TABLET at 16:48

## 2024-01-24 RX ADMIN — AMITRIPTYLINE HYDROCHLORIDE 50 MG: 50 TABLET, FILM COATED ORAL at 21:05

## 2024-01-24 RX ADMIN — SENNOSIDES AND DOCUSATE SODIUM 2 TABLET: 50; 8.6 TABLET ORAL at 08:20

## 2024-01-24 RX ADMIN — Medication 10 ML: at 21:06

## 2024-01-24 RX ADMIN — ONDANSETRON HYDROCHLORIDE 4 MG: 2 INJECTION, SOLUTION INTRAMUSCULAR; INTRAVENOUS at 16:48

## 2024-01-24 RX ADMIN — HYDROMORPHONE HYDROCHLORIDE 1 MG: 1 INJECTION, SOLUTION INTRAMUSCULAR; INTRAVENOUS; SUBCUTANEOUS at 02:17

## 2024-01-24 NOTE — CONSULTS
Name: Piper Cain ADMIT: 2024   : 1980  PCP: Provider, No Known    MRN: [id number] LOS: 3 days   AGE/SEX: 43 y.o. female  ROOM: [facility]     Subjective   Subjective   Resting in bed.  No family at bedside.  She moved out of the ICU today.  States she continues to have left mid/upper quadrant pain that is chronic in nature for her.  She is followed by pain management and typically on Percocet 10 mg tablets.  She states the pain in her back associated with her splenic hematoma/laceration has much improved.  She denies any chest pain or trouble breathing.  She denies any nausea, vomiting or diarrhea.  Had a normal bowel movement earlier.  She states that she is going to eat regular food for dinner and breakfast.     Objective   Objective   Vital Signs  Temp:  [97.4 °F (36.3 °C)-97.9 °F (36.6 °C)] 97.5 °F (36.4 °C)  Heart Rate:  [64-97] 88  Resp:  [16-18] 18  BP: ()/(59-89) 112/89  SpO2:  [83 %-98 %] 93 %  on   ;   Device (Oxygen Therapy): room air  Body mass index is 19.53 kg/m².    Physical Exam  Vitals and nursing note reviewed.   Constitutional:       Appearance: She is ill-appearing. She is not toxic-appearing.   HENT:      Head: Normocephalic and atraumatic.   Cardiovascular:      Rate and Rhythm: Normal rate and regular rhythm.      Pulses: Normal pulses.   Pulmonary:      Effort: Pulmonary effort is normal. No respiratory distress.      Breath sounds: Normal breath sounds.   Abdominal:      General: Bowel sounds are normal. There is no distension.      Palpations: Abdomen is soft.      Tenderness: There is abdominal tenderness.   Musculoskeletal:         General: No swelling. Normal range of motion.   Skin:     General: Skin is warm and dry.      Findings: No bruising.   Neurological:      Mental Status: She is alert and oriented to person, place, and time.      Sensory: No sensory deficit.      Coordination: Coordination normal.   Psychiatric:         Mood and Affect: Mood normal.          Behavior: Behavior normal.       Results Review:       I reviewed the patient's new clinical results.  Results from last 7 days   Lab Units 01/24/24 0454 01/23/24 2126 01/23/24 0403 01/22/24 2358 01/22/24 1948 01/22/24 0345 01/21/24 2007 01/21/24  1357   WBC 10*3/mm3 11.71*  --  12.71*  --   --  9.45  --  9.71   HEMOGLOBIN g/dL 9.0* 10.1* 10.1* 10.0*   < > 10.1*   < > 8.2*   PLATELETS 10*3/mm3 333  --  379  --   --  331  --  415    < > = values in this interval not displayed.     Results from last 7 days   Lab Units 01/24/24 0454 01/23/24 0403 01/22/24 0345 01/21/24  0756   SODIUM mmol/L 140 138 135* 137   POTASSIUM mmol/L 3.7 3.8 3.5 3.8   CHLORIDE mmol/L 108* 105 101 96*   CO2 mmol/L 21.6* 23.3 23.0 28.2   BUN mg/dL 3* 6 3* 5*   CREATININE mg/dL 0.40* 0.45* 0.47* 0.60   GLUCOSE mg/dL 84 179* 109* 104*   Estimated Creatinine Clearance: 185.5 mL/min (A) (by C-G formula based on SCr of 0.4 mg/dL (L)).  Results from last 7 days   Lab Units 01/24/24 0454 01/23/24 0403 01/22/24 0345 01/21/24  0756   ALBUMIN g/dL 2.2* 2.4* 3.0* 3.6   BILIRUBIN mg/dL 0.2 0.2 0.3 0.3   ALK PHOS U/L 65 73 70 91   AST (SGOT) U/L 7 6 7 5   ALT (SGPT) U/L 6 6 9 <5     Results from last 7 days   Lab Units 01/24/24 0454 01/23/24 0403 01/22/24 0345 01/21/24  0756   CALCIUM mg/dL 8.2* 8.4* 8.5* 8.8   ALBUMIN g/dL 2.2* 2.4* 3.0* 3.6   MAGNESIUM mg/dL  --   --   --  1.5*     Results from last 7 days   Lab Units 01/21/24  0756   LACTATE mmol/L 0.8     Glucose   Date/Time Value Ref Range Status   01/24/2024 0623 98 70 - 130 mg/dL Final   01/23/2024 2343 113 70 - 130 mg/dL Final   01/23/2024 2149 111 70 - 130 mg/dL Final   01/23/2024 1801 141 (H) 70 - 130 mg/dL Final   01/23/2024 1055 135 (H) 70 - 130 mg/dL Final   01/23/2024 0607 191 (H) 70 - 130 mg/dL Final   01/22/2024 2336 171 (H) 70 - 130 mg/dL Final       amitriptyline, 50 mg, Oral, Nightly  folic acid, 1,000 mcg, Oral, Daily  insulin lispro, 2-9 Units, Subcutaneous, 4x Daily AC  & at Bedtime  magnesium oxide, 400 mg, Oral, Daily  pantoprazole, 40 mg, Oral, Daily  senna-docusate sodium, 2 tablet, Oral, BID  sodium chloride, 10 mL, Intravenous, Q12H  thiamine, 100 mg, Oral, Daily      lactated ringers, 9 mL/hr, Last Rate: 9 mL/hr (01/22/24 1126)  lactated ringers, 9 mL/hr    Diet: Regular/House Diet; Texture: Regular Texture (IDDSI 7); Fluid Consistency: Thin (IDDSI 0)       Assessment/Plan     Active Hospital Problems    Diagnosis  POA    **Splenic laceration [S36.039A]  Yes    Chronic, continuous use of opioids [F11.90]  Yes    Generalized anxiety disorder [F41.1]  Yes    Essential hypertension [I10]  Yes    Tobacco abuse [Z72.0]  Yes    GERD without esophagitis [K21.9]  Yes    Chronic pancreatitis [K86.1]  Yes    Anemia of chronic disease [D63.8]  Yes    Alcohol dependence in remission [F10.21]  Yes      Resolved Hospital Problems   No resolved problems to display.     Ms. Cain is a 43 y.o. male who presented to the hospital with left upper quadrant abdominal pain and left shoulder pain for several days.  She presented to a freestanding emergency department where CT A/P demonstrated left splenic subcapsular hematoma associated with splenic laceration with surrounding edema.  She was transferred to the ICU for further evaluation by general surgery.  Vascular surgery was consulted for free splenectomy coil embolization.  She was taken to the OR on 1/22 where she underwent coil embolization of the splenic artery and right femoral artery exploration with retrieval of a dislodged coil.  She was cleared for transfer out of the ICU 1/24.    Splenic laceration  -Status post coil embolization as above with vascular surgery.  They have signed off.  -No plans for further surgical intervention (splenectomy) for now. Diet advancing today.  -Dilaudid frequency has been reduced by surgery.    Chronic pancreatitis  History of alcohol abuse  -Did have recent admission here a couple weeks prior for acute  on chronic pancreatitis.  -Gastroenterology followed at that time.  She was to follow up with Dr. Denson given a pancreatic pseudocyst (has required drainage with stent in the past).  -Resume folic acid and vitamin B replacement.  -Chronically on Percocet and followed by pain management outpatient- has been getting Percocet 5 mg every 4 hours in addition to Dilaudid 1 mg here. Dilaudid appropriately weaned by surgery.   -Will adjust Percocet closer to her chronic home dosing (gets 60 each of Percocet 10 mg for 30 day supply with Dr. Chuck MARCIAL reviewed.)    Anemia chronic disease  -Trends overall stable in the 9-10 range.    Hypertension  -Chronically on medications.  BP stable.    GERD  -Resume home PPI.    Generalized anxiety disorder  -Resume home Elavil.    Discussed with patient and nurse.    VTE Prophylaxis - SCDs  Code Status - Full code  Disposition - Anticipate discharge when cleared by surgery. Possibly tomorrow?      SOL Rios  [facility]  01/24/24  15:32 EST

## 2024-01-24 NOTE — PROGRESS NOTES
Acute Care General Surgery Progress Note    Patient: Piper Cain  YOB: 1980  MRN: [id number]      Assessment/Plan:   Piper Cain is a 43 y.o. female with chronic pancreatitis and a large subcapsular splenic hematoma, s/p embolization of the splenic artery. Patient feeling better this morning, pain is controlled with pain medicine. Tolerating clears, advancing to a regular diet today. Hgb stable, will monitor H&H daily. Vitals stable. No plans for surgical intervention at this time, will continue to follow.       Subjective  Patient is feeling much better today. States pain is well controlled with minimal pain at incisional site. Denies N/V/D. Patient states she would like to have a regular diet.     Objective    Vitals:    01/24/24 0955   BP: 129/80   Pulse: 91   Resp: 18   Temp: 97.5 °F (36.4 °C)   SpO2:        Intake/Output Summary (Last 24 hours) at 1/24/2024 1025  Last data filed at 1/24/2024 0500  Gross per 24 hour   Intake 480 ml   Output 0 ml   Net 480 ml         Physical Exam  Constitutional: alert, oriented, no acute distress  Respiratory: Normal work of breathing, Symmetric excursion  Cardiovascular: Well pefursed, RRR, no jugular venous distention evident   Abdominal: soft, non-tender, non-distended  Skin: incision site clean, dry, intact, no erythema noted      Laboratory Results  Results from last 7 days   Lab Units 01/24/24  0454 01/23/24 2126 01/23/24  0403 01/22/24  1948 01/22/24  0345 01/21/24 2007 01/21/24  1357 01/21/24  0603   WBC 10*3/mm3 11.71*  --  12.71*  --  9.45  --  9.71 11.35*   HEMOGLOBIN g/dL 9.0*   < > 10.1*   < > 10.1*   < > 8.2* 10.0*   HEMATOCRIT % 26.2*   < > 29.5*   < > 29.6*   < > 24.9* 30.7*   PLATELETS 10*3/mm3 333  --  379  --  331  --  415 442    < > = values in this interval not displayed.     Results from last 7 days   Lab Units 01/24/24  0454 01/23/24  0403 01/22/24  0345   SODIUM mmol/L 140 138 135*   POTASSIUM mmol/L 3.7 3.8 3.5   CHLORIDE mmol/L  108* 105 101   CO2 mmol/L 21.6* 23.3 23.0   BUN mg/dL 3* 6 3*   CREATININE mg/dL 0.40* 0.45* 0.47*   CALCIUM mg/dL 8.2* 8.4* 8.5*   BILIRUBIN mg/dL 0.2 0.2 0.3   ALK PHOS U/L 65 73 70   ALT (SGPT) U/L 6 6 9   AST (SGOT) U/L 7 6 7   GLUCOSE mg/dL 84 179* 109*         SOL Murdock  Acute Care General Surgery  [facility]    [address], [address]  [place], [place], [address]  P: [phone]  F: [phone]

## 2024-01-24 NOTE — PROGRESS NOTES
[facility] INPATIENT PROGRESS NOTE         [facility] INTENSIVE CARE    2024      PATIENT IDENTIFICATION:  Name: Piper Cain ADMIT: 2024   : 1980  PCP: Provider, No Known    MRN: [id number] LOS: 3 days   AGE/SEX: 43 y.o. female  ROOM: [facility]                     LOS 3    Reason for visit: ICU medical management, splenic laceration with flank pain      SUBJECTIVE:      No new issues overnight.  No nausea or vomiting.  Still with some left upper quadrant discomfort but she describes it as mild.  Tolerating clear liquids.  Advance diet as recommended by surgery.  Out of intensive care.    Objective   OBJECTIVE:    Vital Sign Min/Max for last 24 hours  Temp  Min: 97.4 °F (36.3 °C)  Max: 98 °F (36.7 °C)   BP  Min: 98/71  Max: 126/81   Pulse  Min: 64  Max: 97   Resp  Min: 16  Max: 18   SpO2  Min: 71 %  Max: 100 %   No data recorded   Weight  Min: 63.8 kg (140 lb 10.5 oz)  Max: 63.8 kg (140 lb 10.5 oz)    Vitals:    24 0500 24 0600 24 0757 24 0800   BP: 114/85 126/81  117/84   Pulse: 80 64  69   Resp:    18   Temp:   97.8 °F (36.6 °C)    TempSrc:   Oral    SpO2:  96%  93%   Weight: 63.8 kg (140 lb 10.5 oz)      Height:                24  0604 24  1343 24  0500   Weight: 57.6 kg (126 lb 15.8 oz) 61.7 kg (136 lb) 63.8 kg (140 lb 10.5 oz)       Body mass index is 20.18 kg/m².                          Body mass index is 20.18 kg/m².    Intake/Output Summary (Last 24 hours) at 2024 0856  Last data filed at 2024 0500  Gross per 24 hour   Intake 480 ml   Output 0 ml   Net 480 ml         Exam:  GEN:  No distress, appears stated age  EYES:   PERRL, anicteric sclerae  ENT:    External ears/nose normal, OP clear  NECK:  No adenopathy, midline trachea  LUNGS: Normal chest on inspection, palpation and auscultation  CV:  Normal S1S2, without murmur  ABD:  Nontender, nondistended, no hepatosplenomegaly, +BS  EXT:  No edema.  No cyanosis or clubbing.  No  mottling and normal cap refill.    Assessment     Scheduled meds:  insulin lispro, 2-9 Units, Subcutaneous, 4x Daily AC & at Bedtime  senna-docusate sodium, 2 tablet, Oral, BID  sodium chloride, 10 mL, Intravenous, Q12H      IV meds:                      lactated ringers, 9 mL/hr, Last Rate: 9 mL/hr (01/22/24 1126)  lactated ringers, 9 mL/hr      Data Review:  Results from last 7 days   Lab Units 01/24/24 0454 01/23/24  0403 01/22/24  0345 01/21/24  0756   SODIUM mmol/L 140 138 135* 137   POTASSIUM mmol/L 3.7 3.8 3.5 3.8   CHLORIDE mmol/L 108* 105 101 96*   CO2 mmol/L 21.6* 23.3 23.0 28.2   BUN mg/dL 3* 6 3* 5*   CREATININE mg/dL 0.40* 0.45* 0.47* 0.60   GLUCOSE mg/dL 84 179* 109* 104*   CALCIUM mg/dL 8.2* 8.4* 8.5* 8.8         Estimated Creatinine Clearance: 186.4 mL/min (A) (by C-G formula based on SCr of 0.4 mg/dL (L)).  Results from last 7 days   Lab Units 01/24/24 0454 01/23/24 2126 01/23/24 0403 01/22/24  2358 01/22/24  1948 01/22/24  0345 01/21/24 2007 01/21/24  1357 01/21/24  0603   WBC 10*3/mm3 11.71*  --  12.71*  --   --  9.45  --  9.71 11.35*   HEMOGLOBIN g/dL 9.0* 10.1* 10.1* 10.0* 9.9* 10.1*   < > 8.2* 10.0*   PLATELETS 10*3/mm3 333  --  379  --   --  331  --  415 442    < > = values in this interval not displayed.     Results from last 7 days   Lab Units 01/21/24  0837   INR  1.20     Results from last 7 days   Lab Units 01/24/24 0454 01/23/24  0403 01/22/24  0345 01/21/24  0756   ALT (SGPT) U/L 6 6 9 <5   AST (SGOT) U/L 7 6 7 5         Results from last 7 days   Lab Units 01/21/24  0756   LACTATE mmol/L 0.8         Glucose   Date/Time Value Ref Range Status   01/24/2024 0623 98 70 - 130 mg/dL Final   01/23/2024 2343 113 70 - 130 mg/dL Final   01/23/2024 2149 111 70 - 130 mg/dL Final   01/23/2024 1801 141 (H) 70 - 130 mg/dL Final   01/23/2024 1055 135 (H) 70 - 130 mg/dL Final   01/23/2024 0607 191 (H) 70 - 130 mg/dL Final   01/22/2024 2336 171 (H) 70 - 130 mg/dL Final         Imaging  reviewed  Chest x-ray 1/21 reviewed shows small to moderate left effusion with atelectasis.          CT abdomen pelvis 1/21 reviewed          Microbiology reviewed  No growth to date          Active Hospital Problems    Diagnosis  POA    **Splenic laceration [S36.039A]  Yes      Resolved Hospital Problems   No resolved problems to display.         ASSESSMENT:  Splenic laceration with hematoma: Status post successful IR intervention  Chronic Pancreatitis  Anemia  Elevated left hemidiaphragm secondary to above      PLAN:  No plan for further surgical intervention.  Control glucose.  Pain control.  Out of ICU.  Consult hospitalist service to take over medical management outside of ICU.  Mechanical DVT prophylaxis.      Discussed with multidisciplinary ICU team on rounds this morning.             Tomasz Correa MD  Pulmonary and Critical Care Medicine  [facility]  1/24/2024    08:56 EST

## 2024-01-25 ENCOUNTER — READMISSION MANAGEMENT (OUTPATIENT)
Dept: CALL CENTER | Facility: HOSPITAL | Age: 44
End: 2024-01-25
Payer: MEDICAID

## 2024-01-25 VITALS
HEART RATE: 74 BPM | HEIGHT: 71 IN | SYSTOLIC BLOOD PRESSURE: 120 MMHG | BODY MASS INDEX: 18.65 KG/M2 | WEIGHT: 133.2 LBS | RESPIRATION RATE: 16 BRPM | OXYGEN SATURATION: 97 % | TEMPERATURE: 97.5 F | DIASTOLIC BLOOD PRESSURE: 87 MMHG

## 2024-01-25 PROBLEM — D73.0 FUNCTIONAL ASPLENIA: Status: ACTIVE | Noted: 2024-01-25

## 2024-01-25 PROBLEM — Q89.01 FUNCTIONAL ASPLENIA: Status: ACTIVE | Noted: 2024-01-25

## 2024-01-25 LAB
ALBUMIN SERPL-MCNC: 2.7 G/DL (ref 3.5–5.2)
ALBUMIN/GLOB SERPL: 0.8 G/DL
ALP SERPL-CCNC: 76 U/L (ref 39–117)
ALT SERPL W P-5'-P-CCNC: 9 U/L (ref 1–33)
ANION GAP SERPL CALCULATED.3IONS-SCNC: 12.1 MMOL/L (ref 5–15)
AST SERPL-CCNC: 9 U/L (ref 1–32)
BASOPHILS # BLD AUTO: 0.03 10*3/MM3 (ref 0–0.2)
BASOPHILS NFR BLD AUTO: 0.4 % (ref 0–1.5)
BILIRUB SERPL-MCNC: <0.2 MG/DL (ref 0–1.2)
BUN SERPL-MCNC: 3 MG/DL (ref 6–20)
BUN/CREAT SERPL: 7.5 (ref 7–25)
CALCIUM SPEC-SCNC: 8.5 MG/DL (ref 8.6–10.5)
CHLORIDE SERPL-SCNC: 105 MMOL/L (ref 98–107)
CO2 SERPL-SCNC: 23.9 MMOL/L (ref 22–29)
CREAT SERPL-MCNC: 0.4 MG/DL (ref 0.57–1)
DEPRECATED RDW RBC AUTO: 50 FL (ref 37–54)
EGFRCR SERPLBLD CKD-EPI 2021: 127.7 ML/MIN/1.73
EOSINOPHIL # BLD AUTO: 0.25 10*3/MM3 (ref 0–0.4)
EOSINOPHIL NFR BLD AUTO: 3.1 % (ref 0.3–6.2)
ERYTHROCYTE [DISTWIDTH] IN BLOOD BY AUTOMATED COUNT: 13.7 % (ref 12.3–15.4)
GLOBULIN UR ELPH-MCNC: 3.2 GM/DL
GLUCOSE BLDC GLUCOMTR-MCNC: 105 MG/DL (ref 70–130)
GLUCOSE SERPL-MCNC: 98 MG/DL (ref 65–99)
HCT VFR BLD AUTO: 30.7 % (ref 34–46.6)
HGB BLD-MCNC: 9.9 G/DL (ref 12–15.9)
LYMPHOCYTES # BLD AUTO: 2.7 10*3/MM3 (ref 0.7–3.1)
LYMPHOCYTES NFR BLD AUTO: 33.4 % (ref 19.6–45.3)
MCH RBC QN AUTO: 32.2 PG (ref 26.6–33)
MCHC RBC AUTO-ENTMCNC: 32.2 G/DL (ref 31.5–35.7)
MCV RBC AUTO: 100 FL (ref 79–97)
MONOCYTES # BLD AUTO: 0.68 10*3/MM3 (ref 0.1–0.9)
MONOCYTES NFR BLD AUTO: 8.4 % (ref 5–12)
NEUTROPHILS NFR BLD AUTO: 4.4 10*3/MM3 (ref 1.7–7)
NEUTROPHILS NFR BLD AUTO: 54.3 % (ref 42.7–76)
PLAT MORPH BLD: NORMAL
PLATELET # BLD AUTO: 274 10*3/MM3 (ref 140–450)
PMV BLD AUTO: 9.9 FL (ref 6–12)
POTASSIUM SERPL-SCNC: 3.6 MMOL/L (ref 3.5–5.2)
PROT SERPL-MCNC: 5.9 G/DL (ref 6–8.5)
RBC # BLD AUTO: 3.07 10*6/MM3 (ref 3.77–5.28)
RBC MORPH BLD: NORMAL
SODIUM SERPL-SCNC: 141 MMOL/L (ref 136–145)
WBC MORPH BLD: NORMAL
WBC NRBC COR # BLD AUTO: 8.09 10*3/MM3 (ref 3.4–10.8)

## 2024-01-25 PROCEDURE — 25010000002 HYDROMORPHONE 1 MG/ML SOLUTION

## 2024-01-25 PROCEDURE — G0009 ADMIN PNEUMOCOCCAL VACCINE: HCPCS

## 2024-01-25 PROCEDURE — 25010000002 MENINGOCOCCAL RECONSTITUTED SOLUTION

## 2024-01-25 PROCEDURE — 80053 COMPREHEN METABOLIC PANEL: CPT | Performed by: SURGERY

## 2024-01-25 PROCEDURE — G0008 ADMIN INFLUENZA VIRUS VAC: HCPCS

## 2024-01-25 PROCEDURE — 25010000002 MENINGOCOCCAL B SUSPENSION PREFILLED SYRINGE

## 2024-01-25 PROCEDURE — 90677 PCV20 VACCINE IM: CPT

## 2024-01-25 PROCEDURE — 90620 MENB-4C VACCINE IM: CPT

## 2024-01-25 PROCEDURE — 99231 SBSQ HOSP IP/OBS SF/LOW 25: CPT

## 2024-01-25 PROCEDURE — 25010000002 HAEMOPHILUS B POLYSAC-TETANUS TOXOID 10 MCG RECONSTITUTED SOLUTION

## 2024-01-25 PROCEDURE — 90734 MENACWYD/MENACWYCRM VACC IM: CPT

## 2024-01-25 PROCEDURE — 85025 COMPLETE CBC W/AUTO DIFF WBC: CPT | Performed by: SURGERY

## 2024-01-25 PROCEDURE — 85007 BL SMEAR W/DIFF WBC COUNT: CPT | Performed by: SURGERY

## 2024-01-25 PROCEDURE — 82948 REAGENT STRIP/BLOOD GLUCOSE: CPT

## 2024-01-25 PROCEDURE — 90648 HIB PRP-T VACCINE 4 DOSE IM: CPT

## 2024-01-25 PROCEDURE — 25010000002 PNEUMOCOCCAL 20-VAL CONJ VACC 0.5 ML SUSPENSION PREFILLED SYRINGE

## 2024-01-25 RX ORDER — OXYCODONE AND ACETAMINOPHEN 10; 325 MG/1; MG/1
1 TABLET ORAL 2 TIMES DAILY PRN
Start: 2024-01-25 | End: 2024-01-27

## 2024-01-25 RX ORDER — POTASSIUM CHLORIDE 750 MG/1
40 TABLET, FILM COATED, EXTENDED RELEASE ORAL EVERY 4 HOURS
Status: DISCONTINUED | OUTPATIENT
Start: 2024-01-25 | End: 2024-01-25 | Stop reason: HOSPADM

## 2024-01-25 RX ADMIN — MAGNESIUM OXIDE 400 MG (241.3 MG MAGNESIUM) TABLET 400 MG: TABLET at 09:07

## 2024-01-25 RX ADMIN — Medication 0.5 ML: at 13:03

## 2024-01-25 RX ADMIN — HYDROMORPHONE HYDROCHLORIDE 1 MG: 1 INJECTION, SOLUTION INTRAMUSCULAR; INTRAVENOUS; SUBCUTANEOUS at 02:03

## 2024-01-25 RX ADMIN — PANTOPRAZOLE SODIUM 40 MG: 40 TABLET, DELAYED RELEASE ORAL at 09:07

## 2024-01-25 RX ADMIN — POTASSIUM CHLORIDE 40 MEQ: 750 TABLET, EXTENDED RELEASE ORAL at 06:23

## 2024-01-25 RX ADMIN — FOLIC ACID 1000 MCG: 1 TABLET ORAL at 09:07

## 2024-01-25 RX ADMIN — PNEUMOCOCCAL 20-VALENT CONJUGATE VACCINE 0.5 ML
2.2; 2.2; 2.2; 2.2; 2.2; 2.2; 2.2; 2.2; 2.2; 2.2; 2.2; 2.2; 2.2; 2.2; 2.2; 2.2; 4.4; 2.2; 2.2; 2.2 INJECTION, SUSPENSION INTRAMUSCULAR at 12:25

## 2024-01-25 RX ADMIN — Medication 100 MG: at 09:07

## 2024-01-25 RX ADMIN — OXYCODONE AND ACETAMINOPHEN 1 TABLET: 10; 325 TABLET ORAL at 09:07

## 2024-01-25 RX ADMIN — NEISSERIA MENINGITIDIS SEROGROUP B NHBA FUSION PROTEIN ANTIGEN, NEISSERIA MENINGITIDIS SEROGROUP B FHBP FUSION PROTEIN ANTIGEN AND NEISSERIA MENINGITIDIS SEROGROUP B NADA PROTEIN ANTIGEN 0.5 ML: 50; 50; 50; 25 INJECTION, SUSPENSION INTRAMUSCULAR at 12:26

## 2024-01-25 RX ADMIN — Medication 0.5 ML: at 12:22

## 2024-01-25 NOTE — PAYOR COMM NOTE
"Piper Zavala (43 y.o. Female)          DC SUMMARY FOR [id number]       CONTACT NORRIS MORGAN  F# [phone]         Date of Birth   1980    Social Security Number       Address   [address]  [place] [address]    Home Phone   [phone]    MRN   [id number]       [employer]    Marital Status   Single                            Admission Date   24    Admission Type   Emergency    Admitting Provider   Yury Reyes MD    Attending Provider       Department, Room/Bed   [address], [address]       Discharge Date   2024    Discharge Disposition   Home or Self Care    Discharge Destination                                 Attending Provider: (none)   Allergies: Nickel    Isolation: None   Infection: None   Code Status: CPR    Ht: 180.3 cm (71\")   Wt: 60.4 kg (133 lb 3.2 oz)    Admission Cmt: None   Principal Problem: Splenic laceration [S36.039A]                   Active Insurance as of 2024       Primary Coverage       Payor Plan Insurance Group Employer/Plan Group    [id number]       Payor Plan Address Payor Plan Phone Number Payor Plan Fax Number Effective Dates    MAIL STOP:   2022 - None Entered    [id number] [place] [id number]         Subscriber Name Subscriber Birth Date Member ID       PIPER ZAVALA 1980 [id number]                     Emergency Contacts        (Rel.) Home Phone Work Phone Mobile Phone    MALOU EVANGELISTA (Significant Other) [phone] -- --                 Discharge Summary        Mike Donnelly MD at 24 1006              Patient Name: Piper Zavala  : 1980  MRN: [id number]    Date of Admission: 2024  Date of Discharge:  2024  Primary Care Physician: Provider, No Known      Chief Complaint:   Flank Pain      Discharge Diagnoses     Active Hospital Problems    Diagnosis  POA    **Splenic laceration " [S36.039A]  Yes    Chronic, continuous use of opioids [F11.90]  Yes    Generalized anxiety disorder [F41.1]  Yes    Essential hypertension [I10]  Yes    Tobacco abuse [Z72.0]  Yes    GERD without esophagitis [K21.9]  Yes    Chronic pancreatitis [K86.1]  Yes    Anemia of chronic disease [D63.8]  Yes    Alcohol dependence in remission [F10.21]  Yes      Resolved Hospital Problems   No resolved problems to display.        Hospital Course      Splenic laceration leading to large subscapular splenic hematoma / acute blood loss anemia  Ms. Cain is a 43 y.o. female with history of alcohol use complicated by chronic pancreatitis and pancreatic pseudocyst, c who presented to the hospital with left upper quadrant abdominal pain and left shoulder pain for several days.  She presented to a freestanding emergency department where CT A/P demonstrated left splenic subcapsular hematoma associated with splenic laceration with surrounding edema.  She was transferred to the ICU for further evaluation by general surgery.  Vascular surgery was consulted for free splenectomy coil embolization.  She was taken to the OR on 1/22 where she underwent coil embolization of the splenic artery and right femoral artery exploration with retrieval of a dislodged coil.  Patient clinically improved, hemoglobin remained stable, she was transferred out of ICU on 01/24/2024.  Hemoglobin was as low as 7.2 during hospitalization, received blood transfusion, hemoglobin remained stable between 9-10 afterwards.  General surgery evaluated, no  interventions were indicated from general surgery standpoint.  Patient was cleared to be discharged from vascular surgery standpoint with 3-week follow-up.  Patient continued to have pain, but gradually improved.  Has chronic pain secondary to chronic pancreatitis as well,  follows with pain management, and on chronic opiates outpatient.  Patient may eventually require splenectomy per general surgery, follow-up with  surgery outpatient per their recommendations.      At the time of discharge patient was told to take all medications as prescribed, keep all follow-up appointments, and call their doctor or return to the hospital with any worsening or concerning symptoms.             Day of Discharge     Subjective:  Patient seen this morning.  No new meds overnight.  Continues to have pain in epigastric and left upper quadrant region, overall improved.  Had 2 BMs yesterday.      Physical Exam:  Temp:  [97.5 °F (36.4 °C)-98.1 °F (36.7 °C)] 97.5 °F (36.4 °C)  Heart Rate:  [72-88] 74  Resp:  [16-18] 16  BP: (111-120)/(81-89) 120/87  Body mass index is 18.58 kg/m².  Physical Exam    General: Alert sitting up in the bed, not in distress,  HEENT: Normocephalic, atraumatic  CV: Regular rate and rhythm, no murmurs rubs or gallops  Lungs: Clear to auscultation bilaterally, no crackles or wheezes  Abdomen: Soft, non distended, Tender to palpation left upper and epigastric region  Extremities: No significant peripheral edema , no cyanosis       Consultants     Consult Orders (all) (From admission, onward)       Start     Ordered    01/24/24 0754  Inpatient Hospitalist Consult  Once        Specialty:  Hospitalist  Provider:  Bienvenido Barney MD    01/24/24 0754    01/21/24 1610  Inpatient Vascular Surgery Consult  Once        Specialty:  Vascular Surgery  Provider:  Jony Fowler MD    01/21/24 1609    01/21/24 1238  Inpatient General Surgery Consult  Once        Specialty:  General Surgery  Provider:  Reji House MD    01/21/24 1238    01/21/24 1230  Inpatient Pulmonology Consult  Once        Specialty:  Pulmonary Disease  Provider:  Prosper Reyes MD    01/21/24 1230                  Procedures     OR EMBOLIZATION MESENTERIC ARTERY RIGHT FEMORAL POPLITEAL THROMBECTOMY      Imaging Results (All)       Procedure Component Value Units Date/Time    Arteriogram (Autofinalize) [[id number]] Resulted: 01/22/24 1433     Updated:  01/22/24 1433    Narrative:      This procedure was auto-finalized with no dictation required.    CT Abdomen Pelvis With Contrast [[id number]] Collected: 01/21/24 0843     Updated: 01/21/24 1008    Narrative:      CT ABDOMEN AND PELVIS WITH IV CONTRAST     HISTORY: Left upper quadrant and left flank pain. Sudden onset last  night.     TECHNIQUE:  CT includes axial imaging from the lung bases to the  trochanters with intravenous contrast and without use of oral contrast.  Data reconstructed in coronal and sagittal planes. Radiation dose  reduction techniques were utilized, including automated exposure control  and exposure modulation based on body size.     COMPARISON: CT abdomen and pelvis 01/04/2024, 09/05/2023, 08/24/2023.     FINDINGS: Since the previous CT 17 days ago there has developed a left  splenic subcapsular hematoma. Hematoma extends along the superior and  lateral aspect of the spleen and measures above the spleen 10 x 10 cm x  9 cm in height. This is associated with a splenic laceration within the  anterior and inferior aspect of the spleen. There is mild surrounding  edema without clear intraperitoneal extension of hemorrhage.     There is resolving acute-on-chronic pancreatitis with improved  peripancreatic inflammation particularly along the anterior pancreatic  tail. Pancreatic ductal dilatation is without change. Pseudocyst in the  region of the pancreatic tail is without change and extends along the  anterior margin of the left kidney. There has developed a new small left  pleural effusion and there is left lower lobe atelectasis. There is no  other evidence for change when compared to the examination 17 days ago.       Impression:      1. New left subcapsular splenic hematoma extending above and lateral to  the spleen measuring approximately 10 x 10 x 9 cm. Hematoma appears  associated with splenic lacerations. Acute-on-chronic pancreatitis with  improvement in degree of acute pancreatitis when  compared to the  examination 17 days ago. Persistent pancreatic ductal dilatation.  Pancreatic pseudocyst in the region of the pancreatic tail without  change.  2. New small left pleural effusion and adjacent left basilar  atelectasis.     Discussed with Dr. Almazan on 01/21/2024 at 8:35 a.m.      Radiation dose reduction techniques were utilized, including automated  exposure control and exposure modulation based on body size.        This report was finalized on 1/21/2024 10:05 AM by Dr. Angel Mcleod M.D on Workstation: [id number]       XR Chest 1 View [[id number]] Collected: 01/21/24 0844     Updated: 01/21/24 1005    Narrative:      CHEST SINGLE VIEW     HISTORY: Left anterior and posterior chest pain.     COMPARISON: AP chest 08/06/2022.     FINDINGS: There has developed a small-to-moderate left pleural effusion  with left basilar atelectasis and this is new when compared with exam  08/06/2022 and a chest CT 01/04/2024. There is mild right basilar  atelectasis. There is no perihilar edema or evidence for pneumothorax.       Impression:      New small-to-moderate left pleural effusion with basilar  atelectasis.     This report was finalized on 1/21/2024 10:02 AM by Dr. Angel Mcleod M.D on Workstation: [id number]             Results for orders placed during the hospital encounter of 08/06/22    Duplex Portal Hepatic Complete CAR    Interpretation Summary  · All vessels appear normal with normal flow direction and no evidence of thrombus. However, there is a nonvascular fluid collection in the and the splenic vein itself is difficult to visualize with multiple collaterals noted.    Results for orders placed during the hospital encounter of 08/06/22    Adult Transthoracic Echo Complete W/ Cont if Necessary Per Protocol    Interpretation Summary  · Left ventricular ejection fraction appears to be 61 - 65%. Left ventricular systolic function is normal.  · Left ventricular diastolic function was normal.  ·  "Normal right ventricular cavity size and systolic function noted.  · The agitated saline study is positive with Valsalva, consistent with a small to moderate sized PFO  · Mild tricuspid valve regurgitation is present.  · Calculated right ventricular systolic pressure from tricuspid regurgitation is 32 mmHg.  · There is no evidence of pericardial effusion    Pertinent Labs     Results from last 7 days   Lab Units 01/25/24 0453 01/24/24 2124 01/24/24 0454 01/23/24 2126 01/23/24 0403 01/22/24 1948 01/22/24  0345   WBC 10*3/mm3 8.09  --  11.71*  --  12.71*  --  9.45   HEMOGLOBIN g/dL 9.9* 9.7* 9.0* 10.1* 10.1*   < > 10.1*   PLATELETS 10*3/mm3 274  --  333  --  379  --  331    < > = values in this interval not displayed.     Results from last 7 days   Lab Units 01/25/24 0453 01/24/24 0454 01/23/24 0403 01/22/24  0345   SODIUM mmol/L 141 140 138 135*   POTASSIUM mmol/L 3.6 3.7 3.8 3.5   CHLORIDE mmol/L 105 108* 105 101   CO2 mmol/L 23.9 21.6* 23.3 23.0   BUN mg/dL 3* 3* 6 3*   CREATININE mg/dL 0.40* 0.40* 0.45* 0.47*   GLUCOSE mg/dL 98 84 179* 109*   Estimated Creatinine Clearance: 176.5 mL/min (A) (by C-G formula based on SCr of 0.4 mg/dL (L)).  Results from last 7 days   Lab Units 01/25/24 0453 01/24/24 0454 01/23/24 0403 01/22/24  0345   ALBUMIN g/dL 2.7* 2.2* 2.4* 3.0*   BILIRUBIN mg/dL <0.2 0.2 0.2 0.3   ALK PHOS U/L 76 65 73 70   AST (SGOT) U/L 9 7 6 7   ALT (SGPT) U/L 9 6 6 9     Results from last 7 days   Lab Units 01/25/24 0453 01/24/24 0454 01/23/24 0403 01/22/24 0345 01/21/24  0756   CALCIUM mg/dL 8.5* 8.2* 8.4* 8.5* 8.8   ALBUMIN g/dL 2.7* 2.2* 2.4* 3.0* 3.6   MAGNESIUM mg/dL  --   --   --   --  1.5*     Results from last 7 days   Lab Units 01/21/24  0756   LIPASE U/L 123*     Results from last 7 days   Lab Units 01/21/24  0756   HSTROP T ng/L <6           Invalid input(s): \"LDLCALC\"  Results from last 7 days   Lab Units 01/21/24  0756   BLOODCX  No growth at 4 days  No growth at 4 days     "     Test Results Pending at Discharge     Pending Labs       Order Current Status    Blood Culture - Blood, Arm, Left Preliminary result    Blood Culture - Blood, Arm, Left Preliminary result            Discharge Details        Discharge Medications        New Medications        Instructions Start Date   oxyCODONE-acetaminophen  MG per tablet  Commonly known as: PERCOCET  Replaces: oxyCODONE-acetaminophen 5-325 MG per tablet   1 tablet, Oral, 2 Times Daily PRN             Continue These Medications        Instructions Start Date   amitriptyline 50 MG tablet  Commonly known as: ELAVIL   50 mg, Oral, Nightly      folic acid 1 MG tablet  Commonly known as: FOLVITE   1,000 mcg, Oral, Daily      magnesium oxide 400 MG tablet  Commonly known as: MAG-OX   400 mg, Oral, Daily      naloxone 4 MG/0.1ML nasal spray  Commonly known as: NARCAN   Call 911. Don't prime. [place] in 1 nostril for overdose. Repeat in 2-3 minutes in other nostril if no or minimal breathing/responsiveness.      ondansetron 4 MG tablet  Commonly known as: ZOFRAN   4 mg, Oral, Every 8 Hours PRN      pantoprazole 40 MG EC tablet  Commonly known as: PROTONIX   40 mg, Oral, Daily      thiamine 100 MG tablet  Commonly known as: VITAMIN B1   100 mg, Oral, Daily             Stop These Medications      oxyCODONE-acetaminophen 5-325 MG per tablet  Commonly known as: Percocet  Replaced by: oxyCODONE-acetaminophen  MG per tablet            ASK your doctor about these medications        Instructions Start Date   Feosol 200 (65 Fe) MG tablet tablet  Generic drug: Ferrous Sulfate Dried   200 mg, 2 Times Daily               Allergies   Allergen Reactions    Nickel Rash       Discharge Disposition:  Home or Self Care      Discharge Diet:  Diet Order   Procedures    Diet: Regular/House Diet; Texture: Regular Texture (IDDSI 7); Fluid Consistency: Thin (IDDSI 0)       Discharge Activity:       CODE STATUS:    Code Status and Medical Interventions:   Ordered at:  01/21/24 1238     Code Status (Patient has no pulse and is not breathing):    CPR (Attempt to Resuscitate)     Medical Interventions (Patient has pulse or is breathing):    Full Support       No future appointments.   Follow-up Information       Provider, No Known. Schedule an appointment as soon as possible for a visit in 1 week(s).    Contact information:  [facility] [address] [phone]               Carine Pena Jr., MD. Go in 3 week(s).    Specialty: Vascular Surgery  Why: 2/14/24 @ 10:00am  Contact information:  [address] [facility] [address]  [place] [id number] [phone]                             Time Spent on Discharge:  Greater than 30 minutes      Mike Donnelly MD  [facility]  01/25/24  10:06 EST                Electronically signed by Mike Donnelly MD at 01/25/24 1016

## 2024-01-25 NOTE — DISCHARGE SUMMARY
Patient Name: Piper Cain  : 1980  MRN: [id number]    Date of Admission: 2024  Date of Discharge:  2024  Primary Care Physician: Provider, No Known      Chief Complaint:   Flank Pain      Discharge Diagnoses     Active Hospital Problems    Diagnosis  POA    **Splenic laceration [S36.039A]  Yes    Chronic, continuous use of opioids [F11.90]  Yes    Generalized anxiety disorder [F41.1]  Yes    Essential hypertension [I10]  Yes    Tobacco abuse [Z72.0]  Yes    GERD without esophagitis [K21.9]  Yes    Chronic pancreatitis [K86.1]  Yes    Anemia of chronic disease [D63.8]  Yes    Alcohol dependence in remission [F10.21]  Yes      Resolved Hospital Problems   No resolved problems to display.        Hospital Course      Splenic laceration leading to large subscapular splenic hematoma / acute blood loss anemia  Ms. Cain is a 43 y.o. female with history of alcohol use complicated by chronic pancreatitis and pancreatic pseudocyst, c who presented to the hospital with left upper quadrant abdominal pain and left shoulder pain for several days.  She presented to a freestanding emergency department where CT A/P demonstrated left splenic subcapsular hematoma associated with splenic laceration with surrounding edema.  She was transferred to the ICU for further evaluation by general surgery.  Vascular surgery was consulted for free splenectomy coil embolization.  She was taken to the OR on  where she underwent coil embolization of the splenic artery and right femoral artery exploration with retrieval of a dislodged coil.  Patient clinically improved, hemoglobin remained stable, she was transferred out of ICU on 2024.  Hemoglobin was as low as 7.2 during hospitalization, received blood transfusion, hemoglobin remained stable between 9-10 afterwards.  General surgery evaluated, no  interventions were indicated from general surgery standpoint.  Patient was cleared to be discharged from vascular  surgery standpoint with 3-week follow-up.  Patient continued to have pain, but gradually improved.  Has chronic pain secondary to chronic pancreatitis as well,  follows with pain management, and on chronic opiates outpatient.  Patient may eventually require splenectomy per general surgery, follow-up with surgery outpatient per their recommendations.      At the time of discharge patient was told to take all medications as prescribed, keep all follow-up appointments, and call their doctor or return to the hospital with any worsening or concerning symptoms.             Day of Discharge     Subjective:  Patient seen this morning.  No new meds overnight.  Continues to have pain in epigastric and left upper quadrant region, overall improved.  Had 2 BMs yesterday.      Physical Exam:  Temp:  [97.5 °F (36.4 °C)-98.1 °F (36.7 °C)] 97.5 °F (36.4 °C)  Heart Rate:  [72-88] 74  Resp:  [16-18] 16  BP: (111-120)/(81-89) 120/87  Body mass index is 18.58 kg/m².  Physical Exam    General: Alert sitting up in the bed, not in distress,  HEENT: Normocephalic, atraumatic  CV: Regular rate and rhythm, no murmurs rubs or gallops  Lungs: Clear to auscultation bilaterally, no crackles or wheezes  Abdomen: Soft, non distended, Tender to palpation left upper and epigastric region  Extremities: No significant peripheral edema , no cyanosis       Consultants     Consult Orders (all) (From admission, onward)       Start     Ordered    01/24/24 0754  Inpatient Hospitalist Consult  Once        Specialty:  Hospitalist  Provider:  Bienvenido Barney MD    01/24/24 0754    01/21/24 1610  Inpatient Vascular Surgery Consult  Once        Specialty:  Vascular Surgery  Provider:  Jony Fowler MD    01/21/24 1609    01/21/24 1238  Inpatient General Surgery Consult  Once        Specialty:  General Surgery  Provider:  Reji House MD    01/21/24 1238    01/21/24 1230  Inpatient Pulmonology Consult  Once        Specialty:  Pulmonary Disease   Provider:  Prosper Reyes MD    01/21/24 1230                  Procedures     OR EMBOLIZATION MESENTERIC ARTERY RIGHT FEMORAL POPLITEAL THROMBECTOMY      Imaging Results (All)       Procedure Component Value Units Date/Time    Arteriogram (Autofinalize) [[id number]] Resulted: 01/22/24 1433     Updated: 01/22/24 1433    Narrative:      This procedure was auto-finalized with no dictation required.    CT Abdomen Pelvis With Contrast [[id number]] Collected: 01/21/24 0843     Updated: 01/21/24 1008    Narrative:      CT ABDOMEN AND PELVIS WITH IV CONTRAST     HISTORY: Left upper quadrant and left flank pain. Sudden onset last  night.     TECHNIQUE:  CT includes axial imaging from the lung bases to the  trochanters with intravenous contrast and without use of oral contrast.  Data reconstructed in coronal and sagittal planes. Radiation dose  reduction techniques were utilized, including automated exposure control  and exposure modulation based on body size.     COMPARISON: CT abdomen and pelvis 01/04/2024, 09/05/2023, 08/24/2023.     FINDINGS: Since the previous CT 17 days ago there has developed a left  splenic subcapsular hematoma. Hematoma extends along the superior and  lateral aspect of the spleen and measures above the spleen 10 x 10 cm x  9 cm in height. This is associated with a splenic laceration within the  anterior and inferior aspect of the spleen. There is mild surrounding  edema without clear intraperitoneal extension of hemorrhage.     There is resolving acute-on-chronic pancreatitis with improved  peripancreatic inflammation particularly along the anterior pancreatic  tail. Pancreatic ductal dilatation is without change. Pseudocyst in the  region of the pancreatic tail is without change and extends along the  anterior margin of the left kidney. There has developed a new small left  pleural effusion and there is left lower lobe atelectasis. There is no  other evidence for change when compared to the  examination 17 days ago.       Impression:      1. New left subcapsular splenic hematoma extending above and lateral to  the spleen measuring approximately 10 x 10 x 9 cm. Hematoma appears  associated with splenic lacerations. Acute-on-chronic pancreatitis with  improvement in degree of acute pancreatitis when compared to the  examination 17 days ago. Persistent pancreatic ductal dilatation.  Pancreatic pseudocyst in the region of the pancreatic tail without  change.  2. New small left pleural effusion and adjacent left basilar  atelectasis.     Discussed with Dr. Almazan on 01/21/2024 at 8:35 a.m.      Radiation dose reduction techniques were utilized, including automated  exposure control and exposure modulation based on body size.        This report was finalized on 1/21/2024 10:05 AM by Dr. Angel Mcleod M.D on Workstation: [id number]       XR Chest 1 View [[id number]] Collected: 01/21/24 0844     Updated: 01/21/24 1005    Narrative:      CHEST SINGLE VIEW     HISTORY: Left anterior and posterior chest pain.     COMPARISON: AP chest 08/06/2022.     FINDINGS: There has developed a small-to-moderate left pleural effusion  with left basilar atelectasis and this is new when compared with exam  08/06/2022 and a chest CT 01/04/2024. There is mild right basilar  atelectasis. There is no perihilar edema or evidence for pneumothorax.       Impression:      New small-to-moderate left pleural effusion with basilar  atelectasis.     This report was finalized on 1/21/2024 10:02 AM by Dr. Angel Mcleod M.D on Workstation: [id number]             Results for orders placed during the hospital encounter of 08/06/22    Duplex Portal Hepatic Complete CAR    Interpretation Summary  · All vessels appear normal with normal flow direction and no evidence of thrombus. However, there is a nonvascular fluid collection in the and the splenic vein itself is difficult to visualize with multiple collaterals noted.    Results for orders  placed during the hospital encounter of 08/06/22    Adult Transthoracic Echo Complete W/ Cont if Necessary Per Protocol    Interpretation Summary  · Left ventricular ejection fraction appears to be 61 - 65%. Left ventricular systolic function is normal.  · Left ventricular diastolic function was normal.  · Normal right ventricular cavity size and systolic function noted.  · The agitated saline study is positive with Valsalva, consistent with a small to moderate sized PFO  · Mild tricuspid valve regurgitation is present.  · Calculated right ventricular systolic pressure from tricuspid regurgitation is 32 mmHg.  · There is no evidence of pericardial effusion    Pertinent Labs     Results from last 7 days   Lab Units 01/25/24 0453 01/24/24 2124 01/24/24 0454 01/23/24 2126 01/23/24 0403 01/22/24 1948 01/22/24 0345   WBC 10*3/mm3 8.09  --  11.71*  --  12.71*  --  9.45   HEMOGLOBIN g/dL 9.9* 9.7* 9.0* 10.1* 10.1*   < > 10.1*   PLATELETS 10*3/mm3 274  --  333  --  379  --  331    < > = values in this interval not displayed.     Results from last 7 days   Lab Units 01/25/24 0453 01/24/24 0454 01/23/24 0403 01/22/24  0345   SODIUM mmol/L 141 140 138 135*   POTASSIUM mmol/L 3.6 3.7 3.8 3.5   CHLORIDE mmol/L 105 108* 105 101   CO2 mmol/L 23.9 21.6* 23.3 23.0   BUN mg/dL 3* 3* 6 3*   CREATININE mg/dL 0.40* 0.40* 0.45* 0.47*   GLUCOSE mg/dL 98 84 179* 109*   Estimated Creatinine Clearance: 176.5 mL/min (A) (by C-G formula based on SCr of 0.4 mg/dL (L)).  Results from last 7 days   Lab Units 01/25/24 0453 01/24/24 0454 01/23/24 0403 01/22/24  0345   ALBUMIN g/dL 2.7* 2.2* 2.4* 3.0*   BILIRUBIN mg/dL <0.2 0.2 0.2 0.3   ALK PHOS U/L 76 65 73 70   AST (SGOT) U/L 9 7 6 7   ALT (SGPT) U/L 9 6 6 9     Results from last 7 days   Lab Units 01/25/24  0453 01/24/24  0454 01/23/24  0403 01/22/24  0345 01/21/24  0756   CALCIUM mg/dL 8.5* 8.2* 8.4* 8.5* 8.8   ALBUMIN g/dL 2.7* 2.2* 2.4* 3.0* 3.6   MAGNESIUM mg/dL  --   --   --    "--  1.5*     Results from last 7 days   Lab Units 01/21/24  0756   LIPASE U/L 123*     Results from last 7 days   Lab Units 01/21/24  0756   HSTROP T ng/L <6           Invalid input(s): \"LDLCALC\"  Results from last 7 days   Lab Units 01/21/24  0756   BLOODCX  No growth at 4 days  No growth at 4 days         Test Results Pending at Discharge     Pending Labs       Order Current Status    Blood Culture - Blood, Arm, Left Preliminary result    Blood Culture - Blood, Arm, Left Preliminary result            Discharge Details        Discharge Medications        New Medications        Instructions Start Date   oxyCODONE-acetaminophen  MG per tablet  Commonly known as: PERCOCET  Replaces: oxyCODONE-acetaminophen 5-325 MG per tablet   1 tablet, Oral, 2 Times Daily PRN             Continue These Medications        Instructions Start Date   amitriptyline 50 MG tablet  Commonly known as: ELAVIL   50 mg, Oral, Nightly      folic acid 1 MG tablet  Commonly known as: FOLVITE   1,000 mcg, Oral, Daily      magnesium oxide 400 MG tablet  Commonly known as: MAG-OX   400 mg, Oral, Daily      naloxone 4 MG/0.1ML nasal spray  Commonly known as: NARCAN   Call 911. Don't prime. [place] in 1 nostril for overdose. Repeat in 2-3 minutes in other nostril if no or minimal breathing/responsiveness.      ondansetron 4 MG tablet  Commonly known as: ZOFRAN   4 mg, Oral, Every 8 Hours PRN      pantoprazole 40 MG EC tablet  Commonly known as: PROTONIX   40 mg, Oral, Daily      thiamine 100 MG tablet  Commonly known as: VITAMIN B1   100 mg, Oral, Daily             Stop These Medications      oxyCODONE-acetaminophen 5-325 MG per tablet  Commonly known as: Percocet  Replaced by: oxyCODONE-acetaminophen  MG per tablet            ASK your doctor about these medications        Instructions Start Date   Feosol 200 (65 Fe) MG tablet tablet  Generic drug: Ferrous Sulfate Dried   200 mg, 2 Times Daily               Allergies   Allergen Reactions    " Nickel Rash       Discharge Disposition:  Home or Self Care      Discharge Diet:  Diet Order   Procedures    Diet: Regular/House Diet; Texture: Regular Texture (IDDSI 7); Fluid Consistency: Thin (IDDSI 0)       Discharge Activity:       CODE STATUS:    Code Status and Medical Interventions:   Ordered at: 01/21/24 1238     Code Status (Patient has no pulse and is not breathing):    CPR (Attempt to Resuscitate)     Medical Interventions (Patient has pulse or is breathing):    Full Support       No future appointments.   Follow-up Information       Provider, No Known. Schedule an appointment as soon as possible for a visit in 1 week(s).    Contact information:  [facility] [address] [phone]               Carine Pena Jr., MD. Go in 3 week(s).    Specialty: Vascular Surgery  Why: 2/14/24 @ 10:00am  Contact information:  [address] [facility] [address]  [facility] [address] [phone]                             Time Spent on Discharge:  Greater than 30 minutes      Mike Donnelly MD  [facility]  01/25/24  10:06 EST

## 2024-01-25 NOTE — PROGRESS NOTES
[facility] INPATIENT PROGRESS NOTE         [address]    2024      PATIENT IDENTIFICATION:  Name: Piper Cain ADMIT: 2024   : 1980  PCP: Provider, No Known    MRN: [id number] LOS: 4 days   AGE/SEX: 43 y.o. female  ROOM: [facility]                     LOS 4    Reason for visit: ICU medical management, splenic laceration with flank pain      SUBJECTIVE:      No new issues overnight.  Sleeping comfortably.     Objective   OBJECTIVE:    Vital Sign Min/Max for last 24 hours  Temp  Min: 97.5 °F (36.4 °C)  Max: 98.1 °F (36.7 °C)   BP  Min: 111/86  Max: 129/80   Pulse  Min: 72  Max: 91   Resp  Min: 16  Max: 18   SpO2  Min: 97 %  Max: 100 %   No data recorded   Weight  Min: 60.4 kg (133 lb 3.2 oz)  Max: 63.5 kg (140 lb)    Vitals:    24 2317 24 0202 24 0528 24 0740   BP: 111/86   120/87   BP Location: Left arm   Left arm   Patient Position: Lying   Lying   Pulse: 72 77  74   Resp: 18   16   Temp: 97.7 °F (36.5 °C)   97.5 °F (36.4 °C)   TempSrc: Oral   Oral   SpO2: 100%   97%   Weight:   60.4 kg (133 lb 3.2 oz)    Height:                24  0500 24  0955 24  0528   Weight: 63.8 kg (140 lb 10.5 oz) 63.5 kg (140 lb) 60.4 kg (133 lb 3.2 oz)       Body mass index is 18.58 kg/m².                          Body mass index is 18.58 kg/m².  No intake or output data in the 24 hours ending 24 0846        Exam:  GEN:  No distress, appears stated age  NECK:  No adenopathy, midline trachea  LUNGS: Nonlabored    Assessment     Scheduled meds:  amitriptyline, 50 mg, Oral, Nightly  folic acid, 1,000 mcg, Oral, Daily  insulin lispro, 2-9 Units, Subcutaneous, 4x Daily AC & at Bedtime  magnesium oxide, 400 mg, Oral, Daily  pantoprazole, 40 mg, Oral, Daily  potassium chloride ER, 40 mEq, Oral, Q4H  senna-docusate sodium, 2 tablet, Oral, BID  sodium chloride, 10 mL, Intravenous, Q12H  thiamine, 100 mg, Oral, Daily      IV meds:                      lactated  ringers, 9 mL/hr, Last Rate: 9 mL/hr (01/22/24 1126)  lactated ringers, 9 mL/hr      Data Review:  Results from last 7 days   Lab Units 01/25/24 0453 01/24/24 0454 01/23/24 0403 01/22/24 0345 01/21/24  0756   SODIUM mmol/L 141 140 138 135* 137   POTASSIUM mmol/L 3.6 3.7 3.8 3.5 3.8   CHLORIDE mmol/L 105 108* 105 101 96*   CO2 mmol/L 23.9 21.6* 23.3 23.0 28.2   BUN mg/dL 3* 3* 6 3* 5*   CREATININE mg/dL 0.40* 0.40* 0.45* 0.47* 0.60   GLUCOSE mg/dL 98 84 179* 109* 104*   CALCIUM mg/dL 8.5* 8.2* 8.4* 8.5* 8.8         Estimated Creatinine Clearance: 176.5 mL/min (A) (by C-G formula based on SCr of 0.4 mg/dL (L)).  Results from last 7 days   Lab Units 01/25/24 0453 01/24/24 2124 01/24/24 0454 01/23/24 2126 01/23/24 0403 01/22/24 1948 01/22/24 0345 01/21/24 2007 01/21/24  1357   WBC 10*3/mm3 8.09  --  11.71*  --  12.71*  --  9.45  --  9.71   HEMOGLOBIN g/dL 9.9* 9.7* 9.0* 10.1* 10.1*   < > 10.1*   < > 8.2*   PLATELETS 10*3/mm3 274  --  333  --  379  --  331  --  415    < > = values in this interval not displayed.     Results from last 7 days   Lab Units 01/21/24  0837   INR  1.20     Results from last 7 days   Lab Units 01/25/24 0453 01/24/24 0454 01/23/24 0403 01/22/24 0345 01/21/24  0756   ALT (SGPT) U/L 9 6 6 9 <5   AST (SGOT) U/L 9 7 6 7 5         Results from last 7 days   Lab Units 01/21/24  0756   LACTATE mmol/L 0.8         Glucose   Date/Time Value Ref Range Status   01/25/2024 0620 105 70 - 130 mg/dL Final   01/24/2024 2046 110 70 - 130 mg/dL Final   01/24/2024 1559 90 70 - 130 mg/dL Final   01/24/2024 0623 98 70 - 130 mg/dL Final   01/23/2024 2343 113 70 - 130 mg/dL Final   01/23/2024 2149 111 70 - 130 mg/dL Final   01/23/2024 1801 141 (H) 70 - 130 mg/dL Final         Imaging reviewed  Chest x-ray 1/21 reviewed shows small to moderate left effusion with atelectasis.          CT abdomen pelvis 1/21 reviewed          Microbiology reviewed  No growth to date          Active Hospital Problems     Diagnosis  POA    **Splenic laceration [S36.039A]  Yes    Chronic, continuous use of opioids [F11.90]  Yes    Generalized anxiety disorder [F41.1]  Yes    Essential hypertension [I10]  Yes    Tobacco abuse [Z72.0]  Yes    GERD without esophagitis [K21.9]  Yes    Chronic pancreatitis [K86.1]  Yes    Anemia of chronic disease [D63.8]  Yes    Alcohol dependence in remission [F10.21]  Yes      Resolved Hospital Problems   No resolved problems to display.         ASSESSMENT:  Splenic laceration with hematoma: Status post successful IR intervention  Chronic Pancreatitis  Anemia  Elevated left hemidiaphragm secondary to above      PLAN:  No plan for further surgical intervention.  Control glucose.  Pain control.  Doing well out of ICU.  Respiratory status stable and we will sign off.              Tomasz Correa MD  Pulmonary and Critical Care Medicine  [facility]  1/25/2024    08:46 EST

## 2024-01-25 NOTE — PROGRESS NOTES
Acute Care General Surgery Progress Note    Patient: Piper Cain  YOB: 1980  MRN: [id number]      Assessment/Plan:  Piper Cain is a 43 y.o. female with chronic pancreatitis and a large subcapsular splenic hematoma, status post embolization of the splenic artery.  Is feeling much better this morning, pain is controlled with medicine.  Tolerating diet.  Vitals and labs are stable.  Okay for discharge.  Consult placed to pharmacy for required vaccines recommended for impaired spleen.  Recommend vaccine administration prior to discharge. Patient should follow-up with Dr. Katherin Jimenez in the office in 2 weeks.    Subjective  No adverse events overnight.  Denies nausea, vomiting, or diarrhea.  Tolerating regular diet.  Ambulating well without assistance.  Pain is minimal.    Objective    Vitals:    01/25/24 0740   BP: 120/87   Pulse: 74   Resp: 16   Temp: 97.5 °F (36.4 °C)   SpO2: 97%     No intake or output data in the 24 hours ending 01/25/24 1027      Physical Exam  Constitutional: Alert, oriented, in no acute distress  Respiratory: Normal work of breathing, clear to auscultation throughout, symmetric excursion  Cardiovascular: Well pefursed, Regular rate and rhythm, no jugular venous distention evident   Abdominal: Soft, nontender, non-distended  Skin: Incision site clean dry and intact, no erythema noted      Laboratory Results  Results from last 7 days   Lab Units 01/25/24  0453 01/24/24 2124 01/24/24  0454 01/23/24 2126 01/23/24  0403 01/22/24  1948 01/22/24  0345 01/21/24 2007 01/21/24  1357 01/21/24  0603   WBC 10*3/mm3 8.09  --  11.71*  --  12.71*  --  9.45  --  9.71 11.35*   HEMOGLOBIN g/dL 9.9*   < > 9.0*   < > 10.1*   < > 10.1*   < > 8.2* 10.0*   HEMATOCRIT % 30.7*   < > 26.2*   < > 29.5*   < > 29.6*   < > 24.9* 30.7*   PLATELETS 10*3/mm3 274  --  333  --  379  --  331  --  415 442    < > = values in this interval not displayed.     Results from last 7 days   Lab Units  01/25/24  0453 01/24/24  0454 01/23/24  0403   SODIUM mmol/L 141 140 138   POTASSIUM mmol/L 3.6 3.7 3.8   CHLORIDE mmol/L 105 108* 105   CO2 mmol/L 23.9 21.6* 23.3   BUN mg/dL 3* 3* 6   CREATININE mg/dL 0.40* 0.40* 0.45*   CALCIUM mg/dL 8.5* 8.2* 8.4*   BILIRUBIN mg/dL <0.2 0.2 0.2   ALK PHOS U/L 76 65 73   ALT (SGPT) U/L 9 6 6   AST (SGOT) U/L 9 7 6   GLUCOSE mg/dL 98 84 179*         SOL Murdock  Acute Care General Surgery  [facility]    [address], [address]  [place], [place], [address]  P: [phone]  F: [phone]

## 2024-01-25 NOTE — PLAN OF CARE
Problem: Adult Inpatient Plan of Care  Goal: Plan of Care Review  Outcome: Ongoing, Progressing  Flowsheets (Taken 1/25/2024 0652)  Progress: improving  Plan of Care Reviewed With: patient  Outcome Evaluation: AOx4, VSS, SR on telemetry monitor, room air, PRN pain medications given, up ad valeria, spouse at bedside, resting comfortably the whole night, call light within reach.  Goal: Patient-Specific Goal (Individualized)  Outcome: Ongoing, Progressing  Goal: Absence of Hospital-Acquired Illness or Injury  Outcome: Ongoing, Progressing  Intervention: Identify and Manage Fall Risk  Recent Flowsheet Documentation  Taken 1/25/2024 0600 by Paul Harper RN  Safety Promotion/Fall Prevention:   safety round/check completed   room organization consistent   nonskid shoes/slippers when out of bed   lighting adjusted   clutter free environment maintained   assistive device/personal items within reach  Taken 1/25/2024 0400 by Paul Harper RN  Safety Promotion/Fall Prevention:   safety round/check completed   room organization consistent   nonskid shoes/slippers when out of bed   lighting adjusted   clutter free environment maintained   assistive device/personal items within reach  Taken 1/25/2024 0203 by Paul Harper RN  Safety Promotion/Fall Prevention:   safety round/check completed   room organization consistent   nonskid shoes/slippers when out of bed   lighting adjusted   clutter free environment maintained   assistive device/personal items within reach  Taken 1/25/2024 0000 by Paul Harper RN  Safety Promotion/Fall Prevention:   safety round/check completed   room organization consistent   nonskid shoes/slippers when out of bed   lighting adjusted   assistive device/personal items within reach   clutter free environment maintained  Taken 1/24/2024 2200 by Paul Harper RN  Safety Promotion/Fall Prevention:   safety round/check completed   room organization  consistent   nonskid shoes/slippers when out of bed   lighting adjusted   clutter free environment maintained   assistive device/personal items within reach  Taken 1/24/2024 2050 by Paul Harper RN  Safety Promotion/Fall Prevention:   safety round/check completed   room organization consistent   nonskid shoes/slippers when out of bed   lighting adjusted   clutter free environment maintained   assistive device/personal items within reach  Intervention: Prevent Skin Injury  Recent Flowsheet Documentation  Taken 1/25/2024 0600 by Paul Harper RN  Body Position: position changed independently  Taken 1/25/2024 0400 by Paul Harper RN  Body Position: position changed independently  Taken 1/25/2024 0203 by Paul Harper RN  Body Position: position changed independently  Taken 1/25/2024 0000 by Paul Harper RN  Body Position: position changed independently  Skin Protection: adhesive use limited  Taken 1/24/2024 2200 by Paul Harper RN  Body Position: position changed independently  Taken 1/24/2024 2050 by Paul Harper RN  Body Position: position changed independently  Skin Protection: adhesive use limited  Intervention: Prevent and Manage VTE (Venous Thromboembolism) Risk  Recent Flowsheet Documentation  Taken 1/25/2024 0600 by Paul Harper RN  Activity Management: up ad valeria  Taken 1/25/2024 0400 by Paul Harper RN  Activity Management: up ad valeria  Taken 1/25/2024 0203 by Paul Harper RN  Activity Management: up ad valeria  Taken 1/25/2024 0000 by Paul Harper RN  Activity Management: up ad valeria  Range of Motion: active ROM (range of motion) encouraged  Taken 1/24/2024 2200 by Paul Harper RN  Activity Management: up ad valeria  Taken 1/24/2024 2050 by Paul Harper RN  Activity Management: up ad valeria  Range of Motion: active ROM (range of motion) encouraged  Intervention: Prevent  Infection  Recent Flowsheet Documentation  Taken 1/25/2024 0600 by Paul Harper RN  Infection Prevention:   rest/sleep promoted   personal protective equipment utilized   hand hygiene promoted   environmental surveillance performed  Taken 1/25/2024 0400 by Paul Harper RN  Infection Prevention:   rest/sleep promoted   personal protective equipment utilized   hand hygiene promoted   environmental surveillance performed  Taken 1/25/2024 0203 by Paul Harper RN  Infection Prevention:   rest/sleep promoted   personal protective equipment utilized   hand hygiene promoted   environmental surveillance performed  Taken 1/25/2024 0000 by Paul Harper RN  Infection Prevention:   rest/sleep promoted   personal protective equipment utilized   hand hygiene promoted   environmental surveillance performed  Taken 1/24/2024 2200 by Paul Harper RN  Infection Prevention:   rest/sleep promoted   personal protective equipment utilized   hand hygiene promoted   environmental surveillance performed  Taken 1/24/2024 2050 by Paul Harper RN  Infection Prevention:   rest/sleep promoted   personal protective equipment utilized   hand hygiene promoted   environmental surveillance performed  Goal: Optimal Comfort and Wellbeing  Outcome: Ongoing, Progressing  Intervention: Monitor Pain and Promote Comfort  Recent Flowsheet Documentation  Taken 1/25/2024 0203 by Paul Harper RN  Pain Management Interventions:   see MAR   unnecessary movement minimized   relaxation techniques promoted   position adjusted   pillow support provided  Taken 1/24/2024 2105 by Paul Harper RN  Pain Management Interventions:   see MAR   unnecessary movement minimized   pillow support provided   position adjusted   relaxation techniques promoted  Intervention: Provide Person-Centered Care  Recent Flowsheet Documentation  Taken 1/25/2024 0000 by Paul Harper  RN  Trust Relationship/Rapport:   care explained   choices provided   questions encouraged   questions answered  Taken 1/24/2024 2050 by Paul Harper RN  Trust Relationship/Rapport:   care explained   choices provided   questions answered   questions encouraged  Goal: Readiness for Transition of Care  Outcome: Ongoing, Progressing     Problem: Fall Injury Risk  Goal: Absence of Fall and Fall-Related Injury  Outcome: Ongoing, Progressing  Intervention: Promote Injury-Free Environment  Recent Flowsheet Documentation  Taken 1/25/2024 0600 by Paul Harper RN  Safety Promotion/Fall Prevention:   safety round/check completed   room organization consistent   nonskid shoes/slippers when out of bed   lighting adjusted   clutter free environment maintained   assistive device/personal items within reach  Taken 1/25/2024 0400 by Paul Harper RN  Safety Promotion/Fall Prevention:   safety round/check completed   room organization consistent   nonskid shoes/slippers when out of bed   lighting adjusted   clutter free environment maintained   assistive device/personal items within reach  Taken 1/25/2024 0203 by Paul Harper RN  Safety Promotion/Fall Prevention:   safety round/check completed   room organization consistent   nonskid shoes/slippers when out of bed   lighting adjusted   clutter free environment maintained   assistive device/personal items within reach  Taken 1/25/2024 0000 by Paul Harper RN  Safety Promotion/Fall Prevention:   safety round/check completed   room organization consistent   nonskid shoes/slippers when out of bed   lighting adjusted   assistive device/personal items within reach   clutter free environment maintained  Taken 1/24/2024 2200 by Paul Harper RN  Safety Promotion/Fall Prevention:   safety round/check completed   room organization consistent   nonskid shoes/slippers when out of bed   lighting adjusted   clutter free  environment maintained   assistive device/personal items within reach  Taken 1/24/2024 2050 by Paul Harper RN  Safety Promotion/Fall Prevention:   safety round/check completed   room organization consistent   nonskid shoes/slippers when out of bed   lighting adjusted   clutter free environment maintained   assistive device/personal items within reach     Problem: Skin Injury Risk Increased  Goal: Skin Health and Integrity  Outcome: Ongoing, Progressing  Intervention: Optimize Skin Protection  Recent Flowsheet Documentation  Taken 1/25/2024 0600 by Paul Harper RN  Head of Bed (HOB) Positioning: HOB elevated  Taken 1/25/2024 0400 by Paul Harper RN  Head of Bed (HOB) Positioning: HOB elevated  Taken 1/25/2024 0203 by Paul Harper RN  Head of Bed (HOB) Positioning: HOB elevated  Taken 1/25/2024 0000 by Paul Harper RN  Pressure Reduction Techniques:   frequent weight shift encouraged   weight shift assistance provided  Head of Bed (HOB) Positioning: HOB elevated  Pressure Reduction Devices:   specialty bed utilized   positioning supports utilized  Skin Protection: adhesive use limited  Taken 1/24/2024 2200 by Paul Harper RN  Head of Bed (HOB) Positioning: HOB elevated  Taken 1/24/2024 2050 by Paul Harper RN  Pressure Reduction Techniques:   frequent weight shift encouraged   weight shift assistance provided  Head of Bed (HOB) Positioning: HOB elevated  Pressure Reduction Devices:   specialty bed utilized   positioning supports utilized  Skin Protection: adhesive use limited   Goal Outcome Evaluation:  Plan of Care Reviewed With: patient        Progress: improving  Outcome Evaluation: AOx4, VSS, SR on telemetry monitor, room air, PRN pain medications given, up ad valeria, spouse at bedside, resting comfortably the whole night, call light within reach.

## 2024-01-26 LAB
BACTERIA SPEC AEROBE CULT: NORMAL
BACTERIA SPEC AEROBE CULT: NORMAL

## 2024-01-26 NOTE — NURSING NOTE
Patient discharged per order. IV and heart monitor removed. AVS reviewed with patient, copy given, and all questions answered. Patient showed no signs of distress at discharge

## 2024-01-26 NOTE — OUTREACH NOTE
Prep Survey      Flowsheet Row Responses   [facility]? [place]   Is LACE score < 7 ? No   Eligibility Readm Mgmt   Discharge diagnosis splenic hematomasmall left-sided pleural effusion.oil embolization of the splenic artery and right femoral artery exploration with retrieval of a dislodged coil.   Does the patient have one of the following disease processes/diagnoses(primary or secondary)? General Surgery   Does the patient have Home health ordered? No   Is there a DME ordered? No   Prep survey completed? Yes            TESSA MADDEN - Registered Nurse

## 2024-01-26 NOTE — CASE MANAGEMENT/SOCIAL WORK
Case Management Discharge Note      Final Note: Home with outpt acu follow up at [facility] for vaccines in March. Evelia rn/ccp         Selected Continued Care - Discharged on 1/25/2024 Admission date: 1/21/2024 - Discharge disposition: Home or Self Care      Destination    No services have been selected for the patient.                Durable Medical Equipment    No services have been selected for the patient.                Dialysis/Infusion    No services have been selected for the patient.                Home Medical Care    No services have been selected for the patient.                Therapy    No services have been selected for the patient.                Community Resources    No services have been selected for the patient.                Community & DME    No services have been selected for the patient.                         Final Discharge Disposition Code: 01 - home or self-care

## 2024-01-30 ENCOUNTER — READMISSION MANAGEMENT (OUTPATIENT)
Dept: CALL CENTER | Facility: HOSPITAL | Age: 44
End: 2024-01-30
Payer: MEDICAID

## 2024-01-30 NOTE — OUTREACH NOTE
General Surgery Week 1 Survey      Flowsheet Row Responses   [facility] patient discharged from? [place]   Does the patient have one of the following disease processes/diagnoses(primary or secondary)? General Surgery   Week 1 attempt successful? No   Unsuccessful attempts Attempt 1            Lupe ARTHUR - Licensed Nurse

## 2024-02-01 ENCOUNTER — READMISSION MANAGEMENT (OUTPATIENT)
Dept: CALL CENTER | Facility: HOSPITAL | Age: 44
End: 2024-02-01
Payer: MEDICAID

## 2024-02-01 NOTE — OUTREACH NOTE
General Surgery Week 1 Survey      Flowsheet Row Responses   [facility] patient discharged from? [place]   Does the patient have one of the following disease processes/diagnoses(primary or secondary)? General Surgery   Week 1 attempt successful? No   Unsuccessful attempts Attempt 2            Dioni SAMSON - Registered Nurse

## 2024-02-06 ENCOUNTER — READMISSION MANAGEMENT (OUTPATIENT)
Dept: CALL CENTER | Facility: HOSPITAL | Age: 44
End: 2024-02-06
Payer: MEDICAID

## 2024-02-06 NOTE — OUTREACH NOTE
General Surgery Week 3 Survey      Flowsheet Row Responses   [facility] patient discharged from? [place]   Does the patient have one of the following disease processes/diagnoses(primary or secondary)? General Surgery   Discharge diagnosis splenic hematomasmall left-sided pleural effusion.oil embolization of the splenic artery and right femoral artery exploration with retrieval of a dislodged coil.            SANDRA VITAL - Registered Nurse

## 2024-03-11 ENCOUNTER — OFFICE (OUTPATIENT)
Dept: URBAN - METROPOLITAN AREA CLINIC 64 | Facility: CLINIC | Age: 44
End: 2024-03-11
Payer: COMMERCIAL

## 2024-03-11 VITALS
SYSTOLIC BLOOD PRESSURE: 107 MMHG | HEIGHT: 71 IN | WEIGHT: 132 LBS | DIASTOLIC BLOOD PRESSURE: 79 MMHG | HEART RATE: 97 BPM

## 2024-03-11 DIAGNOSIS — R10.13 EPIGASTRIC PAIN: ICD-10-CM

## 2024-03-11 DIAGNOSIS — R11.2 NAUSEA WITH VOMITING, UNSPECIFIED: ICD-10-CM

## 2024-03-11 DIAGNOSIS — K86.1 OTHER CHRONIC PANCREATITIS: ICD-10-CM

## 2024-03-11 PROCEDURE — 99213 OFFICE O/P EST LOW 20 MIN: CPT | Performed by: NURSE PRACTITIONER

## 2024-03-11 RX ORDER — ONDANSETRON HYDROCHLORIDE 4 MG/1
8 TABLET, FILM COATED ORAL
Qty: 90 | Refills: 3 | Status: ACTIVE
Start: 2024-03-11

## 2024-03-21 ENCOUNTER — HOSPITAL ENCOUNTER (OUTPATIENT)
Dept: INFUSION THERAPY | Facility: HOSPITAL | Age: 44
Discharge: HOME | End: 2024-03-21
Payer: MEDICAID

## 2024-03-21 VITALS
DIASTOLIC BLOOD PRESSURE: 83 MMHG | TEMPERATURE: 96.9 F | OXYGEN SATURATION: 100 % | SYSTOLIC BLOOD PRESSURE: 113 MMHG | RESPIRATION RATE: 18 BRPM | HEART RATE: 95 BPM

## 2024-03-21 DIAGNOSIS — Q89.01 FUNCTIONAL ASPLENIA: Primary | ICD-10-CM

## 2024-03-21 PROCEDURE — 96372 THER/PROPH/DIAG INJ SC/IM: CPT

## 2024-03-21 PROCEDURE — 25010000002 MENINGOCOCCAL B SUSPENSION PREFILLED SYRINGE

## 2024-03-21 PROCEDURE — 25010000002 MENINGOCOCCAL RECONSTITUTED SOLUTION

## 2024-03-21 PROCEDURE — 90620 MENB-4C VACCINE IM: CPT

## 2024-03-21 PROCEDURE — 90471 IMMUNIZATION ADMIN: CPT

## 2024-03-21 PROCEDURE — 90734 MENACWYD/MENACWYCRM VACC IM: CPT

## 2024-03-21 PROCEDURE — 90472 IMMUNIZATION ADMIN EACH ADD: CPT

## 2024-03-21 RX ADMIN — NEISSERIA MENINGITIDIS SEROGROUP B NHBA FUSION PROTEIN ANTIGEN, NEISSERIA MENINGITIDIS SEROGROUP B FHBP FUSION PROTEIN ANTIGEN AND NEISSERIA MENINGITIDIS SEROGROUP B NADA PROTEIN ANTIGEN 0.5 ML: 50; 50; 50; 25 INJECTION, SUSPENSION INTRAMUSCULAR at 13:49

## 2024-03-21 RX ADMIN — Medication 0.5 ML: at 13:52

## 2024-03-21 NOTE — PROGRESS NOTES
Pt received 2 IM injections and stayed 15 minutes. No allergic reaction noted. Pt has previous tolerated these injections.

## 2024-04-01 ENCOUNTER — HOSPITAL ENCOUNTER (INPATIENT)
Facility: HOSPITAL | Age: 44
LOS: 6 days | Discharge: HOME | DRG: 439 | End: 2024-04-07
Attending: EMERGENCY MEDICINE | Admitting: HOSPITALIST
Payer: MEDICAID

## 2024-04-01 ENCOUNTER — APPOINTMENT (OUTPATIENT)
Dept: CT IMAGING | Facility: HOSPITAL | Age: 44
DRG: 439 | End: 2024-04-01
Payer: MEDICAID

## 2024-04-01 DIAGNOSIS — R10.9 ACUTE ABDOMINAL PAIN: ICD-10-CM

## 2024-04-01 DIAGNOSIS — D72.829 LEUKOCYTOSIS, UNSPECIFIED TYPE: ICD-10-CM

## 2024-04-01 DIAGNOSIS — K29.00 ACUTE GASTRITIS, PRESENCE OF BLEEDING UNSPECIFIED, UNSPECIFIED GASTRITIS TYPE: ICD-10-CM

## 2024-04-01 DIAGNOSIS — K76.89 LIVER CYST: ICD-10-CM

## 2024-04-01 DIAGNOSIS — R10.12 LEFT UPPER QUADRANT ABDOMINAL PAIN: Primary | ICD-10-CM

## 2024-04-01 LAB
ALBUMIN SERPL-MCNC: 4.2 G/DL (ref 3.5–5.2)
ALBUMIN/GLOB SERPL: 1.1 G/DL
ALP SERPL-CCNC: 152 U/L (ref 39–117)
ALT SERPL W P-5'-P-CCNC: 12 U/L (ref 1–33)
ANION GAP SERPL CALCULATED.3IONS-SCNC: 15.2 MMOL/L (ref 5–15)
AST SERPL-CCNC: 9 U/L (ref 1–32)
BACTERIA UR QL AUTO: ABNORMAL /HPF
BASOPHILS # BLD AUTO: 0.04 10*3/MM3 (ref 0–0.2)
BASOPHILS NFR BLD AUTO: 0.2 % (ref 0–1.5)
BILIRUB SERPL-MCNC: 0.7 MG/DL (ref 0–1.2)
BILIRUB UR QL STRIP: ABNORMAL
BUN SERPL-MCNC: 7 MG/DL (ref 6–20)
BUN/CREAT SERPL: 10.3 (ref 7–25)
CALCIUM SPEC-SCNC: 10.4 MG/DL (ref 8.6–10.5)
CHLORIDE SERPL-SCNC: 87 MMOL/L (ref 98–107)
CLARITY UR: CLEAR
CO2 SERPL-SCNC: 25.8 MMOL/L (ref 22–29)
COLOR UR: YELLOW
CREAT SERPL-MCNC: 0.68 MG/DL (ref 0.57–1)
D-LACTATE SERPL-SCNC: 1 MMOL/L (ref 0.5–2)
DEPRECATED RDW RBC AUTO: 51.8 FL (ref 37–54)
EGFRCR SERPLBLD CKD-EPI 2021: 112.4 ML/MIN/1.73
EOSINOPHIL # BLD AUTO: 0.06 10*3/MM3 (ref 0–0.4)
EOSINOPHIL NFR BLD AUTO: 0.3 % (ref 0.3–6.2)
ERYTHROCYTE [DISTWIDTH] IN BLOOD BY AUTOMATED COUNT: 14.7 % (ref 12.3–15.4)
GLOBULIN UR ELPH-MCNC: 3.7 GM/DL
GLUCOSE SERPL-MCNC: 94 MG/DL (ref 65–99)
GLUCOSE UR STRIP-MCNC: NEGATIVE MG/DL
GRAN CASTS URNS QL MICRO: ABNORMAL /LPF
HCT VFR BLD AUTO: 39.1 % (ref 34–46.6)
HGB BLD-MCNC: 13 G/DL (ref 12–15.9)
HGB UR QL STRIP.AUTO: ABNORMAL
HOLD SPECIMEN: NORMAL
HYALINE CASTS UR QL AUTO: ABNORMAL /LPF
IMM GRANULOCYTES # BLD AUTO: 0.03 10*3/MM3 (ref 0–0.05)
IMM GRANULOCYTES NFR BLD AUTO: 0.1 % (ref 0–0.5)
KETONES UR QL STRIP: ABNORMAL
LEUKOCYTE ESTERASE UR QL STRIP.AUTO: NEGATIVE
LIPASE SERPL-CCNC: 123 U/L (ref 13–60)
LYMPHOCYTES # BLD AUTO: 1.7 10*3/MM3 (ref 0.7–3.1)
LYMPHOCYTES NFR BLD AUTO: 7.6 % (ref 19.6–45.3)
MCH RBC QN AUTO: 31.3 PG (ref 26.6–33)
MCHC RBC AUTO-ENTMCNC: 33.2 G/DL (ref 31.5–35.7)
MCV RBC AUTO: 94 FL (ref 79–97)
MONOCYTES # BLD AUTO: 1.25 10*3/MM3 (ref 0.1–0.9)
MONOCYTES NFR BLD AUTO: 5.6 % (ref 5–12)
NEUTROPHILS NFR BLD AUTO: 19.21 10*3/MM3 (ref 1.7–7)
NEUTROPHILS NFR BLD AUTO: 86.2 % (ref 42.7–76)
NITRITE UR QL STRIP: NEGATIVE
PH UR STRIP.AUTO: 6 [PH] (ref 5–8)
PLATELET # BLD AUTO: 221 10*3/MM3 (ref 140–450)
PMV BLD AUTO: 10 FL (ref 6–12)
POTASSIUM SERPL-SCNC: 3.8 MMOL/L (ref 3.5–5.2)
PROCALCITONIN SERPL-MCNC: 0.42 NG/ML (ref 0–0.25)
PROT SERPL-MCNC: 7.9 G/DL (ref 6–8.5)
PROT UR QL STRIP: ABNORMAL
RBC # BLD AUTO: 4.16 10*6/MM3 (ref 3.77–5.28)
RBC # UR STRIP: ABNORMAL /HPF
REF LAB TEST METHOD: ABNORMAL
SODIUM SERPL-SCNC: 128 MMOL/L (ref 136–145)
SP GR UR STRIP: 1.02 (ref 1–1.03)
SQUAMOUS #/AREA URNS HPF: ABNORMAL /HPF
UROBILINOGEN UR QL STRIP: ABNORMAL
WBC # UR STRIP: ABNORMAL /HPF
WBC NRBC COR # BLD AUTO: 22.29 10*3/MM3 (ref 3.4–10.8)

## 2024-04-01 PROCEDURE — 99291 CRITICAL CARE FIRST HOUR: CPT

## 2024-04-01 PROCEDURE — 99285 EMERGENCY DEPT VISIT HI MDM: CPT

## 2024-04-01 PROCEDURE — 25010000002 PIPERACILLIN SOD-TAZOBACTAM PER 1 G: Performed by: HOSPITALIST

## 2024-04-01 PROCEDURE — 25810000003 SODIUM CHLORIDE 0.9 % SOLUTION: Performed by: EMERGENCY MEDICINE

## 2024-04-01 PROCEDURE — 81001 URINALYSIS AUTO W/SCOPE: CPT | Performed by: EMERGENCY MEDICINE

## 2024-04-01 PROCEDURE — 25010000002 HYDROMORPHONE PER 4 MG: Performed by: EMERGENCY MEDICINE

## 2024-04-01 PROCEDURE — 80053 COMPREHEN METABOLIC PANEL: CPT | Performed by: EMERGENCY MEDICINE

## 2024-04-01 PROCEDURE — 25010000002 PIPERACILLIN SOD-TAZOBACTAM PER 1 G: Performed by: EMERGENCY MEDICINE

## 2024-04-01 PROCEDURE — 83605 ASSAY OF LACTIC ACID: CPT | Performed by: EMERGENCY MEDICINE

## 2024-04-01 PROCEDURE — G0378 HOSPITAL OBSERVATION PER HR: HCPCS

## 2024-04-01 PROCEDURE — 85025 COMPLETE CBC W/AUTO DIFF WBC: CPT | Performed by: EMERGENCY MEDICINE

## 2024-04-01 PROCEDURE — 25010000002 HYDROMORPHONE PER 4 MG: Performed by: HOSPITALIST

## 2024-04-01 PROCEDURE — 87040 BLOOD CULTURE FOR BACTERIA: CPT | Performed by: EMERGENCY MEDICINE

## 2024-04-01 PROCEDURE — 25010000002 ONDANSETRON PER 1 MG: Performed by: EMERGENCY MEDICINE

## 2024-04-01 PROCEDURE — 25810000003 SODIUM CHLORIDE 0.9 % SOLUTION: Performed by: HOSPITALIST

## 2024-04-01 PROCEDURE — 99214 OFFICE O/P EST MOD 30 MIN: CPT | Performed by: SURGERY

## 2024-04-01 PROCEDURE — 84145 PROCALCITONIN (PCT): CPT | Performed by: HOSPITALIST

## 2024-04-01 PROCEDURE — 74177 CT ABD & PELVIS W/CONTRAST: CPT

## 2024-04-01 PROCEDURE — 99284 EMERGENCY DEPT VISIT MOD MDM: CPT | Performed by: EMERGENCY MEDICINE

## 2024-04-01 PROCEDURE — 25510000001 IOPAMIDOL PER 1 ML: Performed by: EMERGENCY MEDICINE

## 2024-04-01 PROCEDURE — 83690 ASSAY OF LIPASE: CPT | Performed by: EMERGENCY MEDICINE

## 2024-04-01 RX ORDER — AMOXICILLIN 250 MG
2 CAPSULE ORAL 2 TIMES DAILY PRN
Status: DISCONTINUED | OUTPATIENT
Start: 2024-04-01 | End: 2024-04-07 | Stop reason: HOSPADM

## 2024-04-01 RX ORDER — PROMETHAZINE HYDROCHLORIDE 25 MG/1
25 SUPPOSITORY RECTAL AS NEEDED
COMMUNITY

## 2024-04-01 RX ORDER — ACETAMINOPHEN 160 MG/5ML
650 SOLUTION ORAL EVERY 4 HOURS PRN
Status: DISCONTINUED | OUTPATIENT
Start: 2024-04-01 | End: 2024-04-07 | Stop reason: HOSPADM

## 2024-04-01 RX ORDER — ONDANSETRON 4 MG/1
4 TABLET, ORALLY DISINTEGRATING ORAL EVERY 6 HOURS PRN
Status: DISCONTINUED | OUTPATIENT
Start: 2024-04-01 | End: 2024-04-07 | Stop reason: HOSPADM

## 2024-04-01 RX ORDER — NALOXONE HCL 0.4 MG/ML
0.4 VIAL (ML) INJECTION
Status: DISCONTINUED | OUTPATIENT
Start: 2024-04-01 | End: 2024-04-07 | Stop reason: HOSPADM

## 2024-04-01 RX ORDER — AMITRIPTYLINE HYDROCHLORIDE 50 MG/1
50 TABLET, FILM COATED ORAL NIGHTLY
Status: DISCONTINUED | OUTPATIENT
Start: 2024-04-01 | End: 2024-04-07 | Stop reason: HOSPADM

## 2024-04-01 RX ORDER — THIAMINE HYDROCHLORIDE 100 MG/ML
200 INJECTION, SOLUTION INTRAMUSCULAR; INTRAVENOUS EVERY 8 HOURS SCHEDULED
Status: DISPENSED | OUTPATIENT
Start: 2024-04-01 | End: 2024-04-06

## 2024-04-01 RX ORDER — ACETAMINOPHEN 650 MG/1
650 SUPPOSITORY RECTAL EVERY 4 HOURS PRN
Status: DISCONTINUED | OUTPATIENT
Start: 2024-04-01 | End: 2024-04-07 | Stop reason: HOSPADM

## 2024-04-01 RX ORDER — SODIUM CHLORIDE 0.9 % (FLUSH) 0.9 %
10 SYRINGE (ML) INJECTION AS NEEDED
Status: DISCONTINUED | OUTPATIENT
Start: 2024-04-01 | End: 2024-04-07 | Stop reason: HOSPADM

## 2024-04-01 RX ORDER — LORAZEPAM 2 MG/ML
2 INJECTION INTRAMUSCULAR
Status: ACTIVE | OUTPATIENT
Start: 2024-04-01 | End: 2024-04-06

## 2024-04-01 RX ORDER — HYDROMORPHONE HYDROCHLORIDE 1 MG/ML
0.5 INJECTION, SOLUTION INTRAMUSCULAR; INTRAVENOUS; SUBCUTANEOUS ONCE
Status: COMPLETED | OUTPATIENT
Start: 2024-04-01 | End: 2024-04-01

## 2024-04-01 RX ORDER — PANTOPRAZOLE SODIUM 40 MG/1
40 TABLET, DELAYED RELEASE ORAL DAILY
Status: DISCONTINUED | OUTPATIENT
Start: 2024-04-01 | End: 2024-04-01

## 2024-04-01 RX ORDER — OXYCODONE AND ACETAMINOPHEN 10; 325 MG/1; MG/1
1 TABLET ORAL AS NEEDED
COMMUNITY
End: 2024-04-07 | Stop reason: HOSPADM

## 2024-04-01 RX ORDER — ONDANSETRON 2 MG/ML
4 INJECTION INTRAMUSCULAR; INTRAVENOUS ONCE
Status: COMPLETED | OUTPATIENT
Start: 2024-04-01 | End: 2024-04-01

## 2024-04-01 RX ORDER — ONDANSETRON 2 MG/ML
4 INJECTION INTRAMUSCULAR; INTRAVENOUS EVERY 6 HOURS PRN
Status: DISCONTINUED | OUTPATIENT
Start: 2024-04-01 | End: 2024-04-07 | Stop reason: HOSPADM

## 2024-04-01 RX ORDER — SODIUM CHLORIDE 0.9 % (FLUSH) 0.9 %
10 SYRINGE (ML) INJECTION EVERY 12 HOURS SCHEDULED
Status: DISCONTINUED | OUTPATIENT
Start: 2024-04-01 | End: 2024-04-07 | Stop reason: HOSPADM

## 2024-04-01 RX ORDER — ACETAMINOPHEN 325 MG/1
650 TABLET ORAL EVERY 4 HOURS PRN
Status: DISCONTINUED | OUTPATIENT
Start: 2024-04-01 | End: 2024-04-07 | Stop reason: HOSPADM

## 2024-04-01 RX ORDER — LORAZEPAM 2 MG/ML
1 INJECTION INTRAMUSCULAR
Status: ACTIVE | OUTPATIENT
Start: 2024-04-01 | End: 2024-04-06

## 2024-04-01 RX ORDER — SODIUM CHLORIDE 9 MG/ML
125 INJECTION, SOLUTION INTRAVENOUS CONTINUOUS
Status: DISCONTINUED | OUTPATIENT
Start: 2024-04-01 | End: 2024-04-07 | Stop reason: HOSPADM

## 2024-04-01 RX ORDER — LORAZEPAM 1 MG/1
1 TABLET ORAL
Status: ACTIVE | OUTPATIENT
Start: 2024-04-01 | End: 2024-04-06

## 2024-04-01 RX ORDER — BISACODYL 5 MG/1
5 TABLET, DELAYED RELEASE ORAL DAILY PRN
Status: DISCONTINUED | OUTPATIENT
Start: 2024-04-01 | End: 2024-04-07 | Stop reason: HOSPADM

## 2024-04-01 RX ORDER — SODIUM CHLORIDE 9 MG/ML
40 INJECTION, SOLUTION INTRAVENOUS AS NEEDED
Status: DISCONTINUED | OUTPATIENT
Start: 2024-04-01 | End: 2024-04-07 | Stop reason: HOSPADM

## 2024-04-01 RX ORDER — LORAZEPAM 1 MG/1
1 TABLET ORAL EVERY 6 HOURS
Status: ACTIVE | OUTPATIENT
Start: 2024-04-02 | End: 2024-04-03

## 2024-04-01 RX ORDER — OXYCODONE AND ACETAMINOPHEN 10; 325 MG/1; MG/1
1 TABLET ORAL EVERY 4 HOURS PRN
Status: DISCONTINUED | OUTPATIENT
Start: 2024-04-01 | End: 2024-04-07 | Stop reason: HOSPADM

## 2024-04-01 RX ORDER — PROMETHAZINE HYDROCHLORIDE 25 MG/1
25 SUPPOSITORY RECTAL EVERY 4 HOURS PRN
Status: DISCONTINUED | OUTPATIENT
Start: 2024-04-01 | End: 2024-04-07 | Stop reason: HOSPADM

## 2024-04-01 RX ORDER — POLYETHYLENE GLYCOL 3350 17 G/17G
17 POWDER, FOR SOLUTION ORAL DAILY PRN
Status: DISCONTINUED | OUTPATIENT
Start: 2024-04-01 | End: 2024-04-07 | Stop reason: HOSPADM

## 2024-04-01 RX ORDER — LORAZEPAM 1 MG/1
2 TABLET ORAL EVERY 6 HOURS
Status: DISPENSED | OUTPATIENT
Start: 2024-04-01 | End: 2024-04-02

## 2024-04-01 RX ORDER — BISACODYL 10 MG
10 SUPPOSITORY, RECTAL RECTAL DAILY PRN
Status: DISCONTINUED | OUTPATIENT
Start: 2024-04-01 | End: 2024-04-07 | Stop reason: HOSPADM

## 2024-04-01 RX ORDER — LORAZEPAM 1 MG/1
2 TABLET ORAL
Status: ACTIVE | OUTPATIENT
Start: 2024-04-01 | End: 2024-04-06

## 2024-04-01 RX ORDER — HYDROMORPHONE HYDROCHLORIDE 1 MG/ML
0.5 INJECTION, SOLUTION INTRAMUSCULAR; INTRAVENOUS; SUBCUTANEOUS
Status: DISCONTINUED | OUTPATIENT
Start: 2024-04-01 | End: 2024-04-05

## 2024-04-01 RX ORDER — PANTOPRAZOLE SODIUM 40 MG/10ML
40 INJECTION, POWDER, LYOPHILIZED, FOR SOLUTION INTRAVENOUS
Status: DISCONTINUED | OUTPATIENT
Start: 2024-04-01 | End: 2024-04-05

## 2024-04-01 RX ADMIN — FOLIC ACID 1 MG: 5 INJECTION, SOLUTION INTRAMUSCULAR; INTRAVENOUS; SUBCUTANEOUS at 22:06

## 2024-04-01 RX ADMIN — Medication 100 MG: at 22:09

## 2024-04-01 RX ADMIN — IOPAMIDOL 85 ML: 755 INJECTION, SOLUTION INTRAVENOUS at 13:23

## 2024-04-01 RX ADMIN — AMITRIPTYLINE HYDROCHLORIDE 50 MG: 50 TABLET, FILM COATED ORAL at 22:08

## 2024-04-01 RX ADMIN — HYDROMORPHONE HYDROCHLORIDE 0.5 MG: 1 INJECTION, SOLUTION INTRAMUSCULAR; INTRAVENOUS; SUBCUTANEOUS at 22:06

## 2024-04-01 RX ADMIN — HYDROMORPHONE HYDROCHLORIDE 0.5 MG: 1 INJECTION, SOLUTION INTRAMUSCULAR; INTRAVENOUS; SUBCUTANEOUS at 15:41

## 2024-04-01 RX ADMIN — HYDROMORPHONE HYDROCHLORIDE 0.5 MG: 1 INJECTION, SOLUTION INTRAMUSCULAR; INTRAVENOUS; SUBCUTANEOUS at 13:17

## 2024-04-01 RX ADMIN — PIPERACILLIN SODIUM AND TAZOBACTAM SODIUM 3.38 G: 3; .375 INJECTION, POWDER, LYOPHILIZED, FOR SOLUTION INTRAVENOUS at 23:29

## 2024-04-01 RX ADMIN — SODIUM CHLORIDE 1000 ML: 9 INJECTION, SOLUTION INTRAVENOUS at 12:09

## 2024-04-01 RX ADMIN — HYDROMORPHONE HYDROCHLORIDE 0.5 MG: 1 INJECTION, SOLUTION INTRAMUSCULAR; INTRAVENOUS; SUBCUTANEOUS at 18:45

## 2024-04-01 RX ADMIN — Medication 10 ML: at 22:14

## 2024-04-01 RX ADMIN — PANTOPRAZOLE SODIUM 40 MG: 40 INJECTION, POWDER, LYOPHILIZED, FOR SOLUTION INTRAVENOUS at 22:05

## 2024-04-01 RX ADMIN — PIPERACILLIN AND TAZOBACTAM 4.5 G: 4; .5 INJECTION, POWDER, FOR SOLUTION INTRAVENOUS at 13:02

## 2024-04-01 RX ADMIN — SODIUM CHLORIDE 125 ML/HR: 9 INJECTION, SOLUTION INTRAVENOUS at 18:57

## 2024-04-01 RX ADMIN — ONDANSETRON 4 MG: 2 INJECTION INTRAMUSCULAR; INTRAVENOUS at 12:09

## 2024-04-01 RX ADMIN — HYDROMORPHONE HYDROCHLORIDE 0.5 MG: 1 INJECTION, SOLUTION INTRAMUSCULAR; INTRAVENOUS; SUBCUTANEOUS at 12:09

## 2024-04-01 NOTE — H&P
HISTORY AND PHYSICAL   [facility]        Patient Identification:  Name: Piper Cain  Age: 43 y.o.  Sex: female  :  1980  MRN: [id number]                     Primary Care Physician: Sharmaine Gatica APRN    Chief Complaint: Abdominal pain    History of Present Illness:       The patient is a 43-year-old female with left upper quadrant pain for several days with a past medical history of alcohol induced pancreatitis and spleen congestion. She had a splenic artery embolization 24 and same day right mesenteric femoral embolization and right popliteal thrombectomy. At the time they decided to try this instead of splenectomy. Patient says since she is having problems today and has had for several days is likely will take her spleen out this time. Patient says that the stabbing pain now that is escalating and is just like the pain she had when she had the spleen surgery in January and less like her pancreatitis which because she is not vomiting like she does with that. And the pain is different. No known fever and the pain does not more into her back but every time she tries to take breath it makes it worse. She gets hot with morphine so she prefers Dilaudid as a pain control.  The patient was evaluated in the emergency room on [facility] and transferred to [facility] for direct admission for further evaluation and treatment.  CT scanning showed some fluid collections and gastric thickening and possible changes of pancreatitis.  The patient has been started on some antibiotics in the ER and given some pain and nausea medicine.  The patient states that she has not been able to eat for 4 days.  She did start drinking again and stopped about 5 days ago.            Past Medical History:  Past Medical History:   Diagnosis Date    Anemia     Anxiety     Constipation     Cyst of spleen     Diarrhea     E. coli bacteremia     ETOH abuse     Frequent UTI     GERD (gastroesophageal  reflux disease)     Hiatal hernia     Left flank pain 10/21/2022    Migraine     Miscarriage 2004    Pancreatitis     Pseudocyst of pancreas      Past Surgical History:  Past Surgical History:   Procedure Laterality Date    EMBOLIZATION MESENTERIC ARTERY N/A 1/22/2024    Procedure: OR EMBOLIZATION MESENTERIC ARTERY RIGHT FEMORAL POPLITEAL THROMBECTOMY;  Surgeon: Carine Pena Jr., MD;  Location: [facility] HYBRID OR;  Service: Vascular;  Laterality: N/A;    ENDOSCOPY N/A 08/10/2022    Procedure: ESOPHAGOGASTRODUODENOSCOPY with bxs;  Surgeon: Salvador Price MD;  Location: [facility] ENDOSCOPY;  Service: Gastroenterology;  Laterality: N/A;  Pre: r/o esophageal  varacies, abd pain  Post: esophageal plaque    ENDOSCOPY N/A 10/9/2023    Procedure: ESOPHAGOGASTRODUODENOSCOPY WITH STENT REMOVAL;  Surgeon: Red Denson MD;  Location: [facility] ENDOSCOPY;  Service: Gastroenterology;  Laterality: N/A;    PANCREATIC CYST DRAINAGE      x 2    UPPER ENDOSCOPIC ULTRASOUND W/ FNA N/A 9/13/2023    Procedure: Esophagogastroduodenoscopy with biopsy x 1 area and endoscopic ultrasound with placement of cyst gastrostomy;  Surgeon: Red Denson MD;  Location: [facility] ENDOSCOPY;  Service: Gastroenterology;  Laterality: N/A;  post: pancreatic psuedocyst    WISDOM TOOTH EXTRACTION        Home Meds:  Medications Prior to Admission   Medication Sig Dispense Refill Last Dose    amitriptyline (ELAVIL) 50 MG tablet Take 1 tablet by mouth Every Night.       Ferrous Sulfate Dried (Feosol) 200 (65 Fe) MG tablet tablet Take 1 tablet by mouth 2 (Two) Times a Day.       folic acid (FOLVITE) 1 MG tablet Take 1 tablet by mouth Daily. 30 tablet 3     magnesium oxide (MAG-OX) 400 MG tablet Take 1 tablet by mouth Daily.       ondansetron (ZOFRAN) 4 MG tablet Take 1 tablet by mouth Every 8 (Eight) Hours As Needed for Nausea or Vomiting.       pantoprazole (PROTONIX) 40 MG EC tablet Take 1 tablet by mouth Daily. 30 tablet 3     thiamine (VITAMIN B1) 100  MG tablet Take 1 tablet by mouth Daily. 30 tablet 3      Current meds    Current Facility-Administered Medications:     sodium chloride 0.9 % flush 10 mL, 10 mL, Intravenous, PRN, Caren Medina MD  Allergies:  Allergies   Allergen Reactions    Nickel Rash     Immunizations:  Immunization History   Administered Date(s) Administered    Hib (PRP-T) 01/25/2024    Meningococcal B,(Bexsero) 01/25/2024, 03/21/2024    Meningococcal Conjugate 01/25/2024, 03/21/2024    Pneumococcal Conjugate 20-Valent (PCV20) 01/25/2024     Social History:   Social History     Social History Narrative    Not on file     Social History     Socioeconomic History    Marital status: Single   Tobacco Use    Smoking status: Every Day     Current packs/day: 0.50     Average packs/day: 0.5 packs/day for 28.2 years (14.1 ttl pk-yrs)     Types: Cigarettes     Start date: 1/28/1996     Passive exposure: Current    Smokeless tobacco: Never   Vaping Use    Vaping status: Never Used   Substance and Sexual Activity    Alcohol use: Not Currently    Drug use: Never    Sexual activity: Defer     Partners: Male       Family History:  Family History   Problem Relation Age of Onset    Alcohol abuse Mother     Arthritis Mother     Asthma Mother     Miscarriages / Stillbirths Mother     Cancer Father     Diabetes Father     Drug abuse Father     Early death Father     Heart disease Father     Hyperlipidemia Father     Hypertension Father     Alcohol abuse Paternal Aunt     Cancer Paternal Aunt     Diabetes Paternal Aunt     Drug abuse Paternal Aunt     Early death Paternal Aunt     Heart disease Paternal Aunt     Hyperlipidemia Paternal Aunt     Hypertension Paternal Aunt     Alcohol abuse Maternal Grandmother     Alcohol abuse Maternal Grandfather     Alcohol abuse Paternal Grandmother     Cancer Paternal Grandmother     Diabetes Paternal Grandmother     Drug abuse Paternal Grandmother     Early death Paternal Grandmother     Heart disease Paternal Grandmother   "   Hyperlipidemia Paternal Grandmother     Hypertension Paternal Grandmother     Alcohol abuse Paternal Grandfather         Review of Systems  See history of present illness and past medical history.  Patient denies headache, dizziness, syncope, falls, trauma, change in vision, change in hearing, change in taste, changes in weight, changes in appetite, focal weakness, numbness, or paresthesia.  Patient denies chest pain, palpitations, dyspnea, orthopnea, PND, cough, sinus pressure, rhinorrhea, epistaxis, hemoptysis, nausea, vomiting, hematemesis, diarrhea, constipation or hematochezia. Denies cold or heat intolerance, polydipsia, polyuria, polyphagia. Denies hematuria, pyuria, dysuria, hesitancy, frequency or urgency. Denies consumption of raw and under cooked meats foods or change in water source.  Denies fever, chills, sweats, night sweats.  Denies missing any routine medications. Remainder of ROS is negative.    Objective:  tMax 24 hrs: Temp (24hrs), Av.4 °F (36.9 °C), Min:98.3 °F (36.8 °C), Max:98.5 °F (36.9 °C)    Vitals Ranges:   Temp:  [98.3 °F (36.8 °C)-98.5 °F (36.9 °C)] 98.3 °F (36.8 °C)  Heart Rate:  [106-133] 111  Resp:  [16-18] 16  BP: (103-110)/(83-93) 110/83      Exam:  /83   Pulse 111   Temp 98.3 °F (36.8 °C) (Oral)   Resp 16   Ht 177.8 cm (70\")   Wt 58.1 kg (128 lb)   LMP 2018 (Exact Date)   SpO2 99%   BMI 18.37 kg/m²     General Appearance:    Alert, cooperative, no distress, appears stated age   Head:    Normocephalic, without obvious abnormality, atraumatic   Eyes:    PERRL, conjunctivae/corneas clear, EOM's intact, both eyes   Ears:    Normal external ear canals, both ears   Nose:   Nares normal, septum midline, mucosa normal, no drainage    or sinus tenderness   Throat:   Lips, mucosa, and tongue normal   Neck:   Supple, symmetrical, trachea midline, no adenopathy;     thyroid:  no enlargement/tenderness/nodules; no carotid    bruit or JVD   Back:     Symmetric, no " curvature, ROM normal, no CVA tenderness   Lungs:     Clear to auscultation bilaterally, respirations unlabored   Chest Wall:    No tenderness or deformity    Heart:    Regular rate and rhythm, S1 and S2 normal, no murmur, rub   or gallop   Abdomen:     Soft, nontender, bowel sounds active all four quadrants,     no masses, no hepatomegaly, no splenomegaly   Extremities:   Extremities normal, atraumatic, no cyanosis or edema   Pulses:   2+ and symmetric all extremities   Skin:   Skin color, texture, turgor normal, no rashes or lesions   Lymph nodes:   Cervical, supraclavicular, and axillary nodes normal   Neurologic:   CNII-XII intact, normal strength, sensation intact throughout      .    Data Review:  Lab Results (last 72 hours)       Procedure Component Value Units Date/Time    Urinalysis, Microscopic Only - Urine, Clean Catch [[id number]]  (Abnormal) Collected: 04/01/24 1209    Specimen: Urine, Clean Catch Updated: 04/01/24 1411     RBC, UA None Seen /HPF      WBC, UA 0-2 /HPF      Bacteria, UA 1+ /HPF      Squamous Epithelial Cells, UA 13-20 /HPF      Hyaline Casts, UA None Seen /LPF      Granular Casts, UA 0-2 /LPF      Methodology Manual Light Microscopy    [place] Urine Culture Tube - Urine, Clean Catch [[id number]] Collected: 04/01/24 1209    Specimen: Urine, Clean Catch Updated: 04/01/24 1347     Extra Tube Hold for add-ons.     Comment: Auto resulted.       Lactic Acid, Plasma [[id number]]  (Normal) Collected: 04/01/24 1243    Specimen: Blood Updated: 04/01/24 1302     Lactate 1.0 mmol/L     Blood Culture - Blood, Arm, Right [[id number]] Collected: 04/01/24 1258    Specimen: Blood from Arm, Right Updated: 04/01/24 1302    Comprehensive Metabolic Panel [[id number]]  (Abnormal) Collected: 04/01/24 1200    Specimen: Blood Updated: 04/01/24 1226     Glucose 94 mg/dL      BUN 7 mg/dL      Creatinine 0.68 mg/dL      Sodium 128 mmol/L      Potassium 3.8 mmol/L      Chloride 87 mmol/L      CO2 25.8 mmol/L      Calcium  10.4 mg/dL      Total Protein 7.9 g/dL      Albumin 4.2 g/dL      ALT (SGPT) 12 U/L      AST (SGOT) 9 U/L      Alkaline Phosphatase 152 U/L      Total Bilirubin 0.7 mg/dL      Globulin 3.7 gm/dL      A/G Ratio 1.1 g/dL      BUN/Creatinine Ratio 10.3     Anion Gap 15.2 mmol/L      eGFR 112.4 mL/min/1.73     Narrative:      GFR Normal >60  Chronic Kidney Disease <60  Kidney Failure <15      Lipase [[id number]]  (Abnormal) Collected: 04/01/24 1200    Specimen: Blood Updated: 04/01/24 1226     Lipase 123 U/L     Urinalysis With Microscopic If Indicated (No Culture) - Urine, Clean Catch [[id number]]  (Abnormal) Collected: 04/01/24 1209    Specimen: Urine, Clean Catch Updated: 04/01/24 1211     Color, UA Yellow     Appearance, UA Clear     pH, UA 6.0     Specific Gravity, UA 1.020     Glucose, UA Negative     Ketones, UA >=160 mg/dL (4+)     Bilirubin, UA Moderate (2+)     Blood, UA Trace     Protein, UA 30 mg/dL (1+)     Leuk Esterase, UA Negative     Nitrite, UA Negative     Urobilinogen, UA 0.2 E.U./dL    CBC & Differential [[id number]]  (Abnormal) Collected: 04/01/24 1200    Specimen: Blood Updated: 04/01/24 1207    Narrative:      The following orders were created for panel order CBC & Differential.  Procedure                               Abnormality         Status                     ---------                               -----------         ------                     CBC Auto Differential[[id number]]        Abnormal            Final result                 Please view results for these tests on the individual orders.    CBC Auto Differential [[id number]]  (Abnormal) Collected: 04/01/24 1200    Specimen: Blood Updated: 04/01/24 1207     WBC 22.29 10*3/mm3      RBC 4.16 10*6/mm3      Hemoglobin 13.0 g/dL      Hematocrit 39.1 %      MCV 94.0 fL      MCH 31.3 pg      MCHC 33.2 g/dL      RDW 14.7 %      RDW-SD 51.8 fl      MPV 10.0 fL      Platelets 221 10*3/mm3      Neutrophil % 86.2 %      Lymphocyte % 7.6 %      Monocyte  % 5.6 %      Eosinophil % 0.3 %      Basophil % 0.2 %      Immature Grans % 0.1 %      Neutrophils, Absolute 19.21 10*3/mm3      Lymphocytes, Absolute 1.70 10*3/mm3      Monocytes, Absolute 1.25 10*3/mm3      Eosinophils, Absolute 0.06 10*3/mm3      Basophils, Absolute 0.04 10*3/mm3      Immature Grans, Absolute 0.03 10*3/mm3                      Imaging Results (All)       Procedure Component Value Units Date/Time    CT Abdomen Pelvis With Contrast [[id number]] Collected: 04/01/24 1400     Updated: 04/01/24 1409    Narrative:      CT ABDOMEN PELVIS W CONTRAST-     Date of Exam: 4/1/2024 12:55 PM     Indication: Where states: Known history of splenic embolization.  Pancreatitis. Left upper quadrant pain. Elevated white blood cell count,  unspecified.     Comparison Exams: 1/21/2024, 1/4/2024, 9/5/2023, and 8/22/2020.     Technique: Multiple contiguous axial images were acquired through the  abdomen and pelvis following the intravenous administration of 85 mL of  Isovue-370. Reformatted coronal and sagittal sequences were also  reviewed. Radiation dose reduction techniques were utilized, including  automated exposure control and exposure modulation based on body size.     FINDINGS:  Mild multifocal subsegmental atelectasis and/or scarring in each lung  base.     The predominantly low-density fluid collection along the inferior margin  of the spleen, anterior to the left kidney, and along the distal tip of  the tail the pancreas now measures approximately 5.5 cm x 3.5 cm by my  measurements (axial series 2 image 41), previously 6.5 cm x 3 cm by my  measurements. A small 1 cm splenic artery aneurysm extends into the  anterior aspect of this collection. Decreased size of the now  low-density lenticular fluid collection along the superior posterior  margin of the spleen, measuring up to 2.5 cm in thickness.     New multilobulated fluid collection along the posterior margin of the  left lobe of the liver and the anterior  margin of the stomach, which is  difficult to measure due to its irregular shape but extends  approximately 10 cm in craniocaudal length.     Additional peripancreatic fluid collection along the anterior pancreatic  body, measuring 3 cm x 1 cm (axial series 2 image 48). Stable pancreatic  parenchymal calcifications consistent with sequela of chronic  pancreatitis. Mild peripancreatic fat stranding.     Severe low-density gastric wall thickening, greatest anteriorly. Mild  colonic stool. Colonic diverticula, without CT evidence of  diverticulitis no bowel obstruction or significant small or large bowel  wall thickening. The appendix is normal.     Mildly heterogeneous hepatic attenuation with subtle nodular liver  contours, suggesting underlying hepatocellular disease/cirrhosis.  Diffuse gallbladder wall thickening could be related to hepatocellular  disease. Decreased conspicuity of the previously seen splenic  lacerations. The adrenal glands and kidneys are unremarkable. The  urinary bladder, uterus, and adnexa are unremarkable in CT appearance.     Trace free fluid in the pelvis. No free intraperitoneal air. Venous  varicosities in the upper abdomen. No definite pathologically enlarged  lymph nodes. Mild calcified atherosclerotic disease in the abdominal  aorta without aneurysm. Multiple surgical clips in the right inguinal  region.     No acute osseous abnormality or concerning osseous lesion.       Impression:         1. Severe diffuse predominantly low-density gastric wall thickening,  greatest anteriorly, likely severe gastritis.  2. New fluid collection along the posterior wall of the left lobe of the  liver anterior to the stomach, probable pseudocyst, with additional  pseudocysts in the left upper quadrant as detailed above.  3. Mild peripancreatic fat stranding with pancreatic parenchymal  calcifications, likely acute on chronic pancreatitis.  4. Decreased size of the now low-density fluid collection along  the  superior and posterior margin of the spleen, probable resolving  hematoma.  5. Mildly heterogeneous hepatic attenuation with subtle nodular liver  contours, suggesting underlying hepatocellular disease/cirrhosis, with  upper abdominal venous varicosities that could reflect portal venous  hypertension.     This report was finalized on 4/1/2024 2:06 PM by Maverick Solis MD on  Workstation: [id number]             Past Medical History:   Diagnosis Date    Anemia     Anxiety     Constipation     Cyst of spleen     Diarrhea     E. coli bacteremia     ETOH abuse     Frequent UTI     GERD (gastroesophageal reflux disease)     Hiatal hernia     Left flank pain 10/21/2022    Migraine     Miscarriage 2004    Pancreatitis     Pseudocyst of pancreas        Assessment:  Active Hospital Problems    Diagnosis  POA    **Abdominal pain [R10.9]  Yes    Acute on chronic pancreatitis [K85.90, K86.1]  Yes    Essential hypertension [I10]  Yes    Pancreatic pseudocyst [K86.3]  Yes    Abnormal CT of the abdomen [R93.5]  Yes    Alcohol abuse [F10.10]  Yes      Resolved Hospital Problems   No resolved problems to display.       Plan:  Will keep patient n.p.o. and continue with some IV fluid hydration and pain and nausea medicine.  Will continue with IV antibiotics and ask for GI and surgery consults.  Follow-up lab studies.  Alcohol withdrawal protocol.    Fletcher Gallegos MD  4/1/2024  17:37 EDT

## 2024-04-01 NOTE — FSED PROVIDER NOTE
Subjective   History of Present Illness  43-year-old female with left upper quadrant pain for several days with a past medical history of alcohol induced pancreatitis and spleen congestion.  She had a splenic artery embolization 1/22/24 and same day right mesenteric femoral embolization and right popliteal thrombectomy. At the time they decided to try this instead of splenectomy.  Patient says since she is having problems today and has had for several days is likely will take her spleen out this time.  Patient says that the stabbing pain now that is escalating and is just like the pain she had when she had the spleen surgery in January and less like her pancreatitis which because she is not vomiting like she does with that.  And the pain is different.  No known fever and the pain does not more into her back but every time she tries to take breath it makes it worse.  She gets hot with morphine so she prefers Dilaudid as a pain control.      Review of Systems    Past Medical History:   Diagnosis Date    Anemia     Anxiety     Constipation     Cyst of spleen     Diarrhea     E. coli bacteremia     ETOH abuse     Frequent UTI     GERD (gastroesophageal reflux disease)     Hiatal hernia     Left flank pain 10/21/2022    Migraine     Miscarriage 2004    Pancreatitis     Pseudocyst of pancreas        Allergies   Allergen Reactions    Nickel Rash       Past Surgical History:   Procedure Laterality Date    EMBOLIZATION MESENTERIC ARTERY N/A 1/22/2024    Procedure: OR EMBOLIZATION MESENTERIC ARTERY RIGHT FEMORAL POPLITEAL THROMBECTOMY;  Surgeon: Carine Pena Jr., MD;  Location: [facility] HYBRID OR;  Service: Vascular;  Laterality: N/A;    ENDOSCOPY N/A 08/10/2022    Procedure: ESOPHAGOGASTRODUODENOSCOPY with bxs;  Surgeon: Salvador Price MD;  Location: [facility] ENDOSCOPY;  Service: Gastroenterology;  Laterality: N/A;  Pre: r/o esophageal  varacies, abd pain  Post: esophageal plaque    ENDOSCOPY N/A 10/9/2023    Procedure:  ESOPHAGOGASTRODUODENOSCOPY WITH STENT REMOVAL;  Surgeon: Red Denson MD;  Location: [facility] ENDOSCOPY;  Service: Gastroenterology;  Laterality: N/A;    PANCREATIC CYST DRAINAGE      x 2    UPPER ENDOSCOPIC ULTRASOUND W/ FNA N/A 9/13/2023    Procedure: Esophagogastroduodenoscopy with biopsy x 1 area and endoscopic ultrasound with placement of cyst gastrostomy;  Surgeon: Red Denson MD;  Location: [facility] ENDOSCOPY;  Service: Gastroenterology;  Laterality: N/A;  post: pancreatic psuedocyst    WISDOM TOOTH EXTRACTION         Family History   Problem Relation Age of Onset    Alcohol abuse Mother     Arthritis Mother     Asthma Mother     Miscarriages / Stillbirths Mother     Cancer Father     Diabetes Father     Drug abuse Father     Early death Father     Heart disease Father     Hyperlipidemia Father     Hypertension Father     Alcohol abuse Paternal Aunt     Cancer Paternal Aunt     Diabetes Paternal Aunt     Drug abuse Paternal Aunt     Early death Paternal Aunt     Heart disease Paternal Aunt     Hyperlipidemia Paternal Aunt     Hypertension Paternal Aunt     Alcohol abuse Maternal Grandmother     Alcohol abuse Maternal Grandfather     Alcohol abuse Paternal Grandmother     Cancer Paternal Grandmother     Diabetes Paternal Grandmother     Drug abuse Paternal Grandmother     Early death Paternal Grandmother     Heart disease Paternal Grandmother     Hyperlipidemia Paternal Grandmother     Hypertension Paternal Grandmother     Alcohol abuse Paternal Grandfather        Social History     Socioeconomic History    Marital status: Single   Tobacco Use    Smoking status: Every Day     Current packs/day: 0.50     Average packs/day: 0.5 packs/day for 28.2 years (14.1 ttl pk-yrs)     Types: Cigarettes     Start date: 1/28/1996     Passive exposure: Current    Smokeless tobacco: Never   Vaping Use    Vaping status: Never Used   Substance and Sexual Activity    Alcohol use: Not Currently    Drug use: Never    Sexual  activity: Defer     Partners: Male           Objective   Physical Exam    Procedures           ED Course  ED Course as of 04/01/24 1425   Mon Apr 01, 2024   1212 White blood count is 22,000 with a shift upward of neutrophil percentage 86 lymphocytes percentage down 7.6 urine shows greater than 160 ketones bilirubin moderate 2+ blood trace protein 30 negative leuks negative nitrite [AR]   1230 Lipase is 123.  CMP sugar is 94 sodium is 128 so slow chloride is 87 which is low and she is getting 2 L of normal saline.  Alk phos is 152 anion gap is 15.2 [AR]   1242 Due to blood count and shift giving Zosyn IV.  I do not know what is going on specifically but I want to cover her until she gets over to the main hospital.  There is a call out to the hospitalist since the surgeon she saw in January was a vascular surgeon and she said she was told she might have to have her spleen out if this occurs again and that would be a general surgeon and I need to hospitalist to help me sort this out or take the case and take care of the patient. [AR]   1244 Discussed with Dr. Gallegos hospitalist regarding past medical history HPI past surgical history lab work including 22,000 Gettings O's and getting 2 L of fluid with a sodium and chloride that are low, lipase is 123 waiting on lactic acid will be glad to get a CT but not get a hold her here for the answers.  Recommend simply because I am not sure how sick and how quickly she may decline I recommend monitor bed.  Not sure which surgeon would be removing her spleen if needed and she was told that this would probably happen if she had another episode.  He agreed to admit. [AR]   1306 Lactate is 1 so I do not think sepsis is a concern at the moment. [AR]   1306 I spoke with the patient and her  regarding all the blood work except for the lactic acid which I do not have back yet and the need for IV antibiotics blood cultures a discussion with the hospitalist who agreed to admit a CT  with IV dye here and be sent by ambulance immediately over to the hospital for her protection and her safety and health.  She agreed to this and understood. [AR]   1311 Patient requesting more pain medicine and I agreed to that considering what she is going through. [AR]   1424 IMPRESSION:     1. Severe diffuse predominantly low-density gastric wall thickening,  greatest anteriorly, likely severe gastritis.  2. New fluid collection along the posterior wall of the left lobe of the  liver anterior to the stomach, probable pseudocyst, with additional  pseudocysts in the left upper quadrant as detailed above.  3. Mild peripancreatic fat stranding with pancreatic parenchymal  calcifications, likely acute on chronic pancreatitis.  4. Decreased size of the now low-density fluid collection along the  superior and posterior margin of the spleen, probable resolving  hematoma.  5. Mildly heterogeneous hepatic attenuation with subtle nodular liver  contours, suggesting underlying hepatocellular disease/cirrhosis, with  upper abdominal venous varicosities that could reflect portal venous  hypertension.   [AR]      ED Course User Index  [AR] Caren Medina MD                                           Medical Decision Making  Review of records it was Dr. Carine Pena who performed surgery on January 22 of this year.    Differential includes spleen thrombosis versus pancreatitis versus some other cause of this kind of pain although she says it is much like it was in January when the surgery was performed.    Problems Addressed:  Left upper quadrant abdominal pain: complicated acute illness or injury  Leukocytosis, unspecified type: complicated acute illness or injury    Amount and/or Complexity of Data Reviewed  Labs: ordered. Decision-making details documented in ED Course.  Radiology: ordered.    Risk  Prescription drug management.  Decision regarding hospitalization.  Risk Details: Critical decision making based on potential  need for splenectomy due to the pain and the requirement that the patient may need splenectomy since the first surgery in January may have failed.  2 L of IV normal saline for critically low sodium and chloride, white blood count 22,000 suggesting infection requiring IV antibiotics and suggesting a potential abdominal infection related to the spleen or the pancreas or another source.  Pain management as well for 2 to 3 days of pain that is increasing.  Discussion with hospitalist and need for CT and rapid transport to [facility] [place], operated by [facility] by ambulance due to the high likelihood that the spleen is involved in the pain and bleeding spleen or spleen that is highly congested can become a true surgical emergency.    Critical Care  Total time providing critical care: 30 minutes        Final diagnoses:   Left upper quadrant abdominal pain   Leukocytosis, unspecified type   Acute gastritis, presence of bleeding unspecified, unspecified gastritis type   Liver cyst       ED Disposition  ED Disposition       ED Disposition   Decision to Admit    Condition   --    Comment   Level of Care: Telemetry [5]   Diagnosis: Abdominal pain [533369]   Admitting Physician: RICARDO DIEGO [[id number]]   Attending Physician: RICARDO DIEGO [[id number]]   Bed Request Comments: tele                 No follow-up provider specified.       Medication List      No changes were made to your prescriptions during this visit.

## 2024-04-01 NOTE — PROGRESS NOTES
[facility]: Piperacillin-Tazobactam Consult    Pt Name: Piper Cain   : 1980    Indication: Intra-Abdominal Infection    Relevant clinical data and objective history reviewed:    Past Medical History:   Diagnosis Date    Anemia     Anxiety     Constipation     Cyst of spleen     Diarrhea     E. coli bacteremia     ETOH abuse     Frequent UTI     GERD (gastroesophageal reflux disease)     Hiatal hernia     Left flank pain 10/21/2022    Migraine     Miscarriage 2004    Pancreatitis     Pseudocyst of pancreas      Creatinine   Date Value Ref Range Status   2024 0.68 0.57 - 1.00 mg/dL Final   2024 0.40 (L) 0.57 - 1.00 mg/dL Final   2024 0.40 (L) 0.57 - 1.00 mg/dL Final   2022 0.73 0.55 - 1.02 mg/dL Final   2022 0.69 0.55 - 1.02 mg/dL Final     BUN   Date Value Ref Range Status   2024 7 6 - 20 mg/dL Final   2022 12 7 - 19 mg/dL Final     Estimated Creatinine Clearance: 99.9 mL/min (by C-G formula based on SCr of 0.68 mg/dL).    Lab Results   Component Value Date    WBC 22.29 (H) 2024     Temp Readings from Last 3 Encounters:   24 98.3 °F (36.8 °C) (Oral)   24 96.9 °F (36.1 °C) (Infrared)   24 97.5 °F (36.4 °C) (Oral)      Assessment/Plan  Estimated CrCl >20 mL/min at this time; BMI 18.37 kg/m2  Will start piperacillin-tazobactam 3.375 g IV every 8 hours for 7 days    Pharmacy will continue to follow daily while on piperacillin-tazobactam and adjust as needed. Thank you for this consult.    Amanda Walters [facility]  Clinical Pharmacist

## 2024-04-01 NOTE — LETTER
[facility]  [address] [facility] [address]  [phone]        April 2, 2024      Patient: Piper Cain  YOB: 1980  Date of Visit: 4/1/2024      SEE FOR OBSERVATION:  ID# [id number]     [facility] DEPT: [phone],  [phone]    [facility]: NPI [id number] [facility] [id number]    BUDDY ROJAS RN,CCP    R10.9, K85.90, K86.1, K86.3    Rosita Rojas, RN

## 2024-04-01 NOTE — LETTER
[facility]  [address] [facility] [address]  [phone]        April 7, 2024      Patient: Piper Cain  YOB: 1980  Date of Visit: 4/1/2024      SEE FOR INPATIENT: ID# [id number]    PATIENT WAS OBSERVATION AND CONVERTED TO INPATIENT 04/05/24    [facility]: NPI [id number]  [facility]# [id number]    PLEASE REPLY         Rosita Richter RN

## 2024-04-01 NOTE — CONSULTS
[facility]   Consult Note    Patient Name: Piper Cain  : 1980  MRN: [id number]  Primary Care Physician:  Sharmaine Gatica APRN  Referring Physician: SOL Cruz  Date of admission: 2024    Inpatient General Surgery Consult  Consult performed by: Jacob Pena Jr., MD  Consult ordered by: Fletcher Gallegos MD        Subjective   Subjective     Reason for Consult/ Chief Complaint: Abdominal pain    History of present  Piper Cain is a pleasant 43 y.o. female who has a history of alcohol abuse complicated by cirrhosis and chronic pancreatitis.  She has multiple peripancreatic fluid collections that have been diagnosed as pseudocyst in the past.  She has required CT scan guided drainage to exclude infection in these collections.  She has also had complications from a splenic laceration with a perisplenic hematoma in 2024.  She required an embolization of the splenic artery and did well after that procedure.    She presented to the emergency room today with a several day history of left upper quadrant abdominal pain that she believes was similar to the pain she experienced related to the splenic laceration.  It has been persistent for at least 3-4 days and she has had a decrease in her appetite.  She states that eating anything or even drinking water increases the pain.  Anything that enters her stomach seems to accentuate the pain.  She has had some nausea but no vomiting.  She has been taking pantoprazole and Zofran without much improvement in her symptoms.  A CT scan of the abdomen and pelvis was performed that shows evidence of chronic pancreatitis with known peripancreatic fluid collections.  These fluid collections are no larger than previous studies and may be somewhat smaller.  She also has some free fluid as well as a resolving hematoma related to the spleen, which is much smaller than the study in 2024.  The stomach is very thick-walled and suspicious for gastritis  which is new compared to a CT scan of the abdomen and pelvis on 1/21/2024..    Review of Systems   Constitutional:  Negative for fatigue and fever.   Respiratory:  Negative for chest tightness and shortness of breath.    Cardiovascular:  Negative for chest pain and palpitations.   Gastrointestinal:  Positive for abdominal pain and nausea. Negative for blood in stool, constipation, diarrhea and vomiting.        Personal History     Past Medical History:   Diagnosis Date    Anemia     Anxiety     Constipation     Cyst of spleen     Diarrhea     E. coli bacteremia     ETOH abuse     Frequent UTI     GERD (gastroesophageal reflux disease)     Hiatal hernia     Left flank pain 10/21/2022    Migraine     Miscarriage 2004    Pancreatitis     Pseudocyst of pancreas        Past Surgical History:   Procedure Laterality Date    EMBOLIZATION MESENTERIC ARTERY N/A 1/22/2024    Procedure: OR EMBOLIZATION MESENTERIC ARTERY RIGHT FEMORAL POPLITEAL THROMBECTOMY;  Surgeon: Carine Pena Jr., MD;  Location: [facility] HYBRID OR;  Service: Vascular;  Laterality: N/A;    ENDOSCOPY N/A 08/10/2022    Procedure: ESOPHAGOGASTRODUODENOSCOPY with bxs;  Surgeon: Salvador Price MD;  Location: [facility] ENDOSCOPY;  Service: Gastroenterology;  Laterality: N/A;  Pre: r/o esophageal  varacies, abd pain  Post: esophageal plaque    ENDOSCOPY N/A 10/9/2023    Procedure: ESOPHAGOGASTRODUODENOSCOPY WITH STENT REMOVAL;  Surgeon: Red Denson MD;  Location: [facility] ENDOSCOPY;  Service: Gastroenterology;  Laterality: N/A;    PANCREATIC CYST DRAINAGE      x 2    UPPER ENDOSCOPIC ULTRASOUND W/ FNA N/A 9/13/2023    Procedure: Esophagogastroduodenoscopy with biopsy x 1 area and endoscopic ultrasound with placement of cyst gastrostomy;  Surgeon: Red Denson MD;  Location: [facility] ENDOSCOPY;  Service: Gastroenterology;  Laterality: N/A;  post: pancreatic psuedocyst    WISDOM TOOTH EXTRACTION         Family History: family history includes Alcohol abuse  in her maternal grandfather, maternal grandmother, mother, paternal aunt, paternal grandfather, and paternal grandmother; Arthritis in her mother; Asthma in her mother; Cancer in her father, paternal aunt, and paternal grandmother; Diabetes in her father, paternal aunt, and paternal grandmother; Drug abuse in her father, paternal aunt, and paternal grandmother; Early death in her father, paternal aunt, and paternal grandmother; Heart disease in her father, paternal aunt, and paternal grandmother; Hyperlipidemia in her father, paternal aunt, and paternal grandmother; Hypertension in her father, paternal aunt, and paternal grandmother; Miscarriages / Stillbirths in her mother. Otherwise pertinent FHx was reviewed and not pertinent to current issue.    Social History:  reports that she has been smoking cigarettes. She started smoking about 28 years ago. She has a 14.1 pack-year smoking history. She has been exposed to tobacco smoke. She has never used smokeless tobacco. She reports that she does not currently use alcohol. She reports that she does not use drugs.    Home Medications:   Ferrous Sulfate Dried, amitriptyline, folic acid, magnesium oxide, ondansetron, oxyCODONE-acetaminophen, pantoprazole, promethazine, and thiamine    Allergies:  Allergies   Allergen Reactions    Nickel Rash       Objective    Objective     Vitals:  Temp:  [98.3 °F (36.8 °C)-98.5 °F (36.9 °C)] 98.3 °F (36.8 °C)  Heart Rate:  [106-133] 111  Resp:  [16-18] 16  BP: (103-110)/(83-93) 110/83    Physical Exam  Constitutional:       Appearance: She is not ill-appearing or toxic-appearing.   HENT:      Head: Normocephalic and atraumatic.   Pulmonary:      Effort: Pulmonary effort is normal. No respiratory distress.   Abdominal:      Palpations: Abdomen is soft.      Tenderness:  in the epigastric area and left upper quadrant      Comments: The abdomen soft and very tender in the epigastrium and left upper quadrant.   Neurological:      Mental  Status: She is alert.   Psychiatric:         Behavior: Behavior is cooperative.         Result Review    Result Review:  I have personally reviewed the results from the time of this admission to 4/1/2024 18:54 EDT and agree with these findings:  [x]  Laboratory list / accordion  []  Microbiology  [x]  Radiology  []  EKG/Telemetry   []  Cardiology/Vascular   []  Pathology  [x]  Old records  []  Other:      Assessment & Plan   Assessment / Plan     Brief Patient Summary with assessment and plan:  Piper Cain is a 43 y.o. female who has a known history of alcoholism with chronic pancreatitis and has developed persistent left upper quadrant abdominal pain.  CT scan of the abdomen and pelvis shows chronic pancreatitis with peripancreatic fluid collections as well as a very thick walled stomach.    1.  Abdominal pain: The patient has epigastric and left upper quadrant abdominal pain that may be related to chronic pancreatitis or acute gastritis based on the thick-walled stomach seen on CT scan of the abdomen and pelvis.  I agree with GI consultation as she will likely need an EGD.  In the meantime, continue pantoprazole.  I will allow ice chips and sips of water.    2.  Chronic pancreatitis: The patient has chronic pancreatitis with multiple peripancreatic fluid collections.  There does not appear to be any new significant findings regarding this ongoing process.  There is some free fluid which has been present before.  This will require conservative management.    3.  Perisplenic hematoma: The patient had a previous splenic laceration with a large perisplenic hematoma and underwent a splenic artery embolization.  This seems to be improving as she has a small residual hematoma but much improved over the previous CT scan of the abdomen and pelvis.  There is no need for surgical management at this time.      Jacob Pena Jr., MD

## 2024-04-01 NOTE — LETTER
[facility] CASE MAN  [address] [facility] [address]  [phone]        April 4, 2024      Patient: Piper Cain  YOB: 1980                           URGENT  Date of Visit: 4/1/2024    REFAXED PER REQUEST OF URIAH HAWTHORNE    SEE FOR OBSERVATION:  ID# [id number]     [facility] DEPT: [phone],  [phone]    [facility]: NP [id number] [facility] [id number]    BUDDY ROJAS RN,CCP    R10.9, K85.90, K86.1, K86.3        Rosita Rojas, RN

## 2024-04-02 LAB
ALBUMIN SERPL-MCNC: 3.4 G/DL (ref 3.5–5.2)
ALBUMIN/GLOB SERPL: 1.2 G/DL
ALP SERPL-CCNC: 110 U/L (ref 39–117)
ALT SERPL W P-5'-P-CCNC: 10 U/L (ref 1–33)
ANION GAP SERPL CALCULATED.3IONS-SCNC: 11 MMOL/L (ref 5–15)
AST SERPL-CCNC: 6 U/L (ref 1–32)
BASOPHILS # BLD AUTO: 0.03 10*3/MM3 (ref 0–0.2)
BASOPHILS NFR BLD AUTO: 0.2 % (ref 0–1.5)
BILIRUB SERPL-MCNC: 0.4 MG/DL (ref 0–1.2)
BUN SERPL-MCNC: 7 MG/DL (ref 6–20)
BUN/CREAT SERPL: 11.1 (ref 7–25)
CALCIUM SPEC-SCNC: 8.3 MG/DL (ref 8.6–10.5)
CHLORIDE SERPL-SCNC: 100 MMOL/L (ref 98–107)
CO2 SERPL-SCNC: 24 MMOL/L (ref 22–29)
CREAT SERPL-MCNC: 0.63 MG/DL (ref 0.57–1)
DEPRECATED RDW RBC AUTO: 51.6 FL (ref 37–54)
EGFRCR SERPLBLD CKD-EPI 2021: 114.5 ML/MIN/1.73
EOSINOPHIL # BLD AUTO: 0.24 10*3/MM3 (ref 0–0.4)
EOSINOPHIL NFR BLD AUTO: 1.7 % (ref 0.3–6.2)
ERYTHROCYTE [DISTWIDTH] IN BLOOD BY AUTOMATED COUNT: 14.7 % (ref 12.3–15.4)
GLOBULIN UR ELPH-MCNC: 2.9 GM/DL
GLUCOSE SERPL-MCNC: 78 MG/DL (ref 65–99)
HCT VFR BLD AUTO: 32 % (ref 34–46.6)
HGB BLD-MCNC: 10.7 G/DL (ref 12–15.9)
IMM GRANULOCYTES # BLD AUTO: 0.07 10*3/MM3 (ref 0–0.05)
IMM GRANULOCYTES NFR BLD AUTO: 0.5 % (ref 0–0.5)
LIPASE SERPL-CCNC: 125 U/L (ref 13–60)
LYMPHOCYTES # BLD AUTO: 1.68 10*3/MM3 (ref 0.7–3.1)
LYMPHOCYTES NFR BLD AUTO: 11.8 % (ref 19.6–45.3)
MCH RBC QN AUTO: 31.9 PG (ref 26.6–33)
MCHC RBC AUTO-ENTMCNC: 33.4 G/DL (ref 31.5–35.7)
MCV RBC AUTO: 95.5 FL (ref 79–97)
MONOCYTES # BLD AUTO: 0.9 10*3/MM3 (ref 0.1–0.9)
MONOCYTES NFR BLD AUTO: 6.3 % (ref 5–12)
NEUTROPHILS NFR BLD AUTO: 11.3 10*3/MM3 (ref 1.7–7)
NEUTROPHILS NFR BLD AUTO: 79.5 % (ref 42.7–76)
NRBC BLD AUTO-RTO: 0 /100 WBC (ref 0–0.2)
PLATELET # BLD AUTO: 170 10*3/MM3 (ref 140–450)
PMV BLD AUTO: 10.8 FL (ref 6–12)
POTASSIUM SERPL-SCNC: 3.5 MMOL/L (ref 3.5–5.2)
PROT SERPL-MCNC: 6.3 G/DL (ref 6–8.5)
RBC # BLD AUTO: 3.35 10*6/MM3 (ref 3.77–5.28)
SODIUM SERPL-SCNC: 135 MMOL/L (ref 136–145)
WBC NRBC COR # BLD AUTO: 14.22 10*3/MM3 (ref 3.4–10.8)

## 2024-04-02 PROCEDURE — 25010000002 HYDROMORPHONE PER 4 MG: Performed by: HOSPITALIST

## 2024-04-02 PROCEDURE — 99231 SBSQ HOSP IP/OBS SF/LOW 25: CPT

## 2024-04-02 PROCEDURE — 25010000002 THIAMINE HCL 200 MG/2ML SOLUTION: Performed by: HOSPITALIST

## 2024-04-02 PROCEDURE — 80053 COMPREHEN METABOLIC PANEL: CPT | Performed by: HOSPITALIST

## 2024-04-02 PROCEDURE — G0378 HOSPITAL OBSERVATION PER HR: HCPCS

## 2024-04-02 PROCEDURE — 99222 1ST HOSP IP/OBS MODERATE 55: CPT | Performed by: INTERNAL MEDICINE

## 2024-04-02 PROCEDURE — 25010000002 ONDANSETRON PER 1 MG: Performed by: HOSPITALIST

## 2024-04-02 PROCEDURE — 25810000003 SODIUM CHLORIDE 0.9 % SOLUTION: Performed by: HOSPITALIST

## 2024-04-02 PROCEDURE — 85025 COMPLETE CBC W/AUTO DIFF WBC: CPT | Performed by: HOSPITALIST

## 2024-04-02 PROCEDURE — 83690 ASSAY OF LIPASE: CPT | Performed by: HOSPITALIST

## 2024-04-02 PROCEDURE — 25010000002 PIPERACILLIN SOD-TAZOBACTAM PER 1 G: Performed by: HOSPITALIST

## 2024-04-02 RX ADMIN — THIAMINE HYDROCHLORIDE 200 MG: 100 INJECTION, SOLUTION INTRAMUSCULAR; INTRAVENOUS at 16:53

## 2024-04-02 RX ADMIN — PANTOPRAZOLE SODIUM 40 MG: 40 INJECTION, POWDER, LYOPHILIZED, FOR SOLUTION INTRAVENOUS at 09:32

## 2024-04-02 RX ADMIN — FOLIC ACID 1 MG: 5 INJECTION, SOLUTION INTRAMUSCULAR; INTRAVENOUS; SUBCUTANEOUS at 11:16

## 2024-04-02 RX ADMIN — AMITRIPTYLINE HYDROCHLORIDE 50 MG: 50 TABLET, FILM COATED ORAL at 20:32

## 2024-04-02 RX ADMIN — HYDROMORPHONE HYDROCHLORIDE 0.5 MG: 1 INJECTION, SOLUTION INTRAMUSCULAR; INTRAVENOUS; SUBCUTANEOUS at 16:51

## 2024-04-02 RX ADMIN — OXYCODONE AND ACETAMINOPHEN 1 TABLET: 325; 10 TABLET ORAL at 13:49

## 2024-04-02 RX ADMIN — OXYCODONE AND ACETAMINOPHEN 1 TABLET: 325; 10 TABLET ORAL at 09:31

## 2024-04-02 RX ADMIN — HYDROMORPHONE HYDROCHLORIDE 0.5 MG: 1 INJECTION, SOLUTION INTRAMUSCULAR; INTRAVENOUS; SUBCUTANEOUS at 04:48

## 2024-04-02 RX ADMIN — Medication 10 ML: at 09:34

## 2024-04-02 RX ADMIN — THIAMINE HYDROCHLORIDE 200 MG: 100 INJECTION, SOLUTION INTRAMUSCULAR; INTRAVENOUS at 09:32

## 2024-04-02 RX ADMIN — Medication 10 ML: at 20:36

## 2024-04-02 RX ADMIN — ONDANSETRON 4 MG: 2 INJECTION INTRAMUSCULAR; INTRAVENOUS at 04:34

## 2024-04-02 RX ADMIN — SODIUM CHLORIDE 125 ML/HR: 9 INJECTION, SOLUTION INTRAVENOUS at 02:34

## 2024-04-02 RX ADMIN — HYDROMORPHONE HYDROCHLORIDE 0.5 MG: 1 INJECTION, SOLUTION INTRAMUSCULAR; INTRAVENOUS; SUBCUTANEOUS at 11:16

## 2024-04-02 RX ADMIN — HYDROMORPHONE HYDROCHLORIDE 0.5 MG: 1 INJECTION, SOLUTION INTRAMUSCULAR; INTRAVENOUS; SUBCUTANEOUS at 23:06

## 2024-04-02 RX ADMIN — PIPERACILLIN SODIUM AND TAZOBACTAM SODIUM 3.38 G: 3; .375 INJECTION, POWDER, LYOPHILIZED, FOR SOLUTION INTRAVENOUS at 09:32

## 2024-04-02 RX ADMIN — THIAMINE HYDROCHLORIDE 200 MG: 100 INJECTION, SOLUTION INTRAMUSCULAR; INTRAVENOUS at 00:24

## 2024-04-02 RX ADMIN — OXYCODONE AND ACETAMINOPHEN 1 TABLET: 325; 10 TABLET ORAL at 02:10

## 2024-04-02 RX ADMIN — PANTOPRAZOLE SODIUM 40 MG: 40 INJECTION, POWDER, LYOPHILIZED, FOR SOLUTION INTRAVENOUS at 16:52

## 2024-04-02 RX ADMIN — THIAMINE HYDROCHLORIDE 200 MG: 100 INJECTION, SOLUTION INTRAMUSCULAR; INTRAVENOUS at 23:06

## 2024-04-02 RX ADMIN — OXYCODONE AND ACETAMINOPHEN 1 TABLET: 325; 10 TABLET ORAL at 20:32

## 2024-04-02 RX ADMIN — ONDANSETRON 4 MG: 2 INJECTION INTRAMUSCULAR; INTRAVENOUS at 20:32

## 2024-04-02 RX ADMIN — SODIUM CHLORIDE 125 ML/HR: 9 INJECTION, SOLUTION INTRAVENOUS at 13:54

## 2024-04-02 RX ADMIN — PIPERACILLIN SODIUM AND TAZOBACTAM SODIUM 3.38 G: 3; .375 INJECTION, POWDER, LYOPHILIZED, FOR SOLUTION INTRAVENOUS at 16:50

## 2024-04-02 RX ADMIN — Medication 100 MG: at 09:32

## 2024-04-02 NOTE — CONSULTS
Nutrition Services    Patient Name:  Piper Cain  YOB: 1980  MRN: [id number]  Admit Date:  4/1/2024  Assessment Date:  04/02/24    Summary: OMAR    Pt is a 43 y.o. female adm for abdominal pain. H/o chronic pancreatitis, HTN, GERD, alcohol/tobacco abuse, splenic artery embolization and right mesenteric femoral embolization and right popliteal thrombectomy 1/22/23. Nutrition assessment completed. Pt has been experiencing abdominal pain, nausea, and decreased appeitte (reportedly has not been able to eat for 6 days). Pt also reportedly started drinking alcohol again but stopped 6 days ago. Wt hx reviewed, wt appears stable. Currently NPO and awaiting diet advancement.   Labs: Na 135, Lipase 125  Meds: IVFs @ 125 mL/hr, PPI, zosyn, thiamine, folic acid    REC:  Advance diet as tolerated and once medically appropriate per MD  Pt has not ate for 6 days. Would recommend nutrition support if pt remains unable to tolerate PO     RD to follow.     CLINICAL NUTRITION ASSESSMENT      Reason for Assessment Nurse Admission Screen     Diagnosis/Problem   Abdominal pain    Medical/Surgical History Past Medical History:   Diagnosis Date    Anemia     Anxiety     Constipation     Cyst of spleen     Diarrhea     E. coli bacteremia     ETOH abuse     Frequent UTI     GERD (gastroesophageal reflux disease)     Hiatal hernia     Left flank pain 10/21/2022    Migraine     Miscarriage 2004    Pancreatitis     Pseudocyst of pancreas        Past Surgical History:   Procedure Laterality Date    EMBOLIZATION MESENTERIC ARTERY N/A 1/22/2024    Procedure: OR EMBOLIZATION MESENTERIC ARTERY RIGHT FEMORAL POPLITEAL THROMBECTOMY;  Surgeon: Carine Pena Jr., MD;  Location: [facility] HYBRID OR;  Service: Vascular;  Laterality: N/A;    ENDOSCOPY N/A 08/10/2022    Procedure: ESOPHAGOGASTRODUODENOSCOPY with bxs;  Surgeon: Salvador Price MD;  Location: [facility] ENDOSCOPY;  Service: Gastroenterology;  Laterality: N/A;  Pre: r/o  "esophageal  varacies, abd pain  Post: esophageal plaque    ENDOSCOPY N/A 10/9/2023    Procedure: ESOPHAGOGASTRODUODENOSCOPY WITH STENT REMOVAL;  Surgeon: Red Denson MD;  Location: [facility] ENDOSCOPY;  Service: Gastroenterology;  Laterality: N/A;    PANCREATIC CYST DRAINAGE      x 2    UPPER ENDOSCOPIC ULTRASOUND W/ FNA N/A 9/13/2023    Procedure: Esophagogastroduodenoscopy with biopsy x 1 area and endoscopic ultrasound with placement of cyst gastrostomy;  Surgeon: Red Denson MD;  Location: [facility] ENDOSCOPY;  Service: Gastroenterology;  Laterality: N/A;  post: pancreatic psuedocyst    WISDOM TOOTH EXTRACTION          Anthropometrics        Current Height  Current Weight  BMI kg/m2 Height: 177.8 cm (70\")  Weight: 58.1 kg (128 lb) (04/01/24 1149)  Body mass index is 18.37 kg/m².   Adjusted BMI (if applicable)    BMI Category Underweight (18.4 or below)   Ideal Body Weight (IBW) 68.5 kg    Usual Body Weight (UBW) 130 lbs    Weight Trend Stable   Weight History Wt Readings from Last 30 Encounters:   04/01/24 1149 58.1 kg (128 lb)   01/25/24 0528 60.4 kg (133 lb 3.2 oz)   01/24/24 0955 63.5 kg (140 lb)   01/24/24 0500 63.8 kg (140 lb 10.5 oz)   01/21/24 1343 61.7 kg (136 lb)   01/21/24 0604 57.6 kg (126 lb 15.8 oz)   01/05/24 0021 57.6 kg (126 lb 14.4 oz)   01/05/24 0010 57.6 kg (126 lb 14.4 oz)   01/04/24 1826 54.4 kg (120 lb)   09/26/23 1022 57.6 kg (127 lb)   09/13/23 0629 57.8 kg (127 lb 6.4 oz)   09/08/23 1151 58.1 kg (128 lb)   09/05/23 1953 59 kg (130 lb)   08/25/23 0643 60 kg (132 lb 4.8 oz)   08/24/23 0655 59.8 kg (131 lb 14.4 oz)   08/23/23 0533 59 kg (130 lb)   08/22/23 1954 61.4 kg (135 lb 4.8 oz)   08/22/23 1254 58.1 kg (128 lb)   08/23/23 1928 59 kg (130 lb)   06/07/23 2210 54.4 kg (120 lb)   03/25/23 2214 59 kg (130 lb)   03/25/23 1631 59 kg (130 lb)   11/30/22 1743 61.2 kg (135 lb)   10/21/22 0009 61.2 kg (135 lb)   08/08/22 0922 55.8 kg (123 lb)   08/06/22 2021 55.8 kg (123 lb)   03/05/18 1124 " 58.1 kg (128 lb)   01/24/18 0456 59 kg (130 lb)   09/27/17 2341 49.9 kg (110 lb)      --  Labs       Pertinent Labs    Results from last 7 days   Lab Units 04/02/24  0653 04/01/24  1200   SODIUM mmol/L 135* 128*   POTASSIUM mmol/L 3.5 3.8   CHLORIDE mmol/L 100 87*   CO2 mmol/L 24.0 25.8   BUN mg/dL 7 7   CREATININE mg/dL 0.63 0.68   CALCIUM mg/dL 8.3* 10.4   BILIRUBIN mg/dL 0.4 0.7   ALK PHOS U/L 110 152*   ALT (SGPT) U/L 10 12   AST (SGOT) U/L 6 9   GLUCOSE mg/dL 78 94     Results from last 7 days   Lab Units 04/02/24  0653 04/02/24  0526   HEMOGLOBIN g/dL  --  10.7*   HEMATOCRIT %  --  32.0*   WBC 10*3/mm3  --  14.22*   ALBUMIN g/dL 3.4*  --      Results from last 7 days   Lab Units 04/02/24  0526 04/01/24  1200   PLATELETS 10*3/mm3 170 221     COVID19   Date Value Ref Range Status   08/07/2022 Not Detected Not Detected - Ref. Range Final     Lab Results   Component Value Date    HGBA1C 5.10 08/23/2023          Medications           Scheduled Medications amitriptyline, 50 mg, Oral, Nightly  folic acid 1 mg in sodium chloride 0.9 % 50 mL IVPB, 1 mg, Intravenous, Daily  LORazepam, 2 mg, Oral, Q6H   Followed by  LORazepam, 1 mg, Oral, Q6H  pantoprazole, 40 mg, Intravenous, BID AC  piperacillin-tazobactam, 3.375 g, Intravenous, Q8H  sodium chloride, 10 mL, Intravenous, Q12H  thiamine (B-1) IV, 200 mg, Intravenous, Q8H   Followed by  [START ON 4/7/2024] thiamine, 100 mg, Oral, Daily  thiamine, 100 mg, Oral, Daily       Infusions sodium chloride, 125 mL/hr, Last Rate: 125 mL/hr (04/02/24 0622)       PRN Medications   acetaminophen **OR** acetaminophen **OR** acetaminophen    senna-docusate sodium **AND** polyethylene glycol **AND** bisacodyl **AND** bisacodyl    HYDROmorphone **AND** naloxone    LORazepam **OR** LORazepam **OR** LORazepam **OR** LORazepam **OR** LORazepam **OR** LORazepam    Magnesium Standard Dose Replacement - Follow Nurse / BPA Driven Protocol    ondansetron ODT **OR** ondansetron     oxyCODONE-acetaminophen    promethazine    sodium chloride    sodium chloride    sodium chloride     Physical Findings          General Findings room air, underweight   Oral/Mouth Cavity WDL   Edema  no edema   Gastrointestinal abdominal pain, nausea   Skin  skin intact   Tubes/Drains/Lines none   NFPE No clinical signs of muscle wasting or fat loss   --  Current Nutrition Orders & Evaluation of Intake       Oral Nutrition     Food Allergies NKFA   Current PO Diet NPO Diet NPO Type: Sips with Meds, Ice Chips   Supplement n/a   PO Evaluation     % PO Intake NPO    Factors Affecting Intake: abdominal pain, altered GI function, decreased appetite, nausea   --  PES STATEMENT / NUTRITION DIAGNOSIS      Nutrition Dx Problem  Problem: Altered GI Function  Etiology: Factors Affecting Nutrition - abdominal pain, decreased appetite, nausea     Signs/Symptoms: NPO and Report/Observation     NUTRITION INTERVENTION / PLAN OF CARE      Intervention Goal(s) Maintain nutrition status, Improved nutrition related labs, Reduce/improve symptoms, Disease management/therapy, Initiate feeding/diet, Establish PO intake, Tolerate PO , Advance diet, and Appropriate weight gain         RD Intervention/Action Await initiation/advancement of PO diet, Continue to monitor, and Care plan reviewed   --      Prescription/Orders:       PO Diet ADAT      Supplements       Enteral Nutrition       Parenteral Nutrition    New Prescription Ordered? Continue same per protocol, No changes at this time   --      Monitor/Evaluation Per protocol   Discharge Plan/Needs Pending clinical course   --    RD to follow per protocol.      Electronically signed by:  Jesisca Costa RDN, CHASTITY  04/02/24 09:26 EDT

## 2024-04-02 NOTE — CONSULTS
[facility]  Initial Inpatient Consult Note    Referring Provider: Dr. Fletcher Gallegos    Reason for Consultation: Abdominal pain and pancreatitis.     Subjective     History of present illness:    43 y.o. female with h/o ETOHism (and still drinks 7 drinks/wk), GERD, h/o chronic pancreatitis with pseudocyst (s/p decompression of pancreatic pseudocyst with cystgastrostomy stent by Dr. Red Denson in 9/23) of pancreas and cirrhosis, h/o splenic artery embolization in 1/24, and is on chronic narcotics for her abdominal pain and follows with a Pain Doctor.   She was admitted 4/1/24 with a few day history of left upper quadrant abdominal pain. She doesn't know what makes the pain worse or better.  She does smoke 1/4 pack/day.  She lives in [place] with her boyfriend.  She is unemployed and has no children.  She denies nonsteroidal anti-inflammatory drug use.  She is on oxycodone for the left upper quadrant pain and sees a pain doctor for this.  She has no diarrhea.  She is constipated.  No rectal bleeding or melena.  Her weight is stable.  The patient has been seen by Dr. Anmol Pena this admission and does not feel that there is any surgical issue going on with her pancreas now.        She had a CT abd/pelvis with IV contrast on 4/1/24 that showed:  IMPRESSION:     1. Severe diffuse predominantly low-density gastric wall thickening,  greatest anteriorly, likely severe gastritis.  2. New fluid collection along the posterior wall of the left lobe of the  liver anterior to the stomach, probable pseudocyst, with additional  pseudocysts in the left upper quadrant as detailed above.  3. Mild peripancreatic fat stranding with pancreatic parenchymal  calcifications, likely acute on chronic pancreatitis.  4. Decreased size of the now low-density fluid collection along the  superior and posterior margin of the spleen, probable resolving  hematoma.  5. Mildly heterogeneous hepatic attenuation with subtle  nodular liver  contours, suggesting underlying hepatocellular disease/cirrhosis, with  upper abdominal venous varicosities that could reflect portal venous  hypertension.     This report was finalized on 4/1/2024 2:06 PM by Maverick Solis MD    Past Medical History:  Past Medical History:   Diagnosis Date    Anemia     Anxiety     Constipation     Cyst of spleen     Diarrhea     E. coli bacteremia     ETOH abuse     Frequent UTI     GERD (gastroesophageal reflux disease)     Hiatal hernia     Left flank pain 10/21/2022    Migraine     Miscarriage 2004    Pancreatitis     Pseudocyst of pancreas      Past Surgical History:  Past Surgical History:   Procedure Laterality Date    EMBOLIZATION MESENTERIC ARTERY N/A 1/22/2024    Procedure: OR EMBOLIZATION MESENTERIC ARTERY RIGHT FEMORAL POPLITEAL THROMBECTOMY;  Surgeon: Carine Pena Jr., MD;  Location: [facility] HYBRID OR;  Service: Vascular;  Laterality: N/A;    ENDOSCOPY N/A 08/10/2022    Procedure: ESOPHAGOGASTRODUODENOSCOPY with bxs;  Surgeon: Salvador Pirce MD;  Location: [facility] ENDOSCOPY;  Service: Gastroenterology;  Laterality: N/A;  Pre: r/o esophageal  varacies, abd pain  Post: esophageal plaque    ENDOSCOPY N/A 10/9/2023    Procedure: ESOPHAGOGASTRODUODENOSCOPY WITH STENT REMOVAL;  Surgeon: Red Denson MD;  Location: [facility] ENDOSCOPY;  Service: Gastroenterology;  Laterality: N/A;    PANCREATIC CYST DRAINAGE      x 2    UPPER ENDOSCOPIC ULTRASOUND W/ FNA N/A 9/13/2023    Procedure: Esophagogastroduodenoscopy with biopsy x 1 area and endoscopic ultrasound with placement of cyst gastrostomy;  Surgeon: Red Denson MD;  Location: [facility] ENDOSCOPY;  Service: Gastroenterology;  Laterality: N/A;  post: pancreatic psuedocyst    WISDOM TOOTH EXTRACTION        Social History:   Social History     Tobacco Use    Smoking status: Every Day     Current packs/day: 0.50     Average packs/day: 0.5 packs/day for 28.2 years (14.1 ttl pk-yrs)     Types: Cigarettes      Start date: 1/28/1996     Passive exposure: Current    Smokeless tobacco: Never   Substance Use Topics    Alcohol use: Not Currently      Family History:  Family History   Problem Relation Age of Onset    Alcohol abuse Mother     Arthritis Mother     Asthma Mother     Miscarriages / Stillbirths Mother     Cancer Father     Diabetes Father     Drug abuse Father     Early death Father     Heart disease Father     Hyperlipidemia Father     Hypertension Father     Alcohol abuse Paternal Aunt     Cancer Paternal Aunt     Diabetes Paternal Aunt     Drug abuse Paternal Aunt     Early death Paternal Aunt     Heart disease Paternal Aunt     Hyperlipidemia Paternal Aunt     Hypertension Paternal Aunt     Alcohol abuse Maternal Grandmother     Alcohol abuse Maternal Grandfather     Alcohol abuse Paternal Grandmother     Cancer Paternal Grandmother     Diabetes Paternal Grandmother     Drug abuse Paternal Grandmother     Early death Paternal Grandmother     Heart disease Paternal Grandmother     Hyperlipidemia Paternal Grandmother     Hypertension Paternal Grandmother     Alcohol abuse Paternal Grandfather        Home Meds:  Medications Prior to Admission   Medication Sig Dispense Refill Last Dose    amitriptyline (ELAVIL) 50 MG tablet Take 1 tablet by mouth Every Night.   3/31/2024    Ferrous Sulfate Dried (Feosol) 200 (65 Fe) MG tablet tablet Take 1 tablet by mouth 2 (Two) Times a Day.   Past Week    folic acid (FOLVITE) 1 MG tablet Take 1 tablet by mouth Daily. 30 tablet 3 Past Week    magnesium oxide (MAG-OX) 400 MG tablet Take 1 tablet by mouth Daily.   Past Week    ondansetron (ZOFRAN) 4 MG tablet Take 1 tablet by mouth Every 8 (Eight) Hours As Needed for Nausea or Vomiting.   4/1/2024    pantoprazole (PROTONIX) 40 MG EC tablet Take 1 tablet by mouth Daily. 30 tablet 3 Past Week    thiamine (VITAMIN B1) 100 MG tablet Take 1 tablet by mouth Daily. 30 tablet 3 Past Week    oxyCODONE-acetaminophen (PERCOCET)  MG per  tablet Take 1 tablet by mouth As Needed for Moderate Pain.       promethazine (PHENERGAN) 25 MG suppository Insert 1 suppository into the rectum As Needed for Nausea or Vomiting.        Current Meds:   amitriptyline, 50 mg, Oral, Nightly  folic acid 1 mg in sodium chloride 0.9 % 50 mL IVPB, 1 mg, Intravenous, Daily  LORazepam, 2 mg, Oral, Q6H   Followed by  LORazepam, 1 mg, Oral, Q6H  pantoprazole, 40 mg, Intravenous, BID AC  piperacillin-tazobactam, 3.375 g, Intravenous, Q8H  sodium chloride, 10 mL, Intravenous, Q12H  thiamine (B-1) IV, 200 mg, Intravenous, Q8H   Followed by  [START ON 4/7/2024] thiamine, 100 mg, Oral, Daily  thiamine, 100 mg, Oral, Daily      Allergies:  Allergies   Allergen Reactions    Nickel Rash     Review of Systems  The following systems were reviewed and negative;  respiratory, cardiovascular, musculoskeletal, and neurological     Objective     Vital Signs  Temp:  [98.1 °F (36.7 °C)-99.9 °F (37.7 °C)] 98.1 °F (36.7 °C)  Heart Rate:  [] 87  Resp:  [16] 16  BP: ()/(72-86) 94/72  Physical Exam:  General Appearance:    Alert, cooperative, in no acute distress   Head:    Normocephalic, without obvious abnormality, atraumatic   Eyes:            Lids and lashes normal, conjunctivae and sclerae normal, no   icterus   Throat:   No oral lesions, no thrush, oral mucosa moist   Neck:   No adenopathy, supple, trachea midline, no thyromegaly, no   carotid bruit, no JVD   Lungs:     Clear to auscultation,respirations regular, even and                   unlabored    Heart:    Regular rhythm and normal rate, normal S1 and S2, no            murmur, no gallop, no rub, no click   Chest Wall:    No abnormalities observed   Abdomen:     Normal bowel sounds, no masses, no organomegaly, soft        nontender, nondistended, no guarding, no rebound                 tenderness   Rectal:     Deferred   Extremities:   no edema, no cyanosis, no redness   Skin:   No bleeding, bruising or rash   Lymph nodes:    No palpable adenopathy   Psychiatric:  Judgement and insight: normal   Orientation to person place and time: normal   Mood and affect: normal   Results Review:   I reviewed the patient's new clinical results.    Results from last 7 days   Lab Units 04/02/24  0526 04/01/24  1200   WBC 10*3/mm3 14.22* 22.29*   HEMOGLOBIN g/dL 10.7* 13.0   HEMATOCRIT % 32.0* 39.1   PLATELETS 10*3/mm3 170 221     Results from last 7 days   Lab Units 04/02/24  0653 04/01/24  1200   SODIUM mmol/L 135* 128*   POTASSIUM mmol/L 3.5 3.8   CHLORIDE mmol/L 100 87*   CO2 mmol/L 24.0 25.8   BUN mg/dL 7 7   CREATININE mg/dL 0.63 0.68   CALCIUM mg/dL 8.3* 10.4   BILIRUBIN mg/dL 0.4 0.7   ALK PHOS U/L 110 152*   ALT (SGPT) U/L 10 12   AST (SGOT) U/L 6 9   GLUCOSE mg/dL 78 94         Lab Results   Lab Value Date/Time    LIPASE 125 (H) 04/02/2024 0526    LIPASE 123 (H) 04/01/2024 1200    LIPASE 123 (H) 01/21/2024 0756    LIPASE 89 (H) 01/07/2024 0449    LIPASE 187 (H) 01/04/2024 1836    LIPASE 99 (H) 09/07/2023 0615    LIPASE 232 (H) 09/05/2023 2205    LIPASE 64 (H) 08/25/2023 0425    LIPASE 75 (H) 08/24/2023 0324    LIPASE 87 (H) 08/23/2023 0252    LIPASE 115 (H) 08/22/2023 1301    LIPASE 73 (H) 06/08/2023 0652    LIPASE 151 (H) 06/07/2023 2246    LIPASE 192 (H) 03/25/2023 1700    LIPASE 38 12/05/2022 0526    LIPASE 34 12/04/2022 0542    LIPASE 29 12/03/2022 0515    LIPASE 50 12/02/2022 0719    LIPASE 29 12/01/2022 0519    LIPASE 65 (H) 11/30/2022 1412    LIPASE 157 (H) 11/28/2022 1751    LIPASE 335 (H) 10/20/2022 1939    LIPASE 378 (H) 08/06/2022 2158    LIPASE 95 (H) 01/24/2018 0545    LIPASE 93 (H) 10/01/2017 0356    LIPASE 122 (H) 09/30/2017 0619    LIPASE 86 (H) 09/29/2017 1731    LIPASE 133 (H) 09/28/2017 0032       Radiology:  CT Abdomen Pelvis With Contrast   Final Result       1. Severe diffuse predominantly low-density gastric wall thickening,   greatest anteriorly, likely severe gastritis.   2. New fluid collection along the posterior wall  of the left lobe of the   liver anterior to the stomach, probable pseudocyst, with additional   pseudocysts in the left upper quadrant as detailed above.   3. Mild peripancreatic fat stranding with pancreatic parenchymal   calcifications, likely acute on chronic pancreatitis.   4. Decreased size of the now low-density fluid collection along the   superior and posterior margin of the spleen, probable resolving   hematoma.   5. Mildly heterogeneous hepatic attenuation with subtle nodular liver   contours, suggesting underlying hepatocellular disease/cirrhosis, with   upper abdominal venous varicosities that could reflect portal venous   hypertension.       This report was finalized on 4/1/2024 2:06 PM by Maverick Solis MD on   Workstation: [id number]              Assessment & Plan   Assessment:   43 y.o. female with h/o ETOHism (and still drinks 7 drinks/wk), GERD, h/o chronic pancreatitis with pseudocyst (s/p decompression of pancreatic pseudocyst with cystgastrostomy stent by Dr. Red Denson in 9/23) of pancreas and cirrhosis, h/o splenic artery embolization in 1/24.   She has an abnormal CAT scan of the abdomen with gastric wall thickening.      Plan:   My recommendation is that we do an EGD tomorrow to evaluate her gastric wall thickening and the left upper quadrant abdominal pain.  She needs to stop drinking alcohol.  I would recommend that she stop smoking.  I would recommend that she go to Alcoholics Anonymous.  When she is finished with this hospitalization she can follow-up with Dr. Piper Denny who is her gastroenterologist in [facility].    I discussed the patient's findings and my recommendations with patient and nursing staff.         Migel Bosch M.D.  [facility]  [address] [place] [address]  Office: [phone]

## 2024-04-02 NOTE — PROGRESS NOTES
"DAILY PROGRESS NOTE  [facility]    Patient Identification:  Name: Piper Cain  Age: 43 y.o.  Sex: female  :  1980  MRN: [id number]         Primary Care Physician: Sharmaine Gatica APRN    Subjective:  Interval History: She complains of abdominal pain and is about the same.    Objective:    Scheduled Meds:amitriptyline, 50 mg, Oral, Nightly  folic acid 1 mg in sodium chloride 0.9 % 50 mL IVPB, 1 mg, Intravenous, Daily  LORazepam, 2 mg, Oral, Q6H   Followed by  LORazepam, 1 mg, Oral, Q6H  pantoprazole, 40 mg, Intravenous, BID AC  piperacillin-tazobactam, 3.375 g, Intravenous, Q8H  sodium chloride, 10 mL, Intravenous, Q12H  thiamine (B-1) IV, 200 mg, Intravenous, Q8H   Followed by  [START ON 2024] thiamine, 100 mg, Oral, Daily  thiamine, 100 mg, Oral, Daily      Continuous Infusions:sodium chloride, 125 mL/hr, Last Rate: 125 mL/hr (24 1354)        Vital signs in last 24 hours:  Temp:  [98.1 °F (36.7 °C)-99.9 °F (37.7 °C)] 98.2 °F (36.8 °C)  Heart Rate:  [] 111  Resp:  [16] 16  BP: ()/(72-83) 99/73    Intake/Output:    Intake/Output Summary (Last 24 hours) at 2024 1547  Last data filed at 2024 1325  Gross per 24 hour   Intake 140 ml   Output 450 ml   Net -310 ml       Exam:  BP 99/73 (BP Location: Right arm, Patient Position: Lying)   Pulse 111   Temp 98.2 °F (36.8 °C) (Oral)   Resp 16   Ht 177.8 cm (70\")   Wt 58.1 kg (128 lb)   LMP 2018 (Exact Date)   SpO2 95%   BMI 18.37 kg/m²     General Appearance:    Alert, cooperative, no distress   Head:    Normocephalic, without obvious abnormality, atraumatic   Eyes:       Throat:   Lips, tongue, gums normal   Neck:   Supple, symmetrical, trachea midline, no JVD   Lungs:     Clear to auscultation bilaterally, respirations unlabored   Chest Wall:    No tenderness or deformity    Heart:    Regular rate and rhythm, S1 and S2 normal, no murmur,no  Rub or gallop   Abdomen:     Soft, tender over upper abdomen, " bowel sounds active, no masses, no organomegaly    Extremities:   Extremities normal, atraumatic, no cyanosis or edema   Pulses:      Skin:   Skin is warm and dry,  no rashes or palpable lesions   Neurologic:   no focal deficits noted      Lab Results (last 72 hours)       Procedure Component Value Units Date/Time    Blood Culture - Blood, Arm, Right [[id number]]  (Normal) Collected: 04/01/24 1258    Specimen: Blood from Arm, Right Updated: 04/02/24 1315     Blood Culture No growth at 24 hours    Comprehensive Metabolic Panel [[id number]]  (Abnormal) Collected: 04/02/24 0653    Specimen: Blood Updated: 04/02/24 0749     Glucose 78 mg/dL      BUN 7 mg/dL      Creatinine 0.63 mg/dL      Sodium 135 mmol/L      Potassium 3.5 mmol/L      Chloride 100 mmol/L      CO2 24.0 mmol/L      Calcium 8.3 mg/dL      Total Protein 6.3 g/dL      Albumin 3.4 g/dL      ALT (SGPT) 10 U/L      AST (SGOT) 6 U/L      Alkaline Phosphatase 110 U/L      Total Bilirubin 0.4 mg/dL      Globulin 2.9 gm/dL      A/G Ratio 1.2 g/dL      BUN/Creatinine Ratio 11.1     Anion Gap 11.0 mmol/L      eGFR 114.5 mL/min/1.73     Narrative:      GFR Normal >60  Chronic Kidney Disease <60  Kidney Failure <15      Lipase [[id number]]  (Abnormal) Collected: 04/02/24 0526    Specimen: Blood Updated: 04/02/24 0630     Lipase 125 U/L     CBC Auto Differential [[id number]]  (Abnormal) Collected: 04/02/24 0526    Specimen: Blood Updated: 04/02/24 0627     WBC 14.22 10*3/mm3      RBC 3.35 10*6/mm3      Hemoglobin 10.7 g/dL      Hematocrit 32.0 %      MCV 95.5 fL      MCH 31.9 pg      MCHC 33.4 g/dL      RDW 14.7 %      RDW-SD 51.6 fl      MPV 10.8 fL      Platelets 170 10*3/mm3      Neutrophil % 79.5 %      Lymphocyte % 11.8 %      Monocyte % 6.3 %      Eosinophil % 1.7 %      Basophil % 0.2 %      Immature Grans % 0.5 %      Neutrophils, Absolute 11.30 10*3/mm3      Lymphocytes, Absolute 1.68 10*3/mm3      Monocytes, Absolute 0.90 10*3/mm3      Eosinophils, Absolute 0.24  "10*3/mm3      Basophils, Absolute 0.03 10*3/mm3      Immature Grans, Absolute 0.07 10*3/mm3      nRBC 0.0 /100 WBC     Procalcitonin [[id number]]  (Abnormal) Collected: 04/01/24 1200    Specimen: Blood Updated: 04/01/24 2126     Procalcitonin 0.42 ng/mL     Narrative:      As a Marker for Sepsis (Non-Neonates):    1. <0.5 ng/mL represents a low risk of severe sepsis and/or septic shock.  2. >2 ng/mL represents a high risk of severe sepsis and/or septic shock.    As a Marker for Lower Respiratory Tract Infections that require antibiotic therapy:    PCT on Admission    Antibiotic Therapy       6-12 Hrs later    >0.5                Strongly Recommended  >0.25 - <0.5        Recommended   0.1 - 0.25          Discouraged              Remeasure/reassess PCT  <0.1                Strongly Discouraged     Remeasure/reassess PCT    As 28 day mortality risk marker: \"Change in Procalcitonin Result\" (>80% or <=80%) if Day 0 (or Day 1) and Day 4 values are available. Refer to http://www.[employer]-pct-calculator.com    Change in PCT <=80%  A decrease of PCT levels below or equal to 80% defines a positive change in PCT test result representing a higher risk for 28-day all-cause mortality of patients diagnosed with severe sepsis for septic shock.    Change in PCT >80%  A decrease of PCT levels of more than 80% defines a negative change in PCT result representing a lower risk for 28-day all-cause mortality of patients diagnosed with severe sepsis or septic shock.       Urinalysis, Microscopic Only - Urine, Clean Catch [[id number]]  (Abnormal) Collected: 04/01/24 1209    Specimen: Urine, Clean Catch Updated: 04/01/24 1411     RBC, UA None Seen /HPF      WBC, UA 0-2 /HPF      Bacteria, UA 1+ /HPF      Squamous Epithelial Cells, UA 13-20 /HPF      Hyaline Casts, UA None Seen /LPF      Granular Casts, UA 0-2 /LPF      Methodology Manual Light Microscopy    [place] Urine Culture Tube - Urine, Clean Catch [[id number]] Collected: 04/01/24 1209    " Specimen: Urine, Clean Catch Updated: 04/01/24 1347     Extra Tube Hold for add-ons.     Comment: Auto resulted.       Lactic Acid, Plasma [[id number]]  (Normal) Collected: 04/01/24 1243    Specimen: Blood Updated: 04/01/24 1302     Lactate 1.0 mmol/L     Comprehensive Metabolic Panel [[id number]]  (Abnormal) Collected: 04/01/24 1200    Specimen: Blood Updated: 04/01/24 1226     Glucose 94 mg/dL      BUN 7 mg/dL      Creatinine 0.68 mg/dL      Sodium 128 mmol/L      Potassium 3.8 mmol/L      Chloride 87 mmol/L      CO2 25.8 mmol/L      Calcium 10.4 mg/dL      Total Protein 7.9 g/dL      Albumin 4.2 g/dL      ALT (SGPT) 12 U/L      AST (SGOT) 9 U/L      Alkaline Phosphatase 152 U/L      Total Bilirubin 0.7 mg/dL      Globulin 3.7 gm/dL      A/G Ratio 1.1 g/dL      BUN/Creatinine Ratio 10.3     Anion Gap 15.2 mmol/L      eGFR 112.4 mL/min/1.73     Narrative:      GFR Normal >60  Chronic Kidney Disease <60  Kidney Failure <15      Lipase [[id number]]  (Abnormal) Collected: 04/01/24 1200    Specimen: Blood Updated: 04/01/24 1226     Lipase 123 U/L     Urinalysis With Microscopic If Indicated (No Culture) - Urine, Clean Catch [[id number]]  (Abnormal) Collected: 04/01/24 1209    Specimen: Urine, Clean Catch Updated: 04/01/24 1211     Color, UA Yellow     Appearance, UA Clear     pH, UA 6.0     Specific Gravity, UA 1.020     Glucose, UA Negative     Ketones, UA >=160 mg/dL (4+)     Bilirubin, UA Moderate (2+)     Blood, UA Trace     Protein, UA 30 mg/dL (1+)     Leuk Esterase, UA Negative     Nitrite, UA Negative     Urobilinogen, UA 0.2 E.U./dL    CBC & Differential [[id number]]  (Abnormal) Collected: 04/01/24 1200    Specimen: Blood Updated: 04/01/24 1207    Narrative:      The following orders were created for panel order CBC & Differential.  Procedure                               Abnormality         Status                     ---------                               -----------         ------                     CBC Auto  "Differential[[id number]]        Abnormal            Final result                 Please view results for these tests on the individual orders.    CBC Auto Differential [[id number]]  (Abnormal) Collected: 04/01/24 1200    Specimen: Blood Updated: 04/01/24 1207     WBC 22.29 10*3/mm3      RBC 4.16 10*6/mm3      Hemoglobin 13.0 g/dL      Hematocrit 39.1 %      MCV 94.0 fL      MCH 31.3 pg      MCHC 33.2 g/dL      RDW 14.7 %      RDW-SD 51.8 fl      MPV 10.0 fL      Platelets 221 10*3/mm3      Neutrophil % 86.2 %      Lymphocyte % 7.6 %      Monocyte % 5.6 %      Eosinophil % 0.3 %      Basophil % 0.2 %      Immature Grans % 0.1 %      Neutrophils, Absolute 19.21 10*3/mm3      Lymphocytes, Absolute 1.70 10*3/mm3      Monocytes, Absolute 1.25 10*3/mm3      Eosinophils, Absolute 0.06 10*3/mm3      Basophils, Absolute 0.04 10*3/mm3      Immature Grans, Absolute 0.03 10*3/mm3           Data Review:  Results from last 7 days   Lab Units 04/02/24  0653 04/01/24  1200   SODIUM mmol/L 135* 128*   POTASSIUM mmol/L 3.5 3.8   CHLORIDE mmol/L 100 87*   CO2 mmol/L 24.0 25.8   BUN mg/dL 7 7   CREATININE mg/dL 0.63 0.68   GLUCOSE mg/dL 78 94   CALCIUM mg/dL 8.3* 10.4     Results from last 7 days   Lab Units 04/02/24  0526 04/01/24  1200   WBC 10*3/mm3 14.22* 22.29*   HEMOGLOBIN g/dL 10.7* 13.0   HEMATOCRIT % 32.0* 39.1   PLATELETS 10*3/mm3 170 221             Lab Results   Lab Value Date/Time    TROPONINT <6 01/21/2024 0756    TROPONINT <6 08/22/2023 1619    TROPONINT <6 08/22/2023 1301    TROPONINT <6 06/08/2023 1109    TROPONINT <6 06/08/2023 0906    TROPONINT <0.010 10/20/2022 1939    TROPONINT <0.010 08/06/2022 2158    TROPONINT <0.010 04/06/2018 1751         Results from last 7 days   Lab Units 04/02/24  0653 04/01/24  1200   ALK PHOS U/L 110 152*   BILIRUBIN mg/dL 0.4 0.7   ALT (SGPT) U/L 10 12   AST (SGOT) U/L 6 9             No results found for: \"POCGLU\"        Past Medical History:   Diagnosis Date    Anemia     Anxiety     " Constipation     Cyst of spleen     Diarrhea     E. coli bacteremia     ETOH abuse     Frequent UTI     GERD (gastroesophageal reflux disease)     Hiatal hernia     Left flank pain 10/21/2022    Migraine     Miscarriage 2004    Pancreatitis     Pseudocyst of pancreas        Assessment:  Active Hospital Problems    Diagnosis  POA    **Abdominal pain [R10.9]  Yes    Acute on chronic pancreatitis [K85.90, K86.1]  Yes    Essential hypertension [I10]  Yes    Pancreatic pseudocyst [K86.3]  Yes    Abnormal CT of the abdomen [R93.5]  Yes    Alcohol abuse [F10.10]  Yes      Resolved Hospital Problems   No resolved problems to display.       Plan:  Continue IV Protonix, IV antibiotics and follow-up on labs and cultures.  Surgery consult noted.  GI consult also noted and plans for EGD tomorrow.    Fletcher Gallegos MD  4/2/2024  15:47 EDT

## 2024-04-02 NOTE — CASE MANAGEMENT/SOCIAL WORK
Discharge Planning Assessment  [facility]     Patient Name: Piper Cain  MRN: [id number]  Today's Date: 4/2/2024    Admit Date: 4/1/2024    Plan: Home with significant other   Discharge Needs Assessment       Row Name 04/02/24 1615       Living Environment    People in Home significant other    Current Living Arrangements home    Potentially Unsafe Housing Conditions none    Primary Care Provided by self    Family Caregiver if Needed significant other    Quality of Family Relationships involved;helpful    Able to Return to Prior Arrangements yes       Resource/Environmental Concerns    Resource/Environmental Concerns none       Transition Planning    Patient/Family Anticipates Transition to home with family    Patient/Family Anticipated Services at Transition none       Discharge Needs Assessment    Readmission Within the Last 30 Days no previous admission in last 30 days    Equipment Currently Used at Home none    Concerns to be Addressed no discharge needs identified    Anticipated Changes Related to Illness none    Equipment Needed After Discharge none                   Discharge Plan       Row Name 04/02/24 1616       Plan    Plan Home with significant other    Patient/Family in Agreement with Plan yes    Plan Comments Spoke to pt at bedside, introduced self and explained CCP role, face sheet and pharmacy information verified. Pt lives with her s/o Salvador in 1 level home with no YOLI, she is IADL's, and uses no medical equipment, has no HH or SNF history. She plans home with Salvador to transport. No anticipated needs. CCP will follow Melanie DENNIS                  Continued Care and Services - Admitted Since 4/1/2024    No active coordination exists for this encounter.       Expected Discharge Date and Time       Expected Discharge Date Expected Discharge Time    Apr 3, 2024            Demographic Summary       Row Name 04/02/24 1615       General Information    Admission Type observation                   Functional  Status       Row Name 04/02/24 1615       Functional Status    Usual Activity Tolerance excellent    Current Activity Tolerance excellent       Assessment of Health Literacy    Health Literacy Good       Functional Status, IADL    Medications independent    Meal Preparation independent    Housekeeping independent    Laundry independent    Shopping independent       Mental Status    General Appearance WDL WDL       Mental Status Summary    Recent Changes in Mental Status/Cognitive Functioning no changes                   Psychosocial    No documentation.                  Abuse/Neglect    No documentation.                  Legal       Row Name 04/02/24 1615       Financial/Legal    Who Manages Finances if Patient Unable s/o Salvador                   Substance Abuse    No documentation.                  Patient Forms    No documentation.                     Melanie Hernandez RN

## 2024-04-02 NOTE — PLAN OF CARE
Goal Outcome Evaluation:  Plan of Care Reviewed With: patient        Progress: no change  Outcome Evaluation: Vss, AOx4, on RA, up ad-valeria, NS @ 125. Pt c/o LUQ pain, PRN Dilaudid & Percocet given, Zofran given for nausea. Pt awake in bed and in no distress.

## 2024-04-02 NOTE — PROGRESS NOTES
Acute Care General Surgery Progress Note    Patient: Piper Cain  YOB: 1980  MRN: [id number]      Assessment  Piper Cain is a 43 y.o. female with chronic pancreatitis, abdominal pain, and a chronic perisplenic hematoma. WBC elevated on admission and is trending down today. Lipase stable at 125. Tolerating sips and chips. She is afebrile and vitals are within normal limits.     Plan  Perisplenic hematoma: this is improved compared to previous CT scan, No surgical intervention indicated at this time  Chronic Pancreatitis: lipase stable this morning, will continue with conservative management  Abdominal pain: CT suggestive of acute gastritis, GI consulted for EGD. Continue protonix.       Subjective  Denies nausea, vomiting, or diarrhea. Denies fever or chills. Reports a constant sharp LUQ abdominal pain that she feels is not well controlled with current medication    Objective  Vitals  Temp 98.1  HR 87  RR 16  BP 94/72  SpO2 98       Physical Exam  Constitutional: alert, oriented, no acute distress  Respiratory: Normal work of breathing, Symmetric excursion  Cardiovascular: Well pefursed, no jugular venous distention evident   Abdominal: soft, non-distended, tender to LUQ  Skin: warm, dry, no jaundice      Laboratory Results  WBC 14.22  Hgb 10.7  Hct 32  [id number]  Lipase 125    SOL Murdock  Acute [facility] General Surgery  [facility]    [address] [place] [address], [address]  [place], [place], [address]  P: [phone]  F: [phone]

## 2024-04-03 ENCOUNTER — ANESTHESIA EVENT (OUTPATIENT)
Dept: GASTROENTEROLOGY | Facility: HOSPITAL | Age: 44
End: 2024-04-03
Payer: MEDICAID

## 2024-04-03 ENCOUNTER — ANESTHESIA (OUTPATIENT)
Dept: GASTROENTEROLOGY | Facility: HOSPITAL | Age: 44
End: 2024-04-03
Payer: MEDICAID

## 2024-04-03 LAB
ANION GAP SERPL CALCULATED.3IONS-SCNC: 14.2 MMOL/L (ref 5–15)
B-HCG UR QL: NEGATIVE
BASOPHILS # BLD AUTO: 0.01 10*3/MM3 (ref 0–0.2)
BASOPHILS NFR BLD AUTO: 0.1 % (ref 0–1.5)
BUN SERPL-MCNC: 4 MG/DL (ref 6–20)
BUN/CREAT SERPL: 8.2 (ref 7–25)
CALCIUM SPEC-SCNC: 8.4 MG/DL (ref 8.6–10.5)
CHLORIDE SERPL-SCNC: 100 MMOL/L (ref 98–107)
CO2 SERPL-SCNC: 20.8 MMOL/L (ref 22–29)
CREAT SERPL-MCNC: 0.49 MG/DL (ref 0.57–1)
DEPRECATED RDW RBC AUTO: 50.1 FL (ref 37–54)
EGFRCR SERPLBLD CKD-EPI 2021: 121.6 ML/MIN/1.73
EOSINOPHIL # BLD AUTO: 0.32 10*3/MM3 (ref 0–0.4)
EOSINOPHIL NFR BLD AUTO: 3.4 % (ref 0.3–6.2)
ERYTHROCYTE [DISTWIDTH] IN BLOOD BY AUTOMATED COUNT: 14.4 % (ref 12.3–15.4)
EXPIRATION DATE: NORMAL
GLUCOSE SERPL-MCNC: 68 MG/DL (ref 65–99)
HCT VFR BLD AUTO: 29.7 % (ref 34–46.6)
HGB BLD-MCNC: 9.5 G/DL (ref 12–15.9)
IMM GRANULOCYTES # BLD AUTO: 0.03 10*3/MM3 (ref 0–0.05)
IMM GRANULOCYTES NFR BLD AUTO: 0.3 % (ref 0–0.5)
INTERNAL NEGATIVE CONTROL: NEGATIVE
INTERNAL POSITIVE CONTROL: POSITIVE
LYMPHOCYTES # BLD AUTO: 1.4 10*3/MM3 (ref 0.7–3.1)
LYMPHOCYTES NFR BLD AUTO: 15.1 % (ref 19.6–45.3)
Lab: NORMAL
MCH RBC QN AUTO: 30.6 PG (ref 26.6–33)
MCHC RBC AUTO-ENTMCNC: 32 G/DL (ref 31.5–35.7)
MCV RBC AUTO: 95.8 FL (ref 79–97)
MONOCYTES # BLD AUTO: 0.78 10*3/MM3 (ref 0.1–0.9)
MONOCYTES NFR BLD AUTO: 8.4 % (ref 5–12)
NEUTROPHILS NFR BLD AUTO: 6.74 10*3/MM3 (ref 1.7–7)
NEUTROPHILS NFR BLD AUTO: 72.7 % (ref 42.7–76)
NRBC BLD AUTO-RTO: 0 /100 WBC (ref 0–0.2)
PLATELET # BLD AUTO: 161 10*3/MM3 (ref 140–450)
PMV BLD AUTO: 10.2 FL (ref 6–12)
POTASSIUM SERPL-SCNC: 3.3 MMOL/L (ref 3.5–5.2)
RBC # BLD AUTO: 3.1 10*6/MM3 (ref 3.77–5.28)
SODIUM SERPL-SCNC: 135 MMOL/L (ref 136–145)
WBC NRBC COR # BLD AUTO: 9.28 10*3/MM3 (ref 3.4–10.8)

## 2024-04-03 PROCEDURE — 25010000002 THIAMINE HCL 200 MG/2ML SOLUTION: Performed by: HOSPITALIST

## 2024-04-03 PROCEDURE — 80048 BASIC METABOLIC PNL TOTAL CA: CPT | Performed by: HOSPITALIST

## 2024-04-03 PROCEDURE — 25010000002 ONDANSETRON PER 1 MG: Performed by: HOSPITALIST

## 2024-04-03 PROCEDURE — 87081 CULTURE SCREEN ONLY: CPT | Performed by: INTERNAL MEDICINE

## 2024-04-03 PROCEDURE — 0DB68ZX EXCISION OF STOMACH, VIA NATURAL OR ARTIFICIAL OPENING ENDOSCOPIC, DIAGNOSTIC: ICD-10-PCS | Performed by: INTERNAL MEDICINE

## 2024-04-03 PROCEDURE — 25810000003 SODIUM CHLORIDE 0.9 % SOLUTION: Performed by: INTERNAL MEDICINE

## 2024-04-03 PROCEDURE — S0260 H&P FOR SURGERY: HCPCS | Performed by: INTERNAL MEDICINE

## 2024-04-03 PROCEDURE — 25810000003 SODIUM CHLORIDE 0.9 % SOLUTION: Performed by: HOSPITALIST

## 2024-04-03 PROCEDURE — 88305 TISSUE EXAM BY PATHOLOGIST: CPT | Performed by: INTERNAL MEDICINE

## 2024-04-03 PROCEDURE — 0DB98ZX EXCISION OF DUODENUM, VIA NATURAL OR ARTIFICIAL OPENING ENDOSCOPIC, DIAGNOSTIC: ICD-10-PCS | Performed by: INTERNAL MEDICINE

## 2024-04-03 PROCEDURE — 85025 COMPLETE CBC W/AUTO DIFF WBC: CPT | Performed by: HOSPITALIST

## 2024-04-03 PROCEDURE — 81025 URINE PREGNANCY TEST: CPT | Performed by: INTERNAL MEDICINE

## 2024-04-03 PROCEDURE — 25010000002 PIPERACILLIN SOD-TAZOBACTAM PER 1 G: Performed by: HOSPITALIST

## 2024-04-03 PROCEDURE — 99231 SBSQ HOSP IP/OBS SF/LOW 25: CPT | Performed by: SURGERY

## 2024-04-03 PROCEDURE — 43239 EGD BIOPSY SINGLE/MULTIPLE: CPT | Performed by: INTERNAL MEDICINE

## 2024-04-03 PROCEDURE — 25010000002 PROPOFOL 10 MG/ML EMULSION: Performed by: NURSE ANESTHETIST, CERTIFIED REGISTERED

## 2024-04-03 PROCEDURE — 25010000002 HYDROMORPHONE PER 4 MG: Performed by: HOSPITALIST

## 2024-04-03 PROCEDURE — 99231 SBSQ HOSP IP/OBS SF/LOW 25: CPT

## 2024-04-03 PROCEDURE — G0378 HOSPITAL OBSERVATION PER HR: HCPCS

## 2024-04-03 PROCEDURE — 0DB48ZX EXCISION OF ESOPHAGOGASTRIC JUNCTION, VIA NATURAL OR ARTIFICIAL OPENING ENDOSCOPIC, DIAGNOSTIC: ICD-10-PCS | Performed by: INTERNAL MEDICINE

## 2024-04-03 RX ORDER — LIDOCAINE HYDROCHLORIDE 20 MG/ML
INJECTION, SOLUTION INFILTRATION; PERINEURAL AS NEEDED
Status: DISCONTINUED | OUTPATIENT
Start: 2024-04-03 | End: 2024-04-03 | Stop reason: SURG

## 2024-04-03 RX ORDER — POTASSIUM CHLORIDE 750 MG/1
40 TABLET, FILM COATED, EXTENDED RELEASE ORAL ONCE
Status: COMPLETED | OUTPATIENT
Start: 2024-04-03 | End: 2024-04-03

## 2024-04-03 RX ORDER — SODIUM CHLORIDE 9 MG/ML
30 INJECTION, SOLUTION INTRAVENOUS CONTINUOUS PRN
Status: DISCONTINUED | OUTPATIENT
Start: 2024-04-03 | End: 2024-04-07 | Stop reason: HOSPADM

## 2024-04-03 RX ORDER — PROPOFOL 10 MG/ML
VIAL (ML) INTRAVENOUS AS NEEDED
Status: DISCONTINUED | OUTPATIENT
Start: 2024-04-03 | End: 2024-04-03 | Stop reason: SURG

## 2024-04-03 RX ADMIN — PIPERACILLIN SODIUM AND TAZOBACTAM SODIUM 3.38 G: 3; .375 INJECTION, POWDER, LYOPHILIZED, FOR SOLUTION INTRAVENOUS at 13:39

## 2024-04-03 RX ADMIN — HYDROMORPHONE HYDROCHLORIDE 0.5 MG: 1 INJECTION, SOLUTION INTRAMUSCULAR; INTRAVENOUS; SUBCUTANEOUS at 20:58

## 2024-04-03 RX ADMIN — PIPERACILLIN SODIUM AND TAZOBACTAM SODIUM 3.38 G: 3; .375 INJECTION, POWDER, LYOPHILIZED, FOR SOLUTION INTRAVENOUS at 01:24

## 2024-04-03 RX ADMIN — ONDANSETRON 4 MG: 2 INJECTION INTRAMUSCULAR; INTRAVENOUS at 22:42

## 2024-04-03 RX ADMIN — PROPOFOL 100 MG: 10 INJECTION, EMULSION INTRAVENOUS at 11:21

## 2024-04-03 RX ADMIN — PROPOFOL 50 MG: 10 INJECTION, EMULSION INTRAVENOUS at 11:23

## 2024-04-03 RX ADMIN — HYDROMORPHONE HYDROCHLORIDE 0.5 MG: 1 INJECTION, SOLUTION INTRAMUSCULAR; INTRAVENOUS; SUBCUTANEOUS at 02:52

## 2024-04-03 RX ADMIN — PIPERACILLIN SODIUM AND TAZOBACTAM SODIUM 3.38 G: 3; .375 INJECTION, POWDER, LYOPHILIZED, FOR SOLUTION INTRAVENOUS at 21:03

## 2024-04-03 RX ADMIN — PANTOPRAZOLE SODIUM 40 MG: 40 INJECTION, POWDER, LYOPHILIZED, FOR SOLUTION INTRAVENOUS at 08:34

## 2024-04-03 RX ADMIN — THIAMINE HYDROCHLORIDE 200 MG: 100 INJECTION, SOLUTION INTRAMUSCULAR; INTRAVENOUS at 21:04

## 2024-04-03 RX ADMIN — PANTOPRAZOLE SODIUM 40 MG: 40 INJECTION, POWDER, LYOPHILIZED, FOR SOLUTION INTRAVENOUS at 17:42

## 2024-04-03 RX ADMIN — ONDANSETRON 4 MG: 2 INJECTION INTRAMUSCULAR; INTRAVENOUS at 15:57

## 2024-04-03 RX ADMIN — PROPOFOL 20 MG: 10 INJECTION, EMULSION INTRAVENOUS at 11:28

## 2024-04-03 RX ADMIN — Medication 10 ML: at 09:00

## 2024-04-03 RX ADMIN — HYDROMORPHONE HYDROCHLORIDE 0.5 MG: 1 INJECTION, SOLUTION INTRAMUSCULAR; INTRAVENOUS; SUBCUTANEOUS at 13:35

## 2024-04-03 RX ADMIN — SODIUM CHLORIDE 30 ML/HR: 9 INJECTION, SOLUTION INTRAVENOUS at 10:18

## 2024-04-03 RX ADMIN — FOLIC ACID 1 MG: 5 INJECTION, SOLUTION INTRAMUSCULAR; INTRAVENOUS; SUBCUTANEOUS at 08:46

## 2024-04-03 RX ADMIN — HYDROMORPHONE HYDROCHLORIDE 0.5 MG: 1 INJECTION, SOLUTION INTRAMUSCULAR; INTRAVENOUS; SUBCUTANEOUS at 08:34

## 2024-04-03 RX ADMIN — ONDANSETRON 4 MG: 2 INJECTION INTRAMUSCULAR; INTRAVENOUS at 02:52

## 2024-04-03 RX ADMIN — PROPOFOL 30 MG: 10 INJECTION, EMULSION INTRAVENOUS at 11:27

## 2024-04-03 RX ADMIN — HYDROMORPHONE HYDROCHLORIDE 0.5 MG: 1 INJECTION, SOLUTION INTRAMUSCULAR; INTRAVENOUS; SUBCUTANEOUS at 17:47

## 2024-04-03 RX ADMIN — OXYCODONE AND ACETAMINOPHEN 1 TABLET: 325; 10 TABLET ORAL at 22:42

## 2024-04-03 RX ADMIN — POTASSIUM CHLORIDE 40 MEQ: 750 TABLET, EXTENDED RELEASE ORAL at 16:08

## 2024-04-03 RX ADMIN — PROPOFOL 50 MG: 10 INJECTION, EMULSION INTRAVENOUS at 11:25

## 2024-04-03 RX ADMIN — Medication 10 ML: at 22:43

## 2024-04-03 RX ADMIN — AMITRIPTYLINE HYDROCHLORIDE 50 MG: 50 TABLET, FILM COATED ORAL at 20:59

## 2024-04-03 RX ADMIN — THIAMINE HYDROCHLORIDE 200 MG: 100 INJECTION, SOLUTION INTRAMUSCULAR; INTRAVENOUS at 05:55

## 2024-04-03 RX ADMIN — THIAMINE HYDROCHLORIDE 200 MG: 100 INJECTION, SOLUTION INTRAMUSCULAR; INTRAVENOUS at 13:29

## 2024-04-03 RX ADMIN — LIDOCAINE HYDROCHLORIDE 100 MG: 20 INJECTION, SOLUTION INFILTRATION; PERINEURAL at 11:19

## 2024-04-03 RX ADMIN — SODIUM CHLORIDE 125 ML/HR: 9 INJECTION, SOLUTION INTRAVENOUS at 22:42

## 2024-04-03 RX ADMIN — OXYCODONE AND ACETAMINOPHEN 1 TABLET: 325; 10 TABLET ORAL at 05:55

## 2024-04-03 RX ADMIN — SODIUM CHLORIDE 125 ML/HR: 9 INJECTION, SOLUTION INTRAVENOUS at 02:41

## 2024-04-03 RX ADMIN — OXYCODONE AND ACETAMINOPHEN 1 TABLET: 325; 10 TABLET ORAL at 15:53

## 2024-04-03 NOTE — PROGRESS NOTES
Acute Care General Surgery Progress Note    Patient: Piper Cain  YOB: 1980  MRN: [id number]      Assessment  Piper Cain is a 43 y.o. female with chronic pancreatitis, abdominal pain, and a chronic perisplenic hematoma.  Patient says she feels a little bit better today.  WBC within normal limits today.  She is afebrile and vitals are good.    Plan  Abdominal pain: CT showed thick-walled stomach, EGD today with GI  Perisplenic hematoma: this is improved compared to previous CT scan, No surgical intervention indicated at this time  Chronic Pancreatitis: lipase stable yesterday, will continue with conservative management      Subjective  Reports she is feeling a little bit better today. Denies nausea, vomiting, and diarrhea.     Objective    Vitals:    04/03/24 0745   BP: 109/80   Pulse: 97   Resp: 16   Temp: 98.6 °F (37 °C)   SpO2: 96%        Physical Exam  Constitutional: alert, oriented, no acute distress  Respiratory: Normal work of breathing, Symmetric excursion  Cardiovascular: Well pefursed, no jugular venous distention evident   Abdominal: soft, non-distended,  tender on palpation LUQ  Skin: warm, dry, no jaundice      Laboratory Results  WBC 9.28  Hgb 9.5  HCT 29.7  Platelets 161  .6  Creatinine 0.49      SOL Murdock  Acute [facility] General Surgery  [facility]    [address], [address]  [place], [place], [address]  P: [phone]  F: [phone]

## 2024-04-03 NOTE — ANESTHESIA PREPROCEDURE EVALUATION
Anesthesia Evaluation     Patient summary reviewed and Nursing notes reviewed                Airway   TM distance: >3 FB  Neck ROM: full  Dental      Pulmonary    (+) a smoker Current,  Cardiovascular     ECG reviewed  Rhythm: regular  Rate: normal    (+) hypertension, DVT      Neuro/Psych  (+) headaches, psychiatric history Anxiety and Depression  GI/Hepatic/Renal/Endo    (+) hiatal hernia, GERD, renal disease- CRI and stones    Musculoskeletal (-) negative ROS    Abdominal    Substance History   (+) alcohol use     OB/GYN negative ob/gyn ROS         Other                    Anesthesia Plan    ASA 3     MAC     intravenous induction     Anesthetic plan, risks, benefits, and alternatives have been provided, discussed and informed consent has been obtained with: patient.    CODE STATUS:    Level Of Support Discussed With: Patient  Code Status (Patient has no pulse and is not breathing): CPR (Attempt to Resuscitate)  Medical Interventions (Patient has pulse or is breathing): Full Support

## 2024-04-03 NOTE — PLAN OF CARE
Problem: Adult Inpatient Plan of Care  Goal: Plan of Care Review  Outcome: Ongoing, Progressing  Flowsheets (Taken 4/3/2024 1820)  Progress: improving  Plan of Care Reviewed With: patient  Goal: Patient-Specific Goal (Individualized)  Outcome: Ongoing, Progressing  Goal: Absence of Hospital-Acquired Illness or Injury  Outcome: Ongoing, Progressing  Intervention: Identify and Manage Fall Risk  Recent Flowsheet Documentation  Taken 4/3/2024 1601 by Olya Singh RN  Safety Promotion/Fall Prevention: safety round/check completed  Taken 4/3/2024 1425 by Olya Singh RN  Safety Promotion/Fall Prevention: safety round/check completed  Taken 4/3/2024 0838 by Olya Singh RN  Safety Promotion/Fall Prevention:   activity supervised   safety round/check completed  Intervention: Prevent Skin Injury  Recent Flowsheet Documentation  Taken 4/3/2024 1601 by Olya Singh RN  Body Position: position changed independently  Intervention: Prevent and Manage VTE (Venous Thromboembolism) Risk  Recent Flowsheet Documentation  Taken 4/3/2024 1601 by Olya Singh RN  Activity Management: up ad valeria  Taken 4/3/2024 1425 by Olya Singh RN  Activity Management: up ad valeria  Range of Motion: active ROM (range of motion) encouraged  Taken 4/3/2024 0838 by Olya Singh RN  Activity Management: up ad valeria  Goal: Optimal Comfort and Wellbeing  Outcome: Ongoing, Progressing  Goal: Readiness for Transition of Care  Outcome: Ongoing, Progressing     Problem: Adjustment to Illness (Sepsis/Septic Shock)  Goal: Optimal Coping  Outcome: Ongoing, Progressing     Problem: Bleeding (Sepsis/Septic Shock)  Goal: Absence of Bleeding  Outcome: Ongoing, Progressing     Problem: Glycemic Control Impaired (Sepsis/Septic Shock)  Goal: Blood Glucose Level Within Desired Range  Outcome: Ongoing, Progressing     Problem: Infection Progression (Sepsis/Septic Shock)  Goal: Absence of Infection Signs and Symptoms  Outcome: Ongoing,  Progressing  Intervention: Promote Recovery  Recent Flowsheet Documentation  Taken 4/3/2024 1601 by Olya Singh, RN  Activity Management: up ad valeria  Taken 4/3/2024 1425 by Olya Singh, RN  Activity Management: up ad valeria  Taken 4/3/2024 0838 by Olya Singh, RN  Activity Management: up ad valeria     Problem: Nutrition Impaired (Sepsis/Septic Shock)  Goal: Optimal Nutrition Intake  Outcome: Ongoing, Progressing   Goal Outcome Evaluation:  Plan of Care Reviewed With: patient        Progress: improving

## 2024-04-03 NOTE — ANESTHESIA POSTPROCEDURE EVALUATION
Patient: Piper Hodgesrp    Procedure Summary       Date: 04/03/24 Room / Location: [facility] ENDOSCOPY 4 / [facility] ENDOSCOPY    Anesthesia Start: 1113 Anesthesia Stop: 1138    Procedure: ESOPHAGOGASTRODUODENOSCOPY (EGD) WITH BIOPSIES (Esophagus) Diagnosis:       Left upper quadrant abdominal pain      (Left upper quadrant abdominal pain [R10.12])    Surgeons: Petar Starr MD Provider: Clay Gonzalez MD    Anesthesia Type: MAC ASA Status: 3            Anesthesia Type: MAC    Vitals  Vitals Value Taken Time   /87 04/03/24 1220   Temp     Pulse 89 04/03/24 1222   Resp 16 04/03/24 1220   SpO2 96 % 04/03/24 1222   Vitals shown include unfiled device data.        Post Anesthesia Care and Evaluation    Patient location during evaluation: PACU  Patient participation: complete - patient participated  Level of consciousness: awake and alert  Pain management: adequate    Airway patency: patent  Anesthetic complications: No anesthetic complications    Cardiovascular status: acceptable  Respiratory status: acceptable  Hydration status: acceptable    Comments: --------------------            04/03/24               1220     --------------------   BP:       117/87     Pulse:      90       Resp:       16       Temp:                SpO2:      98%      --------------------

## 2024-04-03 NOTE — H&P
[facility], [place], operated by [facility]  Pre Procedure History & Physical    Chief Complaint:   Abdominal pain, CT scan with gastric changes    Subjective     HPI:   This 43-year-old female presents the endoscopy suite for upper endoscopic evaluation.  She has history of alcohol abuse and pancreatitis.  She complained of abdominal pain and a CT scan revealed a thick-walled stomach.    Past Medical History:   Past Medical History:   Diagnosis Date    Anemia     Anxiety     Constipation     Cyst of spleen     Diarrhea     E. coli bacteremia     ETOH abuse     Frequent UTI     GERD (gastroesophageal reflux disease)     Hiatal hernia     Left flank pain 10/21/2022    Migraine     Miscarriage 2004    Pancreatitis     Pseudocyst of pancreas        Past Surgical History:  Past Surgical History:   Procedure Laterality Date    EMBOLIZATION MESENTERIC ARTERY N/A 1/22/2024    Procedure: OR EMBOLIZATION MESENTERIC ARTERY RIGHT FEMORAL POPLITEAL THROMBECTOMY;  Surgeon: Carine Pena Jr., MD;  Location: [facility] HYBRID OR;  Service: Vascular;  Laterality: N/A;    ENDOSCOPY N/A 08/10/2022    Procedure: ESOPHAGOGASTRODUODENOSCOPY with bxs;  Surgeon: Salvador Price MD;  Location: [facility] ENDOSCOPY;  Service: Gastroenterology;  Laterality: N/A;  Pre: r/o esophageal  varacies, abd pain  Post: esophageal plaque    ENDOSCOPY N/A 10/9/2023    Procedure: ESOPHAGOGASTRODUODENOSCOPY WITH STENT REMOVAL;  Surgeon: Red Denson MD;  Location: [facility] ENDOSCOPY;  Service: Gastroenterology;  Laterality: N/A;    PANCREATIC CYST DRAINAGE      x 2    UPPER ENDOSCOPIC ULTRASOUND W/ FNA N/A 9/13/2023    Procedure: Esophagogastroduodenoscopy with biopsy x 1 area and endoscopic ultrasound with placement of cyst gastrostomy;  Surgeon: Red Denson MD;  Location: [facility] ENDOSCOPY;  Service: Gastroenterology;  Laterality: N/A;  post: pancreatic psuedocyst    WISDOM TOOTH EXTRACTION         Family History:  Family History   Problem Relation Age of  Onset    Alcohol abuse Mother     Arthritis Mother     Asthma Mother     Miscarriages / Stillbirths Mother     Cancer Father     Diabetes Father     Drug abuse Father     Early death Father     Heart disease Father     Hyperlipidemia Father     Hypertension Father     Alcohol abuse Paternal Aunt     Cancer Paternal Aunt     Diabetes Paternal Aunt     Drug abuse Paternal Aunt     Early death Paternal Aunt     Heart disease Paternal Aunt     Hyperlipidemia Paternal Aunt     Hypertension Paternal Aunt     Alcohol abuse Maternal Grandmother     Alcohol abuse Maternal Grandfather     Alcohol abuse Paternal Grandmother     Cancer Paternal Grandmother     Diabetes Paternal Grandmother     Drug abuse Paternal Grandmother     Early death Paternal Grandmother     Heart disease Paternal Grandmother     Hyperlipidemia Paternal Grandmother     Hypertension Paternal Grandmother     Alcohol abuse Paternal Grandfather        Social History:   reports that she has been smoking cigarettes. She started smoking about 28 years ago. She has a 14.1 pack-year smoking history. She has been exposed to tobacco smoke. She has never used smokeless tobacco. She reports that she does not currently use alcohol. She reports that she does not use drugs.    Medications:   Medications Prior to Admission   Medication Sig Dispense Refill Last Dose    amitriptyline (ELAVIL) 50 MG tablet Take 1 tablet by mouth Every Night.   3/31/2024    Ferrous Sulfate Dried (Feosol) 200 (65 Fe) MG tablet tablet Take 1 tablet by mouth 2 (Two) Times a Day.   Past Week    folic acid (FOLVITE) 1 MG tablet Take 1 tablet by mouth Daily. 30 tablet 3 Past Week    magnesium oxide (MAG-OX) 400 MG tablet Take 1 tablet by mouth Daily.   Past Week    ondansetron (ZOFRAN) 4 MG tablet Take 1 tablet by mouth Every 8 (Eight) Hours As Needed for Nausea or Vomiting.   4/1/2024    pantoprazole (PROTONIX) 40 MG EC tablet Take 1 tablet by mouth Daily. 30 tablet 3 Past Week    thiamine  "(VITAMIN B1) 100 MG tablet Take 1 tablet by mouth Daily. 30 tablet 3 Past Week    oxyCODONE-acetaminophen (PERCOCET)  MG per tablet Take 1 tablet by mouth As Needed for Moderate Pain.       promethazine (PHENERGAN) 25 MG suppository Insert 1 suppository into the rectum As Needed for Nausea or Vomiting.          Allergies:  Nickel    ROS:    Pertinent items are noted in HPI, all other systems reviewed and negative     Objective     Blood pressure 120/88, pulse 108, temperature 98.4 °F (36.9 °C), temperature source Oral, resp. rate 16, height 177.8 cm (70\"), weight 58.1 kg (128 lb), last menstrual period 01/17/2018, SpO2 99%, not currently breastfeeding.    Physical Exam   Constitutional: Pt is oriented to person, place, and time and well-developed, well-nourished, and in no distress.   Mouth/Throat: Oropharynx is clear and moist.   Neck: Normal range of motion.   Cardiovascular: Normal rate, regular rhythm and normal heart sounds.    Pulmonary/Chest: Effort normal and breath sounds normal.   Abdominal: Soft. Nontender  Skin: Skin is warm and dry.   Psychiatric: Mood, memory, affect and judgment normal.     Assessment & Plan     Diagnosis:  Abdominal pain  Abnormal CT scan    Anticipated Surgical Procedure:  EGD    The risks, benefits, and alternatives of this procedure have been discussed with the patient or the responsible party- the patient understands and agrees to proceed.                                                          "

## 2024-04-03 NOTE — PROGRESS NOTES
"DAILY PROGRESS NOTE  [facility]    Patient Identification:  Name: Piper Cain  Age: 43 y.o.  Sex: female  :  1980  MRN: [id number]         Primary Care Physician: Sharmaine Gatica APRN    Subjective:  Interval History: She complains of abdominal pain and is about the same.    Objective:    Scheduled Meds:amitriptyline, 50 mg, Oral, Nightly  folic acid 1 mg in sodium chloride 0.9 % 50 mL IVPB, 1 mg, Intravenous, Daily  LORazepam, 1 mg, Oral, Q6H  pantoprazole, 40 mg, Intravenous, BID AC  piperacillin-tazobactam, 3.375 g, Intravenous, Q8H  sodium chloride, 10 mL, Intravenous, Q12H  thiamine (B-1) IV, 200 mg, Intravenous, Q8H   Followed by  [START ON 2024] thiamine, 100 mg, Oral, Daily  thiamine, 100 mg, Oral, Daily      Continuous Infusions:sodium chloride, 125 mL/hr, Last Rate: 125 mL/hr (24 0555)  sodium chloride, 30 mL/hr, Last Rate: 30 mL/hr (24 1018)        Vital signs in last 24 hours:  Temp:  [97.3 °F (36.3 °C)-98.8 °F (37.1 °C)] 97.3 °F (36.3 °C)  Heart Rate:  [] 97  Resp:  [14-18] 16  BP: (109-120)/(74-88) 117/86    Intake/Output:    Intake/Output Summary (Last 24 hours) at 4/3/2024 1528  Last data filed at 4/3/2024 1300  Gross per 24 hour   Intake 3760 ml   Output --   Net 3760 ml       Exam:  /86 (BP Location: Left arm, Patient Position: Sitting)   Pulse 97   Temp 97.3 °F (36.3 °C) (Axillary)   Resp 16   Ht 177.8 cm (70\")   Wt 58.1 kg (128 lb)   LMP 2018 (Exact Date)   SpO2 99%   BMI 18.37 kg/m²     General Appearance:    Alert, cooperative, no distress   Head:    Normocephalic, without obvious abnormality, atraumatic   Eyes:       Throat:   Lips, tongue, gums normal   Neck:   Supple, symmetrical, trachea midline, no JVD   Lungs:     Clear to auscultation bilaterally, respirations unlabored   Chest Wall:    No tenderness or deformity    Heart:    Regular rate and rhythm, S1 and S2 normal, no murmur,no  Rub or gallop   Abdomen:     Soft, " tender over upper abdomen, bowel sounds active, no masses, no organomegaly    Extremities:   Extremities normal, atraumatic, no cyanosis or edema   Pulses:      Skin:   Skin is warm and dry,  no rashes or palpable lesions   Neurologic:   no focal deficits noted      Lab Results (last 72 hours)       Procedure Component Value Units Date/Time    Blood Culture - Blood, Arm, Right [[id number]]  (Normal) Collected: 04/01/24 1258    Specimen: Blood from Arm, Right Updated: 04/02/24 1315     Blood Culture No growth at 24 hours    Comprehensive Metabolic Panel [[id number]]  (Abnormal) Collected: 04/02/24 0653    Specimen: Blood Updated: 04/02/24 0749     Glucose 78 mg/dL      BUN 7 mg/dL      Creatinine 0.63 mg/dL      Sodium 135 mmol/L      Potassium 3.5 mmol/L      Chloride 100 mmol/L      CO2 24.0 mmol/L      Calcium 8.3 mg/dL      Total Protein 6.3 g/dL      Albumin 3.4 g/dL      ALT (SGPT) 10 U/L      AST (SGOT) 6 U/L      Alkaline Phosphatase 110 U/L      Total Bilirubin 0.4 mg/dL      Globulin 2.9 gm/dL      A/G Ratio 1.2 g/dL      BUN/Creatinine Ratio 11.1     Anion Gap 11.0 mmol/L      eGFR 114.5 mL/min/1.73     Narrative:      GFR Normal >60  Chronic Kidney Disease <60  Kidney Failure <15      Lipase [[id number]]  (Abnormal) Collected: 04/02/24 0526    Specimen: Blood Updated: 04/02/24 0630     Lipase 125 U/L     CBC Auto Differential [[id number]]  (Abnormal) Collected: 04/02/24 0526    Specimen: Blood Updated: 04/02/24 0627     WBC 14.22 10*3/mm3      RBC 3.35 10*6/mm3      Hemoglobin 10.7 g/dL      Hematocrit 32.0 %      MCV 95.5 fL      MCH 31.9 pg      MCHC 33.4 g/dL      RDW 14.7 %      RDW-SD 51.6 fl      MPV 10.8 fL      Platelets 170 10*3/mm3      Neutrophil % 79.5 %      Lymphocyte % 11.8 %      Monocyte % 6.3 %      Eosinophil % 1.7 %      Basophil % 0.2 %      Immature Grans % 0.5 %      Neutrophils, Absolute 11.30 10*3/mm3      Lymphocytes, Absolute 1.68 10*3/mm3      Monocytes, Absolute 0.90 10*3/mm3       "Eosinophils, Absolute 0.24 10*3/mm3      Basophils, Absolute 0.03 10*3/mm3      Immature Grans, Absolute 0.07 10*3/mm3      nRBC 0.0 /100 WBC     Procalcitonin [[id number]]  (Abnormal) Collected: 04/01/24 1200    Specimen: Blood Updated: 04/01/24 2126     Procalcitonin 0.42 ng/mL     Narrative:      As a Marker for Sepsis (Non-Neonates):    1. <0.5 ng/mL represents a low risk of severe sepsis and/or septic shock.  2. >2 ng/mL represents a high risk of severe sepsis and/or septic shock.    As a Marker for Lower Respiratory Tract Infections that require antibiotic therapy:    PCT on Admission    Antibiotic Therapy       6-12 Hrs later    >0.5                Strongly Recommended  >0.25 - <0.5        Recommended   0.1 - 0.25          Discouraged              Remeasure/reassess PCT  <0.1                Strongly Discouraged     Remeasure/reassess PCT    As 28 day mortality risk marker: \"Change in Procalcitonin Result\" (>80% or <=80%) if Day 0 (or Day 1) and Day 4 values are available. Refer to [email or web address]-pct-calculator.[email or web address]    Change in PCT <=80%  A decrease of PCT levels below or equal to 80% defines a positive change in PCT test result representing a higher risk for 28-day all-cause mortality of patients diagnosed with severe sepsis for septic shock.    Change in PCT >80%  A decrease of PCT levels of more than 80% defines a negative change in PCT result representing a lower risk for 28-day all-cause mortality of patients diagnosed with severe sepsis or septic shock.       Urinalysis, Microscopic Only - Urine, Clean Catch [[id number]]  (Abnormal) Collected: 04/01/24 1209    Specimen: Urine, Clean Catch Updated: 04/01/24 1411     RBC, UA None Seen /HPF      WBC, UA 0-2 /HPF      Bacteria, UA 1+ /HPF      Squamous Epithelial Cells, UA 13-20 /HPF      Hyaline Casts, UA None Seen /LPF      Granular Casts, UA 0-2 /LPF      Methodology Manual Light Microscopy    Rembrandt Urine Culture Tube - Urine, Clean Catch [[id number]] " Collected: 04/01/24 1209    Specimen: Urine, Clean Catch Updated: 04/01/24 1347     Extra Tube Hold for add-ons.     Comment: Auto resulted.       Lactic Acid, Plasma [[id number]]  (Normal) Collected: 04/01/24 1243    Specimen: Blood Updated: 04/01/24 1302     Lactate 1.0 mmol/L     Comprehensive Metabolic Panel [[id number]]  (Abnormal) Collected: 04/01/24 1200    Specimen: Blood Updated: 04/01/24 1226     Glucose 94 mg/dL      BUN 7 mg/dL      Creatinine 0.68 mg/dL      Sodium 128 mmol/L      Potassium 3.8 mmol/L      Chloride 87 mmol/L      CO2 25.8 mmol/L      Calcium 10.4 mg/dL      Total Protein 7.9 g/dL      Albumin 4.2 g/dL      ALT (SGPT) 12 U/L      AST (SGOT) 9 U/L      Alkaline Phosphatase 152 U/L      Total Bilirubin 0.7 mg/dL      Globulin 3.7 gm/dL      A/G Ratio 1.1 g/dL      BUN/Creatinine Ratio 10.3     Anion Gap 15.2 mmol/L      eGFR 112.4 mL/min/1.73     Narrative:      GFR Normal >60  Chronic Kidney Disease <60  Kidney Failure <15      Lipase [[id number]]  (Abnormal) Collected: 04/01/24 1200    Specimen: Blood Updated: 04/01/24 1226     Lipase 123 U/L     Urinalysis With Microscopic If Indicated (No Culture) - Urine, Clean Catch [[id number]]  (Abnormal) Collected: 04/01/24 1209    Specimen: Urine, Clean Catch Updated: 04/01/24 1211     Color, UA Yellow     Appearance, UA Clear     pH, UA 6.0     Specific Gravity, UA 1.020     Glucose, UA Negative     Ketones, UA >=160 mg/dL (4+)     Bilirubin, UA Moderate (2+)     Blood, UA Trace     Protein, UA 30 mg/dL (1+)     Leuk Esterase, UA Negative     Nitrite, UA Negative     Urobilinogen, UA 0.2 E.U./dL    CBC & Differential [[id number]]  (Abnormal) Collected: 04/01/24 1200    Specimen: Blood Updated: 04/01/24 1207    Narrative:      The following orders were created for panel order CBC & Differential.  Procedure                               Abnormality         Status                     ---------                               -----------         ------                      CBC Auto Differential[[id number]]        Abnormal            Final result                 Please view results for these tests on the individual orders.    CBC Auto Differential [[id number]]  (Abnormal) Collected: 04/01/24 1200    Specimen: Blood Updated: 04/01/24 1207     WBC 22.29 10*3/mm3      RBC 4.16 10*6/mm3      Hemoglobin 13.0 g/dL      Hematocrit 39.1 %      MCV 94.0 fL      MCH 31.3 pg      MCHC 33.2 g/dL      RDW 14.7 %      RDW-SD 51.8 fl      MPV 10.0 fL      Platelets 221 10*3/mm3      Neutrophil % 86.2 %      Lymphocyte % 7.6 %      Monocyte % 5.6 %      Eosinophil % 0.3 %      Basophil % 0.2 %      Immature Grans % 0.1 %      Neutrophils, Absolute 19.21 10*3/mm3      Lymphocytes, Absolute 1.70 10*3/mm3      Monocytes, Absolute 1.25 10*3/mm3      Eosinophils, Absolute 0.06 10*3/mm3      Basophils, Absolute 0.04 10*3/mm3      Immature Grans, Absolute 0.03 10*3/mm3           Data Review:  Results from last 7 days   Lab Units 04/03/24  0556 04/02/24  0653 04/01/24  1200   SODIUM mmol/L 135* 135* 128*   POTASSIUM mmol/L 3.3* 3.5 3.8   CHLORIDE mmol/L 100 100 87*   CO2 mmol/L 20.8* 24.0 25.8   BUN mg/dL 4* 7 7   CREATININE mg/dL 0.49* 0.63 0.68   GLUCOSE mg/dL 68 78 94   CALCIUM mg/dL 8.4* 8.3* 10.4     Results from last 7 days   Lab Units 04/03/24  0556 04/02/24  0526 04/01/24  1200   WBC 10*3/mm3 9.28 14.22* 22.29*   HEMOGLOBIN g/dL 9.5* 10.7* 13.0   HEMATOCRIT % 29.7* 32.0* 39.1   PLATELETS 10*3/mm3 161 170 221             Lab Results   Lab Value Date/Time    TROPONINT <6 01/21/2024 0756    TROPONINT <6 08/22/2023 1619    TROPONINT <6 08/22/2023 1301    TROPONINT <6 06/08/2023 1109    TROPONINT <6 06/08/2023 0906    TROPONINT <0.010 10/20/2022 1939    TROPONINT <0.010 08/06/2022 2158    TROPONINT <0.010 04/06/2018 1751         Results from last 7 days   Lab Units 04/02/24  0653 04/01/24  1200   ALK PHOS U/L 110 152*   BILIRUBIN mg/dL 0.4 0.7   ALT (SGPT) U/L 10 12   AST (SGOT) U/L 6 9        "      No results found for: \"POCGLU\"        Past Medical History:   Diagnosis Date    Anemia     Anxiety     Constipation     Cyst of spleen     Diarrhea     E. coli bacteremia     ETOH abuse     Frequent UTI     GERD (gastroesophageal reflux disease)     Hiatal hernia     Left flank pain 10/21/2022    Migraine     Miscarriage 2004    Pancreatitis     Pseudocyst of pancreas        Assessment:  Active Hospital Problems    Diagnosis  POA    **Abdominal pain [R10.9]  Yes    Acute on chronic pancreatitis [K85.90, K86.1]  Yes    Essential hypertension [I10]  Yes    Pancreatic pseudocyst [K86.3]  Yes    Abnormal CT of the abdomen [R93.5]  Yes    Alcohol abuse [F10.10]  Yes      Resolved Hospital Problems   No resolved problems to display.       Plan:  Continue IV Protonix, IV antibiotics and follow-up on labs and cultures.  Surgery consult noted.  GI consult also noted and results of EEG noted.  Await path report.  Try clear liquid diet and see how she does.    Fletcher Gallegos MD  4/3/2024  15:28 EDT   "

## 2024-04-03 NOTE — PROGRESS NOTES
Chief Complaint:    Abdominal pain    Subjective:    The patient is having epigastric abdominal pain with radiation to the right back.  She had an EGD today that showed gastritis.      Objective:    Temp:  [97.3 °F (36.3 °C)-98.8 °F (37.1 °C)] 98.2 °F (36.8 °C)  Heart Rate:  [] 106  Resp:  [14-18] 16  BP: (109-120)/(74-93) 120/93    Physical Exam  Constitutional:       Appearance: She is not ill-appearing or toxic-appearing.   Abdominal:      Palpations: Abdomen is soft.      Tenderness: There is abdominal tenderness in the epigastric area.   Neurological:      Mental Status: She is alert.   Psychiatric:         Behavior: Behavior is cooperative.         Results:    WBC is 9.28.  H/H is 9.5/29.7.  BUN is 4 creatinine 0.49.    Assessment/Plan:    The patient continues to have chronic epigastric abdominal pain with radiation to the right back.  Her symptoms are most consistent with chronic pancreatitis.  This was discussed with her.  There is no need for surgical management of her fluid collections.  I will sign off but remain available if needed.    Jacob Pena Jr., M.D.

## 2024-04-04 LAB
ANION GAP SERPL CALCULATED.3IONS-SCNC: 13.9 MMOL/L (ref 5–15)
BASOPHILS # BLD AUTO: 0.02 10*3/MM3 (ref 0–0.2)
BASOPHILS NFR BLD AUTO: 0.3 % (ref 0–1.5)
BUN SERPL-MCNC: <2 MG/DL (ref 6–20)
BUN/CREAT SERPL: ABNORMAL
CALCIUM SPEC-SCNC: 8 MG/DL (ref 8.6–10.5)
CHLORIDE SERPL-SCNC: 99 MMOL/L (ref 98–107)
CO2 SERPL-SCNC: 23.1 MMOL/L (ref 22–29)
CREAT SERPL-MCNC: 0.37 MG/DL (ref 0.57–1)
DEPRECATED RDW RBC AUTO: 51.5 FL (ref 37–54)
EGFRCR SERPLBLD CKD-EPI 2021: 130.1 ML/MIN/1.73
EOSINOPHIL # BLD AUTO: 0.15 10*3/MM3 (ref 0–0.4)
EOSINOPHIL NFR BLD AUTO: 2 % (ref 0.3–6.2)
ERYTHROCYTE [DISTWIDTH] IN BLOOD BY AUTOMATED COUNT: 14.6 % (ref 12.3–15.4)
GLUCOSE SERPL-MCNC: 92 MG/DL (ref 65–99)
HCT VFR BLD AUTO: 29.2 % (ref 34–46.6)
HGB BLD-MCNC: 9.7 G/DL (ref 12–15.9)
IMM GRANULOCYTES # BLD AUTO: 0.03 10*3/MM3 (ref 0–0.05)
IMM GRANULOCYTES NFR BLD AUTO: 0.4 % (ref 0–0.5)
LAB AP CASE REPORT: NORMAL
LIPASE SERPL-CCNC: 139 U/L (ref 13–60)
LYMPHOCYTES # BLD AUTO: 1.06 10*3/MM3 (ref 0.7–3.1)
LYMPHOCYTES NFR BLD AUTO: 14.4 % (ref 19.6–45.3)
MCH RBC QN AUTO: 32 PG (ref 26.6–33)
MCHC RBC AUTO-ENTMCNC: 33.2 G/DL (ref 31.5–35.7)
MCV RBC AUTO: 96.4 FL (ref 79–97)
MONOCYTES # BLD AUTO: 0.7 10*3/MM3 (ref 0.1–0.9)
MONOCYTES NFR BLD AUTO: 9.5 % (ref 5–12)
NEUTROPHILS NFR BLD AUTO: 5.42 10*3/MM3 (ref 1.7–7)
NEUTROPHILS NFR BLD AUTO: 73.4 % (ref 42.7–76)
NRBC BLD AUTO-RTO: 0 /100 WBC (ref 0–0.2)
PATH REPORT.FINAL DX SPEC: NORMAL
PATH REPORT.GROSS SPEC: NORMAL
PLATELET # BLD AUTO: 160 10*3/MM3 (ref 140–450)
PMV BLD AUTO: 10.3 FL (ref 6–12)
POTASSIUM SERPL-SCNC: 3.1 MMOL/L (ref 3.5–5.2)
RBC # BLD AUTO: 3.03 10*6/MM3 (ref 3.77–5.28)
SODIUM SERPL-SCNC: 136 MMOL/L (ref 136–145)
WBC NRBC COR # BLD AUTO: 7.38 10*3/MM3 (ref 3.4–10.8)

## 2024-04-04 PROCEDURE — 83690 ASSAY OF LIPASE: CPT | Performed by: HOSPITALIST

## 2024-04-04 PROCEDURE — 25810000003 SODIUM CHLORIDE 0.9 % SOLUTION: Performed by: HOSPITALIST

## 2024-04-04 PROCEDURE — 80048 BASIC METABOLIC PNL TOTAL CA: CPT | Performed by: HOSPITALIST

## 2024-04-04 PROCEDURE — 99232 SBSQ HOSP IP/OBS MODERATE 35: CPT | Performed by: PHYSICIAN ASSISTANT

## 2024-04-04 PROCEDURE — 25010000002 HYDROMORPHONE PER 4 MG: Performed by: HOSPITALIST

## 2024-04-04 PROCEDURE — 25010000002 THIAMINE HCL 200 MG/2ML SOLUTION: Performed by: HOSPITALIST

## 2024-04-04 PROCEDURE — 85025 COMPLETE CBC W/AUTO DIFF WBC: CPT | Performed by: HOSPITALIST

## 2024-04-04 PROCEDURE — G0378 HOSPITAL OBSERVATION PER HR: HCPCS

## 2024-04-04 PROCEDURE — 25010000002 ONDANSETRON PER 1 MG: Performed by: HOSPITALIST

## 2024-04-04 PROCEDURE — 25010000002 PIPERACILLIN SOD-TAZOBACTAM PER 1 G: Performed by: HOSPITALIST

## 2024-04-04 RX ADMIN — THIAMINE HYDROCHLORIDE 200 MG: 100 INJECTION, SOLUTION INTRAMUSCULAR; INTRAVENOUS at 14:34

## 2024-04-04 RX ADMIN — HYDROMORPHONE HYDROCHLORIDE 0.5 MG: 1 INJECTION, SOLUTION INTRAMUSCULAR; INTRAVENOUS; SUBCUTANEOUS at 18:44

## 2024-04-04 RX ADMIN — HYDROMORPHONE HYDROCHLORIDE 0.5 MG: 1 INJECTION, SOLUTION INTRAMUSCULAR; INTRAVENOUS; SUBCUTANEOUS at 12:03

## 2024-04-04 RX ADMIN — HYDROMORPHONE HYDROCHLORIDE 0.5 MG: 1 INJECTION, SOLUTION INTRAMUSCULAR; INTRAVENOUS; SUBCUTANEOUS at 04:47

## 2024-04-04 RX ADMIN — PIPERACILLIN SODIUM AND TAZOBACTAM SODIUM 3.38 G: 3; .375 INJECTION, POWDER, LYOPHILIZED, FOR SOLUTION INTRAVENOUS at 07:01

## 2024-04-04 RX ADMIN — FOLIC ACID 1 MG: 5 INJECTION, SOLUTION INTRAMUSCULAR; INTRAVENOUS; SUBCUTANEOUS at 12:03

## 2024-04-04 RX ADMIN — SODIUM CHLORIDE 125 ML/HR: 9 INJECTION, SOLUTION INTRAVENOUS at 08:39

## 2024-04-04 RX ADMIN — ONDANSETRON 4 MG: 2 INJECTION INTRAMUSCULAR; INTRAVENOUS at 22:35

## 2024-04-04 RX ADMIN — HYDROMORPHONE HYDROCHLORIDE 0.5 MG: 1 INJECTION, SOLUTION INTRAMUSCULAR; INTRAVENOUS; SUBCUTANEOUS at 08:32

## 2024-04-04 RX ADMIN — ONDANSETRON 4 MG: 2 INJECTION INTRAMUSCULAR; INTRAVENOUS at 04:47

## 2024-04-04 RX ADMIN — HYDROMORPHONE HYDROCHLORIDE 0.5 MG: 1 INJECTION, SOLUTION INTRAMUSCULAR; INTRAVENOUS; SUBCUTANEOUS at 23:08

## 2024-04-04 RX ADMIN — Medication 100 MG: at 08:36

## 2024-04-04 RX ADMIN — OXYCODONE AND ACETAMINOPHEN 1 TABLET: 325; 10 TABLET ORAL at 08:32

## 2024-04-04 RX ADMIN — OXYCODONE AND ACETAMINOPHEN 1 TABLET: 325; 10 TABLET ORAL at 18:45

## 2024-04-04 RX ADMIN — OXYCODONE AND ACETAMINOPHEN 1 TABLET: 325; 10 TABLET ORAL at 14:02

## 2024-04-04 RX ADMIN — PIPERACILLIN SODIUM AND TAZOBACTAM SODIUM 3.38 G: 3; .375 INJECTION, POWDER, LYOPHILIZED, FOR SOLUTION INTRAVENOUS at 21:33

## 2024-04-04 RX ADMIN — PIPERACILLIN SODIUM AND TAZOBACTAM SODIUM 3.38 G: 3; .375 INJECTION, POWDER, LYOPHILIZED, FOR SOLUTION INTRAVENOUS at 14:30

## 2024-04-04 RX ADMIN — AMITRIPTYLINE HYDROCHLORIDE 50 MG: 50 TABLET, FILM COATED ORAL at 21:34

## 2024-04-04 RX ADMIN — Medication 10 ML: at 08:36

## 2024-04-04 RX ADMIN — PANTOPRAZOLE SODIUM 40 MG: 40 INJECTION, POWDER, LYOPHILIZED, FOR SOLUTION INTRAVENOUS at 08:36

## 2024-04-04 RX ADMIN — Medication 10 ML: at 21:39

## 2024-04-04 RX ADMIN — THIAMINE HYDROCHLORIDE 200 MG: 100 INJECTION, SOLUTION INTRAMUSCULAR; INTRAVENOUS at 07:03

## 2024-04-04 NOTE — PROGRESS NOTES
[facility], [facility]  Inpatient Progress Note    Reason for Follow-up: Abdominal pain and pancreatitis    Subjective     Interval History:   Tolerating liquid diet well and would like to advance.  She does report persistent left upper quadrant sharp pain, worse with, breathing-especially deep breathing she states it feels like when her spleen was hemorrhaging previously.  Some nausea, denies vomiting.    4/3/2024: EGD with mild esophagitis, gastritis, and bile reflux, otherwise normal throughout.  Path showing Leon's esophagus without dysplasia, normal duodenal biopsies.  Normal gastric biopsies but pending H. pylori urease testing.    4/4 labs show worsening lipase to 139; CBC with hemoglobin stable at 9.7, BMP with potassium 3.1    Current Facility-Administered Medications:     acetaminophen (TYLENOL) tablet 650 mg, 650 mg, Oral, Q4H PRN **OR** acetaminophen (TYLENOL) 160 MG/5ML oral solution 650 mg, 650 mg, Oral, Q4H PRN **OR** acetaminophen (TYLENOL) suppository 650 mg, 650 mg, Rectal, Q4H PRN, Fletcher Gallegos MD    amitriptyline (ELAVIL) tablet 50 mg, 50 mg, Oral, Nightly, Fletcher Gallegos MD, 50 mg at 04/03/24 2059    sennosides-docusate (PERICOLACE) 8.6-50 MG per tablet 2 tablet, 2 tablet, Oral, BID PRN **AND** polyethylene glycol (MIRALAX) packet 17 g, 17 g, Oral, Daily PRN **AND** bisacodyl (DULCOLAX) EC tablet 5 mg, 5 mg, Oral, Daily PRN **AND** bisacodyl (DULCOLAX) suppository 10 mg, 10 mg, Rectal, Daily PRN, Fletcher Gallegos MD    folic acid 1 mg in sodium chloride 0.9 % 50 mL IVPB, 1 mg, Intravenous, Daily, Fletcher Gallegos MD, Last Rate: 100 mL/hr at 04/04/24 1203, 1 mg at 04/04/24 1203    HYDROmorphone (DILAUDID) injection 0.5 mg, 0.5 mg, Intravenous, Q2H PRN, 0.5 mg at 04/04/24 1203 **AND** naloxone (NARCAN) injection 0.4 mg, 0.4 mg, Intravenous, Q5 Min PRN, Fletcher Gallegos MD    LORazepam (ATIVAN) tablet 1 mg, 1 mg, Oral, Q1H PRN **OR** LORazepam (ATIVAN) injection 1 mg, 1 mg, Intravenous,  Q1H PRN **OR** LORazepam (ATIVAN) tablet 2 mg, 2 mg, Oral, Q1H PRN **OR** LORazepam (ATIVAN) injection 2 mg, 2 mg, Intravenous, Q1H PRN **OR** LORazepam (ATIVAN) injection 2 mg, 2 mg, Intravenous, Q15 Min PRN **OR** LORazepam (ATIVAN) injection 2 mg, 2 mg, Intramuscular, Q15 Min PRNEl Lyle E, MD    Magnesium Standard Dose Replacement - Follow Nurse / BPA Driven Protocol, , Does not apply, PRNEl Lyle E, MD    ondansetron ODT (ZOFRAN-ODT) disintegrating tablet 4 mg, 4 mg, Oral, Q6H PRN **OR** ondansetron (ZOFRAN) injection 4 mg, 4 mg, Intravenous, Q6H PRN, Fletcher Gallegos MD, 4 mg at 04/04/24 0447    oxyCODONE-acetaminophen (PERCOCET)  MG per tablet 1 tablet, 1 tablet, Oral, Q4H PRN, Fletcher Gallegos MD, 1 tablet at 04/04/24 0832    pantoprazole (PROTONIX) injection 40 mg, 40 mg, Intravenous, BID AC, Fletcher Gallegos MD, 40 mg at 04/04/24 0836    piperacillin-tazobactam (ZOSYN) 3.375 g IVPB in 100 mL NS MBP (CD), 3.375 g, Intravenous, Q8H, Fletcher Gallegos MD, Last Rate: 0 mL/hr at 04/04/24 0400, 3.375 g at 04/04/24 0701    promethazine (PHENERGAN) suppository 25 mg, 25 mg, Rectal, Q4H PRN, Fletcher Gallegos MD    sodium chloride 0.9 % flush 10 mL, 10 mL, Intravenous, PRN, Caren Medina MD    sodium chloride 0.9 % flush 10 mL, 10 mL, Intravenous, Q12H, Fletcher Gallegos MD, 10 mL at 04/04/24 0836    sodium chloride 0.9 % flush 10 mL, 10 mL, Intravenous, PRN, Fletcher Gallegos MD    sodium chloride 0.9 % infusion 40 mL, 40 mL, Intravenous, PRN, Fletcher Gallegos MD    sodium chloride 0.9 % infusion, 125 mL/hr, Intravenous, Continuous, Fletcher Gallegos MD, Last Rate: 125 mL/hr at 04/04/24 0839, 125 mL/hr at 04/04/24 0839    sodium chloride 0.9 % infusion, 30 mL/hr, Intravenous, Continuous PRN, Petar Starr MD, Last Rate: 30 mL/hr at 04/03/24 1018, 30 mL/hr at 04/03/24 1018    thiamine (B-1) injection 200 mg, 200 mg, Intravenous, Q8H, 200 mg at 04/04/24 0703 **FOLLOWED BY** [START ON 4/7/2024] thiamine (VITAMIN  B-1) tablet 100 mg, 100 mg, Oral, Daily, Fletcher Gallegos MD    thiamine (VITAMIN B-1) tablet 100 mg, 100 mg, Oral, Daily, Fletcher Gallegos MD, 100 mg at 04/04/24 0836      Objective     Vital Signs  Temp:  [97.3 °F (36.3 °C)-98.6 °F (37 °C)] 98.6 °F (37 °C)  Heart Rate:  [] 90  Resp:  [16] 16  BP: (117-124)/(84-93) 117/84  Body mass index is 18.37 kg/m².    Intake/Output Summary (Last 24 hours) at 4/4/2024 1256  Last data filed at 4/4/2024 0900  Gross per 24 hour   Intake 1920 ml   Output --   Net 1920 ml     I/O this shift:  In: 600 [P.O.:600]  Out: -      Physical Exam:    General: patient awake, alert and cooperative   Eyes: Normal lids and lashes, no scleral icterus   Skin: warm and dry, not jaundiced   Pulm: regular and unlabored   Abdomen: soft, diffuse upper and left upper quadrant TTP with guarding, nondistended; normal bowel sounds   Psychiatric: Normal mood and behavior; memory intact     Results Review:     I reviewed the patient's new clinical results.    Results from last 7 days   Lab Units 04/04/24  0607 04/03/24  0556 04/02/24  0526   WBC 10*3/mm3 7.38 9.28 14.22*   HEMOGLOBIN g/dL 9.7* 9.5* 10.7*   HEMATOCRIT % 29.2* 29.7* 32.0*   PLATELETS 10*3/mm3 160 161 170     Results from last 7 days   Lab Units 04/04/24  0607 04/03/24  0556 04/02/24  0653 04/01/24  1200   SODIUM mmol/L 136 135* 135* 128*   POTASSIUM mmol/L 3.1* 3.3* 3.5 3.8   CHLORIDE mmol/L 99 100 100 87*   CO2 mmol/L 23.1 20.8* 24.0 25.8   BUN mg/dL <2* 4* 7 7   CREATININE mg/dL 0.37* 0.49* 0.63 0.68   CALCIUM mg/dL 8.0* 8.4* 8.3* 10.4   BILIRUBIN mg/dL  --   --  0.4 0.7   ALK PHOS U/L  --   --  110 152*   ALT (SGPT) U/L  --   --  10 12   AST (SGOT) U/L  --   --  6 9   GLUCOSE mg/dL 92 68 78 94         Lab Results   Lab Value Date/Time    LIPASE 139 (H) 04/04/2024 0607    LIPASE 125 (H) 04/02/2024 0526    LIPASE 123 (H) 04/01/2024 1200    LIPASE 123 (H) 01/21/2024 0756    LIPASE 89 (H) 01/07/2024 0449    LIPASE 187 (H) 01/04/2024 2381     LIPASE 99 (H) 09/07/2023 0615    LIPASE 232 (H) 09/05/2023 2205    LIPASE 64 (H) 08/25/2023 0425    LIPASE 75 (H) 08/24/2023 0324    LIPASE 87 (H) 08/23/2023 0252    LIPASE 115 (H) 08/22/2023 1301    LIPASE 73 (H) 06/08/2023 0652    LIPASE 151 (H) 06/07/2023 2246    LIPASE 192 (H) 03/25/2023 1700    LIPASE 38 12/05/2022 0526    LIPASE 34 12/04/2022 0542    LIPASE 29 12/03/2022 0515    LIPASE 50 12/02/2022 0719    LIPASE 29 12/01/2022 0519    LIPASE 65 (H) 11/30/2022 1412    LIPASE 157 (H) 11/28/2022 1751    LIPASE 335 (H) 10/20/2022 1939    LIPASE 378 (H) 08/06/2022 2158    LIPASE 95 (H) 01/24/2018 0545    LIPASE 93 (H) 10/01/2017 0356    LIPASE 122 (H) 09/30/2017 0619    LIPASE 86 (H) 09/29/2017 1731    LIPASE 133 (H) 09/28/2017 0032       Radiology:  CT Abdomen Pelvis With Contrast   Final Result       1. Severe diffuse predominantly low-density gastric wall thickening,   greatest anteriorly, likely severe gastritis.   2. New fluid collection along the posterior wall of the left lobe of the   liver anterior to the stomach, probable pseudocyst, with additional   pseudocysts in the left upper quadrant as detailed above.   3. Mild peripancreatic fat stranding with pancreatic parenchymal   calcifications, likely acute on chronic pancreatitis.   4. Decreased size of the now low-density fluid collection along the   superior and posterior margin of the spleen, probable resolving   hematoma.   5. Mildly heterogeneous hepatic attenuation with subtle nodular liver   contours, suggesting underlying hepatocellular disease/cirrhosis, with   upper abdominal venous varicosities that could reflect portal venous   hypertension.       This report was finalized on 4/1/2024 2:06 PM by Maverick Solis MD on   Workstation: [id number]              Assessment & Plan     Active Hospital Problems    Diagnosis     **Abdominal pain     Acute on chronic pancreatitis     Essential hypertension     Pancreatic pseudocyst     Abnormal CT of the  abdomen     Alcohol abuse        Assessment:  Acute on chronic pancreatitis  Left upper quadrant abdominal pain, splinting  Pancreatic pseudocyst along the tail of the pancreas and inferior margin of the spleen.  New multilobulated fluid collection left liver lobe and anterior margin of the stomach  Improved splenic fluid collection-likely resolving hematoma  Hepatocellular disease/cirrhosis on CT scan  GERD/Leon's esophagus on EGD pathology  Gastritis  History of alcohol abuse      Plan:  Persistent left upper quadrant pain that she states is similar to when she had splenic hemorrhage from previous pancreatitis.  She does have a pancreatic pseudocyst in that area.  Also with improving splenic hematoma on CT scan.  Appreciate general surgery evaluation and recommendations.  Agree that pain is likely related to chronic pancreatitis.  Continue IV PPI and supportive care for now.  Needs total cessation of alcohol use.  Also recommend tobacco cessation.  We can try to advance her diet if patient tolerates.  This is at patient request.  She is tolerating clear liquid diet without worsening pain.  But still with discomfort in the LUQ.  Lipase mildly worse.  Follow-up with SOL Gipson, outpatient gastroenterologist at [facility] [place] after discharge.    I discussed the patients findings and my recommendations with patient.    Dragon dictation used throughout this note.            Evelin Hinton PA-C  [facility]  [address] [place] [address]  Office: [phone]

## 2024-04-04 NOTE — PROGRESS NOTES
"DAILY PROGRESS NOTE  [facility]    Patient Identification:  Name: Piper Cain  Age: 43 y.o.  Sex: female  :  1980  MRN: [id number]         Primary Care Physician: Sharmaine Gatica APRN    Subjective:  Interval History: She complains of abdominal pain and is better.  She has been able to tolerate clear liquids.    Objective:    Scheduled Meds:amitriptyline, 50 mg, Oral, Nightly  folic acid 1 mg in sodium chloride 0.9 % 50 mL IVPB, 1 mg, Intravenous, Daily  pantoprazole, 40 mg, Intravenous, BID AC  piperacillin-tazobactam, 3.375 g, Intravenous, Q8H  sodium chloride, 10 mL, Intravenous, Q12H  thiamine (B-1) IV, 200 mg, Intravenous, Q8H   Followed by  [START ON 2024] thiamine, 100 mg, Oral, Daily  thiamine, 100 mg, Oral, Daily      Continuous Infusions:sodium chloride, 125 mL/hr, Last Rate: 125 mL/hr (24 0839)  sodium chloride, 30 mL/hr, Last Rate: 30 mL/hr (24 1018)        Vital signs in last 24 hours:  Temp:  [98.2 °F (36.8 °C)-98.6 °F (37 °C)] 98.2 °F (36.8 °C)  Heart Rate:  [] 87  Resp:  [16] 16  BP: (117-124)/(84-94) 117/94    Intake/Output:    Intake/Output Summary (Last 24 hours) at 2024 1418  Last data filed at 2024 1300  Gross per 24 hour   Intake 2340 ml   Output --   Net 2340 ml       Exam:  /94 (BP Location: Left arm, Patient Position: Sitting)   Pulse 87   Temp 98.2 °F (36.8 °C) (Oral)   Resp 16   Ht 177.8 cm (70\")   Wt 58.1 kg (128 lb)   LMP 2018 (Exact Date)   SpO2 100%   BMI 18.37 kg/m²     General Appearance:    Alert, cooperative, no distress   Head:    Normocephalic, without obvious abnormality, atraumatic   Eyes:       Throat:   Lips, tongue, gums normal   Neck:   Supple, symmetrical, trachea midline, no JVD   Lungs:     Clear to auscultation bilaterally, respirations unlabored   Chest Wall:    No tenderness or deformity    Heart:    Regular rate and rhythm, S1 and S2 normal, no murmur,no  Rub or gallop   Abdomen:     Soft, " tender over upper abdomen, bowel sounds active, no masses, no organomegaly    Extremities:   Extremities normal, atraumatic, no cyanosis or edema   Pulses:      Skin:   Skin is warm and dry,  no rashes or palpable lesions   Neurologic:   no focal deficits noted      Lab Results (last 72 hours)       Procedure Component Value Units Date/Time    Blood Culture - Blood, Arm, Right [[id number]]  (Normal) Collected: 04/01/24 1258    Specimen: Blood from Arm, Right Updated: 04/02/24 1315     Blood Culture No growth at 24 hours    Comprehensive Metabolic Panel [[id number]]  (Abnormal) Collected: 04/02/24 0653    Specimen: Blood Updated: 04/02/24 0749     Glucose 78 mg/dL      BUN 7 mg/dL      Creatinine 0.63 mg/dL      Sodium 135 mmol/L      Potassium 3.5 mmol/L      Chloride 100 mmol/L      CO2 24.0 mmol/L      Calcium 8.3 mg/dL      Total Protein 6.3 g/dL      Albumin 3.4 g/dL      ALT (SGPT) 10 U/L      AST (SGOT) 6 U/L      Alkaline Phosphatase 110 U/L      Total Bilirubin 0.4 mg/dL      Globulin 2.9 gm/dL      A/G Ratio 1.2 g/dL      BUN/Creatinine Ratio 11.1     Anion Gap 11.0 mmol/L      eGFR 114.5 mL/min/1.73     Narrative:      GFR Normal >60  Chronic Kidney Disease <60  Kidney Failure <15      Lipase [[id number]]  (Abnormal) Collected: 04/02/24 0526    Specimen: Blood Updated: 04/02/24 0630     Lipase 125 U/L     CBC Auto Differential [[id number]]  (Abnormal) Collected: 04/02/24 0526    Specimen: Blood Updated: 04/02/24 0627     WBC 14.22 10*3/mm3      RBC 3.35 10*6/mm3      Hemoglobin 10.7 g/dL      Hematocrit 32.0 %      MCV 95.5 fL      MCH 31.9 pg      MCHC 33.4 g/dL      RDW 14.7 %      RDW-SD 51.6 fl      MPV 10.8 fL      Platelets 170 10*3/mm3      Neutrophil % 79.5 %      Lymphocyte % 11.8 %      Monocyte % 6.3 %      Eosinophil % 1.7 %      Basophil % 0.2 %      Immature Grans % 0.5 %      Neutrophils, Absolute 11.30 10*3/mm3      Lymphocytes, Absolute 1.68 10*3/mm3      Monocytes, Absolute 0.90 10*3/mm3       "Eosinophils, Absolute 0.24 10*3/mm3      Basophils, Absolute 0.03 10*3/mm3      Immature Grans, Absolute 0.07 10*3/mm3      nRBC 0.0 /100 WBC     Procalcitonin [[id number]]  (Abnormal) Collected: 04/01/24 1200    Specimen: Blood Updated: 04/01/24 2126     Procalcitonin 0.42 ng/mL     Narrative:      As a Marker for Sepsis (Non-Neonates):    1. <0.5 ng/mL represents a low risk of severe sepsis and/or septic shock.  2. >2 ng/mL represents a high risk of severe sepsis and/or septic shock.    As a Marker for Lower Respiratory Tract Infections that require antibiotic therapy:    PCT on Admission    Antibiotic Therapy       6-12 Hrs later    >0.5                Strongly Recommended  >0.25 - <0.5        Recommended   0.1 - 0.25          Discouraged              Remeasure/reassess PCT  <0.1                Strongly Discouraged     Remeasure/reassess PCT    As 28 day mortality risk marker: \"Change in Procalcitonin Result\" (>80% or <=80%) if Day 0 (or Day 1) and Day 4 values are available. Refer to http://www.[facility]-pct-calculator.com    Change in PCT <=80%  A decrease of PCT levels below or equal to 80% defines a positive change in PCT test result representing a higher risk for 28-day all-cause mortality of patients diagnosed with severe sepsis for septic shock.    Change in PCT >80%  A decrease of PCT levels of more than 80% defines a negative change in PCT result representing a lower risk for 28-day all-cause mortality of patients diagnosed with severe sepsis or septic shock.       Urinalysis, Microscopic Only - Urine, Clean Catch [[id number]]  (Abnormal) Collected: 04/01/24 1209    Specimen: Urine, Clean Catch Updated: 04/01/24 1411     RBC, UA None Seen /HPF      WBC, UA 0-2 /HPF      Bacteria, UA 1+ /HPF      Squamous Epithelial Cells, UA 13-20 /HPF      Hyaline Casts, UA None Seen /LPF      Granular Casts, UA 0-2 /LPF      Methodology Manual Light Microscopy    [place] Urine Culture Tube - Urine, Clean Catch [[id number]] " Collected: 04/01/24 1209    Specimen: Urine, Clean Catch Updated: 04/01/24 1347     Extra Tube Hold for add-ons.     Comment: Auto resulted.       Lactic Acid, Plasma [[id number]]  (Normal) Collected: 04/01/24 1243    Specimen: Blood Updated: 04/01/24 1302     Lactate 1.0 mmol/L     Comprehensive Metabolic Panel [[id number]]  (Abnormal) Collected: 04/01/24 1200    Specimen: Blood Updated: 04/01/24 1226     Glucose 94 mg/dL      BUN 7 mg/dL      Creatinine 0.68 mg/dL      Sodium 128 mmol/L      Potassium 3.8 mmol/L      Chloride 87 mmol/L      CO2 25.8 mmol/L      Calcium 10.4 mg/dL      Total Protein 7.9 g/dL      Albumin 4.2 g/dL      ALT (SGPT) 12 U/L      AST (SGOT) 9 U/L      Alkaline Phosphatase 152 U/L      Total Bilirubin 0.7 mg/dL      Globulin 3.7 gm/dL      A/G Ratio 1.1 g/dL      BUN/Creatinine Ratio 10.3     Anion Gap 15.2 mmol/L      eGFR 112.4 mL/min/1.73     Narrative:      GFR Normal >60  Chronic Kidney Disease <60  Kidney Failure <15      Lipase [[id number]]  (Abnormal) Collected: 04/01/24 1200    Specimen: Blood Updated: 04/01/24 1226     Lipase 123 U/L     Urinalysis With Microscopic If Indicated (No Culture) - Urine, Clean Catch [[id number]]  (Abnormal) Collected: 04/01/24 1209    Specimen: Urine, Clean Catch Updated: 04/01/24 1211     Color, UA Yellow     Appearance, UA Clear     pH, UA 6.0     Specific Gravity, UA 1.020     Glucose, UA Negative     Ketones, UA >=160 mg/dL (4+)     Bilirubin, UA Moderate (2+)     Blood, UA Trace     Protein, UA 30 mg/dL (1+)     Leuk Esterase, UA Negative     Nitrite, UA Negative     Urobilinogen, UA 0.2 E.U./dL    CBC & Differential [[id number]]  (Abnormal) Collected: 04/01/24 1200    Specimen: Blood Updated: 04/01/24 1207    Narrative:      The following orders were created for panel order CBC & Differential.  Procedure                               Abnormality         Status                     ---------                               -----------         ------                      CBC Auto Differential[[id number]]        Abnormal            Final result                 Please view results for these tests on the individual orders.    CBC Auto Differential [[id number]]  (Abnormal) Collected: 04/01/24 1200    Specimen: Blood Updated: 04/01/24 1207     WBC 22.29 10*3/mm3      RBC 4.16 10*6/mm3      Hemoglobin 13.0 g/dL      Hematocrit 39.1 %      MCV 94.0 fL      MCH 31.3 pg      MCHC 33.2 g/dL      RDW 14.7 %      RDW-SD 51.8 fl      MPV 10.0 fL      Platelets 221 10*3/mm3      Neutrophil % 86.2 %      Lymphocyte % 7.6 %      Monocyte % 5.6 %      Eosinophil % 0.3 %      Basophil % 0.2 %      Immature Grans % 0.1 %      Neutrophils, Absolute 19.21 10*3/mm3      Lymphocytes, Absolute 1.70 10*3/mm3      Monocytes, Absolute 1.25 10*3/mm3      Eosinophils, Absolute 0.06 10*3/mm3      Basophils, Absolute 0.04 10*3/mm3      Immature Grans, Absolute 0.03 10*3/mm3           Data Review:  Results from last 7 days   Lab Units 04/04/24  0607 04/03/24  0556 04/02/24  0653   SODIUM mmol/L 136 135* 135*   POTASSIUM mmol/L 3.1* 3.3* 3.5   CHLORIDE mmol/L 99 100 100   CO2 mmol/L 23.1 20.8* 24.0   BUN mg/dL <2* 4* 7   CREATININE mg/dL 0.37* 0.49* 0.63   GLUCOSE mg/dL 92 68 78   CALCIUM mg/dL 8.0* 8.4* 8.3*     Results from last 7 days   Lab Units 04/04/24  0607 04/03/24  0556 04/02/24  0526   WBC 10*3/mm3 7.38 9.28 14.22*   HEMOGLOBIN g/dL 9.7* 9.5* 10.7*   HEMATOCRIT % 29.2* 29.7* 32.0*   PLATELETS 10*3/mm3 160 161 170             Lab Results   Lab Value Date/Time    TROPONINT <6 01/21/2024 0756    TROPONINT <6 08/22/2023 1619    TROPONINT <6 08/22/2023 1301    TROPONINT <6 06/08/2023 1109    TROPONINT <6 06/08/2023 0906    TROPONINT <0.010 10/20/2022 1939    TROPONINT <0.010 08/06/2022 2158    TROPONINT <0.010 04/06/2018 1751         Results from last 7 days   Lab Units 04/02/24  0653 04/01/24  1200   ALK PHOS U/L 110 152*   BILIRUBIN mg/dL 0.4 0.7   ALT (SGPT) U/L 10 12   AST (SGOT) U/L 6 9       "       No results found for: \"POCGLU\"        Past Medical History:   Diagnosis Date    Anemia     Anxiety     Constipation     Cyst of spleen     Diarrhea     E. coli bacteremia     ETOH abuse     Frequent UTI     GERD (gastroesophageal reflux disease)     Hiatal hernia     Left flank pain 10/21/2022    Migraine     Miscarriage 2004    Pancreatitis     Pseudocyst of pancreas        Assessment:  Active Hospital Problems    Diagnosis  POA    **Abdominal pain [R10.9]  Yes    Acute on chronic pancreatitis [K85.90, K86.1]  Yes    Essential hypertension [I10]  Yes    Pancreatic pseudocyst [K86.3]  Yes    Abnormal CT of the abdomen [R93.5]  Yes    Alcohol abuse [F10.10]  Yes      Resolved Hospital Problems   No resolved problems to display.       Plan:  Continue IV Protonix, IV antibiotics and follow-up on labs and cultures.  Surgery consult noted.  GI consult also noted and results of EGD noted.  Await path report.  Advancing to low-fat low residue GI soft diet.  Will see how she does with diet.  DC planning.  May be home tomorrow.    Fletcher Gallegos MD  4/4/2024  14:18 EDT   "

## 2024-04-05 LAB
ANION GAP SERPL CALCULATED.3IONS-SCNC: 10.5 MMOL/L (ref 5–15)
BASOPHILS # BLD AUTO: 0.02 10*3/MM3 (ref 0–0.2)
BASOPHILS NFR BLD AUTO: 0.3 % (ref 0–1.5)
BUN SERPL-MCNC: <2 MG/DL (ref 6–20)
BUN/CREAT SERPL: ABNORMAL
CALCIUM SPEC-SCNC: 8.1 MG/DL (ref 8.6–10.5)
CHLORIDE SERPL-SCNC: 103 MMOL/L (ref 98–107)
CO2 SERPL-SCNC: 27.5 MMOL/L (ref 22–29)
CREAT SERPL-MCNC: 0.45 MG/DL (ref 0.57–1)
DEPRECATED RDW RBC AUTO: 49.6 FL (ref 37–54)
EGFRCR SERPLBLD CKD-EPI 2021: 124.1 ML/MIN/1.73
EOSINOPHIL # BLD AUTO: 0.25 10*3/MM3 (ref 0–0.4)
EOSINOPHIL NFR BLD AUTO: 4 % (ref 0.3–6.2)
ERYTHROCYTE [DISTWIDTH] IN BLOOD BY AUTOMATED COUNT: 14.2 % (ref 12.3–15.4)
FERRITIN SERPL-MCNC: 915 NG/ML (ref 13–150)
GLUCOSE SERPL-MCNC: 109 MG/DL (ref 65–99)
HCT VFR BLD AUTO: 28.7 % (ref 34–46.6)
HGB BLD-MCNC: 9.6 G/DL (ref 12–15.9)
IMM GRANULOCYTES # BLD AUTO: 0.01 10*3/MM3 (ref 0–0.05)
IMM GRANULOCYTES NFR BLD AUTO: 0.2 % (ref 0–0.5)
IRON 24H UR-MRATE: 17 MCG/DL (ref 37–145)
IRON SATN MFR SERPL: 11 % (ref 20–50)
LYMPHOCYTES # BLD AUTO: 1.39 10*3/MM3 (ref 0.7–3.1)
LYMPHOCYTES NFR BLD AUTO: 22.1 % (ref 19.6–45.3)
MCH RBC QN AUTO: 32.2 PG (ref 26.6–33)
MCHC RBC AUTO-ENTMCNC: 33.4 G/DL (ref 31.5–35.7)
MCV RBC AUTO: 96.3 FL (ref 79–97)
MONOCYTES # BLD AUTO: 0.84 10*3/MM3 (ref 0.1–0.9)
MONOCYTES NFR BLD AUTO: 13.4 % (ref 5–12)
NEUTROPHILS NFR BLD AUTO: 3.78 10*3/MM3 (ref 1.7–7)
NEUTROPHILS NFR BLD AUTO: 60 % (ref 42.7–76)
NRBC BLD AUTO-RTO: 0 /100 WBC (ref 0–0.2)
PLATELET # BLD AUTO: 199 10*3/MM3 (ref 140–450)
PMV BLD AUTO: 10.3 FL (ref 6–12)
POTASSIUM SERPL-SCNC: 3.3 MMOL/L (ref 3.5–5.2)
RBC # BLD AUTO: 2.98 10*6/MM3 (ref 3.77–5.28)
SODIUM SERPL-SCNC: 141 MMOL/L (ref 136–145)
TIBC SERPL-MCNC: 158 MCG/DL (ref 298–536)
TRANSFERRIN SERPL-MCNC: 106 MG/DL (ref 200–360)
UREASE TISS QL: NEGATIVE
WBC NRBC COR # BLD AUTO: 6.29 10*3/MM3 (ref 3.4–10.8)

## 2024-04-05 PROCEDURE — 84466 ASSAY OF TRANSFERRIN: CPT | Performed by: PHYSICIAN ASSISTANT

## 2024-04-05 PROCEDURE — 25010000002 ONDANSETRON PER 1 MG: Performed by: HOSPITALIST

## 2024-04-05 PROCEDURE — 83540 ASSAY OF IRON: CPT | Performed by: PHYSICIAN ASSISTANT

## 2024-04-05 PROCEDURE — 25010000002 HYDROMORPHONE PER 4 MG: Performed by: HOSPITALIST

## 2024-04-05 PROCEDURE — 85025 COMPLETE CBC W/AUTO DIFF WBC: CPT | Performed by: PHYSICIAN ASSISTANT

## 2024-04-05 PROCEDURE — 25010000002 PIPERACILLIN SOD-TAZOBACTAM PER 1 G: Performed by: HOSPITALIST

## 2024-04-05 PROCEDURE — 25010000002 THIAMINE HCL 200 MG/2ML SOLUTION: Performed by: HOSPITALIST

## 2024-04-05 PROCEDURE — 82728 ASSAY OF FERRITIN: CPT | Performed by: PHYSICIAN ASSISTANT

## 2024-04-05 PROCEDURE — 99232 SBSQ HOSP IP/OBS MODERATE 35: CPT | Performed by: PHYSICIAN ASSISTANT

## 2024-04-05 PROCEDURE — 80048 BASIC METABOLIC PNL TOTAL CA: CPT | Performed by: PHYSICIAN ASSISTANT

## 2024-04-05 RX ORDER — PANTOPRAZOLE SODIUM 40 MG/1
40 TABLET, DELAYED RELEASE ORAL
Status: DISCONTINUED | OUTPATIENT
Start: 2024-04-05 | End: 2024-04-07 | Stop reason: HOSPADM

## 2024-04-05 RX ADMIN — THIAMINE HYDROCHLORIDE 200 MG: 100 INJECTION, SOLUTION INTRAMUSCULAR; INTRAVENOUS at 22:24

## 2024-04-05 RX ADMIN — HYDROMORPHONE HYDROCHLORIDE 0.5 MG: 1 INJECTION, SOLUTION INTRAMUSCULAR; INTRAVENOUS; SUBCUTANEOUS at 09:44

## 2024-04-05 RX ADMIN — Medication 10 ML: at 20:26

## 2024-04-05 RX ADMIN — PIPERACILLIN SODIUM AND TAZOBACTAM SODIUM 3.38 G: 3; .375 INJECTION, POWDER, LYOPHILIZED, FOR SOLUTION INTRAVENOUS at 06:46

## 2024-04-05 RX ADMIN — OXYCODONE AND ACETAMINOPHEN 1 TABLET: 325; 10 TABLET ORAL at 01:05

## 2024-04-05 RX ADMIN — OXYCODONE AND ACETAMINOPHEN 1 TABLET: 325; 10 TABLET ORAL at 20:23

## 2024-04-05 RX ADMIN — PANTOPRAZOLE SODIUM 40 MG: 40 INJECTION, POWDER, LYOPHILIZED, FOR SOLUTION INTRAVENOUS at 06:46

## 2024-04-05 RX ADMIN — AMITRIPTYLINE HYDROCHLORIDE 50 MG: 50 TABLET, FILM COATED ORAL at 20:23

## 2024-04-05 RX ADMIN — THIAMINE HYDROCHLORIDE 200 MG: 100 INJECTION, SOLUTION INTRAMUSCULAR; INTRAVENOUS at 00:00

## 2024-04-05 RX ADMIN — PIPERACILLIN SODIUM AND TAZOBACTAM SODIUM 3.38 G: 3; .375 INJECTION, POWDER, LYOPHILIZED, FOR SOLUTION INTRAVENOUS at 15:54

## 2024-04-05 RX ADMIN — OXYCODONE AND ACETAMINOPHEN 1 TABLET: 325; 10 TABLET ORAL at 06:46

## 2024-04-05 RX ADMIN — OXYCODONE AND ACETAMINOPHEN 1 TABLET: 325; 10 TABLET ORAL at 15:54

## 2024-04-05 RX ADMIN — Medication 100 MG: at 09:44

## 2024-04-05 RX ADMIN — ONDANSETRON 4 MG: 2 INJECTION INTRAMUSCULAR; INTRAVENOUS at 20:39

## 2024-04-05 RX ADMIN — PANTOPRAZOLE SODIUM 40 MG: 40 TABLET, DELAYED RELEASE ORAL at 17:58

## 2024-04-05 RX ADMIN — FOLIC ACID 1 MG: 5 INJECTION, SOLUTION INTRAMUSCULAR; INTRAVENOUS; SUBCUTANEOUS at 09:44

## 2024-04-05 RX ADMIN — ONDANSETRON 4 MG: 2 INJECTION INTRAMUSCULAR; INTRAVENOUS at 09:44

## 2024-04-05 RX ADMIN — OXYCODONE AND ACETAMINOPHEN 1 TABLET: 325; 10 TABLET ORAL at 11:57

## 2024-04-05 RX ADMIN — PIPERACILLIN SODIUM AND TAZOBACTAM SODIUM 3.38 G: 3; .375 INJECTION, POWDER, LYOPHILIZED, FOR SOLUTION INTRAVENOUS at 22:24

## 2024-04-05 NOTE — PROGRESS NOTES
[facility]  Inpatient Progress Note    Reason for Follow-up: Abdominal pain and pancreatitis    Subjective     Interval History:   Still with LUQ pain-worse with breathing and movement.  Tolerating soft low-fat diet fairly well.  Did seem to have worsening pain with a banana earlier.  Admits to some nausea, no vomiting.    4/5/2024 labs with CBC with hemoglobin 9.6, normocytic normochromic.    Current Facility-Administered Medications:     acetaminophen (TYLENOL) tablet 650 mg, 650 mg, Oral, Q4H PRN **OR** acetaminophen (TYLENOL) 160 MG/5ML oral solution 650 mg, 650 mg, Oral, Q4H PRN **OR** acetaminophen (TYLENOL) suppository 650 mg, 650 mg, Rectal, Q4H PRN, Fletcher Gallegos MD    amitriptyline (ELAVIL) tablet 50 mg, 50 mg, Oral, Nightly, Fletcher Gallegos MD, 50 mg at 04/04/24 2134    sennosides-docusate (PERICOLACE) 8.6-50 MG per tablet 2 tablet, 2 tablet, Oral, BID PRN **AND** polyethylene glycol (MIRALAX) packet 17 g, 17 g, Oral, Daily PRN **AND** bisacodyl (DULCOLAX) EC tablet 5 mg, 5 mg, Oral, Daily PRN **AND** bisacodyl (DULCOLAX) suppository 10 mg, 10 mg, Rectal, Daily PRN, Fletcher Gallegos MD    folic acid 1 mg in sodium chloride 0.9 % 50 mL IVPB, 1 mg, Intravenous, Daily, Fletcher Gallegos MD, Last Rate: 100 mL/hr at 04/05/24 0944, 1 mg at 04/05/24 0944    HYDROmorphone (DILAUDID) injection 0.5 mg, 0.5 mg, Intravenous, Q2H PRN, 0.5 mg at 04/05/24 0944 **AND** naloxone (NARCAN) injection 0.4 mg, 0.4 mg, Intravenous, Q5 Min PRN, Fletcher Gallegos MD    LORazepam (ATIVAN) tablet 1 mg, 1 mg, Oral, Q1H PRN **OR** LORazepam (ATIVAN) injection 1 mg, 1 mg, Intravenous, Q1H PRN **OR** LORazepam (ATIVAN) tablet 2 mg, 2 mg, Oral, Q1H PRN **OR** LORazepam (ATIVAN) injection 2 mg, 2 mg, Intravenous, Q1H PRN **OR** LORazepam (ATIVAN) injection 2 mg, 2 mg, Intravenous, Q15 Min PRN **OR** LORazepam (ATIVAN) injection 2 mg, 2 mg, Intramuscular, Q15 Min PRN, Fletcher Gallegos MD    Magnesium Standard Dose  Replacement - Follow Nurse / BPA Driven Protocol, , Does not apply, PRN, Fletcher Gallegos MD    ondansetron ODT (ZOFRAN-ODT) disintegrating tablet 4 mg, 4 mg, Oral, Q6H PRN **OR** ondansetron (ZOFRAN) injection 4 mg, 4 mg, Intravenous, Q6H PRN, Fletcher Gallegos MD, 4 mg at 04/05/24 0944    oxyCODONE-acetaminophen (PERCOCET)  MG per tablet 1 tablet, 1 tablet, Oral, Q4H PRN, Fletcher Gallegos MD, 1 tablet at 04/05/24 0646    pantoprazole (PROTONIX) injection 40 mg, 40 mg, Intravenous, BID AC, Fletcher Gallegos MD, 40 mg at 04/05/24 0646    piperacillin-tazobactam (ZOSYN) 3.375 g IVPB in 100 mL NS MBP (CD), 3.375 g, Intravenous, Q8H, Fletcher Gallegos MD, Last Rate: 0 mL/hr at 04/05/24 0410, 3.375 g at 04/05/24 0646    promethazine (PHENERGAN) suppository 25 mg, 25 mg, Rectal, Q4H PRN, Fletcher Gallegos MD    sodium chloride 0.9 % flush 10 mL, 10 mL, Intravenous, PRN, Caren Medina MD    sodium chloride 0.9 % flush 10 mL, 10 mL, Intravenous, Q12H, Fletcher Gallegos MD, 10 mL at 04/04/24 2139    sodium chloride 0.9 % flush 10 mL, 10 mL, Intravenous, PRN, Fletcher Gallegos MD    sodium chloride 0.9 % infusion 40 mL, 40 mL, Intravenous, PRN, Fletcher Gallegos MD    sodium chloride 0.9 % infusion, 125 mL/hr, Intravenous, Continuous, Fletcher Gallegos MD, Last Rate: 125 mL/hr at 04/05/24 0648, 125 mL/hr at 04/05/24 0648    sodium chloride 0.9 % infusion, 30 mL/hr, Intravenous, Continuous PRN, Petar Starr MD, Last Rate: 30 mL/hr at 04/03/24 1018, 30 mL/hr at 04/03/24 1018    thiamine (B-1) injection 200 mg, 200 mg, Intravenous, Q8H, 200 mg at 04/05/24 0000 **FOLLOWED BY** [START ON 4/7/2024] thiamine (VITAMIN B-1) tablet 100 mg, 100 mg, Oral, Daily, Fletcher Gallegos MD    thiamine (VITAMIN B-1) tablet 100 mg, 100 mg, Oral, Daily, Fletcher Gallegos MD, 100 mg at 04/05/24 0944  Review of Systems:    The following systems were reviewed and negative;  respiratory and cardiovascular    Objective     Vital Signs  Temp:  [97.7 °F (36.5 °C)-98.8  °F (37.1 °C)] 97.7 °F (36.5 °C)  Heart Rate:  [85-95] 85  Resp:  [16] 16  BP: (116-121)/(84-94) 121/86  Body mass index is 18.37 kg/m².    Intake/Output Summary (Last 24 hours) at 4/5/2024 1014  Last data filed at 4/4/2024 1700  Gross per 24 hour   Intake 1860 ml   Output --   Net 1860 ml     No intake/output data recorded.     Physical Exam:    General: patient awake, alert and cooperative   Eyes: Normal lids and lashes, no scleral icterus   Skin: warm and dry, not jaundiced   Pulm: regular and unlabored   Abdomen: soft, epigastric and left upper quadrant TTP, nondistended;    Psychiatric: Normal mood and behavior; memory intact     Results Review:     I reviewed the patient's new clinical results.    Results from last 7 days   Lab Units 04/05/24  0918 04/04/24  0607 04/03/24  0556   WBC 10*3/mm3 6.29 7.38 9.28   HEMOGLOBIN g/dL 9.6* 9.7* 9.5*   HEMATOCRIT % 28.7* 29.2* 29.7*   PLATELETS 10*3/mm3 199 160 161     Results from last 7 days   Lab Units 04/05/24  0918 04/04/24  0607 04/03/24  0556 04/02/24  0653 04/01/24  1200   SODIUM mmol/L 141 136 135* 135* 128*   POTASSIUM mmol/L 3.3* 3.1* 3.3* 3.5 3.8   CHLORIDE mmol/L 103 99 100 100 87*   CO2 mmol/L 27.5 23.1 20.8* 24.0 25.8   BUN mg/dL <2* <2* 4* 7 7   CREATININE mg/dL 0.45* 0.37* 0.49* 0.63 0.68   CALCIUM mg/dL 8.1* 8.0* 8.4* 8.3* 10.4   BILIRUBIN mg/dL  --   --   --  0.4 0.7   ALK PHOS U/L  --   --   --  110 152*   ALT (SGPT) U/L  --   --   --  10 12   AST (SGOT) U/L  --   --   --  6 9   GLUCOSE mg/dL 109* 92 68 78 94         Lab Results   Lab Value Date/Time    LIPASE 139 (H) 04/04/2024 0607    LIPASE 125 (H) 04/02/2024 0526    LIPASE 123 (H) 04/01/2024 1200    LIPASE 123 (H) 01/21/2024 0756    LIPASE 89 (H) 01/07/2024 0449    LIPASE 187 (H) 01/04/2024 1836    LIPASE 99 (H) 09/07/2023 0615    LIPASE 232 (H) 09/05/2023 2205    LIPASE 64 (H) 08/25/2023 0425    LIPASE 75 (H) 08/24/2023 0324    LIPASE 87 (H) 08/23/2023 0252    LIPASE 115 (H) 08/22/2023 1301     LIPASE 73 (H) 06/08/2023 0652    LIPASE 151 (H) 06/07/2023 2246    LIPASE 192 (H) 03/25/2023 1700    LIPASE 38 12/05/2022 0526    LIPASE 34 12/04/2022 0542    LIPASE 29 12/03/2022 0515    LIPASE 50 12/02/2022 0719    LIPASE 29 12/01/2022 0519    LIPASE 65 (H) 11/30/2022 1412    LIPASE 157 (H) 11/28/2022 1751    LIPASE 335 (H) 10/20/2022 1939    LIPASE 378 (H) 08/06/2022 2158    LIPASE 95 (H) 01/24/2018 0545    LIPASE 93 (H) 10/01/2017 0356    LIPASE 122 (H) 09/30/2017 0619    LIPASE 86 (H) 09/29/2017 1731    LIPASE 133 (H) 09/28/2017 0032       Radiology:  CT Abdomen Pelvis With Contrast   Final Result       1. Severe diffuse predominantly low-density gastric wall thickening,   greatest anteriorly, likely severe gastritis.   2. New fluid collection along the posterior wall of the left lobe of the   liver anterior to the stomach, probable pseudocyst, with additional   pseudocysts in the left upper quadrant as detailed above.   3. Mild peripancreatic fat stranding with pancreatic parenchymal   calcifications, likely acute on chronic pancreatitis.   4. Decreased size of the now low-density fluid collection along the   superior and posterior margin of the spleen, probable resolving   hematoma.   5. Mildly heterogeneous hepatic attenuation with subtle nodular liver   contours, suggesting underlying hepatocellular disease/cirrhosis, with   upper abdominal venous varicosities that could reflect portal venous   hypertension.       This report was finalized on 4/1/2024 2:06 PM by Maverick Solis MD on   Workstation: [id number]              Assessment & Plan     Active Hospital Problems    Diagnosis     **Abdominal pain     Acute on chronic pancreatitis     Essential hypertension     Pancreatic pseudocyst     Abnormal CT of the abdomen     Alcohol abuse        Assessment:  Acute on chronic pancreatitis  Left upper quadrant abdominal pain, splinting  Pancreatic pseudocyst along the tail of the pancreas and inferior margin of the  spleen.  New multilobulated fluid collection left liver lobe and anterior margin of the stomach  Improved splenic fluid collection-likely resolving hematoma  Hepatocellular disease/cirrhosis on CT scan  GERD/Leon's esophagus on EGD pathology  Gastritis  History of alcohol abuse  Normocytic anemia      Plan:  Persistent left upper quadrant pain that she states is similar to when she had splenic hemorrhage from previous pancreatitis.  She does have a pancreatic pseudocyst in that area.  Also with improving splenic hematoma on CT scan.  Appreciate general surgery evaluation and recommendations.  Agree that pain is likely related to chronic pancreatitis.  Continue IV PPI and supportive care for now.--Continue pantoprazole 40 mg twice daily and Pepcid in the evening for home medication.  Needs total cessation of alcohol use.  Also recommend tobacco cessation.  Continue low-fat GI soft diet.  Will check iron panel, B12, folate given normocytic anemia.  No overt GI bleed.  Follow-up with SOL Gipson, outpatient gastroenterologist at [facility] after discharge.    I discussed the patients findings and my recommendations with patient.    Dragon dictation used throughout this note.            Evelin Hinton PA-C  [facility]  [address] [place] [address]  Office: [phone]

## 2024-04-05 NOTE — PROGRESS NOTES
"DAILY PROGRESS NOTE  [facility]    Patient Identification:  Name: Piper Cain  Age: 43 y.o.  Sex: female  :  1980  MRN: [id number]         Primary Care Physician: Sharmaine Gatica APRN    Subjective:  Interval History: She complains of abdominal pain and is better.  She has been able to tolerate low-fat diet.    Objective:    Scheduled Meds:amitriptyline, 50 mg, Oral, Nightly  folic acid 1 mg in sodium chloride 0.9 % 50 mL IVPB, 1 mg, Intravenous, Daily  pantoprazole, 40 mg, Oral, BID AC  piperacillin-tazobactam, 3.375 g, Intravenous, Q8H  sodium chloride, 10 mL, Intravenous, Q12H  thiamine (B-1) IV, 200 mg, Intravenous, Q8H   Followed by  [START ON 2024] thiamine, 100 mg, Oral, Daily  thiamine, 100 mg, Oral, Daily      Continuous Infusions:sodium chloride, 125 mL/hr, Last Rate: 125 mL/hr (24 0648)  sodium chloride, 30 mL/hr, Last Rate: 30 mL/hr (24 1018)        Vital signs in last 24 hours:  Temp:  [97.7 °F (36.5 °C)-98.8 °F (37.1 °C)] 98 °F (36.7 °C)  Heart Rate:  [84-95] 84  Resp:  [16] 16  BP: (105-121)/(78-86) 105/78    Intake/Output:    Intake/Output Summary (Last 24 hours) at 2024 1612  Last data filed at 2024 1300  Gross per 24 hour   Intake 1500 ml   Output --   Net 1500 ml       Exam:  /78 (BP Location: Right arm, Patient Position: Lying)   Pulse 84   Temp 98 °F (36.7 °C) (Oral)   Resp 16   Ht 177.8 cm (70\")   Wt 58.1 kg (128 lb)   LMP 2018 (Exact Date)   SpO2 98%   BMI 18.37 kg/m²     General Appearance:    Alert, cooperative, no distress   Head:    Normocephalic, without obvious abnormality, atraumatic   Eyes:       Throat:   Lips, tongue, gums normal   Neck:   Supple, symmetrical, trachea midline, no JVD   Lungs:     Clear to auscultation bilaterally, respirations unlabored   Chest Wall:    No tenderness or deformity    Heart:    Regular rate and rhythm, S1 and S2 normal, no murmur,no  Rub or gallop   Abdomen:     Soft, tender over " upper abdomen, bowel sounds active, no masses, no organomegaly    Extremities:   Extremities normal, atraumatic, no cyanosis or edema   Pulses:      Skin:   Skin is warm and dry,  no rashes or palpable lesions   Neurologic:   no focal deficits noted      Lab Results (last 72 hours)       Procedure Component Value Units Date/Time    Blood Culture - Blood, Arm, Right [[id number]]  (Normal) Collected: 04/01/24 1258    Specimen: Blood from Arm, Right Updated: 04/02/24 1315     Blood Culture No growth at 24 hours    Comprehensive Metabolic Panel [[id number]]  (Abnormal) Collected: 04/02/24 0653    Specimen: Blood Updated: 04/02/24 0749     Glucose 78 mg/dL      BUN 7 mg/dL      Creatinine 0.63 mg/dL      Sodium 135 mmol/L      Potassium 3.5 mmol/L      Chloride 100 mmol/L      CO2 24.0 mmol/L      Calcium 8.3 mg/dL      Total Protein 6.3 g/dL      Albumin 3.4 g/dL      ALT (SGPT) 10 U/L      AST (SGOT) 6 U/L      Alkaline Phosphatase 110 U/L      Total Bilirubin 0.4 mg/dL      Globulin 2.9 gm/dL      A/G Ratio 1.2 g/dL      BUN/Creatinine Ratio 11.1     Anion Gap 11.0 mmol/L      eGFR 114.5 mL/min/1.73     Narrative:      GFR Normal >60  Chronic Kidney Disease <60  Kidney Failure <15      Lipase [[id number]]  (Abnormal) Collected: 04/02/24 0526    Specimen: Blood Updated: 04/02/24 0630     Lipase 125 U/L     CBC Auto Differential [[id number]]  (Abnormal) Collected: 04/02/24 0526    Specimen: Blood Updated: 04/02/24 0627     WBC 14.22 10*3/mm3      RBC 3.35 10*6/mm3      Hemoglobin 10.7 g/dL      Hematocrit 32.0 %      MCV 95.5 fL      MCH 31.9 pg      MCHC 33.4 g/dL      RDW 14.7 %      RDW-SD 51.6 fl      MPV 10.8 fL      Platelets 170 10*3/mm3      Neutrophil % 79.5 %      Lymphocyte % 11.8 %      Monocyte % 6.3 %      Eosinophil % 1.7 %      Basophil % 0.2 %      Immature Grans % 0.5 %      Neutrophils, Absolute 11.30 10*3/mm3      Lymphocytes, Absolute 1.68 10*3/mm3      Monocytes, Absolute 0.90 10*3/mm3       "Eosinophils, Absolute 0.24 10*3/mm3      Basophils, Absolute 0.03 10*3/mm3      Immature Grans, Absolute 0.07 10*3/mm3      nRBC 0.0 /100 WBC     Procalcitonin [[id number]]  (Abnormal) Collected: 04/01/24 1200    Specimen: Blood Updated: 04/01/24 2126     Procalcitonin 0.42 ng/mL     Narrative:      As a Marker for Sepsis (Non-Neonates):    1. <0.5 ng/mL represents a low risk of severe sepsis and/or septic shock.  2. >2 ng/mL represents a high risk of severe sepsis and/or septic shock.    As a Marker for Lower Respiratory Tract Infections that require antibiotic therapy:    PCT on Admission    Antibiotic Therapy       6-12 Hrs later    >0.5                Strongly Recommended  >0.25 - <0.5        Recommended   0.1 - 0.25          Discouraged              Remeasure/reassess PCT  <0.1                Strongly Discouraged     Remeasure/reassess PCT    As 28 day mortality risk marker: \"Change in Procalcitonin Result\" (>80% or <=80%) if Day 0 (or Day 1) and Day 4 values are available. Refer to [email or web address]    Change in PCT <=80%  A decrease of PCT levels below or equal to 80% defines a positive change in PCT test result representing a higher risk for 28-day all-cause mortality of patients diagnosed with severe sepsis for septic shock.    Change in PCT >80%  A decrease of PCT levels of more than 80% defines a negative change in PCT result representing a lower risk for 28-day all-cause mortality of patients diagnosed with severe sepsis or septic shock.       Urinalysis, Microscopic Only - Urine, Clean Catch [[id number]]  (Abnormal) Collected: 04/01/24 1209    Specimen: Urine, Clean Catch Updated: 04/01/24 1411     RBC, UA None Seen /HPF      WBC, UA 0-2 /HPF      Bacteria, UA 1+ /HPF      Squamous Epithelial Cells, UA 13-20 /HPF      Hyaline Casts, UA None Seen /LPF      Granular Casts, UA 0-2 /LPF      Methodology Manual Light Microscopy    [place] Urine Culture Tube - Urine, Clean Catch [[id number]] " Collected: 04/01/24 1209    Specimen: Urine, Clean Catch Updated: 04/01/24 1347     Extra Tube Hold for add-ons.     Comment: Auto resulted.       Lactic Acid, Plasma [[id number]]  (Normal) Collected: 04/01/24 1243    Specimen: Blood Updated: 04/01/24 1302     Lactate 1.0 mmol/L     Comprehensive Metabolic Panel [[id number]]  (Abnormal) Collected: 04/01/24 1200    Specimen: Blood Updated: 04/01/24 1226     Glucose 94 mg/dL      BUN 7 mg/dL      Creatinine 0.68 mg/dL      Sodium 128 mmol/L      Potassium 3.8 mmol/L      Chloride 87 mmol/L      CO2 25.8 mmol/L      Calcium 10.4 mg/dL      Total Protein 7.9 g/dL      Albumin 4.2 g/dL      ALT (SGPT) 12 U/L      AST (SGOT) 9 U/L      Alkaline Phosphatase 152 U/L      Total Bilirubin 0.7 mg/dL      Globulin 3.7 gm/dL      A/G Ratio 1.1 g/dL      BUN/Creatinine Ratio 10.3     Anion Gap 15.2 mmol/L      eGFR 112.4 mL/min/1.73     Narrative:      GFR Normal >60  Chronic Kidney Disease <60  Kidney Failure <15      Lipase [[id number]]  (Abnormal) Collected: 04/01/24 1200    Specimen: Blood Updated: 04/01/24 1226     Lipase 123 U/L     Urinalysis With Microscopic If Indicated (No Culture) - Urine, Clean Catch [[id number]]  (Abnormal) Collected: 04/01/24 1209    Specimen: Urine, Clean Catch Updated: 04/01/24 1211     Color, UA Yellow     Appearance, UA Clear     pH, UA 6.0     Specific Gravity, UA 1.020     Glucose, UA Negative     Ketones, UA >=160 mg/dL (4+)     Bilirubin, UA Moderate (2+)     Blood, UA Trace     Protein, UA 30 mg/dL (1+)     Leuk Esterase, UA Negative     Nitrite, UA Negative     Urobilinogen, UA 0.2 E.U./dL    CBC & Differential [[id number]]  (Abnormal) Collected: 04/01/24 1200    Specimen: Blood Updated: 04/01/24 1207    Narrative:      The following orders were created for panel order CBC & Differential.  Procedure                               Abnormality         Status                     ---------                               -----------         ------                      CBC Auto Differential[[id number]]        Abnormal            Final result                 Please view results for these tests on the individual orders.    CBC Auto Differential [[id number]]  (Abnormal) Collected: 04/01/24 1200    Specimen: Blood Updated: 04/01/24 1207     WBC 22.29 10*3/mm3      RBC 4.16 10*6/mm3      Hemoglobin 13.0 g/dL      Hematocrit 39.1 %      MCV 94.0 fL      MCH 31.3 pg      MCHC 33.2 g/dL      RDW 14.7 %      RDW-SD 51.8 fl      MPV 10.0 fL      Platelets 221 10*3/mm3      Neutrophil % 86.2 %      Lymphocyte % 7.6 %      Monocyte % 5.6 %      Eosinophil % 0.3 %      Basophil % 0.2 %      Immature Grans % 0.1 %      Neutrophils, Absolute 19.21 10*3/mm3      Lymphocytes, Absolute 1.70 10*3/mm3      Monocytes, Absolute 1.25 10*3/mm3      Eosinophils, Absolute 0.06 10*3/mm3      Basophils, Absolute 0.04 10*3/mm3      Immature Grans, Absolute 0.03 10*3/mm3           Data Review:  Results from last 7 days   Lab Units 04/05/24  0918 04/04/24  0607 04/03/24  0556   SODIUM mmol/L 141 136 135*   POTASSIUM mmol/L 3.3* 3.1* 3.3*   CHLORIDE mmol/L 103 99 100   CO2 mmol/L 27.5 23.1 20.8*   BUN mg/dL <2* <2* 4*   CREATININE mg/dL 0.45* 0.37* 0.49*   GLUCOSE mg/dL 109* 92 68   CALCIUM mg/dL 8.1* 8.0* 8.4*     Results from last 7 days   Lab Units 04/05/24  0918 04/04/24  0607 04/03/24  0556   WBC 10*3/mm3 6.29 7.38 9.28   HEMOGLOBIN g/dL 9.6* 9.7* 9.5*   HEMATOCRIT % 28.7* 29.2* 29.7*   PLATELETS 10*3/mm3 199 160 161             Lab Results   Lab Value Date/Time    TROPONINT <6 01/21/2024 0756    TROPONINT <6 08/22/2023 1619    TROPONINT <6 08/22/2023 1301    TROPONINT <6 06/08/2023 1109    TROPONINT <6 06/08/2023 0906    TROPONINT <0.010 10/20/2022 1939    TROPONINT <0.010 08/06/2022 2158    TROPONINT <0.010 04/06/2018 1751         Results from last 7 days   Lab Units 04/02/24  0653 04/01/24  1200   ALK PHOS U/L 110 152*   BILIRUBIN mg/dL 0.4 0.7   ALT (SGPT) U/L 10 12   AST (SGOT) U/L 6 9  "            No results found for: \"POCGLU\"        Past Medical History:   Diagnosis Date    Anemia     Anxiety     Constipation     Cyst of spleen     Diarrhea     E. coli bacteremia     ETOH abuse     Frequent UTI     GERD (gastroesophageal reflux disease)     Hiatal hernia     Left flank pain 10/21/2022    Migraine     Miscarriage 2004    Pancreatitis     Pseudocyst of pancreas        Assessment:  Active Hospital Problems    Diagnosis  POA    **Abdominal pain [R10.9]  Yes    Acute on chronic pancreatitis [K85.90, K86.1]  Yes    Essential hypertension [I10]  Yes    Pancreatic pseudocyst [K86.3]  Yes    Abnormal CT of the abdomen [R93.5]  Yes    Alcohol abuse [F10.10]  Yes      Resolved Hospital Problems   No resolved problems to display.       Plan:  Continue IV Protonix, IV antibiotics and follow-up on labs and cultures.  Surgery consult noted.  GI consult also noted and results of EGD noted.  Await path report.  Advancing to low-fat low residue GI soft diet.  Will see how she does with diet.  DC planning.  May be home tomorrow.  Will discontinue Dilaudid.  Discussed with the nurse.    Fletcher Gallegos MD  4/5/2024  16:12 EDT   "

## 2024-04-05 NOTE — PLAN OF CARE
Problem: Adult Inpatient Plan of Care  Goal: Plan of Care Review  Outcome: Ongoing, Progressing  Flowsheets (Taken 4/5/2024 1820)  Progress: improving  Plan of Care Reviewed With: patient     Problem: Adult Inpatient Plan of Care  Goal: Absence of Hospital-Acquired Illness or Injury  Outcome: Ongoing, Progressing  Intervention: Identify and Manage Fall Risk  Recent Flowsheet Documentation  Taken 4/5/2024 0944 by Radha Galloway RN  Safety Promotion/Fall Prevention:   room organization consistent   safety round/check completed  Intervention: Prevent Skin Injury  Recent Flowsheet Documentation  Taken 4/5/2024 0944 by Radha Galloway RN  Body Position:   position changed independently   foot of bed elevated  Intervention: Prevent and Manage VTE (Venous Thromboembolism) Risk  Recent Flowsheet Documentation  Taken 4/5/2024 0944 by Radha Galloway RN  Activity Management: up ad valeria     Problem: Adult Inpatient Plan of Care  Goal: Optimal Comfort and Wellbeing  Outcome: Ongoing, Progressing  Intervention: Monitor Pain and Promote Comfort  Recent Flowsheet Documentation  Taken 4/5/2024 0944 by Radha Galloway RN  Pain Management Interventions: see MAR  Intervention: Provide Person-Centered Care  Recent Flowsheet Documentation  Taken 4/5/2024 0944 by Radha Galloway RN  Trust Relationship/Rapport:   care explained   choices provided   questions answered   questions encouraged   Goal Outcome Evaluation:  Plan of Care Reviewed With: patient        Progress: improving

## 2024-04-06 LAB
ANION GAP SERPL CALCULATED.3IONS-SCNC: 10 MMOL/L (ref 5–15)
BACTERIA SPEC AEROBE CULT: NORMAL
BASOPHILS # BLD AUTO: 0.02 10*3/MM3 (ref 0–0.2)
BASOPHILS NFR BLD AUTO: 0.4 % (ref 0–1.5)
BUN SERPL-MCNC: 4 MG/DL (ref 6–20)
BUN/CREAT SERPL: 7.7 (ref 7–25)
CALCIUM SPEC-SCNC: 8.6 MG/DL (ref 8.6–10.5)
CHLORIDE SERPL-SCNC: 103 MMOL/L (ref 98–107)
CO2 SERPL-SCNC: 29 MMOL/L (ref 22–29)
CREAT SERPL-MCNC: 0.52 MG/DL (ref 0.57–1)
DEPRECATED RDW RBC AUTO: 49.2 FL (ref 37–54)
EGFRCR SERPLBLD CKD-EPI 2021: 119.9 ML/MIN/1.73
EOSINOPHIL # BLD AUTO: 0.28 10*3/MM3 (ref 0–0.4)
EOSINOPHIL NFR BLD AUTO: 5 % (ref 0.3–6.2)
ERYTHROCYTE [DISTWIDTH] IN BLOOD BY AUTOMATED COUNT: 14.2 % (ref 12.3–15.4)
FOLATE SERPL-MCNC: 14.4 NG/ML (ref 4.78–24.2)
GLUCOSE SERPL-MCNC: 104 MG/DL (ref 65–99)
HCT VFR BLD AUTO: 28.6 % (ref 34–46.6)
HGB BLD-MCNC: 9.5 G/DL (ref 12–15.9)
IMM GRANULOCYTES # BLD AUTO: 0.02 10*3/MM3 (ref 0–0.05)
IMM GRANULOCYTES NFR BLD AUTO: 0.4 % (ref 0–0.5)
LYMPHOCYTES # BLD AUTO: 1.53 10*3/MM3 (ref 0.7–3.1)
LYMPHOCYTES NFR BLD AUTO: 27.2 % (ref 19.6–45.3)
MCH RBC QN AUTO: 31.8 PG (ref 26.6–33)
MCHC RBC AUTO-ENTMCNC: 33.2 G/DL (ref 31.5–35.7)
MCV RBC AUTO: 95.7 FL (ref 79–97)
MONOCYTES # BLD AUTO: 0.68 10*3/MM3 (ref 0.1–0.9)
MONOCYTES NFR BLD AUTO: 12.1 % (ref 5–12)
NEUTROPHILS NFR BLD AUTO: 3.1 10*3/MM3 (ref 1.7–7)
NEUTROPHILS NFR BLD AUTO: 54.9 % (ref 42.7–76)
NRBC BLD AUTO-RTO: 0 /100 WBC (ref 0–0.2)
PLATELET # BLD AUTO: 226 10*3/MM3 (ref 140–450)
PMV BLD AUTO: 10.4 FL (ref 6–12)
POTASSIUM SERPL-SCNC: 3.3 MMOL/L (ref 3.5–5.2)
RBC # BLD AUTO: 2.99 10*6/MM3 (ref 3.77–5.28)
SODIUM SERPL-SCNC: 142 MMOL/L (ref 136–145)
VIT B12 BLD-MCNC: >2000 PG/ML (ref 211–946)
WBC NRBC COR # BLD AUTO: 5.63 10*3/MM3 (ref 3.4–10.8)

## 2024-04-06 PROCEDURE — 99232 SBSQ HOSP IP/OBS MODERATE 35: CPT | Performed by: INTERNAL MEDICINE

## 2024-04-06 PROCEDURE — 25010000002 ONDANSETRON PER 1 MG: Performed by: HOSPITALIST

## 2024-04-06 PROCEDURE — 85025 COMPLETE CBC W/AUTO DIFF WBC: CPT | Performed by: PHYSICIAN ASSISTANT

## 2024-04-06 PROCEDURE — 80048 BASIC METABOLIC PNL TOTAL CA: CPT | Performed by: PHYSICIAN ASSISTANT

## 2024-04-06 PROCEDURE — 25010000002 PIPERACILLIN SOD-TAZOBACTAM PER 1 G: Performed by: HOSPITALIST

## 2024-04-06 PROCEDURE — 82746 ASSAY OF FOLIC ACID SERUM: CPT | Performed by: PHYSICIAN ASSISTANT

## 2024-04-06 PROCEDURE — 25010000002 THIAMINE HCL 200 MG/2ML SOLUTION: Performed by: HOSPITALIST

## 2024-04-06 PROCEDURE — 82607 VITAMIN B-12: CPT | Performed by: PHYSICIAN ASSISTANT

## 2024-04-06 RX ORDER — NICOTINE 21 MG/24HR
1 PATCH, TRANSDERMAL 24 HOURS TRANSDERMAL
Status: DISCONTINUED | OUTPATIENT
Start: 2024-04-06 | End: 2024-04-07 | Stop reason: HOSPADM

## 2024-04-06 RX ORDER — POTASSIUM CHLORIDE 750 MG/1
40 TABLET, FILM COATED, EXTENDED RELEASE ORAL ONCE
Status: COMPLETED | OUTPATIENT
Start: 2024-04-06 | End: 2024-04-06

## 2024-04-06 RX ADMIN — OXYCODONE AND ACETAMINOPHEN 1 TABLET: 325; 10 TABLET ORAL at 13:36

## 2024-04-06 RX ADMIN — ONDANSETRON 4 MG: 2 INJECTION INTRAMUSCULAR; INTRAVENOUS at 17:38

## 2024-04-06 RX ADMIN — OXYCODONE AND ACETAMINOPHEN 1 TABLET: 325; 10 TABLET ORAL at 05:09

## 2024-04-06 RX ADMIN — BISACODYL 5 MG: 5 TABLET, COATED ORAL at 21:38

## 2024-04-06 RX ADMIN — PANTOPRAZOLE SODIUM 40 MG: 40 TABLET, DELAYED RELEASE ORAL at 05:08

## 2024-04-06 RX ADMIN — PIPERACILLIN SODIUM AND TAZOBACTAM SODIUM 3.38 G: 3; .375 INJECTION, POWDER, LYOPHILIZED, FOR SOLUTION INTRAVENOUS at 21:17

## 2024-04-06 RX ADMIN — PIPERACILLIN SODIUM AND TAZOBACTAM SODIUM 3.38 G: 3; .375 INJECTION, POWDER, LYOPHILIZED, FOR SOLUTION INTRAVENOUS at 05:09

## 2024-04-06 RX ADMIN — PIPERACILLIN SODIUM AND TAZOBACTAM SODIUM 3.38 G: 3; .375 INJECTION, POWDER, LYOPHILIZED, FOR SOLUTION INTRAVENOUS at 13:36

## 2024-04-06 RX ADMIN — THIAMINE HYDROCHLORIDE 200 MG: 100 INJECTION, SOLUTION INTRAMUSCULAR; INTRAVENOUS at 05:10

## 2024-04-06 RX ADMIN — Medication 100 MG: at 09:10

## 2024-04-06 RX ADMIN — ONDANSETRON 4 MG: 2 INJECTION INTRAMUSCULAR; INTRAVENOUS at 09:17

## 2024-04-06 RX ADMIN — ONDANSETRON 4 MG: 2 INJECTION INTRAMUSCULAR; INTRAVENOUS at 05:10

## 2024-04-06 RX ADMIN — Medication 10 ML: at 21:15

## 2024-04-06 RX ADMIN — Medication 10 ML: at 09:11

## 2024-04-06 RX ADMIN — POLYETHYLENE GLYCOL 3350 17 G: 17 POWDER, FOR SOLUTION ORAL at 16:07

## 2024-04-06 RX ADMIN — POTASSIUM CHLORIDE 40 MEQ: 750 TABLET, EXTENDED RELEASE ORAL at 12:24

## 2024-04-06 RX ADMIN — OXYCODONE AND ACETAMINOPHEN 1 TABLET: 325; 10 TABLET ORAL at 09:10

## 2024-04-06 RX ADMIN — OXYCODONE AND ACETAMINOPHEN 1 TABLET: 325; 10 TABLET ORAL at 21:38

## 2024-04-06 RX ADMIN — OXYCODONE AND ACETAMINOPHEN 1 TABLET: 325; 10 TABLET ORAL at 00:31

## 2024-04-06 RX ADMIN — FOLIC ACID 1 MG: 5 INJECTION, SOLUTION INTRAMUSCULAR; INTRAVENOUS; SUBCUTANEOUS at 12:24

## 2024-04-06 RX ADMIN — PANTOPRAZOLE SODIUM 40 MG: 40 TABLET, DELAYED RELEASE ORAL at 16:07

## 2024-04-06 RX ADMIN — OXYCODONE AND ACETAMINOPHEN 1 TABLET: 325; 10 TABLET ORAL at 17:38

## 2024-04-06 NOTE — PROGRESS NOTES
[facility]  Inpatient Progress Note    Reason for Follow Up: Abdominal pain, chronic pancreatitis    Subjective     Interval History:   She had pain with ginger ale last night, now avoiding carbonated beverages    Current Facility-Administered Medications:     acetaminophen (TYLENOL) tablet 650 mg, 650 mg, Oral, Q4H PRN **OR** acetaminophen (TYLENOL) 160 MG/5ML oral solution 650 mg, 650 mg, Oral, Q4H PRN **OR** acetaminophen (TYLENOL) suppository 650 mg, 650 mg, Rectal, Q4H PRN, Fletcher Gallegos MD    amitriptyline (ELAVIL) tablet 50 mg, 50 mg, Oral, Nightly, Fletcher Gallegos MD, 50 mg at 04/05/24 2023    sennosides-docusate (PERICOLACE) 8.6-50 MG per tablet 2 tablet, 2 tablet, Oral, BID PRN **AND** polyethylene glycol (MIRALAX) packet 17 g, 17 g, Oral, Daily PRN **AND** bisacodyl (DULCOLAX) EC tablet 5 mg, 5 mg, Oral, Daily PRN **AND** bisacodyl (DULCOLAX) suppository 10 mg, 10 mg, Rectal, Daily PRN, Fletcher Gallegos MD    folic acid 1 mg in sodium chloride 0.9 % 50 mL IVPB, 1 mg, Intravenous, Daily, Fletcher Gallegos MD, Last Rate: 100 mL/hr at 04/06/24 1224, 1 mg at 04/06/24 1224    LORazepam (ATIVAN) tablet 1 mg, 1 mg, Oral, Q1H PRN **OR** LORazepam (ATIVAN) injection 1 mg, 1 mg, Intravenous, Q1H PRN **OR** LORazepam (ATIVAN) tablet 2 mg, 2 mg, Oral, Q1H PRN **OR** LORazepam (ATIVAN) injection 2 mg, 2 mg, Intravenous, Q1H PRN **OR** LORazepam (ATIVAN) injection 2 mg, 2 mg, Intravenous, Q15 Min PRN **OR** LORazepam (ATIVAN) injection 2 mg, 2 mg, Intramuscular, Q15 Min PRN, Fletcher Gallegos MD    Magnesium Standard Dose Replacement - Follow Nurse / BPA Driven Protocol, , Does not apply, PRN, Fletcher Gallegos MD    [DISCONTINUED] HYDROmorphone (DILAUDID) injection 0.5 mg, 0.5 mg, Intravenous, Q2H PRN, 0.5 mg at 04/05/24 0944 **AND** naloxone (NARCAN) injection 0.4 mg, 0.4 mg, Intravenous, Q5 Min PRN, Fletcher Gallegos MD    ondansetron ODT (ZOFRAN-ODT) disintegrating tablet 4 mg, 4 mg, Oral, Q6H PRN **OR**  ondansetron (ZOFRAN) injection 4 mg, 4 mg, Intravenous, Q6H PRN, Fletcher Gallegos MD, 4 mg at 04/06/24 0917    oxyCODONE-acetaminophen (PERCOCET)  MG per tablet 1 tablet, 1 tablet, Oral, Q4H PRN, Fletcher Gallegos MD, 1 tablet at 04/06/24 0910    pantoprazole (PROTONIX) EC tablet 40 mg, 40 mg, Oral, BID AC, Evelin Hinton PA-C, 40 mg at 04/06/24 0508    piperacillin-tazobactam (ZOSYN) 3.375 g IVPB in 100 mL NS MBP (CD), 3.375 g, Intravenous, Q8H, Fletcher Gallegos MD, Last Rate: 0 mL/hr at 04/05/24 0410, 3.375 g at 04/06/24 0509    promethazine (PHENERGAN) suppository 25 mg, 25 mg, Rectal, Q4H PRN, Fletcher Gallegos MD    sodium chloride 0.9 % flush 10 mL, 10 mL, Intravenous, PRN, Caren Medina MD    sodium chloride 0.9 % flush 10 mL, 10 mL, Intravenous, Q12H, Fletcher Gallegos MD, 10 mL at 04/06/24 0911    sodium chloride 0.9 % flush 10 mL, 10 mL, Intravenous, PRN, Fletcher Gallegos MD    sodium chloride 0.9 % infusion 40 mL, 40 mL, Intravenous, PRN, Fletcher Gallegos MD    sodium chloride 0.9 % infusion, 125 mL/hr, Intravenous, Continuous, Fletcher Gallegos MD, Last Rate: 125 mL/hr at 04/05/24 0648, 125 mL/hr at 04/05/24 0648    sodium chloride 0.9 % infusion, 30 mL/hr, Intravenous, Continuous PRN, Petar Starr MD, Last Rate: 30 mL/hr at 04/03/24 1018, 30 mL/hr at 04/03/24 1018    thiamine (B-1) injection 200 mg, 200 mg, Intravenous, Q8H, 200 mg at 04/06/24 0510 **FOLLOWED BY** [START ON 4/7/2024] thiamine (VITAMIN B-1) tablet 100 mg, 100 mg, Oral, Daily, Fletcher Gallegos MD    thiamine (VITAMIN B-1) tablet 100 mg, 100 mg, Oral, Daily, Fletcher Gallegos MD, 100 mg at 04/06/24 0910  Review of Systems:    There is weakness and fatigue all other systems reviewed and negative    Objective     Vital Signs  Temp:  [97.9 °F (36.6 °C)-98.4 °F (36.9 °C)] 98.4 °F (36.9 °C)  Heart Rate:  [74-84] 74  Resp:  [16] 16  BP: (105-122)/(78-94) 116/81  Body mass index is 18.37 kg/m².    Intake/Output Summary (Last 24 hours) at 4/6/2024  1253  Last data filed at 4/6/2024 1224  Gross per 24 hour   Intake 1020 ml   Output --   Net 1020 ml     I/O this shift:  In: 360 [P.O.:360]  Out: -      Physical Exam:   General: patient awake, alert and cooperative   Eyes: Normal lids and lashes, no scleral icterus   Neck: supple, normal ROM   Skin: warm and dry, not jaundiced   Cardiovascular: regular rhythm and rate, no murmurs auscultated   Pulm: clear to auscultation bilaterally, regular and unlabored   Abdomen: soft, nontender, nondistended; normal bowel sounds   Extremities: no rash or edema   Psychiatric: Normal mood and behavior; memory intact     Results Review:     I reviewed the patient's new clinical results.    Results from last 7 days   Lab Units 04/06/24  0456 04/05/24  0918 04/04/24  0607   WBC 10*3/mm3 5.63 6.29 7.38   HEMOGLOBIN g/dL 9.5* 9.6* 9.7*   HEMATOCRIT % 28.6* 28.7* 29.2*   PLATELETS 10*3/mm3 226 199 160     Results from last 7 days   Lab Units 04/06/24  0456 04/05/24  0918 04/04/24  0607 04/03/24  0556 04/02/24  0653 04/01/24  1200   SODIUM mmol/L 142 141 136   < > 135* 128*   POTASSIUM mmol/L 3.3* 3.3* 3.1*   < > 3.5 3.8   CHLORIDE mmol/L 103 103 99   < > 100 87*   CO2 mmol/L 29.0 27.5 23.1   < > 24.0 25.8   BUN mg/dL 4* <2* <2*   < > 7 7   CREATININE mg/dL 0.52* 0.45* 0.37*   < > 0.63 0.68   CALCIUM mg/dL 8.6 8.1* 8.0*   < > 8.3* 10.4   BILIRUBIN mg/dL  --   --   --   --  0.4 0.7   ALK PHOS U/L  --   --   --   --  110 152*   ALT (SGPT) U/L  --   --   --   --  10 12   AST (SGOT) U/L  --   --   --   --  6 9   GLUCOSE mg/dL 104* 109* 92   < > 78 94    < > = values in this interval not displayed.         Lab Results   Lab Value Date/Time    LIPASE 139 (H) 04/04/2024 0607    LIPASE 125 (H) 04/02/2024 0526    LIPASE 123 (H) 04/01/2024 1200    LIPASE 123 (H) 01/21/2024 0756    LIPASE 89 (H) 01/07/2024 0449    LIPASE 187 (H) 01/04/2024 1836    LIPASE 99 (H) 09/07/2023 0615    LIPASE 232 (H) 09/05/2023 2205    LIPASE 64 (H) 08/25/2023 0424     LIPASE 75 (H) 08/24/2023 0324    LIPASE 87 (H) 08/23/2023 0252    LIPASE 115 (H) 08/22/2023 1301    LIPASE 73 (H) 06/08/2023 0652    LIPASE 151 (H) 06/07/2023 2246    LIPASE 192 (H) 03/25/2023 1700    LIPASE 38 12/05/2022 0526    LIPASE 34 12/04/2022 0542    LIPASE 29 12/03/2022 0515    LIPASE 50 12/02/2022 0719    LIPASE 29 12/01/2022 0519    LIPASE 65 (H) 11/30/2022 1412    LIPASE 157 (H) 11/28/2022 1751    LIPASE 335 (H) 10/20/2022 1939    LIPASE 378 (H) 08/06/2022 2158    LIPASE 95 (H) 01/24/2018 0545    LIPASE 93 (H) 10/01/2017 0356    LIPASE 122 (H) 09/30/2017 0619    LIPASE 86 (H) 09/29/2017 1731    LIPASE 133 (H) 09/28/2017 0032       Radiology:  CT Abdomen Pelvis With Contrast   Final Result       1. Severe diffuse predominantly low-density gastric wall thickening,   greatest anteriorly, likely severe gastritis.   2. New fluid collection along the posterior wall of the left lobe of the   liver anterior to the stomach, probable pseudocyst, with additional   pseudocysts in the left upper quadrant as detailed above.   3. Mild peripancreatic fat stranding with pancreatic parenchymal   calcifications, likely acute on chronic pancreatitis.   4. Decreased size of the now low-density fluid collection along the   superior and posterior margin of the spleen, probable resolving   hematoma.   5. Mildly heterogeneous hepatic attenuation with subtle nodular liver   contours, suggesting underlying hepatocellular disease/cirrhosis, with   upper abdominal venous varicosities that could reflect portal venous   hypertension.       This report was finalized on 4/1/2024 2:06 PM by Maverick Solis MD on   Workstation: [id number]              Assessment & Plan     Active Hospital Problems    Diagnosis     **Abdominal pain     Acute on chronic pancreatitis     Essential hypertension     Pancreatic pseudocyst     Abnormal CT of the abdomen     Alcohol abuse        Assessment:  Acute on chronic pancreatitis  Left upper quadrant  abdominal pain, splinting  Pancreatic pseudocyst along the tail of the pancreas and inferior margin of the spleen.  New multilobulated fluid collection left liver lobe and anterior margin of the stomach  Improved splenic fluid collection-likely resolving hematoma  Hepatocellular disease/cirrhosis on CT scan  GERD/Leon's esophagus on EGD pathology  Gastritis  History of alcohol abuse  Normocytic anemia        Plan:  Persistent left upper quadrant pain that she states is similar to when she had splenic hemorrhage from previous pancreatitis.  She does have a pancreatic pseudocyst in that area.  Also with improving splenic hematoma on CT scan.  Appreciate general surgery evaluation and recommendations.  Agree that pain is likely related to chronic pancreatitis.  Continue IV PPI and supportive care for now.--Continue pantoprazole 40 mg twice daily and Pepcid in the evening for home medication.  Needs total cessation of alcohol use.  Also recommend tobacco cessation.  Continue low-fat GI soft diet.  Avoid carbonated beverages, Gatorade okay  Follow-up with SOL Gipson, outpatient gastroenterologist at [facility] after discharge.       I discussed the patients findings and my recommendations with patient and nursing staff.    Jose Robertson MD

## 2024-04-06 NOTE — PROGRESS NOTES
"DAILY PROGRESS NOTE  [facility]    Patient Identification:  Name: Piper Cain  Age: 43 y.o.  Sex: female  :  1980  MRN: [id number]         Primary Care Physician: Sharmaine Gatica APRN    Subjective:  Interval History: She complains of abdominal pain and is better.  She has been able to tolerate low-fat diet.  She had some worsening abdominal pains last night.    Objective:    Scheduled Meds:amitriptyline, 50 mg, Oral, Nightly  folic acid 1 mg in sodium chloride 0.9 % 50 mL IVPB, 1 mg, Intravenous, Daily  pantoprazole, 40 mg, Oral, BID AC  piperacillin-tazobactam, 3.375 g, Intravenous, Q8H  sodium chloride, 10 mL, Intravenous, Q12H  thiamine (B-1) IV, 200 mg, Intravenous, Q8H   Followed by  [START ON 2024] thiamine, 100 mg, Oral, Daily  thiamine, 100 mg, Oral, Daily      Continuous Infusions:sodium chloride, 125 mL/hr, Last Rate: 125 mL/hr (24 0648)  sodium chloride, 30 mL/hr, Last Rate: 30 mL/hr (24 1018)        Vital signs in last 24 hours:  Temp:  [97.9 °F (36.6 °C)-98.4 °F (36.9 °C)] 98.4 °F (36.9 °C)  Heart Rate:  [74-84] 74  Resp:  [16] 16  BP: (105-122)/(78-94) 116/81    Intake/Output:    Intake/Output Summary (Last 24 hours) at 2024 1206  Last data filed at 2024  Gross per 24 hour   Intake 660 ml   Output --   Net 660 ml       Exam:  /81 (BP Location: Left arm, Patient Position: Lying)   Pulse 74   Temp 98.4 °F (36.9 °C) (Oral)   Resp 16   Ht 177.8 cm (70\")   Wt 58.1 kg (128 lb)   LMP 2018 (Exact Date)   SpO2 96%   BMI 18.37 kg/m²     General Appearance:    Alert, cooperative, no distress   Head:    Normocephalic, without obvious abnormality, atraumatic   Eyes:       Throat:   Lips, tongue, gums normal   Neck:   Supple, symmetrical, trachea midline, no JVD   Lungs:     Clear to auscultation bilaterally, respirations unlabored   Chest Wall:    No tenderness or deformity    Heart:    Regular rate and rhythm, S1 and S2 normal, no " murmur,no  Rub or gallop   Abdomen:     Soft, tender over upper abdomen, bowel sounds active, no masses, no organomegaly    Extremities:   Extremities normal, atraumatic, no cyanosis or edema   Pulses:      Skin:   Skin is warm and dry,  no rashes or palpable lesions   Neurologic:   no focal deficits noted      Lab Results (last 72 hours)       Procedure Component Value Units Date/Time    Blood Culture - Blood, Arm, Right [[id number]]  (Normal) Collected: 04/01/24 1258    Specimen: Blood from Arm, Right Updated: 04/02/24 1315     Blood Culture No growth at 24 hours    Comprehensive Metabolic Panel [[id number]]  (Abnormal) Collected: 04/02/24 0653    Specimen: Blood Updated: 04/02/24 0749     Glucose 78 mg/dL      BUN 7 mg/dL      Creatinine 0.63 mg/dL      Sodium 135 mmol/L      Potassium 3.5 mmol/L      Chloride 100 mmol/L      CO2 24.0 mmol/L      Calcium 8.3 mg/dL      Total Protein 6.3 g/dL      Albumin 3.4 g/dL      ALT (SGPT) 10 U/L      AST (SGOT) 6 U/L      Alkaline Phosphatase 110 U/L      Total Bilirubin 0.4 mg/dL      Globulin 2.9 gm/dL      A/G Ratio 1.2 g/dL      BUN/Creatinine Ratio 11.1     Anion Gap 11.0 mmol/L      eGFR 114.5 mL/min/1.73     Narrative:      GFR Normal >60  Chronic Kidney Disease <60  Kidney Failure <15      Lipase [[id number]]  (Abnormal) Collected: 04/02/24 0526    Specimen: Blood Updated: 04/02/24 0630     Lipase 125 U/L     CBC Auto Differential [[id number]]  (Abnormal) Collected: 04/02/24 0526    Specimen: Blood Updated: 04/02/24 0627     WBC 14.22 10*3/mm3      RBC 3.35 10*6/mm3      Hemoglobin 10.7 g/dL      Hematocrit 32.0 %      MCV 95.5 fL      MCH 31.9 pg      MCHC 33.4 g/dL      RDW 14.7 %      RDW-SD 51.6 fl      MPV 10.8 fL      Platelets 170 10*3/mm3      Neutrophil % 79.5 %      Lymphocyte % 11.8 %      Monocyte % 6.3 %      Eosinophil % 1.7 %      Basophil % 0.2 %      Immature Grans % 0.5 %      Neutrophils, Absolute 11.30 10*3/mm3      Lymphocytes, Absolute 1.68  "10*3/mm3      Monocytes, Absolute 0.90 10*3/mm3      Eosinophils, Absolute 0.24 10*3/mm3      Basophils, Absolute 0.03 10*3/mm3      Immature Grans, Absolute 0.07 10*3/mm3      nRBC 0.0 /100 WBC     Procalcitonin [[id number]]  (Abnormal) Collected: 04/01/24 1200    Specimen: Blood Updated: 04/01/24 2126     Procalcitonin 0.42 ng/mL     Narrative:      As a Marker for Sepsis (Non-Neonates):    1. <0.5 ng/mL represents a low risk of severe sepsis and/or septic shock.  2. >2 ng/mL represents a high risk of severe sepsis and/or septic shock.    As a Marker for Lower Respiratory Tract Infections that require antibiotic therapy:    PCT on Admission    Antibiotic Therapy       6-12 Hrs later    >0.5                Strongly Recommended  >0.25 - <0.5        Recommended   0.1 - 0.25          Discouraged              Remeasure/reassess PCT  <0.1                Strongly Discouraged     Remeasure/reassess PCT    As 28 day mortality risk marker: \"Change in Procalcitonin Result\" (>80% or <=80%) if Day 0 (or Day 1) and Day 4 values are available. Refer to http://www.[facility]-pct-calculator.com    Change in PCT <=80%  A decrease of PCT levels below or equal to 80% defines a positive change in PCT test result representing a higher risk for 28-day all-cause mortality of patients diagnosed with severe sepsis for septic shock.    Change in PCT >80%  A decrease of PCT levels of more than 80% defines a negative change in PCT result representing a lower risk for 28-day all-cause mortality of patients diagnosed with severe sepsis or septic shock.       Urinalysis, Microscopic Only - Urine, Clean Catch [[id number]]  (Abnormal) Collected: 04/01/24 1209    Specimen: Urine, Clean Catch Updated: 04/01/24 1411     RBC, UA None Seen /HPF      WBC, UA 0-2 /HPF      Bacteria, UA 1+ /HPF      Squamous Epithelial Cells, UA 13-20 /HPF      Hyaline Casts, UA None Seen /LPF      Granular Casts, UA 0-2 /LPF      Methodology Manual Light Microscopy    [place] " Urine Culture Tube - Urine, Clean Catch [[id number]] Collected: 04/01/24 1209    Specimen: Urine, Clean Catch Updated: 04/01/24 1347     Extra Tube Hold for add-ons.     Comment: Auto resulted.       Lactic Acid, Plasma [[id number]]  (Normal) Collected: 04/01/24 1243    Specimen: Blood Updated: 04/01/24 1302     Lactate 1.0 mmol/L     Comprehensive Metabolic Panel [[id number]]  (Abnormal) Collected: 04/01/24 1200    Specimen: Blood Updated: 04/01/24 1226     Glucose 94 mg/dL      BUN 7 mg/dL      Creatinine 0.68 mg/dL      Sodium 128 mmol/L      Potassium 3.8 mmol/L      Chloride 87 mmol/L      CO2 25.8 mmol/L      Calcium 10.4 mg/dL      Total Protein 7.9 g/dL      Albumin 4.2 g/dL      ALT (SGPT) 12 U/L      AST (SGOT) 9 U/L      Alkaline Phosphatase 152 U/L      Total Bilirubin 0.7 mg/dL      Globulin 3.7 gm/dL      A/G Ratio 1.1 g/dL      BUN/Creatinine Ratio 10.3     Anion Gap 15.2 mmol/L      eGFR 112.4 mL/min/1.73     Narrative:      GFR Normal >60  Chronic Kidney Disease <60  Kidney Failure <15      Lipase [[id number]]  (Abnormal) Collected: 04/01/24 1200    Specimen: Blood Updated: 04/01/24 1226     Lipase 123 U/L     Urinalysis With Microscopic If Indicated (No Culture) - Urine, Clean Catch [[id number]]  (Abnormal) Collected: 04/01/24 1209    Specimen: Urine, Clean Catch Updated: 04/01/24 1211     Color, UA Yellow     Appearance, UA Clear     pH, UA 6.0     Specific Gravity, UA 1.020     Glucose, UA Negative     Ketones, UA >=160 mg/dL (4+)     Bilirubin, UA Moderate (2+)     Blood, UA Trace     Protein, UA 30 mg/dL (1+)     Leuk Esterase, UA Negative     Nitrite, UA Negative     Urobilinogen, UA 0.2 E.U./dL    CBC & Differential [[id number]]  (Abnormal) Collected: 04/01/24 1200    Specimen: Blood Updated: 04/01/24 1207    Narrative:      The following orders were created for panel order CBC & Differential.  Procedure                               Abnormality         Status                     ---------                                -----------         ------                     CBC Auto Differential[[id number]]        Abnormal            Final result                 Please view results for these tests on the individual orders.    CBC Auto Differential [[id number]]  (Abnormal) Collected: 04/01/24 1200    Specimen: Blood Updated: 04/01/24 1207     WBC 22.29 10*3/mm3      RBC 4.16 10*6/mm3      Hemoglobin 13.0 g/dL      Hematocrit 39.1 %      MCV 94.0 fL      MCH 31.3 pg      MCHC 33.2 g/dL      RDW 14.7 %      RDW-SD 51.8 fl      MPV 10.0 fL      Platelets 221 10*3/mm3      Neutrophil % 86.2 %      Lymphocyte % 7.6 %      Monocyte % 5.6 %      Eosinophil % 0.3 %      Basophil % 0.2 %      Immature Grans % 0.1 %      Neutrophils, Absolute 19.21 10*3/mm3      Lymphocytes, Absolute 1.70 10*3/mm3      Monocytes, Absolute 1.25 10*3/mm3      Eosinophils, Absolute 0.06 10*3/mm3      Basophils, Absolute 0.04 10*3/mm3      Immature Grans, Absolute 0.03 10*3/mm3           Data Review:  Results from last 7 days   Lab Units 04/06/24  0456 04/05/24  0918 04/04/24  0607   SODIUM mmol/L 142 141 136   POTASSIUM mmol/L 3.3* 3.3* 3.1*   CHLORIDE mmol/L 103 103 99   CO2 mmol/L 29.0 27.5 23.1   BUN mg/dL 4* <2* <2*   CREATININE mg/dL 0.52* 0.45* 0.37*   GLUCOSE mg/dL 104* 109* 92   CALCIUM mg/dL 8.6 8.1* 8.0*     Results from last 7 days   Lab Units 04/06/24  0456 04/05/24  0918 04/04/24  0607   WBC 10*3/mm3 5.63 6.29 7.38   HEMOGLOBIN g/dL 9.5* 9.6* 9.7*   HEMATOCRIT % 28.6* 28.7* 29.2*   PLATELETS 10*3/mm3 226 199 160             Lab Results   Lab Value Date/Time    TROPONINT <6 01/21/2024 0756    TROPONINT <6 08/22/2023 1619    TROPONINT <6 08/22/2023 1301    TROPONINT <6 06/08/2023 1109    TROPONINT <6 06/08/2023 0906    TROPONINT <0.010 10/20/2022 1939    TROPONINT <0.010 08/06/2022 2158    TROPONINT <0.010 04/06/2018 1751         Results from last 7 days   Lab Units 04/02/24  0653 04/01/24  1200   ALK PHOS U/L 110 152*   BILIRUBIN mg/dL 0.4  "0.7   ALT (SGPT) U/L 10 12   AST (SGOT) U/L 6 9             No results found for: \"POCGLU\"        Past Medical History:   Diagnosis Date    Anemia     Anxiety     Constipation     Cyst of spleen     Diarrhea     E. coli bacteremia     ETOH abuse     Frequent UTI     GERD (gastroesophageal reflux disease)     Hiatal hernia     Left flank pain 10/21/2022    Migraine     Miscarriage 2004    Pancreatitis     Pseudocyst of pancreas        Assessment:  Active Hospital Problems    Diagnosis  POA    **Abdominal pain [R10.9]  Yes    Acute on chronic pancreatitis [K85.90, K86.1]  Yes    Essential hypertension [I10]  Yes    Pancreatic pseudocyst [K86.3]  Yes    Abnormal CT of the abdomen [R93.5]  Yes    Alcohol abuse [F10.10]  Yes      Resolved Hospital Problems   No resolved problems to display.       Plan:  Continue po Protonix, IV antibiotics and follow-up on labs and cultures.  Surgery consult noted.  GI consult also noted and results of EGD noted.  Advancing to low-fat low residue GI soft diet.  Will see how she does with diet.  DC planning.  May be home tomorrow.  Discussed with the nurse.  GI wants her to stay another day.    Fletcher Gallegos MD  4/6/2024  12:06 EDT   "

## 2024-04-06 NOTE — PLAN OF CARE
Goal Outcome Evaluation:  Plan of Care Reviewed With: patient        Progress: improving  Outcome Evaluation:     A&O x4. VSS. SR on telemetry. Room air.       C/O back and abdominal pain.   PRN pain medication given.     IV ABX given.   IVF's infusing.       Up adlib.   Ambulated in [place] multiple times.       Will continue to monitor.         DC today.

## 2024-04-07 ENCOUNTER — READMISSION MANAGEMENT (OUTPATIENT)
Dept: CALL CENTER | Facility: HOSPITAL | Age: 44
End: 2024-04-07
Payer: MEDICAID

## 2024-04-07 VITALS
HEIGHT: 70 IN | SYSTOLIC BLOOD PRESSURE: 117 MMHG | BODY MASS INDEX: 18.32 KG/M2 | OXYGEN SATURATION: 99 % | RESPIRATION RATE: 16 BRPM | DIASTOLIC BLOOD PRESSURE: 79 MMHG | WEIGHT: 128 LBS | TEMPERATURE: 98.1 F | HEART RATE: 67 BPM

## 2024-04-07 LAB
ANION GAP SERPL CALCULATED.3IONS-SCNC: 12 MMOL/L (ref 5–15)
BASOPHILS # BLD AUTO: 0.03 10*3/MM3 (ref 0–0.2)
BASOPHILS NFR BLD AUTO: 0.5 % (ref 0–1.5)
BUN SERPL-MCNC: 5 MG/DL (ref 6–20)
BUN/CREAT SERPL: 9.1 (ref 7–25)
CALCIUM SPEC-SCNC: 8.6 MG/DL (ref 8.6–10.5)
CHLORIDE SERPL-SCNC: 104 MMOL/L (ref 98–107)
CO2 SERPL-SCNC: 29 MMOL/L (ref 22–29)
CREAT SERPL-MCNC: 0.55 MG/DL (ref 0.57–1)
DEPRECATED RDW RBC AUTO: 50.5 FL (ref 37–54)
EGFRCR SERPLBLD CKD-EPI 2021: 118.3 ML/MIN/1.73
EOSINOPHIL # BLD AUTO: 0.34 10*3/MM3 (ref 0–0.4)
EOSINOPHIL NFR BLD AUTO: 5.4 % (ref 0.3–6.2)
ERYTHROCYTE [DISTWIDTH] IN BLOOD BY AUTOMATED COUNT: 14.3 % (ref 12.3–15.4)
GLUCOSE SERPL-MCNC: 79 MG/DL (ref 65–99)
HCT VFR BLD AUTO: 31.5 % (ref 34–46.6)
HGB BLD-MCNC: 10.2 G/DL (ref 12–15.9)
IMM GRANULOCYTES # BLD AUTO: 0.02 10*3/MM3 (ref 0–0.05)
IMM GRANULOCYTES NFR BLD AUTO: 0.3 % (ref 0–0.5)
LYMPHOCYTES # BLD AUTO: 1.82 10*3/MM3 (ref 0.7–3.1)
LYMPHOCYTES NFR BLD AUTO: 28.8 % (ref 19.6–45.3)
MCH RBC QN AUTO: 31.6 PG (ref 26.6–33)
MCHC RBC AUTO-ENTMCNC: 32.4 G/DL (ref 31.5–35.7)
MCV RBC AUTO: 97.5 FL (ref 79–97)
MONOCYTES # BLD AUTO: 0.87 10*3/MM3 (ref 0.1–0.9)
MONOCYTES NFR BLD AUTO: 13.8 % (ref 5–12)
NEUTROPHILS NFR BLD AUTO: 3.24 10*3/MM3 (ref 1.7–7)
NEUTROPHILS NFR BLD AUTO: 51.2 % (ref 42.7–76)
NRBC BLD AUTO-RTO: 0 /100 WBC (ref 0–0.2)
PLATELET # BLD AUTO: 295 10*3/MM3 (ref 140–450)
PMV BLD AUTO: 10 FL (ref 6–12)
POTASSIUM SERPL-SCNC: 4.2 MMOL/L (ref 3.5–5.2)
RBC # BLD AUTO: 3.23 10*6/MM3 (ref 3.77–5.28)
SODIUM SERPL-SCNC: 145 MMOL/L (ref 136–145)
WBC NRBC COR # BLD AUTO: 6.32 10*3/MM3 (ref 3.4–10.8)

## 2024-04-07 PROCEDURE — 99232 SBSQ HOSP IP/OBS MODERATE 35: CPT | Performed by: INTERNAL MEDICINE

## 2024-04-07 PROCEDURE — 25010000002 ONDANSETRON PER 1 MG: Performed by: HOSPITALIST

## 2024-04-07 PROCEDURE — 63710000001 ONDANSETRON ODT 4 MG TABLET DISPERSIBLE: Performed by: HOSPITALIST

## 2024-04-07 PROCEDURE — 80048 BASIC METABOLIC PNL TOTAL CA: CPT | Performed by: PHYSICIAN ASSISTANT

## 2024-04-07 PROCEDURE — 25010000002 PIPERACILLIN SOD-TAZOBACTAM PER 1 G: Performed by: HOSPITALIST

## 2024-04-07 PROCEDURE — 85025 COMPLETE CBC W/AUTO DIFF WBC: CPT | Performed by: PHYSICIAN ASSISTANT

## 2024-04-07 RX ORDER — AMOXICILLIN AND CLAVULANATE POTASSIUM 875; 125 MG/1; MG/1
1 TABLET, FILM COATED ORAL 2 TIMES DAILY
Qty: 8 TABLET | Refills: 0 | Status: SHIPPED | OUTPATIENT
Start: 2024-04-07 | End: 2024-04-11

## 2024-04-07 RX ORDER — OXYCODONE AND ACETAMINOPHEN 10; 325 MG/1; MG/1
1 TABLET ORAL EVERY 4 HOURS PRN
Qty: 20 TABLET | Refills: 0 | Status: SHIPPED | OUTPATIENT
Start: 2024-04-07

## 2024-04-07 RX ADMIN — Medication 100 MG: at 09:20

## 2024-04-07 RX ADMIN — OXYCODONE AND ACETAMINOPHEN 1 TABLET: 325; 10 TABLET ORAL at 06:20

## 2024-04-07 RX ADMIN — FOLIC ACID 1 MG: 5 INJECTION, SOLUTION INTRAMUSCULAR; INTRAVENOUS; SUBCUTANEOUS at 09:20

## 2024-04-07 RX ADMIN — PANTOPRAZOLE SODIUM 40 MG: 40 TABLET, DELAYED RELEASE ORAL at 06:20

## 2024-04-07 RX ADMIN — OXYCODONE AND ACETAMINOPHEN 1 TABLET: 325; 10 TABLET ORAL at 09:19

## 2024-04-07 RX ADMIN — Medication 10 ML: at 09:20

## 2024-04-07 RX ADMIN — AMITRIPTYLINE HYDROCHLORIDE 50 MG: 50 TABLET, FILM COATED ORAL at 03:01

## 2024-04-07 RX ADMIN — PIPERACILLIN SODIUM AND TAZOBACTAM SODIUM 3.38 G: 3; .375 INJECTION, POWDER, LYOPHILIZED, FOR SOLUTION INTRAVENOUS at 06:20

## 2024-04-07 RX ADMIN — ONDANSETRON 4 MG: 2 INJECTION INTRAMUSCULAR; INTRAVENOUS at 01:56

## 2024-04-07 RX ADMIN — ONDANSETRON 4 MG: 4 TABLET, ORALLY DISINTEGRATING ORAL at 09:20

## 2024-04-07 RX ADMIN — NICOTINE 1 PATCH: 21 PATCH, EXTENDED RELEASE TRANSDERMAL at 03:01

## 2024-04-07 RX ADMIN — OXYCODONE AND ACETAMINOPHEN 1 TABLET: 325; 10 TABLET ORAL at 01:56

## 2024-04-07 NOTE — PROGRESS NOTES
[facility]  Inpatient Progress Note    Reason for Follow Up: Abdominal pain and pancreatitis    Subjective     Interval History:   No further abdominal pain, wants to go home    Current Facility-Administered Medications:     acetaminophen (TYLENOL) tablet 650 mg, 650 mg, Oral, Q4H PRN **OR** acetaminophen (TYLENOL) 160 MG/5ML oral solution 650 mg, 650 mg, Oral, Q4H PRN **OR** acetaminophen (TYLENOL) suppository 650 mg, 650 mg, Rectal, Q4H PRN, Fletcher Gallegos MD    amitriptyline (ELAVIL) tablet 50 mg, 50 mg, Oral, Nightly, Fletcher Gallegos MD, 50 mg at 04/07/24 0301    sennosides-docusate (PERICOLACE) 8.6-50 MG per tablet 2 tablet, 2 tablet, Oral, BID PRN **AND** polyethylene glycol (MIRALAX) packet 17 g, 17 g, Oral, Daily PRN, 17 g at 04/06/24 1607 **AND** bisacodyl (DULCOLAX) EC tablet 5 mg, 5 mg, Oral, Daily PRN, 5 mg at 04/06/24 2138 **AND** bisacodyl (DULCOLAX) suppository 10 mg, 10 mg, Rectal, Daily PRN, Fletcher Gallegos MD    folic acid 1 mg in sodium chloride 0.9 % 50 mL IVPB, 1 mg, Intravenous, Daily, Fletcher Gallegos MD, Last Rate: 100 mL/hr at 04/07/24 0920, 1 mg at 04/07/24 0920    Magnesium Standard Dose Replacement - Follow Nurse / BPA Driven Protocol, , Does not apply, PRN, Fletcher Gallegos MD    [DISCONTINUED] HYDROmorphone (DILAUDID) injection 0.5 mg, 0.5 mg, Intravenous, Q2H PRN, 0.5 mg at 04/05/24 0944 **AND** naloxone (NARCAN) injection 0.4 mg, 0.4 mg, Intravenous, Q5 Min PRN, Fletcher Gallegos MD    nicotine (NICODERM CQ) 21 MG/24HR patch 1 patch, 1 patch, Transdermal, Q24H, Kimberly Armstrong APRN, 1 patch at 04/07/24 0301    ondansetron ODT (ZOFRAN-ODT) disintegrating tablet 4 mg, 4 mg, Oral, Q6H PRN, 4 mg at 04/07/24 0920 **OR** ondansetron (ZOFRAN) injection 4 mg, 4 mg, Intravenous, Q6H PRN, Fletcher Gallegos MD, 4 mg at 04/07/24 0156    oxyCODONE-acetaminophen (PERCOCET)  MG per tablet 1 tablet, 1 tablet, Oral, Q4H PRN, Fletcher Gallegos MD, 1 tablet at 04/07/24 0919     pantoprazole (PROTONIX) EC tablet 40 mg, 40 mg, Oral, BID AC, Evelin Hinton, EV, 40 mg at 24 0620    piperacillin-tazobactam (ZOSYN) 3.375 g IVPB in 100 mL NS MBP (CD), 3.375 g, Intravenous, Q8H, Fletcher Gallegos MD, Last Rate: 0 mL/hr at 24 0410, 3.375 g at 24 0620    promethazine (PHENERGAN) suppository 25 mg, 25 mg, Rectal, Q4H PRN, Fletcher Gallegos MD    sodium chloride 0.9 % flush 10 mL, 10 mL, Intravenous, PRN, Caren Medina MD    sodium chloride 0.9 % flush 10 mL, 10 mL, Intravenous, Q12H, Fletcher Gallegos MD, 10 mL at 24 0920    sodium chloride 0.9 % flush 10 mL, 10 mL, Intravenous, PRN, Fletcher Gallegos MD    sodium chloride 0.9 % infusion 40 mL, 40 mL, Intravenous, PRN, Fletcher Gallegos MD    sodium chloride 0.9 % infusion, 125 mL/hr, Intravenous, Continuous, Fletcher Gallegos MD, Last Rate: 125 mL/hr at 24 0648, 125 mL/hr at 24 0648    sodium chloride 0.9 % infusion, 30 mL/hr, Intravenous, Continuous PRN, Petar Starr MD, Last Rate: 30 mL/hr at 24 1018, 30 mL/hr at 24 1018    thiamine (VITAMIN B-1) tablet 100 mg, 100 mg, Oral, Daily, Fletcher Gallegos MD, 100 mg at 24 0920    [] thiamine (B-1) injection 200 mg, 200 mg, Intravenous, Q8H, 200 mg at 24 0510 **FOLLOWED BY** thiamine (VITAMIN B-1) tablet 100 mg, 100 mg, Oral, Daily, Fletcher Gallegos MD, 100 mg at 24 0920  Review of Systems:    There is weakness and fatigue all other systems reviewed and negative    Objective     Vital Signs  Temp:  [98.1 °F (36.7 °C)-98.2 °F (36.8 °C)] 98.1 °F (36.7 °C)  Heart Rate:  [67-89] 67  Resp:  [16-18] 16  BP: (103-118)/(69-85) 117/79  Body mass index is 18.37 kg/m².    Intake/Output Summary (Last 24 hours) at 2024 1314  Last data filed at 2024 0920  Gross per 24 hour   Intake 240 ml   Output --   Net 240 ml     I/O this shift:  In: 240 [P.O.:240]  Out: -      Physical Exam:   General: patient awake, alert and cooperative   Eyes: Normal  lids and lashes, no scleral icterus   Neck: supple, normal ROM   Skin: warm and dry, not jaundiced   Cardiovascular: regular rhythm and rate, no murmurs auscultated   Pulm: clear to auscultation bilaterally, regular and unlabored   Abdomen: soft, nontender, nondistended; normal bowel sounds   Extremities: no rash or edema   Psychiatric: Normal mood and behavior; memory intact     Results Review:     I reviewed the patient's new clinical results.    Results from last 7 days   Lab Units 04/07/24  0621 04/06/24 0456 04/05/24  0918   WBC 10*3/mm3 6.32 5.63 6.29   HEMOGLOBIN g/dL 10.2* 9.5* 9.6*   HEMATOCRIT % 31.5* 28.6* 28.7*   PLATELETS 10*3/mm3 295 226 199     Results from last 7 days   Lab Units 04/07/24 0621 04/06/24 0456 04/05/24  0918 04/03/24  0556 04/02/24  0653 04/01/24  1200   SODIUM mmol/L 145 142 141   < > 135* 128*   POTASSIUM mmol/L 4.2 3.3* 3.3*   < > 3.5 3.8   CHLORIDE mmol/L 104 103 103   < > 100 87*   CO2 mmol/L 29.0 29.0 27.5   < > 24.0 25.8   BUN mg/dL 5* 4* <2*   < > 7 7   CREATININE mg/dL 0.55* 0.52* 0.45*   < > 0.63 0.68   CALCIUM mg/dL 8.6 8.6 8.1*   < > 8.3* 10.4   BILIRUBIN mg/dL  --   --   --   --  0.4 0.7   ALK PHOS U/L  --   --   --   --  110 152*   ALT (SGPT) U/L  --   --   --   --  10 12   AST (SGOT) U/L  --   --   --   --  6 9   GLUCOSE mg/dL 79 104* 109*   < > 78 94    < > = values in this interval not displayed.         Lab Results   Lab Value Date/Time    LIPASE 139 (H) 04/04/2024 0607    LIPASE 125 (H) 04/02/2024 0526    LIPASE 123 (H) 04/01/2024 1200    LIPASE 123 (H) 01/21/2024 0756    LIPASE 89 (H) 01/07/2024 0449    LIPASE 187 (H) 01/04/2024 1836    LIPASE 99 (H) 09/07/2023 0615    LIPASE 232 (H) 09/05/2023 2205    LIPASE 64 (H) 08/25/2023 0425    LIPASE 75 (H) 08/24/2023 0324    LIPASE 87 (H) 08/23/2023 0252    LIPASE 115 (H) 08/22/2023 1301    LIPASE 73 (H) 06/08/2023 0652    LIPASE 151 (H) 06/07/2023 2246    LIPASE 192 (H) 03/25/2023 1700    LIPASE 38 12/05/2022 0526     LIPASE 34 12/04/2022 0542    LIPASE 29 12/03/2022 0515    LIPASE 50 12/02/2022 0719    LIPASE 29 12/01/2022 0519    LIPASE 65 (H) 11/30/2022 1412    LIPASE 157 (H) 11/28/2022 1751    LIPASE 335 (H) 10/20/2022 1939    LIPASE 378 (H) 08/06/2022 2158    LIPASE 95 (H) 01/24/2018 0545    LIPASE 93 (H) 10/01/2017 0356    LIPASE 122 (H) 09/30/2017 0619    LIPASE 86 (H) 09/29/2017 1731    LIPASE 133 (H) 09/28/2017 0032       Radiology:  CT Abdomen Pelvis With Contrast   Final Result       1. Severe diffuse predominantly low-density gastric wall thickening,   greatest anteriorly, likely severe gastritis.   2. New fluid collection along the posterior wall of the left lobe of the   liver anterior to the stomach, probable pseudocyst, with additional   pseudocysts in the left upper quadrant as detailed above.   3. Mild peripancreatic fat stranding with pancreatic parenchymal   calcifications, likely acute on chronic pancreatitis.   4. Decreased size of the now low-density fluid collection along the   superior and posterior margin of the spleen, probable resolving   hematoma.   5. Mildly heterogeneous hepatic attenuation with subtle nodular liver   contours, suggesting underlying hepatocellular disease/cirrhosis, with   upper abdominal venous varicosities that could reflect portal venous   hypertension.       This report was finalized on 4/1/2024 2:06 PM by Maverick Solis MD on   Workstation: [id number]              Assessment & Plan     Active Hospital Problems    Diagnosis     **Abdominal pain     Acute on chronic pancreatitis     Essential hypertension     Pancreatic pseudocyst     Abnormal CT of the abdomen     Alcohol abuse        Assessment:  Acute on chronic pancreatitis  Left upper quadrant abdominal pain, splinting-resolved  Pancreatic pseudocyst along the tail of the pancreas and inferior margin of the spleen.  New multilobulated fluid collection left liver lobe and anterior margin of the stomach  Improved splenic fluid  collection-likely resolving hematoma  Hepatocellular disease/cirrhosis on CT scan  GERD/Leon's esophagus on EGD pathology  Gastritis  History of alcohol abuse  Normocytic anemia        Plan:  Pain has resolved she wants to go home  Appreciate general surgery evaluation and recommendations.  Agree that pain is likely related to chronic pancreatitis.  Continue IV PPI and supportive care for now.--Continue pantoprazole 40 mg twice daily and Pepcid in the evening for home medication.  Needs total cessation of alcohol use.  Also recommend tobacco cessation.  Continue low-fat GI soft diet.  Avoid carbonated beverages, Gatorade okay  Follow-up with SOL Gipson, outpatient gastroenterologist at [facility] after discharge.  We will sign off and see as needed     I discussed the patients findings and my recommendations with patient and nursing staff.    Jose Robertson MD

## 2024-04-07 NOTE — CASE MANAGEMENT/SOCIAL WORK
Case Management Discharge Note      Final Note: dc home         Selected Continued Care - Discharged on 4/7/2024 Admission date: 4/1/2024 - Discharge disposition: Home or Self Care      Destination    No services have been selected for the patient.                Durable Medical Equipment    No services have been selected for the patient.                Dialysis/Infusion    No services have been selected for the patient.                Home Medical Care    No services have been selected for the patient.                Therapy    No services have been selected for the patient.                Community Resources    No services have been selected for the patient.                Community & DME    No services have been selected for the patient.                         Final Discharge Disposition Code: 01 - home or self-care

## 2024-04-07 NOTE — PLAN OF CARE
Goal Outcome Evaluation:  Plan of Care Reviewed With: patient        Progress: improving  Outcome Evaluation:     A&O x4. VSS. SR on telemetry. Room air.     Up adlib.     Ambulated in [place] multiple laps around nurses station.       C/O back and abdominal pain.   PRN pain medication given.     C/O nausea.   PRN zofran given.       No BM tonight.   Stool softener given.           Will continue to monitor.

## 2024-04-07 NOTE — DISCHARGE SUMMARY
PHYSICIAN DISCHARGE SUMMARY                                                                        [facility]    Patient Identification:  Name: Piper Cain  Age: 43 y.o.  Sex: female  :  1980  MRN: [id number]  Primary Care Physician: Sahrmaine Gatica APRN    Admit date: 2024  Discharge date and time:2024  Discharged Condition: good    Discharge Diagnoses:  Active Hospital Problems    Diagnosis  POA    **Abdominal pain [R10.9]  Yes    Acute on chronic pancreatitis [K85.90, K86.1]  Yes    Essential hypertension [I10]  Yes    Pancreatic pseudocyst [K86.3]  Yes    Abnormal CT of the abdomen [R93.5]  Yes    Alcohol abuse [F10.10]  Yes      Resolved Hospital Problems   No resolved problems to display.          PMHX:   Past Medical History:   Diagnosis Date    Anemia     Anxiety     Constipation     Cyst of spleen     Diarrhea     E. coli bacteremia     ETOH abuse     Frequent UTI     GERD (gastroesophageal reflux disease)     Hiatal hernia     Left flank pain 10/21/2022    Migraine     Miscarriage     Pancreatitis     Pseudocyst of pancreas      PSHX:   Past Surgical History:   Procedure Laterality Date    EMBOLIZATION MESENTERIC ARTERY N/A 2024    Procedure: OR EMBOLIZATION MESENTERIC ARTERY RIGHT FEMORAL POPLITEAL THROMBECTOMY;  Surgeon: Carine Pena Jr., MD;  Location: [facility] HYBRID OR;  Service: Vascular;  Laterality: N/A;    ENDOSCOPY N/A 08/10/2022    Procedure: ESOPHAGOGASTRODUODENOSCOPY with bxs;  Surgeon: Salvador Price MD;  Location: [facility] ENDOSCOPY;  Service: Gastroenterology;  Laterality: N/A;  Pre: r/o esophageal  varacies, abd pain  Post: esophageal plaque    ENDOSCOPY N/A 10/9/2023    Procedure: ESOPHAGOGASTRODUODENOSCOPY WITH STENT REMOVAL;  Surgeon: Red Denson MD;  Location: [facility] ENDOSCOPY;  Service: Gastroenterology;  Laterality: N/A;    ENDOSCOPY N/A 4/3/2024    Procedure:  ESOPHAGOGASTRODUODENOSCOPY (EGD) WITH BIOPSIES;  Surgeon: Petar Starr MD;  Location: [facility] ENDOSCOPY;  Service: Gastroenterology;  Laterality: N/A;  PRE- ABDOMINAL PAIN  POST - GASTRITIS, ESOPHAGITIS,BILE REFLUX    PANCREATIC CYST DRAINAGE      x 2    UPPER ENDOSCOPIC ULTRASOUND W/ FNA N/A 9/13/2023    Procedure: Esophagogastroduodenoscopy with biopsy x 1 area and endoscopic ultrasound with placement of cyst gastrostomy;  Surgeon: Red Denson MD;  Location: [facility] ENDOSCOPY;  Service: Gastroenterology;  Laterality: N/A;  post: pancreatic psuedocyst    WISDOM TOOTH EXTRACTION         Hospital Course: Piper Cain   is a 43-year-old female with left upper quadrant pain for several days with a past medical history of alcohol induced pancreatitis and spleen congestion. She had a splenic artery embolization 1/22/24 and same day right mesenteric femoral embolization and right popliteal thrombectomy. At the time they decided to try this instead of splenectomy. Patient says since she is having problems today and has had for several days is likely will take her spleen out this time. Patient says that the stabbing pain now that is escalating and is just like the pain she had when she had the spleen surgery in January and less like her pancreatitis which because she is not vomiting like she does with that. And the pain is different. No known fever and the pain does not more into her back but every time she tries to take breath it makes it worse. She gets hot with morphine so she prefers Dilaudid as a pain control.  The patient was evaluated in the emergency room on [facility] and transferred to [facility] for direct admission for further evaluation and treatment.  CT scanning showed some fluid collections and gastric thickening and possible changes of pancreatitis.  The patient has been started on some antibiotics in the ER and given some pain and nausea medicine.  The patient states that she  has not been able to eat for 4 days.  She did start drinking again and stopped about 5 days ago.  The patient was admitted to the hospital and seen by GI medicine and general surgery.  She was treated with IV antibiotics and IV fluid hydration with pain and nausea medicine.  The patient had EGD done this admission.  She is feeling better after being in the hospital for few days looked to be well enough to go home.  She was counseled about alcohol cessation and diet for her acute on chronic pancreatitis.  Will also give her a few more days of oral antibiotics.  She will follow-up with her primary care in 1 week for ongoing care and also follow-up with her primary gastroenterologist.    Consults:     Consults       Date and Time Order Name Status Description    4/1/2024  5:45 PM Inpatient General Surgery Consult Completed     4/1/2024  5:45 PM Inpatient Gastroenterology Consult Completed           Results from last 7 days   Lab Units 04/07/24  0621   WBC 10*3/mm3 6.32   HEMOGLOBIN g/dL 10.2*   HEMATOCRIT % 31.5*   PLATELETS 10*3/mm3 295     Results from last 7 days   Lab Units 04/07/24  0621   SODIUM mmol/L 145   POTASSIUM mmol/L 4.2   CHLORIDE mmol/L 104   CO2 mmol/L 29.0   BUN mg/dL 5*   CREATININE mg/dL 0.55*   GLUCOSE mg/dL 79   CALCIUM mg/dL 8.6     Significant Diagnostic Studies:   WBC   Date Value Ref Range Status   04/07/2024 6.32 3.40 - 10.80 10*3/mm3 Final     Hemoglobin   Date Value Ref Range Status   04/07/2024 10.2 (L) 12.0 - 15.9 g/dL Final     Hematocrit   Date Value Ref Range Status   04/07/2024 31.5 (L) 34.0 - 46.6 % Final     Platelets   Date Value Ref Range Status   04/07/2024 295 140 - 450 10*3/mm3 Final     Sodium   Date Value Ref Range Status   04/07/2024 145 136 - 145 mmol/L Final     Potassium   Date Value Ref Range Status   04/07/2024 4.2 3.5 - 5.2 mmol/L Final     Chloride   Date Value Ref Range Status   04/07/2024 104 98 - 107 mmol/L Final     CO2   Date Value Ref Range Status   04/07/2024  "29.0 22.0 - 29.0 mmol/L Final     BUN   Date Value Ref Range Status   04/07/2024 5 (L) 6 - 20 mg/dL Final     Creatinine   Date Value Ref Range Status   04/07/2024 0.55 (L) 0.57 - 1.00 mg/dL Final     Glucose   Date Value Ref Range Status   04/07/2024 79 65 - 99 mg/dL Final     Calcium   Date Value Ref Range Status   04/07/2024 8.6 8.6 - 10.5 mg/dL Final     No results found for: \"AST\", \"ALT\", \"ALKPHOS\"  No results found for: \"APTT\", \"INR\"  No results found for: \"COLORU\", \"CLARITYU\", \"SPECGRAV\", \"PHUR\", \"PROTEINUR\", \"GLUCOSEU\", \"KETONESU\", \"BLOODU\", \"NITRITE\", \"LEUKOCYTESUR\", \"BILIRUBINUR\", \"UROBILINOGEN\", \"RBCUA\", \"WBCUA\", \"BACTERIA\", \"UACOMMENT\"  No results found for: \"TROPONINT\", \"TROPONINI\", \"BNP\"  No components found for: \"HGBA1C;2\"  No components found for: \"TSH;2\"  Imaging Results (All)       Procedure Component Value Units Date/Time    CT Abdomen Pelvis With Contrast [[id number]] Collected: 04/01/24 1400     Updated: 04/01/24 1409    Narrative:      CT ABDOMEN PELVIS W CONTRAST-     Date of Exam: 4/1/2024 12:55 PM     Indication: Where states: Known history of splenic embolization.  Pancreatitis. Left upper quadrant pain. Elevated white blood cell count,  unspecified.     Comparison Exams: 1/21/2024, 1/4/2024, 9/5/2023, and 8/22/2020.     Technique: Multiple contiguous axial images were acquired through the  abdomen and pelvis following the intravenous administration of 85 mL of  Isovue-370. Reformatted coronal and sagittal sequences were also  reviewed. Radiation dose reduction techniques were utilized, including  automated exposure control and exposure modulation based on body size.     FINDINGS:  Mild multifocal subsegmental atelectasis and/or scarring in each lung  base.     The predominantly low-density fluid collection along the inferior margin  of the spleen, anterior to the left kidney, and along the distal tip of  the tail the pancreas now measures approximately 5.5 cm x 3.5 cm by my  measurements " (axial series 2 image 41), previously 6.5 cm x 3 cm by my  measurements. A small 1 cm splenic artery aneurysm extends into the  anterior aspect of this collection. Decreased size of the now  low-density lenticular fluid collection along the superior posterior  margin of the spleen, measuring up to 2.5 cm in thickness.     New multilobulated fluid collection along the posterior margin of the  left lobe of the liver and the anterior margin of the stomach, which is  difficult to measure due to its irregular shape but extends  approximately 10 cm in craniocaudal length.     Additional peripancreatic fluid collection along the anterior pancreatic  body, measuring 3 cm x 1 cm (axial series 2 image 48). Stable pancreatic  parenchymal calcifications consistent with sequela of chronic  pancreatitis. Mild peripancreatic fat stranding.     Severe low-density gastric wall thickening, greatest anteriorly. Mild  colonic stool. Colonic diverticula, without CT evidence of  diverticulitis no bowel obstruction or significant small or large bowel  wall thickening. The appendix is normal.     Mildly heterogeneous hepatic attenuation with subtle nodular liver  contours, suggesting underlying hepatocellular disease/cirrhosis.  Diffuse gallbladder wall thickening could be related to hepatocellular  disease. Decreased conspicuity of the previously seen splenic  lacerations. The adrenal glands and kidneys are unremarkable. The  urinary bladder, uterus, and adnexa are unremarkable in CT appearance.     Trace free fluid in the pelvis. No free intraperitoneal air. Venous  varicosities in the upper abdomen. No definite pathologically enlarged  lymph nodes. Mild calcified atherosclerotic disease in the abdominal  aorta without aneurysm. Multiple surgical clips in the right inguinal  region.     No acute osseous abnormality or concerning osseous lesion.       Impression:         1. Severe diffuse predominantly low-density gastric wall  thickening,  greatest anteriorly, likely severe gastritis.  2. New fluid collection along the posterior wall of the left lobe of the  liver anterior to the stomach, probable pseudocyst, with additional  pseudocysts in the left upper quadrant as detailed above.  3. Mild peripancreatic fat stranding with pancreatic parenchymal  calcifications, likely acute on chronic pancreatitis.  4. Decreased size of the now low-density fluid collection along the  superior and posterior margin of the spleen, probable resolving  hematoma.  5. Mildly heterogeneous hepatic attenuation with subtle nodular liver  contours, suggesting underlying hepatocellular disease/cirrhosis, with  upper abdominal venous varicosities that could reflect portal venous  hypertension.     This report was finalized on 4/1/2024 2:06 PM by Maverick Solis MD on  Workstation: [id number]             Lab Results (last 7 days)       Procedure Component Value Units Date/Time    Basic Metabolic Panel [[id number]]  (Abnormal) Collected: 04/07/24 0621    Specimen: Blood Updated: 04/07/24 0716     Glucose 79 mg/dL      BUN 5 mg/dL      Creatinine 0.55 mg/dL      Sodium 145 mmol/L      Potassium 4.2 mmol/L      Chloride 104 mmol/L      CO2 29.0 mmol/L      Calcium 8.6 mg/dL      BUN/Creatinine Ratio 9.1     Anion Gap 12.0 mmol/L      eGFR 118.3 mL/min/1.73     Narrative:      GFR Normal >60  Chronic Kidney Disease <60  Kidney Failure <15      CBC & Differential [[id number]]  (Abnormal) Collected: 04/07/24 0621    Specimen: Blood Updated: 04/07/24 0655    Narrative:      The following orders were created for panel order CBC & Differential.  Procedure                               Abnormality         Status                     ---------                               -----------         ------                     CBC Auto Differential[[id number]]        Abnormal            Final result                 Please view results for these tests on the individual orders.    CBC Auto  Differential [[id number]]  (Abnormal) Collected: 04/07/24 0621    Specimen: Blood Updated: 04/07/24 0655     WBC 6.32 10*3/mm3      RBC 3.23 10*6/mm3      Hemoglobin 10.2 g/dL      Hematocrit 31.5 %      MCV 97.5 fL      MCH 31.6 pg      MCHC 32.4 g/dL      RDW 14.3 %      RDW-SD 50.5 fl      MPV 10.0 fL      Platelets 295 10*3/mm3      Neutrophil % 51.2 %      Lymphocyte % 28.8 %      Monocyte % 13.8 %      Eosinophil % 5.4 %      Basophil % 0.5 %      Immature Grans % 0.3 %      Neutrophils, Absolute 3.24 10*3/mm3      Lymphocytes, Absolute 1.82 10*3/mm3      Monocytes, Absolute 0.87 10*3/mm3      Eosinophils, Absolute 0.34 10*3/mm3      Basophils, Absolute 0.03 10*3/mm3      Immature Grans, Absolute 0.02 10*3/mm3      nRBC 0.0 /100 WBC     Blood Culture - Blood, Arm, Right [[id number]]  (Normal) Collected: 04/01/24 1258    Specimen: Blood from Arm, Right Updated: 04/06/24 1316     Blood Culture No growth at 5 days    Folate [[id number]]  (Normal) Collected: 04/06/24 0456    Specimen: Blood Updated: 04/06/24 0632     Folate 14.40 ng/mL     Narrative:      Results may be falsely increased if patient taking Biotin.      Vitamin B12 [[id number]]  (Abnormal) Collected: 04/06/24 0456    Specimen: Blood Updated: 04/06/24 0632     Vitamin B-12 >2,000 pg/mL     Narrative:      Results may be falsely increased if patient taking Biotin.      Basic Metabolic Panel [[id number]]  (Abnormal) Collected: 04/06/24 0456    Specimen: Blood Updated: 04/06/24 0617     Glucose 104 mg/dL      BUN 4 mg/dL      Creatinine 0.52 mg/dL      Sodium 142 mmol/L      Potassium 3.3 mmol/L      Chloride 103 mmol/L      CO2 29.0 mmol/L      Calcium 8.6 mg/dL      BUN/Creatinine Ratio 7.7     Anion Gap 10.0 mmol/L      eGFR 119.9 mL/min/1.73     Narrative:      GFR Normal >60  Chronic Kidney Disease <60  Kidney Failure <15      CBC & Differential [[id number]]  (Abnormal) Collected: 04/06/24 0456    Specimen: Blood Updated: 04/06/24 0558    Narrative:       The following orders were created for panel order CBC & Differential.  Procedure                               Abnormality         Status                     ---------                               -----------         ------                     CBC Auto Differential[[id number]]        Abnormal            Final result                 Please view results for these tests on the individual orders.    CBC Auto Differential [[id number]]  (Abnormal) Collected: 04/06/24 0456    Specimen: Blood Updated: 04/06/24 0558     WBC 5.63 10*3/mm3      RBC 2.99 10*6/mm3      Hemoglobin 9.5 g/dL      Hematocrit 28.6 %      MCV 95.7 fL      MCH 31.8 pg      MCHC 33.2 g/dL      RDW 14.2 %      RDW-SD 49.2 fl      MPV 10.4 fL      Platelets 226 10*3/mm3      Neutrophil % 54.9 %      Lymphocyte % 27.2 %      Monocyte % 12.1 %      Eosinophil % 5.0 %      Basophil % 0.4 %      Immature Grans % 0.4 %      Neutrophils, Absolute 3.10 10*3/mm3      Lymphocytes, Absolute 1.53 10*3/mm3      Monocytes, Absolute 0.68 10*3/mm3      Eosinophils, Absolute 0.28 10*3/mm3      Basophils, Absolute 0.02 10*3/mm3      Immature Grans, Absolute 0.02 10*3/mm3      nRBC 0.0 /100 WBC     Ferritin [[id number]]  (Abnormal) Collected: 04/05/24 0918    Specimen: Blood Updated: 04/05/24 1056     Ferritin 915.00 ng/mL     Narrative:      Results may be falsely decreased if patient taking Biotin.      Iron Profile [[id number]]  (Abnormal) Collected: 04/05/24 0918    Specimen: Blood Updated: 04/05/24 1050     Iron 17 mcg/dL      Iron Saturation (TSAT) 11 %      Transferrin 106 mg/dL      TIBC 158 mcg/dL     Basic Metabolic Panel [[id number]]  (Abnormal) Collected: 04/05/24 0918    Specimen: Blood Updated: 04/05/24 0951     Glucose 109 mg/dL      BUN <2 mg/dL      Creatinine 0.45 mg/dL      Sodium 141 mmol/L      Potassium 3.3 mmol/L      Chloride 103 mmol/L      CO2 27.5 mmol/L      Calcium 8.1 mg/dL      BUN/Creatinine Ratio --     Comment: Unable to calculate Bun/Crea  Ratio.        Anion Gap 10.5 mmol/L      eGFR 124.1 mL/min/1.73     Narrative:      GFR Normal >60  Chronic Kidney Disease <60  Kidney Failure <15      CBC & Differential [[id number]]  (Abnormal) Collected: 04/05/24 0918    Specimen: Blood Updated: 04/05/24 0932    Narrative:      The following orders were created for panel order CBC & Differential.  Procedure                               Abnormality         Status                     ---------                               -----------         ------                     CBC Auto Differential[[id number]]        Abnormal            Final result                 Please view results for these tests on the individual orders.    CBC Auto Differential [[id number]]  (Abnormal) Collected: 04/05/24 0918    Specimen: Blood Updated: 04/05/24 0932     WBC 6.29 10*3/mm3      RBC 2.98 10*6/mm3      Hemoglobin 9.6 g/dL      Hematocrit 28.7 %      MCV 96.3 fL      MCH 32.2 pg      MCHC 33.4 g/dL      RDW 14.2 %      RDW-SD 49.6 fl      MPV 10.3 fL      Platelets 199 10*3/mm3      Neutrophil % 60.0 %      Lymphocyte % 22.1 %      Monocyte % 13.4 %      Eosinophil % 4.0 %      Basophil % 0.3 %      Immature Grans % 0.2 %      Neutrophils, Absolute 3.78 10*3/mm3      Lymphocytes, Absolute 1.39 10*3/mm3      Monocytes, Absolute 0.84 10*3/mm3      Eosinophils, Absolute 0.25 10*3/mm3      Basophils, Absolute 0.02 10*3/mm3      Immature Grans, Absolute 0.01 10*3/mm3      nRBC 0.0 /100 WBC     Urease For H Pylori - Tissue, Stomach [[id number]]  (Normal) Collected: 04/03/24 1127    Specimen: Tissue from Stomach Updated: 04/05/24 0626     Urease Negative    Tissue Pathology Exam [[id number]] Collected: 04/03/24 1126    Specimen: Tissue from Small Intestine, Duodenum; Tissue from Stomach; Tissue from GE Junction Updated: 04/04/24 1022     Case Report --     Surgical Pathology Report                         Case: [id number]                                  Authorizing Provider:  Petar Starr  MD KINGSLEY   Collected:           04/03/2024 11:26 AM          Ordering Location:     [facility]  Received:            04/03/2024 01:47 PM                                 ENDO SUITES                                                                  Pathologist:           Puneet Townsend MD                                                         Specimens:   1) - Small Intestine, Duodenum, DUODENAL BIOPSIES                                                   2) - Stomach, GASTRIC BIOPSIES                                                                      3) - GE Junction, GE JUNCTION BIOPSIES                                                      Final Diagnosis --     1.  Small bowel, duodenum, biopsy: Benign small bowel with-   -A. Normal intact villous architecture.   -B. No significant inflammation, no granuloma.   -C. No viral inclusions or other organisms on routinely stained sections.     2.  Stomach, biopsy: Benign gastric mucosa with-   -A. No atypia or metaplasia.   -B. No significant inflammation.   -C. No distinct Helicobacter-like organisms on routinely stained sections.     3.  Gastroesophageal junction, biopsy: Benign squamous and gastric mucosa with    -A.  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  Acute and chronic inflammation in the gastric mucosa       Gross Description --     1. Small Intestine, Duodenum.  The specimen is received in formalin labeled with the patient's name and further designated 'duodenal biopsy' are multiple small fragments of gray-tan tissue. The specimen is submitted for embedding as received.    2. Stomach.  The specimen is received in formalin labeled with the patient's name and further designated 'gastric biopsy' are multiple small fragments of gray-tan tissue. The specimen is submitted for embedding as received.    3. GE Junction.  The specimen is received in formalin labeled with the patient's name and further  designated 'GE junction biopsy' are multiple small fragments of gray-tan tissue. The specimen is submitted for embedding as received.        Basic Metabolic Panel [[id number]]  (Abnormal) Collected: 04/04/24 0607    Specimen: Blood Updated: 04/04/24 0656     Glucose 92 mg/dL      BUN <2 mg/dL      Creatinine 0.37 mg/dL      Sodium 136 mmol/L      Potassium 3.1 mmol/L      Chloride 99 mmol/L      CO2 23.1 mmol/L      Calcium 8.0 mg/dL      BUN/Creatinine Ratio --     Comment: Unable to calculate Bun/Crea Ratio.        Anion Gap 13.9 mmol/L      eGFR 130.1 mL/min/1.73     Narrative:      GFR Normal >60  Chronic Kidney Disease <60  Kidney Failure <15      Lipase [[id number]]  (Abnormal) Collected: 04/04/24 0607    Specimen: Blood Updated: 04/04/24 0643     Lipase 139 U/L     CBC & Differential [[id number]]  (Abnormal) Collected: 04/04/24 0607    Specimen: Blood Updated: 04/04/24 0634    Narrative:      The following orders were created for panel order CBC & Differential.  Procedure                               Abnormality         Status                     ---------                               -----------         ------                     CBC Auto Differential[[id number]]        Abnormal            Final result                 Please view results for these tests on the individual orders.    CBC Auto Differential [[id number]]  (Abnormal) Collected: 04/04/24 0607    Specimen: Blood Updated: 04/04/24 0634     WBC 7.38 10*3/mm3      RBC 3.03 10*6/mm3      Hemoglobin 9.7 g/dL      Hematocrit 29.2 %      MCV 96.4 fL      MCH 32.0 pg      MCHC 33.2 g/dL      RDW 14.6 %      RDW-SD 51.5 fl      MPV 10.3 fL      Platelets 160 10*3/mm3      Neutrophil % 73.4 %      Lymphocyte % 14.4 %      Monocyte % 9.5 %      Eosinophil % 2.0 %      Basophil % 0.3 %      Immature Grans % 0.4 %      Neutrophils, Absolute 5.42 10*3/mm3      Lymphocytes, Absolute 1.06 10*3/mm3      Monocytes, Absolute 0.70 10*3/mm3      Eosinophils, Absolute 0.15  10*3/mm3      Basophils, Absolute 0.02 10*3/mm3      Immature Grans, Absolute 0.03 10*3/mm3      nRBC 0.0 /100 WBC     POC Pregnancy, Urine [[id number]]  (Normal) Collected: 04/03/24 1017    Specimen: Urine Updated: 04/03/24 1017     HCG, Urine, QL Negative     Lot Number [id number]     Internal Positive Control Positive     Internal Negative Control Negative     Expiration Date 05/02/2025    Basic Metabolic Panel [[id number]]  (Abnormal) Collected: 04/03/24 0556    Specimen: Blood Updated: 04/03/24 0654     Glucose 68 mg/dL      BUN 4 mg/dL      Creatinine 0.49 mg/dL      Sodium 135 mmol/L      Potassium 3.3 mmol/L      Comment: Slight hemolysis detected by analyzer. Result may be falsely elevated.        Chloride 100 mmol/L      CO2 20.8 mmol/L      Calcium 8.4 mg/dL      BUN/Creatinine Ratio 8.2     Anion Gap 14.2 mmol/L      eGFR 121.6 mL/min/1.73     Narrative:      GFR Normal >60  Chronic Kidney Disease <60  Kidney Failure <15      CBC & Differential [[id number]]  (Abnormal) Collected: 04/03/24 0556    Specimen: Blood Updated: 04/03/24 0636    Narrative:      The following orders were created for panel order CBC & Differential.  Procedure                               Abnormality         Status                     ---------                               -----------         ------                     CBC Auto Differential[[id number]]        Abnormal            Final result                 Please view results for these tests on the individual orders.    CBC Auto Differential [[id number]]  (Abnormal) Collected: 04/03/24 0556    Specimen: Blood Updated: 04/03/24 0636     WBC 9.28 10*3/mm3      RBC 3.10 10*6/mm3      Hemoglobin 9.5 g/dL      Hematocrit 29.7 %      MCV 95.8 fL      MCH 30.6 pg      MCHC 32.0 g/dL      RDW 14.4 %      RDW-SD 50.1 fl      MPV 10.2 fL      Platelets 161 10*3/mm3      Neutrophil % 72.7 %      Lymphocyte % 15.1 %      Monocyte % 8.4 %      Eosinophil % 3.4 %      Basophil % 0.1 %      Immature  Grans % 0.3 %      Neutrophils, Absolute 6.74 10*3/mm3      Lymphocytes, Absolute 1.40 10*3/mm3      Monocytes, Absolute 0.78 10*3/mm3      Eosinophils, Absolute 0.32 10*3/mm3      Basophils, Absolute 0.01 10*3/mm3      Immature Grans, Absolute 0.03 10*3/mm3      nRBC 0.0 /100 WBC     Comprehensive Metabolic Panel [[id number]]  (Abnormal) Collected: 04/02/24 0653    Specimen: Blood Updated: 04/02/24 0749     Glucose 78 mg/dL      BUN 7 mg/dL      Creatinine 0.63 mg/dL      Sodium 135 mmol/L      Potassium 3.5 mmol/L      Chloride 100 mmol/L      CO2 24.0 mmol/L      Calcium 8.3 mg/dL      Total Protein 6.3 g/dL      Albumin 3.4 g/dL      ALT (SGPT) 10 U/L      AST (SGOT) 6 U/L      Alkaline Phosphatase 110 U/L      Total Bilirubin 0.4 mg/dL      Globulin 2.9 gm/dL      A/G Ratio 1.2 g/dL      BUN/Creatinine Ratio 11.1     Anion Gap 11.0 mmol/L      eGFR 114.5 mL/min/1.73     Narrative:      GFR Normal >60  Chronic Kidney Disease <60  Kidney Failure <15      Lipase [[id number]]  (Abnormal) Collected: 04/02/24 0526    Specimen: Blood Updated: 04/02/24 0630     Lipase 125 U/L     CBC Auto Differential [[id number]]  (Abnormal) Collected: 04/02/24 0526    Specimen: Blood Updated: 04/02/24 0627     WBC 14.22 10*3/mm3      RBC 3.35 10*6/mm3      Hemoglobin 10.7 g/dL      Hematocrit 32.0 %      MCV 95.5 fL      MCH 31.9 pg      MCHC 33.4 g/dL      RDW 14.7 %      RDW-SD 51.6 fl      MPV 10.8 fL      Platelets 170 10*3/mm3      Neutrophil % 79.5 %      Lymphocyte % 11.8 %      Monocyte % 6.3 %      Eosinophil % 1.7 %      Basophil % 0.2 %      Immature Grans % 0.5 %      Neutrophils, Absolute 11.30 10*3/mm3      Lymphocytes, Absolute 1.68 10*3/mm3      Monocytes, Absolute 0.90 10*3/mm3      Eosinophils, Absolute 0.24 10*3/mm3      Basophils, Absolute 0.03 10*3/mm3      Immature Grans, Absolute 0.07 10*3/mm3      nRBC 0.0 /100 WBC     Procalcitonin [[id number]]  (Abnormal) Collected: 04/01/24 1200    Specimen: Blood Updated:  "04/01/24 2126     Procalcitonin 0.42 ng/mL     Narrative:      As a Marker for Sepsis (Non-Neonates):    1. <0.5 ng/mL represents a low risk of severe sepsis and/or septic shock.  2. >2 ng/mL represents a high risk of severe sepsis and/or septic shock.    As a Marker for Lower Respiratory Tract Infections that require antibiotic therapy:    PCT on Admission    Antibiotic Therapy       6-12 Hrs later    >0.5                Strongly Recommended  >0.25 - <0.5        Recommended   0.1 - 0.25          Discouraged              Remeasure/reassess PCT  <0.1                Strongly Discouraged     Remeasure/reassess PCT    As 28 day mortality risk marker: \"Change in Procalcitonin Result\" (>80% or <=80%) if Day 0 (or Day 1) and Day 4 values are available. Refer to http://[email or web address]-pct-calculator.com    Change in PCT <=80%  A decrease of PCT levels below or equal to 80% defines a positive change in PCT test result representing a higher risk for 28-day all-cause mortality of patients diagnosed with severe sepsis for septic shock.    Change in PCT >80%  A decrease of PCT levels of more than 80% defines a negative change in PCT result representing a lower risk for 28-day all-cause mortality of patients diagnosed with severe sepsis or septic shock.       Urinalysis, Microscopic Only - Urine, Clean Catch [[id number]]  (Abnormal) Collected: 04/01/24 1209    Specimen: Urine, Clean Catch Updated: 04/01/24 1411     RBC, UA None Seen /HPF      WBC, UA 0-2 /HPF      Bacteria, UA 1+ /HPF      Squamous Epithelial Cells, UA 13-20 /HPF      Hyaline Casts, UA None Seen /LPF      Granular Casts, UA 0-2 /LPF      Methodology Manual Light Microscopy    [place] Urine Culture Tube - Urine, Clean Catch [[id number]] Collected: 04/01/24 1209    Specimen: Urine, Clean Catch Updated: 04/01/24 1347     Extra Tube Hold for add-ons.     Comment: Auto resulted.       Lactic Acid, Plasma [[id number]]  (Normal) Collected: 04/01/24 1243    Specimen: Blood " Updated: 04/01/24 1302     Lactate 1.0 mmol/L     Comprehensive Metabolic Panel [[id number]]  (Abnormal) Collected: 04/01/24 1200    Specimen: Blood Updated: 04/01/24 1226     Glucose 94 mg/dL      BUN 7 mg/dL      Creatinine 0.68 mg/dL      Sodium 128 mmol/L      Potassium 3.8 mmol/L      Chloride 87 mmol/L      CO2 25.8 mmol/L      Calcium 10.4 mg/dL      Total Protein 7.9 g/dL      Albumin 4.2 g/dL      ALT (SGPT) 12 U/L      AST (SGOT) 9 U/L      Alkaline Phosphatase 152 U/L      Total Bilirubin 0.7 mg/dL      Globulin 3.7 gm/dL      A/G Ratio 1.1 g/dL      BUN/Creatinine Ratio 10.3     Anion Gap 15.2 mmol/L      eGFR 112.4 mL/min/1.73     Narrative:      GFR Normal >60  Chronic Kidney Disease <60  Kidney Failure <15      Lipase [[id number]]  (Abnormal) Collected: 04/01/24 1200    Specimen: Blood Updated: 04/01/24 1226     Lipase 123 U/L     Urinalysis With Microscopic If Indicated (No Culture) - Urine, Clean Catch [[id number]]  (Abnormal) Collected: 04/01/24 1209    Specimen: Urine, Clean Catch Updated: 04/01/24 1211     Color, UA Yellow     Appearance, UA Clear     pH, UA 6.0     Specific Gravity, UA 1.020     Glucose, UA Negative     Ketones, UA >=160 mg/dL (4+)     Bilirubin, UA Moderate (2+)     Blood, UA Trace     Protein, UA 30 mg/dL (1+)     Leuk Esterase, UA Negative     Nitrite, UA Negative     Urobilinogen, UA 0.2 E.U./dL    CBC & Differential [[id number]]  (Abnormal) Collected: 04/01/24 1200    Specimen: Blood Updated: 04/01/24 1207    Narrative:      The following orders were created for panel order CBC & Differential.  Procedure                               Abnormality         Status                     ---------                               -----------         ------                     CBC Auto Differential[[id number]]        Abnormal            Final result                 Please view results for these tests on the individual orders.    CBC Auto Differential [[id number]]  (Abnormal) Collected:  "04/01/24 1200    Specimen: Blood Updated: 04/01/24 1207     WBC 22.29 10*3/mm3      RBC 4.16 10*6/mm3      Hemoglobin 13.0 g/dL      Hematocrit 39.1 %      MCV 94.0 fL      MCH 31.3 pg      MCHC 33.2 g/dL      RDW 14.7 %      RDW-SD 51.8 fl      MPV 10.0 fL      Platelets 221 10*3/mm3      Neutrophil % 86.2 %      Lymphocyte % 7.6 %      Monocyte % 5.6 %      Eosinophil % 0.3 %      Basophil % 0.2 %      Immature Grans % 0.1 %      Neutrophils, Absolute 19.21 10*3/mm3      Lymphocytes, Absolute 1.70 10*3/mm3      Monocytes, Absolute 1.25 10*3/mm3      Eosinophils, Absolute 0.06 10*3/mm3      Basophils, Absolute 0.04 10*3/mm3      Immature Grans, Absolute 0.03 10*3/mm3           /79 (BP Location: Right arm, Patient Position: Lying)   Pulse 67   Temp 98.1 °F (36.7 °C) (Oral)   Resp 16   Ht 177.8 cm (70\")   Wt 58.1 kg (128 lb)   LMP 01/17/2018 (Exact Date)   SpO2 99%   BMI 18.37 kg/m²     Discharge Exam:  General Appearance:    Alert, cooperative, no distress                          Head:    Normocephalic, without obvious abnormality, atraumatic                          Eyes:                            Throat:   Lips, tongue, gums normal                          Neck:   Supple, symmetrical, trachea midline, no JVD                        Lungs:     Clear to auscultation bilaterally, respirations unlabored                Chest Wall:    No tenderness or deformity                        Heart:    Regular rate and rhythm, S1 and S2 normal, no murmur,no  Rub  or gallop                  Abdomen:     Soft, non-tender, bowel sounds active, no masses, no organomegaly                  Extremities:   Extremities normal, atraumatic, no cyanosis or edema                             Skin:   Skin is warm and dry,  no rashes or palpable lesions                  Neurologic:   no focal deficits noted     Disposition:  Home    Activity as tolerated    Diet as tolerated  Diet Order   Procedures    Diet: Gastrointestinal; " Fiber-Restricted, Fat-Restricted, Low Irritant; Texture: Soft to Chew (NDD 3); Soft to Chew: Whole Meat; Fluid Consistency: Thin (IDDSI 0)       Patient Instructions:      Discharge Medications        New Medications        Instructions Start Date   amoxicillin-clavulanate 875-125 MG per tablet  Commonly known as: AUGMENTIN   1 tablet, Oral, 2 Times Daily             Changes to Medications        Instructions Start Date   oxyCODONE-acetaminophen  MG per tablet  Commonly known as: PERCOCET  What changed: when to take this   1 tablet, Oral, Every 4 Hours PRN             Continue These Medications        Instructions Start Date   amitriptyline 50 MG tablet  Commonly known as: ELAVIL   50 mg, Oral, Nightly      Feosol 200 (65 Fe) MG tablet tablet  Generic drug: Ferrous Sulfate Dried   200 mg, Oral, 2 Times Daily      folic acid 1 MG tablet  Commonly known as: FOLVITE   1,000 mcg, Oral, Daily      magnesium oxide 400 MG tablet  Commonly known as: MAG-OX   400 mg, Oral, Daily      ondansetron 4 MG tablet  Commonly known as: ZOFRAN   4 mg, Oral, Every 8 Hours PRN      pantoprazole 40 MG EC tablet  Commonly known as: PROTONIX   40 mg, Oral, Daily      promethazine 25 MG suppository  Commonly known as: PHENERGAN   25 mg, Rectal, As Needed      thiamine 100 MG tablet  Commonly known as: VITAMIN B1   100 mg, Oral, Daily             Future Appointments   Date Time Provider Department Center   5/22/2024  9:45 AM Carine Pena Jr., MD [facility]      Follow-up Information       Sharmaine Gatica APRN Follow up in 1 week(s).    Specialty: Nurse Practitioner  Why: Also follow-up with primary GI doctor and keep appointment with Carine Pena  Contact information:  [address] [facility] [address]  [phone]                           Discharge Order (From admission, onward)       Start     Ordered    04/07/24 1134  Discharge patient  Once        Expected Discharge Date: 04/07/24   Discharge  Disposition: Home or Self Care   Physician of Record for Attribution - Please select from Treatment Team: FLETCHER GALLEGOS [[id number]]   Review needed by CMO to determine Physician of Record: No      Question Answer Comment   Physician of Record for Attribution - Please select from Treatment Team FLETCHER GALLEGOS    Review needed by CMO to determine Physician of Record No        04/07/24 1135    04/06/24 1200  Discharge patient  Once,   Status:  Canceled        Expected Discharge Date: 04/06/24   Discharge Disposition: Home or Self Care   Physician of Record for Attribution - Please select from Treatment Team: FLETCHER GALLEGOS [[id number]]   Review needed by CMO to determine Physician of Record: No      Question Answer Comment   Physician of Record for Attribution - Please select from Treatment Team FLETCHER GALLEGOS    Review needed by CMO to determine Physician of Record No        04/06/24 1159                    Total time spent discharging patient including evaluation,post hospitalization follow up,  medication and post hospitalization instructions and education total time exceeds 30 minutes.    Signed:  Fletcher Gallegos MD  4/7/2024  11:36 EDT

## 2024-04-07 NOTE — OUTREACH NOTE
Prep Survey      Flowsheet Row Responses   [facility] facility patient discharged from? [facility]   Is LACE score < 7 ? No   Eligibility Readm Mgmt   Discharge diagnosis **Abdominal pain   Does the patient have one of the following disease processes/diagnoses(primary or secondary)? Other   Does the patient have Home health ordered? No   Is there a DME ordered? No   Prep survey completed? Yes            TESSA MADDEN - Registered Nurse

## 2024-04-08 NOTE — PAYOR COMM NOTE
"Lizzeth Zavala (43 y.o. Female)      PATIENT DISCHARGED 04/07/2024:  REF# [id number]      DEPT: [phone],  [phone]    [facility]: NPI [id number] [facility]# [id number]    BUDDY ROJAS RN,[facility]       Date of Birth   1980    Social Security Number       Address   [address]   [place] [address]    Home Phone   [phone]    MRN   [id number]       Caodaism   Bahai    Marital Status   Single                            Admission Date   4/1/24    Admission Type   Emergency    Admitting Provider   Fletcher Gallegos MD    Attending Provider       Department, Room/Bed   [address], [address]1       Discharge Date   4/7/2024    Discharge Disposition   Home or Self Care    Discharge Destination                                 Attending Provider: (none)   Allergies: Nickel    Isolation: None   Infection: None   Code Status: Prior    Ht: 177.8 cm (70\")   Wt: 58.1 kg (128 lb)    Admission Cmt: None   Principal Problem: Abdominal pain [R10.9]                   Active Insurance as of 4/1/2024       Primary Coverage       Payor Plan Insurance Group Employer/Plan Group    [id number]       Payor Plan Address Payor Plan Phone Number Payor Plan Fax Number Effective Dates    MAIL STOP: 7/1/2022 - None Entered    [address] [address]       [facility] [address]         Subscriber Name Subscriber Birth Date Member ID       LIZZETH ZAVALA 1980 [id number]                     Emergency Contacts        (Rel.) Home Phone Work Phone Mobile Phone    MALOU EVANGELISTA (Significant Other) [phone] -- --                 Discharge Summary        Fletcher Gallegos MD at 04/07/24 1136                                                                             PHYSICIAN DISCHARGE SUMMARY                                                                        [facility]    Patient " Identification:  Name: Piper Cain  Age: 43 y.o.  Sex: female  :  1980  MRN: [id number]  Primary Care Physician: Sharmaine Gatica APRN    Admit date: 2024  Discharge date and time:2024  Discharged Condition: good    Discharge Diagnoses:  Active Hospital Problems    Diagnosis  POA    **Abdominal pain [R10.9]  Yes    Acute on chronic pancreatitis [K85.90, K86.1]  Yes    Essential hypertension [I10]  Yes    Pancreatic pseudocyst [K86.3]  Yes    Abnormal CT of the abdomen [R93.5]  Yes    Alcohol abuse [F10.10]  Yes      Resolved Hospital Problems   No resolved problems to display.          PMHX:   Past Medical History:   Diagnosis Date    Anemia     Anxiety     Constipation     Cyst of spleen     Diarrhea     E. coli bacteremia     ETOH abuse     Frequent UTI     GERD (gastroesophageal reflux disease)     Hiatal hernia     Left flank pain 10/21/2022    Migraine     Miscarriage     Pancreatitis     Pseudocyst of pancreas      PSHX:   Past Surgical History:   Procedure Laterality Date    EMBOLIZATION MESENTERIC ARTERY N/A 2024    Procedure: OR EMBOLIZATION MESENTERIC ARTERY RIGHT FEMORAL POPLITEAL THROMBECTOMY;  Surgeon: Carine Pena Jr., MD;  Location: [facility] HYBRID OR;  Service: Vascular;  Laterality: N/A;    ENDOSCOPY N/A 08/10/2022    Procedure: ESOPHAGOGASTRODUODENOSCOPY with bxs;  Surgeon: Salvador Price MD;  Location: [facility] ENDOSCOPY;  Service: Gastroenterology;  Laterality: N/A;  Pre: r/o esophageal  varacies, abd pain  Post: esophageal plaque    ENDOSCOPY N/A 10/9/2023    Procedure: ESOPHAGOGASTRODUODENOSCOPY WITH STENT REMOVAL;  Surgeon: Red Denson MD;  Location: [facility] ENDOSCOPY;  Service: Gastroenterology;  Laterality: N/A;    ENDOSCOPY N/A 4/3/2024    Procedure: ESOPHAGOGASTRODUODENOSCOPY (EGD) WITH BIOPSIES;  Surgeon: Petar Starr MD;  Location: [facility] ENDOSCOPY;  Service: Gastroenterology;  Laterality: N/A;  PRE- ABDOMINAL PAIN  POST - GASTRITIS,  ESOPHAGITIS,BILE REFLUX    PANCREATIC CYST DRAINAGE      x 2    UPPER ENDOSCOPIC ULTRASOUND W/ FNA N/A 9/13/2023    Procedure: Esophagogastroduodenoscopy with biopsy x 1 area and endoscopic ultrasound with placement of cyst gastrostomy;  Surgeon: Red Denson MD;  Location: [facility] ENDOSCOPY;  Service: Gastroenterology;  Laterality: N/A;  post: pancreatic psuedocyst    WISDOM TOOTH EXTRACTION         Hospital Course: Piper Cain   is a 43-year-old female with left upper quadrant pain for several days with a past medical history of alcohol induced pancreatitis and spleen congestion. She had a splenic artery embolization 1/22/24 and same day right mesenteric femoral embolization and right popliteal thrombectomy. At the time they decided to try this instead of splenectomy. Patient says since she is having problems today and has had for several days is likely will take her spleen out this time. Patient says that the stabbing pain now that is escalating and is just like the pain she had when she had the spleen surgery in January and less like her pancreatitis which because she is not vomiting like she does with that. And the pain is different. No known fever and the pain does not more into her back but every time she tries to take breath it makes it worse. She gets hot with morphine so she prefers Dilaudid as a pain control.  The patient was evaluated in the emergency room on [facility] and transferred to [facility] for direct admission for further evaluation and treatment.  CT scanning showed some fluid collections and gastric thickening and possible changes of pancreatitis.  The patient has been started on some antibiotics in the ER and given some pain and nausea medicine.  The patient states that she has not been able to eat for 4 days.  She did start drinking again and stopped about 5 days ago.  The patient was admitted to the hospital and seen by GI medicine and general surgery.  She was  treated with IV antibiotics and IV fluid hydration with pain and nausea medicine.  The patient had EGD done this admission.  She is feeling better after being in the hospital for few days looked to be well enough to go home.  She was counseled about alcohol cessation and diet for her acute on chronic pancreatitis.  Will also give her a few more days of oral antibiotics.  She will follow-up with her primary care in 1 week for ongoing care and also follow-up with her primary gastroenterologist.    Consults:     Consults       Date and Time Order Name Status Description    4/1/2024  5:45 PM Inpatient General Surgery Consult Completed     4/1/2024  5:45 PM Inpatient Gastroenterology Consult Completed           Results from last 7 days   Lab Units 04/07/24  0621   WBC 10*3/mm3 6.32   HEMOGLOBIN g/dL 10.2*   HEMATOCRIT % 31.5*   PLATELETS 10*3/mm3 295     Results from last 7 days   Lab Units 04/07/24  0621   SODIUM mmol/L 145   POTASSIUM mmol/L 4.2   CHLORIDE mmol/L 104   CO2 mmol/L 29.0   BUN mg/dL 5*   CREATININE mg/dL 0.55*   GLUCOSE mg/dL 79   CALCIUM mg/dL 8.6     Significant Diagnostic Studies:   WBC   Date Value Ref Range Status   04/07/2024 6.32 3.40 - 10.80 10*3/mm3 Final     Hemoglobin   Date Value Ref Range Status   04/07/2024 10.2 (L) 12.0 - 15.9 g/dL Final     Hematocrit   Date Value Ref Range Status   04/07/2024 31.5 (L) 34.0 - 46.6 % Final     Platelets   Date Value Ref Range Status   04/07/2024 295 140 - 450 10*3/mm3 Final     Sodium   Date Value Ref Range Status   04/07/2024 145 136 - 145 mmol/L Final     Potassium   Date Value Ref Range Status   04/07/2024 4.2 3.5 - 5.2 mmol/L Final     Chloride   Date Value Ref Range Status   04/07/2024 104 98 - 107 mmol/L Final     CO2   Date Value Ref Range Status   04/07/2024 29.0 22.0 - 29.0 mmol/L Final     BUN   Date Value Ref Range Status   04/07/2024 5 (L) 6 - 20 mg/dL Final     Creatinine   Date Value Ref Range Status   04/07/2024 0.55 (L) 0.57 - 1.00 mg/dL  "Final     Glucose   Date Value Ref Range Status   04/07/2024 79 65 - 99 mg/dL Final     Calcium   Date Value Ref Range Status   04/07/2024 8.6 8.6 - 10.5 mg/dL Final     No results found for: \"AST\", \"ALT\", \"ALKPHOS\"  No results found for: \"APTT\", \"INR\"  No results found for: \"COLORU\", \"CLARITYU\", \"SPECGRAV\", \"PHUR\", \"PROTEINUR\", \"GLUCOSEU\", \"KETONESU\", \"BLOODU\", \"NITRITE\", \"LEUKOCYTESUR\", \"BILIRUBINUR\", \"UROBILINOGEN\", \"RBCUA\", \"WBCUA\", \"BACTERIA\", \"UACOMMENT\"  No results found for: \"TROPONINT\", \"TROPONINI\", \"BNP\"  No components found for: \"HGBA1C;2\"  No components found for: \"TSH;2\"  Imaging Results (All)       Procedure Component Value Units Date/Time    CT Abdomen Pelvis With Contrast [[id number]] Collected: 04/01/24 1400     Updated: 04/01/24 1409    Narrative:      CT ABDOMEN PELVIS W CONTRAST-     Date of Exam: 4/1/2024 12:55 PM     Indication: Where states: Known history of splenic embolization.  Pancreatitis. Left upper quadrant pain. Elevated white blood cell count,  unspecified.     Comparison Exams: 1/21/2024, 1/4/2024, 9/5/2023, and 8/22/2020.     Technique: Multiple contiguous axial images were acquired through the  abdomen and pelvis following the intravenous administration of 85 mL of  Isovue-370. Reformatted coronal and sagittal sequences were also  reviewed. Radiation dose reduction techniques were utilized, including  automated exposure control and exposure modulation based on body size.     FINDINGS:  Mild multifocal subsegmental atelectasis and/or scarring in each lung  base.     The predominantly low-density fluid collection along the inferior margin  of the spleen, anterior to the left kidney, and along the distal tip of  the tail the pancreas now measures approximately 5.5 cm x 3.5 cm by my  measurements (axial series 2 image 41), previously 6.5 cm x 3 cm by my  measurements. A small 1 cm splenic artery aneurysm extends into the  anterior aspect of this collection. Decreased size of the " now  low-density lenticular fluid collection along the superior posterior  margin of the spleen, measuring up to 2.5 cm in thickness.     New multilobulated fluid collection along the posterior margin of the  left lobe of the liver and the anterior margin of the stomach, which is  difficult to measure due to its irregular shape but extends  approximately 10 cm in craniocaudal length.     Additional peripancreatic fluid collection along the anterior pancreatic  body, measuring 3 cm x 1 cm (axial series 2 image 48). Stable pancreatic  parenchymal calcifications consistent with sequela of chronic  pancreatitis. Mild peripancreatic fat stranding.     Severe low-density gastric wall thickening, greatest anteriorly. Mild  colonic stool. Colonic diverticula, without CT evidence of  diverticulitis no bowel obstruction or significant small or large bowel  wall thickening. The appendix is normal.     Mildly heterogeneous hepatic attenuation with subtle nodular liver  contours, suggesting underlying hepatocellular disease/cirrhosis.  Diffuse gallbladder wall thickening could be related to hepatocellular  disease. Decreased conspicuity of the previously seen splenic  lacerations. The adrenal glands and kidneys are unremarkable. The  urinary bladder, uterus, and adnexa are unremarkable in CT appearance.     Trace free fluid in the pelvis. No free intraperitoneal air. Venous  varicosities in the upper abdomen. No definite pathologically enlarged  lymph nodes. Mild calcified atherosclerotic disease in the abdominal  aorta without aneurysm. Multiple surgical clips in the right inguinal  region.     No acute osseous abnormality or concerning osseous lesion.       Impression:         1. Severe diffuse predominantly low-density gastric wall thickening,  greatest anteriorly, likely severe gastritis.  2. New fluid collection along the posterior wall of the left lobe of the  liver anterior to the stomach, probable pseudocyst, with  additional  pseudocysts in the left upper quadrant as detailed above.  3. Mild peripancreatic fat stranding with pancreatic parenchymal  calcifications, likely acute on chronic pancreatitis.  4. Decreased size of the now low-density fluid collection along the  superior and posterior margin of the spleen, probable resolving  hematoma.  5. Mildly heterogeneous hepatic attenuation with subtle nodular liver  contours, suggesting underlying hepatocellular disease/cirrhosis, with  upper abdominal venous varicosities that could reflect portal venous  hypertension.     This report was finalized on 4/1/2024 2:06 PM by Maverick Solis MD on  Workstation: [id number]             Lab Results (last 7 days)       Procedure Component Value Units Date/Time    Basic Metabolic Panel [[id number]]  (Abnormal) Collected: 04/07/24 0621    Specimen: Blood Updated: 04/07/24 0716     Glucose 79 mg/dL      BUN 5 mg/dL      Creatinine 0.55 mg/dL      Sodium 145 mmol/L      Potassium 4.2 mmol/L      Chloride 104 mmol/L      CO2 29.0 mmol/L      Calcium 8.6 mg/dL      BUN/Creatinine Ratio 9.1     Anion Gap 12.0 mmol/L      eGFR 118.3 mL/min/1.73     Narrative:      GFR Normal >60  Chronic Kidney Disease <60  Kidney Failure <15      CBC & Differential [[id number]]  (Abnormal) Collected: 04/07/24 0621    Specimen: Blood Updated: 04/07/24 0655    Narrative:      The following orders were created for panel order CBC & Differential.  Procedure                               Abnormality         Status                     ---------                               -----------         ------                     CBC Auto Differential[[id number]]        Abnormal            Final result                 Please view results for these tests on the individual orders.    CBC Auto Differential [[id number]]  (Abnormal) Collected: 04/07/24 0621    Specimen: Blood Updated: 04/07/24 0655     WBC 6.32 10*3/mm3      RBC 3.23 10*6/mm3      Hemoglobin 10.2 g/dL      Hematocrit  31.5 %      MCV 97.5 fL      MCH 31.6 pg      MCHC 32.4 g/dL      RDW 14.3 %      RDW-SD 50.5 fl      MPV 10.0 fL      Platelets 295 10*3/mm3      Neutrophil % 51.2 %      Lymphocyte % 28.8 %      Monocyte % 13.8 %      Eosinophil % 5.4 %      Basophil % 0.5 %      Immature Grans % 0.3 %      Neutrophils, Absolute 3.24 10*3/mm3      Lymphocytes, Absolute 1.82 10*3/mm3      Monocytes, Absolute 0.87 10*3/mm3      Eosinophils, Absolute 0.34 10*3/mm3      Basophils, Absolute 0.03 10*3/mm3      Immature Grans, Absolute 0.02 10*3/mm3      nRBC 0.0 /100 WBC     Blood Culture - Blood, Arm, Right [[id number]]  (Normal) Collected: 04/01/24 1258    Specimen: Blood from Arm, Right Updated: 04/06/24 1316     Blood Culture No growth at 5 days    Folate [[id number]]  (Normal) Collected: 04/06/24 0456    Specimen: Blood Updated: 04/06/24 0632     Folate 14.40 ng/mL     Narrative:      Results may be falsely increased if patient taking Biotin.      Vitamin B12 [[id number]]  (Abnormal) Collected: 04/06/24 0456    Specimen: Blood Updated: 04/06/24 0632     Vitamin B-12 >2,000 pg/mL     Narrative:      Results may be falsely increased if patient taking Biotin.      Basic Metabolic Panel [[id number]]  (Abnormal) Collected: 04/06/24 0456    Specimen: Blood Updated: 04/06/24 0617     Glucose 104 mg/dL      BUN 4 mg/dL      Creatinine 0.52 mg/dL      Sodium 142 mmol/L      Potassium 3.3 mmol/L      Chloride 103 mmol/L      CO2 29.0 mmol/L      Calcium 8.6 mg/dL      BUN/Creatinine Ratio 7.7     Anion Gap 10.0 mmol/L      eGFR 119.9 mL/min/1.73     Narrative:      GFR Normal >60  Chronic Kidney Disease <60  Kidney Failure <15      CBC & Differential [[id number]]  (Abnormal) Collected: 04/06/24 0456    Specimen: Blood Updated: 04/06/24 0558    Narrative:      The following orders were created for panel order CBC & Differential.  Procedure                               Abnormality         Status                     ---------                                -----------         ------                     CBC Auto Differential[[id number]]        Abnormal            Final result                 Please view results for these tests on the individual orders.    CBC Auto Differential [[id number]]  (Abnormal) Collected: 04/06/24 0456    Specimen: Blood Updated: 04/06/24 0558     WBC 5.63 10*3/mm3      RBC 2.99 10*6/mm3      Hemoglobin 9.5 g/dL      Hematocrit 28.6 %      MCV 95.7 fL      MCH 31.8 pg      MCHC 33.2 g/dL      RDW 14.2 %      RDW-SD 49.2 fl      MPV 10.4 fL      Platelets 226 10*3/mm3      Neutrophil % 54.9 %      Lymphocyte % 27.2 %      Monocyte % 12.1 %      Eosinophil % 5.0 %      Basophil % 0.4 %      Immature Grans % 0.4 %      Neutrophils, Absolute 3.10 10*3/mm3      Lymphocytes, Absolute 1.53 10*3/mm3      Monocytes, Absolute 0.68 10*3/mm3      Eosinophils, Absolute 0.28 10*3/mm3      Basophils, Absolute 0.02 10*3/mm3      Immature Grans, Absolute 0.02 10*3/mm3      nRBC 0.0 /100 WBC     Ferritin [[id number]]  (Abnormal) Collected: 04/05/24 0918    Specimen: Blood Updated: 04/05/24 1056     Ferritin 915.00 ng/mL     Narrative:      Results may be falsely decreased if patient taking Biotin.      Iron Profile [[id number]]  (Abnormal) Collected: 04/05/24 0918    Specimen: Blood Updated: 04/05/24 1050     Iron 17 mcg/dL      Iron Saturation (TSAT) 11 %      Transferrin 106 mg/dL      TIBC 158 mcg/dL     Basic Metabolic Panel [[id number]]  (Abnormal) Collected: 04/05/24 0918    Specimen: Blood Updated: 04/05/24 0951     Glucose 109 mg/dL      BUN <2 mg/dL      Creatinine 0.45 mg/dL      Sodium 141 mmol/L      Potassium 3.3 mmol/L      Chloride 103 mmol/L      CO2 27.5 mmol/L      Calcium 8.1 mg/dL      BUN/Creatinine Ratio --     Comment: Unable to calculate Bun/Crea Ratio.        Anion Gap 10.5 mmol/L      eGFR 124.1 mL/min/1.73     Narrative:      GFR Normal >60  Chronic Kidney Disease <60  Kidney Failure <15      CBC & Differential [[id number]]  (Abnormal)  Collected: 04/05/24 0918    Specimen: Blood Updated: 04/05/24 0932    Narrative:      The following orders were created for panel order CBC & Differential.  Procedure                               Abnormality         Status                     ---------                               -----------         ------                     CBC Auto Differential[[id number]]        Abnormal            Final result                 Please view results for these tests on the individual orders.    CBC Auto Differential [[id number]]  (Abnormal) Collected: 04/05/24 0918    Specimen: Blood Updated: 04/05/24 0932     WBC 6.29 10*3/mm3      RBC 2.98 10*6/mm3      Hemoglobin 9.6 g/dL      Hematocrit 28.7 %      MCV 96.3 fL      MCH 32.2 pg      MCHC 33.4 g/dL      RDW 14.2 %      RDW-SD 49.6 fl      MPV 10.3 fL      Platelets 199 10*3/mm3      Neutrophil % 60.0 %      Lymphocyte % 22.1 %      Monocyte % 13.4 %      Eosinophil % 4.0 %      Basophil % 0.3 %      Immature Grans % 0.2 %      Neutrophils, Absolute 3.78 10*3/mm3      Lymphocytes, Absolute 1.39 10*3/mm3      Monocytes, Absolute 0.84 10*3/mm3      Eosinophils, Absolute 0.25 10*3/mm3      Basophils, Absolute 0.02 10*3/mm3      Immature Grans, Absolute 0.01 10*3/mm3      nRBC 0.0 /100 WBC     Urease For H Pylori - Tissue, Stomach [[id number]]  (Normal) Collected: 04/03/24 1127    Specimen: Tissue from Stomach Updated: 04/05/24 0626     Urease Negative    Tissue Pathology Exam [[id number]] Collected: 04/03/24 1126    Specimen: Tissue from Small Intestine, Duodenum; Tissue from Stomach; Tissue from GE Junction Updated: 04/04/24 1023     Case Report --     Surgical Pathology Report                         Case: [id number]                                  Authorizing Provider:  Petar Starr MD   Collected:           04/03/2024 11:26 AM          Ordering Location:     [facility]  Received:            04/03/2024 01:47 PM                                 ENDO SUITES                                                                   Pathologist:           Puneet Townsend MD                                                         Specimens:   1) - Small Intestine, Duodenum, DUODENAL BIOPSIES                                                   2) - Stomach, GASTRIC BIOPSIES                                                                      3) - GE Junction, GE JUNCTION BIOPSIES                                                      Final Diagnosis --     1.  Small bowel, duodenum, biopsy: Benign small bowel with-   -A. Normal intact villous architecture.   -B. No significant inflammation, no granuloma.   -C. No viral inclusions or other organisms on routinely stained sections.     2.  Stomach, biopsy: Benign gastric mucosa with-   -A. No atypia or metaplasia.   -B. No significant inflammation.   -C. No distinct Helicobacter-like organisms on routinely stained sections.     3.  Gastroesophageal junction, biopsy: Benign squamous and gastric mucosa with    -A.  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  Acute and chronic inflammation in the gastric mucosa       Gross Description --     1. Small Intestine, Duodenum.  The specimen is received in formalin labeled with the patient's name and further designated 'duodenal biopsy' are multiple small fragments of gray-tan tissue. The specimen is submitted for embedding as received.    2. Stomach.  The specimen is received in formalin labeled with the patient's name and further designated 'gastric biopsy' are multiple small fragments of gray-tan tissue. The specimen is submitted for embedding as received.    3. GE Junction.  The specimen is received in formalin labeled with the patient's name and further designated 'GE junction biopsy' are multiple small fragments of gray-tan tissue. The specimen is submitted for embedding as received.        Basic Metabolic Panel [[id number]]  (Abnormal) Collected:  04/04/24 0607    Specimen: Blood Updated: 04/04/24 0656     Glucose 92 mg/dL      BUN <2 mg/dL      Creatinine 0.37 mg/dL      Sodium 136 mmol/L      Potassium 3.1 mmol/L      Chloride 99 mmol/L      CO2 23.1 mmol/L      Calcium 8.0 mg/dL      BUN/Creatinine Ratio --     Comment: Unable to calculate Bun/Crea Ratio.        Anion Gap 13.9 mmol/L      eGFR 130.1 mL/min/1.73     Narrative:      GFR Normal >60  Chronic Kidney Disease <60  Kidney Failure <15      Lipase [[id number]]  (Abnormal) Collected: 04/04/24 0607    Specimen: Blood Updated: 04/04/24 0643     Lipase 139 U/L     CBC & Differential [[id number]]  (Abnormal) Collected: 04/04/24 0607    Specimen: Blood Updated: 04/04/24 0634    Narrative:      The following orders were created for panel order CBC & Differential.  Procedure                               Abnormality         Status                     ---------                               -----------         ------                     CBC Auto Differential[[id number]]        Abnormal            Final result                 Please view results for these tests on the individual orders.    CBC Auto Differential [[id number]]  (Abnormal) Collected: 04/04/24 0607    Specimen: Blood Updated: 04/04/24 0634     WBC 7.38 10*3/mm3      RBC 3.03 10*6/mm3      Hemoglobin 9.7 g/dL      Hematocrit 29.2 %      MCV 96.4 fL      MCH 32.0 pg      MCHC 33.2 g/dL      RDW 14.6 %      RDW-SD 51.5 fl      MPV 10.3 fL      Platelets 160 10*3/mm3      Neutrophil % 73.4 %      Lymphocyte % 14.4 %      Monocyte % 9.5 %      Eosinophil % 2.0 %      Basophil % 0.3 %      Immature Grans % 0.4 %      Neutrophils, Absolute 5.42 10*3/mm3      Lymphocytes, Absolute 1.06 10*3/mm3      Monocytes, Absolute 0.70 10*3/mm3      Eosinophils, Absolute 0.15 10*3/mm3      Basophils, Absolute 0.02 10*3/mm3      Immature Grans, Absolute 0.03 10*3/mm3      nRBC 0.0 /100 WBC     POC Pregnancy, Urine [[id number]]  (Normal) Collected: 04/03/24 1017     Specimen: Urine Updated: 04/03/24 1017     HCG, Urine, QL Negative     Lot Number [id number]     Internal Positive Control Positive     Internal Negative Control Negative     Expiration Date 05/02/2025    Basic Metabolic Panel [[id number]]  (Abnormal) Collected: 04/03/24 0556    Specimen: Blood Updated: 04/03/24 0654     Glucose 68 mg/dL      BUN 4 mg/dL      Creatinine 0.49 mg/dL      Sodium 135 mmol/L      Potassium 3.3 mmol/L      Comment: Slight hemolysis detected by analyzer. Result may be falsely elevated.        Chloride 100 mmol/L      CO2 20.8 mmol/L      Calcium 8.4 mg/dL      BUN/Creatinine Ratio 8.2     Anion Gap 14.2 mmol/L      eGFR 121.6 mL/min/1.73     Narrative:      GFR Normal >60  Chronic Kidney Disease <60  Kidney Failure <15      CBC & Differential [[id number]]  (Abnormal) Collected: 04/03/24 0556    Specimen: Blood Updated: 04/03/24 0636    Narrative:      The following orders were created for panel order CBC & Differential.  Procedure                               Abnormality         Status                     ---------                               -----------         ------                     CBC Auto Differential[[id number]]        Abnormal            Final result                 Please view results for these tests on the individual orders.    CBC Auto Differential [[id number]]  (Abnormal) Collected: 04/03/24 0556    Specimen: Blood Updated: 04/03/24 0636     WBC 9.28 10*3/mm3      RBC 3.10 10*6/mm3      Hemoglobin 9.5 g/dL      Hematocrit 29.7 %      MCV 95.8 fL      MCH 30.6 pg      MCHC 32.0 g/dL      RDW 14.4 %      RDW-SD 50.1 fl      MPV 10.2 fL      Platelets 161 10*3/mm3      Neutrophil % 72.7 %      Lymphocyte % 15.1 %      Monocyte % 8.4 %      Eosinophil % 3.4 %      Basophil % 0.1 %      Immature Grans % 0.3 %      Neutrophils, Absolute 6.74 10*3/mm3      Lymphocytes, Absolute 1.40 10*3/mm3      Monocytes, Absolute 0.78 10*3/mm3      Eosinophils, Absolute 0.32 10*3/mm3      Basophils,  Absolute 0.01 10*3/mm3      Immature Grans, Absolute 0.03 10*3/mm3      nRBC 0.0 /100 WBC     Comprehensive Metabolic Panel [[id number]]  (Abnormal) Collected: 04/02/24 0653    Specimen: Blood Updated: 04/02/24 0749     Glucose 78 mg/dL      BUN 7 mg/dL      Creatinine 0.63 mg/dL      Sodium 135 mmol/L      Potassium 3.5 mmol/L      Chloride 100 mmol/L      CO2 24.0 mmol/L      Calcium 8.3 mg/dL      Total Protein 6.3 g/dL      Albumin 3.4 g/dL      ALT (SGPT) 10 U/L      AST (SGOT) 6 U/L      Alkaline Phosphatase 110 U/L      Total Bilirubin 0.4 mg/dL      Globulin 2.9 gm/dL      A/G Ratio 1.2 g/dL      BUN/Creatinine Ratio 11.1     Anion Gap 11.0 mmol/L      eGFR 114.5 mL/min/1.73     Narrative:      GFR Normal >60  Chronic Kidney Disease <60  Kidney Failure <15      Lipase [[id number]]  (Abnormal) Collected: 04/02/24 0526    Specimen: Blood Updated: 04/02/24 0630     Lipase 125 U/L     CBC Auto Differential [[id number]]  (Abnormal) Collected: 04/02/24 0526    Specimen: Blood Updated: 04/02/24 0627     WBC 14.22 10*3/mm3      RBC 3.35 10*6/mm3      Hemoglobin 10.7 g/dL      Hematocrit 32.0 %      MCV 95.5 fL      MCH 31.9 pg      MCHC 33.4 g/dL      RDW 14.7 %      RDW-SD 51.6 fl      MPV 10.8 fL      Platelets 170 10*3/mm3      Neutrophil % 79.5 %      Lymphocyte % 11.8 %      Monocyte % 6.3 %      Eosinophil % 1.7 %      Basophil % 0.2 %      Immature Grans % 0.5 %      Neutrophils, Absolute 11.30 10*3/mm3      Lymphocytes, Absolute 1.68 10*3/mm3      Monocytes, Absolute 0.90 10*3/mm3      Eosinophils, Absolute 0.24 10*3/mm3      Basophils, Absolute 0.03 10*3/mm3      Immature Grans, Absolute 0.07 10*3/mm3      nRBC 0.0 /100 WBC     Procalcitonin [[id number]]  (Abnormal) Collected: 04/01/24 1200    Specimen: Blood Updated: 04/01/24 2126     Procalcitonin 0.42 ng/mL     Narrative:      As a Marker for Sepsis (Non-Neonates):    1. <0.5 ng/mL represents a low risk of severe sepsis and/or septic shock.  2. >2 ng/mL  "represents a high risk of severe sepsis and/or septic shock.    As a Marker for Lower Respiratory Tract Infections that require antibiotic therapy:    PCT on Admission    Antibiotic Therapy       6-12 Hrs later    >0.5                Strongly Recommended  >0.25 - <0.5        Recommended   0.1 - 0.25          Discouraged              Remeasure/reassess PCT  <0.1                Strongly Discouraged     Remeasure/reassess PCT    As 28 day mortality risk marker: \"Change in Procalcitonin Result\" (>80% or <=80%) if Day 0 (or Day 1) and Day 4 values are available. Refer to http://www.[facility]-pct-calculator.com    Change in PCT <=80%  A decrease of PCT levels below or equal to 80% defines a positive change in PCT test result representing a higher risk for 28-day all-cause mortality of patients diagnosed with severe sepsis for septic shock.    Change in PCT >80%  A decrease of PCT levels of more than 80% defines a negative change in PCT result representing a lower risk for 28-day all-cause mortality of patients diagnosed with severe sepsis or septic shock.       Urinalysis, Microscopic Only - Urine, Clean Catch [[id number]]  (Abnormal) Collected: 04/01/24 1209    Specimen: Urine, Clean Catch Updated: 04/01/24 1411     RBC, UA None Seen /HPF      WBC, UA 0-2 /HPF      Bacteria, UA 1+ /HPF      Squamous Epithelial Cells, UA 13-20 /HPF      Hyaline Casts, UA None Seen /LPF      Granular Casts, UA 0-2 /LPF      Methodology Manual Light Microscopy    [place] Urine Culture Tube - Urine, Clean Catch [[id number]] Collected: 04/01/24 1209    Specimen: Urine, Clean Catch Updated: 04/01/24 1347     Extra Tube Hold for add-ons.     Comment: Auto resulted.       Lactic Acid, Plasma [[id number]]  (Normal) Collected: 04/01/24 1243    Specimen: Blood Updated: 04/01/24 1302     Lactate 1.0 mmol/L     Comprehensive Metabolic Panel [[id number]]  (Abnormal) Collected: 04/01/24 1200    Specimen: Blood Updated: 04/01/24 1226     Glucose 94 mg/dL      " BUN 7 mg/dL      Creatinine 0.68 mg/dL      Sodium 128 mmol/L      Potassium 3.8 mmol/L      Chloride 87 mmol/L      CO2 25.8 mmol/L      Calcium 10.4 mg/dL      Total Protein 7.9 g/dL      Albumin 4.2 g/dL      ALT (SGPT) 12 U/L      AST (SGOT) 9 U/L      Alkaline Phosphatase 152 U/L      Total Bilirubin 0.7 mg/dL      Globulin 3.7 gm/dL      A/G Ratio 1.1 g/dL      BUN/Creatinine Ratio 10.3     Anion Gap 15.2 mmol/L      eGFR 112.4 mL/min/1.73     Narrative:      GFR Normal >60  Chronic Kidney Disease <60  Kidney Failure <15      Lipase [[id number]]  (Abnormal) Collected: 04/01/24 1200    Specimen: Blood Updated: 04/01/24 1226     Lipase 123 U/L     Urinalysis With Microscopic If Indicated (No Culture) - Urine, Clean Catch [[id number]]  (Abnormal) Collected: 04/01/24 1209    Specimen: Urine, Clean Catch Updated: 04/01/24 1211     Color, UA Yellow     Appearance, UA Clear     pH, UA 6.0     Specific Gravity, UA 1.020     Glucose, UA Negative     Ketones, UA >=160 mg/dL (4+)     Bilirubin, UA Moderate (2+)     Blood, UA Trace     Protein, UA 30 mg/dL (1+)     Leuk Esterase, UA Negative     Nitrite, UA Negative     Urobilinogen, UA 0.2 E.U./dL    CBC & Differential [[id number]]  (Abnormal) Collected: 04/01/24 1200    Specimen: Blood Updated: 04/01/24 1207    Narrative:      The following orders were created for panel order CBC & Differential.  Procedure                               Abnormality         Status                     ---------                               -----------         ------                     CBC Auto Differential[[id number]]        Abnormal            Final result                 Please view results for these tests on the individual orders.    CBC Auto Differential [[id number]]  (Abnormal) Collected: 04/01/24 1200    Specimen: Blood Updated: 04/01/24 1207     WBC 22.29 10*3/mm3      RBC 4.16 10*6/mm3      Hemoglobin 13.0 g/dL      Hematocrit 39.1 %      MCV 94.0 fL      MCH 31.3 pg      MCHC 33.2  "g/dL      RDW 14.7 %      RDW-SD 51.8 fl      MPV 10.0 fL      Platelets 221 10*3/mm3      Neutrophil % 86.2 %      Lymphocyte % 7.6 %      Monocyte % 5.6 %      Eosinophil % 0.3 %      Basophil % 0.2 %      Immature Grans % 0.1 %      Neutrophils, Absolute 19.21 10*3/mm3      Lymphocytes, Absolute 1.70 10*3/mm3      Monocytes, Absolute 1.25 10*3/mm3      Eosinophils, Absolute 0.06 10*3/mm3      Basophils, Absolute 0.04 10*3/mm3      Immature Grans, Absolute 0.03 10*3/mm3           /79 (BP Location: Right arm, Patient Position: Lying)   Pulse 67   Temp 98.1 °F (36.7 °C) (Oral)   Resp 16   Ht 177.8 cm (70\")   Wt 58.1 kg (128 lb)   LMP 01/17/2018 (Exact Date)   SpO2 99%   BMI 18.37 kg/m²     Discharge Exam:  General Appearance:    Alert, cooperative, no distress                          Head:    Normocephalic, without obvious abnormality, atraumatic                          Eyes:                            Throat:   Lips, tongue, gums normal                          Neck:   Supple, symmetrical, trachea midline, no JVD                        Lungs:     Clear to auscultation bilaterally, respirations unlabored                Chest Wall:    No tenderness or deformity                        Heart:    Regular rate and rhythm, S1 and S2 normal, no murmur,no  Rub  or gallop                  Abdomen:     Soft, non-tender, bowel sounds active, no masses, no organomegaly                  Extremities:   Extremities normal, atraumatic, no cyanosis or edema                             Skin:   Skin is warm and dry,  no rashes or palpable lesions                  Neurologic:   no focal deficits noted     Disposition:  Home    Activity as tolerated    Diet as tolerated  Diet Order   Procedures    Diet: Gastrointestinal; Fiber-Restricted, Fat-Restricted, Low Irritant; Texture: Soft to Chew (NDD 3); Soft to Chew: Whole Meat; Fluid Consistency: Thin (IDDSI 0)       Patient Instructions:      Discharge Medications    "     New Medications        Instructions Start Date   amoxicillin-clavulanate 875-125 MG per tablet  Commonly known as: AUGMENTIN   1 tablet, Oral, 2 Times Daily             Changes to Medications        Instructions Start Date   oxyCODONE-acetaminophen  MG per tablet  Commonly known as: PERCOCET  What changed: when to take this   1 tablet, Oral, Every 4 Hours PRN             Continue These Medications        Instructions Start Date   amitriptyline 50 MG tablet  Commonly known as: ELAVIL   50 mg, Oral, Nightly      Feosol 200 (65 Fe) MG tablet tablet  Generic drug: Ferrous Sulfate Dried   200 mg, Oral, 2 Times Daily      folic acid 1 MG tablet  Commonly known as: FOLVITE   1,000 mcg, Oral, Daily      magnesium oxide 400 MG tablet  Commonly known as: MAG-OX   400 mg, Oral, Daily      ondansetron 4 MG tablet  Commonly known as: ZOFRAN   4 mg, Oral, Every 8 Hours PRN      pantoprazole 40 MG EC tablet  Commonly known as: PROTONIX   40 mg, Oral, Daily      promethazine 25 MG suppository  Commonly known as: PHENERGAN   25 mg, Rectal, As Needed      thiamine 100 MG tablet  Commonly known as: VITAMIN B1   100 mg, Oral, Daily             Future Appointments   Date Time Provider Department Center   5/22/2024  9:45 AM Carine Pena Jr., MD [facility]      Follow-up Information       Sharmaine Gatica APRN Follow up in 1 week(s).    Specialty: Nurse Practitioner  Why: Also follow-up with primary GI doctor and keep appointment with Carine Pena  Contact information:  [id number] [facility] [id number] [phone]                           Discharge Order (From admission, onward)       Start     Ordered    04/07/24 1134  Discharge patient  Once        Expected Discharge Date: 04/07/24   Discharge Disposition: Home or Self Care   Physician of Record for Attribution - Please select from Treatment Team: RICARDO DIEGO [[id number]]   Review needed by CMO to determine Physician of Record: No      Question  Answer Comment   Physician of Record for Attribution - Please select from Treatment Team FLETCHER GALLEGOS    Review needed by CMO to determine Physician of Record No        04/07/24 1135    04/06/24 1200  Discharge patient  Once,   Status:  Canceled        Expected Discharge Date: 04/06/24   Discharge Disposition: Home or Self Care   Physician of Record for Attribution - Please select from Treatment Team: FLETCHER GALLEGOS [[id number]]   Review needed by CMO to determine Physician of Record: No      Question Answer Comment   Physician of Record for Attribution - Please select from Treatment Team FLETCHER GALLEGOS    Review needed by CMO to determine Physician of Record No        04/06/24 1159                    Total time spent discharging patient including evaluation,post hospitalization follow up,  medication and post hospitalization instructions and education total time exceeds 30 minutes.    Signed:  Fletcher Gallegos MD  4/7/2024  11:36 EDT     Electronically signed by Fletcher Gallegos MD at 04/07/24 1418       Discharge Order (From admission, onward)       Start     Ordered    04/07/24 1134  Discharge patient  Once        Expected Discharge Date: 04/07/24   Discharge Disposition: Home or Self Care   Physician of Record for Attribution - Please select from Treatment Team: FLETCHER GALLEGOS [[id number]]   Review needed by CMO to determine Physician of Record: No      Question Answer Comment   Physician of Record for Attribution - Please select from Treatment Team FLETCHER GALLEGOS    Review needed by CMO to determine Physician of Record No        04/07/24 1135    04/06/24 1200  Discharge patient  Once,   Status:  Canceled        Expected Discharge Date: 04/06/24   Discharge Disposition: Home or Self Care   Physician of Record for Attribution - Please select from Treatment Team: FLETCHER GALLEGOS [[id number]]   Review needed by CMO to determine Physician of Record: No      Question Answer Comment   Physician of Record for Attribution - Please select from  Treatment Team RICARDO DIEGO    Review needed by CMO to determine Physician of Record No        04/06/24 6737

## 2024-04-11 ENCOUNTER — READMISSION MANAGEMENT (OUTPATIENT)
Dept: CALL CENTER | Facility: HOSPITAL | Age: 44
End: 2024-04-11
Payer: MEDICAID

## 2024-04-11 NOTE — OUTREACH NOTE
Medical Week 1 Survey      Flowsheet Row Responses   [facility] patient discharged from? [place]   Does the patient have one of the following disease processes/diagnoses(primary or secondary)? Other   Week 1 attempt successful? No   Unsuccessful attempts Attempt 1  [All numbers listed were attempted-no answer]            Vanessa H - Registered Nurse

## 2024-04-16 ENCOUNTER — READMISSION MANAGEMENT (OUTPATIENT)
Dept: CALL CENTER | Facility: HOSPITAL | Age: 44
End: 2024-04-16
Payer: MEDICAID

## 2024-04-16 NOTE — OUTREACH NOTE
Medical Week 1 Survey      Flowsheet Row Responses   [facility] patient discharged from? [place]   Does the patient have one of the following disease processes/diagnoses(primary or secondary)? Other   Week 1 attempt successful? Yes   Call start time 1537   Call end time 1551   Discharge diagnosis **Abdominal pain   Meds reviewed with patient/caregiver? Yes   Is the patient having any side effects they believe may be caused by any medication additions or changes? No   Does the patient have all medications ordered at discharge? Yes   Is the patient taking all medications as directed (includes completed medication regime)? Yes   Does the patient have a primary care provider?  Yes   Does the patient have an appointment with their PCP within 7 days of discharge? No   What is preventing the patient from scheduling follow up appointments within 7 days of discharge? Haven't had time   Has the patient kept scheduled appointments due by today? N/A   Comments Had appt with pain management.  Has appt with GI and Dr. Pena next month.   Psychosocial issues? No   Did the patient receive a copy of their discharge instructions? Yes   Nursing interventions Reviewed instructions with patient   What is the patient's perception of their health status since discharge? Improving   Is the patient/caregiver able to teach back signs and symptoms related to disease process for when to call PCP? Yes   Is the patient/caregiver able to teach back signs and symptoms related to disease process for when to call 911? Yes   Is the patient/caregiver able to teach back the hierarchy of who to call/visit for symptoms/problems? PCP, Specialist, Home health nurse, Urgent Care, ED, 911 Yes   Additional teach back comments Still in a lot of pain.  She feels sluggish and weak.  Has been to pain management.  She has been eating a mostly clear liquid diet with applesauce.  Feels like she can't get back to her normal.  She states she is scheduled for  a scope but thinks she needs to cancel due to having one done during hospitalization. Advised to contact GI regarding this.   Week 1 call completed? Yes   Wrap up additional comments If pain is not controlled, pt will go to ED.   Call end time 0015            Lupe ARTHUR - Licensed Nurse

## 2024-04-25 ENCOUNTER — HOSPITAL ENCOUNTER (INPATIENT)
Facility: HOSPITAL | Age: 44
LOS: 6 days | Discharge: HOME | DRG: 815 | End: 2024-05-03
Attending: EMERGENCY MEDICINE | Admitting: INTERNAL MEDICINE
Payer: MEDICAID

## 2024-04-25 ENCOUNTER — APPOINTMENT (OUTPATIENT)
Dept: CT IMAGING | Facility: HOSPITAL | Age: 44
DRG: 815 | End: 2024-04-25
Payer: MEDICAID

## 2024-04-25 DIAGNOSIS — K86.3 PANCREATIC PSEUDOCYST: ICD-10-CM

## 2024-04-25 DIAGNOSIS — R10.12 LEFT UPPER QUADRANT ABDOMINAL PAIN: Primary | ICD-10-CM

## 2024-04-25 DIAGNOSIS — K86.0 ALCOHOL-INDUCED CHRONIC PANCREATITIS: ICD-10-CM

## 2024-04-25 DIAGNOSIS — S36.029A SPLEEN HEMATOMA, INITIAL ENCOUNTER: ICD-10-CM

## 2024-04-25 DIAGNOSIS — F11.90 CHRONIC, CONTINUOUS USE OF OPIOIDS: ICD-10-CM

## 2024-04-25 PROBLEM — D73.5 HEMATOMA OF SPLEEN WITHOUT RUPTURE OF CAPSULE: Status: ACTIVE | Noted: 2024-04-25

## 2024-04-25 LAB
ALBUMIN SERPL-MCNC: 4.1 G/DL (ref 3.5–5.2)
ALBUMIN/GLOB SERPL: 1.1 G/DL
ALP SERPL-CCNC: 128 U/L (ref 39–117)
ALT SERPL W P-5'-P-CCNC: 9 U/L (ref 1–33)
ANION GAP SERPL CALCULATED.3IONS-SCNC: 13.1 MMOL/L (ref 5–15)
AST SERPL-CCNC: 9 U/L (ref 1–32)
BASOPHILS # BLD AUTO: 0.01 10*3/MM3 (ref 0–0.2)
BASOPHILS NFR BLD AUTO: 0.1 % (ref 0–1.5)
BILIRUB SERPL-MCNC: <0.2 MG/DL (ref 0–1.2)
BILIRUB UR QL STRIP: NEGATIVE
BUN SERPL-MCNC: 7 MG/DL (ref 6–20)
BUN/CREAT SERPL: 9 (ref 7–25)
CALCIUM SPEC-SCNC: 9.8 MG/DL (ref 8.6–10.5)
CHLORIDE SERPL-SCNC: 97 MMOL/L (ref 98–107)
CLARITY UR: ABNORMAL
CO2 SERPL-SCNC: 25.9 MMOL/L (ref 22–29)
COLOR UR: YELLOW
CREAT SERPL-MCNC: 0.78 MG/DL (ref 0.57–1)
DEPRECATED RDW RBC AUTO: 49 FL (ref 37–54)
EGFRCR SERPLBLD CKD-EPI 2021: 98 ML/MIN/1.73
EOSINOPHIL # BLD AUTO: 0.13 10*3/MM3 (ref 0–0.4)
EOSINOPHIL NFR BLD AUTO: 1.2 % (ref 0.3–6.2)
ERYTHROCYTE [DISTWIDTH] IN BLOOD BY AUTOMATED COUNT: 13.9 % (ref 12.3–15.4)
ETHANOL BLD-MCNC: <10 MG/DL (ref 0–10)
ETHANOL UR QL: <0.01 %
GLOBULIN UR ELPH-MCNC: 3.9 GM/DL
GLUCOSE SERPL-MCNC: 109 MG/DL (ref 65–99)
GLUCOSE UR STRIP-MCNC: NEGATIVE MG/DL
HCT VFR BLD AUTO: 37 % (ref 34–46.6)
HGB BLD-MCNC: 12.1 G/DL (ref 12–15.9)
HGB UR QL STRIP.AUTO: NEGATIVE
IMM GRANULOCYTES # BLD AUTO: 0.01 10*3/MM3 (ref 0–0.05)
IMM GRANULOCYTES NFR BLD AUTO: 0.1 % (ref 0–0.5)
KETONES UR QL STRIP: NEGATIVE
LEUKOCYTE ESTERASE UR QL STRIP.AUTO: NEGATIVE
LIPASE SERPL-CCNC: 139 U/L (ref 13–60)
LYMPHOCYTES # BLD AUTO: 2.6 10*3/MM3 (ref 0.7–3.1)
LYMPHOCYTES NFR BLD AUTO: 23.9 % (ref 19.6–45.3)
MAGNESIUM SERPL-MCNC: 1.8 MG/DL (ref 1.6–2.6)
MCH RBC QN AUTO: 30.9 PG (ref 26.6–33)
MCHC RBC AUTO-ENTMCNC: 32.7 G/DL (ref 31.5–35.7)
MCV RBC AUTO: 94.6 FL (ref 79–97)
MONOCYTES # BLD AUTO: 1.01 10*3/MM3 (ref 0.1–0.9)
MONOCYTES NFR BLD AUTO: 9.3 % (ref 5–12)
NEUTROPHILS NFR BLD AUTO: 65.4 % (ref 42.7–76)
NEUTROPHILS NFR BLD AUTO: 7.12 10*3/MM3 (ref 1.7–7)
NITRITE UR QL STRIP: NEGATIVE
PH UR STRIP.AUTO: 6 [PH] (ref 5–8)
PLATELET # BLD AUTO: 580 10*3/MM3 (ref 140–450)
PMV BLD AUTO: 8.6 FL (ref 6–12)
POTASSIUM SERPL-SCNC: 3.9 MMOL/L (ref 3.5–5.2)
PROT SERPL-MCNC: 8 G/DL (ref 6–8.5)
PROT UR QL STRIP: NEGATIVE
RBC # BLD AUTO: 3.91 10*6/MM3 (ref 3.77–5.28)
SODIUM SERPL-SCNC: 136 MMOL/L (ref 136–145)
SP GR UR STRIP: <=1.005 (ref 1–1.03)
UROBILINOGEN UR QL STRIP: ABNORMAL
WBC NRBC COR # BLD AUTO: 10.88 10*3/MM3 (ref 3.4–10.8)

## 2024-04-25 PROCEDURE — 36415 COLL VENOUS BLD VENIPUNCTURE: CPT

## 2024-04-25 PROCEDURE — 25010000002 ONDANSETRON PER 1 MG: Performed by: EMERGENCY MEDICINE

## 2024-04-25 PROCEDURE — 83690 ASSAY OF LIPASE: CPT | Performed by: EMERGENCY MEDICINE

## 2024-04-25 PROCEDURE — 85025 COMPLETE CBC W/AUTO DIFF WBC: CPT | Performed by: EMERGENCY MEDICINE

## 2024-04-25 PROCEDURE — 25510000001 IOPAMIDOL PER 1 ML: Performed by: EMERGENCY MEDICINE

## 2024-04-25 PROCEDURE — 25010000002 DROPERIDOL PER 5 MG: Performed by: EMERGENCY MEDICINE

## 2024-04-25 PROCEDURE — 99285 EMERGENCY DEPT VISIT HI MDM: CPT | Performed by: EMERGENCY MEDICINE

## 2024-04-25 PROCEDURE — G0378 HOSPITAL OBSERVATION PER HR: HCPCS

## 2024-04-25 PROCEDURE — 99285 EMERGENCY DEPT VISIT HI MDM: CPT

## 2024-04-25 PROCEDURE — 25010000002 HYDROMORPHONE 1 MG/ML SOLUTION: Performed by: EMERGENCY MEDICINE

## 2024-04-25 PROCEDURE — 25810000003 SODIUM CHLORIDE 0.9 % SOLUTION: Performed by: EMERGENCY MEDICINE

## 2024-04-25 PROCEDURE — 80053 COMPREHEN METABOLIC PANEL: CPT | Performed by: EMERGENCY MEDICINE

## 2024-04-25 PROCEDURE — 25010000002 HYDROMORPHONE PER 4 MG: Performed by: EMERGENCY MEDICINE

## 2024-04-25 PROCEDURE — 74177 CT ABD & PELVIS W/CONTRAST: CPT

## 2024-04-25 PROCEDURE — 81003 URINALYSIS AUTO W/O SCOPE: CPT | Performed by: EMERGENCY MEDICINE

## 2024-04-25 PROCEDURE — 83735 ASSAY OF MAGNESIUM: CPT | Performed by: EMERGENCY MEDICINE

## 2024-04-25 PROCEDURE — 82077 ASSAY SPEC XCP UR&BREATH IA: CPT | Performed by: EMERGENCY MEDICINE

## 2024-04-25 RX ORDER — DROPERIDOL 2.5 MG/ML
2.5 INJECTION, SOLUTION INTRAMUSCULAR; INTRAVENOUS ONCE
Status: COMPLETED | OUTPATIENT
Start: 2024-04-25 | End: 2024-04-25

## 2024-04-25 RX ORDER — BISACODYL 10 MG
10 SUPPOSITORY, RECTAL RECTAL DAILY PRN
Status: DISCONTINUED | OUTPATIENT
Start: 2024-04-25 | End: 2024-04-26

## 2024-04-25 RX ORDER — ONDANSETRON 4 MG/1
4 TABLET, ORALLY DISINTEGRATING ORAL EVERY 6 HOURS PRN
Status: DISCONTINUED | OUTPATIENT
Start: 2024-04-25 | End: 2024-05-03 | Stop reason: HOSPADM

## 2024-04-25 RX ORDER — CHOLECALCIFEROL (VITAMIN D3) 125 MCG
5 CAPSULE ORAL NIGHTLY PRN
Status: DISCONTINUED | OUTPATIENT
Start: 2024-04-25 | End: 2024-05-03 | Stop reason: HOSPADM

## 2024-04-25 RX ORDER — NITROGLYCERIN 0.4 MG/1
0.4 TABLET SUBLINGUAL
Status: DISCONTINUED | OUTPATIENT
Start: 2024-04-25 | End: 2024-04-26

## 2024-04-25 RX ORDER — ONDANSETRON 2 MG/ML
4 INJECTION INTRAMUSCULAR; INTRAVENOUS EVERY 6 HOURS PRN
Status: DISCONTINUED | OUTPATIENT
Start: 2024-04-25 | End: 2024-05-03 | Stop reason: HOSPADM

## 2024-04-25 RX ORDER — HYDROMORPHONE HYDROCHLORIDE 1 MG/ML
0.5 INJECTION, SOLUTION INTRAMUSCULAR; INTRAVENOUS; SUBCUTANEOUS ONCE
Status: COMPLETED | OUTPATIENT
Start: 2024-04-25 | End: 2024-04-25

## 2024-04-25 RX ORDER — POLYETHYLENE GLYCOL 3350 17 G/17G
17 POWDER, FOR SOLUTION ORAL DAILY PRN
Status: DISCONTINUED | OUTPATIENT
Start: 2024-04-25 | End: 2024-04-26

## 2024-04-25 RX ORDER — PANTOPRAZOLE SODIUM 40 MG/10ML
40 INJECTION, POWDER, LYOPHILIZED, FOR SOLUTION INTRAVENOUS ONCE
Status: COMPLETED | OUTPATIENT
Start: 2024-04-25 | End: 2024-04-25

## 2024-04-25 RX ORDER — ALUMINA, MAGNESIA, AND SIMETHICONE 2400; 2400; 240 MG/30ML; MG/30ML; MG/30ML
15 SUSPENSION ORAL EVERY 6 HOURS PRN
Status: DISCONTINUED | OUTPATIENT
Start: 2024-04-25 | End: 2024-05-03 | Stop reason: HOSPADM

## 2024-04-25 RX ORDER — SODIUM CHLORIDE 0.9 % (FLUSH) 0.9 %
10 SYRINGE (ML) INJECTION AS NEEDED
Status: DISCONTINUED | OUTPATIENT
Start: 2024-04-25 | End: 2024-05-03 | Stop reason: HOSPADM

## 2024-04-25 RX ORDER — AMOXICILLIN 250 MG
2 CAPSULE ORAL 2 TIMES DAILY PRN
Status: DISCONTINUED | OUTPATIENT
Start: 2024-04-25 | End: 2024-04-26

## 2024-04-25 RX ORDER — ONDANSETRON 2 MG/ML
4 INJECTION INTRAMUSCULAR; INTRAVENOUS ONCE
Status: COMPLETED | OUTPATIENT
Start: 2024-04-25 | End: 2024-04-25

## 2024-04-25 RX ORDER — SODIUM CHLORIDE, SODIUM LACTATE, POTASSIUM CHLORIDE, CALCIUM CHLORIDE 600; 310; 30; 20 MG/100ML; MG/100ML; MG/100ML; MG/100ML
75 INJECTION, SOLUTION INTRAVENOUS CONTINUOUS
Status: DISCONTINUED | OUTPATIENT
Start: 2024-04-26 | End: 2024-04-28

## 2024-04-25 RX ORDER — MORPHINE SULFATE 2 MG/ML
2 INJECTION, SOLUTION INTRAMUSCULAR; INTRAVENOUS
Status: DISCONTINUED | OUTPATIENT
Start: 2024-04-25 | End: 2024-04-26

## 2024-04-25 RX ORDER — BISACODYL 5 MG/1
5 TABLET, DELAYED RELEASE ORAL DAILY PRN
Status: DISCONTINUED | OUTPATIENT
Start: 2024-04-25 | End: 2024-04-26

## 2024-04-25 RX ADMIN — HYDROMORPHONE HYDROCHLORIDE 0.5 MG: 1 INJECTION, SOLUTION INTRAMUSCULAR; INTRAVENOUS; SUBCUTANEOUS at 16:56

## 2024-04-25 RX ADMIN — HYDROMORPHONE HYDROCHLORIDE 1 MG: 1 INJECTION, SOLUTION INTRAMUSCULAR; INTRAVENOUS; SUBCUTANEOUS at 18:19

## 2024-04-25 RX ADMIN — IOPAMIDOL 100 ML: 755 INJECTION, SOLUTION INTRAVENOUS at 19:15

## 2024-04-25 RX ADMIN — SODIUM CHLORIDE 1000 ML: 9 INJECTION, SOLUTION INTRAVENOUS at 15:43

## 2024-04-25 RX ADMIN — PANTOPRAZOLE SODIUM 40 MG: 40 INJECTION, POWDER, FOR SOLUTION INTRAVENOUS at 16:00

## 2024-04-25 RX ADMIN — ONDANSETRON 4 MG: 2 INJECTION INTRAMUSCULAR; INTRAVENOUS at 21:04

## 2024-04-25 RX ADMIN — HYDROMORPHONE HYDROCHLORIDE 0.5 MG: 1 INJECTION, SOLUTION INTRAMUSCULAR; INTRAVENOUS; SUBCUTANEOUS at 16:00

## 2024-04-25 RX ADMIN — DROPERIDOL 2.5 MG: 2.5 INJECTION, SOLUTION INTRAMUSCULAR; INTRAVENOUS at 16:00

## 2024-04-25 RX ADMIN — HYDROMORPHONE HYDROCHLORIDE 1 MG: 1 INJECTION, SOLUTION INTRAMUSCULAR; INTRAVENOUS; SUBCUTANEOUS at 20:43

## 2024-04-25 NOTE — LETTER
INPATIENT REQUEST      [facility]  [address] [facility] [address]  [phone]        April 29, 2024      Patient: Piper ROLLE Payton  YOB: 1980  Date of Visit: 4/25/2024        INPATIENT REQUEST        Sulaiman Cardona LPN  P# [phone]  F# [phone]

## 2024-04-25 NOTE — FSED PROVIDER NOTE
Subjective   History of Present Illness  The patient is a 43-year-old female.  She has a history of chronic abdominal pain with acute on chronic pancreatitis.  She also has a history of hypertension, pancreatic pseudocyst, spontaneous splenic laceration treated with embolization in January 2024.  She most recently was admitted to [facility] from 4/1/2024 until 4/7/2024 with acute on chronic pancreatitis.  At that time she had had some alcohol ingestion 5 days previous to admission.  Patient did undergo EGD on 4/3/2024.  She was found to have mild inflammation in the gastric body and gastric antrum..        She presents today with increasing pain in the left upper quadrant going into the left flank for the last few days.  She describes some nausea.  No fever no chills.      Review of Systems    Past Medical History:   Diagnosis Date    Anemia     Anxiety     Constipation     Cyst of spleen     Diarrhea     E. coli bacteremia     ETOH abuse     Frequent UTI     GERD (gastroesophageal reflux disease)     Hiatal hernia     Left flank pain 10/21/2022    Migraine     Miscarriage 2004    Pancreatitis     Pseudocyst of pancreas        Allergies   Allergen Reactions    Nickel Rash       Past Surgical History:   Procedure Laterality Date    EMBOLIZATION MESENTERIC ARTERY N/A 1/22/2024    Procedure: OR EMBOLIZATION MESENTERIC ARTERY RIGHT FEMORAL POPLITEAL THROMBECTOMY;  Surgeon: Carine Pena Jr., MD;  Location: [facility] HYBRID OR;  Service: Vascular;  Laterality: N/A;    ENDOSCOPY N/A 08/10/2022    Procedure: ESOPHAGOGASTRODUODENOSCOPY with bxs;  Surgeon: Salvador Price MD;  Location: [facility] ENDOSCOPY;  Service: Gastroenterology;  Laterality: N/A;  Pre: r/o esophageal  varacies, abd pain  Post: esophageal plaque    ENDOSCOPY N/A 10/9/2023    Procedure: ESOPHAGOGASTRODUODENOSCOPY WITH STENT REMOVAL;  Surgeon: Red Denson MD;  Location: [facility] ENDOSCOPY;  Service: Gastroenterology;  Laterality: N/A;     ENDOSCOPY N/A 4/3/2024    Procedure: ESOPHAGOGASTRODUODENOSCOPY (EGD) WITH BIOPSIES;  Surgeon: Petar Starr MD;  Location: [facility] ENDOSCOPY;  Service: Gastroenterology;  Laterality: N/A;  PRE- ABDOMINAL PAIN  POST - GASTRITIS, ESOPHAGITIS,BILE REFLUX    PANCREATIC CYST DRAINAGE      x 2    UPPER ENDOSCOPIC ULTRASOUND W/ FNA N/A 9/13/2023    Procedure: Esophagogastroduodenoscopy with biopsy x 1 area and endoscopic ultrasound with placement of cyst gastrostomy;  Surgeon: Red Denson MD;  Location: [facility] ENDOSCOPY;  Service: Gastroenterology;  Laterality: N/A;  post: pancreatic psuedocyst    WISDOM TOOTH EXTRACTION         Family History   Problem Relation Age of Onset    Alcohol abuse Mother     Arthritis Mother     Asthma Mother     Miscarriages / Stillbirths Mother     Cancer Father     Diabetes Father     Drug abuse Father     Early death Father     Heart disease Father     Hyperlipidemia Father     Hypertension Father     Alcohol abuse Paternal Aunt     Cancer Paternal Aunt     Diabetes Paternal Aunt     Drug abuse Paternal Aunt     Early death Paternal Aunt     Heart disease Paternal Aunt     Hyperlipidemia Paternal Aunt     Hypertension Paternal Aunt     Alcohol abuse Maternal Grandmother     Alcohol abuse Maternal Grandfather     Alcohol abuse Paternal Grandmother     Cancer Paternal Grandmother     Diabetes Paternal Grandmother     Drug abuse Paternal Grandmother     Early death Paternal Grandmother     Heart disease Paternal Grandmother     Hyperlipidemia Paternal Grandmother     Hypertension Paternal Grandmother     Alcohol abuse Paternal Grandfather        Social History     Socioeconomic History    Marital status: Single   Tobacco Use    Smoking status: Every Day     Current packs/day: 0.50     Average packs/day: 0.5 packs/day for 28.2 years (14.1 ttl pk-yrs)     Types: Cigarettes     Start date: 1/28/1996     Passive exposure: Current    Smokeless tobacco: Never   Vaping Use    Vaping  status: Never Used   Substance and Sexual Activity    Alcohol use: Not Currently    Drug use: Never    Sexual activity: Defer     Partners: Male           Objective   Physical Exam  Vital signs: Reviewed in nurses notes    General: Awake alert.  She does appear to be uncomfortable    HEENT: Normocephalic atraumatic nasopharynx clear pharynx clear and moist    Neck:   Supple without lymphadenopathy    Respiratory:   Nonlabored respirations.  Clear to auscultation bilaterally.  Equal breath sounds bilaterally.  No wheezes or stridor noted.    Cardiovascular: Rate is rapid, rhythm regular.    Abdomen: Flat stomach.  Nondistended.  There is mild to moderate left upper quadrant tenderness to deep palpation.  No guarding no rebound    Skin:   Warm and dry.  No rashes noted    Neurological examination: Patient is awake alert oriented x4.  Speech is normal.  No facial palsy.  No focal motor or sensory deficits.  Procedures           ED Course  ED Course as of 04/27/24 0153   Thu Apr 25, 2024   2113 Pt to be admitted, transfer to [facility], [place], d/w Dr Hilliard hospitalist, and surgery will consult [CF]      ED Course User Index  [CF] Clay Calvillo MD      Lab Results (last 72 hours)       Procedure Component Value Units Date/Time    CBC & Differential [[id number]]  (Abnormal) Collected: 04/25/24 1537    Specimen: Blood Updated: 04/25/24 1541    Narrative:      The following orders were created for panel order CBC & Differential.  Procedure                               Abnormality         Status                     ---------                               -----------         ------                     CBC Auto Differential[[id number]]        Abnormal            Final result                 Please view results for these tests on the individual orders.    Comprehensive Metabolic Panel [[id number]]  (Abnormal) Collected: 04/25/24 1537    Specimen: Blood Updated: 04/25/24 1600     Glucose 109 mg/dL      BUN 7 mg/dL       Creatinine 0.78 mg/dL      Sodium 136 mmol/L      Potassium 3.9 mmol/L      Chloride 97 mmol/L      CO2 25.9 mmol/L      Calcium 9.8 mg/dL      Total Protein 8.0 g/dL      Albumin 4.1 g/dL      ALT (SGPT) 9 U/L      AST (SGOT) 9 U/L      Alkaline Phosphatase 128 U/L      Total Bilirubin <0.2 mg/dL      Globulin 3.9 gm/dL      A/G Ratio 1.1 g/dL      BUN/Creatinine Ratio 9.0     Anion Gap 13.1 mmol/L      eGFR 98.0 mL/min/1.73     Narrative:      GFR Normal >60  Chronic Kidney Disease <60  Kidney Failure <15      Lipase [[id number]]  (Abnormal) Collected: 04/25/24 1537    Specimen: Blood Updated: 04/25/24 1600     Lipase 139 U/L     CBC Auto Differential [[id number]]  (Abnormal) Collected: 04/25/24 1537    Specimen: Blood Updated: 04/25/24 1541     WBC 10.88 10*3/mm3      RBC 3.91 10*6/mm3      Hemoglobin 12.1 g/dL      Hematocrit 37.0 %      MCV 94.6 fL      MCH 30.9 pg      MCHC 32.7 g/dL      RDW 13.9 %      RDW-SD 49.0 fl      MPV 8.6 fL      Platelets 580 10*3/mm3      Neutrophil % 65.4 %      Lymphocyte % 23.9 %      Monocyte % 9.3 %      Eosinophil % 1.2 %      Basophil % 0.1 %      Immature Grans % 0.1 %      Neutrophils, Absolute 7.12 10*3/mm3      Lymphocytes, Absolute 2.60 10*3/mm3      Monocytes, Absolute 1.01 10*3/mm3      Eosinophils, Absolute 0.13 10*3/mm3      Basophils, Absolute 0.01 10*3/mm3      Immature Grans, Absolute 0.01 10*3/mm3     Magnesium [[id number]]  (Normal) Collected: 04/25/24 1537    Specimen: Blood Updated: 04/25/24 1600     Magnesium 1.8 mg/dL     Ethanol [[id number]] Collected: 04/25/24 1537    Specimen: Blood Updated: 04/25/24 1632     Ethanol <10 mg/dL      Ethanol % <0.010 %     Urinalysis With Microscopic If Indicated (No Culture) - Urine, Clean Catch [[id number]]  (Abnormal) Collected: 04/25/24 1645    Specimen: Urine, Clean Catch Updated: 04/25/24 1710     Color, UA Yellow     Appearance, UA Slightly Cloudy     pH, UA 6.0     Specific Gravity, UA <=1.005     Glucose, UA Negative      Ketones, UA Negative     Bilirubin, UA Negative     Blood, UA Negative     Protein, UA Negative     Leuk Esterase, UA Negative     Nitrite, UA Negative     Urobilinogen, UA 0.2 E.U./dL    Narrative:      Urine microscopic not indicated.    Comprehensive Metabolic Panel [[id number]]  (Abnormal) Collected: 04/26/24 0706    Specimen: Blood Updated: 04/26/24 0806     Glucose 93 mg/dL      BUN 4 mg/dL      Creatinine 0.57 mg/dL      Sodium 137 mmol/L      Potassium 4.0 mmol/L      Chloride 100 mmol/L      CO2 24.6 mmol/L      Calcium 9.2 mg/dL      Total Protein 6.7 g/dL      Albumin 3.4 g/dL      ALT (SGPT) 5 U/L      AST (SGOT) 10 U/L      Alkaline Phosphatase 107 U/L      Total Bilirubin <0.2 mg/dL      Globulin 3.3 gm/dL      A/G Ratio 1.0 g/dL      BUN/Creatinine Ratio 7.0     Anion Gap 12.4 mmol/L      eGFR 117.3 mL/min/1.73     Narrative:      GFR Normal >60  Chronic Kidney Disease <60  Kidney Failure <15      CBC (No Diff) [[id number]]  (Abnormal) Collected: 04/26/24 0706    Specimen: Blood Updated: 04/26/24 0808     WBC 8.90 10*3/mm3      RBC 3.65 10*6/mm3      Hemoglobin 11.5 g/dL      Hematocrit 34.1 %      MCV 93.4 fL      MCH 31.5 pg      MCHC 33.7 g/dL      RDW 14.2 %      RDW-SD 49.1 fl      MPV 9.4 fL      Platelets 401 10*3/mm3     Magnesium [[id number]]  (Normal) Collected: 04/26/24 0713    Specimen: Blood Updated: 04/26/24 0911     Magnesium 1.6 mg/dL     Phosphorus [[id number]]  (Normal) Collected: 04/26/24 0713    Specimen: Blood Updated: 04/26/24 0911     Phosphorus 4.2 mg/dL     Procalcitonin [[id number]]  (Normal) Collected: 04/26/24 0713    Specimen: Blood Updated: 04/26/24 0921     Procalcitonin 0.08 ng/mL     Narrative:      As a Marker for Sepsis (Non-Neonates):    1. <0.5 ng/mL represents a low risk of severe sepsis and/or septic shock.  2. >2 ng/mL represents a high risk of severe sepsis and/or septic shock.    As a Marker for Lower Respiratory Tract Infections that require antibiotic  "therapy:    PCT on Admission    Antibiotic Therapy       6-12 Hrs later    >0.5                Strongly Recommended  >0.25 - <0.5        Recommended   0.1 - 0.25          Discouraged              Remeasure/reassess PCT  <0.1                Strongly Discouraged     Remeasure/reassess PCT    As 28 day mortality risk marker: \"Change in Procalcitonin Result\" (>80% or <=80%) if Day 0 (or Day 1) and Day 4 values are available. Refer to http://www.[email or web address] [email or web address]-[email or web address]-[email or web address]    Change in PCT <=80%  A decrease of PCT levels below or equal to 80% defines a positive change in PCT test result representing a higher risk for 28-day all-cause mortality of patients diagnosed with severe sepsis for septic shock.    Change in PCT >80%  A decrease of PCT levels of more than 80% defines a negative change in PCT result representing a lower risk for 28-day all-cause mortality of patients diagnosed with severe sepsis or septic shock.       Hemoglobin & Hematocrit, Blood [[id number]]  (Abnormal) Collected: 04/26/24 1615    Specimen: Blood Updated: 04/26/24 1632     Hemoglobin 9.9 g/dL      Hematocrit 30.3 %     Hemoglobin & Hematocrit, Blood [[id number]]  (Abnormal) Collected: 04/26/24 2155    Specimen: Blood Updated: 04/26/24 2226     Hemoglobin 9.9 g/dL      Hematocrit 30.6 %     Hemoglobin & Hematocrit, Blood [[id number]]  (Abnormal) Collected: 04/26/24 2349    Specimen: Blood Updated: 04/27/24 0009     Hemoglobin 9.7 g/dL      Hematocrit 30.2 %              Medications   sodium chloride 0.9 % flush 10 mL (has no administration in time range)   sodium chloride 0.9 % flush 10 mL (has no administration in time range)   lactated ringers infusion (75 mL/hr Intravenous New Bag 4/27/24 0009)   melatonin tablet 5 mg (has no administration in time range)   ondansetron ODT (ZOFRAN-ODT) disintegrating tablet 4 mg ( Oral Not Given:  See Alt 4/27/24 0009)     Or   ondansetron (ZOFRAN) injection 4 mg (4 mg Intravenous Given 4/27/24 0009) "   aluminum-magnesium hydroxide-simethicone (MAALOX MAX) 400-400-40 MG/5ML suspension 15 mL (has no administration in time range)   Magnesium Standard Dose Replacement - Follow Nurse / BPA Driven Protocol (has no administration in time range)   LORazepam (ATIVAN) tablet 1 mg (has no administration in time range)     Or   midazolam (VERSED) injection 2 mg (has no administration in time range)     Or   LORazepam (ATIVAN) tablet 2 mg (has no administration in time range)     Or   midazolam (VERSED) injection 4 mg (has no administration in time range)     Or   midazolam (VERSED) injection 4 mg (has no administration in time range)     Or   midazolam (VERSED) injection 4 mg (has no administration in time range)   pantoprazole (PROTONIX) injection 40 mg (40 mg Intravenous Given 4/26/24 0827)   amitriptyline (ELAVIL) tablet 50 mg (50 mg Oral Given 4/26/24 2039)   magnesium oxide (MAG-OX) tablet 400 mg (400 mg Oral Given 4/26/24 1042)   oxyCODONE-acetaminophen (PERCOCET)  MG per tablet 1 tablet (1 tablet Oral Given 4/27/24 0141)   ferrous sulfate tablet 325 mg (325 mg Oral Given 4/26/24 1042)   polyethylene glycol (MIRALAX) packet 17 g (17 g Oral Given 4/26/24 1547)   sennosides-docusate (PERICOLACE) 8.6-50 MG per tablet 1 tablet (1 tablet Oral Given 4/26/24 2040)   HYDROmorphone (DILAUDID) injection 1 mg (1 mg Intravenous Given 4/26/24 2312)   acetaminophen (TYLENOL) tablet 650 mg (650 mg Oral Given 4/26/24 1647)   sodium chloride 0.9 % bolus 1,000 mL (0 mL Intravenous Stopped 4/25/24 1821)   droperidol (INAPSINE) injection 2.5 mg (2.5 mg Intravenous Given 4/25/24 1600)   pantoprazole (PROTONIX) injection 40 mg (40 mg Intravenous Given 4/25/24 1600)   HYDROmorphone (DILAUDID) injection 0.5 mg (0.5 mg Intravenous Given 4/25/24 1600)   HYDROmorphone (DILAUDID) injection 0.5 mg (0.5 mg Intravenous Given 4/25/24 0516)   HYDROmorphone (DILAUDID) injection 1 mg (1 mg Intravenous Given 4/25/24 7089)   iopamidol (ISOVUE-370)  76 % injection 100 mL (100 mL Intravenous Given 4/25/24 1915)   HYDROmorphone (DILAUDID) injection 1 mg (1 mg Intravenous Given 4/25/24 2043)   ondansetron (ZOFRAN) injection 4 mg (4 mg Intravenous Given 4/25/24 2104)                               1850: Patient's heart rate is now 90.  Labs been reviewed and are stable.  Lipase is slightly elevated at 139.  This is close to her baseline.  White blood cell count 10.88.  Hemoglobin and hematocrit are 12 and 37 respectively.  Patient continues to complain of pain left upper quadrant.  Will proceed with CT abdomen pelvis IV.  I was trying to prevent this but patient did have spontaneous splenic rupture approximately 3 months ago.  She is very difficult to control in regards to her pain secondary to her chronic pain syndrome..   Care will be transitioned to Dr. Calvillo at 1900.          Medical Decision Making  Problems Addressed:  Alcohol-induced chronic pancreatitis: complicated acute illness or injury  Left upper quadrant abdominal pain: complicated acute illness or injury  Spleen hematoma, initial encounter: complicated acute illness or injury    Amount and/or Complexity of Data Reviewed  Labs: ordered.  Radiology: ordered.    Risk  Prescription drug management.  Decision regarding hospitalization.        Final diagnoses:   Left upper quadrant abdominal pain   Alcohol-induced chronic pancreatitis   Spleen hematoma, initial encounter       ED Disposition  ED Disposition       ED Disposition   Decision to Admit    Condition   --    Comment   Level of Care: Med/Surg [1]   Diagnosis: Hematoma of spleen without rupture of capsule [[id number]]   Admitting Physician: ROBB ALFARO [[id number]]   Attending Physician: ROBB ALFARO [[id number]]                 No follow-up provider specified.       Medication List      No changes were made to your prescriptions during this visit.

## 2024-04-25 NOTE — LETTER
[facility]  [address] [facility] [address]  [phone]        April 28, 2024      Patient: Piper Cain  YOB: 1980  Date of Visit: 4/25/2024        INPATIENT REQUEST ADMITTED AS OBS 4/25 AND CONVERTED TO INPATIENT 4/27/24      Sulaiman Cardona LPN  P# [phone]  F# [phone]

## 2024-04-25 NOTE — Clinical Note
Pt has new splenic fluid collection causing mass effect on adjacent organs., Dr. Jean Hawk is her specialist.

## 2024-04-26 LAB
ALBUMIN SERPL-MCNC: 3.4 G/DL (ref 3.5–5.2)
ALBUMIN/GLOB SERPL: 1 G/DL
ALP SERPL-CCNC: 107 U/L (ref 39–117)
ALT SERPL W P-5'-P-CCNC: 5 U/L (ref 1–33)
ANION GAP SERPL CALCULATED.3IONS-SCNC: 12.4 MMOL/L (ref 5–15)
AST SERPL-CCNC: 10 U/L (ref 1–32)
BILIRUB SERPL-MCNC: <0.2 MG/DL (ref 0–1.2)
BUN SERPL-MCNC: 4 MG/DL (ref 6–20)
BUN/CREAT SERPL: 7 (ref 7–25)
CALCIUM SPEC-SCNC: 9.2 MG/DL (ref 8.6–10.5)
CHLORIDE SERPL-SCNC: 100 MMOL/L (ref 98–107)
CO2 SERPL-SCNC: 24.6 MMOL/L (ref 22–29)
CREAT SERPL-MCNC: 0.57 MG/DL (ref 0.57–1)
DEPRECATED RDW RBC AUTO: 49.1 FL (ref 37–54)
EGFRCR SERPLBLD CKD-EPI 2021: 117.3 ML/MIN/1.73
ERYTHROCYTE [DISTWIDTH] IN BLOOD BY AUTOMATED COUNT: 14.2 % (ref 12.3–15.4)
GLOBULIN UR ELPH-MCNC: 3.3 GM/DL
GLUCOSE SERPL-MCNC: 93 MG/DL (ref 65–99)
HCT VFR BLD AUTO: 30.3 % (ref 34–46.6)
HCT VFR BLD AUTO: 30.6 % (ref 34–46.6)
HCT VFR BLD AUTO: 34.1 % (ref 34–46.6)
HGB BLD-MCNC: 11.5 G/DL (ref 12–15.9)
HGB BLD-MCNC: 9.9 G/DL (ref 12–15.9)
HGB BLD-MCNC: 9.9 G/DL (ref 12–15.9)
MAGNESIUM SERPL-MCNC: 1.6 MG/DL (ref 1.6–2.6)
MCH RBC QN AUTO: 31.5 PG (ref 26.6–33)
MCHC RBC AUTO-ENTMCNC: 33.7 G/DL (ref 31.5–35.7)
MCV RBC AUTO: 93.4 FL (ref 79–97)
PHOSPHATE SERPL-MCNC: 4.2 MG/DL (ref 2.5–4.5)
PLATELET # BLD AUTO: 401 10*3/MM3 (ref 140–450)
PMV BLD AUTO: 9.4 FL (ref 6–12)
POTASSIUM SERPL-SCNC: 4 MMOL/L (ref 3.5–5.2)
PROCALCITONIN SERPL-MCNC: 0.08 NG/ML (ref 0–0.25)
PROT SERPL-MCNC: 6.7 G/DL (ref 6–8.5)
RBC # BLD AUTO: 3.65 10*6/MM3 (ref 3.77–5.28)
SODIUM SERPL-SCNC: 137 MMOL/L (ref 136–145)
WBC NRBC COR # BLD AUTO: 8.9 10*3/MM3 (ref 3.4–10.8)

## 2024-04-26 PROCEDURE — 85018 HEMOGLOBIN: CPT | Performed by: NURSE PRACTITIONER

## 2024-04-26 PROCEDURE — 80053 COMPREHEN METABOLIC PANEL: CPT | Performed by: NURSE PRACTITIONER

## 2024-04-26 PROCEDURE — 25810000003 LACTATED RINGERS PER 1000 ML: Performed by: STUDENT IN AN ORGANIZED HEALTH CARE EDUCATION/TRAINING PROGRAM

## 2024-04-26 PROCEDURE — 99223 1ST HOSP IP/OBS HIGH 75: CPT | Performed by: STUDENT IN AN ORGANIZED HEALTH CARE EDUCATION/TRAINING PROGRAM

## 2024-04-26 PROCEDURE — 85027 COMPLETE CBC AUTOMATED: CPT | Performed by: NURSE PRACTITIONER

## 2024-04-26 PROCEDURE — 25010000002 ONDANSETRON PER 1 MG: Performed by: NURSE PRACTITIONER

## 2024-04-26 PROCEDURE — 25010000002 THIAMINE HCL 200 MG/2ML SOLUTION: Performed by: NURSE PRACTITIONER

## 2024-04-26 PROCEDURE — 25010000002 HYDROMORPHONE 1 MG/ML SOLUTION: Performed by: STUDENT IN AN ORGANIZED HEALTH CARE EDUCATION/TRAINING PROGRAM

## 2024-04-26 PROCEDURE — G0378 HOSPITAL OBSERVATION PER HR: HCPCS

## 2024-04-26 PROCEDURE — 84100 ASSAY OF PHOSPHORUS: CPT | Performed by: STUDENT IN AN ORGANIZED HEALTH CARE EDUCATION/TRAINING PROGRAM

## 2024-04-26 PROCEDURE — 25010000002 HYDROMORPHONE PER 4 MG: Performed by: NURSE PRACTITIONER

## 2024-04-26 PROCEDURE — 85014 HEMATOCRIT: CPT | Performed by: NURSE PRACTITIONER

## 2024-04-26 PROCEDURE — 83735 ASSAY OF MAGNESIUM: CPT | Performed by: STUDENT IN AN ORGANIZED HEALTH CARE EDUCATION/TRAINING PROGRAM

## 2024-04-26 PROCEDURE — 84145 PROCALCITONIN (PCT): CPT | Performed by: STUDENT IN AN ORGANIZED HEALTH CARE EDUCATION/TRAINING PROGRAM

## 2024-04-26 PROCEDURE — 25810000003 LACTATED RINGERS PER 1000 ML: Performed by: NURSE PRACTITIONER

## 2024-04-26 RX ORDER — MIDAZOLAM HYDROCHLORIDE 1 MG/ML
4 INJECTION INTRAMUSCULAR; INTRAVENOUS
Status: DISCONTINUED | OUTPATIENT
Start: 2024-04-26 | End: 2024-04-30

## 2024-04-26 RX ORDER — ACETAMINOPHEN 325 MG/1
650 TABLET ORAL EVERY 6 HOURS PRN
Status: DISCONTINUED | OUTPATIENT
Start: 2024-04-26 | End: 2024-05-03 | Stop reason: HOSPADM

## 2024-04-26 RX ORDER — AMITRIPTYLINE HYDROCHLORIDE 50 MG/1
50 TABLET, FILM COATED ORAL NIGHTLY
Status: DISCONTINUED | OUTPATIENT
Start: 2024-04-26 | End: 2024-05-01

## 2024-04-26 RX ORDER — FOLIC ACID 1 MG/1
1 TABLET ORAL DAILY
Status: DISCONTINUED | OUTPATIENT
Start: 2024-04-26 | End: 2024-04-26

## 2024-04-26 RX ORDER — LORAZEPAM 1 MG/1
2 TABLET ORAL
Status: DISCONTINUED | OUTPATIENT
Start: 2024-04-26 | End: 2024-04-30

## 2024-04-26 RX ORDER — THIAMINE HYDROCHLORIDE 100 MG/ML
200 INJECTION, SOLUTION INTRAMUSCULAR; INTRAVENOUS EVERY 8 HOURS SCHEDULED
Status: DISCONTINUED | OUTPATIENT
Start: 2024-04-26 | End: 2024-04-26

## 2024-04-26 RX ORDER — FERROUS SULFATE 325(65) MG
325 TABLET ORAL
Status: DISCONTINUED | OUTPATIENT
Start: 2024-04-26 | End: 2024-05-03 | Stop reason: HOSPADM

## 2024-04-26 RX ORDER — AMOXICILLIN 250 MG
1 CAPSULE ORAL 2 TIMES DAILY
Status: DISCONTINUED | OUTPATIENT
Start: 2024-04-26 | End: 2024-05-03 | Stop reason: HOSPADM

## 2024-04-26 RX ORDER — MIDAZOLAM HYDROCHLORIDE 1 MG/ML
2 INJECTION INTRAMUSCULAR; INTRAVENOUS
Status: DISCONTINUED | OUTPATIENT
Start: 2024-04-26 | End: 2024-04-30

## 2024-04-26 RX ORDER — LORAZEPAM 1 MG/1
1 TABLET ORAL EVERY 6 HOURS
Status: DISCONTINUED | OUTPATIENT
Start: 2024-04-27 | End: 2024-04-26

## 2024-04-26 RX ORDER — PANTOPRAZOLE SODIUM 40 MG/10ML
40 INJECTION, POWDER, LYOPHILIZED, FOR SOLUTION INTRAVENOUS DAILY
Status: DISCONTINUED | OUTPATIENT
Start: 2024-04-26 | End: 2024-04-27

## 2024-04-26 RX ORDER — LORAZEPAM 1 MG/1
1 TABLET ORAL
Status: DISCONTINUED | OUTPATIENT
Start: 2024-04-26 | End: 2024-04-30

## 2024-04-26 RX ORDER — POLYETHYLENE GLYCOL 3350 17 G/17G
17 POWDER, FOR SOLUTION ORAL DAILY
Status: DISCONTINUED | OUTPATIENT
Start: 2024-04-26 | End: 2024-04-27

## 2024-04-26 RX ORDER — LORAZEPAM 1 MG/1
2 TABLET ORAL EVERY 6 HOURS
Status: DISCONTINUED | OUTPATIENT
Start: 2024-04-26 | End: 2024-04-26

## 2024-04-26 RX ORDER — HYDROMORPHONE HYDROCHLORIDE 1 MG/ML
0.5 INJECTION, SOLUTION INTRAMUSCULAR; INTRAVENOUS; SUBCUTANEOUS
Status: DISCONTINUED | OUTPATIENT
Start: 2024-04-26 | End: 2024-04-26

## 2024-04-26 RX ORDER — MAGNESIUM OXIDE 400 MG/1
400 TABLET ORAL DAILY
Status: DISCONTINUED | OUTPATIENT
Start: 2024-04-26 | End: 2024-05-03 | Stop reason: HOSPADM

## 2024-04-26 RX ORDER — OXYCODONE AND ACETAMINOPHEN 10; 325 MG/1; MG/1
1 TABLET ORAL EVERY 4 HOURS PRN
Status: DISCONTINUED | OUTPATIENT
Start: 2024-04-26 | End: 2024-04-29

## 2024-04-26 RX ADMIN — HYDROMORPHONE HYDROCHLORIDE 1 MG: 1 INJECTION, SOLUTION INTRAMUSCULAR; INTRAVENOUS; SUBCUTANEOUS at 18:03

## 2024-04-26 RX ADMIN — HYDROMORPHONE HYDROCHLORIDE 1 MG: 1 INJECTION, SOLUTION INTRAMUSCULAR; INTRAVENOUS; SUBCUTANEOUS at 13:58

## 2024-04-26 RX ADMIN — HYDROMORPHONE HYDROCHLORIDE 0.5 MG: 1 INJECTION, SOLUTION INTRAMUSCULAR; INTRAVENOUS; SUBCUTANEOUS at 02:45

## 2024-04-26 RX ADMIN — PANTOPRAZOLE SODIUM 40 MG: 40 INJECTION, POWDER, FOR SOLUTION INTRAVENOUS at 08:27

## 2024-04-26 RX ADMIN — ONDANSETRON 4 MG: 2 INJECTION INTRAMUSCULAR; INTRAVENOUS at 18:03

## 2024-04-26 RX ADMIN — SODIUM CHLORIDE, POTASSIUM CHLORIDE, SODIUM LACTATE AND CALCIUM CHLORIDE 125 ML/HR: 600; 310; 30; 20 INJECTION, SOLUTION INTRAVENOUS at 00:32

## 2024-04-26 RX ADMIN — ONDANSETRON 4 MG: 2 INJECTION INTRAMUSCULAR; INTRAVENOUS at 10:42

## 2024-04-26 RX ADMIN — SODIUM CHLORIDE, POTASSIUM CHLORIDE, SODIUM LACTATE AND CALCIUM CHLORIDE 100 ML/HR: 600; 310; 30; 20 INJECTION, SOLUTION INTRAVENOUS at 14:11

## 2024-04-26 RX ADMIN — OXYCODONE AND ACETAMINOPHEN 1 TABLET: 10; 325 TABLET ORAL at 15:47

## 2024-04-26 RX ADMIN — DOCUSATE SODIUM 50MG AND SENNOSIDES 8.6MG 1 TABLET: 8.6; 5 TABLET, FILM COATED ORAL at 20:40

## 2024-04-26 RX ADMIN — ONDANSETRON 4 MG: 2 INJECTION INTRAMUSCULAR; INTRAVENOUS at 04:53

## 2024-04-26 RX ADMIN — HYDROMORPHONE HYDROCHLORIDE 0.5 MG: 1 INJECTION, SOLUTION INTRAMUSCULAR; INTRAVENOUS; SUBCUTANEOUS at 04:54

## 2024-04-26 RX ADMIN — ACETAMINOPHEN 650 MG: 325 TABLET, FILM COATED ORAL at 16:47

## 2024-04-26 RX ADMIN — FOLIC ACID 1 MG: 1 TABLET ORAL at 08:27

## 2024-04-26 RX ADMIN — FERROUS SULFATE TAB 325 MG (65 MG ELEMENTAL FE) 325 MG: 325 (65 FE) TAB at 10:42

## 2024-04-26 RX ADMIN — OXYCODONE AND ACETAMINOPHEN 1 TABLET: 10; 325 TABLET ORAL at 11:37

## 2024-04-26 RX ADMIN — MAGNESIUM OXIDE 400 MG (241.3 MG MAGNESIUM) TABLET 400 MG: TABLET at 10:42

## 2024-04-26 RX ADMIN — POLYETHYLENE GLYCOL 3350 17 G: 17 POWDER, FOR SOLUTION ORAL at 15:47

## 2024-04-26 RX ADMIN — AMITRIPTYLINE HYDROCHLORIDE 50 MG: 50 TABLET, FILM COATED ORAL at 20:39

## 2024-04-26 RX ADMIN — HYDROMORPHONE HYDROCHLORIDE 1 MG: 1 INJECTION, SOLUTION INTRAMUSCULAR; INTRAVENOUS; SUBCUTANEOUS at 10:42

## 2024-04-26 RX ADMIN — THIAMINE HYDROCHLORIDE 200 MG: 100 INJECTION, SOLUTION INTRAMUSCULAR; INTRAVENOUS at 05:38

## 2024-04-26 RX ADMIN — HYDROMORPHONE HYDROCHLORIDE 1 MG: 1 INJECTION, SOLUTION INTRAMUSCULAR; INTRAVENOUS; SUBCUTANEOUS at 08:27

## 2024-04-26 RX ADMIN — THIAMINE HYDROCHLORIDE 200 MG: 100 INJECTION, SOLUTION INTRAMUSCULAR; INTRAVENOUS at 14:11

## 2024-04-26 RX ADMIN — HYDROMORPHONE HYDROCHLORIDE 0.5 MG: 1 INJECTION, SOLUTION INTRAMUSCULAR; INTRAVENOUS; SUBCUTANEOUS at 00:32

## 2024-04-26 RX ADMIN — HYDROMORPHONE HYDROCHLORIDE 1 MG: 1 INJECTION, SOLUTION INTRAMUSCULAR; INTRAVENOUS; SUBCUTANEOUS at 23:12

## 2024-04-26 RX ADMIN — OXYCODONE AND ACETAMINOPHEN 1 TABLET: 10; 325 TABLET ORAL at 20:40

## 2024-04-26 RX ADMIN — SODIUM CHLORIDE, POTASSIUM CHLORIDE, SODIUM LACTATE AND CALCIUM CHLORIDE 125 ML/HR: 600; 310; 30; 20 INJECTION, SOLUTION INTRAVENOUS at 05:41

## 2024-04-26 NOTE — PLAN OF CARE
Goal Outcome Evaluation:  Plan of Care Reviewed With: patient        Progress: no change  Outcome Evaluation: pt c/o LUQ pain radiating to left neck area, PO and IV pain medications given, ivf infusing, voiding well, bowel regimen medication given as ordered, ambulated in peterson many times,up ad valeria, tolerated diet of clear liquids without emesis, antiemetic given as PRN, scds on while in bed

## 2024-04-26 NOTE — PROGRESS NOTES
Continued Stay Note  [facility]     Patient Name: Piper Cain  MRN: [id number]  Today's Date: 4/26/2024    Admit Date: 4/25/2024    Plan: Home no needs (FU after poss surgery)   Discharge Plan       Row Name 04/26/24 1008       Plan    Plan Home no needs (FU after poss surgery)    Plan Comments Introduced self and role of CCP. Patient confirmed DC plan is to return to home. Stated she is independent with ADL's and uses no DMEs. Stated family/friends will assist as needed and will provide transportation at DC. Denies any needs/equipment.                   Discharge Codes    No documentation.                       Cynthia Bonilla, RN

## 2024-04-26 NOTE — ED NOTES
Pt ambulatory for transfer by private vehicle to [facility]; with all belongings; nad; stable gait

## 2024-04-26 NOTE — CONSULTS
General Surgery Consultation    Consulting Physician: Mai Almeida MD  Referring:  Kimberly Armstrong APRN    Reason for consultation:   Recent perispleinic hematoma with worsening pseudocyst and/or hematoma    CC:   LUQ pain    HPI:   The patient is a 43 y.o. female with chronic pancreatitis secondary to alcohol use, pancreatic pseudocyst, history of splenic laceration and subcapsular hematoma, tobacco use, anxiety, and chronic pain.  She presents today for worsening left upper quadrant pain.  She denies nausea or vomiting.  She has been having normal bowel movements.  She states that her pain is similar to prior episodes, sharp, stabbing, in the left upper quadrant and radiating to her left posterior shoulder as well as her left neck.  She reports her pain has been worse than usual for the past 3 to 4 days.  Denies any blood in her stool.  Patient states the reason she came in is because she is having difficulty with pain control.  She notes that she has been seen by pain management and is on oral narcotics, but she is having to split these and is running out and feels as though her pain is not adequately controlled.  She did speak with them previously about possible regional blocks but declined them at the time.  During my exam, she notes that she just had some pain medication and she is feeling better.    She underwent embolization of the splenic artery by Dr. Carine Pena on 1/22/24.  She was nonoperatively managed for this and discharged from the hospital.  She was seen again on 4/1/2024 for worsening left upper quadrant abdominal pain.  Was found to have peripancreatic fluid collections as well as thick-walled stomach and suspicion for possible acute gastritis with ongoing complicated chronic pancreatitis.  She underwent EGD by Dr. Starr on 4/3/2024 which demonstrated gastritis.  Duodenal biopsies were unremarkable.  Stomach biopsies were unremarkable, negative for H. pylori.  GE junction biopsy  demonstrated focal intestinal metaplasia consistent with Leon's esophagus with no dysplasia, as well as acute and chronic inflammation of the gastric mucosa.  During that hospitalization, she improved with supportive management and was discharged home to follow-up with her PCP and GI.    Past Medical History:  Chronic pancreatitis secondary to alcohol abuse  Pancreatic pseudocyst  Splenic laceration  Anemia  Neck pain  GERD  Hiatal hernia  Anxiety  History of E. coli bacteremia  Frequent UTI  Migraine    Past Surgical History:  EGD with cyst gastrostomy and subsequent stent removal by Dr. Denson in 2023  Most recent EGD 4/3/2024 by Dr. Starr to evaluate gastritis, esophagitis, bile reflux  Splenic artery embolization and open exploration right CFA with right lower extremity angiogram and Juanjose removal of embolized coil by Dr. Carine Pena 1/22/2024    Medications:  Medications Prior to Admission   Medication Sig Dispense Refill Last Dose    amitriptyline (ELAVIL) 50 MG tablet Take 1 tablet by mouth Every Night.       Ferrous Sulfate Dried (Feosol) 200 (65 Fe) MG tablet tablet Take 1 tablet by mouth Daily.       folic acid (FOLVITE) 1 MG tablet Take 1 tablet by mouth Daily. 30 tablet 3     magnesium oxide (MAG-OX) 400 MG tablet Take 1 tablet by mouth Daily.       ondansetron (ZOFRAN) 4 MG tablet Take 1 tablet by mouth Every 8 (Eight) Hours As Needed for Nausea or Vomiting.       oxyCODONE-acetaminophen (PERCOCET)  MG per tablet Take 1 tablet by mouth Every 4 (Four) Hours As Needed for Moderate Pain. 20 tablet 0     pantoprazole (PROTONIX) 40 MG EC tablet Take 1 tablet by mouth Daily. 30 tablet 3     promethazine (PHENERGAN) 25 MG suppository Insert 1 suppository into the rectum As Needed for Nausea or Vomiting.       thiamine (VITAMIN B1) 100 MG tablet Take 1 tablet by mouth Daily. 30 tablet 3        Current Facility-Administered Medications:     aluminum-magnesium hydroxide-simethicone (MAALOX MAX)  400-400-40 MG/5ML suspension 15 mL, 15 mL, Oral, Q6H PRN, Kimberly Armstrong APRN    amitriptyline (ELAVIL) tablet 50 mg, 50 mg, Oral, Nightly, Cristina Escobar APRN    sennosides-docusate (PERICOLACE) 8.6-50 MG per tablet 2 tablet, 2 tablet, Oral, BID PRN **AND** polyethylene glycol (MIRALAX) packet 17 g, 17 g, Oral, Daily PRN **AND** bisacodyl (DULCOLAX) EC tablet 5 mg, 5 mg, Oral, Daily PRN **AND** bisacodyl (DULCOLAX) suppository 10 mg, 10 mg, Rectal, Daily PRN, Kimberly Armstrong APRN    ferrous sulfate tablet 325 mg, 325 mg, Oral, Daily With Breakfast, Cristina Escobar, SOL, 325 mg at 04/26/24 1042    HYDROmorphone (DILAUDID) injection 0.5 mg, 0.5 mg, Intravenous, Q2H PRN, Xiao Conroy APRN, 0.5 mg at 04/26/24 0454    HYDROmorphone (DILAUDID) injection 1 mg, 1 mg, Intravenous, Q2H PRN, Mike Donnelly MD, 1 mg at 04/26/24 1042    lactated ringers infusion, 100 mL/hr, Intravenous, Continuous, Mike Donnelly MD, Last Rate: 100 mL/hr at 04/26/24 1248, 100 mL/hr at 04/26/24 1248    LORazepam (ATIVAN) tablet 1 mg, 1 mg, Oral, Q1H PRN **OR** midazolam (VERSED) injection 2 mg, 2 mg, Intravenous, Q1H PRN **OR** LORazepam (ATIVAN) tablet 2 mg, 2 mg, Oral, Q1H PRN **OR** midazolam (VERSED) injection 4 mg, 4 mg, Intravenous, Q1H PRN **OR** midazolam (VERSED) injection 4 mg, 4 mg, Intravenous, Q15 Min PRN **OR** midazolam (VERSED) injection 4 mg, 4 mg, Intramuscular, Q15 Min PRN, Xiao Conroy APRN    magnesium oxide (MAG-OX) tablet 400 mg, 400 mg, Oral, Daily, Cristina Escobar APRN, 400 mg at 04/26/24 1042    Magnesium Standard Dose Replacement - Follow Nurse / BPA Driven Protocol, , Does not apply, PRN, Xiao Conroy APRN    melatonin tablet 5 mg, 5 mg, Oral, Nightly PRN, Kimberly Armstrong APRN    ondansetron ODT (ZOFRAN-ODT) disintegrating tablet 4 mg, 4 mg, Oral, Q6H PRN **OR** ondansetron (ZOFRAN) injection 4 mg, 4 mg, Intravenous, Q6H PRN, Kimberly Armstrong APRN, 4 mg at  04/26/24 1042    oxyCODONE-acetaminophen (PERCOCET)  MG per tablet 1 tablet, 1 tablet, Oral, Q4H PRN, Cristina Escobar, SOL, 1 tablet at 04/26/24 1137    pantoprazole (PROTONIX) injection 40 mg, 40 mg, Intravenous, Daily, Mike Donnelly MD, 40 mg at 04/26/24 0827    sodium chloride 0.9 % flush 10 mL, 10 mL, Intravenous, PRN, Chidi Almazan MD    sodium chloride 0.9 % flush 10 mL, 10 mL, Intravenous, PRN, Kimberly Armstrong, APREDITH    thiamine (B-1) injection 200 mg, 200 mg, Intravenous, Q8H, 200 mg at 04/26/24 0538 **FOLLOWED BY** [START ON 5/1/2024] thiamine (VITAMIN B-1) tablet 100 mg, 100 mg, Oral, Daily, Xiao Conroy APRN    Allergies:   Allergies   Allergen Reactions    Nickel Rash     Social History:   Smokes tobacco  Uses alcohol  Denies recreational drug use    Family History:   Family History   Problem Relation Age of Onset    Alcohol abuse Mother     Arthritis Mother     Asthma Mother     Miscarriages / Stillbirths Mother     Cancer Father     Diabetes Father     Drug abuse Father     Early death Father     Heart disease Father     Hyperlipidemia Father     Hypertension Father     Alcohol abuse Paternal Aunt     Cancer Paternal Aunt     Diabetes Paternal Aunt     Drug abuse Paternal Aunt     Early death Paternal Aunt     Heart disease Paternal Aunt     Hyperlipidemia Paternal Aunt     Hypertension Paternal Aunt     Alcohol abuse Maternal Grandmother     Alcohol abuse Maternal Grandfather     Alcohol abuse Paternal Grandmother     Cancer Paternal Grandmother     Diabetes Paternal Grandmother     Drug abuse Paternal Grandmother     Early death Paternal Grandmother     Heart disease Paternal Grandmother     Hyperlipidemia Paternal Grandmother     Hypertension Paternal Grandmother     Alcohol abuse Paternal Grandfather        Review of Systems:  Constitutional: denies fever or chills  Cardiovascular:   denies palpitations or syncope  Respiratory: denies cough or shortness of breath, +  pleurisy  Gastrointestinal:  + abdominal pain, denies nausea, vomiting, diarrhea, constipation, melena, hematochezia  Genitourinary: denies dysuria or hematuria  Musculoskeletal: denies weakness; +left neck pain, left shoulder pain  Skin: denies rash or jaundice     Physical Exam:   Vitals:    04/26/24 0944   BP: 118/79   Pulse: 98   Resp: 18   Temp: 97.6 °F (36.4 °C)   SpO2: 98%     GENERAL: alert, interactive, cooperative, sitting up in bed, comfortable appearing, able to reposition easily without assistance or obvious significant increase in pain  HEENT: no scleral icterus; moist mucous membranes  NECK: Supple  RESPIRATORY: normal work of breathing on room air  CARDIOVASCULAR: Regular rate, palpable distal pulses  GASTROINTESTINAL: Abdomen firm, nondistended, mildly tender in the left upper quadrant without rebound or guarding  MUSCULOSKELETAL: no cyanosis or edema   NEUROLOGIC: alert and oriented, normal speech, no gross focal deficits   SKIN: warm, no rash, no jaundice    Diagnostic workup:     Pertinent labs:   Results from last 7 days   Lab Units 04/26/24  0706 04/25/24  1537   WBC 10*3/mm3 8.90 10.88*   HEMOGLOBIN g/dL 11.5* 12.1   HEMATOCRIT % 34.1 37.0   PLATELETS 10*3/mm3 401 580*     Results from last 7 days   Lab Units 04/26/24  0706 04/25/24  1537   SODIUM mmol/L 137 136   POTASSIUM mmol/L 4.0 3.9   CHLORIDE mmol/L 100 97*   CO2 mmol/L 24.6 25.9   BUN mg/dL 4* 7   CREATININE mg/dL 0.57 0.78   CALCIUM mg/dL 9.2 9.8   BILIRUBIN mg/dL <0.2 <0.2   ALK PHOS U/L 107 128*   ALT (SGPT) U/L 5 9   AST (SGOT) U/L 10 9   GLUCOSE mg/dL 93 109*   Lipase 139  Magnesium 1.6  Phosphorus 4.2  Procalcitonin 0.08  Ethanol less than 10, ethanol percent less than 0.010    Hemoglobin at discharge 2 weeks ago on 4/7/2024 was 10.2.     Imaging:  CT abdomen pelvis 4/25/2024 images and report independently reviewed - There are calcifications in the head and uncinate process.  There is a small fluid collection in the left lobe of  the liver that is decreased from prior study.  There are multiple peripancreatic body and tail fluid collections.  The ventral more medial 1 has slightly increased in size from previous study on 4/1/2024.  The more lateral fluid collection at the inferior aspect of the spleen has also slightly increased in size from prior study.  There is also a newly enlarged multiloculated collection involving the spleen concerning for pseudocyst versus hematoma.  There is mass effect on the left kidney, with asymmetric delayed left renal nephrograms, and mass effect on the stomach.  Previous gastric wall thickening has improved.  Her splenic artery embolization clips are noted.  She has moderate to large colonic stool burden which is significantly increased from prior study.    Assessment and plan:   The patient is a 43 y.o. female with chronic alcoholic pancreatitis with pancreatic pseudocysts and previous episode of splenic hemorrhage and subcapsular hematoma, has undergone splenic artery embolization in January 2024, seen again earlier this month for left upper quadrant pain, noted to have decreased size of pancreatic fluid collections.  Return for for 5 days of worsening left upper quadrant pain, now with CT evidence of increased size of her fluid collections and concern for possible interval splenic hemorrhage.     AVSS. Patient actually appears comfortable today following pain medication, and her abdominal exam is only mildly tender without peritoneal signs.   Review of her CT does demonstrate enlarged fluid collections in the left upper quadrant worse from prior earlier this month, along with significant constipation. While it is possible she has had interval splenic hemorrhage, her hemoglobin is actually increased from recent hospitalization, and she has not had symptoms of anemia, and so I am more suspicious of worsening pancreatic pseudocysts.   She does not require surgical intervention currently.  Splenectomy if  needed would be technically difficult and high risk for complications such as bleeding and pancreatic fistula given her history.   Ok for clear liquid diet. Serial abdominal exams. Daily CBCs.  Bowel regimen.      Mai Almeida M.D.  General, Robotic, and Endoscopic Surgery  [facility]    [address], [address]  [place], [place], [address]  P: [phone]  F: [phone]

## 2024-04-26 NOTE — PLAN OF CARE
Goal Outcome Evaluation:  Plan of Care Reviewed With: patient        Progress: no change  Outcome Evaluation: Direct admit, IV Dilaudid given for Left flank pain, IV Zofran given for nausea- no emesis, axox4, NPO, gen surgery consult placed, tachycardiac at times, ivf infusing, up ad valeria, ambulated in halls, room air, voiding freely

## 2024-04-26 NOTE — PROGRESS NOTES
"Nutrition Services    Patient Name:  Piper Cain  YOB: 1980  MRN: [id number]  Admit Date:  4/25/2024Assessment Date:  04/26/24    Screen for BMI <19, did not see pt today d/t NPO status. Will assess as diet advances and more appropriate to discuss dietary hx and needs     NUTRITION SCREENING      Reason for Encounter BMI   Diagnosis/Problem Hematoma of spleen with out rupture, Hx of chronic abdominal pain with acute on chronic pancreatitis        PO Diet NPO Diet NPO Type: Strict NPO   Supplements    PO Intake % NPO status        Medications MAR reviewed by RD   Labs  Listed below, reviewed   Physical Findings A/O x4   GI Function Abdominal discomfort, N/V, no BM documented    Skin Status Intact        Height  Weight  BMI  Weight Trend     Height: 175.3 cm (69\")  Weight: 58.1 kg (128 lb) (04/25/24 1532)  Body mass index is 18.9 kg/m².  Stable, Loss       Nutrition Problem (PES) Inadequate oral intake related to inadequate calorie intake as evidenced by NPO status .       Intervention/Plan Will follow up as diet advances     RD to follow up per protocol.     Results from last 7 days   Lab Units 04/26/24  0706 04/25/24  1537   SODIUM mmol/L 137 136   POTASSIUM mmol/L 4.0 3.9   CHLORIDE mmol/L 100 97*   CO2 mmol/L 24.6 25.9   BUN mg/dL 4* 7   CREATININE mg/dL 0.57 0.78   CALCIUM mg/dL 9.2 9.8   BILIRUBIN mg/dL <0.2 <0.2   ALK PHOS U/L 107 128*   ALT (SGPT) U/L 5 9   AST (SGOT) U/L 10 9   GLUCOSE mg/dL 93 109*     Results from last 7 days   Lab Units 04/26/24  0706 04/25/24  1537   MAGNESIUM mg/dL  --  1.8   HEMOGLOBIN g/dL 11.5* 12.1   HEMATOCRIT % 34.1 37.0     Lab Results   Component Value Date    HGBA1C 5.10 08/23/2023         Electronically signed by:  Kisha Bonilla RD  04/26/24 09:06 EDT  "

## 2024-04-26 NOTE — H&P
Patient Name:  Piper Cain  YOB: 1980  MRN:  [id number]  Admit Date:  4/25/2024  Patient Care Team:  Sharmaine Gatica APRN as PCP - General (Nurse Practitioner)      Subjective   History Present Illness     Chief Complaint   Patient presents with    Abdominal Pain     History of Present Illness  Ms. Cain is a 43 y.o. smoker with a history of alcohol abuse, chronic pancreatitis, chronic opioid use, generalized anxiety, pancreatic pseudocyst and history of splenic laceration and subcapsular hematoma that presents to [facility] complaining of abdominal pain.  The patient states that she does have some level of chronic pain due to her pancreatic issues, but has noticed worsening and a new type of pain in the last 4 days.  She started having some steady pain in the left upper quadrant that was stabbing and radiating to the top of her left shoulder going into the left side of her neck.  She states that she has had persistent nausea with this as well as dizzy yesterday.  She has not vomited and denies any issues with constipation or diarrhea.  She did have some associated chest discomfort on the left side in relation to her left upper quadrant pain as well.  She denies any recent cough, fever or chills.  She is currently working on trying to stop smoking.  She states that she has not had any urgency, frequency or dysuria with her voiding, but states it is sometimes slow to start.   She was admitted here in January of this year when she had left upper quadrant abdominal pain and left shoulder pain at that time as well.  CT A/P at an outlying emergency room revealed left splenic subcapsular hematoma associated with splenic laceration and surrounding edema.  She was transferred to the ICU she was evaluated by general surgery as well as vascular surgery.  She was taken to the OR on 1/22 where she underwent coil embolization of the splenic artery and right femoral artery exploration with  retrieval of dislodged coil.  She was ultimately stabilized and discharged.  She again presented back to this facility 4/1 to 4/7 where she was found to have acute on chronic pancreatitis.  She was found to have peripancreatic fluid collections as well as thick-walled stomach and suspicion for possible acute gastritis.  EGD was obtained 4/3 and demonstrated gastritis.  Duodenal and stomach biopsies were unremarkable.  She was.  By gastroenterology and given supportive care and ultimately discharged.   Presented to a freestanding emergency room yesterday where CT A/P revealed decreased size of a 3.3 cm probable pseudocyst with a larger pancreatic collection as well as collection along the inferior aspect of the spleen that was also increased in size..  The perisplenic collection felt to be concerning for pseudocyst and/or hematoma.  For this reason, she was transferred to this facility for further evaluation with a general surgery.  There was some asymmetric delayed left renal nephrograms.  UA was obtained and unremarkable any acute findings.  She has been given supportive care and awaiting further evaluation by general surgery.     Review of Systems   Constitutional:  Negative for appetite change, fatigue and fever.   HENT:  Negative for congestion and ear pain.    Respiratory:  Negative for cough and shortness of breath.    Cardiovascular:  Negative for chest pain and leg swelling.   Gastrointestinal:  Positive for abdominal pain and nausea. Negative for constipation, diarrhea and vomiting.   Genitourinary:  Negative for difficulty urinating and dysuria.   Musculoskeletal:  Negative for back pain and gait problem.   Skin:  Negative for color change and pallor.   Neurological:  Negative for dizziness and light-headedness.   Psychiatric/Behavioral:  Negative for confusion. The patient is not nervous/anxious.       Personal History     Past Medical History:   Diagnosis Date    Anemia     Anxiety     Constipation      Cyst of spleen     Diarrhea     E. coli bacteremia     ETOH abuse     Frequent UTI     GERD (gastroesophageal reflux disease)     Hiatal hernia     Left flank pain 10/21/2022    Migraine     Miscarriage 2004    Pancreatitis     Pseudocyst of pancreas      Past Surgical History:   Procedure Laterality Date    EMBOLIZATION MESENTERIC ARTERY N/A 1/22/2024    Procedure: OR EMBOLIZATION MESENTERIC ARTERY RIGHT FEMORAL POPLITEAL THROMBECTOMY;  Surgeon: Carine Pena Jr., MD;  Location: [facility] HYBRID OR;  Service: Vascular;  Laterality: N/A;    ENDOSCOPY N/A 08/10/2022    Procedure: ESOPHAGOGASTRODUODENOSCOPY with bxs;  Surgeon: Salvador Price MD;  Location: [facility] ENDOSCOPY;  Service: Gastroenterology;  Laterality: N/A;  Pre: r/o esophageal  varacies, abd pain  Post: esophageal plaque    ENDOSCOPY N/A 10/9/2023    Procedure: ESOPHAGOGASTRODUODENOSCOPY WITH STENT REMOVAL;  Surgeon: Red Denson MD;  Location: [facility] ENDOSCOPY;  Service: Gastroenterology;  Laterality: N/A;    ENDOSCOPY N/A 4/3/2024    Procedure: ESOPHAGOGASTRODUODENOSCOPY (EGD) WITH BIOPSIES;  Surgeon: Petar Starr MD;  Location: [facility] ENDOSCOPY;  Service: Gastroenterology;  Laterality: N/A;  PRE- ABDOMINAL PAIN  POST - GASTRITIS, ESOPHAGITIS,BILE REFLUX    PANCREATIC CYST DRAINAGE      x 2    UPPER ENDOSCOPIC ULTRASOUND W/ FNA N/A 9/13/2023    Procedure: Esophagogastroduodenoscopy with biopsy x 1 area and endoscopic ultrasound with placement of cyst gastrostomy;  Surgeon: Red Denson MD;  Location: [facility] ENDOSCOPY;  Service: Gastroenterology;  Laterality: N/A;  post: pancreatic psuedocyst    WISDOM TOOTH EXTRACTION       Family History   Problem Relation Age of Onset    Alcohol abuse Mother     Arthritis Mother     Asthma Mother     Miscarriages / Stillbirths Mother     Cancer Father     Diabetes Father     Drug abuse Father     Early death Father     Heart disease Father     Hyperlipidemia Father     Hypertension Father      Alcohol abuse Paternal Aunt     Cancer Paternal Aunt     Diabetes Paternal Aunt     Drug abuse Paternal Aunt     Early death Paternal Aunt     Heart disease Paternal Aunt     Hyperlipidemia Paternal Aunt     Hypertension Paternal Aunt     Alcohol abuse Maternal Grandmother     Alcohol abuse Maternal Grandfather     Alcohol abuse Paternal Grandmother     Cancer Paternal Grandmother     Diabetes Paternal Grandmother     Drug abuse Paternal Grandmother     Early death Paternal Grandmother     Heart disease Paternal Grandmother     Hyperlipidemia Paternal Grandmother     Hypertension Paternal Grandmother     Alcohol abuse Paternal Grandfather      Social History     Tobacco Use    Smoking status: Every Day     Current packs/day: 0.50     Average packs/day: 0.5 packs/day for 28.2 years (14.1 ttl pk-yrs)     Types: Cigarettes     Start date: 1/28/1996     Passive exposure: Current    Smokeless tobacco: Never   Vaping Use    Vaping status: Never Used   Substance Use Topics    Alcohol use: Not Currently    Drug use: Never     Medications Prior to Admission   Medication Sig Dispense Refill Last Dose    amitriptyline (ELAVIL) 50 MG tablet Take 1 tablet by mouth Every Night.       Ferrous Sulfate Dried (Feosol) 200 (65 Fe) MG tablet tablet Take 1 tablet by mouth Daily.       folic acid (FOLVITE) 1 MG tablet Take 1 tablet by mouth Daily. 30 tablet 3     magnesium oxide (MAG-OX) 400 MG tablet Take 1 tablet by mouth Daily.       ondansetron (ZOFRAN) 4 MG tablet Take 1 tablet by mouth Every 8 (Eight) Hours As Needed for Nausea or Vomiting.       oxyCODONE-acetaminophen (PERCOCET)  MG per tablet Take 1 tablet by mouth Every 4 (Four) Hours As Needed for Moderate Pain. 20 tablet 0     pantoprazole (PROTONIX) 40 MG EC tablet Take 1 tablet by mouth Daily. 30 tablet 3     promethazine (PHENERGAN) 25 MG suppository Insert 1 suppository into the rectum As Needed for Nausea or Vomiting.       thiamine (VITAMIN B1) 100 MG tablet Take  1 tablet by mouth Daily. 30 tablet 3      Allergies:    Allergies   Allergen Reactions    Nickel Rash       Objective    Objective     Vital Signs  Temp:  [97.6 °F (36.4 °C)-99.1 °F (37.3 °C)] 97.6 °F (36.4 °C)  Heart Rate:  [] 98  Resp:  [16-18] 18  BP: (113-136)/() 118/79  SpO2:  [96 %-100 %] 98 %  on   ;   Device (Oxygen Therapy): room air  Body mass index is 18.9 kg/m².    Physical Exam  Vitals and nursing note reviewed.   Constitutional:       Appearance: She is ill-appearing. She is not toxic-appearing.   HENT:      Head: Normocephalic and atraumatic.      Right Ear: External ear normal.      Left Ear: External ear normal.      Nose: Nose normal.   Cardiovascular:      Rate and Rhythm: Normal rate and regular rhythm.      Pulses: Normal pulses.   Pulmonary:      Effort: Pulmonary effort is normal. No respiratory distress.      Breath sounds: Normal breath sounds.   Abdominal:      General: Bowel sounds are normal. There is no distension.      Palpations: Abdomen is soft.      Tenderness: There is abdominal tenderness.   Musculoskeletal:         General: No swelling. Normal range of motion.      Cervical back: Normal range of motion and neck supple.   Skin:     General: Skin is warm and dry.      Findings: No bruising.   Neurological:      Mental Status: She is alert and oriented to person, place, and time.      Sensory: No sensory deficit.      Coordination: Coordination normal.   Psychiatric:         Mood and Affect: Mood normal.         Behavior: Behavior normal.     Results Review:  I reviewed the patient's new clinical results.  I reviewed the patient's new imaging results and agree with the interpretation.  I reviewed the patient's other test results and agree with the interpretation  I personally viewed and interpreted the patient's EKG/Telemetry data    Lab Results (last 24 hours)       Procedure Component Value Units Date/Time    CBC & Differential [[id number]]  (Abnormal) Collected:  04/25/24 1537    Specimen: Blood Updated: 04/25/24 1541    Narrative:      The following orders were created for panel order CBC & Differential.  Procedure                               Abnormality         Status                     ---------                               -----------         ------                     CBC Auto Differential[[id number]]        Abnormal            Final result                 Please view results for these tests on the individual orders.    Comprehensive Metabolic Panel [[id number]]  (Abnormal) Collected: 04/25/24 1537    Specimen: Blood Updated: 04/25/24 1600     Glucose 109 mg/dL      BUN 7 mg/dL      Creatinine 0.78 mg/dL      Sodium 136 mmol/L      Potassium 3.9 mmol/L      Chloride 97 mmol/L      CO2 25.9 mmol/L      Calcium 9.8 mg/dL      Total Protein 8.0 g/dL      Albumin 4.1 g/dL      ALT (SGPT) 9 U/L      AST (SGOT) 9 U/L      Alkaline Phosphatase 128 U/L      Total Bilirubin <0.2 mg/dL      Globulin 3.9 gm/dL      A/G Ratio 1.1 g/dL      BUN/Creatinine Ratio 9.0     Anion Gap 13.1 mmol/L      eGFR 98.0 mL/min/1.73     Narrative:      GFR Normal >60  Chronic Kidney Disease <60  Kidney Failure <15      Lipase [[id number]]  (Abnormal) Collected: 04/25/24 1537    Specimen: Blood Updated: 04/25/24 1600     Lipase 139 U/L     CBC Auto Differential [[id number]]  (Abnormal) Collected: 04/25/24 1537    Specimen: Blood Updated: 04/25/24 1541     WBC 10.88 10*3/mm3      RBC 3.91 10*6/mm3      Hemoglobin 12.1 g/dL      Hematocrit 37.0 %      MCV 94.6 fL      MCH 30.9 pg      MCHC 32.7 g/dL      RDW 13.9 %      RDW-SD 49.0 fl      MPV 8.6 fL      Platelets 580 10*3/mm3      Neutrophil % 65.4 %      Lymphocyte % 23.9 %      Monocyte % 9.3 %      Eosinophil % 1.2 %      Basophil % 0.1 %      Immature Grans % 0.1 %      Neutrophils, Absolute 7.12 10*3/mm3      Lymphocytes, Absolute 2.60 10*3/mm3      Monocytes, Absolute 1.01 10*3/mm3      Eosinophils, Absolute 0.13 10*3/mm3      Basophils,  Absolute 0.01 10*3/mm3      Immature Grans, Absolute 0.01 10*3/mm3     Magnesium [[id number]]  (Normal) Collected: 04/25/24 1537    Specimen: Blood Updated: 04/25/24 1600     Magnesium 1.8 mg/dL     Ethanol [[id number]] Collected: 04/25/24 1537    Specimen: Blood Updated: 04/25/24 1632     Ethanol <10 mg/dL      Ethanol % <0.010 %     Urinalysis With Microscopic If Indicated (No Culture) - Urine, Clean Catch [[id number]]  (Abnormal) Collected: 04/25/24 1645    Specimen: Urine, Clean Catch Updated: 04/25/24 1710     Color, UA Yellow     Appearance, UA Slightly Cloudy     pH, UA 6.0     Specific Gravity, UA <=1.005     Glucose, UA Negative     Ketones, UA Negative     Bilirubin, UA Negative     Blood, UA Negative     Protein, UA Negative     Leuk Esterase, UA Negative     Nitrite, UA Negative     Urobilinogen, UA 0.2 E.U./dL    Narrative:      Urine microscopic not indicated.    Comprehensive Metabolic Panel [[id number]]  (Abnormal) Collected: 04/26/24 0706    Specimen: Blood Updated: 04/26/24 0806     Glucose 93 mg/dL      BUN 4 mg/dL      Creatinine 0.57 mg/dL      Sodium 137 mmol/L      Potassium 4.0 mmol/L      Chloride 100 mmol/L      CO2 24.6 mmol/L      Calcium 9.2 mg/dL      Total Protein 6.7 g/dL      Albumin 3.4 g/dL      ALT (SGPT) 5 U/L      AST (SGOT) 10 U/L      Alkaline Phosphatase 107 U/L      Total Bilirubin <0.2 mg/dL      Globulin 3.3 gm/dL      A/G Ratio 1.0 g/dL      BUN/Creatinine Ratio 7.0     Anion Gap 12.4 mmol/L      eGFR 117.3 mL/min/1.73     Narrative:      GFR Normal >60  Chronic Kidney Disease <60  Kidney Failure <15      CBC (No Diff) [[id number]]  (Abnormal) Collected: 04/26/24 0706    Specimen: Blood Updated: 04/26/24 0808     WBC 8.90 10*3/mm3      RBC 3.65 10*6/mm3      Hemoglobin 11.5 g/dL      Hematocrit 34.1 %      MCV 93.4 fL      MCH 31.5 pg      MCHC 33.7 g/dL      RDW 14.2 %      RDW-SD 49.1 fl      MPV 9.4 fL      Platelets 401 10*3/mm3     Magnesium [[id number]]  (Normal)  "Collected: 04/26/24 0713    Specimen: Blood Updated: 04/26/24 0911     Magnesium 1.6 mg/dL     Phosphorus [[id number]]  (Normal) Collected: 04/26/24 0713    Specimen: Blood Updated: 04/26/24 0911     Phosphorus 4.2 mg/dL     Procalcitonin [[id number]]  (Normal) Collected: 04/26/24 0713    Specimen: Blood Updated: 04/26/24 0921     Procalcitonin 0.08 ng/mL     Narrative:      As a Marker for Sepsis (Non-Neonates):    1. <0.5 ng/mL represents a low risk of severe sepsis and/or septic shock.  2. >2 ng/mL represents a high risk of severe sepsis and/or septic shock.    As a Marker for Lower Respiratory Tract Infections that require antibiotic therapy:    PCT on Admission    Antibiotic Therapy       6-12 Hrs later    >0.5                Strongly Recommended  >0.25 - <0.5        Recommended   0.1 - 0.25          Discouraged              Remeasure/reassess PCT  <0.1                Strongly Discouraged     Remeasure/reassess PCT    As 28 day mortality risk marker: \"Change in Procalcitonin Result\" (>80% or <=80%) if Day 0 (or Day 1) and Day 4 values are available. Refer to [email or web address]    Change in PCT <=80%  A decrease of PCT levels below or equal to 80% defines a positive change in PCT test result representing a higher risk for 28-day all-cause mortality of patients diagnosed with severe sepsis for septic shock.    Change in PCT >80%  A decrease of PCT levels of more than 80% defines a negative change in PCT result representing a lower risk for 28-day all-cause mortality of patients diagnosed with severe sepsis or septic shock.               Imaging Results (Last 24 Hours)       Procedure Component Value Units Date/Time    CT Abdomen Pelvis With Contrast [[id number]] Collected: 04/25/24 1929     Updated: 04/25/24 1948    Narrative:      CT ABDOMEN PELVIS W CONTRAST-     HISTORY: Worsened left upper quadrant pain, history of splenic rupture,  pancreatitis, gastritis.     TECHNIQUE:  CT of the abdomen and " pelvis was performed following the  administration of intravenous contrast. Reformatted images were  reviewed. Radiation dose reduction techniques were utilized, including  automated exposure control and exposure modulation based on body size.     COMPARISON: CT abdomen and pelvis 4/1/2024     FINDINGS:     Lung bases and pleural spaces are clear.  The liver is enlarged, measuring 18.5 cm in craniocaudal length. There  is a peripherally enhancing collection along the dorsal left hepatic  surface measuring approximately 3.0 x 2.7 x 3.3 cm, which has  significantly decreased in size from 4/1/2024, previously 6.3 x 3.3 x  7.7 cm when remeasured. The gallbladder is present. There are multiple  coarse pancreatic calcifications. Along the ventral distal pancreatic  body and tail, there is an approximately 5.7 x 3.2 x 3.5 cm peripherally  enhancing multiloculated collection, which has increased in size from  4/1/2024, previously 3.0 x 1.1 x 1.5 cm when remeasured. Along the  inferior aspect of the spleen, there is a 7.6 x 4.9 x 6.4 cm  peripherally enhancing collection, which has increased in size,  previously 5.9 x 3.4 x 5.2 cm when remeasured. This is likely contiguous  with an approximately 10.6 x 9.0 x 8.0 cm multiloculated collection  involving the spleen, which is newly enlarged.  The adrenal glands are within normal limits. The collection along the  inferior aspect of the spleen results in mass effect on the adjacent  left kidney. There are asymmetric delayed left renal nephrograms. There  is no hydronephrosis. There is at least mild atherosclerosis. There are  embolization coils/clips in the left lower quadrant. Metallic streak  artifact limits evaluation of adjacent structures. There is mild fat  stranding in the left upper quadrant.  There is mass effect on the gastric fundus from the dominant splenic  collection. Previous endogastric wall thickening has improved. There is  no bowel obstruction. There is a  moderate to large colonic stool burden.  The appendix is visualized. The bladder is poorly distended. The uterus  is present. There is no adnexal mass.  There is osseous demineralization.          Impression:         1.  Decreased size of a 3.3 cm probable pseudocyst along the left  hepatic surface. A distal pancreatic collection measuring 5.7 cm has  increased in size, and a collection along the inferior aspect of the  spleen has also increased in size, likely reflecting additional  pseudocysts. The perisplenic collection is likely contiguous with a  large multiloculated collection occupying the spleen, which is newly  enlarged, concerning for pseudocyst and/or hematoma. The splenic  collection is more similar in appearance to the previously noted  hematoma/collection from January, which had improved on follow-up CT  from 4/1/2024. This is concerning for possible interval hemorrhage.  2.  Asymmetric delayed left renal nephrograms. Correlate with urinalysis  to evaluate for possible pyelonephritis.  3.  Additional findings, as above.        This report was finalized on 4/25/2024 7:45 PM by Dr. Aleisha Sandoval M.D  on Workstation: [id number]               Results for orders placed during the hospital encounter of 08/06/22    Adult Transthoracic Echo Complete W/ Cont if Necessary Per Protocol    Interpretation Summary  · Left ventricular ejection fraction appears to be 61 - 65%. Left ventricular systolic function is normal.  · Left ventricular diastolic function was normal.  · Normal right ventricular cavity size and systolic function noted.  · The agitated saline study is positive with Valsalva, consistent with a small to moderate sized PFO  · Mild tricuspid valve regurgitation is present.  · Calculated right ventricular systolic pressure from tricuspid regurgitation is 32 mmHg.  · There is no evidence of pericardial effusion       Assessment/Plan     Active Hospital Problems    Diagnosis  POA    **Hematoma of spleen  without rupture of capsule [D73.5]  Yes    Chronic, continuous use of opioids [F11.90]  Yes    Generalized anxiety disorder [F41.1]  Yes    Tobacco abuse [Z72.0]  Yes    Abdominal pain [R10.9]  Yes    Pancreatic pseudocyst [K86.3]  Yes    Anemia of chronic disease [D63.8]  Yes    Chronic pancreatitis [K86.1]  Yes    Abnormal CT of the abdomen [R93.5]  Yes    Alcohol abuse [F10.10]  Yes     Ms. Cain is a 43 y.o. female [facility] with some radiation into the left shoulder.  She has a history of splenic hematoma/laceration requiring embolization of the splenic artery in January of this year.  CT A/P revealed worsening pseudocyst and/or hematoma.  She was sent to this facility and general surgery was consulted.    Hematoma of spleen  -General surgery has been consulted.  Await their further recommendations.  -Will continue supportive care with pain medications and nausea medications as needed.  -N.p.o. until by surgery for a diet.  IV fluids in place.  -No current concerns for infection given lack of white count or fever.  -Will monitor serial H&H.    Chronic pancreatitis  Pancreatic pseudocyst  -Followed by gastroenterology on recent admission.  -Lipase 139.  Can repeat in a.m. and monitor.  -Continue alcohol cessation.    Alcohol abuse  -Currently 29 days sober.  -Continue to encourage alcohol abstinence.  -Vitamin replacement.    Chronic, continuous use of opioids  -Continue home Percocet with IV pain medications for breakthrough.    Anemia of chronic disease  -Hemoglobin 11 range.  Will monitor trend.  -Continue her oral iron.    Tobacco abuse  -Nicotine replacement therapy.    Generalized anxiety disorder  -Continue home medication.    I discussed the patients findings and my recommendations with patient and nursing staff.    VTE Prophylaxis - SCDs.  Code Status - Full code.       SOL Rios  [facility]  04/26/24  10:06 EDT

## 2024-04-26 NOTE — ED NOTES
"Nursing report ED to floor  Piper Cain  43 y.o.  female    HPI :  HPI (Adult)  Stated Reason for Visit: Onset 3-4 days.  Hx of acute pancreatitis.  LLQ abdominal pain.  Intermittent stabbing.  Nausea.    Chief Complaint  Chief Complaint   Patient presents with    Abdominal Pain       Admitting doctor:   Castro Hilliard MD    Admitting diagnosis:   The primary encounter diagnosis was Left upper quadrant abdominal pain. Diagnoses of Alcohol-induced chronic pancreatitis and Spleen hematoma, initial encounter were also pertinent to this visit.    Code status:   Current Code Status       Date Active Code Status Order ID Comments User Context       Prior            Allergies:   Nickel    Isolation:   No active isolations    Intake and Output    Intake/Output Summary (Last 24 hours) at 4/25/2024 2139  Last data filed at 4/25/2024 1821  Gross per 24 hour   Intake 1000 ml   Output --   Net 1000 ml       Weight:       04/25/24  1532   Weight: 58.1 kg (128 lb)       Most recent vitals:   Vitals:    04/25/24 1532 04/25/24 1700 04/25/24 1800 04/25/24 2111   BP: 122/94 132/94 (!) 136/115 129/93   BP Location: Right arm   Left arm   Patient Position: Sitting   Lying   Pulse: 120 91 91 92   Resp: 18 16 16 18   Temp: 98.6 °F (37 °C)      TempSrc: Oral      SpO2: 99% 97% 100% 96%   Weight: 58.1 kg (128 lb)      Height: 175.3 cm (69\")          Active LDAs/IV Access:   Lines, Drains & Airways       Active LDAs       Name Placement date Placement time Site Days    Peripheral IV 04/25/24 1656 Anterior;Distal;Left;Upper Arm 04/25/24  1656  Arm  less than 1                    Labs (abnormal labs have a star):   Labs Reviewed   COMPREHENSIVE METABOLIC PANEL - Abnormal; Notable for the following components:       Result Value    Glucose 109 (*)     Chloride 97 (*)     Alkaline Phosphatase 128 (*)     All other components within normal limits    Narrative:     GFR Normal >60  Chronic Kidney Disease <60  Kidney Failure <15     LIPASE - " Abnormal; Notable for the following components:    Lipase 139 (*)     All other components within normal limits   URINALYSIS W/ MICROSCOPIC IF INDICATED (NO CULTURE) - Abnormal; Notable for the following components:    Appearance, UA Slightly Cloudy (*)     All other components within normal limits    Narrative:     Urine microscopic not indicated.   CBC WITH AUTO DIFFERENTIAL - Abnormal; Notable for the following components:    WBC 10.88 (*)     Platelets 580 (*)     Neutrophils, Absolute 7.12 (*)     Monocytes, Absolute 1.01 (*)     All other components within normal limits   MAGNESIUM - Normal   ETHANOL   CBC AND DIFFERENTIAL    Narrative:     The following orders were created for panel order CBC & Differential.  Procedure                               Abnormality         Status                     ---------                               -----------         ------                     CBC Auto Differential[[id number]]        Abnormal            Final result                 Please view results for these tests on the individual orders.       EKG:   No orders to display       Meds given in ED:   Medications   sodium chloride 0.9 % flush 10 mL (has no administration in time range)   sodium chloride 0.9 % bolus 1,000 mL (0 mL Intravenous Stopped 4/25/24 1821)   droperidol (INAPSINE) injection 2.5 mg (2.5 mg Intravenous Given 4/25/24 1600)   pantoprazole (PROTONIX) injection 40 mg (40 mg Intravenous Given 4/25/24 1600)   HYDROmorphone (DILAUDID) injection 0.5 mg (0.5 mg Intravenous Given 4/25/24 1600)   HYDROmorphone (DILAUDID) injection 0.5 mg (0.5 mg Intravenous Given 4/25/24 1656)   HYDROmorphone (DILAUDID) injection 1 mg (1 mg Intravenous Given 4/25/24 1819)   iopamidol (ISOVUE-370) 76 % injection 100 mL (100 mL Intravenous Given 4/25/24 1915)   HYDROmorphone (DILAUDID) injection 1 mg (1 mg Intravenous Given 4/25/24 2043)   ondansetron (ZOFRAN) injection 4 mg (4 mg Intravenous Given 4/25/24 2104)       Imaging  results:  CT Abdomen Pelvis With Contrast    Result Date: 4/25/2024   1.  Decreased size of a 3.3 cm probable pseudocyst along the left hepatic surface. A distal pancreatic collection measuring 5.7 cm has increased in size, and a collection along the inferior aspect of the spleen has also increased in size, likely reflecting additional pseudocysts. The perisplenic collection is likely contiguous with a large multiloculated collection occupying the spleen, which is newly enlarged, concerning for pseudocyst and/or hematoma. The splenic collection is more similar in appearance to the previously noted hematoma/collection from January, which had improved on follow-up CT from 4/1/2024. This is concerning for possible interval hemorrhage. 2.  Asymmetric delayed left renal nephrograms. Correlate with urinalysis to evaluate for possible pyelonephritis. 3.  Additional findings, as above.   This report was finalized on 4/25/2024 7:45 PM by Dr. Aleisha Sandoval M.D on Workstation: [id number]       Ambulatory status:   - ambulatory      Social issues:   Social History     Socioeconomic History    Marital status: Single   Tobacco Use    Smoking status: Every Day     Current packs/day: 0.50     Average packs/day: 0.5 packs/day for 28.2 years (14.1 ttl pk-yrs)     Types: Cigarettes     Start date: 1/28/1996     Passive exposure: Current    Smokeless tobacco: Never   Vaping Use    Vaping status: Never Used   Substance and Sexual Activity    Alcohol use: Not Currently    Drug use: Never    Sexual activity: Defer     Partners: Male       Peripheral Neurovascular  Peripheral Neurovascular (Adult)  Peripheral Neurovascular WDL: WDL    Neuro Cognitive  Neuro Cognitive (Adult)  Cognitive/Neuro/Behavioral WDL: WDL    Learning  Learning Assessment (Adult)  Learning Readiness and Ability: no barriers identified    Respiratory  Respiratory (Adult)  Airway WDL: WDL  Respiratory WDL  Respiratory WDL: WDL    Abdominal Pain       Pain  Assessments  Pain (Adult)  (0-10) Pain Rating: Rest: 7  (0-10) Pain Rating: Activity: 7  Patient requested Medication Prescribed for Lower Pain Scale: No  Pain Location: abdomen  Response to Pain Interventions: intervention not effective per patient    NIH Stroke Scale       Evelyn Gannon RN  04/25/24 21:39 EDT

## 2024-04-26 NOTE — ED NOTES
Phone report to Ty RN @ [facility]; pt signed and understands risks of manipulating IV while en route to hospital by private vehicle

## 2024-04-27 LAB
ALBUMIN SERPL-MCNC: 3.2 G/DL (ref 3.5–5.2)
ALBUMIN/GLOB SERPL: 1 G/DL
ALP SERPL-CCNC: 102 U/L (ref 39–117)
ALT SERPL W P-5'-P-CCNC: 7 U/L (ref 1–33)
ANION GAP SERPL CALCULATED.3IONS-SCNC: 11.8 MMOL/L (ref 5–15)
AST SERPL-CCNC: 11 U/L (ref 1–32)
BILIRUB SERPL-MCNC: <0.2 MG/DL (ref 0–1.2)
BUN SERPL-MCNC: 3 MG/DL (ref 6–20)
BUN/CREAT SERPL: 5.7 (ref 7–25)
CALCIUM SPEC-SCNC: 9.2 MG/DL (ref 8.6–10.5)
CHLORIDE SERPL-SCNC: 101 MMOL/L (ref 98–107)
CO2 SERPL-SCNC: 27.2 MMOL/L (ref 22–29)
CREAT SERPL-MCNC: 0.53 MG/DL (ref 0.57–1)
DEPRECATED RDW RBC AUTO: 49.7 FL (ref 37–54)
EGFRCR SERPLBLD CKD-EPI 2021: 119.3 ML/MIN/1.73
ERYTHROCYTE [DISTWIDTH] IN BLOOD BY AUTOMATED COUNT: 14.1 % (ref 12.3–15.4)
GLOBULIN UR ELPH-MCNC: 3.3 GM/DL
GLUCOSE SERPL-MCNC: 91 MG/DL (ref 65–99)
HCT VFR BLD AUTO: 29.6 % (ref 34–46.6)
HCT VFR BLD AUTO: 30.2 % (ref 34–46.6)
HCT VFR BLD AUTO: 30.7 % (ref 34–46.6)
HCT VFR BLD AUTO: 30.7 % (ref 34–46.6)
HCT VFR BLD AUTO: 31 % (ref 34–46.6)
HGB BLD-MCNC: 10 G/DL (ref 12–15.9)
HGB BLD-MCNC: 9.6 G/DL (ref 12–15.9)
HGB BLD-MCNC: 9.7 G/DL (ref 12–15.9)
HGB BLD-MCNC: 9.9 G/DL (ref 12–15.9)
HGB BLD-MCNC: 9.9 G/DL (ref 12–15.9)
LIPASE SERPL-CCNC: 144 U/L (ref 13–60)
MAGNESIUM SERPL-MCNC: 1.7 MG/DL (ref 1.6–2.6)
MCH RBC QN AUTO: 30.8 PG (ref 26.6–33)
MCHC RBC AUTO-ENTMCNC: 32.2 G/DL (ref 31.5–35.7)
MCV RBC AUTO: 95.6 FL (ref 79–97)
PLATELET # BLD AUTO: 405 10*3/MM3 (ref 140–450)
PMV BLD AUTO: 8.9 FL (ref 6–12)
POTASSIUM SERPL-SCNC: 3.7 MMOL/L (ref 3.5–5.2)
PROT SERPL-MCNC: 6.5 G/DL (ref 6–8.5)
RBC # BLD AUTO: 3.21 10*6/MM3 (ref 3.77–5.28)
SODIUM SERPL-SCNC: 140 MMOL/L (ref 136–145)
WBC NRBC COR # BLD AUTO: 7.65 10*3/MM3 (ref 3.4–10.8)

## 2024-04-27 PROCEDURE — 85027 COMPLETE CBC AUTOMATED: CPT | Performed by: NURSE PRACTITIONER

## 2024-04-27 PROCEDURE — 25810000003 LACTATED RINGERS PER 1000 ML: Performed by: STUDENT IN AN ORGANIZED HEALTH CARE EDUCATION/TRAINING PROGRAM

## 2024-04-27 PROCEDURE — 85014 HEMATOCRIT: CPT | Performed by: STUDENT IN AN ORGANIZED HEALTH CARE EDUCATION/TRAINING PROGRAM

## 2024-04-27 PROCEDURE — 80053 COMPREHEN METABOLIC PANEL: CPT | Performed by: NURSE PRACTITIONER

## 2024-04-27 PROCEDURE — 99233 SBSQ HOSP IP/OBS HIGH 50: CPT | Performed by: STUDENT IN AN ORGANIZED HEALTH CARE EDUCATION/TRAINING PROGRAM

## 2024-04-27 PROCEDURE — 85014 HEMATOCRIT: CPT | Performed by: NURSE PRACTITIONER

## 2024-04-27 PROCEDURE — 25010000002 HYDROMORPHONE 1 MG/ML SOLUTION: Performed by: STUDENT IN AN ORGANIZED HEALTH CARE EDUCATION/TRAINING PROGRAM

## 2024-04-27 PROCEDURE — 83690 ASSAY OF LIPASE: CPT | Performed by: NURSE PRACTITIONER

## 2024-04-27 PROCEDURE — 83735 ASSAY OF MAGNESIUM: CPT | Performed by: STUDENT IN AN ORGANIZED HEALTH CARE EDUCATION/TRAINING PROGRAM

## 2024-04-27 PROCEDURE — 85018 HEMOGLOBIN: CPT | Performed by: STUDENT IN AN ORGANIZED HEALTH CARE EDUCATION/TRAINING PROGRAM

## 2024-04-27 PROCEDURE — G0378 HOSPITAL OBSERVATION PER HR: HCPCS

## 2024-04-27 PROCEDURE — 85018 HEMOGLOBIN: CPT | Performed by: NURSE PRACTITIONER

## 2024-04-27 PROCEDURE — 25010000002 ONDANSETRON PER 1 MG: Performed by: NURSE PRACTITIONER

## 2024-04-27 RX ORDER — FOLIC ACID 1 MG/1
1 TABLET ORAL DAILY
Status: DISCONTINUED | OUTPATIENT
Start: 2024-04-27 | End: 2024-05-03 | Stop reason: HOSPADM

## 2024-04-27 RX ORDER — BISACODYL 10 MG
10 SUPPOSITORY, RECTAL RECTAL DAILY
Status: DISCONTINUED | OUTPATIENT
Start: 2024-04-27 | End: 2024-04-27

## 2024-04-27 RX ORDER — BISACODYL 10 MG
10 SUPPOSITORY, RECTAL RECTAL DAILY PRN
Status: DISCONTINUED | OUTPATIENT
Start: 2024-04-27 | End: 2024-05-03 | Stop reason: HOSPADM

## 2024-04-27 RX ORDER — PANTOPRAZOLE SODIUM 40 MG/1
40 TABLET, DELAYED RELEASE ORAL
Status: DISCONTINUED | OUTPATIENT
Start: 2024-04-27 | End: 2024-05-03 | Stop reason: HOSPADM

## 2024-04-27 RX ORDER — POLYETHYLENE GLYCOL 3350 17 G/17G
17 POWDER, FOR SOLUTION ORAL 2 TIMES DAILY
Status: DISCONTINUED | OUTPATIENT
Start: 2024-04-27 | End: 2024-05-03 | Stop reason: HOSPADM

## 2024-04-27 RX ORDER — LIDOCAINE 4 G/G
1 PATCH TOPICAL
Status: DISCONTINUED | OUTPATIENT
Start: 2024-04-27 | End: 2024-05-03 | Stop reason: HOSPADM

## 2024-04-27 RX ORDER — BISACODYL 5 MG/1
10 TABLET, DELAYED RELEASE ORAL DAILY PRN
Status: DISCONTINUED | OUTPATIENT
Start: 2024-04-27 | End: 2024-04-27

## 2024-04-27 RX ADMIN — PANTOPRAZOLE SODIUM 40 MG: 40 TABLET, DELAYED RELEASE ORAL at 08:22

## 2024-04-27 RX ADMIN — ONDANSETRON 4 MG: 2 INJECTION INTRAMUSCULAR; INTRAVENOUS at 00:09

## 2024-04-27 RX ADMIN — DOCUSATE SODIUM 50MG AND SENNOSIDES 8.6MG 1 TABLET: 8.6; 5 TABLET, FILM COATED ORAL at 20:31

## 2024-04-27 RX ADMIN — OXYCODONE AND ACETAMINOPHEN 1 TABLET: 10; 325 TABLET ORAL at 16:39

## 2024-04-27 RX ADMIN — ONDANSETRON 4 MG: 2 INJECTION INTRAMUSCULAR; INTRAVENOUS at 11:46

## 2024-04-27 RX ADMIN — OXYCODONE AND ACETAMINOPHEN 1 TABLET: 10; 325 TABLET ORAL at 20:31

## 2024-04-27 RX ADMIN — OXYCODONE AND ACETAMINOPHEN 1 TABLET: 10; 325 TABLET ORAL at 01:41

## 2024-04-27 RX ADMIN — POLYETHYLENE GLYCOL 3350 17 G: 17 POWDER, FOR SOLUTION ORAL at 08:23

## 2024-04-27 RX ADMIN — POLYETHYLENE GLYCOL 3350 17 G: 17 POWDER, FOR SOLUTION ORAL at 20:31

## 2024-04-27 RX ADMIN — HYDROMORPHONE HYDROCHLORIDE 1 MG: 1 INJECTION, SOLUTION INTRAMUSCULAR; INTRAVENOUS; SUBCUTANEOUS at 22:34

## 2024-04-27 RX ADMIN — MAGNESIUM OXIDE 400 MG (241.3 MG MAGNESIUM) TABLET 400 MG: TABLET at 08:22

## 2024-04-27 RX ADMIN — ONDANSETRON 4 MG: 2 INJECTION INTRAMUSCULAR; INTRAVENOUS at 18:30

## 2024-04-27 RX ADMIN — HYDROMORPHONE HYDROCHLORIDE 1 MG: 1 INJECTION, SOLUTION INTRAMUSCULAR; INTRAVENOUS; SUBCUTANEOUS at 18:30

## 2024-04-27 RX ADMIN — HYDROMORPHONE HYDROCHLORIDE 1 MG: 1 INJECTION, SOLUTION INTRAMUSCULAR; INTRAVENOUS; SUBCUTANEOUS at 05:03

## 2024-04-27 RX ADMIN — HYDROMORPHONE HYDROCHLORIDE 1 MG: 1 INJECTION, SOLUTION INTRAMUSCULAR; INTRAVENOUS; SUBCUTANEOUS at 14:27

## 2024-04-27 RX ADMIN — OXYCODONE AND ACETAMINOPHEN 1 TABLET: 10; 325 TABLET ORAL at 07:10

## 2024-04-27 RX ADMIN — DOCUSATE SODIUM 50MG AND SENNOSIDES 8.6MG 1 TABLET: 8.6; 5 TABLET, FILM COATED ORAL at 08:23

## 2024-04-27 RX ADMIN — FOLIC ACID 1 MG: 1 TABLET ORAL at 22:00

## 2024-04-27 RX ADMIN — SODIUM CHLORIDE, POTASSIUM CHLORIDE, SODIUM LACTATE AND CALCIUM CHLORIDE 75 ML/HR: 600; 310; 30; 20 INJECTION, SOLUTION INTRAVENOUS at 13:43

## 2024-04-27 RX ADMIN — AMITRIPTYLINE HYDROCHLORIDE 50 MG: 50 TABLET, FILM COATED ORAL at 20:31

## 2024-04-27 RX ADMIN — HYDROMORPHONE HYDROCHLORIDE 1 MG: 1 INJECTION, SOLUTION INTRAMUSCULAR; INTRAVENOUS; SUBCUTANEOUS at 09:19

## 2024-04-27 RX ADMIN — Medication 100 MG: at 22:00

## 2024-04-27 RX ADMIN — FERROUS SULFATE TAB 325 MG (65 MG ELEMENTAL FE) 325 MG: 325 (65 FE) TAB at 08:22

## 2024-04-27 RX ADMIN — OXYCODONE AND ACETAMINOPHEN 1 TABLET: 10; 325 TABLET ORAL at 11:46

## 2024-04-27 RX ADMIN — LIDOCAINE 1 PATCH: 4 PATCH TOPICAL at 22:00

## 2024-04-27 RX ADMIN — BISACODYL 10 MG: 5 TABLET, COATED ORAL at 16:39

## 2024-04-27 RX ADMIN — SODIUM CHLORIDE, POTASSIUM CHLORIDE, SODIUM LACTATE AND CALCIUM CHLORIDE 75 ML/HR: 600; 310; 30; 20 INJECTION, SOLUTION INTRAVENOUS at 00:09

## 2024-04-27 NOTE — PLAN OF CARE
Goal Outcome Evaluation:  Plan of Care Reviewed With: patient        Progress: no change  Outcome Evaluation: VSS. IV Dilaudid and Percocet given alternately for severe left sided abdominal pain. Zofran for nausea. IV Fluids on flow. Good oral fluid intake. Up ad valeria, large urine output , no BM . Ambulated in the peterson  several times.  Slept on and off.

## 2024-04-27 NOTE — PROGRESS NOTES
"General Surgery Progress Note    CC: follow up pancreatitis, worsening pancreatic pseudocyst vs perisplenic hematoma    S: Patient states she is doing well except for pain in LUQ and shoulder. Denies NV and is hungry. Passing gas but denies BM since prior to arrival. Moving around without difficulty.     O:/86 (BP Location: Left arm, Patient Position: Lying)   Pulse 95   Temp 97.7 °F (36.5 °C) (Oral)   Resp 16   Ht 175.3 cm (69\")   Wt 58.1 kg (128 lb)   LMP 01/17/2018 (Exact Date)   SpO2 97%   BMI 18.90 kg/m²     Intake & Output (last day)         04/26 0701  04/27 0700 04/27 0701 04/28 0700    P.O. 1600     I.V. (mL/kg) 1890 (32.5)     IV Piggyback      Total Intake(mL/kg) 3490 (60.1)     Urine (mL/kg/hr) 4950 (3.5)     Total Output 4950     Net -1460                   GENERAL: awake, alert, very comfortable appearing, interactive, talkative  HEENT: EOMI, clear sclera, moist mucus membranes   CHEST: normal work of breathing on room air  CARDIAC: regular rate   GI: Abd less firm, nondistended, mildly tender in the LUQ without rebound or guarding  EXTREMITIES: CHAMORRO, no cyanosis or edema    SKIN: Warm and dry, no rash    LABS  Results from last 7 days   Lab Units 04/27/24  0647 04/26/24  2349 04/26/24  2155 04/26/24  1615 04/26/24  0706 04/25/24  1537   WBC 10*3/mm3 7.65  --   --   --  8.90 10.88*   HEMOGLOBIN g/dL 9.9*  9.9* 9.7* 9.9*   < > 11.5* 12.1   HEMATOCRIT % 30.7*  30.7* 30.2* 30.6*   < > 34.1 37.0   PLATELETS 10*3/mm3 405  --   --   --  401 580*    < > = values in this interval not displayed.     Results from last 7 days   Lab Units 04/27/24  0647 04/26/24  0706 04/25/24  1537   SODIUM mmol/L 140 137 136   POTASSIUM mmol/L 3.7 4.0 3.9   CHLORIDE mmol/L 101 100 97*   CO2 mmol/L 27.2 24.6 25.9   BUN mg/dL 3* 4* 7   CREATININE mg/dL 0.53* 0.57 0.78   CALCIUM mg/dL 9.2 9.2 9.8   BILIRUBIN mg/dL <0.2 <0.2 <0.2   ALK PHOS U/L 102 107 128*   ALT (SGPT) U/L 7 5 9   AST (SGOT) U/L 11 10 9   GLUCOSE " mg/dL 91 93 109*           A/P: 43 y.o. female with  chronic alcoholic pancreatitis with pancreatic pseudocysts and previous episode of splenic hemorrhage and subcapsular hematoma, has undergone splenic artery embolization in January 2024, seen again earlier this month for left upper quadrant pain, noted to have decreased size of pancreatic fluid collections.  Returned for 5 days of worsening left upper quadrant pain, now with CT evidence of increased size of her fluid collections and concern for possible interval splenic hemorrhage.    AVSS.WBC 7.65. No intraabdominal infection. Abdominal exam reassuring with mild tenderness but no peritoneal signs. Passing flatus, no BM. Tolerating clear liquids. Will increase bowel stimulation given her constipation related to narcotic use and stool burden noted on CT.  Hgb stable at 9.9. Can do daily CBCs as no signs of bleeding. At this point she does not require surgery. Will allow regular diet.  Had a lengthy discussion with her about her pain, pancreatitis, fluid collections and concern for possible ongoing splenic involvement. Discussed with her that she may need splenectomy if she develops recurrent bleeding, and she may need future intervention for her pseudocysts with regards to pain management, but she is very high risk for complications from surgery including bleeding, pancreatic fistula, worsening pancreatitis. She seems in good understanding of the risks/benefits and would like to avoid surgery right now if possible. Will recommend she follow up with pain management and general surgery as scheduled later this month. She should continue strict abstinence from alcohol. If she remains stable, tolerates diet, and has reasonable pain control she should be ok to discharge home in the next day or two.    Mai Almeida MD  General, Robotic and Endoscopic Surgery  [facility]    [address], [address]  [place], [place], [address]  P: [phone]  F:  [phone] \

## 2024-04-27 NOTE — PROGRESS NOTES
Name: Piper Cain ADMIT: 2024   : 1980  PCP: Sharmaine Gatica APRN    MRN: [id number] LOS: 0 days   AGE/SEX: 43 y.o. female  [facility]OM[facility]     Subjective   Subjective   Started advance diet.  Tolerating well.  Denies any nausea or vomiting.  Does have chronic abdominal pain but has not worsened since advancement of diet.  Has not had a bowel movement in several days.  Is passing flatus.  States she uses Dulcolax suppositories occasionally at home to stimulate bowels.  Currently her biggest pain is in the left posterior shoulder.  She states it feels tight some radiation up the back of neck.     Objective   Objective   Vital Signs  Temp:  [97.6 °F (36.4 °C)-98.1 °F (36.7 °C)] 97.6 °F (36.4 °C)  Heart Rate:  [84-95] 94  Resp:  [16-18] 16  BP: (110-122)/(77-88) 120/88  SpO2:  [97 %-100 %] 99 %  on   ;   Device (Oxygen Therapy): room air  Body mass index is 18.9 kg/m².    Physical Exam  Vitals and nursing note reviewed.   Constitutional:       Comments: Thin     Eyes:      General: Scleral icterus present.      Conjunctiva/sclera: Conjunctivae normal.   Cardiovascular:      Rate and Rhythm: Normal rate and regular rhythm.      Pulses: Normal pulses.      Heart sounds: Normal heart sounds.   Pulmonary:      Effort: Pulmonary effort is normal.      Breath sounds: Normal breath sounds.   Abdominal:      General: Bowel sounds are normal. There is distension.      Tenderness: There is abdominal tenderness.   Musculoskeletal:         General: Tenderness (Tenderness to left posterior shoulder.  Muscle spasming noted.) present.   Skin:     General: Skin is warm and dry.   Neurological:      General: No focal deficit present.      Mental Status: She is alert and oriented to person, place, and time. Mental status is at baseline.   Psychiatric:         Mood and Affect: Mood normal.         Behavior: Behavior normal.         Thought Content: Thought content normal.         Judgment: Judgment normal.  "                Results Review  I reviewed the patient's new clinical results.  Results from last 7 days   Lab Units 04/27/24  1547 04/27/24  1403 04/27/24  0647 04/26/24  2349 04/26/24  1615 04/26/24  0706 04/25/24  1537   WBC 10*3/mm3  --   --  7.65  --   --  8.90 10.88*   HEMOGLOBIN g/dL 9.6* 10.0* 9.9*  9.9* 9.7*   < > 11.5* 12.1   PLATELETS 10*3/mm3  --   --  405  --   --  401 580*    < > = values in this interval not displayed.     Results from last 7 days   Lab Units 04/27/24  0647 04/26/24  0706 04/25/24  1537   SODIUM mmol/L 140 137 136   POTASSIUM mmol/L 3.7 4.0 3.9   CHLORIDE mmol/L 101 100 97*   CO2 mmol/L 27.2 24.6 25.9   BUN mg/dL 3* 4* 7   CREATININE mg/dL 0.53* 0.57 0.78   GLUCOSE mg/dL 91 93 109*     Lab Results   Component Value Date    ANIONGAP 11.8 04/27/2024     Estimated Creatinine Clearance: 128.1 mL/min (A) (by C-G formula based on SCr of 0.53 mg/dL (L)).   Lab Results   Component Value Date    EGFR 119.3 04/27/2024     Results from last 7 days   Lab Units 04/27/24  0647 04/26/24  0706 04/25/24  1537   ALBUMIN g/dL 3.2* 3.4* 4.1   BILIRUBIN mg/dL <0.2 <0.2 <0.2   ALK PHOS U/L 102 107 128*   AST (SGOT) U/L 11 10 9   ALT (SGPT) U/L 7 5 9     Results from last 7 days   Lab Units 04/27/24  0647 04/26/24  0713 04/26/24  0706 04/25/24  1537   CALCIUM mg/dL 9.2  --  9.2 9.8   ALBUMIN g/dL 3.2*  --  3.4* 4.1   MAGNESIUM mg/dL 1.7 1.6  --  1.8   PHOSPHORUS mg/dL  --  4.2  --   --      Results from last 7 days   Lab Units 04/26/24  0713   PROCALCITONIN ng/mL 0.08     No results found for: \"HGBA1C\", \"POCGLU\"    No radiology results for the last day      Current Facility-Administered Medications:     acetaminophen (TYLENOL) tablet 650 mg, 650 mg, Oral, Q6H PRN, Cristina Escobar, APRN, 650 mg at 04/26/24 1647    aluminum-magnesium hydroxide-simethicone (MAALOX MAX) 400-400-40 MG/5ML suspension 15 mL, 15 mL, Oral, Q6H PRN, Kimberly Armstrong, APRN    amitriptyline (ELAVIL) tablet 50 mg, 50 mg, Oral, " Nightly, Cristina Escobar, SOL, 50 mg at 04/26/24 2039    bisacodyl (DULCOLAX) suppository 10 mg, 10 mg, Rectal, Daily PRN, Michelle Andujar APRN    ferrous sulfate tablet 325 mg, 325 mg, Oral, Daily With Breakfast, Cristina Escobar, APRN, 325 mg at 04/27/24 0822    HYDROmorphone (DILAUDID) injection 1 mg, 1 mg, Intravenous, Q4H PRN, Mike Donnelly MD, 1 mg at 04/27/24 1830    lactated ringers infusion, 75 mL/hr, Intravenous, Continuous, Mike Donnelly MD, Last Rate: 75 mL/hr at 04/27/24 1343, 75 mL/hr at 04/27/24 1343    Lidocaine 4 % 1 patch, 1 patch, Transdermal, Q24H, Michelle Andujar APRN    LORazepam (ATIVAN) tablet 1 mg, 1 mg, Oral, Q1H PRN **OR** midazolam (VERSED) injection 2 mg, 2 mg, Intravenous, Q1H PRN **OR** LORazepam (ATIVAN) tablet 2 mg, 2 mg, Oral, Q1H PRN **OR** midazolam (VERSED) injection 4 mg, 4 mg, Intravenous, Q1H PRN **OR** midazolam (VERSED) injection 4 mg, 4 mg, Intravenous, Q15 Min PRN **OR** midazolam (VERSED) injection 4 mg, 4 mg, Intramuscular, Q15 Min PRN, Xiao Conroy APRN    magnesium oxide (MAG-OX) tablet 400 mg, 400 mg, Oral, Daily, Cristina EscobarSOL, 400 mg at 04/27/24 0822    Magnesium Standard Dose Replacement - Follow Nurse / BPA Driven Protocol, , Does not apply, PRN, Xiao Conroy APRN    melatonin tablet 5 mg, 5 mg, Oral, Nightly PRN, Kimberly Armstrong APRN    ondansetron ODT (ZOFRAN-ODT) disintegrating tablet 4 mg, 4 mg, Oral, Q6H PRN **OR** ondansetron (ZOFRAN) injection 4 mg, 4 mg, Intravenous, Q6H PRN, Kimberly Armstrong, APRN, 4 mg at 04/27/24 1830    oxyCODONE-acetaminophen (PERCOCET)  MG per tablet 1 tablet, 1 tablet, Oral, Q4H PRN, Cristina Escobar, APRN, 1 tablet at 04/27/24 1639    pantoprazole (PROTONIX) EC tablet 40 mg, 40 mg, Oral, Q AM, Mike Donnelly MD, 40 mg at 04/27/24 0822    polyethylene glycol (MIRALAX) packet 17 g, 17 g, Oral, BID, Mai Almeida MD    sennosides-docusate (PERICOLACE) 8.6-50 MG per  tablet 1 tablet, 1 tablet, Oral, BID, Mai Almeida MD, 1 tablet at 04/27/24 0823    sodium chloride 0.9 % flush 10 mL, 10 mL, Intravenous, PRN, Chidi Almazan MD    sodium chloride 0.9 % flush 10 mL, 10 mL, Intravenous, PRN, Kimberly Armstrong APRN      lactated ringers, 75 mL/hr, Last Rate: 75 mL/hr (04/27/24 1343)    Diet  Diet: Regular/House; Fluid Consistency: Thin (IDDSI 0)       Assessment/Plan     Active Hospital Problems    Diagnosis  POA    **Hematoma of spleen without rupture of capsule [D73.5]  Yes    Chronic, continuous use of opioids [F11.90]  Yes    Generalized anxiety disorder [F41.1]  Yes    Tobacco abuse [Z72.0]  Yes    Abdominal pain [R10.9]  Yes    Pancreatic pseudocyst [K86.3]  Yes    Anemia of chronic disease [D63.8]  Yes    Chronic pancreatitis [K86.1]  Yes    Abnormal CT of the abdomen [R93.5]  Yes    Alcohol abuse [F10.10]  Yes      Resolved Hospital Problems    Diagnosis Date Resolved POA    Hyperglycemia [R73.9] 08/25/2023 Yes     43 y.o. female  who presented to the emergency room at an outlying facility for left upper quadrant pain with some radiation into the left shoulder.  She has a history of splenic hematoma/laceration requiring embolization of the splenic artery in January of this year.  CT A/P revealed worsening pseudocyst and/or hematoma.     Hematoma of spleen  -General surgery has been consulted.  Await their further recommendations.  -Will continue supportive care with pain medications and nausea medications as needed.  - diet has been advanced by surgery.  Continue IV fluids until oral intake increased.   -No current concerns for infection given lack of white count or fever.  -Will monitor serial H&H. Remains stable.      Chronic pancreatitis  Pancreatic pseudocyst  -Followed by gastroenterology on recent admission.  -Lipase 144 (139).    -Continue alcohol cessation.     Alcohol abuse  -Currently 30 days sober.  -Continue to encourage alcohol abstinence.  -Vitamin  replacement oral now that she is taking p.o.  Home folate and thiamine dose.     Chronic, continuous use of opioids  -Continue home Percocet with IV pain medications for breakthrough.     Anemia of chronic disease  -Hemoglobin 11 range.  Will monitor trend.  - Hgb 9.6  (10)   -Continue her oral iron.     Tobacco abuse  -Nicotine replacement therapy.     Generalized anxiety disorder  -Continue home medication.    Left posterior shoulder pain  - some MSK component but could have some referring pain.   - try lidocaine patch as she has some nodular muscle spasm on exam.       DVT prophylaxis  SCDs    Discharge  TBD  Expected Discharge Date: 4/30/2024; Expected Discharge Time:     Discussed with patient and nursing staff    SOL Jalloh  [facility]

## 2024-04-27 NOTE — PLAN OF CARE
Goal Outcome Evaluation:              Outcome Evaluation: VSS. IV fluids infusing. Percocet and Dilaudid given for pain. Up ad valeria. Ambulated in hallway. On Regular diet now.

## 2024-04-28 LAB
25(OH)D3 SERPL-MCNC: 34.4 NG/ML (ref 30–100)
HCT VFR BLD AUTO: 28.2 % (ref 34–46.6)
HCT VFR BLD AUTO: 30.1 % (ref 34–46.6)
HGB BLD-MCNC: 9.2 G/DL (ref 12–15.9)
HGB BLD-MCNC: 9.5 G/DL (ref 12–15.9)

## 2024-04-28 PROCEDURE — 82306 VITAMIN D 25 HYDROXY: CPT | Performed by: NURSE PRACTITIONER

## 2024-04-28 PROCEDURE — 25010000002 HYDROMORPHONE 1 MG/ML SOLUTION: Performed by: STUDENT IN AN ORGANIZED HEALTH CARE EDUCATION/TRAINING PROGRAM

## 2024-04-28 PROCEDURE — 85018 HEMOGLOBIN: CPT | Performed by: STUDENT IN AN ORGANIZED HEALTH CARE EDUCATION/TRAINING PROGRAM

## 2024-04-28 PROCEDURE — 25010000002 ONDANSETRON PER 1 MG: Performed by: NURSE PRACTITIONER

## 2024-04-28 PROCEDURE — 25810000003 LACTATED RINGERS PER 1000 ML: Performed by: STUDENT IN AN ORGANIZED HEALTH CARE EDUCATION/TRAINING PROGRAM

## 2024-04-28 PROCEDURE — G0378 HOSPITAL OBSERVATION PER HR: HCPCS

## 2024-04-28 PROCEDURE — 85014 HEMATOCRIT: CPT | Performed by: STUDENT IN AN ORGANIZED HEALTH CARE EDUCATION/TRAINING PROGRAM

## 2024-04-28 PROCEDURE — 99233 SBSQ HOSP IP/OBS HIGH 50: CPT | Performed by: STUDENT IN AN ORGANIZED HEALTH CARE EDUCATION/TRAINING PROGRAM

## 2024-04-28 RX ORDER — OMEGA-3S/DHA/EPA/FISH OIL/D3 300MG-1000
400 CAPSULE ORAL DAILY
Status: DISCONTINUED | OUTPATIENT
Start: 2024-04-28 | End: 2024-04-29

## 2024-04-28 RX ORDER — GABAPENTIN 300 MG/1
300 CAPSULE ORAL 3 TIMES DAILY
Status: DISCONTINUED | OUTPATIENT
Start: 2024-04-28 | End: 2024-05-01

## 2024-04-28 RX ADMIN — POLYETHYLENE GLYCOL 3350 17 G: 17 POWDER, FOR SOLUTION ORAL at 09:18

## 2024-04-28 RX ADMIN — PANTOPRAZOLE SODIUM 40 MG: 40 TABLET, DELAYED RELEASE ORAL at 05:06

## 2024-04-28 RX ADMIN — LIDOCAINE 1 PATCH: 4 PATCH TOPICAL at 20:08

## 2024-04-28 RX ADMIN — BISACODYL 10 MG: 10 SUPPOSITORY RECTAL at 09:28

## 2024-04-28 RX ADMIN — ONDANSETRON 4 MG: 2 INJECTION INTRAMUSCULAR; INTRAVENOUS at 12:40

## 2024-04-28 RX ADMIN — MAGNESIUM OXIDE 400 MG (241.3 MG MAGNESIUM) TABLET 400 MG: TABLET at 09:17

## 2024-04-28 RX ADMIN — OXYCODONE AND ACETAMINOPHEN 1 TABLET: 10; 325 TABLET ORAL at 05:07

## 2024-04-28 RX ADMIN — HYDROMORPHONE HYDROCHLORIDE 1 MG: 1 INJECTION, SOLUTION INTRAMUSCULAR; INTRAVENOUS; SUBCUTANEOUS at 13:30

## 2024-04-28 RX ADMIN — FOLIC ACID 1 MG: 1 TABLET ORAL at 09:17

## 2024-04-28 RX ADMIN — HYDROMORPHONE HYDROCHLORIDE 1 MG: 1 INJECTION, SOLUTION INTRAMUSCULAR; INTRAVENOUS; SUBCUTANEOUS at 09:27

## 2024-04-28 RX ADMIN — OXYCODONE AND ACETAMINOPHEN 1 TABLET: 10; 325 TABLET ORAL at 16:31

## 2024-04-28 RX ADMIN — GABAPENTIN 300 MG: 300 CAPSULE ORAL at 16:31

## 2024-04-28 RX ADMIN — OXYCODONE AND ACETAMINOPHEN 1 TABLET: 10; 325 TABLET ORAL at 11:39

## 2024-04-28 RX ADMIN — OXYCODONE AND ACETAMINOPHEN 1 TABLET: 10; 325 TABLET ORAL at 22:41

## 2024-04-28 RX ADMIN — GABAPENTIN 300 MG: 300 CAPSULE ORAL at 11:39

## 2024-04-28 RX ADMIN — OXYCODONE AND ACETAMINOPHEN 1 TABLET: 10; 325 TABLET ORAL at 01:17

## 2024-04-28 RX ADMIN — CHOLECALCIFEROL TAB 10 MCG (400 UNIT) 400 UNITS: 10 TAB at 09:25

## 2024-04-28 RX ADMIN — DOCUSATE SODIUM 50MG AND SENNOSIDES 8.6MG 1 TABLET: 8.6; 5 TABLET, FILM COATED ORAL at 09:15

## 2024-04-28 RX ADMIN — HYDROMORPHONE HYDROCHLORIDE 1 MG: 1 INJECTION, SOLUTION INTRAMUSCULAR; INTRAVENOUS; SUBCUTANEOUS at 21:46

## 2024-04-28 RX ADMIN — GABAPENTIN 300 MG: 300 CAPSULE ORAL at 21:46

## 2024-04-28 RX ADMIN — ALUMINUM HYDROXIDE, MAGNESIUM HYDROXIDE, AND DIMETHICONE 15 ML: 400; 400; 40 SUSPENSION ORAL at 20:08

## 2024-04-28 RX ADMIN — Medication 100 MG: at 09:17

## 2024-04-28 RX ADMIN — SODIUM CHLORIDE, POTASSIUM CHLORIDE, SODIUM LACTATE AND CALCIUM CHLORIDE 75 ML/HR: 600; 310; 30; 20 INJECTION, SOLUTION INTRAVENOUS at 03:01

## 2024-04-28 RX ADMIN — FERROUS SULFATE TAB 325 MG (65 MG ELEMENTAL FE) 325 MG: 325 (65 FE) TAB at 09:15

## 2024-04-28 RX ADMIN — HYDROMORPHONE HYDROCHLORIDE 1 MG: 1 INJECTION, SOLUTION INTRAMUSCULAR; INTRAVENOUS; SUBCUTANEOUS at 02:55

## 2024-04-28 RX ADMIN — HYDROMORPHONE HYDROCHLORIDE 1 MG: 1 INJECTION, SOLUTION INTRAMUSCULAR; INTRAVENOUS; SUBCUTANEOUS at 17:37

## 2024-04-28 RX ADMIN — ONDANSETRON 4 MG: 2 INJECTION INTRAMUSCULAR; INTRAVENOUS at 19:52

## 2024-04-28 NOTE — PLAN OF CARE
Goal Outcome Evaluation:  Plan of Care Reviewed With: patient        Progress: improving  Outcome Evaluation: up ad valeria.  ambulating often.  reports BM after dulcolax suppository.  c/o upper abd pain and left shoulder pain.  prn dilaudid and prn percocet given.   c/o nausea this afternoon  and zofran given with good relief.  IVF's discontinued.  pt requesting PO creon, provider notified and order noted.

## 2024-04-28 NOTE — PROGRESS NOTES
"General Surgery Progress Note    CC: follow up pancreatitis, worsening pancreatic pseudocyst vs perisplenic hematoma    S: Patient states she is still having pain. Worst in her shoulder. Still present in the left upper quadrant. She states the dilaudid helps take the edge off, the percocet does not last long enough. She has not had a BM. She is tolerating regular diet without nausea or vomiting.    O:/91 (BP Location: Right arm, Patient Position: Lying)   Pulse 89   Temp 97.2 °F (36.2 °C) (Oral)   Resp 18   Ht 175.3 cm (69\")   Wt 58.1 kg (128 lb)   LMP 01/17/2018 (Exact Date)   SpO2 96%   BMI 18.90 kg/m²     Intake & Output (last day)         04/27 0701  04/28 0700 04/28 0701  04/29 0700    P.O. 1680 210    I.V. (mL/kg) 1000 (17.2) 463.8 (8)    Total Intake(mL/kg) 2680 (46.1) 673.8 (11.6)    Urine (mL/kg/hr) 4825 (3.5) 975 (4.2)    Total Output 4825 975    Net -2141 -881.3                  GENERAL: awake, alert, comfortable appearing sitting up in bed, interactive, cooperative, repositions herself without difficulty   HEENT: EOMI, clear sclera, moist mucus membranes   CHEST: normal work of breathing on room air  CARDIAC: regular rate, palpable distal pulses, no peripheral edema  GI: Abd soft, nondistended, mildly tender in the LUQ without rebound or guarding  EXTREMITIES: CHAMORRO, no cyanosis or edema    SKIN: Warm and dry, no rash    LABS  Results from last 7 days   Lab Units 04/28/24  0630 04/27/24  2359 04/27/24  1547 04/27/24  1403 04/27/24  0647 04/26/24  1615 04/26/24  0706 04/25/24  1537   WBC 10*3/mm3  --   --   --   --  7.65  --  8.90 10.88*   HEMOGLOBIN g/dL 9.2* 9.5* 9.6*   < > 9.9*  9.9*   < > 11.5* 12.1   HEMATOCRIT % 28.2* 30.1* 29.6*   < > 30.7*  30.7*   < > 34.1 37.0   PLATELETS 10*3/mm3  --   --   --   --  405  --  401 580*    < > = values in this interval not displayed.     Results from last 7 days   Lab Units 04/27/24  0647 04/26/24  0706 04/25/24  1537   SODIUM mmol/L 140 137 136 "   POTASSIUM mmol/L 3.7 4.0 3.9   CHLORIDE mmol/L 101 100 97*   CO2 mmol/L 27.2 24.6 25.9   BUN mg/dL 3* 4* 7   CREATININE mg/dL 0.53* 0.57 0.78   CALCIUM mg/dL 9.2 9.2 9.8   BILIRUBIN mg/dL <0.2 <0.2 <0.2   ALK PHOS U/L 102 107 128*   ALT (SGPT) U/L 7 5 9   AST (SGOT) U/L 11 10 9   GLUCOSE mg/dL 91 93 109*           A/P: 43 y.o. female with  chronic alcoholic pancreatitis with pancreatic pseudocysts and previous episode of splenic hemorrhage and subcapsular hematoma, has undergone splenic artery embolization in January 2024, seen again early April 2024 for left upper quadrant pain, noted to have decreased size of pancreatic fluid collections on imaging at that time and was treated with supportive management for pain and gastritis.  Returned for 5 days of worsening left upper quadrant pain, now with CT evidence of increased size of her fluid collections and concern for possible interval splenic hemorrhage.    No tachycardia or blood pressure instability. Hgb stable while on IV fluids. Can space out Hgb checks.  Her abdominal and shoulder pain are stable, improved with pain medications but requiring frequent doses of dilaudid and percocet. Abdominal exam remains reassuring without peritoneal signs.   Will add gabapentin for multimodal pain control. Continue bowel regimen for narcotic-associated constipation.  No fever, hypotension, or leukocytosis or CT evidence to suggest infected peripancreatic collections.  Acutely she remains stable without current need for urgent splenectomy.     Mai Almeida MD  General, Robotic and Endoscopic Surgery  [facility]    [address] [place] [address], [address]  [place], [place], [address]  P: [phone]  F: [phone] \

## 2024-04-28 NOTE — PLAN OF CARE
Goal Outcome Evaluation:  Plan of Care Reviewed With: patient        Progress: improving  Outcome Evaluation: VSS. IV Dilaudid and Percocet given alternately for left abdominal pain, Lidocaine patch to left shoulder. Up ad valeria, ambulated in the peterson. Voided freely. Regular diet tolerated. IV Fluids on flow. H&H Q8H, Hb stable.  Slept on and off.

## 2024-04-28 NOTE — PROGRESS NOTES
Name: Piper Cain ADMIT: 2024   : 1980  PCP: Sharmaine Gatica APRN    MRN: [id number] LOS: 1 days   AGE/SEX: 43 y.o. female  ROOM: [facility]     Subjective   Subjective   Was asleep upon entering room.  Easily awakened by my voice. Shoulder pain continues.  Feels lidocaine helped a little but not resolved completely.  Nausea continues off and on.  Was able to eat a good breakfast but no much lunch she reports.  Pain to LQ remains.  Did have BM, less abd distention. She is still requiring dilaudid in addition to her oral pain meds. Denies shoa, chest pain, syncope, or near syncope.      Objective   Objective   Vital Signs  Temp:  [97.2 °F (36.2 °C)-97.6 °F (36.4 °C)] 97.6 °F (36.4 °C)  Heart Rate:  [84-94] 84  Resp:  [16-18] 18  BP: (116-126)/(68-91) 120/68  SpO2:  [96 %-99 %] 97 %  on   ;   Device (Oxygen Therapy): room air  Body mass index is 18.9 kg/m².    Physical Exam  Vitals and nursing note reviewed.   Constitutional:       Comments: Thin     Eyes:      General: No scleral icterus.     Conjunctiva/sclera: Conjunctivae normal.   Cardiovascular:      Rate and Rhythm: Normal rate and regular rhythm.      Pulses: Normal pulses.      Heart sounds: Normal heart sounds.   Pulmonary:      Effort: Pulmonary effort is normal.      Breath sounds: Normal breath sounds.   Abdominal:      General: Bowel sounds are normal.      Palpations: Abdomen is soft.      Tenderness: There is abdominal tenderness.   Musculoskeletal:         General: Tenderness (Tenderness to left posterior shoulder.  less muscle spasms noted today.) present.   Skin:     General: Skin is warm and dry.   Neurological:      General: No focal deficit present.      Mental Status: She is alert and oriented to person, place, and time. Mental status is at baseline.   Psychiatric:         Mood and Affect: Mood normal.         Behavior: Behavior normal.         Thought Content: Thought content normal.         Judgment: Judgment normal.  "                Results Review  I reviewed the patient's new clinical results.  Results from last 7 days   Lab Units 04/28/24  0630 04/27/24  2359 04/27/24  1547 04/27/24  1403 04/27/24  0647 04/26/24  1615 04/26/24  0706 04/25/24  1537   WBC 10*3/mm3  --   --   --   --  7.65  --  8.90 10.88*   HEMOGLOBIN g/dL 9.2* 9.5* 9.6* 10.0* 9.9*  9.9*   < > 11.5* 12.1   PLATELETS 10*3/mm3  --   --   --   --  405  --  401 580*    < > = values in this interval not displayed.     Results from last 7 days   Lab Units 04/27/24  0647 04/26/24  0706 04/25/24  1537   SODIUM mmol/L 140 137 136   POTASSIUM mmol/L 3.7 4.0 3.9   CHLORIDE mmol/L 101 100 97*   CO2 mmol/L 27.2 24.6 25.9   BUN mg/dL 3* 4* 7   CREATININE mg/dL 0.53* 0.57 0.78   GLUCOSE mg/dL 91 93 109*     Lab Results   Component Value Date    ANIONGAP 11.8 04/27/2024     Estimated Creatinine Clearance: 128.1 mL/min (A) (by C-G formula based on SCr of 0.53 mg/dL (L)).   Lab Results   Component Value Date    EGFR 119.3 04/27/2024     Results from last 7 days   Lab Units 04/27/24  0647 04/26/24  0706 04/25/24  1537   ALBUMIN g/dL 3.2* 3.4* 4.1   BILIRUBIN mg/dL <0.2 <0.2 <0.2   ALK PHOS U/L 102 107 128*   AST (SGOT) U/L 11 10 9   ALT (SGPT) U/L 7 5 9     Results from last 7 days   Lab Units 04/27/24  0647 04/26/24  0713 04/26/24  0706 04/25/24  1537   CALCIUM mg/dL 9.2  --  9.2 9.8   ALBUMIN g/dL 3.2*  --  3.4* 4.1   MAGNESIUM mg/dL 1.7 1.6  --  1.8   PHOSPHORUS mg/dL  --  4.2  --   --      Results from last 7 days   Lab Units 04/26/24  0713   PROCALCITONIN ng/mL 0.08     No results found for: \"HGBA1C\", \"POCGLU\"    No radiology results for the last day      Current Facility-Administered Medications:     acetaminophen (TYLENOL) tablet 650 mg, 650 mg, Oral, Q6H PRN, Cristina Escobar, SOL, 650 mg at 04/26/24 1647    aluminum-magnesium hydroxide-simethicone (MAALOX MAX) 400-400-40 MG/5ML suspension 15 mL, 15 mL, Oral, Q6H PRN, Kimberly Armstrong, SOL    amitriptyline (ELAVIL) " tablet 50 mg, 50 mg, Oral, Nightly, Cristina Escobar APRN, 50 mg at 04/27/24 2031    bisacodyl (DULCOLAX) suppository 10 mg, 10 mg, Rectal, Daily PRN, Michelle Andujar APRN, 10 mg at 04/28/24 0928    cholecalciferol (VITAMIN D3) tablet 400 Units, 400 Units, Oral, Daily, Michelle Andujar APRN, 400 Units at 04/28/24 0925    ferrous sulfate tablet 325 mg, 325 mg, Oral, Daily With Breakfast, Cristina Escobar APRN, 325 mg at 04/28/24 0915    folic acid (FOLVITE) tablet 1 mg, 1 mg, Oral, Daily, Michelle Andujar APRN, 1 mg at 04/28/24 0917    gabapentin (NEURONTIN) capsule 300 mg, 300 mg, Oral, TID, Mai Almeida MD, 300 mg at 04/28/24 1139    HYDROmorphone (DILAUDID) injection 1 mg, 1 mg, Intravenous, Q4H PRN, Mike Donnelly MD, 1 mg at 04/28/24 1330    Lidocaine 4 % 1 patch, 1 patch, Transdermal, Q24H, Michelle Andujar APRN, 1 patch at 04/27/24 2200    LORazepam (ATIVAN) tablet 1 mg, 1 mg, Oral, Q1H PRN **OR** midazolam (VERSED) injection 2 mg, 2 mg, Intravenous, Q1H PRN **OR** LORazepam (ATIVAN) tablet 2 mg, 2 mg, Oral, Q1H PRN **OR** midazolam (VERSED) injection 4 mg, 4 mg, Intravenous, Q1H PRN **OR** midazolam (VERSED) injection 4 mg, 4 mg, Intravenous, Q15 Min PRN **OR** midazolam (VERSED) injection 4 mg, 4 mg, Intramuscular, Q15 Min PRN, Xiao Conroy APRN    magnesium oxide (MAG-OX) tablet 400 mg, 400 mg, Oral, Daily, Cristina Escobar APRN, 400 mg at 04/28/24 0917    Magnesium Standard Dose Replacement - Follow Nurse / BPA Driven Protocol, , Does not apply, PRN, Xiao Conroy APRN    melatonin tablet 5 mg, 5 mg, Oral, Nightly PRN, Kimberly Armstrong APRN    ondansetron ODT (ZOFRAN-ODT) disintegrating tablet 4 mg, 4 mg, Oral, Q6H PRN **OR** ondansetron (ZOFRAN) injection 4 mg, 4 mg, Intravenous, Q6H PRN, Kimberly Armstrong APRN, 4 mg at 04/28/24 1240    oxyCODONE-acetaminophen (PERCOCET)  MG per tablet 1 tablet, 1 tablet, Oral, Q4H PRN, Cristina Escobar APRN, 1 tablet at  04/28/24 1139    pantoprazole (PROTONIX) EC tablet 40 mg, 40 mg, Oral, Q AM, Mike Donnelly MD, 40 mg at 04/28/24 0506    polyethylene glycol (MIRALAX) packet 17 g, 17 g, Oral, BID, Mai Almeida MD, 17 g at 04/28/24 0918    sennosides-docusate (PERICOLACE) 8.6-50 MG per tablet 1 tablet, 1 tablet, Oral, BID, Mai Almeida MD, 1 tablet at 04/28/24 0915    sodium chloride 0.9 % flush 10 mL, 10 mL, Intravenous, PRN, Chidi Almazan MD    sodium chloride 0.9 % flush 10 mL, 10 mL, Intravenous, PRN, Kimberly Armstrong APRN    thiamine (VITAMIN B-1) tablet 100 mg, 100 mg, Oral, Daily, Michelle Andujar APRN, 100 mg at 04/28/24 0917         Diet  Diet: Regular/House; Fluid Consistency: Thin (IDDSI 0)       Assessment/Plan     Active Hospital Problems    Diagnosis  POA    **Hematoma of spleen without rupture of capsule [D73.5]  Yes    Chronic, continuous use of opioids [F11.90]  Yes    Generalized anxiety disorder [F41.1]  Yes    Tobacco abuse [Z72.0]  Yes    Abdominal pain [R10.9]  Yes    Pancreatic pseudocyst [K86.3]  Yes    Anemia of chronic disease [D63.8]  Yes    Chronic pancreatitis [K86.1]  Yes    Abnormal CT of the abdomen [R93.5]  Yes    Alcohol abuse [F10.10]  Yes      Resolved Hospital Problems    Diagnosis Date Resolved POA    Hyperglycemia [R73.9] 08/25/2023 Yes     43 y.o. female  who presented to the emergency room at an outlying facility for left upper quadrant pain with some radiation into the left shoulder.  She has a history of splenic hematoma/laceration requiring embolization of the splenic artery in January of this year.  CT A/P revealed worsening pseudocyst and/or hematoma.     Hematoma of spleen  -General surgery has been consulted.  Appreciate their recommendations. H/H stable.  No plan for splenectomy at present.   -Will continue supportive care   - diet has been advanced by surgery.  IVF have been stopped now that she is taking in po  -No current concerns for infection given  lack of white count or fever.  -H/H and vitals have remained stable.  Surgery has changed to daily cbc.       Chronic pancreatitis  Pancreatic pseudocyst  -Followed by gastroenterology on recent admission.  -Lipase 144 (139).    -Continue alcohol cessation. Sober over a month     Alcohol abuse  -Currently sober just over a month.  -Continue to encourage alcohol abstinence.  -Vitamin replacement oral now that she is taking p.o.  Home folate and thiamine dose.  - Vit D 25 OH low normal.  Add low dose vitamin d3 400 ius.       Chronic pain, continuous use of opioids  -Continue home Percocet with IV pain medications for breakthrough.   - surgery has added gabapentin.  Lidocaine patch for shoulder helped some. Continue for now.      Anemia of chronic disease  -Hemoglobin 11 range.  Will monitor trend.  - Hgb 9.2 (9.6  <---10)   -Continue her oral iron.     Tobacco abuse  -Nicotine replacement therapy patch offered she has declined     Generalized anxiety disorder  -Continue home medication.    Left posterior shoulder pain  - some MSK component  but could have some referring pain.   - lidocaine patch did not help.    - surgery team has added Gabapentin.        DVT prophylaxis  SCDs    Discharge  TBD  Expected Discharge Date: 4/30/2024; Expected Discharge Time: 1-2 days    Discussed with patient and nursing staff    SOL Jalloh  [facility]

## 2024-04-29 LAB
ANION GAP SERPL CALCULATED.3IONS-SCNC: 12.7 MMOL/L (ref 5–15)
BASOPHILS # BLD AUTO: 0.06 10*3/MM3 (ref 0–0.2)
BASOPHILS NFR BLD AUTO: 0.5 % (ref 0–1.5)
BUN SERPL-MCNC: 8 MG/DL (ref 6–20)
BUN/CREAT SERPL: 13.3 (ref 7–25)
CALCIUM SPEC-SCNC: 9.4 MG/DL (ref 8.6–10.5)
CHLORIDE SERPL-SCNC: 98 MMOL/L (ref 98–107)
CLUMPED PLATELETS: PRESENT
CO2 SERPL-SCNC: 26.3 MMOL/L (ref 22–29)
CREAT SERPL-MCNC: 0.6 MG/DL (ref 0.57–1)
DEPRECATED RDW RBC AUTO: 48.9 FL (ref 37–54)
EGFRCR SERPLBLD CKD-EPI 2021: 115.8 ML/MIN/1.73
EOSINOPHIL # BLD AUTO: 0.29 10*3/MM3 (ref 0–0.4)
EOSINOPHIL NFR BLD AUTO: 2.4 % (ref 0.3–6.2)
ERYTHROCYTE [DISTWIDTH] IN BLOOD BY AUTOMATED COUNT: 14 % (ref 12.3–15.4)
GLUCOSE SERPL-MCNC: 98 MG/DL (ref 65–99)
HCT VFR BLD AUTO: 31.9 % (ref 34–46.6)
HGB BLD-MCNC: 10.2 G/DL (ref 12–15.9)
IMM GRANULOCYTES # BLD AUTO: 0.03 10*3/MM3 (ref 0–0.05)
IMM GRANULOCYTES NFR BLD AUTO: 0.2 % (ref 0–0.5)
LYMPHOCYTES # BLD AUTO: 1.92 10*3/MM3 (ref 0.7–3.1)
LYMPHOCYTES NFR BLD AUTO: 15.8 % (ref 19.6–45.3)
MAGNESIUM SERPL-MCNC: 1.8 MG/DL (ref 1.6–2.6)
MCH RBC QN AUTO: 30.4 PG (ref 26.6–33)
MCHC RBC AUTO-ENTMCNC: 32 G/DL (ref 31.5–35.7)
MCV RBC AUTO: 94.9 FL (ref 79–97)
MONOCYTES # BLD AUTO: 1.43 10*3/MM3 (ref 0.1–0.9)
MONOCYTES NFR BLD AUTO: 11.8 % (ref 5–12)
NEUTROPHILS NFR BLD AUTO: 69.3 % (ref 42.7–76)
NEUTROPHILS NFR BLD AUTO: 8.44 10*3/MM3 (ref 1.7–7)
NRBC BLD AUTO-RTO: 0 /100 WBC (ref 0–0.2)
PHOSPHATE SERPL-MCNC: 4.8 MG/DL (ref 2.5–4.5)
PLATELET # BLD AUTO: 475 10*3/MM3 (ref 140–450)
PMV BLD AUTO: 9.7 FL (ref 6–12)
POTASSIUM SERPL-SCNC: 4.2 MMOL/L (ref 3.5–5.2)
RBC # BLD AUTO: 3.36 10*6/MM3 (ref 3.77–5.28)
RBC MORPH BLD: NORMAL
SODIUM SERPL-SCNC: 137 MMOL/L (ref 136–145)
WBC MORPH BLD: NORMAL
WBC NRBC COR # BLD AUTO: 12.17 10*3/MM3 (ref 3.4–10.8)

## 2024-04-29 PROCEDURE — 84100 ASSAY OF PHOSPHORUS: CPT | Performed by: STUDENT IN AN ORGANIZED HEALTH CARE EDUCATION/TRAINING PROGRAM

## 2024-04-29 PROCEDURE — 80048 BASIC METABOLIC PNL TOTAL CA: CPT | Performed by: STUDENT IN AN ORGANIZED HEALTH CARE EDUCATION/TRAINING PROGRAM

## 2024-04-29 PROCEDURE — 25010000002 HYDROMORPHONE 1 MG/ML SOLUTION: Performed by: STUDENT IN AN ORGANIZED HEALTH CARE EDUCATION/TRAINING PROGRAM

## 2024-04-29 PROCEDURE — 25010000002 ONDANSETRON PER 1 MG: Performed by: NURSE PRACTITIONER

## 2024-04-29 PROCEDURE — G0378 HOSPITAL OBSERVATION PER HR: HCPCS

## 2024-04-29 PROCEDURE — 85025 COMPLETE CBC W/AUTO DIFF WBC: CPT | Performed by: STUDENT IN AN ORGANIZED HEALTH CARE EDUCATION/TRAINING PROGRAM

## 2024-04-29 PROCEDURE — 85007 BL SMEAR W/DIFF WBC COUNT: CPT | Performed by: STUDENT IN AN ORGANIZED HEALTH CARE EDUCATION/TRAINING PROGRAM

## 2024-04-29 PROCEDURE — 83735 ASSAY OF MAGNESIUM: CPT | Performed by: STUDENT IN AN ORGANIZED HEALTH CARE EDUCATION/TRAINING PROGRAM

## 2024-04-29 PROCEDURE — 99233 SBSQ HOSP IP/OBS HIGH 50: CPT | Performed by: STUDENT IN AN ORGANIZED HEALTH CARE EDUCATION/TRAINING PROGRAM

## 2024-04-29 RX ORDER — OXYCODONE AND ACETAMINOPHEN 7.5; 325 MG/1; MG/1
2 TABLET ORAL EVERY 4 HOURS PRN
Status: DISCONTINUED | OUTPATIENT
Start: 2024-04-29 | End: 2024-05-03 | Stop reason: HOSPADM

## 2024-04-29 RX ADMIN — CHOLECALCIFEROL TAB 10 MCG (400 UNIT) 400 UNITS: 10 TAB at 08:22

## 2024-04-29 RX ADMIN — AMITRIPTYLINE HYDROCHLORIDE 50 MG: 50 TABLET, FILM COATED ORAL at 20:18

## 2024-04-29 RX ADMIN — PANCRELIPASE 6000 UNITS OF LIPASE: 30000; 6000; 19000 CAPSULE, DELAYED RELEASE PELLETS ORAL at 17:11

## 2024-04-29 RX ADMIN — HYDROMORPHONE HYDROCHLORIDE 1 MG: 1 INJECTION, SOLUTION INTRAMUSCULAR; INTRAVENOUS; SUBCUTANEOUS at 16:56

## 2024-04-29 RX ADMIN — HYDROMORPHONE HYDROCHLORIDE 1 MG: 1 INJECTION, SOLUTION INTRAMUSCULAR; INTRAVENOUS; SUBCUTANEOUS at 21:00

## 2024-04-29 RX ADMIN — AMITRIPTYLINE HYDROCHLORIDE 50 MG: 50 TABLET, FILM COATED ORAL at 03:14

## 2024-04-29 RX ADMIN — MAGNESIUM OXIDE 400 MG (241.3 MG MAGNESIUM) TABLET 400 MG: TABLET at 08:22

## 2024-04-29 RX ADMIN — ONDANSETRON 4 MG: 2 INJECTION INTRAMUSCULAR; INTRAVENOUS at 20:59

## 2024-04-29 RX ADMIN — LIDOCAINE 1 PATCH: 4 PATCH TOPICAL at 20:19

## 2024-04-29 RX ADMIN — FERROUS SULFATE TAB 325 MG (65 MG ELEMENTAL FE) 325 MG: 325 (65 FE) TAB at 08:20

## 2024-04-29 RX ADMIN — DOCUSATE SODIUM 50MG AND SENNOSIDES 8.6MG 1 TABLET: 8.6; 5 TABLET, FILM COATED ORAL at 20:18

## 2024-04-29 RX ADMIN — GABAPENTIN 300 MG: 300 CAPSULE ORAL at 20:19

## 2024-04-29 RX ADMIN — PANCRELIPASE 6000 UNITS OF LIPASE: 30000; 6000; 19000 CAPSULE, DELAYED RELEASE PELLETS ORAL at 11:17

## 2024-04-29 RX ADMIN — GABAPENTIN 300 MG: 300 CAPSULE ORAL at 08:21

## 2024-04-29 RX ADMIN — ONDANSETRON 4 MG: 2 INJECTION INTRAMUSCULAR; INTRAVENOUS at 01:50

## 2024-04-29 RX ADMIN — HYDROMORPHONE HYDROCHLORIDE 1 MG: 1 INJECTION, SOLUTION INTRAMUSCULAR; INTRAVENOUS; SUBCUTANEOUS at 11:12

## 2024-04-29 RX ADMIN — FOLIC ACID 1 MG: 1 TABLET ORAL at 08:22

## 2024-04-29 RX ADMIN — OXYCODONE AND ACETAMINOPHEN 2 TABLET: 7.5; 325 TABLET ORAL at 18:43

## 2024-04-29 RX ADMIN — GABAPENTIN 300 MG: 300 CAPSULE ORAL at 16:56

## 2024-04-29 RX ADMIN — OXYCODONE AND ACETAMINOPHEN 2 TABLET: 7.5; 325 TABLET ORAL at 12:58

## 2024-04-29 RX ADMIN — HYDROMORPHONE HYDROCHLORIDE 1 MG: 1 INJECTION, SOLUTION INTRAMUSCULAR; INTRAVENOUS; SUBCUTANEOUS at 06:58

## 2024-04-29 RX ADMIN — PANCRELIPASE 6000 UNITS OF LIPASE: 30000; 6000; 19000 CAPSULE, DELAYED RELEASE PELLETS ORAL at 08:22

## 2024-04-29 RX ADMIN — DOCUSATE SODIUM 50MG AND SENNOSIDES 8.6MG 1 TABLET: 8.6; 5 TABLET, FILM COATED ORAL at 08:22

## 2024-04-29 RX ADMIN — OXYCODONE AND ACETAMINOPHEN 1 TABLET: 10; 325 TABLET ORAL at 08:21

## 2024-04-29 RX ADMIN — ONDANSETRON 4 MG: 2 INJECTION INTRAMUSCULAR; INTRAVENOUS at 14:49

## 2024-04-29 RX ADMIN — HYDROMORPHONE HYDROCHLORIDE 1 MG: 1 INJECTION, SOLUTION INTRAMUSCULAR; INTRAVENOUS; SUBCUTANEOUS at 01:57

## 2024-04-29 RX ADMIN — PANTOPRAZOLE SODIUM 40 MG: 40 TABLET, DELAYED RELEASE ORAL at 06:58

## 2024-04-29 RX ADMIN — Medication 100 MG: at 08:22

## 2024-04-29 RX ADMIN — OXYCODONE AND ACETAMINOPHEN 1 TABLET: 10; 325 TABLET ORAL at 03:10

## 2024-04-29 RX ADMIN — ONDANSETRON 4 MG: 2 INJECTION INTRAMUSCULAR; INTRAVENOUS at 08:20

## 2024-04-29 NOTE — PROGRESS NOTES
Continued Stay Note  [facility]     Patient Name: Piper Cain  MRN: [id number]  Today's Date: 4/29/2024    Admit Date: 4/25/2024    Plan: Home   Discharge Plan       Row Name 04/29/24 1432       Plan    Plan Home    Plan Comments CCP is continuing to follow for poss needs/equipment.                   Discharge Codes    No documentation.                 Expected Discharge Date and Time       Expected Discharge Date Expected Discharge Time    Apr 30, 2024               Cynthia Bonilla RN

## 2024-04-29 NOTE — CONSULTS
"Visited patient sitting up in bed.     Patient was appreciative of  visit, because her boyfriend had encouraged talking to  about medical journey. Patient shared about medical history and [employer] history, including mention of spiritual television shows that are helpful. Patient shared that main prayer requests are for \"a break\" from her struggles as well as strength in the journey of pain management.     Facilitated advanced directive conversation and called  Carmelita Suh to notarize.   "

## 2024-04-29 NOTE — PROGRESS NOTES
"General Surgery Progress Note    CC: follow up pancreatitis, worsening pancreatic pseudocyst vs perisplenic hematoma    S: Still having pain in LUQ and left shoulder.  Had an episode of NV last night. She requested to restart her creon which she reports she is supposed to take at home, but was not taking. She did have a large bowel movement yesterday and per nursing declined her subsequent doses of laxatives.     O:/80 (BP Location: Right arm, Patient Position: Lying)   Pulse 89   Temp 98.1 °F (36.7 °C) (Oral)   Resp 18   Ht 175.3 cm (69\")   Wt 58.1 kg (128 lb)   LMP 01/17/2018 (Exact Date)   SpO2 97%   BMI 18.90 kg/m²     Intake & Output (last day)         04/28 0701  04/29 0700 04/29 0701 04/30 0700    P.O. 210     I.V. (mL/kg) 463.8 (8)     Total Intake(mL/kg) 673.8 (11.6)     Urine (mL/kg/hr) 2075 (1.5)     Emesis/NG output 0     Stool 0     Total Output 2075     Net -1401.3           Stool Unmeasured Occurrence 1 x     Emesis Unmeasured Occurrence 1 x             GENERAL: awake, alert, comfortable appearing sitting up in bed, interactive, cooperative, repositions herself without difficulty   HEENT: EOMI, clear sclera, moist mucus membranes   CHEST: normal work of breathing on room air  CARDIAC: regular rate, palpable distal pulses, no peripheral edema  GI: Abd soft, nondistended, mildly tender in the LUQ and epigastrium without rebound or guarding  EXTREMITIES: CHAMORRO, no cyanosis or edema    SKIN: Warm and dry, no rash    LABS  Results from last 7 days   Lab Units 04/29/24  0459 04/28/24  0630 04/27/24  2359 04/27/24  1403 04/27/24  0647 04/26/24  1615 04/26/24  0706   WBC 10*3/mm3 12.17*  --   --   --  7.65  --  8.90   HEMOGLOBIN g/dL 10.2* 9.2* 9.5*   < > 9.9*  9.9*   < > 11.5*   HEMATOCRIT % 31.9* 28.2* 30.1*   < > 30.7*  30.7*   < > 34.1   PLATELETS 10*3/mm3 475*  --   --   --  405  --  401    < > = values in this interval not displayed.     Results from last 7 days   Lab Units 04/29/24  0459 " 04/27/24  0647 04/26/24  0706 04/25/24  1537   SODIUM mmol/L 137 140 137 136   POTASSIUM mmol/L 4.2 3.7 4.0 3.9   CHLORIDE mmol/L 98 101 100 97*   CO2 mmol/L 26.3 27.2 24.6 25.9   BUN mg/dL 8 3* 4* 7   CREATININE mg/dL 0.60 0.53* 0.57 0.78   CALCIUM mg/dL 9.4 9.2 9.2 9.8   BILIRUBIN mg/dL  --  <0.2 <0.2 <0.2   ALK PHOS U/L  --  102 107 128*   ALT (SGPT) U/L  --  7 5 9   AST (SGOT) U/L  --  11 10 9   GLUCOSE mg/dL 98 91 93 109*           A/P: 43 y.o. female with  chronic alcoholic pancreatitis with pancreatic pseudocysts and previous episode of splenic hemorrhage and subcapsular hematoma, has undergone splenic artery embolization in January 2024, seen again early April 2024 for left upper quadrant pain, noted to have decreased size of pancreatic fluid collections on imaging at that time and was treated with supportive management for pain and gastritis.  Returned for 5 days of worsening left upper quadrant pain, now with CT evidence of increased size of her fluid collections and concern for possible interval splenic hemorrhage.    AVSS. Hgb improved to 10.2 after stopping IVF.   Slightly worse abdominal pain reported after having bowel movements and NV yesterday. Exam stable. She reports minimal improvement with gabapentin.   WBC increased - afebrile. Monitor.  Continue bowel regimen for narcotic associated constipation.   Acutely she remains stable without current need for urgent splenectomy.   With regards to her recurrent pancreatic pseudocysts, would recommend follow up with Dr. Denson whom she has seen in the past for evaluation for possible cyst-gastrostomy for drainage for possible improved pain control.     Mai Almeida MD  General, Robotic and Endoscopic Surgery  [facility]    [address], [address]  [place], [place], [address]  P: [phone]  F: [phone] \

## 2024-04-29 NOTE — PLAN OF CARE
Goal Outcome Evaluation:  Plan of Care Reviewed With: patient        Progress: no change  Outcome Evaluation: VSS, on room air, voiding without issue, up ad valeria, ambulating often in halls, perocet and dilaudid for pain, zofran for nausea w/ relief, SL, tolerating regular diet, patient updated on plan of care.

## 2024-04-29 NOTE — PLAN OF CARE
Problem: Adult Inpatient Plan of Care  Goal: Plan of Care Review  Outcome: Ongoing, Not Progressing  Flowsheets (Taken 4/29/2024 0849)  Progress: improving  Plan of Care Reviewed With: patient  Outcome Evaluation: alert and oriented, up ad valeria, c/o constant LUQ abdominal pain and Lt shoulder pain, started on Lidocaine patch for Lt shoulder pain, frequently requires IV Dilaudid and Percocet alternately, pain always 8/10, vomitted once undigested food, IV Zofran given with good relief, , also had 1 loose BM, frequently ambulates, did not sleep until 0500 this am, seen by Dr Almeida, advised not to skip bowel regimen ordered, explained to Pt, VSS   Goal Outcome Evaluation:  Plan of Care Reviewed With: patient        Progress: improving  Outcome Evaluation: alert and oriented, up ad valeria, c/o constant LUQ abdominal pain and Lt shoulder pain, started on Lidocaine patch for Lt shoulder pain, frequently requires IV Dilaudid and Percocet alternately, pain always 8/10, vomitted once undigested food, IV Zofran given with good relief, , also had 1 loose BM, frequently ambulates, did not sleep until 0500 this am, seen by Dr Almeida, advised not to skip bowel regimen ordered, explained to Pt, VSS

## 2024-04-29 NOTE — PROGRESS NOTES
Name: Piper Cain ADMIT: 2024   : 1980  PCP: Sharmaine Gatica APRN    MRN: [id number] LOS: 2 days   AGE/SEX: 43 y.o. female  ROOM: [facility]     Subjective   Subjective     She reports a large bowel movement yesterday as well as an episode of nausea and vomiting last night.  She also reports slightly worsened left upper quadrant pain radiating to her neck and shoulder.  She endorses diffuse hyperalgesia and feels like she has the flu.  She does not have any respiratory symptoms, dysuria, or new rashes.  Mildly elevated white blood cell count this morning.  No fevers.  She does not feel like the pain medication is helping sufficiently, in particular she does not feel like the Percocet doses are adequately controlling her pain.       Objective   Objective   Vital Signs  Temp:  [97.2 °F (36.2 °C)-99 °F (37.2 °C)] 98.1 °F (36.7 °C)  Heart Rate:  [] 89  Resp:  [18] 18  BP: (118-136)/(68-98) 118/80  SpO2:  [96 %-98 %] 97 %  on   ;   Device (Oxygen Therapy): room air  Body mass index is 18.9 kg/m².  Physical Exam  Constitutional:       General: She is not in acute distress.     Appearance: She is not toxic-appearing.   Cardiovascular:      Rate and Rhythm: Normal rate and regular rhythm.      Heart sounds: Normal heart sounds.   Pulmonary:      Effort: Pulmonary effort is normal.      Breath sounds: Normal breath sounds.   Abdominal:      General: Bowel sounds are normal. There is no distension.      Palpations: Abdomen is soft.      Tenderness: There is abdominal tenderness. There is no guarding or rebound.   Musculoskeletal:         General: No tenderness.      Right lower leg: No edema.      Left lower leg: No edema.   Neurological:      Mental Status: She is alert.   Psychiatric:         Mood and Affect: Mood normal.         Behavior: Behavior normal.         Results Review     I reviewed the patient's new clinical results.  Results from last 7 days   Lab Units 24  0454 24  2831  "04/27/24  2359 04/27/24  1547 04/27/24  1403 04/27/24  0647 04/26/24  1615 04/26/24  0706 04/25/24  1537   WBC 10*3/mm3 12.17*  --   --   --   --  7.65  --  8.90 10.88*   HEMOGLOBIN g/dL 10.2* 9.2* 9.5* 9.6*   < > 9.9*  9.9*   < > 11.5* 12.1   PLATELETS 10*3/mm3 475*  --   --   --   --  405  --  401 580*    < > = values in this interval not displayed.     Results from last 7 days   Lab Units 04/29/24  0459 04/27/24  0647 04/26/24  0706 04/25/24  1537   SODIUM mmol/L 137 140 137 136   POTASSIUM mmol/L 4.2 3.7 4.0 3.9   CHLORIDE mmol/L 98 101 100 97*   CO2 mmol/L 26.3 27.2 24.6 25.9   BUN mg/dL 8 3* 4* 7   CREATININE mg/dL 0.60 0.53* 0.57 0.78   GLUCOSE mg/dL 98 91 93 109*   Estimated Creatinine Clearance: 113.2 mL/min (by C-G formula based on SCr of 0.6 mg/dL).  Results from last 7 days   Lab Units 04/27/24  0647 04/26/24  0706 04/25/24  1537   ALBUMIN g/dL 3.2* 3.4* 4.1   BILIRUBIN mg/dL <0.2 <0.2 <0.2   ALK PHOS U/L 102 107 128*   AST (SGOT) U/L 11 10 9   ALT (SGPT) U/L 7 5 9     Results from last 7 days   Lab Units 04/29/24  0459 04/27/24  0647 04/26/24  0713 04/26/24  0706 04/25/24  1537   CALCIUM mg/dL 9.4 9.2  --  9.2 9.8   ALBUMIN g/dL  --  3.2*  --  3.4* 4.1   MAGNESIUM mg/dL 1.8 1.7 1.6  --  1.8   PHOSPHORUS mg/dL 4.8*  --  4.2  --   --      Results from last 7 days   Lab Units 04/26/24  0713   PROCALCITONIN ng/mL 0.08     COVID19   Date Value Ref Range Status   08/07/2022 Not Detected Not Detected - Ref. Range Final     No results found for: \"HGBA1C\", \"POCGLU\"    CT Abdomen Pelvis With Contrast  Narrative: CT ABDOMEN PELVIS W CONTRAST-     HISTORY: Worsened left upper quadrant pain, history of splenic rupture,  pancreatitis, gastritis.     TECHNIQUE:  CT of the abdomen and pelvis was performed following the  administration of intravenous contrast. Reformatted images were  reviewed. Radiation dose reduction techniques were utilized, including  automated exposure control and exposure modulation based on body " size.     COMPARISON: CT abdomen and pelvis 4/1/2024     FINDINGS:     Lung bases and pleural spaces are clear.  The liver is enlarged, measuring 18.5 cm in craniocaudal length. There  is a peripherally enhancing collection along the dorsal left hepatic  surface measuring approximately 3.0 x 2.7 x 3.3 cm, which has  significantly decreased in size from 4/1/2024, previously 6.3 x 3.3 x  7.7 cm when remeasured. The gallbladder is present. There are multiple  coarse pancreatic calcifications. Along the ventral distal pancreatic  body and tail, there is an approximately 5.7 x 3.2 x 3.5 cm peripherally  enhancing multiloculated collection, which has increased in size from  4/1/2024, previously 3.0 x 1.1 x 1.5 cm when remeasured. Along the  inferior aspect of the spleen, there is a 7.6 x 4.9 x 6.4 cm  peripherally enhancing collection, which has increased in size,  previously 5.9 x 3.4 x 5.2 cm when remeasured. This is likely contiguous  with an approximately 10.6 x 9.0 x 8.0 cm multiloculated collection  involving the spleen, which is newly enlarged.  The adrenal glands are within normal limits. The collection along the  inferior aspect of the spleen results in mass effect on the adjacent  left kidney. There are asymmetric delayed left renal nephrograms. There  is no hydronephrosis. There is at least mild atherosclerosis. There are  embolization coils/clips in the left lower quadrant. Metallic streak  artifact limits evaluation of adjacent structures. There is mild fat  stranding in the left upper quadrant.  There is mass effect on the gastric fundus from the dominant splenic  collection. Previous endogastric wall thickening has improved. There is  no bowel obstruction. There is a moderate to large colonic stool burden.  The appendix is visualized. The bladder is poorly distended. The uterus  is present. There is no adnexal mass.  There is osseous demineralization.        Impression:    1.  Decreased size of a 3.3 cm  probable pseudocyst along the left  hepatic surface. A distal pancreatic collection measuring 5.7 cm has  increased in size, and a collection along the inferior aspect of the  spleen has also increased in size, likely reflecting additional  pseudocysts. The perisplenic collection is likely contiguous with a  large multiloculated collection occupying the spleen, which is newly  enlarged, concerning for pseudocyst and/or hematoma. The splenic  collection is more similar in appearance to the previously noted  hematoma/collection from January, which had improved on follow-up CT  from 4/1/2024. This is concerning for possible interval hemorrhage.  2.  Asymmetric delayed left renal nephrograms. Correlate with urinalysis  to evaluate for possible pyelonephritis.  3.  Additional findings, as above.        This report was finalized on 4/25/2024 7:45 PM by Dr. Aleisha Sandoval M.D  on Workstation: [id number]       Scheduled Medications  amitriptyline, 50 mg, Oral, Nightly  cholecalciferol, 400 Units, Oral, Daily  ferrous sulfate, 325 mg, Oral, Daily With Breakfast  folic acid, 1 mg, Oral, Daily  gabapentin, 300 mg, Oral, TID  Lidocaine, 1 patch, Transdermal, Q24H  magnesium oxide, 400 mg, Oral, Daily  pancrelipase (Lip-Prot-Amyl), 6,000 units of lipase, Oral, TID With Meals  pantoprazole, 40 mg, Oral, Q AM  polyethylene glycol, 17 g, Oral, BID  senna-docusate sodium, 1 tablet, Oral, BID  thiamine, 100 mg, Oral, Daily    Infusions   Diet  Diet: Regular/House; Fluid Consistency: Thin (IDDSI 0)       Assessment/Plan     Active Hospital Problems    Diagnosis  POA    **Hematoma of spleen without rupture of capsule [D73.5]  Yes    Chronic, continuous use of opioids [F11.90]  Yes    Generalized anxiety disorder [F41.1]  Yes    Tobacco abuse [Z72.0]  Yes    Abdominal pain [R10.9]  Yes    Pancreatic pseudocyst [K86.3]  Yes    Anemia of chronic disease [D63.8]  Yes    Chronic pancreatitis [K86.1]  Yes    Abnormal CT of the abdomen  [R93.5]  Yes    Alcohol abuse [F10.10]  Yes      Resolved Hospital Problems    Diagnosis Date Resolved POA    Hyperglycemia [R73.9] 08/25/2023 Yes       43 y.o. female admitted with Hematoma of spleen without rupture of capsule.    43 y.o. female  who presented to the emergency room at an outlying facility for left upper quadrant pain with some radiation into the left shoulder.  She has a history of splenic hematoma/laceration requiring embolization of the splenic artery in January of this year.  CT A/P revealed worsening pseudocyst and/or hematoma.      Hematoma of spleen  -general surgery is following and treating conservatively at this time  -hgb has been stable  -antiemetics for nausea  -pain control as discussed below  -bowel regimen     Leukocytosis/thrombocytosis  -monitor for now   -if continues to worsen, then check blood cultures/consider repeating CT     Chronic pancreatitis  Pancreatic pseudocyst  -Followed by gastroenterology as an outpatient  -creon      Alcohol dependence in remission  -Currently sober just over a month.  -Continue to encourage alcohol abstinence.  -Vitamin replacement oral now that she is taking p.o.  Home folate and thiamine dose.     Acute on chronic pain, continuous use of opioids  - using frequent iv dilaudid as well as her home percocet dose  - gabapentin and lidocaine patch added this admission.  - will increase the dose of her percocet today     Anemia of chronic disease  -Hemoglobin 10.2 today     Tobacco abuse  -Nicotine replacement therapy patch offered she has declined     Generalized anxiety disorder  -Continue home medication.     Left posterior shoulder pain  - some MSK component  but could have some referring pain.   - lidocaine patch did not help.    - gabapentin was added by surgery a couple of days ago      SCDs for DVT prophylaxis.  Full code.  Discussed with patient.  Anticipate discharge home when cleared by consultants.      Rafat Beltran MD  [facility]  04/29/24  08:54 EDT    I wore protective equipment throughout this patient encounter including a face mask, gloves and protective eyewear.  Hand hygiene was performed before donning protective equipment and after removal when leaving the room.

## 2024-04-30 LAB
ALBUMIN SERPL-MCNC: 3.5 G/DL (ref 3.5–5.2)
ALBUMIN/GLOB SERPL: 1 G/DL
ALP SERPL-CCNC: 109 U/L (ref 39–117)
ALT SERPL W P-5'-P-CCNC: 11 U/L (ref 1–33)
ANION GAP SERPL CALCULATED.3IONS-SCNC: 10 MMOL/L (ref 5–15)
AST SERPL-CCNC: 6 U/L (ref 1–32)
BASOPHILS # BLD AUTO: 0.03 10*3/MM3 (ref 0–0.2)
BASOPHILS NFR BLD AUTO: 0.4 % (ref 0–1.5)
BILIRUB SERPL-MCNC: <0.2 MG/DL (ref 0–1.2)
BUN SERPL-MCNC: 13 MG/DL (ref 6–20)
BUN/CREAT SERPL: 19.1 (ref 7–25)
CALCIUM SPEC-SCNC: 9.2 MG/DL (ref 8.6–10.5)
CHLORIDE SERPL-SCNC: 98 MMOL/L (ref 98–107)
CO2 SERPL-SCNC: 28 MMOL/L (ref 22–29)
CREAT SERPL-MCNC: 0.68 MG/DL (ref 0.57–1)
DEPRECATED RDW RBC AUTO: 46 FL (ref 37–54)
EGFRCR SERPLBLD CKD-EPI 2021: 112.4 ML/MIN/1.73
EOSINOPHIL # BLD AUTO: 0.37 10*3/MM3 (ref 0–0.4)
EOSINOPHIL NFR BLD AUTO: 5.1 % (ref 0.3–6.2)
ERYTHROCYTE [DISTWIDTH] IN BLOOD BY AUTOMATED COUNT: 13.6 % (ref 12.3–15.4)
GLOBULIN UR ELPH-MCNC: 3.5 GM/DL
GLUCOSE SERPL-MCNC: 111 MG/DL (ref 65–99)
HCT VFR BLD AUTO: 31.5 % (ref 34–46.6)
HGB BLD-MCNC: 9.9 G/DL (ref 12–15.9)
IMM GRANULOCYTES # BLD AUTO: 0.03 10*3/MM3 (ref 0–0.05)
IMM GRANULOCYTES NFR BLD AUTO: 0.4 % (ref 0–0.5)
LYMPHOCYTES # BLD AUTO: 2.01 10*3/MM3 (ref 0.7–3.1)
LYMPHOCYTES NFR BLD AUTO: 27.8 % (ref 19.6–45.3)
MCH RBC QN AUTO: 29.5 PG (ref 26.6–33)
MCHC RBC AUTO-ENTMCNC: 31.4 G/DL (ref 31.5–35.7)
MCV RBC AUTO: 93.8 FL (ref 79–97)
MONOCYTES # BLD AUTO: 0.8 10*3/MM3 (ref 0.1–0.9)
MONOCYTES NFR BLD AUTO: 11.1 % (ref 5–12)
NEUTROPHILS NFR BLD AUTO: 3.98 10*3/MM3 (ref 1.7–7)
NEUTROPHILS NFR BLD AUTO: 55.2 % (ref 42.7–76)
NRBC BLD AUTO-RTO: 0 /100 WBC (ref 0–0.2)
PLATELET # BLD AUTO: 482 10*3/MM3 (ref 140–450)
PMV BLD AUTO: 9.4 FL (ref 6–12)
POTASSIUM SERPL-SCNC: 4.3 MMOL/L (ref 3.5–5.2)
PROT SERPL-MCNC: 7 G/DL (ref 6–8.5)
RBC # BLD AUTO: 3.36 10*6/MM3 (ref 3.77–5.28)
SODIUM SERPL-SCNC: 136 MMOL/L (ref 136–145)
WBC NRBC COR # BLD AUTO: 7.22 10*3/MM3 (ref 3.4–10.8)

## 2024-04-30 PROCEDURE — 80053 COMPREHEN METABOLIC PANEL: CPT | Performed by: STUDENT IN AN ORGANIZED HEALTH CARE EDUCATION/TRAINING PROGRAM

## 2024-04-30 PROCEDURE — 85025 COMPLETE CBC W/AUTO DIFF WBC: CPT | Performed by: STUDENT IN AN ORGANIZED HEALTH CARE EDUCATION/TRAINING PROGRAM

## 2024-04-30 PROCEDURE — 99233 SBSQ HOSP IP/OBS HIGH 50: CPT | Performed by: STUDENT IN AN ORGANIZED HEALTH CARE EDUCATION/TRAINING PROGRAM

## 2024-04-30 PROCEDURE — 25010000002 HYDROMORPHONE 1 MG/ML SOLUTION: Performed by: STUDENT IN AN ORGANIZED HEALTH CARE EDUCATION/TRAINING PROGRAM

## 2024-04-30 PROCEDURE — G0378 HOSPITAL OBSERVATION PER HR: HCPCS

## 2024-04-30 PROCEDURE — 25010000002 ONDANSETRON PER 1 MG: Performed by: NURSE PRACTITIONER

## 2024-04-30 RX ADMIN — OXYCODONE AND ACETAMINOPHEN 2 TABLET: 7.5; 325 TABLET ORAL at 13:05

## 2024-04-30 RX ADMIN — Medication 100 MG: at 09:02

## 2024-04-30 RX ADMIN — DOCUSATE SODIUM 50MG AND SENNOSIDES 8.6MG 1 TABLET: 8.6; 5 TABLET, FILM COATED ORAL at 09:01

## 2024-04-30 RX ADMIN — AMITRIPTYLINE HYDROCHLORIDE 50 MG: 50 TABLET, FILM COATED ORAL at 22:44

## 2024-04-30 RX ADMIN — HYDROMORPHONE HYDROCHLORIDE 1 MG: 1 INJECTION, SOLUTION INTRAMUSCULAR; INTRAVENOUS; SUBCUTANEOUS at 18:30

## 2024-04-30 RX ADMIN — ONDANSETRON 4 MG: 2 INJECTION INTRAMUSCULAR; INTRAVENOUS at 04:21

## 2024-04-30 RX ADMIN — Medication 10 ML: at 01:11

## 2024-04-30 RX ADMIN — HYDROMORPHONE HYDROCHLORIDE 1 MG: 1 INJECTION, SOLUTION INTRAMUSCULAR; INTRAVENOUS; SUBCUTANEOUS at 14:27

## 2024-04-30 RX ADMIN — GABAPENTIN 300 MG: 300 CAPSULE ORAL at 09:01

## 2024-04-30 RX ADMIN — HYDROMORPHONE HYDROCHLORIDE 1 MG: 1 INJECTION, SOLUTION INTRAMUSCULAR; INTRAVENOUS; SUBCUTANEOUS at 06:21

## 2024-04-30 RX ADMIN — OXYCODONE AND ACETAMINOPHEN 2 TABLET: 7.5; 325 TABLET ORAL at 17:32

## 2024-04-30 RX ADMIN — HYDROMORPHONE HYDROCHLORIDE 1 MG: 1 INJECTION, SOLUTION INTRAMUSCULAR; INTRAVENOUS; SUBCUTANEOUS at 10:17

## 2024-04-30 RX ADMIN — HYDROMORPHONE HYDROCHLORIDE 1 MG: 1 INJECTION, SOLUTION INTRAMUSCULAR; INTRAVENOUS; SUBCUTANEOUS at 01:11

## 2024-04-30 RX ADMIN — PANCRELIPASE 30000 UNITS OF LIPASE: 30000; 6000; 19000 CAPSULE, DELAYED RELEASE PELLETS ORAL at 12:18

## 2024-04-30 RX ADMIN — OXYCODONE AND ACETAMINOPHEN 2 TABLET: 7.5; 325 TABLET ORAL at 00:16

## 2024-04-30 RX ADMIN — MAGNESIUM OXIDE 400 MG (241.3 MG MAGNESIUM) TABLET 400 MG: TABLET at 09:02

## 2024-04-30 RX ADMIN — ONDANSETRON 4 MG: 2 INJECTION INTRAMUSCULAR; INTRAVENOUS at 10:17

## 2024-04-30 RX ADMIN — OXYCODONE AND ACETAMINOPHEN 2 TABLET: 7.5; 325 TABLET ORAL at 21:29

## 2024-04-30 RX ADMIN — PANTOPRAZOLE SODIUM 40 MG: 40 TABLET, DELAYED RELEASE ORAL at 06:21

## 2024-04-30 RX ADMIN — HYDROMORPHONE HYDROCHLORIDE 1 MG: 1 INJECTION, SOLUTION INTRAMUSCULAR; INTRAVENOUS; SUBCUTANEOUS at 22:42

## 2024-04-30 RX ADMIN — GABAPENTIN 300 MG: 300 CAPSULE ORAL at 21:29

## 2024-04-30 RX ADMIN — OXYCODONE AND ACETAMINOPHEN 2 TABLET: 7.5; 325 TABLET ORAL at 09:01

## 2024-04-30 RX ADMIN — FOLIC ACID 1 MG: 1 TABLET ORAL at 09:02

## 2024-04-30 RX ADMIN — FERROUS SULFATE TAB 325 MG (65 MG ELEMENTAL FE) 325 MG: 325 (65 FE) TAB at 09:01

## 2024-04-30 RX ADMIN — GABAPENTIN 300 MG: 300 CAPSULE ORAL at 16:35

## 2024-04-30 RX ADMIN — ONDANSETRON 4 MG: 2 INJECTION INTRAMUSCULAR; INTRAVENOUS at 17:39

## 2024-04-30 RX ADMIN — DOCUSATE SODIUM 50MG AND SENNOSIDES 8.6MG 1 TABLET: 8.6; 5 TABLET, FILM COATED ORAL at 21:32

## 2024-04-30 RX ADMIN — OXYCODONE AND ACETAMINOPHEN 2 TABLET: 7.5; 325 TABLET ORAL at 04:20

## 2024-04-30 NOTE — PROGRESS NOTES
"General Surgery Progress Note    CC: follow up pancreatitis, worsening pancreatic pseudocyst vs perisplenic hematoma    S: Reports L shoulder and LUQ pain which is stable, no real changes after increased percocet. States lidocaine patch doesn't help but she has used icyhot at home with relief. Had some nausea and vomited last night after dinner, patient attributes this to restarting her creon medication. Has eaten this morning and denies further nausea.  She would like icy-hot patches, as they work better for her shoulder than the lidocaine patches.     O:/84 (BP Location: Right arm, Patient Position: Sitting)   Pulse 111   Temp 99 °F (37.2 °C) (Oral)   Resp 18   Ht 175.3 cm (69\")   Wt 58.1 kg (128 lb)   LMP 01/17/2018 (Exact Date)   SpO2 95%   BMI 18.90 kg/m²     Intake & Output (last day)         04/29 0701  04/30 0700 04/30 0701  05/01 0700    P.O. 850     I.V. (mL/kg)      Total Intake(mL/kg) 850 (14.6)     Urine (mL/kg/hr) 3350 (2.4) 750 (2.3)    Emesis/NG output      Stool      Total Output 3350 750    Net -2500 -750                  GENERAL: awake, alert, comfortable appearing walking around room, interactive, cooperative   HEENT: EOMI, clear sclera, moist mucus membranes   CHEST: normal work of breathing on room air  CARDIAC: regular rate, palpable distal pulses, no peripheral edema  GI: Abd heating pad in place, abd soft, nondistended, mildly tender in the LUQ without rebound or guarding  EXTREMITIES: CHAMORRO, no cyanosis or edema    SKIN: Warm and dry, no rash    LABS  Results from last 7 days   Lab Units 04/30/24  0304 04/29/24  0459 04/28/24  0630 04/27/24  1403 04/27/24  0647   WBC 10*3/mm3 7.22 12.17*  --   --  7.65   HEMOGLOBIN g/dL 9.9* 10.2* 9.2*   < > 9.9*  9.9*   HEMATOCRIT % 31.5* 31.9* 28.2*   < > 30.7*  30.7*   PLATELETS 10*3/mm3 482* 475*  --   --  405    < > = values in this interval not displayed.     Results from last 7 days   Lab Units 04/30/24  0304 04/29/24  0459 " 04/27/24  0647 04/26/24  0706   SODIUM mmol/L 136 137 140 137   POTASSIUM mmol/L 4.3 4.2 3.7 4.0   CHLORIDE mmol/L 98 98 101 100   CO2 mmol/L 28.0 26.3 27.2 24.6   BUN mg/dL 13 8 3* 4*   CREATININE mg/dL 0.68 0.60 0.53* 0.57   CALCIUM mg/dL 9.2 9.4 9.2 9.2   BILIRUBIN mg/dL <0.2  --  <0.2 <0.2   ALK PHOS U/L 109  --  102 107   ALT (SGPT) U/L 11  --  7 5   AST (SGOT) U/L 6  --  11 10   GLUCOSE mg/dL 111* 98 91 93           A/P: 43 y.o. female with chronic alcoholic pancreatitis with pancreatic pseudocysts and previous episode of splenic hemorrhage and subcapsular hematoma, has undergone splenic artery embolization in January 2024, seen again early April 2024 for left upper quadrant pain, noted to have decreased size of pancreatic fluid collections on imaging at that time and was treated with supportive management for pain and gastritis.  Returned for 5 days of worsening left upper quadrant pain, now with CT evidence of increased size of her fluid collections and radiographic concern for possible interval splenic hemorrhage.    AVSS. No clinical signs of bleeding.   Leukocytosis yesterday noted, resolved without intervention.    Had nausea after resuming home creon. Reasonable to hold as she was not having obvious digestion/absoprtion issues earlier in the admission.  Continue bowel regimen for narcotic associated constipation.   Acutely she remains stable without current need for urgent splenectomy.   With regards to her recurrent pancreatic pseudocysts, recommend follow up with Dr. Denson whom she has seen in the past for evaluation for possible cyst-gastrostomy for drainage for possible improved pain control. Discussed with her recommendation to transition to non-IV, multimodal pain medication regimen as her need for IV dilaudid is a barrier to discharge.  She has an outpatient pain specialist and previously declined recommendations for regional blocks, but is willing to discuss this again with them at her follow up  appointment May 10.     Mai Almeida MD  General, Robotic and Endoscopic Surgery  [facility] [place], operated by [facility]    [address], [address]  [place], [place], [address]  P: [phone]  F: [phone] \

## 2024-04-30 NOTE — PROGRESS NOTES
Name: Piper Cain ADMIT: 2024   : 1980  PCP: Sharmaine Gatica APRN    MRN: [id number] LOS: 3 days   AGE/SEX: 43 y.o. female  ROOM: [facility]     Subjective   Subjective     She continues to have intermittent nausea after eating as well as abdominal pain. It doesn't really sound like the pain is causing the nausea       Objective   Objective   Vital Signs  Temp:  [96.6 °F (35.9 °C)-99 °F (37.2 °C)] 96.6 °F (35.9 °C)  Heart Rate:  [] 94  Resp:  [16-18] 18  BP: (107-124)/(75-84) 116/84  SpO2:  [95 %-97 %] 97 %  on   ;   Device (Oxygen Therapy): room air  Body mass index is 18.9 kg/m².  Physical Exam  Constitutional:       General: She is not in acute distress.     Appearance: She is not toxic-appearing.   Cardiovascular:      Rate and Rhythm: Normal rate and regular rhythm.      Heart sounds: Normal heart sounds.   Pulmonary:      Effort: Pulmonary effort is normal.      Breath sounds: Normal breath sounds.   Abdominal:      General: Bowel sounds are normal. There is no distension.      Palpations: Abdomen is soft.      Tenderness: There is abdominal tenderness. There is no guarding or rebound.   Musculoskeletal:         General: No tenderness.      Right lower leg: No edema.      Left lower leg: No edema.   Neurological:      Mental Status: She is alert.   Psychiatric:         Mood and Affect: Mood normal.         Behavior: Behavior normal.         Results Review     I reviewed the patient's new clinical results.  Results from last 7 days   Lab Units 24  0304 24  0459 24  0630 24  2359 24  1403 24  0647 24  1615 24  0706   WBC 10*3/mm3 7.22 12.17*  --   --   --  7.65  --  8.90   HEMOGLOBIN g/dL 9.9* 10.2* 9.2* 9.5*   < > 9.9*  9.9*   < > 11.5*   PLATELETS 10*3/mm3 482* 475*  --   --   --  405  --  401    < > = values in this interval not displayed.     Results from last 7 days   Lab Units 24  0304 24  0459 24  0647 24  0706  "  SODIUM mmol/L 136 137 140 137   POTASSIUM mmol/L 4.3 4.2 3.7 4.0   CHLORIDE mmol/L 98 98 101 100   CO2 mmol/L 28.0 26.3 27.2 24.6   BUN mg/dL 13 8 3* 4*   CREATININE mg/dL 0.68 0.60 0.53* 0.57   GLUCOSE mg/dL 111* 98 91 93   Estimated Creatinine Clearance: 99.9 mL/min (by C-G formula based on SCr of 0.68 mg/dL).  Results from last 7 days   Lab Units 04/30/24  0304 04/27/24  0647 04/26/24  0706 04/25/24  1537   ALBUMIN g/dL 3.5 3.2* 3.4* 4.1   BILIRUBIN mg/dL <0.2 <0.2 <0.2 <0.2   ALK PHOS U/L 109 102 107 128*   AST (SGOT) U/L 6 11 10 9   ALT (SGPT) U/L 11 7 5 9     Results from last 7 days   Lab Units 04/30/24  0304 04/29/24  0459 04/27/24  0647 04/26/24  0713 04/26/24  0706 04/25/24  1537   CALCIUM mg/dL 9.2 9.4 9.2  --  9.2 9.8   ALBUMIN g/dL 3.5  --  3.2*  --  3.4* 4.1   MAGNESIUM mg/dL  --  1.8 1.7 1.6  --  1.8   PHOSPHORUS mg/dL  --  4.8*  --  4.2  --   --      Results from last 7 days   Lab Units 04/26/24  0713   PROCALCITONIN ng/mL 0.08     COVID19   Date Value Ref Range Status   08/07/2022 Not Detected Not Detected - Ref. Range Final     No results found for: \"HGBA1C\", \"POCGLU\"    CT Abdomen Pelvis With Contrast  Narrative: CT ABDOMEN PELVIS W CONTRAST-     HISTORY: Worsened left upper quadrant pain, history of splenic rupture,  pancreatitis, gastritis.     TECHNIQUE:  CT of the abdomen and pelvis was performed following the  administration of intravenous contrast. Reformatted images were  reviewed. Radiation dose reduction techniques were utilized, including  automated exposure control and exposure modulation based on body size.     COMPARISON: CT abdomen and pelvis 4/1/2024     FINDINGS:     Lung bases and pleural spaces are clear.  The liver is enlarged, measuring 18.5 cm in craniocaudal length. There  is a peripherally enhancing collection along the dorsal left hepatic  surface measuring approximately 3.0 x 2.7 x 3.3 cm, which has  significantly decreased in size from 4/1/2024, previously 6.3 x 3.3 " x  7.7 cm when remeasured. The gallbladder is present. There are multiple  coarse pancreatic calcifications. Along the ventral distal pancreatic  body and tail, there is an approximately 5.7 x 3.2 x 3.5 cm peripherally  enhancing multiloculated collection, which has increased in size from  4/1/2024, previously 3.0 x 1.1 x 1.5 cm when remeasured. Along the  inferior aspect of the spleen, there is a 7.6 x 4.9 x 6.4 cm  peripherally enhancing collection, which has increased in size,  previously 5.9 x 3.4 x 5.2 cm when remeasured. This is likely contiguous  with an approximately 10.6 x 9.0 x 8.0 cm multiloculated collection  involving the spleen, which is newly enlarged.  The adrenal glands are within normal limits. The collection along the  inferior aspect of the spleen results in mass effect on the adjacent  left kidney. There are asymmetric delayed left renal nephrograms. There  is no hydronephrosis. There is at least mild atherosclerosis. There are  embolization coils/clips in the left lower quadrant. Metallic streak  artifact limits evaluation of adjacent structures. There is mild fat  stranding in the left upper quadrant.  There is mass effect on the gastric fundus from the dominant splenic  collection. Previous endogastric wall thickening has improved. There is  no bowel obstruction. There is a moderate to large colonic stool burden.  The appendix is visualized. The bladder is poorly distended. The uterus  is present. There is no adnexal mass.  There is osseous demineralization.        Impression:    1.  Decreased size of a 3.3 cm probable pseudocyst along the left  hepatic surface. A distal pancreatic collection measuring 5.7 cm has  increased in size, and a collection along the inferior aspect of the  spleen has also increased in size, likely reflecting additional  pseudocysts. The perisplenic collection is likely contiguous with a  large multiloculated collection occupying the spleen, which is newly  enlarged,  concerning for pseudocyst and/or hematoma. The splenic  collection is more similar in appearance to the previously noted  hematoma/collection from January, which had improved on follow-up CT  from 4/1/2024. This is concerning for possible interval hemorrhage.  2.  Asymmetric delayed left renal nephrograms. Correlate with urinalysis  to evaluate for possible pyelonephritis.  3.  Additional findings, as above.        This report was finalized on 4/25/2024 7:45 PM by Dr. Aleisha Sandoval M.D  on Workstation: [id number]       Scheduled Medications  amitriptyline, 50 mg, Oral, Nightly  ferrous sulfate, 325 mg, Oral, Daily With Breakfast  folic acid, 1 mg, Oral, Daily  gabapentin, 300 mg, Oral, TID  Lidocaine, 1 patch, Transdermal, Q24H  magnesium oxide, 400 mg, Oral, Daily  pancrelipase (Lip-Prot-Amyl), 30,000 units of lipase, Oral, TID With Meals  pantoprazole, 40 mg, Oral, Q AM  polyethylene glycol, 17 g, Oral, BID  senna-docusate sodium, 1 tablet, Oral, BID  thiamine, 100 mg, Oral, Daily    Infusions   Diet  Diet: Regular/House, Gastrointestinal; Fat-Restricted; Fluid Consistency: Thin (IDDSI 0)       Assessment/Plan     Active Hospital Problems    Diagnosis  POA    **Hematoma of spleen without rupture of capsule [D73.5]  Yes    Chronic, continuous use of opioids [F11.90]  Yes    Generalized anxiety disorder [F41.1]  Yes    Tobacco abuse [Z72.0]  Yes    Abdominal pain [R10.9]  Yes    Pancreatic pseudocyst [K86.3]  Yes    Anemia of chronic disease [D63.8]  Yes    Chronic pancreatitis [K86.1]  Yes    Abnormal CT of the abdomen [R93.5]  Yes    Alcohol abuse [F10.10]  Yes      Resolved Hospital Problems    Diagnosis Date Resolved POA    Hyperglycemia [R73.9] 08/25/2023 Yes       43 y.o. female admitted with Hematoma of spleen without rupture of capsule.    43 y.o. female who presented to the emergency room at an outlying facility for left upper quadrant pain with some radiation into the left shoulder.  She has a history of  splenic hematoma/laceration requiring embolization of the splenic artery in January of this year.  CT A/P revealed worsening pseudocyst and/or hematoma.      Hematoma of spleen  -general surgery is following and treating conservatively at this time  -hgb has been stable  -I'm really not sure what else to do for her ongoing pain and nausea/vomiting. -Will ask GI to see her to see if they have any ideas    Leukocytosis/thrombocytosis  -leukocytosis improved spontaneously this morning  -Thrombocytosis is mild and stable     Chronic pancreatitis  Pancreatic pseudocyst  -Followed by gastroenterology as an outpatient  -change diet to low fat  -on creon     Alcohol dependence in remission  -Currently sober just over a month.  -thiamine, folate     Acute on chronic pain, continuous use of opioids  -on iv dilaudid, percocet, gabapentin, and lidocaine.   -the increased percocet wasn't particularly helpful     Anemia of chronic disease  -Hemoglobin 9.9     Tobacco abuse  -Nicotine replacement therapy patch offered she has declined     Generalized anxiety disorder    SCDs for DVT prophylaxis.  Full code.  Discussed with patient.  Anticipate discharge home when cleared by consultants.      Rafat Beltran MD  [facility]  04/30/24  16:31 EDT    I wore protective equipment throughout this patient encounter including a face mask, gloves and protective eyewear.  Hand hygiene was performed before donning protective equipment and after removal when leaving the room.

## 2024-04-30 NOTE — PLAN OF CARE
Goal Outcome Evaluation:           Progress: no change  Outcome Evaluation: VSS. Pt still having increased abdominal and L shoulder pain. Pt states lidocaine patch makes her pain worse. GI consult placed and diet switched to low fat/irritant. Pt taking scheduled PO gabapentin, PRN IV Dilaudid and PRN PO Percocet. PO Zofran PRN for nausea management. Pt ambulating in halls. IV saline locked. Pt refusing Creon and she states that general surgery told her to go ahead and stop taking it. GI to see likely tomorrow and hopefully they can discuss it further.

## 2024-04-30 NOTE — PAYOR COMM NOTE
"Lizzeth Zavala (43 y.o. Female)          INPATIENT REQUEST FOR [id number]     ADMITTED AS OBS 4/25  CONVERTED TO INPATIENT 4/27/24    CONTACT F# [phone]         Date of Birth   1980    Social Security Number       Address   [address]   [place] [address]    Home Phone   [phone]    MRN   [id number]       Sikhism   Alevism    Marital Status   Single                            Admission Date   4/25/24    Admission Type   Urgent    Admitting Provider   Castro Hilliard MD    Attending Provider   Rafat Beltran MD    Department, Room/Bed   [facility] [address] [place], 96/1       Discharge Date       Discharge Disposition       Discharge Destination                                 Attending Provider: Rafat Beltran MD    Allergies: Nickel    Isolation: None   Infection: None   Code Status: CPR    Ht: 175.3 cm (69\")   Wt: 58.1 kg (128 lb)    Admission Cmt: None   Principal Problem: Hematoma of spleen without rupture of capsule [D73.5]                   Active Insurance as of 4/25/2024       Primary Coverage       Payor Plan Insurance Group Employer/Plan Group    [facility] MEDICAID [facility] [id number]       Payor Plan Address Payor Plan Phone Number Payor Plan Fax Number Effective Dates    MAIL STOP:   7/1/2022 - None Entered    [address] [address]       [facility] [address]         Subscriber Name Subscriber Birth Date Member ID       LIZZETH ZAVALA 1980 [id number]                     Emergency Contacts        (Rel.) Home Phone Work Phone Mobile Phone    JEAN CARLOS,MALOU (Significant Other) [phone] -- --                 History & Physical        Cristina Escobar APRN at 04/26/24 1006       Attestation signed by Mike Donnelly MD at 04/26/24 0092    Addendum:   IMike MD, personally performed the services described in this documentation as scribed by the above named individual in my presence, and it is both " accurate and complete.     I provided a substantive portion of the care of the patient, >50%.  I personally performed the physical exam in its entirety, and below are my findings    General: Alert and oriented x3, no acute distress.  HEENT: Normocephalic, atraumatic  Eyes: PERRL, EOMI, anicteric sclerae  Lungs: Clear to auscultation bilaterally, no crackles or wheezes  CV: Regular rate and rhythm, no murmurs rubs or gallops  Abdomen: Soft, nondistended, tenderness to palpation left upper and epigastric region.  Extremities: No significant peripheral edema  Skin: Clean/dry/intact, no rashes  Neuro: Cranial nerves II through XII intact, no gross focal neurological deficits appreciated  Psych: Appropriate mood and affect      I agree with assessment and plan as per above with the addition of   Ms. Cain is a 43 y.o. smoker with a history of alcohol abuse, chronic pancreatitis, chronic opioid use, generalized anxiety, pancreatic pseudocyst and history of splenic laceration and subcapsular hematoma that presents to [facility] complaining of abdominal pain.     Hematoma of spleen   -CT scan shows pseudocysts and/or hematoma  -Hemoglobin 11.5 this morning, last hemoglobin on 04/7/24 was 10.2.  Hemoglobin stable/improved compared to prior  -On as needed Percocet continued, as needed IV Dilaudid for breakthrough pain  -IV fluids, antiemetics  -General surgery evaluated, findings likely primarily secondary to worsening pancreatic pseudocyst rather than hematoma per surgery.  No plan for intervention currently.  Splenectomy if needed will be technically difficult and high risk for complication per surgery.    Chronic pancreatitis/pancreatic pseudocyst/alcohol abuse  -Patient reports she has been sober from 30 days, encouraged continuation of alcohol abstinence   -ethanol level was  was normal on admission            Mike Donnelly MD   4/26/2024  15:18 EDT                        Patient Name:  Piper ROLLE  Payton  YOB: 1980  MRN:  [id number]  Admit Date:  4/25/2024  Patient Care Team:  Sharmaine Gatica APRN as PCP - General (Nurse Practitioner)      Subjective  History Present Illness     Chief Complaint   Patient presents with    Abdominal Pain     History of Present Illness  Ms. Cain is a 43 y.o. smoker with a history of alcohol abuse, chronic pancreatitis, chronic opioid use, generalized anxiety, pancreatic pseudocyst and history of splenic laceration and subcapsular hematoma that presents to [facility] complaining of abdominal pain.  The patient states that she does have some level of chronic pain due to her pancreatic issues, but has noticed worsening and a new type of pain in the last 4 days.  She started having some steady pain in the left upper quadrant that was stabbing and radiating to the top of her left shoulder going into the left side of her neck.  She states that she has had persistent nausea with this as well as dizzy yesterday.  She has not vomited and denies any issues with constipation or diarrhea.  She did have some associated chest discomfort on the left side in relation to her left upper quadrant pain as well.  She denies any recent cough, fever or chills.  She is currently working on trying to stop smoking.  She states that she has not had any urgency, frequency or dysuria with her voiding, but states it is sometimes slow to start.   She was admitted here in January of this year when she had left upper quadrant abdominal pain and left shoulder pain at that time as well.  CT A/P at an outlying emergency room revealed left splenic subcapsular hematoma associated with splenic laceration and surrounding edema.  She was transferred to the ICU she was evaluated by general surgery as well as vascular surgery.  She was taken to the OR on 1/22 where she underwent coil embolization of the splenic artery and right femoral artery exploration with retrieval of dislodged coil.   She was ultimately stabilized and discharged.  She again presented back to this facility 4/1 to 4/7 where she was found to have acute on chronic pancreatitis.  She was found to have peripancreatic fluid collections as well as thick-walled stomach and suspicion for possible acute gastritis.  EGD was obtained 4/3 and demonstrated gastritis.  Duodenal and stomach biopsies were unremarkable.  She was.  By gastroenterology and given supportive care and ultimately discharged.   Presented to a freestanding emergency room yesterday where CT A/P revealed decreased size of a 3.3 cm probable pseudocyst with a larger pancreatic collection as well as collection along the inferior aspect of the spleen that was also increased in size..  The perisplenic collection felt to be concerning for pseudocyst and/or hematoma.  For this reason, she was transferred to this facility for further evaluation with a general surgery.  There was some asymmetric delayed left renal nephrograms.  UA was obtained and unremarkable any acute findings.  She has been given supportive care and awaiting further evaluation by general surgery.     Review of Systems   Constitutional:  Negative for appetite change, fatigue and fever.   HENT:  Negative for congestion and ear pain.    Respiratory:  Negative for cough and shortness of breath.    Cardiovascular:  Negative for chest pain and leg swelling.   Gastrointestinal:  Positive for abdominal pain and nausea. Negative for constipation, diarrhea and vomiting.   Genitourinary:  Negative for difficulty urinating and dysuria.   Musculoskeletal:  Negative for back pain and gait problem.   Skin:  Negative for color change and pallor.   Neurological:  Negative for dizziness and light-headedness.   Psychiatric/Behavioral:  Negative for confusion. The patient is not nervous/anxious.       Personal History     Past Medical History:   Diagnosis Date    Anemia     Anxiety     Constipation     Cyst of spleen     Diarrhea      E. coli bacteremia     ETOH abuse     Frequent UTI     GERD (gastroesophageal reflux disease)     Hiatal hernia     Left flank pain 10/21/2022    Migraine     Miscarriage 2004    Pancreatitis     Pseudocyst of pancreas      Past Surgical History:   Procedure Laterality Date    EMBOLIZATION MESENTERIC ARTERY N/A 1/22/2024    Procedure: OR EMBOLIZATION MESENTERIC ARTERY RIGHT FEMORAL POPLITEAL THROMBECTOMY;  Surgeon: Carine Pena Jr., MD;  Location: [facility] HYBRID OR;  Service: Vascular;  Laterality: N/A;    ENDOSCOPY N/A 08/10/2022    Procedure: ESOPHAGOGASTRODUODENOSCOPY with bxs;  Surgeon: Salvador Price MD;  Location: [facility] ENDOSCOPY;  Service: Gastroenterology;  Laterality: N/A;  Pre: r/o esophageal  varacies, abd pain  Post: esophageal plaque    ENDOSCOPY N/A 10/9/2023    Procedure: ESOPHAGOGASTRODUODENOSCOPY WITH STENT REMOVAL;  Surgeon: Red Denson MD;  Location: [facility] ENDOSCOPY;  Service: Gastroenterology;  Laterality: N/A;    ENDOSCOPY N/A 4/3/2024    Procedure: ESOPHAGOGASTRODUODENOSCOPY (EGD) WITH BIOPSIES;  Surgeon: Petar Starr MD;  Location: [facility] ENDOSCOPY;  Service: Gastroenterology;  Laterality: N/A;  PRE- ABDOMINAL PAIN  POST - GASTRITIS, ESOPHAGITIS,BILE REFLUX    PANCREATIC CYST DRAINAGE      x 2    UPPER ENDOSCOPIC ULTRASOUND W/ FNA N/A 9/13/2023    Procedure: Esophagogastroduodenoscopy with biopsy x 1 area and endoscopic ultrasound with placement of cyst gastrostomy;  Surgeon: Red Denson MD;  Location: [facility] ENDOSCOPY;  Service: Gastroenterology;  Laterality: N/A;  post: pancreatic psuedocyst    WISDOM TOOTH EXTRACTION       Family History   Problem Relation Age of Onset    Alcohol abuse Mother     Arthritis Mother     Asthma Mother     Miscarriages / Stillbirths Mother     Cancer Father     Diabetes Father     Drug abuse Father     Early death Father     Heart disease Father     Hyperlipidemia Father     Hypertension Father     Alcohol abuse Paternal Aunt      Cancer Paternal Aunt     Diabetes Paternal Aunt     Drug abuse Paternal Aunt     Early death Paternal Aunt     Heart disease Paternal Aunt     Hyperlipidemia Paternal Aunt     Hypertension Paternal Aunt     Alcohol abuse Maternal Grandmother     Alcohol abuse Maternal Grandfather     Alcohol abuse Paternal Grandmother     Cancer Paternal Grandmother     Diabetes Paternal Grandmother     Drug abuse Paternal Grandmother     Early death Paternal Grandmother     Heart disease Paternal Grandmother     Hyperlipidemia Paternal Grandmother     Hypertension Paternal Grandmother     Alcohol abuse Paternal Grandfather      Social History     Tobacco Use    Smoking status: Every Day     Current packs/day: 0.50     Average packs/day: 0.5 packs/day for 28.2 years (14.1 ttl pk-yrs)     Types: Cigarettes     Start date: 1/28/1996     Passive exposure: Current    Smokeless tobacco: Never   Vaping Use    Vaping status: Never Used   Substance Use Topics    Alcohol use: Not Currently    Drug use: Never     Medications Prior to Admission   Medication Sig Dispense Refill Last Dose    amitriptyline (ELAVIL) 50 MG tablet Take 1 tablet by mouth Every Night.       Ferrous Sulfate Dried (Feosol) 200 (65 Fe) MG tablet tablet Take 1 tablet by mouth Daily.       folic acid (FOLVITE) 1 MG tablet Take 1 tablet by mouth Daily. 30 tablet 3     magnesium oxide (MAG-OX) 400 MG tablet Take 1 tablet by mouth Daily.       ondansetron (ZOFRAN) 4 MG tablet Take 1 tablet by mouth Every 8 (Eight) Hours As Needed for Nausea or Vomiting.       oxyCODONE-acetaminophen (PERCOCET)  MG per tablet Take 1 tablet by mouth Every 4 (Four) Hours As Needed for Moderate Pain. 20 tablet 0     pantoprazole (PROTONIX) 40 MG EC tablet Take 1 tablet by mouth Daily. 30 tablet 3     promethazine (PHENERGAN) 25 MG suppository Insert 1 suppository into the rectum As Needed for Nausea or Vomiting.       thiamine (VITAMIN B1) 100 MG tablet Take 1 tablet by mouth Daily. 30  tablet 3      Allergies:    Allergies   Allergen Reactions    Nickel Rash       Objective   Objective     Vital Signs  Temp:  [97.6 °F (36.4 °C)-99.1 °F (37.3 °C)] 97.6 °F (36.4 °C)  Heart Rate:  [] 98  Resp:  [16-18] 18  BP: (113-136)/() 118/79  SpO2:  [96 %-100 %] 98 %  on   ;   Device (Oxygen Therapy): room air  Body mass index is 18.9 kg/m².    Physical Exam  Vitals and nursing note reviewed.   Constitutional:       Appearance: She is ill-appearing. She is not toxic-appearing.   HENT:      Head: Normocephalic and atraumatic.      Right Ear: External ear normal.      Left Ear: External ear normal.      Nose: Nose normal.   Cardiovascular:      Rate and Rhythm: Normal rate and regular rhythm.      Pulses: Normal pulses.   Pulmonary:      Effort: Pulmonary effort is normal. No respiratory distress.      Breath sounds: Normal breath sounds.   Abdominal:      General: Bowel sounds are normal. There is no distension.      Palpations: Abdomen is soft.      Tenderness: There is abdominal tenderness.   Musculoskeletal:         General: No swelling. Normal range of motion.      Cervical back: Normal range of motion and neck supple.   Skin:     General: Skin is warm and dry.      Findings: No bruising.   Neurological:      Mental Status: She is alert and oriented to person, place, and time.      Sensory: No sensory deficit.      Coordination: Coordination normal.   Psychiatric:         Mood and Affect: Mood normal.         Behavior: Behavior normal.     Results Review:  I reviewed the patient's new clinical results.  I reviewed the patient's new imaging results and agree with the interpretation.  I reviewed the patient's other test results and agree with the interpretation  I personally viewed and interpreted the patient's EKG/Telemetry data    Lab Results (last 24 hours)       Procedure Component Value Units Date/Time    CBC & Differential [[id number]]  (Abnormal) Collected: 04/25/24 9865    Specimen: Blood  Updated: 04/25/24 1541    Narrative:      The following orders were created for panel order CBC & Differential.  Procedure                               Abnormality         Status                     ---------                               -----------         ------                     CBC Auto Differential[[id number]]        Abnormal            Final result                 Please view results for these tests on the individual orders.    Comprehensive Metabolic Panel [[id number]]  (Abnormal) Collected: 04/25/24 1537    Specimen: Blood Updated: 04/25/24 1600     Glucose 109 mg/dL      BUN 7 mg/dL      Creatinine 0.78 mg/dL      Sodium 136 mmol/L      Potassium 3.9 mmol/L      Chloride 97 mmol/L      CO2 25.9 mmol/L      Calcium 9.8 mg/dL      Total Protein 8.0 g/dL      Albumin 4.1 g/dL      ALT (SGPT) 9 U/L      AST (SGOT) 9 U/L      Alkaline Phosphatase 128 U/L      Total Bilirubin <0.2 mg/dL      Globulin 3.9 gm/dL      A/G Ratio 1.1 g/dL      BUN/Creatinine Ratio 9.0     Anion Gap 13.1 mmol/L      eGFR 98.0 mL/min/1.73     Narrative:      GFR Normal >60  Chronic Kidney Disease <60  Kidney Failure <15      Lipase [[id number]]  (Abnormal) Collected: 04/25/24 1537    Specimen: Blood Updated: 04/25/24 1600     Lipase 139 U/L     CBC Auto Differential [[id number]]  (Abnormal) Collected: 04/25/24 1537    Specimen: Blood Updated: 04/25/24 1541     WBC 10.88 10*3/mm3      RBC 3.91 10*6/mm3      Hemoglobin 12.1 g/dL      Hematocrit 37.0 %      MCV 94.6 fL      MCH 30.9 pg      MCHC 32.7 g/dL      RDW 13.9 %      RDW-SD 49.0 fl      MPV 8.6 fL      Platelets 580 10*3/mm3      Neutrophil % 65.4 %      Lymphocyte % 23.9 %      Monocyte % 9.3 %      Eosinophil % 1.2 %      Basophil % 0.1 %      Immature Grans % 0.1 %      Neutrophils, Absolute 7.12 10*3/mm3      Lymphocytes, Absolute 2.60 10*3/mm3      Monocytes, Absolute 1.01 10*3/mm3      Eosinophils, Absolute 0.13 10*3/mm3      Basophils, Absolute 0.01 10*3/mm3      Immature  Grans, Absolute 0.01 10*3/mm3     Magnesium [[id number]]  (Normal) Collected: 04/25/24 1537    Specimen: Blood Updated: 04/25/24 1600     Magnesium 1.8 mg/dL     Ethanol [[id number]] Collected: 04/25/24 1537    Specimen: Blood Updated: 04/25/24 1632     Ethanol <10 mg/dL      Ethanol % <0.010 %     Urinalysis With Microscopic If Indicated (No Culture) - Urine, Clean Catch [[id number]]  (Abnormal) Collected: 04/25/24 1645    Specimen: Urine, Clean Catch Updated: 04/25/24 1710     Color, UA Yellow     Appearance, UA Slightly Cloudy     pH, UA 6.0     Specific Gravity, UA <=1.005     Glucose, UA Negative     Ketones, UA Negative     Bilirubin, UA Negative     Blood, UA Negative     Protein, UA Negative     Leuk Esterase, UA Negative     Nitrite, UA Negative     Urobilinogen, UA 0.2 E.U./dL    Narrative:      Urine microscopic not indicated.    Comprehensive Metabolic Panel [[id number]]  (Abnormal) Collected: 04/26/24 0706    Specimen: Blood Updated: 04/26/24 0806     Glucose 93 mg/dL      BUN 4 mg/dL      Creatinine 0.57 mg/dL      Sodium 137 mmol/L      Potassium 4.0 mmol/L      Chloride 100 mmol/L      CO2 24.6 mmol/L      Calcium 9.2 mg/dL      Total Protein 6.7 g/dL      Albumin 3.4 g/dL      ALT (SGPT) 5 U/L      AST (SGOT) 10 U/L      Alkaline Phosphatase 107 U/L      Total Bilirubin <0.2 mg/dL      Globulin 3.3 gm/dL      A/G Ratio 1.0 g/dL      BUN/Creatinine Ratio 7.0     Anion Gap 12.4 mmol/L      eGFR 117.3 mL/min/1.73     Narrative:      GFR Normal >60  Chronic Kidney Disease <60  Kidney Failure <15      CBC (No Diff) [[id number]]  (Abnormal) Collected: 04/26/24 0706    Specimen: Blood Updated: 04/26/24 0808     WBC 8.90 10*3/mm3      RBC 3.65 10*6/mm3      Hemoglobin 11.5 g/dL      Hematocrit 34.1 %      MCV 93.4 fL      MCH 31.5 pg      MCHC 33.7 g/dL      RDW 14.2 %      RDW-SD 49.1 fl      MPV 9.4 fL      Platelets 401 10*3/mm3     Magnesium [[id number]]  (Normal) Collected: 04/26/24 0713    Specimen: Blood  "Updated: 04/26/24 0911     Magnesium 1.6 mg/dL     Phosphorus [[id number]]  (Normal) Collected: 04/26/24 0713    Specimen: Blood Updated: 04/26/24 0911     Phosphorus 4.2 mg/dL     Procalcitonin [[id number]]  (Normal) Collected: 04/26/24 0713    Specimen: Blood Updated: 04/26/24 0921     Procalcitonin 0.08 ng/mL     Narrative:      As a Marker for Sepsis (Non-Neonates):    1. <0.5 ng/mL represents a low risk of severe sepsis and/or septic shock.  2. >2 ng/mL represents a high risk of severe sepsis and/or septic shock.    As a Marker for Lower Respiratory Tract Infections that require antibiotic therapy:    PCT on Admission    Antibiotic Therapy       6-12 Hrs later    >0.5                Strongly Recommended  >0.25 - <0.5        Recommended   0.1 - 0.25          Discouraged              Remeasure/reassess PCT  <0.1                Strongly Discouraged     Remeasure/reassess PCT    As 28 day mortality risk marker: \"Change in Procalcitonin Result\" (>80% or <=80%) if Day 0 (or Day 1) and Day 4 values are available. Refer to [email or web address]-[email or web address]    Change in PCT <=80%  A decrease of PCT levels below or equal to 80% defines a positive change in PCT test result representing a higher risk for 28-day all-cause mortality of patients diagnosed with severe sepsis for septic shock.    Change in PCT >80%  A decrease of PCT levels of more than 80% defines a negative change in PCT result representing a lower risk for 28-day all-cause mortality of patients diagnosed with severe sepsis or septic shock.               Imaging Results (Last 24 Hours)       Procedure Component Value Units Date/Time    CT Abdomen Pelvis With Contrast [[id number]] Collected: 04/25/24 1929     Updated: 04/25/24 1948    Narrative:      CT ABDOMEN PELVIS W CONTRAST-     HISTORY: Worsened left upper quadrant pain, history of splenic rupture,  pancreatitis, gastritis.     TECHNIQUE:  CT of the abdomen and pelvis was performed following " the  administration of intravenous contrast. Reformatted images were  reviewed. Radiation dose reduction techniques were utilized, including  automated exposure control and exposure modulation based on body size.     COMPARISON: CT abdomen and pelvis 4/1/2024     FINDINGS:     Lung bases and pleural spaces are clear.  The liver is enlarged, measuring 18.5 cm in craniocaudal length. There  is a peripherally enhancing collection along the dorsal left hepatic  surface measuring approximately 3.0 x 2.7 x 3.3 cm, which has  significantly decreased in size from 4/1/2024, previously 6.3 x 3.3 x  7.7 cm when remeasured. The gallbladder is present. There are multiple  coarse pancreatic calcifications. Along the ventral distal pancreatic  body and tail, there is an approximately 5.7 x 3.2 x 3.5 cm peripherally  enhancing multiloculated collection, which has increased in size from  4/1/2024, previously 3.0 x 1.1 x 1.5 cm when remeasured. Along the  inferior aspect of the spleen, there is a 7.6 x 4.9 x 6.4 cm  peripherally enhancing collection, which has increased in size,  previously 5.9 x 3.4 x 5.2 cm when remeasured. This is likely contiguous  with an approximately 10.6 x 9.0 x 8.0 cm multiloculated collection  involving the spleen, which is newly enlarged.  The adrenal glands are within normal limits. The collection along the  inferior aspect of the spleen results in mass effect on the adjacent  left kidney. There are asymmetric delayed left renal nephrograms. There  is no hydronephrosis. There is at least mild atherosclerosis. There are  embolization coils/clips in the left lower quadrant. Metallic streak  artifact limits evaluation of adjacent structures. There is mild fat  stranding in the left upper quadrant.  There is mass effect on the gastric fundus from the dominant splenic  collection. Previous endogastric wall thickening has improved. There is  no bowel obstruction. There is a moderate to large colonic stool  burden.  The appendix is visualized. The bladder is poorly distended. The uterus  is present. There is no adnexal mass.  There is osseous demineralization.          Impression:         1.  Decreased size of a 3.3 cm probable pseudocyst along the left  hepatic surface. A distal pancreatic collection measuring 5.7 cm has  increased in size, and a collection along the inferior aspect of the  spleen has also increased in size, likely reflecting additional  pseudocysts. The perisplenic collection is likely contiguous with a  large multiloculated collection occupying the spleen, which is newly  enlarged, concerning for pseudocyst and/or hematoma. The splenic  collection is more similar in appearance to the previously noted  hematoma/collection from January, which had improved on follow-up CT  from 4/1/2024. This is concerning for possible interval hemorrhage.  2.  Asymmetric delayed left renal nephrograms. Correlate with urinalysis  to evaluate for possible pyelonephritis.  3.  Additional findings, as above.        This report was finalized on 4/25/2024 7:45 PM by Dr. Aleisha Sandoval M.D  on Workstation: [id number]               Results for orders placed during the hospital encounter of 08/06/22    Adult Transthoracic Echo Complete W/ Cont if Necessary Per Protocol    Interpretation Summary  · Left ventricular ejection fraction appears to be 61 - 65%. Left ventricular systolic function is normal.  · Left ventricular diastolic function was normal.  · Normal right ventricular cavity size and systolic function noted.  · The agitated saline study is positive with Valsalva, consistent with a small to moderate sized PFO  · Mild tricuspid valve regurgitation is present.  · Calculated right ventricular systolic pressure from tricuspid regurgitation is 32 mmHg.  · There is no evidence of pericardial effusion       Assessment/Plan     Active Hospital Problems    Diagnosis  POA    **Hematoma of spleen without rupture of capsule  [D73.5]  Yes    Chronic, continuous use of opioids [F11.90]  Yes    Generalized anxiety disorder [F41.1]  Yes    Tobacco abuse [Z72.0]  Yes    Abdominal pain [R10.9]  Yes    Pancreatic pseudocyst [K86.3]  Yes    Anemia of chronic disease [D63.8]  Yes    Chronic pancreatitis [K86.1]  Yes    Abnormal CT of the abdomen [R93.5]  Yes    Alcohol abuse [F10.10]  Yes     Ms. Cain is a 43 y.o. female [facility] with some radiation into the left shoulder.  She has a history of splenic hematoma/laceration requiring embolization of the splenic artery in January of this year.  CT A/P revealed worsening pseudocyst and/or hematoma.  She was sent to this facility and general surgery was consulted.    Hematoma of spleen  -General surgery has been consulted.  Await their further recommendations.  -Will continue supportive care with pain medications and nausea medications as needed.  -N.p.o. until by surgery for a diet.  IV fluids in place.  -No current concerns for infection given lack of white count or fever.  -Will monitor serial H&H.    Chronic pancreatitis  Pancreatic pseudocyst  -Followed by gastroenterology on recent admission.  -Lipase 139.  Can repeat in a.m. and monitor.  -Continue alcohol cessation.    Alcohol abuse  -Currently 29 days sober.  -Continue to encourage alcohol abstinence.  -Vitamin replacement.    Chronic, continuous use of opioids  -Continue home Percocet with IV pain medications for breakthrough.    Anemia of chronic disease  -Hemoglobin 11 range.  Will monitor trend.  -Continue her oral iron.    Tobacco abuse  -Nicotine replacement therapy.    Generalized anxiety disorder  -Continue home medication.    I discussed the patients findings and my recommendations with patient and nursing staff.    VTE Prophylaxis - SCDs.  Code Status - Full code.       SOL Rios  [facility]  04/26/24  10:06  EDT      Electronically signed by Mike Donnelly MD at 04/26/24 1537          Emergency Department Notes        Evelyn Gannon RN at 04/25/24 2158          Pt ambulatory for transfer by private vehicle to [facility]; with all belongings; nad; stable gait     Electronically signed by Evelyn Gannon RN at 04/25/24 2201       Evelyn Gannon RN at 04/25/24 2146          Phone report to Ty RN @ [facility]; pt signed and understands risks of manipulating IV while en route to hospital by private vehicle     Electronically signed by Evelyn Gannon RN at 04/25/24 2147       Evelyn Gannon RN at 04/25/24 2139          Nursing report ED to floor  Piper Hodgesrp  43 y.o.  female    HPI :  HPI (Adult)  Stated Reason for Visit: Onset 3-4 days.  Hx of acute pancreatitis.  LLQ abdominal pain.  Intermittent stabbing.  Nausea.    Chief Complaint  Chief Complaint   Patient presents with    Abdominal Pain       Admitting doctor:   Castro Hilliard MD    Admitting diagnosis:   The primary encounter diagnosis was Left upper quadrant abdominal pain. Diagnoses of Alcohol-induced chronic pancreatitis and Spleen hematoma, initial encounter were also pertinent to this visit.    Code status:   Current Code Status       Date Active Code Status Order ID Comments User Context       Prior            Allergies:   Nickel    Isolation:   No active isolations    Intake and Output    Intake/Output Summary (Last 24 hours) at 4/25/2024 2139  Last data filed at 4/25/2024 1821  Gross per 24 hour   Intake 1000 ml   Output --   Net 1000 ml       Weight:       04/25/24  1532   Weight: 58.1 kg (128 lb)       Most recent vitals:   Vitals:    04/25/24 1532 04/25/24 1700 04/25/24 1800 04/25/24 2111   BP: 122/94 132/94 (!) 136/115 129/93   BP Location: Right arm   Left arm   Patient Position: Sitting   Lying   Pulse: 120 91 91 92   Resp: 18 16 16 18   Temp: 98.6 °F (37 °C)      TempSrc: Oral      SpO2: 99% 97% 100% 96%   Weight: 58.1 kg (128  "lb)      Height: 175.3 cm (69\")          Active LDAs/IV Access:   Lines, Drains & Airways       Active LDAs       Name Placement date Placement time Site Days    Peripheral IV 04/25/24 1656 Anterior;Distal;Left;Upper Arm 04/25/24 1656  Arm  less than 1                    Labs (abnormal labs have a star):   Labs Reviewed   COMPREHENSIVE METABOLIC PANEL - Abnormal; Notable for the following components:       Result Value    Glucose 109 (*)     Chloride 97 (*)     Alkaline Phosphatase 128 (*)     All other components within normal limits    Narrative:     GFR Normal >60  Chronic Kidney Disease <60  Kidney Failure <15     LIPASE - Abnormal; Notable for the following components:    Lipase 139 (*)     All other components within normal limits   URINALYSIS W/ MICROSCOPIC IF INDICATED (NO CULTURE) - Abnormal; Notable for the following components:    Appearance, UA Slightly Cloudy (*)     All other components within normal limits    Narrative:     Urine microscopic not indicated.   CBC WITH AUTO DIFFERENTIAL - Abnormal; Notable for the following components:    WBC 10.88 (*)     Platelets 580 (*)     Neutrophils, Absolute 7.12 (*)     Monocytes, Absolute 1.01 (*)     All other components within normal limits   MAGNESIUM - Normal   ETHANOL   CBC AND DIFFERENTIAL    Narrative:     The following orders were created for panel order CBC & Differential.  Procedure                               Abnormality         Status                     ---------                               -----------         ------                     CBC Auto Differential[[id number]]        Abnormal            Final result                 Please view results for these tests on the individual orders.       EKG:   No orders to display       Meds given in ED:   Medications   sodium chloride 0.9 % flush 10 mL (has no administration in time range)   sodium chloride 0.9 % bolus 1,000 mL (0 mL Intravenous Stopped 4/25/24 1821)   droperidol (INAPSINE) injection 2.5 " mg (2.5 mg Intravenous Given 4/25/24 1600)   pantoprazole (PROTONIX) injection 40 mg (40 mg Intravenous Given 4/25/24 1600)   HYDROmorphone (DILAUDID) injection 0.5 mg (0.5 mg Intravenous Given 4/25/24 1600)   HYDROmorphone (DILAUDID) injection 0.5 mg (0.5 mg Intravenous Given 4/25/24 1656)   HYDROmorphone (DILAUDID) injection 1 mg (1 mg Intravenous Given 4/25/24 1819)   iopamidol (ISOVUE-370) 76 % injection 100 mL (100 mL Intravenous Given 4/25/24 1915)   HYDROmorphone (DILAUDID) injection 1 mg (1 mg Intravenous Given 4/25/24 2043)   ondansetron (ZOFRAN) injection 4 mg (4 mg Intravenous Given 4/25/24 2104)       Imaging results:  CT Abdomen Pelvis With Contrast    Result Date: 4/25/2024   1.  Decreased size of a 3.3 cm probable pseudocyst along the left hepatic surface. A distal pancreatic collection measuring 5.7 cm has increased in size, and a collection along the inferior aspect of the spleen has also increased in size, likely reflecting additional pseudocysts. The perisplenic collection is likely contiguous with a large multiloculated collection occupying the spleen, which is newly enlarged, concerning for pseudocyst and/or hematoma. The splenic collection is more similar in appearance to the previously noted hematoma/collection from January, which had improved on follow-up CT from 4/1/2024. This is concerning for possible interval hemorrhage. 2.  Asymmetric delayed left renal nephrograms. Correlate with urinalysis to evaluate for possible pyelonephritis. 3.  Additional findings, as above.   This report was finalized on 4/25/2024 7:45 PM by Dr. Aleisha Sandoval M.D on Workstation: [id number]       Ambulatory status:   - ambulatory      Social issues:   Social History     Socioeconomic History    Marital status: Single   Tobacco Use    Smoking status: Every Day     Current packs/day: 0.50     Average packs/day: 0.5 packs/day for 28.2 years (14.1 ttl pk-yrs)     Types: Cigarettes     Start date: 1/28/1996      Passive exposure: Current    Smokeless tobacco: Never   Vaping Use    Vaping status: Never Used   Substance and Sexual Activity    Alcohol use: Not Currently    Drug use: Never    Sexual activity: Defer     Partners: Male       Peripheral Neurovascular  Peripheral Neurovascular (Adult)  Peripheral Neurovascular WDL: WDL    Neuro Cognitive  Neuro Cognitive (Adult)  Cognitive/Neuro/Behavioral WDL: WDL    Learning  Learning Assessment (Adult)  Learning Readiness and Ability: no barriers identified    Respiratory  Respiratory (Adult)  Airway WDL: WDL  Respiratory WDL  Respiratory WDL: WDL    Abdominal Pain       Pain Assessments  Pain (Adult)  (0-10) Pain Rating: Rest: 7  (0-10) Pain Rating: Activity: 7  Patient requested Medication Prescribed for Lower Pain Scale: No  Pain Location: abdomen  Response to Pain Interventions: intervention not effective per patient    NIH Stroke Scale       Evelyn Gannon RN  04/25/24 21:39 EDT     Electronically signed by Evelyn Gannon RN at 04/25/24 2140       Alessandra Green RN at 04/25/24 2101          Hospitalist paged at this time. MD Babak to call back    Electronically signed by Alessandra Green RN at 04/25/24 2104       Alessandra Green RN at 04/25/24 2050          Gen Surg called back at this time     Electronically signed by Alessandra Green RN at 04/25/24 2050       Alessandra Green RN at 04/25/24 2048          General surgery paged at this time     Electronically signed by Alessandra Green RN at 04/25/24 2048       Chidi Almazan MD at 04/25/24 1547          Subjective   History of Present Illness  The patient is a 43-year-old female.  She has a history of chronic abdominal pain with acute on chronic pancreatitis.  She also has a history of hypertension, pancreatic pseudocyst, spontaneous splenic laceration treated with embolization in January 2024.  She most recently was admitted to [facility] from 4/1/2024 until 4/7/2024 with acute on chronic  pancreatitis.  At that time she had had some alcohol ingestion 5 days previous to admission.  Patient did undergo EGD on 4/3/2024.  She was found to have mild inflammation in the gastric body and gastric antrum..        She presents today with increasing pain in the left upper quadrant going into the left flank for the last few days.  She describes some nausea.  No fever no chills.      Review of Systems    Past Medical History:   Diagnosis Date    Anemia     Anxiety     Constipation     Cyst of spleen     Diarrhea     E. coli bacteremia     ETOH abuse     Frequent UTI     GERD (gastroesophageal reflux disease)     Hiatal hernia     Left flank pain 10/21/2022    Migraine     Miscarriage 2004    Pancreatitis     Pseudocyst of pancreas        Allergies   Allergen Reactions    Nickel Rash       Past Surgical History:   Procedure Laterality Date    EMBOLIZATION MESENTERIC ARTERY N/A 1/22/2024    Procedure: OR EMBOLIZATION MESENTERIC ARTERY RIGHT FEMORAL POPLITEAL THROMBECTOMY;  Surgeon: Carine Pena Jr., MD;  Location: [facility] HYBRID OR;  Service: Vascular;  Laterality: N/A;    ENDOSCOPY N/A 08/10/2022    Procedure: ESOPHAGOGASTRODUODENOSCOPY with bxs;  Surgeon: Salvador Price MD;  Location: [facility] ENDOSCOPY;  Service: Gastroenterology;  Laterality: N/A;  Pre: r/o esophageal  varacies, abd pain  Post: esophageal plaque    ENDOSCOPY N/A 10/9/2023    Procedure: ESOPHAGOGASTRODUODENOSCOPY WITH STENT REMOVAL;  Surgeon: Red Denson MD;  Location: [facility] ENDOSCOPY;  Service: Gastroenterology;  Laterality: N/A;    ENDOSCOPY N/A 4/3/2024    Procedure: ESOPHAGOGASTRODUODENOSCOPY (EGD) WITH BIOPSIES;  Surgeon: Petar Starr MD;  Location: [facility] ENDOSCOPY;  Service: Gastroenterology;  Laterality: N/A;  PRE- ABDOMINAL PAIN  POST - GASTRITIS, ESOPHAGITIS,BILE REFLUX    PANCREATIC CYST DRAINAGE      x 2    UPPER ENDOSCOPIC ULTRASOUND W/ FNA N/A 9/13/2023    Procedure: Esophagogastroduodenoscopy with biopsy x 1  area and endoscopic ultrasound with placement of cyst gastrostomy;  Surgeon: Red Denson MD;  Location: [facility] ENDOSCOPY;  Service: Gastroenterology;  Laterality: N/A;  post: pancreatic psuedocyst    WISDOM TOOTH EXTRACTION         Family History   Problem Relation Age of Onset    Alcohol abuse Mother     Arthritis Mother     Asthma Mother     Miscarriages / Stillbirths Mother     Cancer Father     Diabetes Father     Drug abuse Father     Early death Father     Heart disease Father     Hyperlipidemia Father     Hypertension Father     Alcohol abuse Paternal Aunt     Cancer Paternal Aunt     Diabetes Paternal Aunt     Drug abuse Paternal Aunt     Early death Paternal Aunt     Heart disease Paternal Aunt     Hyperlipidemia Paternal Aunt     Hypertension Paternal Aunt     Alcohol abuse Maternal Grandmother     Alcohol abuse Maternal Grandfather     Alcohol abuse Paternal Grandmother     Cancer Paternal Grandmother     Diabetes Paternal Grandmother     Drug abuse Paternal Grandmother     Early death Paternal Grandmother     Heart disease Paternal Grandmother     Hyperlipidemia Paternal Grandmother     Hypertension Paternal Grandmother     Alcohol abuse Paternal Grandfather        Social History     Socioeconomic History    Marital status: Single   Tobacco Use    Smoking status: Every Day     Current packs/day: 0.50     Average packs/day: 0.5 packs/day for 28.2 years (14.1 ttl pk-yrs)     Types: Cigarettes     Start date: 1/28/1996     Passive exposure: Current    Smokeless tobacco: Never   Vaping Use    Vaping status: Never Used   Substance and Sexual Activity    Alcohol use: Not Currently    Drug use: Never    Sexual activity: Defer     Partners: Male           Objective   Physical Exam  Vital signs: Reviewed in nurses notes    General: Awake alert.  She does appear to be uncomfortable    HEENT: Normocephalic atraumatic nasopharynx clear pharynx clear and moist    Neck:   Supple without  lymphadenopathy    Respiratory:   Nonlabored respirations.  Clear to auscultation bilaterally.  Equal breath sounds bilaterally.  No wheezes or stridor noted.    Cardiovascular: Rate is rapid, rhythm regular.    Abdomen: Flat stomach.  Nondistended.  There is mild to moderate left upper quadrant tenderness to deep palpation.  No guarding no rebound    Skin:   Warm and dry.  No rashes noted    Neurological examination: Patient is awake alert oriented x4.  Speech is normal.  No facial palsy.  No focal motor or sensory deficits.  Procedures          ED Course  ED Course as of 04/27/24 0153   Thu Apr 25, 2024   2113 Pt to be admitted, transfer to [facility], d/w Dr Hilliard hospitalist, and surgery will consult [CF]      ED Course User Index  [CF] Clay Calvillo MD      Lab Results (last 72 hours)       Procedure Component Value Units Date/Time    CBC & Differential [[id number]]  (Abnormal) Collected: 04/25/24 1537    Specimen: Blood Updated: 04/25/24 1541    Narrative:      The following orders were created for panel order CBC & Differential.  Procedure                               Abnormality         Status                     ---------                               -----------         ------                     CBC Auto Differential[[id number]]        Abnormal            Final result                 Please view results for these tests on the individual orders.    Comprehensive Metabolic Panel [[id number]]  (Abnormal) Collected: 04/25/24 1537    Specimen: Blood Updated: 04/25/24 1600     Glucose 109 mg/dL      BUN 7 mg/dL      Creatinine 0.78 mg/dL      Sodium 136 mmol/L      Potassium 3.9 mmol/L      Chloride 97 mmol/L      CO2 25.9 mmol/L      Calcium 9.8 mg/dL      Total Protein 8.0 g/dL      Albumin 4.1 g/dL      ALT (SGPT) 9 U/L      AST (SGOT) 9 U/L      Alkaline Phosphatase 128 U/L      Total Bilirubin <0.2 mg/dL      Globulin 3.9 gm/dL      A/G Ratio 1.1 g/dL      BUN/Creatinine Ratio 9.0     Anion Gap  13.1 mmol/L      eGFR 98.0 mL/min/1.73     Narrative:      GFR Normal >60  Chronic Kidney Disease <60  Kidney Failure <15      Lipase [[id number]]  (Abnormal) Collected: 04/25/24 1537    Specimen: Blood Updated: 04/25/24 1600     Lipase 139 U/L     CBC Auto Differential [[id number]]  (Abnormal) Collected: 04/25/24 1537    Specimen: Blood Updated: 04/25/24 1541     WBC 10.88 10*3/mm3      RBC 3.91 10*6/mm3      Hemoglobin 12.1 g/dL      Hematocrit 37.0 %      MCV 94.6 fL      MCH 30.9 pg      MCHC 32.7 g/dL      RDW 13.9 %      RDW-SD 49.0 fl      MPV 8.6 fL      Platelets 580 10*3/mm3      Neutrophil % 65.4 %      Lymphocyte % 23.9 %      Monocyte % 9.3 %      Eosinophil % 1.2 %      Basophil % 0.1 %      Immature Grans % 0.1 %      Neutrophils, Absolute 7.12 10*3/mm3      Lymphocytes, Absolute 2.60 10*3/mm3      Monocytes, Absolute 1.01 10*3/mm3      Eosinophils, Absolute 0.13 10*3/mm3      Basophils, Absolute 0.01 10*3/mm3      Immature Grans, Absolute 0.01 10*3/mm3     Magnesium [[id number]]  (Normal) Collected: 04/25/24 1537    Specimen: Blood Updated: 04/25/24 1600     Magnesium 1.8 mg/dL     Ethanol [[id number]] Collected: 04/25/24 1537    Specimen: Blood Updated: 04/25/24 1632     Ethanol <10 mg/dL      Ethanol % <0.010 %     Urinalysis With Microscopic If Indicated (No Culture) - Urine, Clean Catch [[id number]]  (Abnormal) Collected: 04/25/24 1645    Specimen: Urine, Clean Catch Updated: 04/25/24 1710     Color, UA Yellow     Appearance, UA Slightly Cloudy     pH, UA 6.0     Specific Gravity, UA <=1.005     Glucose, UA Negative     Ketones, UA Negative     Bilirubin, UA Negative     Blood, UA Negative     Protein, UA Negative     Leuk Esterase, UA Negative     Nitrite, UA Negative     Urobilinogen, UA 0.2 E.U./dL    Narrative:      Urine microscopic not indicated.    Comprehensive Metabolic Panel [[id number]]  (Abnormal) Collected: 04/26/24 0706    Specimen: Blood Updated: 04/26/24 0806     Glucose 93 mg/dL       "BUN 4 mg/dL      Creatinine 0.57 mg/dL      Sodium 137 mmol/L      Potassium 4.0 mmol/L      Chloride 100 mmol/L      CO2 24.6 mmol/L      Calcium 9.2 mg/dL      Total Protein 6.7 g/dL      Albumin 3.4 g/dL      ALT (SGPT) 5 U/L      AST (SGOT) 10 U/L      Alkaline Phosphatase 107 U/L      Total Bilirubin <0.2 mg/dL      Globulin 3.3 gm/dL      A/G Ratio 1.0 g/dL      BUN/Creatinine Ratio 7.0     Anion Gap 12.4 mmol/L      eGFR 117.3 mL/min/1.73     Narrative:      GFR Normal >60  Chronic Kidney Disease <60  Kidney Failure <15      CBC (No Diff) [[id number]]  (Abnormal) Collected: 04/26/24 0706    Specimen: Blood Updated: 04/26/24 0808     WBC 8.90 10*3/mm3      RBC 3.65 10*6/mm3      Hemoglobin 11.5 g/dL      Hematocrit 34.1 %      MCV 93.4 fL      MCH 31.5 pg      MCHC 33.7 g/dL      RDW 14.2 %      RDW-SD 49.1 fl      MPV 9.4 fL      Platelets 401 10*3/mm3     Magnesium [[id number]]  (Normal) Collected: 04/26/24 0713    Specimen: Blood Updated: 04/26/24 0911     Magnesium 1.6 mg/dL     Phosphorus [[id number]]  (Normal) Collected: 04/26/24 0713    Specimen: Blood Updated: 04/26/24 0911     Phosphorus 4.2 mg/dL     Procalcitonin [[id number]]  (Normal) Collected: 04/26/24 0713    Specimen: Blood Updated: 04/26/24 0921     Procalcitonin 0.08 ng/mL     Narrative:      As a Marker for Sepsis (Non-Neonates):    1. <0.5 ng/mL represents a low risk of severe sepsis and/or septic shock.  2. >2 ng/mL represents a high risk of severe sepsis and/or septic shock.    As a Marker for Lower Respiratory Tract Infections that require antibiotic therapy:    PCT on Admission    Antibiotic Therapy       6-12 Hrs later    >0.5                Strongly Recommended  >0.25 - <0.5        Recommended   0.1 - 0.25          Discouraged              Remeasure/reassess PCT  <0.1                Strongly Discouraged     Remeasure/reassess PCT    As 28 day mortality risk marker: \"Change in Procalcitonin Result\" (>80% or <=80%) if Day 0 (or Day 1) and " Day 4 values are available. Refer to http://www.[email or web address]-pct-calculator.com    Change in PCT <=80%  A decrease of PCT levels below or equal to 80% defines a positive change in PCT test result representing a higher risk for 28-day all-cause mortality of patients diagnosed with severe sepsis for septic shock.    Change in PCT >80%  A decrease of PCT levels of more than 80% defines a negative change in PCT result representing a lower risk for 28-day all-cause mortality of patients diagnosed with severe sepsis or septic shock.       Hemoglobin & Hematocrit, Blood [[id number]]  (Abnormal) Collected: 04/26/24 1615    Specimen: Blood Updated: 04/26/24 1632     Hemoglobin 9.9 g/dL      Hematocrit 30.3 %     Hemoglobin & Hematocrit, Blood [[id number]]  (Abnormal) Collected: 04/26/24 2155    Specimen: Blood Updated: 04/26/24 2226     Hemoglobin 9.9 g/dL      Hematocrit 30.6 %     Hemoglobin & Hematocrit, Blood [[id number]]  (Abnormal) Collected: 04/26/24 2349    Specimen: Blood Updated: 04/27/24 0009     Hemoglobin 9.7 g/dL      Hematocrit 30.2 %              Medications   sodium chloride 0.9 % flush 10 mL (has no administration in time range)   sodium chloride 0.9 % flush 10 mL (has no administration in time range)   lactated ringers infusion (75 mL/hr Intravenous New Bag 4/27/24 0009)   melatonin tablet 5 mg (has no administration in time range)   ondansetron ODT (ZOFRAN-ODT) disintegrating tablet 4 mg ( Oral Not Given:  See Alt 4/27/24 0009)     Or   ondansetron (ZOFRAN) injection 4 mg (4 mg Intravenous Given 4/27/24 0009)   aluminum-magnesium hydroxide-simethicone (MAALOX MAX) 400-400-40 MG/5ML suspension 15 mL (has no administration in time range)   Magnesium Standard Dose Replacement - Follow Nurse / BPA Driven Protocol (has no administration in time range)   LORazepam (ATIVAN) tablet 1 mg (has no administration in time range)     Or   midazolam (VERSED) injection 2 mg (has no administration in time range)     Or   LORazepam  (ATIVAN) tablet 2 mg (has no administration in time range)     Or   midazolam (VERSED) injection 4 mg (has no administration in time range)     Or   midazolam (VERSED) injection 4 mg (has no administration in time range)     Or   midazolam (VERSED) injection 4 mg (has no administration in time range)   pantoprazole (PROTONIX) injection 40 mg (40 mg Intravenous Given 4/26/24 0827)   amitriptyline (ELAVIL) tablet 50 mg (50 mg Oral Given 4/26/24 2039)   magnesium oxide (MAG-OX) tablet 400 mg (400 mg Oral Given 4/26/24 1042)   oxyCODONE-acetaminophen (PERCOCET)  MG per tablet 1 tablet (1 tablet Oral Given 4/27/24 0141)   ferrous sulfate tablet 325 mg (325 mg Oral Given 4/26/24 1042)   polyethylene glycol (MIRALAX) packet 17 g (17 g Oral Given 4/26/24 1547)   sennosides-docusate (PERICOLACE) 8.6-50 MG per tablet 1 tablet (1 tablet Oral Given 4/26/24 2040)   HYDROmorphone (DILAUDID) injection 1 mg (1 mg Intravenous Given 4/26/24 2312)   acetaminophen (TYLENOL) tablet 650 mg (650 mg Oral Given 4/26/24 1647)   sodium chloride 0.9 % bolus 1,000 mL (0 mL Intravenous Stopped 4/25/24 1821)   droperidol (INAPSINE) injection 2.5 mg (2.5 mg Intravenous Given 4/25/24 1600)   pantoprazole (PROTONIX) injection 40 mg (40 mg Intravenous Given 4/25/24 1600)   HYDROmorphone (DILAUDID) injection 0.5 mg (0.5 mg Intravenous Given 4/25/24 1600)   HYDROmorphone (DILAUDID) injection 0.5 mg (0.5 mg Intravenous Given 4/25/24 1656)   HYDROmorphone (DILAUDID) injection 1 mg (1 mg Intravenous Given 4/25/24 1819)   iopamidol (ISOVUE-370) 76 % injection 100 mL (100 mL Intravenous Given 4/25/24 1915)   HYDROmorphone (DILAUDID) injection 1 mg (1 mg Intravenous Given 4/25/24 2043)   ondansetron (ZOFRAN) injection 4 mg (4 mg Intravenous Given 4/25/24 2104)                               1850: Patient's heart rate is now 90.  Labs been reviewed and are stable.  Lipase is slightly elevated at 139.  This is close to her baseline.  White blood cell  count 10.88.  Hemoglobin and hematocrit are 12 and 37 respectively.  Patient continues to complain of pain left upper quadrant.  Will proceed with CT abdomen pelvis IV.  I was trying to prevent this but patient did have spontaneous splenic rupture approximately 3 months ago.  She is very difficult to control in regards to her pain secondary to her chronic pain syndrome..   Care will be transitioned to Dr. Calvillo at 1900.          Medical Decision Making  Problems Addressed:  Alcohol-induced chronic pancreatitis: complicated acute illness or injury  Left upper quadrant abdominal pain: complicated acute illness or injury  Spleen hematoma, initial encounter: complicated acute illness or injury    Amount and/or Complexity of Data Reviewed  Labs: ordered.  Radiology: ordered.    Risk  Prescription drug management.  Decision regarding hospitalization.        Final diagnoses:   Left upper quadrant abdominal pain   Alcohol-induced chronic pancreatitis   Spleen hematoma, initial encounter       ED Disposition  ED Disposition       ED Disposition   Decision to Admit    Condition   --    Comment   Level of Care: Med/Surg [1]   Diagnosis: Hematoma of spleen without rupture of capsule [[id number]]   Admitting Physician: ROBB ALFARO [[id number]]   Attending Physician: ROBB ALFARO [[id number]]                 No follow-up provider specified.       Medication List      No changes were made to your prescriptions during this visit.           Electronically signed by Chidi Almazan MD at 04/27/24 0153       Operative/Procedure Notes (last 5 days)  Notes from 04/25/24 1458 through 04/30/24 1458   No notes of this type exist for this encounter.          Physician Progress Notes (last 5 days)        Mai Almeida MD at 04/30/24 1244          General Surgery Progress Note    CC: follow up pancreatitis, worsening pancreatic pseudocyst vs perisplenic hematoma    S: Reports L shoulder and LUQ pain which is stable, no real changes after  "increased percocet. States lidocaine patch doesn't help but she has used icyhot at home with relief. Had some nausea and vomited last night after dinner, patient attributes this to restarting her creon medication. Has eaten this morning and denies further nausea.  She would like icy-hot patches, as they work better for her shoulder than the lidocaine patches.     O:/84 (BP Location: Right arm, Patient Position: Sitting)   Pulse 111   Temp 99 °F (37.2 °C) (Oral)   Resp 18   Ht 175.3 cm (69\")   Wt 58.1 kg (128 lb)   LMP 01/17/2018 (Exact Date)   SpO2 95%   BMI 18.90 kg/m²     Intake & Output (last day)         04/29 0701 04/30 0700 04/30 0701 05/01 0700    P.O. 850     I.V. (mL/kg)      Total Intake(mL/kg) 850 (14.6)     Urine (mL/kg/hr) 3350 (2.4) 750 (2.3)    Emesis/NG output      Stool      Total Output 3350 750    Net -2500 -750                  GENERAL: awake, alert, comfortable appearing walking around room, interactive, cooperative   HEENT: EOMI, clear sclera, moist mucus membranes   CHEST: normal work of breathing on room air  CARDIAC: regular rate, palpable distal pulses, no peripheral edema  GI: Abd heating pad in place, abd soft, nondistended, mildly tender in the LUQ without rebound or guarding  EXTREMITIES: CHAMORRO, no cyanosis or edema    SKIN: Warm and dry, no rash    LABS  Results from last 7 days   Lab Units 04/30/24  0304 04/29/24  0459 04/28/24  0630 04/27/24  1403 04/27/24  0647   WBC 10*3/mm3 7.22 12.17*  --   --  7.65   HEMOGLOBIN g/dL 9.9* 10.2* 9.2*   < > 9.9*  9.9*   HEMATOCRIT % 31.5* 31.9* 28.2*   < > 30.7*  30.7*   PLATELETS 10*3/mm3 482* 475*  --   --  405    < > = values in this interval not displayed.     Results from last 7 days   Lab Units 04/30/24  0304 04/29/24  0459 04/27/24  0647 04/26/24  0706   SODIUM mmol/L 136 137 140 137   POTASSIUM mmol/L 4.3 4.2 3.7 4.0   CHLORIDE mmol/L 98 98 101 100   CO2 mmol/L 28.0 26.3 27.2 24.6   BUN mg/dL 13 8 3* 4*   CREATININE mg/dL " 0.68 0.60 0.53* 0.57   CALCIUM mg/dL 9.2 9.4 9.2 9.2   BILIRUBIN mg/dL <0.2  --  <0.2 <0.2   ALK PHOS U/L 109  --  102 107   ALT (SGPT) U/L 11  --  7 5   AST (SGOT) U/L 6  --  11 10   GLUCOSE mg/dL 111* 98 91 93           A/P: 43 y.o. female with chronic alcoholic pancreatitis with pancreatic pseudocysts and previous episode of splenic hemorrhage and subcapsular hematoma, has undergone splenic artery embolization in January 2024, seen again early April 2024 for left upper quadrant pain, noted to have decreased size of pancreatic fluid collections on imaging at that time and was treated with supportive management for pain and gastritis.  Returned for 5 days of worsening left upper quadrant pain, now with CT evidence of increased size of her fluid collections and radiographic concern for possible interval splenic hemorrhage.    AVSS. No clinical signs of bleeding.   Leukocytosis yesterday noted, resolved without intervention.    Had nausea after resuming home creon. Reasonable to hold as she was not having obvious digestion/absoprtion issues earlier in the admission.  Continue bowel regimen for narcotic associated constipation.   Acutely she remains stable without current need for urgent splenectomy.   With regards to her recurrent pancreatic pseudocysts, recommend follow up with Dr. Denson whom she has seen in the past for evaluation for possible cyst-gastrostomy for drainage for possible improved pain control. Discussed with her recommendation to transition to non-IV, multimodal pain medication regimen as her need for IV dilaudid is a barrier to discharge.  She has an outpatient pain specialist and previously declined recommendations for regional blocks, but is willing to discuss this again with them at her follow up appointment May 10.     Mai Almeida MD  General, Robotic and Endoscopic Surgery  [facility]    [address], [address]  [place], [place], [address]  P: [phone]  F: [phone]  \    Electronically signed by Mai Almeida MD at 24 1258       Rafat Beltran MD at 24 0852              Name: Piper Cain ADMIT: 2024   : 1980  PCP: Sharmaine Gatica APRN    MRN: [id number] LOS: 2 days   AGE/SEX: 43 y.o. female  ROOM: [facility]     Subjective   Subjective     She reports a large bowel movement yesterday as well as an episode of nausea and vomiting last night.  She also reports slightly worsened left upper quadrant pain radiating to her neck and shoulder.  She endorses diffuse hyperalgesia and feels like she has the flu.  She does not have any respiratory symptoms, dysuria, or new rashes.  Mildly elevated white blood cell count this morning.  No fevers.  She does not feel like the pain medication is helping sufficiently, in particular she does not feel like the Percocet doses are adequately controlling her pain.      Objective   Objective   Vital Signs  Temp:  [97.2 °F (36.2 °C)-99 °F (37.2 °C)] 98.1 °F (36.7 °C)  Heart Rate:  [] 89  Resp:  [18] 18  BP: (118-136)/(68-98) 118/80  SpO2:  [96 %-98 %] 97 %  on   ;   Device (Oxygen Therapy): room air  Body mass index is 18.9 kg/m².  Physical Exam  Constitutional:       General: She is not in acute distress.     Appearance: She is not toxic-appearing.   Cardiovascular:      Rate and Rhythm: Normal rate and regular rhythm.      Heart sounds: Normal heart sounds.   Pulmonary:      Effort: Pulmonary effort is normal.      Breath sounds: Normal breath sounds.   Abdominal:      General: Bowel sounds are normal. There is no distension.      Palpations: Abdomen is soft.      Tenderness: There is abdominal tenderness. There is no guarding or rebound.   Musculoskeletal:         General: No tenderness.      Right lower leg: No edema.      Left lower leg: No edema.   Neurological:      Mental Status: She is alert.   Psychiatric:         Mood and Affect: Mood normal.         Behavior: Behavior normal.         Results Review     I  "reviewed the patient's new clinical results.  Results from last 7 days   Lab Units 04/29/24 0459 04/28/24  0630 04/27/24  2359 04/27/24  1547 04/27/24  1403 04/27/24  0647 04/26/24  1615 04/26/24  0706 04/25/24  1537   WBC 10*3/mm3 12.17*  --   --   --   --  7.65  --  8.90 10.88*   HEMOGLOBIN g/dL 10.2* 9.2* 9.5* 9.6*   < > 9.9*  9.9*   < > 11.5* 12.1   PLATELETS 10*3/mm3 475*  --   --   --   --  405  --  401 580*    < > = values in this interval not displayed.     Results from last 7 days   Lab Units 04/29/24 0459 04/27/24  0647 04/26/24  0706 04/25/24  1537   SODIUM mmol/L 137 140 137 136   POTASSIUM mmol/L 4.2 3.7 4.0 3.9   CHLORIDE mmol/L 98 101 100 97*   CO2 mmol/L 26.3 27.2 24.6 25.9   BUN mg/dL 8 3* 4* 7   CREATININE mg/dL 0.60 0.53* 0.57 0.78   GLUCOSE mg/dL 98 91 93 109*   Estimated Creatinine Clearance: 113.2 mL/min (by C-G formula based on SCr of 0.6 mg/dL).  Results from last 7 days   Lab Units 04/27/24 0647 04/26/24  0706 04/25/24  1537   ALBUMIN g/dL 3.2* 3.4* 4.1   BILIRUBIN mg/dL <0.2 <0.2 <0.2   ALK PHOS U/L 102 107 128*   AST (SGOT) U/L 11 10 9   ALT (SGPT) U/L 7 5 9     Results from last 7 days   Lab Units 04/29/24 0459 04/27/24  0647 04/26/24  0713 04/26/24  0706 04/25/24  1537   CALCIUM mg/dL 9.4 9.2  --  9.2 9.8   ALBUMIN g/dL  --  3.2*  --  3.4* 4.1   MAGNESIUM mg/dL 1.8 1.7 1.6  --  1.8   PHOSPHORUS mg/dL 4.8*  --  4.2  --   --      Results from last 7 days   Lab Units 04/26/24  0713   PROCALCITONIN ng/mL 0.08     COVID19   Date Value Ref Range Status   08/07/2022 Not Detected Not Detected - Ref. Range Final     No results found for: \"HGBA1C\", \"POCGLU\"    CT Abdomen Pelvis With Contrast  Narrative: CT ABDOMEN PELVIS W CONTRAST-     HISTORY: Worsened left upper quadrant pain, history of splenic rupture,  pancreatitis, gastritis.     TECHNIQUE:  CT of the abdomen and pelvis was performed following the  administration of intravenous contrast. Reformatted images were  reviewed. Radiation " dose reduction techniques were utilized, including  automated exposure control and exposure modulation based on body size.     COMPARISON: CT abdomen and pelvis 4/1/2024     FINDINGS:     Lung bases and pleural spaces are clear.  The liver is enlarged, measuring 18.5 cm in craniocaudal length. There  is a peripherally enhancing collection along the dorsal left hepatic  surface measuring approximately 3.0 x 2.7 x 3.3 cm, which has  significantly decreased in size from 4/1/2024, previously 6.3 x 3.3 x  7.7 cm when remeasured. The gallbladder is present. There are multiple  coarse pancreatic calcifications. Along the ventral distal pancreatic  body and tail, there is an approximately 5.7 x 3.2 x 3.5 cm peripherally  enhancing multiloculated collection, which has increased in size from  4/1/2024, previously 3.0 x 1.1 x 1.5 cm when remeasured. Along the  inferior aspect of the spleen, there is a 7.6 x 4.9 x 6.4 cm  peripherally enhancing collection, which has increased in size,  previously 5.9 x 3.4 x 5.2 cm when remeasured. This is likely contiguous  with an approximately 10.6 x 9.0 x 8.0 cm multiloculated collection  involving the spleen, which is newly enlarged.  The adrenal glands are within normal limits. The collection along the  inferior aspect of the spleen results in mass effect on the adjacent  left kidney. There are asymmetric delayed left renal nephrograms. There  is no hydronephrosis. There is at least mild atherosclerosis. There are  embolization coils/clips in the left lower quadrant. Metallic streak  artifact limits evaluation of adjacent structures. There is mild fat  stranding in the left upper quadrant.  There is mass effect on the gastric fundus from the dominant splenic  collection. Previous endogastric wall thickening has improved. There is  no bowel obstruction. There is a moderate to large colonic stool burden.  The appendix is visualized. The bladder is poorly distended. The uterus  is present.  There is no adnexal mass.  There is osseous demineralization.        Impression:    1.  Decreased size of a 3.3 cm probable pseudocyst along the left  hepatic surface. A distal pancreatic collection measuring 5.7 cm has  increased in size, and a collection along the inferior aspect of the  spleen has also increased in size, likely reflecting additional  pseudocysts. The perisplenic collection is likely contiguous with a  large multiloculated collection occupying the spleen, which is newly  enlarged, concerning for pseudocyst and/or hematoma. The splenic  collection is more similar in appearance to the previously noted  hematoma/collection from January, which had improved on follow-up CT  from 4/1/2024. This is concerning for possible interval hemorrhage.  2.  Asymmetric delayed left renal nephrograms. Correlate with urinalysis  to evaluate for possible pyelonephritis.  3.  Additional findings, as above.        This report was finalized on 4/25/2024 7:45 PM by Dr. Aleisha Sandoval M.D  on Workstation: [id number]       Scheduled Medications  amitriptyline, 50 mg, Oral, Nightly  cholecalciferol, 400 Units, Oral, Daily  ferrous sulfate, 325 mg, Oral, Daily With Breakfast  folic acid, 1 mg, Oral, Daily  gabapentin, 300 mg, Oral, TID  Lidocaine, 1 patch, Transdermal, Q24H  magnesium oxide, 400 mg, Oral, Daily  pancrelipase (Lip-Prot-Amyl), 6,000 units of lipase, Oral, TID With Meals  pantoprazole, 40 mg, Oral, Q AM  polyethylene glycol, 17 g, Oral, BID  senna-docusate sodium, 1 tablet, Oral, BID  thiamine, 100 mg, Oral, Daily    Infusions   Diet  Diet: Regular/House; Fluid Consistency: Thin (IDDSI 0)      Assessment/Plan     Active Hospital Problems    Diagnosis  POA    **Hematoma of spleen without rupture of capsule [D73.5]  Yes    Chronic, continuous use of opioids [F11.90]  Yes    Generalized anxiety disorder [F41.1]  Yes    Tobacco abuse [Z72.0]  Yes    Abdominal pain [R10.9]  Yes    Pancreatic pseudocyst [K86.3]  Yes     Anemia of chronic disease [D63.8]  Yes    Chronic pancreatitis [K86.1]  Yes    Abnormal CT of the abdomen [R93.5]  Yes    Alcohol abuse [F10.10]  Yes      Resolved Hospital Problems    Diagnosis Date Resolved POA    Hyperglycemia [R73.9] 08/25/2023 Yes       43 y.o. female admitted with Hematoma of spleen without rupture of capsule.    43 y.o. female  who presented to the emergency room at an outlying facility for left upper quadrant pain with some radiation into the left shoulder.  She has a history of splenic hematoma/laceration requiring embolization of the splenic artery in January of this year.  CT A/P revealed worsening pseudocyst and/or hematoma.      Hematoma of spleen  -general surgery is following and treating conservatively at this time  -hgb has been stable  -antiemetics for nausea  -pain control as discussed below  -bowel regimen     Leukocytosis/thrombocytosis  -monitor for now   -if continues to worsen, then check blood cultures/consider repeating CT     Chronic pancreatitis  Pancreatic pseudocyst  -Followed by gastroenterology as an outpatient  -creon      Alcohol dependence in remission  -Currently sober just over a month.  -Continue to encourage alcohol abstinence.  -Vitamin replacement oral now that she is taking p.o.  Home folate and thiamine dose.     Acute on chronic pain, continuous use of opioids  - using frequent iv dilaudid as well as her home percocet dose  - gabapentin and lidocaine patch added this admission.  - will increase the dose of her percocet today     Anemia of chronic disease  -Hemoglobin 10.2 today     Tobacco abuse  -Nicotine replacement therapy patch offered she has declined     Generalized anxiety disorder  -Continue home medication.     Left posterior shoulder pain  - some MSK component  but could have some referring pain.   - lidocaine patch did not help.    - gabapentin was added by surgery a couple of days ago      SCDs for DVT prophylaxis.  Full code.  Discussed  "with patient.  Anticipate discharge home when cleared by consultants.      Rafat Beltran MD  [facility]  04/29/24  08:54 EDT    I wore protective equipment throughout this patient encounter including a face mask, gloves and protective eyewear.  Hand hygiene was performed before donning protective equipment and after removal when leaving the room.         Electronically signed by Rafat Beltran MD at 04/29/24 0902       Mai Almeida MD at 04/29/24 0796          General Surgery Progress Note    CC: follow up pancreatitis, worsening pancreatic pseudocyst vs perisplenic hematoma    S: Still having pain in LUQ and left shoulder.  Had an episode of NV last night. She requested to restart her creon which she reports she is supposed to take at home, but was not taking. She did have a large bowel movement yesterday and per nursing declined her subsequent doses of laxatives.     O:/80 (BP Location: Right arm, Patient Position: Lying)   Pulse 89   Temp 98.1 °F (36.7 °C) (Oral)   Resp 18   Ht 175.3 cm (69\")   Wt 58.1 kg (128 lb)   LMP 01/17/2018 (Exact Date)   SpO2 97%   BMI 18.90 kg/m²     Intake & Output (last day)         04/28 0701  04/29 0700 04/29 0701  04/30 0700    P.O. 210     I.V. (mL/kg) 463.8 (8)     Total Intake(mL/kg) 673.8 (11.6)     Urine (mL/kg/hr) 2075 (1.5)     Emesis/NG output 0     Stool 0     Total Output 2075     Net -1401.3           Stool Unmeasured Occurrence 1 x     Emesis Unmeasured Occurrence 1 x             GENERAL: awake, alert, comfortable appearing sitting up in bed, interactive, cooperative, repositions herself without difficulty   HEENT: EOMI, clear sclera, moist mucus membranes   CHEST: normal work of breathing on room air  CARDIAC: regular rate, palpable distal pulses, no peripheral edema  GI: Abd soft, nondistended, mildly tender in the LUQ and epigastrium without rebound or guarding  EXTREMITIES: CHAMORRO, no cyanosis or edema    SKIN: Warm and dry, no " rash    LABS  Results from last 7 days   Lab Units 04/29/24  0459 04/28/24  0630 04/27/24  2359 04/27/24  1403 04/27/24  0647 04/26/24  1615 04/26/24  0706   WBC 10*3/mm3 12.17*  --   --   --  7.65  --  8.90   HEMOGLOBIN g/dL 10.2* 9.2* 9.5*   < > 9.9*  9.9*   < > 11.5*   HEMATOCRIT % 31.9* 28.2* 30.1*   < > 30.7*  30.7*   < > 34.1   PLATELETS 10*3/mm3 475*  --   --   --  405  --  401    < > = values in this interval not displayed.     Results from last 7 days   Lab Units 04/29/24  0459 04/27/24  0647 04/26/24  0706 04/25/24  1537   SODIUM mmol/L 137 140 137 136   POTASSIUM mmol/L 4.2 3.7 4.0 3.9   CHLORIDE mmol/L 98 101 100 97*   CO2 mmol/L 26.3 27.2 24.6 25.9   BUN mg/dL 8 3* 4* 7   CREATININE mg/dL 0.60 0.53* 0.57 0.78   CALCIUM mg/dL 9.4 9.2 9.2 9.8   BILIRUBIN mg/dL  --  <0.2 <0.2 <0.2   ALK PHOS U/L  --  102 107 128*   ALT (SGPT) U/L  --  7 5 9   AST (SGOT) U/L  --  11 10 9   GLUCOSE mg/dL 98 91 93 109*           A/P: 43 y.o. female with  chronic alcoholic pancreatitis with pancreatic pseudocysts and previous episode of splenic hemorrhage and subcapsular hematoma, has undergone splenic artery embolization in January 2024, seen again early April 2024 for left upper quadrant pain, noted to have decreased size of pancreatic fluid collections on imaging at that time and was treated with supportive management for pain and gastritis.  Returned for 5 days of worsening left upper quadrant pain, now with CT evidence of increased size of her fluid collections and concern for possible interval splenic hemorrhage.    AVSS. Hgb improved to 10.2 after stopping IVF.   Slightly worse abdominal pain reported after having bowel movements and NV yesterday. Exam stable. She reports minimal improvement with gabapentin.   WBC increased - afebrile. Monitor.  Continue bowel regimen for narcotic associated constipation.   Acutely she remains stable without current need for urgent splenectomy.   With regards to her recurrent  pancreatic pseudocysts, would recommend follow up with Dr. Denson whom she has seen in the past for evaluation for possible cyst-gastrostomy for drainage for possible improved pain control.     Mai Almeida MD  General, Robotic and Endoscopic Surgery  [facility]    [address], [address]  [place], [address]  P: [phone]  F: [phone] \    Electronically signed by Mai Almeida MD at 24 3400       Michelle Andujar APRN at 24 1602            Name: Piper Cain ADMIT: 2024   : 1980  PCP: Sharmaine Gatica APRN    MRN: [id number] LOS: 1 days   AGE/SEX: 43 y.o. female  ROOM: [facility]     Subjective   Subjective   Was asleep upon entering room.  Easily awakened by my voice. Shoulder pain continues.  Feels lidocaine helped a little but not resolved completely.  Nausea continues off and on.  Was able to eat a good breakfast but no much lunch she reports.  Pain to LQ remains.  Did have BM, less abd distention. She is still requiring dilaudid in addition to her oral pain meds. Denies shoa, chest pain, syncope, or near syncope.     Objective   Objective   Vital Signs  Temp:  [97.2 °F (36.2 °C)-97.6 °F (36.4 °C)] 97.6 °F (36.4 °C)  Heart Rate:  [84-94] 84  Resp:  [16-18] 18  BP: (116-126)/(68-91) 120/68  SpO2:  [96 %-99 %] 97 %  on   ;   Device (Oxygen Therapy): room air  Body mass index is 18.9 kg/m².    Physical Exam  Vitals and nursing note reviewed.   Constitutional:       Comments: Thin     Eyes:      General: No scleral icterus.     Conjunctiva/sclera: Conjunctivae normal.   Cardiovascular:      Rate and Rhythm: Normal rate and regular rhythm.      Pulses: Normal pulses.      Heart sounds: Normal heart sounds.   Pulmonary:      Effort: Pulmonary effort is normal.      Breath sounds: Normal breath sounds.   Abdominal:      General: Bowel sounds are normal.      Palpations: Abdomen is soft.      Tenderness: There is abdominal tenderness.   Musculoskeletal:          General: Tenderness (Tenderness to left posterior shoulder.  less muscle spasms noted today.) present.   Skin:     General: Skin is warm and dry.   Neurological:      General: No focal deficit present.      Mental Status: She is alert and oriented to person, place, and time. Mental status is at baseline.   Psychiatric:         Mood and Affect: Mood normal.         Behavior: Behavior normal.         Thought Content: Thought content normal.         Judgment: Judgment normal.                 Results Review  I reviewed the patient's new clinical results.  Results from last 7 days   Lab Units 04/28/24  0630 04/27/24  2359 04/27/24  1547 04/27/24  1403 04/27/24  0647 04/26/24  1615 04/26/24  0706 04/25/24  1537   WBC 10*3/mm3  --   --   --   --  7.65  --  8.90 10.88*   HEMOGLOBIN g/dL 9.2* 9.5* 9.6* 10.0* 9.9*  9.9*   < > 11.5* 12.1   PLATELETS 10*3/mm3  --   --   --   --  405  --  401 580*    < > = values in this interval not displayed.     Results from last 7 days   Lab Units 04/27/24  0647 04/26/24  0706 04/25/24  1537   SODIUM mmol/L 140 137 136   POTASSIUM mmol/L 3.7 4.0 3.9   CHLORIDE mmol/L 101 100 97*   CO2 mmol/L 27.2 24.6 25.9   BUN mg/dL 3* 4* 7   CREATININE mg/dL 0.53* 0.57 0.78   GLUCOSE mg/dL 91 93 109*     Lab Results   Component Value Date    ANIONGAP 11.8 04/27/2024     Estimated Creatinine Clearance: 128.1 mL/min (A) (by C-G formula based on SCr of 0.53 mg/dL (L)).   Lab Results   Component Value Date    EGFR 119.3 04/27/2024     Results from last 7 days   Lab Units 04/27/24  0647 04/26/24  0706 04/25/24  1537   ALBUMIN g/dL 3.2* 3.4* 4.1   BILIRUBIN mg/dL <0.2 <0.2 <0.2   ALK PHOS U/L 102 107 128*   AST (SGOT) U/L 11 10 9   ALT (SGPT) U/L 7 5 9     Results from last 7 days   Lab Units 04/27/24  0647 04/26/24  0713 04/26/24  0706 04/25/24  1537   CALCIUM mg/dL 9.2  --  9.2 9.8   ALBUMIN g/dL 3.2*  --  3.4* 4.1   MAGNESIUM mg/dL 1.7 1.6  --  1.8   PHOSPHORUS mg/dL  --  4.2  --   --      Results  "from last 7 days   Lab Units 04/26/24  0713   PROCALCITONIN ng/mL 0.08     No results found for: \"HGBA1C\", \"POCGLU\"    No radiology results for the last day      Current Facility-Administered Medications:     acetaminophen (TYLENOL) tablet 650 mg, 650 mg, Oral, Q6H PRN, Cristina Ecsobar, APRN, 650 mg at 04/26/24 1647    aluminum-magnesium hydroxide-simethicone (MAALOX MAX) 400-400-40 MG/5ML suspension 15 mL, 15 mL, Oral, Q6H PRN, Kimberly Armstrong, APRN    amitriptyline (ELAVIL) tablet 50 mg, 50 mg, Oral, Nightly, Cristina Escobar APRN, 50 mg at 04/27/24 2031    bisacodyl (DULCOLAX) suppository 10 mg, 10 mg, Rectal, Daily PRN, Michelle Andujar APRN, 10 mg at 04/28/24 0928    cholecalciferol (VITAMIN D3) tablet 400 Units, 400 Units, Oral, Daily, Michelle Andujar APRN, 400 Units at 04/28/24 0925    ferrous sulfate tablet 325 mg, 325 mg, Oral, Daily With Breakfast, Cristina Escobar APRN, 325 mg at 04/28/24 0915    folic acid (FOLVITE) tablet 1 mg, 1 mg, Oral, Daily, Michelle Andujar APRN, 1 mg at 04/28/24 0917    gabapentin (NEURONTIN) capsule 300 mg, 300 mg, Oral, TID, Mai Almeida MD, 300 mg at 04/28/24 1139    HYDROmorphone (DILAUDID) injection 1 mg, 1 mg, Intravenous, Q4H PRN, Mike Donnelly MD, 1 mg at 04/28/24 1330    Lidocaine 4 % 1 patch, 1 patch, Transdermal, Q24H, Michelle Andujar APRN, 1 patch at 04/27/24 2200    LORazepam (ATIVAN) tablet 1 mg, 1 mg, Oral, Q1H PRN **OR** midazolam (VERSED) injection 2 mg, 2 mg, Intravenous, Q1H PRN **OR** LORazepam (ATIVAN) tablet 2 mg, 2 mg, Oral, Q1H PRN **OR** midazolam (VERSED) injection 4 mg, 4 mg, Intravenous, Q1H PRN **OR** midazolam (VERSED) injection 4 mg, 4 mg, Intravenous, Q15 Min PRN **OR** midazolam (VERSED) injection 4 mg, 4 mg, Intramuscular, Q15 Min PRN, Xiao Conroy APRN    magnesium oxide (MAG-OX) tablet 400 mg, 400 mg, Oral, Daily, Cristina Escobar APRN, 400 mg at 04/28/24 0917    Magnesium Standard Dose Replacement - Follow Nurse " / BPA Driven Protocol, , Does not apply, PRN, Xiao Conroy APRN    melatonin tablet 5 mg, 5 mg, Oral, Nightly PRN, Kimberly Armstrong APRN    ondansetron ODT (ZOFRAN-ODT) disintegrating tablet 4 mg, 4 mg, Oral, Q6H PRN **OR** ondansetron (ZOFRAN) injection 4 mg, 4 mg, Intravenous, Q6H PRN, Kimberly Armstrong APRN, 4 mg at 04/28/24 1240    oxyCODONE-acetaminophen (PERCOCET)  MG per tablet 1 tablet, 1 tablet, Oral, Q4H PRN, Cristina Escobar APRN, 1 tablet at 04/28/24 1139    pantoprazole (PROTONIX) EC tablet 40 mg, 40 mg, Oral, Q AM, Mike Donnelly MD, 40 mg at 04/28/24 0506    polyethylene glycol (MIRALAX) packet 17 g, 17 g, Oral, BID, Mai Almeida MD, 17 g at 04/28/24 0918    sennosides-docusate (PERICOLACE) 8.6-50 MG per tablet 1 tablet, 1 tablet, Oral, BID, Mai Almeida MD, 1 tablet at 04/28/24 0915    sodium chloride 0.9 % flush 10 mL, 10 mL, Intravenous, PRN, Chidi Almazan MD    sodium chloride 0.9 % flush 10 mL, 10 mL, Intravenous, PRN, Kimberly Armstrong APRN    thiamine (VITAMIN B-1) tablet 100 mg, 100 mg, Oral, Daily, Michelle Andujar APRN, 100 mg at 04/28/24 0917         Diet  Diet: Regular/House; Fluid Consistency: Thin (IDDSI 0)      Assessment/Plan     Active Hospital Problems    Diagnosis  POA    **Hematoma of spleen without rupture of capsule [D73.5]  Yes    Chronic, continuous use of opioids [F11.90]  Yes    Generalized anxiety disorder [F41.1]  Yes    Tobacco abuse [Z72.0]  Yes    Abdominal pain [R10.9]  Yes    Pancreatic pseudocyst [K86.3]  Yes    Anemia of chronic disease [D63.8]  Yes    Chronic pancreatitis [K86.1]  Yes    Abnormal CT of the abdomen [R93.5]  Yes    Alcohol abuse [F10.10]  Yes      Resolved Hospital Problems    Diagnosis Date Resolved POA    Hyperglycemia [R73.9] 08/25/2023 Yes     43 y.o. female  who presented to the emergency room at an outlying facility for left upper quadrant pain with some radiation into the left shoulder.  She has a  history of splenic hematoma/laceration requiring embolization of the splenic artery in January of this year.  CT A/P revealed worsening pseudocyst and/or hematoma.     Hematoma of spleen  -General surgery has been consulted.  Appreciate their recommendations. H/H stable.  No plan for splenectomy at present.   -Will continue supportive care   - diet has been advanced by surgery.  IVF have been stopped now that she is taking in po  -No current concerns for infection given lack of white count or fever.  -H/H and vitals have remained stable.  Surgery has changed to daily cbc.       Chronic pancreatitis  Pancreatic pseudocyst  -Followed by gastroenterology on recent admission.  -Lipase 144 (139).    -Continue alcohol cessation. Sober over a month     Alcohol abuse  -Currently sober just over a month.  -Continue to encourage alcohol abstinence.  -Vitamin replacement oral now that she is taking p.o.  Home folate and thiamine dose.  - Vit D 25 OH low normal.  Add low dose vitamin d3 400 ius.       Chronic pain, continuous use of opioids  -Continue home Percocet with IV pain medications for breakthrough.   - surgery has added gabapentin.  Lidocaine patch for shoulder helped some. Continue for now.      Anemia of chronic disease  -Hemoglobin 11 range.  Will monitor trend.  - Hgb 9.2 (9.6  <---10)   -Continue her oral iron.     Tobacco abuse  -Nicotine replacement therapy patch offered she has declined     Generalized anxiety disorder  -Continue home medication.    Left posterior shoulder pain  - some MSK component  but could have some referring pain.   - lidocaine patch did not help.    - surgery team has added Gabapentin.        DVT prophylaxis  SCDs    Discharge  TBD  Expected Discharge Date: 4/30/2024; Expected Discharge Time: 1-2 days    Discussed with patient and nursing staff    SOL Jalloh  [facility]    Electronically signed by Michelle Andujar APRN at 04/28/24 2040       Juan Pablo,  "MD Mai at 04/28/24 1055          General Surgery Progress Note    CC: follow up pancreatitis, worsening pancreatic pseudocyst vs perisplenic hematoma    S: Patient states she is still having pain. Worst in her shoulder. Still present in the left upper quadrant. She states the dilaudid helps take the edge off, the percocet does not last long enough. She has not had a BM. She is tolerating regular diet without nausea or vomiting.    O:/91 (BP Location: Right arm, Patient Position: Lying)   Pulse 89   Temp 97.2 °F (36.2 °C) (Oral)   Resp 18   Ht 175.3 cm (69\")   Wt 58.1 kg (128 lb)   LMP 01/17/2018 (Exact Date)   SpO2 96%   BMI 18.90 kg/m²     Intake & Output (last day)         04/27 0701  04/28 0700 04/28 0701  04/29 0700    P.O. 1680 210    I.V. (mL/kg) 1000 (17.2) 463.8 (8)    Total Intake(mL/kg) 2680 (46.1) 673.8 (11.6)    Urine (mL/kg/hr) 4825 (3.5) 975 (4.2)    Total Output 4825 975    Net -2145 -301.3                  GENERAL: awake, alert, comfortable appearing sitting up in bed, interactive, cooperative, repositions herself without difficulty   HEENT: EOMI, clear sclera, moist mucus membranes   CHEST: normal work of breathing on room air  CARDIAC: regular rate, palpable distal pulses, no peripheral edema  GI: Abd soft, nondistended, mildly tender in the LUQ without rebound or guarding  EXTREMITIES: CHAMORRO, no cyanosis or edema    SKIN: Warm and dry, no rash    LABS  Results from last 7 days   Lab Units 04/28/24  0630 04/27/24  2359 04/27/24  1547 04/27/24  1403 04/27/24  0647 04/26/24  1615 04/26/24  0706 04/25/24  1537   WBC 10*3/mm3  --   --   --   --  7.65  --  8.90 10.88*   HEMOGLOBIN g/dL 9.2* 9.5* 9.6*   < > 9.9*  9.9*   < > 11.5* 12.1   HEMATOCRIT % 28.2* 30.1* 29.6*   < > 30.7*  30.7*   < > 34.1 37.0   PLATELETS 10*3/mm3  --   --   --   --  405  --  401 580*    < > = values in this interval not displayed.     Results from last 7 days   Lab Units 04/27/24  0647 04/26/24  0706 " 24  1537   SODIUM mmol/L 140 137 136   POTASSIUM mmol/L 3.7 4.0 3.9   CHLORIDE mmol/L 101 100 97*   CO2 mmol/L 27.2 24.6 25.9   BUN mg/dL 3* 4* 7   CREATININE mg/dL 0.53* 0.57 0.78   CALCIUM mg/dL 9.2 9.2 9.8   BILIRUBIN mg/dL <0.2 <0.2 <0.2   ALK PHOS U/L 102 107 128*   ALT (SGPT) U/L 7 5 9   AST (SGOT) U/L 11 10 9   GLUCOSE mg/dL 91 93 109*           A/P: 43 y.o. female with  chronic alcoholic pancreatitis with pancreatic pseudocysts and previous episode of splenic hemorrhage and subcapsular hematoma, has undergone splenic artery embolization in 2024, seen again early 2024 for left upper quadrant pain, noted to have decreased size of pancreatic fluid collections on imaging at that time and was treated with supportive management for pain and gastritis.  Returned for 5 days of worsening left upper quadrant pain, now with CT evidence of increased size of her fluid collections and concern for possible interval splenic hemorrhage.    No tachycardia or blood pressure instability. Hgb stable while on IV fluids. Can space out Hgb checks.  Her abdominal and shoulder pain are stable, improved with pain medications but requiring frequent doses of dilaudid and percocet. Abdominal exam remains reassuring without peritoneal signs.   Will add gabapentin for multimodal pain control. Continue bowel regimen for narcotic-associated constipation.  No fever, hypotension, or leukocytosis or CT evidence to suggest infected peripancreatic collections.  Acutely she remains stable without current need for urgent splenectomy.     Mai Almeida MD  General, Robotic and Endoscopic Surgery  [facility]    [address] [place] [address], [address]  [place], [place], [address]  P: [phone]  F: [phone] \    Electronically signed by Mai Almeida MD at 24 0500       Michelle Andujar APRN at 24 1630            Name: Piper Cain ADMIT: 2024   : 1980  PCP: Sharmaine Gatica APRN    MRN:  [id number] LOS: 0 days   AGE/SEX: 43 y.o. female  ROOM: [facility]     Subjective   Subjective   Started advance diet.  Tolerating well.  Denies any nausea or vomiting.  Does have chronic abdominal pain but has not worsened since advancement of diet.  Has not had a bowel movement in several days.  Is passing flatus.  States she uses Dulcolax suppositories occasionally at home to stimulate bowels.  Currently her biggest pain is in the left posterior shoulder.  She states it feels tight some radiation up the back of neck.    Objective   Objective   Vital Signs  Temp:  [97.6 °F (36.4 °C)-98.1 °F (36.7 °C)] 97.6 °F (36.4 °C)  Heart Rate:  [84-95] 94  Resp:  [16-18] 16  BP: (110-122)/(77-88) 120/88  SpO2:  [97 %-100 %] 99 %  on   ;   Device (Oxygen Therapy): room air  Body mass index is 18.9 kg/m².    Physical Exam  Vitals and nursing note reviewed.   Constitutional:       Comments: Thin     Eyes:      General: Scleral icterus present.      Conjunctiva/sclera: Conjunctivae normal.   Cardiovascular:      Rate and Rhythm: Normal rate and regular rhythm.      Pulses: Normal pulses.      Heart sounds: Normal heart sounds.   Pulmonary:      Effort: Pulmonary effort is normal.      Breath sounds: Normal breath sounds.   Abdominal:      General: Bowel sounds are normal. There is distension.      Tenderness: There is abdominal tenderness.   Musculoskeletal:         General: Tenderness (Tenderness to left posterior shoulder.  Muscle spasming noted.) present.   Skin:     General: Skin is warm and dry.   Neurological:      General: No focal deficit present.      Mental Status: She is alert and oriented to person, place, and time. Mental status is at baseline.   Psychiatric:         Mood and Affect: Mood normal.         Behavior: Behavior normal.         Thought Content: Thought content normal.         Judgment: Judgment normal.                 Results Review  I reviewed the patient's new clinical results.  Results from last 7 days  "  Lab Units 04/27/24  1547 04/27/24  1403 04/27/24  0647 04/26/24  2349 04/26/24  1615 04/26/24  0706 04/25/24  1537   WBC 10*3/mm3  --   --  7.65  --   --  8.90 10.88*   HEMOGLOBIN g/dL 9.6* 10.0* 9.9*  9.9* 9.7*   < > 11.5* 12.1   PLATELETS 10*3/mm3  --   --  405  --   --  401 580*    < > = values in this interval not displayed.     Results from last 7 days   Lab Units 04/27/24  0647 04/26/24  0706 04/25/24  1537   SODIUM mmol/L 140 137 136   POTASSIUM mmol/L 3.7 4.0 3.9   CHLORIDE mmol/L 101 100 97*   CO2 mmol/L 27.2 24.6 25.9   BUN mg/dL 3* 4* 7   CREATININE mg/dL 0.53* 0.57 0.78   GLUCOSE mg/dL 91 93 109*     Lab Results   Component Value Date    ANIONGAP 11.8 04/27/2024     Estimated Creatinine Clearance: 128.1 mL/min (A) (by C-G formula based on SCr of 0.53 mg/dL (L)).   Lab Results   Component Value Date    EGFR 119.3 04/27/2024     Results from last 7 days   Lab Units 04/27/24  0647 04/26/24  0706 04/25/24  1537   ALBUMIN g/dL 3.2* 3.4* 4.1   BILIRUBIN mg/dL <0.2 <0.2 <0.2   ALK PHOS U/L 102 107 128*   AST (SGOT) U/L 11 10 9   ALT (SGPT) U/L 7 5 9     Results from last 7 days   Lab Units 04/27/24  0647 04/26/24  0713 04/26/24  0706 04/25/24  1537   CALCIUM mg/dL 9.2  --  9.2 9.8   ALBUMIN g/dL 3.2*  --  3.4* 4.1   MAGNESIUM mg/dL 1.7 1.6  --  1.8   PHOSPHORUS mg/dL  --  4.2  --   --      Results from last 7 days   Lab Units 04/26/24  0713   PROCALCITONIN ng/mL 0.08     No results found for: \"HGBA1C\", \"POCGLU\"    No radiology results for the last day      Current Facility-Administered Medications:     acetaminophen (TYLENOL) tablet 650 mg, 650 mg, Oral, Q6H PRN, Cristina Escobar, SOL, 650 mg at 04/26/24 1647    aluminum-magnesium hydroxide-simethicone (MAALOX MAX) 400-400-40 MG/5ML suspension 15 mL, 15 mL, Oral, Q6H PRN, Kimberly Armstrong, APRN    amitriptyline (ELAVIL) tablet 50 mg, 50 mg, Oral, Nightly, Cristina Escobar APRN, 50 mg at 04/26/24 2039    bisacodyl (DULCOLAX) suppository 10 mg, 10 mg, Rectal, " Daily PRN, Michelle Andujar APRN    ferrous sulfate tablet 325 mg, 325 mg, Oral, Daily With Breakfast, Cristina Escobar APRN, 325 mg at 04/27/24 0822    HYDROmorphone (DILAUDID) injection 1 mg, 1 mg, Intravenous, Q4H PRN, Mike Donnelly MD, 1 mg at 04/27/24 1830    lactated ringers infusion, 75 mL/hr, Intravenous, Continuous, Mike Donnelly MD, Last Rate: 75 mL/hr at 04/27/24 1343, 75 mL/hr at 04/27/24 1343    Lidocaine 4 % 1 patch, 1 patch, Transdermal, Q24H, Michelle Andujar APRN    LORazepam (ATIVAN) tablet 1 mg, 1 mg, Oral, Q1H PRN **OR** midazolam (VERSED) injection 2 mg, 2 mg, Intravenous, Q1H PRN **OR** LORazepam (ATIVAN) tablet 2 mg, 2 mg, Oral, Q1H PRN **OR** midazolam (VERSED) injection 4 mg, 4 mg, Intravenous, Q1H PRN **OR** midazolam (VERSED) injection 4 mg, 4 mg, Intravenous, Q15 Min PRN **OR** midazolam (VERSED) injection 4 mg, 4 mg, Intramuscular, Q15 Min PRN, Xiao Conroy APRN    magnesium oxide (MAG-OX) tablet 400 mg, 400 mg, Oral, Daily, Cristina Escobar APRN, 400 mg at 04/27/24 0822    Magnesium Standard Dose Replacement - Follow Nurse / BPA Driven Protocol, , Does not apply, PRN, Xiao Conroy APRN    melatonin tablet 5 mg, 5 mg, Oral, Nightly PRN, Kimberly Armstrong APRN    ondansetron ODT (ZOFRAN-ODT) disintegrating tablet 4 mg, 4 mg, Oral, Q6H PRN **OR** ondansetron (ZOFRAN) injection 4 mg, 4 mg, Intravenous, Q6H PRN, Kimberly Armstrong APRN, 4 mg at 04/27/24 1830    oxyCODONE-acetaminophen (PERCOCET)  MG per tablet 1 tablet, 1 tablet, Oral, Q4H PRN, Cristina Escobar, SOL, 1 tablet at 04/27/24 1639    pantoprazole (PROTONIX) EC tablet 40 mg, 40 mg, Oral, Q AM, Mike Donnelly MD, 40 mg at 04/27/24 0822    polyethylene glycol (MIRALAX) packet 17 g, 17 g, Oral, BID, Mai Almeida MD    sennosides-docusate (PERICOLACE) 8.6-50 MG per tablet 1 tablet, 1 tablet, Oral, BID, Mai Almeida MD, 1 tablet at 04/27/24 0823    sodium chloride 0.9 % flush  10 mL, 10 mL, Intravenous, PRN, Chidi Almazan MD    sodium chloride 0.9 % flush 10 mL, 10 mL, Intravenous, PRN, Kimberly Armstrong APRN      lactated ringers, 75 mL/hr, Last Rate: 75 mL/hr (04/27/24 1343)    Diet  Diet: Regular/House; Fluid Consistency: Thin (IDDSI 0)      Assessment/Plan     Active Hospital Problems    Diagnosis  POA    **Hematoma of spleen without rupture of capsule [D73.5]  Yes    Chronic, continuous use of opioids [F11.90]  Yes    Generalized anxiety disorder [F41.1]  Yes    Tobacco abuse [Z72.0]  Yes    Abdominal pain [R10.9]  Yes    Pancreatic pseudocyst [K86.3]  Yes    Anemia of chronic disease [D63.8]  Yes    Chronic pancreatitis [K86.1]  Yes    Abnormal CT of the abdomen [R93.5]  Yes    Alcohol abuse [F10.10]  Yes      Resolved Hospital Problems    Diagnosis Date Resolved POA    Hyperglycemia [R73.9] 08/25/2023 Yes     43 y.o. female  who presented to the emergency room at an outlying facility for left upper quadrant pain with some radiation into the left shoulder.  She has a history of splenic hematoma/laceration requiring embolization of the splenic artery in January of this year.  CT A/P revealed worsening pseudocyst and/or hematoma.     Hematoma of spleen  -General surgery has been consulted.  Await their further recommendations.  -Will continue supportive care with pain medications and nausea medications as needed.  - diet has been advanced by surgery.  Continue IV fluids until oral intake increased.   -No current concerns for infection given lack of white count or fever.  -Will monitor serial H&H. Remains stable.      Chronic pancreatitis  Pancreatic pseudocyst  -Followed by gastroenterology on recent admission.  -Lipase 144 (139).    -Continue alcohol cessation.     Alcohol abuse  -Currently 30 days sober.  -Continue to encourage alcohol abstinence.  -Vitamin replacement oral now that she is taking p.o.  Home folate and thiamine dose.     Chronic, continuous use of  "opioids  -Continue home Percocet with IV pain medications for breakthrough.     Anemia of chronic disease  -Hemoglobin 11 range.  Will monitor trend.  - Hgb 9.6  (10)   -Continue her oral iron.     Tobacco abuse  -Nicotine replacement therapy.     Generalized anxiety disorder  -Continue home medication.    Left posterior shoulder pain  - some MSK component but could have some referring pain.   - try lidocaine patch as she has some nodular muscle spasm on exam.       DVT prophylaxis  SCDs    Discharge  TBD  Expected Discharge Date: 4/30/2024; Expected Discharge Time:     Discussed with patient and nursing staff    SOL Jalloh  [facility]    Electronically signed by Michelle Andujar APRN at 04/27/24 1948       Mai Almeida MD at 04/27/24 1257          General Surgery Progress Note    CC: follow up pancreatitis, worsening pancreatic pseudocyst vs perisplenic hematoma    S: Patient states she is doing well except for pain in LUQ and shoulder. Denies NV and is hungry. Passing gas but denies BM since prior to arrival. Moving around without difficulty.     O:/86 (BP Location: Left arm, Patient Position: Lying)   Pulse 95   Temp 97.7 °F (36.5 °C) (Oral)   Resp 16   Ht 175.3 cm (69\")   Wt 58.1 kg (128 lb)   LMP 01/17/2018 (Exact Date)   SpO2 97%   BMI 18.90 kg/m²     Intake & Output (last day)         04/26 0701  04/27 0700 04/27 0701  04/28 0700    P.O. 1600     I.V. (mL/kg) 1890 (32.5)     IV Piggyback      Total Intake(mL/kg) 3490 (60.1)     Urine (mL/kg/hr) 4950 (3.5)     Total Output 4950     Net -1460                   GENERAL: awake, alert, very comfortable appearing, interactive, talkative  HEENT: EOMI, clear sclera, moist mucus membranes   CHEST: normal work of breathing on room air  CARDIAC: regular rate   GI: Abd less firm, nondistended, mildly tender in the LUQ without rebound or guarding  EXTREMITIES: CHAMORRO, no cyanosis or edema    SKIN: Warm and dry, no " rash    LABS  Results from last 7 days   Lab Units 04/27/24  0647 04/26/24  2349 04/26/24  2155 04/26/24  1615 04/26/24  0706 04/25/24  1537   WBC 10*3/mm3 7.65  --   --   --  8.90 10.88*   HEMOGLOBIN g/dL 9.9*  9.9* 9.7* 9.9*   < > 11.5* 12.1   HEMATOCRIT % 30.7*  30.7* 30.2* 30.6*   < > 34.1 37.0   PLATELETS 10*3/mm3 405  --   --   --  401 580*    < > = values in this interval not displayed.     Results from last 7 days   Lab Units 04/27/24  0647 04/26/24  0706 04/25/24  1537   SODIUM mmol/L 140 137 136   POTASSIUM mmol/L 3.7 4.0 3.9   CHLORIDE mmol/L 101 100 97*   CO2 mmol/L 27.2 24.6 25.9   BUN mg/dL 3* 4* 7   CREATININE mg/dL 0.53* 0.57 0.78   CALCIUM mg/dL 9.2 9.2 9.8   BILIRUBIN mg/dL <0.2 <0.2 <0.2   ALK PHOS U/L 102 107 128*   ALT (SGPT) U/L 7 5 9   AST (SGOT) U/L 11 10 9   GLUCOSE mg/dL 91 93 109*           A/P: 43 y.o. female with  chronic alcoholic pancreatitis with pancreatic pseudocysts and previous episode of splenic hemorrhage and subcapsular hematoma, has undergone splenic artery embolization in January 2024, seen again earlier this month for left upper quadrant pain, noted to have decreased size of pancreatic fluid collections.  Returned for 5 days of worsening left upper quadrant pain, now with CT evidence of increased size of her fluid collections and concern for possible interval splenic hemorrhage.    AVSS.WBC 7.65. No intraabdominal infection. Abdominal exam reassuring with mild tenderness but no peritoneal signs. Passing flatus, no BM. Tolerating clear liquids. Will increase bowel stimulation given her constipation related to narcotic use and stool burden noted on CT.  Hgb stable at 9.9. Can do daily CBCs as no signs of bleeding. At this point she does not require surgery. Will allow regular diet.  Had a lengthy discussion with her about her pain, pancreatitis, fluid collections and concern for possible ongoing splenic involvement. Discussed with her that she may need splenectomy if she  "develops recurrent bleeding, and she may need future intervention for her pseudocysts with regards to pain management, but she is very high risk for complications from surgery including bleeding, pancreatic fistula, worsening pancreatitis. She seems in good understanding of the risks/benefits and would like to avoid surgery right now if possible. Will recommend she follow up with pain management and general surgery as scheduled later this month. She should continue strict abstinence from alcohol. If she remains stable, tolerates diet, and has reasonable pain control she should be ok to discharge home in the next day or two.    Mai Almeida MD  General, Robotic and Endoscopic Surgery  [facility]    [address], [address]  [place], [place], [address]  P: [phone]  F: [phone] \    Electronically signed by Mai Almeida MD at 04/27/24 1318          Consult Notes (last 5 days)        Urmila Dillard at 04/29/24 1310        Consult Orders    1. Inpatient Consult to Advance Care Planning [[id number]] ordered by Mike Donnelly MD at 04/28/24 0913                 Visited patient sitting up in bed.     Patient was appreciative of  visit, because her boyfriend had encouraged talking to  about medical journey. Patient shared about medical history and Voodoo history, including mention of spiritual television shows that are helpful. Patient shared that main prayer requests are for \"a break\" from her struggles as well as strength in the journey of pain management.     Facilitated advanced directive conversation and called  Carmelita Suh to notarize.     Electronically signed by Urmila Dillard at 04/29/24 1343       Mai Almeida MD at 04/26/24 1302        Consult Orders    1. Inpatient General Surgery Consult [[id number]] ordered by Kimberly Armstrong APRN at 04/26/24 0004                 General Surgery Consultation    Consulting Physician: Mai Almeida, " MD  Referring:  Kimberly Armstrong APRN    Reason for consultation:   Recent perispleinic hematoma with worsening pseudocyst and/or hematoma    CC:   LUQ pain    HPI:   The patient is a 43 y.o. female with chronic pancreatitis secondary to alcohol use, pancreatic pseudocyst, history of splenic laceration and subcapsular hematoma, tobacco use, anxiety, and chronic pain.  She presents today for worsening left upper quadrant pain.  She denies nausea or vomiting.  She has been having normal bowel movements.  She states that her pain is similar to prior episodes, sharp, stabbing, in the left upper quadrant and radiating to her left posterior shoulder as well as her left neck.  She reports her pain has been worse than usual for the past 3 to 4 days.  Denies any blood in her stool.  Patient states the reason she came in is because she is having difficulty with pain control.  She notes that she has been seen by pain management and is on oral narcotics, but she is having to split these and is running out and feels as though her pain is not adequately controlled.  She did speak with them previously about possible regional blocks but declined them at the time.  During my exam, she notes that she just had some pain medication and she is feeling better.    She underwent embolization of the splenic artery by Dr. Carine Pena on 1/22/24.  She was nonoperatively managed for this and discharged from the hospital.  She was seen again on 4/1/2024 for worsening left upper quadrant abdominal pain.  Was found to have peripancreatic fluid collections as well as thick-walled stomach and suspicion for possible acute gastritis with ongoing complicated chronic pancreatitis.  She underwent EGD by Dr. Starr on 4/3/2024 which demonstrated gastritis.  Duodenal biopsies were unremarkable.  Stomach biopsies were unremarkable, negative for H. pylori.  GE junction biopsy demonstrated focal intestinal metaplasia consistent with Leon's  esophagus with no dysplasia, as well as acute and chronic inflammation of the gastric mucosa.  During that hospitalization, she improved with supportive management and was discharged home to follow-up with her PCP and GI.    Past Medical History:  Chronic pancreatitis secondary to alcohol abuse  Pancreatic pseudocyst  Splenic laceration  Anemia  Neck pain  GERD  Hiatal hernia  Anxiety  History of E. coli bacteremia  Frequent UTI  Migraine    Past Surgical History:  EGD with cyst gastrostomy and subsequent stent removal by Dr. Denson in 2023  Most recent EGD 4/3/2024 by Dr. Starr to evaluate gastritis, esophagitis, bile reflux  Splenic artery embolization and open exploration right CFA with right lower extremity angiogram and Juanjose removal of embolized coil by Dr. Carine Pena 1/22/2024    Medications:  Medications Prior to Admission   Medication Sig Dispense Refill Last Dose    amitriptyline (ELAVIL) 50 MG tablet Take 1 tablet by mouth Every Night.       Ferrous Sulfate Dried (Feosol) 200 (65 Fe) MG tablet tablet Take 1 tablet by mouth Daily.       folic acid (FOLVITE) 1 MG tablet Take 1 tablet by mouth Daily. 30 tablet 3     magnesium oxide (MAG-OX) 400 MG tablet Take 1 tablet by mouth Daily.       ondansetron (ZOFRAN) 4 MG tablet Take 1 tablet by mouth Every 8 (Eight) Hours As Needed for Nausea or Vomiting.       oxyCODONE-acetaminophen (PERCOCET)  MG per tablet Take 1 tablet by mouth Every 4 (Four) Hours As Needed for Moderate Pain. 20 tablet 0     pantoprazole (PROTONIX) 40 MG EC tablet Take 1 tablet by mouth Daily. 30 tablet 3     promethazine (PHENERGAN) 25 MG suppository Insert 1 suppository into the rectum As Needed for Nausea or Vomiting.       thiamine (VITAMIN B1) 100 MG tablet Take 1 tablet by mouth Daily. 30 tablet 3        Current Facility-Administered Medications:     aluminum-magnesium hydroxide-simethicone (MAALOX MAX) 400-400-40 MG/5ML suspension 15 mL, 15 mL, Oral, Q6H PRN, Nathaniel  SOL Brantley    amitriptyline (ELAVIL) tablet 50 mg, 50 mg, Oral, Nightly, Cristina Escobar APRN    sennosides-docusate (PERICOLACE) 8.6-50 MG per tablet 2 tablet, 2 tablet, Oral, BID PRN **AND** polyethylene glycol (MIRALAX) packet 17 g, 17 g, Oral, Daily PRN **AND** bisacodyl (DULCOLAX) EC tablet 5 mg, 5 mg, Oral, Daily PRN **AND** bisacodyl (DULCOLAX) suppository 10 mg, 10 mg, Rectal, Daily PRN, Kimberly Armstrong APRN    ferrous sulfate tablet 325 mg, 325 mg, Oral, Daily With Breakfast, Cristina Escobar APRN, 325 mg at 04/26/24 1042    HYDROmorphone (DILAUDID) injection 0.5 mg, 0.5 mg, Intravenous, Q2H PRN, Xiao Conroy APRN, 0.5 mg at 04/26/24 0454    HYDROmorphone (DILAUDID) injection 1 mg, 1 mg, Intravenous, Q2H PRN, Mike Donnelly MD, 1 mg at 04/26/24 1042    lactated ringers infusion, 100 mL/hr, Intravenous, Continuous, Mike Donnelyl MD, Last Rate: 100 mL/hr at 04/26/24 1248, 100 mL/hr at 04/26/24 1248    LORazepam (ATIVAN) tablet 1 mg, 1 mg, Oral, Q1H PRN **OR** midazolam (VERSED) injection 2 mg, 2 mg, Intravenous, Q1H PRN **OR** LORazepam (ATIVAN) tablet 2 mg, 2 mg, Oral, Q1H PRN **OR** midazolam (VERSED) injection 4 mg, 4 mg, Intravenous, Q1H PRN **OR** midazolam (VERSED) injection 4 mg, 4 mg, Intravenous, Q15 Min PRN **OR** midazolam (VERSED) injection 4 mg, 4 mg, Intramuscular, Q15 Min PRN, Xiao Conroy APRN    magnesium oxide (MAG-OX) tablet 400 mg, 400 mg, Oral, Daily, Cristina Escobar, SOL, 400 mg at 04/26/24 1042    Magnesium Standard Dose Replacement - Follow Nurse / BPA Driven Protocol, , Does not apply, PRN, Xiao Conroy APRN    melatonin tablet 5 mg, 5 mg, Oral, Nightly PRN, Kimberly Armstrong APRN    ondansetron ODT (ZOFRAN-ODT) disintegrating tablet 4 mg, 4 mg, Oral, Q6H PRN **OR** ondansetron (ZOFRAN) injection 4 mg, 4 mg, Intravenous, Q6H PRN, Kimberly Armstrong APRN, 4 mg at 04/26/24 1042    oxyCODONE-acetaminophen (PERCOCET)  MG per  tablet 1 tablet, 1 tablet, Oral, Q4H PRN, Cristina Escobar, APRN, 1 tablet at 04/26/24 1137    pantoprazole (PROTONIX) injection 40 mg, 40 mg, Intravenous, Daily, Mike Donnelly MD, 40 mg at 04/26/24 0827    sodium chloride 0.9 % flush 10 mL, 10 mL, Intravenous, PRN, Chidi Almazan MD    sodium chloride 0.9 % flush 10 mL, 10 mL, Intravenous, PRN, Kimberly Armstrong, APRN    thiamine (B-1) injection 200 mg, 200 mg, Intravenous, Q8H, 200 mg at 04/26/24 0538 **FOLLOWED BY** [START ON 5/1/2024] thiamine (VITAMIN B-1) tablet 100 mg, 100 mg, Oral, Daily, Xiao Conroy, APRN    Allergies:   Allergies   Allergen Reactions    Nickel Rash     Social History:   Smokes tobacco  Uses alcohol  Denies recreational drug use    Family History:   Family History   Problem Relation Age of Onset    Alcohol abuse Mother     Arthritis Mother     Asthma Mother     Miscarriages / Stillbirths Mother     Cancer Father     Diabetes Father     Drug abuse Father     Early death Father     Heart disease Father     Hyperlipidemia Father     Hypertension Father     Alcohol abuse Paternal Aunt     Cancer Paternal Aunt     Diabetes Paternal Aunt     Drug abuse Paternal Aunt     Early death Paternal Aunt     Heart disease Paternal Aunt     Hyperlipidemia Paternal Aunt     Hypertension Paternal Aunt     Alcohol abuse Maternal Grandmother     Alcohol abuse Maternal Grandfather     Alcohol abuse Paternal Grandmother     Cancer Paternal Grandmother     Diabetes Paternal Grandmother     Drug abuse Paternal Grandmother     Early death Paternal Grandmother     Heart disease Paternal Grandmother     Hyperlipidemia Paternal Grandmother     Hypertension Paternal Grandmother     Alcohol abuse Paternal Grandfather        Review of Systems:  Constitutional: denies fever or chills  Cardiovascular:   denies palpitations or syncope  Respiratory: denies cough or shortness of breath, + pleurisy  Gastrointestinal:  + abdominal pain, denies nausea,  vomiting, diarrhea, constipation, melena, hematochezia  Genitourinary: denies dysuria or hematuria  Musculoskeletal: denies weakness; +left neck pain, left shoulder pain  Skin: denies rash or jaundice     Physical Exam:   Vitals:    04/26/24 0944   BP: 118/79   Pulse: 98   Resp: 18   Temp: 97.6 °F (36.4 °C)   SpO2: 98%     GENERAL: alert, interactive, cooperative, sitting up in bed, comfortable appearing, able to reposition easily without assistance or obvious significant increase in pain  HEENT: no scleral icterus; moist mucous membranes  NECK: Supple  RESPIRATORY: normal work of breathing on room air  CARDIOVASCULAR: Regular rate, palpable distal pulses  GASTROINTESTINAL: Abdomen firm, nondistended, mildly tender in the left upper quadrant without rebound or guarding  MUSCULOSKELETAL: no cyanosis or edema   NEUROLOGIC: alert and oriented, normal speech, no gross focal deficits   SKIN: warm, no rash, no jaundice    Diagnostic workup:     Pertinent labs:   Results from last 7 days   Lab Units 04/26/24  0706 04/25/24  1537   WBC 10*3/mm3 8.90 10.88*   HEMOGLOBIN g/dL 11.5* 12.1   HEMATOCRIT % 34.1 37.0   PLATELETS 10*3/mm3 401 580*     Results from last 7 days   Lab Units 04/26/24  0706 04/25/24  1537   SODIUM mmol/L 137 136   POTASSIUM mmol/L 4.0 3.9   CHLORIDE mmol/L 100 97*   CO2 mmol/L 24.6 25.9   BUN mg/dL 4* 7   CREATININE mg/dL 0.57 0.78   CALCIUM mg/dL 9.2 9.8   BILIRUBIN mg/dL <0.2 <0.2   ALK PHOS U/L 107 128*   ALT (SGPT) U/L 5 9   AST (SGOT) U/L 10 9   GLUCOSE mg/dL 93 109*   Lipase 139  Magnesium 1.6  Phosphorus 4.2  Procalcitonin 0.08  Ethanol less than 10, ethanol percent less than 0.010    Hemoglobin at discharge 2 weeks ago on 4/7/2024 was 10.2.     Imaging:  CT abdomen pelvis 4/25/2024 images and report independently reviewed - There are calcifications in the head and uncinate process.  There is a small fluid collection in the left lobe of the liver that is decreased from prior study.  There are  multiple peripancreatic body and tail fluid collections.  The ventral more medial 1 has slightly increased in size from previous study on 4/1/2024.  The more lateral fluid collection at the inferior aspect of the spleen has also slightly increased in size from prior study.  There is also a newly enlarged multiloculated collection involving the spleen concerning for pseudocyst versus hematoma.  There is mass effect on the left kidney, with asymmetric delayed left renal nephrograms, and mass effect on the stomach.  Previous gastric wall thickening has improved.  Her splenic artery embolization clips are noted.  She has moderate to large colonic stool burden which is significantly increased from prior study.    Assessment and plan:   The patient is a 43 y.o. female with chronic alcoholic pancreatitis with pancreatic pseudocysts and previous episode of splenic hemorrhage and subcapsular hematoma, has undergone splenic artery embolization in January 2024, seen again earlier this month for left upper quadrant pain, noted to have decreased size of pancreatic fluid collections.  Return for for 5 days of worsening left upper quadrant pain, now with CT evidence of increased size of her fluid collections and concern for possible interval splenic hemorrhage.     AVSS. Patient actually appears comfortable today following pain medication, and her abdominal exam is only mildly tender without peritoneal signs.   Review of her CT does demonstrate enlarged fluid collections in the left upper quadrant worse from prior earlier this month, along with significant constipation. While it is possible she has had interval splenic hemorrhage, her hemoglobin is actually increased from recent hospitalization, and she has not had symptoms of anemia, and so I am more suspicious of worsening pancreatic pseudocysts.   She does not require surgical intervention currently.  Splenectomy if needed would be technically difficult and high risk for  complications such as bleeding and pancreatic fistula given her history.   Ok for clear liquid diet. Serial abdominal exams. Daily CBCs.  Bowel regimen.      Mai Almeida M.D.  General, Robotic, and Endoscopic Surgery  [facility], [place], operated by [facility]    [address], [address]  [place], [place], [address]  P: [phone]  F: [phone]       Electronically signed by Mai Almeida MD at 04/26/24 1401          All medication doses during the admission are shown, including meds that are no longer on order.  Scheduled Meds Sorted by Name  for Piper Cain as of 4/24/24 through 4/30/24    1 Day 3 Days 7 Days 10 Days < Today >   Legend:       Medications 04/24/24 04/25/24 04/26/24 04/27/24 04/28/24 04/29/24 04/30/24   amitriptyline (ELAVIL) tablet 50 mg  Dose: 50 mg  Freq: Nightly Route: PO  Start: 04/26/24 2100 2039 2031 0314 [C]     2018 2100        bisacodyl (DULCOLAX) suppository 10 mg  Dose: 10 mg  Freq: Daily Route: RE  Start: 04/27/24 1900 End: 04/27/24 1814   Admin Instructions:          1814-D/C'd         cholecalciferol (VITAMIN D3) tablet 400 Units  Dose: 400 Units  Freq: Daily Route: PO  Start: 04/28/24 0900 End: 04/29/24 0858        0925      0822     0858-D/C'd       droperidol (INAPSINE) injection 2.5 mg  Dose: 2.5 mg  Freq: Once Route: IV  Start: 04/25/24 1615 End: 04/25/24 1600     1600             ferrous sulfate tablet 325 mg  Dose: 325 mg  Freq: Daily With Breakfast Route: PO  Start: 04/26/24 1000   Admin Instructions:         1042      0822      0915      0820      0901        folic acid (FOLVITE) tablet 1 mg  Dose: 1 mg  Freq: Daily Route: PO  Start: 04/27/24 2040       2200      0917      0822      0902        folic acid (FOLVITE) tablet 1 mg  Dose: 1 mg  Freq: Daily Route: PO  Start: 04/26/24 0900 End: 04/26/24 0901      0827     0901-D/C'd          gabapentin (NEURONTIN) capsule 300 mg  Dose: 300 mg  Freq: 3 Times Daily Route: PO  Start: 04/28/24 1130 End: 05/05/24  0859   Admin Instructions:           1139     1631     2146      0821     1656     2019      0901     1600     2100        HYDROmorphone (DILAUDID) injection 0.5 mg  Dose: 0.5 mg  Freq: Once Route: IV  Start: 04/25/24 1630 End: 04/25/24 1656   Admin Instructions:        1656             HYDROmorphone (DILAUDID) injection 0.5 mg  Dose: 0.5 mg  Freq: Once Route: IV  Start: 04/25/24 1615 End: 04/25/24 1600   Admin Instructions:        1600             HYDROmorphone (DILAUDID) injection 1 mg  Dose: 1 mg  Freq: Once Route: IV  Start: 04/25/24 2100 End: 04/25/24 2043   Admin Instructions:        2043             HYDROmorphone (DILAUDID) injection 1 mg  Dose: 1 mg  Freq: Once Route: IV  Start: 04/25/24 1830 End: 04/25/24 1819   Admin Instructions:        1819             iopamidol (ISOVUE-370) 76 % injection 100 mL  Dose: 100 mL  Freq: Once in Imaging Route: IV  Start: 04/25/24 1945 End: 04/25/24 1915 1915 [C]             Lidocaine 4 % 1 patch  Dose: 1 patch  Freq: Every 24 Hours Scheduled Route: TD  Start: 04/27/24 1915   Admin Instructions:          2200      0933          2008 [C]      0825 2019 0904 2100         LORazepam (ATIVAN) tablet 2 mg  Dose: 2 mg  Freq: Every 6 Hours Route: PO  Start: 04/26/24 0115 End: 04/26/24 0019   Admin Instructions:         0019-D/C'd          Followed by   LORazepam (ATIVAN) tablet 1 mg  Dose: 1 mg  Freq: Every 6 Hours Route: PO  Start: 04/27/24 0115 End: 04/26/24 0019   Admin Instructions:         0019-D/C'd          magnesium oxide (MAG-OX) tablet 400 mg  Dose: 400 mg  Freq: Daily Route: PO  Start: 04/26/24 1000   Admin Instructions:         1042      0822      0917      0822      0902        ondansetron (ZOFRAN) injection 4 mg  Dose: 4 mg  Freq: Once Route: IV  Start: 04/25/24 2115 End: 04/25/24 2104   Admin Instructions:        2104             pancrelipase (Lip-Prot-Amyl) (CREON) capsule 12,000 units of lipase  Dose: 12,000 units of lipase  Freq: 3 Times Daily With  Meals Route: PO  Start: 04/30/24 0800 End: 04/29/24 1715   Admin Instructions:            1715-D/C'd       pancrelipase (Lip-Prot-Amyl) (CREON) capsule 30,000 units of lipase  Dose: 30,000 units of lipase  Freq: 3 Times Daily With Meals Route: PO  Start: 04/30/24 0800   Admin Instructions:             [phone]        pancrelipase (Lip-Prot-Amyl) (CREON) capsule 6,000 units of lipase  Dose: 6,000 units of lipase  Freq: 3 Times Daily With Meals Route: PO  Start: 04/28/24 1830 End: 04/29/24 1712   Admin Instructions:           (2011)     (4405) [C]      0874     1112     1711     1712-D/C'd       pantoprazole (PROTONIX) EC tablet 40 mg  Dose: 40 mg  Freq: Every Early Morning Route: PO  Start: 04/27/24 0830   Admin Instructions:          0822      0506      0658      0621        pantoprazole (PROTONIX) injection 40 mg  Dose: 40 mg  Freq: Daily Route: IV  Indications of Use: STRESS ULCER PROPHYLAXIS  Start: 04/26/24 0900 End: 04/27/24 0730   Admin Instructions:         0827      0730-D/C'd         pantoprazole (PROTONIX) injection 40 mg  Dose: 40 mg  Freq: Once Route: IV  Indications of Use: STRESS ULCER PROPHYLAXIS  Start: 04/25/24 1615 End: 04/25/24 1600   Admin Instructions:        1600             polyethylene glycol (MIRALAX) packet 17 g  Dose: 17 g  Freq: 2 Times Daily Route: PO  Start: 04/27/24 2100   Admin Instructions:          2031 0918     (2014)      (1113)     (2019)      (0902)     2100        polyethylene glycol (MIRALAX) packet 17 g  Dose: 17 g  Freq: Daily Route: PO  Start: 04/26/24 1500 End: 04/27/24 1319   Admin Instructions:         1543      0872     1319-D/C'd         sennosides-docusate (PERICOLACE) 8.6-50 MG per tablet 1 tablet  Dose: 1 tablet  Freq: 2 Times Daily Route: PO  Start: 04/26/24 2100 2040 0823 2031 0915     (2014) [C]      0822 2018 0901 2100        sodium chloride 0.9 % bolus 1,000 mL  Dose: 1,000 mL  Freq: Once Route: IV  Last  Dose: Stopped (04/25/24 1821)  Start: 04/25/24 1600 End: 04/25/24 1821     1543     1821              thiamine (B-1) injection 200 mg  Dose: 200 mg  Freq: Every 8 Hours Scheduled Route: IV  Start: 04/26/24 0600 End: 04/26/24 1536   Admin Instructions:         0538     1411     1536-D/C'd          Followed by   thiamine (VITAMIN B-1) tablet 100 mg  Dose: 100 mg  Freq: Daily Route: PO  Start: 05/01/24 0900 End: 04/26/24 1536      1536-D/C'd          thiamine (VITAMIN B-1) tablet 100 mg  Dose: 100 mg  Freq: Daily Route: PO  Start: 04/27/24 2040       2200      0917      0822      0902                    Continuous Meds Sorted by Name  for Piper Cain C as of 4/24/24 through 4/30/24  Legend:       Medications 04/24/24 04/25/24 04/26/24 04/27/24 04/28/24 04/29/24 04/30/24   lactated ringers infusion  Rate: 75 mL/hr Dose: 75 mL/hr  Freq: Continuous Route: IV  Last Dose: Stopped (04/28/24 0912)  Start: 04/26/24 0045 End: 04/28/24 0825      0032     0540     0541     0827     1248     1401     1411     1803     2041      0009     1343      0300     0301     0825-D/C'd  0912                      PRN Meds Sorted by Name  for Piper Cain C as of 4/24/24 through 4/30/24  Legend:       Medications 04/24/24 04/25/24 04/26/24 04/27/24 04/28/24 04/29/24 04/30/24   acetaminophen (TYLENOL) tablet 650 mg  Dose: 650 mg  Freq: Every 6 Hours PRN Route: PO  PRN Reason: Mild Pain  Start: 04/26/24 1615   Admin Instructions:         1647            aluminum-magnesium hydroxide-simethicone (MAALOX MAX) 400-400-40 MG/5ML suspension 15 mL  Dose: 15 mL  Freq: Every 6 Hours PRN Route: PO  PRN Reason: Heartburn  Start: 04/25/24 2356   Admin Instructions:           2008          bisacodyl (DULCOLAX) EC tablet 10 mg  Dose: 10 mg  Freq: Daily PRN Route: PO  PRN Reason: Constipation  Start: 04/27/24 1319 End: 04/27/24 1814   Admin Instructions:          1639     1814-D/C'd          sennosides-docusate (PERICOLACE) 8.6-50 MG per tablet 2  tablet  Dose: 2 tablet  Freq: 2 Times Daily PRN Route: PO  PRN Reason: Constipation  Start: 04/25/24 2356 End: 04/26/24 1402   Admin Instructions:         1402-D/C'd          And   polyethylene glycol (MIRALAX) packet 17 g  Dose: 17 g  Freq: Daily PRN Route: PO  PRN Reason: Constipation  PRN Comment: Use if senna-docusate is ineffective  Start: 04/25/24 2356 End: 04/26/24 1402   Admin Instructions:         1402-D/C'd          And   bisacodyl (DULCOLAX) EC tablet 5 mg  Dose: 5 mg  Freq: Daily PRN Route: PO  PRN Reason: Constipation  PRN Comment: Use if polyethylene glycol is ineffective  Start: 04/25/24 2356 End: 04/26/24 1402   Admin Instructions:         1402-D/C'd          And   bisacodyl (DULCOLAX) suppository 10 mg  Dose: 10 mg  Freq: Daily PRN Route: RE  PRN Reason: Constipation  PRN Comment: Use if bisacodyl oral is ineffective  Start: 04/25/24 2356 End: 04/26/24 1402   Admin Instructions:         1402-D/C'd          bisacodyl (DULCOLAX) suppository 10 mg  Dose: 10 mg  Freq: Daily PRN Route: RE  PRN Reason: Constipation  Start: 04/27/24 1814   Admin Instructions:          2227      0928          HYDROmorphone (DILAUDID) injection 0.5 mg  Dose: 0.5 mg  Freq: Every 2 Hours PRN Route: IV  PRN Reason: Severe Pain  Start: 04/26/24 0016 End: 04/26/24 1532   Admin Instructions:         0032     0245     0454     1532-D/C'd          HYDROmorphone (DILAUDID) injection 1 mg  Dose: 1 mg  Freq: Every 4 Hours PRN Route: IV  PRN Reason: Severe Pain  Start: 04/27/24 0731   Admin Instructions:          0919     1427     1830     2234      0255     0927     1330     1737     2146      0157     0658     1112     1656     2100      0111     0621     1017     1427        HYDROmorphone (DILAUDID) injection 1 mg  Dose: 1 mg  Freq: Every 3 Hours PRN Route: IV  PRN Reason: Severe Pain  Start: 04/26/24 1532 End: 04/27/24 0731   Admin Instructions:         1803     2312      0503     0731-D/C'd         HYDROmorphone (DILAUDID)  injection 1 mg  Dose: 1 mg  Freq: Every 2 Hours PRN Route: IV  PRN Reason: Severe Pain  Start: 04/26/24 0720 End: 04/26/24 1532   Admin Instructions:         0827     1042     1358     1532-D/C'd           LORazepam (ATIVAN) tablet 1 mg  Dose: 1 mg  Freq: Every 1 Hour PRN Route: PO  PRN Reason: Withdrawal  PRN Comment: For CIWA-Ar 8-10  Start: 04/26/24 0018 End: 05/01/24 0017   Admin Instructions:                Or   midazolam (VERSED) injection 2 mg  Dose: 2 mg  Freq: Every 1 Hour PRN Route: IV  PRN Comment: For CIWA-Ar 8-10  Start: 04/26/24 0018 End: 05/01/24 0017   Admin Instructions:                Or   LORazepam (ATIVAN) tablet 2 mg  Dose: 2 mg  Freq: Every 1 Hour PRN Route: PO  PRN Reason: Withdrawal  PRN Comment: For CIWA-Ar 11-15 or -160, DBP , -125  Start: 04/26/24 0018 End: 05/01/24 0017   Admin Instructions:                Or   midazolam (VERSED) injection 4 mg  Dose: 4 mg  Freq: Every 1 Hour PRN Route: IV  PRN Comment: For CIWA-Ar 11-15 or -160, DBP , -125  Start: 04/26/24 0018 End: 05/01/24 0017   Admin Instructions:                Or   midazolam (VERSED) injection 4 mg  Dose: 4 mg  Freq: Every 15 Minutes PRN Route: IV  PRN Comment: For CIWA-Ar Greater Than 15 or SBP greater than 160, DBP greater than 110, or HR greater than 125  Start: 04/26/24 0018 End: 05/01/24 0017   Admin Instructions:                Or   midazolam (VERSED) injection 4 mg  Dose: 4 mg  Freq: Every 15 Minutes PRN Route: IM  PRN Comment: If Unable to Administer IV - For CIWA-Ar Greater Than 15 or SBP greater than 160, DBP greater than 110, or HR greater than 125  Start: 04/26/24 0018 End: 05/01/24 0017   Admin Instructions:                Magnesium Standard Dose Replacement - Follow Nurse / BPA Driven Protocol  Freq: As Needed Route: XX  PRN Reason: Other  Start: 04/26/24 0017   Admin Instructions:                melatonin tablet 5 mg  Dose: 5 mg  Freq: Nightly PRN Route: PO  PRN Reason:  Sleep  Start: 04/25/24 2356             morphine injection 2 mg  Dose: 2 mg  Freq: Every 2 Hours PRN Route: IV  PRN Reason: Severe Pain  Start: 04/25/24 2356 End: 04/26/24 0017   Admin Instructions:         0017-D/C'd          nitroglycerin (NITROSTAT) SL tablet 0.4 mg  Dose: 0.4 mg  Freq: Every 5 Minutes PRN Route: SL  PRN Reason: Chest Pain  PRN Comment: Only if SBP Greater Than 100  Start: 04/25/24 2355 End: 04/26/24 0017   Admin Instructions:         0017-D/C'd           ondansetron ODT (ZOFRAN-ODT) disintegrating tablet 4 mg  Dose: 4 mg  Freq: Every 6 Hours PRN Route: PO  PRN Reasons: Nausea,Vomiting  Start: 04/25/24 2356   Admin Instructions:                                                                                           Or   ondansetron (ZOFRAN) injection 4 mg  Dose: 4 mg  Freq: Every 6 Hours PRN Route: IV  PRN Reasons: Nausea,Vomiting  Start: 04/25/24 2356   Admin Instructions:         0453     1042     1807 (2041      0002     1146     1830      1240     1952      0150     0820     1449     2059      0421     1017        oxyCODONE-acetaminophen (PERCOCET)  MG per tablet 1 tablet  Dose: 1 tablet  Freq: Every 4 Hours PRN Route: PO  PRN Reason: Moderate Pain  Start: 04/26/24 0900 End: 04/29/24 0854   Admin Instructions:         1137     1547     2040      0141     0710     1146     1639     2031      0117     0507     1139     1631     2241      0310     0821     0854-D/C'd       oxyCODONE-acetaminophen (PERCOCET) 7.5-325 MG per tablet 2 tablet  Dose: 2 tablet  Freq: Every 4 Hours PRN Route: PO  PRN Reason: Moderate Pain  Start: 04/29/24 0853 End: 05/04/24 0852   Admin Instructions:            1258     1843      0016     0420     0901     1305        sodium chloride 0.9 % flush 10 mL  Dose: 10 mL  Freq: As Needed Route: IV  PRN Reason: Line Care  Start: 04/25/24 2355          0111        sodium chloride 0.9 % flush 10 mL  Dose: 10 mL  Freq: As Needed Route: IV  PRN Reason: Line  Care  Start: 04/25/24 1539

## 2024-04-30 NOTE — PLAN OF CARE
Goal Outcome Evaluation:  Plan of Care Reviewed With: patient        Progress: no change  Outcome Evaluation: VSS. IV Dilaudid and Percocet given alternately for  left abdominal  and shoulder pain, Lidocaine patch to left shoulder. Zofran for nausea. Saline locked, on regular diet. Ambulated several times at the hallway, voided freely. Slept for short period of time.

## 2024-04-30 NOTE — PROGRESS NOTES
Nutrition Services    Patient Name:  Piper Cain  YOB: 1980  MRN: [id number]  Admit Date:  4/25/2024    Assessment Date:  04/30/24    Summary: follow up    Pt seen for follow up. 43yoF admitted for hematoma of spleen w/out rupture of capsule. Hx of chronic pancreatitis, alcohol abuse, chronic opioid use, tobacco abuse, splenic laceration, hiatal hernia. Pt is 1 mo sober per chart review. Wt stable. Reports good appetite per interview. Was asking for snacks in the room. % PO intake. On BM regimen for narcotic associated constipation. Declined prune juice for now. Currently on a regular diet, no restrictions. Pt reports not following a special diet at home. Was not really taking her creon at home per MD's note.     Meds: ferrous sulfate, folic acid, thiamine, mag ox, creon, PPI, pericolace, zofran    Plan/recs:  Might benefit in a low fat, high protein, high kcal diet.  Might benefit in MCT oil  Maintain wt  Continue to monitor PO intake, nutrition needs.    RD to follow     CLINICAL NUTRITION ASSESSMENT      Reason for Assessment Follow-up Protocol     Diagnosis/Problem   Hematoma of spleen w/out rupture of capsule   Medical/Surgical History Past Medical History:   Diagnosis Date    Anemia     Anxiety     Constipation     Cyst of spleen     Diarrhea     E. 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    Left flank pain 10/21/2022    Migraine     Miscarriage 2004    Pancreatitis     Pseudocyst of pancreas        Past Surgical History:   Procedure Laterality Date    EMBOLIZATION MESENTERIC ARTERY N/A 01/22/2024    Procedure: OR EMBOLIZATION MESENTERIC ARTERY RIGHT FEMORAL POPLITEAL THROMBECTOMY;  Surgeon: Carine Pena Jr., MD;  Location: [facility] OR;  Service: Vascular;  Laterality: N/A;    ENDOSCOPY N/A 08/10/2022    Procedure: ESOPHAGOGASTRODUODENOSCOPY with  "bxs;  Surgeon: Salvador Price MD;  Location:  [facility] ENDOSCOPY;  Service: Gastroenterology;  Laterality: N/A;  Pre: r/o esophageal  varacies, abd pain  Post: esophageal plaque    ENDOSCOPY N/A 10/09/2023    Procedure: ESOPHAGOGASTRODUODENOSCOPY WITH STENT REMOVAL;  Surgeon: Red Denson MD;  Location: [facility] ENDOSCOPY;  Service: Gastroenterology;  Laterality: N/A;    ENDOSCOPY N/A 04/03/2024    Procedure: ESOPHAGOGASTRODUODENOSCOPY (EGD) WITH BIOPSIES;  Surgeon: Petar Starr MD;  Location:  [facility] ENDOSCOPY;  Service: Gastroenterology;  Laterality: N/A;  PRE- ABDOMINAL PAIN  POST - GASTRITIS, ESOPHAGITIS,BILE REFLUX    PANCREATIC CYST DRAINAGE      x 2    SPLENECTOMY  2024    SPLEEN REPAIR    UPPER ENDOSCOPIC ULTRASOUND W/ FNA N/A 09/13/2023    Procedure: Esophagogastroduodenoscopy with biopsy x 1 area and endoscopic ultrasound with placement of cyst gastrostomy;  Surgeon: Red Denson MD;  Location: [facility] ENDOSCOPY;  Service: Gastroenterology;  Laterality: N/A;  post: pancreatic psuedocyst    WISDOM TOOTH EXTRACTION          Anthropometrics        Current Height  Current Weight  BMI kg/m2 Height: 175.3 cm (69\")  Weight: 58.1 kg (128 lb) (04/25/24 1532)  Body mass index is 18.9 kg/m².   Adjusted BMI (if applicable)    BMI Category Normal/Healthy (18.4 - 24.9)   Ideal Body Weight (IBW) 145#   Usual Body Weight (UBW) 126-135#   Weight Trend Stable   Weight History Wt Readings from Last 30 Encounters:   04/25/24 1532 58.1 kg (128 lb)   04/03/24 1005 58.1 kg (128 lb)   04/01/24 1149 58.1 kg (128 lb)   01/25/24 0528 60.4 kg (133 lb 3.2 oz)   01/24/24 0955 63.5 kg (140 lb)   01/24/24 0500 63.8 kg (140 lb 10.5 oz)   01/21/24 1343 61.7 kg (136 lb)   01/21/24 0604 57.6 kg (126 lb 15.8 oz)   01/05/24 0021 57.6 kg (126 lb 14.4 oz)   01/05/24 0010 57.6 kg (126 lb 14.4 oz)   01/04/24 1826 54.4 kg (120 lb)   09/26/23 1022 57.6 kg (127 lb)   09/13/23 0629 57.8 kg (127 lb 6.4 oz)   09/08/23 1151 58.1 kg (128 lb) "   09/05/23 1953 59 kg (130 lb)   08/25/23 0643 60 kg (132 lb 4.8 oz)   08/24/23 0655 59.8 kg (131 lb 14.4 oz)   08/23/23 0533 59 kg (130 lb)   08/22/23 1954 61.4 kg (135 lb 4.8 oz)   08/22/23 1254 58.1 kg (128 lb)   08/23/23 1928 59 kg (130 lb)   06/07/23 2210 54.4 kg (120 lb)   03/25/23 2214 59 kg (130 lb)   03/25/23 1631 59 kg (130 lb)   11/30/22 1743 61.2 kg (135 lb)   10/21/22 0009 61.2 kg (135 lb)   08/08/22 0922 55.8 kg (123 lb)   08/06/22 2021 55.8 kg (123 lb)   03/05/18 1124 58.1 kg (128 lb)   01/24/18 0456 59 kg (130 lb)   09/27/17 2341 49.9 kg (110 lb)        Estimated Requirements         Weight used  58.1 kg    Calories  1,743 - 2,033 kcal (30-35 kcal/kg)    Protein  70 - 87 g pro (1.2 - 1.5 gm/kg)    Fluid   (1 mL/kcal)     Labs       Pertinent Labs    Results from last 7 days   Lab Units 04/30/24  0304 04/29/24  0459 04/27/24  0647 04/26/24  0706   SODIUM mmol/L 136 137 140 137   POTASSIUM mmol/L 4.3 4.2 3.7 4.0   CHLORIDE mmol/L 98 98 101 100   CO2 mmol/L 28.0 26.3 27.2 24.6   BUN mg/dL 13 8 3* 4*   CREATININE mg/dL 0.68 0.60 0.53* 0.57   CALCIUM mg/dL 9.2 9.4 9.2 9.2   BILIRUBIN mg/dL <0.2  --  <0.2 <0.2   ALK PHOS U/L 109  --  102 107   ALT (SGPT) U/L 11  --  7 5   AST (SGOT) U/L 6  --  11 10   GLUCOSE mg/dL 111* 98 91 93     Results from last 7 days   Lab Units 04/30/24  0304 04/29/24  0459 04/27/24  1403 04/27/24  0647 04/26/24  1615 04/26/24  0713   MAGNESIUM mg/dL  --  1.8  --  1.7  --  1.6   PHOSPHORUS mg/dL  --  4.8*  --   --   --  4.2   HEMOGLOBIN g/dL 9.9* 10.2*   < > 9.9*  9.9*   < >  --    HEMATOCRIT % 31.5* 31.9*   < > 30.7*  30.7*   < >  --    WBC 10*3/mm3 7.22 12.17*  --  7.65  --   --    ALBUMIN g/dL 3.5  --   --  3.2*  --   --     < > = values in this interval not displayed.     Results from last 7 days   Lab Units 04/30/24  0304 04/29/24  0459 04/27/24  0647 04/26/24  0706 04/25/24  1537   PLATELETS 10*3/mm3 482* 475* 405 401 580*     COVID19   Date Value Ref Range Status    08/07/2022 Not Detected Not Detected - Ref. Range Final     Lab Results   Component Value Date    HGBA1C 5.10 08/23/2023          Medications           Scheduled Medications amitriptyline, 50 mg, Oral, Nightly  ferrous sulfate, 325 mg, Oral, Daily With Breakfast  folic acid, 1 mg, Oral, Daily  gabapentin, 300 mg, Oral, TID  Lidocaine, 1 patch, Transdermal, Q24H  magnesium oxide, 400 mg, Oral, Daily  pancrelipase (Lip-Prot-Amyl), 30,000 units of lipase, Oral, TID With Meals  pantoprazole, 40 mg, Oral, Q AM  polyethylene glycol, 17 g, Oral, BID  senna-docusate sodium, 1 tablet, Oral, BID  thiamine, 100 mg, Oral, Daily       Infusions     PRN Medications   acetaminophen    aluminum-magnesium hydroxide-simethicone    bisacodyl    HYDROmorphone    LORazepam **OR** midazolam **OR** LORazepam **OR** midazolam **OR** midazolam **OR** midazolam    Magnesium Standard Dose Replacement - Follow Nurse / BPA Driven Protocol    melatonin    ondansetron ODT **OR** ondansetron    oxyCODONE-acetaminophen    sodium chloride    sodium chloride     Physical Findings          General Findings alert, oriented, room air   Oral/Mouth Cavity WDL   Edema  no edema   Gastrointestinal constipation, last bowel movement: 4/29   Skin  skin intact   Tubes/Drains/Lines none   NFPE Not indicated at this time   --  Current Nutrition Orders & Evaluation of Intake       Oral Nutrition     Food Allergies NKFA   Current PO Diet Diet: Regular/House; Fluid Consistency: Thin (IDDSI 0)   Supplement n/a   PO Evaluation     % PO Intake 100% Po itnake    Factors Affecting Intake: abdominal pain, constipation   --  PES STATEMENT / NUTRITION DIAGNOSIS      Nutrition Dx Problem  Problem: Increased Nutrient Needs  Etiology: Medical Diagnosis - chronic pancreatitis, alcohol abuse    Signs/Symptoms: PO intake and Protein     NUTRITION INTERVENTION / PLAN OF CARE      Intervention Goal(s) Maintain nutrition status, Meet estimated needs, Disease management/therapy,  Maintain weight, No significant weight loss, and PO intake goal %: 100%         RD Intervention/Action Encourage intake, Continue to monitor, Care plan reviewed, and Education regarding: low fat, high protein, high kcal diet at home.   --      Prescription/Orders:       PO Diet       Supplements       Enteral Nutrition       Parenteral Nutrition    New Prescription Ordered? No changes at this time   --      Monitor/Evaluation Per protocol, PO intake, Pertinent labs, Weight, GI status   Discharge Plan/Needs Pending clinical course   --    RD to follow per protocol.      Electronically signed by:  Tammie Bo  04/30/24 10:27 EDT

## 2024-05-01 LAB
ANION GAP SERPL CALCULATED.3IONS-SCNC: 11 MMOL/L (ref 5–15)
BUN SERPL-MCNC: 8 MG/DL (ref 6–20)
BUN/CREAT SERPL: 13.3 (ref 7–25)
CALCIUM SPEC-SCNC: 10 MG/DL (ref 8.6–10.5)
CHLORIDE SERPL-SCNC: 99 MMOL/L (ref 98–107)
CO2 SERPL-SCNC: 28 MMOL/L (ref 22–29)
CREAT SERPL-MCNC: 0.6 MG/DL (ref 0.57–1)
DEPRECATED RDW RBC AUTO: 50.1 FL (ref 37–54)
EGFRCR SERPLBLD CKD-EPI 2021: 115.8 ML/MIN/1.73
ERYTHROCYTE [DISTWIDTH] IN BLOOD BY AUTOMATED COUNT: 13.9 % (ref 12.3–15.4)
GLUCOSE SERPL-MCNC: 104 MG/DL (ref 65–99)
HCT VFR BLD AUTO: 35.7 % (ref 34–46.6)
HGB BLD-MCNC: 11.2 G/DL (ref 12–15.9)
MCH RBC QN AUTO: 30.4 PG (ref 26.6–33)
MCHC RBC AUTO-ENTMCNC: 31.4 G/DL (ref 31.5–35.7)
MCV RBC AUTO: 97 FL (ref 79–97)
PLATELET # BLD AUTO: 515 10*3/MM3 (ref 140–450)
PMV BLD AUTO: 9 FL (ref 6–12)
POTASSIUM SERPL-SCNC: 4.5 MMOL/L (ref 3.5–5.2)
RBC # BLD AUTO: 3.68 10*6/MM3 (ref 3.77–5.28)
SODIUM SERPL-SCNC: 138 MMOL/L (ref 136–145)
WBC NRBC COR # BLD AUTO: 8.42 10*3/MM3 (ref 3.4–10.8)

## 2024-05-01 PROCEDURE — 25010000002 ONDANSETRON PER 1 MG: Performed by: NURSE PRACTITIONER

## 2024-05-01 PROCEDURE — 85027 COMPLETE CBC AUTOMATED: CPT | Performed by: STUDENT IN AN ORGANIZED HEALTH CARE EDUCATION/TRAINING PROGRAM

## 2024-05-01 PROCEDURE — 99222 1ST HOSP IP/OBS MODERATE 55: CPT | Performed by: PHYSICIAN ASSISTANT

## 2024-05-01 PROCEDURE — 80048 BASIC METABOLIC PNL TOTAL CA: CPT | Performed by: STUDENT IN AN ORGANIZED HEALTH CARE EDUCATION/TRAINING PROGRAM

## 2024-05-01 PROCEDURE — 25010000002 HYDROMORPHONE 1 MG/ML SOLUTION: Performed by: STUDENT IN AN ORGANIZED HEALTH CARE EDUCATION/TRAINING PROGRAM

## 2024-05-01 PROCEDURE — G0378 HOSPITAL OBSERVATION PER HR: HCPCS

## 2024-05-01 PROCEDURE — 99024 POSTOP FOLLOW-UP VISIT: CPT | Performed by: STUDENT IN AN ORGANIZED HEALTH CARE EDUCATION/TRAINING PROGRAM

## 2024-05-01 RX ORDER — GABAPENTIN 300 MG/1
600 CAPSULE ORAL 3 TIMES DAILY
Status: DISCONTINUED | OUTPATIENT
Start: 2024-05-01 | End: 2024-05-03 | Stop reason: HOSPADM

## 2024-05-01 RX ADMIN — OXYCODONE AND ACETAMINOPHEN 2 TABLET: 7.5; 325 TABLET ORAL at 13:04

## 2024-05-01 RX ADMIN — ONDANSETRON 4 MG: 2 INJECTION INTRAMUSCULAR; INTRAVENOUS at 09:51

## 2024-05-01 RX ADMIN — GABAPENTIN 600 MG: 300 CAPSULE ORAL at 21:52

## 2024-05-01 RX ADMIN — GABAPENTIN 600 MG: 300 CAPSULE ORAL at 16:04

## 2024-05-01 RX ADMIN — HYDROMORPHONE HYDROCHLORIDE 1 MG: 1 INJECTION, SOLUTION INTRAMUSCULAR; INTRAVENOUS; SUBCUTANEOUS at 16:04

## 2024-05-01 RX ADMIN — HYDROMORPHONE HYDROCHLORIDE 1 MG: 1 INJECTION, SOLUTION INTRAMUSCULAR; INTRAVENOUS; SUBCUTANEOUS at 03:20

## 2024-05-01 RX ADMIN — GABAPENTIN 300 MG: 300 CAPSULE ORAL at 08:26

## 2024-05-01 RX ADMIN — HYDROMORPHONE HYDROCHLORIDE 1 MG: 1 INJECTION, SOLUTION INTRAMUSCULAR; INTRAVENOUS; SUBCUTANEOUS at 11:48

## 2024-05-01 RX ADMIN — FOLIC ACID 1 MG: 1 TABLET ORAL at 08:26

## 2024-05-01 RX ADMIN — OXYCODONE AND ACETAMINOPHEN 2 TABLET: 7.5; 325 TABLET ORAL at 02:17

## 2024-05-01 RX ADMIN — DOCUSATE SODIUM 50MG AND SENNOSIDES 8.6MG 1 TABLET: 8.6; 5 TABLET, FILM COATED ORAL at 20:27

## 2024-05-01 RX ADMIN — MAGNESIUM OXIDE 400 MG (241.3 MG MAGNESIUM) TABLET 400 MG: TABLET at 08:26

## 2024-05-01 RX ADMIN — ONDANSETRON 4 MG: 2 INJECTION INTRAMUSCULAR; INTRAVENOUS at 02:17

## 2024-05-01 RX ADMIN — PANTOPRAZOLE SODIUM 40 MG: 40 TABLET, DELAYED RELEASE ORAL at 07:10

## 2024-05-01 RX ADMIN — OXYCODONE AND ACETAMINOPHEN 2 TABLET: 7.5; 325 TABLET ORAL at 08:30

## 2024-05-01 RX ADMIN — FERROUS SULFATE TAB 325 MG (65 MG ELEMENTAL FE) 325 MG: 325 (65 FE) TAB at 08:27

## 2024-05-01 RX ADMIN — POLYETHYLENE GLYCOL 3350 17 G: 17 POWDER, FOR SOLUTION ORAL at 20:27

## 2024-05-01 RX ADMIN — Medication 100 MG: at 08:26

## 2024-05-01 RX ADMIN — DOCUSATE SODIUM 50MG AND SENNOSIDES 8.6MG 1 TABLET: 8.6; 5 TABLET, FILM COATED ORAL at 08:27

## 2024-05-01 RX ADMIN — HYDROMORPHONE HYDROCHLORIDE 1 MG: 1 INJECTION, SOLUTION INTRAMUSCULAR; INTRAVENOUS; SUBCUTANEOUS at 20:27

## 2024-05-01 RX ADMIN — ONDANSETRON 4 MG: 2 INJECTION INTRAMUSCULAR; INTRAVENOUS at 16:04

## 2024-05-01 RX ADMIN — OXYCODONE AND ACETAMINOPHEN 2 TABLET: 7.5; 325 TABLET ORAL at 17:28

## 2024-05-01 RX ADMIN — OXYCODONE AND ACETAMINOPHEN 2 TABLET: 7.5; 325 TABLET ORAL at 22:14

## 2024-05-01 RX ADMIN — HYDROMORPHONE HYDROCHLORIDE 1 MG: 1 INJECTION, SOLUTION INTRAMUSCULAR; INTRAVENOUS; SUBCUTANEOUS at 07:12

## 2024-05-01 NOTE — PROGRESS NOTES
"General Surgery Progress Note    CC: follow up pancreatitis, worsening pancreatic pseudocyst vs perisplenic hematoma    S: Still having pain in LUQ and shoulder. Tolerated diet. Denies NV. No BM yesterday.    O:/86 (BP Location: Right arm, Patient Position: Lying)   Pulse 104   Temp 97.3 °F (36.3 °C) (Oral)   Resp 16   Ht 175.3 cm (69\")   Wt 58.1 kg (128 lb)   LMP 01/17/2018 (Exact Date)   SpO2 96%   BMI 18.90 kg/m²     Intake & Output (last day)         04/30 0701 05/01 0700 05/01 0701  05/02 0700    P.O. 1100     Total Intake(mL/kg) 1100 (18.9)     Urine (mL/kg/hr) 1960 (1.4)     Total Output 1960     Net -860                   GENERAL: awake, alert, comfortable appearing eating breakfast in bed, interactive, cooperative   HEENT: EOMI, clear sclera, moist mucus membranes   CHEST: normal work of breathing on room air  CARDIAC: no peripheral edema  GI: Abd heating pad in place, abd soft, nondistended, mildly tender in the LUQ without rebound or guarding  EXTREMITIES: CHAMORRO, no cyanosis or edema    SKIN: Warm and dry, no rash    LABS  Results from last 7 days   Lab Units 05/01/24  0542 04/30/24  0304 04/29/24  0459   WBC 10*3/mm3 8.42 7.22 12.17*   HEMOGLOBIN g/dL 11.2* 9.9* 10.2*   HEMATOCRIT % 35.7 31.5* 31.9*   PLATELETS 10*3/mm3 515* 482* 475*     Results from last 7 days   Lab Units 05/01/24  0542 04/30/24  0304 04/29/24  0459 04/27/24  0647 04/26/24  0706   SODIUM mmol/L 138 136 137 140 137   POTASSIUM mmol/L 4.5 4.3 4.2 3.7 4.0   CHLORIDE mmol/L 99 98 98 101 100   CO2 mmol/L 28.0 28.0 26.3 27.2 24.6   BUN mg/dL 8 13 8 3* 4*   CREATININE mg/dL 0.60 0.68 0.60 0.53* 0.57   CALCIUM mg/dL 10.0 9.2 9.4 9.2 9.2   BILIRUBIN mg/dL  --  <0.2  --  <0.2 <0.2   ALK PHOS U/L  --  109  --  102 107   ALT (SGPT) U/L  --  11  --  7 5   AST (SGOT) U/L  --  6  --  11 10   GLUCOSE mg/dL 104* 111* 98 91 93           A/P: 43 y.o. female with chronic alcoholic pancreatitis with pancreatic pseudocysts and previous episode " of splenic hemorrhage and subcapsular hematoma, has undergone splenic artery embolization in January 2024, seen again early April 2024 for left upper quadrant pain, noted to have decreased size of pancreatic fluid collections on imaging at that time and was treated with supportive management for pain and gastritis.  Returned for 5 days of worsening left upper quadrant pain, now with CT evidence of increased size of her fluid collections and radiographic concern for possible interval splenic hemorrhage.    AVSS. No clinical signs of bleeding. No plans for splenectomy.  WBC stable.    Continue bowel regimen.  I have reached out to Dr. Denson and will discuss with him if he is available regarding possible evaluation for cystgastrostomy.  Discussed with her that her pain is difficult to control her chronic pancreatitis but fortunately on exam over the past days she appears reasonably comfortable and able to perform ADLs. She has outpatient pain management eval pending and plans to discuss regional blocks for improved pain control. Hopefully between that and possible GI eval for pseudocyst drainage she can have symptomatic relief.      Addendum: Dr. Denson contacted me and will have the patient follow up in his office to evaluate for management of her pseudocysts.    Mai Almeida MD  General, Robotic and Endoscopic Surgery  [facility]    [address], [address]  [place], [place], [address]  P: [phone]  F: [phone] \

## 2024-05-01 NOTE — PLAN OF CARE
Goal Outcome Evaluation:  Plan of Care Reviewed With: patient        Progress: no change  Outcome Evaluation: VSS, on room air, constant pain to LUQ, L shoulder and neck, percocet and dilaudid rotated for pain, zofran for nauea, no emesis today, up ad valeria, ambulated in halls, voiding without issue, pt updated on plan of care.

## 2024-05-01 NOTE — PLAN OF CARE
Problem: Adult Inpatient Plan of Care  Goal: Plan of Care Review  Outcome: Ongoing, Not Progressing  Flowsheets (Taken 5/1/2024 6741)  Progress: no change  Plan of Care Reviewed With: patient  Outcome Evaluation: still with constant LUQ pain, Lt shoulder and neck pain, medicated with Percocet and IV Dilaudid alternately, refused Lidocain patch as Pt reports it does not help, no BM during the night, voiding, ambulated, VSS   Goal Outcome Evaluation:  Plan of Care Reviewed With: patient        Progress: no change  Outcome Evaluation: still with constant LUQ pain, Lt shoulder and neck pain, medicated with Percocet and IV Dilaudid alternately, refused Lidocain patch as Pt reports it does not help, no BM during the night, voiding, ambulated, VSS

## 2024-05-01 NOTE — PROGRESS NOTES
Name: Piper Cain ADMIT: 2024   : 1980  PCP: Sharmaine Gatica APRN    MRN: [id number] LOS: 4 days   AGE/SEX: 43 y.o. female  ROOM: [facility]     Subjective   Subjective   Feels the same today. Still c/o LUQ pain radiating to left shoulder associated with nausea. No V. Tolerating diet. No D. No F/C/NS. No SOA or CP. Voiding fine.        Objective   Objective   Vital Signs  Temp:  [96.6 °F (35.9 °C)-99 °F (37.2 °C)] 97.3 °F (36.3 °C)  Heart Rate:  [] 104  Resp:  [16-18] 16  BP: (104-125)/(80-93) 108/86  SpO2:  [95 %-100 %] 96 %  on   ;   Device (Oxygen Therapy): room air  Body mass index is 18.9 kg/m².  Physical Exam  Vitals and nursing note reviewed.   Constitutional:       General: She is not in acute distress.     Appearance: She is ill-appearing (chronically). She is not toxic-appearing or diaphoretic.   HENT:      Head: Normocephalic.      Mouth/Throat:      Mouth: Mucous membranes are moist.      Pharynx: Oropharynx is clear.   Eyes:      General: No scleral icterus.        Right eye: No discharge.         Left eye: No discharge.      Extraocular Movements: Extraocular movements intact.      Conjunctiva/sclera: Conjunctivae normal.   Cardiovascular:      Rate and Rhythm: Normal rate and regular rhythm.      Pulses: Normal pulses.   Pulmonary:      Effort: Pulmonary effort is normal. No respiratory distress.      Breath sounds: Normal breath sounds. No wheezing or rales.   Abdominal:      General: Bowel sounds are normal. There is no distension.      Palpations: Abdomen is soft.      Tenderness: There is abdominal tenderness (BRANDY and LUQ). There is no guarding or rebound.   Musculoskeletal:         General: No swelling or deformity. Normal range of motion.      Cervical back: Neck supple.   Skin:     General: Skin is warm and dry.      Capillary Refill: Capillary refill takes less than 2 seconds.      Coloration: Skin is not jaundiced.   Neurological:      General: No focal deficit present.  "     Mental Status: She is alert and oriented to person, place, and time. Mental status is at baseline.      Cranial Nerves: No cranial nerve deficit.      Coordination: Coordination normal.   Psychiatric:         Mood and Affect: Mood normal.         Behavior: Behavior normal.         Thought Content: Thought content normal.       Results Review     I reviewed the patient's new clinical results.  Results from last 7 days   Lab Units 05/01/24  0542 04/30/24  0304 04/29/24  0459 04/28/24  0630 04/27/24  1403 04/27/24  0647   WBC 10*3/mm3 8.42 7.22 12.17*  --   --  7.65   HEMOGLOBIN g/dL 11.2* 9.9* 10.2* 9.2*   < > 9.9*  9.9*   PLATELETS 10*3/mm3 515* 482* 475*  --   --  405    < > = values in this interval not displayed.     Results from last 7 days   Lab Units 05/01/24  0542 04/30/24  0304 04/29/24  0459 04/27/24  0647   SODIUM mmol/L 138 136 137 140   POTASSIUM mmol/L 4.5 4.3 4.2 3.7   CHLORIDE mmol/L 99 98 98 101   CO2 mmol/L 28.0 28.0 26.3 27.2   BUN mg/dL 8 13 8 3*   CREATININE mg/dL 0.60 0.68 0.60 0.53*   GLUCOSE mg/dL 104* 111* 98 91   EGFR mL/min/1.73 115.8 112.4 115.8 119.3     Results from last 7 days   Lab Units 04/30/24  0304 04/27/24  0647 04/26/24  0706 04/25/24  1537   ALBUMIN g/dL 3.5 3.2* 3.4* 4.1   BILIRUBIN mg/dL <0.2 <0.2 <0.2 <0.2   ALK PHOS U/L 109 102 107 128*   AST (SGOT) U/L 6 11 10 9   ALT (SGPT) U/L 11 7 5 9     Results from last 7 days   Lab Units 05/01/24  0542 04/30/24  0304 04/29/24  0459 04/27/24  0647 04/26/24  0713 04/26/24  0706 04/25/24  1537   CALCIUM mg/dL 10.0 9.2 9.4 9.2  --  9.2 9.8   ALBUMIN g/dL  --  3.5  --  3.2*  --  3.4* 4.1   MAGNESIUM mg/dL  --   --  1.8 1.7 1.6  --  1.8   PHOSPHORUS mg/dL  --   --  4.8*  --  4.2  --   --      Results from last 7 days   Lab Units 04/26/24  0713   PROCALCITONIN ng/mL 0.08     No results found for: \"HGBA1C\", \"POCGLU\"    No radiology results for the last day    I have personally reviewed all medications:  Scheduled " Medications  amitriptyline, 50 mg, Oral, Nightly  ferrous sulfate, 325 mg, Oral, Daily With Breakfast  folic acid, 1 mg, Oral, Daily  gabapentin, 300 mg, Oral, TID  Lidocaine, 1 patch, Transdermal, Q24H  magnesium oxide, 400 mg, Oral, Daily  pancrelipase (Lip-Prot-Amyl), 30,000 units of lipase, Oral, TID With Meals  pantoprazole, 40 mg, Oral, Q AM  polyethylene glycol, 17 g, Oral, BID  senna-docusate sodium, 1 tablet, Oral, BID  thiamine, 100 mg, Oral, Daily    Infusions   Diet  Diet: Regular/House, Gastrointestinal; Fat-Restricted; Fluid Consistency: Thin (IDDSI 0)    I have personally reviewed:  [x]  Laboratory   []  Microbiology   []  Radiology   []  EKG/Telemetry  []  Cardiology/Vascular   []  Pathology    [x]  Records       Assessment/Plan     Active Hospital Problems    Diagnosis  POA    **Hematoma of spleen without rupture of capsule [D73.5]  Yes    Chronic, continuous use of opioids [F11.90]  Yes    Generalized anxiety disorder [F41.1]  Yes    Tobacco abuse [Z72.0]  Yes    Abdominal pain [R10.9]  Yes    Pancreatic pseudocyst [K86.3]  Yes    Anemia of chronic disease [D63.8]  Yes    Chronic pancreatitis [K86.1]  Yes    Abnormal CT of the abdomen [R93.5]  Yes    Alcohol abuse [F10.10]  Yes      Resolved Hospital Problems    Diagnosis Date Resolved POA    Hyperglycemia [R73.9] 08/25/2023 Yes     42yo woman who presented to ER at an outlying facility for left upper quadrant pain with some radiation into the left shoulder. She has a history of splenic hematoma/laceration requiring embolization of the splenic artery in January of this year.  CT A/P with evidence of increased size of her fluid collections and radiographic concern for possible interval splenic hemorrhage.      Hematoma of spleen  -General Surgery is following and treating conservatively at this time  -Hgb has been stable  -Not sure what else to do for her ongoing pain and nausea/vomiting.   Gen Surg has mentioned speaking with Dr. Denson to see if pt  would be candidate for cystgastrostomy  -Have asked GI to see her to see if they have any ideas  -She already has outpt appt with her pain mgmt provider on 5/5--could discuss block at that time.     Chronic pancreatitis  Pancreatic pseudocyst  -Followed by Gastroenterology as an outpatient  -Changed diet to low fat  -Continue Creon    Acute on chronic pain  Opioid dependence  -On IV Dilaudid, Percocet, Gabapentin, and Lidoderm patches (pt declines Lidoderm patches).   -Increase in Percocet dose to 15mg wasn't particularly helpful  -Continue nightly Elavil  -Increase dose of Gabapentin today (new medication this admission)    Alcohol dependence in remission  -Currently sober just over a month.  -Continue thiamine and folate     Leukocytosis/thrombocytosis  -Leukocytosis improved spontaneously  -Thrombocytosis is mild and fairly stable     Anemia of chronic disease  -Hgb acceptable/stable     Tobacco abuse  -Nicotine replacement therapy patch offered--she has declined     Generalized anxiety disorder  -not on meds for this other than nightly Elavil      SCDs for DVT prophylaxis.  Full code.  Discussed with patient.  Anticipate discharge home when cleared by consultants.  Expected Discharge Date: 5/2/2024; Expected Discharge Time: TBD      Salvador Kapoor MD  [facility]  05/01/24  09:22 EDT

## 2024-05-01 NOTE — CONSULTS
[facility]  Initial Inpatient Consult Note    Referring Provider: Dr. Kapoor    Reason for Consultation: Abd pain, chronic pancreatitis    Subjective     History of present illness:    43 y.o. female, known to our practice from previous admissions, w/ PMHx of chronic pancreatitis, hx of ETOH abuse, hx of pancreatic pseudocyst s/p cyst gastrostomy stent by Dr. Denson 09/2023 presented to the ED w/ recurrent LUQ pain. She states she was in her usual good health, was avoiding ETOH, following low fat diet, but started having LUQ pain again. Currently marginally better. The pain tends to be constant, but worse w/ po intake, radiates to L shoulder/neck. Lidocaine patch doesn't seem to be helping. She states creon tends to make the pain worse. Has noticed worsening pain w/ smoking as well, which she plans to avoid going forward. She denies ongoing ETOH use. She is taking pancreatin 1300 which she states she tolerates better than creon.     4/3/2024: EGD with mild esophagitis, gastritis, and bile reflux, otherwise normal throughout.  Path showing Leon's esophagus without dysplasia, normal duodenal biopsies.  Normal gastric biopsies. Negative H.pylori      EGD/EUS 09/13/2023:  EGD Findings:   1.  Normal esophageal mucosa entire esophagus  2.  Erosions and erythema in the fundus and antrum consistent with erosive gastritis, cold forcep biopsies of the antrum and body were taken for H. pylori  3.  Normal duodenal mucosa visualized to D2     EUS Findings:   1.  8 cm x 8 cm anechoic lesion in the pancreatic tail consistent with pseudocyst.  Cyst gastrostomy was created using a 10 mm x 10 mm fully covered wall opposing stent as described above.  There was no dopplerable vessel on the far side of the cyst indicating no likely pseudoaneurysm.   2.  Stranding surrounding the pseudocyst distally in this pancreas indicating continued pancreatitis/inflammation.  3.  Normal left and right lobe of the liver  4.   Normal celiac axis    Past Medical History:  Past Medical History:   Diagnosis Date    Anemia     Anxiety     Constipation     Cyst of spleen     Diarrhea     E. 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    Left flank pain 10/21/2022    Migraine     Miscarriage 2004    Pancreatitis     Pseudocyst of pancreas      Past Surgical History:  Past Surgical History:   Procedure Laterality Date    EMBOLIZATION MESENTERIC ARTERY N/A 01/22/2024    Procedure: OR EMBOLIZATION MESENTERIC ARTERY RIGHT FEMORAL POPLITEAL THROMBECTOMY;  Surgeon: Carine Pena Jr., MD;  Location: [facility] HYBRID OR;  Service: Vascular;  Laterality: N/A;    ENDOSCOPY N/A 08/10/2022    Procedure: ESOPHAGOGASTRODUODENOSCOPY with bxs;  Surgeon: Salvador Price MD;  Location: [facility] ENDOSCOPY;  Service: Gastroenterology;  Laterality: N/A;  Pre: r/o esophageal  varacies, abd pain  Post: esophageal plaque    ENDOSCOPY N/A 10/09/2023    Procedure: ESOPHAGOGASTRODUODENOSCOPY WITH STENT REMOVAL;  Surgeon: Red Denson MD;  Location: [facility] ENDOSCOPY;  Service: Gastroenterology;  Laterality: N/A;    ENDOSCOPY N/A 04/03/2024    Procedure: ESOPHAGOGASTRODUODENOSCOPY (EGD) WITH BIOPSIES;  Surgeon: Petar Starr MD;  Location: [facility] ENDOSCOPY;  Service: Gastroenterology;  Laterality: N/A;  PRE- ABDOMINAL PAIN  POST - GASTRITIS, ESOPHAGITIS,BILE REFLUX    PANCREATIC CYST DRAINAGE      x 2    SPLENECTOMY  2024    SPLEEN REPAIR    UPPER ENDOSCOPIC ULTRASOUND W/ FNA N/A 09/13/2023    Procedure: Esophagogastroduodenoscopy with biopsy x 1 area and endoscopic ultrasound with placement of cyst gastrostomy;  Surgeon: Red Denson MD;  Location: [facility] ENDOSCOPY;  Service: Gastroenterology;  Laterality: N/A;  post: pancreatic psuedocyst    WISDOM TOOTH EXTRACTION        Social History:   Social History     Tobacco Use    Smoking  status: Every Day     Current packs/day: 0.50     Average packs/day: 0.5 packs/day for 28.3 years (14.1 ttl pk-yrs)     Types: Cigarettes     Start date: 1/28/1996     Passive exposure: Current    Smokeless tobacco: Never   Substance Use Topics    Alcohol use: Not Currently      Family History:  Family History   Problem Relation Age of Onset    Alcohol abuse Mother     Arthritis Mother     Asthma Mother     Miscarriages / Stillbirths Mother     Cancer Father     Diabetes Father     Drug abuse Father     Early death Father     Heart disease Father     Hyperlipidemia Father     Hypertension Father     Alcohol abuse Paternal Aunt     Cancer Paternal Aunt     Diabetes Paternal Aunt     Drug abuse Paternal Aunt     Early death Paternal Aunt     Heart disease Paternal Aunt     Hyperlipidemia Paternal Aunt     Hypertension Paternal Aunt     Alcohol abuse Maternal Grandmother     Alcohol abuse Maternal Grandfather     Alcohol abuse Paternal Grandmother     Cancer Paternal Grandmother     Diabetes Paternal Grandmother     Drug abuse Paternal Grandmother     Early death Paternal Grandmother     Heart disease Paternal Grandmother     Hyperlipidemia Paternal Grandmother     Hypertension Paternal Grandmother     Alcohol abuse Paternal Grandfather        Home Meds:  Medications Prior to Admission   Medication Sig Dispense Refill Last Dose    amitriptyline (ELAVIL) 50 MG tablet Take 1 tablet by mouth Every Night.       Ferrous Sulfate Dried (Feosol) 200 (65 Fe) MG tablet tablet Take 1 tablet by mouth Daily.       folic acid (FOLVITE) 1 MG tablet Take 1 tablet by mouth Daily. 30 tablet 3     magnesium oxide (MAG-OX) 400 MG tablet Take 1 tablet by mouth Daily.       ondansetron (ZOFRAN) 4 MG tablet Take 1 tablet by mouth Every 8 (Eight) Hours As Needed for Nausea or Vomiting.       oxyCODONE-acetaminophen (PERCOCET)  MG per tablet Take 1 tablet by mouth Every 4 (Four) Hours As Needed for Moderate Pain. 20 tablet 0      pantoprazole (PROTONIX) 40 MG EC tablet Take 1 tablet by mouth Daily. 30 tablet 3     promethazine (PHENERGAN) 25 MG suppository Insert 1 suppository into the rectum As Needed for Nausea or Vomiting.       thiamine (VITAMIN B1) 100 MG tablet Take 1 tablet by mouth Daily. 30 tablet 3     Pancrelipase, Lip-Prot-Amyl, (CREON PO) Take  by mouth.        Current Meds:   amitriptyline, 50 mg, Oral, Nightly  ferrous sulfate, 325 mg, Oral, Daily With Breakfast  folic acid, 1 mg, Oral, Daily  gabapentin, 600 mg, Oral, TID  Lidocaine, 1 patch, Transdermal, Q24H  magnesium oxide, 400 mg, Oral, Daily  pancrelipase (Lip-Prot-Amyl), 30,000 units of lipase, Oral, TID With Meals  pantoprazole, 40 mg, Oral, Q AM  polyethylene glycol, 17 g, Oral, BID  senna-docusate sodium, 1 tablet, Oral, BID  thiamine, 100 mg, Oral, Daily      Allergies:  Allergies   Allergen Reactions    Nickel Rash       Objective     Vital Signs  Temp:  [96.6 °F (35.9 °C)-99 °F (37.2 °C)] 99 °F (37.2 °C)  Heart Rate:  [] 101  Resp:  [16-18] 16  BP: (104-125)/(78-93) 117/78  Physical Exam:  General Appearance:     Alert, cooperative, in no acute distress   Abdomen:     Normal bowel sounds, no masses, no organomegaly, soft     nontender, nondistended, no guarding, no rebound                 tenderness   Rectal:     Deferred       Results Review:   I reviewed the patient's new clinical results.    Results from last 7 days   Lab Units 05/01/24  0542 04/30/24  0304 04/29/24  0459   WBC 10*3/mm3 8.42 7.22 12.17*   HEMOGLOBIN g/dL 11.2* 9.9* 10.2*   HEMATOCRIT % 35.7 31.5* 31.9*   PLATELETS 10*3/mm3 515* 482* 475*     Results from last 7 days   Lab Units 05/01/24  0542 04/30/24  0304 04/29/24  0459 04/27/24  0647 04/26/24  0706   SODIUM mmol/L 138 136 137 140 137   POTASSIUM mmol/L 4.5 4.3 4.2 3.7 4.0   CHLORIDE mmol/L 99 98 98 101 100   CO2 mmol/L 28.0 28.0 26.3 27.2 24.6   BUN mg/dL 8 13 8 3* 4*   CREATININE mg/dL 0.60 0.68 0.60 0.53* 0.57   CALCIUM mg/dL 10.0  9.2 9.4 9.2 9.2   BILIRUBIN mg/dL  --  <0.2  --  <0.2 <0.2   ALK PHOS U/L  --  109  --  102 107   ALT (SGPT) U/L  --  11  --  7 5   AST (SGOT) U/L  --  6  --  11 10   GLUCOSE mg/dL 104* 111* 98 91 93         Lab Results   Lab Value Date/Time    LIPASE 144 (H) 04/27/2024 0647    LIPASE 139 (H) 04/25/2024 1537    LIPASE 139 (H) 04/04/2024 0607    LIPASE 125 (H) 04/02/2024 0526    LIPASE 123 (H) 04/01/2024 1200    LIPASE 123 (H) 01/21/2024 0756    LIPASE 89 (H) 01/07/2024 0449    LIPASE 187 (H) 01/04/2024 1836    LIPASE 99 (H) 09/07/2023 0615    LIPASE 232 (H) 09/05/2023 2205    LIPASE 64 (H) 08/25/2023 0425    LIPASE 75 (H) 08/24/2023 0324    LIPASE 87 (H) 08/23/2023 0252    LIPASE 115 (H) 08/22/2023 1301    LIPASE 73 (H) 06/08/2023 0652    LIPASE 151 (H) 06/07/2023 2246    LIPASE 192 (H) 03/25/2023 1700    LIPASE 38 12/05/2022 0526    LIPASE 34 12/04/2022 0542    LIPASE 29 12/03/2022 0515    LIPASE 50 12/02/2022 0719    LIPASE 29 12/01/2022 0519    LIPASE 65 (H) 11/30/2022 1412    LIPASE 157 (H) 11/28/2022 1751    LIPASE 335 (H) 10/20/2022 1939    LIPASE 378 (H) 08/06/2022 2158    LIPASE 95 (H) 01/24/2018 0545    LIPASE 93 (H) 10/01/2017 0356    LIPASE 122 (H) 09/30/2017 0619    LIPASE 86 (H) 09/29/2017 1731    LIPASE 133 (H) 09/28/2017 0032       Radiology:  CT Abdomen Pelvis With Contrast   Final Result       1.  Decreased size of a 3.3 cm probable pseudocyst along the left   hepatic surface. A distal pancreatic collection measuring 5.7 cm has   increased in size, and a collection along the inferior aspect of the   spleen has also increased in size, likely reflecting additional   pseudocysts. The perisplenic collection is likely contiguous with a   large multiloculated collection occupying the spleen, which is newly   enlarged, concerning for pseudocyst and/or hematoma. The splenic   collection is more similar in appearance to the previously noted   hematoma/collection from January, which had improved on follow-up  CT   from 4/1/2024. This is concerning for possible interval hemorrhage.   2.  Asymmetric delayed left renal nephrograms. Correlate with urinalysis   to evaluate for possible pyelonephritis.   3.  Additional findings, as above.           This report was finalized on 4/25/2024 7:45 PM by Dr. Aleisha Sandoval M.D   on Workstation: [id number]                Assessment:   Chronic pancreatitis   Hx of pancreatic pseudocyst s/p decompression with cyst gastrostomy 09/2023  Hx of ETOH abuse- now abstinent for a couple weeks    Plan:   Increase amitriptyline to 75mg to see if that helps. She understands this can take several days, weeks to notice improvement in symptoms. Discussed possible side effects associated with this.   It seems surgery team has reached out to Dr. Denson to help set up a repeat cyst gastrostomy with him. Appreciate their assistance.   Meanwhile, continue supportive care. Patient should avoid smoking going forward as well. Continue to avoid ETOH.   Low fat diet as tolerated     I discussed the patients findings and my recommendations with patient.         Tyler Perez PA-C  [facility]  [address] [place] [address] [address] [place] [address]  Office: [phone]

## 2024-05-02 LAB
ALBUMIN SERPL-MCNC: 3.6 G/DL (ref 3.5–5.2)
ALBUMIN/GLOB SERPL: 1 G/DL
ALP SERPL-CCNC: 133 U/L (ref 39–117)
ALT SERPL W P-5'-P-CCNC: 17 U/L (ref 1–33)
ANION GAP SERPL CALCULATED.3IONS-SCNC: 9 MMOL/L (ref 5–15)
ANISOCYTOSIS BLD QL: ABNORMAL
AST SERPL-CCNC: 10 U/L (ref 1–32)
BILIRUB SERPL-MCNC: <0.2 MG/DL (ref 0–1.2)
BUN SERPL-MCNC: 10 MG/DL (ref 6–20)
BUN/CREAT SERPL: 15.6 (ref 7–25)
CALCIUM SPEC-SCNC: 9.4 MG/DL (ref 8.6–10.5)
CHLORIDE SERPL-SCNC: 99 MMOL/L (ref 98–107)
CO2 SERPL-SCNC: 29 MMOL/L (ref 22–29)
CREAT SERPL-MCNC: 0.64 MG/DL (ref 0.57–1)
DEPRECATED RDW RBC AUTO: 47.7 FL (ref 37–54)
EGFRCR SERPLBLD CKD-EPI 2021: 114 ML/MIN/1.73
EOSINOPHIL # BLD MANUAL: 0.17 10*3/MM3 (ref 0–0.4)
EOSINOPHIL NFR BLD MANUAL: 2 % (ref 0.3–6.2)
ERYTHROCYTE [DISTWIDTH] IN BLOOD BY AUTOMATED COUNT: 13.7 % (ref 12.3–15.4)
GLOBULIN UR ELPH-MCNC: 3.5 GM/DL
GLUCOSE SERPL-MCNC: 99 MG/DL (ref 65–99)
HCT VFR BLD AUTO: 29.2 % (ref 34–46.6)
HGB BLD-MCNC: 9.4 G/DL (ref 12–15.9)
HYPOCHROMIA BLD QL: ABNORMAL
LIPASE SERPL-CCNC: 91 U/L (ref 13–60)
LYMPHOCYTES # BLD MANUAL: 2.41 10*3/MM3 (ref 0.7–3.1)
LYMPHOCYTES NFR BLD MANUAL: 11.1 % (ref 5–12)
MAGNESIUM SERPL-MCNC: 1.6 MG/DL (ref 1.6–2.6)
MCH RBC QN AUTO: 30.6 PG (ref 26.6–33)
MCHC RBC AUTO-ENTMCNC: 32.2 G/DL (ref 31.5–35.7)
MCV RBC AUTO: 95.1 FL (ref 79–97)
MONOCYTES # BLD: 0.94 10*3/MM3 (ref 0.1–0.9)
MYELOCYTES NFR BLD MANUAL: 1 % (ref 0–0)
NEUTROPHILS # BLD AUTO: 4.9 10*3/MM3 (ref 1.7–7)
NEUTROPHILS NFR BLD MANUAL: 57.6 % (ref 42.7–76)
NRBC BLD AUTO-RTO: 0 /100 WBC (ref 0–0.2)
PHOSPHATE SERPL-MCNC: 4.3 MG/DL (ref 2.5–4.5)
PLAT MORPH BLD: NORMAL
PLATELET # BLD AUTO: 462 10*3/MM3 (ref 140–450)
PMV BLD AUTO: 9.5 FL (ref 6–12)
POIKILOCYTOSIS BLD QL SMEAR: ABNORMAL
POLYCHROMASIA BLD QL SMEAR: ABNORMAL
POTASSIUM SERPL-SCNC: 4.3 MMOL/L (ref 3.5–5.2)
PROT SERPL-MCNC: 7.1 G/DL (ref 6–8.5)
RBC # BLD AUTO: 3.07 10*6/MM3 (ref 3.77–5.28)
SODIUM SERPL-SCNC: 137 MMOL/L (ref 136–145)
VARIANT LYMPHS NFR BLD MANUAL: 28.3 % (ref 19.6–45.3)
WBC MORPH BLD: NORMAL
WBC NRBC COR # BLD AUTO: 8.5 10*3/MM3 (ref 3.4–10.8)

## 2024-05-02 PROCEDURE — 85007 BL SMEAR W/DIFF WBC COUNT: CPT | Performed by: HOSPITALIST

## 2024-05-02 PROCEDURE — 99232 SBSQ HOSP IP/OBS MODERATE 35: CPT | Performed by: NURSE PRACTITIONER

## 2024-05-02 PROCEDURE — 99233 SBSQ HOSP IP/OBS HIGH 50: CPT | Performed by: STUDENT IN AN ORGANIZED HEALTH CARE EDUCATION/TRAINING PROGRAM

## 2024-05-02 PROCEDURE — 84100 ASSAY OF PHOSPHORUS: CPT | Performed by: HOSPITALIST

## 2024-05-02 PROCEDURE — 83735 ASSAY OF MAGNESIUM: CPT | Performed by: HOSPITALIST

## 2024-05-02 PROCEDURE — 80053 COMPREHEN METABOLIC PANEL: CPT | Performed by: HOSPITALIST

## 2024-05-02 PROCEDURE — 83690 ASSAY OF LIPASE: CPT | Performed by: HOSPITALIST

## 2024-05-02 PROCEDURE — 85025 COMPLETE CBC W/AUTO DIFF WBC: CPT | Performed by: HOSPITALIST

## 2024-05-02 PROCEDURE — 25010000002 HYDROMORPHONE 1 MG/ML SOLUTION: Performed by: STUDENT IN AN ORGANIZED HEALTH CARE EDUCATION/TRAINING PROGRAM

## 2024-05-02 PROCEDURE — 25010000002 ONDANSETRON PER 1 MG: Performed by: NURSE PRACTITIONER

## 2024-05-02 RX ORDER — SCOLOPAMINE TRANSDERMAL SYSTEM 1 MG/1
1 PATCH, EXTENDED RELEASE TRANSDERMAL
Status: DISCONTINUED | OUTPATIENT
Start: 2024-05-02 | End: 2024-05-02

## 2024-05-02 RX ADMIN — HYDROMORPHONE HYDROCHLORIDE 1 MG: 1 INJECTION, SOLUTION INTRAMUSCULAR; INTRAVENOUS; SUBCUTANEOUS at 00:21

## 2024-05-02 RX ADMIN — OXYCODONE AND ACETAMINOPHEN 2 TABLET: 7.5; 325 TABLET ORAL at 23:44

## 2024-05-02 RX ADMIN — HYDROMORPHONE HYDROCHLORIDE 1 MG: 1 INJECTION, SOLUTION INTRAMUSCULAR; INTRAVENOUS; SUBCUTANEOUS at 13:07

## 2024-05-02 RX ADMIN — GABAPENTIN 600 MG: 300 CAPSULE ORAL at 16:58

## 2024-05-02 RX ADMIN — PANTOPRAZOLE SODIUM 40 MG: 40 TABLET, DELAYED RELEASE ORAL at 07:55

## 2024-05-02 RX ADMIN — DOCUSATE SODIUM 50MG AND SENNOSIDES 8.6MG 1 TABLET: 8.6; 5 TABLET, FILM COATED ORAL at 09:00

## 2024-05-02 RX ADMIN — DOCUSATE SODIUM 50MG AND SENNOSIDES 8.6MG 1 TABLET: 8.6; 5 TABLET, FILM COATED ORAL at 21:03

## 2024-05-02 RX ADMIN — OXYCODONE AND ACETAMINOPHEN 2 TABLET: 7.5; 325 TABLET ORAL at 18:58

## 2024-05-02 RX ADMIN — AMITRIPTYLINE HYDROCHLORIDE 75 MG: 50 TABLET, FILM COATED ORAL at 21:03

## 2024-05-02 RX ADMIN — HYDROMORPHONE HYDROCHLORIDE 1 MG: 1 INJECTION, SOLUTION INTRAMUSCULAR; INTRAVENOUS; SUBCUTANEOUS at 17:32

## 2024-05-02 RX ADMIN — GABAPENTIN 600 MG: 300 CAPSULE ORAL at 08:48

## 2024-05-02 RX ADMIN — ONDANSETRON 4 MG: 2 INJECTION INTRAMUSCULAR; INTRAVENOUS at 17:32

## 2024-05-02 RX ADMIN — Medication 100 MG: at 08:48

## 2024-05-02 RX ADMIN — OXYCODONE AND ACETAMINOPHEN 2 TABLET: 7.5; 325 TABLET ORAL at 14:56

## 2024-05-02 RX ADMIN — ONDANSETRON 4 MG: 2 INJECTION INTRAMUSCULAR; INTRAVENOUS at 08:51

## 2024-05-02 RX ADMIN — MAGNESIUM OXIDE 400 MG (241.3 MG MAGNESIUM) TABLET 400 MG: TABLET at 08:48

## 2024-05-02 RX ADMIN — GABAPENTIN 600 MG: 300 CAPSULE ORAL at 21:02

## 2024-05-02 RX ADMIN — HYDROMORPHONE HYDROCHLORIDE 1 MG: 1 INJECTION, SOLUTION INTRAMUSCULAR; INTRAVENOUS; SUBCUTANEOUS at 08:48

## 2024-05-02 RX ADMIN — FOLIC ACID 1 MG: 1 TABLET ORAL at 08:48

## 2024-05-02 RX ADMIN — AMITRIPTYLINE HYDROCHLORIDE 75 MG: 50 TABLET, FILM COATED ORAL at 02:25

## 2024-05-02 RX ADMIN — ONDANSETRON 4 MG: 2 INJECTION INTRAMUSCULAR; INTRAVENOUS at 00:21

## 2024-05-02 RX ADMIN — HYDROMORPHONE HYDROCHLORIDE 1 MG: 1 INJECTION, SOLUTION INTRAMUSCULAR; INTRAVENOUS; SUBCUTANEOUS at 21:37

## 2024-05-02 RX ADMIN — HYDROMORPHONE HYDROCHLORIDE 1 MG: 1 INJECTION, SOLUTION INTRAMUSCULAR; INTRAVENOUS; SUBCUTANEOUS at 04:29

## 2024-05-02 RX ADMIN — POLYETHYLENE GLYCOL 3350 17 G: 17 POWDER, FOR SOLUTION ORAL at 09:00

## 2024-05-02 RX ADMIN — BISACODYL 10 MG: 10 SUPPOSITORY RECTAL at 21:37

## 2024-05-02 RX ADMIN — OXYCODONE AND ACETAMINOPHEN 2 TABLET: 7.5; 325 TABLET ORAL at 02:19

## 2024-05-02 RX ADMIN — FERROUS SULFATE TAB 325 MG (65 MG ELEMENTAL FE) 325 MG: 325 (65 FE) TAB at 08:48

## 2024-05-02 RX ADMIN — OXYCODONE AND ACETAMINOPHEN 2 TABLET: 7.5; 325 TABLET ORAL at 10:25

## 2024-05-02 NOTE — PROGRESS NOTES
Name: Piper Cain ADMIT: 2024   : 1980  PCP: Sharmaine Gatica APRN    MRN: [id number] LOS: 5 days   AGE/SEX: 43 y.o. female  ROOM: [facility]     Subjective   Subjective   Feels the same today. Still c/o LUQ pain radiating to left shoulder associated with nausea. No V. Tolerating diet. No D. No F/C/NS. No SOA or CP. Voiding fine.        Objective   Objective   Vital Signs  Temp:  [97.2 °F (36.2 °C)-98.9 °F (37.2 °C)] 98.9 °F (37.2 °C)  Heart Rate:  [] 88  Resp:  [16-18] 18  BP: ()/(68-86) 96/68  SpO2:  [95 %] 95 %  on   ;   Device (Oxygen Therapy): room air  Body mass index is 18.9 kg/m².    (No change in exam today)    Physical Exam  Vitals and nursing note reviewed. Exam conducted with a chaperone present (Nursing student).   Constitutional:       General: She is not in acute distress.     Appearance: She is ill-appearing (chronically). She is not toxic-appearing or diaphoretic.   HENT:      Head: Normocephalic.      Mouth/Throat:      Mouth: Mucous membranes are moist.      Pharynx: Oropharynx is clear.   Eyes:      General: No scleral icterus.        Right eye: No discharge.         Left eye: No discharge.      Extraocular Movements: Extraocular movements intact.      Conjunctiva/sclera: Conjunctivae normal.   Cardiovascular:      Rate and Rhythm: Normal rate and regular rhythm.      Pulses: Normal pulses.   Pulmonary:      Effort: Pulmonary effort is normal. No respiratory distress.      Breath sounds: Normal breath sounds. No wheezing or rales.   Abdominal:      General: Bowel sounds are normal. There is no distension.      Palpations: Abdomen is soft.      Tenderness: There is abdominal tenderness (BRANDY and LUQ). There is no guarding or rebound.   Musculoskeletal:         General: No swelling or deformity. Normal range of motion.      Cervical back: Neck supple.   Skin:     General: Skin is warm and dry.      Capillary Refill: Capillary refill takes less than 2 seconds.       "Coloration: Skin is not jaundiced.   Neurological:      General: No focal deficit present.      Mental Status: She is alert and oriented to person, place, and time. Mental status is at baseline.      Cranial Nerves: No cranial nerve deficit.      Coordination: Coordination normal.   Psychiatric:         Mood and Affect: Mood normal.         Behavior: Behavior normal.         Thought Content: Thought content normal.       Results Review     I reviewed the patient's new clinical results.  Results from last 7 days   Lab Units 05/02/24 0459 05/01/24 0542 04/30/24 0304 04/29/24  0459   WBC 10*3/mm3 8.50 8.42 7.22 12.17*   HEMOGLOBIN g/dL 9.4* 11.2* 9.9* 10.2*   PLATELETS 10*3/mm3 462* 515* 482* 475*     Results from last 7 days   Lab Units 05/02/24 0459 05/01/24 0542 04/30/24 0304 04/29/24  0459   SODIUM mmol/L 137 138 136 137   POTASSIUM mmol/L 4.3 4.5 4.3 4.2   CHLORIDE mmol/L 99 99 98 98   CO2 mmol/L 29.0 28.0 28.0 26.3   BUN mg/dL 10 8 13 8   CREATININE mg/dL 0.64 0.60 0.68 0.60   GLUCOSE mg/dL 99 104* 111* 98   EGFR mL/min/1.73 114.0 115.8 112.4 115.8     Results from last 7 days   Lab Units 05/02/24 0459 04/30/24 0304 04/27/24  0647 04/26/24  0706   ALBUMIN g/dL 3.6 3.5 3.2* 3.4*   BILIRUBIN mg/dL <0.2 <0.2 <0.2 <0.2   ALK PHOS U/L 133* 109 102 107   AST (SGOT) U/L 10 6 11 10   ALT (SGPT) U/L 17 11 7 5     Results from last 7 days   Lab Units 05/02/24 0459 05/01/24 0542 04/30/24 0304 04/29/24 0459 04/27/24  0647 04/26/24  0713 04/26/24  0706   CALCIUM mg/dL 9.4 10.0 9.2 9.4 9.2  --  9.2   ALBUMIN g/dL 3.6  --  3.5  --  3.2*  --  3.4*   MAGNESIUM mg/dL 1.6  --   --  1.8 1.7 1.6  --    PHOSPHORUS mg/dL 4.3  --   --  4.8*  --  4.2  --      Results from last 7 days   Lab Units 04/26/24  0713   PROCALCITONIN ng/mL 0.08     No results found for: \"HGBA1C\", \"POCGLU\"    No radiology results for the last day    I have personally reviewed all medications:  Scheduled Medications  amitriptyline, 75 mg, Oral, " Nightly  ferrous sulfate, 325 mg, Oral, Daily With Breakfast  folic acid, 1 mg, Oral, Daily  gabapentin, 600 mg, Oral, TID  Lidocaine, 1 patch, Transdermal, Q24H  magnesium oxide, 400 mg, Oral, Daily  pancrelipase (Lip-Prot-Amyl), 30,000 units of lipase, Oral, TID With Meals  pantoprazole, 40 mg, Oral, Q AM  polyethylene glycol, 17 g, Oral, BID  senna-docusate sodium, 1 tablet, Oral, BID  thiamine, 100 mg, Oral, Daily    Infusions   Diet  Diet: Regular/House, Gastrointestinal; Fat-Restricted; Fluid Consistency: Thin (IDDSI 0)    I have personally reviewed:  [x]  Laboratory   []  Microbiology   []  Radiology   []  EKG/Telemetry  []  Cardiology/Vascular   []  Pathology    [x]  Records       Assessment/Plan     Active Hospital Problems    Diagnosis  POA    **Hematoma of spleen without rupture of capsule [D73.5]  Yes    Chronic, continuous use of opioids [F11.90]  Yes    Generalized anxiety disorder [F41.1]  Yes    Tobacco abuse [Z72.0]  Yes    Abdominal pain [R10.9]  Yes    Pancreatic pseudocyst [K86.3]  Yes    Anemia of chronic disease [D63.8]  Yes    Chronic pancreatitis [K86.1]  Yes    Abnormal CT of the abdomen [R93.5]  Yes    Alcohol abuse [F10.10]  Yes      Resolved Hospital Problems    Diagnosis Date Resolved POA    Hyperglycemia [R73.9] 08/25/2023 Yes     44yo woman who presented to ER at an outlying facility for left upper quadrant pain with some radiation into the left shoulder. She has a history of splenic hematoma/laceration requiring embolization of the splenic artery in January of this year.  CT A/P with evidence of increased size of her fluid collections and radiographic concern for possible interval splenic hemorrhage.      Hematoma of spleen  -General Surgery is following and treating conservatively at this time  -Hgb has been stable  -Not sure what else to do for her ongoing pain and nausea/vomiting.   Gen Surg recommends she f/u with Dr. Denson at [facility] for definitive mgmt of her large cyst  -Asked GI  to see her to see if they have any ideas--they increased dose of Elavil to 75mg  -She already has outpt appt with her pain mgmt provider on 5/10--could discuss block at that time.     Chronic pancreatitis  Pancreatic pseudocyst  -Followed by Gastroenterology (Jani[facility]) as an outpatient  -Changed diet to low fat  -Continue Creon    Acute on chronic pain  Opioid dependence  -On IV Dilaudid, Percocet, Gabapentin, and Lidoderm patches (pt declining Lidoderm patches).   -Increase in Percocet dose to 15mg wasn't particularly helpful  -Continue nightly Elavil at increased dose  -Increased dose of Gabapentin yesterday (new medication this admission)    Alcohol dependence in remission  -Currently sober just over a month.  -Continue thiamine and folate     Leukocytosis/thrombocytosis  -Leukocytosis improved spontaneously  -Thrombocytosis is mild and fairly stable     Anemia of chronic disease  -Hgb acceptable/stable     Tobacco abuse  -Nicotine replacement therapy patch offered--she has declined     Generalized anxiety disorder  -not on meds for this       SCDs for DVT prophylaxis.  Full code.  Discussed with patient and Dr. Almeida.  Anticipate discharge home tomorrow.  Expected Discharge Date: 5/3/2024; Expected Discharge Time: TBD      Salavdor Kapoor MD  [facility]  05/02/24  09:21 EDT

## 2024-05-02 NOTE — PLAN OF CARE
Problem: Adult Inpatient Plan of Care  Goal: Plan of Care Review  Outcome: Ongoing, Not Progressing  Flowsheets (Taken 5/2/2024 0806)  Progress: no change  Plan of Care Reviewed With: patient  Outcome Evaluation: no change, still complains of constatnt, LUQ pain, Lt shoulder and neck pain, rrequiring IV Dilaudid and Percocet alternately, up ad lub, took her Miralax and Percolace, no BM so far, VSS, awake all night, started to sleep at around 0500   Goal Outcome Evaluation:  Plan of Care Reviewed With: patient        Progress: no change  Outcome Evaluation: no change, still complains of constatnt, LUQ pain, Lt shoulder and neck pain, rrequiring IV Dilaudid and Percocet alternately, up ad lub, took her Miralax and Percolace, no BM so far, VSS, awake all night, started to sleep at around 0500

## 2024-05-02 NOTE — PROGRESS NOTES
"General Surgery Progress Note    CC: follow up pancreatitis, worsening pancreatic pseudocyst vs perisplenic hematoma    S: No changes in symptoms except shoulder pain better controlled with salonpas patches. Tolerating diet. No NV. Denies fevers. Having bowel function.    O:BP 96/68 (BP Location: Right arm, Patient Position: Lying)   Pulse 88   Temp 98.9 °F (37.2 °C) (Oral)   Resp 18   Ht 175.3 cm (69\")   Wt 58.1 kg (128 lb)   LMP 01/17/2018 (Exact Date)   SpO2 95%   BMI 18.90 kg/m²     Intake & Output (last day)         05/01 0701 05/02 0700 05/02 0701 05/03 0700    P.O. 600     Total Intake(mL/kg) 600 (10.3)     Urine (mL/kg/hr) 2920 (2.1)     Total Output 2920     Net -2320                   GENERAL: awake, alert, comfortable appearing eating scrambled eggs in bed, interactive, cooperative   HEENT: EOMI, clear sclera, moist mucus membranes   CHEST: normal work of breathing on room air  CARDIAC: no peripheral edema  GI: Abd soft, nondistended, mildly tender in the LUQ without rebound or guarding  EXTREMITIES: CHAMORRO, no cyanosis or edema    SKIN: Warm and dry, no rash    LABS  Results from last 7 days   Lab Units 05/02/24 0459 05/01/24 0542 04/30/24  0304   WBC 10*3/mm3 8.50 8.42 7.22   HEMOGLOBIN g/dL 9.4* 11.2* 9.9*   HEMATOCRIT % 29.2* 35.7 31.5*   PLATELETS 10*3/mm3 462* 515* 482*   MONOCYTES % % 11.1  --   --    EOSINOPHIL % % 2.0  --   --      Results from last 7 days   Lab Units 05/02/24  0459 05/01/24  0542 04/30/24  0304 04/29/24  0459 04/27/24  0647   SODIUM mmol/L 137 138 136   < > 140   POTASSIUM mmol/L 4.3 4.5 4.3   < > 3.7   CHLORIDE mmol/L 99 99 98   < > 101   CO2 mmol/L 29.0 28.0 28.0   < > 27.2   BUN mg/dL 10 8 13   < > 3*   CREATININE mg/dL 0.64 0.60 0.68   < > 0.53*   CALCIUM mg/dL 9.4 10.0 9.2   < > 9.2   BILIRUBIN mg/dL <0.2  --  <0.2  --  <0.2   ALK PHOS U/L 133*  --  109  --  102   ALT (SGPT) U/L 17  --  11  --  7   AST (SGOT) U/L 10  --  6  --  11   GLUCOSE mg/dL 99 104* 111*   < > " 91    < > = values in this interval not displayed.           A/P: 43 y.o. female with chronic alcoholic pancreatitis with pancreatic pseudocysts and previous episode of splenic hemorrhage and subcapsular hematoma, has undergone splenic artery embolization in January 2024, seen again early April 2024 for left upper quadrant pain, noted to have decreased size of pancreatic fluid collections on imaging at that time and was treated with supportive management for pain and gastritis.  Returned for 5 days of worsening left upper quadrant pain, now with CT evidence of increased size of her fluid collections and radiographic concern for possible interval splenic hemorrhage.  She has remained clinically stable without evidence of bleeding. No plans for splenectomy given her course.  Appreciate GI recs regarding med management.  Discussed strong recommendation for alcohol abstinence.  Follow up with Dr. Denson to evaluate for possible drainage of pseudocysts. Follow up with established pain specialist as scheduled for ongoing pancreatitis pain management, including consideration of regional blocks.  I will sign off, please contact me for questions or concerns.    Mai Almeida MD  General, Robotic and Endoscopic Surgery  [facility]    [address], [address]  [place], [place], [address]  P: [phone]  F: [phone] \

## 2024-05-02 NOTE — PLAN OF CARE
Goal Outcome Evaluation:  Plan of Care Reviewed With: patient        Progress: improving  Outcome Evaluation: VSS, on room air, up ad valeria, percocet and dilaudid rotated for pain, zofran for nausea w/ relief, ambulated in halls, SL, pt appears more comfortable today. Possible d/c tomorrow.

## 2024-05-02 NOTE — CASE MANAGEMENT/SOCIAL WORK
Continued Stay Note  [facility]     Patient Name: Piper Cain  MRN: [id number]  Today's Date: 5/2/2024    Admit Date: 4/25/2024    Plan: Home with family to transport   Discharge Plan       Row Name 05/02/24 0610       Plan    Plan Home with family to transport    Plan Comments Spoke with patient at bedside., Introduced self. Patient plans to return home with family to transport.                   Discharge Codes    No documentation.                 Expected Discharge Date and Time       Expected Discharge Date Expected Discharge Time    May 3, 2024               Evelyn Samayoa RN

## 2024-05-02 NOTE — PROGRESS NOTES
Gastroenterology   Inpatient Progress Note    Reason for Follow Up: Pancreatitis    Subjective  Interval History:   Nausea controlled with zofran  On low fat diet, still having abdominal pain that radiates to shoulder    Current Facility-Administered Medications:     acetaminophen (TYLENOL) tablet 650 mg, 650 mg, Oral, Q6H PRN, Cristina Escobar APRN, 650 mg at 04/26/24 1647    aluminum-magnesium hydroxide-simethicone (MAALOX MAX) 400-400-40 MG/5ML suspension 15 mL, 15 mL, Oral, Q6H PRN, Kimberly Armstrong APRN, 15 mL at 04/28/24 2008    amitriptyline (ELAVIL) tablet 75 mg, 75 mg, Oral, Nightly, Tyler Perez PA-C, 75 mg at 05/02/24 0225    bisacodyl (DULCOLAX) suppository 10 mg, 10 mg, Rectal, Daily PRN, Michelle Andujar APRN, 10 mg at 04/28/24 0928    ferrous sulfate tablet 325 mg, 325 mg, Oral, Daily With Breakfast, Cristina Escobar APRN, 325 mg at 05/02/24 0848    folic acid (FOLVITE) tablet 1 mg, 1 mg, Oral, Daily, Michelle Andujar APRN, 1 mg at 05/02/24 0848    gabapentin (NEURONTIN) capsule 600 mg, 600 mg, Oral, TID, Salvador Kapoor MD, 600 mg at 05/02/24 0848    HYDROmorphone (DILAUDID) injection 1 mg, 1 mg, Intravenous, Q4H PRN, Mike Donnelly MD, 1 mg at 05/02/24 1307    Lidocaine 4 % 1 patch, 1 patch, Transdermal, Q24H, Michelle Andujar APRN, 1 patch at 04/29/24 2019    magnesium oxide (MAG-OX) tablet 400 mg, 400 mg, Oral, Daily, Cristina Escobar APRN, 400 mg at 05/02/24 0848    Magnesium Standard Dose Replacement - Follow Nurse / BPA Driven Protocol, , Does not apply, PRN, Xiao Conroy APRN    melatonin tablet 5 mg, 5 mg, Oral, Nightly PRN, Kimberly Armstrong APRN    ondansetron ODT (ZOFRAN-ODT) disintegrating tablet 4 mg, 4 mg, Oral, Q6H PRN **OR** ondansetron (ZOFRAN) injection 4 mg, 4 mg, Intravenous, Q6H PRN, Kimberly Armstrong APRN, 4 mg at 05/02/24 0851    oxyCODONE-acetaminophen (PERCOCET) 7.5-325 MG per tablet 2 tablet, 2 tablet, Oral, Q4H PRN, Salvador Kapoor MD, 2  tablet at 05/02/24 1456    pancrelipase (Lip-Prot-Amyl) (CREON) capsule 30,000 units of lipase, 30,000 units of lipase, Oral, TID With Meals, Rafat Beltran MD, 30,000 units of lipase at 04/30/24 1218    pantoprazole (PROTONIX) EC tablet 40 mg, 40 mg, Oral, Q AM, Mike Donnelly MD, 40 mg at 05/02/24 0755    polyethylene glycol (MIRALAX) packet 17 g, 17 g, Oral, BID, Mai Almeida MD, 17 g at 05/02/24 0900    sennosides-docusate (PERICOLACE) 8.6-50 MG per tablet 1 tablet, 1 tablet, Oral, BID, Mai Almeida MD, 1 tablet at 05/02/24 0900    sodium chloride 0.9 % flush 10 mL, 10 mL, Intravenous, PRN, Chidi Almazan MD    sodium chloride 0.9 % flush 10 mL, 10 mL, Intravenous, PRN, Kimberly Armstrong APRN, 10 mL at 04/30/24 0111    thiamine (VITAMIN B-1) tablet 100 mg, 100 mg, Oral, Daily, Michelle Andujar APRN, 100 mg at 05/02/24 0848  Review of Systems:                GI positive for abdominal pain, nausea    Objective     Vital Signs  Temp:  [97.2 °F (36.2 °C)-98.9 °F (37.2 °C)] 98.6 °F (37 °C)  Heart Rate:  [] 101  Resp:  [16-18] 18  BP: ()/(68-86) 109/73  Body mass index is 18.9 kg/m².                  Physical Exam:              General: patient awake, alert and cooperative              Eyes: no scleral icterus              Skin: warm and dry, not jaundiced              Abdomen: Soft but lower abdominal distention noted,, normal bowel sounds, tender with palpation              Psychiatric: Appropriate affect and behavior                Results Review:                I reviewed the patient's new clinical results.    Results from last 7 days   Lab Units 05/02/24  0459 05/01/24  0542 04/30/24  0304   WBC 10*3/mm3 8.50 8.42 7.22   HEMOGLOBIN g/dL 9.4* 11.2* 9.9*   HEMATOCRIT % 29.2* 35.7 31.5*   PLATELETS 10*3/mm3 462* 515* 482*     Results from last 7 days   Lab Units 05/02/24  0459 05/01/24  0542 04/30/24  0304 04/29/24  0459 04/27/24  0647   SODIUM mmol/L 137 138 136   < > 140    POTASSIUM mmol/L 4.3 4.5 4.3   < > 3.7   CHLORIDE mmol/L 99 99 98   < > 101   CO2 mmol/L 29.0 28.0 28.0   < > 27.2   BUN mg/dL 10 8 13   < > 3*   CREATININE mg/dL 0.64 0.60 0.68   < > 0.53*   CALCIUM mg/dL 9.4 10.0 9.2   < > 9.2   BILIRUBIN mg/dL <0.2  --  <0.2  --  <0.2   ALK PHOS U/L 133*  --  109  --  102   ALT (SGPT) U/L 17  --  11  --  7   AST (SGOT) U/L 10  --  6  --  11   GLUCOSE mg/dL 99 104* 111*   < > 91    < > = values in this interval not displayed.         Lab Results   Lab Value Date/Time    LIPASE 91 (H) 05/02/2024 0459    LIPASE 144 (H) 04/27/2024 0647    LIPASE 139 (H) 04/25/2024 1537       Radiology:  CT Abdomen Pelvis With Contrast   Final Result       1.  Decreased size of a 3.3 cm probable pseudocyst along the left   hepatic surface. A distal pancreatic collection measuring 5.7 cm has   increased in size, and a collection along the inferior aspect of the   spleen has also increased in size, likely reflecting additional   pseudocysts. The perisplenic collection is likely contiguous with a   large multiloculated collection occupying the spleen, which is newly   enlarged, concerning for pseudocyst and/or hematoma. The splenic   collection is more similar in appearance to the previously noted   hematoma/collection from January, which had improved on follow-up CT   from 4/1/2024. This is concerning for possible interval hemorrhage.   2.  Asymmetric delayed left renal nephrograms. Correlate with urinalysis   to evaluate for possible pyelonephritis.   3.  Additional findings, as above.           This report was finalized on 4/25/2024 7:45 PM by Dr. Aleisha Sandoval M.D   on Workstation: [id number]              Assessment & Plan     Active Hospital Problems    Diagnosis     **Hematoma of spleen without rupture of capsule     Chronic, continuous use of opioids     Generalized anxiety disorder     Tobacco abuse     Abdominal pain     Pancreatic pseudocyst     Anemia of chronic disease     Chronic  pancreatitis     Abnormal CT of the abdomen     Alcohol abuse        Assessment:  Chronic pancreatitis with evidence of increased size of fluid collection and radiographic concern for possible interval splenic hemorrhage  Nausea, vomiting and abdominal pain secondary to #1  Hx of pancreatic pseudocyst s/p decompression with cyst gastrostomy 09/2023  History of splenic hematoma/laceration requiring embolization of splenic artery January 2024  Hx of ETOH abuse- now abstinent for 1 month   Tobacco use  Chronic pancreatitis established with outpatient pain management      Plan:  Strict low-fat diet  Continue Creon  Continue Zofran for nausea  Continue amitriptyline, this was increased to 75 mg yesterday  Continue outpatient follow-up with pain management, could consider regional blocks for improved pain control  Follow-up with Dr. Janiya Sorensen enterologist for possible drainage of pseudocyst, Dr. Almeida discussed with Dr. Denson and outpatient follow-up for management of pseudocysts is being arranged  Surgery has been consulted and has followed patient during this admission, no plans for splenectomy, surgery signed off today  Give medications as needed for constipation    I discussed the patients findings and my recommendations with patient and nursing staff.           Adrianna HORTON  [facility], [facility] [place]  [address] [place] [address]

## 2024-05-03 ENCOUNTER — READMISSION MANAGEMENT (OUTPATIENT)
Dept: CALL CENTER | Facility: HOSPITAL | Age: 44
End: 2024-05-03
Payer: MEDICAID

## 2024-05-03 VITALS
OXYGEN SATURATION: 98 % | BODY MASS INDEX: 18.96 KG/M2 | SYSTOLIC BLOOD PRESSURE: 107 MMHG | RESPIRATION RATE: 18 BRPM | TEMPERATURE: 97.3 F | HEIGHT: 69 IN | WEIGHT: 128 LBS | HEART RATE: 103 BPM | DIASTOLIC BLOOD PRESSURE: 80 MMHG

## 2024-05-03 LAB
ALBUMIN SERPL-MCNC: 3.8 G/DL (ref 3.5–5.2)
ALBUMIN/GLOB SERPL: 1.1 G/DL
ALP SERPL-CCNC: 149 U/L (ref 39–117)
ALT SERPL W P-5'-P-CCNC: 20 U/L (ref 1–33)
ANION GAP SERPL CALCULATED.3IONS-SCNC: 10 MMOL/L (ref 5–15)
AST SERPL-CCNC: 12 U/L (ref 1–32)
BILIRUB SERPL-MCNC: <0.2 MG/DL (ref 0–1.2)
BUN SERPL-MCNC: 13 MG/DL (ref 6–20)
BUN/CREAT SERPL: 22.4 (ref 7–25)
CALCIUM SPEC-SCNC: 9.7 MG/DL (ref 8.6–10.5)
CHLORIDE SERPL-SCNC: 100 MMOL/L (ref 98–107)
CO2 SERPL-SCNC: 31 MMOL/L (ref 22–29)
CREAT SERPL-MCNC: 0.58 MG/DL (ref 0.57–1)
DEPRECATED RDW RBC AUTO: 45.9 FL (ref 37–54)
EGFRCR SERPLBLD CKD-EPI 2021: 116.8 ML/MIN/1.73
ERYTHROCYTE [DISTWIDTH] IN BLOOD BY AUTOMATED COUNT: 13.4 % (ref 12.3–15.4)
GLOBULIN UR ELPH-MCNC: 3.6 GM/DL
GLUCOSE SERPL-MCNC: 94 MG/DL (ref 65–99)
HCT VFR BLD AUTO: 30.9 % (ref 34–46.6)
HGB BLD-MCNC: 9.8 G/DL (ref 12–15.9)
LIPASE SERPL-CCNC: 65 U/L (ref 13–60)
MAGNESIUM SERPL-MCNC: 1.7 MG/DL (ref 1.6–2.6)
MCH RBC QN AUTO: 29.8 PG (ref 26.6–33)
MCHC RBC AUTO-ENTMCNC: 31.7 G/DL (ref 31.5–35.7)
MCV RBC AUTO: 93.9 FL (ref 79–97)
PLATELET # BLD AUTO: 488 10*3/MM3 (ref 140–450)
PMV BLD AUTO: 9 FL (ref 6–12)
POTASSIUM SERPL-SCNC: 4.3 MMOL/L (ref 3.5–5.2)
PROT SERPL-MCNC: 7.4 G/DL (ref 6–8.5)
RBC # BLD AUTO: 3.29 10*6/MM3 (ref 3.77–5.28)
SODIUM SERPL-SCNC: 141 MMOL/L (ref 136–145)
WBC NRBC COR # BLD AUTO: 8.73 10*3/MM3 (ref 3.4–10.8)

## 2024-05-03 PROCEDURE — 25010000002 ONDANSETRON PER 1 MG: Performed by: NURSE PRACTITIONER

## 2024-05-03 PROCEDURE — 83690 ASSAY OF LIPASE: CPT | Performed by: HOSPITALIST

## 2024-05-03 PROCEDURE — 25010000002 HYDROMORPHONE 1 MG/ML SOLUTION: Performed by: STUDENT IN AN ORGANIZED HEALTH CARE EDUCATION/TRAINING PROGRAM

## 2024-05-03 PROCEDURE — 80053 COMPREHEN METABOLIC PANEL: CPT | Performed by: HOSPITALIST

## 2024-05-03 PROCEDURE — 83735 ASSAY OF MAGNESIUM: CPT | Performed by: HOSPITALIST

## 2024-05-03 PROCEDURE — 85027 COMPLETE CBC AUTOMATED: CPT | Performed by: HOSPITALIST

## 2024-05-03 PROCEDURE — 99232 SBSQ HOSP IP/OBS MODERATE 35: CPT | Performed by: NURSE PRACTITIONER

## 2024-05-03 RX ORDER — POLYETHYLENE GLYCOL 3350 17 G/17G
17 POWDER, FOR SOLUTION ORAL DAILY
Qty: 952 G | Refills: 0 | Status: SHIPPED | OUTPATIENT
Start: 2024-05-03

## 2024-05-03 RX ORDER — AMITRIPTYLINE HYDROCHLORIDE 75 MG/1
75 TABLET ORAL NIGHTLY
Qty: 30 TABLET | Refills: 0 | Status: SHIPPED | OUTPATIENT
Start: 2024-05-03 | End: 2024-06-02

## 2024-05-03 RX ORDER — GABAPENTIN 300 MG/1
600 CAPSULE ORAL 3 TIMES DAILY
Qty: 180 CAPSULE | Refills: 0 | Status: SHIPPED | OUTPATIENT
Start: 2024-05-03 | End: 2024-06-02

## 2024-05-03 RX ORDER — AMOXICILLIN 250 MG
1 CAPSULE ORAL 2 TIMES DAILY
Qty: 20 TABLET | Refills: 0 | Status: SHIPPED | OUTPATIENT
Start: 2024-05-03

## 2024-05-03 RX ORDER — OXYCODONE AND ACETAMINOPHEN 7.5; 325 MG/1; MG/1
2 TABLET ORAL EVERY 4 HOURS PRN
Qty: 30 TABLET | Refills: 0 | Status: SHIPPED | OUTPATIENT
Start: 2024-05-03 | End: 2024-05-07

## 2024-05-03 RX ADMIN — HYDROMORPHONE HYDROCHLORIDE 1 MG: 1 INJECTION, SOLUTION INTRAMUSCULAR; INTRAVENOUS; SUBCUTANEOUS at 09:43

## 2024-05-03 RX ADMIN — HYDROMORPHONE HYDROCHLORIDE 1 MG: 1 INJECTION, SOLUTION INTRAMUSCULAR; INTRAVENOUS; SUBCUTANEOUS at 01:33

## 2024-05-03 RX ADMIN — OXYCODONE AND ACETAMINOPHEN 2 TABLET: 7.5; 325 TABLET ORAL at 12:34

## 2024-05-03 RX ADMIN — FERROUS SULFATE TAB 325 MG (65 MG ELEMENTAL FE) 325 MG: 325 (65 FE) TAB at 08:37

## 2024-05-03 RX ADMIN — PANTOPRAZOLE SODIUM 40 MG: 40 TABLET, DELAYED RELEASE ORAL at 05:34

## 2024-05-03 RX ADMIN — HYDROMORPHONE HYDROCHLORIDE 1 MG: 1 INJECTION, SOLUTION INTRAMUSCULAR; INTRAVENOUS; SUBCUTANEOUS at 05:34

## 2024-05-03 RX ADMIN — GABAPENTIN 600 MG: 300 CAPSULE ORAL at 08:37

## 2024-05-03 RX ADMIN — FOLIC ACID 1 MG: 1 TABLET ORAL at 08:38

## 2024-05-03 RX ADMIN — MAGNESIUM OXIDE 400 MG (241.3 MG MAGNESIUM) TABLET 400 MG: TABLET at 08:38

## 2024-05-03 RX ADMIN — OXYCODONE AND ACETAMINOPHEN 2 TABLET: 7.5; 325 TABLET ORAL at 08:39

## 2024-05-03 RX ADMIN — ONDANSETRON 4 MG: 2 INJECTION INTRAMUSCULAR; INTRAVENOUS at 01:33

## 2024-05-03 RX ADMIN — Medication 100 MG: at 08:37

## 2024-05-03 RX ADMIN — ONDANSETRON 4 MG: 2 INJECTION INTRAMUSCULAR; INTRAVENOUS at 09:43

## 2024-05-03 NOTE — PROGRESS NOTES
Gastroenterology   Inpatient Progress Note    Reason for Follow Up: Pancreatitis    Subjective     Interval History:   Patient reports plans for discharge home today.  She has upcoming appointment scheduled with all of her specialist.  She is still experiencing some left upper quadrant discomfort and rates it as a 7/10.  She is tolerating her low-fat diet.  She is having bowel movements.    Current Facility-Administered Medications:     acetaminophen (TYLENOL) tablet 650 mg, 650 mg, Oral, Q6H PRN, Cristina Escobar APRN, 650 mg at 04/26/24 1647    aluminum-magnesium hydroxide-simethicone (MAALOX MAX) 400-400-40 MG/5ML suspension 15 mL, 15 mL, Oral, Q6H PRN, Kimberly Armstrong APRN, 15 mL at 04/28/24 2008    amitriptyline (ELAVIL) tablet 75 mg, 75 mg, Oral, Nightly, Tyler Perez PA-C, 75 mg at 05/02/24 2103    bisacodyl (DULCOLAX) suppository 10 mg, 10 mg, Rectal, Daily PRN, Michelle Andujar APRN, 10 mg at 05/02/24 2137    ferrous sulfate tablet 325 mg, 325 mg, Oral, Daily With Breakfast, Cristina Escobar APRN, 325 mg at 05/03/24 0837    folic acid (FOLVITE) tablet 1 mg, 1 mg, Oral, Daily, Michelle Andujar APRN, 1 mg at 05/03/24 0838    gabapentin (NEURONTIN) capsule 600 mg, 600 mg, Oral, TID, Salvador Kapoor MD, 600 mg at 05/03/24 0837    HYDROmorphone (DILAUDID) injection 1 mg, 1 mg, Intravenous, Q4H PRN, Mike Donnelly MD, 1 mg at 05/03/24 0534    Lidocaine 4 % 1 patch, 1 patch, Transdermal, Q24H, Michelle Andujar APRN, 1 patch at 04/29/24 2019    magnesium oxide (MAG-OX) tablet 400 mg, 400 mg, Oral, Daily, Cristina Escobar APRN, 400 mg at 05/03/24 0838    Magnesium Standard Dose Replacement - Follow Nurse / BPA Driven Protocol, , Does not apply, PRN, Conroy, Xiao Susan, APRN    melatonin tablet 5 mg, 5 mg, Oral, Nightly PRN, Kimberly Armstrong APRN    ondansetron ODT (ZOFRAN-ODT) disintegrating tablet 4 mg, 4 mg, Oral, Q6H PRN **OR** ondansetron (ZOFRAN) injection 4 mg, 4 mg, Intravenous, Q6H  PRN, Kimberly Armstrong APRN, 4 mg at 05/03/24 0133    oxyCODONE-acetaminophen (PERCOCET) 7.5-325 MG per tablet 2 tablet, 2 tablet, Oral, Q4H PRN, Salvador Kapoor MD, 2 tablet at 05/03/24 0839    pancrelipase (Lip-Prot-Amyl) (CREON) capsule 30,000 units of lipase, 30,000 units of lipase, Oral, TID With Meals, Rafat Beltran MD, 30,000 units of lipase at 04/30/24 1218    pantoprazole (PROTONIX) EC tablet 40 mg, 40 mg, Oral, Q AM, Mike Donnelly MD, 40 mg at 05/03/24 0534    polyethylene glycol (MIRALAX) packet 17 g, 17 g, Oral, BID, Mai Almeida MD, 17 g at 05/02/24 0900    sennosides-docusate (PERICOLACE) 8.6-50 MG per tablet 1 tablet, 1 tablet, Oral, BID, Mai Almeida MD, 1 tablet at 05/02/24 2103    sodium chloride 0.9 % flush 10 mL, 10 mL, Intravenous, PRN, Chidi Almazan MD    sodium chloride 0.9 % flush 10 mL, 10 mL, Intravenous, PRN, Kimberly Armstorng APRN, 10 mL at 04/30/24 0111    thiamine (VITAMIN B-1) tablet 100 mg, 100 mg, Oral, Daily, Michelle Andujar APRN, 100 mg at 05/03/24 0837  Review of Systems:    All systems were reviewed and negative except for:  Gastrointestinal: positive for  pain    Objective     Vital Signs  Temp:  [97 °F (36.1 °C)-98.6 °F (37 °C)] 97 °F (36.1 °C)  Heart Rate:  [] 74  Resp:  [18] 18  BP: ()/(72-74) 113/72  Body mass index is 18.9 kg/m².    Intake/Output Summary (Last 24 hours) at 5/3/2024 0930  Last data filed at 5/3/2024 0534  Gross per 24 hour   Intake 600 ml   Output 850 ml   Net -250 ml     No intake/output data recorded.     Physical Exam:   General: patient awake, alert and cooperative   Eyes: no scleral icterus   Skin: warm and dry, not jaundiced   Abdomen: soft, LUQ abdominal tenderness on palpation, nondistended; normal bowel sounds, no masses palpated, no periumbical lymphadenopathy   Psychiatric: Appropriate affect and behavior     Results Review:     I reviewed the patient's new clinical results.  I reviewed the patient's  new imaging results and agree with the interpretation.  I reviewed the patient's other test results and agree with the interpretation    Results from last 7 days   Lab Units 05/03/24  0533 05/02/24  0459 05/01/24  0542   WBC 10*3/mm3 8.73 8.50 8.42   HEMOGLOBIN g/dL 9.8* 9.4* 11.2*   HEMATOCRIT % 30.9* 29.2* 35.7   PLATELETS 10*3/mm3 488* 462* 515*     Results from last 7 days   Lab Units 05/03/24  0533 05/02/24  0459 05/01/24  0542 04/30/24  0304   SODIUM mmol/L 141 137 138 136   POTASSIUM mmol/L 4.3 4.3 4.5 4.3   CHLORIDE mmol/L 100 99 99 98   CO2 mmol/L 31.0* 29.0 28.0 28.0   BUN mg/dL 13 10 8 13   CREATININE mg/dL 0.58 0.64 0.60 0.68   CALCIUM mg/dL 9.7 9.4 10.0 9.2   BILIRUBIN mg/dL <0.2 <0.2  --  <0.2   ALK PHOS U/L 149* 133*  --  109   ALT (SGPT) U/L 20 17  --  11   AST (SGOT) U/L 12 10  --  6   GLUCOSE mg/dL 94 99 104* 111*         Lab Results   Lab Value Date/Time    LIPASE 65 (H) 05/03/2024 0533    LIPASE 91 (H) 05/02/2024 0459    LIPASE 144 (H) 04/27/2024 0647    LIPASE 139 (H) 04/25/2024 1537    LIPASE 139 (H) 04/04/2024 0607    LIPASE 125 (H) 04/02/2024 0526    LIPASE 123 (H) 04/01/2024 1200    LIPASE 123 (H) 01/21/2024 0756    LIPASE 89 (H) 01/07/2024 0449    LIPASE 187 (H) 01/04/2024 1836    LIPASE 99 (H) 09/07/2023 0615    LIPASE 232 (H) 09/05/2023 2205    LIPASE 64 (H) 08/25/2023 0425    LIPASE 75 (H) 08/24/2023 0324    LIPASE 87 (H) 08/23/2023 0252    LIPASE 115 (H) 08/22/2023 1301    LIPASE 73 (H) 06/08/2023 0652    LIPASE 151 (H) 06/07/2023 2246    LIPASE 192 (H) 03/25/2023 1700    LIPASE 38 12/05/2022 0526    LIPASE 34 12/04/2022 0542    LIPASE 29 12/03/2022 0515    LIPASE 50 12/02/2022 0719    LIPASE 29 12/01/2022 0519    LIPASE 65 (H) 11/30/2022 1412    LIPASE 157 (H) 11/28/2022 1751    LIPASE 335 (H) 10/20/2022 1939    LIPASE 378 (H) 08/06/2022 2158    LIPASE 95 (H) 01/24/2018 0545    LIPASE 93 (H) 10/01/2017 0356    LIPASE 122 (H) 09/30/2017 0619    LIPASE 86 (H) 09/29/2017 1731    LIPASE  133 (H) 09/28/2017 0032       Radiology:  CT Abdomen Pelvis With Contrast   Final Result       1.  Decreased size of a 3.3 cm probable pseudocyst along the left   hepatic surface. A distal pancreatic collection measuring 5.7 cm has   increased in size, and a collection along the inferior aspect of the   spleen has also increased in size, likely reflecting additional   pseudocysts. The perisplenic collection is likely contiguous with a   large multiloculated collection occupying the spleen, which is newly   enlarged, concerning for pseudocyst and/or hematoma. The splenic   collection is more similar in appearance to the previously noted   hematoma/collection from January, which had improved on follow-up CT   from 4/1/2024. This is concerning for possible interval hemorrhage.   2.  Asymmetric delayed left renal nephrograms. Correlate with urinalysis   to evaluate for possible pyelonephritis.   3.  Additional findings, as above.           This report was finalized on 4/25/2024 7:45 PM by Dr. Aleisha Sandoval M.D   on Workstation: [id number]              Assessment & Plan     Active Hospital Problems    Diagnosis     **Hematoma of spleen without rupture of capsule     Chronic, continuous use of opioids     Generalized anxiety disorder     Tobacco abuse     Abdominal pain     Pancreatic pseudocyst     Anemia of chronic disease     Chronic pancreatitis     Abnormal CT of the abdomen     Alcohol abuse        Assessment:  Pancreatitis with concern secondary to increased fluid collection in possible intrarenal splenic hemorrhage-lipase now down to 65.  She is s/p splenic artery embolization in January 2024.  No plans for splenectomy at this time  Nausea and vomiting and abdominal pain secondary to #1  History of pancreatic pseudocyst s/p decompression with cystogastrostomy on 9/2023  History of EtOH abuse-now abstinent for 1 month  Tobacco abuse  Chronic pancreatitis treated with outpatient pain  management  Constipation    These problems are new to me.  Plan:  Continue strict low-fat diet  Continue pancreatic enzymes  Continue antiemetics as needed for nausea  Amitriptyline  Continue outpatient follow-up with pain management as scheduled on 5/10/20/2024  Outpatient arrangement has been made for drainage of pseudocyst with Dr. Denson per discussion with Dr. Almeida.  Please schedule appointment for patient with Dr. Denson or one of his nurse practitioners for office follow-up visit and to schedule EUS with drainage of pseudocyst.  General surgery following during this admission.  No plans for splenectomy and surgery has signed off.  Patient states that her condition is too dangerous for surgery and that there is no surgery indicated at this time.  Continue follow-up with GI specialist in June as scheduled  Continue bowel regimen for constipation  GI will sign off.  As always, thank you for allowing us to participate in the care of your patient.  If we can be of further assistance please not hesitate to reach out to our office.    I discussed the patients findings and my recommendations with patient.    SOL Snyder APRN  [facility] [address] [address]  Office: [phone]

## 2024-05-03 NOTE — DISCHARGE SUMMARY
Patient Name: Piper Cain  : 1980  MRN: [id number]    Date of Admission: 2024  Date of Discharge:  5/3/2024  Primary Care Physician: Sharmaine Gatica APRN      Chief Complaint:   Abdominal Pain      Discharge Diagnoses     Active Hospital Problems    Diagnosis  POA    **Hematoma of spleen without rupture of capsule [D73.5]  Yes    Chronic, continuous use of opioids [F11.90]  Yes    Generalized anxiety disorder [F41.1]  Yes    Tobacco abuse [Z72.0]  Yes    Abdominal pain [R10.9]  Yes    Pancreatic pseudocyst [K86.3]  Yes    Anemia of chronic disease [D63.8]  Yes    Chronic pancreatitis [K86.1]  Yes    Abnormal CT of the abdomen [R93.5]  Yes    Alcohol abuse [F10.10]  Yes      Resolved Hospital Problems    Diagnosis Date Resolved POA    Hyperglycemia [R73.9] 2023 Yes        Hospital Course     Very pleasant 42yo woman who presented to ER at an outlying facility for left upper quadrant pain with some radiation into the left shoulder. She has a history of splenic hematoma/laceration requiring embolization of the splenic artery in January of this year.  CT A/P with evidence of increased size of her fluid collections and radiographic concern for possible interval splenic hemorrhage. Please see below for details of admission by problem:      Hematoma of spleen  -Gen Surg followed   -Hgb has been stable  -Not sure what else to do for her ongoing pain and nausea/vomiting.   -Gen Surg recommends she f/u with Dr. Denson at [place] for definitive mgmt of her large cyst  -Asked GI to see her to see if they had any ideas--they increased dose of Elavil to 75mg  -She already has outpt appt with her pain mgmt provider on 5/10--could discuss block at that time but ultimately needs surgical mgmt of large cyst     Chronic pancreatitis  Pancreatic pseudocyst  -Followed by Gastroenterology (Janiya) as an outpatient  -Changed diet to low fat  -Continued Creon     Acute on chronic pain  Opioid dependence  -On IV  Dilaudid, Percocet, Gabapentin, and Lidoderm patches (pt declining Lidoderm patches and using OTC Salonpas patches instead).   -Increased Percocet dose to 15mg (7.5 x 2)  -Continue nightly Elavil   -Continue Gabapentin (new medication this admission)  -F/u with Pain Mgmt on May 10th     Alcohol dependence in remission  -Currently sober just over a month.  -Continue thiamine and folate      Leukocytosis/thrombocytosis  -Leukocytosis improved spontaneously  -Thrombocytosis is mild and stable     Anemia of chronic disease  -Hgb acceptable/stable     Tobacco abuse  -Nicotine replacement therapy patch offered--she declined     Generalized anxiety disorder  -not on meds for this         SCDs for DVT prophylaxis while here.  Full code confirmed.  Discussed with patient.  Discharge home today    Day of Discharge     Subjective:  Feels the same today. Still c/o LUQ pain radiating to left shoulder associated with nausea. No V. Tolerating diet. No D. No F/C/NS. No SOA or CP. Voiding fine. Amenable to dc home today.    Physical Exam:  Temp:  [97 °F (36.1 °C)-98.6 °F (37 °C)] 97 °F (36.1 °C)  Heart Rate:  [] 74  Resp:  [18] 18  BP: ()/(72-74) 113/72  Body mass index is 18.9 kg/m².  Physical Exam  Vitals and nursing note reviewed.  Constitutional:       General: She is not in acute distress.     Appearance: She is ill-appearing (chronically). She is not toxic-appearing or diaphoretic.   Cardiovascular:      Rate and Rhythm: Normal rate and regular rhythm.      Pulses: Normal pulses.   Pulmonary:      Effort: Pulmonary effort is normal. No respiratory distress.      Breath sounds: Normal breath sounds. No wheezing or rales.   Abdominal:      General: Bowel sounds are normal. There is no distension.      Palpations: Abdomen is soft.      Tenderness: There is abdominal tenderness (BRANDY and LUQ). There is no guarding or rebound.   Musculoskeletal:         General: No swelling or deformity. Normal range of motion.       Cervical back: Neck supple.   Skin:     General: Skin is warm and dry.      Capillary Refill: Capillary refill takes less than 2 seconds.      Coloration: Skin is not jaundiced.   Neurological:      General: No focal deficit present.      Mental Status: She is alert and oriented to person, place, and time. Mental status is at baseline.      Cranial Nerves: No cranial nerve deficit.      Coordination: Coordination normal.   Psychiatric:         Mood and Affect: Mood normal.         Behavior: Behavior normal.         Thought Content: Thought content normal.         Consultants     Consult Orders (all) (From admission, onward)       Start     Ordered    04/30/24 1703  Inpatient Gastroenterology Consult  Once        Specialty:  Gastroenterology  Provider:  Rachell Madden MD    04/30/24 1702    04/28/24 0913  Inpatient Consult to Advance Care Planning  Once        Comments: Desires paperwork   Provider:  (Not yet assigned)    04/28/24 0913    04/26/24 0004  Inpatient General Surgery Consult  Once        Specialty:  General Surgery  Provider:  Jacob Pena Jr., MD    04/26/24 0004                  Procedures     * Surgery not found *    Imaging Results (All)       Procedure Component Value Units Date/Time    CT Abdomen Pelvis With Contrast [[id number]] Collected: 04/25/24 1929     Updated: 04/25/24 1948    Narrative:      CT ABDOMEN PELVIS W CONTRAST-     HISTORY: Worsened left upper quadrant pain, history of splenic rupture,  pancreatitis, gastritis.     TECHNIQUE:  CT of the abdomen and pelvis was performed following the  administration of intravenous contrast. Reformatted images were  reviewed. Radiation dose reduction techniques were utilized, including  automated exposure control and exposure modulation based on body size.     COMPARISON: CT abdomen and pelvis 4/1/2024     FINDINGS:     Lung bases and pleural spaces are clear.  The liver is enlarged, measuring 18.5 cm in craniocaudal length. There  is a  peripherally enhancing collection along the dorsal left hepatic  surface measuring approximately 3.0 x 2.7 x 3.3 cm, which has  significantly decreased in size from 4/1/2024, previously 6.3 x 3.3 x  7.7 cm when remeasured. The gallbladder is present. There are multiple  coarse pancreatic calcifications. Along the ventral distal pancreatic  body and tail, there is an approximately 5.7 x 3.2 x 3.5 cm peripherally  enhancing multiloculated collection, which has increased in size from  4/1/2024, previously 3.0 x 1.1 x 1.5 cm when remeasured. Along the  inferior aspect of the spleen, there is a 7.6 x 4.9 x 6.4 cm  peripherally enhancing collection, which has increased in size,  previously 5.9 x 3.4 x 5.2 cm when remeasured. This is likely contiguous  with an approximately 10.6 x 9.0 x 8.0 cm multiloculated collection  involving the spleen, which is newly enlarged.  The adrenal glands are within normal limits. The collection along the  inferior aspect of the spleen results in mass effect on the adjacent  left kidney. There are asymmetric delayed left renal nephrograms. There  is no hydronephrosis. There is at least mild atherosclerosis. There are  embolization coils/clips in the left lower quadrant. Metallic streak  artifact limits evaluation of adjacent structures. There is mild fat  stranding in the left upper quadrant.  There is mass effect on the gastric fundus from the dominant splenic  collection. Previous endogastric wall thickening has improved. There is  no bowel obstruction. There is a moderate to large colonic stool burden.  The appendix is visualized. The bladder is poorly distended. The uterus  is present. There is no adnexal mass.  There is osseous demineralization.          Impression:         1.  Decreased size of a 3.3 cm probable pseudocyst along the left  hepatic surface. A distal pancreatic collection measuring 5.7 cm has  increased in size, and a collection along the inferior aspect of the  spleen has  also increased in size, likely reflecting additional  pseudocysts. The perisplenic collection is likely contiguous with a  large multiloculated collection occupying the spleen, which is newly  enlarged, concerning for pseudocyst and/or hematoma. The splenic  collection is more similar in appearance to the previously noted  hematoma/collection from January, which had improved on follow-up CT  from 4/1/2024. This is concerning for possible interval hemorrhage.  2.  Asymmetric delayed left renal nephrograms. Correlate with urinalysis  to evaluate for possible pyelonephritis.  3.  Additional findings, as above.        This report was finalized on 4/25/2024 7:45 PM by Dr. Aleisha Sandoval M.D  on Workstation: [id number]             Results for orders placed during the hospital encounter of 08/06/22    Duplex Portal Hepatic Complete CAR    Interpretation Summary  · All vessels appear normal with normal flow direction and no evidence of thrombus. However, there is a nonvascular fluid collection in the and the splenic vein itself is difficult to visualize with multiple collaterals noted.    Results for orders placed during the hospital encounter of 08/06/22    Adult Transthoracic Echo Complete W/ Cont if Necessary Per Protocol    Interpretation Summary  · Left ventricular ejection fraction appears to be 61 - 65%. Left ventricular systolic function is normal.  · Left ventricular diastolic function was normal.  · Normal right ventricular cavity size and systolic function noted.  · The agitated saline study is positive with Valsalva, consistent with a small to moderate sized PFO  · Mild tricuspid valve regurgitation is present.  · Calculated right ventricular systolic pressure from tricuspid regurgitation is 32 mmHg.  · There is no evidence of pericardial effusion    Pertinent Labs     Results from last 7 days   Lab Units 05/03/24  0533 05/02/24  0459 05/01/24  0542 04/30/24  0304   WBC 10*3/mm3 8.73 8.50 8.42 7.22   HEMOGLOBIN  "g/dL 9.8* 9.4* 11.2* 9.9*   PLATELETS 10*3/mm3 488* 462* 515* 482*     Results from last 7 days   Lab Units 05/03/24 0533 05/02/24 0459 05/01/24  0542 04/30/24  0304   SODIUM mmol/L 141 137 138 136   POTASSIUM mmol/L 4.3 4.3 4.5 4.3   CHLORIDE mmol/L 100 99 99 98   CO2 mmol/L 31.0* 29.0 28.0 28.0   BUN mg/dL 13 10 8 13   CREATININE mg/dL 0.58 0.64 0.60 0.68   GLUCOSE mg/dL 94 99 104* 111*   EGFR mL/min/1.73 116.8 114.0 115.8 112.4     Results from last 7 days   Lab Units 05/03/24 0533 05/02/24 0459 04/30/24  0304 04/27/24  0647   ALBUMIN g/dL 3.8 3.6 3.5 3.2*   BILIRUBIN mg/dL <0.2 <0.2 <0.2 <0.2   ALK PHOS U/L 149* 133* 109 102   AST (SGOT) U/L 12 10 6 11   ALT (SGPT) U/L 20 17 11 7     Results from last 7 days   Lab Units 05/03/24 0533 05/02/24 0459 05/01/24  0542 04/30/24  0304 04/29/24  0459 04/27/24  0647   CALCIUM mg/dL 9.7 9.4 10.0 9.2 9.4 9.2   ALBUMIN g/dL 3.8 3.6  --  3.5  --  3.2*   MAGNESIUM mg/dL 1.7 1.6  --   --  1.8 1.7   PHOSPHORUS mg/dL  --  4.3  --   --  4.8*  --      Results from last 7 days   Lab Units 05/03/24 0533 05/02/24 0459 04/27/24  0647   LIPASE U/L 65* 91* 144*             Invalid input(s): \"LDLCALC\"          Test Results Pending at Discharge       Discharge Details        Discharge Medications        New Medications        Instructions Start Date   gabapentin 300 MG capsule  Commonly known as: NEURONTIN   600 mg, Oral, 3 Times Daily      oxyCODONE-acetaminophen 7.5-325 MG per tablet  Commonly known as: PERCOCET  Replaces: oxyCODONE-acetaminophen  MG per tablet   2 tablets, Oral, Every 4 Hours PRN      polyethylene glycol 17 GM/SCOOP powder  Commonly known as: MiraLax   17 g, Oral, Daily      sennosides-docusate 8.6-50 MG per tablet  Commonly known as: PERICOLACE   1 tablet, Oral, 2 Times Daily             Changes to Medications        Instructions Start Date   amitriptyline 75 MG tablet  Commonly known as: ELAVIL  What changed:   medication strength  how much to take   75 " mg, Oral, Nightly             Continue These Medications        Instructions Start Date   CREON PO   Oral      Feosol 200 (65 Fe) MG tablet tablet  Generic drug: Ferrous Sulfate Dried   200 mg, Oral, Daily      folic acid 1 MG tablet  Commonly known as: FOLVITE   1,000 mcg, Oral, Daily      magnesium oxide 400 MG tablet  Commonly known as: MAG-OX   400 mg, Oral, Daily      ondansetron 4 MG tablet  Commonly known as: ZOFRAN   4 mg, Oral, Every 8 Hours PRN      pantoprazole 40 MG EC tablet  Commonly known as: PROTONIX   40 mg, Oral, Daily      promethazine 25 MG suppository  Commonly known as: PHENERGAN   25 mg, Rectal, As Needed      thiamine 100 MG tablet  Commonly known as: VITAMIN B1   100 mg, Oral, Daily             Stop These Medications      oxyCODONE-acetaminophen  MG per tablet  Commonly known as: PERCOCET  Replaced by: oxyCODONE-acetaminophen 7.5-325 MG per tablet              Allergies   Allergen Reactions    Nickel Rash       Discharge Disposition:  Home or Self Care      Discharge Diet:  Diet Order   Procedures    Diet: Regular/House, Gastrointestinal; Fat-Restricted; Fluid Consistency: Thin (IDDSI 0)       Discharge Activity:   As tolerated    CODE STATUS:    Code Status and Medical Interventions:   Ordered at: 04/25/24 6872     Code Status (Patient has no pulse and is not breathing):    CPR (Attempt to Resuscitate)     Medical Interventions (Patient has pulse or is breathing):    Full Support       Future Appointments   Date Time Provider Department Center   5/22/2024  9:45 AM Carine Pena Jr., MD [facility]     Additional Instructions for the Follow-ups that You Need to Schedule       Discharge Follow-up with PCP   As directed       Currently Documented PCP:    Sharmaine Gatica APRN    PCP Phone Number:    [phone]     Follow Up Details: Gavi NP (PCP) at next available appt        Discharge Follow-up with Specialty: Pain Mgmt on May 10th   As directed      Specialty: Pain Mgmt  on May 10th        Discharge Follow-up with Specified Provider: Dr. Denson (GI) at next available appt   As directed      To: Dr. Denson (GI) at next available appt               Follow-up Information       Sharmaine Gatica APRN .    Specialty: Nurse Practitioner  Why: Gavi MURRIETA (PCP) at next available appt  Contact information:  [address] [facility] [address]  [facility] [address]  [phone]                             Additional Instructions for the Follow-ups that You Need to Schedule       Discharge Follow-up with PCP   As directed       Currently Documented PCP:    Sharmaine Gatica APRN    PCP Phone Number:    [phone]     Follow Up Details: Gavi MURRIETA (PCP) at next available appt        Discharge Follow-up with Specialty: Pain Mgmt on May 10th   As directed      Specialty: Pain Mgmt on May 10th        Discharge Follow-up with Specified Provider: Dr. Denson () at next available appt   As directed      To: Dr. Denson () at next available appt            Time Spent on Discharge:  Greater than 30 minutes      Salvador Kapoor MD  [facility]  05/03/24  09:45 EDT

## 2024-05-03 NOTE — PLAN OF CARE
Goal Outcome Evaluation:  Plan of Care Reviewed With: patient        Progress: improving  Outcome Evaluation: vss, iv dilaudid and percocet for pain, tolerating regualr diet, ambulating in the hallway, dulcolax supp given last night, continue to monitor the pt.

## 2024-05-03 NOTE — PAYOR COMM NOTE
"Lizzeth Zavala (43 y.o. Female)          DC SUMMARY FOR [id number]       CONTACT F# [phone]         Date of Birth   1980    Social Security Number       Address   [address]   [place] [address]    Home Phone   [phone]    MRN   [id number]       Congregational   [employer]    Marital Status   Single                            Admission Date   24    Admission Type   Urgent    Admitting Provider   Castro Hilliard MD    Attending Provider       Department, Room/Bed   [address], P696/1       Discharge Date   5/3/2024    Discharge Disposition   Home or Self Care    Discharge Destination                                 Attending Provider: (none)   Allergies: Nickel    Isolation: None   Infection: None   Code Status: CPR    Ht: 175.3 cm (69\")   Wt: 58.1 kg (128 lb)    Admission Cmt: None   Principal Problem: Hematoma of spleen without rupture of capsule [D73.5]                   Active Insurance as of 2024       Primary Coverage       Payor Plan Insurance Group Employer/Plan Group    [id number]       Payor Plan Address Payor Plan Phone Number Payor Plan Fax Number Effective Dates    MAIL STOP:   2022 - None Entered    [address] [address]       [facility] [address]         Subscriber Name Subscriber Birth Date Member ID       LIZZETH ZAVALA 1980 [id number]                     Emergency Contacts        (Rel.) Home Phone Work Phone Mobile Phone    JEAN CARLOSMALOU (Significant Other) [phone] -- --                 Discharge Summary        Malou Kapoor MD at 24 0945              Patient Name: Lizzeth Zavala  : 1980  MRN: [id number]    Date of Admission: 2024  Date of Discharge:  5/3/2024  Primary Care Physician: Sharmaine Gatica APRN      Chief Complaint:   Abdominal Pain      Discharge Diagnoses     Active Hospital Problems    Diagnosis  POA    **Hematoma of spleen " without rupture of capsule [D73.5]  Yes    Chronic, continuous use of opioids [F11.90]  Yes    Generalized anxiety disorder [F41.1]  Yes    Tobacco abuse [Z72.0]  Yes    Abdominal pain [R10.9]  Yes    Pancreatic pseudocyst [K86.3]  Yes    Anemia of chronic disease [D63.8]  Yes    Chronic pancreatitis [K86.1]  Yes    Abnormal CT of the abdomen [R93.5]  Yes    Alcohol abuse [F10.10]  Yes      Resolved Hospital Problems    Diagnosis Date Resolved POA    Hyperglycemia [R73.9] 08/25/2023 Yes        Hospital Course     Very pleasant 42yo woman [facility] with some radiation into the left shoulder. She has a history of splenic hematoma/laceration requiring embolization of the splenic artery in January of this year.  CT A/P with evidence of increased size of her fluid collections and radiographic concern for possible interval splenic hemorrhage. Please see below for details of admission by problem:      Hematoma of spleen  -Gen Surg followed   -Hgb has been stable  -Not sure what else to do for her ongoing pain and nausea/vomiting.   -Gen Surg recommends she f/u with Dr. Denson at [place] for definitive mgmt of her large cyst  -Asked GI to see her to see if they had any ideas--they increased dose of Elavil to 75mg  -She already has outpt appt with her pain mgmt provider on 5/10--could discuss block at that time but ultimately needs surgical mgmt of large cyst     Chronic pancreatitis  Pancreatic pseudocyst  -Followed by Gastroenterology (Janiya) as an outpatient  -Changed diet to low fat  -Continued Creon     Acute on chronic pain  Opioid dependence  -On IV Dilaudid, Percocet, Gabapentin, and Lidoderm patches (pt declining Lidoderm patches and using OTC Salonpas patches instead).   -Increased Percocet dose to 15mg (7.5 x 2)  -Continue nightly Elavil   -Continue Gabapentin (new medication this admission)  -F/u with Pain Mgmt on May 10th     Alcohol dependence in  remission  -Currently sober just over a month.  -Continue thiamine and folate      Leukocytosis/thrombocytosis  -Leukocytosis improved spontaneously  -Thrombocytosis is mild and stable     Anemia of chronic disease  -Hgb acceptable/stable     Tobacco abuse  -Nicotine replacement therapy patch offered--she declined     Generalized anxiety disorder  -not on meds for this         SCDs for DVT prophylaxis while here.  Full code confirmed.  Discussed with patient.  Discharge home today    Day of Discharge     Subjective:  Feels the same today. Still c/o LUQ pain radiating to left shoulder associated with nausea. No V. Tolerating diet. No D. No F/C/NS. No SOA or CP. Voiding fine. Amenable to dc home today.    Physical Exam:  Temp:  [97 °F (36.1 °C)-98.6 °F (37 °C)] 97 °F (36.1 °C)  Heart Rate:  [] 74  Resp:  [18] 18  BP: ()/(72-74) 113/72  Body mass index is 18.9 kg/m².  Physical Exam  Vitals and nursing note reviewed.  Constitutional:       General: She is not in acute distress.     Appearance: She is ill-appearing (chronically). She is not toxic-appearing or diaphoretic.   Cardiovascular:      Rate and Rhythm: Normal rate and regular rhythm.      Pulses: Normal pulses.   Pulmonary:      Effort: Pulmonary effort is normal. No respiratory distress.      Breath sounds: Normal breath sounds. No wheezing or rales.   Abdominal:      General: Bowel sounds are normal. There is no distension.      Palpations: Abdomen is soft.      Tenderness: There is abdominal tenderness (BRANDY and LUQ). There is no guarding or rebound.   Musculoskeletal:         General: No swelling or deformity. Normal range of motion.      Cervical back: Neck supple.   Skin:     General: Skin is warm and dry.      Capillary Refill: Capillary refill takes less than 2 seconds.      Coloration: Skin is not jaundiced.   Neurological:      General: No focal deficit present.      Mental Status: She is alert and oriented to person, place, and time. Mental  status is at baseline.      Cranial Nerves: No cranial nerve deficit.      Coordination: Coordination normal.   Psychiatric:         Mood and Affect: Mood normal.         Behavior: Behavior normal.         Thought Content: Thought content normal.         Consultants     Consult Orders (all) (From admission, onward)       Start     Ordered    04/30/24 1703  Inpatient Gastroenterology Consult  Once        Specialty:  Gastroenterology  Provider:  Rachell Madden MD    04/30/24 1702    04/28/24 0913  Inpatient Consult to Advance Care Planning  Once        Comments: Desires paperwork   Provider:  (Not yet assigned)    04/28/24 0913    04/26/24 0004  Inpatient General Surgery Consult  Once        Specialty:  General Surgery  Provider:  Jacob Pena Jr., MD    04/26/24 0004                  Procedures     * Surgery not found *    Imaging Results (All)       Procedure Component Value Units Date/Time    CT Abdomen Pelvis With Contrast [[id number]] Collected: 04/25/24 1929     Updated: 04/25/24 1948    Narrative:      CT ABDOMEN PELVIS W CONTRAST-     HISTORY: Worsened left upper quadrant pain, history of splenic rupture,  pancreatitis, gastritis.     TECHNIQUE:  CT of the abdomen and pelvis was performed following the  administration of intravenous contrast. Reformatted images were  reviewed. Radiation dose reduction techniques were utilized, including  automated exposure control and exposure modulation based on body size.     COMPARISON: CT abdomen and pelvis 4/1/2024     FINDINGS:     Lung bases and pleural spaces are clear.  The liver is enlarged, measuring 18.5 cm in craniocaudal length. There  is a peripherally enhancing collection along the dorsal left hepatic  surface measuring approximately 3.0 x 2.7 x 3.3 cm, which has  significantly decreased in size from 4/1/2024, previously 6.3 x 3.3 x  7.7 cm when remeasured. The gallbladder is present. There are multiple  coarse pancreatic calcifications. Along the ventral  distal pancreatic  body and tail, there is an approximately 5.7 x 3.2 x 3.5 cm peripherally  enhancing multiloculated collection, which has increased in size from  4/1/2024, previously 3.0 x 1.1 x 1.5 cm when remeasured. Along the  inferior aspect of the spleen, there is a 7.6 x 4.9 x 6.4 cm  peripherally enhancing collection, which has increased in size,  previously 5.9 x 3.4 x 5.2 cm when remeasured. This is likely contiguous  with an approximately 10.6 x 9.0 x 8.0 cm multiloculated collection  involving the spleen, which is newly enlarged.  The adrenal glands are within normal limits. The collection along the  inferior aspect of the spleen results in mass effect on the adjacent  left kidney. There are asymmetric delayed left renal nephrograms. There  is no hydronephrosis. There is at least mild atherosclerosis. There are  embolization coils/clips in the left lower quadrant. Metallic streak  artifact limits evaluation of adjacent structures. There is mild fat  stranding in the left upper quadrant.  There is mass effect on the gastric fundus from the dominant splenic  collection. Previous endogastric wall thickening has improved. There is  no bowel obstruction. There is a moderate to large colonic stool burden.  The appendix is visualized. The bladder is poorly distended. The uterus  is present. There is no adnexal mass.  There is osseous demineralization.          Impression:         1.  Decreased size of a 3.3 cm probable pseudocyst along the left  hepatic surface. A distal pancreatic collection measuring 5.7 cm has  increased in size, and a collection along the inferior aspect of the  spleen has also increased in size, likely reflecting additional  pseudocysts. The perisplenic collection is likely contiguous with a  large multiloculated collection occupying the spleen, which is newly  enlarged, concerning for pseudocyst and/or hematoma. The splenic  collection is more similar in appearance to the previously  noted  hematoma/collection from January, which had improved on follow-up CT  from 4/1/2024. This is concerning for possible interval hemorrhage.  2.  Asymmetric delayed left renal nephrograms. Correlate with urinalysis  to evaluate for possible pyelonephritis.  3.  Additional findings, as above.        This report was finalized on 4/25/2024 7:45 PM by Dr. Aleisha Sandoval M.D  on Workstation: [id number]             Results for orders placed during the hospital encounter of 08/06/22    Duplex Portal Hepatic Complete CAR    Interpretation Summary  · All vessels appear normal with normal flow direction and no evidence of thrombus. However, there is a nonvascular fluid collection in the and the splenic vein itself is difficult to visualize with multiple collaterals noted.    Results for orders placed during the hospital encounter of 08/06/22    Adult Transthoracic Echo Complete W/ Cont if Necessary Per Protocol    Interpretation Summary  · Left ventricular ejection fraction appears to be 61 - 65%. Left ventricular systolic function is normal.  · Left ventricular diastolic function was normal.  · Normal right ventricular cavity size and systolic function noted.  · The agitated saline study is positive with Valsalva, consistent with a small to moderate sized PFO  · Mild tricuspid valve regurgitation is present.  · Calculated right ventricular systolic pressure from tricuspid regurgitation is 32 mmHg.  · There is no evidence of pericardial effusion    Pertinent Labs     Results from last 7 days   Lab Units 05/03/24 0533 05/02/24 0459 05/01/24 0542 04/30/24  0304   WBC 10*3/mm3 8.73 8.50 8.42 7.22   HEMOGLOBIN g/dL 9.8* 9.4* 11.2* 9.9*   PLATELETS 10*3/mm3 488* 462* 515* 482*     Results from last 7 days   Lab Units 05/03/24  0533 05/02/24  0459 05/01/24  0542 04/30/24  0304   SODIUM mmol/L 141 137 138 136   POTASSIUM mmol/L 4.3 4.3 4.5 4.3   CHLORIDE mmol/L 100 99 99 98   CO2 mmol/L 31.0* 29.0 28.0 28.0   BUN mg/dL 13 10  "8 13   CREATININE mg/dL 0.58 0.64 0.60 0.68   GLUCOSE mg/dL 94 99 104* 111*   EGFR mL/min/1.73 116.8 114.0 115.8 112.4     Results from last 7 days   Lab Units 05/03/24 0533 05/02/24 0459 04/30/24 0304 04/27/24  0647   ALBUMIN g/dL 3.8 3.6 3.5 3.2*   BILIRUBIN mg/dL <0.2 <0.2 <0.2 <0.2   ALK PHOS U/L 149* 133* 109 102   AST (SGOT) U/L 12 10 6 11   ALT (SGPT) U/L 20 17 11 7     Results from last 7 days   Lab Units 05/03/24 0533 05/02/24 0459 05/01/24 0542 04/30/24 0304 04/29/24 0459 04/27/24  0647   CALCIUM mg/dL 9.7 9.4 10.0 9.2 9.4 9.2   ALBUMIN g/dL 3.8 3.6  --  3.5  --  3.2*   MAGNESIUM mg/dL 1.7 1.6  --   --  1.8 1.7   PHOSPHORUS mg/dL  --  4.3  --   --  4.8*  --      Results from last 7 days   Lab Units 05/03/24 0533 05/02/24 0459 04/27/24  0647   LIPASE U/L 65* 91* 144*             Invalid input(s): \"LDLCALC\"          Test Results Pending at Discharge       Discharge Details        Discharge Medications        New Medications        Instructions Start Date   gabapentin 300 MG capsule  Commonly known as: NEURONTIN   600 mg, Oral, 3 Times Daily      oxyCODONE-acetaminophen 7.5-325 MG per tablet  Commonly known as: PERCOCET  Replaces: oxyCODONE-acetaminophen  MG per tablet   2 tablets, Oral, Every 4 Hours PRN      polyethylene glycol 17 GM/SCOOP powder  Commonly known as: MiraLax   17 g, Oral, Daily      sennosides-docusate 8.6-50 MG per tablet  Commonly known as: PERICOLACE   1 tablet, Oral, 2 Times Daily             Changes to Medications        Instructions Start Date   amitriptyline 75 MG tablet  Commonly known as: ELAVIL  What changed:   medication strength  how much to take   75 mg, Oral, Nightly             Continue These Medications        Instructions Start Date   CREON PO   Oral      Feosol 200 (65 Fe) MG tablet tablet  Generic drug: Ferrous Sulfate Dried   200 mg, Oral, Daily      folic acid 1 MG tablet  Commonly known as: FOLVITE   1,000 mcg, Oral, Daily      magnesium oxide 400 MG " tablet  Commonly known as: MAG-OX   400 mg, Oral, Daily      ondansetron 4 MG tablet  Commonly known as: ZOFRAN   4 mg, Oral, Every 8 Hours PRN      pantoprazole 40 MG EC tablet  Commonly known as: PROTONIX   40 mg, Oral, Daily      promethazine 25 MG suppository  Commonly known as: PHENERGAN   25 mg, Rectal, As Needed      thiamine 100 MG tablet  Commonly known as: VITAMIN B1   100 mg, Oral, Daily             Stop These Medications      oxyCODONE-acetaminophen  MG per tablet  Commonly known as: PERCOCET  Replaced by: oxyCODONE-acetaminophen 7.5-325 MG per tablet              Allergies   Allergen Reactions    Nickel Rash       Discharge Disposition:  Home or Self Care      Discharge Diet:  Diet Order   Procedures    Diet: Regular/House, Gastrointestinal; Fat-Restricted; Fluid Consistency: Thin (IDDSI 0)       Discharge Activity:   As tolerated    CODE STATUS:    Code Status and Medical Interventions:   Ordered at: 04/25/24 4250     Code Status (Patient has no pulse and is not breathing):    CPR (Attempt to Resuscitate)     Medical Interventions (Patient has pulse or is breathing):    Full Support       Future Appointments   Date Time Provider Department Center   5/22/2024  9:45 AM Carine Pena Jr., MD [facility]     Additional Instructions for the Follow-ups that You Need to Schedule       Discharge Follow-up with PCP   As directed       Currently Documented PCP:    Sharmaine Gatica APRN    PCP Phone Number:    [phone]     Follow Up Details: Gavi MURRIETA (PCP) at next available appt        Discharge Follow-up with Specialty: Pain Mgmt on May 10th   As directed      Specialty: Pain Mgmt on May 10th        Discharge Follow-up with Specified Provider: Dr. Denson (GI) at next available appt   As directed      To: Dr. Denson (GI) at next available appt               Follow-up Information       Sharmaine Gatica APRN .    Specialty: Nurse Practitioner  Why: Gavi MURRIETA (PCP) at next available appt  Contact  information:  [address]  [place]  [facility] [address]  [phone]                             Additional Instructions for the Follow-ups that You Need to Schedule       Discharge Follow-up with PCP   As directed       Currently Documented PCP:    Sharmaine Gatica APRN    PCP Phone Number:    [phone]     Follow Up Details: Gavi NP (PCP) at next available appt        Discharge Follow-up with Specialty: Pain Mgmt on May 10th   As directed      Specialty: Pain Mgmt on May 10th        Discharge Follow-up with Specified Provider: Dr. Denson (GI) at next available appt   As directed      To: Dr. Denson (GI) at next available appt            Time Spent on Discharge:  Greater than 30 minutes      Salvador Kapoor MD  [facility]  05/03/24  09:45 EDT                Electronically signed by Salvador Kapoor MD at 05/03/24 0962

## 2024-05-04 NOTE — OUTREACH NOTE
Prep Survey      Flowsheet Row Responses   [facility] facility patient discharged from? [place]   Is LACE score < 7 ? No   Eligibility Readm Mgmt   Discharge diagnosis Hematoma of spleen without rupture of capsule   Does the patient have one of the following disease processes/diagnoses(primary or secondary)? Other   Does the patient have Home health ordered? No   Is there a DME ordered? No   Prep survey completed? Yes            TESSA MADDEN - Registered Nurse

## 2024-05-05 NOTE — CASE MANAGEMENT/SOCIAL WORK
Case Management Discharge Note      Final Note: home         Selected Continued Care - Discharged on 5/3/2024 Admission date: 4/25/2024 - Discharge disposition: Home or Self Care      Destination    No services have been selected for the patient.                Durable Medical Equipment    No services have been selected for the patient.                Dialysis/Infusion    No services have been selected for the patient.                Home Medical Care    No services have been selected for the patient.                Therapy    No services have been selected for the patient.                Community Resources    No services have been selected for the patient.                Community & DME    No services have been selected for the patient.                    Transportation Services  Private: Car    Final Discharge Disposition Code: 01 - home or self-care

## 2024-05-08 ENCOUNTER — READMISSION MANAGEMENT (OUTPATIENT)
Dept: CALL CENTER | Facility: HOSPITAL | Age: 44
End: 2024-05-08
Payer: MEDICAID

## 2024-05-13 ENCOUNTER — READMISSION MANAGEMENT (OUTPATIENT)
Dept: CALL CENTER | Facility: HOSPITAL | Age: 44
End: 2024-05-13
Payer: MEDICAID

## 2024-05-13 NOTE — OUTREACH NOTE
Medical Week 1 Survey      Flowsheet Row Responses   [facility] patient discharged from? [place]   Does the patient have one of the following disease processes/diagnoses(primary or secondary)? Other   Week 1 attempt successful? Yes   Call start time 1551   Call end time 1617   Discharge diagnosis Hematoma of spleen without rupture of capsule   Meds reviewed with patient/caregiver? Yes   Is the patient having any side effects they believe may be caused by any medication additions or changes? No   Does the patient have all medications ordered at discharge? No   Nursing Interventions No intervention needed   Prescription comments pt declined the medications for constipation as she had those at home, pt reports but has the other medications   Is the patient taking all medications as directed (includes completed medication regime)? Yes   Medication comments The pt c/o inability to sleep, pt reports that she takes Melatonin 10mg to aide in sleep but she still does not rest well.   Does the patient have a primary care provider?  Yes   Does the patient have an appointment with their PCP within 7 days of discharge? Yes   Has the patient kept scheduled appointments due by today? Yes   Comments Pt reports that pain management is unable to do a block to help her pain.   Comments Pt c/o pain from abdomen up to her Left shoulder to neck on left side, currently rates pain 7/10, under left rib moving up toward/across her sternum into her back. Per pt report, her appetite has increased with no N/V/D but her ability to sleep has decreased. Pt has no issues voiding.   Did the patient receive a copy of their discharge instructions? Yes   Nursing interventions Reviewed instructions with patient   What is the patient's perception of their health status since discharge? Improving   Is the patient/caregiver able to teach back signs and symptoms related to disease process for when to call PCP? Yes   Is the patient/caregiver able to  teach back signs and symptoms related to disease process for when to call 911? Yes   Additional teach back comments .   Week 1 call completed? Yes   Revoked No further contact(revokes)-requires comment   Wrap up additional comments If pain is not controlled, pt will go to ED. Pt has a new job a [employer], she reports   Call end time [phone]            Gabriela STYLES - Registered Nurse

## 2024-05-16 ENCOUNTER — OFFICE (OUTPATIENT)
Dept: URBAN - METROPOLITAN AREA CLINIC 64 | Facility: CLINIC | Age: 44
End: 2024-05-16
Payer: COMMERCIAL

## 2024-05-16 VITALS
WEIGHT: 130 LBS | SYSTOLIC BLOOD PRESSURE: 125 MMHG | HEIGHT: 71 IN | HEART RATE: 100 BPM | DIASTOLIC BLOOD PRESSURE: 90 MMHG

## 2024-05-16 DIAGNOSIS — K86.3 PSEUDOCYST OF PANCREAS: ICD-10-CM

## 2024-05-16 DIAGNOSIS — R10.12 LEFT UPPER QUADRANT PAIN: ICD-10-CM

## 2024-05-16 DIAGNOSIS — K85.90 ACUTE PANCREATITIS WITHOUT NECROSIS OR INFECTION, UNSPECIFIE: ICD-10-CM

## 2024-05-16 DIAGNOSIS — R93.3 ABNORMAL FINDINGS ON DIAGNOSTIC IMAGING OF OTHER PARTS OF DI: ICD-10-CM

## 2024-05-16 PROCEDURE — 99214 OFFICE O/P EST MOD 30 MIN: CPT | Performed by: NURSE PRACTITIONER

## 2024-05-16 RX ORDER — GABAPENTIN 600 MG/1
1800 TABLET, FILM COATED ORAL
Qty: 270 | Refills: 3 | Status: ACTIVE
Start: 2024-05-16

## 2024-05-16 RX ORDER — AMITRIPTYLINE HYDROCHLORIDE 75 MG/1
75 TABLET, FILM COATED ORAL
Qty: 90 | Refills: 3 | Status: ACTIVE
Start: 2024-05-16

## 2024-05-17 NOTE — PROGRESS NOTES
"Oncology Follow Up Visit: February 10, 2020    Oncologist: Dr Charline Velazquez  PCP: Earline Mehta    Diagnosis: Stage IV Adenocarcinoma of the rectum   Alannah Wynn is a 72 yo female who presented in 10/2019 with intermittent hematochezia and anorectal irritation since 2011, that have been managed as \"hemorrhoids\".  Diagnostic colonoscopy on 10/18/19 for rectal bleeding showed \"fungating and ulcerated non-obstructing mass was found in the distal rectum <1 cm from the anal verge. The mass was partially circumferential (involving one-half of the lumen circumference). The mass measured four cm in length. In addition, its diameter measured six mm.  Pathology showed invasive moderately differentiated adenocarcinoma with intact mismatch repair protein expression.  Pelvic MRI 11/2019 found fungating polypoid mid/lower rectal mass with luminal narrowing as well as innumerable enlarged lymph nodes as described above with a conglomerate of suspicious lymph nodes abutting the anterior peritoneal reflection. Stage cT3d N2.   Further testing with PET scan proved pulmonarymetastasis and bony metastasis at L3.  Treatment:   11/25/2019 began treatment with FOLFOX/Avastin    Interval History: Ms. Wynn comes to clinic with  for continuation of treatment for her colon cancer with metastasis with FOLFOX/Avastin- due for cycle 6. Pt shares she is still having trouble with poor appetite first week with some nausea- smells trigger the nausea but has started Emend on day 3 but feels it was helpful.No nausea week 2 and no weight loss noted. She continues with 2/10 pain but is not using pain medication regularly. Feels she does have weakness but no falls- again worse first week after chemo. Cold sensitivity noted first week only- none noted today.denies depression or anxiety and sleep is same with variability.    Rest of comprehensive and complete ROS is reviewed and is negative.   Past Medical History:   Diagnosis Date     " Chief Complaint  Post-op Follow-up    Subjective        Piper Cain presents to [facility] VASCULAR SURGERY  History of Present IllnessThe patient is a 43-year-old female with a history of alcohol use with chronic pancreatitis and pancreatic pseudocyst who developed an expanding subcapsular hematoma of the spleen. She was taken to the   operating room on January 22, 2024 where she underwent coil embolization of the splenic artery with exploration of the right femoral artery and retrieval of a dislodged coil. She did well following this. Her flank and shoulder pain resolved following this.  She noted paresthesias in the right anterior medial thigh following her procedure.  She was told this was related to the exploration of the femoral artery and this should resolve over time.  She returned on May 22, 2024 for this.  Unfortunately, her pancreatic pseudocyst has increased in size and she will likely need resection.  She had multiple questions regarding this.      Past History:  Medical History: has a past medical history of Anemia, Anxiety, Constipation, Cyst of spleen, Diarrhea, E. 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   Surgical History: has a past surgical history that includes Esophagogastroduodenoscopy (N/A, 08/10/2022); Pancreatic cyst drainage; [place] tooth extraction; Upper endoscopic ultrasound w/ FNA (N/A, 09/13/2023); Esophagogastroduodenoscopy (N/A, 10/09/2023); EMBOLIZATION MESENTERIC ARTERY (N/A, 01/22/2024); Esophagogastroduodenoscopy (N/A, 04/03/2024); and Splenectomy, total (2024).   Family History: family history includes Alcohol abuse in her maternal grandfather, maternal grandmother, mother, paternal aunt, paternal grandfather, and paternal grandmother; Arthritis in her mother; Asthma  "in her mother; Cancer in her father, paternal aunt, and paternal grandmother; Diabetes in her father, paternal aunt, and paternal grandmother; Drug abuse in her father, paternal aunt, and paternal grandmother; Early death in her father, paternal aunt, and paternal grandmother; Heart disease in her father, paternal aunt, and paternal grandmother; Hyperlipidemia in her father, paternal aunt, and paternal grandmother; Hypertension in her father, paternal aunt, and paternal grandmother; Miscarriages / Stillbirths in her mother.   Social History: reports that she has been smoking cigarettes. She started smoking about 28 years ago. She has a 14.2 pack-year smoking history. She has been exposed to tobacco smoke. She has never used smokeless tobacco. She reports that she does not currently use alcohol. She reports that she does not use drugs.    (Not in a hospital admission)     Allergies: Nickel   Objective   Vital Signs:  /78   Pulse 103   Ht 175.3 cm (69\")   Wt 61.2 kg (135 lb)   BMI 19.94 kg/m²   Estimated body mass index is 19.94 kg/m² as calculated from the following:    Height as of this encounter: 175.3 cm (69\").    Weight as of this encounter: 61.2 kg (135 lb).     BMI is within normal parameters. No other follow-up for BMI required.    Piper ROLLE Payton  reports that she has been smoking cigarettes. She started smoking about 28 years ago. She has a 14.2 pack-year smoking history. She has been exposed to tobacco smoke. She has never used smokeless tobacco. I have educated her on the risk of diseases from using tobacco products such as cancer, COPD, and heart disease.     I advised her to quit and she is not willing to quit.    I spent  2  minutes counseling the patient.             Physical Exam fullness noted in the left upper quadrant from her pancreatic pseudocyst with mild tenderness.  Strong palpable pedal pulses noted bilaterally.  Result Review :                     Assessment and Plan     Diagnoses " Gastroesophageal reflux disease      Current Outpatient Medications   Medication     Ascorbic Acid (VITAMIN C) 500 MG CAPS     B Complex Vitamins (B COMPLEX-B12 PO)     cholecalciferol (VITAMIN D3) 400 unit (10 mcg) TABS tablet     docusate sodium (COLACE) 100 MG capsule     ENOVARX-LIDOCAINE HCL 5 % cream     Fesoterodine Fumarate 8 MG TB24     hydrocortisone (ANUSOL-HC) 2.5 % cream     lidocaine-prilocaine (EMLA) 2.5-2.5 % external cream     LORazepam (ATIVAN) 0.5 MG tablet     Lutein 20 MG CAPS     mirabegron (MYRBETRIQ) 50 MG 24 hr tablet     Multiple vitamin TABS     naloxone (NARCAN) 4 MG/0.1ML nasal spray     omeprazole (PRILOSEC) 20 MG DR capsule     ondansetron (ZOFRAN) 8 MG tablet     prochlorperazine (COMPAZINE) 10 MG tablet     solifenacin (VESICARE) 10 MG tablet     traMADol (ULTRAM) 50 MG tablet     trospium (SANCTURA XR) 60 MG CP24 24 hr capsule     Current Facility-Administered Medications   Medication     lidocaine (XYLOCAINE) 5 % ointment     Allergies   Allergen Reactions     Penicillins      Sulfa Drugs      Physical Exam:/82   Pulse 82   Temp 97.9  F (36.6  C) (Oral)   Resp 16   Wt 74.5 kg (164 lb 4.8 oz)   LMP 11/27/2001 (Approximate)   SpO2 99%   BMI 33.34 kg/m     ECOG PS- 0-1  Constitutional: Alert, cooperative, and in no distress.   ENT: Eyes bright, No mouth sores  Neck: Supple, No adenopathy.  Cardiac: Heart rate and rhythm is regular and strong without murmur  Respiratory: Breathing easy. Lung sounds clear to auscultation  GI: Abdomen is soft, rounded, non-tender, BS normal. No masses or organomegaly  MS: Muscle tone normal, extremities normal with no edema. Able to get to exam table with minimal assistance.  Skin: No suspicious lesions or rashes  Neuro: Sensory grossly WNL, gait normal.   Lymph: Normal ant/post cervical, axillary, supraclavicular nodes  Psych: Mentation appears normal and affect is normal and bright.    Laboratory Results:   Results for orders placed or  and all orders for this visit:    1. Laceration of spleen, subsequent encounter (Primary)    2. Pancreatic pseudocyst    3. Chronic pancreatitis, unspecified pancreatitis type    4. Tobacco abuse    Unfortunately, her pancreatic pseudocyst has increased in size with increasing pain.  It is likely that she is going to need pancreatic resection as well as splenectomy.  Even though I will not be involved in this had a lengthy discussion with her in regards to this, along with her significant other.  Bleeding risk should be significantly less since there is been coil embolization of her splenic artery.  Otherwise, I will see her back as needed.         Follow Up     Return if symptoms worsen or fail to improve.  Patient was given instructions and counseling regarding her condition or for health maintenance advice. Please see specific information pulled into the AVS if appropriate.        performed in visit on 02/10/20   CBC with platelets differential     Status: Abnormal   Result Value Ref Range    WBC 14.7 (H) 4.0 - 11.0 10e9/L    RBC Count 3.96 3.8 - 5.2 10e12/L    Hemoglobin 11.8 11.7 - 15.7 g/dL    Hematocrit 37.6 35.0 - 47.0 %    MCV 95 78 - 100 fl    MCH 29.8 26.5 - 33.0 pg    MCHC 31.4 (L) 31.5 - 36.5 g/dL    RDW 18.2 (H) 10.0 - 15.0 %    Platelet Count 173 150 - 450 10e9/L    Diff Method Automated Method     % Neutrophils 64.8 %    % Lymphocytes 22.9 %    % Monocytes 5.8 %    % Eosinophils 1.4 %    % Basophils 0.8 %    % Immature Granulocytes 4.3 %    Absolute Neutrophil 9.5 (H) 1.6 - 8.3 10e9/L    Absolute Lymphocytes 3.4 0.8 - 5.3 10e9/L    Absolute Monocytes 0.9 0.0 - 1.3 10e9/L    Absolute Eosinophils 0.2 0.0 - 0.7 10e9/L    Absolute Basophils 0.1 0.0 - 0.2 10e9/L    Abs Immature Granulocytes 0.6 (H) 0 - 0.4 10e9/L   Comprehensive metabolic panel     Status: Abnormal   Result Value Ref Range    Sodium 141 133 - 144 mmol/L    Potassium 4.1 3.4 - 5.3 mmol/L    Chloride 109 94 - 109 mmol/L    Carbon Dioxide 26 20 - 32 mmol/L    Anion Gap 6 3 - 14 mmol/L    Glucose 93 70 - 99 mg/dL    Urea Nitrogen 13 7 - 30 mg/dL    Creatinine 0.55 0.52 - 1.04 mg/dL    GFR Estimate >90 >60 mL/min/[1.73_m2]    GFR Estimate If Black >90 >60 mL/min/[1.73_m2]    Calcium 8.7 8.5 - 10.1 mg/dL    Bilirubin Total 0.2 0.2 - 1.3 mg/dL    Albumin 3.3 (L) 3.4 - 5.0 g/dL    Protein Total 7.1 6.8 - 8.8 g/dL    Alkaline Phosphatase 197 (H) 40 - 150 U/L    ALT 23 0 - 50 U/L    AST 22 0 - 45 U/L   Protein qualitative urine     Status: None   Result Value Ref Range    Protein Albumin Urine Negative NEG^Negative mg/dL     Assessment and Plan:   Stage IV Adenocarcinoma of the rectum-Pt continues treatment with FOLFOX/Avastin since 11/25/2019. She is meeting goals for continuation of cycle 6 though continues with nausea despite medications for this issue but is tolerating without weight loss.   Pt will return in 2 weeks after  imaging for follow up with plan to see if improvement noted.   Bone metastasis- pt continuing Zometa every 3 months- due after 2/18/2020. Confirms she is using calcium and vitamin D daily.Encouraged regular walks or some kind of exercise.    Nausea- continues to state first week associated with Nausea and using supplements only. Continues use of Aloxi and Emend with prednisone for premed.Using Emend on day of pump disconnect since last cycle and does feel it was helpful but still having trouble first week but eating well with 2nd week with no significant weight loss.   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   This was a 25 min visit with > 50% in counseling and coordinating care including education and management of concerns.    Felipa Garner,CNP

## 2024-05-22 ENCOUNTER — OFFICE VISIT (OUTPATIENT)
Age: 44
End: 2024-05-22
Payer: MEDICAID

## 2024-05-22 VITALS
DIASTOLIC BLOOD PRESSURE: 78 MMHG | WEIGHT: 135 LBS | HEIGHT: 69 IN | HEART RATE: 103 BPM | BODY MASS INDEX: 19.99 KG/M2 | SYSTOLIC BLOOD PRESSURE: 110 MMHG

## 2024-05-22 DIAGNOSIS — K86.1 CHRONIC PANCREATITIS, UNSPECIFIED PANCREATITIS TYPE: ICD-10-CM

## 2024-05-22 DIAGNOSIS — S36.039D LACERATION OF SPLEEN, SUBSEQUENT ENCOUNTER: Primary | ICD-10-CM

## 2024-05-22 DIAGNOSIS — Z72.0 TOBACCO ABUSE: ICD-10-CM

## 2024-05-22 DIAGNOSIS — K86.3 PANCREATIC PSEUDOCYST: ICD-10-CM

## 2024-07-09 ENCOUNTER — HOSPITAL ENCOUNTER (EMERGENCY)
Facility: HOSPITAL | Age: 44
Discharge: HOME | End: 2024-07-10
Attending: EMERGENCY MEDICINE
Payer: MEDICAID

## 2024-07-09 VITALS
BODY MASS INDEX: 19.26 KG/M2 | HEIGHT: 69 IN | HEART RATE: 88 BPM | RESPIRATION RATE: 16 BRPM | SYSTOLIC BLOOD PRESSURE: 109 MMHG | WEIGHT: 130 LBS | DIASTOLIC BLOOD PRESSURE: 84 MMHG | OXYGEN SATURATION: 98 % | TEMPERATURE: 98.3 F

## 2024-07-09 DIAGNOSIS — S61.011A LACERATION OF SKIN OF RIGHT THUMB, INITIAL ENCOUNTER: Primary | ICD-10-CM

## 2024-07-09 PROCEDURE — 99283 EMERGENCY DEPT VISIT LOW MDM: CPT

## 2024-07-09 PROCEDURE — 99283 EMERGENCY DEPT VISIT LOW MDM: CPT | Performed by: EMERGENCY MEDICINE

## 2024-07-09 RX ORDER — GINSENG 100 MG
1 CAPSULE ORAL 2 TIMES DAILY
Qty: 13 G | Refills: 0 | Status: SHIPPED | OUTPATIENT
Start: 2024-07-09 | End: 2024-07-16

## 2024-07-09 RX ORDER — DIAPER,BRIEF,INFANT-TODD,DISP
1 EACH MISCELLANEOUS ONCE
Status: COMPLETED | OUTPATIENT
Start: 2024-07-10 | End: 2024-07-10

## 2024-07-10 PROCEDURE — 90715 TDAP VACCINE 7 YRS/> IM: CPT | Performed by: EMERGENCY MEDICINE

## 2024-07-10 PROCEDURE — 25010000002 TETANUS-DIPHTH-ACELL PERTUSSIS 5-2.5-18.5 LF-MCG/0.5 SUSPENSION PREFILLED SYRINGE: Performed by: EMERGENCY MEDICINE

## 2024-07-10 PROCEDURE — 90471 IMMUNIZATION ADMIN: CPT | Performed by: EMERGENCY MEDICINE

## 2024-07-10 RX ADMIN — BACITRACIN 0.9 G: 500 OINTMENT TOPICAL at 00:01

## 2024-07-10 RX ADMIN — TETANUS TOXOID, REDUCED DIPHTHERIA TOXOID AND ACELLULAR PERTUSSIS VACCINE, ADSORBED 0.5 ML: 5; 2.5; 8; 8; 2.5 SUSPENSION INTRAMUSCULAR at 00:02

## 2024-07-10 NOTE — DISCHARGE INSTRUCTIONS
Keep dressing clean and dry  Return ED fever, bleeding, infection, worse condition, any other concerns

## 2024-07-10 NOTE — FSED PROVIDER NOTE
Subjective   History of Present Illness  44yo female presents ED c/o accidental laceration right thumb while cutting potatoes approximately 4hrs prior to arrival.  Last tetanus immunization unknown.  ROS otherwise noncontributory.    History provided by:  Patient  Finger Laceration      Review of Systems   Constitutional: Negative.    HENT: Negative.     Eyes: Negative.    Respiratory: Negative.     Cardiovascular: Negative.    Gastrointestinal: Negative.    Skin:  Positive for wound.   Allergic/Immunologic: Positive for immunocompromised state.   All other systems reviewed and are negative.      Past Medical History:   Diagnosis Date   • Anemia    • Anxiety    • Constipation    • Cyst of spleen    • Diarrhea    • E. coli bacteremia    • ETOH abuse    • Frequent UTI    • GERD (gastroesophageal reflux disease)     WITHOUT ESOPHAGITIS   • Hiatal hernia    • Infarction of spleen 02/16/2024   • Left flank pain 10/21/2022   • Migraine    • Miscarriage 2004   • Pancreatitis    • Pseudocyst of pancreas        Allergies   Allergen Reactions   • Nickel Rash       Past Surgical History:   Procedure Laterality Date   • EMBOLIZATION MESENTERIC ARTERY N/A 01/22/2024    Procedure: OR EMBOLIZATION MESENTERIC ARTERY RIGHT FEMORAL POPLITEAL THROMBECTOMY;  Surgeon: Carine Pena Jr., MD;  Location: [facility] HYBRID OR;  Service: Vascular;  Laterality: N/A;   • ENDOSCOPY N/A 08/10/2022    Procedure: ESOPHAGOGASTRODUODENOSCOPY with bxs;  Surgeon: Salvador Price MD;  Location: [facility] ENDOSCOPY;  Service: Gastroenterology;  Laterality: N/A;  Pre: r/o esophageal  varacies, abd pain  Post: esophageal plaque   • ENDOSCOPY N/A 10/09/2023    Procedure: ESOPHAGOGASTRODUODENOSCOPY WITH STENT REMOVAL;  Surgeon: Red Denson MD;  Location: [facility] ENDOSCOPY;  Service: Gastroenterology;  Laterality: N/A;   • ENDOSCOPY N/A 04/03/2024    Procedure: ESOPHAGOGASTRODUODENOSCOPY (EGD) WITH BIOPSIES;  Surgeon: Petar Starr MD;  [facility];  Service: Gastroenterology;  Laterality: N/A;  PRE- ABDOMINAL PAIN  POST - GASTRITIS, ESOPHAGITIS,BILE REFLUX   • PANCREATIC CYST DRAINAGE      x 2   • SPLENECTOMY  2024    SPLEEN REPAIR   • UPPER ENDOSCOPIC ULTRASOUND W/ FNA N/A 09/13/2023    Procedure: Esophagogastroduodenoscopy with biopsy x 1 area and endoscopic ultrasound with placement of cyst gastrostomy;  Surgeon: Red Denson MD;  Location: [facility] ENDOSCOPY;  Service: Gastroenterology;  Laterality: N/A;  post: pancreatic psuedocyst   • WISDOM TOOTH EXTRACTION         Family History   Problem Relation Age of Onset   • Alcohol abuse Mother    • Arthritis Mother    • Asthma Mother    • Miscarriages / Stillbirths Mother    • Cancer Father    • Diabetes Father    • Drug abuse Father    • Early death Father    • Heart disease Father    • Hyperlipidemia Father    • Hypertension Father    • Alcohol abuse Paternal Aunt    • Cancer Paternal Aunt    • Diabetes Paternal Aunt    • Drug abuse Paternal Aunt    • Early death Paternal Aunt    • Heart disease Paternal Aunt    • Hyperlipidemia Paternal Aunt    • Hypertension Paternal Aunt    • Alcohol abuse Maternal Grandmother    • Alcohol abuse Maternal Grandfather    • Alcohol abuse Paternal Grandmother    • Cancer Paternal Grandmother    • Diabetes Paternal Grandmother    • Drug abuse Paternal Grandmother    • Early death Paternal Grandmother    • Heart disease Paternal Grandmother    • Hyperlipidemia Paternal Grandmother    • Hypertension Paternal Grandmother    • Alcohol abuse Paternal Grandfather        Social History     Socioeconomic History   • Marital status: Single   Tobacco Use   • Smoking status: Every Day     Current packs/day: 0.50     Average packs/day: 0.5 packs/day for 28.4 years (14.2 ttl pk-yrs)     Types: Cigarettes     Start date: 1/28/1996     Passive exposure: Current   • Smokeless tobacco: Never   Vaping Use   • Vaping status: Never Used   Substance and Sexual Activity   •  Alcohol use: Not Currently   • Drug use: Never   • Sexual activity: Defer     Partners: Male           Objective   Physical Exam  Vitals and nursing note reviewed.   Constitutional:       Appearance: Normal appearance.   HENT:      Head: Normocephalic and atraumatic.      Right Ear: External ear normal.      Left Ear: External ear normal.      Nose: Nose normal.      Mouth/Throat:      Mouth: Mucous membranes are moist.      Pharynx: Oropharynx is clear.   Eyes:      Pupils: Pupils are equal, round, and reactive to light.   Cardiovascular:      Rate and Rhythm: Normal rate and regular rhythm.      Pulses: Normal pulses.      Heart sounds: Normal heart sounds. No murmur heard.     No friction rub. No gallop.   Pulmonary:      Effort: Pulmonary effort is normal.      Breath sounds: Normal breath sounds. No wheezing, rhonchi or rales.   Abdominal:      General: Abdomen is flat. Bowel sounds are normal. There is no distension.      Palpations: Abdomen is soft.      Tenderness: There is no abdominal tenderness.   Musculoskeletal:        Hands:       Cervical back: Normal range of motion and neck supple. No rigidity.   Lymphadenopathy:      Cervical: No cervical adenopathy.   Skin:     General: Skin is warm.   Neurological:      General: No focal deficit present.      Mental Status: She is alert and oriented to person, place, and time.      GCS: GCS eye subscore is 4. GCS verbal subscore is 5. GCS motor subscore is 6.       Procedures           ED Course                                           Medical Decision Making  Problems Addressed:  Laceration of skin of right thumb, initial encounter: complicated acute illness or injury    Risk  OTC drugs.  Prescription drug management.        Final diagnoses:   Laceration of skin of right thumb, initial encounter       ED Disposition  ED Disposition       ED Disposition   Discharge    Condition   Good    Comment   --               Sharmaine Gatica, APRN  [address]  [address]  [place] [id number] [phone]    In 2 days  For wound re-check         Medication List        New Prescriptions      bacitracin 500 UNIT/GM ointment  Apply 1 Application topically to the appropriate area as directed 2 (Two) Times a Day for 7 days.               Where to Get Your Medications        These medications were sent to [facility]/pharmacy #[id number] - [place], [place] - [address] AT [facility] - [phone]  - [phone]   [address], [facility] [address]      Phone: [phone]   bacitracin 500 UNIT/GM ointment

## 2024-07-18 ENCOUNTER — OFFICE (OUTPATIENT)
Dept: URBAN - METROPOLITAN AREA CLINIC 64 | Facility: CLINIC | Age: 44
End: 2024-07-18
Payer: COMMERCIAL

## 2024-07-18 VITALS
HEART RATE: 110 BPM | DIASTOLIC BLOOD PRESSURE: 84 MMHG | SYSTOLIC BLOOD PRESSURE: 101 MMHG | HEIGHT: 71 IN | WEIGHT: 131 LBS

## 2024-07-18 DIAGNOSIS — K85.90 ACUTE PANCREATITIS WITHOUT NECROSIS OR INFECTION, UNSPECIFIE: ICD-10-CM

## 2024-07-18 PROCEDURE — 99213 OFFICE O/P EST LOW 20 MIN: CPT | Performed by: NURSE PRACTITIONER

## 2024-09-13 ENCOUNTER — HOSPITAL ENCOUNTER (EMERGENCY)
Facility: HOSPITAL | Age: 44
Discharge: HOME | End: 2024-09-13
Attending: EMERGENCY MEDICINE
Payer: MEDICAID

## 2024-09-13 ENCOUNTER — APPOINTMENT (OUTPATIENT)
Dept: CT IMAGING | Facility: HOSPITAL | Age: 44
End: 2024-09-13
Payer: MEDICAID

## 2024-09-13 ENCOUNTER — APPOINTMENT (OUTPATIENT)
Dept: GENERAL RADIOLOGY | Facility: HOSPITAL | Age: 44
End: 2024-09-13
Payer: MEDICAID

## 2024-09-13 VITALS
RESPIRATION RATE: 16 BRPM | HEIGHT: 69 IN | SYSTOLIC BLOOD PRESSURE: 108 MMHG | HEART RATE: 95 BPM | WEIGHT: 130 LBS | TEMPERATURE: 97.7 F | DIASTOLIC BLOOD PRESSURE: 87 MMHG | OXYGEN SATURATION: 98 % | BODY MASS INDEX: 19.26 KG/M2

## 2024-09-13 DIAGNOSIS — N10 ACUTE PYELONEPHRITIS: Primary | ICD-10-CM

## 2024-09-13 LAB
ALBUMIN SERPL-MCNC: 4.3 G/DL (ref 3.5–5.2)
ALBUMIN/GLOB SERPL: 1.4 G/DL
ALP SERPL-CCNC: 108 U/L (ref 39–117)
ALT SERPL W P-5'-P-CCNC: 16 U/L (ref 1–33)
ANION GAP SERPL CALCULATED.3IONS-SCNC: 10.1 MMOL/L (ref 5–15)
AST SERPL-CCNC: 19 U/L (ref 1–32)
B-HCG UR QL: NEGATIVE
BASOPHILS # BLD AUTO: 0.05 10*3/MM3 (ref 0–0.2)
BASOPHILS NFR BLD AUTO: 0.8 % (ref 0–1.5)
BILIRUB SERPL-MCNC: 0.3 MG/DL (ref 0–1.2)
BILIRUB UR QL STRIP: NEGATIVE
BUN SERPL-MCNC: 3 MG/DL (ref 6–20)
BUN/CREAT SERPL: 4.9 (ref 7–25)
CALCIUM SPEC-SCNC: 9.1 MG/DL (ref 8.6–10.5)
CHLORIDE SERPL-SCNC: 103 MMOL/L (ref 98–107)
CLARITY UR: CLEAR
CO2 SERPL-SCNC: 26.9 MMOL/L (ref 22–29)
COLOR UR: YELLOW
CREAT SERPL-MCNC: 0.61 MG/DL (ref 0.57–1)
D DIMER PPP FEU-MCNC: 0.42 MCGFEU/ML (ref 0–0.5)
DEPRECATED RDW RBC AUTO: 58.1 FL (ref 37–54)
EGFRCR SERPLBLD CKD-EPI 2021: 115.4 ML/MIN/1.73
EOSINOPHIL # BLD AUTO: 0.2 10*3/MM3 (ref 0–0.4)
EOSINOPHIL NFR BLD AUTO: 3 % (ref 0.3–6.2)
ERYTHROCYTE [DISTWIDTH] IN BLOOD BY AUTOMATED COUNT: 15.8 % (ref 12.3–15.4)
ETHANOL BLD-MCNC: 102 MG/DL (ref 0–10)
ETHANOL UR QL: 0.1 %
GLOBULIN UR ELPH-MCNC: 3 GM/DL
GLUCOSE SERPL-MCNC: 115 MG/DL (ref 65–99)
GLUCOSE UR STRIP-MCNC: NEGATIVE MG/DL
HCT VFR BLD AUTO: 42.8 % (ref 34–46.6)
HGB BLD-MCNC: 14.6 G/DL (ref 12–15.9)
HGB UR QL STRIP.AUTO: NEGATIVE
IMM GRANULOCYTES # BLD AUTO: 0 10*3/MM3 (ref 0–0.05)
IMM GRANULOCYTES NFR BLD AUTO: 0 % (ref 0–0.5)
KETONES UR QL STRIP: NEGATIVE
LEUKOCYTE ESTERASE UR QL STRIP.AUTO: NEGATIVE
LIPASE SERPL-CCNC: 32 U/L (ref 13–60)
LYMPHOCYTES # BLD AUTO: 3.33 10*3/MM3 (ref 0.7–3.1)
LYMPHOCYTES NFR BLD AUTO: 50 % (ref 19.6–45.3)
MCH RBC QN AUTO: 33.8 PG (ref 26.6–33)
MCHC RBC AUTO-ENTMCNC: 34.1 G/DL (ref 31.5–35.7)
MCV RBC AUTO: 99.1 FL (ref 79–97)
MONOCYTES # BLD AUTO: 0.63 10*3/MM3 (ref 0.1–0.9)
MONOCYTES NFR BLD AUTO: 9.5 % (ref 5–12)
NEUTROPHILS NFR BLD AUTO: 2.45 10*3/MM3 (ref 1.7–7)
NEUTROPHILS NFR BLD AUTO: 36.7 % (ref 42.7–76)
NITRITE UR QL STRIP: NEGATIVE
PH UR STRIP.AUTO: 5.5 [PH] (ref 5–8)
PLATELET # BLD AUTO: 277 10*3/MM3 (ref 140–450)
PMV BLD AUTO: 9.7 FL (ref 6–12)
POTASSIUM SERPL-SCNC: 3.3 MMOL/L (ref 3.5–5.2)
PROT SERPL-MCNC: 7.3 G/DL (ref 6–8.5)
PROT UR QL STRIP: NEGATIVE
RBC # BLD AUTO: 4.32 10*6/MM3 (ref 3.77–5.28)
SODIUM SERPL-SCNC: 140 MMOL/L (ref 136–145)
SP GR UR STRIP: <=1.005 (ref 1–1.03)
TROPONIN T SERPL HS-MCNC: <6 NG/L
UROBILINOGEN UR QL STRIP: NORMAL
WBC NRBC COR # BLD AUTO: 6.66 10*3/MM3 (ref 3.4–10.8)

## 2024-09-13 PROCEDURE — 85025 COMPLETE CBC W/AUTO DIFF WBC: CPT | Performed by: NURSE PRACTITIONER

## 2024-09-13 PROCEDURE — 84484 ASSAY OF TROPONIN QUANT: CPT | Performed by: NURSE PRACTITIONER

## 2024-09-13 PROCEDURE — 83690 ASSAY OF LIPASE: CPT | Performed by: NURSE PRACTITIONER

## 2024-09-13 PROCEDURE — 85379 FIBRIN DEGRADATION QUANT: CPT | Performed by: NURSE PRACTITIONER

## 2024-09-13 PROCEDURE — 25010000002 CEFTRIAXONE PER 250 MG: Performed by: NURSE PRACTITIONER

## 2024-09-13 PROCEDURE — 74177 CT ABD & PELVIS W/CONTRAST: CPT

## 2024-09-13 PROCEDURE — 81003 URINALYSIS AUTO W/O SCOPE: CPT | Performed by: EMERGENCY MEDICINE

## 2024-09-13 PROCEDURE — 36415 COLL VENOUS BLD VENIPUNCTURE: CPT

## 2024-09-13 PROCEDURE — 25510000001 IOPAMIDOL PER 1 ML: Performed by: EMERGENCY MEDICINE

## 2024-09-13 PROCEDURE — 71045 X-RAY EXAM CHEST 1 VIEW: CPT

## 2024-09-13 PROCEDURE — 80053 COMPREHEN METABOLIC PANEL: CPT | Performed by: NURSE PRACTITIONER

## 2024-09-13 PROCEDURE — 96374 THER/PROPH/DIAG INJ IV PUSH: CPT

## 2024-09-13 PROCEDURE — 99284 EMERGENCY DEPT VISIT MOD MDM: CPT | Performed by: NURSE PRACTITIONER

## 2024-09-13 PROCEDURE — 99285 EMERGENCY DEPT VISIT HI MDM: CPT

## 2024-09-13 PROCEDURE — 82077 ASSAY SPEC XCP UR&BREATH IA: CPT | Performed by: NURSE PRACTITIONER

## 2024-09-13 PROCEDURE — 81025 URINE PREGNANCY TEST: CPT | Performed by: EMERGENCY MEDICINE

## 2024-09-13 RX ORDER — SODIUM CHLORIDE 0.9 % (FLUSH) 0.9 %
10 SYRINGE (ML) INJECTION AS NEEDED
Status: DISCONTINUED | OUTPATIENT
Start: 2024-09-13 | End: 2024-09-13 | Stop reason: HOSPADM

## 2024-09-13 RX ORDER — IOPAMIDOL 755 MG/ML
100 INJECTION, SOLUTION INTRAVASCULAR
Status: COMPLETED | OUTPATIENT
Start: 2024-09-13 | End: 2024-09-13

## 2024-09-13 RX ADMIN — CEFTRIAXONE SODIUM 1000 MG: 1 INJECTION, POWDER, FOR SOLUTION INTRAMUSCULAR; INTRAVENOUS at 17:46

## 2024-09-13 RX ADMIN — IOPAMIDOL 85 ML: 755 INJECTION, SOLUTION INTRAVENOUS at 16:47

## 2024-09-13 NOTE — [facility] PROVIDER NOTE
EMERGENCY DEPARTMENT ENCOUNTER    Room Number:  07/07  Date seen:  9/13/2024  Time seen: 15:04 EDT  PCP: Sharmaine Gatica APRN  Historian: patient    Discussed/obtained information from independent historians: n/a    HPI:  Chief complaint: Left-sided abdominal pain  A complete HPI/ROS/PMH/PSH/SH/FH are unobtainable due to: Not applicable  Context:Piper Cain is a 43 y.o. female with history of pancreatitis, alcohol use disorder, GERD, pancreatic pseudocysts and recent splenectomy and partial pancreas removal June 2024 who presents to the ED with c/o 2 weeks of left sided abdominal pain.  It is described as random and stabbing and located left sided of abdomen, left ribs and back.  She has associated nausea which she treats with Zofran.  She is in pain management and in process of changing pain care providers.  She is out of her usual pain medication.  Denies fevers/chills, dyspnea on exertion but does state she has been having chest pain and has upcoming appointment with a Cardiologist in October.     External (non-ED) record review: I reviewed patient's recently surgical oncology office visit with Dr. Beasley from 8/1/2024.  Patient is status post distal pancreatic pseudocyst with extension into the spleen and is status post a distal pancreatic ectomy splenectomy June 2024.  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    Anemia of chronic disease 08/08/2022    GERD without  esophagitis 08/08/2022    Alcohol dependence in remission 08/08/2022    Pancreatic pseudocyst 08/14/2022    Candida esophagitis 08/14/2022    Ureterolithiasis 10/20/2022    Left flank pain 10/21/2022    Bacterial vaginosis 10/23/2022    Abdominal pain 11/30/2022    Adrenal hemorrhage 03/25/2023    Chronic pancreatitis, unspecified pancreatitis type 06/08/2023    Macrocytosis 06/08/2023    Left-sided chest pain 06/08/2023    Tobacco abuse 06/08/2023    Acute on chronic pancreatitis 08/23/2023    Alcohol abuse 08/23/2023    Acute UTI (urinary tract infection) 08/23/2023    Essential hypertension 08/23/2023    Migraine 08/23/2023    Generalized anxiety disorder 08/23/2023    Pancreatic pseudocyst 08/23/2023    Acute abdominal pain 09/05/2023    Splenic laceration 01/21/2024    Chronic, continuous use of opioids 01/24/2024    Functional asplenia 01/25/2024    Hematoma of spleen without rupture of capsule 04/25/2024     Resolved Ambulatory Problems     Diagnosis Date Noted    Hypokalemia 09/28/2017    Mixed acid base balance disorder 09/28/2017    Dehydration 09/28/2017    Nausea and vomiting 06/08/2023    Hyperglycemia 08/23/2023     Past Medical History:   Diagnosis Date    Anemia     Anxiety     Constipation     Cyst of spleen     Diarrhea     E. coli bacteremia     ETOH abuse     Frequent UTI     GERD (gastroesophageal reflux disease)     Hiatal hernia     Infarction of spleen 02/16/2024    Miscarriage 2004    Pancreatitis     Pseudocyst of pancreas        PAST SURGICAL HISTORY  Past Surgical History:   Procedure Laterality Date    EMBOLIZATION MESENTERIC ARTERY N/A 01/22/2024    Procedure: OR EMBOLIZATION MESENTERIC ARTERY RIGHT FEMORAL POPLITEAL THROMBECTOMY;  Surgeon: Carine Pena Jr., MD;  Location: [facility] HYBRID OR;  Service: Vascular;  Laterality: N/A;    ENDOSCOPY N/A 08/10/2022    Procedure: ESOPHAGOGASTRODUODENOSCOPY with bxs;  Surgeon: Salvador Price MD;  Location: [facility] ENDOSCOPY;  Service:  Gastroenterology;  Laterality: N/A;  Pre: r/o esophageal  varacies, abd pain  Post: esophageal plaque    ENDOSCOPY N/A 10/09/2023    Procedure: ESOPHAGOGASTRODUODENOSCOPY WITH STENT REMOVAL;  Surgeon: Red Denson MD;  Location: [facility] ENDOSCOPY;  Service: Gastroenterology;  Laterality: N/A;    ENDOSCOPY N/A 04/03/2024    Procedure: ESOPHAGOGASTRODUODENOSCOPY (EGD) WITH BIOPSIES;  Surgeon: Petar Starr MD;  Location: [facility] ENDOSCOPY;  Service: Gastroenterology;  Laterality: N/A;  PRE- ABDOMINAL PAIN  POST - GASTRITIS, ESOPHAGITIS,BILE REFLUX    PANCREATIC CYST DRAINAGE      x 2    SPLENECTOMY  2024    SPLEEN REPAIR    UPPER ENDOSCOPIC ULTRASOUND W/ FNA N/A 09/13/2023    Procedure: Esophagogastroduodenoscopy with biopsy x 1 area and endoscopic ultrasound with placement of cyst gastrostomy;  Surgeon: Red Denson MD;  Location: [facility] ENDOSCOPY;  Service: Gastroenterology;  Laterality: N/A;  post: pancreatic psuedocyst    WISDOM TOOTH EXTRACTION         FAMILY HISTORY  Family History   Problem Relation Age of Onset    Alcohol abuse Mother     Arthritis Mother     Asthma Mother     Miscarriages / Stillbirths Mother     Cancer Father     Diabetes Father     Drug abuse Father     Early death Father     Heart disease Father     Hyperlipidemia Father     Hypertension Father     Alcohol abuse Paternal Aunt     Cancer Paternal Aunt     Diabetes Paternal Aunt     Drug abuse Paternal Aunt     Early death Paternal Aunt     Heart disease Paternal Aunt     Hyperlipidemia Paternal Aunt     Hypertension Paternal Aunt     Alcohol abuse Maternal Grandmother     Alcohol abuse Maternal Grandfather     Alcohol abuse Paternal Grandmother     Cancer Paternal Grandmother     Diabetes Paternal Grandmother     Drug abuse Paternal Grandmother     Early death Paternal Grandmother     Heart disease Paternal Grandmother     Hyperlipidemia Paternal Grandmother     Hypertension Paternal Grandmother     Alcohol abuse Paternal  Grandfather        SOCIAL HISTORY  Social History     Socioeconomic History    Marital status: Single   Tobacco Use    Smoking status: Every Day     Current packs/day: 0.50     Average packs/day: 0.5 packs/day for 28.6 years (14.3 ttl pk-yrs)     Types: Cigarettes     Start date: 1/28/1996     Passive exposure: Current    Smokeless tobacco: Never   Vaping Use    Vaping status: Never Used   Substance and Sexual Activity    Alcohol use: Not Currently    Drug use: Never    Sexual activity: Defer     Partners: Male       REVIEW OF SYSTEMS  Review of Systems    All systems reviewed and negative except for those discussed in HPI.     PHYSICAL EXAM    I have reviewed the triage vital signs and nursing notes.  Vitals:    09/13/24 1803   BP: 108/87   Pulse: 95   Resp:    Temp:    SpO2: 98%     Physical Exam  Abdominal:          Comments: Well healed surgical incision.      No rebound/guarding.           GENERAL: not distressed  HENT: nares patent  EYES: no scleral icterus  NECK: no ROM limitations  CV: regular rhythm, regular rate, no murmur  RESPIRATORY: normal effort, CTAB  ABDOMEN: soft.  See above  : deferred  MUSCULOSKELETAL: no deformity  NEURO: alert, moves all extremities, follows commands  SKIN: warm, dry    LAB RESULTS  Recent Results (from the past 24 hour(s))   Ethanol    Collection Time: 09/13/24  3:41 PM    Specimen: Blood   Result Value Ref Range    Ethanol 102 (H) 0 - 10 mg/dL    Ethanol % 0.102 %   Comprehensive Metabolic Panel    Collection Time: 09/13/24  3:41 PM    Specimen: Blood   Result Value Ref Range    Glucose 115 (H) 65 - 99 mg/dL    BUN 3 (L) 6 - 20 mg/dL    Creatinine 0.61 0.57 - 1.00 mg/dL    Sodium 140 136 - 145 mmol/L    Potassium 3.3 (L) 3.5 - 5.2 mmol/L    Chloride 103 98 - 107 mmol/L    CO2 26.9 22.0 - 29.0 mmol/L    Calcium 9.1 8.6 - 10.5 mg/dL    Total Protein 7.3 6.0 - 8.5 g/dL    Albumin 4.3 3.5 - 5.2 g/dL    ALT (SGPT) 16 1 - 33 U/L    AST (SGOT) 19 1 - 32 U/L    Alkaline Phosphatase  108 39 - 117 U/L    Total Bilirubin 0.3 0.0 - 1.2 mg/dL    Globulin 3.0 gm/dL    A/G Ratio 1.4 g/dL    BUN/Creatinine Ratio 4.9 (L) 7.0 - 25.0    Anion Gap 10.1 5.0 - 15.0 mmol/L    eGFR 115.4 >60.0 mL/min/1.73   Lipase    Collection Time: 09/13/24  3:41 PM    Specimen: Blood   Result Value Ref Range    Lipase 32 13 - 60 U/L   CBC Auto Differential    Collection Time: 09/13/24  3:41 PM    Specimen: Blood   Result Value Ref Range    WBC 6.66 3.40 - 10.80 10*3/mm3    RBC 4.32 3.77 - 5.28 10*6/mm3    Hemoglobin 14.6 12.0 - 15.9 g/dL    Hematocrit 42.8 34.0 - 46.6 %    MCV 99.1 (H) 79.0 - 97.0 fL    MCH 33.8 (H) 26.6 - 33.0 pg    MCHC 34.1 31.5 - 35.7 g/dL    RDW 15.8 (H) 12.3 - 15.4 %    RDW-SD 58.1 (H) 37.0 - 54.0 fl    MPV 9.7 6.0 - 12.0 fL    Platelets 277 140 - 450 10*3/mm3    Neutrophil % 36.7 (L) 42.7 - 76.0 %    Lymphocyte % 50.0 (H) 19.6 - 45.3 %    Monocyte % 9.5 5.0 - 12.0 %    Eosinophil % 3.0 0.3 - 6.2 %    Basophil % 0.8 0.0 - 1.5 %    Immature Grans % 0.0 0.0 - 0.5 %    Neutrophils, Absolute 2.45 1.70 - 7.00 10*3/mm3    Lymphocytes, Absolute 3.33 (H) 0.70 - 3.10 10*3/mm3    Monocytes, Absolute 0.63 0.10 - 0.90 10*3/mm3    Eosinophils, Absolute 0.20 0.00 - 0.40 10*3/mm3    Basophils, Absolute 0.05 0.00 - 0.20 10*3/mm3    Immature Grans, Absolute 0.00 0.00 - 0.05 10*3/mm3   D-dimer, Quantitative    Collection Time: 09/13/24  3:41 PM    Specimen: Blood   Result Value Ref Range    D-Dimer, Quantitative 0.42 0.00 - 0.50 MCGFEU/mL   Single High Sensitivity Troponin T    Collection Time: 09/13/24  3:41 PM    Specimen: Blood   Result Value Ref Range    HS Troponin T <6 <14 ng/L   Pregnancy, Urine - Urine, Clean Catch    Collection Time: 09/13/24  5:33 PM    Specimen: Urine, Clean Catch   Result Value Ref Range    HCG, Urine QL Negative Negative   Urinalysis With Culture If Indicated - Urine, Clean Catch    Collection Time: 09/13/24  5:33 PM    Specimen: Urine, Clean Catch   Result Value Ref Range    Color, UA  Yellow Yellow, Straw    Appearance, UA Clear Clear    pH, UA 5.5 5.0 - 8.0    Specific Gravity, UA <=1.005 1.005 - 1.030    Glucose, UA Negative Negative    Ketones, UA Negative Negative    Bilirubin, UA Negative Negative    Blood, UA Negative Negative    Protein, UA Negative Negative    Leuk Esterase, UA Negative Negative    Nitrite, UA Negative Negative    Urobilinogen, UA 0.2 E.U./dL 0.2 - 1.0 E.U./dL       Ordered the above labs and independently interpreted results.  My findings will be discussed in the ED course or medical decision making section below    RADIOLOGY RESULTS  XR Chest 1 View    Result Date: 9/13/2024  XR CHEST 1 VW-   INDICATION: Rib pain on left  COMPARISON: Chest radiograph January 21, 2024  TECHNIQUE: 1 view chest  FINDINGS:  No focal opacity. No effusions. Normal mediastinal contour. Heart is normal in size.      No acute cardiopulmonary process  This report was finalized on 9/13/2024 5:27 PM by Dr. Petar Verde M.D on Workstation: [id number]      CT Abdomen Pelvis With Contrast    Result Date: 9/13/2024  CT ABDOMEN AND PELVIS WITH IV CONTRAST  HISTORY: 43-year-old female with left upper quadrant pain. Open splenectomy and partial pancreatectomy on 06/24/2024. Chronic pancreatitis history.  TECHNIQUE: Radiation dose reduction techniques were utilized, including automated exposure control and exposure modulation based on body size. 3 mm images were obtained through the abdomen and pelvis after the administration of IV contrast. Compared with previous CT 04/25/2024.  FINDINGS: 1. There are expected post surgical changes at the left upper abdomen with a small amount of residual fluid at the surgical bed extending around the stomach beneath the left hemidiaphragm. There is a 2.6 x 1 cm fluid collection along the pancreatic resection margin, series 2 image 36. This likely represents residual postsurgical change or pseudocyst. Conservative surveillance is recommended with a follow-up  contrast-enhanced CT abdomen in 2-3 months.  2. There is new left perinephric stranding and new mild thickening of the left renal pelvis and ureter. Acute left pyelonephritis is suspected. No renal or ureteral stones and there is no evidence for left ureteral obstruction. Urinary bladder wall is thickened, but the bladder is partially collapsed. Please correlate clinically for acute UTI/cystitis and left pyelonephritis. Right kidney appears unremarkable.  3. Gastric folds are thickened and gastritis is suspected. Please correlate clinically. There is a mildly thickened appearance of the splenic flexure which is adjacent to the residual postsurgical change. There is no bowel ileus or obstruction. No evidence for colitis or appendicitis. Uterus and adnexa appear unremarkable.  4. Liver, gallbladder, and adrenals appear unremarkable. Many calcifications are noted within the remaining pancreas.  5. There is no evidence for pneumonia or pleural effusions at the visualized lung bases.        Ordered the above noted radiological studies.  Independently interpreted by me.  My findings will be discussed in the medical decision section below.     PROGRESS, DATA ANALYSIS, CONSULTS AND MEDICAL DECISION MAKING    Please note that this section constitutes my independent interpretation of clinical data including lab results, radiology, EKG's.  This constitutes my independent professional opinion regarding differential diagnosis and management of this patient.  It may include any factors such as history from outside sources, review of external records, social determinants of health, management of medications, response to those treatments, and discussions with other providers.    ED Course as of 09/13/24 1813   Fri Sep 13, 2024   1627 Ethanol(!): 102  Pt has a ride home.    [EW]   1723 CT concerning for pyelonephritis.  Pt ambulates to  to give urine specimen.  I have ordered dose of Rocephin.  I discussed her CT results with her.   There is concern for a post operative fluid collection (anticipated) vs recurrent pseudocyst of pancreas.  I discussed that she needs to follow up with Dr. Beasley, her surgeon for repeat CT scan of abdomen/pelvis in 2-3 months.  [EW]      ED Course User Index  [EW] Aleisha Farias APRN     Orders placed during this visit:  Orders Placed This Encounter   Procedures    XR Chest 1 View    CT Abdomen Pelvis With Contrast    Pregnancy, Urine - Urine, Clean Catch    Urinalysis With Culture If Indicated - Urine, Clean Catch    Ethanol    Comprehensive Metabolic Panel    Lipase    CBC Auto Differential    D-dimer, Quantitative    Single High Sensitivity Troponin T    Blood Draw With IV Start    Insert peripheral IV    CBC & Differential    ED Acknowledgement Form Needed;            Medical Decision Making  Problems Addressed:  Acute pyelonephritis: complicated acute illness or injury    Amount and/or Complexity of Data Reviewed  Labs: ordered. Decision-making details documented in ED Course.  Radiology: ordered.    Risk  Prescription drug management.    Pt presents with 2 weeks of left sided abdominal pain. I considered recurrent pancreatitis, post operative fluid collection, renal stone, pneumonia.  She has h/o recent (June 2024) partial pancreatectomy and splenectomy for recurrent pancreatic pseudocysts (Dr. Beasley).  She has some chronic nausea and home antiemetics.  She is established with pain management. CT does show acute left sided pyelonephritis and I gave dose of Rocephin here and am sending her home on Augmentin.  I did discuss the importance of follow up for repeat CT scan in 2-3 months of abd/pelvis per Dr. Beasley to evaluate fluid collection.  RTER precautions advised. Pt did have  and is calling for a ride.         DIAGNOSIS  Final diagnoses:   Acute pyelonephritis          Medication List        New Prescriptions      amoxicillin-clavulanate 875-125 MG per tablet  Commonly known as:  AUGMENTIN  Take 1 tablet by mouth Every 12 (Twelve) Hours.               Where to Get Your Medications        These medications were sent to [facility]/pharmacy #[id number] - [place], [place] - [address]. AT [facility] - [phone]  - [phone] FX  [address] [facility] [address]      Phone: [phone]   amoxicillin-clavulanate 875-125 MG per tablet         FOLLOW-UP  Sharmaine Gatica, APRN  [id number] [facility] [address]  [place]  [facility] [id number] [phone]    Schedule an appointment as soon as possible for a visit in 1 week      nAgel Beasley MD  [address] [facility]. [address] [place] [id number] [phone]    Schedule an appointment as soon as possible for a visit           Latest Documented Vital Signs:  As of 18:13 EDT  BP- 108/87 HR- 95 Temp- 97.7 °F (36.5 °C) (Oral) O2 sat- 98%    Appropriate PPE utilized throughout this patient encounter to include mask, if indicated, per current protocol. Hand hygiene was performed before donning PPE and after removal when leaving the room.    Please note that portions of this were completed with a voice recognition program.     Note Disclaimer: At [facility], we believe that sharing information builds trust and better relationships. You are receiving this note because you are receiving care at [facility] or recently visited. It is possible you will see health information before a provider has talked with you about it. This kind of information can be easy to misunderstand. To help you fully understand what it means for your health, we urge you to discuss this note with your provider.

## 2024-09-13 NOTE — DISCHARGE INSTRUCTIONS
It is recommended you have repeat CT scan of abdomen and pelvis in 2-3 months to evaluate fluid collection that is post operative in nature.    Although you are being discharged from the ED today, I encourage you to return for worsening symptoms. Things can, and do, change such that treatment at home with medication may not be adequate. Specifically I recommend returning for chest pain or discomfort, difficulty breathing, persistent vomiting or difficulty holding down liquids or medications, fever > 102.0 F,  or any other worsening or alarming symptoms.     Rest. Drink plenty of fluids.  Follow up with PCP or provider listed for further evaluation and management.  Follow up with primary care provider for further management and to have blood pressure rechecked.  Take all medications as prescribed.

## 2024-10-17 ENCOUNTER — OFFICE (OUTPATIENT)
Age: 44
End: 2024-10-17
Payer: COMMERCIAL

## 2024-10-17 ENCOUNTER — OFFICE (OUTPATIENT)
Dept: URBAN - METROPOLITAN AREA CLINIC 64 | Facility: CLINIC | Age: 44
End: 2024-10-17
Payer: COMMERCIAL

## 2024-10-17 VITALS
HEIGHT: 71 IN | SYSTOLIC BLOOD PRESSURE: 139 MMHG | SYSTOLIC BLOOD PRESSURE: 139 MMHG | HEIGHT: 71 IN | SYSTOLIC BLOOD PRESSURE: 139 MMHG | HEART RATE: 90 BPM | DIASTOLIC BLOOD PRESSURE: 95 MMHG | WEIGHT: 120 LBS | DIASTOLIC BLOOD PRESSURE: 95 MMHG | WEIGHT: 120 LBS | HEART RATE: 90 BPM | HEIGHT: 71 IN | DIASTOLIC BLOOD PRESSURE: 95 MMHG | HEIGHT: 71 IN | DIASTOLIC BLOOD PRESSURE: 95 MMHG | DIASTOLIC BLOOD PRESSURE: 95 MMHG | SYSTOLIC BLOOD PRESSURE: 139 MMHG | SYSTOLIC BLOOD PRESSURE: 139 MMHG | SYSTOLIC BLOOD PRESSURE: 139 MMHG | HEART RATE: 90 BPM | DIASTOLIC BLOOD PRESSURE: 95 MMHG | HEIGHT: 71 IN | HEART RATE: 90 BPM | DIASTOLIC BLOOD PRESSURE: 95 MMHG | WEIGHT: 120 LBS | HEIGHT: 71 IN | HEART RATE: 90 BPM | WEIGHT: 120 LBS | WEIGHT: 120 LBS | WEIGHT: 120 LBS | HEIGHT: 71 IN | WEIGHT: 120 LBS | HEART RATE: 90 BPM | SYSTOLIC BLOOD PRESSURE: 139 MMHG | HEART RATE: 90 BPM

## 2024-10-17 DIAGNOSIS — R11.2 NAUSEA WITH VOMITING, UNSPECIFIED: ICD-10-CM

## 2024-10-17 DIAGNOSIS — R10.13 EPIGASTRIC PAIN: ICD-10-CM

## 2024-10-17 DIAGNOSIS — Z72.0 TOBACCO USE: ICD-10-CM

## 2024-10-17 DIAGNOSIS — R10.12 LEFT UPPER QUADRANT PAIN: ICD-10-CM

## 2024-10-17 DIAGNOSIS — K86.3 PSEUDOCYST OF PANCREAS: ICD-10-CM

## 2024-10-17 PROCEDURE — 99214 OFFICE O/P EST MOD 30 MIN: CPT | Performed by: INTERNAL MEDICINE

## 2024-12-18 ENCOUNTER — ON CAMPUS - OUTPATIENT (OUTPATIENT)
Dept: URBAN - METROPOLITAN AREA HOSPITAL 77 | Facility: HOSPITAL | Age: 44
End: 2024-12-18
Payer: COMMERCIAL

## 2024-12-18 DIAGNOSIS — K29.50 UNSPECIFIED CHRONIC GASTRITIS WITHOUT BLEEDING: ICD-10-CM

## 2024-12-18 DIAGNOSIS — T18.2XXA FOREIGN BODY IN STOMACH, INITIAL ENCOUNTER: ICD-10-CM

## 2024-12-18 DIAGNOSIS — R11.2 NAUSEA WITH VOMITING, UNSPECIFIED: ICD-10-CM

## 2024-12-18 DIAGNOSIS — R10.13 EPIGASTRIC PAIN: ICD-10-CM

## 2024-12-18 PROCEDURE — 43239 EGD BIOPSY SINGLE/MULTIPLE: CPT | Performed by: INTERNAL MEDICINE

## 2025-04-14 ENCOUNTER — ON CAMPUS - OUTPATIENT (OUTPATIENT)
Dept: URBAN - METROPOLITAN AREA HOSPITAL 77 | Facility: HOSPITAL | Age: 45
End: 2025-04-14
Payer: COMMERCIAL

## 2025-04-14 DIAGNOSIS — K31.89 OTHER DISEASES OF STOMACH AND DUODENUM: ICD-10-CM

## 2025-04-14 DIAGNOSIS — B37.81 CANDIDAL ESOPHAGITIS: ICD-10-CM

## 2025-04-14 DIAGNOSIS — R93.3 ABNORMAL FINDINGS ON DIAGNOSTIC IMAGING OF OTHER PARTS OF DI: ICD-10-CM

## 2025-04-14 DIAGNOSIS — K86.2 CYST OF PANCREAS: ICD-10-CM

## 2025-04-14 DIAGNOSIS — K29.50 UNSPECIFIED CHRONIC GASTRITIS WITHOUT BLEEDING: ICD-10-CM

## 2025-04-14 DIAGNOSIS — Z90.81 ACQUIRED ABSENCE OF SPLEEN: ICD-10-CM

## 2025-04-14 PROCEDURE — 43259 EGD US EXAM DUODENUM/JEJUNUM: CPT | Performed by: INTERNAL MEDICINE

## 2025-04-14 PROCEDURE — 43239 EGD BIOPSY SINGLE/MULTIPLE: CPT | Performed by: INTERNAL MEDICINE

## 2025-06-11 ENCOUNTER — OFFICE (OUTPATIENT)
Dept: URBAN - METROPOLITAN AREA CLINIC 64 | Facility: CLINIC | Age: 45
End: 2025-06-11
Payer: COMMERCIAL

## 2025-06-11 VITALS
DIASTOLIC BLOOD PRESSURE: 77 MMHG | SYSTOLIC BLOOD PRESSURE: 117 MMHG | HEART RATE: 97 BPM | HEIGHT: 71 IN | WEIGHT: 151 LBS

## 2025-06-11 DIAGNOSIS — K86.1 OTHER CHRONIC PANCREATITIS: ICD-10-CM

## 2025-06-11 DIAGNOSIS — R10.11 RIGHT UPPER QUADRANT PAIN: ICD-10-CM

## 2025-06-11 DIAGNOSIS — F10.10 ALCOHOL ABUSE, UNCOMPLICATED: ICD-10-CM

## 2025-06-11 DIAGNOSIS — Z72.0 TOBACCO USE: ICD-10-CM

## 2025-06-11 PROCEDURE — 99213 OFFICE O/P EST LOW 20 MIN: CPT | Performed by: NURSE PRACTITIONER

## 2025-07-07 ENCOUNTER — APPOINTMENT (OUTPATIENT)
Dept: GENERAL RADIOLOGY | Facility: HOSPITAL | Age: 45
End: 2025-07-07
Payer: MEDICAID

## 2025-07-07 ENCOUNTER — APPOINTMENT (OUTPATIENT)
Dept: CT IMAGING | Facility: HOSPITAL | Age: 45
End: 2025-07-07
Payer: MEDICAID

## 2025-07-07 ENCOUNTER — APPOINTMENT (OUTPATIENT)
Dept: ULTRASOUND IMAGING | Facility: HOSPITAL | Age: 45
End: 2025-07-07
Payer: MEDICAID

## 2025-07-07 ENCOUNTER — HOSPITAL ENCOUNTER (EMERGENCY)
Facility: HOSPITAL | Age: 45
Discharge: HOME | End: 2025-07-07
Attending: EMERGENCY MEDICINE | Admitting: EMERGENCY MEDICINE
Payer: MEDICAID

## 2025-07-07 VITALS
OXYGEN SATURATION: 98 % | SYSTOLIC BLOOD PRESSURE: 111 MMHG | HEIGHT: 70 IN | DIASTOLIC BLOOD PRESSURE: 80 MMHG | HEART RATE: 81 BPM | BODY MASS INDEX: 17.08 KG/M2 | RESPIRATION RATE: 16 BRPM | TEMPERATURE: 98.1 F

## 2025-07-07 DIAGNOSIS — M79.10 MUSCLE TENDERNESS: ICD-10-CM

## 2025-07-07 DIAGNOSIS — R10.9 FLANK PAIN: Primary | ICD-10-CM

## 2025-07-07 LAB
ALBUMIN SERPL-MCNC: 4.7 G/DL (ref 3.5–5.2)
ALBUMIN/GLOB SERPL: 1.4 G/DL
ALP SERPL-CCNC: 124 U/L (ref 39–117)
ALT SERPL W P-5'-P-CCNC: 16 U/L (ref 1–33)
ANION GAP SERPL CALCULATED.3IONS-SCNC: 14.5 MMOL/L (ref 5–15)
ANISOCYTOSIS BLD QL: ABNORMAL
AST SERPL-CCNC: 13 U/L (ref 1–32)
BASOPHILS # BLD MANUAL: 0 10*3/MM3 (ref 0–0.2)
BASOPHILS NFR BLD MANUAL: 0 % (ref 0–1.5)
BILIRUB SERPL-MCNC: 0.3 MG/DL (ref 0–1.2)
BILIRUB UR QL STRIP: NEGATIVE
BUN SERPL-MCNC: 12 MG/DL (ref 6–20)
BUN/CREAT SERPL: 13.8 (ref 7–25)
CALCIUM SPEC-SCNC: 10 MG/DL (ref 8.6–10.5)
CHLORIDE SERPL-SCNC: 97 MMOL/L (ref 98–107)
CLARITY UR: CLEAR
CO2 SERPL-SCNC: 23.5 MMOL/L (ref 22–29)
COLOR UR: YELLOW
CREAT SERPL-MCNC: 0.87 MG/DL (ref 0.57–1)
DEPRECATED RDW RBC AUTO: 46.3 FL (ref 37–54)
EGFRCR SERPLBLD CKD-EPI 2021: 85.4 ML/MIN/1.73
EOSINOPHIL # BLD MANUAL: 0.36 10*3/MM3 (ref 0–0.4)
EOSINOPHIL NFR BLD MANUAL: 3 % (ref 0.3–6.2)
ERYTHROCYTE [DISTWIDTH] IN BLOOD BY AUTOMATED COUNT: 12.3 % (ref 12.3–15.4)
GEN 5 1HR TROPONIN T REFLEX: <6 NG/L
GLOBULIN UR ELPH-MCNC: 3.4 GM/DL
GLUCOSE SERPL-MCNC: 98 MG/DL (ref 65–99)
GLUCOSE UR STRIP-MCNC: NEGATIVE MG/DL
HCT VFR BLD AUTO: 41.2 % (ref 34–46.6)
HGB BLD-MCNC: 13.7 G/DL (ref 12–15.9)
HGB UR QL STRIP.AUTO: NEGATIVE
HOLD SPECIMEN: NORMAL
HOLD SPECIMEN: NORMAL
KETONES UR QL STRIP: NEGATIVE
LEUKOCYTE ESTERASE UR QL STRIP.AUTO: NEGATIVE
LIPASE SERPL-CCNC: 15 U/L (ref 13–60)
LYMPHOCYTES # BLD MANUAL: 4.06 10*3/MM3 (ref 0.7–3.1)
LYMPHOCYTES NFR BLD MANUAL: 7.1 % (ref 5–12)
MCH RBC QN AUTO: 34.3 PG (ref 26.6–33)
MCHC RBC AUTO-ENTMCNC: 33.3 G/DL (ref 31.5–35.7)
MCV RBC AUTO: 103.3 FL (ref 79–97)
MONOCYTES # BLD: 0.84 10*3/MM3 (ref 0.1–0.9)
NEUTROPHILS # BLD AUTO: 6.59 10*3/MM3 (ref 1.7–7)
NEUTROPHILS NFR BLD MANUAL: 55.6 % (ref 42.7–76)
NITRITE UR QL STRIP: NEGATIVE
NRBC BLD AUTO-RTO: 0 /100 WBC (ref 0–0.2)
PH UR STRIP.AUTO: 7 [PH] (ref 5–8)
PLAT MORPH BLD: NORMAL
PLATELET # BLD AUTO: 401 10*3/MM3 (ref 140–450)
PMV BLD AUTO: 9.4 FL (ref 6–12)
POTASSIUM SERPL-SCNC: 3.9 MMOL/L (ref 3.5–5.2)
PROT SERPL-MCNC: 8.1 G/DL (ref 6–8.5)
PROT UR QL STRIP: NEGATIVE
RBC # BLD AUTO: 3.99 10*6/MM3 (ref 3.77–5.28)
SODIUM SERPL-SCNC: 135 MMOL/L (ref 136–145)
SP GR UR STRIP: <=1.005 (ref 1–1.03)
TROPONIN T NUMERIC DELTA: NORMAL
TROPONIN T SERPL HS-MCNC: <6 NG/L
UROBILINOGEN UR QL STRIP: NORMAL
VARIANT LYMPHS NFR BLD MANUAL: 34.3 % (ref 19.6–45.3)
WBC MORPH BLD: NORMAL
WBC NRBC COR # BLD AUTO: 11.85 10*3/MM3 (ref 3.4–10.8)
WHOLE BLOOD HOLD COAG: NORMAL
WHOLE BLOOD HOLD SPECIMEN: NORMAL

## 2025-07-07 PROCEDURE — 96374 THER/PROPH/DIAG INJ IV PUSH: CPT

## 2025-07-07 PROCEDURE — 25010000002 HYDROMORPHONE PER 4 MG: Performed by: EMERGENCY MEDICINE

## 2025-07-07 PROCEDURE — 74176 CT ABD & PELVIS W/O CONTRAST: CPT

## 2025-07-07 PROCEDURE — 81003 URINALYSIS AUTO W/O SCOPE: CPT

## 2025-07-07 PROCEDURE — 85025 COMPLETE CBC W/AUTO DIFF WBC: CPT

## 2025-07-07 PROCEDURE — 25010000002 HYDROMORPHONE PER 4 MG

## 2025-07-07 PROCEDURE — 93005 ELECTROCARDIOGRAM TRACING: CPT | Performed by: EMERGENCY MEDICINE

## 2025-07-07 PROCEDURE — 84484 ASSAY OF TROPONIN QUANT: CPT | Performed by: EMERGENCY MEDICINE

## 2025-07-07 PROCEDURE — 36415 COLL VENOUS BLD VENIPUNCTURE: CPT

## 2025-07-07 PROCEDURE — 71045 X-RAY EXAM CHEST 1 VIEW: CPT

## 2025-07-07 PROCEDURE — 96376 TX/PRO/DX INJ SAME DRUG ADON: CPT

## 2025-07-07 PROCEDURE — 83690 ASSAY OF LIPASE: CPT

## 2025-07-07 PROCEDURE — 25010000002 ONDANSETRON PER 1 MG: Performed by: EMERGENCY MEDICINE

## 2025-07-07 PROCEDURE — 99284 EMERGENCY DEPT VISIT MOD MDM: CPT

## 2025-07-07 PROCEDURE — 96375 TX/PRO/DX INJ NEW DRUG ADDON: CPT

## 2025-07-07 PROCEDURE — 85007 BL SMEAR W/DIFF WBC COUNT: CPT

## 2025-07-07 PROCEDURE — 80053 COMPREHEN METABOLIC PANEL: CPT

## 2025-07-07 RX ORDER — HYDROMORPHONE HYDROCHLORIDE 1 MG/ML
0.5 INJECTION, SOLUTION INTRAMUSCULAR; INTRAVENOUS; SUBCUTANEOUS ONCE
Status: COMPLETED | OUTPATIENT
Start: 2025-07-07 | End: 2025-07-07

## 2025-07-07 RX ORDER — ONDANSETRON 2 MG/ML
4 INJECTION INTRAMUSCULAR; INTRAVENOUS ONCE
Status: COMPLETED | OUTPATIENT
Start: 2025-07-07 | End: 2025-07-07

## 2025-07-07 RX ORDER — SODIUM CHLORIDE 0.9 % (FLUSH) 0.9 %
10 SYRINGE (ML) INJECTION AS NEEDED
Status: DISCONTINUED | OUTPATIENT
Start: 2025-07-07 | End: 2025-07-08 | Stop reason: HOSPADM

## 2025-07-07 RX ORDER — METHYLPREDNISOLONE 4 MG/1
TABLET ORAL
Qty: 21 TABLET | Refills: 0 | Status: SHIPPED | OUTPATIENT
Start: 2025-07-07

## 2025-07-07 RX ADMIN — ONDANSETRON 4 MG: 2 INJECTION, SOLUTION INTRAMUSCULAR; INTRAVENOUS at 20:45

## 2025-07-07 RX ADMIN — HYDROMORPHONE HYDROCHLORIDE 0.5 MG: 1 INJECTION, SOLUTION INTRAMUSCULAR; INTRAVENOUS; SUBCUTANEOUS at 20:46

## 2025-07-07 RX ADMIN — HYDROMORPHONE HYDROCHLORIDE 0.5 MG: 1 INJECTION, SOLUTION INTRAMUSCULAR; INTRAVENOUS; SUBCUTANEOUS at 22:05

## 2025-07-07 NOTE — ED NOTES
Pt presents to ED with c/o right side pain x 2 weeks worse today. Pt has been seen at  and been on abx and also had an US done by GI last Wednesday. Pt states this pain is worse with movement and today she felt a burning sensation. Pt states she has urinary hesitancy.

## 2025-07-08 LAB
QT INTERVAL: 373 MS
QTC INTERVAL: 420 MS

## 2025-07-08 NOTE — DISCHARGE INSTRUCTIONS
Please follow-up with your PCP.    Rest.  Increase fluid intake.      Although you are being discharged in the ED today, I encourage you to return for worsening symptoms.  Things can, and do, change such a treatment at home with medication may not be adequate.  Specifically I recommend returning for chest pain or discomfort, difficulty breathing, persistent vomiting or difficulty holding down liquids or medications, fever > 102.0 F, or any other worsening or alarming symptoms.     Take meds as prescribed.     You have been evaluated in the emergency department for your presenting symptoms and deemed appropriate for discharge home.  Understand that your health care does not end here today.  It is important that your primary care physician be made aware of your visit.  I recommend calling your primary care provider in the next 48 hours to arrange follow-up care.  Your primary care provider needs to review your treatment and recovery to ensure that no further treatment is necessary.  Additional testing or procedures may be necessary as an outpatient at the discretion of your primary care provider.    I also recommend following up with your PCP for recheck of your blood pressure and treatment as needed.

## 2025-07-08 NOTE — ED PROVIDER NOTES
EMERGENCY DEPARTMENT ENCOUNTER    Room Number:  08/08  Date of encounter:  7/8/2025  PCP: Sharmaine Gatica APRN  Historian: Patient  Full history not obtainable due to: None    HPI:  Chief Complaint: right flank pain    Context: Piper Cain is a 44 y.o. female with a PMH significant for alcohol-induced pancreatitis, elevated LFTs, alcohol abuse, thrombocytopenia, epigastric pain, pyelonephritis, perinephric abscess, splenic vein thrombosis, GERD, macrocytosis, left-sided chest pain who presents to the ED c/o right-sided flank/abdominal pain.  Patient says that she has recently started a new workout class, has been having pain in the oblique region of her abdomen.  She says that the workout class primarily focuses on abs.  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  Patient was worked up by her PCP, had a ultrasound of her right upper quadrant due to concern for gallstones but has not yet received results.      MEDICAL RECORD REVIEW:    Upon review of the medical record it appears the patient was evaluated 4/11/25.  The patient had a normal rubella and mumps.    PAST MEDICAL HISTORY    Active Ambulatory Problems     Diagnosis Date Noted    Alcohol-induced acute pancreatitis without infection or necrosis 09/28/2017    Alcohol abuse 09/28/2017    Elevated LFTs 09/28/2017    Thrombocytopenia 10/02/2017    Epigastric pain 08/07/2022    Abnormal CT of the abdomen 08/07/2022    Chronic pancreatitis 08/08/2022    Acute pyelonephritis 08/08/2022    Perinephric abscess 08/08/2022    Splenic vein thrombosis 08/08/2022    Anemia of chronic disease 08/08/2022    GERD without esophagitis 08/08/2022    Alcohol dependence in remission 08/08/2022    Pancreatic pseudocyst 08/14/2022    Candida esophagitis 08/14/2022    Ureterolithiasis 10/20/2022    Left flank pain 10/21/2022    Bacterial vaginosis  10/23/2022    Abdominal pain 11/30/2022    Adrenal hemorrhage 03/25/2023    Chronic pancreatitis, unspecified pancreatitis type 06/08/2023    Macrocytosis 06/08/2023    Left-sided chest pain 06/08/2023    Tobacco abuse 06/08/2023    Acute on chronic pancreatitis 08/23/2023    Alcohol abuse 08/23/2023    Acute UTI (urinary tract infection) 08/23/2023    Essential hypertension 08/23/2023    Migraine 08/23/2023    Generalized anxiety disorder 08/23/2023    Pancreatic pseudocyst 08/23/2023    Acute abdominal pain 09/05/2023    Splenic laceration 01/21/2024    Chronic, continuous use of opioids 01/24/2024    Functional asplenia 01/25/2024    Hematoma of spleen without rupture of capsule 04/25/2024     Resolved Ambulatory Problems     Diagnosis Date Noted    Hypokalemia 09/28/2017    Mixed acid base balance disorder 09/28/2017    Dehydration 09/28/2017    Nausea and vomiting 06/08/2023    Hyperglycemia 08/23/2023     Past Medical History:   Diagnosis Date    Anemia     Anxiety     Constipation     Cyst of spleen     Diarrhea     E. coli bacteremia     ETOH abuse     Frequent UTI     GERD (gastroesophageal reflux disease)     Hiatal hernia     Infarction of spleen 02/16/2024    Miscarriage 2004    Pancreatitis     Pseudocyst of pancreas          PAST SURGICAL HISTORY  Past Surgical History:   Procedure Laterality Date    EMBOLIZATION MESENTERIC ARTERY N/A 01/22/2024    Procedure: OR EMBOLIZATION MESENTERIC ARTERY RIGHT FEMORAL POPLITEAL THROMBECTOMY;  Surgeon: Carine Pena Jr., MD;  Location: [facility] HYBRID OR;  Service: Vascular;  Laterality: N/A;    ENDOSCOPY N/A 08/10/2022    Procedure: ESOPHAGOGASTRODUODENOSCOPY with bxs;  Surgeon: Salvador Price MD;  Location: [facility] ENDOSCOPY;  Service: Gastroenterology;  Laterality: N/A;  Pre: r/o esophageal  varacies, abd pain  Post: esophageal plaque    ENDOSCOPY N/A 10/09/2023    Procedure: ESOPHAGOGASTRODUODENOSCOPY WITH STENT REMOVAL;  Surgeon: Red Denson MD;   Location: [facility] ENDOSCOPY;  Service: Gastroenterology;  Laterality: N/A;    ENDOSCOPY N/A 04/03/2024    Procedure: ESOPHAGOGASTRODUODENOSCOPY (EGD) WITH BIOPSIES;  Surgeon: Petar Starr MD;  Location: [facility] ENDOSCOPY;  Service: Gastroenterology;  Laterality: N/A;  PRE- ABDOMINAL PAIN  POST - GASTRITIS, ESOPHAGITIS,BILE REFLUX    PANCREATIC CYST DRAINAGE      x 2    SPLENECTOMY  2024    SPLEEN REPAIR    UPPER ENDOSCOPIC ULTRASOUND W/ FNA N/A 09/13/2023    Procedure: Esophagogastroduodenoscopy with biopsy x 1 area and endoscopic ultrasound with placement of cyst gastrostomy;  Surgeon: Red Denson MD;  Location: [facility] ENDOSCOPY;  Service: Gastroenterology;  Laterality: N/A;  post: pancreatic psuedocyst    WISDOM TOOTH EXTRACTION           FAMILY HISTORY  Family History   Problem Relation Age of Onset    Alcohol abuse Mother     Arthritis Mother     Asthma Mother     Miscarriages / Stillbirths Mother     Cancer Father     Diabetes Father     Drug abuse Father     Early death Father     Heart disease Father     Hyperlipidemia Father     Hypertension Father     Alcohol abuse Paternal Aunt     Cancer Paternal Aunt     Diabetes Paternal Aunt     Drug abuse Paternal Aunt     Early death Paternal Aunt     Heart disease Paternal Aunt     Hyperlipidemia Paternal Aunt     Hypertension Paternal Aunt     Alcohol abuse Maternal Grandmother     Alcohol abuse Maternal Grandfather     Alcohol abuse Paternal Grandmother     Cancer Paternal Grandmother     Diabetes Paternal Grandmother     Drug abuse Paternal Grandmother     Early death Paternal Grandmother     Heart disease Paternal Grandmother     Hyperlipidemia Paternal Grandmother     Hypertension Paternal Grandmother     Alcohol abuse Paternal Grandfather          SOCIAL HISTORY  Social History     Socioeconomic History    Marital status: Single   Tobacco Use    Smoking status: Every Day     Current packs/day: 0.50     Average packs/day: 0.5 packs/day for 29.4  years (14.7 ttl pk-yrs)     Types: Cigarettes     Start date: 1/28/1996     Passive exposure: Current    Smokeless tobacco: Never   Vaping Use    Vaping status: Never Used   Substance and Sexual Activity    Alcohol use: Not Currently    Drug use: Never    Sexual activity: Defer     Partners: Male         ALLERGIES  Nickel        REVIEW OF SYSTEMS    All systems reviewed and marked as negative except as listed in HPI     PHYSICAL EXAM    I have reviewed the triage vital signs and nursing notes.    ED Triage Vitals   Temp Heart Rate Resp BP SpO2   07/07/25 1751 07/07/25 1751 07/07/25 1751 07/07/25 1756 07/07/25 1751   98.1 °F (36.7 °C) 90 18 124/92 97 %      Temp src Heart Rate Source Patient Position BP Location FiO2 (%)   07/07/25 1751 -- -- 07/07/25 1756 --   Oral   Right arm        Physical Exam  Constitutional:       General: She is not in acute distress.     Appearance: Normal appearance. She is not ill-appearing.   HENT:      Head: Normocephalic and atraumatic.      Nose: Nose normal.   Eyes:      Extraocular Movements: Extraocular movements intact.      Pupils: Pupils are equal, round, and reactive to light.   Cardiovascular:      Rate and Rhythm: Normal rate and regular rhythm.      Pulses: Normal pulses.      Heart sounds: Normal heart sounds.   Pulmonary:      Effort: Pulmonary effort is normal. No respiratory distress.      Breath sounds: Normal breath sounds.   Abdominal:      General: Abdomen is flat. There is no distension.      Palpations: Abdomen is soft.      Tenderness: There is no abdominal tenderness. There is no guarding.       Skin:     General: Skin is warm and dry.      Coloration: Skin is not jaundiced.   Neurological:      Mental Status: She is alert and oriented to person, place, and time.   Psychiatric:         Mood and Affect: Mood normal.         Behavior: Behavior normal.         Thought Content: Thought content normal.         Vital signs and nursing notes reviewed.      LAB  RESULTS  Recent Results (from the past 24 hours)   Urinalysis With Microscopic If Indicated (No Culture) - Urine, Clean Catch    Collection Time: 07/07/25  6:02 PM    Specimen: Urine, Clean Catch   Result Value Ref Range    Color, UA Yellow Yellow, Straw    Appearance, UA Clear Clear    pH, UA 7.0 5.0 - 8.0    Specific Gravity, UA <=1.005 1.005 - 1.030    Glucose, UA Negative Negative    Ketones, UA Negative Negative    Bilirubin, UA Negative Negative    Blood, UA Negative Negative    Protein, UA Negative Negative    Leuk Esterase, UA Negative Negative    Nitrite, UA Negative Negative    Urobilinogen, UA 0.2 E.U./dL 0.2 - 1.0 E.U./dL   Comprehensive Metabolic Panel    Collection Time: 07/07/25  6:09 PM    Specimen: Hand, Left; Blood   Result Value Ref Range    Glucose 98 65 - 99 mg/dL    BUN 12.0 6.0 - 20.0 mg/dL    Creatinine 0.87 0.57 - 1.00 mg/dL    Sodium 135 (L) 136 - 145 mmol/L    Potassium 3.9 3.5 - 5.2 mmol/L    Chloride 97 (L) 98 - 107 mmol/L    CO2 23.5 22.0 - 29.0 mmol/L    Calcium 10.0 8.6 - 10.5 mg/dL    Total Protein 8.1 6.0 - 8.5 g/dL    Albumin 4.7 3.5 - 5.2 g/dL    ALT (SGPT) 16 1 - 33 U/L    AST (SGOT) 13 1 - 32 U/L    Alkaline Phosphatase 124 (H) 39 - 117 U/L    Total Bilirubin 0.3 0.0 - 1.2 mg/dL    Globulin 3.4 gm/dL    A/G Ratio 1.4 g/dL    BUN/Creatinine Ratio 13.8 7.0 - 25.0    Anion Gap 14.5 5.0 - 15.0 mmol/L    eGFR 85.4 >60.0 mL/min/1.73   Lipase    Collection Time: 07/07/25  6:09 PM    Specimen: Hand, Left; Blood   Result Value Ref Range    Lipase 15 13 - 60 U/L   Green Top (Gel)    Collection Time: 07/07/25  6:09 PM   Result Value Ref Range    Extra Tube Hold for add-ons.    Lavender Top    Collection Time: 07/07/25  6:09 PM   Result Value Ref Range    Extra Tube hold for add-on    Gold Top - SST    Collection Time: 07/07/25  6:09 PM   Result Value Ref Range    Extra Tube Hold for add-ons.    Light Blue Top    Collection Time: 07/07/25  6:09 PM   Result Value Ref Range    Extra Tube Hold  for add-ons.    CBC Auto Differential    Collection Time: 07/07/25  6:09 PM    Specimen: Hand, Left; Blood   Result Value Ref Range    WBC 11.85 (H) 3.40 - 10.80 10*3/mm3    RBC 3.99 3.77 - 5.28 10*6/mm3    Hemoglobin 13.7 12.0 - 15.9 g/dL    Hematocrit 41.2 34.0 - 46.6 %    .3 (H) 79.0 - 97.0 fL    MCH 34.3 (H) 26.6 - 33.0 pg    MCHC 33.3 31.5 - 35.7 g/dL    RDW 12.3 12.3 - 15.4 %    RDW-SD 46.3 37.0 - 54.0 fl    MPV 9.4 6.0 - 12.0 fL    Platelets 401 140 - 450 10*3/mm3    nRBC 0.0 0.0 - 0.2 /100 WBC   Manual Differential    Collection Time: 07/07/25  6:09 PM    Specimen: Hand, Left; Blood   Result Value Ref Range    Neutrophil % 55.6 42.7 - 76.0 %    Lymphocyte % 34.3 19.6 - 45.3 %    Monocyte % 7.1 5.0 - 12.0 %    Eosinophil % 3.0 0.3 - 6.2 %    Basophil % 0.0 0.0 - 1.5 %    Neutrophils Absolute 6.59 1.70 - 7.00 10*3/mm3    Lymphocytes Absolute 4.06 (H) 0.70 - 3.10 10*3/mm3    Monocytes Absolute 0.84 0.10 - 0.90 10*3/mm3    Eosinophils Absolute 0.36 0.00 - 0.40 10*3/mm3    Basophils Absolute 0.00 0.00 - 0.20 10*3/mm3    Anisocytosis Slight/1+ None Seen    WBC Morphology Normal Normal    Platelet Morphology Normal Normal   High Sensitivity Troponin T    Collection Time: 07/07/25  6:09 PM    Specimen: Hand, Left; Blood   Result Value Ref Range    HS Troponin T <6 <14 ng/L   ECG 12 Lead Chest Pain    Collection Time: 07/07/25  7:45 PM   Result Value Ref Range    QT Interval 373 ms    QTC Interval 420 ms   High Sensitivity Troponin T 1Hr    Collection Time: 07/07/25  8:45 PM    Specimen: Blood   Result Value Ref Range    HS Troponin T <6 <14 ng/L    Troponin T Numeric Delta         Ordered the above labs and independently reviewed the results.        RADIOLOGY  CT Abdomen Pelvis Without Contrast  Result Date: 7/7/2025  CT OF THE ABDOMEN AND PELVIS WITHOUT CONTRAST  HISTORY: Right flank pain  COMPARISON: September 13, 2024  TECHNIQUE: Axial CT imaging was obtained through the abdomen and pelvis. IV contrast was  administered.  FINDINGS: Images through the lung bases are clear. No suspicious hepatic lesions are seen. The stomach, duodenum, and adrenal glands appear normal. The patient is status post distal pancreatectomy and splenectomy. There are changes of chronic calcific pancreatitis noted within the residual pancreatic parenchyma. There is some questionable higher density material layering within the gallbladder. Some stones or sludge are not excluded. There is no gallbladder wall dilatation or gallbladder wall thickening or pericholecystic fluid. No hydronephrosis is seen on either side. There are aortoiliac calcifications. Ovaries appear normal. There is no bowel obstruction. The appendix is normal. Postsurgical changes are noted within the right inguinal region. There is a tiny fat-containing umbilical hernia. No acute osseous abnormalities are seen.       1. Questionable higher density material layering within the gallbladder. This may be artifactual, although sludge or stones are not excluded.  Radiation dose reduction techniques were utilized, including automated exposure control and exposure modulation based on body size.   This report was finalized on 7/7/2025 9:35 PM by Dr. Camilla Whalen M.D on Workstation: [id number]      XR Chest 1 View  Result Date: 7/7/2025  XR CHEST 1 VW-  HISTORY: Female who is 44 years-old, pain  TECHNIQUE: Frontal view of the chest  COMPARISON: 11/13/2024  FINDINGS: Heart, mediastinum and pulmonary vasculature are unremarkable. No focal pulmonary consolidation, pleural effusion, or pneumothorax. No acute osseous process.      No evidence for acute pulmonary process. Follow-up as clinical indications persist.  This report was finalized on 7/7/2025 8:10 PM by Dr. Jean Marie M.D on Workstation: [id number]        I ordered the above noted radiological studies. Independently reviewed by me and discussed with radiologist.  See dictation above for official radiology  interpretation.      PROCEDURES    Procedures        MEDICATIONS GIVEN IN ER    Medications   HYDROmorphone (DILAUDID) injection 0.5 mg (0.5 mg Intravenous Given 7/7/25 2046)   ondansetron (ZOFRAN) injection 4 mg (4 mg Intravenous Given 7/7/25 2045)   HYDROmorphone (DILAUDID) injection 0.5 mg (0.5 mg Intravenous Given 7/7/25 2205)         PROGRESS, DATA ANALYSIS, CONSULTS, AND MEDICAL DECISION MAKING    All labs have been independently interpreted by me.  All radiology studies have been interpreted by me.  Discussion below represents my analysis of pertinent findings related to patient's condition, differential diagnosis, treatment plan and final disposition.    Workup in the emergency department today is overall stable.  No significant electrolyte derangement, no leukocytosis 11.5 which is nonspecific.  Transaminases normal, alk phos slightly elevated at 124, normal bilirubin.  Patient does not have any right upper quadrant tenderness, biliary origin seems unlikely at this time.  Pain seems very specific to her oblique muscle.  Patient was offered admission for pain management, patient requested instead to go home on steroids as steroids have worked for her in the past with muscular pains.  Discharged with strict return precautions, is to follow-up with her GI team.    - Chronic or social conditions impacting care: History of alcohol abuse, history of pancreatitis, asplenia      DIFFERENTIAL DIAGNOSIS INCLUDE BUT NOT LIMITED TO: Differential diagnosis includes but is not limited to:  - Hepatobiliary pathology such as cholecystitis, cholangitis, and symptomatic cholelithiasis  - Pancreatitis  - Dyspepsia  - Small bowel obstruction  - Appendicitis  - Diverticulitis  - UTI including pyelonephritis  - Ureteral stone  - Zoster  - Colitis, including infectious and ischemic  - Atypical ACS  ]      Orders placed during this visit:  Orders Placed This Encounter   Procedures    XR Chest 1 View    CT Abdomen Pelvis Without  Contrast    [place] Draw    Comprehensive Metabolic Panel    Lipase    Urinalysis With Microscopic If Indicated (No Culture) - Urine, Clean Catch    CBC Auto Differential    Manual Differential    High Sensitivity Troponin T    High Sensitivity Troponin T 1Hr    Undress & Gown    ECG 12 Lead Chest Pain    CBC & Differential    Green Top (Gel)    Lavender Top    Gold Top - SST    Light Blue Top         ED Course as of 07/08/25 1656   Mon Jul 07, 2025   2030 XR Chest 1 View  My independent interpretation of the imaging study is no pneumothorax [AB]   2133 CT Abdomen Pelvis Without Contrast  My independent interpretation of the imaging study is no SBO or free air [AB]   2215 ALT (SGPT): 16 [CV]   2215 AST (SGOT): 13 [CV]   2215 Alkaline Phosphatase(!): 124 [CV]   2230 I discussed results with patient.  Pain seems very muscular in origin as she has very specific point tenderness over her right oblique, no elevation of LFTs, no elevation of bilirubin, patient is afebrile with pain controlled in the ED today.  Patient is requesting steroids as she says this has helped with muscular pain in the past.  This is likely a new pain caused by her new workout regimen [CV]      ED Course User Index  [AB] Jose Russo MD  [CV] Clay Marquez PA-C       AS OF 16:56 EDT VITALS:    BP - 111/80  HR - 81  TEMP - 98.1 °F (36.7 °C)  02 SATS - 98%    I rechecked the patient.  I discussed the patient's labs, radiology findings (including all incidental findings), diagnosis, and plan for discharge.  A repeat exam reveals no new worrisome changes from my initial exam findings.  The patient understands that the fact that they are being discharged does not denote that nothing is abnormal, it indicates that no clinical emergency is present and that they must follow-up as directed in order to properly maintain their health.  Follow-up instructions (specifically listed below) and return to ER precautions were given at this time.  I  specifically instructed the patient to follow-up with their PCP.  The patient understands and agrees with the plan, and is ready for discharge.  All questions answered.    Sharmaine Gatica, APRN  [id number] [facility] [address]  [place] [id number] [phone]    Go in 2 days  As needed         Medication List        New Prescriptions      methylPREDNISolone 4 MG dose pack  Commonly known as: MEDROL  Take as directed on package instructions.               Where to Get Your Medications        These medications were sent to [facility]/pharmacy #[id number] - [facility] [place] - [address] [place] [address] - [phone]  - [phone] [address] [place] [address]      Hours: 24-hours Phone: [phone]   methylPREDNISolone 4 MG dose pack         DIAGNOSIS  Final diagnoses:   Flank pain   Muscle tenderness         DISPOSITION  Discharge    Pt masked in first look. I wore a surgical mask throughout my encounters with the pt. I performed hand hygiene on entry into the pt room and upon exit.     Dictated utilizing Dragon dictation     Note Disclaimer: At [facility], we believe that sharing information builds trust and better relationships. You are receiving this note because you recently visited [facility]. It is possible you will see health information before a provider has talked with you about it. This kind of information can be easy to misunderstand. To help you fully understand what it means for your health, we urge you to discuss this note with your provider.      Clay Marquez PA-C  07/08/25 [id number]

## 2025-07-08 NOTE — ED PROVIDER NOTES
MD ATTESTATION NOTE  I supervised care provided by the APC. We have discussed this patient's history, physical exam, and treatment plan. I have reviewed the APC's note and I agree with the APC's findings and plan of care.   SHARED VISIT: This visit was performed by BOTH a physician and an APC. The substantive portion of the medical decision making was performed by this attesting physician who made or approved the management plan and takes responsibility for patient management. All studies in the APC note (if performed) were independently interpreted by me.   I have personally had a face to face encounter with the patient.     PCP: Sharmaine Gatica APRN  Patient Care Team:  Sharmaine Gatica APRN as PCP - General (Nurse Practitioner)     Piper Cain is a 44 y.o. female who presents to the ED c/o right upper quadrant pain.  Patient has had 2 to 3 weeks of right upper quadrant pain.  No associated fever, chills, nausea, vomiting or diarrhea.  She had a right upper quadrant ultrasound last week ordered by her GI physician but has not received those results.  It is not available within our system.    On exam:  General: NAD.  Head: NCAT.  ENT: nares patent, no scleral icterus  Neck: Supple, trachea midline.  Cardiac: regular rate and rhythm.  Lungs: normal effort, clear to auscultation bilaterally  Abdomen: Soft, nondistended, exquisite tenderness in the right lateral aspect of the abdominal wall without other right upper quadrant or abdominal tenderness, no rebound tenderness, no guarding or rigidity.   MS: Moves all extremities well, no peripheral edema  Neuro: alert, MAEW, follows commands  Psych: calm, cooperative  Skin: Warm, dry.    Medical Decision Making:  After the initial H&P, I discussed pertinent information from history and physical exam with patient/family.  Discussed differential diagnosis.  Discussed plan for ED evaluation/work-up/treatment.  All questions answered.  Patient/family is agreeable with  plan.    ED Course as of 07/07/25 2248 Mon Jul 07, 2025 2030 XR Chest 1 View  My independent interpretation of the imaging study is no pneumothorax [AB]   2133 CT Abdomen Pelvis Without Contrast  My independent interpretation of the imaging study is no SBO or free air [AB]   2215 ALT (SGPT): 16 [CV]   2215 AST (SGOT): 13 [CV]   2215 Alkaline Phosphatase(!): 124 [CV]   2230 I discussed results with patient.  Pain seems very muscular in origin as she has very specific point tenderness over her right oblique, no elevation of LFTs, no elevation of bilirubin, patient is afebrile with pain controlled in the ED today.  Patient is requesting steroids as she says this has helped with muscular pain in the past.  This is likely a new pain caused by her new workout regimen [CV]      ED Course User Index  [AB] Jose Russo MD  [CV] Clay Marquez, PAInesC       Diagnosis  Final diagnoses:   Flank pain   Muscle tenderness        oJse Russo MD  07/07/25 2248

## (undated) DEVICE — SHEATH STEER INTRO TOURGUIDE 7F 9MM 90CM

## (undated) DEVICE — GLV SURG SENSICARE PI MIC PF SZ7.5 LF STRL

## (undated) DEVICE — FRCP BX RADJAW4 NDL 2.8 240CM LG OG BX40

## (undated) DEVICE — IMPLANTABLE DEVICE
Type: IMPLANTABLE DEVICE | Site: ARTERIAL | Status: NON-FUNCTIONAL
Removed: 2024-01-22

## (undated) DEVICE — SNAR AMPLTZ GOOSENECK 10MM 120CM 4FR

## (undated) DEVICE — Device

## (undated) DEVICE — SHEATH STEER INTRO TOURGUIDE 7F 9MM 45CM

## (undated) DEVICE — ADAPT CLN BIOGUARD AIR/H2O DISP

## (undated) DEVICE — CVR PROB 96IN LF STRL

## (undated) DEVICE — SENSR O2 OXIMAX FNGR A/ 18IN NONSTR

## (undated) DEVICE — SINGLE-USE BIOPSY FORCEPS: Brand: RADIAL JAW 4

## (undated) DEVICE — BITEBLOCK ENDO W/STRAP 60F A/ LF DISP

## (undated) DEVICE — GW GLIDEWIRE STD ANGL .035IN 3X180CM

## (undated) DEVICE — CANN O2 ETCO2 FITS ALL CONN CO2 SMPL A/ 7IN DISP LF

## (undated) DEVICE — KT ORCA ORCAPOD DISP STRL

## (undated) DEVICE — DEV HNDL RUBY COIL DETACH

## (undated) DEVICE — CATH GUIDE SOFTVU FLUSH HT PIG .035 5F 65CM

## (undated) DEVICE — LN SMPL CO2 SHTRM SD STREAM W/M LUER

## (undated) DEVICE — CATH ANGIO SOS OMNI 2 VSCR SFT/RO/TP .038IN 5F 65CM

## (undated) DEVICE — FRCP GRASP RESCUE RAT/TOOTH ALLGTR 8X230CM

## (undated) DEVICE — SOL NACL 0.9PCT 1000ML

## (undated) DEVICE — PK ENDO GI 50

## (undated) DEVICE — BITEBLOCK OMNI BLOC

## (undated) DEVICE — BLCK/BITE BLOX W/DENTL/RIM W/STRAP 54F

## (undated) DEVICE — TUBING, SUCTION, 1/4" X 10', STRAIGHT: Brand: MEDLINE

## (undated) DEVICE — SYR ARTERION MEDRAD MARK7

## (undated) DEVICE — ERBE NESSY®PLATE 170 SPLIT; 168CM²; CABLE 3M: Brand: ERBE

## (undated) DEVICE — MSK PROC CURAPLEX O2 2/ADAPT 7FT

## (undated) DEVICE — PK ANGIO 40

## (undated) DEVICE — ADAPT CHECKFLO PERFORMER ASSEMBL

## (undated) DEVICE — SNAR VASC RETRV ENSNARE STD SYS 6F 6TO10MM 120CM